# Patient Record
Sex: MALE | Race: WHITE | NOT HISPANIC OR LATINO | Employment: OTHER | URBAN - METROPOLITAN AREA
[De-identification: names, ages, dates, MRNs, and addresses within clinical notes are randomized per-mention and may not be internally consistent; named-entity substitution may affect disease eponyms.]

---

## 2017-02-12 ENCOUNTER — HOSPITAL ENCOUNTER (EMERGENCY)
Facility: HOSPITAL | Age: 72
Discharge: HOME | End: 2017-02-12
Attending: EMERGENCY MEDICINE | Admitting: EMERGENCY MEDICINE

## 2017-02-12 ENCOUNTER — APPOINTMENT (OUTPATIENT)
Dept: GENERAL RADIOLOGY | Facility: HOSPITAL | Age: 72
End: 2017-02-12

## 2017-02-12 VITALS
BODY MASS INDEX: 35.21 KG/M2 | WEIGHT: 260 LBS | SYSTOLIC BLOOD PRESSURE: 185 MMHG | OXYGEN SATURATION: 96 % | TEMPERATURE: 97.8 F | HEIGHT: 72 IN | DIASTOLIC BLOOD PRESSURE: 96 MMHG | HEART RATE: 54 BPM | RESPIRATION RATE: 16 BRPM

## 2017-02-12 DIAGNOSIS — M54.31 SCIATICA OF RIGHT SIDE: ICD-10-CM

## 2017-02-12 DIAGNOSIS — S39.012A LUMBAR STRAIN, INITIAL ENCOUNTER: Primary | ICD-10-CM

## 2017-02-12 PROCEDURE — 99283 EMERGENCY DEPT VISIT LOW MDM: CPT

## 2017-02-12 PROCEDURE — 72110 X-RAY EXAM L-2 SPINE 4/>VWS: CPT

## 2017-02-12 RX ORDER — BACLOFEN 10 MG/1
10 TABLET ORAL 3 TIMES DAILY PRN
Qty: 15 TABLET | Refills: 0 | Status: SHIPPED | OUTPATIENT
Start: 2017-02-12 | End: 2017-04-17

## 2017-02-12 RX ORDER — BACLOFEN 10 MG/1
10 TABLET ORAL ONCE
Status: COMPLETED | OUTPATIENT
Start: 2017-02-12 | End: 2017-02-12

## 2017-02-12 RX ORDER — TAMSULOSIN HYDROCHLORIDE 0.4 MG/1
1 CAPSULE ORAL DAILY
COMMUNITY
End: 2020-10-10 | Stop reason: HOSPADM

## 2017-02-12 RX ORDER — OXYCODONE HYDROCHLORIDE AND ACETAMINOPHEN 5; 325 MG/1; MG/1
1 TABLET ORAL ONCE
Status: COMPLETED | OUTPATIENT
Start: 2017-02-12 | End: 2017-02-12

## 2017-02-12 RX ORDER — SIMVASTATIN 10 MG
10 TABLET ORAL NIGHTLY
COMMUNITY
End: 2018-05-21 | Stop reason: CLARIF

## 2017-02-12 RX ORDER — FINASTERIDE 5 MG/1
5 TABLET, FILM COATED ORAL NIGHTLY
COMMUNITY
End: 2022-01-10

## 2017-02-12 RX ORDER — LISINOPRIL 40 MG/1
40 TABLET ORAL EVERY MORNING
COMMUNITY
End: 2019-12-10 | Stop reason: SDUPTHER

## 2017-02-12 RX ORDER — OXYCODONE HYDROCHLORIDE AND ACETAMINOPHEN 5; 325 MG/1; MG/1
1 TABLET ORAL EVERY 4 HOURS PRN
Qty: 16 TABLET | Refills: 0 | Status: SHIPPED | OUTPATIENT
Start: 2017-02-12 | End: 2017-04-17

## 2017-02-12 RX ORDER — DILTIAZEM HYDROCHLORIDE 120 MG/1
240 TABLET, FILM COATED ORAL ONCE
COMMUNITY
End: 2017-04-17 | Stop reason: DRUGHIGH

## 2017-02-12 RX ORDER — GLIMEPIRIDE 4 MG/1
4 TABLET ORAL
COMMUNITY
End: 2019-12-10

## 2017-02-12 RX ORDER — CLOPIDOGREL BISULFATE 75 MG/1
75 TABLET ORAL EVERY MORNING
COMMUNITY
End: 2018-11-07 | Stop reason: ALTCHOICE

## 2017-02-12 RX ORDER — HYDRALAZINE HYDROCHLORIDE 50 MG/1
50 TABLET, FILM COATED ORAL 2 TIMES DAILY
COMMUNITY
End: 2018-05-21 | Stop reason: DRUGHIGH

## 2017-02-12 RX ADMIN — BACLOFEN 10 MG: 10 TABLET ORAL at 13:08

## 2017-02-12 RX ADMIN — OXYCODONE HYDROCHLORIDE AND ACETAMINOPHEN 1 TABLET: 5; 325 TABLET ORAL at 13:08

## 2017-02-12 NOTE — ED PROVIDER NOTES
70 y/o male presents to the ED with right sided back pain with radiation down his right leg. Pt states he was seen by Mandaen recently, DVT was ruled out with venous duplex. The pt also says he underwent cardiac cath earlier this month.   The pt says he has been using his stationary bicycle     EXAM:  Awake, alert, oriented X3  Pain to Right buttock with radiation to right thigh, NV intact distally.    PLAN:   XR L-Spine - no acute abnormalities.   Pt will be d/c from the ED and instructed to f/u with ortho.     I supervised care provided by the midlevel provider.    We have discussed this patient's history, physical exam, and treatment plan.   I have reviewed the note and personally saw and examined the patient and agree with the plan of care.    Entered by Nicolás Baez acting as scribe for Dr. Mccartney.     Nicolás Baez  02/12/17 7123       Aftab Mccartney MD  02/13/17 2996

## 2017-02-12 NOTE — ED NOTES
"Pt wife states they went to a [facility] due to the leg and back pain. States they did a doppler and ruled out a blood clot and sent him home with no medications. Pt states he has had a similar event occur 5 years ago and was told he had \"scoliosis\". Pt states he has had pain shooting down from his and down his leg.      Beth Armenta, Nursing Student  02/12/17 1123    "

## 2017-02-12 NOTE — ED PROVIDER NOTES
"  EMERGENCY DEPARTMENT ENCOUNTER    CHIEF COMPLAINT  Chief Complaint: back pain  History given by: patient, family  History limited by: N/A  Room Number: 07/07  PMD: MUKUL Bañuelos      HPI:  Pt is a 71 y.o. male who presents with right lower back pain radiating to the right hip and right thigh which started about 4 days ago. Right lower back pain is exacerbated by bearing weight. He denies recent injury or trauma, sensory loss, motor loss, bladder dysfunction, bowel dysfunction, saddle anesthesia, chest pain, trouble breathing, fevers, chills, abd pain, and N/V/D. He has been taking NSAIDs for pain without sx relief. Per family, pt was evaluated yesterday at [facility] for \"a knot in the right leg\" and had RLE venous duplex performed which showed no DVT. Pt also reports that he had unremarkable cardiac cath earlier this month and was started on plavix. Past Medical History of HTN and diabetes.     Duration: started about 4 days ago  Timing: intermittent  Location: right lower back  Radiation: right hip and right thigh  Quality: pain  Intensity/Severity: moderate  Progression: unchanged  Associated Symptoms: none  Aggravating Factors: bearing weight  Alleviating Factors: resting  Previous Episodes: pt had similar sx 5 years ago for which he was evaluated at the time and was told he had \"scoliosis\"   Treatment before arrival: pt has been taking NSAIDs for pain without sx relief    PAST MEDICAL HISTORY  Active Ambulatory Problems     Diagnosis Date Noted   • No Active Ambulatory Problems     Resolved Ambulatory Problems     Diagnosis Date Noted   • No Resolved Ambulatory Problems     Past Medical History   Diagnosis Date   • Diabetes mellitus    • Hypertension    • Prostate hyperplasia without urinary obstruction    • TIA (transient ischemic attack)        PAST SURGICAL HISTORY  Past Surgical History   Procedure Laterality Date   • Joint replacement       Righ Knee   • Hand surgery     • Colonoscopy   "       FAMILY HISTORY  History reviewed. No pertinent family history.    SOCIAL HISTORY  Social History     Social History   • Marital status:      Spouse name: N/A   • Number of children: N/A   • Years of education: N/A     Occupational History   • Not on file.     Social History Main Topics   • Smoking status: Former Smoker   • Smokeless tobacco: Never Used      Comment: quite: 1973   • Alcohol use No   • Drug use: No   • Sexual activity: Not on file     Other Topics Concern   • Not on file     Social History Narrative   • No narrative on file         ALLERGIES  Review of patient's allergies indicates no known allergies.    REVIEW OF SYSTEMS  Review of Systems   Constitutional: Negative for chills and fever.   HENT: Negative for sore throat.    Respiratory: Negative for shortness of breath.    Cardiovascular: Negative for chest pain.   Gastrointestinal: Negative for abdominal pain, diarrhea, nausea and vomiting.        No bowel dysfunction   Genitourinary: Negative for difficulty urinating and dysuria.        No bladder dysfunction   Musculoskeletal: Positive for back pain (right lower back pain radiating to the right hip and right thigh). Negative for neck pain.   Skin: Negative for rash.   Neurological: Negative for weakness, numbness and headaches.        No saddle anesthesia   Psychiatric/Behavioral: The patient is not nervous/anxious.        PHYSICAL EXAM  ED Triage Vitals   Temp Heart Rate Resp BP SpO2   02/12/17 1115 02/12/17 1115 02/12/17 1115 02/12/17 1144 02/12/17 1115   97.8 °F (36.6 °C) 76 18 191/89 95 % WNL       Physical Exam   Constitutional: He is oriented to person, place, and time and well-developed, well-nourished, and in no distress. No distress.   HENT:   Head: Atraumatic.   Mouth/Throat: Mucous membranes are normal.   Eyes: No scleral icterus.   Neck: Normal range of motion.   Cardiovascular: Normal rate, regular rhythm, normal heart sounds and intact distal pulses.    Pulses:        Dorsalis pedis pulses are 2+ on the right side, and 2+ on the left side.        Posterior tibial pulses are 2+ on the right side, and 2+ on the left side.   Pulmonary/Chest: Effort normal and breath sounds normal.   Abdominal: Soft. There is no tenderness. There is no rebound and no guarding.   Musculoskeletal: Normal range of motion.   No l-spine tenderness, tenderness over right SI joint, negative straight leg raises bilaterally, NV intact distally to bilateral feet   Neurological: He is alert and oriented to person, place, and time. He has normal sensation and normal strength.   Skin: Skin is warm and dry.   Psychiatric: Mood and affect normal.   Nursing note and vitals reviewed.        RADIOLOGY         XR Spine Lumbar 4+ View (Final result) Result time: 02/12/17 13:39:34     Final result by Rc Lombardi MD (02/12/17 13:39:34)     Narrative:     LUMBAR SPINE SERIES 5 VIEWS     HISTORY: 71-year-old male with nontraumatic low back pain associated  with right-sided hip pain x4 days.     COMPARISON: (none available)     FINDINGS:  1. The scoliosis of the lumbar spine convex to the right measuring  approximately 19 degrees.  2. Multilevel degenerative disease with vacuum phenomena and L1-S1.  2. No acute abnormality.  3. Prominent overlying bowel contents somewhat limits evaluation  question constipation.     This report was finalized on 2/12/2017 1:39 PM by Dr. Rc Lombardi MD.                   I ordered the above noted radiological studies and reviewed the images on the PACS system.         PROGRESS AND CONSULTS  12:39 PM- Discussed with pt about the importance of not taking NSAIDs while he is on a blood thinner.  12:56 PM- Ordered percocet and baclofen for back pain.   2:00 PM- Reviewed pt's history and workup with Dr. Mccartney.  At bedside evaluation, they agree with the plan of care.  2:28 PM- Rechecked pt. He is feeling better after ED treatment. Reviewed implications of results (l-spine xray findings),  diagnosis, meds, responsibility to follow up, warning signs and symptoms of possible worsening, potential complications and reasons to return to ER with patient.  Discussed all results and noted any abnormalities with patient.  Discussed absolute need to recheck abnormalities and condition with orthopedic surgeon. Informed pt that sx are possibly due to right sided sciatica for which he will be prescribed short course of pain medication and muscle relaxer. Advised pt to apply ice or heat and to perform gentle stretching. Again reiterated to pt to not take NSAIDs while he is on a blood thinner.   Discussed plan for discharge, as there is no emergent indication for admission.  Pt is agreeable and understands need for follow up and repeat testing.  Pt is aware that discharge does not mean that nothing is wrong but it indicates no emergency is present.  Pt is discharged with instructions to follow up with primary care doctor to have their blood pressure rechecked.       DIAGNOSIS  Final diagnoses:   Lumbar strain, initial encounter   Sciatica of right side       FOLLOW UP   Zamzam John, MUKUL  [address]  [facility] [id number] [phone]    Call in 1 day      Prudencio Chambers MD  [address]  [facility] [id number] [phone]    Call in 1 day        RX     baclofen 10 MG tablet   Commonly known as:  LIORESAL   Take 1 tablet by mouth 3 (Three) Times a Day As Needed for muscle spasms.       oxyCODONE-acetaminophen 5-325 MG per tablet   Commonly known as:  PERCOCET   Take 1 tablet by mouth Every 4 (Four) Hours As Needed for moderate pain   (4-6).           [facility] report #[id number] reviewed.  Risks, benefits, alternatives discussed with patient.  Pt consents to treatment and agrees to follow up with PMD tomorrow for further care and any other prescriptions.       COURSE & MEDICAL DECISION MAKING  Pertinent Imaging studies that were ordered and reviewed are noted above.  Results were  "reviewed/discussed with the patient and they were also made aware of online assess.   Pt also made aware that some labs, such as cultures, will not be resulted during ER visit and follow up with PMD is necessary.     MEDICATIONS GIVEN IN ER  Medications   oxyCODONE-acetaminophen (PERCOCET) 5-325 MG per tablet 1 tablet (1 tablet Oral Given 2/12/17 1308)   baclofen (LIORESAL) tablet 10 mg (10 mg Oral Given 2/12/17 1308)       Visit Vitals   • BP (!) 181/88   • Pulse 86   • Temp 97.8 °F (36.6 °C) (Tympanic)   • Resp 19   • Ht 72\" (182.9 cm)   • Wt 260 lb (118 kg)   • SpO2 96%   • BMI 35.26 kg/m2         I personally reviewed the past medical history, past surgical history, social history, family history, current medications and allergies as they appear in this chart.  The scribe's note accurately reflects the work and decisions made by me.     I personally scribed for YN Harmon on 2/12/2017 at 2:31 PM.  Electronically signed by Clarice Lundberg on 2/12/2017 at time 2:31 PM         Clarice Lundberg  02/12/17 1444       MUKUL Johnson  02/12/17 1703    "

## 2017-02-12 NOTE — DISCHARGE INSTRUCTIONS
Medications as ordered  Continue current home medications   Heat or ice to back with gentle stretching  Activity as tolerated  Follow up with pmd/orthopedic md-call in am for appointment  Return to er for numbness/tingling to legs, loss of bowel/bladder function, increased pain or any new or worsening symptoms

## 2017-03-16 ENCOUNTER — TRANSCRIBE ORDERS (OUTPATIENT)
Dept: ADMINISTRATIVE | Facility: HOSPITAL | Age: 72
End: 2017-03-16

## 2017-03-16 DIAGNOSIS — M89.9 BONE DISORDER: Primary | ICD-10-CM

## 2017-03-16 DIAGNOSIS — M54.40 BILATERAL LOW BACK PAIN WITH SCIATICA, SCIATICA LATERALITY UNSPECIFIED, UNSPECIFIED CHRONICITY: ICD-10-CM

## 2017-03-22 ENCOUNTER — APPOINTMENT (OUTPATIENT)
Dept: BONE DENSITY | Facility: HOSPITAL | Age: 72
End: 2017-03-22
Attending: ORTHOPAEDIC SURGERY

## 2017-04-14 ENCOUNTER — APPOINTMENT (OUTPATIENT)
Dept: PREADMISSION TESTING | Facility: HOSPITAL | Age: 72
End: 2017-04-14

## 2017-04-17 ENCOUNTER — HOSPITAL ENCOUNTER (OUTPATIENT)
Dept: GENERAL RADIOLOGY | Facility: HOSPITAL | Age: 72
Discharge: HOME | End: 2017-04-17

## 2017-04-17 ENCOUNTER — HOSPITAL ENCOUNTER (OUTPATIENT)
Dept: GENERAL RADIOLOGY | Facility: HOSPITAL | Age: 72
Discharge: HOME | End: 2017-04-17
Admitting: ORTHOPAEDIC SURGERY

## 2017-04-17 ENCOUNTER — APPOINTMENT (OUTPATIENT)
Dept: PREADMISSION TESTING | Facility: HOSPITAL | Age: 72
End: 2017-04-17

## 2017-04-17 VITALS
OXYGEN SATURATION: 96 % | SYSTOLIC BLOOD PRESSURE: 168 MMHG | RESPIRATION RATE: 20 BRPM | HEART RATE: 69 BPM | BODY MASS INDEX: 34.95 KG/M2 | HEIGHT: 72 IN | WEIGHT: 258 LBS | TEMPERATURE: 98.4 F | DIASTOLIC BLOOD PRESSURE: 76 MMHG

## 2017-04-17 LAB
ALBUMIN SERPL-MCNC: 4.3 G/DL (ref 3.5–5.2)
ALBUMIN/GLOB SERPL: 1.6 G/DL
ALP SERPL-CCNC: 78 U/L (ref 39–117)
ALT SERPL W P-5'-P-CCNC: 23 U/L (ref 1–41)
ANION GAP SERPL CALCULATED.3IONS-SCNC: 15.2 MMOL/L
APTT PPP: 30.4 SECONDS (ref 22.7–35.4)
AST SERPL-CCNC: 25 U/L (ref 1–40)
BASOPHILS # BLD AUTO: 0.09 10*3/MM3 (ref 0–0.2)
BASOPHILS NFR BLD AUTO: 0.9 % (ref 0–1.5)
BILIRUB SERPL-MCNC: 0.5 MG/DL (ref 0.1–1.2)
BILIRUB UR QL STRIP: NEGATIVE
BUN BLD-MCNC: 16 MG/DL (ref 8–23)
BUN/CREAT SERPL: 13.6 (ref 7–25)
CALCIUM SPEC-SCNC: 9.5 MG/DL (ref 8.6–10.5)
CHLORIDE SERPL-SCNC: 100 MMOL/L (ref 98–107)
CLARITY UR: CLEAR
CO2 SERPL-SCNC: 26.8 MMOL/L (ref 22–29)
COLOR UR: YELLOW
CREAT BLD-MCNC: 1.18 MG/DL (ref 0.76–1.27)
DEPRECATED RDW RBC AUTO: 47.4 FL (ref 37–54)
EOSINOPHIL # BLD AUTO: 0.82 10*3/MM3 (ref 0–0.7)
EOSINOPHIL NFR BLD AUTO: 8.5 % (ref 0.3–6.2)
ERYTHROCYTE [DISTWIDTH] IN BLOOD BY AUTOMATED COUNT: 14 % (ref 11.5–14.5)
GFR SERPL CREATININE-BSD FRML MDRD: 61 ML/MIN/1.73
GLOBULIN UR ELPH-MCNC: 2.7 GM/DL
GLUCOSE BLD-MCNC: 367 MG/DL (ref 65–99)
GLUCOSE UR STRIP-MCNC: ABNORMAL MG/DL
HCT VFR BLD AUTO: 48.3 % (ref 40.4–52.2)
HGB BLD-MCNC: 16.6 G/DL (ref 13.7–17.6)
HGB UR QL STRIP.AUTO: NEGATIVE
IMM GRANULOCYTES # BLD: 0 10*3/MM3 (ref 0–0.03)
IMM GRANULOCYTES NFR BLD: 0 % (ref 0–0.5)
INR PPP: 1.14 (ref 0.9–1.1)
KETONES UR QL STRIP: NEGATIVE
LEUKOCYTE ESTERASE UR QL STRIP.AUTO: NEGATIVE
LYMPHOCYTES # BLD AUTO: 1.41 10*3/MM3 (ref 0.9–4.8)
LYMPHOCYTES NFR BLD AUTO: 14.5 % (ref 19.6–45.3)
MCH RBC QN AUTO: 31.9 PG (ref 27–32.7)
MCHC RBC AUTO-ENTMCNC: 34.4 G/DL (ref 32.6–36.4)
MCV RBC AUTO: 92.7 FL (ref 79.8–96.2)
MONOCYTES # BLD AUTO: 0.53 10*3/MM3 (ref 0.2–1.2)
MONOCYTES NFR BLD AUTO: 5.5 % (ref 5–12)
NEUTROPHILS # BLD AUTO: 6.85 10*3/MM3 (ref 1.9–8.1)
NEUTROPHILS NFR BLD AUTO: 70.6 % (ref 42.7–76)
NITRITE UR QL STRIP: NEGATIVE
PH UR STRIP.AUTO: 6 [PH] (ref 5–8)
PLATELET # BLD AUTO: 525 10*3/MM3 (ref 140–500)
PMV BLD AUTO: 10.1 FL (ref 6–12)
POTASSIUM BLD-SCNC: 3.5 MMOL/L (ref 3.5–5.2)
PROT SERPL-MCNC: 7 G/DL (ref 6–8.5)
PROT UR QL STRIP: NEGATIVE
PROTHROMBIN TIME: 14.1 SECONDS (ref 11.7–14.2)
RBC # BLD AUTO: 5.21 10*6/MM3 (ref 4.6–6)
SODIUM BLD-SCNC: 142 MMOL/L (ref 136–145)
SP GR UR STRIP: 1.01 (ref 1–1.03)
UROBILINOGEN UR QL STRIP: ABNORMAL
WBC NRBC COR # BLD: 9.7 10*3/MM3 (ref 4.5–10.7)

## 2017-04-17 PROCEDURE — 71020 HC CHEST PA AND LATERAL: CPT

## 2017-04-17 PROCEDURE — 85610 PROTHROMBIN TIME: CPT

## 2017-04-17 PROCEDURE — 80053 COMPREHEN METABOLIC PANEL: CPT

## 2017-04-17 PROCEDURE — 85730 THROMBOPLASTIN TIME PARTIAL: CPT

## 2017-04-17 PROCEDURE — 81003 URINALYSIS AUTO W/O SCOPE: CPT

## 2017-04-17 PROCEDURE — 85025 COMPLETE CBC W/AUTO DIFF WBC: CPT

## 2017-04-17 PROCEDURE — 36415 COLL VENOUS BLD VENIPUNCTURE: CPT

## 2017-04-17 PROCEDURE — 73560 X-RAY EXAM OF KNEE 1 OR 2: CPT

## 2017-04-17 RX ORDER — DILTIAZEM HYDROCHLORIDE 240 MG/1
240 CAPSULE, COATED, EXTENDED RELEASE ORAL EVERY MORNING
COMMUNITY
End: 2018-10-24

## 2017-04-17 RX ORDER — BUMETANIDE 1 MG/1
1 TABLET ORAL EVERY MORNING
COMMUNITY
End: 2020-08-29 | Stop reason: HOSPADM

## 2017-04-17 RX ORDER — TRIAMTERENE AND HYDROCHLOROTHIAZIDE 37.5; 25 MG/1; MG/1
1 CAPSULE ORAL EVERY MORNING
COMMUNITY
End: 2020-08-29 | Stop reason: HOSPADM

## 2017-04-17 NOTE — DISCHARGE INSTRUCTIONS
Take the following medications the morning of surgery with a small sip of water:hydralazine, triamterene cartia        General Instructions:  • Do not eat solid food after midnight the night before surgery.  • You may drink clear liquids day of surgery but must stop at least one hour before your hospital arrival time.  • It is beneficial for you to have a clear drink that contains carbohydrates the day of surgery.  We suggest a 20 ounce bottle of Gatorade or Powerade for non-diabetic patients or a 20 ounce bottle of G2 or Powerade Zero for diabetic patients. (Pediatric patients, are not advised to drink a 20 ounce carbohydrate drink)    Clear liquids are liquids you can see through.  Nothing red in color.     Plain water                               Sports drinks  Sodas                                   Gelatin (Jell-O)  Fruit juices without pulp such as white grape juice and apple juice  Popsicles that contain no fruit or yogurt  Tea or coffee (no cream or milk added)  Gatorade / Powerade  G2 / Powerade Zero    • Infants may have breast milk up to four hours before surgery.  • Infants drinking formula may drink formula up to six hours before surgery.   • Patients who avoid smoking, chewing tobacco and alcohol for 4 weeks prior to surgery have a reduced risk of post-operative complications.  Quit smoking as many days before surgery as you can.  • Do not smoke, use chewing tobacco or drink alcohol the day of surgery.   • If applicable bring your C-PAP/ BI-PAP machine.  • Bring any papers given to you in the doctor’s office.  • Wear clean comfortable clothes and socks.  • Do not wear contact lenses or make-up.  Bring a case for your glasses.   • Bring crutches or walker if applicable.  • Leave all other valuables and jewelry at home.  • The Pre-Admission Testing nurse will instruct you to bring medications if unable to obtain an accurate list in Pre-Admission Testing.        If you were given a blood bank ID arm band  remember to bring it with you the day of surgery.    Preventing a Surgical Site Infection:  • For 2 to 3 days before surgery, avoid shaving with a razor because the razor can irritate skin and make it easier to develop an infection.  • The night prior to surgery sleep in a clean bed with clean clothing.  Do not allow pets to sleep with you.  • Shower on the morning of surgery using a fresh bar of anti-bacterial soap (such as Dial) and clean washcloth.  Dry with a clean towel and dress in clean clothing.  • Ask your surgeon if you will be receiving antibiotics prior to surgery.  • Make sure you, your family, and all healthcare providers clean their hands with soap and water or an alcohol based hand  before caring for you or your wound.    Day of surgery:  Upon arrival, a Pre-op nurse and Anesthesiologist will review your health history, obtain vital signs, and answer questions you may have.  The only belongings needed at this time will be your home medications and if applicable your C-PAP/BI-PAP machine.  If you are staying overnight your family can leave the rest of your belongings in the car and bring them to your room later.  A Pre-op nurse will start an IV and you may receive medication in preparation for surgery, including something to help you relax.  Your family will be able to see you in the Pre-op area.  While you are in surgery your family should notify the waiting room  if they leave the waiting room area and provide a contact phone number.    Please be aware that surgery does come with discomfort.  We want to make every effort to control your discomfort so please discuss any uncontrolled symptoms with your nurse.   Your doctor will most likely have prescribed pain medications.      If you are going home after surgery you will receive individualized written care instructions before being discharged.  A responsible adult must drive you to and from the hospital on the day of your surgery  and stay with you for 24 hours.    If you are staying overnight following surgery, you will be transported to your hospital room following the recovery period.  [facility] has all private rooms.    If you have any questions please call Pre-Admission Testing at [phone].  Deductibles and co-payments are collected on the day of service. Please be prepared to pay the required co-pay, deductible or deposit on the day of service as defined by your plan.    2% CHLORAHEXIDINE GLUCONATE* CLOTH  Preparing or “prepping” skin before surgery can reduce the risk of infection at the surgical site. To make the process easier, [facility] has chosen disposable cloths moistened with a rinse-free, 2% Chlorhexidine Gluconate (CHG) antiseptic solution. The steps below outline the prepping process and should be carefully followed.        Use the prep cloth on the area that is circled in the diagram             Directions Night before Surgery  1) Shower using a fresh bar of anti-bacterial soap (such as Dial) and clean washcloth.  Use a clean towel to completely dry your skin.  2) Do not use any lotions, oils or creams on your skin.  3) Open the package and remove 1 cloth, wipe your skin for 30 seconds in a circular motion.  Allow to dry for 3 minutes.  4) Repeat #3 with second cloth.  5) Do not touch your eyes, ears, or mouth with the prep cloth.  6) Allow the wet prep solution to air dry.  7) Discard the prep cloth and wash your hands with soap and water.   8) Dress in clean bed clothes and sleep on fresh clean bed sheets.   9) You may experience some temporary itching after the prep.    Directions Day of Surgery  1) Repeat steps 1,2,3,4,5,6,7, and 9.   2) Dress in clean clothes before coming to the hospital.    BACTROBAN NASAL OINTMENT  There are many germs normally in your nose. Bactroban is an ointment that will help reduce these germs. Please follow these instructions for Bactroban use:    ____Two days  before surgery in the evening Date________    ____The day before surgery in the morning  Date________    ____The day before surgery in the evening              Date________    ____The day of surgery in the morning    Date________    **Squirt ½ package of Bactroban Ointment onto a cotton applicator and apply to inside of 1st nostril.  Squirt the remaining Bactroban and apply to the inside of the other nostril.    PERIDEX- ORAL:  Use only if your surgeon has ordered  Use the night before and morning of surgery - Swish, gargle, and spit - do not swallow.

## 2017-04-27 ENCOUNTER — HOSPITAL ENCOUNTER (INPATIENT)
Facility: HOSPITAL | Age: 72
LOS: 1 days | Discharge: HOME HEALTH | End: 2017-04-28
Attending: ORTHOPAEDIC SURGERY | Admitting: ORTHOPAEDIC SURGERY

## 2017-04-27 ENCOUNTER — ANESTHESIA EVENT (OUTPATIENT)
Dept: PERIOP | Facility: HOSPITAL | Age: 72
End: 2017-04-27

## 2017-04-27 ENCOUNTER — ANESTHESIA (OUTPATIENT)
Dept: PERIOP | Facility: HOSPITAL | Age: 72
End: 2017-04-27

## 2017-04-27 DIAGNOSIS — R26.89 IMPAIRED GAIT AND MOBILITY: Primary | ICD-10-CM

## 2017-04-27 PROBLEM — M17.9 OSTEOARTHRITIS, KNEE: Status: ACTIVE | Noted: 2017-04-27

## 2017-04-27 LAB
GLUCOSE BLDC GLUCOMTR-MCNC: 168 MG/DL (ref 70–130)
GLUCOSE BLDC GLUCOMTR-MCNC: 234 MG/DL (ref 70–130)
GLUCOSE BLDC GLUCOMTR-MCNC: 268 MG/DL (ref 70–130)
GLUCOSE BLDC GLUCOMTR-MCNC: 89 MG/DL (ref 70–130)

## 2017-04-27 PROCEDURE — C1713 ANCHOR/SCREW BN/BN,TIS/BN: HCPCS | Performed by: ORTHOPAEDIC SURGERY

## 2017-04-27 PROCEDURE — 25010000002 FENTANYL CITRATE (PF) 100 MCG/2ML SOLUTION: Performed by: NURSE ANESTHETIST, CERTIFIED REGISTERED

## 2017-04-27 PROCEDURE — 97161 PT EVAL LOW COMPLEX 20 MIN: CPT

## 2017-04-27 PROCEDURE — 94799 UNLISTED PULMONARY SVC/PX: CPT

## 2017-04-27 PROCEDURE — 63710000001 INSULIN ASPART PER 5 UNITS: Performed by: ORTHOPAEDIC SURGERY

## 2017-04-27 PROCEDURE — 25010000002 ONDANSETRON PER 1 MG: Performed by: NURSE ANESTHETIST, CERTIFIED REGISTERED

## 2017-04-27 PROCEDURE — 82962 GLUCOSE BLOOD TEST: CPT

## 2017-04-27 PROCEDURE — 25010000003 CEFAZOLIN IN DEXTROSE 2-4 GM/100ML-% SOLUTION: Performed by: ORTHOPAEDIC SURGERY

## 2017-04-27 PROCEDURE — 25010000003 CEFAZOLIN IN DEXTROSE 2-4 GM/100ML-% SOLUTION: Performed by: NURSE ANESTHETIST, CERTIFIED REGISTERED

## 2017-04-27 PROCEDURE — 0SRD0J9 REPLACEMENT OF LEFT KNEE JOINT WITH SYNTHETIC SUBSTITUTE, CEMENTED, OPEN APPROACH: ICD-10-PCS | Performed by: ORTHOPAEDIC SURGERY

## 2017-04-27 PROCEDURE — 25010000002 MIDAZOLAM PER 1 MG: Performed by: ANESTHESIOLOGY

## 2017-04-27 PROCEDURE — C1776 JOINT DEVICE (IMPLANTABLE): HCPCS | Performed by: ORTHOPAEDIC SURGERY

## 2017-04-27 PROCEDURE — 97110 THERAPEUTIC EXERCISES: CPT

## 2017-04-27 PROCEDURE — 25010000002 KETOROLAC TROMETHAMINE PER 15 MG: Performed by: ORTHOPAEDIC SURGERY

## 2017-04-27 PROCEDURE — 25010000002 PROPOFOL 10 MG/ML EMULSION: Performed by: NURSE ANESTHETIST, CERTIFIED REGISTERED

## 2017-04-27 PROCEDURE — 25010000002 VANCOMYCIN: Performed by: ORTHOPAEDIC SURGERY

## 2017-04-27 DEVICE — TRY TIB INTERLOK PRI 79MM: Type: IMPLANTABLE DEVICE | Site: KNEE | Status: FUNCTIONAL

## 2017-04-27 DEVICE — CMT BONE PALACOS 120001: Type: IMPLANTABLE DEVICE | Site: KNEE | Status: FUNCTIONAL

## 2017-04-27 DEVICE — IMPLANTABLE DEVICE: Type: IMPLANTABLE DEVICE | Site: KNEE | Status: FUNCTIONAL

## 2017-04-27 DEVICE — PAT 3PEG THN 37X9.6 37MM: Type: IMPLANTABLE DEVICE | Site: KNEE | Status: FUNCTIONAL

## 2017-04-27 DEVICE — CAP TOTL KN CMT PREMIUM: Type: IMPLANTABLE DEVICE | Site: KNEE | Status: FUNCTIONAL

## 2017-04-27 DEVICE — COMP FEM/KN VANGUARD INTLK CR 72.5MM NS LT: Type: IMPLANTABLE DEVICE | Site: KNEE | Status: FUNCTIONAL

## 2017-04-27 DEVICE — STEM TIB PRI FINN 46X40MM: Type: IMPLANTABLE DEVICE | Site: KNEE | Status: FUNCTIONAL

## 2017-04-27 RX ORDER — PROMETHAZINE HYDROCHLORIDE 25 MG/1
12.5 TABLET ORAL ONCE AS NEEDED
Status: DISCONTINUED | OUTPATIENT
Start: 2017-04-27 | End: 2017-04-27 | Stop reason: HOSPADM

## 2017-04-27 RX ORDER — ONDANSETRON 2 MG/ML
INJECTION INTRAMUSCULAR; INTRAVENOUS AS NEEDED
Status: DISCONTINUED | OUTPATIENT
Start: 2017-04-27 | End: 2017-04-27 | Stop reason: SURG

## 2017-04-27 RX ORDER — PROMETHAZINE HYDROCHLORIDE 25 MG/ML
12.5 INJECTION, SOLUTION INTRAMUSCULAR; INTRAVENOUS ONCE AS NEEDED
Status: DISCONTINUED | OUTPATIENT
Start: 2017-04-27 | End: 2017-04-27 | Stop reason: HOSPADM

## 2017-04-27 RX ORDER — HYDRALAZINE HYDROCHLORIDE 50 MG/1
50 TABLET, FILM COATED ORAL 2 TIMES DAILY
Status: DISCONTINUED | OUTPATIENT
Start: 2017-04-27 | End: 2017-04-28

## 2017-04-27 RX ORDER — CEFAZOLIN SODIUM 2 G/100ML
2 INJECTION, SOLUTION INTRAVENOUS EVERY 8 HOURS
Status: DISCONTINUED | OUTPATIENT
Start: 2017-04-27 | End: 2017-04-27

## 2017-04-27 RX ORDER — FERROUS SULFATE 325(65) MG
325 TABLET ORAL
Status: DISCONTINUED | OUTPATIENT
Start: 2017-04-27 | End: 2017-04-28 | Stop reason: HOSPADM

## 2017-04-27 RX ORDER — ROSUVASTATIN CALCIUM 5 MG/1
5 TABLET, COATED ORAL DAILY
Status: DISCONTINUED | OUTPATIENT
Start: 2017-04-27 | End: 2017-04-28 | Stop reason: HOSPADM

## 2017-04-27 RX ORDER — HYDROCODONE BITARTRATE AND ACETAMINOPHEN 7.5; 325 MG/1; MG/1
1 TABLET ORAL ONCE AS NEEDED
Status: DISCONTINUED | OUTPATIENT
Start: 2017-04-27 | End: 2017-04-27 | Stop reason: HOSPADM

## 2017-04-27 RX ORDER — DILTIAZEM HYDROCHLORIDE 240 MG/1
240 CAPSULE, COATED, EXTENDED RELEASE ORAL EVERY MORNING
Status: DISCONTINUED | OUTPATIENT
Start: 2017-04-27 | End: 2017-04-28 | Stop reason: HOSPADM

## 2017-04-27 RX ORDER — ASPIRIN 81 MG/1
81 TABLET ORAL DAILY
Status: DISCONTINUED | OUTPATIENT
Start: 2017-04-27 | End: 2017-04-28 | Stop reason: HOSPADM

## 2017-04-27 RX ORDER — DEXTROSE MONOHYDRATE 25 G/50ML
25 INJECTION, SOLUTION INTRAVENOUS
Status: DISCONTINUED | OUTPATIENT
Start: 2017-04-27 | End: 2017-04-28 | Stop reason: HOSPADM

## 2017-04-27 RX ORDER — MIDAZOLAM HYDROCHLORIDE 1 MG/ML
2 INJECTION INTRAMUSCULAR; INTRAVENOUS
Status: DISCONTINUED | OUTPATIENT
Start: 2017-04-27 | End: 2017-04-27 | Stop reason: HOSPADM

## 2017-04-27 RX ORDER — NALOXONE HCL 0.4 MG/ML
0.1 VIAL (ML) INJECTION
Status: DISCONTINUED | OUTPATIENT
Start: 2017-04-27 | End: 2017-04-28 | Stop reason: HOSPADM

## 2017-04-27 RX ORDER — FENTANYL CITRATE 50 UG/ML
50 INJECTION, SOLUTION INTRAMUSCULAR; INTRAVENOUS
Status: DISCONTINUED | OUTPATIENT
Start: 2017-04-27 | End: 2017-04-27 | Stop reason: HOSPADM

## 2017-04-27 RX ORDER — ATORVASTATIN CALCIUM 10 MG/1
10 TABLET, FILM COATED ORAL DAILY
Status: DISCONTINUED | OUTPATIENT
Start: 2017-04-27 | End: 2017-04-27 | Stop reason: CLARIF

## 2017-04-27 RX ORDER — GLIMEPIRIDE 4 MG/1
4 TABLET ORAL
Status: DISCONTINUED | OUTPATIENT
Start: 2017-04-27 | End: 2017-04-27 | Stop reason: CLARIF

## 2017-04-27 RX ORDER — SODIUM CHLORIDE 0.9 % (FLUSH) 0.9 %
1-10 SYRINGE (ML) INJECTION AS NEEDED
Status: DISCONTINUED | OUTPATIENT
Start: 2017-04-27 | End: 2017-04-27 | Stop reason: HOSPADM

## 2017-04-27 RX ORDER — ONDANSETRON 2 MG/ML
4 INJECTION INTRAMUSCULAR; INTRAVENOUS EVERY 6 HOURS PRN
Status: DISCONTINUED | OUTPATIENT
Start: 2017-04-27 | End: 2017-04-28 | Stop reason: HOSPADM

## 2017-04-27 RX ORDER — BISACODYL 5 MG/1
10 TABLET, DELAYED RELEASE ORAL DAILY PRN
Status: DISCONTINUED | OUTPATIENT
Start: 2017-04-27 | End: 2017-04-28 | Stop reason: HOSPADM

## 2017-04-27 RX ORDER — NICOTINE POLACRILEX 4 MG
15 LOZENGE BUCCAL
Status: DISCONTINUED | OUTPATIENT
Start: 2017-04-27 | End: 2017-04-28 | Stop reason: HOSPADM

## 2017-04-27 RX ORDER — ACETAMINOPHEN 325 MG/1
325 TABLET ORAL EVERY 4 HOURS PRN
Status: DISCONTINUED | OUTPATIENT
Start: 2017-04-27 | End: 2017-04-28 | Stop reason: HOSPADM

## 2017-04-27 RX ORDER — FINASTERIDE 5 MG/1
5 TABLET, FILM COATED ORAL NIGHTLY
Status: DISCONTINUED | OUTPATIENT
Start: 2017-04-27 | End: 2017-04-28 | Stop reason: HOSPADM

## 2017-04-27 RX ORDER — TAMSULOSIN HYDROCHLORIDE 0.4 MG/1
0.4 CAPSULE ORAL NIGHTLY
Status: DISCONTINUED | OUTPATIENT
Start: 2017-04-27 | End: 2017-04-28 | Stop reason: HOSPADM

## 2017-04-27 RX ORDER — OXYCODONE HYDROCHLORIDE AND ACETAMINOPHEN 5; 325 MG/1; MG/1
2 TABLET ORAL EVERY 4 HOURS PRN
Status: DISCONTINUED | OUTPATIENT
Start: 2017-04-27 | End: 2017-04-28 | Stop reason: HOSPADM

## 2017-04-27 RX ORDER — PROPOFOL 10 MG/ML
VIAL (ML) INTRAVENOUS AS NEEDED
Status: DISCONTINUED | OUTPATIENT
Start: 2017-04-27 | End: 2017-04-27 | Stop reason: SURG

## 2017-04-27 RX ORDER — ACETAMINOPHEN 325 MG/1
650 TABLET ORAL EVERY 4 HOURS PRN
Status: DISCONTINUED | OUTPATIENT
Start: 2017-04-27 | End: 2017-04-28 | Stop reason: HOSPADM

## 2017-04-27 RX ORDER — LABETALOL HYDROCHLORIDE 5 MG/ML
5 INJECTION, SOLUTION INTRAVENOUS
Status: DISCONTINUED | OUTPATIENT
Start: 2017-04-27 | End: 2017-04-27 | Stop reason: HOSPADM

## 2017-04-27 RX ORDER — MAGNESIUM HYDROXIDE 1200 MG/15ML
LIQUID ORAL AS NEEDED
Status: DISCONTINUED | OUTPATIENT
Start: 2017-04-27 | End: 2017-04-27 | Stop reason: HOSPADM

## 2017-04-27 RX ORDER — NALOXONE HCL 0.4 MG/ML
0.2 VIAL (ML) INJECTION AS NEEDED
Status: DISCONTINUED | OUTPATIENT
Start: 2017-04-27 | End: 2017-04-27 | Stop reason: HOSPADM

## 2017-04-27 RX ORDER — ROCURONIUM BROMIDE 10 MG/ML
INJECTION, SOLUTION INTRAVENOUS AS NEEDED
Status: DISCONTINUED | OUTPATIENT
Start: 2017-04-27 | End: 2017-04-27 | Stop reason: SURG

## 2017-04-27 RX ORDER — HYDRALAZINE HYDROCHLORIDE 20 MG/ML
5 INJECTION INTRAMUSCULAR; INTRAVENOUS
Status: DISCONTINUED | OUTPATIENT
Start: 2017-04-27 | End: 2017-04-27 | Stop reason: HOSPADM

## 2017-04-27 RX ORDER — ONDANSETRON 4 MG/1
4 TABLET, ORALLY DISINTEGRATING ORAL EVERY 6 HOURS PRN
Status: DISCONTINUED | OUTPATIENT
Start: 2017-04-27 | End: 2017-04-28 | Stop reason: HOSPADM

## 2017-04-27 RX ORDER — FLUMAZENIL 0.1 MG/ML
0.2 INJECTION INTRAVENOUS AS NEEDED
Status: DISCONTINUED | OUTPATIENT
Start: 2017-04-27 | End: 2017-04-27 | Stop reason: HOSPADM

## 2017-04-27 RX ORDER — FAMOTIDINE 10 MG/ML
20 INJECTION, SOLUTION INTRAVENOUS ONCE
Status: COMPLETED | OUTPATIENT
Start: 2017-04-27 | End: 2017-04-27

## 2017-04-27 RX ORDER — TRIAMTERENE AND HYDROCHLOROTHIAZIDE 37.5; 25 MG/1; MG/1
1 CAPSULE ORAL EVERY MORNING
Status: DISCONTINUED | OUTPATIENT
Start: 2017-04-27 | End: 2017-04-28

## 2017-04-27 RX ORDER — LIDOCAINE HYDROCHLORIDE 20 MG/ML
INJECTION, SOLUTION INFILTRATION; PERINEURAL AS NEEDED
Status: DISCONTINUED | OUTPATIENT
Start: 2017-04-27 | End: 2017-04-27 | Stop reason: SURG

## 2017-04-27 RX ORDER — ONDANSETRON 2 MG/ML
4 INJECTION INTRAMUSCULAR; INTRAVENOUS ONCE AS NEEDED
Status: DISCONTINUED | OUTPATIENT
Start: 2017-04-27 | End: 2017-04-27 | Stop reason: HOSPADM

## 2017-04-27 RX ORDER — BISACODYL 10 MG
10 SUPPOSITORY, RECTAL RECTAL DAILY PRN
Status: DISCONTINUED | OUTPATIENT
Start: 2017-04-27 | End: 2017-04-28 | Stop reason: HOSPADM

## 2017-04-27 RX ORDER — ACETAMINOPHEN 500 MG
1000 TABLET ORAL ONCE
Status: COMPLETED | OUTPATIENT
Start: 2017-04-27 | End: 2017-04-27

## 2017-04-27 RX ORDER — PROMETHAZINE HYDROCHLORIDE 25 MG/1
25 SUPPOSITORY RECTAL ONCE AS NEEDED
Status: DISCONTINUED | OUTPATIENT
Start: 2017-04-27 | End: 2017-04-27 | Stop reason: HOSPADM

## 2017-04-27 RX ORDER — DIPHENHYDRAMINE HYDROCHLORIDE 50 MG/ML
12.5 INJECTION INTRAMUSCULAR; INTRAVENOUS
Status: DISCONTINUED | OUTPATIENT
Start: 2017-04-27 | End: 2017-04-27 | Stop reason: HOSPADM

## 2017-04-27 RX ORDER — SODIUM CHLORIDE, SODIUM LACTATE, POTASSIUM CHLORIDE, CALCIUM CHLORIDE 600; 310; 30; 20 MG/100ML; MG/100ML; MG/100ML; MG/100ML
9 INJECTION, SOLUTION INTRAVENOUS CONTINUOUS
Status: DISCONTINUED | OUTPATIENT
Start: 2017-04-27 | End: 2017-04-28 | Stop reason: HOSPADM

## 2017-04-27 RX ORDER — SODIUM CHLORIDE 0.9 % (FLUSH) 0.9 %
1-10 SYRINGE (ML) INJECTION AS NEEDED
Status: DISCONTINUED | OUTPATIENT
Start: 2017-04-27 | End: 2017-04-28 | Stop reason: HOSPADM

## 2017-04-27 RX ORDER — BUMETANIDE 1 MG/1
1 TABLET ORAL EVERY MORNING
Status: DISCONTINUED | OUTPATIENT
Start: 2017-04-27 | End: 2017-04-28

## 2017-04-27 RX ORDER — SODIUM CHLORIDE 9 MG/ML
INJECTION, SOLUTION INTRAVENOUS AS NEEDED
Status: DISCONTINUED | OUTPATIENT
Start: 2017-04-27 | End: 2017-04-27 | Stop reason: HOSPADM

## 2017-04-27 RX ORDER — CEFAZOLIN SODIUM 2 G/100ML
INJECTION, SOLUTION INTRAVENOUS AS NEEDED
Status: DISCONTINUED | OUTPATIENT
Start: 2017-04-27 | End: 2017-04-27 | Stop reason: SURG

## 2017-04-27 RX ORDER — DOCUSATE SODIUM 100 MG/1
100 CAPSULE, LIQUID FILLED ORAL 2 TIMES DAILY PRN
Status: DISCONTINUED | OUTPATIENT
Start: 2017-04-27 | End: 2017-04-28 | Stop reason: HOSPADM

## 2017-04-27 RX ORDER — HYDROMORPHONE HYDROCHLORIDE 1 MG/ML
0.5 INJECTION, SOLUTION INTRAMUSCULAR; INTRAVENOUS; SUBCUTANEOUS
Status: DISCONTINUED | OUTPATIENT
Start: 2017-04-27 | End: 2017-04-27 | Stop reason: HOSPADM

## 2017-04-27 RX ORDER — LISINOPRIL 40 MG/1
40 TABLET ORAL EVERY MORNING
Status: DISCONTINUED | OUTPATIENT
Start: 2017-04-27 | End: 2017-04-28 | Stop reason: HOSPADM

## 2017-04-27 RX ORDER — SODIUM CHLORIDE, SODIUM LACTATE, POTASSIUM CHLORIDE, CALCIUM CHLORIDE 600; 310; 30; 20 MG/100ML; MG/100ML; MG/100ML; MG/100ML
100 INJECTION, SOLUTION INTRAVENOUS CONTINUOUS
Status: DISCONTINUED | OUTPATIENT
Start: 2017-04-27 | End: 2017-04-28 | Stop reason: HOSPADM

## 2017-04-27 RX ORDER — FENTANYL CITRATE 50 UG/ML
INJECTION, SOLUTION INTRAMUSCULAR; INTRAVENOUS AS NEEDED
Status: DISCONTINUED | OUTPATIENT
Start: 2017-04-27 | End: 2017-04-27 | Stop reason: SURG

## 2017-04-27 RX ORDER — CLOPIDOGREL BISULFATE 75 MG/1
75 TABLET ORAL EVERY MORNING
Status: DISCONTINUED | OUTPATIENT
Start: 2017-04-28 | End: 2017-04-28 | Stop reason: HOSPADM

## 2017-04-27 RX ORDER — DIPHENHYDRAMINE HCL 25 MG
25 CAPSULE ORAL EVERY 6 HOURS PRN
Status: DISCONTINUED | OUTPATIENT
Start: 2017-04-27 | End: 2017-04-28 | Stop reason: HOSPADM

## 2017-04-27 RX ORDER — ONDANSETRON 4 MG/1
4 TABLET, FILM COATED ORAL EVERY 6 HOURS PRN
Status: DISCONTINUED | OUTPATIENT
Start: 2017-04-27 | End: 2017-04-28 | Stop reason: HOSPADM

## 2017-04-27 RX ORDER — GLIPIZIDE 10 MG/1
10 TABLET ORAL
Status: DISCONTINUED | OUTPATIENT
Start: 2017-04-27 | End: 2017-04-28 | Stop reason: HOSPADM

## 2017-04-27 RX ORDER — KETOROLAC TROMETHAMINE 30 MG/ML
30 INJECTION, SOLUTION INTRAMUSCULAR; INTRAVENOUS EVERY 6 HOURS
Status: DISPENSED | OUTPATIENT
Start: 2017-04-27 | End: 2017-04-28

## 2017-04-27 RX ORDER — OXYCODONE AND ACETAMINOPHEN 7.5; 325 MG/1; MG/1
1 TABLET ORAL ONCE AS NEEDED
Status: DISCONTINUED | OUTPATIENT
Start: 2017-04-27 | End: 2017-04-27 | Stop reason: HOSPADM

## 2017-04-27 RX ORDER — PROMETHAZINE HYDROCHLORIDE 25 MG/1
25 TABLET ORAL ONCE AS NEEDED
Status: DISCONTINUED | OUTPATIENT
Start: 2017-04-27 | End: 2017-04-27 | Stop reason: HOSPADM

## 2017-04-27 RX ORDER — OXYCODONE HYDROCHLORIDE AND ACETAMINOPHEN 5; 325 MG/1; MG/1
1 TABLET ORAL EVERY 4 HOURS PRN
Status: DISCONTINUED | OUTPATIENT
Start: 2017-04-27 | End: 2017-04-28 | Stop reason: HOSPADM

## 2017-04-27 RX ORDER — CEFAZOLIN SODIUM 2 G/100ML
2 INJECTION, SOLUTION INTRAVENOUS EVERY 8 HOURS
Status: COMPLETED | OUTPATIENT
Start: 2017-04-27 | End: 2017-04-28

## 2017-04-27 RX ORDER — MIDAZOLAM HYDROCHLORIDE 1 MG/ML
1 INJECTION INTRAMUSCULAR; INTRAVENOUS
Status: DISCONTINUED | OUTPATIENT
Start: 2017-04-27 | End: 2017-04-27 | Stop reason: HOSPADM

## 2017-04-27 RX ADMIN — PROPOFOL 200 MG: 10 INJECTION, EMULSION INTRAVENOUS at 07:08

## 2017-04-27 RX ADMIN — FAMOTIDINE 20 MG: 10 INJECTION, SOLUTION INTRAVENOUS at 06:57

## 2017-04-27 RX ADMIN — INSULIN ASPART 4 UNITS: 100 INJECTION, SOLUTION INTRAVENOUS; SUBCUTANEOUS at 20:39

## 2017-04-27 RX ADMIN — LISINOPRIL 40 MG: 40 TABLET ORAL at 11:17

## 2017-04-27 RX ADMIN — CEFAZOLIN SODIUM 2 G: 2 INJECTION, SOLUTION INTRAVENOUS at 08:31

## 2017-04-27 RX ADMIN — FENTANYL CITRATE 50 MCG: 50 INJECTION INTRAMUSCULAR; INTRAVENOUS at 09:27

## 2017-04-27 RX ADMIN — FENTANYL CITRATE 50 MCG: 50 INJECTION INTRAMUSCULAR; INTRAVENOUS at 07:31

## 2017-04-27 RX ADMIN — ROSUVASTATIN CALCIUM 5 MG: 5 TABLET, FILM COATED ORAL at 11:16

## 2017-04-27 RX ADMIN — FINASTERIDE 5 MG: 5 TABLET, FILM COATED ORAL at 20:31

## 2017-04-27 RX ADMIN — LIDOCAINE HYDROCHLORIDE 100 MG: 20 INJECTION, SOLUTION INFILTRATION; PERINEURAL at 07:08

## 2017-04-27 RX ADMIN — SODIUM CHLORIDE, POTASSIUM CHLORIDE, SODIUM LACTATE AND CALCIUM CHLORIDE 9 ML/HR: 600; 310; 30; 20 INJECTION, SOLUTION INTRAVENOUS at 06:57

## 2017-04-27 RX ADMIN — CEFAZOLIN SODIUM 2 G: 2 INJECTION, SOLUTION INTRAVENOUS at 15:24

## 2017-04-27 RX ADMIN — ROCURONIUM BROMIDE 50 MG: 10 INJECTION INTRAVENOUS at 07:08

## 2017-04-27 RX ADMIN — OXYCODONE HYDROCHLORIDE AND ACETAMINOPHEN 2 TABLET: 5; 325 TABLET ORAL at 11:16

## 2017-04-27 RX ADMIN — HYDRALAZINE HYDROCHLORIDE 50 MG: 50 TABLET ORAL at 17:02

## 2017-04-27 RX ADMIN — MIDAZOLAM 2 MG: 1 INJECTION INTRAMUSCULAR; INTRAVENOUS at 06:57

## 2017-04-27 RX ADMIN — VANCOMYCIN HYDROCHLORIDE 1500 MG: 1 INJECTION, POWDER, LYOPHILIZED, FOR SOLUTION INTRAVENOUS at 06:02

## 2017-04-27 RX ADMIN — TAMSULOSIN HYDROCHLORIDE 0.4 MG: 0.4 CAPSULE ORAL at 20:31

## 2017-04-27 RX ADMIN — KETOROLAC TROMETHAMINE 30 MG: 30 INJECTION, SOLUTION INTRAMUSCULAR at 15:24

## 2017-04-27 RX ADMIN — OXYCODONE HYDROCHLORIDE AND ACETAMINOPHEN 2 TABLET: 5; 325 TABLET ORAL at 20:35

## 2017-04-27 RX ADMIN — KETOROLAC TROMETHAMINE 30 MG: 30 INJECTION, SOLUTION INTRAMUSCULAR at 22:32

## 2017-04-27 RX ADMIN — ACETAMINOPHEN 1000 MG: 500 TABLET ORAL at 06:01

## 2017-04-27 RX ADMIN — OXYCODONE HYDROCHLORIDE AND ACETAMINOPHEN 2 TABLET: 5; 325 TABLET ORAL at 15:24

## 2017-04-27 RX ADMIN — FENTANYL CITRATE 100 MCG: 50 INJECTION INTRAMUSCULAR; INTRAVENOUS at 07:06

## 2017-04-27 RX ADMIN — FENTANYL CITRATE 50 MCG: 50 INJECTION INTRAMUSCULAR; INTRAVENOUS at 09:14

## 2017-04-27 RX ADMIN — ASPIRIN 81 MG: 81 TABLET ORAL at 11:16

## 2017-04-27 RX ADMIN — SUGAMMADEX 230 MG: 100 INJECTION, SOLUTION INTRAVENOUS at 08:36

## 2017-04-27 RX ADMIN — KETOROLAC TROMETHAMINE 30 MG: 30 INJECTION, SOLUTION INTRAMUSCULAR at 09:15

## 2017-04-27 RX ADMIN — FENTANYL CITRATE 50 MCG: 50 INJECTION INTRAMUSCULAR; INTRAVENOUS at 08:49

## 2017-04-27 RX ADMIN — FERROUS SULFATE TAB 325 MG (65 MG ELEMENTAL FE) 325 MG: 325 (65 FE) TAB at 11:17

## 2017-04-27 RX ADMIN — SODIUM CHLORIDE, POTASSIUM CHLORIDE, SODIUM LACTATE AND CALCIUM CHLORIDE: 600; 310; 30; 20 INJECTION, SOLUTION INTRAVENOUS at 08:40

## 2017-04-27 RX ADMIN — ONDANSETRON 4 MG: 2 INJECTION INTRAMUSCULAR; INTRAVENOUS at 08:36

## 2017-04-27 RX ADMIN — GLIPIZIDE 10 MG: 10 TABLET ORAL at 11:17

## 2017-04-27 RX ADMIN — INSULIN ASPART 6 UNITS: 100 INJECTION, SOLUTION INTRAVENOUS; SUBCUTANEOUS at 17:02

## 2017-04-27 RX ADMIN — DOCUSATE SODIUM 100 MG: 100 CAPSULE, LIQUID FILLED ORAL at 11:16

## 2017-04-27 NOTE — ANESTHESIA PREPROCEDURE EVALUATION
Anesthesia Evaluation     Patient summary reviewed and Nursing notes reviewed   no history of anesthetic complications:  NPO Status: > 8 hours   Airway   Mallampati: III  TM distance: >3 FB  Neck ROM: full  possible difficult intubation  Dental - normal exam     Pulmonary - negative pulmonary ROS    breath sounds clear to auscultation  (-) shortness of breath, sleep apnea, decreased breath sounds, wheezes  Cardiovascular - normal exam  Exercise tolerance: good (4-7 METS)    ECG reviewed  PT is on anticoagulation therapy  Beta blocker not taken-may be given intraoperatively  Rhythm: regular  Rate: normal    (+) hypertension, CAD,   (-) past MI, angina, CHF, orthopnea, PND, FELICIANO, PVD      Neuro/Psych  (+) TIA,    (-) seizures, neuromuscular disease, CVA, dizziness/light headedness, weakness, numbness  GI/Hepatic/Renal/Endo    (+) obesity,  diabetes mellitus type 2,   (-) liver disease, renal disease    Musculoskeletal     Abdominal  - normal exam   Substance History - negative use  (-) alcohol use, drug use     OB/GYN negative ob/gyn ROS         Other   (+) arthritis                                 Anesthesia Plan    ASA 3     general     intravenous induction   Anesthetic plan and risks discussed with patient.    Plan discussed with CRNA.

## 2017-04-27 NOTE — ANESTHESIA PROCEDURE NOTES
Airway  Date/Time: 4/27/2017 7:13 AM  Airway not difficult    General Information and Staff    Patient location during procedure: OR  Anesthesiologist: YESSICA TUCKER  CRNA: BRISEYDA DUFF    Indications and Patient Condition  Indications for airway management: airway protection    Preoxygenated: yes  Mask difficulty assessment: 2 - vent by mask + OA or adjuvant +/- NMBA    Final Airway Details  Final airway type: endotracheal airway      Successful airway: ETT  Cuffed: yes   Successful intubation technique: direct laryngoscopy and video laryngoscopy  Facilitating devices/methods: intubating stylet and cricoid pressure  Endotracheal tube insertion site: oral  Blade size: D blade.  ETT size: 8.0 mm  Placement verified by: chest auscultation and capnometry   Measured from: teeth  ETT to teeth (cm): 21  Number of attempts at approach: 1    Additional Comments  C MAC used first attempt for perceived difficult airway and dental protection. Atraumatic. No dental damage noted.

## 2017-04-27 NOTE — ANESTHESIA POSTPROCEDURE EVALUATION
Patient: Erlin Blanco    Procedure Summary     Date Anesthesia Start Anesthesia Stop Room / Location    04/27/17 0705 0900 [facility] OR [facility] / [facility]       Procedure Diagnosis Surgeon Provider    LT TOTAL KNEE ARTHROPLASTY (Left Knee) No diagnosis on file. MD Vamsi Hoffman MD          Anesthesia Type: general  Last vitals  /77 (04/27/17 0928)    Temp      Pulse 68 (04/27/17 0928)   Resp 16 (04/27/17 0928)    SpO2 98 % (04/27/17 0928)      Post Anesthesia Care and Evaluation    Patient location during evaluation: PACU  Patient participation: complete - patient participated  Level of consciousness: awake and alert  Pain management: adequate  Airway patency: patent  Anesthetic complications: No anesthetic complications  PONV Status: none  Cardiovascular status: acceptable  Respiratory status: acceptable  Hydration status: acceptable

## 2017-04-28 VITALS
OXYGEN SATURATION: 94 % | TEMPERATURE: 98.7 F | DIASTOLIC BLOOD PRESSURE: 64 MMHG | WEIGHT: 254 LBS | BODY MASS INDEX: 34.4 KG/M2 | RESPIRATION RATE: 18 BRPM | HEART RATE: 79 BPM | HEIGHT: 72 IN | SYSTOLIC BLOOD PRESSURE: 150 MMHG

## 2017-04-28 PROBLEM — I10 HYPERTENSION: Status: ACTIVE | Noted: 2017-04-28

## 2017-04-28 PROBLEM — E11.9 TYPE 2 DIABETES MELLITUS: Status: ACTIVE | Noted: 2017-04-28

## 2017-04-28 PROBLEM — N17.9 AKI (ACUTE KIDNEY INJURY) (HCC): Status: ACTIVE | Noted: 2017-04-28

## 2017-04-28 PROBLEM — N17.9 AKI (ACUTE KIDNEY INJURY): Status: ACTIVE | Noted: 2017-04-28

## 2017-04-28 PROBLEM — I25.10 CORONARY ARTERY DISEASE: Status: ACTIVE | Noted: 2017-04-28

## 2017-04-28 LAB
ANION GAP SERPL CALCULATED.3IONS-SCNC: 11.9 MMOL/L
BUN BLD-MCNC: 30 MG/DL (ref 8–23)
BUN/CREAT SERPL: 19.4 (ref 7–25)
CALCIUM SPEC-SCNC: 8.5 MG/DL (ref 8.6–10.5)
CHLORIDE SERPL-SCNC: 98 MMOL/L (ref 98–107)
CO2 SERPL-SCNC: 26.1 MMOL/L (ref 22–29)
CREAT BLD-MCNC: 1.55 MG/DL (ref 0.76–1.27)
GFR SERPL CREATININE-BSD FRML MDRD: 45 ML/MIN/1.73
GLUCOSE BLD-MCNC: 167 MG/DL (ref 65–99)
GLUCOSE BLDC GLUCOMTR-MCNC: 123 MG/DL (ref 70–130)
GLUCOSE BLDC GLUCOMTR-MCNC: 213 MG/DL (ref 70–130)
HBA1C MFR BLD: 6.45 % (ref 4.8–5.6)
HCT VFR BLD AUTO: 41.7 % (ref 40.4–52.2)
HGB BLD-MCNC: 13.5 G/DL (ref 13.7–17.6)
POTASSIUM BLD-SCNC: 3.5 MMOL/L (ref 3.5–5.2)
SODIUM BLD-SCNC: 136 MMOL/L (ref 136–145)

## 2017-04-28 PROCEDURE — 83036 HEMOGLOBIN GLYCOSYLATED A1C: CPT | Performed by: INTERNAL MEDICINE

## 2017-04-28 PROCEDURE — 82962 GLUCOSE BLOOD TEST: CPT

## 2017-04-28 PROCEDURE — 97110 THERAPEUTIC EXERCISES: CPT

## 2017-04-28 PROCEDURE — 25010000003 CEFAZOLIN IN DEXTROSE 2-4 GM/100ML-% SOLUTION: Performed by: ORTHOPAEDIC SURGERY

## 2017-04-28 PROCEDURE — 85018 HEMOGLOBIN: CPT | Performed by: ORTHOPAEDIC SURGERY

## 2017-04-28 PROCEDURE — 85014 HEMATOCRIT: CPT | Performed by: ORTHOPAEDIC SURGERY

## 2017-04-28 PROCEDURE — 80048 BASIC METABOLIC PNL TOTAL CA: CPT | Performed by: ORTHOPAEDIC SURGERY

## 2017-04-28 PROCEDURE — 97150 GROUP THERAPEUTIC PROCEDURES: CPT

## 2017-04-28 RX ORDER — HYDRALAZINE HYDROCHLORIDE 50 MG/1
50 TABLET, FILM COATED ORAL EVERY 8 HOURS SCHEDULED
Status: DISCONTINUED | OUTPATIENT
Start: 2017-04-28 | End: 2017-04-28 | Stop reason: HOSPADM

## 2017-04-28 RX ORDER — ASPIRIN 81 MG/1
81 TABLET ORAL DAILY
Qty: 42 TABLET | Refills: 0 | Status: SHIPPED | OUTPATIENT
Start: 2017-04-28 | End: 2017-06-09

## 2017-04-28 RX ORDER — OXYCODONE HYDROCHLORIDE AND ACETAMINOPHEN 5; 325 MG/1; MG/1
1-2 TABLET ORAL EVERY 4 HOURS PRN
Qty: 100 TABLET | Refills: 0 | Status: SHIPPED | OUTPATIENT
Start: 2017-04-28 | End: 2017-05-07

## 2017-04-28 RX ADMIN — GLIPIZIDE 10 MG: 10 TABLET ORAL at 08:48

## 2017-04-28 RX ADMIN — OXYCODONE HYDROCHLORIDE AND ACETAMINOPHEN 2 TABLET: 5; 325 TABLET ORAL at 04:55

## 2017-04-28 RX ADMIN — LISINOPRIL 40 MG: 40 TABLET ORAL at 08:49

## 2017-04-28 RX ADMIN — CEFAZOLIN SODIUM 2 G: 2 INJECTION, SOLUTION INTRAVENOUS at 00:28

## 2017-04-28 RX ADMIN — DILTIAZEM HYDROCHLORIDE 240 MG: 240 CAPSULE, COATED, EXTENDED RELEASE ORAL at 08:48

## 2017-04-28 RX ADMIN — MAGNESIUM HYDROXIDE 10 ML: 2400 SUSPENSION ORAL at 06:24

## 2017-04-28 RX ADMIN — HYDRALAZINE HYDROCHLORIDE 50 MG: 50 TABLET, FILM COATED ORAL at 06:20

## 2017-04-28 RX ADMIN — OXYCODONE HYDROCHLORIDE AND ACETAMINOPHEN 2 TABLET: 5; 325 TABLET ORAL at 00:29

## 2017-04-28 RX ADMIN — ASPIRIN 81 MG: 81 TABLET ORAL at 08:47

## 2017-04-28 RX ADMIN — OXYCODONE HYDROCHLORIDE AND ACETAMINOPHEN 2 TABLET: 5; 325 TABLET ORAL at 08:48

## 2017-04-28 RX ADMIN — FERROUS SULFATE TAB 325 MG (65 MG ELEMENTAL FE) 325 MG: 325 (65 FE) TAB at 08:48

## 2017-04-28 RX ADMIN — CLOPIDOGREL 75 MG: 75 TABLET, FILM COATED ORAL at 08:48

## 2017-04-28 RX ADMIN — OXYCODONE HYDROCHLORIDE AND ACETAMINOPHEN 2 TABLET: 5; 325 TABLET ORAL at 13:58

## 2017-05-03 ENCOUNTER — HOSPITAL ENCOUNTER (OUTPATIENT)
Facility: HOSPITAL | Age: 72
Setting detail: OBSERVATION
Discharge: HOME | End: 2017-05-05
Attending: ORTHOPAEDIC SURGERY | Admitting: ORTHOPAEDIC SURGERY

## 2017-05-03 DIAGNOSIS — L03.116 CELLULITIS OF KNEE, LEFT: Primary | ICD-10-CM

## 2017-05-03 DIAGNOSIS — R26.2 DIFFICULTY WALKING: ICD-10-CM

## 2017-05-03 LAB
ALBUMIN SERPL-MCNC: 3.5 G/DL (ref 3.5–5.2)
ALBUMIN/GLOB SERPL: 1 G/DL
ALP SERPL-CCNC: 68 U/L (ref 39–117)
ALT SERPL W P-5'-P-CCNC: 19 U/L (ref 1–41)
ANION GAP SERPL CALCULATED.3IONS-SCNC: 14.1 MMOL/L
AST SERPL-CCNC: 26 U/L (ref 1–40)
BILIRUB SERPL-MCNC: 0.6 MG/DL (ref 0.1–1.2)
BUN BLD-MCNC: 32 MG/DL (ref 8–23)
BUN/CREAT SERPL: 29.4 (ref 7–25)
CALCIUM SPEC-SCNC: 9.5 MG/DL (ref 8.6–10.5)
CHLORIDE SERPL-SCNC: 93 MMOL/L (ref 98–107)
CO2 SERPL-SCNC: 28.9 MMOL/L (ref 22–29)
CREAT BLD-MCNC: 1.09 MG/DL (ref 0.76–1.27)
CRP SERPL-MCNC: 8.55 MG/DL (ref 0–0.5)
DEPRECATED RDW RBC AUTO: 46.2 FL (ref 37–54)
ERYTHROCYTE [DISTWIDTH] IN BLOOD BY AUTOMATED COUNT: 13.3 % (ref 11.5–14.5)
ERYTHROCYTE [SEDIMENTATION RATE] IN BLOOD: 45 MM/HR (ref 0–20)
GFR SERPL CREATININE-BSD FRML MDRD: 67 ML/MIN/1.73
GLOBULIN UR ELPH-MCNC: 3.4 GM/DL
GLUCOSE BLD-MCNC: 300 MG/DL (ref 65–99)
GLUCOSE BLDC GLUCOMTR-MCNC: 231 MG/DL (ref 70–130)
GLUCOSE BLDC GLUCOMTR-MCNC: 235 MG/DL (ref 70–130)
HCT VFR BLD AUTO: 40.4 % (ref 40.4–52.2)
HGB BLD-MCNC: 13.3 G/DL (ref 13.7–17.6)
MCH RBC QN AUTO: 31.2 PG (ref 27–32.7)
MCHC RBC AUTO-ENTMCNC: 32.9 G/DL (ref 32.6–36.4)
MCV RBC AUTO: 94.8 FL (ref 79.8–96.2)
PLATELET # BLD AUTO: 704 10*3/MM3 (ref 140–500)
PMV BLD AUTO: 9.7 FL (ref 6–12)
POTASSIUM BLD-SCNC: 3.9 MMOL/L (ref 3.5–5.2)
PROT SERPL-MCNC: 6.9 G/DL (ref 6–8.5)
RBC # BLD AUTO: 4.26 10*6/MM3 (ref 4.6–6)
SODIUM BLD-SCNC: 136 MMOL/L (ref 136–145)
WBC NRBC COR # BLD: 11.23 10*3/MM3 (ref 4.5–10.7)

## 2017-05-03 PROCEDURE — 25010000002 VANCOMYCIN: Performed by: PHYSICIAN ASSISTANT

## 2017-05-03 PROCEDURE — 86140 C-REACTIVE PROTEIN: CPT | Performed by: PHYSICIAN ASSISTANT

## 2017-05-03 PROCEDURE — G0378 HOSPITAL OBSERVATION PER HR: HCPCS

## 2017-05-03 PROCEDURE — 82962 GLUCOSE BLOOD TEST: CPT

## 2017-05-03 PROCEDURE — 85652 RBC SED RATE AUTOMATED: CPT | Performed by: PHYSICIAN ASSISTANT

## 2017-05-03 PROCEDURE — 80053 COMPREHEN METABOLIC PANEL: CPT | Performed by: PHYSICIAN ASSISTANT

## 2017-05-03 PROCEDURE — 85027 COMPLETE CBC AUTOMATED: CPT | Performed by: PHYSICIAN ASSISTANT

## 2017-05-03 PROCEDURE — 25010000003 CEFTRIAXONE PER 250 MG: Performed by: PHYSICIAN ASSISTANT

## 2017-05-03 RX ORDER — DILTIAZEM HYDROCHLORIDE 240 MG/1
240 CAPSULE, COATED, EXTENDED RELEASE ORAL EVERY MORNING
Status: DISCONTINUED | OUTPATIENT
Start: 2017-05-04 | End: 2017-05-05 | Stop reason: HOSPADM

## 2017-05-03 RX ORDER — TRIAMTERENE AND HYDROCHLOROTHIAZIDE 37.5; 25 MG/1; MG/1
1 CAPSULE ORAL EVERY MORNING
Status: DISCONTINUED | OUTPATIENT
Start: 2017-05-04 | End: 2017-05-05 | Stop reason: HOSPADM

## 2017-05-03 RX ORDER — OXYCODONE HYDROCHLORIDE AND ACETAMINOPHEN 5; 325 MG/1; MG/1
2 TABLET ORAL EVERY 4 HOURS PRN
Status: DISCONTINUED | OUTPATIENT
Start: 2017-05-03 | End: 2017-05-03 | Stop reason: SDUPTHER

## 2017-05-03 RX ORDER — TAMSULOSIN HYDROCHLORIDE 0.4 MG/1
0.4 CAPSULE ORAL NIGHTLY
Status: DISCONTINUED | OUTPATIENT
Start: 2017-05-03 | End: 2017-05-05 | Stop reason: HOSPADM

## 2017-05-03 RX ORDER — ASPIRIN 81 MG/1
81 TABLET ORAL DAILY
Status: DISCONTINUED | OUTPATIENT
Start: 2017-05-03 | End: 2017-05-05 | Stop reason: HOSPADM

## 2017-05-03 RX ORDER — HYDRALAZINE HYDROCHLORIDE 50 MG/1
50 TABLET, FILM COATED ORAL 2 TIMES DAILY
Status: DISCONTINUED | OUTPATIENT
Start: 2017-05-03 | End: 2017-05-05 | Stop reason: HOSPADM

## 2017-05-03 RX ORDER — LISINOPRIL 40 MG/1
40 TABLET ORAL EVERY MORNING
Status: DISCONTINUED | OUTPATIENT
Start: 2017-05-04 | End: 2017-05-05 | Stop reason: HOSPADM

## 2017-05-03 RX ORDER — GLIMEPIRIDE 4 MG/1
4 TABLET ORAL
Status: DISCONTINUED | OUTPATIENT
Start: 2017-05-04 | End: 2017-05-03 | Stop reason: CLARIF

## 2017-05-03 RX ORDER — OXYCODONE HYDROCHLORIDE AND ACETAMINOPHEN 5; 325 MG/1; MG/1
2 TABLET ORAL EVERY 4 HOURS PRN
Status: DISCONTINUED | OUTPATIENT
Start: 2017-05-03 | End: 2017-05-05 | Stop reason: HOSPADM

## 2017-05-03 RX ORDER — CEFTRIAXONE SODIUM 2 G/50ML
2 INJECTION, SOLUTION INTRAVENOUS EVERY 24 HOURS
Status: DISCONTINUED | OUTPATIENT
Start: 2017-05-03 | End: 2017-05-05 | Stop reason: HOSPADM

## 2017-05-03 RX ORDER — BUMETANIDE 1 MG/1
1 TABLET ORAL EVERY MORNING
Status: DISCONTINUED | OUTPATIENT
Start: 2017-05-04 | End: 2017-05-05 | Stop reason: HOSPADM

## 2017-05-03 RX ORDER — OXYCODONE HYDROCHLORIDE AND ACETAMINOPHEN 5; 325 MG/1; MG/1
1 TABLET ORAL EVERY 4 HOURS PRN
Status: DISCONTINUED | OUTPATIENT
Start: 2017-05-03 | End: 2017-05-03

## 2017-05-03 RX ORDER — ZOLPIDEM TARTRATE 5 MG/1
5 TABLET ORAL NIGHTLY PRN
Status: DISCONTINUED | OUTPATIENT
Start: 2017-05-03 | End: 2017-05-05 | Stop reason: HOSPADM

## 2017-05-03 RX ORDER — FINASTERIDE 5 MG/1
5 TABLET, FILM COATED ORAL NIGHTLY
Status: DISCONTINUED | OUTPATIENT
Start: 2017-05-03 | End: 2017-05-05 | Stop reason: HOSPADM

## 2017-05-03 RX ORDER — CLOPIDOGREL BISULFATE 75 MG/1
75 TABLET ORAL DAILY
Status: DISCONTINUED | OUTPATIENT
Start: 2017-05-03 | End: 2017-05-03

## 2017-05-03 RX ORDER — GLIPIZIDE 10 MG/1
10 TABLET ORAL
Status: DISCONTINUED | OUTPATIENT
Start: 2017-05-04 | End: 2017-05-05 | Stop reason: HOSPADM

## 2017-05-03 RX ORDER — OXYCODONE HYDROCHLORIDE AND ACETAMINOPHEN 5; 325 MG/1; MG/1
1 TABLET ORAL EVERY 4 HOURS PRN
Status: DISCONTINUED | OUTPATIENT
Start: 2017-05-03 | End: 2017-05-05 | Stop reason: HOSPADM

## 2017-05-03 RX ORDER — CLOPIDOGREL BISULFATE 75 MG/1
75 TABLET ORAL EVERY MORNING
Status: DISCONTINUED | OUTPATIENT
Start: 2017-05-04 | End: 2017-05-05 | Stop reason: HOSPADM

## 2017-05-03 RX ORDER — ASPIRIN 81 MG/1
81 TABLET, CHEWABLE ORAL DAILY
Status: DISCONTINUED | OUTPATIENT
Start: 2017-05-03 | End: 2017-05-03

## 2017-05-03 RX ORDER — OXYCODONE HYDROCHLORIDE AND ACETAMINOPHEN 5; 325 MG/1; MG/1
1 TABLET ORAL EVERY 4 HOURS PRN
Status: DISCONTINUED | OUTPATIENT
Start: 2017-05-03 | End: 2017-05-03 | Stop reason: SDUPTHER

## 2017-05-03 RX ORDER — ROSUVASTATIN CALCIUM 5 MG/1
5 TABLET, COATED ORAL DAILY
Status: DISCONTINUED | OUTPATIENT
Start: 2017-05-03 | End: 2017-05-05 | Stop reason: HOSPADM

## 2017-05-03 RX ADMIN — VANCOMYCIN HYDROCHLORIDE 2250 MG: 1 INJECTION, POWDER, LYOPHILIZED, FOR SOLUTION INTRAVENOUS at 18:46

## 2017-05-03 RX ADMIN — ASPIRIN 81 MG: 81 TABLET, CHEWABLE ORAL at 18:15

## 2017-05-03 RX ADMIN — OXYCODONE HYDROCHLORIDE AND ACETAMINOPHEN 2 TABLET: 5; 325 TABLET ORAL at 17:02

## 2017-05-03 RX ADMIN — ROSUVASTATIN CALCIUM 5 MG: 5 TABLET, FILM COATED ORAL at 22:18

## 2017-05-03 RX ADMIN — CEFTRIAXONE SODIUM 2 G: 2 INJECTION, SOLUTION INTRAVENOUS at 18:15

## 2017-05-03 RX ADMIN — OXYCODONE HYDROCHLORIDE AND ACETAMINOPHEN 2 TABLET: 5; 325 TABLET ORAL at 20:56

## 2017-05-03 RX ADMIN — HYDRALAZINE HYDROCHLORIDE 50 MG: 50 TABLET, FILM COATED ORAL at 22:18

## 2017-05-03 RX ADMIN — FINASTERIDE 5 MG: 5 TABLET, FILM COATED ORAL at 22:18

## 2017-05-03 RX ADMIN — TAMSULOSIN HYDROCHLORIDE 0.4 MG: 0.4 CAPSULE ORAL at 22:18

## 2017-05-04 ENCOUNTER — APPOINTMENT (OUTPATIENT)
Dept: GENERAL RADIOLOGY | Facility: HOSPITAL | Age: 72
End: 2017-05-04

## 2017-05-04 PROBLEM — L03.116 CELLULITIS OF KNEE, LEFT: Status: ACTIVE | Noted: 2017-05-04

## 2017-05-04 LAB
GLUCOSE BLDC GLUCOMTR-MCNC: 170 MG/DL (ref 70–130)
GLUCOSE BLDC GLUCOMTR-MCNC: 192 MG/DL (ref 70–130)
GLUCOSE BLDC GLUCOMTR-MCNC: 237 MG/DL (ref 70–130)
GLUCOSE BLDC GLUCOMTR-MCNC: 295 MG/DL (ref 70–130)

## 2017-05-04 PROCEDURE — 96366 THER/PROPH/DIAG IV INF ADDON: CPT

## 2017-05-04 PROCEDURE — 82962 GLUCOSE BLOOD TEST: CPT

## 2017-05-04 PROCEDURE — G0378 HOSPITAL OBSERVATION PER HR: HCPCS

## 2017-05-04 PROCEDURE — C1894 INTRO/SHEATH, NON-LASER: HCPCS

## 2017-05-04 PROCEDURE — 25010000003 CEFTRIAXONE PER 250 MG: Performed by: PHYSICIAN ASSISTANT

## 2017-05-04 PROCEDURE — 63710000001 INSULIN ASPART PER 5 UNITS: Performed by: ORTHOPAEDIC SURGERY

## 2017-05-04 PROCEDURE — 25010000002 VANCOMYCIN: Performed by: PHYSICIAN ASSISTANT

## 2017-05-04 PROCEDURE — 96365 THER/PROPH/DIAG IV INF INIT: CPT

## 2017-05-04 RX ORDER — DEXTROSE MONOHYDRATE 25 G/50ML
25 INJECTION, SOLUTION INTRAVENOUS
Status: DISCONTINUED | OUTPATIENT
Start: 2017-05-04 | End: 2017-05-05 | Stop reason: HOSPADM

## 2017-05-04 RX ORDER — SODIUM CHLORIDE 0.9 % (FLUSH) 0.9 %
10 SYRINGE (ML) INJECTION EVERY 12 HOURS SCHEDULED
Status: DISCONTINUED | OUTPATIENT
Start: 2017-05-04 | End: 2017-05-05 | Stop reason: HOSPADM

## 2017-05-04 RX ORDER — SODIUM CHLORIDE 0.9 % (FLUSH) 0.9 %
10 SYRINGE (ML) INJECTION AS NEEDED
Status: DISCONTINUED | OUTPATIENT
Start: 2017-05-04 | End: 2017-05-05 | Stop reason: HOSPADM

## 2017-05-04 RX ORDER — NICOTINE POLACRILEX 4 MG
15 LOZENGE BUCCAL
Status: DISCONTINUED | OUTPATIENT
Start: 2017-05-04 | End: 2017-05-05 | Stop reason: HOSPADM

## 2017-05-04 RX ADMIN — HYDRALAZINE HYDROCHLORIDE 50 MG: 50 TABLET, FILM COATED ORAL at 17:47

## 2017-05-04 RX ADMIN — CLOPIDOGREL 75 MG: 75 TABLET, FILM COATED ORAL at 06:48

## 2017-05-04 RX ADMIN — TAMSULOSIN HYDROCHLORIDE 0.4 MG: 0.4 CAPSULE ORAL at 20:16

## 2017-05-04 RX ADMIN — CEFTRIAXONE SODIUM 2 G: 2 INJECTION, SOLUTION INTRAVENOUS at 17:19

## 2017-05-04 RX ADMIN — VANCOMYCIN HYDROCHLORIDE 1500 MG: 1 INJECTION, POWDER, LYOPHILIZED, FOR SOLUTION INTRAVENOUS at 17:47

## 2017-05-04 RX ADMIN — ASPIRIN 81 MG: 81 TABLET ORAL at 08:50

## 2017-05-04 RX ADMIN — Medication 10 ML: at 21:58

## 2017-05-04 RX ADMIN — FINASTERIDE 5 MG: 5 TABLET, FILM COATED ORAL at 20:16

## 2017-05-04 RX ADMIN — OXYCODONE HYDROCHLORIDE AND ACETAMINOPHEN 2 TABLET: 5; 325 TABLET ORAL at 10:48

## 2017-05-04 RX ADMIN — INSULIN ASPART 4 UNITS: 100 INJECTION, SOLUTION INTRAVENOUS; SUBCUTANEOUS at 22:21

## 2017-05-04 RX ADMIN — OXYCODONE HYDROCHLORIDE AND ACETAMINOPHEN 1 TABLET: 5; 325 TABLET ORAL at 16:21

## 2017-05-04 RX ADMIN — GLIPIZIDE 10 MG: 10 TABLET ORAL at 06:48

## 2017-05-04 RX ADMIN — INSULIN ASPART 2 UNITS: 100 INJECTION, SOLUTION INTRAVENOUS; SUBCUTANEOUS at 17:47

## 2017-05-04 RX ADMIN — OXYCODONE HYDROCHLORIDE AND ACETAMINOPHEN 2 TABLET: 5; 325 TABLET ORAL at 02:50

## 2017-05-04 RX ADMIN — DILTIAZEM HYDROCHLORIDE 240 MG: 240 CAPSULE, COATED, EXTENDED RELEASE ORAL at 06:48

## 2017-05-04 RX ADMIN — TRIAMTERENE AND HYDROCHLOROTHIAZIDE 1 CAPSULE: 37.5; 25 CAPSULE ORAL at 06:48

## 2017-05-04 RX ADMIN — LISINOPRIL 40 MG: 40 TABLET ORAL at 06:48

## 2017-05-04 RX ADMIN — BUMETANIDE 1 MG: 1 TABLET ORAL at 06:48

## 2017-05-04 RX ADMIN — HYDRALAZINE HYDROCHLORIDE 50 MG: 50 TABLET, FILM COATED ORAL at 08:50

## 2017-05-04 RX ADMIN — OXYCODONE HYDROCHLORIDE AND ACETAMINOPHEN 2 TABLET: 5; 325 TABLET ORAL at 20:16

## 2017-05-04 RX ADMIN — MAGNESIUM HYDROXIDE 10 ML: 2400 SUSPENSION ORAL at 16:21

## 2017-05-04 RX ADMIN — VANCOMYCIN HYDROCHLORIDE 1500 MG: 1 INJECTION, POWDER, LYOPHILIZED, FOR SOLUTION INTRAVENOUS at 04:15

## 2017-05-05 VITALS
BODY MASS INDEX: 37.71 KG/M2 | SYSTOLIC BLOOD PRESSURE: 176 MMHG | WEIGHT: 278.4 LBS | HEIGHT: 72 IN | HEART RATE: 71 BPM | DIASTOLIC BLOOD PRESSURE: 82 MMHG | TEMPERATURE: 97.7 F | RESPIRATION RATE: 20 BRPM | OXYGEN SATURATION: 96 %

## 2017-05-05 LAB
ANION GAP SERPL CALCULATED.3IONS-SCNC: 12.6 MMOL/L
BUN BLD-MCNC: 29 MG/DL (ref 8–23)
BUN/CREAT SERPL: 26.6 (ref 7–25)
CALCIUM SPEC-SCNC: 9 MG/DL (ref 8.6–10.5)
CHLORIDE SERPL-SCNC: 100 MMOL/L (ref 98–107)
CO2 SERPL-SCNC: 26.4 MMOL/L (ref 22–29)
CREAT BLD-MCNC: 1.09 MG/DL (ref 0.76–1.27)
GFR SERPL CREATININE-BSD FRML MDRD: 67 ML/MIN/1.73
GLUCOSE BLD-MCNC: 140 MG/DL (ref 65–99)
GLUCOSE BLDC GLUCOMTR-MCNC: 158 MG/DL (ref 70–130)
GLUCOSE BLDC GLUCOMTR-MCNC: 173 MG/DL (ref 70–130)
POTASSIUM BLD-SCNC: 3.6 MMOL/L (ref 3.5–5.2)
SODIUM BLD-SCNC: 139 MMOL/L (ref 136–145)
VANCOMYCIN TROUGH SERPL-MCNC: 15.1 MCG/ML (ref 5–20)

## 2017-05-05 PROCEDURE — 63710000001 INSULIN ASPART PER 5 UNITS: Performed by: ORTHOPAEDIC SURGERY

## 2017-05-05 PROCEDURE — 80048 BASIC METABOLIC PNL TOTAL CA: CPT

## 2017-05-05 PROCEDURE — 96366 THER/PROPH/DIAG IV INF ADDON: CPT

## 2017-05-05 PROCEDURE — G0378 HOSPITAL OBSERVATION PER HR: HCPCS

## 2017-05-05 PROCEDURE — 82962 GLUCOSE BLOOD TEST: CPT

## 2017-05-05 PROCEDURE — 25010000002 VANCOMYCIN: Performed by: PHYSICIAN ASSISTANT

## 2017-05-05 PROCEDURE — 97161 PT EVAL LOW COMPLEX 20 MIN: CPT | Performed by: PHYSICAL THERAPIST

## 2017-05-05 PROCEDURE — 80202 ASSAY OF VANCOMYCIN: CPT | Performed by: PHYSICIAN ASSISTANT

## 2017-05-05 PROCEDURE — G8978 MOBILITY CURRENT STATUS: HCPCS | Performed by: PHYSICAL THERAPIST

## 2017-05-05 PROCEDURE — G8979 MOBILITY GOAL STATUS: HCPCS | Performed by: PHYSICAL THERAPIST

## 2017-05-05 PROCEDURE — 97110 THERAPEUTIC EXERCISES: CPT | Performed by: PHYSICAL THERAPIST

## 2017-05-05 PROCEDURE — G8980 MOBILITY D/C STATUS: HCPCS | Performed by: PHYSICAL THERAPIST

## 2017-05-05 RX ORDER — CEFTRIAXONE SODIUM 2 G/50ML
2 INJECTION, SOLUTION INTRAVENOUS EVERY 24 HOURS
Qty: 40 ML | Refills: 1 | Status: SHIPPED | OUTPATIENT
Start: 2017-05-05 | End: 2018-05-21

## 2017-05-05 RX ADMIN — BUMETANIDE 1 MG: 1 TABLET ORAL at 08:38

## 2017-05-05 RX ADMIN — ROSUVASTATIN CALCIUM 5 MG: 5 TABLET, FILM COATED ORAL at 08:37

## 2017-05-05 RX ADMIN — CLOPIDOGREL 75 MG: 75 TABLET, FILM COATED ORAL at 06:49

## 2017-05-05 RX ADMIN — ASPIRIN 81 MG: 81 TABLET ORAL at 08:37

## 2017-05-05 RX ADMIN — VANCOMYCIN HYDROCHLORIDE 1500 MG: 1 INJECTION, POWDER, LYOPHILIZED, FOR SOLUTION INTRAVENOUS at 03:12

## 2017-05-05 RX ADMIN — OXYCODONE HYDROCHLORIDE AND ACETAMINOPHEN 2 TABLET: 5; 325 TABLET ORAL at 03:21

## 2017-05-05 RX ADMIN — OXYCODONE HYDROCHLORIDE AND ACETAMINOPHEN 2 TABLET: 5; 325 TABLET ORAL at 10:15

## 2017-05-05 RX ADMIN — OXYCODONE HYDROCHLORIDE AND ACETAMINOPHEN 2 TABLET: 5; 325 TABLET ORAL at 15:36

## 2017-05-05 RX ADMIN — LISINOPRIL 40 MG: 40 TABLET ORAL at 06:50

## 2017-05-05 RX ADMIN — INSULIN ASPART 2 UNITS: 100 INJECTION, SOLUTION INTRAVENOUS; SUBCUTANEOUS at 08:37

## 2017-05-05 RX ADMIN — TRIAMTERENE AND HYDROCHLOROTHIAZIDE 1 CAPSULE: 37.5; 25 CAPSULE ORAL at 06:49

## 2017-05-05 RX ADMIN — DILTIAZEM HYDROCHLORIDE 240 MG: 240 CAPSULE, COATED, EXTENDED RELEASE ORAL at 06:50

## 2017-05-05 RX ADMIN — GLIPIZIDE 10 MG: 10 TABLET ORAL at 06:50

## 2017-05-05 RX ADMIN — HYDRALAZINE HYDROCHLORIDE 50 MG: 50 TABLET, FILM COATED ORAL at 08:37

## 2017-05-05 RX ADMIN — INSULIN ASPART 2 UNITS: 100 INJECTION, SOLUTION INTRAVENOUS; SUBCUTANEOUS at 11:51

## 2017-05-09 ENCOUNTER — LAB REQUISITION (OUTPATIENT)
Dept: LAB | Facility: HOSPITAL | Age: 72
End: 2017-05-09

## 2017-05-09 DIAGNOSIS — Z00.00 ENCOUNTER FOR GENERAL ADULT MEDICAL EXAMINATION WITHOUT ABNORMAL FINDINGS: ICD-10-CM

## 2017-05-09 LAB
ALBUMIN SERPL-MCNC: 3.3 G/DL (ref 3.5–5.2)
ALBUMIN/GLOB SERPL: 0.9 G/DL
ALP SERPL-CCNC: 101 U/L (ref 39–117)
ALT SERPL W P-5'-P-CCNC: 50 U/L (ref 1–41)
ANION GAP SERPL CALCULATED.3IONS-SCNC: 13.6 MMOL/L
AST SERPL-CCNC: 56 U/L (ref 1–40)
BASOPHILS # BLD AUTO: 0.1 10*3/MM3 (ref 0–0.2)
BASOPHILS NFR BLD AUTO: 0.8 % (ref 0–1.5)
BILIRUB SERPL-MCNC: 0.7 MG/DL (ref 0.1–1.2)
BUN BLD-MCNC: 22 MG/DL (ref 8–23)
BUN/CREAT SERPL: 17.7 (ref 7–25)
CALCIUM SPEC-SCNC: 9.7 MG/DL (ref 8.6–10.5)
CHLORIDE SERPL-SCNC: 99 MMOL/L (ref 98–107)
CO2 SERPL-SCNC: 26.4 MMOL/L (ref 22–29)
CREAT BLD-MCNC: 1.24 MG/DL (ref 0.76–1.27)
DEPRECATED RDW RBC AUTO: 45.4 FL (ref 37–54)
EOSINOPHIL # BLD AUTO: 0.47 10*3/MM3 (ref 0–0.7)
EOSINOPHIL NFR BLD AUTO: 4 % (ref 0.3–6.2)
ERYTHROCYTE [DISTWIDTH] IN BLOOD BY AUTOMATED COUNT: 13.3 % (ref 11.5–14.5)
GFR SERPL CREATININE-BSD FRML MDRD: 58 ML/MIN/1.73
GLOBULIN UR ELPH-MCNC: 3.7 GM/DL
GLUCOSE BLD-MCNC: 183 MG/DL (ref 65–99)
HCT VFR BLD AUTO: 40.5 % (ref 40.4–52.2)
HGB BLD-MCNC: 13.4 G/DL (ref 13.7–17.6)
IMM GRANULOCYTES # BLD: 0.09 10*3/MM3 (ref 0–0.03)
IMM GRANULOCYTES NFR BLD: 0.8 % (ref 0–0.5)
LYMPHOCYTES # BLD AUTO: 1.26 10*3/MM3 (ref 0.9–4.8)
LYMPHOCYTES NFR BLD AUTO: 10.7 % (ref 19.6–45.3)
MCH RBC QN AUTO: 31 PG (ref 27–32.7)
MCHC RBC AUTO-ENTMCNC: 33.1 G/DL (ref 32.6–36.4)
MCV RBC AUTO: 93.8 FL (ref 79.8–96.2)
MONOCYTES # BLD AUTO: 0.66 10*3/MM3 (ref 0.2–1.2)
MONOCYTES NFR BLD AUTO: 5.6 % (ref 5–12)
NEUTROPHILS # BLD AUTO: 9.25 10*3/MM3 (ref 1.9–8.1)
NEUTROPHILS NFR BLD AUTO: 78.1 % (ref 42.7–76)
PLATELET # BLD AUTO: 822 10*3/MM3 (ref 140–500)
PMV BLD AUTO: 9.4 FL (ref 6–12)
POTASSIUM BLD-SCNC: 3.9 MMOL/L (ref 3.5–5.2)
PROT SERPL-MCNC: 7 G/DL (ref 6–8.5)
RBC # BLD AUTO: 4.32 10*6/MM3 (ref 4.6–6)
SODIUM BLD-SCNC: 139 MMOL/L (ref 136–145)
VANCOMYCIN TROUGH SERPL-MCNC: 15.1 MCG/ML (ref 5–20)
WBC NRBC COR # BLD: 11.83 10*3/MM3 (ref 4.5–10.7)

## 2017-05-09 PROCEDURE — 85025 COMPLETE CBC W/AUTO DIFF WBC: CPT | Performed by: ORTHOPAEDIC SURGERY

## 2017-05-09 PROCEDURE — 80053 COMPREHEN METABOLIC PANEL: CPT | Performed by: ORTHOPAEDIC SURGERY

## 2017-05-09 PROCEDURE — 80202 ASSAY OF VANCOMYCIN: CPT | Performed by: ORTHOPAEDIC SURGERY

## 2017-05-15 ENCOUNTER — LAB REQUISITION (OUTPATIENT)
Dept: LAB | Facility: HOSPITAL | Age: 72
End: 2017-05-15

## 2017-05-15 DIAGNOSIS — Z00.00 ENCOUNTER FOR GENERAL ADULT MEDICAL EXAMINATION WITHOUT ABNORMAL FINDINGS: ICD-10-CM

## 2017-05-15 LAB
ALBUMIN SERPL-MCNC: 3.4 G/DL (ref 3.5–5.2)
ALBUMIN/GLOB SERPL: 1 G/DL
ALP SERPL-CCNC: 96 U/L (ref 39–117)
ALT SERPL W P-5'-P-CCNC: 34 U/L (ref 1–41)
ANION GAP SERPL CALCULATED.3IONS-SCNC: 12.1 MMOL/L
AST SERPL-CCNC: 37 U/L (ref 1–40)
BASOPHILS # BLD AUTO: 0.15 10*3/MM3 (ref 0–0.2)
BASOPHILS NFR BLD AUTO: 1.4 % (ref 0–1.5)
BILIRUB SERPL-MCNC: 0.5 MG/DL (ref 0.1–1.2)
BUN BLD-MCNC: 22 MG/DL (ref 8–23)
BUN/CREAT SERPL: 19.5 (ref 7–25)
CALCIUM SPEC-SCNC: 9.6 MG/DL (ref 8.6–10.5)
CHLORIDE SERPL-SCNC: 101 MMOL/L (ref 98–107)
CO2 SERPL-SCNC: 26.9 MMOL/L (ref 22–29)
CREAT BLD-MCNC: 1.13 MG/DL (ref 0.76–1.27)
DEPRECATED RDW RBC AUTO: 45.5 FL (ref 37–54)
EOSINOPHIL # BLD AUTO: 0.84 10*3/MM3 (ref 0–0.7)
EOSINOPHIL NFR BLD AUTO: 8.1 % (ref 0.3–6.2)
ERYTHROCYTE [DISTWIDTH] IN BLOOD BY AUTOMATED COUNT: 13.5 % (ref 11.5–14.5)
GFR SERPL CREATININE-BSD FRML MDRD: 64 ML/MIN/1.73
GLOBULIN UR ELPH-MCNC: 3.4 GM/DL
GLUCOSE BLD-MCNC: 133 MG/DL (ref 65–99)
HCT VFR BLD AUTO: 40.6 % (ref 40.4–52.2)
HGB BLD-MCNC: 13.4 G/DL (ref 13.7–17.6)
IMM GRANULOCYTES # BLD: 0.05 10*3/MM3 (ref 0–0.03)
IMM GRANULOCYTES NFR BLD: 0.5 % (ref 0–0.5)
LYMPHOCYTES # BLD AUTO: 1.45 10*3/MM3 (ref 0.9–4.8)
LYMPHOCYTES NFR BLD AUTO: 14 % (ref 19.6–45.3)
MCH RBC QN AUTO: 30.9 PG (ref 27–32.7)
MCHC RBC AUTO-ENTMCNC: 33 G/DL (ref 32.6–36.4)
MCV RBC AUTO: 93.5 FL (ref 79.8–96.2)
MONOCYTES # BLD AUTO: 0.64 10*3/MM3 (ref 0.2–1.2)
MONOCYTES NFR BLD AUTO: 6.2 % (ref 5–12)
NEUTROPHILS # BLD AUTO: 7.25 10*3/MM3 (ref 1.9–8.1)
NEUTROPHILS NFR BLD AUTO: 69.8 % (ref 42.7–76)
PLATELET # BLD AUTO: 842 10*3/MM3 (ref 140–500)
PMV BLD AUTO: 9.5 FL (ref 6–12)
POTASSIUM BLD-SCNC: 3.7 MMOL/L (ref 3.5–5.2)
PROT SERPL-MCNC: 6.8 G/DL (ref 6–8.5)
RBC # BLD AUTO: 4.34 10*6/MM3 (ref 4.6–6)
SODIUM BLD-SCNC: 140 MMOL/L (ref 136–145)
VANCOMYCIN TROUGH SERPL-MCNC: 17.9 MCG/ML (ref 5–20)
WBC NRBC COR # BLD: 10.38 10*3/MM3 (ref 4.5–10.7)

## 2017-05-15 PROCEDURE — 85025 COMPLETE CBC W/AUTO DIFF WBC: CPT | Performed by: ORTHOPAEDIC SURGERY

## 2017-05-15 PROCEDURE — 80202 ASSAY OF VANCOMYCIN: CPT | Performed by: ORTHOPAEDIC SURGERY

## 2017-05-15 PROCEDURE — 80053 COMPREHEN METABOLIC PANEL: CPT | Performed by: ORTHOPAEDIC SURGERY

## 2017-05-23 ENCOUNTER — LAB REQUISITION (OUTPATIENT)
Dept: LAB | Facility: HOSPITAL | Age: 72
End: 2017-05-23

## 2017-05-23 DIAGNOSIS — Z00.00 ENCOUNTER FOR GENERAL ADULT MEDICAL EXAMINATION WITHOUT ABNORMAL FINDINGS: ICD-10-CM

## 2017-05-23 LAB
ALBUMIN SERPL-MCNC: 3.8 G/DL (ref 3.5–5.2)
ALBUMIN/GLOB SERPL: 1.3 G/DL
ALP SERPL-CCNC: 106 U/L (ref 39–117)
ALT SERPL W P-5'-P-CCNC: 21 U/L (ref 1–41)
ANION GAP SERPL CALCULATED.3IONS-SCNC: 14.3 MMOL/L
AST SERPL-CCNC: 23 U/L (ref 1–40)
BASOPHILS # BLD AUTO: 0.06 10*3/MM3 (ref 0–0.2)
BASOPHILS NFR BLD AUTO: 0.7 % (ref 0–1.5)
BILIRUB SERPL-MCNC: 0.7 MG/DL (ref 0.1–1.2)
BUN BLD-MCNC: 23 MG/DL (ref 8–23)
BUN/CREAT SERPL: 20.9 (ref 7–25)
CALCIUM SPEC-SCNC: 9.8 MG/DL (ref 8.6–10.5)
CHLORIDE SERPL-SCNC: 101 MMOL/L (ref 98–107)
CO2 SERPL-SCNC: 25.7 MMOL/L (ref 22–29)
CREAT BLD-MCNC: 1.1 MG/DL (ref 0.76–1.27)
CRP SERPL-MCNC: 0.66 MG/DL (ref 0–0.5)
DEPRECATED RDW RBC AUTO: 47.3 FL (ref 37–54)
EOSINOPHIL # BLD AUTO: 0.79 10*3/MM3 (ref 0–0.7)
EOSINOPHIL NFR BLD AUTO: 9.5 % (ref 0.3–6.2)
ERYTHROCYTE [DISTWIDTH] IN BLOOD BY AUTOMATED COUNT: 13.7 % (ref 11.5–14.5)
ERYTHROCYTE [SEDIMENTATION RATE] IN BLOOD: 5 MM/HR (ref 0–20)
GFR SERPL CREATININE-BSD FRML MDRD: 66 ML/MIN/1.73
GLOBULIN UR ELPH-MCNC: 3 GM/DL
GLUCOSE BLD-MCNC: 221 MG/DL (ref 65–99)
HCT VFR BLD AUTO: 43.5 % (ref 40.4–52.2)
HGB BLD-MCNC: 14.3 G/DL (ref 13.7–17.6)
IMM GRANULOCYTES # BLD: 0.03 10*3/MM3 (ref 0–0.03)
IMM GRANULOCYTES NFR BLD: 0.4 % (ref 0–0.5)
LYMPHOCYTES # BLD AUTO: 1.32 10*3/MM3 (ref 0.9–4.8)
LYMPHOCYTES NFR BLD AUTO: 15.9 % (ref 19.6–45.3)
MCH RBC QN AUTO: 31.3 PG (ref 27–32.7)
MCHC RBC AUTO-ENTMCNC: 32.9 G/DL (ref 32.6–36.4)
MCV RBC AUTO: 95.2 FL (ref 79.8–96.2)
MONOCYTES # BLD AUTO: 0.62 10*3/MM3 (ref 0.2–1.2)
MONOCYTES NFR BLD AUTO: 7.5 % (ref 5–12)
NEUTROPHILS # BLD AUTO: 5.5 10*3/MM3 (ref 1.9–8.1)
NEUTROPHILS NFR BLD AUTO: 66 % (ref 42.7–76)
PLATELET # BLD AUTO: 603 10*3/MM3 (ref 140–500)
PMV BLD AUTO: 10.1 FL (ref 6–12)
POTASSIUM BLD-SCNC: 3.4 MMOL/L (ref 3.5–5.2)
PROT SERPL-MCNC: 6.8 G/DL (ref 6–8.5)
RBC # BLD AUTO: 4.57 10*6/MM3 (ref 4.6–6)
SODIUM BLD-SCNC: 141 MMOL/L (ref 136–145)
VANCOMYCIN TROUGH SERPL-MCNC: 16.2 MCG/ML (ref 5–20)
WBC NRBC COR # BLD: 8.32 10*3/MM3 (ref 4.5–10.7)

## 2017-05-23 PROCEDURE — 85652 RBC SED RATE AUTOMATED: CPT | Performed by: ORTHOPAEDIC SURGERY

## 2017-05-23 PROCEDURE — 85025 COMPLETE CBC W/AUTO DIFF WBC: CPT | Performed by: ORTHOPAEDIC SURGERY

## 2017-05-23 PROCEDURE — 86140 C-REACTIVE PROTEIN: CPT | Performed by: ORTHOPAEDIC SURGERY

## 2017-05-23 PROCEDURE — 80053 COMPREHEN METABOLIC PANEL: CPT | Performed by: ORTHOPAEDIC SURGERY

## 2017-05-23 PROCEDURE — 80202 ASSAY OF VANCOMYCIN: CPT | Performed by: ORTHOPAEDIC SURGERY

## 2018-05-21 ENCOUNTER — OFFICE VISIT (OUTPATIENT)
Dept: CARDIOLOGY | Facility: CLINIC | Age: 73
End: 2018-05-21

## 2018-05-21 VITALS
SYSTOLIC BLOOD PRESSURE: 136 MMHG | BODY MASS INDEX: 34.81 KG/M2 | DIASTOLIC BLOOD PRESSURE: 68 MMHG | HEART RATE: 81 BPM | HEIGHT: 72 IN | WEIGHT: 257 LBS

## 2018-05-21 DIAGNOSIS — I77.810 AORTIC ROOT DILATATION (HCC): ICD-10-CM

## 2018-05-21 DIAGNOSIS — R00.2 PALPITATIONS: Primary | ICD-10-CM

## 2018-05-21 PROCEDURE — 93000 ELECTROCARDIOGRAM COMPLETE: CPT | Performed by: INTERNAL MEDICINE

## 2018-05-21 PROCEDURE — 99204 OFFICE O/P NEW MOD 45 MIN: CPT | Performed by: INTERNAL MEDICINE

## 2018-05-21 RX ORDER — HYDRALAZINE HYDROCHLORIDE 100 MG/1
100 TABLET, FILM COATED ORAL 3 TIMES DAILY
COMMUNITY
Start: 2018-04-16 | End: 2019-12-10 | Stop reason: SDUPTHER

## 2018-05-21 RX ORDER — PRAVASTATIN SODIUM 40 MG
40 TABLET ORAL DAILY
COMMUNITY
Start: 2018-05-18 | End: 2019-12-11 | Stop reason: SDUPTHER

## 2018-05-21 RX ORDER — VIT C/B6/B5/MAGNESIUM/HERB 173 50-5-6-5MG
CAPSULE ORAL
COMMUNITY
End: 2019-12-10 | Stop reason: SINTOL

## 2018-07-09 ENCOUNTER — HOSPITAL ENCOUNTER (EMERGENCY)
Facility: HOSPITAL | Age: 73
Discharge: HOME | End: 2018-07-09
Attending: EMERGENCY MEDICINE | Admitting: EMERGENCY MEDICINE

## 2018-07-09 ENCOUNTER — APPOINTMENT (OUTPATIENT)
Dept: GENERAL RADIOLOGY | Facility: HOSPITAL | Age: 73
End: 2018-07-09

## 2018-07-09 VITALS
HEART RATE: 56 BPM | TEMPERATURE: 97.1 F | SYSTOLIC BLOOD PRESSURE: 173 MMHG | OXYGEN SATURATION: 98 % | DIASTOLIC BLOOD PRESSURE: 91 MMHG | BODY MASS INDEX: 34.4 KG/M2 | RESPIRATION RATE: 18 BRPM | WEIGHT: 254 LBS | HEIGHT: 72 IN

## 2018-07-09 DIAGNOSIS — S61.215A LACERATION OF LEFT RING FINGER WITHOUT FOREIGN BODY WITHOUT DAMAGE TO NAIL, INITIAL ENCOUNTER: Primary | ICD-10-CM

## 2018-07-09 PROCEDURE — 90471 IMMUNIZATION ADMIN: CPT | Performed by: PHYSICIAN ASSISTANT

## 2018-07-09 PROCEDURE — 25010000002 TDAP 5-2.5-18.5 LF-MCG/0.5 SUSPENSION: Performed by: PHYSICIAN ASSISTANT

## 2018-07-09 PROCEDURE — 90715 TDAP VACCINE 7 YRS/> IM: CPT | Performed by: PHYSICIAN ASSISTANT

## 2018-07-09 PROCEDURE — 73140 X-RAY EXAM OF FINGER(S): CPT

## 2018-07-09 PROCEDURE — 99283 EMERGENCY DEPT VISIT LOW MDM: CPT

## 2018-07-09 RX ORDER — LIDOCAINE HYDROCHLORIDE 10 MG/ML
20 INJECTION, SOLUTION INFILTRATION; PERINEURAL ONCE
Status: COMPLETED | OUTPATIENT
Start: 2018-07-09 | End: 2018-07-09

## 2018-07-09 RX ORDER — CEPHALEXIN 500 MG/1
500 CAPSULE ORAL 3 TIMES DAILY
Qty: 21 CAPSULE | Refills: 0 | Status: SHIPPED | OUTPATIENT
Start: 2018-07-09 | End: 2018-11-05

## 2018-07-09 RX ADMIN — LIDOCAINE HYDROCHLORIDE 20 ML: 10 INJECTION, SOLUTION INFILTRATION; PERINEURAL at 22:00

## 2018-07-09 RX ADMIN — TETANUS TOXOID, REDUCED DIPHTHERIA TOXOID AND ACELLULAR PERTUSSIS VACCINE, ADSORBED 0.5 ML: 5; 2.5; 8; 8; 2.5 SUSPENSION INTRAMUSCULAR at 22:26

## 2018-07-10 NOTE — DISCHARGE INSTRUCTIONS
Keep laceration clean and dry, apply antibiotic ointment once or twice daily, watch carefully for signs of infection, sutures need to be removed in 8 to 10 days. Return to care if any complications.

## 2018-07-10 NOTE — ED NOTES
Pt using hedge trimmers today. Pt with laceration to 4th finger on left hand. Bleeding controlled. Pt on Plavix.      Rebecca Norton RN  07/09/18 8280

## 2018-07-10 NOTE — ED NOTES
Pt's wound dressed with bacitracin and Band-Aid applied. Pt tolerated well.     Lachelle Gillis RN  07/09/18 5370

## 2018-07-10 NOTE — ED PROVIDER NOTES
The patient presents complaining of a left ring finger laceration after an incident with a headge edwar. Pt is right handed.     Physical Exam:  Patient is nontoxic appearing and in NAD. The pt is alert and oriented X 3.  HENT: Normal  Skin: 1cm laceration on palmar aspect of left 4th finger. No nail injury. Normal ROM.     XR - Negative for fracture.    Plan: Discussed plan to have PA repair the laceration. Pt understands and agrees with the plan. All questions have been answered.      MD ATTESTATION NOTE    The CONSTANZA and I have discussed this patient's history, physical exam, and treatment plan.  I have reviewed the documentation and personally had a face to face interaction with the patient. I affirm the documentation and agree with the treatment and plan.  The attached note describes my personal findings.    Documentation assistance provided by shayne Mchugh for Dr Tran. Information recorded by the scribe was done at my direction and has been verified and validated by me.       Jenelle Mchugh  07/09/18 2229       Walter Tran MD  07/10/18 0019

## 2018-07-10 NOTE — ED PROVIDER NOTES
" EMERGENCY DEPARTMENT ENCOUNTER    CHIEF COMPLAINT  Chief Complaint: Finger laceration  History given by: Pt  History limited by: Nothing  Room Number: 08/08  PMD: MUKUL Bañuelos      HPI:  Pt is a 73 y.o. male who presents complaining of laceration to the L 4th fingertip approximately 30 minutes PTA. Pt reports he was working in his yard and cut his L 4th finger with electric hedge trimmers. Pt c/o \"throbbing\" pain to the finger. Pt denies numbness/tingling.  Pt states he is unsure if he is UTD on his TDAP.     Duration:  30 minutes PTA  Onset: sudden  Timing: constant  Location: L 4th fingertip  Radiation: none  Quality: laceration  Intensity/Severity: moderate  Progression: unchanged  Associated Symptoms: \"throbbing\" pain to the finger  Aggravating Factors: none  Alleviating Factors: none  Previous Episodes: None stated  Treatment before arrival: Pt reports he is unsure if he is UTD on his TDAP    PAST MEDICAL HISTORY  Active Ambulatory Problems     Diagnosis Date Noted   • Osteoarthritis, knee 04/27/2017   • Type 2 diabetes mellitus (CMS/[facility]) 04/28/2017   • DORI (acute kidney injury) (CMS/[facility]) 04/28/2017   • Hypertension 04/28/2017   • Coronary artery disease 04/28/2017   • Cellulitis of knee, left 05/04/2017     Resolved Ambulatory Problems     Diagnosis Date Noted   • No Resolved Ambulatory Problems     Past Medical History:   Diagnosis Date   • Aortic root dilation (CMS/[facility])    • Arthritis    • CHF (congestive heart failure) (CMS/[facility])    • Coronary artery disease    • Diabetes mellitus (CMS/[facility])    • Diminished pulse    • Hearing loss    • Heart valve disease    • High cholesterol    • Irregular heart rate    • Knee pain, left    • Prostate hyperplasia without urinary obstruction    • Pulmonary hypertension    • Scoliosis    • TIA (transient ischemic attack)        PAST SURGICAL HISTORY  Past Surgical History:   Procedure Laterality Date   • CARDIAC CATHETERIZATION     • COLONOSCOPY     • HAND SURGERY   "   • JOINT REPLACEMENT  2014    Rig Knee   • IN TOTAL KNEE ARTHROPLASTY Left 4/27/2017    Procedure: LT TOTAL KNEE ARTHROPLASTY;  Surgeon: Bertin Perrin MD;  Location: [facility] MAIN OR;  Service: Orthopedics       FAMILY HISTORY  Family History   Problem Relation Age of Onset   • Hypertension Mother    • Other Father         ruptured appendixdies when pt. was 14.   • Diabetes Paternal Aunt    • Diabetes Paternal Uncle        SOCIAL HISTORY  Social History     Social History   • Marital status:      Spouse name: N/A   • Number of children: N/A   • Years of education: N/A     Occupational History   • Not on file.     Social History Main Topics   • Smoking status: Former Smoker     Types: Cigarettes     Quit date: 1973   • Smokeless tobacco: Former User      Comment: quite: 1973   • Alcohol use No   • Drug use: No   • Sexual activity: Not on file     Other Topics Concern   • Not on file     Social History Narrative   • No narrative on file       ALLERGIES  Patient has no known allergies.    REVIEW OF SYSTEMS  Review of Systems   Constitutional: Negative for fever.   Respiratory: Negative for shortness of breath.    Cardiovascular: Negative for chest pain.   Musculoskeletal: Positive for myalgias (L 4th finger pain).   Skin: Positive for wound (L 4th finger laceration).       PHYSICAL EXAM  ED Triage Vitals   Temp Heart Rate Resp BP SpO2   07/09/18 2133 07/09/18 2133 07/09/18 2133 07/09/18 2146 07/09/18 2133   97.1 °F (36.2 °C) 64 18 170/89 98 %      Temp src Heart Rate Source Patient Position BP Location FiO2 (%)   07/09/18 2133 -- -- -- --   Tympanic           Physical Exam   Constitutional: No distress.   HENT:   Head: Normocephalic and atraumatic.   Cardiovascular: Regular rhythm.    Pulmonary/Chest: No respiratory distress.   Abdominal: There is no tenderness.   Musculoskeletal: He exhibits no tenderness.   Neurological:   Pt is neurovascularly intact   Skin: No rash noted.   1 cm laceration to the L ring  finger without nail involvement   Nursing note and vitals reviewed.      RADIOLOGY  XR Finger 2+ View Left   Preliminary Result   No acute fracture or dislocation. Soft tissue laceration.               I ordered the above noted radiological studies. Interpreted by radiologist. Reviewed by me in PACS.       PROCEDURES  Laceration Repair  Date/Time: 7/9/2018 9:58 PM  Performed by: MARIA ELENA MAGUIRE  Authorized by: CONSUELO RAY     Consent:     Consent obtained:  Verbal    Consent given by:  Patient    Risks discussed:  Infection and pain    Alternatives discussed:  No treatment  Anesthesia (see MAR for exact dosages):     Anesthesia method:  Nerve block    Block location:  L 4th finger    Block needle gauge:  27 G    Block anesthetic:  Lidocaine 1% w/o epi    Block technique:  Digital block    Block injection procedure:  Anatomic landmarks identified, introduced needle and incremental injection    Block outcome:  Anesthesia achieved  Laceration details:     Location:  Finger    Finger location:  L ring finger    Length (cm):  1  Repair type:     Repair type:  Simple  Pre-procedure details:     Preparation:  Patient was prepped and draped in usual sterile fashion and imaging obtained to evaluate for foreign bodies  Exploration:     Hemostasis achieved with:  Epinephrine  Treatment:     Area cleansed with:  Betadine    Amount of cleaning:  Standard    Irrigation solution:  Sterile saline  Skin repair:     Repair method:  Sutures    Suture size:  5-0    Suture material:  Nylon    Suture technique:  Simple interrupted    Number of sutures:  4  Approximation:     Approximation:  Close  Post-procedure details:     Dressing:  Antibiotic ointment    Patient tolerance of procedure:  Tolerated well, no immediate complications            PROGRESS AND CONSULTS       2154 - XR L finger ordered for further evaluation. TDAP ordered.     2210 - Discussed pt care with Dr. Tran who agrees with course of care.     2241 - Informed pt  of the plan to discharge home with abx and a follow up with PCP for suture removal. Pt understands and agrees with plan. All questions answered.     MEDICAL DECISION MAKING  Results were reviewed/discussed with the patient and they were also made aware of online access. Pt also made aware that some labs, such as cultures, will not be resulted during ER visit and follow up with PMD is necessary.     MDM  Number of Diagnoses or Management Options     Amount and/or Complexity of Data Reviewed  Tests in the radiology section of CPT®: reviewed and ordered (XR L finger - no acute fracture)           DIAGNOSIS  Final diagnoses:   Laceration of left ring finger without foreign body without damage to nail, initial encounter       DISPOSITION  DISCHARGE    Patient discharged in stable condition.    Reviewed implications of results, diagnosis, meds, responsibility to follow up, warning signs and symptoms of possible worsening, potential complications and reasons to return to ER.    Patient/Family voiced understanding of above instructions.    Discussed plan for discharge, as there is no emergent indication for admission. Patient referred to primary care provider for BP management due to today's BP. Pt/family is agreeable and understands need for follow up and repeat testing.  Pt is aware that discharge does not mean that nothing is wrong but it indicates no emergency is present that requires admission and they must continue care with follow-up as given below or physician of their choice.     FOLLOW-UP  Zamzam John, APRN  [address] [id number] [phone]    Schedule an appointment as soon as possible for a visit in 10 days  For suture removal         Medication List      New Prescriptions    cephalexin 500 MG capsule  Commonly known as:  KEFLEX  Take 1 capsule by mouth 3 (Three) Times a Day.              Latest Documented Vital Signs:  As of 10:48 PM  BP- 170/89 HR- 64 Temp- 97.1 °F (36.2 °C) (Tympanic) O2  sat- 98%    --  Documentation assistance provided by shayne Ballesteros for Cachorro Osorio PA-C.  Information recorded by the mariselaiberna was done at my direction and has been verified and validated by me.         Phong Ballesteros  07/09/18 2245       CARINA Day  07/09/18 2245

## 2018-07-10 NOTE — ED NOTES
Pt doesn't remember when his last tetanus shot was. There is no active bleeding at this time to lac. Reassurance given; call light in reach. Pts breathing even and unlabored. Pt appears in NAD at this time. Family at bedside.        Lachelle Gillis RN  07/09/18 7119

## 2018-10-24 ENCOUNTER — APPOINTMENT (OUTPATIENT)
Dept: GENERAL RADIOLOGY | Facility: HOSPITAL | Age: 73
End: 2018-10-24

## 2018-10-24 ENCOUNTER — HOSPITAL ENCOUNTER (EMERGENCY)
Facility: HOSPITAL | Age: 73
Discharge: HOME | End: 2018-10-24
Attending: EMERGENCY MEDICINE | Admitting: EMERGENCY MEDICINE

## 2018-10-24 ENCOUNTER — APPOINTMENT (OUTPATIENT)
Dept: CARDIOLOGY | Facility: HOSPITAL | Age: 73
End: 2018-10-24
Attending: EMERGENCY MEDICINE

## 2018-10-24 VITALS
WEIGHT: 242 LBS | DIASTOLIC BLOOD PRESSURE: 77 MMHG | BODY MASS INDEX: 32.78 KG/M2 | TEMPERATURE: 98 F | RESPIRATION RATE: 16 BRPM | HEIGHT: 72 IN | HEART RATE: 69 BPM | SYSTOLIC BLOOD PRESSURE: 137 MMHG | OXYGEN SATURATION: 94 %

## 2018-10-24 DIAGNOSIS — R00.0 TACHYCARDIA: Primary | ICD-10-CM

## 2018-10-24 LAB
ALBUMIN SERPL-MCNC: 3.7 G/DL (ref 3.5–5.2)
ALBUMIN/GLOB SERPL: 1.1 G/DL
ALP SERPL-CCNC: 76 U/L (ref 39–117)
ALT SERPL W P-5'-P-CCNC: 14 U/L (ref 1–41)
ANION GAP SERPL CALCULATED.3IONS-SCNC: 11.2 MMOL/L
AST SERPL-CCNC: 19 U/L (ref 1–40)
BASOPHILS # BLD AUTO: 0.06 10*3/MM3 (ref 0–0.2)
BASOPHILS NFR BLD AUTO: 0.6 % (ref 0–1.5)
BILIRUB SERPL-MCNC: 0.5 MG/DL (ref 0.1–1.2)
BUN BLD-MCNC: 22 MG/DL (ref 8–23)
BUN/CREAT SERPL: 19.1 (ref 7–25)
CALCIUM SPEC-SCNC: 9.5 MG/DL (ref 8.6–10.5)
CHLORIDE SERPL-SCNC: 103 MMOL/L (ref 98–107)
CO2 SERPL-SCNC: 23.8 MMOL/L (ref 22–29)
CREAT BLD-MCNC: 1.15 MG/DL (ref 0.76–1.27)
DEPRECATED RDW RBC AUTO: 48.9 FL (ref 37–54)
EOSINOPHIL # BLD AUTO: 0.21 10*3/MM3 (ref 0–0.7)
EOSINOPHIL NFR BLD AUTO: 2.1 % (ref 0.3–6.2)
ERYTHROCYTE [DISTWIDTH] IN BLOOD BY AUTOMATED COUNT: 14.3 % (ref 11.5–14.5)
GFR SERPL CREATININE-BSD FRML MDRD: 63 ML/MIN/1.73
GLOBULIN UR ELPH-MCNC: 3.3 GM/DL
GLUCOSE BLD-MCNC: 239 MG/DL (ref 65–99)
HCT VFR BLD AUTO: 40.7 % (ref 40.4–52.2)
HGB BLD-MCNC: 13.4 G/DL (ref 13.7–17.6)
IMM GRANULOCYTES # BLD: 0.03 10*3/MM3 (ref 0–0.03)
IMM GRANULOCYTES NFR BLD: 0.3 % (ref 0–0.5)
INR PPP: 1.26 (ref 0.9–1.1)
LYMPHOCYTES # BLD AUTO: 0.95 10*3/MM3 (ref 0.9–4.8)
LYMPHOCYTES NFR BLD AUTO: 9.6 % (ref 19.6–45.3)
MAGNESIUM SERPL-MCNC: 2.1 MG/DL (ref 1.6–2.4)
MCH RBC QN AUTO: 30.5 PG (ref 27–32.7)
MCHC RBC AUTO-ENTMCNC: 32.9 G/DL (ref 32.6–36.4)
MCV RBC AUTO: 92.7 FL (ref 79.8–96.2)
MONOCYTES # BLD AUTO: 0.98 10*3/MM3 (ref 0.2–1.2)
MONOCYTES NFR BLD AUTO: 9.9 % (ref 5–12)
NEUTROPHILS # BLD AUTO: 7.66 10*3/MM3 (ref 1.9–8.1)
NEUTROPHILS NFR BLD AUTO: 77.8 % (ref 42.7–76)
PLATELET # BLD AUTO: 516 10*3/MM3 (ref 140–500)
PMV BLD AUTO: 9.7 FL (ref 6–12)
POTASSIUM BLD-SCNC: 3.6 MMOL/L (ref 3.5–5.2)
PROT SERPL-MCNC: 7 G/DL (ref 6–8.5)
PROTHROMBIN TIME: 15.5 SECONDS (ref 11.7–14.2)
RBC # BLD AUTO: 4.39 10*6/MM3 (ref 4.6–6)
SODIUM BLD-SCNC: 138 MMOL/L (ref 136–145)
T4 FREE SERPL-MCNC: 1.29 NG/DL (ref 0.93–1.7)
TROPONIN T SERPL-MCNC: 0.01 NG/ML (ref 0–0.03)
TSH SERPL DL<=0.05 MIU/L-ACNC: 2.07 MIU/ML (ref 0.27–4.2)
WBC NRBC COR # BLD: 9.86 10*3/MM3 (ref 4.5–10.7)

## 2018-10-24 PROCEDURE — 99284 EMERGENCY DEPT VISIT MOD MDM: CPT

## 2018-10-24 PROCEDURE — 93005 ELECTROCARDIOGRAM TRACING: CPT | Performed by: EMERGENCY MEDICINE

## 2018-10-24 PROCEDURE — 0296T HC EXT ECG > 48HR TO 21 DAY RCRD W/CONECT INTL RCRD: CPT

## 2018-10-24 PROCEDURE — 93010 ELECTROCARDIOGRAM REPORT: CPT | Performed by: INTERNAL MEDICINE

## 2018-10-24 PROCEDURE — 71045 X-RAY EXAM CHEST 1 VIEW: CPT

## 2018-10-24 PROCEDURE — 84443 ASSAY THYROID STIM HORMONE: CPT | Performed by: EMERGENCY MEDICINE

## 2018-10-24 PROCEDURE — 80053 COMPREHEN METABOLIC PANEL: CPT | Performed by: EMERGENCY MEDICINE

## 2018-10-24 PROCEDURE — 83735 ASSAY OF MAGNESIUM: CPT | Performed by: EMERGENCY MEDICINE

## 2018-10-24 PROCEDURE — 84484 ASSAY OF TROPONIN QUANT: CPT | Performed by: EMERGENCY MEDICINE

## 2018-10-24 PROCEDURE — 84439 ASSAY OF FREE THYROXINE: CPT | Performed by: EMERGENCY MEDICINE

## 2018-10-24 PROCEDURE — 85610 PROTHROMBIN TIME: CPT | Performed by: EMERGENCY MEDICINE

## 2018-10-24 PROCEDURE — 85025 COMPLETE CBC W/AUTO DIFF WBC: CPT | Performed by: EMERGENCY MEDICINE

## 2018-10-24 RX ORDER — POTASSIUM CHLORIDE 750 MG/1
40 CAPSULE, EXTENDED RELEASE ORAL ONCE
Status: COMPLETED | OUTPATIENT
Start: 2018-10-24 | End: 2018-10-24

## 2018-10-24 RX ORDER — DILTIAZEM HYDROCHLORIDE 300 MG/1
300 CAPSULE, COATED, EXTENDED RELEASE ORAL DAILY
Qty: 30 CAPSULE | Refills: 0 | Status: SHIPPED | OUTPATIENT
Start: 2018-10-24 | End: 2018-11-19 | Stop reason: SDUPTHER

## 2018-10-24 RX ORDER — SODIUM CHLORIDE 0.9 % (FLUSH) 0.9 %
10 SYRINGE (ML) INJECTION AS NEEDED
Status: DISCONTINUED | OUTPATIENT
Start: 2018-10-24 | End: 2018-10-24 | Stop reason: HOSPADM

## 2018-10-24 RX ADMIN — POTASSIUM CHLORIDE 40 MEQ: 750 CAPSULE, EXTENDED RELEASE ORAL at 14:15

## 2018-10-24 NOTE — ED NOTES
Pt reports he checked his HR this morning and HR 120s. Pt denies cp, soa, palpitations or any complaints at this time. Pt a&ox4, skin p/w/d. Pt states he was seen at [facility] on Monday for HR 140s and states he was given IV medication to slow his heart rate.     Francine Connelly RN  10/24/18 7797

## 2018-10-24 NOTE — ED PROVIDER NOTES
" EMERGENCY DEPARTMENT ENCOUNTER    Room Number:  26/26  PCP: Zamzam John APRN   Cardiologist: Dr. Higuera  Historian: Patient, Family      HPI  Chief Complaint: Elevated Heart Rate  Context: Erlin Blanco is a 73 y.o. male. Pt reports that while pt was at his PMD's office for a routine f/u appointment about 2 days ago, pt was noticed to have heart rate in the 140s. Consequently, pt was referred to the ER for further evaluation. Pt was seen at [facility] ER for this about 2 days ago and was noted to be in SVT for which pt was administered Adenosine, which converted pt out of SVT and into sinus rhythm. Pt reports that after working last night, pt checked his heart rate and noted that it was in the 120s. Pt called his PMD's office for this earlier today and was again referred to the ER for further evaluation. Pt reports that he is asymptomatic with this - Pt denies chest pain/chest pressure/chest tightness, dyspnea, palpitations, dizziness/lightheadedness, diaphoresis, nausea, vomiting, abdominal pain, BLE edema, and bilateral/unilateral calf pain. There are no other complaints at this time.       Location: Cardiac  Radiation: N/A  Character: \"elevated heart rate\"  Duration: Onset about 2 days ago  Severity: Moderate  Progression: Waxing and waning  Aggravating Factors: Nothing  Alleviating Factors: Nothing          MEDICAL RECORD REVIEW    In 2015, pt was admitted at this facility for a knee surgery. During the admission, pt was noted to be tachycardic; pt was evaluated for this by cardiology, who thought that pt was in sinus tachycardia.     Pt was seen in Dr. Higuera's office in 05/2018 (cardiology) secondary to palpitations - At that time, pt's palpitations were thought to be infrequent.           PAST MEDICAL HISTORY  Active Ambulatory Problems     Diagnosis Date Noted   • Osteoarthritis, knee 04/27/2017   • Type 2 diabetes mellitus (CMS/[facility]) 04/28/2017   • DORI (acute kidney injury) (CMS/[facility]) 04/28/2017   • " Hypertension 04/28/2017   • Coronary artery disease 04/28/2017   • Cellulitis of knee, left 05/04/2017     Resolved Ambulatory Problems     Diagnosis Date Noted   • No Resolved Ambulatory Problems     Past Medical History:   Diagnosis Date   • Aortic root dilation (CMS/[facility])    • Arthritis    • CHF (congestive heart failure) (CMS/[facility])    • Coronary artery disease    • Diabetes mellitus (CMS/[facility])    • Diminished pulse    • Hearing loss    • Heart valve disease    • High cholesterol    • Irregular heart rate    • Knee pain, left    • Prostate hyperplasia without urinary obstruction    • Pulmonary hypertension (CMS/[facility])    • Scoliosis    • SVT (supraventricular tachycardia) (CMS/[facility])    • TIA (transient ischemic attack)          PAST SURGICAL HISTORY  Past Surgical History:   Procedure Laterality Date   • CARDIAC CATHETERIZATION     • COLONOSCOPY     • HAND SURGERY     • JOINT REPLACEMENT  2014    Righ Knee   • MA TOTAL KNEE ARTHROPLASTY Left 4/27/2017    Procedure: LT TOTAL KNEE ARTHROPLASTY;  Surgeon: Bertin Perrin MD;  Location: [facility] OR;  Service: Orthopedics         FAMILY HISTORY  Family History   Problem Relation Age of Onset   • Hypertension Mother    • Other Father         ruptured appendixdies when pt. was 14.   • Diabetes Paternal Aunt    • Diabetes Paternal Uncle          SOCIAL HISTORY  Social History     Social History   • Marital status:      Spouse name: N/A   • Number of children: N/A   • Years of education: N/A     Occupational History   • Not on file.     Social History Main Topics   • Smoking status: Former Smoker     Types: Cigarettes     Quit date: 1973   • Smokeless tobacco: Former User      Comment: quite: 1973   • Alcohol use No   • Drug use: No   • Sexual activity: Not on file     Other Topics Concern   • Not on file     Social History Narrative   • No narrative on file         ALLERGIES  Patient has no known allergies.        REVIEW OF SYSTEMS  Review of Systems    Constitutional: Negative for chills, diaphoresis and fever.   HENT: Negative for congestion, rhinorrhea and sore throat.    Eyes: Negative for pain.   Respiratory: Negative for cough and shortness of breath.    Cardiovascular: Negative for chest pain, palpitations and leg swelling.   Gastrointestinal: Negative for abdominal pain, diarrhea, nausea and vomiting.   Endocrine: Negative.    Genitourinary: Negative for difficulty urinating, dysuria, flank pain and frequency.   Musculoskeletal: Negative for myalgias, neck pain and neck stiffness.   Skin: Negative.  Negative for rash.   Neurological: Negative for dizziness, speech difficulty, weakness, light-headedness, numbness and headaches.   Psychiatric/Behavioral: Negative.    All other systems reviewed and are negative.           PHYSICAL EXAM  ED Triage Vitals   Temp Heart Rate Resp BP SpO2   10/24/18 1227 10/24/18 1227 10/24/18 1227 10/24/18 1251 10/24/18 1227   98 °F (36.7 °C) 84 16 129/66 98 %      Temp src Heart Rate Source Patient Position BP Location FiO2 (%)   10/24/18 1227 -- -- -- --   Tympanic             Physical Exam   Constitutional: He is oriented to person, place, and time. No distress.   HENT:   Head: Normocephalic.   Mouth/Throat: Mucous membranes are normal.   Eyes: Pupils are equal, round, and reactive to light. EOM are normal.   Neck: Normal range of motion. Neck supple.   Cardiovascular: Normal rate and regular rhythm.    Murmur heard.   Systolic murmur is present with a grade of 3/6   Pulmonary/Chest: Effort normal and breath sounds normal. No respiratory distress. He has no decreased breath sounds. He has no wheezes. He has no rhonchi. He has no rales.   Abdominal: Soft. There is no tenderness. There is no rebound and no guarding.   Musculoskeletal: Normal range of motion. He exhibits edema (1+ BLE edema).   Calves are nontender bilaterally.   Neurological: He is alert and oriented to person, place, and time. He has normal sensation.   Skin:  Skin is warm and dry.   Psychiatric: Mood and affect normal.   Nursing note and vitals reviewed.          LAB RESULTS  Recent Results (from the past 24 hour(s))   Comprehensive Metabolic Panel    Collection Time: 10/24/18 12:55 PM   Result Value Ref Range    Glucose 239 (H) 65 - 99 mg/dL    BUN 22 8 - 23 mg/dL    Creatinine 1.15 0.76 - 1.27 mg/dL    Sodium 138 136 - 145 mmol/L    Potassium 3.6 3.5 - 5.2 mmol/L    Chloride 103 98 - 107 mmol/L    CO2 23.8 22.0 - 29.0 mmol/L    Calcium 9.5 8.6 - 10.5 mg/dL    Total Protein 7.0 6.0 - 8.5 g/dL    Albumin 3.70 3.50 - 5.20 g/dL    ALT (SGPT) 14 1 - 41 U/L    AST (SGOT) 19 1 - 40 U/L    Alkaline Phosphatase 76 39 - 117 U/L    Total Bilirubin 0.5 0.1 - 1.2 mg/dL    eGFR Non African Amer 63 >60 mL/min/1.73    Globulin 3.3 gm/dL    A/G Ratio 1.1 g/dL    BUN/Creatinine Ratio 19.1 7.0 - 25.0    Anion Gap 11.2 mmol/L   Protime-INR    Collection Time: 10/24/18 12:55 PM   Result Value Ref Range    Protime 15.5 (H) 11.7 - 14.2 Seconds    INR 1.26 (H) 0.90 - 1.10   Troponin    Collection Time: 10/24/18 12:55 PM   Result Value Ref Range    Troponin T 0.012 0.000 - 0.030 ng/mL   Magnesium    Collection Time: 10/24/18 12:55 PM   Result Value Ref Range    Magnesium 2.1 1.6 - 2.4 mg/dL   TSH    Collection Time: 10/24/18 12:55 PM   Result Value Ref Range    TSH 2.070 0.270 - 4.200 mIU/mL   T4, Free    Collection Time: 10/24/18 12:55 PM   Result Value Ref Range    Free T4 1.29 0.93 - 1.70 ng/dL   CBC Auto Differential    Collection Time: 10/24/18 12:55 PM   Result Value Ref Range    WBC 9.86 4.50 - 10.70 10*3/mm3    RBC 4.39 (L) 4.60 - 6.00 10*6/mm3    Hemoglobin 13.4 (L) 13.7 - 17.6 g/dL    Hematocrit 40.7 40.4 - 52.2 %    MCV 92.7 79.8 - 96.2 fL    MCH 30.5 27.0 - 32.7 pg    MCHC 32.9 32.6 - 36.4 g/dL    RDW 14.3 11.5 - 14.5 %    RDW-SD 48.9 37.0 - 54.0 fl    MPV 9.7 6.0 - 12.0 fL    Platelets 516 (H) 140 - 500 10*3/mm3    Neutrophil % 77.8 (H) 42.7 - 76.0 %    Lymphocyte % 9.6 (L) 19.6 -  45.3 %    Monocyte % 9.9 5.0 - 12.0 %    Eosinophil % 2.1 0.3 - 6.2 %    Basophil % 0.6 0.0 - 1.5 %    Immature Grans % 0.3 0.0 - 0.5 %    Neutrophils, Absolute 7.66 1.90 - 8.10 10*3/mm3    Lymphocytes, Absolute 0.95 0.90 - 4.80 10*3/mm3    Monocytes, Absolute 0.98 0.20 - 1.20 10*3/mm3    Eosinophils, Absolute 0.21 0.00 - 0.70 10*3/mm3    Basophils, Absolute 0.06 0.00 - 0.20 10*3/mm3    Immature Grans, Absolute 0.03 0.00 - 0.03 10*3/mm3       Ordered the above labs and reviewed the results.        RADIOLOGY  XR Chest 1 View   Final Result   No focal pulmonary consolidation. Tortuous aorta. Follow-up   as clinically indicated.       This report was finalized on 10/24/2018 1:10 PM by Dr. Akhil Nugent M.D.                 Ordered the above noted radiological studies. Reviewed by me in PACS.          PROCEDURES  Procedures        EKG    EKG time: 12:45 PM  Rhythm/Rate: NSR rate 73  LBBB  No Acute Ischemia  Non-Specific ST-T changes    changed compared to prior on 06/2015 (Pt was in sinus tachycardia on prior EKG)    Interpreted Contemporaneously by me.  Independently viewed by me.          MEDICATIONS GIVEN IN ER  Medications   sodium chloride 0.9 % flush 10 mL (not administered)   potassium chloride (MICRO-K) CR capsule 40 mEq (40 mEq Oral Given 10/24/18 1415)             PROGRESS AND CONSULTS       12:42 PM:  Blood work, Free T4/TSH, CXR, and EKG ordered for further evaluation.     1:02 PM:  Holter monitor ordered for pt.     1:44 PM:  Pt's K+ is 3.6 -> KCl PO ordered for pt. Placed call to the cardiologist to discuss further course of care.    2:02 PM:  Rechecked pt. Pt is resting comfortably and appears in no acute distress. Pt is in sinus rhythm rate 60s.     Informed pt that he has been in sinus rhythm in the ER. Pt's K+ is 3.6. Will discuss further course of care with the cardiologist. Pt agrees with plan.     2:10 PM:  Discussed the case with Dr. Garcia, cardiologist. She would like pt to increase his  Cardizem dose to 300 mg once a day. She would like pt to call their office either today or tomorrow to schedule an appointment for close f/u.    2:32 PM:  Rechecked pt. Pt currently has a holter monitor placed. Informed pt that I have discussed the case with Dr. Garcia, cardiologist. Pt's dose of Cardizem will be increased to 300 mg once a day. Family states that pt has an appointment with [facility] on 10/29/18 -> Strongly advised pt to f/u as scheduled. Strict RTER warnings given. Pt agrees with plan for discharge.         MEDICAL DECISION MAKING      MDM  Number of Diagnoses or Management Options  Tachycardia:      Amount and/or Complexity of Data Reviewed  Clinical lab tests: ordered and reviewed (Troponin is 0.012.)  Tests in the radiology section of CPT®: ordered and reviewed (CXR:  No focal pulmonary consolidation. Tortuous aorta. Follow-up  as clinically indicated.)  Tests in the medicine section of CPT®: reviewed and ordered (EKG interpreted.   )  Decide to obtain previous medical records or to obtain history from someone other than the patient: yes  Discuss the patient with other providers: yes (Discussed the case with Dr. Garcia, cardiologist.  )    Patient Progress  Patient progress: stable             DIAGNOSIS  Final diagnoses:   Tachycardia         DISPOSITION  Pt discharged.    DISCHARGE    Patient discharged in stable condition.    Reviewed implications of results, diagnosis, meds, responsibility to follow up, warning signs and symptoms of possible worsening, potential complications and reasons to return to ER.    Patient/Family voiced understanding of above instructions.    Discussed plan for discharge, as there is no emergent indication for admission. Pt/family is agreeable and understands need for follow up and repeat testing. Pt is aware that discharge does not mean that nothing is wrong but it indicates no emergency is present that requires admission and they must continue care  with follow-up as given below or physician of their choice.     FOLLOW-UP  Prudencio Higuera MD  [address]  [place] [address]  [phone]      Go to your appointment as scheduled next week.         Medication List      Changed    diltiaZEM  MG 24 hr capsule  Commonly known as:  CARDIZEM CD  Take 1 capsule by mouth Daily.  What changed:  medication strength  how much to take  when to take this                Latest Documented Vital Signs:  As of 2:36 PM  BP- 131/71 HR- 68 Temp- 98 °F (36.7 °C) (Tympanic) O2 sat- 96%        --  Documentation assistance provided by shayne Lundberg for Dr. Bib MD.  Information recorded by the scribe was done at my direction and has been verified and validated by me.          Bev Lundberg  10/24/18 1549       Aftab Mccartney MD  10/26/18 1939

## 2018-11-04 PROBLEM — I47.10 SUPRAVENTRICULAR TACHYCARDIA: Status: ACTIVE | Noted: 2018-11-04

## 2018-11-04 PROBLEM — I77.810 AORTIC ROOT DILATION: Status: ACTIVE | Noted: 2018-11-04

## 2018-11-04 PROBLEM — I47.1 SUPRAVENTRICULAR TACHYCARDIA: Status: ACTIVE | Noted: 2018-11-04

## 2018-11-04 NOTE — PROGRESS NOTES
Date of Office Visit: 2018  Encounter Provider: MUKUL Sun  Place of Service: [facility] CARDIOLOGY  Patient Name: Erlin Blanco  :1945      Subjective:     Chief Complaint:  Tachycardia, Palpitations    History of Present Illness:  Erlin Blanco is a pleasant 73 y.o. male who is new to me.  Outside records have been obtained and reviewed by me. The patient has a past medial history of CAD, CHF, Aortic root dilation, Diabetes Mellitus, Hypertension, Hyperlipidemia, Pulmonary Hypertension, TIA, and hearing loss.      This patient formerly followed with Dr. Thomas and transitioned his care to Dr. Higuera. He was initially evaluated by Dr. Higuera on 18. At that visit the patient reported some intermittent palpitations which were not particularly bothersome to the patient and he did not have any other associated with symptoms such a dyspnea, chest pain, or syncope with the palpitations. The palpitations seemed to be infrequent at the time. Dr. Higuera stated if the palpitations become more frequent or the patient begins having symptoms, we could evaluate with a holter monitor. It was also noted that the patient has some chronic edema in which the diuretics he takes seem to control it well. He had also been on Plavix ever since a TIA in . It appears he has had thrombocytosis. He had an echocardiogram in the past that suggested pulmonary hypertension, which was evaluated by heart catheterization. He had follow up echocardiogram this year in 2018, which revealed normal LV function, no pulmonary hypertension, and mild aortic root dilation. In regards to his mild aortic root dilation,  Dr. Higuera focused on blood pressure control as the main therapy for that. He wanted to see him back in 1 year.     This patient presented to his PCP's office on 10/22/18 for routine follow-up. His HR was apparently in the 140s, so he was referred to the ED at [facility] and was found to be  an SVT rhythm. He was given adenosine in the ED and converted back to sinus rhythm and apparently sent home. Two nights later he called his PCP because he checked his HR after working that night and noted his heart rate to be in the 120s. He was referred back to the ED, this time at [facility], for further evaluation on 10/24/18. He had no other complaints and was asymptomatic with it. His Troponin was negative, EKG showed NSR with LBBB ST-T wave changes but no acute ischemia, chest xray no acute findings but a tortuous aorta detected. Patient's potassium was 3.6- was given supplementation. Troponin was negative and rest of lab work up including TSH/Free T4 was WNL. Patient al had a holter monitor placed. Dr. Garcia wanted to increase his Cardizem to 300 mg daily and he was discharged home to follow-up closely with cardiology.    He is here today for ED follow-up.  He is currently wearing a Ziopatch and will be mailing it in on Wednesday 11/7/18. I do not have an EKG for review from [facility] ED on 10/22/18. I have requested those records.  He is currently in sinus rhythm with a left bundle branch block as ECG is essentially unchanged compared to his May 2018 12-lead.  He has no complaints today.  He reports he has not had any more issues with elevations of his heart rate since his Cardizem dose was increased on 10/24/18.  He denies any chest pain, shortness of breath, palpitations, edema, dizziness, lightheadedness, syncope, near-syncope, fatigue, PND or orthopnea.  He states that blood pressure typically runs in the 130s/80s.  Occasionally it will run in the 140s/150s before he takes his medications or if he takes his medications late as he did today because of his wife's appointment.  His initial blood pressure today was 174/84 recheck was 158/78.      Prior testing:   3/26/18 Echo:  His echocardiogram revealed mild thickening of mitral valve leaflets, dilation of aortic root measuring 3.7 cm, mildly dilated left  atrium, mildly enlarged right atrium size, mild concentric left ventricular hypertrophy, overall left ventricular function was normal, the ejection fraction is visually estimated at 60-65%, regional wall motion abnormalities could not be excluded due to limited visualization.  Velocities were consistent with impaired relaxation and the pulmonary AR velocity was greater than 35 cm/s which was consistent with elevated filling pressure, the right ventricle was normal size and function.  Past Medical History:   Diagnosis Date   • Aortic root dilation (CMS/HCC)    • Arthritis    • CHF (congestive heart failure) (CMS/HCC)    • Coronary artery disease    • Diabetes mellitus (CMS/HCC)     type 2   • Diminished pulse     in lower extremities   • Hearing loss     mala hearing aids   • Heart valve disease    • High cholesterol    • Irregular heart rate    • Knee pain, left    • Prostate hyperplasia without urinary obstruction    • Pulmonary hypertension (CMS/HCC)    • Scoliosis    • SVT (supraventricular tachycardia) (CMS/HCC)    • TIA (transient ischemic attack)     2006     Past Surgical History:   Procedure Laterality Date   • CARDIAC CATHETERIZATION     • COLONOSCOPY     • HAND SURGERY     • JOINT REPLACEMENT  2014    Righ Knee   • PA TOTAL KNEE ARTHROPLASTY Left 4/27/2017    Procedure: LT TOTAL KNEE ARTHROPLASTY;  Surgeon: Bertin Perrin MD;  Location: [facility];  Service: Orthopedics     Outpatient Medications Prior to Visit   Medication Sig Dispense Refill   • bumetanide (BUMEX) 1 MG tablet Take 1 mg by mouth Every Morning.     • clopidogrel (PLAVIX) 75 MG tablet Take 75 mg by mouth Every Morning. Stopped 4/13/17     • diltiaZEM CD (CARDIZEM CD) 300 MG 24 hr capsule Take 1 capsule by mouth Daily. 30 capsule 0   • finasteride (PROSCAR) 5 MG tablet Take 5 mg by mouth Every Night.     • glimepiride (AMARYL) 4 MG tablet Take 4 mg by mouth Every Morning Before Breakfast.     • hydrALAZINE (APRESOLINE) 100 MG tablet  Take 100 mg by mouth 3 (Three) Times a Day.     • lisinopril (PRINIVIL,ZESTRIL) 40 MG tablet Take 40 mg by mouth Every Morning.     • Multiple Vitamins-Minerals (MULTIVITAMIN ADULT PO) Take 1 tablet by mouth Daily.     • pravastatin (PRAVACHOL) 40 MG tablet Take 40 mg by mouth Daily.     • tamsulosin (FLOMAX) 0.4 MG capsule 24 hr capsule Take 1 capsule by mouth Every Night.     • triamterene-hydrochlorothiazide (DYAZIDE) 37.5-25 MG per capsule Take 1 capsule by mouth Every Morning.     • Turmeric 500 MG capsule Take  by mouth.     • cephalexin (KEFLEX) 500 MG capsule Take 1 capsule by mouth 3 (Three) Times a Day. 21 capsule 0   • Naproxen Sodium (ALEVE PO) Take 1 tablet by mouth 2 (Two) Times a Day. Stopped 4/13/17       No facility-administered medications prior to visit.        Allergies as of 11/05/2018   • (No Known Allergies)     Social History     Social History   • Marital status:      Spouse name: N/A   • Number of children: N/A   • Years of education: N/A     Occupational History   • Not on file.     Social History Main Topics   • Smoking status: Former Smoker     Types: Cigarettes     Quit date: 1973   • Smokeless tobacco: Former User      Comment: quite: 1973   • Alcohol use No   • Drug use: No   • Sexual activity: Defer     Other Topics Concern   • Not on file     Social History Narrative   • No narrative on file     Family History   Problem Relation Age of Onset   • Hypertension Mother    • Other Father         ruptured appendixdies when pt. was 14.   • Diabetes Paternal Aunt    • Diabetes Paternal Uncle      Review of Systems   Constitution: Negative for chills, fever and malaise/fatigue.   HENT: Negative for ear pain, hearing loss, nosebleeds and sore throat.    Eyes: Negative for double vision, pain, vision loss in left eye and vision loss in right eye.   Cardiovascular: Negative for chest pain, claudication, dyspnea on exertion, irregular heartbeat, leg swelling, near-syncope, orthopnea,  "palpitations, paroxysmal nocturnal dyspnea and syncope.   Respiratory: Negative for cough, shortness of breath, snoring and wheezing.    Endocrine: Negative for cold intolerance and heat intolerance.   Hematologic/Lymphatic: Negative for bleeding problem.   Skin: Negative for color change, itching, rash and unusual hair distribution.   Musculoskeletal: Negative for joint pain and joint swelling.   Gastrointestinal: Negative for abdominal pain, diarrhea, hematochezia, melena, nausea and vomiting.   Genitourinary: Negative for decreased libido, frequency, hematuria, hesitancy and incomplete emptying.   Neurological: Negative for excessive daytime sleepiness, dizziness, headaches, light-headedness, loss of balance, numbness, paresthesias and seizures.   Psychiatric/Behavioral: Negative for depression.          Objective:     Vitals:    11/05/18 1320   BP: 174/84   BP Location: Left arm   Patient Position: Sitting   Pulse: 59   Weight: 112 kg (248 lb)   Height: 182.9 cm (72\")     Body mass index is 33.63 kg/m².    PHYSICAL EXAM:  Physical Exam   Constitutional: He is oriented to person, place, and time. He appears well-developed and well-nourished. No distress.   Obese   HENT:   Head: Normocephalic and atraumatic.   Eyes: Pupils are equal, round, and reactive to light.   Neck: No JVD present. Carotid bruit is not present.   Cardiovascular: Normal rate, regular rhythm and intact distal pulses.    Murmur heard.  High-pitched blowing midsystolic murmur is present with a grade of 1/6  at the apex  Pulses:       Radial pulses are 2+ on the right side, and 2+ on the left side.        Dorsalis pedis pulses are 2+ on the right side, and 2+ on the left side.        Posterior tibial pulses are 2+ on the right side, and 2+ on the left side.   Pulmonary/Chest: Effort normal and breath sounds normal. No accessory muscle usage. No respiratory distress. He has no decreased breath sounds. He has no wheezes. He has no rhonchi. He has no " rales. He exhibits no tenderness.   Abdominal: Soft. Bowel sounds are normal. He exhibits no distension. There is no splenomegaly or hepatomegaly. There is no tenderness.   Rounded, compressible.   Musculoskeletal: Normal range of motion. He exhibits edema (mild, pitting lower extremity edema).   Neurological: He is alert and oriented to person, place, and time.   No focal neuro deficits noted.   Skin: Skin is warm, dry and intact. He is not diaphoretic. No erythema.   Psychiatric: He has a normal mood and affect. His speech is normal and behavior is normal. Judgment and thought content normal. Cognition and memory are normal.   Nursing note and vitals reviewed.        ECG 12 Lead  Date/Time: 11/5/2018 1:33 PM  Performed by: WALESKA SAHU  Authorized by: WALESKA SAHU   Comparison: compared with previous ECG   Rhythm: sinus bradycardia  Rate: bradycardic  BPM: 59  Conduction: left bundle branch block  QRS axis: normal  Clinical impression: abnormal ECG  Comments: Borderline prolonged ID interval   Indication: Hx of SVT            Assessment:       Diagnosis Plan   1. Supraventricular tachycardia (CMS/HCC)     2. Essential hypertension     3. Aortic root dilation (CMS/HCC)         Plan:      1. Supraventricular tachycardia: Cardizem increased in [facility] ED on 10/24/18 to 300 mg daily.  No recurrence of elevated heart rate since increase of Cardizem dose. After the patient had left I received his records from [facility] ED. Upon review of EKG from 10/22/18 at [facility] with Dr. Alvarado it appears that the patient was in atrial flutter with a rate of 137. He is currently wearing Ziopatch and will mail it on Wednesday 11/7/18.  I will discuss need for anticoagulation for this patient with Dr. Higuera based on his Ziopatch results and burden of atrial flutter.     Atrial Fibrillation and Atrial Flutter  Assessment  • The patient has paroxysmal atrial flutter  • This is non-valvular in etiology  • The patient's CHADS2-VASc  score is 5  • A ZNP2DF1-MVDv score of 2 or more is considered a high risk for a thromboembolic event    Plan  • Attempt to maintain sinus rhythm  • Continue diltiazem for rhythm control    2. Hypertension: B/P a little uncontrolled today.  Patient will let me know if his blood pressure is consistently saying above 140/90 at home.  He will also continue to monitor his heart rate at home as well.  I gave him a log to keep track of his vitals at home.  I will have him bring in his cuff for accuracy verification at his next visit.     I advised the patient that if they have not heard from our office within 4-5 days after they have mailed their Ziopatch to please call our office to obtain their results. He demonstrated understanding.          Follow up with Dr. Higuera on 5/23/18, unless otherwise needed sooner.  I advised the patient to contact our office with any questions or concerns.         Your medication list          Accurate as of 11/5/18  1:52 PM. If you have any questions, ask your nurse or doctor.               CONTINUE taking these medications      Instructions Last Dose Given Next Dose Due   bumetanide 1 MG tablet  Commonly known as:  BUMEX      Take 1 mg by mouth Every Morning.       clopidogrel 75 MG tablet  Commonly known as:  PLAVIX      Take 75 mg by mouth Every Morning. Stopped 4/13/17       diltiaZEM  MG 24 hr capsule  Commonly known as:  CARDIZEM CD      Take 1 capsule by mouth Daily.       finasteride 5 MG tablet  Commonly known as:  PROSCAR      Take 5 mg by mouth Every Night.       fluticasone 50 MCG/ACT nasal spray  Commonly known as:  FLONASE           glimepiride 4 MG tablet  Commonly known as:  AMARYL      Take 4 mg by mouth Every Morning Before Breakfast.       hydrALAZINE 100 MG tablet  Commonly known as:  APRESOLINE      Take 100 mg by mouth 3 (Three) Times a Day.       lisinopril 40 MG tablet  Commonly known as:  PRINIVIL,ZESTRIL      Take 40 mg by mouth Every Morning.       MULTIVITAMIN  ADULT PO      Take 1 tablet by mouth Daily.       nitrofurantoin (macrocrystal-monohydrate) 100 MG capsule  Commonly known as:  MACROBID      TAKE 1 CAPSULE BY MOUTH 2 TIMES A DAY WITH MEALS       pravastatin 40 MG tablet  Commonly known as:  PRAVACHOL      Take 40 mg by mouth Daily.       tamsulosin 0.4 MG capsule 24 hr capsule  Commonly known as:  FLOMAX      Take 1 capsule by mouth Every Night.       triamterene-hydrochlorothiazide 37.5-25 MG per capsule  Commonly known as:  DYAZIDE      Take 1 capsule by mouth Every Morning.       Turmeric 500 MG capsule      Take  by mouth.          STOP taking these medications    ALEVE PO  Stopped by:  MUKUL Sun        cephalexin 500 MG capsule  Commonly known as:  KEFLEX  Stopped by:  MUKUL Sun               The above medication changes may not have been made by this provider.  Medication list was updated to reflect medications patient is currently taking including medication changes and discontinuations made by other healthcare providers.       I have reviewed this case with Dr. Alvarado. It has been a pleasure to participate in this patient's care. Please feel free to contact me with any questions or concerns.     MUKUL Sun  11/05/2018       Dictated utilizing Dragon Dictation System.

## 2018-11-05 ENCOUNTER — OFFICE VISIT (OUTPATIENT)
Dept: CARDIOLOGY | Facility: CLINIC | Age: 73
End: 2018-11-05

## 2018-11-05 VITALS
BODY MASS INDEX: 33.59 KG/M2 | SYSTOLIC BLOOD PRESSURE: 158 MMHG | HEIGHT: 72 IN | HEART RATE: 59 BPM | DIASTOLIC BLOOD PRESSURE: 78 MMHG | WEIGHT: 248 LBS

## 2018-11-05 DIAGNOSIS — I10 ESSENTIAL HYPERTENSION: ICD-10-CM

## 2018-11-05 DIAGNOSIS — I77.810 AORTIC ROOT DILATION (HCC): ICD-10-CM

## 2018-11-05 DIAGNOSIS — I47.1 SUPRAVENTRICULAR TACHYCARDIA (HCC): Primary | ICD-10-CM

## 2018-11-05 PROCEDURE — 99214 OFFICE O/P EST MOD 30 MIN: CPT | Performed by: NURSE PRACTITIONER

## 2018-11-05 PROCEDURE — 93000 ELECTROCARDIOGRAM COMPLETE: CPT | Performed by: NURSE PRACTITIONER

## 2018-11-05 RX ORDER — FLUTICASONE PROPIONATE 50 MCG
SPRAY, SUSPENSION (ML) NASAL AS NEEDED
COMMUNITY
Start: 2018-08-16 | End: 2020-06-24 | Stop reason: SDUPTHER

## 2018-11-05 RX ORDER — NITROFURANTOIN 25; 75 MG/1; MG/1
CAPSULE ORAL
Refills: 0 | COMMUNITY
Start: 2018-09-02 | End: 2019-12-10

## 2018-11-07 ENCOUNTER — TELEPHONE (OUTPATIENT)
Dept: CARDIOLOGY | Facility: CLINIC | Age: 73
End: 2018-11-07

## 2018-11-07 NOTE — TELEPHONE ENCOUNTER
I called this patient and started him on Eliquis 5 mg twice a day as he had an episode of atrial flutter with a [id number] score of 5.  I had him discontinue his clopidogrel which he was taking for a TIAs in the past in 2006.  He denied any type of stent placement in the past.  He turned in a Ziopatch today.  I advised him we will call him with results of his testing when we receive it.  He needs a four-week hospital follow-up with Dr. Higuera to discuss further management of his atrial flutter and review his Ziopatch results.  He is going to come and  some samples of the Eliquis tomorrow morning.  I will have him schedule his four-week follow-up with Dr. Higuera at that time.    Terry- can you please get the patient scheduled for his follow-up visit with Dr. Higuera in 4 weeks when he comes to  his samples tomorrow (11/8/18) please and thank you?

## 2018-11-15 ENCOUNTER — TRANSCRIBE ORDERS (OUTPATIENT)
Dept: ADMINISTRATIVE | Facility: HOSPITAL | Age: 73
End: 2018-11-15

## 2018-11-15 DIAGNOSIS — M25.562 ACUTE BILATERAL KNEE PAIN: Primary | ICD-10-CM

## 2018-11-15 DIAGNOSIS — M25.561 ACUTE BILATERAL KNEE PAIN: Primary | ICD-10-CM

## 2018-11-16 ENCOUNTER — TRANSCRIBE ORDERS (OUTPATIENT)
Dept: ADMINISTRATIVE | Facility: HOSPITAL | Age: 73
End: 2018-11-16

## 2018-11-16 DIAGNOSIS — Z96.653 HISTORY OF BILATERAL KNEE REPLACEMENT: Primary | ICD-10-CM

## 2018-11-16 PROCEDURE — 0298T HOLTER MONITOR - 72 HOUR UP TO 21 DAY: CPT | Performed by: INTERNAL MEDICINE

## 2018-11-19 ENCOUNTER — TELEPHONE (OUTPATIENT)
Dept: CARDIOLOGY | Facility: CLINIC | Age: 73
End: 2018-11-19

## 2018-11-19 RX ORDER — DILTIAZEM HYDROCHLORIDE 300 MG/1
300 CAPSULE, COATED, EXTENDED RELEASE ORAL DAILY
Qty: 30 CAPSULE | Refills: 0 | Status: SHIPPED | OUTPATIENT
Start: 2018-11-19 | End: 2018-12-19 | Stop reason: SDUPTHER

## 2018-11-19 RX ORDER — DILTIAZEM HYDROCHLORIDE 300 MG/1
300 CAPSULE, COATED, EXTENDED RELEASE ORAL DAILY
Qty: 30 CAPSULE | Refills: 3 | Status: SHIPPED | OUTPATIENT
Start: 2018-11-19 | End: 2018-12-19 | Stop reason: SDUPTHER

## 2018-11-19 RX ORDER — DILTIAZEM HYDROCHLORIDE 60 MG/1
60 CAPSULE, EXTENDED RELEASE ORAL
Status: CANCELLED | OUTPATIENT
Start: 2018-11-19

## 2018-11-19 NOTE — TELEPHONE ENCOUNTER
I called and spoke with this patient regarding his Ziopatch results.  I discussed the case with Dr. Higuera and we will have him continue taking his apixaban.  He is on Cardizem 300 mg daily and his heart rate has been running 60s to 70s in the morning.  He is feeling well and has had no symptoms of palpitations. I renewed his Rx.

## 2018-11-21 ENCOUNTER — HOSPITAL ENCOUNTER (OUTPATIENT)
Dept: NUCLEAR MEDICINE | Facility: HOSPITAL | Age: 73
Discharge: HOME | End: 2018-11-21
Attending: ORTHOPAEDIC SURGERY

## 2018-11-21 DIAGNOSIS — M25.561 ACUTE BILATERAL KNEE PAIN: ICD-10-CM

## 2018-11-21 DIAGNOSIS — M25.562 ACUTE BILATERAL KNEE PAIN: ICD-10-CM

## 2018-11-21 PROCEDURE — 78306 BONE IMAGING WHOLE BODY: CPT

## 2018-11-21 PROCEDURE — 0 TECHNETIUM MEDRONATE KIT: Performed by: ORTHOPAEDIC SURGERY

## 2018-11-21 PROCEDURE — A9503 TC99M MEDRONATE: HCPCS | Performed by: ORTHOPAEDIC SURGERY

## 2018-11-21 RX ORDER — TC 99M MEDRONATE 20 MG/10ML
18.4 INJECTION, POWDER, LYOPHILIZED, FOR SOLUTION INTRAVENOUS
Status: COMPLETED | OUTPATIENT
Start: 2018-11-21 | End: 2018-11-21

## 2018-11-21 RX ADMIN — Medication 18.4 MILLICURIE: at 09:10

## 2018-12-19 ENCOUNTER — OFFICE VISIT (OUTPATIENT)
Dept: CARDIOLOGY | Facility: CLINIC | Age: 73
End: 2018-12-19

## 2018-12-19 VITALS
HEART RATE: 127 BPM | BODY MASS INDEX: 33.18 KG/M2 | SYSTOLIC BLOOD PRESSURE: 148 MMHG | DIASTOLIC BLOOD PRESSURE: 82 MMHG | WEIGHT: 245 LBS | HEIGHT: 72 IN

## 2018-12-19 DIAGNOSIS — I10 ESSENTIAL HYPERTENSION: ICD-10-CM

## 2018-12-19 DIAGNOSIS — I77.810 AORTIC ROOT DILATION (HCC): ICD-10-CM

## 2018-12-19 DIAGNOSIS — I47.1 SUPRAVENTRICULAR TACHYCARDIA (HCC): Primary | ICD-10-CM

## 2018-12-19 PROCEDURE — 93000 ELECTROCARDIOGRAM COMPLETE: CPT | Performed by: INTERNAL MEDICINE

## 2018-12-19 PROCEDURE — 99213 OFFICE O/P EST LOW 20 MIN: CPT | Performed by: INTERNAL MEDICINE

## 2018-12-19 RX ORDER — DILTIAZEM HYDROCHLORIDE 300 MG/1
300 CAPSULE, COATED, EXTENDED RELEASE ORAL DAILY
Qty: 90 CAPSULE | Refills: 3 | Status: SHIPPED | OUTPATIENT
Start: 2018-12-19 | End: 2019-11-30 | Stop reason: SDUPTHER

## 2018-12-19 NOTE — PROGRESS NOTES
Subjective:     Encounter Date:12/19/2018      Patient ID: Erlin Blanco is a 73 y.o. male.    Chief Complaint: SVT    History of Present Illness    Dear Zamzam John:    I had the pleasure of seeing this patient in cardiac follow up today.  As you well know, he is a janki 73-year-old man with history of diabetes, hypertension, hyperlipidemia and aortic root dilatation.  He has a history of palpitations.  He saw my nurse practitioner last month and was found to be tachycardic.  There was some concern about whether he was in atrial flutter.  He had a heart monitor applied and was started on apixaban.    He comes in for his follow up.  Since I have last seen him, he says he has no symptoms.  He is tachycardic on presentation, but has no complaints.  I performed carotid sinus massage and converted him to sinus rhythm at a rate of 80.        Review of Systems   All other systems reviewed and are negative.        ECG 12 Lead  Date/Time: 12/19/2018 10:43 AM  Performed by: Prudencio Higuera MD  Authorized by: Prudencio Higuera MD   Comparison: compared with previous ECG   Similar to previous ECG  Rhythm: sinus rhythm  BPM: 82  Conduction: left bundle branch block               Objective:     Physical Exam   Constitutional: He is oriented to person, place, and time. He appears well-developed and well-nourished.   HENT:   Head: Normocephalic and atraumatic.   Neck: Normal range of motion. Neck supple.   Cardiovascular: Normal rate, regular rhythm and normal heart sounds.   Pulmonary/Chest: Effort normal and breath sounds normal.   Abdominal: Soft. Bowel sounds are normal.   Musculoskeletal: Normal range of motion.   Neurological: He is alert and oriented to person, place, and time.   Skin: Skin is warm and dry.   Psychiatric: He has a normal mood and affect. His behavior is normal. Thought content normal.   Vitals reviewed.      Lab Review:       Assessment:          Diagnosis Plan   1. Supraventricular tachycardia (CMS/HCC)      2. Aortic root dilation (CMS/HCC)     3. Essential hypertension            Plan:       It was a pleasure to see your patient in cardiac follow up today.  He is a janki 73-year-old man with hypertension and multiple cardiac risk factors.  He has a chronic left bundle branch block.  It turns out he has frequent SVT.  I do not think this is atrial flutter and therefore, I do not think he has to be on chronic anticoagulation for this rhythm.    I offered him increased medications or ablation to treat SVT, but he says it is really asymptomatic, so I do not think he wants to do anything about it.  He will see me again in 6 months or sooner if symptoms warrant.

## 2018-12-26 ENCOUNTER — TELEPHONE (OUTPATIENT)
Dept: CARDIOLOGY | Facility: CLINIC | Age: 73
End: 2018-12-26

## 2018-12-26 RX ORDER — CLOPIDOGREL BISULFATE 75 MG/1
75 TABLET ORAL DAILY
Qty: 90 TABLET | Refills: 2 | Status: SHIPPED | OUTPATIENT
Start: 2018-12-26 | End: 2019-09-23 | Stop reason: SDUPTHER

## 2018-12-26 NOTE — TELEPHONE ENCOUNTER
Patient was seen in the office on 12/19, and was told by Dr. Higuera to stop taking Eliquis.  He is calling back today to see if he should go back onto plavix, which is what he was taking prior to Eliquis.    Per verbal from Dr. Higuera, he should be taking plavix.    Left patient a voicemail informing him, and medication list updated in Epic.

## 2019-06-18 PROBLEM — G45.9 TIA (TRANSIENT ISCHEMIC ATTACK): Status: ACTIVE | Noted: 2019-06-18

## 2019-06-18 NOTE — PROGRESS NOTES
Date of Office Visit: 2019  Encounter Provider: MUKUL Sun  Place of Service: [facility] CARDIOLOGY  Patient Name: Erlin Blanco  :1945      Subjective:     Chief Complaint:  6-month follow-up visit SVT, hypertension, CAD    History of Present Illness:  Erlin Blanco is a pleasant 74 y.o. male who is new to me.  Outside records have been obtained and reviewed by me. The patient has a past medial history of CAD, CHF, Aortic root dilation, Diabetes Mellitus, Hypertension, Hyperlipidemia, Pulmonary Hypertension, TIA, and hearing loss.      This patient formerly followed with Dr. Thomas and transitioned his care to Dr. Higuera. He was initially evaluated by Dr. Higuera on 18. At that visit the patient reported some intermittent palpitations which were not particularly bothersome to the patient and he did not have any other associated with symptoms such a dyspnea, chest pain, or syncope with the palpitations. The palpitations seemed to be infrequent at the time. Dr. Higuera stated if the palpitations become more frequent or the patient begins having symptoms, we could evaluate with a holter monitor. It was also noted that the patient has some chronic edema in which the diuretics he takes seem to control it well. He had also been on Plavix ever since a TIA in . It appears he has had thrombocytosis. He had an echocardiogram in the past that suggested pulmonary hypertension, which was evaluated by heart catheterization. He had follow up echocardiogram this year in 2018, which revealed normal LV function, no pulmonary hypertension, and mild aortic root dilation. In regards to his mild aortic root dilation,  Dr. Higuera focused on blood pressure control as the main therapy for that. He wanted to see him back in 1 year.     This patient presented to his PCP's office on 10/22/18 for routine follow-up. His HR was apparently in the 140s, so he was referred to the ED at [facility]  and was found to be an SVT rhythm. He was given adenosine in the ED and converted back to sinus rhythm and apparently sent home. Two nights later he called his PCP because he checked his HR after working that night and noted his heart rate to be in the 120s. He was referred back to the ED, this time at [facility], for further evaluation on 10/24/18. He had no other complaints and was asymptomatic with it. His Troponin was negative, EKG showed NSR with LBBB ST-T wave changes but no acute ischemia, chest xray no acute findings but a tortuous aorta detected. Patient's potassium was 3.6- was given supplementation. Troponin was negative and rest of lab work up including TSH/Free T4 was WNL. Patient had a holter monitor placed. Dr. Garcia wanted to increase his Cardizem to 300 mg daily and he was discharged home to follow-up closely with cardiology.   I saw him for the first time on 11/5/18 for and ED follow up and he was still wearing a Ziopatch. I did not have an EKG for review from [facility] ED on 10/22/18 but requested those records.  At the time of his visit he was in sinus rhythm with a left bundle branch block as ECG is essentially unchanged compared to his May 2018 12-lead.  He had no complaints.  He had not had any more issues with elevations of his heart rate since his Cardizem dose was increased on 10/24/18.  He denied any chest pain, shortness of breath, palpitations, edema, dizziness, lightheadedness, syncope, near-syncope, fatigue, PND or orthopnea.  He states that blood pressure typically runs in the 130s/80s and occasionally it woul drun in the 140s/150s before he took his meds of if he took them late. His initial blood pressure today was 174/84 recheck was 158/78 he reported he took his medications late so I asked him to continue to monitor at home.    Ziopatch showed predominant rhythm normal sinus rhythm with bundle branch block and first-degree AV block, numerous episodes of SVT and possible atrial  fibrillation the longest was 1 hour and 2 minutes.  He had no ventricular tachycardia.  PACs occurred frequently and PVCs occurred rarely.  He was started on apixaban.     He followed up with Dr. Higuera on 12/19/2018 he was having no symptoms was tachycardic on presentation.  Carotid sinus massage was performed and converted to sinus rhythm at a rate of 80.  Dr. Higuera felt at that time he was experiencing frequent SVT and not atrial flutter and did not feel he needed to be on chronic anticoagulation so his apixaban was stopped and he was restarted on Plavix.  Dr. Higuera offered him increase in medications or ablation to treat SVT but he was really asymptomatic and so they held off on any further treatment.  Dr. Higuera wanted to see him again in 6 months.    He is presenting today for 6-month follow-up visit.  He has been doing well since his last visit.  He had lost weight as he works a part-time job with [employer] 4 times a week and walks about 4 miles each shift.  He was newly diagnosed with what sounds like polycythemia vera and is following with Dr. Thomas and has had therapeutic phlebotomy.  He is on testosterone therapy which Dr. Thomas okayed him to continue.  He was already taking Plavix from a TIA in 2006 and Dr. Thomas recommended he continue with his polycythemia. He denies any chest pain, shortness of breath, palpitations, dizziness, lightheadedness, syncope, near syncope, PND, orthopnea or significant fatigue.  He has a wound on his left shin after injuring it in his workshop.  He is following with wound care weekly and currently has it wrapped.  He has had some swelling of that leg but it he is starting to heal.  He denies any numbness or tingling in his extremities.            Past Medical History:   Diagnosis Date   • Aortic root dilation (CMS/HCC)    • Arthritis    • CHF (congestive heart failure) (CMS/HCC)    • Coronary artery disease    • Diabetes mellitus (CMS/HCC)     type 2   • Diminished pulse     in  lower extremities   • Essential hypertension    • Hearing loss     mala hearing aids   • Heart valve disease    • High cholesterol    • Irregular heart rate    • Knee pain, left    • Prostate hyperplasia without urinary obstruction    • Pulmonary hypertension (CMS/HCC)    • Scoliosis    • SVT (supraventricular tachycardia) (CMS/HCC)    • TIA (transient ischemic attack)     2006     Past Surgical History:   Procedure Laterality Date   • CARDIAC CATHETERIZATION     • COLONOSCOPY     • HAND SURGERY     • JOINT REPLACEMENT  2014    Righ Knee   • FL TOTAL KNEE ARTHROPLASTY Left 4/27/2017    Procedure: LT TOTAL KNEE ARTHROPLASTY;  Surgeon: Bertin Perrin MD;  Location: [facility] OR;  Service: Orthopedics     Outpatient Medications Prior to Visit   Medication Sig Dispense Refill   • bumetanide (BUMEX) 1 MG tablet Take 1 mg by mouth Every Morning.     • clopidogrel (PLAVIX) 75 MG tablet Take 1 tablet by mouth Daily. 90 tablet 2   • diltiaZEM CD (CARDIZEM CD) 300 MG 24 hr capsule Take 1 capsule by mouth Daily. 90 capsule 3   • finasteride (PROSCAR) 5 MG tablet Take 5 mg by mouth Every Night.     • fluticasone (FLONASE) 50 MCG/ACT nasal spray      • glimepiride (AMARYL) 4 MG tablet Take 4 mg by mouth Every Morning Before Breakfast.     • hydrALAZINE (APRESOLINE) 100 MG tablet Take 100 mg by mouth 3 (Three) Times a Day.     • hydroxyurea (HYDREA) 500 MG capsule Take 1 mg by mouth Daily.     • lisinopril (PRINIVIL,ZESTRIL) 40 MG tablet Take 40 mg by mouth Every Morning.     • Multiple Vitamins-Minerals (MULTIVITAMIN ADULT PO) Take 1 tablet by mouth Daily.     • nitrofurantoin, macrocrystal-monohydrate, (MACROBID) 100 MG capsule TAKE 1 CAPSULE BY MOUTH 2 TIMES A DAY WITH MEALS  0   • pravastatin (PRAVACHOL) 40 MG tablet Take 40 mg by mouth Daily.     • tamsulosin (FLOMAX) 0.4 MG capsule 24 hr capsule Take 1 capsule by mouth Every Night.     • Testosterone (TESTOPEL IL) by Implant route.     • triamterene-hydrochlorothiazide  (DYAZIDE) 37.5-25 MG per capsule Take 1 capsule by mouth Every Morning.     • Turmeric 500 MG capsule Take  by mouth.       No facility-administered medications prior to visit.        Allergies as of 2019   • (No Known Allergies)     Social History     Socioeconomic History   • Marital status:      Spouse name: Not on file   • Number of children: Not on file   • Years of education: Not on file   • Highest education level: Not on file   Tobacco Use   • Smoking status: Former Smoker     Types: Cigarettes     Last attempt to quit:      Years since quittin.4   • Smokeless tobacco: Former User   • Tobacco comment: quite:    Substance and Sexual Activity   • Alcohol use: No   • Drug use: No   • Sexual activity: Defer     Family History   Problem Relation Age of Onset   • Hypertension Mother    • Other Father         ruptured appendixdies when pt. was 14.   • Diabetes Paternal Aunt    • Diabetes Paternal Uncle      Review of Systems   Constitution: Positive for weight loss (10 lbs). Negative for chills, fever and malaise/fatigue.   HENT: Negative for ear pain, hearing loss, nosebleeds and sore throat.    Eyes: Negative for double vision, pain, vision loss in left eye and vision loss in right eye.   Cardiovascular: Negative for chest pain, claudication, dyspnea on exertion, irregular heartbeat, leg swelling, near-syncope, orthopnea, palpitations, paroxysmal nocturnal dyspnea and syncope.   Respiratory: Negative for cough, shortness of breath, snoring and wheezing.    Endocrine: Negative for cold intolerance and heat intolerance.   Hematologic/Lymphatic: Negative for bleeding problem.   Skin: Positive for poor wound healing. Negative for color change, itching, rash and unusual hair distribution.   Musculoskeletal: Negative for joint pain and joint swelling.   Gastrointestinal: Negative for abdominal pain, diarrhea, hematochezia, melena, nausea and vomiting.   Genitourinary: Negative for decreased  "libido, frequency, hematuria, hesitancy and incomplete emptying.   Neurological: Negative for excessive daytime sleepiness, dizziness, headaches, light-headedness, loss of balance, numbness, paresthesias and seizures.   Psychiatric/Behavioral: Negative for depression.          Objective:     Vitals:    06/19/19 1419 06/19/19 1445   BP: 130/76    BP Location: Left arm    Patient Position: Sitting    Pulse: 91 98   SpO2:  99%   Weight: 113 kg (249 lb 3.2 oz)    Height: 182.9 cm (72\")      Body mass index is 33.8 kg/m².    PHYSICAL EXAM:  Physical Exam   Constitutional: He is oriented to person, place, and time. He appears well-developed and well-nourished. No distress.   Obese   HENT:   Head: Normocephalic and atraumatic.   Eyes: Conjunctivae and EOM are normal. Pupils are equal, round, and reactive to light.   Neck: No JVD present. Carotid bruit is not present.   Cardiovascular: Normal rate, regular rhythm and intact distal pulses.   No murmur heard.  Pulses:       Radial pulses are 2+ on the right side, and 2+ on the left side.        Dorsalis pedis pulses are 2+ on the right side, and 2+ on the left side.        Posterior tibial pulses are 2+ on the right side, and 2+ on the left side.   Lower extremity edema   Pulmonary/Chest: Effort normal and breath sounds normal. No accessory muscle usage. No respiratory distress. He has no decreased breath sounds. He has no wheezes. He has no rhonchi. He has no rales. He exhibits no tenderness.   Abdominal: Soft. Bowel sounds are normal. He exhibits no distension. There is no tenderness. There is no rebound and no guarding.   Rounded, compressible.   Musculoskeletal: Normal range of motion. He exhibits edema (mild, 1+ pitting lower extremity edema (left leg wrapped in ace wrap)).   Neurological: He is alert and oriented to person, place, and time.   No focal neuro deficits noted.   Skin: Skin is warm, dry and intact. He is not diaphoretic. No erythema.        Psychiatric: He " has a normal mood and affect. His speech is normal and behavior is normal. Judgment and thought content normal. Cognition and memory are normal.   Nursing note and vitals reviewed.        ECG 12 Lead  Date/Time: 6/19/2019 2:18 PM  Performed by: Cleopatra Augustin APRN  Authorized by: Cleopatra Augustin APRN   Comparison: compared with previous ECG from 12/19/2018  Comparison to previous ECG: Similar after converting out of SVT  Rhythm: sinus rhythm  Rate: normal  BPM: 91  Conduction: left bundle branch block and 1st degree AV block  QRS axis: normal    Clinical impression: abnormal EKG  Comments: Borderline prolonged QT interval  Indication: Paroxysmal SVT, LBBB          Prior testing:   3/26/18 Echo:  His echocardiogram revealed mild thickening of mitral valve leaflets, dilation of aortic root measuring 3.7 cm, mildly dilated left atrium, mildly enlarged right atrium size, mild concentric left ventricular hypertrophy, overall left ventricular function was normal, the ejection fraction is visually estimated at 60-65%, regional wall motion abnormalities could not be excluded due to limited visualization.  Velocities were consistent with impaired relaxation and the pulmonary AR velocity was greater than 35 cm/s which was consistent with elevated filling pressure, the right ventricle was normal size and function.    Assessment:       Diagnosis Plan   1. Supraventricular tachycardia (CMS/HCC)  Adult Transthoracic Echo Complete W/ Cont if Necessary Per Protocol    ECG 12 Lead   2. Aortic root dilation (CMS/HCC)  Adult Transthoracic Echo Complete W/ Cont if Necessary Per Protocol    ECG 12 Lead   3. Essential hypertension  Adult Transthoracic Echo Complete W/ Cont if Necessary Per Protocol    ECG 12 Lead   4. TIA (transient ischemic attack)  ECG 12 Lead   5. Type 2 diabetes mellitus with complication, without long-term current use of insulin (CMS/HCC)  ECG 12 Lead       Plan:      1. Supraventricular tachycardia: Cardizem  increased in [facility] ED on 10/24/18 to 300 mg daily.  Questionable atrial flutter/fib but Dr. Higuera felt it was only SVT and stopped apixaban and offered increase in mediation vs. Ablation but the patient was asymptomatic. In SR today.       2. Hypertension: B/P is 130/76 today. On multiple medications. Patient will let me know if his blood pressure is consistently saying above 140/90 at home.  He will also continue to monitor his heart rate at home as well.  I gave him a log to keep track of his vitals at home.  I will have him bring in his cuff for accuracy verification at his next visit.      3. Aortic root dilation:  3.7 cm on 3/2018 echo. Will repeat echo prior to next visit in 6 months to follow up.     4. TIA: 2006. On Plavix and statin.     5. Pulmonary hypertension: Prior echocardiogram suggestive, evaluated with heart catheterization, follow-up echo showed no pulmonary hypertension. Denies SOB.     6. Diabetes: On oral therapy.  His A1c 6.6% on 6/6/2019.     7. Left bundle branch block: Chronic.     8. Chronic edema: On diuretic therapy.  Her renal function and LFTs on 6/6/2019 labs    9. Polycythemia: Seeing heme/onc Dr. Thomas    10.  Hyperlipidemia: Lipid panel under good control triglyceride 79, total cholesterol 118, HDL 46, LDL 56 on 6/6/2019 labs      He is doing well from a cardiac standpoint so I will not make any changes to his regimen today.  Follow up with Dr. Arias to transition care from Dr. Higuera in 6 months, unless otherwise needed sooner.  I advised the patient to contact our office with any questions or concerns.         Your medication list           Accurate as of 6/19/19  4:07 PM. If you have any questions, ask your nurse or doctor.               CONTINUE taking these medications      Instructions Last Dose Given Next Dose Due   bumetanide 1 MG tablet  Commonly known as:  BUMEX      Take 1 mg by mouth Every Morning.       clopidogrel 75 MG tablet  Commonly known as:  PLAVIX      Take 1  tablet by mouth Daily.       diltiaZEM  MG 24 hr capsule  Commonly known as:  CARDIZEM CD      Take 1 capsule by mouth Daily.       finasteride 5 MG tablet  Commonly known as:  PROSCAR      Take 5 mg by mouth Every Night.       fluticasone 50 MCG/ACT nasal spray  Commonly known as:  FLONASE           glimepiride 4 MG tablet  Commonly known as:  AMARYL      Take 4 mg by mouth Every Morning Before Breakfast.       hydrALAZINE 100 MG tablet  Commonly known as:  APRESOLINE      Take 100 mg by mouth 3 (Three) Times a Day.       hydroxyurea 500 MG capsule  Commonly known as:  HYDREA      Take 1 mg by mouth Daily.       lisinopril 40 MG tablet  Commonly known as:  PRINIVIL,ZESTRIL      Take 40 mg by mouth Every Morning.       MULTIVITAMIN ADULT PO      Take 1 tablet by mouth Daily.       nitrofurantoin (macrocrystal-monohydrate) 100 MG capsule  Commonly known as:  MACROBID      TAKE 1 CAPSULE BY MOUTH 2 TIMES A DAY WITH MEALS       pravastatin 40 MG tablet  Commonly known as:  PRAVACHOL      Take 40 mg by mouth Daily.       tamsulosin 0.4 MG capsule 24 hr capsule  Commonly known as:  FLOMAX      Take 1 capsule by mouth Every Night.       TESTOPEL IL      by Implant route.       triamterene-hydrochlorothiazide 37.5-25 MG per capsule  Commonly known as:  DYAZIDE      Take 1 capsule by mouth Every Morning.       Turmeric 500 MG capsule      Take  by mouth.              The above medication changes may not have been made by this provider.  Medication list was updated to reflect medications patient is currently taking including medication changes and discontinuations made by other healthcare providers.       I have reviewed this case with Dr. Arias. It has been a pleasure to participate in this patient's care. Please feel free to contact me with any questions or concerns.     MUKUL Sun  06/19/2019    Dictated utilizing Dragon Dictation System.

## 2019-06-19 ENCOUNTER — OFFICE VISIT (OUTPATIENT)
Dept: CARDIOLOGY | Facility: CLINIC | Age: 74
End: 2019-06-19

## 2019-06-19 VITALS
HEART RATE: 98 BPM | DIASTOLIC BLOOD PRESSURE: 76 MMHG | OXYGEN SATURATION: 99 % | BODY MASS INDEX: 33.75 KG/M2 | HEIGHT: 72 IN | WEIGHT: 249.2 LBS | SYSTOLIC BLOOD PRESSURE: 130 MMHG

## 2019-06-19 DIAGNOSIS — G45.9 TIA (TRANSIENT ISCHEMIC ATTACK): ICD-10-CM

## 2019-06-19 DIAGNOSIS — I10 ESSENTIAL HYPERTENSION: ICD-10-CM

## 2019-06-19 DIAGNOSIS — E11.8 TYPE 2 DIABETES MELLITUS WITH COMPLICATION, WITHOUT LONG-TERM CURRENT USE OF INSULIN (HCC): ICD-10-CM

## 2019-06-19 DIAGNOSIS — I47.1 SUPRAVENTRICULAR TACHYCARDIA (HCC): Primary | ICD-10-CM

## 2019-06-19 DIAGNOSIS — I77.810 AORTIC ROOT DILATION (HCC): ICD-10-CM

## 2019-06-19 PROCEDURE — 93000 ELECTROCARDIOGRAM COMPLETE: CPT | Performed by: NURSE PRACTITIONER

## 2019-06-19 PROCEDURE — 99214 OFFICE O/P EST MOD 30 MIN: CPT | Performed by: NURSE PRACTITIONER

## 2019-06-19 RX ORDER — HYDROXYUREA 500 MG/1
1 CAPSULE ORAL DAILY
COMMUNITY
End: 2020-10-10 | Stop reason: HOSPADM

## 2019-09-23 RX ORDER — CLOPIDOGREL BISULFATE 75 MG/1
TABLET ORAL
Qty: 90 TABLET | Refills: 1 | Status: SHIPPED | OUTPATIENT
Start: 2019-09-23 | End: 2020-03-23

## 2019-12-02 RX ORDER — DILTIAZEM HYDROCHLORIDE 300 MG/1
CAPSULE, EXTENDED RELEASE ORAL
Qty: 90 CAPSULE | Refills: 1 | Status: SHIPPED | OUTPATIENT
Start: 2019-12-02 | End: 2020-06-01

## 2019-12-10 ENCOUNTER — OFFICE VISIT (OUTPATIENT)
Dept: FAMILY MEDICINE CLINIC | Facility: CLINIC | Age: 74
End: 2019-12-10

## 2019-12-10 VITALS
SYSTOLIC BLOOD PRESSURE: 150 MMHG | DIASTOLIC BLOOD PRESSURE: 70 MMHG | WEIGHT: 253.8 LBS | HEIGHT: 72 IN | BODY MASS INDEX: 34.38 KG/M2 | TEMPERATURE: 98.2 F | HEART RATE: 59 BPM | OXYGEN SATURATION: 98 %

## 2019-12-10 DIAGNOSIS — E66.09 CLASS 1 OBESITY DUE TO EXCESS CALORIES WITH SERIOUS COMORBIDITY AND BODY MASS INDEX (BMI) OF 34.0 TO 34.9 IN ADULT: ICD-10-CM

## 2019-12-10 DIAGNOSIS — E11.9 TYPE 2 DIABETES MELLITUS WITHOUT COMPLICATION, WITHOUT LONG-TERM CURRENT USE OF INSULIN (HCC): Primary | ICD-10-CM

## 2019-12-10 DIAGNOSIS — E78.2 MIXED HYPERLIPIDEMIA: ICD-10-CM

## 2019-12-10 DIAGNOSIS — D45 POLYCYTHEMIA VERA (HCC): ICD-10-CM

## 2019-12-10 DIAGNOSIS — I10 ESSENTIAL HYPERTENSION: ICD-10-CM

## 2019-12-10 PROBLEM — L03.116 CELLULITIS OF KNEE, LEFT: Status: RESOLVED | Noted: 2017-05-04 | Resolved: 2019-12-10

## 2019-12-10 PROCEDURE — 99214 OFFICE O/P EST MOD 30 MIN: CPT | Performed by: NURSE PRACTITIONER

## 2019-12-10 RX ORDER — HYDRALAZINE HYDROCHLORIDE 100 MG/1
100 TABLET, FILM COATED ORAL 3 TIMES DAILY
Qty: 270 TABLET | Refills: 1 | Status: SHIPPED | OUTPATIENT
Start: 2019-12-10 | End: 2020-11-13 | Stop reason: SDUPTHER

## 2019-12-10 RX ORDER — LISINOPRIL 40 MG/1
40 TABLET ORAL EVERY MORNING
Qty: 90 TABLET | Refills: 1 | Status: SHIPPED | OUTPATIENT
Start: 2019-12-10 | End: 2020-06-09

## 2019-12-10 RX ORDER — GLIMEPIRIDE 2 MG/1
2 TABLET ORAL
COMMUNITY
End: 2019-12-13 | Stop reason: SDUPTHER

## 2019-12-10 RX ORDER — METHYLPREDNISOLONE 4 MG/1
TABLET ORAL
Refills: 0 | COMMUNITY
Start: 2019-09-11 | End: 2019-12-10

## 2019-12-10 NOTE — PROGRESS NOTES
Subjective   Erlin Blanco is a 74 y.o. male.     Chief Complaint   Patient presents with   • Diabetes   • Hypertension   • Hyperlipidemia       History of Present Illness   Patient presents for follow up DM2: takes Glimepiride daily; last A1c about 7.0%; monitors blood sugars, typically runs about 120s in mornings; was previously working nights and blood sugars were running higher due to different schedule so took Glimepiride twice daily for about 1 month; no longer working and blood sugars have been good; back to taking Glimepiride once daily; watches diet; not as much exercise since no longer walking at work; no numbness or tingling; last eye exam about 2 months ago, [facility], no retinopathy.    F/U HTN: takes Lisinopril, Triamterene/HCTZ and Cartia daily; also takes Hydralazine TID and Bumex daily; will be seeing Dr. Arias cardiology now that Dr. Higuera has left practice; has follow up next week; monitors blood pressure; typically runs about 150s/70-80s; some swelling in left foot, has had problems with left knee; swelling resolves when takes Bumex; no orthostasis; no headaches.    F/U Hyperlipidemia: takes Pravastatin daily; no myalgias since changed to Pravastatin instead of Simvastatin in 2018; avoids saturated fats; fasting today.    F/U BPH: takes Flomax daily; sees Dr. Chapman urology; urology monitors PSA, due in 6 months; had follow up yesterday; will be stopping Testosterone pellets due to polycythemia; sees Dr. Thomas hematology; takes Hydroxyurea daily; has therapeutic phlebotomy every 6 weeks; last labs almost normal.    F/U CAD: takes Plavix daily; has some easy bruising.    The following portions of the patient's history were reviewed and updated as appropriate: allergies, current medications, past family history, past medical history, past social history, past surgical history and problem list.    Past Medical History:   Diagnosis Date   • Aortic root dilation (CMS/HCC)    • Arthritis    •  Cellulitis of knee, left 2017   • CHF (congestive heart failure) (CMS/HCC)    • Coronary artery disease    • Diabetes mellitus (CMS/HCC)     type 2   • Diminished pulse     in lower extremities   • Essential hypertension    • Hearing loss     mala hearing aids   • Heart valve disease    • Hyperlipidemia    • Irregular heart rate    • Knee pain, left    • Polycythemia vera (CMS/HCC)    • Prostate hyperplasia without urinary obstruction    • Pulmonary hypertension (CMS/HCC)    • Scoliosis    • SVT (supraventricular tachycardia) (CMS/HCC)    • TIA (transient ischemic attack)            Past Surgical History:   Procedure Laterality Date   • CARDIAC CATHETERIZATION     • COLONOSCOPY      did Cologuard approx 2019 and negative   • HAND SURGERY     • JOINT REPLACEMENT      Righ Knee   • CA TOTAL KNEE ARTHROPLASTY Left 2017    Procedure: LT TOTAL KNEE ARTHROPLASTY;  Surgeon: Bertin Perrin MD;  Location: [facility];  Service: Orthopedics       Family History   Problem Relation Age of Onset   • Hypertension Mother    • Other Father         ruptured appendix when pt. was 14.   • Diabetes Paternal Aunt    • Diabetes Paternal Uncle        Social History     Socioeconomic History   • Marital status:      Spouse name: Not on file   • Number of children: Not on file   • Years of education: Not on file   • Highest education level: Not on file   Tobacco Use   • Smoking status: Former Smoker     Types: Cigarettes     Last attempt to quit:      Years since quittin.9   • Smokeless tobacco: Former User   • Tobacco comment: quite: 1973   Substance and Sexual Activity   • Alcohol use: No   • Drug use: No   • Sexual activity: Defer       Review of Systems   Constitutional: Negative for appetite change, chills, fatigue, fever, unexpected weight gain (no unexplained weight gain) and unexpected weight loss.   HENT: Negative for ear pain and sore throat.    Eyes: Negative for blurred vision and pain.  "  Respiratory: Negative for cough, chest tightness and shortness of breath.    Cardiovascular: Negative for chest pain and palpitations.   Gastrointestinal: Negative for abdominal pain, blood in stool, constipation, diarrhea, GERD and indigestion.   Genitourinary: Negative for dysuria and frequency.   Musculoskeletal: Negative for back pain. Arthralgias: left knee pain, sees Dr. Aydin contreras.   Skin: Negative for rash.   Neurological: Negative for syncope and light-headedness.   Psychiatric/Behavioral: Negative for sleep disturbance and depressed mood. The patient is not nervous/anxious.        Objective   Vitals:    12/10/19 0908 12/10/19 0956 12/10/19 0957   BP: 134/60 150/68 150/70   BP Location: Left arm Right arm Left arm   Patient Position: Sitting Sitting Sitting   Cuff Size: Adult     Pulse: 59     Temp: 98.2 °F (36.8 °C)     SpO2: 98%     Weight: 115 kg (253 lb 12.8 oz)     Height: 182.9 cm (72\")       Body mass index is 34.42 kg/m².    Physical Exam   Constitutional: He is oriented to person, place, and time. He appears well-developed and well-nourished. No distress.   HENT:   Head: Normocephalic.   Eyes: Conjunctivae are normal.   Neck: Normal range of motion. Neck supple. Carotid bruit is not present.   Cardiovascular: Normal rate, regular rhythm, S1 normal, S2 normal and intact distal pulses.   Murmur heard.   Systolic murmur is present with a grade of 2/6.  Pulmonary/Chest: Effort normal and breath sounds normal. No respiratory distress.   Abdominal: Soft. Bowel sounds are normal. There is no hepatosplenomegaly. There is no tenderness.   Musculoskeletal: He exhibits edema (trace pretibial bilaterally; left ankle with 1+ nonpitting edema; right ankle with trace nonpitting edema).    Elba had a diabetic foot exam performed today.   During the foot exam he had a monofilament test performed.    Neurological Sensory Findings -  Altered sharp/dull right ankle/foot discrimination (in some areas) and altered " sharp/dull left ankle/foot discrimination (in some areas). No right ankle/foot altered proprioception and no left ankle/foot altered proprioception  Vascular Status -  His right foot exhibits normal foot vasculature . His left foot exhibits abnormal foot edema. His left foot exhibits normal foot vasculature .  Skin Integrity  -  His right foot skin is intact.His left foot skin is intact..  Neurological: He is alert and oriented to person, place, and time. Abnormal gait: limping on left.   Skin: Skin is warm and dry. No rash noted.   Psychiatric: He has a normal mood and affect. His behavior is normal. Judgment and thought content normal. Cognition and memory are normal.   Nursing note and vitals reviewed.      Assessment/Plan .  Problem List Items Addressed This Visit     Type 2 diabetes mellitus (CMS/[facility]) - Primary    Current Assessment & Plan     Diabetes is stable.   Dietary recommendations for ADA diet.  Regular aerobic exercise.  Diabetes will be reassessed 3-4 months.         Relevant Medications    glimepiride (AMARYL) 2 MG tablet    Other Relevant Orders    Comprehensive Metabolic Panel    Lipid Panel With LDL / HDL Ratio    Hemoglobin A1c    Hypertension    Current Assessment & Plan     Hypertension is stable.  Regular aerobic exercise.  Continue current medications.  Blood pressure will be reassessed at the next regular appointment.         Relevant Medications    hydrALAZINE (APRESOLINE) 100 MG tablet    lisinopril (PRINIVIL,ZESTRIL) 40 MG tablet    Other Relevant Orders    Comprehensive Metabolic Panel    Hyperlipidemia    Relevant Orders    Lipid Panel With LDL / HDL Ratio    Class 1 obesity due to excess calories with serious comorbidity and body mass index (BMI) of 34.0 to 34.9 in adult    Current Assessment & Plan     Continue to work on healthy diet and exercise.  Handout given.         Polycythemia vera (CMS/[facility])          Return in about 4 months (around 4/10/2020) for Medicare Wellness,  Recheck.  Will send refill of Pravastatin to mail order pending lab results.

## 2019-12-10 NOTE — ASSESSMENT & PLAN NOTE
Diabetes is stable.   Dietary recommendations for ADA diet.  Regular aerobic exercise.  Diabetes will be reassessed 3-4 months.

## 2019-12-10 NOTE — PATIENT INSTRUCTIONS
Continue to monitor your blood sugar once daily before a meal and record results.  Continue to monitor your blood pressure periodically and record results.  Continue to work on healthy diet and exercise.  Follow up pending lab results.  Follow up in 3-4 months for Medicare Wellness, or sooner if problem or concerns.      Exercising to Lose Weight  Exercise is structured, repetitive physical activity to improve fitness and health. Getting regular exercise is important for everyone. It is especially important if you are overweight. Being overweight increases your risk of heart disease, stroke, diabetes, high blood pressure, and several types of cancer. Reducing your calorie intake and exercising can help you lose weight.  Exercise is usually categorized as moderate or vigorous intensity. To lose weight, most people need to do a certain amount of moderate-intensity or vigorous-intensity exercise each week.  Moderate-intensity exercise    Moderate-intensity exercise is any activity that gets you moving enough to burn at least three times more energy (calories) than if you were sitting.  Examples of moderate exercise include:  · Walking a mile in 15 minutes.  · Doing light yard work.  · Biking at an easy pace.  Most people should get at least 150 minutes (2 hours and 30 minutes) a week of moderate-intensity exercise to maintain their body weight.  Vigorous-intensity exercise  Vigorous-intensity exercise is any activity that gets you moving enough to burn at least six times more calories than if you were sitting. When you exercise at this intensity, you should be working hard enough that you are not able to carry on a conversation.  Examples of vigorous exercise include:  · Running.  · Playing a team sport, such as football, basketball, and soccer.  · Jumping rope.  Most people should get at least 75 minutes (1 hour and 15 minutes) a week of vigorous-intensity exercise to maintain their body weight.  How can exercise  affect me?  When you exercise enough to burn more calories than you eat, you lose weight. Exercise also reduces body fat and builds muscle. The more muscle you have, the more calories you burn. Exercise also:  · Improves mood.  · Reduces stress and tension.  · Improves your overall fitness, flexibility, and endurance.  · Increases bone strength.  The amount of exercise you need to lose weight depends on:  · Your age.  · The type of exercise.  · Any health conditions you have.  · Your overall physical ability.  Talk to your health care provider about how much exercise you need and what types of activities are safe for you.  What actions can I take to lose weight?  Nutrition    · Make changes to your diet as told by your health care provider or diet and nutrition specialist (dietitian). This may include:  ? Eating fewer calories.  ? Eating more protein.  ? Eating less unhealthy fats.  ? Eating a diet that includes fresh fruits and vegetables, whole grains, low-fat dairy products, and lean protein.  ? Avoiding foods with added fat, salt, and sugar.  · Drink plenty of water while you exercise to prevent dehydration or heat stroke.  Activity  · Choose an activity that you enjoy and set realistic goals. Your health care provider can help you make an exercise plan that works for you.  · Exercise at a moderate or vigorous intensity most days of the week.  ? The intensity of exercise may vary from person to person. You can tell how intense a workout is for you by paying attention to your breathing and heartbeat. Most people will notice their breathing and heartbeat get faster with more intense exercise.  · Do resistance training twice each week, such as:  ? Push-ups.  ? Sit-ups.  ? Lifting weights.  ? Using resistance bands.  · Getting short amounts of exercise can be just as helpful as long structured periods of exercise. If you have trouble finding time to exercise, try to include exercise in your daily routine.  ? Get up,  stretch, and walk around every 30 minutes throughout the day.  ? Go for a walk during your lunch break.  ? Park your car farther away from your destination.  ? If you take public transportation, get off one stop early and walk the rest of the way.  ? Make phone calls while standing up and walking around.  ? Take the stairs instead of elevators or escalators.  · Wear comfortable clothes and shoes with good support.  · Do not exercise so much that you hurt yourself, feel dizzy, or get very short of breath.  Where to find more information  · [place] Department of Health and Human Services: www.hhs.gov  · Centers for Disease Control and Prevention (CDC): www.cdc.gov  Contact a health care provider:  · Before starting a new exercise program.  · If you have questions or concerns about your weight.  · If you have a medical problem that keeps you from exercising.  Get help right away if you have any of the following while exercising:  · Injury.  · Dizziness.  · Difficulty breathing or shortness of breath that does not go away when you stop exercising.  · Chest pain.  · Rapid heartbeat.  Summary  · Being overweight increases your risk of heart disease, stroke, diabetes, high blood pressure, and several types of cancer.  · Losing weight happens when you burn more calories than you eat.  · Reducing the amount of calories you eat in addition to getting regular moderate or vigorous exercise each week helps you lose weight.  This information is not intended to replace advice given to you by your health care provider. Make sure you discuss any questions you have with your health care provider.  Document Released: 01/20/2012 Document Revised: 12/31/2018 Document Reviewed: 12/31/2018  [employer] Interactive Patient Education © 2019 [employer]      Calorie Counting for Weight Loss  Calories are units of energy. Your body needs a certain amount of calories from food to keep you going throughout the day. When you eat more calories than  your body needs, your body stores the extra calories as fat. When you eat fewer calories than your body needs, your body burns fat to get the energy it needs.  Calorie counting means keeping track of how many calories you eat and drink each day. Calorie counting can be helpful if you need to lose weight. If you make sure to eat fewer calories than your body needs, you should lose weight. Ask your health care provider what a healthy weight is for you.  For calorie counting to work, you will need to eat the right number of calories in a day in order to lose a healthy amount of weight per week. A dietitian can help you determine how many calories you need in a day and will give you suggestions on how to reach your calorie goal.  · A healthy amount of weight to lose per week is usually 1-2 lb (0.5-0.9 kg). This usually means that your daily calorie intake should be reduced by 500-750 calories.  · Eating 1,200 - 1,500 calories per day can help most women lose weight.  · Eating 1,500 - 1,800 calories per day can help most men lose weight.  What is my plan?  My goal is to have __________ calories per day.  If I have this many calories per day, I should lose around __________ pounds per week.  What do I need to know about calorie counting?  In order to meet your daily calorie goal, you will need to:  · Find out how many calories are in each food you would like to eat. Try to do this before you eat.  · Decide how much of the food you plan to eat.  · Write down what you ate and how many calories it had. Doing this is called keeping a food log.  To successfully lose weight, it is important to balance calorie counting with a healthy lifestyle that includes regular activity. Aim for 150 minutes of moderate exercise (such as walking) or 75 minutes of vigorous exercise (such as running) each week.  Where do I find calorie information?    The number of calories in a food can be found on a Nutrition Facts label. If a food does not  have a Nutrition Facts label, try to look up the calories online or ask your dietitian for help.  Remember that calories are listed per serving. If you choose to have more than one serving of a food, you will have to multiply the calories per serving by the amount of servings you plan to eat. For example, the label on a package of bread might say that a serving size is 1 slice and that there are 90 calories in a serving. If you eat 1 slice, you will have eaten 90 calories. If you eat 2 slices, you will have eaten 180 calories.  How do I keep a food log?  Immediately after each meal, record the following information in your food log:  · What you ate. Don't forget to include toppings, sauces, and other extras on the food.  · How much you ate. This can be measured in cups, ounces, or number of items.  · How many calories each food and drink had.  · The total number of calories in the meal.  Keep your food log near you, such as in a small notebook in your pocket, or use a mobile bartolo or website. Some programs will calculate calories for you and show you how many calories you have left for the day to meet your goal.  What are some calorie counting tips?    · Use your calories on foods and drinks that will fill you up and not leave you hungry:  ? Some examples of foods that fill you up are nuts and nut butters, vegetables, lean proteins, and high-fiber foods like whole grains. High-fiber foods are foods with more than 5 g fiber per serving.  ? Drinks such as sodas, specialty coffee drinks, alcohol, and juices have a lot of calories, yet do not fill you up.  · Eat nutritious foods and avoid empty calories. Empty calories are calories you get from foods or beverages that do not have many vitamins or protein, such as candy, sweets, and soda. It is better to have a nutritious high-calorie food (such as an avocado) than a food with few nutrients (such as a bag of chips).  · Know how many calories are in the foods you eat most  "often. This will help you calculate calorie counts faster.  · Pay attention to calories in drinks. Low-calorie drinks include water and unsweetened drinks.  · Pay attention to nutrition labels for \"low fat\" or \"fat free\" foods. These foods sometimes have the same amount of calories or more calories than the full fat versions. They also often have added sugar, starch, or salt, to make up for flavor that was removed with the fat.  · Find a way of tracking calories that works for you. Get creative. Try different apps or programs if writing down calories does not work for you.  What are some portion control tips?  · Know how many calories are in a serving. This will help you know how many servings of a certain food you can have.  · Use a measuring cup to measure serving sizes. You could also try weighing out portions on a kitchen scale. With time, you will be able to estimate serving sizes for some foods.  · Take some time to put servings of different foods on your favorite plates, bowls, and cups so you know what a serving looks like.  · Try not to eat straight from a bag or box. Doing this can lead to overeating. Put the amount you would like to eat in a cup or on a plate to make sure you are eating the right portion.  · Use smaller plates, glasses, and bowls to prevent overeating.  · Try not to multitask (for example, watch TV or use your computer) while eating. If it is time to eat, sit down at a table and enjoy your food. This will help you to know when you are full. It will also help you to be aware of what you are eating and how much you are eating.  What are tips for following this plan?  Reading food labels  · Check the calorie count compared to the serving size. The serving size may be smaller than what you are used to eating.  · Check the source of the calories. Make sure the food you are eating is high in vitamins and protein and low in saturated and trans fats.  Shopping  · Read nutrition labels while you " "shop. This will help you make healthy decisions before you decide to purchase your food.  · Make a grocery list and stick to it.  Cooking  · Try to cook your favorite foods in a healthier way. For example, try baking instead of frying.  · Use low-fat dairy products.  Meal planning  · Use more fruits and vegetables. Half of your plate should be fruits and vegetables.  · Include lean proteins like poultry and fish.  How do I count calories when eating out?  · Ask for smaller portion sizes.  · Consider sharing an entree and sides instead of getting your own entree.  · If you get your own entree, eat only half. Ask for a box at the beginning of your meal and put the rest of your entree in it so you are not tempted to eat it.  · If calories are listed on the menu, choose the lower calorie options.  · Choose dishes that include vegetables, fruits, whole grains, low-fat dairy products, and lean protein.  · Choose items that are boiled, broiled, grilled, or steamed. Stay away from items that are buttered, battered, fried, or served with cream sauce. Items labeled \"crispy\" are usually fried, unless stated otherwise.  · Choose water, low-fat milk, unsweetened iced tea, or other drinks without added sugar. If you want an alcoholic beverage, choose a lower calorie option such as a glass of wine or light beer.  · Ask for dressings, sauces, and syrups on the side. These are usually high in calories, so you should limit the amount you eat.  · If you want a salad, choose a garden salad and ask for grilled meats. Avoid extra toppings like hung, cheese, or fried items. Ask for the dressing on the side, or ask for olive oil and vinegar or lemon to use as dressing.  · Estimate how many servings of a food you are given. For example, a serving of cooked rice is ½ cup or about the size of half a baseball. Knowing serving sizes will help you be aware of how much food you are eating at restaurants. The list below tells you how big or small " some common portion sizes are based on everyday objects:  ? 1 oz--4 stacked dice.  ? 3 oz--1 deck of cards.  ? 1 tsp--1 die.  ? 1 Tbsp--½ a ping-pong ball.  ? 2 Tbsp--1 ping-pong ball.  ? ½ cup--½ baseball.  ? 1 cup--1 baseball.  Summary  · Calorie counting means keeping track of how many calories you eat and drink each day. If you eat fewer calories than your body needs, you should lose weight.  · A healthy amount of weight to lose per week is usually 1-2 lb (0.5-0.9 kg). This usually means reducing your daily calorie intake by 500-750 calories.  · The number of calories in a food can be found on a Nutrition Facts label. If a food does not have a Nutrition Facts label, try to look up the calories online or ask your dietitian for help.  · Use your calories on foods and drinks that will fill you up, and not on foods and drinks that will leave you hungry.  · Use smaller plates, glasses, and bowls to prevent overeating.  This information is not intended to replace advice given to you by your health care provider. Make sure you discuss any questions you have with your health care provider.  Document Released: 12/18/2006 Document Revised: 09/06/2019 Document Reviewed: 11/17/2017  [employer] Interactive Patient Education © 2019 [employer]

## 2019-12-10 NOTE — ASSESSMENT & PLAN NOTE
Hypertension is stable.  Regular aerobic exercise.  Continue current medications.  Blood pressure will be reassessed at the next regular appointment.

## 2019-12-11 DIAGNOSIS — E78.2 MIXED HYPERLIPIDEMIA: Primary | ICD-10-CM

## 2019-12-11 LAB
ALBUMIN SERPL-MCNC: 4.7 G/DL (ref 3.5–5.2)
ALBUMIN/GLOB SERPL: 2 G/DL
ALP SERPL-CCNC: 82 U/L (ref 39–117)
ALT SERPL-CCNC: 19 U/L (ref 1–41)
AST SERPL-CCNC: 23 U/L (ref 1–40)
BILIRUB SERPL-MCNC: 0.6 MG/DL (ref 0.2–1.2)
BUN SERPL-MCNC: 22 MG/DL (ref 8–23)
BUN/CREAT SERPL: 16.9 (ref 7–25)
CALCIUM SERPL-MCNC: 9.6 MG/DL (ref 8.6–10.5)
CHLORIDE SERPL-SCNC: 101 MMOL/L (ref 98–107)
CHOLEST SERPL-MCNC: 129 MG/DL (ref 0–200)
CO2 SERPL-SCNC: 30.1 MMOL/L (ref 22–29)
CREAT SERPL-MCNC: 1.3 MG/DL (ref 0.76–1.27)
GLOBULIN SER CALC-MCNC: 2.3 GM/DL
GLUCOSE SERPL-MCNC: 134 MG/DL (ref 65–99)
HBA1C MFR BLD: 7.5 % (ref 4.8–5.6)
HDLC SERPL-MCNC: 52 MG/DL (ref 40–60)
LDLC SERPL CALC-MCNC: 67 MG/DL (ref 0–100)
LDLC/HDLC SERPL: 1.29 {RATIO}
POTASSIUM SERPL-SCNC: 3.9 MMOL/L (ref 3.5–5.2)
PROT SERPL-MCNC: 7 G/DL (ref 6–8.5)
SODIUM SERPL-SCNC: 143 MMOL/L (ref 136–145)
TRIGL SERPL-MCNC: 49 MG/DL (ref 0–150)
VLDLC SERPL CALC-MCNC: 9.8 MG/DL

## 2019-12-11 RX ORDER — PRAVASTATIN SODIUM 40 MG
40 TABLET ORAL DAILY
Qty: 90 TABLET | Refills: 1 | Status: SHIPPED | OUTPATIENT
Start: 2019-12-11 | End: 2020-06-08

## 2019-12-13 DIAGNOSIS — E11.9 TYPE 2 DIABETES MELLITUS WITHOUT COMPLICATION, WITHOUT LONG-TERM CURRENT USE OF INSULIN (HCC): Primary | ICD-10-CM

## 2019-12-13 RX ORDER — GLIMEPIRIDE 2 MG/1
1 TABLET ORAL
Start: 2019-12-13 | End: 2020-02-24 | Stop reason: SDUPTHER

## 2019-12-17 ENCOUNTER — HOSPITAL ENCOUNTER (OUTPATIENT)
Dept: CARDIOLOGY | Facility: HOSPITAL | Age: 74
Discharge: HOME | End: 2019-12-17
Admitting: NURSE PRACTITIONER

## 2019-12-17 ENCOUNTER — OFFICE VISIT (OUTPATIENT)
Dept: CARDIOLOGY | Facility: CLINIC | Age: 74
End: 2019-12-17

## 2019-12-17 VITALS
DIASTOLIC BLOOD PRESSURE: 70 MMHG | SYSTOLIC BLOOD PRESSURE: 152 MMHG | HEIGHT: 72 IN | HEART RATE: 69 BPM | BODY MASS INDEX: 34.13 KG/M2 | WEIGHT: 252 LBS

## 2019-12-17 DIAGNOSIS — I77.810 AORTIC ROOT DILATION (HCC): ICD-10-CM

## 2019-12-17 DIAGNOSIS — G45.9 TIA (TRANSIENT ISCHEMIC ATTACK): ICD-10-CM

## 2019-12-17 DIAGNOSIS — D45 POLYCYTHEMIA VERA (HCC): ICD-10-CM

## 2019-12-17 DIAGNOSIS — I10 ESSENTIAL HYPERTENSION: ICD-10-CM

## 2019-12-17 DIAGNOSIS — E11.9 TYPE 2 DIABETES MELLITUS WITHOUT COMPLICATION, WITHOUT LONG-TERM CURRENT USE OF INSULIN (HCC): ICD-10-CM

## 2019-12-17 DIAGNOSIS — I25.10 CORONARY ARTERY DISEASE INVOLVING NATIVE CORONARY ARTERY OF NATIVE HEART WITHOUT ANGINA PECTORIS: ICD-10-CM

## 2019-12-17 DIAGNOSIS — I47.1 SUPRAVENTRICULAR TACHYCARDIA (HCC): ICD-10-CM

## 2019-12-17 DIAGNOSIS — E78.2 MIXED HYPERLIPIDEMIA: ICD-10-CM

## 2019-12-17 DIAGNOSIS — I47.1 SUPRAVENTRICULAR TACHYCARDIA (HCC): Primary | ICD-10-CM

## 2019-12-17 PROCEDURE — 93306 TTE W/DOPPLER COMPLETE: CPT

## 2019-12-17 PROCEDURE — 25010000002 PERFLUTREN (DEFINITY) 8.476 MG IN SODIUM CHLORIDE 0.9 % 10 ML INJECTION: Performed by: NURSE PRACTITIONER

## 2019-12-17 PROCEDURE — 93306 TTE W/DOPPLER COMPLETE: CPT | Performed by: INTERNAL MEDICINE

## 2019-12-17 PROCEDURE — 99214 OFFICE O/P EST MOD 30 MIN: CPT | Performed by: INTERNAL MEDICINE

## 2019-12-17 PROCEDURE — 93000 ELECTROCARDIOGRAM COMPLETE: CPT | Performed by: INTERNAL MEDICINE

## 2019-12-17 RX ADMIN — PERFLUTREN 2 ML: 6.52 INJECTION, SUSPENSION INTRAVENOUS at 12:25

## 2019-12-17 NOTE — PROGRESS NOTES
Subjective:     Encounter Date:12/17/2019      Patient ID: Erlin Blanco is a 74 y.o. male.    Chief Complaint:  History of Present Illness    This is a 74-year-old with a history of paroxysmal ventricular tachycardia, aortic root dilatation, coronary artery disease, diabetes mellitus type 2, hypertension, hyperlipidemia, prior TIA in 2006, chronic diastolic congestive heart failure, polycythemia, who presents for follow-up.    The patient was previously followed by Dr. Higuera.  Prior to that he was followed by Dr. Thomas.  He establish care with Dr. Higuera in 5/2018.  At that time he reported intermittent palpitations but nothing significant.  The plan was to monitor his symptoms and consider further work-up if he had any issues.  In regards to his aortic root dilatation Dr. Higuera recommended focusing on blood pressure control and the plan was to see him back again in 1 year.      In 10/2018 the patient was referred to the emergency room from his primary care physician's office after he was noted to have a heart rate in the 140s.  In the emergency room it was felt that he was in supraventricular tachycardia so he was given a dose of adenosine which resulted in conversion back to sinus rhythm.  He returned back to the emergency room a couple of days later with heart rates in the 120s but his work-up was unremarkable at that time.  Prior to leaving the emergency room he had a ZIO monitor this placed and his diltiazem was increased to 300 mg a day.  He returned to the office on 11/5/2018 and was seen by MUKUL George at which time he was doing well.  His ZIO monitor ended up showing numerous episodes of supraventricular tachycardia with possible atrial fibrillation, the longest lasting an hour and 2 minutes.  Based on those findings he was started on apixaban.    He return for routine follow-up in 12/2018 at which time he he was incidentally noted to be tachycardic with rates in the 120s.  An EKG was performed and  Dr. Higuera felt that it was supraventricular tachycardia.  Carotid massage was performed and the patient converted to sinus rhythm.  The patient was asymptomatic with the supraventricular tachycardia.  Since it was not felt that he was having atrial fibrillation his apixaban was stopped and he was resumed on clopidogrel.  He then returned to the office last and was seen by MUKUL George in 6/2019 at which time he was doing well.  He had lost weight at that time working at [employer] 4 times a week which required a lot of walking.  He also reported that he had been diagnosed with polycythemia and was getting therapeutic phlebotomy about every 6 weeks.  No changes were made into his management.  He was set up for a repeat echocardiogram to reevaluate his aortic root dilatation.    He presents today for routine follow-up.  He overall reports that he has been feeling well.  He denies any chest pain, palpitations, orthopnea, near-syncope or syncope, lower extremity edema, or shortness of breath.  He reports that his systolic pressures run in the 140s to 150s at home.  He continues to get therapeutic phlebotomy about every 6 weeks to maintain his hematocrit around 50 or below.  Since his last office visit he no longer works at [employer] and admits that he has not been as active.  As a result his weight has crept up since his last office visit.    Review of Systems   Constitution: Negative for malaise/fatigue.   HENT: Negative for hearing loss, hoarse voice, nosebleeds and sore throat.    Eyes: Negative for pain.   Cardiovascular: Negative for chest pain, claudication, cyanosis, dyspnea on exertion, irregular heartbeat, leg swelling, near-syncope, orthopnea, palpitations, paroxysmal nocturnal dyspnea and syncope.   Respiratory: Negative for shortness of breath and snoring.    Endocrine: Negative for cold intolerance, heat intolerance, polydipsia, polyphagia and polyuria.   Skin: Negative for itching and rash.   Musculoskeletal:  Negative for arthritis, falls, joint pain, joint swelling, muscle cramps, muscle weakness and myalgias.   Gastrointestinal: Negative for constipation, diarrhea, dysphagia, heartburn, hematemesis, hematochezia, melena, nausea and vomiting.   Genitourinary: Negative for frequency, hematuria and hesitancy.   Neurological: Negative for excessive daytime sleepiness, dizziness, headaches, light-headedness, numbness and weakness.   Psychiatric/Behavioral: Negative for depression. The patient is not nervous/anxious.          Current Outpatient Medications:   •  bumetanide (BUMEX) 1 MG tablet, Take 1 mg by mouth Every Morning., Disp: , Rfl:   •  CARTIA  MG 24 hr capsule, TAKE 1 CAPSULE DAILY, Disp: 90 capsule, Rfl: 1  •  clopidogrel (PLAVIX) 75 MG tablet, TAKE 1 TABLET DAILY, Disp: 90 tablet, Rfl: 1  •  finasteride (PROSCAR) 5 MG tablet, Take 5 mg by mouth Every Night., Disp: , Rfl:   •  fluticasone (FLONASE) 50 MCG/ACT nasal spray, , Disp: , Rfl:   •  glimepiride (AMARYL) 2 MG tablet, Take 0.5 tablets by mouth Every Morning Before Breakfast., Disp: , Rfl:   •  hydrALAZINE (APRESOLINE) 100 MG tablet, Take 1 tablet by mouth 3 (Three) Times a Day., Disp: 270 tablet, Rfl: 1  •  hydroxyurea (HYDREA) 500 MG capsule, Take 1 mg by mouth Daily., Disp: , Rfl:   •  lisinopril (PRINIVIL,ZESTRIL) 40 MG tablet, Take 1 tablet by mouth Every Morning., Disp: 90 tablet, Rfl: 1  •  Multiple Vitamins-Minerals (MULTIVITAMIN ADULT PO), Take 1 tablet by mouth Daily., Disp: , Rfl:   •  pravastatin (PRAVACHOL) 40 MG tablet, Take 1 tablet by mouth Daily., Disp: 90 tablet, Rfl: 1  •  tamsulosin (FLOMAX) 0.4 MG capsule 24 hr capsule, Take 1 capsule by mouth Every Night., Disp: , Rfl:   •  triamterene-hydrochlorothiazide (DYAZIDE) 37.5-25 MG per capsule, Take 1 capsule by mouth Every Morning., Disp: , Rfl:     Past Medical History:   Diagnosis Date   • Abnormal ECG    • Abnormal stress test    • Acute bronchitis    • Acute sinusitis    • Allergic  rhinitis    • Aortic root dilation (CMS/HCC)    • Arthritis    • Benign enlargement of prostate    • Cellulitis of knee, left 5/4/2017   • CHF (congestive heart failure) (CMS/HCC)    • Chronic diastolic congestive heart failure (CMS/HCC)    • Coronary artery disease    • Diminished pulse     in lower extremities   • Edema    • Elevated hematocrit    • Elevated hemoglobin (CMS/HCC)    • Essential hypertension    • Hearing loss     mala hearing aids   • Heart valve disease    • High risk medication use    • History of back pain    • History of dysuria    • History of echocardiogram    • History of intracranial hemorrhage    • History of scoliosis    • History of stroke    • Hyperlipidemia    • Injury of toe, left, initial encounter    • Irregular heart rate    • Knee pain, left    • Left bundle branch block    • Leg wound, left    • Low back pain    • Medicare annual wellness visit, subsequent    • Murmur    • Muscle cramps    • Need for hepatitis C screening test    • Polycythemia vera (CMS/HCC)    • Prostate hyperplasia without urinary obstruction    • Prostatitis    • Proteinuria    • Pulmonary hypertension (CMS/HCC)    • Scoliosis    • SVT (supraventricular tachycardia) (CMS/HCC)    • Tachycardia    • Thrombocytosis (CMS/HCC)    • TIA (transient ischemic attack)     2006   • Type 2 diabetes mellitus (CMS/HCC)    • Valvular heart disease        Past Surgical History:   Procedure Laterality Date   • CARDIAC CATHETERIZATION     • COLONOSCOPY      did Cologuard approx 6/2019 and negative   • HAND SURGERY     • JOINT REPLACEMENT  2014    Righ Knee   • IN TOTAL KNEE ARTHROPLASTY Left 4/27/2017    Procedure: LT TOTAL KNEE ARTHROPLASTY;  Surgeon: Bertin Perrin MD;  Location: [facility];  Service: Orthopedics       Family History   Problem Relation Age of Onset   • Hypertension Mother    • Other Father         ruptured appendix when pt. was 14.   • Diabetes Paternal Aunt    • Diabetes Paternal Uncle        Social  "History     Tobacco Use   • Smoking status: Former Smoker     Types: Cigarettes     Last attempt to quit: 1973     Years since quittin.9   • Smokeless tobacco: Former User   • Tobacco comment: quite:    Substance Use Topics   • Alcohol use: No   • Drug use: No         ECG 12 Lead  Date/Time: 2019 1:49 PM  Performed by: Marika Arias MD  Authorized by: Marika Arias MD   Comparison: compared with previous ECG   Similar to previous ECG  Rhythm: sinus rhythm  Conduction: left bundle branch block and 1st degree AV block               Objective:     Visit Vitals  /70   Pulse 69   Ht 182.9 cm (72\")   Wt 114 kg (252 lb)   BMI 34.18 kg/m²         Physical Exam   Constitutional: He is oriented to person, place, and time. He appears well-developed and well-nourished.   HENT:   Head: Normocephalic and atraumatic.   Neck: No JVD present. Carotid bruit is not present.   Cardiovascular: Normal rate, regular rhythm, S1 normal and S2 normal. Exam reveals no gallop.   No murmur heard.  Pulses:       Radial pulses are 2+ on the right side, and 2+ on the left side.   No bilateral lower extremity edema   Pulmonary/Chest: Effort normal and breath sounds normal.   Abdominal: Soft. Normal appearance.   Neurological: He is alert and oriented to person, place, and time.   Skin: Skin is warm, dry and intact.   Psychiatric: He has a normal mood and affect.       Lab Review:       Assessment:          Diagnosis Plan   1. Supraventricular tachycardia (CMS/HCC)     2. Aortic root dilation (CMS/HCC)     3. TIA (transient ischemic attack)     4. Mixed hyperlipidemia     5. Essential hypertension     6. Coronary artery disease involving native coronary artery of native heart without angina pectoris     7. Type 2 diabetes mellitus without complication, without long-term current use of insulin (CMS/HCC)     8. Polycythemia vera (CMS/HCC)            Plan:       1.  Paroxysmal supraventricular tachycardia.  No recent episodes " that we are aware of.  Continue management with diltiazem.  2.  Aortic root dilatation.  Is scheduled for repeat echocardiogram today.  Will review the results once they are available and call him with those results.  3.  Hypertension.  Mildly elevated and it appears that this is where his blood pressures have been running at home.  He is already on a maximum dose of 5 antihypertensive medications.  Instead of adding more medications I suggested the patient start working on his lifestyle with dropping some of the extra weight that he is gained recently and going back to increasing his activity.  4.  Diabetes mellitus type 2  5.  Hyperlipidemia.  On pravastatin.  This is followed by MUKUL Crawford.  6.  History of a TIA.  On chronic antiplatelet therapy with clopidogrel.  7.  Polycythemia vera.  8.  Chronic diastolic congestive heart failure.  Appears euvolemic on bumetanide.    We will plan on seeing the patient back again in 6 months.

## 2019-12-18 LAB
AORTIC ARCH: 2.5 CM
AORTIC ROOT ANNULUS: 2.1 CM
ASCENDING AORTA: 4.1 CM
BH CV ECHO MEAS - ACS: 2.2 CM
BH CV ECHO MEAS - AO MAX PG (FULL): 5 MMHG
BH CV ECHO MEAS - AO MAX PG: 12.8 MMHG
BH CV ECHO MEAS - AO MEAN PG (FULL): 3.2 MMHG
BH CV ECHO MEAS - AO MEAN PG: 7.6 MMHG
BH CV ECHO MEAS - AO ROOT AREA (BSA CORRECTED): 1.6
BH CV ECHO MEAS - AO ROOT AREA: 11.2 CM^2
BH CV ECHO MEAS - AO ROOT DIAM: 3.8 CM
BH CV ECHO MEAS - AO V2 MAX: 178.8 CM/SEC
BH CV ECHO MEAS - AO V2 MEAN: 130 CM/SEC
BH CV ECHO MEAS - AO V2 VTI: 41.2 CM
BH CV ECHO MEAS - ASC AORTA: 4.1 CM
BH CV ECHO MEAS - AVA(I,A): 2.6 CM^2
BH CV ECHO MEAS - AVA(I,D): 2.6 CM^2
BH CV ECHO MEAS - AVA(V,A): 2.7 CM^2
BH CV ECHO MEAS - AVA(V,D): 2.7 CM^2
BH CV ECHO MEAS - BSA(HAYCOCK): 2.4 M^2
BH CV ECHO MEAS - BSA: 2.4 M^2
BH CV ECHO MEAS - BZI_BMI: 34.2 KILOGRAMS/M^2
BH CV ECHO MEAS - BZI_METRIC_HEIGHT: 182.9 CM
BH CV ECHO MEAS - BZI_METRIC_WEIGHT: 114.3 KG
BH CV ECHO MEAS - EDV(MOD-SP2): 132 ML
BH CV ECHO MEAS - EDV(MOD-SP4): 180 ML
BH CV ECHO MEAS - EDV(TEICH): 143.9 ML
BH CV ECHO MEAS - EF(CUBED): 68.2 %
BH CV ECHO MEAS - EF(MOD-BP): 59 %
BH CV ECHO MEAS - EF(MOD-SP2): 53.8 %
BH CV ECHO MEAS - EF(MOD-SP4): 63.3 %
BH CV ECHO MEAS - EF(TEICH): 59.3 %
BH CV ECHO MEAS - ESV(MOD-SP2): 61 ML
BH CV ECHO MEAS - ESV(MOD-SP4): 66 ML
BH CV ECHO MEAS - ESV(TEICH): 58.6 ML
BH CV ECHO MEAS - FS: 31.8 %
BH CV ECHO MEAS - IVS/LVPW: 1
BH CV ECHO MEAS - IVSD: 1.3 CM
BH CV ECHO MEAS - LAT PEAK E' VEL: 6 CM/SEC
BH CV ECHO MEAS - LV DIASTOLIC VOL/BSA (35-75): 76.6 ML/M^2
BH CV ECHO MEAS - LV MASS(C)D: 291.6 GRAMS
BH CV ECHO MEAS - LV MASS(C)DI: 124.1 GRAMS/M^2
BH CV ECHO MEAS - LV MAX PG: 7.8 MMHG
BH CV ECHO MEAS - LV MEAN PG: 4.4 MMHG
BH CV ECHO MEAS - LV SYSTOLIC VOL/BSA (12-30): 28.1 ML/M^2
BH CV ECHO MEAS - LV V1 MAX: 139.4 CM/SEC
BH CV ECHO MEAS - LV V1 MEAN: 97.8 CM/SEC
BH CV ECHO MEAS - LV V1 VTI: 30.7 CM
BH CV ECHO MEAS - LVIDD: 5.4 CM
BH CV ECHO MEAS - LVIDS: 3.7 CM
BH CV ECHO MEAS - LVLD AP2: 8.6 CM
BH CV ECHO MEAS - LVLD AP4: 8.3 CM
BH CV ECHO MEAS - LVLS AP2: 8.4 CM
BH CV ECHO MEAS - LVLS AP4: 7.2 CM
BH CV ECHO MEAS - LVOT AREA (M): 3.5 CM^2
BH CV ECHO MEAS - LVOT AREA: 3.5 CM^2
BH CV ECHO MEAS - LVOT DIAM: 2.1 CM
BH CV ECHO MEAS - LVPWD: 1.3 CM
BH CV ECHO MEAS - MED PEAK E' VEL: 5 CM/SEC
BH CV ECHO MEAS - MV A DUR: 0.12 SEC
BH CV ECHO MEAS - MV A MAX VEL: 109.6 CM/SEC
BH CV ECHO MEAS - MV DEC SLOPE: 413.4 CM/SEC^2
BH CV ECHO MEAS - MV DEC TIME: 0.24 SEC
BH CV ECHO MEAS - MV E MAX VEL: 89.3 CM/SEC
BH CV ECHO MEAS - MV E/A: 0.81
BH CV ECHO MEAS - MV MAX PG: 4.5 MMHG
BH CV ECHO MEAS - MV MEAN PG: 2.5 MMHG
BH CV ECHO MEAS - MV P1/2T MAX VEL: 100.4 CM/SEC
BH CV ECHO MEAS - MV P1/2T: 71.2 MSEC
BH CV ECHO MEAS - MV V2 MAX: 105.9 CM/SEC
BH CV ECHO MEAS - MV V2 MEAN: 75.6 CM/SEC
BH CV ECHO MEAS - MV V2 VTI: 34.6 CM
BH CV ECHO MEAS - MVA P1/2T LCG: 2.2 CM^2
BH CV ECHO MEAS - MVA(P1/2T): 3.1 CM^2
BH CV ECHO MEAS - MVA(VTI): 3.1 CM^2
BH CV ECHO MEAS - PA ACC TIME: 0.09 SEC
BH CV ECHO MEAS - PA MAX PG (FULL): 1.7 MMHG
BH CV ECHO MEAS - PA MAX PG: 6.2 MMHG
BH CV ECHO MEAS - PA PR(ACCEL): 37.8 MMHG
BH CV ECHO MEAS - PA V2 MAX: 124.9 CM/SEC
BH CV ECHO MEAS - PULM A REVS DUR: 0.11 SEC
BH CV ECHO MEAS - PULM A REVS VEL: 29.9 CM/SEC
BH CV ECHO MEAS - PULM DIAS VEL: 43.4 CM/SEC
BH CV ECHO MEAS - PULM S/D: 1.3
BH CV ECHO MEAS - PULM SYS VEL: 57.7 CM/SEC
BH CV ECHO MEAS - PVA(V,A): 2.3 CM^2
BH CV ECHO MEAS - PVA(V,D): 2.3 CM^2
BH CV ECHO MEAS - QP/QS: 0.53
BH CV ECHO MEAS - RAP SYSTOLE: 3 MMHG
BH CV ECHO MEAS - RV MAX PG: 4.5 MMHG
BH CV ECHO MEAS - RV MEAN PG: 2.2 MMHG
BH CV ECHO MEAS - RV V1 MAX: 106.2 CM/SEC
BH CV ECHO MEAS - RV V1 MEAN: 68.6 CM/SEC
BH CV ECHO MEAS - RV V1 VTI: 21 CM
BH CV ECHO MEAS - RVOT AREA: 2.7 CM^2
BH CV ECHO MEAS - RVOT DIAM: 1.9 CM
BH CV ECHO MEAS - RVSP: 22 MMHG
BH CV ECHO MEAS - SI(AO): 196.6 ML/M^2
BH CV ECHO MEAS - SI(CUBED): 46.8 ML/M^2
BH CV ECHO MEAS - SI(LVOT): 45.8 ML/M^2
BH CV ECHO MEAS - SI(MOD-SP2): 30.2 ML/M^2
BH CV ECHO MEAS - SI(MOD-SP4): 48.5 ML/M^2
BH CV ECHO MEAS - SI(TEICH): 36.3 ML/M^2
BH CV ECHO MEAS - SUP REN AO DIAM: 2.1 CM
BH CV ECHO MEAS - SV(AO): 462.1 ML
BH CV ECHO MEAS - SV(CUBED): 110 ML
BH CV ECHO MEAS - SV(LVOT): 107.6 ML
BH CV ECHO MEAS - SV(MOD-SP2): 71 ML
BH CV ECHO MEAS - SV(MOD-SP4): 114 ML
BH CV ECHO MEAS - SV(RVOT): 57.5 ML
BH CV ECHO MEAS - SV(TEICH): 85.3 ML
BH CV ECHO MEAS - TAPSE (>1.6): 3 CM2
BH CV ECHO MEAS - TR MAX VEL: 217.2 CM/SEC
BH CV ECHO MEASUREMENTS AVERAGE E/E' RATIO: 16.24
BH CV XLRA - RV BASE: 3.3 CM
BH CV XLRA - RV LENGTH: 8.7 CM
BH CV XLRA - RV MID: 3.3 CM
BH CV XLRA - TDI S': 11 CM/SEC
LEFT ATRIUM VOLUME INDEX: 34 ML/M2
LV EF 2D ECHO EST: 59 %
MAXIMAL PREDICTED HEART RATE: 148 BPM
SINUS: 3.5 CM
STJ: 3.5 CM
STRESS TARGET HR: 126 BPM

## 2019-12-19 ENCOUNTER — TELEPHONE (OUTPATIENT)
Dept: CARDIOLOGY | Facility: CLINIC | Age: 74
End: 2019-12-19

## 2019-12-19 NOTE — TELEPHONE ENCOUNTER
After reviewing with DR. Arias. I called and spoke with the patient regarding his echo results.  There was no evidence of aortic dilation.  At this time we will hold off on CT scan and plan for repeat echo in 1 year which will be addressed when he follows up with Dr. Arias in 6 months.  I reviewed his echo results and reiterated dietary and lifestyle modifications to try to help with better blood pressure control as he is already on high doses of 5 medications.  He intends to join [employer] with his wife.  I have encouraged him in the next couple months despite lifestyle changes to please call if his blood pressure still running elevated.  He demonstrated understanding and will keep his June 2020 appointment with Dr. Arias.

## 2020-01-10 ENCOUNTER — TELEPHONE (OUTPATIENT)
Dept: FAMILY MEDICINE CLINIC | Facility: CLINIC | Age: 75
End: 2020-01-10

## 2020-01-10 NOTE — TELEPHONE ENCOUNTER
Left message to call back regarding current symptoms and if any fever, as well as when symptoms started.

## 2020-01-13 ENCOUNTER — OFFICE VISIT (OUTPATIENT)
Dept: FAMILY MEDICINE CLINIC | Facility: CLINIC | Age: 75
End: 2020-01-13

## 2020-01-13 VITALS
WEIGHT: 253.2 LBS | SYSTOLIC BLOOD PRESSURE: 142 MMHG | BODY MASS INDEX: 34.29 KG/M2 | HEIGHT: 72 IN | DIASTOLIC BLOOD PRESSURE: 60 MMHG | TEMPERATURE: 98.2 F | OXYGEN SATURATION: 98 % | HEART RATE: 67 BPM

## 2020-01-13 DIAGNOSIS — J01.90 ACUTE NON-RECURRENT SINUSITIS, UNSPECIFIED LOCATION: ICD-10-CM

## 2020-01-13 DIAGNOSIS — E11.9 TYPE 2 DIABETES MELLITUS WITHOUT COMPLICATION, WITHOUT LONG-TERM CURRENT USE OF INSULIN (HCC): ICD-10-CM

## 2020-01-13 DIAGNOSIS — J20.9 ACUTE BRONCHITIS, UNSPECIFIED ORGANISM: Primary | ICD-10-CM

## 2020-01-13 PROCEDURE — 99213 OFFICE O/P EST LOW 20 MIN: CPT | Performed by: NURSE PRACTITIONER

## 2020-01-13 RX ORDER — AMOXICILLIN AND CLAVULANATE POTASSIUM 875; 125 MG/1; MG/1
1 TABLET, FILM COATED ORAL 2 TIMES DAILY
Qty: 14 TABLET | Refills: 0 | Status: SHIPPED | OUTPATIENT
Start: 2020-01-13 | End: 2020-01-20

## 2020-01-13 NOTE — PATIENT INSTRUCTIONS
Continue increased intake of clear liquids and rest.  Continue Flonase daily.  May try Delsym for cough.  Follow up if symptoms persist or worsen.

## 2020-01-13 NOTE — PROGRESS NOTES
Thais Blanco is a 74 y.o. male.     Chief Complaint   Patient presents with   • URI     X 5 days, getting better        History of Present Illness   Patient presents with c/o URI; symptoms started on 1/8/20; no fever; has had productive cough--green sputum few days ago, now clear; no ear pain; no sore throat; started with runny nose; no nausea, vomiting, or diarrhea; has tried Coriciden and Flonase and help some; takes daily allergy medication; feeling a little better, cough has improved; still has some congestion in lungs.    F/U DM2: blood sugar was getting into 70s and feeling weak, so decreased to Glimepiride 1/2 tablet twice daily; blood sugar has been running low 100s since; has been trying to do better with healthy diet and exercise.    The following portions of the patient's history were reviewed and updated as appropriate: allergies, current medications, past family history, past medical history, past social history, past surgical history and problem list.    Past Medical History:   Diagnosis Date   • Abnormal ECG    • Abnormal stress test    • Acute bronchitis    • Acute sinusitis    • Allergic rhinitis    • Aortic root dilation (CMS/HCC)    • Arthritis    • Benign enlargement of prostate    • Cellulitis of knee, left 5/4/2017   • CHF (congestive heart failure) (CMS/HCC)    • Chronic diastolic congestive heart failure (CMS/HCC)    • Coronary artery disease    • Diminished pulse     in lower extremities   • Edema    • Elevated hematocrit    • Elevated hemoglobin (CMS/HCC)    • Essential hypertension    • Hearing loss     mala hearing aids   • Heart valve disease    • High risk medication use    • History of back pain    • History of dysuria    • History of echocardiogram    • History of intracranial hemorrhage    • History of scoliosis    • History of stroke    • Hyperlipidemia    • Injury of toe, left, initial encounter    • Irregular heart rate    • Knee pain, left    • Left bundle branch  block    • Leg wound, left    • Low back pain    • Medicare annual wellness visit, subsequent    • Murmur    • Muscle cramps    • Need for hepatitis C screening test    • Polycythemia vera (CMS/HCC)    • Prostate hyperplasia without urinary obstruction    • Prostatitis    • Proteinuria    • Pulmonary hypertension (CMS/HCC)    • Scoliosis    • SVT (supraventricular tachycardia) (CMS/HCC)    • Tachycardia    • Thrombocytosis (CMS/HCC)    • TIA (transient ischemic attack)        • Type 2 diabetes mellitus (CMS/HCC)    • Valvular heart disease        Past Surgical History:   Procedure Laterality Date   • CARDIAC CATHETERIZATION     • COLONOSCOPY      did Cologuard approx 2019 and negative   • HAND SURGERY     • JOINT REPLACEMENT      Righ Knee   • ME TOTAL KNEE ARTHROPLASTY Left 2017    Procedure: LT TOTAL KNEE ARTHROPLASTY;  Surgeon: Bertin Perrin MD;  Location: [facility];  Service: Orthopedics       Family History   Problem Relation Age of Onset   • Hypertension Mother    • Other Father         ruptured appendix when pt. was 14.   • Diabetes Paternal Aunt    • Diabetes Paternal Uncle    • No Known Problems Other        Social History     Socioeconomic History   • Marital status:      Spouse name: Not on file   • Number of children: Not on file   • Years of education: Not on file   • Highest education level: Not on file   Tobacco Use   • Smoking status: Former Smoker     Types: Cigarettes     Last attempt to quit:      Years since quittin.0   • Smokeless tobacco: Former User   • Tobacco comment: quite: 1973   Substance and Sexual Activity   • Alcohol use: No   • Drug use: No   • Sexual activity: Defer       Review of Systems   Constitutional: Positive for fatigue. Negative for appetite change, chills, fever, unexpected weight gain and unexpected weight loss.   HENT: Positive for postnasal drip. Negative for sinus pressure and trouble swallowing.    Eyes: Negative for blurred  "vision and pain.   Respiratory: Positive for cough. Negative for chest tightness and shortness of breath (some with coughing spells).    Cardiovascular: Negative for chest pain, palpitations and leg swelling.   Gastrointestinal: Negative for abdominal pain.   Musculoskeletal: Negative for back pain.   Skin: Negative for rash.   Neurological: Negative for dizziness and headache.       Objective   Vitals:    01/13/20 1307   BP: 142/60   BP Location: Left arm   Patient Position: Sitting   Cuff Size: Adult   Pulse: 67   Temp: 98.2 °F (36.8 °C)   SpO2: 98%   Weight: 115 kg (253 lb 3.2 oz)   Height: 182.9 cm (72\")     Body mass index is 34.34 kg/m².    Physical Exam   Constitutional: He is oriented to person, place, and time. He appears well-developed and well-nourished. No distress.   HENT:   Head: Normocephalic.   Right Ear: External ear normal. Right ear erythematous TM: mild erythema. A middle ear effusion is present.   Left Ear: External ear normal. Left ear erythematous TM: mild erythema. A middle ear effusion is present.   Nose: Nose normal. Right sinus exhibits no maxillary sinus tenderness and no frontal sinus tenderness. Left sinus exhibits no maxillary sinus tenderness and no frontal sinus tenderness.   Mouth/Throat: Mucous membranes are normal. Posterior oropharyngeal erythema present. Oropharyngeal exudate: drainage in posterior pharynx.   Eyes: Conjunctivae are normal.   Neck: Normal range of motion. Neck supple. Carotid bruit is not present.   Cardiovascular: Normal rate, regular rhythm, S1 normal, S2 normal and intact distal pulses.   Murmur heard.   Systolic murmur is present with a grade of 2/6.  Pulmonary/Chest: Effort normal. No respiratory distress. He has no decreased breath sounds. He has no wheezes. He has rhonchi in the right lower field and the left lower field.   Rhonchi throughout improves with cough.   Abdominal: Soft. Bowel sounds are normal. There is no hepatosplenomegaly. There is no " tenderness.   Musculoskeletal: He exhibits no edema.   Lymphadenopathy:     He has cervical adenopathy (tenderness of bilateral anterior cervical lymph nodes, mobile, approx 0.5 cm).   Neurological: He is alert and oriented to person, place, and time. Abnormal gait: limping on left.   Skin: Skin is warm and dry. No rash noted.   Psychiatric: He has a normal mood and affect. His behavior is normal. Judgment and thought content normal. Cognition and memory are normal.   Nursing note and vitals reviewed.      Assessment/Plan .  Problem List Items Addressed This Visit     Type 2 diabetes mellitus, without long-term current use of insulin (CMS/[facility])    Overview     With mild non-proliferative diabetic retinopathy, without macular edema         Current Assessment & Plan     Diabetes is improving with lifestyle modifications.   Continue current treatment regimen.  Regular aerobic exercise.  Diabetes will be reassessed as scheduled in 3 months.         Acute non-recurrent sinusitis    Current Assessment & Plan     Continue Flonase daily.         Relevant Medications    amoxicillin-clavulanate (AUGMENTIN) 875-125 MG per tablet    Acute bronchitis - Primary    Current Assessment & Plan     Continue increased intake of clear liquids and rest.  May try Delsym for cough.         Relevant Medications    Chlorphen-Pseudoephed-APAP (CORICIDIN D PO)    amoxicillin-clavulanate (AUGMENTIN) 875-125 MG per tablet          Return if symptoms worsen or fail to improve.

## 2020-01-13 NOTE — ASSESSMENT & PLAN NOTE
Diabetes is improving with lifestyle modifications.   Continue current treatment regimen.  Regular aerobic exercise.  Diabetes will be reassessed as scheduled in 3 months.

## 2020-01-25 ENCOUNTER — APPOINTMENT (OUTPATIENT)
Dept: CT IMAGING | Facility: HOSPITAL | Age: 75
End: 2020-01-25

## 2020-01-25 ENCOUNTER — HOSPITAL ENCOUNTER (EMERGENCY)
Facility: HOSPITAL | Age: 75
Discharge: HOME | End: 2020-01-25
Attending: EMERGENCY MEDICINE | Admitting: EMERGENCY MEDICINE

## 2020-01-25 VITALS
HEART RATE: 68 BPM | TEMPERATURE: 97.9 F | SYSTOLIC BLOOD PRESSURE: 172 MMHG | OXYGEN SATURATION: 97 % | HEIGHT: 73 IN | RESPIRATION RATE: 17 BRPM | WEIGHT: 242 LBS | BODY MASS INDEX: 32.07 KG/M2 | DIASTOLIC BLOOD PRESSURE: 84 MMHG

## 2020-01-25 DIAGNOSIS — S40.812A ABRASION OF LEFT UPPER EXTREMITY, INITIAL ENCOUNTER: ICD-10-CM

## 2020-01-25 DIAGNOSIS — S00.81XA ABRASION OF FACE, INITIAL ENCOUNTER: ICD-10-CM

## 2020-01-25 DIAGNOSIS — I10 HYPERTENSION, UNSPECIFIED TYPE: ICD-10-CM

## 2020-01-25 DIAGNOSIS — S00.83XA CONTUSION OF FACE, INITIAL ENCOUNTER: ICD-10-CM

## 2020-01-25 DIAGNOSIS — S09.90XA MINOR HEAD INJURY WITHOUT LOSS OF CONSCIOUSNESS, INITIAL ENCOUNTER: ICD-10-CM
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
## 2020-01-25 LAB
BILIRUB UR QL STRIP: NEGATIVE
CLARITY UR: CLEAR
COLOR UR: YELLOW
GLUCOSE UR STRIP-MCNC: NEGATIVE MG/DL
HGB UR QL STRIP.AUTO: NEGATIVE
KETONES UR QL STRIP: NEGATIVE
LEUKOCYTE ESTERASE UR QL STRIP.AUTO: NEGATIVE
NITRITE UR QL STRIP: NEGATIVE
PH UR STRIP.AUTO: 7 [PH] (ref 5–8)
PROT UR QL STRIP: NEGATIVE
SP GR UR STRIP: 1.01 (ref 1–1.03)
UROBILINOGEN UR QL STRIP: NORMAL

## 2020-01-25 PROCEDURE — 25010000002 TDAP 5-2.5-18.5 LF-MCG/0.5 SUSPENSION: Performed by: EMERGENCY MEDICINE

## 2020-01-25 PROCEDURE — 81003 URINALYSIS AUTO W/O SCOPE: CPT | Performed by: EMERGENCY MEDICINE

## 2020-01-25 PROCEDURE — 90471 IMMUNIZATION ADMIN: CPT | Performed by: EMERGENCY MEDICINE

## 2020-01-25 PROCEDURE — 51798 US URINE CAPACITY MEASURE: CPT

## 2020-01-25 PROCEDURE — 70450 CT HEAD/BRAIN W/O DYE: CPT

## 2020-01-25 PROCEDURE — 90715 TDAP VACCINE 7 YRS/> IM: CPT | Performed by: EMERGENCY MEDICINE

## 2020-01-25 PROCEDURE — 99284 EMERGENCY DEPT VISIT MOD MDM: CPT

## 2020-01-25 RX ORDER — HYDRALAZINE HYDROCHLORIDE 50 MG/1
100 TABLET, FILM COATED ORAL ONCE
Status: COMPLETED | OUTPATIENT
Start: 2020-01-25 | End: 2020-01-25

## 2020-01-25 RX ADMIN — TETANUS TOXOID, REDUCED DIPHTHERIA TOXOID AND ACELLULAR PERTUSSIS VACCINE, ADSORBED 0.5 ML: 5; 2.5; 8; 8; 2.5 SUSPENSION INTRAMUSCULAR at 20:15

## 2020-01-25 RX ADMIN — HYDRALAZINE HYDROCHLORIDE 100 MG: 50 TABLET, FILM COATED ORAL at 21:02

## 2020-01-26 NOTE — ED TRIAGE NOTES
Pt here after fall with  Laceration to head and nose. Denies LOC, denies Nausea,   vomiting, or dizziness at this time.   Pt awake and alert able to answer all questions as asked. Takes plavix 75mg daily

## 2020-01-26 NOTE — ED NOTES
Pt ambulatory c steady gait to wheelchair, AAOx4, ABC's intact, NAD noted at this time. Pt discharged c belongings and educational packet.        Tomas Orozco, RN  01/25/20 4372

## 2020-01-26 NOTE — ED PROVIDER NOTES
EMERGENCY DEPARTMENT ENCOUNTER    CHIEF COMPLAINT  Chief Complaint: facial abrasion  History given by: patient  History limited by: none  Room Number: 27/27  PMD: Zamzam John, MUKUL      HPI:  Pt is a 74 y.o. male who presents complaining of an abrasion to the forehead that occurred s/p mechanical fall this evening. He states he was walking down a set of stairs, accidentally missed a step, and fell face-down onto concrete. He denies facial pain, dental pain, and any dizziness prior or following the incident. Per pt family who witnessed the event, pt was wearing glasses which had cut his nose during the fall. Pt affirms Hx of HTN.            PAST MEDICAL HISTORY  Active Ambulatory Problems     Diagnosis Date Noted   • Osteoarthritis, knee 04/27/2017   • Type 2 diabetes mellitus, without long-term current use of insulin (CMS/[facility]) 04/28/2017   • DORI (acute kidney injury) (CMS/[facility]) 04/28/2017   • Hypertension 04/28/2017   • Coronary artery disease 04/28/2017   • Supraventricular tachycardia (CMS/[facility]) 11/04/2018   • Aortic root dilation (CMS/[facility]) 11/04/2018   • TIA (transient ischemic attack) 06/18/2019   • Hyperlipidemia    • Prostate hyperplasia without urinary obstruction    • Class 1 obesity due to excess calories with serious comorbidity and body mass index (BMI) of 34.0 to 34.9 in adult 12/10/2019   • Polycythemia vera (CMS/[facility])    • Acute non-recurrent sinusitis 01/13/2020   • Acute bronchitis 01/13/2020     Resolved Ambulatory Problems     Diagnosis Date Noted   • Cellulitis of knee, left 05/04/2017     Past Medical History:   Diagnosis Date   • Abnormal ECG    • Abnormal stress test    • Acute sinusitis    • Allergic rhinitis    • Arthritis    • Benign enlargement of prostate    • CHF (congestive heart failure) (CMS/[facility])    • Chronic diastolic congestive heart failure (CMS/[facility])    • Diminished pulse    • Edema    • Elevated hematocrit    • Elevated hemoglobin (CMS/[facility])    • Essential hypertension    • Hearing  loss    • Heart valve disease    • High risk medication use    • History of back pain    • History of dysuria    • History of echocardiogram    • History of intracranial hemorrhage    • History of scoliosis    • History of stroke    • Injury of toe, left, initial encounter    • Irregular heart rate    • Knee pain, left    • Left bundle branch block    • Leg wound, left    • Low back pain    • Medicare annual wellness visit, subsequent    • Murmur    • Muscle cramps    • Need for hepatitis C screening test    • Prostatitis    • Proteinuria    • Pulmonary hypertension (CMS/HCC)    • Scoliosis    • SVT (supraventricular tachycardia) (CMS/HCC)    • Tachycardia    • Thrombocytosis (CMS/HCC)    • Type 2 diabetes mellitus (CMS/HCC)    • Valvular heart disease        PAST SURGICAL HISTORY  Past Surgical History:   Procedure Laterality Date   • CARDIAC CATHETERIZATION     • COLONOSCOPY      did Cologuard approx 2019 and negative   • HAND SURGERY     • JOINT REPLACEMENT      Righ Knee   • [place] TOTAL KNEE ARTHROPLASTY Left 2017    Procedure: LT TOTAL KNEE ARTHROPLASTY;  Surgeon: Bertin Perrin MD;  Location: [facility];  Service: Orthopedics       FAMILY HISTORY  Family History   Problem Relation Age of Onset   • Hypertension Mother    • Other Father         ruptured appendix,  when pt. was 14 years old.   • Diabetes Paternal Aunt    • Diabetes Paternal Uncle    • No Known Problems Other        SOCIAL HISTORY  Social History     Socioeconomic History   • Marital status:      Spouse name: Not on file   • Number of children: Not on file   • Years of education: Not on file   • Highest education level: Not on file   Tobacco Use   • Smoking status: Former Smoker     Types: Cigarettes     Last attempt to quit:      Years since quittin.1   • Smokeless tobacco: Former User   • Tobacco comment: quite:    Substance and Sexual Activity   • Alcohol use: No   • Drug use: No   • Sexual activity:  Defer       ALLERGIES  Patient has no known allergies.    REVIEW OF SYSTEMS  Review of Systems   Constitutional: Negative for activity change, appetite change and fever.   HENT: Negative for congestion and sore throat.    Eyes: Negative.    Respiratory: Negative for cough and shortness of breath.    Cardiovascular: Negative for chest pain and leg swelling.   Gastrointestinal: Negative for abdominal pain, diarrhea and vomiting.   Endocrine: Negative.    Genitourinary: Negative for decreased urine volume and dysuria.   Musculoskeletal: Negative for neck pain.   Skin: Positive for wound ( forehead abrasion ). Negative for rash.   Allergic/Immunologic: Negative.    Neurological: Negative for weakness, numbness and headaches.   Hematological: Negative.    Psychiatric/Behavioral: Negative.    All other systems reviewed and are negative.      PHYSICAL EXAM  ED Triage Vitals [01/25/20 1916]   Temp Heart Rate Resp BP SpO2   97.9 °F (36.6 °C) (!) 146 20 -- 98 %      Temp src Heart Rate Source Patient Position BP Location FiO2 (%)   Oral Monitor -- -- --       Physical Exam   Constitutional: He is oriented to person, place, and time and well-developed, well-nourished, and in no distress. No distress.   HENT:   Head: Normocephalic.   Contusion/abrasion to L forehead; there is a deep abrasion to the nose with a good siezd tissue defect, however, there is nothing able to be sutured; no septal hematoma; abrasion to upper lip; there are no intraoral injuries; no malocclusion; facial bones and mandible are nontender   Eyes: Pupils are equal, round, and reactive to light.   Neck: Normal range of motion. Neck supple. No JVD present. No tracheal deviation present. No thyromegaly present.   Cardiovascular: Normal rate, regular rhythm and normal heart sounds. Exam reveals no gallop and no friction rub.   No murmur heard.  Pulmonary/Chest: Effort normal and breath sounds normal. No stridor. No respiratory distress. He has no wheezes. He  has no rales.   Abdominal: Soft. Bowel sounds are normal. He exhibits no distension. There is no tenderness. There is no rebound and no guarding.   Musculoskeletal: Normal range of motion. He exhibits no edema, tenderness or deformity.   Abrasions to L forearm   Neurological: He is alert and oriented to person, place, and time. No cranial nerve deficit. GCS score is 15.   Skin: Skin is warm and dry. No rash noted. He is not diaphoretic. No erythema.   Psychiatric: Mood, affect and judgment normal.   Nursing note and vitals reviewed.      LAB RESULTS  Lab Results (last 24 hours)     ** No results found for the last 24 hours. **          I ordered the above labs and reviewed the results    RADIOLOGY  CT Head Without Contrast   Final Result   1. No acute intracranial abnormality.                       This report was finalized on 1/26/2020 1:29 AM by Randy Garza M.D.               I ordered the above noted radiological studies. Interpreted by radiologist. Discussed with radiologist. Reviewed by me in PACS.       PROCEDURES  Procedures      PROGRESS AND CONSULTS  ED Course as of Jan 25 2107   Sat Jan 25, 2020 2048 I have ordered his nighttime dose of hydralazine for his hypertension.  No suturable injuries identified   [WC]      ED Course User Index  [WC] Walter Mnea MD   1933: Ordered CT head for further evaluation. Ordered Tetanus shot and instructions for RN to clean and dress wounds.    2106: Pt rechecked and resting comfortably, in NAD and with wounds dressed. Informed pt of negative acute CT head and discussed plan for discharge.  I discussed appropriate wound care. Pt and pt family agreeable to plan or discharge, all questions answered.     MEDICAL DECISION MAKING  Results were reviewed/discussed with the patient and they were also made aware of online access. Pt also made aware that some labs, such as cultures, will not be resulted during ER visit and follow up with PMD is necessary.     [facility]  Number  of Diagnoses or Management Options     Amount and/or Complexity of Data Reviewed  Clinical lab tests: reviewed and ordered (UA nl)  Tests in the radiology section of CPT®: reviewed and ordered (Negative acute)  Decide to obtain previous medical records or to obtain history from someone other than the patient: yes           DIAGNOSIS  Final diagnoses:   Fall down steps, initial encounter   Minor head injury without loss of consciousness, initial encounter   Contusion of face, initial encounter   Abrasion of face, initial encounter   Abrasion of left upper extremity, initial encounter   Hypertension, unspecified type       DISPOSITION  DISCHARGE    Patient discharged in stable condition.    Reviewed implications of results, diagnosis, meds, responsibility to follow up, warning signs and symptoms of possible worsening, potential complications and reasons to return to ER.    Patient/Family voiced understanding of above instructions.    Discussed plan for discharge, as there is no emergent indication for admission. Patient referred to primary care provider for BP management due to today's BP. Pt/family is agreeable and understands need for follow up and repeat testing.  Pt is aware that discharge does not mean that nothing is wrong but it indicates no emergency is present that requires admission and they must continue care with follow-up as given below or physician of their choice.     FOLLOW-UP  Zamzam John, APRN  [address] [address] [address] [phone]    Schedule an appointment as soon as possible for a visit in 1 week  For wound re-check    [facility] Emergency Department  [address] [facility] [address] [phone]    As needed, If symptoms worsen         Medication List      No changes were made to your prescriptions during this visit.           Latest Documented Vital Signs:  As of 9:43 AM  BP- 172/84 HR- 68 Temp- 97.9 °F (36.6 °C) (Oral) O2 sat-  97%    --  Documentation assistance provided by shayne Nelson for Dr. Mena.  Information recorded by the scribe was done at my direction and has been verified and validated by me.     Gwen Nelson  01/25/20 2112       Walter Mena MD  01/26/20 030       Walter Mena MD  01/29/20 [id number]

## 2020-01-29 ENCOUNTER — OFFICE VISIT (OUTPATIENT)
Dept: FAMILY MEDICINE CLINIC | Facility: CLINIC | Age: 75
End: 2020-01-29

## 2020-01-29 VITALS
TEMPERATURE: 97.6 F | HEART RATE: 86 BPM | SYSTOLIC BLOOD PRESSURE: 150 MMHG | OXYGEN SATURATION: 98 % | WEIGHT: 255.2 LBS | DIASTOLIC BLOOD PRESSURE: 70 MMHG | BODY MASS INDEX: 33.82 KG/M2 | HEIGHT: 73 IN

## 2020-01-29 DIAGNOSIS — S00.81XD ABRASION OF FACE, SUBSEQUENT ENCOUNTER: ICD-10-CM

## 2020-01-29 DIAGNOSIS — I10 ESSENTIAL HYPERTENSION: Primary | ICD-10-CM

## 2020-01-29 DIAGNOSIS — M25.561 RIGHT KNEE PAIN, UNSPECIFIED CHRONICITY: ICD-10-CM

## 2020-01-29 DIAGNOSIS — S09.90XD: ICD-10-CM

## 2020-01-29 DIAGNOSIS — S00.83XD CONTUSION OF FACE, SUBSEQUENT ENCOUNTER: ICD-10-CM
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
## 2020-01-29 PROBLEM — S00.81XA ABRASION OF FACE: Status: ACTIVE | Noted: 2020-01-29

## 2020-01-29 PROBLEM — S09.90XA MINOR HEAD INJURY WITHOUT LOSS OF CONSCIOUSNESS: Status: ACTIVE | Noted: 2020-01-29
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
## 2020-01-29 PROBLEM — S00.83XA CONTUSION OF FACE: Status: ACTIVE | Noted: 2020-01-29

## 2020-01-29 PROCEDURE — 99214 OFFICE O/P EST MOD 30 MIN: CPT | Performed by: NURSE PRACTITIONER

## 2020-01-29 NOTE — PROGRESS NOTES
Thais Blanco is a 74 y.o. male.     Chief Complaint   Patient presents with   • hospital follow up     01/25/2020    • Facial Injury     brusing   • Hypertension     went up in hospital        History of Present Illness   Patient presents for ER follow up; pt went to [facility] ER on 1/25/20 after fall down steps; had missed step and landed face down on concrete; glasses cut nose; had CT scan: no brain bleed or concussion; no dizziness; no headaches; swelling in face has improved; BP was increased in ER and gave med and helped; not a lot of discomfort; took Tylenol twice daily for couple of days and helped; did not need Tylenol yesterday; no ear pain; no trouble chewing; no blurry vision or pain in eyes; has facial bruising and abrasion; no drainage from facial abrasion; no sutures; has been doing equal parts peroxide and water twice daily, started triple antibiotic ointment yesterday; had been keeping covered, but left open to air yesterday; takes Plavix daily.    F/U HTN: takes Hydralazine TID, Lisinopril and Triamterene/HCTZ daily; needs new battery for BP machine so has not been monitoring recently; no swelling in legs other than knees; injured knee with fall, but improving; knees have felt stiff; no decreased ROM; due for follow with ortho, plans to schedule.    The following portions of the patient's history were reviewed and updated as appropriate: allergies, current medications, past family history, past medical history, past social history, past surgical history and problem list.    Past Medical History:   Diagnosis Date   • Abnormal ECG    • Abnormal stress test    • Acute bronchitis    • Acute sinusitis    • Allergic rhinitis    • Aortic root dilation (CMS/HCC)    • Arthritis    • Benign enlargement of prostate    • Cellulitis of knee, left 5/4/2017   • CHF (congestive heart failure) (CMS/HCC)    • Chronic diastolic congestive heart failure (CMS/HCC)    • Coronary artery disease    •  Diminished pulse     in lower extremities   • Edema    • Elevated hematocrit    • Elevated hemoglobin (CMS/HCC)    • Essential hypertension    • Hearing loss     mala hearing aids   • Heart valve disease    • High risk medication use    • History of back pain    • History of dysuria    • History of echocardiogram    • History of intracranial hemorrhage    • History of scoliosis    • History of stroke    • Hyperlipidemia    • Injury of toe, left, initial encounter    • Irregular heart rate    • Knee pain, left    • Left bundle branch block    • Leg wound, left    • Low back pain    • Medicare annual wellness visit, subsequent    • Murmur    • Muscle cramps    • Need for hepatitis C screening test    • Polycythemia vera (CMS/HCC)    • Prostate hyperplasia without urinary obstruction    • Prostatitis    • Proteinuria    • Pulmonary hypertension (CMS/HCC)    • Scoliosis    • SVT (supraventricular tachycardia) (CMS/HCC)    • Tachycardia    • Thrombocytosis (CMS/HCC)    • TIA (transient ischemic attack)        • Type 2 diabetes mellitus (CMS/HCC)    • Valvular heart disease        Past Surgical History:   Procedure Laterality Date   • CARDIAC CATHETERIZATION     • COLONOSCOPY      did Cologuard approx 2019 and negative   • HAND SURGERY     • JOINT REPLACEMENT      Righ Knee   • [place] TOTAL KNEE ARTHROPLASTY Left 2017    Procedure: LT TOTAL KNEE ARTHROPLASTY;  Surgeon: Bertin Perrin MD;  Location: [facility];  Service: Orthopedics       Family History   Problem Relation Age of Onset   • Hypertension Mother    • Other Father         ruptured appendix,  when pt. was 14 years old.   • Diabetes Paternal Aunt    • Diabetes Paternal Uncle    • No Known Problems Other        Social History     Socioeconomic History   • Marital status:      Spouse name: Not on file   • Number of children: Not on file   • Years of education: Not on file   • Highest education level: Not on file   Tobacco Use   •  "Smoking status: Former Smoker     Types: Cigarettes     Last attempt to quit:      Years since quittin.1   • Smokeless tobacco: Former User   • Tobacco comment: quite:    Substance and Sexual Activity   • Alcohol use: No   • Drug use: No   • Sexual activity: Defer       Review of Systems   Constitutional: Negative for unexpected weight gain and unexpected weight loss.   HENT: Negative for nosebleeds, sinus pressure, sore throat and trouble swallowing.    Eyes: Negative for blurred vision.   Respiratory: Positive for cough (occasional cough, much improved from illness). Negative for chest tightness and shortness of breath.    Cardiovascular: Negative for chest pain and palpitations.   Gastrointestinal: Negative for abdominal pain, constipation, diarrhea, GERD and indigestion.   Genitourinary: Negative for dysuria and frequency.   Musculoskeletal: Negative for back pain and neck pain. Neck stiffness: some after yard work before injury.   Skin: Negative for rash.   Neurological: Negative for syncope, light-headedness and headache.       Objective   Vitals:    20 0817   BP: 150/70   BP Location: Left arm   Patient Position: Sitting   Cuff Size: Adult   Pulse: 86   Temp: 97.6 °F (36.4 °C)   SpO2: 98%   Weight: 116 kg (255 lb 3.2 oz)   Height: 185.4 cm (73\")     Body mass index is 33.67 kg/m².    Physical Exam   Constitutional: He is oriented to person, place, and time. Vital signs are normal. He appears well-developed and well-nourished. No distress.   HENT:   Head: Normocephalic.       Right Ear: External ear normal. Tympanic membrane is not erythematous. Right ear middle ear effusion: fluid behind TM.   Left Ear: Tympanic membrane is not erythematous. Left ear middle ear effusion: fluid behind TM.   Nose: Nose normal.       Mouth/Throat: Mucous membranes are normal. He has dentures. Oropharyngeal exudate: drainage noted in posterior pharynx. Posterior oropharyngeal erythema: mild erythema.   Eyes: " Pupils are equal, round, and reactive to light. Conjunctivae and EOM are normal.   Ecchymosis with signs of improvement around bilateral eyes with mild swelling   Neck: Normal range of motion. Neck supple. Carotid bruit is not present.   Cardiovascular: Normal rate, regular rhythm, S1 normal, S2 normal and intact distal pulses.   Murmur heard.   Systolic murmur is present with a grade of 2/6.  Pulmonary/Chest: Effort normal and breath sounds normal. No respiratory distress.   Abdominal: Soft. Bowel sounds are normal. There is no hepatosplenomegaly. There is no tenderness.   Musculoskeletal: He exhibits no edema.        Right knee: Decreased range of motion: slight decreased ROM  Swelling: mild  No tenderness found.   Lymphadenopathy:     He has no cervical adenopathy.   Neurological: He is alert and oriented to person, place, and time. No cranial nerve deficit. Abnormal gait: limping on right.   Skin: Skin is warm and dry.   Psychiatric: He has a normal mood and affect. Judgment and thought content normal. Cognition and memory are normal.   Nursing note and vitals reviewed.     Lab Results   Component Value Date    CHLPL 129 12/10/2019    TRIG 49 12/10/2019    HDL 52 12/10/2019    VLDL 9.8 12/10/2019    LDL 67 12/10/2019     Lab Results   Component Value Date    TSH 2.070 10/24/2018     Lab Results   Component Value Date    HGBA1C 7.50 (H) 12/10/2019     Lab Results   Component Value Date    LABPH 7.5 06/02/2015    COLORU Yellow 01/25/2020    CLARITYU Clear 01/25/2020    LEUKOCYTESUR Negative 01/25/2020    GLUCOSEU Negative 01/25/2020    BLOODU Negative 01/25/2020    BILIRUBINUR Negative 01/25/2020    NITRITEU Negative 01/25/2020     [facility]  1/25/20: CT head: no acute intracranial abnormality; minimal frontal region extracranial soft tissue swelling without fracture.    Assessment/Plan .  Problem List Items Addressed This Visit     Hypertension - Primary    Current  Assessment & Plan     Hypertension is stable.  Continue current medications.  Blood pressure will be reassessed at the next regular appointment.         Abrasion of face    Current Assessment & Plan     Continue to keep facial wounds clean.  Continue triple antibiotic ointment to wounds.  Continue to monitor wounds for signs or symptoms of infection, such as redness, drainage, swelling, etc.         Right knee pain    Current Assessment & Plan     Schedule follow up appointment with ortho.         Fall down steps    Minor head injury without loss of consciousness    Contusion of face          Return if symptoms worsen or fail to improve.

## 2020-01-29 NOTE — PATIENT INSTRUCTIONS
Continue to keep facial wounds clean.  Continue triple antibiotic ointment to wounds.  Follow up if symptoms persist or worsen.

## 2020-01-30 NOTE — ASSESSMENT & PLAN NOTE
Hypertension is stable.  Continue current medications.  Blood pressure will be reassessed at the next regular appointment.

## 2020-01-30 NOTE — ASSESSMENT & PLAN NOTE
Continue to keep facial wounds clean.  Continue triple antibiotic ointment to wounds.  Continue to monitor wounds for signs or symptoms of infection, such as redness, drainage, swelling, etc.

## 2020-02-21 ENCOUNTER — TELEPHONE (OUTPATIENT)
Dept: FAMILY MEDICINE CLINIC | Facility: CLINIC | Age: 75
End: 2020-02-21

## 2020-02-21 NOTE — TELEPHONE ENCOUNTER
Pt is requesting a refill on the following medication,   glimepiride (AMARYL) 2 MG tablet        Sig: Take 0.5 tablets by mouth Every Morning Before Breakfast.

## 2020-02-24 DIAGNOSIS — E11.9 TYPE 2 DIABETES MELLITUS WITHOUT COMPLICATION, WITHOUT LONG-TERM CURRENT USE OF INSULIN (HCC): ICD-10-CM

## 2020-02-24 RX ORDER — GLIMEPIRIDE 2 MG/1
1 TABLET ORAL
Qty: 45 TABLET | Refills: 0 | Status: SHIPPED | OUTPATIENT
Start: 2020-02-24 | End: 2020-03-16 | Stop reason: SDUPTHER

## 2020-03-16 DIAGNOSIS — E11.9 TYPE 2 DIABETES MELLITUS WITHOUT COMPLICATION, WITHOUT LONG-TERM CURRENT USE OF INSULIN (HCC): ICD-10-CM

## 2020-03-16 RX ORDER — GLIMEPIRIDE 2 MG/1
1 TABLET ORAL
Qty: 45 TABLET | Refills: 0 | Status: SHIPPED | OUTPATIENT
Start: 2020-03-16 | End: 2020-04-14 | Stop reason: SDUPTHER

## 2020-03-23 ENCOUNTER — TELEPHONE (OUTPATIENT)
Dept: FAMILY MEDICINE CLINIC | Facility: CLINIC | Age: 75
End: 2020-03-23

## 2020-03-23 RX ORDER — CLOPIDOGREL BISULFATE 75 MG/1
TABLET ORAL
Qty: 90 TABLET | Refills: 3 | Status: SHIPPED | OUTPATIENT
Start: 2020-03-23 | End: 2020-08-29 | Stop reason: HOSPADM

## 2020-03-23 NOTE — TELEPHONE ENCOUNTER
Patient states that he has been having this congestion since he was seen last advise him to take some OTC medication such as antihistamines and to try cordidin d and see if that helps his symptoms if not to call us back in a couple days to evaluate

## 2020-03-24 ENCOUNTER — TELEPHONE (OUTPATIENT)
Dept: FAMILY MEDICINE CLINIC | Facility: CLINIC | Age: 75
End: 2020-03-24

## 2020-03-24 NOTE — TELEPHONE ENCOUNTER
Spoke with patient over the phone; pt has had occasional cough, productive at times, since last OV when treated for sinusitis; no ear pain; no sore throat; cough started again after had been outside working in the yard few days ago; no runny/stuffy nose; no fever; no SOA; pt uses Flonase daily; pt plans to try Coricidin HBP; instructed may try antihistamine, such as Claritin or Allegra; pt saw hematology yesterday and labs were good; instructed to follow up if symptoms persist or worsen.

## 2020-04-14 ENCOUNTER — TELEMEDICINE (OUTPATIENT)
Dept: FAMILY MEDICINE CLINIC | Facility: CLINIC | Age: 75
End: 2020-04-14

## 2020-04-14 VITALS — DIASTOLIC BLOOD PRESSURE: 80 MMHG | HEART RATE: 64 BPM | SYSTOLIC BLOOD PRESSURE: 145 MMHG

## 2020-04-14 DIAGNOSIS — I10 ESSENTIAL HYPERTENSION: ICD-10-CM

## 2020-04-14 DIAGNOSIS — D45 POLYCYTHEMIA VERA (HCC): ICD-10-CM

## 2020-04-14 DIAGNOSIS — R05.9 COUGH: ICD-10-CM

## 2020-04-14 DIAGNOSIS — E11.3293 TYPE 2 DIABETES MELLITUS WITH BOTH EYES AFFECTED BY MILD NONPROLIFERATIVE RETINOPATHY WITHOUT MACULAR EDEMA, WITHOUT LONG-TERM CURRENT USE OF INSULIN (HCC): Primary | ICD-10-CM

## 2020-04-14 DIAGNOSIS — N40.0 PROSTATE HYPERPLASIA WITHOUT URINARY OBSTRUCTION: ICD-10-CM

## 2020-04-14 DIAGNOSIS — E78.2 MIXED HYPERLIPIDEMIA: ICD-10-CM

## 2020-04-14 DIAGNOSIS — J30.9 ALLERGIC RHINITIS, UNSPECIFIED SEASONALITY, UNSPECIFIED TRIGGER: ICD-10-CM

## 2020-04-14 PROBLEM — S00.81XA ABRASION OF FACE: Status: RESOLVED | Noted: 2020-01-29 | Resolved: 2020-04-14

## 2020-04-14 PROBLEM — S09.90XA MINOR HEAD INJURY WITHOUT LOSS OF CONSCIOUSNESS: Status: RESOLVED | Noted: 2020-01-29 | Resolved: 2020-04-14
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
## 2020-04-14 PROBLEM — S00.83XA CONTUSION OF FACE: Status: RESOLVED | Noted: 2020-01-29 | Resolved: 2020-04-14

## 2020-04-14 PROCEDURE — 99214 OFFICE O/P EST MOD 30 MIN: CPT | Performed by: NURSE PRACTITIONER

## 2020-04-14 RX ORDER — MONTELUKAST SODIUM 10 MG/1
10 TABLET ORAL DAILY
Qty: 30 TABLET | Refills: 1 | Status: SHIPPED | OUTPATIENT
Start: 2020-04-14 | End: 2020-06-18

## 2020-04-14 RX ORDER — GLIMEPIRIDE 2 MG/1
1 TABLET ORAL
Qty: 45 TABLET | Refills: 0 | Status: SHIPPED | OUTPATIENT
Start: 2020-04-14 | End: 2020-05-14 | Stop reason: SDUPTHER

## 2020-04-14 NOTE — ASSESSMENT & PLAN NOTE
Hypertension is stable.  Continue current medications.  Ambulatory blood pressure monitoring.  Blood pressure will be reassessed at the next regular appointment.

## 2020-04-14 NOTE — PROGRESS NOTES
Subjective   Erlin Blanco is a 74 y.o. male.     Chief Complaint   Patient presents with   • Diabetes   • Hypertension   • Hyperlipidemia   • Cough       History of Present Illness   This is a video visit.  Patient presents for follow up DM2: takes Glimepiride daily; last A1c 7.5%; monitors blood sugars, typically running low 100s; was out of medication for about 1 week about 1 month ago and blood sugars were running about 160s when out of medication; watches intake of carbs/sugars; not much exercise with COVID-19 pandemic, has been trying to get out walking some; considering getting some weights; no numbness or tingling; last eye exam 8/2019; needs refill sent to mail order.    F/U HTN: takes Lisinopril, Triamterene/HCTZ, and Cartia daily; also takes Hydralazaine TID and Bumex daily; sees Dr. Arias cardiology; also takes Plavix daily for CAD; monitors blood pressure, typically runs about 140s/70s; no headaches; no orthostasis; no swelling; wears some mild compression stockings; has follow up with Dr. Arias June 2020.    FU Hyperlipidemia: takes Pravastatin daily; no myalgias; watches intake of satured fats.    F/U BPH: takes Flomax and Finasteride daily; sees Dr. Chapman urology; plans to schedule follow up after pandemic.    F/U Polycythemia: sees Dr. Thomas hematology; takes Hydroxyurea daily; has had recent labs aobut 2 weeks ago and good; last phlebotomy about 1 month ago; gets phlebotomy about every 12 weeks at this point.    Also c/o chest congestion; occasional productive cough--clear drainage; has been taking Claritin, Flonase and Mucinex and helps some; no fever; no SOA; occasional wheeze at night; no ear pain or sore throat; no runny or stuffy nose; some runny nose after mowing grass; typically has problems with allergies this time of year for last couple of years; will have cough in mornings due to drainage in throat; takes Delsym as needed and helps; has had cough since finished Augmentin in  January 2020; no malaise.    The following portions of the patient's history were reviewed and updated as appropriate: allergies, current medications, past family history, past medical history, past social history, past surgical history and problem list.    Current Outpatient Medications   Medication Sig Dispense Refill   • bumetanide (BUMEX) 1 MG tablet Take 1 mg by mouth Every Morning.     • CARTIA  MG 24 hr capsule TAKE 1 CAPSULE DAILY 90 capsule 1   • clopidogrel (PLAVIX) 75 MG tablet TAKE 1 TABLET DAILY 90 tablet 3   • finasteride (PROSCAR) 5 MG tablet Take 5 mg by mouth Every Night.     • fluticasone (FLONASE) 50 MCG/ACT nasal spray      • glimepiride (AMARYL) 2 MG tablet Take 0.5 tablets by mouth Every Morning Before Breakfast. 45 tablet 0   • hydrALAZINE (APRESOLINE) 100 MG tablet Take 1 tablet by mouth 3 (Three) Times a Day. 270 tablet 1   • hydroxyurea (HYDREA) 500 MG capsule Take 1 mg by mouth Daily.     • lisinopril (PRINIVIL,ZESTRIL) 40 MG tablet Take 1 tablet by mouth Every Morning. 90 tablet 1   • Multiple Vitamins-Minerals (MULTIVITAMIN ADULT PO) Take 1 tablet by mouth Daily.     • pravastatin (PRAVACHOL) 40 MG tablet Take 1 tablet by mouth Daily. 90 tablet 1   • tamsulosin (FLOMAX) 0.4 MG capsule 24 hr capsule Take 1 capsule by mouth Every Night.     • triamterene-hydrochlorothiazide (DYAZIDE) 37.5-25 MG per capsule Take 1 capsule by mouth Every Morning.     • montelukast (Singulair) 10 MG tablet Take 1 tablet by mouth Daily. 30 tablet 1     No current facility-administered medications for this visit.         Past Medical History:   Diagnosis Date   • Abnormal ECG    • Abnormal stress test    • Abrasion of face 1/29/2020   • Acute bronchitis    • Acute sinusitis    • Allergic rhinitis    • Aortic root dilation (CMS/HCC)    • Arthritis    • Benign enlargement of prostate    • Cellulitis of knee, left 5/4/2017   • CHF (congestive heart failure) (CMS/HCC)    • Chronic diastolic congestive heart  failure (CMS/[facility])    • Contusion of face 2020   • Coronary artery disease    • Diminished pulse     in lower extremities   • Edema    • Elevated hematocrit    • Elevated hemoglobin (CMS/[facility])    • Essential hypertension    • Hearing loss     mala hearing aids   • Heart valve disease    • High risk medication use    • History of back pain    • History of dysuria    • History of echocardiogram    • History of intracranial hemorrhage    • History of scoliosis    • History of stroke    • Hyperlipidemia    • Injury of toe, left, initial encounter    • Irregular heart rate    • Knee pain, left    • Left bundle branch block    • Leg wound, left    • Low back pain    • Medicare annual wellness visit, subsequent    • Minor head injury without loss of consciousness 2020   • Murmur    • Muscle cramps    • Need for hepatitis C screening test    • Polycythemia vera (CMS/[facility])    • Prostate hyperplasia without urinary obstruction    • Prostatitis    • Proteinuria    • Pulmonary hypertension (CMS/[facility])    • Scoliosis    • SVT (supraventricular tachycardia) (CMS/[facility])    • Tachycardia    • Thrombocytosis (CMS/[facility])    • TIA (transient ischemic attack)        • Type 2 diabetes mellitus (CMS/[facility])    • Valvular heart disease        Past Surgical History:   Procedure Laterality Date   • CARDIAC CATHETERIZATION     • COLONOSCOPY      did Cologuard approx 2019 and negative   • HAND SURGERY     • JOINT REPLACEMENT      Righ Knee   • OR TOTAL KNEE ARTHROPLASTY Left 2017    Procedure: LT TOTAL KNEE ARTHROPLASTY;  Surgeon: Bertin Perrin MD;  Location: [facility];  Service: Orthopedics       Family History   Problem Relation Age of Onset   • Hypertension Mother    • Other Father         ruptured appendix,  when pt. was 14 years old.   • Diabetes Paternal Aunt    • Diabetes Paternal Uncle    • No Known Problems Other        Social History     Socioeconomic History   • Marital status:      Spouse name:  Not on file   • Number of children: Not on file   • Years of education: Not on file   • Highest education level: Not on file   Tobacco Use   • Smoking status: Former Smoker     Types: Cigarettes     Last attempt to quit:      Years since quittin.3   • Smokeless tobacco: Former User   • Tobacco comment: quite:    Substance and Sexual Activity   • Alcohol use: No   • Drug use: No   • Sexual activity: Defer       Review of Systems   Constitutional: Negative for appetite change, chills, fatigue (has been feeling pretty good recently), fever, unexpected weight gain and unexpected weight loss.   HENT: Positive for postnasal drip. Negative for sinus pressure and trouble swallowing.    Eyes: Negative for blurred vision and pain.   Respiratory: Negative for apnea and chest tightness.    Cardiovascular: Negative for chest pain and palpitations.   Gastrointestinal: Negative for abdominal pain, blood in stool, constipation, diarrhea, GERD and indigestion.   Genitourinary: Positive for frequency (urology discussed prostate surgery about 6 months ago). Negative for dysuria.   Skin: Negative for rash.   Psychiatric/Behavioral: Negative for sleep disturbance.       Objective   Vitals:    20 1030   BP: 145/80   Pulse: 64     There is no height or weight on file to calculate BMI.    Physical Exam   Constitutional: He is oriented to person, place, and time. He appears well-developed and well-nourished. No distress.   HENT:   Head: Normocephalic.   Mouth/Throat: Mucous membranes are normal.   Eyes: Lids are normal.   Neck: Normal range of motion.   Pulmonary/Chest: Effort normal. No accessory muscle usage. No respiratory distress.   Abdominal: There is no tenderness (per patient palpation).   Neurological: He is alert and oriented to person, place, and time.   Skin: Skin is dry.   Psychiatric: He has a normal mood and affect. Judgment and thought content normal. Cognition and memory are normal.       Lab Results    Component Value Date    WBC 9.86 10/24/2018    RBC 4.39 (L) 10/24/2018    HGB 13.4 (L) 10/24/2018    HCT 40.7 10/24/2018    MCV 92.7 10/24/2018    MCH 30.5 10/24/2018    MCHC 32.9 10/24/2018    RDW 14.3 10/24/2018    RDWSD 48.9 10/24/2018    MPV 9.7 10/24/2018     (H) 10/24/2018    NEUTRORELPCT 77.8 (H) 10/24/2018    LYMPHORELPCT 9.6 (L) 10/24/2018    MONORELPCT 9.9 10/24/2018    EOSRELPCT 2.1 10/24/2018    BASORELPCT 0.6 10/24/2018    AUTOIGPER 0.3 10/24/2018    NEUTROABS 7.66 10/24/2018    LYMPHSABS 0.95 10/24/2018    MONOSABS 0.98 10/24/2018    EOSABS 0.21 10/24/2018    BASOSABS 0.06 10/24/2018    AUTOIGNUM 0.03 10/24/2018     Lab Results   Component Value Date    GLUCOSE 239 (H) 10/24/2018    BUN 22 12/10/2019    CREATININE 1.30 (H) 12/10/2019    EGFRIFNONA 54 (L) 12/10/2019    EGFRIFAFRI 66 12/10/2019    BCR 16.9 12/10/2019    K 3.9 12/10/2019    CO2 30.1 (H) 12/10/2019    CALCIUM 9.6 12/10/2019    PROTENTOTREF 7.0 12/10/2019    ALBUMIN 4.70 12/10/2019    LABIL2 2.0 12/10/2019    AST 23 12/10/2019    ALT 19 12/10/2019      Lab Results   Component Value Date    CHLPL 129 12/10/2019    TRIG 49 12/10/2019    HDL 52 12/10/2019    VLDL 9.8 12/10/2019    LDL 67 12/10/2019     Lab Results   Component Value Date    TSH 2.070 10/24/2018     Lab Results   Component Value Date    HGBA1C 7.50 (H) 12/10/2019     Lab Results   Component Value Date    LABPH 7.5 06/02/2015    COLORU Yellow 01/25/2020    CLARITYU Clear 01/25/2020    LEUKOCYTESUR Negative 01/25/2020    GLUCOSEU Negative 01/25/2020    BLOODU Negative 01/25/2020    BILIRUBINUR Negative 01/25/2020    NITRITEU Negative 01/25/2020       Assessment/Plan .  Problem List Items Addressed This Visit     Type 2 diabetes mellitus, without long-term current use of insulin (CMS/[facility]) - Primary    Overview     With mild non-proliferative diabetic retinopathy, without macular edema         Relevant Medications    glimepiride (AMARYL) 2 MG tablet    Other Relevant Orders     Lipid Panel With LDL / HDL Ratio    Comprehensive Metabolic Panel    Hemoglobin A1c    Hypertension    Current Assessment & Plan     Hypertension is stable.  Continue current medications.  Ambulatory blood pressure monitoring.  Blood pressure will be reassessed at the next regular appointment.         Relevant Orders    Comprehensive Metabolic Panel    Hyperlipidemia    Current Assessment & Plan     Continue healthy diet and exercise.         Relevant Orders    Lipid Panel With LDL / HDL Ratio    Comprehensive Metabolic Panel    Prostate hyperplasia without urinary obstruction    Current Assessment & Plan     Schedule follow up appointment with urology.         Polycythemia vera (CMS/HCC)    Current Assessment & Plan     Follow up as scheduled with Dr. Thomas, hematology.         Allergic rhinitis    Relevant Medications    montelukast (Singulair) 10 MG tablet    Cough    Current Assessment & Plan     Add Montelukast daily.  Call in 2 weeks regarding cough.               Return in about 4 months (around 8/14/2020) for Medicare Wellness, Recheck. or sooner if symptoms persist or worsen.  Discussed AE/BBW with Montelukast; will consider chest x-ray if symptoms persist.    Time spent doing video visit, reviewing, discussing, answering questions, and educating patient was 40 minutes.

## 2020-04-14 NOTE — PATIENT INSTRUCTIONS
Get fasting labs in about 1 month.  Continue to monitor your blood sugar once daily before a meal and record results.  Continue to monitor your blood pressure periodically and record results.  Continue to work on healthy diet and exercise.  Add Montelukast daily.  Call in 2 weeks regarding cough.  Follow up pending lab results.  Follow up in 4 months, or sooner if problem or concerns.

## 2020-05-14 ENCOUNTER — RESULTS ENCOUNTER (OUTPATIENT)
Dept: FAMILY MEDICINE CLINIC | Facility: CLINIC | Age: 75
End: 2020-05-14

## 2020-05-14 DIAGNOSIS — E11.3293 TYPE 2 DIABETES MELLITUS WITH BOTH EYES AFFECTED BY MILD NONPROLIFERATIVE RETINOPATHY WITHOUT MACULAR EDEMA, WITHOUT LONG-TERM CURRENT USE OF INSULIN (HCC): ICD-10-CM

## 2020-05-14 DIAGNOSIS — I10 ESSENTIAL HYPERTENSION: ICD-10-CM

## 2020-05-14 DIAGNOSIS — E78.2 MIXED HYPERLIPIDEMIA: ICD-10-CM

## 2020-05-14 RX ORDER — GLIMEPIRIDE 2 MG/1
1 TABLET ORAL
Qty: 45 TABLET | Refills: 0 | Status: SHIPPED | OUTPATIENT
Start: 2020-05-14 | End: 2020-06-03 | Stop reason: SDUPTHER

## 2020-06-01 RX ORDER — DILTIAZEM HYDROCHLORIDE 300 MG/1
CAPSULE, EXTENDED RELEASE ORAL
Qty: 90 CAPSULE | Refills: 0 | Status: ON HOLD | OUTPATIENT
Start: 2020-06-01 | End: 2020-08-28

## 2020-06-02 LAB
ALBUMIN SERPL-MCNC: 4.3 G/DL (ref 3.5–5.2)
ALBUMIN/GLOB SERPL: 1.7 G/DL
ALP SERPL-CCNC: 91 U/L (ref 39–117)
ALT SERPL-CCNC: 18 U/L (ref 1–41)
AST SERPL-CCNC: 27 U/L (ref 1–40)
BILIRUB SERPL-MCNC: 0.4 MG/DL (ref 0.2–1.2)
BUN SERPL-MCNC: 17 MG/DL (ref 8–23)
BUN/CREAT SERPL: 16.2 (ref 7–25)
CALCIUM SERPL-MCNC: 9.6 MG/DL (ref 8.6–10.5)
CHLORIDE SERPL-SCNC: 104 MMOL/L (ref 98–107)
CHOLEST SERPL-MCNC: 113 MG/DL (ref 0–200)
CO2 SERPL-SCNC: 26.4 MMOL/L (ref 22–29)
CREAT SERPL-MCNC: 1.05 MG/DL (ref 0.76–1.27)
GLOBULIN SER CALC-MCNC: 2.5 GM/DL
GLUCOSE SERPL-MCNC: 154 MG/DL (ref 65–99)
HBA1C MFR BLD: 8.5 % (ref 4.8–5.6)
HDLC SERPL-MCNC: 40 MG/DL (ref 40–60)
LDLC SERPL CALC-MCNC: 59 MG/DL (ref 0–100)
LDLC/HDLC SERPL: 1.48 {RATIO}
POTASSIUM SERPL-SCNC: 4.1 MMOL/L (ref 3.5–5.2)
PROT SERPL-MCNC: 6.8 G/DL (ref 6–8.5)
SODIUM SERPL-SCNC: 140 MMOL/L (ref 136–145)
TRIGL SERPL-MCNC: 69 MG/DL (ref 0–150)
VLDLC SERPL CALC-MCNC: 13.8 MG/DL

## 2020-06-03 DIAGNOSIS — E11.3293 TYPE 2 DIABETES MELLITUS WITH BOTH EYES AFFECTED BY MILD NONPROLIFERATIVE RETINOPATHY WITHOUT MACULAR EDEMA, WITHOUT LONG-TERM CURRENT USE OF INSULIN (HCC): ICD-10-CM

## 2020-06-03 RX ORDER — GLIMEPIRIDE 2 MG/1
TABLET ORAL
Start: 2020-06-03 | End: 2020-06-24 | Stop reason: SDUPTHER

## 2020-06-07 DIAGNOSIS — I10 ESSENTIAL HYPERTENSION: ICD-10-CM

## 2020-06-08 DIAGNOSIS — E78.2 MIXED HYPERLIPIDEMIA: ICD-10-CM

## 2020-06-08 RX ORDER — PRAVASTATIN SODIUM 40 MG
TABLET ORAL
Qty: 90 TABLET | Refills: 0 | Status: SHIPPED | OUTPATIENT
Start: 2020-06-08 | End: 2021-02-23

## 2020-06-09 RX ORDER — LISINOPRIL 40 MG/1
TABLET ORAL
Qty: 90 TABLET | Refills: 0 | Status: SHIPPED | OUTPATIENT
Start: 2020-06-09 | End: 2020-08-29 | Stop reason: HOSPADM

## 2020-06-18 ENCOUNTER — OFFICE VISIT (OUTPATIENT)
Dept: CARDIOLOGY | Facility: CLINIC | Age: 75
End: 2020-06-18

## 2020-06-18 VITALS
HEART RATE: 67 BPM | HEIGHT: 73 IN | WEIGHT: 258.8 LBS | BODY MASS INDEX: 34.3 KG/M2 | SYSTOLIC BLOOD PRESSURE: 130 MMHG | DIASTOLIC BLOOD PRESSURE: 70 MMHG

## 2020-06-18 DIAGNOSIS — E78.2 MIXED HYPERLIPIDEMIA: ICD-10-CM

## 2020-06-18 DIAGNOSIS — D45 POLYCYTHEMIA VERA (HCC): ICD-10-CM

## 2020-06-18 DIAGNOSIS — I77.810 AORTIC ROOT DILATION (HCC): ICD-10-CM

## 2020-06-18 DIAGNOSIS — I44.7 LBBB (LEFT BUNDLE BRANCH BLOCK): ICD-10-CM

## 2020-06-18 DIAGNOSIS — I10 ESSENTIAL HYPERTENSION: ICD-10-CM

## 2020-06-18 DIAGNOSIS — E11.3293 TYPE 2 DIABETES MELLITUS WITH BOTH EYES AFFECTED BY MILD NONPROLIFERATIVE RETINOPATHY WITHOUT MACULAR EDEMA, WITHOUT LONG-TERM CURRENT USE OF INSULIN (HCC): ICD-10-CM

## 2020-06-18 DIAGNOSIS — I47.1 SUPRAVENTRICULAR TACHYCARDIA (HCC): Primary | ICD-10-CM

## 2020-06-18 PROCEDURE — 99214 OFFICE O/P EST MOD 30 MIN: CPT | Performed by: INTERNAL MEDICINE

## 2020-06-18 PROCEDURE — 93000 ELECTROCARDIOGRAM COMPLETE: CPT | Performed by: INTERNAL MEDICINE

## 2020-06-18 NOTE — PROGRESS NOTES
Subjective:     Encounter Date:06/18/2020      Patient ID: Erlin Blanco is a 75 y.o. male.    Chief Complaint:  History of Present Illness    This is a 75-year-old with a history of paroxysmal ventricular tachycardia, aortic root dilatation, coronary artery disease, diabetes mellitus type 2, hypertension, hyperlipidemia, prior TIA in 2006, chronic diastolic congestive heart failure, polycythemia, who presents for follow-up.     He presents today for annual follow-up.  He denies any chest pain, shortness of breath, palpitations, orthopnea, near-syncope or syncope, or lower extremity edema.  He reports that he has been doing a lot of home improvement projects lately and this is kept him busy.  He denies any significant issues engage in these activities.    Prior History:  The patient was previously followed by Dr. Higuera.  Prior to that he was followed by Dr. Thomas.  He establish care with Dr. Higuera in 5/2018.  At that time he reported intermittent palpitations but nothing significant.  The plan was to monitor his symptoms and consider further work-up if he had any issues.  In regards to his aortic root dilatation Dr. Higuera recommended focusing on blood pressure control and the plan was to see him back again in 1 year.       In 10/2018 the patient was referred to the emergency room from his primary care physician's office after he was noted to have a heart rate in the 140s.  In the emergency room it was felt that he was in supraventricular tachycardia so he was given a dose of adenosine which resulted in conversion back to sinus rhythm.  He returned back to the emergency room a couple of days later with heart rates in the 120s but his work-up was unremarkable at that time.  Prior to leaving the emergency room he had a ZIO monitor this placed and his diltiazem was increased to 300 mg a day.  He returned to the office on 11/5/2018 and was seen by MUKUL George at which time he was doing well.  His ZIO monitor  ended up showing numerous episodes of supraventricular tachycardia with possible atrial fibrillation, the longest lasting an hour and 2 minutes.  Based on those findings he was started on apixaban.     He return for routine follow-up in 12/2018 at which time he he was incidentally noted to be tachycardic with rates in the 120s.  An EKG was performed and Dr. Higuera felt that it was supraventricular tachycardia.  Carotid massage was performed and the patient converted to sinus rhythm.  The patient was asymptomatic with the supraventricular tachycardia.  Since it was not felt that he was having atrial fibrillation his apixaban was stopped and he was resumed on clopidogrel.  He then returned to the office last and was seen by MUKUL George in 6/2019 at which time he was doing well.  He had lost weight at that time working at [employer] 4 times a week which required a lot of walking.  He also reported that he had been diagnosed with polycythemia and was getting therapeutic phlebotomy about every 6 weeks.       He had a repeat echocardiogram performed in 12/2019 which showed normal left ventricular systolic function wall motion with an EF of 59%, grade 2 diastolic dysfunction, mildly dilated left atrium, mild mitral regurgitation, normal right ventricular systolic pressure, and no evidence of aortic root or ascending aortic dilatation.    Review of Systems   Constitution: Positive for malaise/fatigue.   HENT: Negative for hearing loss, hoarse voice, nosebleeds and sore throat.    Eyes: Negative for pain.   Cardiovascular: Negative for chest pain, claudication, cyanosis, dyspnea on exertion, irregular heartbeat, leg swelling, near-syncope, orthopnea, palpitations, paroxysmal nocturnal dyspnea and syncope.   Respiratory: Negative for shortness of breath and snoring.    Endocrine: Negative for cold intolerance, heat intolerance, polydipsia, polyphagia and polyuria.   Skin: Negative for itching and rash.   Musculoskeletal:  Negative for arthritis, falls, joint pain, joint swelling, muscle cramps, muscle weakness and myalgias.   Gastrointestinal: Negative for constipation, diarrhea, dysphagia, heartburn, hematemesis, hematochezia, melena, nausea and vomiting.   Genitourinary: Negative for frequency, hematuria and hesitancy.   Neurological: Negative for excessive daytime sleepiness, dizziness, headaches, light-headedness, numbness and weakness.   Psychiatric/Behavioral: Negative for depression. The patient is not nervous/anxious.          Current Outpatient Medications:   •  bumetanide (BUMEX) 1 MG tablet, Take 1 mg by mouth Every Morning., Disp: , Rfl:   •  CARTIA  MG 24 hr capsule, TAKE 1 CAPSULE DAILY, Disp: 90 capsule, Rfl: 0  •  clopidogrel (PLAVIX) 75 MG tablet, TAKE 1 TABLET DAILY, Disp: 90 tablet, Rfl: 3  •  finasteride (PROSCAR) 5 MG tablet, Take 5 mg by mouth Every Night., Disp: , Rfl:   •  fluticasone (FLONASE) 50 MCG/ACT nasal spray, As Needed., Disp: , Rfl:   •  glimepiride (AMARYL) 2 MG tablet, Take whole tablet with breakfast and 1/2 tablet with evening meal., Disp: , Rfl:   •  hydrALAZINE (APRESOLINE) 100 MG tablet, Take 1 tablet by mouth 3 (Three) Times a Day., Disp: 270 tablet, Rfl: 1  •  hydroxyurea (HYDREA) 500 MG capsule, Take 1 mg by mouth Daily., Disp: , Rfl:   •  lisinopril (PRINIVIL,ZESTRIL) 40 MG tablet, TAKE 1 TABLET EVERY MORNING, Disp: 90 tablet, Rfl: 0  •  Multiple Vitamins-Minerals (MULTIVITAMIN ADULT PO), Take 1 tablet by mouth Daily., Disp: , Rfl:   •  pravastatin (PRAVACHOL) 40 MG tablet, TAKE 1 TABLET DAILY, Disp: 90 tablet, Rfl: 0  •  tamsulosin (FLOMAX) 0.4 MG capsule 24 hr capsule, Take 1 capsule by mouth 2 (Two) Times a Day., Disp: , Rfl:   •  triamterene-hydrochlorothiazide (DYAZIDE) 37.5-25 MG per capsule, Take 1 capsule by mouth Every Morning., Disp: , Rfl:     Past Medical History:   Diagnosis Date   • Abnormal ECG    • Abnormal stress test    • Abrasion of face 1/29/2020   • Acute  bronchitis    • Acute sinusitis    • Allergic rhinitis    • Aortic root dilation (CMS/HCC)    • Arthritis    • Benign enlargement of prostate    • Cellulitis of knee, left 5/4/2017   • CHF (congestive heart failure) (CMS/HCC)    • Chronic diastolic congestive heart failure (CMS/HCC)    • Contusion of face 1/29/2020   • Coronary artery disease    • Diminished pulse     in lower extremities   • Edema    • Elevated hematocrit    • Elevated hemoglobin (CMS/HCC)    • Essential hypertension    • Hearing loss     mala hearing aids   • Heart valve disease    • High risk medication use    • History of back pain    • History of dysuria    • History of echocardiogram    • History of intracranial hemorrhage    • History of scoliosis    • History of stroke    • Hyperlipidemia    • Injury of toe, left, initial encounter    • Irregular heart rate    • Knee pain, left    • Left bundle branch block    • Leg wound, left    • Low back pain    • Medicare annual wellness visit, subsequent    • Minor head injury without loss of consciousness 1/29/2020   • Murmur    • Muscle cramps    • Need for hepatitis C screening test    • Polycythemia    • Polycythemia vera (CMS/HCC)    • Prostate hyperplasia without urinary obstruction    • Prostatitis    • Proteinuria    • Pulmonary hypertension (CMS/HCC)    • Scoliosis    • SVT (supraventricular tachycardia) (CMS/HCC)    • Tachycardia    • Thrombocytosis (CMS/HCC)    • TIA (transient ischemic attack)     2006   • Type 2 diabetes mellitus (CMS/HCC)    • Valvular heart disease        Past Surgical History:   Procedure Laterality Date   • CARDIAC CATHETERIZATION     • COLONOSCOPY      did Cologuard approx 6/2019 and negative   • HAND SURGERY     • JOINT REPLACEMENT  2014    Rig Knee   • AK TOTAL KNEE ARTHROPLASTY Left 4/27/2017    Procedure: LT TOTAL KNEE ARTHROPLASTY;  Surgeon: Bertin Perrin MD;  Location: [facility];  Service: Orthopedics       Family History   Problem Relation Age of Onset  "  • Hypertension Mother    • Other Father         ruptured appendix,  when pt. was 14 years old.   • Diabetes Paternal Aunt    • Diabetes Paternal Uncle    • No Known Problems Other        Social History     Tobacco Use   • Smoking status: Former Smoker     Types: Cigarettes     Last attempt to quit:      Years since quittin.4   • Smokeless tobacco: Former User   • Tobacco comment: quite:    Substance Use Topics   • Alcohol use: No   • Drug use: No         ECG 12 Lead  Date/Time: 2020 5:02 PM  Performed by: Marika Arias MD  Authorized by: Marika Arias MD   Comparison: compared with previous ECG   Similar to previous ECG  Rhythm: sinus rhythm  Conduction: left bundle branch block               Objective:     Visit Vitals  /70   Pulse 67   Ht 185.4 cm (73\")   Wt 117 kg (258 lb 12.8 oz)   BMI 34.14 kg/m²         Physical Exam   Constitutional: He is oriented to person, place, and time. He appears well-developed and well-nourished.   HENT:   Head: Normocephalic and atraumatic.   Neck: No JVD present. Carotid bruit is not present.   Cardiovascular: Normal rate, regular rhythm, S1 normal and S2 normal. Exam reveals no gallop.   Murmur heard.   Systolic murmur is present with a grade of 2/6.  Pulses:       Radial pulses are 2+ on the right side, and 2+ on the left side.   Pulmonary/Chest: Effort normal and breath sounds normal.   Abdominal: Soft. Normal appearance.   Musculoskeletal: He exhibits no edema.   Neurological: He is alert and oriented to person, place, and time.   Skin: Skin is warm, dry and intact.   Psychiatric: He has a normal mood and affect.           Assessment:          Diagnosis Plan   1. Supraventricular tachycardia (CMS/HCC)     2. Aortic root dilation (CMS/HCC)     3. Essential hypertension     4. Mixed hyperlipidemia     5. Type 2 diabetes mellitus with both eyes affected by mild nonproliferative retinopathy without macular edema, without long-term current use " of insulin (CMS/[facility])     6. Polycythemia vera (CMS/[facility])     7. LBBB (left bundle branch block)            Plan:         1.  Paroxysmal supraventricular tachycardia.  No recent episodes.  Continue management with diltiazem.  2.  Prior reported aortic root dilatation.  Not present on his last echocardiogram.  Will consider repeat echocardiogram next year to reevaluate and ensure that this is still the case.  3.  Hypertension.  Well-controlled on his current medications.  4.  Chronic diastolic congestive heart failure.  Euvolemic on his current management.  5.  Diabetes mellitus type 2  6.  Hyperlipidemia.  On pravastatin which is followed by MUKUL Crawford  7.  History of TIA.  Remains on clopidogrel.  8.  Polycythemia vera.  Followed closely by hematology.  He reports that he has therapeutic phlebotomy about every 6 weeks.  9.  Chronic left bundle branch block.    We will plan on seeing the patient back again in 1 year or sooner if further issues arise.

## 2020-06-24 ENCOUNTER — OFFICE VISIT (OUTPATIENT)
Dept: FAMILY MEDICINE CLINIC | Facility: CLINIC | Age: 75
End: 2020-06-24

## 2020-06-24 VITALS — HEART RATE: 74 BPM | SYSTOLIC BLOOD PRESSURE: 114 MMHG | DIASTOLIC BLOOD PRESSURE: 72 MMHG

## 2020-06-24 DIAGNOSIS — E11.3293 TYPE 2 DIABETES MELLITUS WITH BOTH EYES AFFECTED BY MILD NONPROLIFERATIVE RETINOPATHY WITHOUT MACULAR EDEMA, WITHOUT LONG-TERM CURRENT USE OF INSULIN (HCC): Primary | ICD-10-CM

## 2020-06-24 DIAGNOSIS — J30.9 ALLERGIC RHINITIS, UNSPECIFIED SEASONALITY, UNSPECIFIED TRIGGER: ICD-10-CM

## 2020-06-24 DIAGNOSIS — N40.0 PROSTATE HYPERPLASIA WITHOUT URINARY OBSTRUCTION: ICD-10-CM

## 2020-06-24 DIAGNOSIS — R53.83 FATIGUE, UNSPECIFIED TYPE: ICD-10-CM

## 2020-06-24 DIAGNOSIS — D45 POLYCYTHEMIA VERA (HCC): ICD-10-CM

## 2020-06-24 PROCEDURE — 99442 PR PHYS/QHP TELEPHONE EVALUATION 11-20 MIN: CPT | Performed by: NURSE PRACTITIONER

## 2020-06-24 RX ORDER — MONTELUKAST SODIUM 10 MG/1
10 TABLET ORAL DAILY
Qty: 90 TABLET | Refills: 1 | Status: SHIPPED | OUTPATIENT
Start: 2020-06-24 | End: 2021-06-28

## 2020-06-24 RX ORDER — FLUTICASONE PROPIONATE 50 MCG
2 SPRAY, SUSPENSION (ML) NASAL AS NEEDED
Qty: 3 BOTTLE | Refills: 1 | Status: SHIPPED | OUTPATIENT
Start: 2020-06-24 | End: 2020-08-29 | Stop reason: HOSPADM

## 2020-06-24 RX ORDER — GLIMEPIRIDE 2 MG/1
TABLET ORAL
Qty: 135 TABLET | Refills: 1 | Status: SHIPPED | OUTPATIENT
Start: 2020-06-24 | End: 2020-08-29 | Stop reason: HOSPADM

## 2020-06-24 NOTE — PROGRESS NOTES
Subjective   Erlin Blanco is a 75 y.o. male.     Chief Complaint   Patient presents with   • Fatigue     You have chosen to receive care through a telephone visit. Do you consent to use a telephone visit for your medical care today? Yes    History of Present Illness   Patient presents with c/o fatigue x2 weeks; has done a lot of work around house last couple of weeks; feels like has not recovered from overwork; saw urology about 1 week ago and increased Tamsulosin dose; wondering if increase may be affecting blood sugars; since then has noticed blood sugars have been higher; blood sugar was 114 this morning and then 214 before lunch and did no have any sweets/carbs with breakfast; no change in appetite; blood sugars had not been well controlled prior to last labs and had adjusted Glimperide to 2 mg in morning and 1 mg in evening and blood sugars had been much better until last week; has not been on any other medication for diabetes in past; does not seem like increase in Tamsulosin does has helped urinary symptoms and has seemed to make symptoms worse.     F/U allergic rhinitis: takes Flonase and Montelukast daily and work well; needs refills; ran out of Montelukast last week.    F/U polycythemia: gets therapeutic phlebotomy every 6 weeks; last 3-4 weeks ago.    The following portions of the patient's history were reviewed and updated as appropriate: allergies, current medications, past family history, past medical history, past social history, past surgical history and problem list.    Current Outpatient Medications on File Prior to Visit   Medication Sig   • bumetanide (BUMEX) 1 MG tablet Take 1 mg by mouth Every Morning.   • CARTIA  MG 24 hr capsule TAKE 1 CAPSULE DAILY   • clopidogrel (PLAVIX) 75 MG tablet TAKE 1 TABLET DAILY   • finasteride (PROSCAR) 5 MG tablet Take 5 mg by mouth Every Night.   • hydrALAZINE (APRESOLINE) 100 MG tablet Take 1 tablet by mouth 3 (Three) Times a Day.   • hydroxyurea  (HYDREA) 500 MG capsule Take 1 mg by mouth Daily.   • lisinopril (PRINIVIL,ZESTRIL) 40 MG tablet TAKE 1 TABLET EVERY MORNING   • Multiple Vitamins-Minerals (MULTIVITAMIN ADULT PO) Take 1 tablet by mouth Daily.   • pravastatin (PRAVACHOL) 40 MG tablet TAKE 1 TABLET DAILY   • tamsulosin (FLOMAX) 0.4 MG capsule 24 hr capsule Take 1 capsule by mouth 2 (Two) Times a Day.   • triamterene-hydrochlorothiazide (DYAZIDE) 37.5-25 MG per capsule Take 1 capsule by mouth Every Morning.   • [DISCONTINUED] fluticasone (FLONASE) 50 MCG/ACT nasal spray As Needed.   • [DISCONTINUED] glimepiride (AMARYL) 2 MG tablet Take whole tablet with breakfast and 1/2 tablet with evening meal.     No current facility-administered medications on file prior to visit.        Past Medical History:   Diagnosis Date   • Abnormal ECG    • Abnormal stress test    • Abrasion of face 1/29/2020   • Acute bronchitis    • Acute sinusitis    • Allergic rhinitis    • Aortic root dilation (CMS/HCC)    • Arthritis    • Benign enlargement of prostate    • Cellulitis of knee, left 5/4/2017   • CHF (congestive heart failure) (CMS/HCC)    • Chronic diastolic congestive heart failure (CMS/HCC)    • Contusion of face 1/29/2020   • Coronary artery disease    • Diminished pulse     in lower extremities   • Edema    • Elevated hematocrit    • Elevated hemoglobin (CMS/HCC)    • Essential hypertension    • Hearing loss     mala hearing aids   • Heart valve disease    • High risk medication use    • History of back pain    • History of dysuria    • History of echocardiogram    • History of intracranial hemorrhage    • History of scoliosis    • History of stroke    • Hyperlipidemia    • Injury of toe, left, initial encounter    • Irregular heart rate    • Knee pain, left    • Left bundle branch block    • Leg wound, left    • Low back pain    • Medicare annual wellness visit, subsequent    • Minor head injury without loss of consciousness 1/29/2020   • Murmur    • Muscle cramps     • Need for hepatitis C screening test    • Polycythemia    • Polycythemia vera (CMS/HCC)    • Prostate hyperplasia without urinary obstruction    • Prostatitis    • Proteinuria    • Pulmonary hypertension (CMS/HCC)    • Scoliosis    • SVT (supraventricular tachycardia) (CMS/HCC)    • Tachycardia    • Thrombocytosis (CMS/HCC)    • TIA (transient ischemic attack)        • Type 2 diabetes mellitus (CMS/HCC)    • Valvular heart disease        Past Surgical History:   Procedure Laterality Date   • CARDIAC CATHETERIZATION     • COLONOSCOPY      did Cologuard approx 2019 and negative   • HAND SURGERY     • JOINT REPLACEMENT      Righ Knee   • OH TOTAL KNEE ARTHROPLASTY Left 2017    Procedure: LT TOTAL KNEE ARTHROPLASTY;  Surgeon: Bertin Perrin MD;  Location: [facility] OR;  Service: Orthopedics       Family History   Problem Relation Age of Onset   • Hypertension Mother    • Other Father         ruptured appendix,  when pt. was 14 years old.   • Diabetes Paternal Aunt    • Diabetes Paternal Uncle    • No Known Problems Other        Social History     Socioeconomic History   • Marital status:      Spouse name: Not on file   • Number of children: Not on file   • Years of education: Not on file   • Highest education level: Not on file   Tobacco Use   • Smoking status: Former Smoker     Types: Cigarettes     Last attempt to quit: 1973     Years since quittin.5   • Smokeless tobacco: Former User   Substance and Sexual Activity   • Alcohol use: No   • Drug use: No   • Sexual activity: Defer       Review of Systems   Constitutional: Positive for fatigue. Negative for appetite change, chills, fever, unexpected weight gain and unexpected weight loss.   HENT: Negative for ear pain, sinus pressure, sore throat and trouble swallowing.    Eyes: Negative for blurred vision and pain.   Respiratory: Negative for cough, chest tightness and shortness of breath.    Cardiovascular: Negative for chest  pain, palpitations and leg swelling.   Gastrointestinal: Negative for abdominal pain, blood in stool, constipation and diarrhea.   Endocrine: Negative for polydipsia.   Genitourinary: Negative for dysuria and hematuria.   Musculoskeletal: Negative for back pain. Arthralgias: no unexplained soreness in shoulders.   Skin: Negative for rash.   Neurological: Positive for weakness. Negative for dizziness and headache.   Hematological: Does not bruise/bleed easily.   Psychiatric/Behavioral: Negative for depressed mood. The patient is not nervous/anxious.        Objective   Vitals:    06/24/20 1354   BP: 114/72   Pulse: 74     There is no height or weight on file to calculate BMI.    Physical Exam  This was a telephone encounter.    Assessment/Plan .  Problem List Items Addressed This Visit     Type 2 diabetes mellitus, without long-term current use of insulin (CMS/[facility]) - Primary    Overview     With mild non-proliferative diabetic retinopathy, without macular edema         Current Assessment & Plan     Diabetes is worsening.   Continue current treatment regimen.  Regular aerobic exercise.  Diabetes will be reassessed next week.  Call next week with blood sugar readings.         Relevant Medications    glimepiride (AMARYL) 2 MG tablet    Prostate hyperplasia without urinary obstruction    Current Assessment & Plan     Decrease Tamsulosin back to 1 tablet daily and see if helps symptoms.         Polycythemia vera (CMS/[facility])    Allergic rhinitis    Relevant Medications    fluticasone (FLONASE) 50 MCG/ACT nasal spray    montelukast (Singulair) 10 MG tablet    Fatigue    Current Assessment & Plan     Will check labs if symptoms persist.               Return in about 6 weeks (around 8/5/2020) for Medicare Wellness, Recheck.or sooner if symptoms persist or worsen.  Will decrease Tamsulosin back to 1 tablet daily and see if helps blood sugars since increase in dose has not seemed to help urinary symptoms; instructed to call back  next week regarding blood sugar readings and fatigue.  Time spent doing telephone visit, reviewing, discussing, answering questions, and educating patient was 20 minutes.

## 2020-06-24 NOTE — PATIENT INSTRUCTIONS
Decrease Tamsulosin back to 1 tablet daily and see if helps symptoms.  Continue to monitor your blood sugar once daily before a meal and record results.  Call next week with blood sugar readings.  Continue to monitor your blood pressure periodically and record results.  Continue to work on healthy diet and exercise.  Follow up in 6 weeks for Medicare Wellness, or sooner if symptoms persist or worsen.

## 2020-06-24 NOTE — ASSESSMENT & PLAN NOTE
Diabetes is worsening.   Continue current treatment regimen.  Regular aerobic exercise.  Diabetes will be reassessed next week.  Call next week with blood sugar readings.

## 2020-08-16 ENCOUNTER — APPOINTMENT (OUTPATIENT)
Dept: CT IMAGING | Facility: HOSPITAL | Age: 75
End: 2020-08-16

## 2020-08-16 ENCOUNTER — HOSPITAL ENCOUNTER (INPATIENT)
Facility: HOSPITAL | Age: 75
LOS: 13 days | Discharge: NURSING, REHAB OR LONG-TERM CARE | End: 2020-08-29
Attending: EMERGENCY MEDICINE | Admitting: HOSPITALIST

## 2020-08-16 ENCOUNTER — APPOINTMENT (OUTPATIENT)
Dept: GENERAL RADIOLOGY | Facility: HOSPITAL | Age: 75
End: 2020-08-16

## 2020-08-16 DIAGNOSIS — R00.0 SINUS TACHYCARDIA: ICD-10-CM

## 2020-08-16 DIAGNOSIS — R93.5 ABNORMAL ABDOMINAL CT SCAN: ICD-10-CM

## 2020-08-16 DIAGNOSIS — R65.20 SEPSIS WITH METABOLIC ENCEPHALOPATHY (HCC): ICD-10-CM

## 2020-08-16 DIAGNOSIS — A41.9 SEPSIS WITH METABOLIC ENCEPHALOPATHY (HCC): ICD-10-CM

## 2020-08-16 DIAGNOSIS — N41.0 ACUTE PROSTATITIS: ICD-10-CM

## 2020-08-16 DIAGNOSIS — R50.9 FEVER AND CHILLS: Primary | ICD-10-CM

## 2020-08-16 DIAGNOSIS — G93.41 SEPSIS WITH METABOLIC ENCEPHALOPATHY (HCC): ICD-10-CM

## 2020-08-16 LAB
ALBUMIN SERPL-MCNC: 3.5 G/DL (ref 3.5–5.2)
ALBUMIN/GLOB SERPL: 1.1 G/DL
ALP SERPL-CCNC: 102 U/L (ref 39–117)
ALT SERPL W P-5'-P-CCNC: 39 U/L (ref 1–41)
ANION GAP SERPL CALCULATED.3IONS-SCNC: 13.1 MMOL/L (ref 5–15)
AST SERPL-CCNC: 30 U/L (ref 1–40)
B PARAPERT DNA SPEC QL NAA+PROBE: NOT DETECTED
B PERT DNA SPEC QL NAA+PROBE: NOT DETECTED
BACTERIA UR QL AUTO: ABNORMAL /HPF
BASOPHILS # BLD MANUAL: 0.04 10*3/MM3 (ref 0–0.2)
BASOPHILS NFR BLD AUTO: 1 % (ref 0–1.5)
BILIRUB SERPL-MCNC: 1.1 MG/DL (ref 0–1.2)
BILIRUB UR QL STRIP: NEGATIVE
BUN SERPL-MCNC: 20 MG/DL (ref 8–23)
BUN/CREAT SERPL: 14.9 (ref 7–25)
C PNEUM DNA NPH QL NAA+NON-PROBE: NOT DETECTED
CALCIUM SPEC-SCNC: 9.2 MG/DL (ref 8.6–10.5)
CHLORIDE SERPL-SCNC: 96 MMOL/L (ref 98–107)
CLARITY UR: CLEAR
CO2 SERPL-SCNC: 26.9 MMOL/L (ref 22–29)
COLOR UR: YELLOW
CREAT SERPL-MCNC: 1.34 MG/DL (ref 0.76–1.27)
D-LACTATE SERPL-SCNC: 2 MMOL/L (ref 0.5–2)
DEPRECATED RDW RBC AUTO: 41 FL (ref 37–54)
EOSINOPHIL # BLD MANUAL: 0.04 10*3/MM3 (ref 0–0.4)
EOSINOPHIL NFR BLD MANUAL: 1 % (ref 0.3–6.2)
ERYTHROCYTE [DISTWIDTH] IN BLOOD BY AUTOMATED COUNT: 13.1 % (ref 12.3–15.4)
FLUAV H1 2009 PAND RNA NPH QL NAA+PROBE: NOT DETECTED
FLUAV H1 HA GENE NPH QL NAA+PROBE: NOT DETECTED
FLUAV H3 RNA NPH QL NAA+PROBE: NOT DETECTED
FLUAV SUBTYP SPEC NAA+PROBE: NOT DETECTED
FLUBV RNA ISLT QL NAA+PROBE: NOT DETECTED
GFR SERPL CREATININE-BSD FRML MDRD: 52 ML/MIN/1.73
GLOBULIN UR ELPH-MCNC: 3.2 GM/DL
GLUCOSE BLDC GLUCOMTR-MCNC: 268 MG/DL (ref 70–130)
GLUCOSE SERPL-MCNC: 317 MG/DL (ref 65–99)
GLUCOSE UR STRIP-MCNC: NEGATIVE MG/DL
HADV DNA SPEC NAA+PROBE: NOT DETECTED
HBA1C MFR BLD: 8 % (ref 4.8–5.6)
HCOV 229E RNA SPEC QL NAA+PROBE: NOT DETECTED
HCOV HKU1 RNA SPEC QL NAA+PROBE: NOT DETECTED
HCOV NL63 RNA SPEC QL NAA+PROBE: NOT DETECTED
HCOV OC43 RNA SPEC QL NAA+PROBE: NOT DETECTED
HCT VFR BLD AUTO: 42.9 % (ref 37.5–51)
HGB BLD-MCNC: 14 G/DL (ref 13–17.7)
HGB UR QL STRIP.AUTO: ABNORMAL
HMPV RNA NPH QL NAA+NON-PROBE: NOT DETECTED
HPIV1 RNA SPEC QL NAA+PROBE: NOT DETECTED
HPIV2 RNA SPEC QL NAA+PROBE: NOT DETECTED
HPIV3 RNA NPH QL NAA+PROBE: NOT DETECTED
HPIV4 P GENE NPH QL NAA+PROBE: NOT DETECTED
HYALINE CASTS UR QL AUTO: ABNORMAL /LPF
INR PPP: 1.32 (ref 0.9–1.1)
KETONES UR QL STRIP: NEGATIVE
LEUKOCYTE ESTERASE UR QL STRIP.AUTO: ABNORMAL
LIPASE SERPL-CCNC: 20 U/L (ref 13–60)
LYMPHOCYTES # BLD MANUAL: 0.11 10*3/MM3 (ref 0.7–3.1)
LYMPHOCYTES NFR BLD MANUAL: 2 % (ref 5–12)
LYMPHOCYTES NFR BLD MANUAL: 3 % (ref 19.6–45.3)
M PNEUMO IGG SER IA-ACNC: NOT DETECTED
MCH RBC QN AUTO: 28.2 PG (ref 26.6–33)
MCHC RBC AUTO-ENTMCNC: 32.6 G/DL (ref 31.5–35.7)
MCV RBC AUTO: 86.3 FL (ref 79–97)
MONOCYTES # BLD AUTO: 0.07 10*3/MM3 (ref 0.1–0.9)
NEUTROPHILS # BLD AUTO: 3.47 10*3/MM3 (ref 1.7–7)
NEUTROPHILS NFR BLD MANUAL: 92.9 % (ref 42.7–76)
NITRITE UR QL STRIP: NEGATIVE
PH UR STRIP.AUTO: <=5 [PH] (ref 5–8)
PLAT MORPH BLD: NORMAL
PLATELET # BLD AUTO: 309 10*3/MM3 (ref 140–450)
PMV BLD AUTO: 10.4 FL (ref 6–12)
POTASSIUM SERPL-SCNC: 3.7 MMOL/L (ref 3.5–5.2)
PROCALCITONIN SERPL-MCNC: 0.36 NG/ML (ref 0–0.25)
PROT SERPL-MCNC: 6.7 G/DL (ref 6–8.5)
PROT UR QL STRIP: NEGATIVE
PROTHROMBIN TIME: 16.2 SECONDS (ref 11.7–14.2)
RBC # BLD AUTO: 4.97 10*6/MM3 (ref 4.14–5.8)
RBC # UR: ABNORMAL /HPF
RBC MORPH BLD: NORMAL
REF LAB TEST METHOD: ABNORMAL
RHINOVIRUS RNA SPEC NAA+PROBE: NOT DETECTED
RSV RNA NPH QL NAA+NON-PROBE: NOT DETECTED
SARS-COV-2 RNA PNL SPEC NAA+PROBE: NOT DETECTED
SODIUM SERPL-SCNC: 136 MMOL/L (ref 136–145)
SP GR UR STRIP: 1.01 (ref 1–1.03)
SQUAMOUS #/AREA URNS HPF: ABNORMAL /HPF
TROPONIN T SERPL-MCNC: <0.01 NG/ML (ref 0–0.03)
TROPONIN T SERPL-MCNC: <0.01 NG/ML (ref 0–0.03)
UROBILINOGEN UR QL STRIP: ABNORMAL
WBC # BLD AUTO: 3.73 10*3/MM3 (ref 3.4–10.8)
WBC MORPH BLD: NORMAL
WBC UR QL AUTO: ABNORMAL /HPF

## 2020-08-16 PROCEDURE — 83690 ASSAY OF LIPASE: CPT | Performed by: EMERGENCY MEDICINE

## 2020-08-16 PROCEDURE — 51702 INSERT TEMP BLADDER CATH: CPT

## 2020-08-16 PROCEDURE — 83605 ASSAY OF LACTIC ACID: CPT | Performed by: EMERGENCY MEDICINE

## 2020-08-16 PROCEDURE — 71260 CT THORAX DX C+: CPT

## 2020-08-16 PROCEDURE — 82962 GLUCOSE BLOOD TEST: CPT

## 2020-08-16 PROCEDURE — 99285 EMERGENCY DEPT VISIT HI MDM: CPT

## 2020-08-16 PROCEDURE — 85025 COMPLETE CBC W/AUTO DIFF WBC: CPT | Performed by: EMERGENCY MEDICINE

## 2020-08-16 PROCEDURE — 81001 URINALYSIS AUTO W/SCOPE: CPT | Performed by: EMERGENCY MEDICINE

## 2020-08-16 PROCEDURE — 0202U NFCT DS 22 TRGT SARS-COV-2: CPT | Performed by: EMERGENCY MEDICINE

## 2020-08-16 PROCEDURE — 87186 SC STD MICRODIL/AGAR DIL: CPT | Performed by: EMERGENCY MEDICINE

## 2020-08-16 PROCEDURE — 84484 ASSAY OF TROPONIN QUANT: CPT | Performed by: INTERNAL MEDICINE

## 2020-08-16 PROCEDURE — 99222 1ST HOSP IP/OBS MODERATE 55: CPT | Performed by: INTERNAL MEDICINE

## 2020-08-16 PROCEDURE — 85610 PROTHROMBIN TIME: CPT | Performed by: EMERGENCY MEDICINE

## 2020-08-16 PROCEDURE — 84484 ASSAY OF TROPONIN QUANT: CPT | Performed by: EMERGENCY MEDICINE

## 2020-08-16 PROCEDURE — 85007 BL SMEAR W/DIFF WBC COUNT: CPT | Performed by: EMERGENCY MEDICINE

## 2020-08-16 PROCEDURE — 87088 URINE BACTERIA CULTURE: CPT | Performed by: EMERGENCY MEDICINE

## 2020-08-16 PROCEDURE — 25010000002 VANCOMYCIN 10 G RECONSTITUTED SOLUTION: Performed by: INTERNAL MEDICINE

## 2020-08-16 PROCEDURE — 25010000002 IOPAMIDOL 61 % SOLUTION: Performed by: EMERGENCY MEDICINE

## 2020-08-16 PROCEDURE — 93005 ELECTROCARDIOGRAM TRACING: CPT | Performed by: EMERGENCY MEDICINE

## 2020-08-16 PROCEDURE — 87086 URINE CULTURE/COLONY COUNT: CPT | Performed by: EMERGENCY MEDICINE

## 2020-08-16 PROCEDURE — 63710000001 INSULIN LISPRO (HUMAN) PER 5 UNITS: Performed by: INTERNAL MEDICINE

## 2020-08-16 PROCEDURE — 25010000002 CEFTRIAXONE PER 250 MG: Performed by: INTERNAL MEDICINE

## 2020-08-16 PROCEDURE — 87150 DNA/RNA AMPLIFIED PROBE: CPT | Performed by: EMERGENCY MEDICINE

## 2020-08-16 PROCEDURE — 71045 X-RAY EXAM CHEST 1 VIEW: CPT

## 2020-08-16 PROCEDURE — 80053 COMPREHEN METABOLIC PANEL: CPT | Performed by: EMERGENCY MEDICINE

## 2020-08-16 PROCEDURE — 84145 PROCALCITONIN (PCT): CPT | Performed by: EMERGENCY MEDICINE

## 2020-08-16 PROCEDURE — 93010 ELECTROCARDIOGRAM REPORT: CPT | Performed by: INTERNAL MEDICINE

## 2020-08-16 PROCEDURE — 83036 HEMOGLOBIN GLYCOSYLATED A1C: CPT | Performed by: INTERNAL MEDICINE

## 2020-08-16 PROCEDURE — 74177 CT ABD & PELVIS W/CONTRAST: CPT

## 2020-08-16 PROCEDURE — 87040 BLOOD CULTURE FOR BACTERIA: CPT | Performed by: EMERGENCY MEDICINE

## 2020-08-16 RX ORDER — ONDANSETRON 2 MG/ML
4 INJECTION INTRAMUSCULAR; INTRAVENOUS EVERY 6 HOURS PRN
Status: DISCONTINUED | OUTPATIENT
Start: 2020-08-16 | End: 2020-08-29 | Stop reason: HOSPADM

## 2020-08-16 RX ORDER — CEFTRIAXONE SODIUM 2 G/50ML
2 INJECTION, SOLUTION INTRAVENOUS EVERY 24 HOURS
Status: DISCONTINUED | OUTPATIENT
Start: 2020-08-17 | End: 2020-08-18

## 2020-08-16 RX ORDER — LISINOPRIL 40 MG/1
40 TABLET ORAL EVERY MORNING
Status: DISCONTINUED | OUTPATIENT
Start: 2020-08-17 | End: 2020-08-17

## 2020-08-16 RX ORDER — FINASTERIDE 5 MG/1
5 TABLET, FILM COATED ORAL NIGHTLY
Status: DISCONTINUED | OUTPATIENT
Start: 2020-08-16 | End: 2020-08-29 | Stop reason: HOSPADM

## 2020-08-16 RX ORDER — HYDROCODONE BITARTRATE AND ACETAMINOPHEN 7.5; 325 MG/1; MG/1
1 TABLET ORAL ONCE
Status: COMPLETED | OUTPATIENT
Start: 2020-08-16 | End: 2020-08-16

## 2020-08-16 RX ORDER — ACETAMINOPHEN 160 MG/5ML
650 SOLUTION ORAL EVERY 4 HOURS PRN
Status: DISCONTINUED | OUTPATIENT
Start: 2020-08-16 | End: 2020-08-29 | Stop reason: HOSPADM

## 2020-08-16 RX ORDER — METOPROLOL TARTRATE 5 MG/5ML
INJECTION INTRAVENOUS
Status: COMPLETED
Start: 2020-08-16 | End: 2020-08-16

## 2020-08-16 RX ORDER — ACETAMINOPHEN 500 MG
TABLET ORAL
Status: COMPLETED
Start: 2020-08-16 | End: 2020-08-16

## 2020-08-16 RX ORDER — DEXTROSE MONOHYDRATE 25 G/50ML
25 INJECTION, SOLUTION INTRAVENOUS
Status: DISCONTINUED | OUTPATIENT
Start: 2020-08-16 | End: 2020-08-17

## 2020-08-16 RX ORDER — HYDROCODONE BITARTRATE AND ACETAMINOPHEN 5; 325 MG/1; MG/1
1 TABLET ORAL EVERY 4 HOURS PRN
COMMUNITY
End: 2020-08-29 | Stop reason: HOSPADM

## 2020-08-16 RX ORDER — SODIUM CHLORIDE 0.9 % (FLUSH) 0.9 %
10 SYRINGE (ML) INJECTION AS NEEDED
Status: DISCONTINUED | OUTPATIENT
Start: 2020-08-16 | End: 2020-08-29 | Stop reason: HOSPADM

## 2020-08-16 RX ORDER — CIPROFLOXACIN 500 MG/1
500 TABLET, FILM COATED ORAL 2 TIMES DAILY
COMMUNITY
End: 2020-08-29 | Stop reason: HOSPADM

## 2020-08-16 RX ORDER — HYDROXYUREA 500 MG/1
500 CAPSULE ORAL DAILY
Status: DISCONTINUED | OUTPATIENT
Start: 2020-08-16 | End: 2020-08-29 | Stop reason: HOSPADM

## 2020-08-16 RX ORDER — ACETAMINOPHEN 650 MG/1
650 SUPPOSITORY RECTAL EVERY 4 HOURS PRN
Status: DISCONTINUED | OUTPATIENT
Start: 2020-08-16 | End: 2020-08-29 | Stop reason: HOSPADM

## 2020-08-16 RX ORDER — MULTIPLE VITAMINS W/ MINERALS TAB 9MG-400MCG
1 TAB ORAL DAILY
Status: DISCONTINUED | OUTPATIENT
Start: 2020-08-16 | End: 2020-08-29 | Stop reason: HOSPADM

## 2020-08-16 RX ORDER — MONTELUKAST SODIUM 10 MG/1
10 TABLET ORAL NIGHTLY
Status: DISCONTINUED | OUTPATIENT
Start: 2020-08-16 | End: 2020-08-29 | Stop reason: HOSPADM

## 2020-08-16 RX ORDER — NICOTINE POLACRILEX 4 MG
15 LOZENGE BUCCAL
Status: DISCONTINUED | OUTPATIENT
Start: 2020-08-16 | End: 2020-08-17

## 2020-08-16 RX ORDER — ACETAMINOPHEN 500 MG
500 TABLET ORAL ONCE
Status: COMPLETED | OUTPATIENT
Start: 2020-08-16 | End: 2020-08-16

## 2020-08-16 RX ORDER — CEFTRIAXONE SODIUM 1 G/50ML
1 INJECTION, SOLUTION INTRAVENOUS ONCE
Status: COMPLETED | OUTPATIENT
Start: 2020-08-16 | End: 2020-08-16

## 2020-08-16 RX ORDER — DILTIAZEM HCL IN NACL,ISO-OSM 125 MG/125
2.5 PLASTIC BAG, INJECTION (ML) INTRAVENOUS
Status: DISCONTINUED | OUTPATIENT
Start: 2020-08-16 | End: 2020-08-17

## 2020-08-16 RX ORDER — PRAVASTATIN SODIUM 40 MG
40 TABLET ORAL DAILY
Status: DISCONTINUED | OUTPATIENT
Start: 2020-08-16 | End: 2020-08-29 | Stop reason: HOSPADM

## 2020-08-16 RX ORDER — CLOPIDOGREL BISULFATE 75 MG/1
75 TABLET ORAL DAILY
Status: DISCONTINUED | OUTPATIENT
Start: 2020-08-16 | End: 2020-08-23

## 2020-08-16 RX ORDER — ACETAMINOPHEN 325 MG/1
650 TABLET ORAL EVERY 4 HOURS PRN
Status: DISCONTINUED | OUTPATIENT
Start: 2020-08-16 | End: 2020-08-29 | Stop reason: HOSPADM

## 2020-08-16 RX ORDER — TAMSULOSIN HYDROCHLORIDE 0.4 MG/1
0.4 CAPSULE ORAL DAILY
Status: DISCONTINUED | OUTPATIENT
Start: 2020-08-16 | End: 2020-08-29 | Stop reason: HOSPADM

## 2020-08-16 RX ORDER — SODIUM CHLORIDE 9 MG/ML
125 INJECTION, SOLUTION INTRAVENOUS CONTINUOUS
Status: DISCONTINUED | OUTPATIENT
Start: 2020-08-16 | End: 2020-08-17

## 2020-08-16 RX ORDER — SODIUM CHLORIDE 0.9 % (FLUSH) 0.9 %
10 SYRINGE (ML) INJECTION EVERY 12 HOURS SCHEDULED
Status: DISCONTINUED | OUTPATIENT
Start: 2020-08-16 | End: 2020-08-29 | Stop reason: HOSPADM

## 2020-08-16 RX ORDER — HYDROCODONE BITARTRATE AND ACETAMINOPHEN 5; 325 MG/1; MG/1
1 TABLET ORAL EVERY 4 HOURS PRN
Status: DISCONTINUED | OUTPATIENT
Start: 2020-08-16 | End: 2020-08-29 | Stop reason: HOSPADM

## 2020-08-16 RX ORDER — SODIUM CHLORIDE 9 MG/ML
125 INJECTION, SOLUTION INTRAVENOUS CONTINUOUS
Status: DISCONTINUED | OUTPATIENT
Start: 2020-08-16 | End: 2020-08-16

## 2020-08-16 RX ORDER — HYDRALAZINE HYDROCHLORIDE 50 MG/1
100 TABLET, FILM COATED ORAL 3 TIMES DAILY
Status: DISCONTINUED | OUTPATIENT
Start: 2020-08-16 | End: 2020-08-17

## 2020-08-16 RX ORDER — SODIUM CHLORIDE, SODIUM LACTATE, POTASSIUM CHLORIDE, CALCIUM CHLORIDE 600; 310; 30; 20 MG/100ML; MG/100ML; MG/100ML; MG/100ML
150 INJECTION, SOLUTION INTRAVENOUS CONTINUOUS
Status: DISCONTINUED | OUTPATIENT
Start: 2020-08-16 | End: 2020-08-19

## 2020-08-16 RX ORDER — ACETAMINOPHEN 500 MG
1000 TABLET ORAL ONCE
Status: DISCONTINUED | OUTPATIENT
Start: 2020-08-16 | End: 2020-08-16

## 2020-08-16 RX ADMIN — SODIUM CHLORIDE 500 ML: 9 INJECTION, SOLUTION INTRAVENOUS at 13:14

## 2020-08-16 RX ADMIN — Medication 500 MG: at 16:38

## 2020-08-16 RX ADMIN — MONTELUKAST SODIUM 10 MG: 10 TABLET, FILM COATED ORAL at 22:29

## 2020-08-16 RX ADMIN — IOPAMIDOL 100 ML: 612 INJECTION, SOLUTION INTRAVENOUS at 14:26

## 2020-08-16 RX ADMIN — SODIUM CHLORIDE, POTASSIUM CHLORIDE, SODIUM LACTATE AND CALCIUM CHLORIDE 150 ML/HR: 600; 310; 30; 20 INJECTION, SOLUTION INTRAVENOUS at 20:48

## 2020-08-16 RX ADMIN — VANCOMYCIN HYDROCHLORIDE 2250 MG: 10 INJECTION, POWDER, LYOPHILIZED, FOR SOLUTION INTRAVENOUS at 21:29

## 2020-08-16 RX ADMIN — METOROPROLOL TARTRATE 2.5 MG: 5 INJECTION, SOLUTION INTRAVENOUS at 18:35

## 2020-08-16 RX ADMIN — ACETAMINOPHEN 500 MG: 500 TABLET ORAL at 16:38

## 2020-08-16 RX ADMIN — INSULIN LISPRO 6 UNITS: 100 INJECTION, SOLUTION INTRAVENOUS; SUBCUTANEOUS at 22:31

## 2020-08-16 RX ADMIN — CEFTRIAXONE SODIUM 1 G: 1 INJECTION, SOLUTION INTRAVENOUS at 16:01

## 2020-08-16 RX ADMIN — Medication 2.5 MG/HR: at 20:50

## 2020-08-16 RX ADMIN — HYDROCODONE BITARTRATE AND ACETAMINOPHEN 1 TABLET: 7.5; 325 TABLET ORAL at 16:39

## 2020-08-16 RX ADMIN — SODIUM CHLORIDE, PRESERVATIVE FREE 10 ML: 5 INJECTION INTRAVENOUS at 22:32

## 2020-08-16 RX ADMIN — FINASTERIDE 5 MG: 5 TABLET, FILM COATED ORAL at 22:30

## 2020-08-17 PROBLEM — R78.81 BACTEREMIA DUE TO ESCHERICHIA COLI: Status: ACTIVE | Noted: 2020-08-17

## 2020-08-17 PROBLEM — A41.9 SEPSIS WITH METABOLIC ENCEPHALOPATHY: Status: ACTIVE | Noted: 2020-08-17

## 2020-08-17 PROBLEM — B96.20 BACTEREMIA DUE TO ESCHERICHIA COLI: Status: ACTIVE | Noted: 2020-08-17

## 2020-08-17 PROBLEM — A41.9 SEPSIS DUE TO URINARY TRACT INFECTION (HCC): Status: ACTIVE | Noted: 2020-08-17

## 2020-08-17 PROBLEM — N39.0 SEPSIS DUE TO URINARY TRACT INFECTION: Status: ACTIVE | Noted: 2020-08-17

## 2020-08-17 PROBLEM — A41.9 SEPSIS DUE TO URINARY TRACT INFECTION: Status: ACTIVE | Noted: 2020-08-17

## 2020-08-17 PROBLEM — G93.41 SEPSIS WITH METABOLIC ENCEPHALOPATHY: Status: ACTIVE | Noted: 2020-08-17

## 2020-08-17 PROBLEM — R65.20 SEPSIS WITH METABOLIC ENCEPHALOPATHY: Status: ACTIVE | Noted: 2020-08-17

## 2020-08-17 LAB
ANION GAP SERPL CALCULATED.3IONS-SCNC: 12.6 MMOL/L (ref 5–15)
BACTERIA BLD CULT: ABNORMAL
BUN SERPL-MCNC: 28 MG/DL (ref 8–23)
BUN/CREAT SERPL: 17.1 (ref 7–25)
CALCIUM SPEC-SCNC: 8.4 MG/DL (ref 8.6–10.5)
CHLORIDE SERPL-SCNC: 100 MMOL/L (ref 98–107)
CO2 SERPL-SCNC: 23.4 MMOL/L (ref 22–29)
CREAT SERPL-MCNC: 1.64 MG/DL (ref 0.76–1.27)
D-LACTATE SERPL-SCNC: 1.7 MMOL/L (ref 0.5–2)
D-LACTATE SERPL-SCNC: 2.4 MMOL/L (ref 0.5–2)
DEPRECATED RDW RBC AUTO: 42.7 FL (ref 37–54)
ERYTHROCYTE [DISTWIDTH] IN BLOOD BY AUTOMATED COUNT: 13.6 % (ref 12.3–15.4)
GFR SERPL CREATININE-BSD FRML MDRD: 41 ML/MIN/1.73
GLUCOSE BLDC GLUCOMTR-MCNC: 239 MG/DL (ref 70–130)
GLUCOSE BLDC GLUCOMTR-MCNC: 271 MG/DL (ref 70–130)
GLUCOSE BLDC GLUCOMTR-MCNC: 318 MG/DL (ref 70–130)
GLUCOSE BLDC GLUCOMTR-MCNC: 324 MG/DL (ref 70–130)
GLUCOSE SERPL-MCNC: 274 MG/DL (ref 65–99)
HCT VFR BLD AUTO: 39.2 % (ref 37.5–51)
HGB BLD-MCNC: 12.6 G/DL (ref 13–17.7)
LACTATE HOLD SPECIMEN: NORMAL
LYMPHOCYTES # BLD MANUAL: 0.18 10*3/MM3 (ref 0.7–3.1)
LYMPHOCYTES NFR BLD MANUAL: 1 % (ref 19.6–45.3)
MAGNESIUM SERPL-MCNC: 1.7 MG/DL (ref 1.6–2.4)
MAGNESIUM SERPL-MCNC: 2.1 MG/DL (ref 1.6–2.4)
MCH RBC QN AUTO: 27.8 PG (ref 26.6–33)
MCHC RBC AUTO-ENTMCNC: 32.1 G/DL (ref 31.5–35.7)
MCV RBC AUTO: 86.5 FL (ref 79–97)
NEUTROPHILS # BLD AUTO: 18.3 10*3/MM3 (ref 1.7–7)
NEUTROPHILS NFR BLD MANUAL: 99 % (ref 42.7–76)
PLAT MORPH BLD: NORMAL
PLATELET # BLD AUTO: 319 10*3/MM3 (ref 140–450)
PMV BLD AUTO: 11 FL (ref 6–12)
POTASSIUM SERPL-SCNC: 3.6 MMOL/L (ref 3.5–5.2)
POTASSIUM SERPL-SCNC: 3.8 MMOL/L (ref 3.5–5.2)
RBC # BLD AUTO: 4.53 10*6/MM3 (ref 4.14–5.8)
RBC MORPH BLD: NORMAL
SODIUM SERPL-SCNC: 136 MMOL/L (ref 136–145)
WBC # BLD AUTO: 18.48 10*3/MM3 (ref 3.4–10.8)
WBC MORPH BLD: NORMAL

## 2020-08-17 PROCEDURE — 80048 BASIC METABOLIC PNL TOTAL CA: CPT | Performed by: INTERNAL MEDICINE

## 2020-08-17 PROCEDURE — 87040 BLOOD CULTURE FOR BACTERIA: CPT | Performed by: INTERNAL MEDICINE

## 2020-08-17 PROCEDURE — 85007 BL SMEAR W/DIFF WBC COUNT: CPT | Performed by: INTERNAL MEDICINE

## 2020-08-17 PROCEDURE — 93005 ELECTROCARDIOGRAM TRACING: CPT | Performed by: NURSE PRACTITIONER

## 2020-08-17 PROCEDURE — 99232 SBSQ HOSP IP/OBS MODERATE 35: CPT | Performed by: INTERNAL MEDICINE

## 2020-08-17 PROCEDURE — 93005 ELECTROCARDIOGRAM TRACING: CPT | Performed by: INTERNAL MEDICINE

## 2020-08-17 PROCEDURE — 93010 ELECTROCARDIOGRAM REPORT: CPT | Performed by: INTERNAL MEDICINE

## 2020-08-17 PROCEDURE — 63710000001 INSULIN LISPRO (HUMAN) PER 5 UNITS: Performed by: INTERNAL MEDICINE

## 2020-08-17 PROCEDURE — 25010000003 CEFTRIAXONE PER 250 MG: Performed by: INTERNAL MEDICINE

## 2020-08-17 PROCEDURE — 85025 COMPLETE CBC W/AUTO DIFF WBC: CPT | Performed by: INTERNAL MEDICINE

## 2020-08-17 PROCEDURE — 25010000002 VANCOMYCIN 10 G RECONSTITUTED SOLUTION: Performed by: INTERNAL MEDICINE

## 2020-08-17 PROCEDURE — 83605 ASSAY OF LACTIC ACID: CPT | Performed by: INTERNAL MEDICINE

## 2020-08-17 PROCEDURE — 84132 ASSAY OF SERUM POTASSIUM: CPT | Performed by: INTERNAL MEDICINE

## 2020-08-17 PROCEDURE — 83735 ASSAY OF MAGNESIUM: CPT | Performed by: INTERNAL MEDICINE

## 2020-08-17 PROCEDURE — 25010000002 MAGNESIUM SULFATE IN D5W 1G/100ML (PREMIX) 1-5 GM/100ML-% SOLUTION: Performed by: INTERNAL MEDICINE

## 2020-08-17 PROCEDURE — 99223 1ST HOSP IP/OBS HIGH 75: CPT | Performed by: INTERNAL MEDICINE

## 2020-08-17 PROCEDURE — 25010000002 ERTAPENEM PER 500 MG: Performed by: INTERNAL MEDICINE

## 2020-08-17 PROCEDURE — 82962 GLUCOSE BLOOD TEST: CPT

## 2020-08-17 PROCEDURE — 25010000002 ONDANSETRON PER 1 MG: Performed by: INTERNAL MEDICINE

## 2020-08-17 RX ORDER — NICOTINE POLACRILEX 4 MG
15 LOZENGE BUCCAL
Status: DISCONTINUED | OUTPATIENT
Start: 2020-08-17 | End: 2020-08-18

## 2020-08-17 RX ORDER — POTASSIUM CHLORIDE 750 MG/1
40 CAPSULE, EXTENDED RELEASE ORAL ONCE
Status: COMPLETED | OUTPATIENT
Start: 2020-08-17 | End: 2020-08-17

## 2020-08-17 RX ORDER — DEXTROSE MONOHYDRATE 25 G/50ML
25 INJECTION, SOLUTION INTRAVENOUS
Status: DISCONTINUED | OUTPATIENT
Start: 2020-08-17 | End: 2020-08-18

## 2020-08-17 RX ORDER — MAGNESIUM SULFATE 1 G/100ML
1 INJECTION INTRAVENOUS ONCE
Status: COMPLETED | OUTPATIENT
Start: 2020-08-17 | End: 2020-08-17

## 2020-08-17 RX ORDER — LIDOCAINE 50 MG/G
1 PATCH TOPICAL
Status: DISCONTINUED | OUTPATIENT
Start: 2020-08-17 | End: 2020-08-29 | Stop reason: HOSPADM

## 2020-08-17 RX ADMIN — INSULIN LISPRO 6 UNITS: 100 INJECTION, SOLUTION INTRAVENOUS; SUBCUTANEOUS at 12:46

## 2020-08-17 RX ADMIN — HYDROCODONE BITARTRATE AND ACETAMINOPHEN 1 TABLET: 5; 325 TABLET ORAL at 12:47

## 2020-08-17 RX ADMIN — HYDRALAZINE HYDROCHLORIDE 100 MG: 50 TABLET, FILM COATED ORAL at 16:54

## 2020-08-17 RX ADMIN — ONDANSETRON 4 MG: 2 INJECTION INTRAMUSCULAR; INTRAVENOUS at 14:26

## 2020-08-17 RX ADMIN — FINASTERIDE 5 MG: 5 TABLET, FILM COATED ORAL at 20:54

## 2020-08-17 RX ADMIN — ACETAMINOPHEN 650 MG: 325 TABLET, FILM COATED ORAL at 17:58

## 2020-08-17 RX ADMIN — HYDROXYUREA 500 MG: 500 CAPSULE ORAL at 08:35

## 2020-08-17 RX ADMIN — MONTELUKAST SODIUM 10 MG: 10 TABLET, FILM COATED ORAL at 20:54

## 2020-08-17 RX ADMIN — METOPROLOL TARTRATE 5 MG: 5 INJECTION, SOLUTION INTRAVENOUS at 18:20

## 2020-08-17 RX ADMIN — SODIUM CHLORIDE, POTASSIUM CHLORIDE, SODIUM LACTATE AND CALCIUM CHLORIDE 1000 ML: 600; 310; 30; 20 INJECTION, SOLUTION INTRAVENOUS at 20:09

## 2020-08-17 RX ADMIN — SODIUM CHLORIDE, PRESERVATIVE FREE 10 ML: 5 INJECTION INTRAVENOUS at 08:36

## 2020-08-17 RX ADMIN — TAMSULOSIN HYDROCHLORIDE 0.4 MG: 0.4 CAPSULE ORAL at 08:35

## 2020-08-17 RX ADMIN — INSULIN LISPRO 4 UNITS: 100 INJECTION, SOLUTION INTRAVENOUS; SUBCUTANEOUS at 08:35

## 2020-08-17 RX ADMIN — POTASSIUM CHLORIDE 40 MEQ: 750 CAPSULE, EXTENDED RELEASE ORAL at 12:47

## 2020-08-17 RX ADMIN — HYDROCODONE BITARTRATE AND ACETAMINOPHEN 1 TABLET: 5; 325 TABLET ORAL at 17:56

## 2020-08-17 RX ADMIN — VANCOMYCIN HYDROCHLORIDE 1250 MG: 10 INJECTION, POWDER, LYOPHILIZED, FOR SOLUTION INTRAVENOUS at 08:34

## 2020-08-17 RX ADMIN — PRAVASTATIN SODIUM 40 MG: 40 TABLET ORAL at 08:36

## 2020-08-17 RX ADMIN — MAGNESIUM SULFATE 1 G: 1 INJECTION INTRAVENOUS at 12:47

## 2020-08-17 RX ADMIN — CLOPIDOGREL 75 MG: 75 TABLET, FILM COATED ORAL at 08:35

## 2020-08-17 RX ADMIN — ACETAMINOPHEN 650 MG: 325 TABLET, FILM COATED ORAL at 00:30

## 2020-08-17 RX ADMIN — LIDOCAINE 1 PATCH: 50 PATCH CUTANEOUS at 15:16

## 2020-08-17 RX ADMIN — HYDRALAZINE HYDROCHLORIDE 100 MG: 50 TABLET, FILM COATED ORAL at 08:35

## 2020-08-17 RX ADMIN — INSULIN LISPRO 7 UNITS: 100 INJECTION, SOLUTION INTRAVENOUS; SUBCUTANEOUS at 16:53

## 2020-08-17 RX ADMIN — MULTIPLE VITAMINS W/ MINERALS TAB 1 TABLET: TAB at 08:35

## 2020-08-17 RX ADMIN — DILTIAZEM HYDROCHLORIDE 300 MG: 180 CAPSULE, COATED, EXTENDED RELEASE ORAL at 22:59

## 2020-08-17 RX ADMIN — ERTAPENEM SODIUM 1 G: 1 INJECTION, POWDER, LYOPHILIZED, FOR SOLUTION INTRAMUSCULAR; INTRAVENOUS at 20:53

## 2020-08-17 RX ADMIN — LISINOPRIL 40 MG: 40 TABLET ORAL at 08:35

## 2020-08-17 RX ADMIN — CEFTRIAXONE SODIUM 2 G: 2 INJECTION, SOLUTION INTRAVENOUS at 17:34

## 2020-08-17 RX ADMIN — SODIUM CHLORIDE, POTASSIUM CHLORIDE, SODIUM LACTATE AND CALCIUM CHLORIDE 150 ML/HR: 600; 310; 30; 20 INJECTION, SOLUTION INTRAVENOUS at 08:35

## 2020-08-17 RX ADMIN — SODIUM CHLORIDE, PRESERVATIVE FREE 10 ML: 5 INJECTION INTRAVENOUS at 20:55

## 2020-08-17 RX ADMIN — HYDROCODONE BITARTRATE AND ACETAMINOPHEN 1 TABLET: 5; 325 TABLET ORAL at 08:35

## 2020-08-18 ENCOUNTER — APPOINTMENT (OUTPATIENT)
Dept: CARDIOLOGY | Facility: HOSPITAL | Age: 75
End: 2020-08-18

## 2020-08-18 PROBLEM — N39.0 URINARY TRACT INFECTION DUE TO EXTENDED-SPECTRUM BETA LACTAMASE (ESBL) PRODUCING ESCHERICHIA COLI: Status: ACTIVE | Noted: 2020-08-18

## 2020-08-18 PROBLEM — A41.51 SEPSIS DUE TO ESCHERICHIA COLI: Status: ACTIVE | Noted: 2020-08-17

## 2020-08-18 PROBLEM — B96.29 URINARY TRACT INFECTION DUE TO EXTENDED-SPECTRUM BETA LACTAMASE (ESBL) PRODUCING ESCHERICHIA COLI: Status: ACTIVE | Noted: 2020-08-18

## 2020-08-18 PROBLEM — Z16.12 URINARY TRACT INFECTION DUE TO EXTENDED-SPECTRUM BETA LACTAMASE (ESBL) PRODUCING ESCHERICHIA COLI: Status: ACTIVE | Noted: 2020-08-18

## 2020-08-18 LAB
ANION GAP SERPL CALCULATED.3IONS-SCNC: 11.5 MMOL/L (ref 5–15)
AORTIC DIMENSIONLESS INDEX: 0.7 (DI)
BACTERIA SPEC AEROBE CULT: ABNORMAL
BH CV ECHO MEAS - ACS: 1.9 CM
BH CV ECHO MEAS - AO MAX PG: 83 MMHG
BH CV ECHO MEAS - AO MEAN PG (FULL): 1 MMHG
BH CV ECHO MEAS - AO MEAN PG: 6 MMHG
BH CV ECHO MEAS - AO ROOT AREA (BSA CORRECTED): 1.3
BH CV ECHO MEAS - AO ROOT AREA: 7.5 CM^2
BH CV ECHO MEAS - AO ROOT DIAM: 3.1 CM
BH CV ECHO MEAS - AO V2 MAX: 122 CM/SEC
BH CV ECHO MEAS - AO V2 MEAN: 83.4 CM/SEC
BH CV ECHO MEAS - AO V2 VTI: 18.8 CM
BH CV ECHO MEAS - AVA(I,A): 2.3 CM^2
BH CV ECHO MEAS - AVA(I,D): 2.3 CM^2
BH CV ECHO MEAS - BSA(HAYCOCK): 2.5 M^2
BH CV ECHO MEAS - BSA: 2.4 M^2
BH CV ECHO MEAS - BZI_BMI: 35 KILOGRAMS/M^2
BH CV ECHO MEAS - BZI_METRIC_HEIGHT: 182.9 CM
BH CV ECHO MEAS - BZI_METRIC_WEIGHT: 117 KG
BH CV ECHO MEAS - EDV(MOD-SP2): 67 ML
BH CV ECHO MEAS - EDV(MOD-SP4): 157 ML
BH CV ECHO MEAS - EDV(TEICH): 78.6 ML
BH CV ECHO MEAS - EF(CUBED): 55.8 %
BH CV ECHO MEAS - EF(MOD-BP): 60 %
BH CV ECHO MEAS - EF(MOD-SP2): 59.7 %
BH CV ECHO MEAS - EF(MOD-SP4): 59.2 %
BH CV ECHO MEAS - EF(TEICH): 47.9 %
BH CV ECHO MEAS - ESV(MOD-SP2): 27 ML
BH CV ECHO MEAS - ESV(MOD-SP4): 64 ML
BH CV ECHO MEAS - ESV(TEICH): 41 ML
BH CV ECHO MEAS - FS: 23.8 %
BH CV ECHO MEAS - IVS/LVPW: 1
BH CV ECHO MEAS - IVSD: 1.3 CM
BH CV ECHO MEAS - LAT PEAK E' VEL: 7 CM/SEC
BH CV ECHO MEAS - LV DIASTOLIC VOL/BSA (35-75): 66.1 ML/M^2
BH CV ECHO MEAS - LV MASS(C)D: 200.6 GRAMS
BH CV ECHO MEAS - LV MASS(C)DI: 84.5 GRAMS/M^2
BH CV ECHO MEAS - LV MAX PG: 6 MMHG
BH CV ECHO MEAS - LV MEAN PG: 2 MMHG
BH CV ECHO MEAS - LV SYSTOLIC VOL/BSA (12-30): 27 ML/M^2
BH CV ECHO MEAS - LV V1 MAX: 99 CM/SEC
BH CV ECHO MEAS - LV V1 MEAN: 65.7 CM/SEC
BH CV ECHO MEAS - LV V1 VTI: 13.6 CM
BH CV ECHO MEAS - LVIDD: 4.2 CM
BH CV ECHO MEAS - LVIDS: 3.2 CM
BH CV ECHO MEAS - LVLD AP2: 7.4 CM
BH CV ECHO MEAS - LVLD AP4: 8.3 CM
BH CV ECHO MEAS - LVLS AP2: 6.6 CM
BH CV ECHO MEAS - LVLS AP4: 7.6 CM
BH CV ECHO MEAS - LVOT AREA (M): 3.1 CM^2
BH CV ECHO MEAS - LVOT AREA: 3.1 CM^2
BH CV ECHO MEAS - LVOT DIAM: 2 CM
BH CV ECHO MEAS - LVPWD: 1.3 CM
BH CV ECHO MEAS - MED PEAK E' VEL: 7 CM/SEC
BH CV ECHO MEAS - MV DEC SLOPE: 1465 CM/SEC^2
BH CV ECHO MEAS - MV DEC TIME: 0.12 SEC
BH CV ECHO MEAS - MV E MAX VEL: 118 CM/SEC
BH CV ECHO MEAS - MV MEAN PG: 3 MMHG
BH CV ECHO MEAS - MV P1/2T MAX VEL: 133 CM/SEC
BH CV ECHO MEAS - MV P1/2T: 26.6 MSEC
BH CV ECHO MEAS - MV V2 MEAN: 71.9 CM/SEC
BH CV ECHO MEAS - MV V2 VTI: 14.2 CM
BH CV ECHO MEAS - MVA P1/2T LCG: 1.7 CM^2
BH CV ECHO MEAS - MVA(P1/2T): 8.3 CM^2
BH CV ECHO MEAS - MVA(VTI): 3 CM^2
BH CV ECHO MEAS - PA MAX PG: 6.3 MMHG
BH CV ECHO MEAS - PA V2 MAX: 125 CM/SEC
BH CV ECHO MEAS - PULM DIAS VEL: 33.5 CM/SEC
BH CV ECHO MEAS - PULM S/D: 1.3
BH CV ECHO MEAS - PULM SYS VEL: 44 CM/SEC
BH CV ECHO MEAS - QP/QS: 0.83
BH CV ECHO MEAS - RAP SYSTOLE: 8 MMHG
BH CV ECHO MEAS - RV MEAN PG: 1 MMHG
BH CV ECHO MEAS - RV V1 MEAN: 43.6 CM/SEC
BH CV ECHO MEAS - RV V1 VTI: 10.2 CM
BH CV ECHO MEAS - RVOT AREA: 3.5 CM^2
BH CV ECHO MEAS - RVOT DIAM: 2.1 CM
BH CV ECHO MEAS - RVSP: 41 MMHG
BH CV ECHO MEAS - SI(AO): 59.8 ML/M^2
BH CV ECHO MEAS - SI(CUBED): 17.4 ML/M^2
BH CV ECHO MEAS - SI(LVOT): 18 ML/M^2
BH CV ECHO MEAS - SI(MOD-SP2): 16.9 ML/M^2
BH CV ECHO MEAS - SI(MOD-SP4): 39.2 ML/M^2
BH CV ECHO MEAS - SI(TEICH): 15.8 ML/M^2
BH CV ECHO MEAS - SV(AO): 141.9 ML
BH CV ECHO MEAS - SV(CUBED): 41.3 ML
BH CV ECHO MEAS - SV(LVOT): 42.7 ML
BH CV ECHO MEAS - SV(MOD-SP2): 40 ML
BH CV ECHO MEAS - SV(MOD-SP4): 93 ML
BH CV ECHO MEAS - SV(RVOT): 35.3 ML
BH CV ECHO MEAS - SV(TEICH): 37.6 ML
BH CV ECHO MEAS - TAPSE (>1.6): 2 CM2
BH CV ECHO MEAS - TR MAX VEL: 288 CM/SEC
BH CV ECHO MEASUREMENTS AVERAGE E/E' RATIO: 16.86
BH CV VAS BP LEFT ARM: NORMAL MMHG
BH CV XLRA - RV BASE: 3.5 CM
BH CV XLRA - RV LENGTH: 7.1 CM
BH CV XLRA - RV MID: 3.4 CM
BH CV XLRA - TDI S': 14 CM/SEC
BUN SERPL-MCNC: 42 MG/DL (ref 8–23)
BUN/CREAT SERPL: 25 (ref 7–25)
CALCIUM SPEC-SCNC: 8.3 MG/DL (ref 8.6–10.5)
CHLORIDE SERPL-SCNC: 102 MMOL/L (ref 98–107)
CLUMPED PLATELETS: PRESENT
CO2 SERPL-SCNC: 22.5 MMOL/L (ref 22–29)
CREAT SERPL-MCNC: 1.68 MG/DL (ref 0.76–1.27)
DEPRECATED RDW RBC AUTO: 40.8 FL (ref 37–54)
ERYTHROCYTE [DISTWIDTH] IN BLOOD BY AUTOMATED COUNT: 13.5 % (ref 12.3–15.4)
GFR SERPL CREATININE-BSD FRML MDRD: 40 ML/MIN/1.73
GLUCOSE BLDC GLUCOMTR-MCNC: 252 MG/DL (ref 70–130)
GLUCOSE BLDC GLUCOMTR-MCNC: 262 MG/DL (ref 70–130)
GLUCOSE BLDC GLUCOMTR-MCNC: 339 MG/DL (ref 70–130)
GLUCOSE BLDC GLUCOMTR-MCNC: 340 MG/DL (ref 70–130)
GLUCOSE SERPL-MCNC: 311 MG/DL (ref 65–99)
HCT VFR BLD AUTO: 39.1 % (ref 37.5–51)
HGB BLD-MCNC: 13.2 G/DL (ref 13–17.7)
LEFT ATRIUM VOLUME INDEX: 41 ML/M2
LYMPHOCYTES # BLD MANUAL: 0.31 10*3/MM3 (ref 0.7–3.1)
LYMPHOCYTES NFR BLD MANUAL: 2 % (ref 19.6–45.3)
LYMPHOCYTES NFR BLD MANUAL: 3 % (ref 5–12)
MCH RBC QN AUTO: 28.4 PG (ref 26.6–33)
MCHC RBC AUTO-ENTMCNC: 33.8 G/DL (ref 31.5–35.7)
MCV RBC AUTO: 84.1 FL (ref 79–97)
METAMYELOCYTES NFR BLD MANUAL: 2 % (ref 0–0)
MONOCYTES # BLD AUTO: 0.47 10*3/MM3 (ref 0.1–0.9)
NEUTROPHILS # BLD AUTO: 14.55 10*3/MM3 (ref 1.7–7)
NEUTROPHILS NFR BLD MANUAL: 93 % (ref 42.7–76)
PLATELET # BLD AUTO: 227 10*3/MM3 (ref 140–450)
PMV BLD AUTO: 11.7 FL (ref 6–12)
POTASSIUM SERPL-SCNC: 4.1 MMOL/L (ref 3.5–5.2)
RBC # BLD AUTO: 4.65 10*6/MM3 (ref 4.14–5.8)
RBC MORPH BLD: NORMAL
SODIUM SERPL-SCNC: 136 MMOL/L (ref 136–145)
WBC # BLD AUTO: 15.64 10*3/MM3 (ref 3.4–10.8)
WBC MORPH BLD: NORMAL

## 2020-08-18 PROCEDURE — 93010 ELECTROCARDIOGRAM REPORT: CPT | Performed by: INTERNAL MEDICINE

## 2020-08-18 PROCEDURE — 63710000001 INSULIN LISPRO (HUMAN) PER 5 UNITS: Performed by: INTERNAL MEDICINE

## 2020-08-18 PROCEDURE — 85025 COMPLETE CBC W/AUTO DIFF WBC: CPT | Performed by: INTERNAL MEDICINE

## 2020-08-18 PROCEDURE — 93005 ELECTROCARDIOGRAM TRACING: CPT | Performed by: NURSE PRACTITIONER

## 2020-08-18 PROCEDURE — 80048 BASIC METABOLIC PNL TOTAL CA: CPT | Performed by: INTERNAL MEDICINE

## 2020-08-18 PROCEDURE — 97110 THERAPEUTIC EXERCISES: CPT

## 2020-08-18 PROCEDURE — 93306 TTE W/DOPPLER COMPLETE: CPT | Performed by: INTERNAL MEDICINE

## 2020-08-18 PROCEDURE — 97166 OT EVAL MOD COMPLEX 45 MIN: CPT

## 2020-08-18 PROCEDURE — 99233 SBSQ HOSP IP/OBS HIGH 50: CPT | Performed by: INTERNAL MEDICINE

## 2020-08-18 PROCEDURE — 99232 SBSQ HOSP IP/OBS MODERATE 35: CPT | Performed by: NURSE PRACTITIONER

## 2020-08-18 PROCEDURE — 93306 TTE W/DOPPLER COMPLETE: CPT

## 2020-08-18 PROCEDURE — 97162 PT EVAL MOD COMPLEX 30 MIN: CPT

## 2020-08-18 PROCEDURE — 82962 GLUCOSE BLOOD TEST: CPT

## 2020-08-18 PROCEDURE — 97530 THERAPEUTIC ACTIVITIES: CPT

## 2020-08-18 PROCEDURE — 25010000002 ERTAPENEM PER 500 MG: Performed by: INTERNAL MEDICINE

## 2020-08-18 PROCEDURE — 85007 BL SMEAR W/DIFF WBC COUNT: CPT | Performed by: INTERNAL MEDICINE

## 2020-08-18 RX ORDER — DEXTROSE MONOHYDRATE 25 G/50ML
25 INJECTION, SOLUTION INTRAVENOUS
Status: DISCONTINUED | OUTPATIENT
Start: 2020-08-18 | End: 2020-08-19

## 2020-08-18 RX ORDER — DILTIAZEM HYDROCHLORIDE 60 MG/1
60 TABLET, FILM COATED ORAL EVERY 6 HOURS SCHEDULED
Status: DISCONTINUED | OUTPATIENT
Start: 2020-08-18 | End: 2020-08-20

## 2020-08-18 RX ORDER — NICOTINE POLACRILEX 4 MG
15 LOZENGE BUCCAL
Status: DISCONTINUED | OUTPATIENT
Start: 2020-08-18 | End: 2020-08-19

## 2020-08-18 RX ADMIN — HYDROXYUREA 500 MG: 500 CAPSULE ORAL at 08:43

## 2020-08-18 RX ADMIN — SODIUM CHLORIDE, POTASSIUM CHLORIDE, SODIUM LACTATE AND CALCIUM CHLORIDE 150 ML/HR: 600; 310; 30; 20 INJECTION, SOLUTION INTRAVENOUS at 01:26

## 2020-08-18 RX ADMIN — ERTAPENEM 1 G: 1 INJECTION INTRAMUSCULAR; INTRAVENOUS at 18:02

## 2020-08-18 RX ADMIN — METOPROLOL TARTRATE 5 MG: 5 INJECTION, SOLUTION INTRAVENOUS at 03:00

## 2020-08-18 RX ADMIN — MONTELUKAST SODIUM 10 MG: 10 TABLET, FILM COATED ORAL at 21:46

## 2020-08-18 RX ADMIN — DILTIAZEM HYDROCHLORIDE 60 MG: 60 TABLET, FILM COATED ORAL at 13:16

## 2020-08-18 RX ADMIN — LIDOCAINE 1 PATCH: 50 PATCH CUTANEOUS at 08:43

## 2020-08-18 RX ADMIN — INSULIN LISPRO 10 UNITS: 100 INJECTION, SOLUTION INTRAVENOUS; SUBCUTANEOUS at 12:08

## 2020-08-18 RX ADMIN — CLOPIDOGREL 75 MG: 75 TABLET, FILM COATED ORAL at 08:43

## 2020-08-18 RX ADMIN — FINASTERIDE 5 MG: 5 TABLET, FILM COATED ORAL at 21:46

## 2020-08-18 RX ADMIN — MULTIPLE VITAMINS W/ MINERALS TAB 1 TABLET: TAB at 08:43

## 2020-08-18 RX ADMIN — INSULIN LISPRO 10 UNITS: 100 INJECTION, SOLUTION INTRAVENOUS; SUBCUTANEOUS at 08:42

## 2020-08-18 RX ADMIN — INSULIN LISPRO 12 UNITS: 100 INJECTION, SOLUTION INTRAVENOUS; SUBCUTANEOUS at 18:02

## 2020-08-18 RX ADMIN — DILTIAZEM HYDROCHLORIDE 300 MG: 180 CAPSULE, COATED, EXTENDED RELEASE ORAL at 08:43

## 2020-08-18 RX ADMIN — DILTIAZEM HYDROCHLORIDE 60 MG: 60 TABLET, FILM COATED ORAL at 18:02

## 2020-08-18 RX ADMIN — HYDROCODONE BITARTRATE AND ACETAMINOPHEN 1 TABLET: 5; 325 TABLET ORAL at 23:23

## 2020-08-18 RX ADMIN — DILTIAZEM HYDROCHLORIDE 60 MG: 60 TABLET, FILM COATED ORAL at 23:23

## 2020-08-18 RX ADMIN — HYDROCODONE BITARTRATE AND ACETAMINOPHEN 1 TABLET: 5; 325 TABLET ORAL at 06:21

## 2020-08-18 RX ADMIN — TAMSULOSIN HYDROCHLORIDE 0.4 MG: 0.4 CAPSULE ORAL at 08:43

## 2020-08-18 RX ADMIN — SODIUM CHLORIDE, PRESERVATIVE FREE 10 ML: 5 INJECTION INTRAVENOUS at 21:46

## 2020-08-18 RX ADMIN — METOPROLOL TARTRATE 5 MG: 5 INJECTION, SOLUTION INTRAVENOUS at 01:17

## 2020-08-18 RX ADMIN — HYDROCODONE BITARTRATE AND ACETAMINOPHEN 1 TABLET: 5; 325 TABLET ORAL at 15:57

## 2020-08-18 RX ADMIN — SODIUM CHLORIDE, POTASSIUM CHLORIDE, SODIUM LACTATE AND CALCIUM CHLORIDE 150 ML/HR: 600; 310; 30; 20 INJECTION, SOLUTION INTRAVENOUS at 20:51

## 2020-08-18 RX ADMIN — HYDROCODONE BITARTRATE AND ACETAMINOPHEN 1 TABLET: 5; 325 TABLET ORAL at 01:17

## 2020-08-18 RX ADMIN — SODIUM CHLORIDE, POTASSIUM CHLORIDE, SODIUM LACTATE AND CALCIUM CHLORIDE 150 ML/HR: 600; 310; 30; 20 INJECTION, SOLUTION INTRAVENOUS at 10:58

## 2020-08-18 RX ADMIN — PRAVASTATIN SODIUM 40 MG: 40 TABLET ORAL at 08:43

## 2020-08-19 PROBLEM — A41.9 SHOCK, SEPTIC: Status: ACTIVE | Noted: 2020-08-19

## 2020-08-19 PROBLEM — R65.21 SHOCK, SEPTIC: Status: ACTIVE | Noted: 2020-08-19

## 2020-08-19 PROBLEM — R65.21 SHOCK, SEPTIC (HCC): Status: ACTIVE | Noted: 2020-08-19

## 2020-08-19 LAB
ANION GAP SERPL CALCULATED.3IONS-SCNC: 7.5 MMOL/L (ref 5–15)
BACTERIA SPEC AEROBE CULT: ABNORMAL
BASOPHILS # BLD AUTO: 0.04 10*3/MM3 (ref 0–0.2)
BASOPHILS NFR BLD AUTO: 0.5 % (ref 0–1.5)
BUN SERPL-MCNC: 44 MG/DL (ref 8–23)
BUN/CREAT SERPL: 35.5 (ref 7–25)
CALCIUM SPEC-SCNC: 8.2 MG/DL (ref 8.6–10.5)
CHLORIDE SERPL-SCNC: 102 MMOL/L (ref 98–107)
CO2 SERPL-SCNC: 26.5 MMOL/L (ref 22–29)
CREAT SERPL-MCNC: 1.24 MG/DL (ref 0.76–1.27)
DEPRECATED RDW RBC AUTO: 41 FL (ref 37–54)
EOSINOPHIL # BLD AUTO: 0.01 10*3/MM3 (ref 0–0.4)
EOSINOPHIL NFR BLD AUTO: 0.1 % (ref 0.3–6.2)
ERYTHROCYTE [DISTWIDTH] IN BLOOD BY AUTOMATED COUNT: 13.6 % (ref 12.3–15.4)
GFR SERPL CREATININE-BSD FRML MDRD: 57 ML/MIN/1.73
GLUCOSE BLDC GLUCOMTR-MCNC: 240 MG/DL (ref 70–130)
GLUCOSE BLDC GLUCOMTR-MCNC: 264 MG/DL (ref 70–130)
GLUCOSE BLDC GLUCOMTR-MCNC: 276 MG/DL (ref 70–130)
GLUCOSE BLDC GLUCOMTR-MCNC: 303 MG/DL (ref 70–130)
GLUCOSE SERPL-MCNC: 288 MG/DL (ref 65–99)
GRAM STN SPEC: ABNORMAL
HCT VFR BLD AUTO: 35.5 % (ref 37.5–51)
HGB BLD-MCNC: 12.3 G/DL (ref 13–17.7)
ISOLATED FROM: ABNORMAL
LYMPHOCYTES # BLD AUTO: 0.48 10*3/MM3 (ref 0.7–3.1)
LYMPHOCYTES NFR BLD AUTO: 5.7 % (ref 19.6–45.3)
MCH RBC QN AUTO: 28.7 PG (ref 26.6–33)
MCHC RBC AUTO-ENTMCNC: 34.6 G/DL (ref 31.5–35.7)
MCV RBC AUTO: 82.9 FL (ref 79–97)
MONOCYTES # BLD AUTO: 0.51 10*3/MM3 (ref 0.1–0.9)
MONOCYTES NFR BLD AUTO: 6 % (ref 5–12)
NEUTROPHILS NFR BLD AUTO: 7.25 10*3/MM3 (ref 1.7–7)
NEUTROPHILS NFR BLD AUTO: 85.6 % (ref 42.7–76)
NT-PROBNP SERPL-MCNC: 6183 PG/ML (ref 0–900)
PLATELET # BLD AUTO: 223 10*3/MM3 (ref 140–450)
PMV BLD AUTO: 11.5 FL (ref 6–12)
POTASSIUM SERPL-SCNC: 3.9 MMOL/L (ref 3.5–5.2)
RBC # BLD AUTO: 4.28 10*6/MM3 (ref 4.14–5.8)
SODIUM SERPL-SCNC: 136 MMOL/L (ref 136–145)
WBC # BLD AUTO: 8.47 10*3/MM3 (ref 3.4–10.8)

## 2020-08-19 PROCEDURE — 25010000002 FUROSEMIDE PER 20 MG: Performed by: NURSE PRACTITIONER

## 2020-08-19 PROCEDURE — 63710000001 INSULIN GLARGINE PER 5 UNITS: Performed by: INTERNAL MEDICINE

## 2020-08-19 PROCEDURE — 83880 ASSAY OF NATRIURETIC PEPTIDE: CPT | Performed by: NURSE PRACTITIONER

## 2020-08-19 PROCEDURE — 85025 COMPLETE CBC W/AUTO DIFF WBC: CPT | Performed by: INTERNAL MEDICINE

## 2020-08-19 PROCEDURE — 93005 ELECTROCARDIOGRAM TRACING: CPT | Performed by: NURSE PRACTITIONER

## 2020-08-19 PROCEDURE — 63710000001 INSULIN LISPRO (HUMAN) PER 5 UNITS: Performed by: INTERNAL MEDICINE

## 2020-08-19 PROCEDURE — 93010 ELECTROCARDIOGRAM REPORT: CPT | Performed by: INTERNAL MEDICINE

## 2020-08-19 PROCEDURE — 82962 GLUCOSE BLOOD TEST: CPT

## 2020-08-19 PROCEDURE — 87040 BLOOD CULTURE FOR BACTERIA: CPT | Performed by: INTERNAL MEDICINE

## 2020-08-19 PROCEDURE — 99232 SBSQ HOSP IP/OBS MODERATE 35: CPT | Performed by: INTERNAL MEDICINE

## 2020-08-19 PROCEDURE — 99233 SBSQ HOSP IP/OBS HIGH 50: CPT | Performed by: NURSE PRACTITIONER

## 2020-08-19 PROCEDURE — 80048 BASIC METABOLIC PNL TOTAL CA: CPT | Performed by: INTERNAL MEDICINE

## 2020-08-19 PROCEDURE — 25010000002 ERTAPENEM PER 500 MG: Performed by: INTERNAL MEDICINE

## 2020-08-19 RX ORDER — INSULIN GLARGINE 100 [IU]/ML
20 INJECTION, SOLUTION SUBCUTANEOUS NIGHTLY
Status: DISCONTINUED | OUTPATIENT
Start: 2020-08-19 | End: 2020-08-29 | Stop reason: HOSPADM

## 2020-08-19 RX ORDER — FUROSEMIDE 10 MG/ML
20 INJECTION INTRAMUSCULAR; INTRAVENOUS ONCE
Status: COMPLETED | OUTPATIENT
Start: 2020-08-19 | End: 2020-08-19

## 2020-08-19 RX ORDER — DEXTROSE MONOHYDRATE 25 G/50ML
25 INJECTION, SOLUTION INTRAVENOUS
Status: DISCONTINUED | OUTPATIENT
Start: 2020-08-19 | End: 2020-08-29 | Stop reason: HOSPADM

## 2020-08-19 RX ORDER — NICOTINE POLACRILEX 4 MG
15 LOZENGE BUCCAL
Status: DISCONTINUED | OUTPATIENT
Start: 2020-08-19 | End: 2020-08-29 | Stop reason: HOSPADM

## 2020-08-19 RX ADMIN — PRAVASTATIN SODIUM 40 MG: 40 TABLET ORAL at 10:10

## 2020-08-19 RX ADMIN — MULTIPLE VITAMINS W/ MINERALS TAB 1 TABLET: TAB at 10:10

## 2020-08-19 RX ADMIN — INSULIN LISPRO 16 UNITS: 100 INJECTION, SOLUTION INTRAVENOUS; SUBCUTANEOUS at 12:48

## 2020-08-19 RX ADMIN — HYDROCODONE BITARTRATE AND ACETAMINOPHEN 1 TABLET: 5; 325 TABLET ORAL at 04:18

## 2020-08-19 RX ADMIN — INSULIN GLARGINE 20 UNITS: 100 INJECTION, SOLUTION SUBCUTANEOUS at 21:29

## 2020-08-19 RX ADMIN — SODIUM CHLORIDE, PRESERVATIVE FREE 10 ML: 5 INJECTION INTRAVENOUS at 21:29

## 2020-08-19 RX ADMIN — TAMSULOSIN HYDROCHLORIDE 0.4 MG: 0.4 CAPSULE ORAL at 10:10

## 2020-08-19 RX ADMIN — MONTELUKAST SODIUM 10 MG: 10 TABLET, FILM COATED ORAL at 21:29

## 2020-08-19 RX ADMIN — HYDROXYUREA 500 MG: 500 CAPSULE ORAL at 10:10

## 2020-08-19 RX ADMIN — DILTIAZEM HYDROCHLORIDE 60 MG: 60 TABLET, FILM COATED ORAL at 05:31

## 2020-08-19 RX ADMIN — FUROSEMIDE 20 MG: 10 INJECTION, SOLUTION INTRAMUSCULAR; INTRAVENOUS at 12:50

## 2020-08-19 RX ADMIN — INSULIN LISPRO 6 UNITS: 100 INJECTION, SOLUTION INTRAVENOUS; SUBCUTANEOUS at 18:12

## 2020-08-19 RX ADMIN — ERTAPENEM 1 G: 1 INJECTION INTRAMUSCULAR; INTRAVENOUS at 18:10

## 2020-08-19 RX ADMIN — SODIUM CHLORIDE, POTASSIUM CHLORIDE, SODIUM LACTATE AND CALCIUM CHLORIDE 150 ML/HR: 600; 310; 30; 20 INJECTION, SOLUTION INTRAVENOUS at 10:26

## 2020-08-19 RX ADMIN — HYDROCODONE BITARTRATE AND ACETAMINOPHEN 1 TABLET: 5; 325 TABLET ORAL at 15:09

## 2020-08-19 RX ADMIN — LIDOCAINE 1 PATCH: 50 PATCH CUTANEOUS at 10:09

## 2020-08-19 RX ADMIN — DILTIAZEM HYDROCHLORIDE 60 MG: 60 TABLET, FILM COATED ORAL at 12:48

## 2020-08-19 RX ADMIN — HYDROCODONE BITARTRATE AND ACETAMINOPHEN 1 TABLET: 5; 325 TABLET ORAL at 21:29

## 2020-08-19 RX ADMIN — INSULIN LISPRO 12 UNITS: 100 INJECTION, SOLUTION INTRAVENOUS; SUBCUTANEOUS at 10:07

## 2020-08-19 RX ADMIN — CLOPIDOGREL 75 MG: 75 TABLET, FILM COATED ORAL at 10:10

## 2020-08-19 RX ADMIN — SODIUM CHLORIDE, POTASSIUM CHLORIDE, SODIUM LACTATE AND CALCIUM CHLORIDE 150 ML/HR: 600; 310; 30; 20 INJECTION, SOLUTION INTRAVENOUS at 03:41

## 2020-08-19 RX ADMIN — DILTIAZEM HYDROCHLORIDE 60 MG: 60 TABLET, FILM COATED ORAL at 18:10

## 2020-08-19 RX ADMIN — FINASTERIDE 5 MG: 5 TABLET, FILM COATED ORAL at 21:29

## 2020-08-19 RX ADMIN — INSULIN LISPRO 5 UNITS: 100 INJECTION, SOLUTION INTRAVENOUS; SUBCUTANEOUS at 18:10

## 2020-08-19 RX ADMIN — SODIUM CHLORIDE, PRESERVATIVE FREE 10 ML: 5 INJECTION INTRAVENOUS at 10:10

## 2020-08-20 LAB
ANION GAP SERPL CALCULATED.3IONS-SCNC: 11.4 MMOL/L (ref 5–15)
BASOPHILS # BLD AUTO: 0.04 10*3/MM3 (ref 0–0.2)
BASOPHILS NFR BLD AUTO: 0.5 % (ref 0–1.5)
BUN SERPL-MCNC: 33 MG/DL (ref 8–23)
BUN/CREAT SERPL: 37.9 (ref 7–25)
CALCIUM SPEC-SCNC: 8.3 MG/DL (ref 8.6–10.5)
CHLORIDE SERPL-SCNC: 102 MMOL/L (ref 98–107)
CO2 SERPL-SCNC: 22.6 MMOL/L (ref 22–29)
CREAT SERPL-MCNC: 0.87 MG/DL (ref 0.76–1.27)
DEPRECATED RDW RBC AUTO: 41.2 FL (ref 37–54)
EOSINOPHIL # BLD AUTO: 0.02 10*3/MM3 (ref 0–0.4)
EOSINOPHIL NFR BLD AUTO: 0.3 % (ref 0.3–6.2)
ERYTHROCYTE [DISTWIDTH] IN BLOOD BY AUTOMATED COUNT: 13.2 % (ref 12.3–15.4)
GFR SERPL CREATININE-BSD FRML MDRD: 86 ML/MIN/1.73
GLUCOSE BLDC GLUCOMTR-MCNC: 177 MG/DL (ref 70–130)
GLUCOSE BLDC GLUCOMTR-MCNC: 193 MG/DL (ref 70–130)
GLUCOSE BLDC GLUCOMTR-MCNC: 246 MG/DL (ref 70–130)
GLUCOSE BLDC GLUCOMTR-MCNC: 300 MG/DL (ref 70–130)
GLUCOSE SERPL-MCNC: 272 MG/DL (ref 65–99)
HCT VFR BLD AUTO: 38.1 % (ref 37.5–51)
HGB BLD-MCNC: 12.9 G/DL (ref 13–17.7)
LYMPHOCYTES # BLD AUTO: 0.42 10*3/MM3 (ref 0.7–3.1)
LYMPHOCYTES NFR BLD AUTO: 5.5 % (ref 19.6–45.3)
MCH RBC QN AUTO: 28.7 PG (ref 26.6–33)
MCHC RBC AUTO-ENTMCNC: 33.9 G/DL (ref 31.5–35.7)
MCV RBC AUTO: 84.9 FL (ref 79–97)
MONOCYTES # BLD AUTO: 0.73 10*3/MM3 (ref 0.1–0.9)
MONOCYTES NFR BLD AUTO: 9.6 % (ref 5–12)
NEUTROPHILS NFR BLD AUTO: 6.32 10*3/MM3 (ref 1.7–7)
NEUTROPHILS NFR BLD AUTO: 83 % (ref 42.7–76)
PLATELET # BLD AUTO: 204 10*3/MM3 (ref 140–450)
PMV BLD AUTO: 11.1 FL (ref 6–12)
POTASSIUM SERPL-SCNC: 4 MMOL/L (ref 3.5–5.2)
RBC # BLD AUTO: 4.49 10*6/MM3 (ref 4.14–5.8)
SODIUM SERPL-SCNC: 136 MMOL/L (ref 136–145)
WBC # BLD AUTO: 7.61 10*3/MM3 (ref 3.4–10.8)

## 2020-08-20 PROCEDURE — 63710000001 INSULIN LISPRO (HUMAN) PER 5 UNITS: Performed by: INTERNAL MEDICINE

## 2020-08-20 PROCEDURE — 25010000002 DIGOXIN PER 500 MCG: Performed by: INTERNAL MEDICINE

## 2020-08-20 PROCEDURE — 63710000001 INSULIN GLARGINE PER 5 UNITS: Performed by: INTERNAL MEDICINE

## 2020-08-20 PROCEDURE — 25010000002 FUROSEMIDE PER 20 MG: Performed by: INTERNAL MEDICINE

## 2020-08-20 PROCEDURE — 99232 SBSQ HOSP IP/OBS MODERATE 35: CPT | Performed by: INTERNAL MEDICINE

## 2020-08-20 PROCEDURE — 25010000002 ERTAPENEM PER 500 MG: Performed by: INTERNAL MEDICINE

## 2020-08-20 PROCEDURE — 85025 COMPLETE CBC W/AUTO DIFF WBC: CPT | Performed by: INTERNAL MEDICINE

## 2020-08-20 PROCEDURE — 80048 BASIC METABOLIC PNL TOTAL CA: CPT | Performed by: INTERNAL MEDICINE

## 2020-08-20 PROCEDURE — 82962 GLUCOSE BLOOD TEST: CPT

## 2020-08-20 RX ORDER — DIGOXIN 0.25 MG/ML
125 INJECTION INTRAMUSCULAR; INTRAVENOUS EVERY 8 HOURS
Status: COMPLETED | OUTPATIENT
Start: 2020-08-20 | End: 2020-08-21

## 2020-08-20 RX ORDER — DIGOXIN 0.25 MG/ML
250 INJECTION INTRAMUSCULAR; INTRAVENOUS ONCE
Status: COMPLETED | OUTPATIENT
Start: 2020-08-20 | End: 2020-08-20

## 2020-08-20 RX ORDER — FUROSEMIDE 10 MG/ML
40 INJECTION INTRAMUSCULAR; INTRAVENOUS ONCE
Status: COMPLETED | OUTPATIENT
Start: 2020-08-20 | End: 2020-08-20

## 2020-08-20 RX ADMIN — INSULIN GLARGINE 20 UNITS: 100 INJECTION, SOLUTION SUBCUTANEOUS at 20:35

## 2020-08-20 RX ADMIN — PRAVASTATIN SODIUM 40 MG: 40 TABLET ORAL at 09:11

## 2020-08-20 RX ADMIN — INSULIN LISPRO 7 UNITS: 100 INJECTION, SOLUTION INTRAVENOUS; SUBCUTANEOUS at 12:31

## 2020-08-20 RX ADMIN — HYDROCODONE BITARTRATE AND ACETAMINOPHEN 1 TABLET: 5; 325 TABLET ORAL at 20:34

## 2020-08-20 RX ADMIN — LIDOCAINE 1 PATCH: 50 PATCH CUTANEOUS at 09:10

## 2020-08-20 RX ADMIN — TAMSULOSIN HYDROCHLORIDE 0.4 MG: 0.4 CAPSULE ORAL at 09:11

## 2020-08-20 RX ADMIN — DILTIAZEM HYDROCHLORIDE 60 MG: 60 TABLET, FILM COATED ORAL at 06:48

## 2020-08-20 RX ADMIN — FUROSEMIDE 40 MG: 10 INJECTION, SOLUTION INTRAMUSCULAR; INTRAVENOUS at 09:12

## 2020-08-20 RX ADMIN — HYDROCODONE BITARTRATE AND ACETAMINOPHEN 1 TABLET: 5; 325 TABLET ORAL at 03:50

## 2020-08-20 RX ADMIN — FINASTERIDE 5 MG: 5 TABLET, FILM COATED ORAL at 20:34

## 2020-08-20 RX ADMIN — INSULIN LISPRO 2 UNITS: 100 INJECTION, SOLUTION INTRAVENOUS; SUBCUTANEOUS at 18:13

## 2020-08-20 RX ADMIN — INSULIN LISPRO 4 UNITS: 100 INJECTION, SOLUTION INTRAVENOUS; SUBCUTANEOUS at 09:12

## 2020-08-20 RX ADMIN — MONTELUKAST SODIUM 10 MG: 10 TABLET, FILM COATED ORAL at 20:34

## 2020-08-20 RX ADMIN — SODIUM CHLORIDE, PRESERVATIVE FREE 10 ML: 5 INJECTION INTRAVENOUS at 20:34

## 2020-08-20 RX ADMIN — DILTIAZEM HYDROCHLORIDE 60 MG: 60 TABLET, FILM COATED ORAL at 00:25

## 2020-08-20 RX ADMIN — DILTIAZEM HYDROCHLORIDE 90 MG: 60 TABLET, FILM COATED ORAL at 18:12

## 2020-08-20 RX ADMIN — INSULIN LISPRO 5 UNITS: 100 INJECTION, SOLUTION INTRAVENOUS; SUBCUTANEOUS at 09:14

## 2020-08-20 RX ADMIN — DIGOXIN 125 MCG: 0.25 INJECTION INTRAMUSCULAR; INTRAVENOUS at 18:12

## 2020-08-20 RX ADMIN — INSULIN LISPRO 5 UNITS: 100 INJECTION, SOLUTION INTRAVENOUS; SUBCUTANEOUS at 18:13

## 2020-08-20 RX ADMIN — MULTIPLE VITAMINS W/ MINERALS TAB 1 TABLET: TAB at 09:11

## 2020-08-20 RX ADMIN — ERTAPENEM 1 G: 1 INJECTION INTRAMUSCULAR; INTRAVENOUS at 18:12

## 2020-08-20 RX ADMIN — HYDROCODONE BITARTRATE AND ACETAMINOPHEN 1 TABLET: 5; 325 TABLET ORAL at 15:45

## 2020-08-20 RX ADMIN — CLOPIDOGREL 75 MG: 75 TABLET, FILM COATED ORAL at 09:11

## 2020-08-20 RX ADMIN — INSULIN LISPRO 5 UNITS: 100 INJECTION, SOLUTION INTRAVENOUS; SUBCUTANEOUS at 12:32

## 2020-08-20 RX ADMIN — SODIUM CHLORIDE, PRESERVATIVE FREE 10 ML: 5 INJECTION INTRAVENOUS at 09:13

## 2020-08-20 RX ADMIN — DIGOXIN 250 MCG: 0.25 INJECTION INTRAMUSCULAR; INTRAVENOUS at 09:12

## 2020-08-20 RX ADMIN — HYDROXYUREA 500 MG: 500 CAPSULE ORAL at 09:11

## 2020-08-20 RX ADMIN — HYDROCODONE BITARTRATE AND ACETAMINOPHEN 1 TABLET: 5; 325 TABLET ORAL at 09:41

## 2020-08-20 RX ADMIN — DILTIAZEM HYDROCHLORIDE 90 MG: 60 TABLET, FILM COATED ORAL at 12:32

## 2020-08-21 ENCOUNTER — APPOINTMENT (OUTPATIENT)
Dept: MRI IMAGING | Facility: HOSPITAL | Age: 75
End: 2020-08-21

## 2020-08-21 LAB
ANION GAP SERPL CALCULATED.3IONS-SCNC: 8.1 MMOL/L (ref 5–15)
BASOPHILS # BLD AUTO: 0.05 10*3/MM3 (ref 0–0.2)
BASOPHILS NFR BLD AUTO: 0.4 % (ref 0–1.5)
BUN SERPL-MCNC: 29 MG/DL (ref 8–23)
BUN/CREAT SERPL: 31.9 (ref 7–25)
CALCIUM SPEC-SCNC: 8.4 MG/DL (ref 8.6–10.5)
CHLORIDE SERPL-SCNC: 102 MMOL/L (ref 98–107)
CO2 SERPL-SCNC: 26.9 MMOL/L (ref 22–29)
CREAT SERPL-MCNC: 0.91 MG/DL (ref 0.76–1.27)
DEPRECATED RDW RBC AUTO: 41.4 FL (ref 37–54)
EOSINOPHIL # BLD AUTO: 0.07 10*3/MM3 (ref 0–0.4)
EOSINOPHIL NFR BLD AUTO: 0.6 % (ref 0.3–6.2)
ERYTHROCYTE [DISTWIDTH] IN BLOOD BY AUTOMATED COUNT: 13.4 % (ref 12.3–15.4)
GFR SERPL CREATININE-BSD FRML MDRD: 82 ML/MIN/1.73
GLUCOSE BLDC GLUCOMTR-MCNC: 175 MG/DL (ref 70–130)
GLUCOSE BLDC GLUCOMTR-MCNC: 219 MG/DL (ref 70–130)
GLUCOSE BLDC GLUCOMTR-MCNC: 286 MG/DL (ref 70–130)
GLUCOSE BLDC GLUCOMTR-MCNC: 305 MG/DL (ref 70–130)
GLUCOSE SERPL-MCNC: 212 MG/DL (ref 65–99)
HCT VFR BLD AUTO: 38 % (ref 37.5–51)
HGB BLD-MCNC: 12.6 G/DL (ref 13–17.7)
IMM GRANULOCYTES # BLD AUTO: 0.16 10*3/MM3 (ref 0–0.05)
IMM GRANULOCYTES NFR BLD AUTO: 1.4 % (ref 0–0.5)
LYMPHOCYTES # BLD AUTO: 0.71 10*3/MM3 (ref 0.7–3.1)
LYMPHOCYTES NFR BLD AUTO: 6 % (ref 19.6–45.3)
MCH RBC QN AUTO: 28.1 PG (ref 26.6–33)
MCHC RBC AUTO-ENTMCNC: 33.2 G/DL (ref 31.5–35.7)
MCV RBC AUTO: 84.6 FL (ref 79–97)
MONOCYTES # BLD AUTO: 0.75 10*3/MM3 (ref 0.1–0.9)
MONOCYTES NFR BLD AUTO: 6.4 % (ref 5–12)
NEUTROPHILS NFR BLD AUTO: 10.06 10*3/MM3 (ref 1.7–7)
NEUTROPHILS NFR BLD AUTO: 85.2 % (ref 42.7–76)
NRBC BLD AUTO-RTO: 0 /100 WBC (ref 0–0.2)
PLATELET # BLD AUTO: 190 10*3/MM3 (ref 140–450)
PMV BLD AUTO: 11.5 FL (ref 6–12)
POTASSIUM SERPL-SCNC: 3.9 MMOL/L (ref 3.5–5.2)
RBC # BLD AUTO: 4.49 10*6/MM3 (ref 4.14–5.8)
SODIUM SERPL-SCNC: 137 MMOL/L (ref 136–145)
WBC # BLD AUTO: 11.8 10*3/MM3 (ref 3.4–10.8)

## 2020-08-21 PROCEDURE — 97530 THERAPEUTIC ACTIVITIES: CPT

## 2020-08-21 PROCEDURE — 72158 MRI LUMBAR SPINE W/O & W/DYE: CPT

## 2020-08-21 PROCEDURE — 25010000002 GADOTERATE MEGLUMINE 10 MMOL/20ML SOLUTION: Performed by: INTERNAL MEDICINE

## 2020-08-21 PROCEDURE — 25010000002 ERTAPENEM PER 500 MG: Performed by: INTERNAL MEDICINE

## 2020-08-21 PROCEDURE — 63710000001 INSULIN GLARGINE PER 5 UNITS: Performed by: INTERNAL MEDICINE

## 2020-08-21 PROCEDURE — 80048 BASIC METABOLIC PNL TOTAL CA: CPT | Performed by: INTERNAL MEDICINE

## 2020-08-21 PROCEDURE — 63710000001 INSULIN LISPRO (HUMAN) PER 5 UNITS: Performed by: INTERNAL MEDICINE

## 2020-08-21 PROCEDURE — 99232 SBSQ HOSP IP/OBS MODERATE 35: CPT | Performed by: INTERNAL MEDICINE

## 2020-08-21 PROCEDURE — 82962 GLUCOSE BLOOD TEST: CPT

## 2020-08-21 PROCEDURE — 25010000002 DIGOXIN PER 500 MCG: Performed by: INTERNAL MEDICINE

## 2020-08-21 PROCEDURE — 97110 THERAPEUTIC EXERCISES: CPT

## 2020-08-21 PROCEDURE — A9575 INJ GADOTERATE MEGLUMI 0.1ML: HCPCS | Performed by: INTERNAL MEDICINE

## 2020-08-21 PROCEDURE — 85025 COMPLETE CBC W/AUTO DIFF WBC: CPT | Performed by: INTERNAL MEDICINE

## 2020-08-21 PROCEDURE — 99223 1ST HOSP IP/OBS HIGH 75: CPT | Performed by: INTERNAL MEDICINE

## 2020-08-21 RX ORDER — GADOTERATE MEGLUMINE 376.9 MG/ML
20 INJECTION INTRAVENOUS
Status: COMPLETED | OUTPATIENT
Start: 2020-08-21 | End: 2020-08-21

## 2020-08-21 RX ORDER — FUROSEMIDE 40 MG/1
40 TABLET ORAL DAILY
Status: DISCONTINUED | OUTPATIENT
Start: 2020-08-21 | End: 2020-08-29 | Stop reason: HOSPADM

## 2020-08-21 RX ORDER — HYDRALAZINE HYDROCHLORIDE 25 MG/1
25 TABLET, FILM COATED ORAL EVERY 8 HOURS SCHEDULED
Status: DISCONTINUED | OUTPATIENT
Start: 2020-08-21 | End: 2020-08-26

## 2020-08-21 RX ADMIN — MONTELUKAST SODIUM 10 MG: 10 TABLET, FILM COATED ORAL at 21:02

## 2020-08-21 RX ADMIN — LIDOCAINE 1 PATCH: 50 PATCH CUTANEOUS at 08:34

## 2020-08-21 RX ADMIN — INSULIN LISPRO 4 UNITS: 100 INJECTION, SOLUTION INTRAVENOUS; SUBCUTANEOUS at 18:33

## 2020-08-21 RX ADMIN — SODIUM CHLORIDE, PRESERVATIVE FREE 10 ML: 5 INJECTION INTRAVENOUS at 21:15

## 2020-08-21 RX ADMIN — INSULIN LISPRO 5 UNITS: 100 INJECTION, SOLUTION INTRAVENOUS; SUBCUTANEOUS at 08:32

## 2020-08-21 RX ADMIN — HYDROXYUREA 500 MG: 500 CAPSULE ORAL at 08:34

## 2020-08-21 RX ADMIN — PRAVASTATIN SODIUM 40 MG: 40 TABLET ORAL at 08:35

## 2020-08-21 RX ADMIN — DIGOXIN 125 MCG: 0.25 INJECTION INTRAMUSCULAR; INTRAVENOUS at 01:32

## 2020-08-21 RX ADMIN — HYDRALAZINE HYDROCHLORIDE 25 MG: 25 TABLET, FILM COATED ORAL at 13:05

## 2020-08-21 RX ADMIN — HYDROCODONE BITARTRATE AND ACETAMINOPHEN 1 TABLET: 5; 325 TABLET ORAL at 15:51

## 2020-08-21 RX ADMIN — HYDROCODONE BITARTRATE AND ACETAMINOPHEN 1 TABLET: 5; 325 TABLET ORAL at 01:32

## 2020-08-21 RX ADMIN — ERTAPENEM 1 G: 1 INJECTION INTRAMUSCULAR; INTRAVENOUS at 18:32

## 2020-08-21 RX ADMIN — TAMSULOSIN HYDROCHLORIDE 0.4 MG: 0.4 CAPSULE ORAL at 08:33

## 2020-08-21 RX ADMIN — FINASTERIDE 5 MG: 5 TABLET, FILM COATED ORAL at 21:03

## 2020-08-21 RX ADMIN — INSULIN LISPRO 5 UNITS: 100 INJECTION, SOLUTION INTRAVENOUS; SUBCUTANEOUS at 18:32

## 2020-08-21 RX ADMIN — HYDROCODONE BITARTRATE AND ACETAMINOPHEN 1 TABLET: 5; 325 TABLET ORAL at 21:03

## 2020-08-21 RX ADMIN — INSULIN LISPRO 4 UNITS: 100 INJECTION, SOLUTION INTRAVENOUS; SUBCUTANEOUS at 13:10

## 2020-08-21 RX ADMIN — DILTIAZEM HYDROCHLORIDE 90 MG: 60 TABLET, FILM COATED ORAL at 01:32

## 2020-08-21 RX ADMIN — INSULIN LISPRO 5 UNITS: 100 INJECTION, SOLUTION INTRAVENOUS; SUBCUTANEOUS at 13:05

## 2020-08-21 RX ADMIN — GADOTERATE MEGLUMINE 20 ML: 376.9 INJECTION, SOLUTION INTRAVENOUS at 17:30

## 2020-08-21 RX ADMIN — DILTIAZEM HYDROCHLORIDE 90 MG: 60 TABLET, FILM COATED ORAL at 13:05

## 2020-08-21 RX ADMIN — DILTIAZEM HYDROCHLORIDE 90 MG: 60 TABLET, FILM COATED ORAL at 23:31

## 2020-08-21 RX ADMIN — HYDROCODONE BITARTRATE AND ACETAMINOPHEN 1 TABLET: 5; 325 TABLET ORAL at 11:20

## 2020-08-21 RX ADMIN — MULTIPLE VITAMINS W/ MINERALS TAB 1 TABLET: TAB at 08:33

## 2020-08-21 RX ADMIN — INSULIN LISPRO 2 UNITS: 100 INJECTION, SOLUTION INTRAVENOUS; SUBCUTANEOUS at 08:32

## 2020-08-21 RX ADMIN — DILTIAZEM HYDROCHLORIDE 90 MG: 60 TABLET, FILM COATED ORAL at 18:32

## 2020-08-21 RX ADMIN — HYDRALAZINE HYDROCHLORIDE 25 MG: 25 TABLET, FILM COATED ORAL at 21:02

## 2020-08-21 RX ADMIN — CLOPIDOGREL 75 MG: 75 TABLET, FILM COATED ORAL at 08:34

## 2020-08-21 RX ADMIN — INSULIN GLARGINE 20 UNITS: 100 INJECTION, SOLUTION SUBCUTANEOUS at 21:15

## 2020-08-21 RX ADMIN — SODIUM CHLORIDE, PRESERVATIVE FREE 10 ML: 5 INJECTION INTRAVENOUS at 08:35

## 2020-08-21 RX ADMIN — DILTIAZEM HYDROCHLORIDE 90 MG: 60 TABLET, FILM COATED ORAL at 06:11

## 2020-08-21 RX ADMIN — FUROSEMIDE 40 MG: 40 TABLET ORAL at 13:04

## 2020-08-22 LAB
ANION GAP SERPL CALCULATED.3IONS-SCNC: 7.6 MMOL/L (ref 5–15)
BASOPHILS # BLD AUTO: 0.06 10*3/MM3 (ref 0–0.2)
BASOPHILS NFR BLD AUTO: 0.5 % (ref 0–1.5)
BUN SERPL-MCNC: 21 MG/DL (ref 8–23)
BUN/CREAT SERPL: 21.2 (ref 7–25)
CALCIUM SPEC-SCNC: 8.2 MG/DL (ref 8.6–10.5)
CHLORIDE SERPL-SCNC: 101 MMOL/L (ref 98–107)
CO2 SERPL-SCNC: 24.4 MMOL/L (ref 22–29)
CREAT SERPL-MCNC: 0.99 MG/DL (ref 0.76–1.27)
CRP SERPL-MCNC: 15.9 MG/DL (ref 0–0.5)
DEPRECATED RDW RBC AUTO: 41.2 FL (ref 37–54)
EOSINOPHIL # BLD AUTO: 0.04 10*3/MM3 (ref 0–0.4)
EOSINOPHIL NFR BLD AUTO: 0.3 % (ref 0.3–6.2)
ERYTHROCYTE [DISTWIDTH] IN BLOOD BY AUTOMATED COUNT: 13.4 % (ref 12.3–15.4)
ERYTHROCYTE [SEDIMENTATION RATE] IN BLOOD: 17 MM/HR (ref 0–20)
GFR SERPL CREATININE-BSD FRML MDRD: 74 ML/MIN/1.73
GLUCOSE BLDC GLUCOMTR-MCNC: 210 MG/DL (ref 70–130)
GLUCOSE BLDC GLUCOMTR-MCNC: 242 MG/DL (ref 70–130)
GLUCOSE BLDC GLUCOMTR-MCNC: 245 MG/DL (ref 70–130)
GLUCOSE BLDC GLUCOMTR-MCNC: 263 MG/DL (ref 70–130)
GLUCOSE SERPL-MCNC: 238 MG/DL (ref 65–99)
HCT VFR BLD AUTO: 38.1 % (ref 37.5–51)
HGB BLD-MCNC: 12.7 G/DL (ref 13–17.7)
IMM GRANULOCYTES # BLD AUTO: 0.24 10*3/MM3 (ref 0–0.05)
IMM GRANULOCYTES NFR BLD AUTO: 1.9 % (ref 0–0.5)
LYMPHOCYTES # BLD AUTO: 0.83 10*3/MM3 (ref 0.7–3.1)
LYMPHOCYTES NFR BLD AUTO: 6.7 % (ref 19.6–45.3)
MCH RBC QN AUTO: 28.2 PG (ref 26.6–33)
MCHC RBC AUTO-ENTMCNC: 33.3 G/DL (ref 31.5–35.7)
MCV RBC AUTO: 84.5 FL (ref 79–97)
MONOCYTES # BLD AUTO: 0.86 10*3/MM3 (ref 0.1–0.9)
MONOCYTES NFR BLD AUTO: 6.9 % (ref 5–12)
NEUTROPHILS NFR BLD AUTO: 10.4 10*3/MM3 (ref 1.7–7)
NEUTROPHILS NFR BLD AUTO: 83.7 % (ref 42.7–76)
NRBC BLD AUTO-RTO: 0 /100 WBC (ref 0–0.2)
PLATELET # BLD AUTO: 287 10*3/MM3 (ref 140–450)
PMV BLD AUTO: 10.7 FL (ref 6–12)
POTASSIUM SERPL-SCNC: 3.8 MMOL/L (ref 3.5–5.2)
RBC # BLD AUTO: 4.51 10*6/MM3 (ref 4.14–5.8)
SODIUM SERPL-SCNC: 133 MMOL/L (ref 136–145)
WBC # BLD AUTO: 12.43 10*3/MM3 (ref 3.4–10.8)

## 2020-08-22 PROCEDURE — 86140 C-REACTIVE PROTEIN: CPT | Performed by: INTERNAL MEDICINE

## 2020-08-22 PROCEDURE — 82962 GLUCOSE BLOOD TEST: CPT

## 2020-08-22 PROCEDURE — 25010000002 ERTAPENEM PER 500 MG: Performed by: INTERNAL MEDICINE

## 2020-08-22 PROCEDURE — 97110 THERAPEUTIC EXERCISES: CPT

## 2020-08-22 PROCEDURE — 99232 SBSQ HOSP IP/OBS MODERATE 35: CPT | Performed by: NURSE PRACTITIONER

## 2020-08-22 PROCEDURE — 63710000001 INSULIN GLARGINE PER 5 UNITS: Performed by: INTERNAL MEDICINE

## 2020-08-22 PROCEDURE — 85652 RBC SED RATE AUTOMATED: CPT | Performed by: INTERNAL MEDICINE

## 2020-08-22 PROCEDURE — 80048 BASIC METABOLIC PNL TOTAL CA: CPT | Performed by: INTERNAL MEDICINE

## 2020-08-22 PROCEDURE — 63710000001 INSULIN LISPRO (HUMAN) PER 5 UNITS: Performed by: INTERNAL MEDICINE

## 2020-08-22 PROCEDURE — 85025 COMPLETE CBC W/AUTO DIFF WBC: CPT | Performed by: INTERNAL MEDICINE

## 2020-08-22 PROCEDURE — 94799 UNLISTED PULMONARY SVC/PX: CPT

## 2020-08-22 PROCEDURE — 99232 SBSQ HOSP IP/OBS MODERATE 35: CPT | Performed by: INTERNAL MEDICINE

## 2020-08-22 RX ORDER — AMOXICILLIN 250 MG
2 CAPSULE ORAL 2 TIMES DAILY PRN
Status: DISCONTINUED | OUTPATIENT
Start: 2020-08-22 | End: 2020-08-25

## 2020-08-22 RX ORDER — BISACODYL 10 MG
10 SUPPOSITORY, RECTAL RECTAL DAILY PRN
Status: DISCONTINUED | OUTPATIENT
Start: 2020-08-22 | End: 2020-08-29 | Stop reason: HOSPADM

## 2020-08-22 RX ADMIN — SODIUM CHLORIDE, PRESERVATIVE FREE 10 ML: 5 INJECTION INTRAVENOUS at 21:08

## 2020-08-22 RX ADMIN — INSULIN LISPRO 4 UNITS: 100 INJECTION, SOLUTION INTRAVENOUS; SUBCUTANEOUS at 13:05

## 2020-08-22 RX ADMIN — MONTELUKAST SODIUM 10 MG: 10 TABLET, FILM COATED ORAL at 21:07

## 2020-08-22 RX ADMIN — HYDROCODONE BITARTRATE AND ACETAMINOPHEN 1 TABLET: 5; 325 TABLET ORAL at 15:14

## 2020-08-22 RX ADMIN — DOCUSATE SODIUM 50MG AND SENNOSIDES 8.6MG 2 TABLET: 8.6; 5 TABLET, FILM COATED ORAL at 13:05

## 2020-08-22 RX ADMIN — CLOPIDOGREL 75 MG: 75 TABLET, FILM COATED ORAL at 08:59

## 2020-08-22 RX ADMIN — FINASTERIDE 5 MG: 5 TABLET, FILM COATED ORAL at 21:07

## 2020-08-22 RX ADMIN — INSULIN LISPRO 6 UNITS: 100 INJECTION, SOLUTION INTRAVENOUS; SUBCUTANEOUS at 08:59

## 2020-08-22 RX ADMIN — INSULIN LISPRO 5 UNITS: 100 INJECTION, SOLUTION INTRAVENOUS; SUBCUTANEOUS at 09:00

## 2020-08-22 RX ADMIN — INSULIN GLARGINE 20 UNITS: 100 INJECTION, SOLUTION SUBCUTANEOUS at 21:36

## 2020-08-22 RX ADMIN — MULTIPLE VITAMINS W/ MINERALS TAB 1 TABLET: TAB at 08:59

## 2020-08-22 RX ADMIN — LIDOCAINE 1 PATCH: 50 PATCH CUTANEOUS at 08:58

## 2020-08-22 RX ADMIN — HYDROCODONE BITARTRATE AND ACETAMINOPHEN 1 TABLET: 5; 325 TABLET ORAL at 09:08

## 2020-08-22 RX ADMIN — DILTIAZEM HYDROCHLORIDE 90 MG: 60 TABLET, FILM COATED ORAL at 18:07

## 2020-08-22 RX ADMIN — HYDRALAZINE HYDROCHLORIDE 25 MG: 25 TABLET, FILM COATED ORAL at 05:22

## 2020-08-22 RX ADMIN — INSULIN LISPRO 5 UNITS: 100 INJECTION, SOLUTION INTRAVENOUS; SUBCUTANEOUS at 13:05

## 2020-08-22 RX ADMIN — SODIUM CHLORIDE, PRESERVATIVE FREE 10 ML: 5 INJECTION INTRAVENOUS at 09:01

## 2020-08-22 RX ADMIN — METOPROLOL TARTRATE 25 MG: 25 TABLET, FILM COATED ORAL at 13:04

## 2020-08-22 RX ADMIN — INSULIN LISPRO 4 UNITS: 100 INJECTION, SOLUTION INTRAVENOUS; SUBCUTANEOUS at 18:07

## 2020-08-22 RX ADMIN — INSULIN LISPRO 5 UNITS: 100 INJECTION, SOLUTION INTRAVENOUS; SUBCUTANEOUS at 18:07

## 2020-08-22 RX ADMIN — DILTIAZEM HYDROCHLORIDE 90 MG: 60 TABLET, FILM COATED ORAL at 13:04

## 2020-08-22 RX ADMIN — HYDRALAZINE HYDROCHLORIDE 25 MG: 25 TABLET, FILM COATED ORAL at 15:14

## 2020-08-22 RX ADMIN — FUROSEMIDE 40 MG: 40 TABLET ORAL at 08:59

## 2020-08-22 RX ADMIN — METOPROLOL TARTRATE 25 MG: 25 TABLET, FILM COATED ORAL at 21:07

## 2020-08-22 RX ADMIN — PRAVASTATIN SODIUM 40 MG: 40 TABLET ORAL at 08:59

## 2020-08-22 RX ADMIN — TAMSULOSIN HYDROCHLORIDE 0.4 MG: 0.4 CAPSULE ORAL at 08:59

## 2020-08-22 RX ADMIN — ERTAPENEM 1 G: 1 INJECTION INTRAMUSCULAR; INTRAVENOUS at 18:07

## 2020-08-22 RX ADMIN — DILTIAZEM HYDROCHLORIDE 90 MG: 60 TABLET, FILM COATED ORAL at 05:22

## 2020-08-22 RX ADMIN — POLYETHYLENE GLYCOL 3350 17 G: 17 POWDER, FOR SOLUTION ORAL at 13:04

## 2020-08-22 RX ADMIN — HYDRALAZINE HYDROCHLORIDE 25 MG: 25 TABLET, FILM COATED ORAL at 21:07

## 2020-08-22 RX ADMIN — HYDROXYUREA 500 MG: 500 CAPSULE ORAL at 11:01

## 2020-08-23 LAB
BASOPHILS # BLD AUTO: 0.04 10*3/MM3 (ref 0–0.2)
BASOPHILS NFR BLD AUTO: 0.4 % (ref 0–1.5)
DEPRECATED RDW RBC AUTO: 41.5 FL (ref 37–54)
EOSINOPHIL # BLD AUTO: 0.07 10*3/MM3 (ref 0–0.4)
EOSINOPHIL NFR BLD AUTO: 0.8 % (ref 0.3–6.2)
ERYTHROCYTE [DISTWIDTH] IN BLOOD BY AUTOMATED COUNT: 13.5 % (ref 12.3–15.4)
GLUCOSE BLDC GLUCOMTR-MCNC: 194 MG/DL (ref 70–130)
GLUCOSE BLDC GLUCOMTR-MCNC: 247 MG/DL (ref 70–130)
GLUCOSE BLDC GLUCOMTR-MCNC: 269 MG/DL (ref 70–130)
GLUCOSE BLDC GLUCOMTR-MCNC: 307 MG/DL (ref 70–130)
HCT VFR BLD AUTO: 37.8 % (ref 37.5–51)
HGB BLD-MCNC: 12.4 G/DL (ref 13–17.7)
IMM GRANULOCYTES # BLD AUTO: 0.17 10*3/MM3 (ref 0–0.05)
IMM GRANULOCYTES NFR BLD AUTO: 1.9 % (ref 0–0.5)
LYMPHOCYTES # BLD AUTO: 0.71 10*3/MM3 (ref 0.7–3.1)
LYMPHOCYTES NFR BLD AUTO: 7.9 % (ref 19.6–45.3)
MCH RBC QN AUTO: 27.9 PG (ref 26.6–33)
MCHC RBC AUTO-ENTMCNC: 32.8 G/DL (ref 31.5–35.7)
MCV RBC AUTO: 84.9 FL (ref 79–97)
MONOCYTES # BLD AUTO: 0.68 10*3/MM3 (ref 0.1–0.9)
MONOCYTES NFR BLD AUTO: 7.6 % (ref 5–12)
NEUTROPHILS NFR BLD AUTO: 7.32 10*3/MM3 (ref 1.7–7)
NEUTROPHILS NFR BLD AUTO: 81.4 % (ref 42.7–76)
NRBC BLD AUTO-RTO: 0 /100 WBC (ref 0–0.2)
PLATELET # BLD AUTO: 289 10*3/MM3 (ref 140–450)
PMV BLD AUTO: 10.7 FL (ref 6–12)
RBC # BLD AUTO: 4.45 10*6/MM3 (ref 4.14–5.8)
WBC # BLD AUTO: 8.99 10*3/MM3 (ref 3.4–10.8)

## 2020-08-23 PROCEDURE — 99232 SBSQ HOSP IP/OBS MODERATE 35: CPT | Performed by: NURSE PRACTITIONER

## 2020-08-23 PROCEDURE — 85025 COMPLETE CBC W/AUTO DIFF WBC: CPT | Performed by: INTERNAL MEDICINE

## 2020-08-23 PROCEDURE — 82962 GLUCOSE BLOOD TEST: CPT

## 2020-08-23 PROCEDURE — 25010000002 ERTAPENEM PER 500 MG: Performed by: INTERNAL MEDICINE

## 2020-08-23 PROCEDURE — 63710000001 INSULIN GLARGINE PER 5 UNITS: Performed by: INTERNAL MEDICINE

## 2020-08-23 PROCEDURE — 80048 BASIC METABOLIC PNL TOTAL CA: CPT | Performed by: INTERNAL MEDICINE

## 2020-08-23 PROCEDURE — 63710000001 INSULIN LISPRO (HUMAN) PER 5 UNITS: Performed by: INTERNAL MEDICINE

## 2020-08-23 PROCEDURE — 99232 SBSQ HOSP IP/OBS MODERATE 35: CPT | Performed by: INTERNAL MEDICINE

## 2020-08-23 RX ADMIN — INSULIN LISPRO 5 UNITS: 100 INJECTION, SOLUTION INTRAVENOUS; SUBCUTANEOUS at 12:56

## 2020-08-23 RX ADMIN — METOPROLOL TARTRATE 25 MG: 25 TABLET, FILM COATED ORAL at 21:32

## 2020-08-23 RX ADMIN — SODIUM CHLORIDE, PRESERVATIVE FREE 10 ML: 5 INJECTION INTRAVENOUS at 21:32

## 2020-08-23 RX ADMIN — MULTIPLE VITAMINS W/ MINERALS TAB 1 TABLET: TAB at 09:10

## 2020-08-23 RX ADMIN — INSULIN LISPRO 5 UNITS: 100 INJECTION, SOLUTION INTRAVENOUS; SUBCUTANEOUS at 09:15

## 2020-08-23 RX ADMIN — INSULIN LISPRO 6 UNITS: 100 INJECTION, SOLUTION INTRAVENOUS; SUBCUTANEOUS at 12:57

## 2020-08-23 RX ADMIN — LIDOCAINE 1 PATCH: 50 PATCH CUTANEOUS at 09:09

## 2020-08-23 RX ADMIN — DILTIAZEM HYDROCHLORIDE 90 MG: 60 TABLET, FILM COATED ORAL at 12:55

## 2020-08-23 RX ADMIN — FUROSEMIDE 40 MG: 40 TABLET ORAL at 09:09

## 2020-08-23 RX ADMIN — POLYETHYLENE GLYCOL 3350 17 G: 17 POWDER, FOR SOLUTION ORAL at 09:09

## 2020-08-23 RX ADMIN — DILTIAZEM HYDROCHLORIDE 90 MG: 60 TABLET, FILM COATED ORAL at 18:36

## 2020-08-23 RX ADMIN — HYDRALAZINE HYDROCHLORIDE 25 MG: 25 TABLET, FILM COATED ORAL at 06:13

## 2020-08-23 RX ADMIN — NYSTATIN 500000 UNITS: 500000 SUSPENSION ORAL at 21:31

## 2020-08-23 RX ADMIN — SODIUM CHLORIDE, PRESERVATIVE FREE 10 ML: 5 INJECTION INTRAVENOUS at 09:10

## 2020-08-23 RX ADMIN — TAMSULOSIN HYDROCHLORIDE 0.4 MG: 0.4 CAPSULE ORAL at 09:09

## 2020-08-23 RX ADMIN — DILTIAZEM HYDROCHLORIDE 90 MG: 60 TABLET, FILM COATED ORAL at 06:13

## 2020-08-23 RX ADMIN — METOPROLOL TARTRATE 25 MG: 25 TABLET, FILM COATED ORAL at 12:55

## 2020-08-23 RX ADMIN — NYSTATIN 500000 UNITS: 500000 SUSPENSION ORAL at 18:37

## 2020-08-23 RX ADMIN — DOCUSATE SODIUM 50MG AND SENNOSIDES 8.6MG 2 TABLET: 8.6; 5 TABLET, FILM COATED ORAL at 09:10

## 2020-08-23 RX ADMIN — DILTIAZEM HYDROCHLORIDE 90 MG: 60 TABLET, FILM COATED ORAL at 01:17

## 2020-08-23 RX ADMIN — CLOPIDOGREL 75 MG: 75 TABLET, FILM COATED ORAL at 09:10

## 2020-08-23 RX ADMIN — DOCUSATE SODIUM 50MG AND SENNOSIDES 8.6MG 2 TABLET: 8.6; 5 TABLET, FILM COATED ORAL at 21:40

## 2020-08-23 RX ADMIN — METOPROLOL TARTRATE 25 MG: 25 TABLET, FILM COATED ORAL at 06:14

## 2020-08-23 RX ADMIN — INSULIN LISPRO 5 UNITS: 100 INJECTION, SOLUTION INTRAVENOUS; SUBCUTANEOUS at 18:37

## 2020-08-23 RX ADMIN — HYDRALAZINE HYDROCHLORIDE 25 MG: 25 TABLET, FILM COATED ORAL at 15:33

## 2020-08-23 RX ADMIN — ERTAPENEM 1 G: 1 INJECTION INTRAMUSCULAR; INTRAVENOUS at 18:36

## 2020-08-23 RX ADMIN — FINASTERIDE 5 MG: 5 TABLET, FILM COATED ORAL at 21:31

## 2020-08-23 RX ADMIN — MONTELUKAST SODIUM 10 MG: 10 TABLET, FILM COATED ORAL at 21:31

## 2020-08-23 RX ADMIN — HYDRALAZINE HYDROCHLORIDE 25 MG: 25 TABLET, FILM COATED ORAL at 21:31

## 2020-08-23 RX ADMIN — INSULIN LISPRO 4 UNITS: 100 INJECTION, SOLUTION INTRAVENOUS; SUBCUTANEOUS at 18:36

## 2020-08-23 RX ADMIN — PRAVASTATIN SODIUM 40 MG: 40 TABLET ORAL at 09:10

## 2020-08-23 RX ADMIN — HYDROCODONE BITARTRATE AND ACETAMINOPHEN 1 TABLET: 5; 325 TABLET ORAL at 06:14

## 2020-08-23 RX ADMIN — INSULIN GLARGINE 20 UNITS: 100 INJECTION, SOLUTION SUBCUTANEOUS at 21:51

## 2020-08-23 RX ADMIN — HYDROXYUREA 500 MG: 500 CAPSULE ORAL at 09:10

## 2020-08-23 RX ADMIN — NYSTATIN 500000 UNITS: 500000 SUSPENSION ORAL at 15:33

## 2020-08-23 RX ADMIN — INSULIN LISPRO 2 UNITS: 100 INJECTION, SOLUTION INTRAVENOUS; SUBCUTANEOUS at 09:10

## 2020-08-23 RX ADMIN — HYDROCODONE BITARTRATE AND ACETAMINOPHEN 1 TABLET: 5; 325 TABLET ORAL at 15:45

## 2020-08-24 LAB
ANION GAP SERPL CALCULATED.3IONS-SCNC: 3.3 MMOL/L (ref 5–15)
ANION GAP SERPL CALCULATED.3IONS-SCNC: 6.3 MMOL/L (ref 5–15)
BACTERIA SPEC AEROBE CULT: NORMAL
BACTERIA SPEC AEROBE CULT: NORMAL
BASOPHILS # BLD AUTO: 0.04 10*3/MM3 (ref 0–0.2)
BASOPHILS NFR BLD AUTO: 0.4 % (ref 0–1.5)
BUN SERPL-MCNC: 22 MG/DL (ref 8–23)
BUN SERPL-MCNC: 24 MG/DL (ref 8–23)
BUN/CREAT SERPL: 26.2 (ref 7–25)
BUN/CREAT SERPL: 30 (ref 7–25)
CALCIUM SPEC-SCNC: 8.5 MG/DL (ref 8.6–10.5)
CALCIUM SPEC-SCNC: 8.6 MG/DL (ref 8.6–10.5)
CHLORIDE SERPL-SCNC: 100 MMOL/L (ref 98–107)
CHLORIDE SERPL-SCNC: 101 MMOL/L (ref 98–107)
CO2 SERPL-SCNC: 24.7 MMOL/L (ref 22–29)
CO2 SERPL-SCNC: 25.7 MMOL/L (ref 22–29)
CREAT SERPL-MCNC: 0.8 MG/DL (ref 0.76–1.27)
CREAT SERPL-MCNC: 0.84 MG/DL (ref 0.76–1.27)
DEPRECATED RDW RBC AUTO: 42.1 FL (ref 37–54)
EOSINOPHIL # BLD AUTO: 0.07 10*3/MM3 (ref 0–0.4)
EOSINOPHIL NFR BLD AUTO: 0.7 % (ref 0.3–6.2)
ERYTHROCYTE [DISTWIDTH] IN BLOOD BY AUTOMATED COUNT: 13.6 % (ref 12.3–15.4)
GFR SERPL CREATININE-BSD FRML MDRD: 90 ML/MIN/1.73
GFR SERPL CREATININE-BSD FRML MDRD: 95 ML/MIN/1.73
GLUCOSE BLDC GLUCOMTR-MCNC: 174 MG/DL (ref 70–130)
GLUCOSE BLDC GLUCOMTR-MCNC: 176 MG/DL (ref 70–130)
GLUCOSE BLDC GLUCOMTR-MCNC: 198 MG/DL (ref 70–130)
GLUCOSE BLDC GLUCOMTR-MCNC: 231 MG/DL (ref 70–130)
GLUCOSE SERPL-MCNC: 219 MG/DL (ref 65–99)
GLUCOSE SERPL-MCNC: 225 MG/DL (ref 65–99)
HCT VFR BLD AUTO: 36.2 % (ref 37.5–51)
HGB BLD-MCNC: 12 G/DL (ref 13–17.7)
IMM GRANULOCYTES # BLD AUTO: 0.19 10*3/MM3 (ref 0–0.05)
IMM GRANULOCYTES NFR BLD AUTO: 1.8 % (ref 0–0.5)
LYMPHOCYTES # BLD AUTO: 0.72 10*3/MM3 (ref 0.7–3.1)
LYMPHOCYTES NFR BLD AUTO: 6.9 % (ref 19.6–45.3)
MCH RBC QN AUTO: 28.1 PG (ref 26.6–33)
MCHC RBC AUTO-ENTMCNC: 33.1 G/DL (ref 31.5–35.7)
MCV RBC AUTO: 84.8 FL (ref 79–97)
MONOCYTES # BLD AUTO: 0.64 10*3/MM3 (ref 0.1–0.9)
MONOCYTES NFR BLD AUTO: 6.2 % (ref 5–12)
NEUTROPHILS NFR BLD AUTO: 8.7 10*3/MM3 (ref 1.7–7)
NEUTROPHILS NFR BLD AUTO: 84 % (ref 42.7–76)
NRBC BLD AUTO-RTO: 0 /100 WBC (ref 0–0.2)
PLATELET # BLD AUTO: 391 10*3/MM3 (ref 140–450)
PMV BLD AUTO: 10.5 FL (ref 6–12)
POTASSIUM SERPL-SCNC: 3.8 MMOL/L (ref 3.5–5.2)
POTASSIUM SERPL-SCNC: 3.8 MMOL/L (ref 3.5–5.2)
RBC # BLD AUTO: 4.27 10*6/MM3 (ref 4.14–5.8)
SODIUM SERPL-SCNC: 128 MMOL/L (ref 136–145)
SODIUM SERPL-SCNC: 133 MMOL/L (ref 136–145)
WBC # BLD AUTO: 10.36 10*3/MM3 (ref 3.4–10.8)

## 2020-08-24 PROCEDURE — 63710000001 INSULIN GLARGINE PER 5 UNITS: Performed by: INTERNAL MEDICINE

## 2020-08-24 PROCEDURE — 63710000001 INSULIN LISPRO (HUMAN) PER 5 UNITS: Performed by: INTERNAL MEDICINE

## 2020-08-24 PROCEDURE — 25010000002 ERTAPENEM PER 500 MG: Performed by: INTERNAL MEDICINE

## 2020-08-24 PROCEDURE — C1751 CATH, INF, PER/CENT/MIDLINE: HCPCS

## 2020-08-24 PROCEDURE — 99232 SBSQ HOSP IP/OBS MODERATE 35: CPT | Performed by: INTERNAL MEDICINE

## 2020-08-24 PROCEDURE — 82962 GLUCOSE BLOOD TEST: CPT

## 2020-08-24 PROCEDURE — 02HV33Z INSERTION OF INFUSION DEVICE INTO SUPERIOR VENA CAVA, PERCUTANEOUS APPROACH: ICD-10-PCS | Performed by: INTERNAL MEDICINE

## 2020-08-24 PROCEDURE — 80048 BASIC METABOLIC PNL TOTAL CA: CPT | Performed by: INTERNAL MEDICINE

## 2020-08-24 PROCEDURE — 99231 SBSQ HOSP IP/OBS SF/LOW 25: CPT | Performed by: NURSE PRACTITIONER

## 2020-08-24 PROCEDURE — 97110 THERAPEUTIC EXERCISES: CPT

## 2020-08-24 PROCEDURE — 85025 COMPLETE CBC W/AUTO DIFF WBC: CPT | Performed by: INTERNAL MEDICINE

## 2020-08-24 PROCEDURE — 97530 THERAPEUTIC ACTIVITIES: CPT

## 2020-08-24 RX ORDER — METOPROLOL TARTRATE 50 MG/1
50 TABLET, FILM COATED ORAL EVERY 12 HOURS SCHEDULED
Status: DISCONTINUED | OUTPATIENT
Start: 2020-08-24 | End: 2020-08-25

## 2020-08-24 RX ORDER — DILTIAZEM HYDROCHLORIDE 180 MG/1
360 CAPSULE, COATED, EXTENDED RELEASE ORAL
Status: DISCONTINUED | OUTPATIENT
Start: 2020-08-24 | End: 2020-08-29 | Stop reason: HOSPADM

## 2020-08-24 RX ADMIN — HYDROCODONE BITARTRATE AND ACETAMINOPHEN 1 TABLET: 5; 325 TABLET ORAL at 05:53

## 2020-08-24 RX ADMIN — MULTIPLE VITAMINS W/ MINERALS TAB 1 TABLET: TAB at 09:12

## 2020-08-24 RX ADMIN — NYSTATIN 500000 UNITS: 500000 SUSPENSION ORAL at 09:15

## 2020-08-24 RX ADMIN — ERTAPENEM 1 G: 1 INJECTION INTRAMUSCULAR; INTRAVENOUS at 18:33

## 2020-08-24 RX ADMIN — HYDROXYUREA 500 MG: 500 CAPSULE ORAL at 09:12

## 2020-08-24 RX ADMIN — NYSTATIN 500000 UNITS: 500000 SUSPENSION ORAL at 18:32

## 2020-08-24 RX ADMIN — HYDRALAZINE HYDROCHLORIDE 25 MG: 25 TABLET, FILM COATED ORAL at 18:32

## 2020-08-24 RX ADMIN — APIXABAN 5 MG: 5 TABLET, FILM COATED ORAL at 09:12

## 2020-08-24 RX ADMIN — HYDRALAZINE HYDROCHLORIDE 25 MG: 25 TABLET, FILM COATED ORAL at 05:32

## 2020-08-24 RX ADMIN — INSULIN LISPRO 4 UNITS: 100 INJECTION, SOLUTION INTRAVENOUS; SUBCUTANEOUS at 09:13

## 2020-08-24 RX ADMIN — FINASTERIDE 5 MG: 5 TABLET, FILM COATED ORAL at 21:55

## 2020-08-24 RX ADMIN — POLYETHYLENE GLYCOL 3350 17 G: 17 POWDER, FOR SOLUTION ORAL at 09:12

## 2020-08-24 RX ADMIN — METOPROLOL TARTRATE 25 MG: 25 TABLET, FILM COATED ORAL at 05:32

## 2020-08-24 RX ADMIN — LIDOCAINE 1 PATCH: 50 PATCH CUTANEOUS at 09:12

## 2020-08-24 RX ADMIN — FUROSEMIDE 40 MG: 40 TABLET ORAL at 09:12

## 2020-08-24 RX ADMIN — HYDROCODONE BITARTRATE AND ACETAMINOPHEN 1 TABLET: 5; 325 TABLET ORAL at 18:33

## 2020-08-24 RX ADMIN — INSULIN GLARGINE 20 UNITS: 100 INJECTION, SOLUTION SUBCUTANEOUS at 22:11

## 2020-08-24 RX ADMIN — INSULIN LISPRO 5 UNITS: 100 INJECTION, SOLUTION INTRAVENOUS; SUBCUTANEOUS at 18:32

## 2020-08-24 RX ADMIN — DILTIAZEM HYDROCHLORIDE 90 MG: 60 TABLET, FILM COATED ORAL at 05:32

## 2020-08-24 RX ADMIN — INSULIN LISPRO 2 UNITS: 100 INJECTION, SOLUTION INTRAVENOUS; SUBCUTANEOUS at 18:33

## 2020-08-24 RX ADMIN — NYSTATIN 500000 UNITS: 500000 SUSPENSION ORAL at 21:56

## 2020-08-24 RX ADMIN — DILTIAZEM HYDROCHLORIDE 90 MG: 60 TABLET, FILM COATED ORAL at 00:10

## 2020-08-24 RX ADMIN — DILTIAZEM HYDROCHLORIDE 360 MG: 180 CAPSULE, COATED, EXTENDED RELEASE ORAL at 10:23

## 2020-08-24 RX ADMIN — TAMSULOSIN HYDROCHLORIDE 0.4 MG: 0.4 CAPSULE ORAL at 09:12

## 2020-08-24 RX ADMIN — METOPROLOL TARTRATE 50 MG: 50 TABLET, FILM COATED ORAL at 21:56

## 2020-08-24 RX ADMIN — SODIUM CHLORIDE, PRESERVATIVE FREE 10 ML: 5 INJECTION INTRAVENOUS at 21:57

## 2020-08-24 RX ADMIN — HYDRALAZINE HYDROCHLORIDE 25 MG: 25 TABLET, FILM COATED ORAL at 21:56

## 2020-08-24 RX ADMIN — MONTELUKAST SODIUM 10 MG: 10 TABLET, FILM COATED ORAL at 21:56

## 2020-08-24 RX ADMIN — SODIUM CHLORIDE, PRESERVATIVE FREE 10 ML: 5 INJECTION INTRAVENOUS at 09:13

## 2020-08-24 RX ADMIN — PRAVASTATIN SODIUM 40 MG: 40 TABLET ORAL at 09:12

## 2020-08-24 RX ADMIN — INSULIN LISPRO 5 UNITS: 100 INJECTION, SOLUTION INTRAVENOUS; SUBCUTANEOUS at 09:15

## 2020-08-24 RX ADMIN — APIXABAN 5 MG: 5 TABLET, FILM COATED ORAL at 21:56

## 2020-08-25 ENCOUNTER — TELEPHONE (OUTPATIENT)
Dept: NEUROSURGERY | Facility: CLINIC | Age: 75
End: 2020-08-25

## 2020-08-25 DIAGNOSIS — R78.81 BACTEREMIA DUE TO ESCHERICHIA COLI: ICD-10-CM

## 2020-08-25 DIAGNOSIS — Z16.12 URINARY TRACT INFECTION DUE TO EXTENDED-SPECTRUM BETA LACTAMASE (ESBL) PRODUCING ESCHERICHIA COLI: ICD-10-CM

## 2020-08-25 DIAGNOSIS — A41.51 SEPSIS DUE TO ESCHERICHIA COLI, UNSPECIFIED WHETHER ACUTE ORGAN DYSFUNCTION PRESENT (HCC): Primary | ICD-10-CM

## 2020-08-25 DIAGNOSIS — N39.0 URINARY TRACT INFECTION DUE TO EXTENDED-SPECTRUM BETA LACTAMASE (ESBL) PRODUCING ESCHERICHIA COLI: ICD-10-CM

## 2020-08-25 DIAGNOSIS — Q87.0 ACROCEPHALOSYNDACTYLY SYNDROME, TYPE I: ICD-10-CM

## 2020-08-25 DIAGNOSIS — B96.29 URINARY TRACT INFECTION DUE TO EXTENDED-SPECTRUM BETA LACTAMASE (ESBL) PRODUCING ESCHERICHIA COLI: ICD-10-CM

## 2020-08-25 DIAGNOSIS — B96.20 BACTEREMIA DUE TO ESCHERICHIA COLI: ICD-10-CM

## 2020-08-25 LAB
ANION GAP SERPL CALCULATED.3IONS-SCNC: 9.2 MMOL/L (ref 5–15)
BUN SERPL-MCNC: 19 MG/DL (ref 8–23)
BUN/CREAT SERPL: 21.6 (ref 7–25)
CALCIUM SPEC-SCNC: 8.7 MG/DL (ref 8.6–10.5)
CHLORIDE SERPL-SCNC: 101 MMOL/L (ref 98–107)
CO2 SERPL-SCNC: 24.8 MMOL/L (ref 22–29)
CREAT SERPL-MCNC: 0.88 MG/DL (ref 0.76–1.27)
DEPRECATED RDW RBC AUTO: 42 FL (ref 37–54)
ERYTHROCYTE [DISTWIDTH] IN BLOOD BY AUTOMATED COUNT: 13.6 % (ref 12.3–15.4)
GFR SERPL CREATININE-BSD FRML MDRD: 85 ML/MIN/1.73
GLUCOSE BLDC GLUCOMTR-MCNC: 170 MG/DL (ref 70–130)
GLUCOSE BLDC GLUCOMTR-MCNC: 179 MG/DL (ref 70–130)
GLUCOSE BLDC GLUCOMTR-MCNC: 194 MG/DL (ref 70–130)
GLUCOSE BLDC GLUCOMTR-MCNC: 223 MG/DL (ref 70–130)
GLUCOSE SERPL-MCNC: 175 MG/DL (ref 65–99)
HCT VFR BLD AUTO: 36.8 % (ref 37.5–51)
HGB BLD-MCNC: 12.2 G/DL (ref 13–17.7)
MCH RBC QN AUTO: 28 PG (ref 26.6–33)
MCHC RBC AUTO-ENTMCNC: 33.2 G/DL (ref 31.5–35.7)
MCV RBC AUTO: 84.6 FL (ref 79–97)
PLATELET # BLD AUTO: 341 10*3/MM3 (ref 140–450)
PMV BLD AUTO: 11.1 FL (ref 6–12)
POTASSIUM SERPL-SCNC: 3.8 MMOL/L (ref 3.5–5.2)
RBC # BLD AUTO: 4.35 10*6/MM3 (ref 4.14–5.8)
SODIUM SERPL-SCNC: 135 MMOL/L (ref 136–145)
WBC # BLD AUTO: 11.41 10*3/MM3 (ref 3.4–10.8)

## 2020-08-25 PROCEDURE — 63710000001 INSULIN LISPRO (HUMAN) PER 5 UNITS: Performed by: INTERNAL MEDICINE

## 2020-08-25 PROCEDURE — 82962 GLUCOSE BLOOD TEST: CPT

## 2020-08-25 PROCEDURE — 97110 THERAPEUTIC EXERCISES: CPT

## 2020-08-25 PROCEDURE — 80048 BASIC METABOLIC PNL TOTAL CA: CPT | Performed by: INTERNAL MEDICINE

## 2020-08-25 PROCEDURE — 99232 SBSQ HOSP IP/OBS MODERATE 35: CPT | Performed by: INTERNAL MEDICINE

## 2020-08-25 PROCEDURE — 97530 THERAPEUTIC ACTIVITIES: CPT

## 2020-08-25 PROCEDURE — 63710000001 INSULIN GLARGINE PER 5 UNITS: Performed by: INTERNAL MEDICINE

## 2020-08-25 PROCEDURE — L0631 LSO SAG R AN/POS PNL PRE CST: HCPCS

## 2020-08-25 PROCEDURE — 85027 COMPLETE CBC AUTOMATED: CPT | Performed by: INTERNAL MEDICINE

## 2020-08-25 PROCEDURE — 25010000002 ERTAPENEM PER 500 MG: Performed by: INTERNAL MEDICINE

## 2020-08-25 RX ORDER — HYDROCODONE BITARTRATE AND ACETAMINOPHEN 5; 325 MG/1; MG/1
2 TABLET ORAL EVERY 4 HOURS PRN
Status: DISCONTINUED | OUTPATIENT
Start: 2020-08-25 | End: 2020-08-29 | Stop reason: HOSPADM

## 2020-08-25 RX ORDER — HYDROMORPHONE HYDROCHLORIDE 1 MG/ML
0.5 INJECTION, SOLUTION INTRAMUSCULAR; INTRAVENOUS; SUBCUTANEOUS EVERY 4 HOURS PRN
Status: DISCONTINUED | OUTPATIENT
Start: 2020-08-25 | End: 2020-08-29 | Stop reason: HOSPADM

## 2020-08-25 RX ORDER — CYCLOBENZAPRINE HCL 10 MG
10 TABLET ORAL 3 TIMES DAILY PRN
Status: DISCONTINUED | OUTPATIENT
Start: 2020-08-25 | End: 2020-08-29 | Stop reason: HOSPADM

## 2020-08-25 RX ORDER — AMOXICILLIN 250 MG
2 CAPSULE ORAL NIGHTLY
Status: DISCONTINUED | OUTPATIENT
Start: 2020-08-25 | End: 2020-08-27

## 2020-08-25 RX ORDER — METOPROLOL TARTRATE 50 MG/1
100 TABLET, FILM COATED ORAL EVERY 12 HOURS SCHEDULED
Status: DISCONTINUED | OUTPATIENT
Start: 2020-08-25 | End: 2020-08-29 | Stop reason: HOSPADM

## 2020-08-25 RX ADMIN — HYDROCODONE BITARTRATE AND ACETAMINOPHEN 2 TABLET: 5; 325 TABLET ORAL at 15:06

## 2020-08-25 RX ADMIN — APIXABAN 5 MG: 5 TABLET, FILM COATED ORAL at 10:18

## 2020-08-25 RX ADMIN — HYDRALAZINE HYDROCHLORIDE 25 MG: 25 TABLET, FILM COATED ORAL at 15:04

## 2020-08-25 RX ADMIN — HYDRALAZINE HYDROCHLORIDE 25 MG: 25 TABLET, FILM COATED ORAL at 22:12

## 2020-08-25 RX ADMIN — FUROSEMIDE 40 MG: 40 TABLET ORAL at 10:18

## 2020-08-25 RX ADMIN — SODIUM CHLORIDE, PRESERVATIVE FREE 10 ML: 5 INJECTION INTRAVENOUS at 10:15

## 2020-08-25 RX ADMIN — LIDOCAINE 1 PATCH: 50 PATCH CUTANEOUS at 10:23

## 2020-08-25 RX ADMIN — INSULIN LISPRO 5 UNITS: 100 INJECTION, SOLUTION INTRAVENOUS; SUBCUTANEOUS at 13:29

## 2020-08-25 RX ADMIN — FINASTERIDE 5 MG: 5 TABLET, FILM COATED ORAL at 22:11

## 2020-08-25 RX ADMIN — INSULIN LISPRO 4 UNITS: 100 INJECTION, SOLUTION INTRAVENOUS; SUBCUTANEOUS at 13:28

## 2020-08-25 RX ADMIN — DOCUSATE SODIUM 50MG AND SENNOSIDES 8.6MG 2 TABLET: 8.6; 5 TABLET, FILM COATED ORAL at 22:12

## 2020-08-25 RX ADMIN — HYDROXYUREA 500 MG: 500 CAPSULE ORAL at 10:19

## 2020-08-25 RX ADMIN — SODIUM CHLORIDE, PRESERVATIVE FREE 10 ML: 5 INJECTION INTRAVENOUS at 23:31

## 2020-08-25 RX ADMIN — INSULIN LISPRO 5 UNITS: 100 INJECTION, SOLUTION INTRAVENOUS; SUBCUTANEOUS at 18:04

## 2020-08-25 RX ADMIN — INSULIN GLARGINE 20 UNITS: 100 INJECTION, SOLUTION SUBCUTANEOUS at 22:31

## 2020-08-25 RX ADMIN — APIXABAN 5 MG: 5 TABLET, FILM COATED ORAL at 22:12

## 2020-08-25 RX ADMIN — INSULIN LISPRO 2 UNITS: 100 INJECTION, SOLUTION INTRAVENOUS; SUBCUTANEOUS at 18:04

## 2020-08-25 RX ADMIN — TAMSULOSIN HYDROCHLORIDE 0.4 MG: 0.4 CAPSULE ORAL at 10:17

## 2020-08-25 RX ADMIN — NYSTATIN 500000 UNITS: 500000 SUSPENSION ORAL at 13:30

## 2020-08-25 RX ADMIN — ERTAPENEM 1 G: 1 INJECTION INTRAMUSCULAR; INTRAVENOUS at 18:04

## 2020-08-25 RX ADMIN — METOPROLOL TARTRATE 100 MG: 50 TABLET, FILM COATED ORAL at 22:12

## 2020-08-25 RX ADMIN — INSULIN LISPRO 2 UNITS: 100 INJECTION, SOLUTION INTRAVENOUS; SUBCUTANEOUS at 10:16

## 2020-08-25 RX ADMIN — MONTELUKAST SODIUM 10 MG: 10 TABLET, FILM COATED ORAL at 22:11

## 2020-08-25 RX ADMIN — HYDRALAZINE HYDROCHLORIDE 25 MG: 25 TABLET, FILM COATED ORAL at 06:25

## 2020-08-25 RX ADMIN — INSULIN LISPRO 5 UNITS: 100 INJECTION, SOLUTION INTRAVENOUS; SUBCUTANEOUS at 10:22

## 2020-08-25 RX ADMIN — NYSTATIN 500000 UNITS: 500000 SUSPENSION ORAL at 22:11

## 2020-08-25 RX ADMIN — PRAVASTATIN SODIUM 40 MG: 40 TABLET ORAL at 10:18

## 2020-08-25 RX ADMIN — DILTIAZEM HYDROCHLORIDE 360 MG: 180 CAPSULE, COATED, EXTENDED RELEASE ORAL at 10:17

## 2020-08-25 RX ADMIN — HYDROCODONE BITARTRATE AND ACETAMINOPHEN 1 TABLET: 5; 325 TABLET ORAL at 11:38

## 2020-08-25 RX ADMIN — NYSTATIN 500000 UNITS: 500000 SUSPENSION ORAL at 18:03

## 2020-08-25 RX ADMIN — BISACODYL 10 MG: 10 SUPPOSITORY RECTAL at 13:28

## 2020-08-25 RX ADMIN — MULTIPLE VITAMINS W/ MINERALS TAB 1 TABLET: TAB at 10:17

## 2020-08-25 RX ADMIN — NYSTATIN 500000 UNITS: 500000 SUSPENSION ORAL at 10:16

## 2020-08-25 RX ADMIN — POLYETHYLENE GLYCOL 3350 17 G: 17 POWDER, FOR SOLUTION ORAL at 13:28

## 2020-08-25 RX ADMIN — METOPROLOL TARTRATE 100 MG: 50 TABLET, FILM COATED ORAL at 15:03

## 2020-08-25 NOTE — TELEPHONE ENCOUNTER
Appt with Dr Wilburn on 10/12/20 @ 12:30PM.  MRI lumbar scheduled for 10/5/20 @ 11:45 am arrival time.  Key @ [id number] noitified of dates and times.  Letter mailed to pt as well.

## 2020-08-26 LAB
ANION GAP SERPL CALCULATED.3IONS-SCNC: 8.5 MMOL/L (ref 5–15)
BUN SERPL-MCNC: 22 MG/DL (ref 8–23)
BUN/CREAT SERPL: 21.8 (ref 7–25)
CALCIUM SPEC-SCNC: 8.9 MG/DL (ref 8.6–10.5)
CHLORIDE SERPL-SCNC: 103 MMOL/L (ref 98–107)
CO2 SERPL-SCNC: 23.5 MMOL/L (ref 22–29)
CREAT SERPL-MCNC: 1.01 MG/DL (ref 0.76–1.27)
DEPRECATED RDW RBC AUTO: 43.4 FL (ref 37–54)
ERYTHROCYTE [DISTWIDTH] IN BLOOD BY AUTOMATED COUNT: 13.8 % (ref 12.3–15.4)
GFR SERPL CREATININE-BSD FRML MDRD: 72 ML/MIN/1.73
GLUCOSE BLDC GLUCOMTR-MCNC: 158 MG/DL (ref 70–130)
GLUCOSE BLDC GLUCOMTR-MCNC: 248 MG/DL (ref 70–130)
GLUCOSE BLDC GLUCOMTR-MCNC: 256 MG/DL (ref 70–130)
GLUCOSE BLDC GLUCOMTR-MCNC: 277 MG/DL (ref 70–130)
GLUCOSE SERPL-MCNC: 159 MG/DL (ref 65–99)
HCT VFR BLD AUTO: 36.3 % (ref 37.5–51)
HGB BLD-MCNC: 11.8 G/DL (ref 13–17.7)
MCH RBC QN AUTO: 28.2 PG (ref 26.6–33)
MCHC RBC AUTO-ENTMCNC: 32.5 G/DL (ref 31.5–35.7)
MCV RBC AUTO: 86.8 FL (ref 79–97)
PLATELET # BLD AUTO: 367 10*3/MM3 (ref 140–450)
PMV BLD AUTO: 10.8 FL (ref 6–12)
POTASSIUM SERPL-SCNC: 3.6 MMOL/L (ref 3.5–5.2)
RBC # BLD AUTO: 4.18 10*6/MM3 (ref 4.14–5.8)
SODIUM SERPL-SCNC: 135 MMOL/L (ref 136–145)
WBC # BLD AUTO: 12.39 10*3/MM3 (ref 3.4–10.8)

## 2020-08-26 PROCEDURE — 97112 NEUROMUSCULAR REEDUCATION: CPT

## 2020-08-26 PROCEDURE — 80048 BASIC METABOLIC PNL TOTAL CA: CPT | Performed by: INTERNAL MEDICINE

## 2020-08-26 PROCEDURE — 63710000001 INSULIN LISPRO (HUMAN) PER 5 UNITS: Performed by: INTERNAL MEDICINE

## 2020-08-26 PROCEDURE — 97530 THERAPEUTIC ACTIVITIES: CPT

## 2020-08-26 PROCEDURE — 85027 COMPLETE CBC AUTOMATED: CPT | Performed by: INTERNAL MEDICINE

## 2020-08-26 PROCEDURE — 25010000002 ERTAPENEM PER 500 MG: Performed by: INTERNAL MEDICINE

## 2020-08-26 PROCEDURE — 82962 GLUCOSE BLOOD TEST: CPT

## 2020-08-26 PROCEDURE — 63710000001 INSULIN GLARGINE PER 5 UNITS: Performed by: INTERNAL MEDICINE

## 2020-08-26 PROCEDURE — 99232 SBSQ HOSP IP/OBS MODERATE 35: CPT | Performed by: NURSE PRACTITIONER

## 2020-08-26 RX ORDER — HYDRALAZINE HYDROCHLORIDE 50 MG/1
50 TABLET, FILM COATED ORAL EVERY 8 HOURS SCHEDULED
Status: DISCONTINUED | OUTPATIENT
Start: 2020-08-26 | End: 2020-08-27

## 2020-08-26 RX ADMIN — NYSTATIN 500000 UNITS: 500000 SUSPENSION ORAL at 17:52

## 2020-08-26 RX ADMIN — HYDROXYUREA 500 MG: 500 CAPSULE ORAL at 10:14

## 2020-08-26 RX ADMIN — HYDRALAZINE HYDROCHLORIDE 50 MG: 50 TABLET, FILM COATED ORAL at 21:54

## 2020-08-26 RX ADMIN — PRAVASTATIN SODIUM 40 MG: 40 TABLET ORAL at 10:16

## 2020-08-26 RX ADMIN — INSULIN LISPRO 4 UNITS: 100 INJECTION, SOLUTION INTRAVENOUS; SUBCUTANEOUS at 17:51

## 2020-08-26 RX ADMIN — FINASTERIDE 5 MG: 5 TABLET, FILM COATED ORAL at 21:54

## 2020-08-26 RX ADMIN — NYSTATIN 500000 UNITS: 500000 SUSPENSION ORAL at 21:54

## 2020-08-26 RX ADMIN — MONTELUKAST SODIUM 10 MG: 10 TABLET, FILM COATED ORAL at 21:54

## 2020-08-26 RX ADMIN — FUROSEMIDE 40 MG: 40 TABLET ORAL at 10:15

## 2020-08-26 RX ADMIN — ERTAPENEM 1 G: 1 INJECTION INTRAMUSCULAR; INTRAVENOUS at 17:51

## 2020-08-26 RX ADMIN — SODIUM CHLORIDE, PRESERVATIVE FREE 10 ML: 5 INJECTION INTRAVENOUS at 21:55

## 2020-08-26 RX ADMIN — INSULIN LISPRO 5 UNITS: 100 INJECTION, SOLUTION INTRAVENOUS; SUBCUTANEOUS at 17:52

## 2020-08-26 RX ADMIN — APIXABAN 5 MG: 5 TABLET, FILM COATED ORAL at 10:16

## 2020-08-26 RX ADMIN — HYDRALAZINE HYDROCHLORIDE 50 MG: 50 TABLET, FILM COATED ORAL at 14:30

## 2020-08-26 RX ADMIN — INSULIN LISPRO 2 UNITS: 100 INJECTION, SOLUTION INTRAVENOUS; SUBCUTANEOUS at 10:13

## 2020-08-26 RX ADMIN — SODIUM CHLORIDE, PRESERVATIVE FREE 10 ML: 5 INJECTION INTRAVENOUS at 10:18

## 2020-08-26 RX ADMIN — APIXABAN 5 MG: 5 TABLET, FILM COATED ORAL at 21:54

## 2020-08-26 RX ADMIN — NYSTATIN 500000 UNITS: 500000 SUSPENSION ORAL at 10:13

## 2020-08-26 RX ADMIN — DILTIAZEM HYDROCHLORIDE 360 MG: 180 CAPSULE, COATED, EXTENDED RELEASE ORAL at 10:15

## 2020-08-26 RX ADMIN — HYDRALAZINE HYDROCHLORIDE 25 MG: 25 TABLET, FILM COATED ORAL at 05:34

## 2020-08-26 RX ADMIN — NYSTATIN 500000 UNITS: 500000 SUSPENSION ORAL at 12:26

## 2020-08-26 RX ADMIN — INSULIN LISPRO 5 UNITS: 100 INJECTION, SOLUTION INTRAVENOUS; SUBCUTANEOUS at 10:18

## 2020-08-26 RX ADMIN — TAMSULOSIN HYDROCHLORIDE 0.4 MG: 0.4 CAPSULE ORAL at 10:14

## 2020-08-26 RX ADMIN — INSULIN LISPRO 5 UNITS: 100 INJECTION, SOLUTION INTRAVENOUS; SUBCUTANEOUS at 12:26

## 2020-08-26 RX ADMIN — DOCUSATE SODIUM 50MG AND SENNOSIDES 8.6MG 2 TABLET: 8.6; 5 TABLET, FILM COATED ORAL at 21:54

## 2020-08-26 RX ADMIN — INSULIN GLARGINE 20 UNITS: 100 INJECTION, SOLUTION SUBCUTANEOUS at 21:50

## 2020-08-26 RX ADMIN — LIDOCAINE 1 PATCH: 50 PATCH CUTANEOUS at 10:13

## 2020-08-26 RX ADMIN — INSULIN LISPRO 6 UNITS: 100 INJECTION, SOLUTION INTRAVENOUS; SUBCUTANEOUS at 12:26

## 2020-08-26 RX ADMIN — METOPROLOL TARTRATE 100 MG: 50 TABLET, FILM COATED ORAL at 21:54

## 2020-08-26 RX ADMIN — METOPROLOL TARTRATE 100 MG: 50 TABLET, FILM COATED ORAL at 10:14

## 2020-08-26 RX ADMIN — HYDROCODONE BITARTRATE AND ACETAMINOPHEN 2 TABLET: 5; 325 TABLET ORAL at 10:15

## 2020-08-26 RX ADMIN — MULTIPLE VITAMINS W/ MINERALS TAB 1 TABLET: TAB at 10:15

## 2020-08-27 LAB
ANION GAP SERPL CALCULATED.3IONS-SCNC: 7.4 MMOL/L (ref 5–15)
BASOPHILS # BLD AUTO: 0.1 10*3/MM3 (ref 0–0.2)
BASOPHILS NFR BLD AUTO: 1 % (ref 0–1.5)
BUN SERPL-MCNC: 22 MG/DL (ref 8–23)
BUN/CREAT SERPL: 23.9 (ref 7–25)
CALCIUM SPEC-SCNC: 8.6 MG/DL (ref 8.6–10.5)
CHLORIDE SERPL-SCNC: 105 MMOL/L (ref 98–107)
CO2 SERPL-SCNC: 24.6 MMOL/L (ref 22–29)
CREAT SERPL-MCNC: 0.92 MG/DL (ref 0.76–1.27)
DEPRECATED RDW RBC AUTO: 41.6 FL (ref 37–54)
EOSINOPHIL # BLD AUTO: 0.06 10*3/MM3 (ref 0–0.4)
EOSINOPHIL NFR BLD AUTO: 0.6 % (ref 0.3–6.2)
ERYTHROCYTE [DISTWIDTH] IN BLOOD BY AUTOMATED COUNT: 13.5 % (ref 12.3–15.4)
GFR SERPL CREATININE-BSD FRML MDRD: 81 ML/MIN/1.73
GLUCOSE BLDC GLUCOMTR-MCNC: 147 MG/DL (ref 70–130)
GLUCOSE BLDC GLUCOMTR-MCNC: 167 MG/DL (ref 70–130)
GLUCOSE BLDC GLUCOMTR-MCNC: 201 MG/DL (ref 70–130)
GLUCOSE BLDC GLUCOMTR-MCNC: 251 MG/DL (ref 70–130)
GLUCOSE SERPL-MCNC: 149 MG/DL (ref 65–99)
HCT VFR BLD AUTO: 34.5 % (ref 37.5–51)
HGB BLD-MCNC: 11.3 G/DL (ref 13–17.7)
IMM GRANULOCYTES # BLD AUTO: 0.08 10*3/MM3 (ref 0–0.05)
IMM GRANULOCYTES NFR BLD AUTO: 0.8 % (ref 0–0.5)
LYMPHOCYTES # BLD AUTO: 1.05 10*3/MM3 (ref 0.7–3.1)
LYMPHOCYTES NFR BLD AUTO: 10.1 % (ref 19.6–45.3)
MCH RBC QN AUTO: 27.8 PG (ref 26.6–33)
MCHC RBC AUTO-ENTMCNC: 32.8 G/DL (ref 31.5–35.7)
MCV RBC AUTO: 84.8 FL (ref 79–97)
MONOCYTES # BLD AUTO: 0.79 10*3/MM3 (ref 0.1–0.9)
MONOCYTES NFR BLD AUTO: 7.6 % (ref 5–12)
NEUTROPHILS NFR BLD AUTO: 79.9 % (ref 42.7–76)
NEUTROPHILS NFR BLD AUTO: 8.3 10*3/MM3 (ref 1.7–7)
NRBC BLD AUTO-RTO: 0 /100 WBC (ref 0–0.2)
PLATELET # BLD AUTO: 393 10*3/MM3 (ref 140–450)
PMV BLD AUTO: 10.6 FL (ref 6–12)
POTASSIUM SERPL-SCNC: 3.6 MMOL/L (ref 3.5–5.2)
RBC # BLD AUTO: 4.07 10*6/MM3 (ref 4.14–5.8)
SODIUM SERPL-SCNC: 137 MMOL/L (ref 136–145)
WBC # BLD AUTO: 10.38 10*3/MM3 (ref 3.4–10.8)

## 2020-08-27 PROCEDURE — 82962 GLUCOSE BLOOD TEST: CPT

## 2020-08-27 PROCEDURE — 63710000001 INSULIN LISPRO (HUMAN) PER 5 UNITS: Performed by: INTERNAL MEDICINE

## 2020-08-27 PROCEDURE — 85025 COMPLETE CBC W/AUTO DIFF WBC: CPT | Performed by: INTERNAL MEDICINE

## 2020-08-27 PROCEDURE — 63710000001 INSULIN GLARGINE PER 5 UNITS: Performed by: INTERNAL MEDICINE

## 2020-08-27 PROCEDURE — 25010000002 ERTAPENEM PER 500 MG: Performed by: INTERNAL MEDICINE

## 2020-08-27 PROCEDURE — 80048 BASIC METABOLIC PNL TOTAL CA: CPT | Performed by: INTERNAL MEDICINE

## 2020-08-27 PROCEDURE — 99232 SBSQ HOSP IP/OBS MODERATE 35: CPT | Performed by: INTERNAL MEDICINE

## 2020-08-27 PROCEDURE — 97530 THERAPEUTIC ACTIVITIES: CPT

## 2020-08-27 RX ORDER — HYDRALAZINE HYDROCHLORIDE 50 MG/1
100 TABLET, FILM COATED ORAL EVERY 8 HOURS SCHEDULED
Status: DISCONTINUED | OUTPATIENT
Start: 2020-08-27 | End: 2020-08-29 | Stop reason: HOSPADM

## 2020-08-27 RX ORDER — AMOXICILLIN 250 MG
2 CAPSULE ORAL 2 TIMES DAILY
Status: DISCONTINUED | OUTPATIENT
Start: 2020-08-27 | End: 2020-08-29 | Stop reason: HOSPADM

## 2020-08-27 RX ADMIN — INSULIN LISPRO 5 UNITS: 100 INJECTION, SOLUTION INTRAVENOUS; SUBCUTANEOUS at 11:40

## 2020-08-27 RX ADMIN — METOPROLOL TARTRATE 100 MG: 50 TABLET, FILM COATED ORAL at 09:49

## 2020-08-27 RX ADMIN — NYSTATIN 500000 UNITS: 500000 SUSPENSION ORAL at 21:11

## 2020-08-27 RX ADMIN — BISACODYL 10 MG: 10 SUPPOSITORY RECTAL at 09:43

## 2020-08-27 RX ADMIN — NYSTATIN 500000 UNITS: 500000 SUSPENSION ORAL at 09:50

## 2020-08-27 RX ADMIN — PRAVASTATIN SODIUM 40 MG: 40 TABLET ORAL at 09:50

## 2020-08-27 RX ADMIN — MULTIPLE VITAMINS W/ MINERALS TAB 1 TABLET: TAB at 09:50

## 2020-08-27 RX ADMIN — FUROSEMIDE 40 MG: 40 TABLET ORAL at 09:50

## 2020-08-27 RX ADMIN — SODIUM CHLORIDE, PRESERVATIVE FREE 10 ML: 5 INJECTION INTRAVENOUS at 21:12

## 2020-08-27 RX ADMIN — INSULIN LISPRO 5 UNITS: 100 INJECTION, SOLUTION INTRAVENOUS; SUBCUTANEOUS at 09:53

## 2020-08-27 RX ADMIN — HYDROXYUREA 500 MG: 500 CAPSULE ORAL at 09:49

## 2020-08-27 RX ADMIN — SODIUM CHLORIDE, PRESERVATIVE FREE 10 ML: 5 INJECTION INTRAVENOUS at 09:51

## 2020-08-27 RX ADMIN — MONTELUKAST SODIUM 10 MG: 10 TABLET, FILM COATED ORAL at 21:11

## 2020-08-27 RX ADMIN — NYSTATIN 500000 UNITS: 500000 SUSPENSION ORAL at 11:40

## 2020-08-27 RX ADMIN — APIXABAN 5 MG: 5 TABLET, FILM COATED ORAL at 21:11

## 2020-08-27 RX ADMIN — NYSTATIN 500000 UNITS: 500000 SUSPENSION ORAL at 17:53

## 2020-08-27 RX ADMIN — INSULIN GLARGINE 20 UNITS: 100 INJECTION, SOLUTION SUBCUTANEOUS at 21:12

## 2020-08-27 RX ADMIN — INSULIN LISPRO 5 UNITS: 100 INJECTION, SOLUTION INTRAVENOUS; SUBCUTANEOUS at 17:53

## 2020-08-27 RX ADMIN — DILTIAZEM HYDROCHLORIDE 360 MG: 180 CAPSULE, COATED, EXTENDED RELEASE ORAL at 09:49

## 2020-08-27 RX ADMIN — POLYETHYLENE GLYCOL 3350 17 G: 17 POWDER, FOR SOLUTION ORAL at 09:44

## 2020-08-27 RX ADMIN — HYDROCODONE BITARTRATE AND ACETAMINOPHEN 2 TABLET: 5; 325 TABLET ORAL at 10:41

## 2020-08-27 RX ADMIN — APIXABAN 5 MG: 5 TABLET, FILM COATED ORAL at 09:50

## 2020-08-27 RX ADMIN — INSULIN LISPRO 2 UNITS: 100 INJECTION, SOLUTION INTRAVENOUS; SUBCUTANEOUS at 11:40

## 2020-08-27 RX ADMIN — HYDROCODONE BITARTRATE AND ACETAMINOPHEN 2 TABLET: 5; 325 TABLET ORAL at 17:53

## 2020-08-27 RX ADMIN — LIDOCAINE 1 PATCH: 50 PATCH CUTANEOUS at 09:43

## 2020-08-27 RX ADMIN — INSULIN LISPRO 2 UNITS: 100 INJECTION, SOLUTION INTRAVENOUS; SUBCUTANEOUS at 17:52

## 2020-08-27 RX ADMIN — ERTAPENEM 1 G: 1 INJECTION INTRAMUSCULAR; INTRAVENOUS at 17:54

## 2020-08-27 RX ADMIN — DOCUSATE SODIUM 50MG AND SENNOSIDES 8.6MG 2 TABLET: 8.6; 5 TABLET, FILM COATED ORAL at 21:11

## 2020-08-27 RX ADMIN — HYDROCODONE BITARTRATE AND ACETAMINOPHEN 1 TABLET: 5; 325 TABLET ORAL at 03:59

## 2020-08-27 RX ADMIN — HYDRALAZINE HYDROCHLORIDE 100 MG: 50 TABLET, FILM COATED ORAL at 14:53

## 2020-08-27 RX ADMIN — TAMSULOSIN HYDROCHLORIDE 0.4 MG: 0.4 CAPSULE ORAL at 09:50

## 2020-08-27 RX ADMIN — HYDRALAZINE HYDROCHLORIDE 50 MG: 50 TABLET, FILM COATED ORAL at 05:27

## 2020-08-27 RX ADMIN — FINASTERIDE 5 MG: 5 TABLET, FILM COATED ORAL at 21:11

## 2020-08-27 RX ADMIN — HYDRALAZINE HYDROCHLORIDE 100 MG: 50 TABLET, FILM COATED ORAL at 21:11

## 2020-08-28 LAB
B PARAPERT DNA SPEC QL NAA+PROBE: NOT DETECTED
B PERT DNA SPEC QL NAA+PROBE: NOT DETECTED
C PNEUM DNA NPH QL NAA+NON-PROBE: NOT DETECTED
FLUAV H1 2009 PAND RNA NPH QL NAA+PROBE: NOT DETECTED
FLUAV H1 HA GENE NPH QL NAA+PROBE: NOT DETECTED
FLUAV H3 RNA NPH QL NAA+PROBE: NOT DETECTED
FLUAV SUBTYP SPEC NAA+PROBE: NOT DETECTED
FLUBV RNA ISLT QL NAA+PROBE: NOT DETECTED
GLUCOSE BLDC GLUCOMTR-MCNC: 186 MG/DL (ref 70–130)
GLUCOSE BLDC GLUCOMTR-MCNC: 189 MG/DL (ref 70–130)
GLUCOSE BLDC GLUCOMTR-MCNC: 211 MG/DL (ref 70–130)
GLUCOSE BLDC GLUCOMTR-MCNC: 241 MG/DL (ref 70–130)
HADV DNA SPEC NAA+PROBE: NOT DETECTED
HCOV 229E RNA SPEC QL NAA+PROBE: NOT DETECTED
HCOV HKU1 RNA SPEC QL NAA+PROBE: NOT DETECTED
HCOV NL63 RNA SPEC QL NAA+PROBE: NOT DETECTED
HCOV OC43 RNA SPEC QL NAA+PROBE: NOT DETECTED
HMPV RNA NPH QL NAA+NON-PROBE: NOT DETECTED
HPIV1 RNA SPEC QL NAA+PROBE: NOT DETECTED
HPIV2 RNA SPEC QL NAA+PROBE: NOT DETECTED
HPIV3 RNA NPH QL NAA+PROBE: NOT DETECTED
HPIV4 P GENE NPH QL NAA+PROBE: NOT DETECTED
M PNEUMO IGG SER IA-ACNC: NOT DETECTED
RHINOVIRUS RNA SPEC NAA+PROBE: NOT DETECTED
RSV RNA NPH QL NAA+NON-PROBE: NOT DETECTED
SARS-COV-2 RNA NPH QL NAA+NON-PROBE: NOT DETECTED

## 2020-08-28 PROCEDURE — 0202U NFCT DS 22 TRGT SARS-COV-2: CPT | Performed by: HOSPITALIST

## 2020-08-28 PROCEDURE — 25010000002 ERTAPENEM PER 500 MG: Performed by: INTERNAL MEDICINE

## 2020-08-28 PROCEDURE — C1751 CATH, INF, PER/CENT/MIDLINE: HCPCS

## 2020-08-28 PROCEDURE — 63710000001 INSULIN GLARGINE PER 5 UNITS: Performed by: INTERNAL MEDICINE

## 2020-08-28 PROCEDURE — 99232 SBSQ HOSP IP/OBS MODERATE 35: CPT | Performed by: NURSE PRACTITIONER

## 2020-08-28 PROCEDURE — 82962 GLUCOSE BLOOD TEST: CPT

## 2020-08-28 PROCEDURE — 02HV33Z INSERTION OF INFUSION DEVICE INTO SUPERIOR VENA CAVA, PERCUTANEOUS APPROACH: ICD-10-PCS | Performed by: HOSPITALIST

## 2020-08-28 PROCEDURE — 63710000001 INSULIN LISPRO (HUMAN) PER 5 UNITS: Performed by: INTERNAL MEDICINE

## 2020-08-28 RX ORDER — SODIUM CHLORIDE 0.9 % (FLUSH) 0.9 %
10 SYRINGE (ML) INJECTION EVERY 12 HOURS SCHEDULED
Status: DISCONTINUED | OUTPATIENT
Start: 2020-08-28 | End: 2020-08-29 | Stop reason: HOSPADM

## 2020-08-28 RX ORDER — SODIUM CHLORIDE 0.9 % (FLUSH) 0.9 %
20 SYRINGE (ML) INJECTION AS NEEDED
Status: DISCONTINUED | OUTPATIENT
Start: 2020-08-28 | End: 2020-08-29 | Stop reason: HOSPADM

## 2020-08-28 RX ORDER — DILTIAZEM HYDROCHLORIDE 300 MG/1
CAPSULE, COATED, EXTENDED RELEASE ORAL
Qty: 90 CAPSULE | Refills: 3 | Status: SHIPPED | OUTPATIENT
Start: 2020-08-28 | End: 2020-10-10 | Stop reason: HOSPADM

## 2020-08-28 RX ORDER — SODIUM CHLORIDE 0.9 % (FLUSH) 0.9 %
10 SYRINGE (ML) INJECTION AS NEEDED
Status: DISCONTINUED | OUTPATIENT
Start: 2020-08-28 | End: 2020-08-29 | Stop reason: HOSPADM

## 2020-08-28 RX ADMIN — SODIUM CHLORIDE, PRESERVATIVE FREE 10 ML: 5 INJECTION INTRAVENOUS at 16:07

## 2020-08-28 RX ADMIN — FUROSEMIDE 40 MG: 40 TABLET ORAL at 08:40

## 2020-08-28 RX ADMIN — HYDRALAZINE HYDROCHLORIDE 100 MG: 50 TABLET, FILM COATED ORAL at 21:27

## 2020-08-28 RX ADMIN — PRAVASTATIN SODIUM 40 MG: 40 TABLET ORAL at 08:40

## 2020-08-28 RX ADMIN — NYSTATIN 500000 UNITS: 500000 SUSPENSION ORAL at 17:36

## 2020-08-28 RX ADMIN — POLYETHYLENE GLYCOL 3350 17 G: 17 POWDER, FOR SOLUTION ORAL at 08:43

## 2020-08-28 RX ADMIN — NYSTATIN 500000 UNITS: 500000 SUSPENSION ORAL at 21:33

## 2020-08-28 RX ADMIN — DILTIAZEM HYDROCHLORIDE 360 MG: 180 CAPSULE, COATED, EXTENDED RELEASE ORAL at 08:40

## 2020-08-28 RX ADMIN — SODIUM CHLORIDE, PRESERVATIVE FREE 10 ML: 5 INJECTION INTRAVENOUS at 08:43

## 2020-08-28 RX ADMIN — INSULIN LISPRO 5 UNITS: 100 INJECTION, SOLUTION INTRAVENOUS; SUBCUTANEOUS at 13:01

## 2020-08-28 RX ADMIN — APIXABAN 5 MG: 5 TABLET, FILM COATED ORAL at 08:40

## 2020-08-28 RX ADMIN — MONTELUKAST SODIUM 10 MG: 10 TABLET, FILM COATED ORAL at 21:27

## 2020-08-28 RX ADMIN — METOPROLOL TARTRATE 100 MG: 50 TABLET, FILM COATED ORAL at 08:40

## 2020-08-28 RX ADMIN — HYDRALAZINE HYDROCHLORIDE 100 MG: 50 TABLET, FILM COATED ORAL at 16:07

## 2020-08-28 RX ADMIN — TAMSULOSIN HYDROCHLORIDE 0.4 MG: 0.4 CAPSULE ORAL at 08:40

## 2020-08-28 RX ADMIN — APIXABAN 5 MG: 5 TABLET, FILM COATED ORAL at 21:27

## 2020-08-28 RX ADMIN — SODIUM CHLORIDE, PRESERVATIVE FREE 10 ML: 5 INJECTION INTRAVENOUS at 21:33

## 2020-08-28 RX ADMIN — FINASTERIDE 5 MG: 5 TABLET, FILM COATED ORAL at 21:27

## 2020-08-28 RX ADMIN — HYDROXYUREA 500 MG: 500 CAPSULE ORAL at 08:40

## 2020-08-28 RX ADMIN — INSULIN LISPRO 5 UNITS: 100 INJECTION, SOLUTION INTRAVENOUS; SUBCUTANEOUS at 08:42

## 2020-08-28 RX ADMIN — INSULIN LISPRO 2 UNITS: 100 INJECTION, SOLUTION INTRAVENOUS; SUBCUTANEOUS at 08:41

## 2020-08-28 RX ADMIN — MULTIPLE VITAMINS W/ MINERALS TAB 1 TABLET: TAB at 08:40

## 2020-08-28 RX ADMIN — ERTAPENEM 1 G: 1 INJECTION INTRAMUSCULAR; INTRAVENOUS at 17:36

## 2020-08-28 RX ADMIN — INSULIN LISPRO 4 UNITS: 100 INJECTION, SOLUTION INTRAVENOUS; SUBCUTANEOUS at 13:02

## 2020-08-28 RX ADMIN — LIDOCAINE 1 PATCH: 50 PATCH CUTANEOUS at 08:40

## 2020-08-28 RX ADMIN — HYDROCODONE BITARTRATE AND ACETAMINOPHEN 2 TABLET: 5; 325 TABLET ORAL at 01:20

## 2020-08-28 RX ADMIN — INSULIN LISPRO 2 UNITS: 100 INJECTION, SOLUTION INTRAVENOUS; SUBCUTANEOUS at 17:36

## 2020-08-28 RX ADMIN — DOCUSATE SODIUM 50MG AND SENNOSIDES 8.6MG 2 TABLET: 8.6; 5 TABLET, FILM COATED ORAL at 08:40

## 2020-08-28 RX ADMIN — INSULIN GLARGINE 20 UNITS: 100 INJECTION, SOLUTION SUBCUTANEOUS at 21:27

## 2020-08-28 RX ADMIN — INSULIN LISPRO 5 UNITS: 100 INJECTION, SOLUTION INTRAVENOUS; SUBCUTANEOUS at 17:37

## 2020-08-28 RX ADMIN — HYDRALAZINE HYDROCHLORIDE 100 MG: 50 TABLET, FILM COATED ORAL at 06:29

## 2020-08-28 RX ADMIN — DOCUSATE SODIUM 50MG AND SENNOSIDES 8.6MG 2 TABLET: 8.6; 5 TABLET, FILM COATED ORAL at 21:28

## 2020-08-28 RX ADMIN — NYSTATIN 500000 UNITS: 500000 SUSPENSION ORAL at 08:44

## 2020-08-28 RX ADMIN — METOPROLOL TARTRATE 100 MG: 50 TABLET, FILM COATED ORAL at 21:27

## 2020-08-28 RX ADMIN — HYDROCODONE BITARTRATE AND ACETAMINOPHEN 2 TABLET: 5; 325 TABLET ORAL at 06:29

## 2020-08-29 VITALS
HEIGHT: 73 IN | RESPIRATION RATE: 16 BRPM | BODY MASS INDEX: 31.71 KG/M2 | DIASTOLIC BLOOD PRESSURE: 72 MMHG | WEIGHT: 239.3 LBS | SYSTOLIC BLOOD PRESSURE: 145 MMHG | TEMPERATURE: 99.4 F | HEART RATE: 59 BPM | OXYGEN SATURATION: 96 %

## 2020-08-29 PROBLEM — R65.21 SHOCK, SEPTIC: Status: RESOLVED | Noted: 2020-08-19 | Resolved: 2020-08-29

## 2020-08-29 PROBLEM — A41.9 SHOCK, SEPTIC (HCC): Status: RESOLVED | Noted: 2020-08-19 | Resolved: 2020-08-29

## 2020-08-29 PROBLEM — R50.9 FEVER AND CHILLS: Status: RESOLVED | Noted: 2020-08-16 | Resolved: 2020-08-29

## 2020-08-29 PROBLEM — A41.9 SHOCK, SEPTIC: Status: RESOLVED | Noted: 2020-08-19 | Resolved: 2020-08-29

## 2020-08-29 PROBLEM — N17.9 AKI (ACUTE KIDNEY INJURY) (HCC): Status: RESOLVED | Noted: 2017-04-28 | Resolved: 2020-08-29

## 2020-08-29 PROBLEM — N17.9 AKI (ACUTE KIDNEY INJURY): Status: RESOLVED | Noted: 2017-04-28 | Resolved: 2020-08-29

## 2020-08-29 LAB
GLUCOSE BLDC GLUCOMTR-MCNC: 196 MG/DL (ref 70–130)
GLUCOSE BLDC GLUCOMTR-MCNC: 198 MG/DL (ref 70–130)
GLUCOSE BLDC GLUCOMTR-MCNC: 239 MG/DL (ref 70–130)

## 2020-08-29 PROCEDURE — 63710000001 INSULIN LISPRO (HUMAN) PER 5 UNITS: Performed by: INTERNAL MEDICINE

## 2020-08-29 PROCEDURE — 99232 SBSQ HOSP IP/OBS MODERATE 35: CPT | Performed by: NURSE PRACTITIONER

## 2020-08-29 PROCEDURE — 82962 GLUCOSE BLOOD TEST: CPT

## 2020-08-29 RX ORDER — FUROSEMIDE 40 MG/1
40 TABLET ORAL DAILY
Start: 2020-08-30 | End: 2020-11-13 | Stop reason: SDUPTHER

## 2020-08-29 RX ORDER — LIDOCAINE 50 MG/G
1 PATCH TOPICAL
Qty: 5 PATCH | Refills: 0
Start: 2020-08-30 | End: 2020-09-04

## 2020-08-29 RX ORDER — METOPROLOL TARTRATE 100 MG/1
100 TABLET ORAL EVERY 12 HOURS SCHEDULED
Start: 2020-08-29 | End: 2020-10-10 | Stop reason: HOSPADM

## 2020-08-29 RX ORDER — HYDROCODONE BITARTRATE AND ACETAMINOPHEN 5; 325 MG/1; MG/1
1 TABLET ORAL EVERY 4 HOURS PRN
Qty: 12 TABLET | Refills: 0 | Status: ON HOLD
Start: 2020-08-29 | End: 2020-10-09 | Stop reason: SDUPTHER

## 2020-08-29 RX ORDER — INSULIN GLARGINE 100 [IU]/ML
20 INJECTION, SOLUTION SUBCUTANEOUS NIGHTLY
Refills: 12
Start: 2020-08-29 | End: 2020-10-10 | Stop reason: HOSPADM

## 2020-08-29 RX ORDER — HYDROCODONE BITARTRATE AND ACETAMINOPHEN 5; 325 MG/1; MG/1
2 TABLET ORAL EVERY 4 HOURS PRN
Start: 2020-08-29 | End: 2020-09-04

## 2020-08-29 RX ORDER — AMOXICILLIN 250 MG
2 CAPSULE ORAL 2 TIMES DAILY
Start: 2020-08-29 | End: 2020-10-10 | Stop reason: HOSPADM

## 2020-08-29 RX ORDER — CYCLOBENZAPRINE HCL 10 MG
10 TABLET ORAL 3 TIMES DAILY PRN
Qty: 10 TABLET
Start: 2020-08-29 | End: 2020-10-10 | Stop reason: HOSPADM

## 2020-08-29 RX ADMIN — MULTIPLE VITAMINS W/ MINERALS TAB 1 TABLET: TAB at 09:17

## 2020-08-29 RX ADMIN — DILTIAZEM HYDROCHLORIDE 360 MG: 180 CAPSULE, COATED, EXTENDED RELEASE ORAL at 09:18

## 2020-08-29 RX ADMIN — NYSTATIN 500000 UNITS: 500000 SUSPENSION ORAL at 09:28

## 2020-08-29 RX ADMIN — SODIUM CHLORIDE, PRESERVATIVE FREE 10 ML: 5 INJECTION INTRAVENOUS at 09:45

## 2020-08-29 RX ADMIN — INSULIN LISPRO 5 UNITS: 100 INJECTION, SOLUTION INTRAVENOUS; SUBCUTANEOUS at 12:01

## 2020-08-29 RX ADMIN — INSULIN LISPRO 5 UNITS: 100 INJECTION, SOLUTION INTRAVENOUS; SUBCUTANEOUS at 09:23

## 2020-08-29 RX ADMIN — HYDROXYUREA 500 MG: 500 CAPSULE ORAL at 09:18

## 2020-08-29 RX ADMIN — METOPROLOL TARTRATE 100 MG: 50 TABLET, FILM COATED ORAL at 09:18

## 2020-08-29 RX ADMIN — CYCLOBENZAPRINE 10 MG: 10 TABLET, FILM COATED ORAL at 01:03

## 2020-08-29 RX ADMIN — POLYETHYLENE GLYCOL 3350 17 G: 17 POWDER, FOR SOLUTION ORAL at 09:29

## 2020-08-29 RX ADMIN — PRAVASTATIN SODIUM 40 MG: 40 TABLET ORAL at 09:18

## 2020-08-29 RX ADMIN — APIXABAN 5 MG: 5 TABLET, FILM COATED ORAL at 09:17

## 2020-08-29 RX ADMIN — DOCUSATE SODIUM 50MG AND SENNOSIDES 8.6MG 2 TABLET: 8.6; 5 TABLET, FILM COATED ORAL at 09:17

## 2020-08-29 RX ADMIN — INSULIN LISPRO 2 UNITS: 100 INJECTION, SOLUTION INTRAVENOUS; SUBCUTANEOUS at 06:46

## 2020-08-29 RX ADMIN — INSULIN LISPRO 4 UNITS: 100 INJECTION, SOLUTION INTRAVENOUS; SUBCUTANEOUS at 12:00

## 2020-08-29 RX ADMIN — NYSTATIN 500000 UNITS: 500000 SUSPENSION ORAL at 12:01

## 2020-08-29 RX ADMIN — HYDROCODONE BITARTRATE AND ACETAMINOPHEN 1 TABLET: 5; 325 TABLET ORAL at 11:00

## 2020-08-29 RX ADMIN — TAMSULOSIN HYDROCHLORIDE 0.4 MG: 0.4 CAPSULE ORAL at 09:18

## 2020-08-29 RX ADMIN — SODIUM CHLORIDE, PRESERVATIVE FREE 10 ML: 5 INJECTION INTRAVENOUS at 09:29

## 2020-08-29 RX ADMIN — LIDOCAINE 1 PATCH: 50 PATCH CUTANEOUS at 09:19

## 2020-08-29 RX ADMIN — CYCLOBENZAPRINE 10 MG: 10 TABLET, FILM COATED ORAL at 09:18

## 2020-08-29 RX ADMIN — HYDRALAZINE HYDROCHLORIDE 100 MG: 50 TABLET, FILM COATED ORAL at 06:39

## 2020-08-29 RX ADMIN — FUROSEMIDE 40 MG: 40 TABLET ORAL at 09:17

## 2020-09-14 ENCOUNTER — TELEPHONE (OUTPATIENT)
Dept: FAMILY MEDICINE CLINIC | Facility: CLINIC | Age: 75
End: 2020-09-14

## 2020-09-14 NOTE — TELEPHONE ENCOUNTER
Debra [phone] with  Home Care needs to let [facility] know that patient is going home today & they will be sending over orders for overall plan of care. Infectious disease will follow with IV medications & urologist will follow as well

## 2020-09-15 ENCOUNTER — READMISSION MANAGEMENT (OUTPATIENT)
Dept: CALL CENTER | Facility: HOSPITAL | Age: 75
End: 2020-09-15

## 2020-09-15 ENCOUNTER — LAB REQUISITION (OUTPATIENT)
Dept: LAB | Facility: HOSPITAL | Age: 75
End: 2020-09-15

## 2020-09-15 ENCOUNTER — TRANSITIONAL CARE MANAGEMENT TELEPHONE ENCOUNTER (OUTPATIENT)
Dept: CALL CENTER | Facility: HOSPITAL | Age: 75
End: 2020-09-15

## 2020-09-15 ENCOUNTER — HOSPITAL ENCOUNTER (INPATIENT)
Facility: HOSPITAL | Age: 75
LOS: 24 days | Discharge: NURSING, REHAB OR LONG-TERM CARE | End: 2020-10-10
Attending: EMERGENCY MEDICINE | Admitting: INTERNAL MEDICINE

## 2020-09-15 DIAGNOSIS — I48.0 PAROXYSMAL ATRIAL FIBRILLATION WITH RAPID VENTRICULAR RESPONSE (HCC): ICD-10-CM

## 2020-09-15 DIAGNOSIS — A41.9 SEPSIS WITH METABOLIC ENCEPHALOPATHY (HCC): ICD-10-CM

## 2020-09-15 DIAGNOSIS — E87.6 ACUTE HYPOKALEMIA: Primary | ICD-10-CM

## 2020-09-15 DIAGNOSIS — R65.20 SEPSIS WITH METABOLIC ENCEPHALOPATHY (HCC): ICD-10-CM

## 2020-09-15 DIAGNOSIS — N39.0 URINARY TRACT INFECTION, SITE NOT SPECIFIED: ICD-10-CM

## 2020-09-15 DIAGNOSIS — A04.72 C. DIFFICILE COLITIS: ICD-10-CM

## 2020-09-15 DIAGNOSIS — G93.41 SEPSIS WITH METABOLIC ENCEPHALOPATHY (HCC): ICD-10-CM

## 2020-09-15 LAB
ALBUMIN SERPL-MCNC: 2.2 G/DL (ref 3.5–5.2)
ALBUMIN/GLOB SERPL: 0.8 G/DL
ALP SERPL-CCNC: 84 U/L (ref 39–117)
ALT SERPL W P-5'-P-CCNC: 8 U/L (ref 1–41)
ANION GAP SERPL CALCULATED.3IONS-SCNC: 11 MMOL/L (ref 5–15)
AST SERPL-CCNC: 13 U/L (ref 1–40)
BASOPHILS # BLD AUTO: 0.07 10*3/MM3 (ref 0–0.2)
BASOPHILS NFR BLD AUTO: 0.6 % (ref 0–1.5)
BILIRUB SERPL-MCNC: 0.3 MG/DL (ref 0–1.2)
BUN SERPL-MCNC: 16 MG/DL (ref 8–23)
BUN/CREAT SERPL: 20 (ref 7–25)
C DIFF TOX GENS STL QL NAA+PROBE: POSITIVE
CALCIUM SPEC-SCNC: 6.9 MG/DL (ref 8.6–10.5)
CHLORIDE SERPL-SCNC: 109 MMOL/L (ref 98–107)
CO2 SERPL-SCNC: 23 MMOL/L (ref 22–29)
CREAT SERPL-MCNC: 0.8 MG/DL (ref 0.76–1.27)
DEPRECATED RDW RBC AUTO: 45 FL (ref 37–54)
EOSINOPHIL # BLD AUTO: 0.27 10*3/MM3 (ref 0–0.4)
EOSINOPHIL NFR BLD AUTO: 2.5 % (ref 0.3–6.2)
ERYTHROCYTE [DISTWIDTH] IN BLOOD BY AUTOMATED COUNT: 14.7 % (ref 12.3–15.4)
GFR SERPL CREATININE-BSD FRML MDRD: 95 ML/MIN/1.73
GLOBULIN UR ELPH-MCNC: 2.8 GM/DL
GLUCOSE SERPL-MCNC: 162 MG/DL (ref 65–99)
HCT VFR BLD AUTO: 31.3 % (ref 37.5–51)
HGB BLD-MCNC: 10.2 G/DL (ref 13–17.7)
IMM GRANULOCYTES # BLD AUTO: 0.06 10*3/MM3 (ref 0–0.05)
IMM GRANULOCYTES NFR BLD AUTO: 0.6 % (ref 0–0.5)
LYMPHOCYTES # BLD AUTO: 0.97 10*3/MM3 (ref 0.7–3.1)
LYMPHOCYTES NFR BLD AUTO: 9 % (ref 19.6–45.3)
MCH RBC QN AUTO: 27.9 PG (ref 26.6–33)
MCHC RBC AUTO-ENTMCNC: 32.6 G/DL (ref 31.5–35.7)
MCV RBC AUTO: 85.8 FL (ref 79–97)
MONOCYTES # BLD AUTO: 0.69 10*3/MM3 (ref 0.1–0.9)
MONOCYTES NFR BLD AUTO: 6.4 % (ref 5–12)
NEUTROPHILS NFR BLD AUTO: 8.73 10*3/MM3 (ref 1.7–7)
NEUTROPHILS NFR BLD AUTO: 80.9 % (ref 42.7–76)
NRBC BLD AUTO-RTO: 0 /100 WBC (ref 0–0.2)
PLATELET # BLD AUTO: 316 10*3/MM3 (ref 140–450)
PMV BLD AUTO: 10.4 FL (ref 6–12)
POTASSIUM SERPL-SCNC: 2.2 MMOL/L (ref 3.5–5.2)
PROT SERPL-MCNC: 5 G/DL (ref 6–8.5)
RBC # BLD AUTO: 3.65 10*6/MM3 (ref 4.14–5.8)
SODIUM SERPL-SCNC: 143 MMOL/L (ref 136–145)
WBC # BLD AUTO: 10.79 10*3/MM3 (ref 3.4–10.8)

## 2020-09-15 PROCEDURE — 99285 EMERGENCY DEPT VISIT HI MDM: CPT

## 2020-09-15 PROCEDURE — 87493 C DIFF AMPLIFIED PROBE: CPT | Performed by: NURSE PRACTITIONER

## 2020-09-15 PROCEDURE — 80053 COMPREHEN METABOLIC PANEL: CPT | Performed by: NURSE PRACTITIONER

## 2020-09-15 PROCEDURE — 85025 COMPLETE CBC W/AUTO DIFF WBC: CPT | Performed by: NURSE PRACTITIONER

## 2020-09-15 NOTE — OUTREACH NOTE
Call Center TCM Note      Responses   [facility] patient discharged from?  Non-BH   Does the patient have one of the following disease processes/diagnoses(primary or secondary)?  Other   TCM attempt successful?  No          Edith Burden MA    9/15/2020, 15:07 EDT

## 2020-09-15 NOTE — OUTREACH NOTE
Prep Survey      Responses   [facility] patient discharged from?  Non-BH   Is LACE score < 7 ?  Non-BH Discharge   Eligibility  [facility]   Date of Discharge  09/14/20   Discharge Disposition  Home or Self Care   Discharge diagnosis  [email or web address]   Does the patient have one of the following disease processes/diagnoses(primary or secondary)?  Other   Prep survey completed?  Yes          Damari Da Silva RN

## 2020-09-15 NOTE — OUTREACH NOTE
Call Center TCM Note      Responses   [facility] patient discharged from?  Non-BH   Does the patient have one of the following disease processes/diagnoses(primary or secondary)?  Other   TCM attempt successful?  No   Unsuccessful attempts  Attempt 2          Edith Burden MA    9/15/2020, 15:48 EDT

## 2020-09-16 ENCOUNTER — TRANSITIONAL CARE MANAGEMENT TELEPHONE ENCOUNTER (OUTPATIENT)
Dept: CALL CENTER | Facility: HOSPITAL | Age: 75
End: 2020-09-16

## 2020-09-16 ENCOUNTER — APPOINTMENT (OUTPATIENT)
Dept: MRI IMAGING | Facility: HOSPITAL | Age: 75
End: 2020-09-16

## 2020-09-16 PROBLEM — A04.72 C. DIFFICILE COLITIS: Status: ACTIVE | Noted: 2020-09-16

## 2020-09-16 PROBLEM — E87.6 ACUTE HYPOKALEMIA: Status: ACTIVE | Noted: 2020-09-16

## 2020-09-16 PROBLEM — D72.829 LEUKOCYTOSIS: Status: ACTIVE | Noted: 2020-09-16

## 2020-09-16 PROBLEM — I48.92 ATRIAL FLUTTER WITH RAPID VENTRICULAR RESPONSE (HCC): Status: ACTIVE | Noted: 2020-09-16

## 2020-09-16 PROBLEM — I48.92 ATRIAL FLUTTER WITH RAPID VENTRICULAR RESPONSE: Status: ACTIVE | Noted: 2020-09-16

## 2020-09-16 LAB
ALBUMIN SERPL-MCNC: 2.7 G/DL (ref 3.5–5.2)
ALBUMIN/GLOB SERPL: 0.8 G/DL
ALP SERPL-CCNC: 100 U/L (ref 39–117)
ALT SERPL W P-5'-P-CCNC: 11 U/L (ref 1–41)
ANION GAP SERPL CALCULATED.3IONS-SCNC: 9.5 MMOL/L (ref 5–15)
ANION GAP SERPL CALCULATED.3IONS-SCNC: 9.6 MMOL/L (ref 5–15)
AST SERPL-CCNC: 15 U/L (ref 1–40)
BASOPHILS # BLD AUTO: 0.11 10*3/MM3 (ref 0–0.2)
BASOPHILS NFR BLD AUTO: 0.8 % (ref 0–1.5)
BILIRUB SERPL-MCNC: 0.5 MG/DL (ref 0–1.2)
BUN SERPL-MCNC: 16 MG/DL (ref 8–23)
BUN SERPL-MCNC: 17 MG/DL (ref 8–23)
BUN/CREAT SERPL: 19.8 (ref 7–25)
BUN/CREAT SERPL: 20.5 (ref 7–25)
CALCIUM SPEC-SCNC: 8.3 MG/DL (ref 8.6–10.5)
CALCIUM SPEC-SCNC: 8.3 MG/DL (ref 8.6–10.5)
CHLORIDE SERPL-SCNC: 102 MMOL/L (ref 98–107)
CHLORIDE SERPL-SCNC: 103 MMOL/L (ref 98–107)
CO2 SERPL-SCNC: 26.4 MMOL/L (ref 22–29)
CO2 SERPL-SCNC: 27.5 MMOL/L (ref 22–29)
CREAT SERPL-MCNC: 0.81 MG/DL (ref 0.76–1.27)
CREAT SERPL-MCNC: 0.83 MG/DL (ref 0.76–1.27)
DEPRECATED RDW RBC AUTO: 45.2 FL (ref 37–54)
DEPRECATED RDW RBC AUTO: 46.1 FL (ref 37–54)
EOSINOPHIL # BLD AUTO: 0.31 10*3/MM3 (ref 0–0.4)
EOSINOPHIL NFR BLD AUTO: 2.2 % (ref 0.3–6.2)
ERYTHROCYTE [DISTWIDTH] IN BLOOD BY AUTOMATED COUNT: 14.9 % (ref 12.3–15.4)
ERYTHROCYTE [DISTWIDTH] IN BLOOD BY AUTOMATED COUNT: 15.1 % (ref 12.3–15.4)
GFR SERPL CREATININE-BSD FRML MDRD: 91 ML/MIN/1.73
GFR SERPL CREATININE-BSD FRML MDRD: 93 ML/MIN/1.73
GLOBULIN UR ELPH-MCNC: 3.2 GM/DL
GLUCOSE BLDC GLUCOMTR-MCNC: 225 MG/DL (ref 70–130)
GLUCOSE BLDC GLUCOMTR-MCNC: 227 MG/DL (ref 70–130)
GLUCOSE BLDC GLUCOMTR-MCNC: 304 MG/DL (ref 70–130)
GLUCOSE SERPL-MCNC: 227 MG/DL (ref 65–99)
GLUCOSE SERPL-MCNC: 233 MG/DL (ref 65–99)
HCT VFR BLD AUTO: 34.5 % (ref 37.5–51)
HCT VFR BLD AUTO: 36 % (ref 37.5–51)
HGB BLD-MCNC: 11.3 G/DL (ref 13–17.7)
HGB BLD-MCNC: 11.9 G/DL (ref 13–17.7)
IMM GRANULOCYTES # BLD AUTO: 0.08 10*3/MM3 (ref 0–0.05)
IMM GRANULOCYTES NFR BLD AUTO: 0.6 % (ref 0–0.5)
LYMPHOCYTES # BLD AUTO: 1.45 10*3/MM3 (ref 0.7–3.1)
LYMPHOCYTES NFR BLD AUTO: 10.4 % (ref 19.6–45.3)
MAGNESIUM SERPL-MCNC: 1.7 MG/DL (ref 1.6–2.4)
MCH RBC QN AUTO: 27.9 PG (ref 26.6–33)
MCH RBC QN AUTO: 28 PG (ref 26.6–33)
MCHC RBC AUTO-ENTMCNC: 32.8 G/DL (ref 31.5–35.7)
MCHC RBC AUTO-ENTMCNC: 33.1 G/DL (ref 31.5–35.7)
MCV RBC AUTO: 84.5 FL (ref 79–97)
MCV RBC AUTO: 85.4 FL (ref 79–97)
MONOCYTES # BLD AUTO: 0.95 10*3/MM3 (ref 0.1–0.9)
MONOCYTES NFR BLD AUTO: 6.8 % (ref 5–12)
NEUTROPHILS NFR BLD AUTO: 11.1 10*3/MM3 (ref 1.7–7)
NEUTROPHILS NFR BLD AUTO: 79.2 % (ref 42.7–76)
NRBC BLD AUTO-RTO: 0 /100 WBC (ref 0–0.2)
NT-PROBNP SERPL-MCNC: 549.9 PG/ML (ref 0–900)
PLATELET # BLD AUTO: 338 10*3/MM3 (ref 140–450)
PLATELET # BLD AUTO: 352 10*3/MM3 (ref 140–450)
PMV BLD AUTO: 9.9 FL (ref 6–12)
PMV BLD AUTO: 9.9 FL (ref 6–12)
POTASSIUM SERPL-SCNC: 2.8 MMOL/L (ref 3.5–5.2)
POTASSIUM SERPL-SCNC: 2.9 MMOL/L (ref 3.5–5.2)
PROT SERPL-MCNC: 5.9 G/DL (ref 6–8.5)
RBC # BLD AUTO: 4.04 10*6/MM3 (ref 4.14–5.8)
RBC # BLD AUTO: 4.26 10*6/MM3 (ref 4.14–5.8)
SARS-COV-2 RNA RESP QL NAA+PROBE: NOT DETECTED
SODIUM SERPL-SCNC: 138 MMOL/L (ref 136–145)
SODIUM SERPL-SCNC: 140 MMOL/L (ref 136–145)
TROPONIN T SERPL-MCNC: <0.01 NG/ML (ref 0–0.03)
WBC # BLD AUTO: 14 10*3/MM3 (ref 3.4–10.8)
WBC # BLD AUTO: 14.35 10*3/MM3 (ref 3.4–10.8)

## 2020-09-16 PROCEDURE — 63710000001 INSULIN LISPRO (HUMAN) PER 5 UNITS: Performed by: NURSE PRACTITIONER

## 2020-09-16 PROCEDURE — 85027 COMPLETE CBC AUTOMATED: CPT | Performed by: NURSE PRACTITIONER

## 2020-09-16 PROCEDURE — 83735 ASSAY OF MAGNESIUM: CPT | Performed by: EMERGENCY MEDICINE

## 2020-09-16 PROCEDURE — 99223 1ST HOSP IP/OBS HIGH 75: CPT | Performed by: INTERNAL MEDICINE

## 2020-09-16 PROCEDURE — 84484 ASSAY OF TROPONIN QUANT: CPT | Performed by: EMERGENCY MEDICINE

## 2020-09-16 PROCEDURE — 85025 COMPLETE CBC W/AUTO DIFF WBC: CPT | Performed by: EMERGENCY MEDICINE

## 2020-09-16 PROCEDURE — 93010 ELECTROCARDIOGRAM REPORT: CPT | Performed by: INTERNAL MEDICINE

## 2020-09-16 PROCEDURE — 25810000003 SODIUM CHLORIDE 0.9 % WITH KCL 20 MEQ 20-0.9 MEQ/L-% SOLUTION: Performed by: NURSE PRACTITIONER

## 2020-09-16 PROCEDURE — 0 GADOBENATE DIMEGLUMINE 529 MG/ML SOLUTION: Performed by: HOSPITALIST

## 2020-09-16 PROCEDURE — 72158 MRI LUMBAR SPINE W/O & W/DYE: CPT

## 2020-09-16 PROCEDURE — 99221 1ST HOSP IP/OBS SF/LOW 40: CPT | Performed by: NURSE PRACTITIONER

## 2020-09-16 PROCEDURE — 93005 ELECTROCARDIOGRAM TRACING: CPT | Performed by: EMERGENCY MEDICINE

## 2020-09-16 PROCEDURE — 25010000002 ERTAPENEM PER 500 MG: Performed by: INTERNAL MEDICINE

## 2020-09-16 PROCEDURE — U0004 COV-19 TEST NON-CDC HGH THRU: HCPCS | Performed by: EMERGENCY MEDICINE

## 2020-09-16 PROCEDURE — 80053 COMPREHEN METABOLIC PANEL: CPT | Performed by: EMERGENCY MEDICINE

## 2020-09-16 PROCEDURE — A9577 INJ MULTIHANCE: HCPCS | Performed by: HOSPITALIST

## 2020-09-16 PROCEDURE — 82962 GLUCOSE BLOOD TEST: CPT

## 2020-09-16 PROCEDURE — 83880 ASSAY OF NATRIURETIC PEPTIDE: CPT | Performed by: EMERGENCY MEDICINE

## 2020-09-16 RX ORDER — BISACODYL 5 MG/1
5 TABLET, DELAYED RELEASE ORAL DAILY PRN
Status: DISCONTINUED | OUTPATIENT
Start: 2020-09-16 | End: 2020-09-18

## 2020-09-16 RX ORDER — MAGNESIUM SULFATE HEPTAHYDRATE 40 MG/ML
2 INJECTION, SOLUTION INTRAVENOUS AS NEEDED
Status: DISCONTINUED | OUTPATIENT
Start: 2020-09-16 | End: 2020-09-29

## 2020-09-16 RX ORDER — SODIUM CHLORIDE AND POTASSIUM CHLORIDE 150; 900 MG/100ML; MG/100ML
100 INJECTION, SOLUTION INTRAVENOUS CONTINUOUS
Status: DISCONTINUED | OUTPATIENT
Start: 2020-09-16 | End: 2020-09-18

## 2020-09-16 RX ORDER — ALUMINA, MAGNESIA, AND SIMETHICONE 2400; 2400; 240 MG/30ML; MG/30ML; MG/30ML
15 SUSPENSION ORAL EVERY 6 HOURS PRN
Status: DISCONTINUED | OUTPATIENT
Start: 2020-09-16 | End: 2020-10-10 | Stop reason: HOSPADM

## 2020-09-16 RX ORDER — DEXTROSE MONOHYDRATE 25 G/50ML
25 INJECTION, SOLUTION INTRAVENOUS
Status: DISCONTINUED | OUTPATIENT
Start: 2020-09-16 | End: 2020-10-10 | Stop reason: HOSPADM

## 2020-09-16 RX ORDER — DILTIAZEM HYDROCHLORIDE 5 MG/ML
INJECTION INTRAVENOUS
Status: COMPLETED
Start: 2020-09-16 | End: 2020-09-16

## 2020-09-16 RX ORDER — POTASSIUM CHLORIDE 7.45 MG/ML
10 INJECTION INTRAVENOUS
Status: DISCONTINUED | OUTPATIENT
Start: 2020-09-16 | End: 2020-09-29

## 2020-09-16 RX ORDER — POTASSIUM CHLORIDE 1.5 G/1.77G
40 POWDER, FOR SOLUTION ORAL AS NEEDED
Status: DISCONTINUED | OUTPATIENT
Start: 2020-09-16 | End: 2020-09-29

## 2020-09-16 RX ORDER — ACETAMINOPHEN 325 MG/1
650 TABLET ORAL EVERY 4 HOURS PRN
Status: DISCONTINUED | OUTPATIENT
Start: 2020-09-16 | End: 2020-10-10 | Stop reason: HOSPADM

## 2020-09-16 RX ORDER — DILTIAZEM HCL IN NACL,ISO-OSM 125 MG/125
5-15 PLASTIC BAG, INJECTION (ML) INTRAVENOUS CONTINUOUS
Status: DISCONTINUED | OUTPATIENT
Start: 2020-09-16 | End: 2020-09-17

## 2020-09-16 RX ORDER — NICOTINE POLACRILEX 4 MG
15 LOZENGE BUCCAL
Status: DISCONTINUED | OUTPATIENT
Start: 2020-09-16 | End: 2020-10-10 | Stop reason: HOSPADM

## 2020-09-16 RX ORDER — ACETAMINOPHEN 160 MG/5ML
650 SOLUTION ORAL EVERY 4 HOURS PRN
Status: DISCONTINUED | OUTPATIENT
Start: 2020-09-16 | End: 2020-09-30

## 2020-09-16 RX ORDER — MAGNESIUM SULFATE HEPTAHYDRATE 40 MG/ML
4 INJECTION, SOLUTION INTRAVENOUS AS NEEDED
Status: DISCONTINUED | OUTPATIENT
Start: 2020-09-16 | End: 2020-09-29

## 2020-09-16 RX ORDER — DIVALPROEX SODIUM 125 MG/1
125 TABLET, DELAYED RELEASE ORAL 2 TIMES DAILY
COMMUNITY
End: 2020-10-10 | Stop reason: HOSPADM

## 2020-09-16 RX ORDER — METOPROLOL TARTRATE 50 MG/1
100 TABLET, FILM COATED ORAL EVERY 12 HOURS SCHEDULED
Status: DISCONTINUED | OUTPATIENT
Start: 2020-09-16 | End: 2020-09-23

## 2020-09-16 RX ORDER — SODIUM CHLORIDE 0.9 % (FLUSH) 0.9 %
10 SYRINGE (ML) INJECTION AS NEEDED
Status: DISCONTINUED | OUTPATIENT
Start: 2020-09-16 | End: 2020-10-10 | Stop reason: HOSPADM

## 2020-09-16 RX ORDER — ACETAMINOPHEN 650 MG/1
650 SUPPOSITORY RECTAL EVERY 4 HOURS PRN
Status: DISCONTINUED | OUTPATIENT
Start: 2020-09-16 | End: 2020-09-30

## 2020-09-16 RX ORDER — POTASSIUM CHLORIDE 750 MG/1
40 CAPSULE, EXTENDED RELEASE ORAL ONCE
Status: COMPLETED | OUTPATIENT
Start: 2020-09-16 | End: 2020-09-16

## 2020-09-16 RX ORDER — POTASSIUM CHLORIDE 750 MG/1
40 CAPSULE, EXTENDED RELEASE ORAL AS NEEDED
Status: DISCONTINUED | OUTPATIENT
Start: 2020-09-16 | End: 2020-09-29

## 2020-09-16 RX ORDER — ONDANSETRON 4 MG/1
4 TABLET, FILM COATED ORAL EVERY 6 HOURS PRN
Status: DISCONTINUED | OUTPATIENT
Start: 2020-09-16 | End: 2020-10-10 | Stop reason: HOSPADM

## 2020-09-16 RX ORDER — ONDANSETRON 2 MG/ML
4 INJECTION INTRAMUSCULAR; INTRAVENOUS EVERY 6 HOURS PRN
Status: DISCONTINUED | OUTPATIENT
Start: 2020-09-16 | End: 2020-10-10 | Stop reason: HOSPADM

## 2020-09-16 RX ORDER — BISACODYL 10 MG
10 SUPPOSITORY, RECTAL RECTAL DAILY PRN
Status: DISCONTINUED | OUTPATIENT
Start: 2020-09-16 | End: 2020-09-18

## 2020-09-16 RX ADMIN — INSULIN LISPRO 5 UNITS: 100 INJECTION, SOLUTION INTRAVENOUS; SUBCUTANEOUS at 13:02

## 2020-09-16 RX ADMIN — APIXABAN 5 MG: 5 TABLET, FILM COATED ORAL at 16:45

## 2020-09-16 RX ADMIN — INSULIN LISPRO 3 UNITS: 100 INJECTION, SOLUTION INTRAVENOUS; SUBCUTANEOUS at 18:16

## 2020-09-16 RX ADMIN — METOPROLOL TARTRATE 100 MG: 50 TABLET, FILM COATED ORAL at 16:44

## 2020-09-16 RX ADMIN — POTASSIUM CHLORIDE 40 MEQ: 10 CAPSULE, COATED, EXTENDED RELEASE ORAL at 09:17

## 2020-09-16 RX ADMIN — ERTAPENEM SODIUM 1 G: 1 INJECTION, POWDER, LYOPHILIZED, FOR SOLUTION INTRAMUSCULAR; INTRAVENOUS at 13:02

## 2020-09-16 RX ADMIN — POTASSIUM CHLORIDE 40 MEQ: 10 CAPSULE, COATED, EXTENDED RELEASE ORAL at 03:13

## 2020-09-16 RX ADMIN — POTASSIUM CHLORIDE AND SODIUM CHLORIDE 100 ML/HR: 900; 150 INJECTION, SOLUTION INTRAVENOUS at 21:13

## 2020-09-16 RX ADMIN — Medication 10 MG/HR: at 21:14

## 2020-09-16 RX ADMIN — VANCOMYCIN 125 MG: KIT at 18:16

## 2020-09-16 RX ADMIN — GADOBENATE DIMEGLUMINE 20 ML: 529 INJECTION, SOLUTION INTRAVENOUS at 14:15

## 2020-09-16 RX ADMIN — VANCOMYCIN 125 MG: KIT at 13:03

## 2020-09-16 RX ADMIN — POTASSIUM CHLORIDE AND SODIUM CHLORIDE 100 ML/HR: 900; 150 INJECTION, SOLUTION INTRAVENOUS at 10:24

## 2020-09-16 RX ADMIN — Medication 5 MG/HR: at 03:16

## 2020-09-16 NOTE — OUTREACH NOTE
Call Center TCM Note      Responses   [facility] patient discharged from?  Non-   Does the patient have one of the following disease processes/diagnoses(primary or secondary)?  Other   TCM attempt successful?  No   Change in Health Status  Readmitted          Damari Da Silva RN    9/16/2020, 05:44 EDT

## 2020-09-17 LAB
ANION GAP SERPL CALCULATED.3IONS-SCNC: 6.7 MMOL/L (ref 5–15)
BASOPHILS # BLD AUTO: 0.08 10*3/MM3 (ref 0–0.2)
BASOPHILS NFR BLD AUTO: 0.7 % (ref 0–1.5)
BUN SERPL-MCNC: 11 MG/DL (ref 8–23)
BUN/CREAT SERPL: 15.9 (ref 7–25)
CALCIUM SPEC-SCNC: 8.3 MG/DL (ref 8.6–10.5)
CHLORIDE SERPL-SCNC: 105 MMOL/L (ref 98–107)
CO2 SERPL-SCNC: 25.3 MMOL/L (ref 22–29)
CREAT SERPL-MCNC: 0.69 MG/DL (ref 0.76–1.27)
CRP SERPL-MCNC: 5.89 MG/DL (ref 0–0.5)
DEPRECATED RDW RBC AUTO: 47.5 FL (ref 37–54)
EOSINOPHIL # BLD AUTO: 0.39 10*3/MM3 (ref 0–0.4)
EOSINOPHIL NFR BLD AUTO: 3.4 % (ref 0.3–6.2)
ERYTHROCYTE [DISTWIDTH] IN BLOOD BY AUTOMATED COUNT: 14.8 % (ref 12.3–15.4)
ERYTHROCYTE [SEDIMENTATION RATE] IN BLOOD: 34 MM/HR (ref 0–20)
GFR SERPL CREATININE-BSD FRML MDRD: 112 ML/MIN/1.73
GLUCOSE BLDC GLUCOMTR-MCNC: 219 MG/DL (ref 70–130)
GLUCOSE BLDC GLUCOMTR-MCNC: 227 MG/DL (ref 70–130)
GLUCOSE BLDC GLUCOMTR-MCNC: 242 MG/DL (ref 70–130)
GLUCOSE BLDC GLUCOMTR-MCNC: 256 MG/DL (ref 70–130)
GLUCOSE BLDC GLUCOMTR-MCNC: 282 MG/DL (ref 70–130)
GLUCOSE SERPL-MCNC: 246 MG/DL (ref 65–99)
HCT VFR BLD AUTO: 34.6 % (ref 37.5–51)
HGB BLD-MCNC: 11.1 G/DL (ref 13–17.7)
IMM GRANULOCYTES # BLD AUTO: 0.13 10*3/MM3 (ref 0–0.05)
IMM GRANULOCYTES NFR BLD AUTO: 1.1 % (ref 0–0.5)
LYMPHOCYTES # BLD AUTO: 1.07 10*3/MM3 (ref 0.7–3.1)
LYMPHOCYTES NFR BLD AUTO: 9.2 % (ref 19.6–45.3)
MCH RBC QN AUTO: 28.5 PG (ref 26.6–33)
MCHC RBC AUTO-ENTMCNC: 32.1 G/DL (ref 31.5–35.7)
MCV RBC AUTO: 88.9 FL (ref 79–97)
MONOCYTES # BLD AUTO: 0.64 10*3/MM3 (ref 0.1–0.9)
MONOCYTES NFR BLD AUTO: 5.5 % (ref 5–12)
NEUTROPHILS NFR BLD AUTO: 80.1 % (ref 42.7–76)
NEUTROPHILS NFR BLD AUTO: 9.29 10*3/MM3 (ref 1.7–7)
NRBC BLD AUTO-RTO: 0 /100 WBC (ref 0–0.2)
PLATELET # BLD AUTO: 338 10*3/MM3 (ref 140–450)
PMV BLD AUTO: 9.9 FL (ref 6–12)
POTASSIUM SERPL-SCNC: 3.6 MMOL/L (ref 3.5–5.2)
RBC # BLD AUTO: 3.89 10*6/MM3 (ref 4.14–5.8)
SODIUM SERPL-SCNC: 137 MMOL/L (ref 136–145)
WBC # BLD AUTO: 11.6 10*3/MM3 (ref 3.4–10.8)

## 2020-09-17 PROCEDURE — 99221 1ST HOSP IP/OBS SF/LOW 40: CPT | Performed by: NURSE PRACTITIONER

## 2020-09-17 PROCEDURE — 85025 COMPLETE CBC W/AUTO DIFF WBC: CPT | Performed by: NURSE PRACTITIONER

## 2020-09-17 PROCEDURE — 85652 RBC SED RATE AUTOMATED: CPT | Performed by: NURSE PRACTITIONER

## 2020-09-17 PROCEDURE — 99233 SBSQ HOSP IP/OBS HIGH 50: CPT | Performed by: INTERNAL MEDICINE

## 2020-09-17 PROCEDURE — 86140 C-REACTIVE PROTEIN: CPT | Performed by: NURSE PRACTITIONER

## 2020-09-17 PROCEDURE — 63710000001 INSULIN GLARGINE PER 5 UNITS: Performed by: NURSE PRACTITIONER

## 2020-09-17 PROCEDURE — 80048 BASIC METABOLIC PNL TOTAL CA: CPT | Performed by: NURSE PRACTITIONER

## 2020-09-17 PROCEDURE — 25810000003 SODIUM CHLORIDE 0.9 % WITH KCL 20 MEQ 20-0.9 MEQ/L-% SOLUTION: Performed by: NURSE PRACTITIONER

## 2020-09-17 PROCEDURE — 99232 SBSQ HOSP IP/OBS MODERATE 35: CPT | Performed by: INTERNAL MEDICINE

## 2020-09-17 PROCEDURE — 63710000001 INSULIN LISPRO (HUMAN) PER 5 UNITS: Performed by: NURSE PRACTITIONER

## 2020-09-17 PROCEDURE — 82962 GLUCOSE BLOOD TEST: CPT

## 2020-09-17 PROCEDURE — 25010000002 ERTAPENEM PER 500 MG: Performed by: INTERNAL MEDICINE

## 2020-09-17 RX ORDER — DIVALPROEX SODIUM 125 MG/1
125 TABLET, DELAYED RELEASE ORAL 2 TIMES DAILY
Status: DISCONTINUED | OUTPATIENT
Start: 2020-09-17 | End: 2020-09-25

## 2020-09-17 RX ORDER — HYDROCODONE BITARTRATE AND ACETAMINOPHEN 5; 325 MG/1; MG/1
1 TABLET ORAL EVERY 4 HOURS PRN
Status: DISCONTINUED | OUTPATIENT
Start: 2020-09-17 | End: 2020-10-10 | Stop reason: HOSPADM

## 2020-09-17 RX ORDER — MONTELUKAST SODIUM 10 MG/1
10 TABLET ORAL NIGHTLY
Status: DISCONTINUED | OUTPATIENT
Start: 2020-09-17 | End: 2020-10-10 | Stop reason: HOSPADM

## 2020-09-17 RX ORDER — FINASTERIDE 5 MG/1
5 TABLET, FILM COATED ORAL NIGHTLY
Status: DISCONTINUED | OUTPATIENT
Start: 2020-09-17 | End: 2020-10-10 | Stop reason: HOSPADM

## 2020-09-17 RX ORDER — HYDRALAZINE HYDROCHLORIDE 50 MG/1
100 TABLET, FILM COATED ORAL 3 TIMES DAILY
Status: DISCONTINUED | OUTPATIENT
Start: 2020-09-17 | End: 2020-10-10 | Stop reason: HOSPADM

## 2020-09-17 RX ORDER — TAMSULOSIN HYDROCHLORIDE 0.4 MG/1
0.4 CAPSULE ORAL DAILY
Status: DISCONTINUED | OUTPATIENT
Start: 2020-09-17 | End: 2020-10-02

## 2020-09-17 RX ORDER — CYCLOBENZAPRINE HCL 10 MG
10 TABLET ORAL 3 TIMES DAILY PRN
Status: DISCONTINUED | OUTPATIENT
Start: 2020-09-17 | End: 2020-09-24

## 2020-09-17 RX ORDER — INSULIN GLARGINE 100 [IU]/ML
20 INJECTION, SOLUTION SUBCUTANEOUS NIGHTLY
Status: DISCONTINUED | OUTPATIENT
Start: 2020-09-17 | End: 2020-09-20

## 2020-09-17 RX ORDER — MULTIPLE VITAMINS W/ MINERALS TAB 9MG-400MCG
1 TAB ORAL DAILY
Status: DISCONTINUED | OUTPATIENT
Start: 2020-09-17 | End: 2020-10-10 | Stop reason: HOSPADM

## 2020-09-17 RX ORDER — PRAVASTATIN SODIUM 40 MG
40 TABLET ORAL DAILY
Status: DISCONTINUED | OUTPATIENT
Start: 2020-09-17 | End: 2020-10-10 | Stop reason: HOSPADM

## 2020-09-17 RX ADMIN — VANCOMYCIN 125 MG: KIT at 12:34

## 2020-09-17 RX ADMIN — INSULIN LISPRO 5 UNITS: 100 INJECTION, SOLUTION INTRAVENOUS; SUBCUTANEOUS at 18:48

## 2020-09-17 RX ADMIN — DILTIAZEM HYDROCHLORIDE 300 MG: 180 CAPSULE, COATED, EXTENDED RELEASE ORAL at 09:04

## 2020-09-17 RX ADMIN — MULTIPLE VITAMINS W/ MINERALS TAB 1 TABLET: TAB at 15:28

## 2020-09-17 RX ADMIN — POTASSIUM CHLORIDE AND SODIUM CHLORIDE 100 ML/HR: 900; 150 INJECTION, SOLUTION INTRAVENOUS at 21:17

## 2020-09-17 RX ADMIN — PRAVASTATIN SODIUM 40 MG: 40 TABLET ORAL at 15:28

## 2020-09-17 RX ADMIN — METOPROLOL TARTRATE 100 MG: 50 TABLET, FILM COATED ORAL at 12:33

## 2020-09-17 RX ADMIN — DIVALPROEX SODIUM 125 MG: 125 TABLET, DELAYED RELEASE ORAL at 21:22

## 2020-09-17 RX ADMIN — ERTAPENEM SODIUM 1 G: 1 INJECTION, POWDER, LYOPHILIZED, FOR SOLUTION INTRAMUSCULAR; INTRAVENOUS at 12:34

## 2020-09-17 RX ADMIN — FINASTERIDE 5 MG: 5 TABLET, FILM COATED ORAL at 21:22

## 2020-09-17 RX ADMIN — VANCOMYCIN 125 MG: KIT at 00:21

## 2020-09-17 RX ADMIN — TAMSULOSIN HYDROCHLORIDE 0.4 MG: 0.4 CAPSULE ORAL at 15:28

## 2020-09-17 RX ADMIN — APIXABAN 5 MG: 5 TABLET, FILM COATED ORAL at 09:04

## 2020-09-17 RX ADMIN — INSULIN GLARGINE 20 UNITS: 100 INJECTION, SOLUTION SUBCUTANEOUS at 22:21

## 2020-09-17 RX ADMIN — INSULIN LISPRO 3 UNITS: 100 INJECTION, SOLUTION INTRAVENOUS; SUBCUTANEOUS at 12:34

## 2020-09-17 RX ADMIN — VANCOMYCIN 125 MG: KIT at 05:48

## 2020-09-17 RX ADMIN — POTASSIUM CHLORIDE AND SODIUM CHLORIDE 100 ML/HR: 900; 150 INJECTION, SOLUTION INTRAVENOUS at 09:05

## 2020-09-17 RX ADMIN — VANCOMYCIN 125 MG: KIT at 18:48

## 2020-09-17 RX ADMIN — INSULIN LISPRO 3 UNITS: 100 INJECTION, SOLUTION INTRAVENOUS; SUBCUTANEOUS at 09:04

## 2020-09-17 RX ADMIN — APIXABAN 5 MG: 5 TABLET, FILM COATED ORAL at 21:21

## 2020-09-17 RX ADMIN — MONTELUKAST SODIUM 10 MG: 10 TABLET, FILM COATED ORAL at 21:22

## 2020-09-17 RX ADMIN — HYDRALAZINE HYDROCHLORIDE 100 MG: 50 TABLET, FILM COATED ORAL at 21:22

## 2020-09-17 RX ADMIN — INSULIN LISPRO 4 UNITS: 100 INJECTION, SOLUTION INTRAVENOUS; SUBCUTANEOUS at 18:48

## 2020-09-18 PROBLEM — L89.302 PRESSURE INJURY OF BUTTOCK, STAGE 2: Status: ACTIVE | Noted: 2020-09-18

## 2020-09-18 LAB
ANION GAP SERPL CALCULATED.3IONS-SCNC: 5.1 MMOL/L (ref 5–15)
BUN SERPL-MCNC: 9 MG/DL (ref 8–23)
BUN/CREAT SERPL: 13 (ref 7–25)
CALCIUM SPEC-SCNC: 8.2 MG/DL (ref 8.6–10.5)
CHLORIDE SERPL-SCNC: 108 MMOL/L (ref 98–107)
CO2 SERPL-SCNC: 25.9 MMOL/L (ref 22–29)
CREAT SERPL-MCNC: 0.69 MG/DL (ref 0.76–1.27)
DEPRECATED RDW RBC AUTO: 47.9 FL (ref 37–54)
ERYTHROCYTE [DISTWIDTH] IN BLOOD BY AUTOMATED COUNT: 15 % (ref 12.3–15.4)
GFR SERPL CREATININE-BSD FRML MDRD: 112 ML/MIN/1.73
GLUCOSE BLDC GLUCOMTR-MCNC: 120 MG/DL (ref 70–130)
GLUCOSE BLDC GLUCOMTR-MCNC: 131 MG/DL (ref 70–130)
GLUCOSE BLDC GLUCOMTR-MCNC: 214 MG/DL (ref 70–130)
GLUCOSE BLDC GLUCOMTR-MCNC: 237 MG/DL (ref 70–130)
GLUCOSE SERPL-MCNC: 137 MG/DL (ref 65–99)
HCT VFR BLD AUTO: 31.4 % (ref 37.5–51)
HGB BLD-MCNC: 10.2 G/DL (ref 13–17.7)
MCH RBC QN AUTO: 28.3 PG (ref 26.6–33)
MCHC RBC AUTO-ENTMCNC: 32.5 G/DL (ref 31.5–35.7)
MCV RBC AUTO: 87.2 FL (ref 79–97)
PLATELET # BLD AUTO: 346 10*3/MM3 (ref 140–450)
PMV BLD AUTO: 9.6 FL (ref 6–12)
POTASSIUM SERPL-SCNC: 3.2 MMOL/L (ref 3.5–5.2)
RBC # BLD AUTO: 3.6 10*6/MM3 (ref 4.14–5.8)
SODIUM SERPL-SCNC: 139 MMOL/L (ref 136–145)
WBC # BLD AUTO: 10.21 10*3/MM3 (ref 3.4–10.8)

## 2020-09-18 PROCEDURE — 82962 GLUCOSE BLOOD TEST: CPT

## 2020-09-18 PROCEDURE — 80048 BASIC METABOLIC PNL TOTAL CA: CPT | Performed by: NURSE PRACTITIONER

## 2020-09-18 PROCEDURE — 97535 SELF CARE MNGMENT TRAINING: CPT

## 2020-09-18 PROCEDURE — 97110 THERAPEUTIC EXERCISES: CPT

## 2020-09-18 PROCEDURE — 25810000003 SODIUM CHLORIDE 0.9 % WITH KCL 20 MEQ 20-0.9 MEQ/L-% SOLUTION: Performed by: NURSE PRACTITIONER

## 2020-09-18 PROCEDURE — 85027 COMPLETE CBC AUTOMATED: CPT | Performed by: NURSE PRACTITIONER

## 2020-09-18 PROCEDURE — 63710000001 INSULIN LISPRO (HUMAN) PER 5 UNITS: Performed by: NURSE PRACTITIONER

## 2020-09-18 PROCEDURE — 97162 PT EVAL MOD COMPLEX 30 MIN: CPT

## 2020-09-18 PROCEDURE — 99232 SBSQ HOSP IP/OBS MODERATE 35: CPT | Performed by: INTERNAL MEDICINE

## 2020-09-18 PROCEDURE — 97530 THERAPEUTIC ACTIVITIES: CPT

## 2020-09-18 PROCEDURE — 63710000001 INSULIN GLARGINE PER 5 UNITS: Performed by: NURSE PRACTITIONER

## 2020-09-18 PROCEDURE — 25010000002 ERTAPENEM PER 500 MG: Performed by: INTERNAL MEDICINE

## 2020-09-18 PROCEDURE — 97166 OT EVAL MOD COMPLEX 45 MIN: CPT

## 2020-09-18 RX ORDER — POTASSIUM CHLORIDE 750 MG/1
40 CAPSULE, EXTENDED RELEASE ORAL ONCE
Status: COMPLETED | OUTPATIENT
Start: 2020-09-18 | End: 2020-09-18

## 2020-09-18 RX ADMIN — MONTELUKAST SODIUM 10 MG: 10 TABLET, FILM COATED ORAL at 20:46

## 2020-09-18 RX ADMIN — Medication 1 APPLICATION: at 20:47

## 2020-09-18 RX ADMIN — HYDRALAZINE HYDROCHLORIDE 100 MG: 50 TABLET, FILM COATED ORAL at 20:46

## 2020-09-18 RX ADMIN — METOPROLOL TARTRATE 100 MG: 50 TABLET, FILM COATED ORAL at 17:15

## 2020-09-18 RX ADMIN — APIXABAN 5 MG: 5 TABLET, FILM COATED ORAL at 08:34

## 2020-09-18 RX ADMIN — DIVALPROEX SODIUM 125 MG: 125 TABLET, DELAYED RELEASE ORAL at 20:47

## 2020-09-18 RX ADMIN — FINASTERIDE 5 MG: 5 TABLET, FILM COATED ORAL at 20:47

## 2020-09-18 RX ADMIN — INSULIN LISPRO 3 UNITS: 100 INJECTION, SOLUTION INTRAVENOUS; SUBCUTANEOUS at 17:14

## 2020-09-18 RX ADMIN — VANCOMYCIN 125 MG: KIT at 17:14

## 2020-09-18 RX ADMIN — METOPROLOL TARTRATE 100 MG: 50 TABLET, FILM COATED ORAL at 23:33

## 2020-09-18 RX ADMIN — DIVALPROEX SODIUM 125 MG: 125 TABLET, DELAYED RELEASE ORAL at 08:35

## 2020-09-18 RX ADMIN — VANCOMYCIN 125 MG: KIT at 23:33

## 2020-09-18 RX ADMIN — POTASSIUM CHLORIDE AND SODIUM CHLORIDE 100 ML/HR: 900; 150 INJECTION, SOLUTION INTRAVENOUS at 08:34

## 2020-09-18 RX ADMIN — POTASSIUM CHLORIDE 40 MEQ: 10 CAPSULE, COATED, EXTENDED RELEASE ORAL at 12:06

## 2020-09-18 RX ADMIN — PRAVASTATIN SODIUM 40 MG: 40 TABLET ORAL at 08:35

## 2020-09-18 RX ADMIN — VANCOMYCIN 125 MG: KIT at 12:07

## 2020-09-18 RX ADMIN — INSULIN LISPRO 5 UNITS: 100 INJECTION, SOLUTION INTRAVENOUS; SUBCUTANEOUS at 08:35

## 2020-09-18 RX ADMIN — TAMSULOSIN HYDROCHLORIDE 0.4 MG: 0.4 CAPSULE ORAL at 08:35

## 2020-09-18 RX ADMIN — INSULIN LISPRO 5 UNITS: 100 INJECTION, SOLUTION INTRAVENOUS; SUBCUTANEOUS at 12:07

## 2020-09-18 RX ADMIN — INSULIN GLARGINE 20 UNITS: 100 INJECTION, SOLUTION SUBCUTANEOUS at 21:45

## 2020-09-18 RX ADMIN — VANCOMYCIN 125 MG: KIT at 01:16

## 2020-09-18 RX ADMIN — HYDROCODONE BITARTRATE AND ACETAMINOPHEN 1 TABLET: 5; 325 TABLET ORAL at 23:19

## 2020-09-18 RX ADMIN — HYDROCODONE BITARTRATE AND ACETAMINOPHEN 1 TABLET: 5; 325 TABLET ORAL at 12:06

## 2020-09-18 RX ADMIN — VANCOMYCIN 125 MG: KIT at 06:24

## 2020-09-18 RX ADMIN — SODIUM CHLORIDE, PRESERVATIVE FREE 10 ML: 5 INJECTION INTRAVENOUS at 08:35

## 2020-09-18 RX ADMIN — Medication 1 APPLICATION: at 08:35

## 2020-09-18 RX ADMIN — MULTIPLE VITAMINS W/ MINERALS TAB 1 TABLET: TAB at 08:35

## 2020-09-18 RX ADMIN — DILTIAZEM HYDROCHLORIDE 300 MG: 180 CAPSULE, COATED, EXTENDED RELEASE ORAL at 13:43

## 2020-09-18 RX ADMIN — ERTAPENEM SODIUM 1 G: 1 INJECTION, POWDER, LYOPHILIZED, FOR SOLUTION INTRAMUSCULAR; INTRAVENOUS at 12:06

## 2020-09-18 RX ADMIN — APIXABAN 5 MG: 5 TABLET, FILM COATED ORAL at 20:46

## 2020-09-18 RX ADMIN — INSULIN LISPRO 5 UNITS: 100 INJECTION, SOLUTION INTRAVENOUS; SUBCUTANEOUS at 17:14

## 2020-09-18 RX ADMIN — Medication 1 APPLICATION: at 01:15

## 2020-09-19 LAB
ANION GAP SERPL CALCULATED.3IONS-SCNC: 8.8 MMOL/L (ref 5–15)
BUN SERPL-MCNC: 7 MG/DL (ref 8–23)
BUN/CREAT SERPL: 8.4 (ref 7–25)
CALCIUM SPEC-SCNC: 9.1 MG/DL (ref 8.6–10.5)
CHLORIDE SERPL-SCNC: 105 MMOL/L (ref 98–107)
CO2 SERPL-SCNC: 26.2 MMOL/L (ref 22–29)
CREAT SERPL-MCNC: 0.83 MG/DL (ref 0.76–1.27)
DEPRECATED RDW RBC AUTO: 46.2 FL (ref 37–54)
ERYTHROCYTE [DISTWIDTH] IN BLOOD BY AUTOMATED COUNT: 15.1 % (ref 12.3–15.4)
GFR SERPL CREATININE-BSD FRML MDRD: 91 ML/MIN/1.73
GLUCOSE BLDC GLUCOMTR-MCNC: 124 MG/DL (ref 70–130)
GLUCOSE BLDC GLUCOMTR-MCNC: 166 MG/DL (ref 70–130)
GLUCOSE BLDC GLUCOMTR-MCNC: 228 MG/DL (ref 70–130)
GLUCOSE BLDC GLUCOMTR-MCNC: 230 MG/DL (ref 70–130)
GLUCOSE SERPL-MCNC: 163 MG/DL (ref 65–99)
HCT VFR BLD AUTO: 32.7 % (ref 37.5–51)
HGB BLD-MCNC: 10.7 G/DL (ref 13–17.7)
MCH RBC QN AUTO: 27.9 PG (ref 26.6–33)
MCHC RBC AUTO-ENTMCNC: 32.7 G/DL (ref 31.5–35.7)
MCV RBC AUTO: 85.2 FL (ref 79–97)
PLATELET # BLD AUTO: 356 10*3/MM3 (ref 140–450)
PMV BLD AUTO: 9.7 FL (ref 6–12)
POTASSIUM SERPL-SCNC: 3.5 MMOL/L (ref 3.5–5.2)
RBC # BLD AUTO: 3.84 10*6/MM3 (ref 4.14–5.8)
SODIUM SERPL-SCNC: 140 MMOL/L (ref 136–145)
WBC # BLD AUTO: 8.15 10*3/MM3 (ref 3.4–10.8)

## 2020-09-19 PROCEDURE — 97530 THERAPEUTIC ACTIVITIES: CPT

## 2020-09-19 PROCEDURE — 63710000001 INSULIN LISPRO (HUMAN) PER 5 UNITS: Performed by: NURSE PRACTITIONER

## 2020-09-19 PROCEDURE — 63710000001 INSULIN GLARGINE PER 5 UNITS: Performed by: NURSE PRACTITIONER

## 2020-09-19 PROCEDURE — 25010000002 ERTAPENEM PER 500 MG: Performed by: INTERNAL MEDICINE

## 2020-09-19 PROCEDURE — 99232 SBSQ HOSP IP/OBS MODERATE 35: CPT | Performed by: INTERNAL MEDICINE

## 2020-09-19 PROCEDURE — 80048 BASIC METABOLIC PNL TOTAL CA: CPT | Performed by: HOSPITALIST

## 2020-09-19 PROCEDURE — 82962 GLUCOSE BLOOD TEST: CPT

## 2020-09-19 PROCEDURE — 97110 THERAPEUTIC EXERCISES: CPT

## 2020-09-19 PROCEDURE — 85027 COMPLETE CBC AUTOMATED: CPT | Performed by: HOSPITALIST

## 2020-09-19 RX ORDER — FUROSEMIDE 40 MG/1
40 TABLET ORAL DAILY
Status: DISCONTINUED | OUTPATIENT
Start: 2020-09-19 | End: 2020-10-10 | Stop reason: HOSPADM

## 2020-09-19 RX ORDER — LOSARTAN POTASSIUM 25 MG/1
25 TABLET ORAL
Status: DISCONTINUED | OUTPATIENT
Start: 2020-09-19 | End: 2020-09-21

## 2020-09-19 RX ADMIN — MULTIPLE VITAMINS W/ MINERALS TAB 1 TABLET: TAB at 07:48

## 2020-09-19 RX ADMIN — INSULIN LISPRO 5 UNITS: 100 INJECTION, SOLUTION INTRAVENOUS; SUBCUTANEOUS at 07:48

## 2020-09-19 RX ADMIN — INSULIN LISPRO 5 UNITS: 100 INJECTION, SOLUTION INTRAVENOUS; SUBCUTANEOUS at 17:28

## 2020-09-19 RX ADMIN — ERTAPENEM SODIUM 1 G: 1 INJECTION, POWDER, LYOPHILIZED, FOR SOLUTION INTRAMUSCULAR; INTRAVENOUS at 11:35

## 2020-09-19 RX ADMIN — Medication 1 APPLICATION: at 20:44

## 2020-09-19 RX ADMIN — HYDROCODONE BITARTRATE AND ACETAMINOPHEN 1 TABLET: 5; 325 TABLET ORAL at 17:28

## 2020-09-19 RX ADMIN — HYDRALAZINE HYDROCHLORIDE 100 MG: 50 TABLET, FILM COATED ORAL at 17:27

## 2020-09-19 RX ADMIN — APIXABAN 5 MG: 5 TABLET, FILM COATED ORAL at 20:41

## 2020-09-19 RX ADMIN — VANCOMYCIN 125 MG: KIT at 23:14

## 2020-09-19 RX ADMIN — VANCOMYCIN 125 MG: KIT at 17:27

## 2020-09-19 RX ADMIN — TAMSULOSIN HYDROCHLORIDE 0.4 MG: 0.4 CAPSULE ORAL at 07:48

## 2020-09-19 RX ADMIN — DIVALPROEX SODIUM 125 MG: 125 TABLET, DELAYED RELEASE ORAL at 07:46

## 2020-09-19 RX ADMIN — HYDRALAZINE HYDROCHLORIDE 100 MG: 50 TABLET, FILM COATED ORAL at 20:41

## 2020-09-19 RX ADMIN — HYDROCODONE BITARTRATE AND ACETAMINOPHEN 1 TABLET: 5; 325 TABLET ORAL at 23:16

## 2020-09-19 RX ADMIN — INSULIN GLARGINE 20 UNITS: 100 INJECTION, SOLUTION SUBCUTANEOUS at 21:57

## 2020-09-19 RX ADMIN — LOSARTAN POTASSIUM 25 MG: 25 TABLET, FILM COATED ORAL at 17:27

## 2020-09-19 RX ADMIN — METOPROLOL TARTRATE 100 MG: 50 TABLET, FILM COATED ORAL at 23:13

## 2020-09-19 RX ADMIN — APIXABAN 5 MG: 5 TABLET, FILM COATED ORAL at 07:48

## 2020-09-19 RX ADMIN — HYDRALAZINE HYDROCHLORIDE 100 MG: 50 TABLET, FILM COATED ORAL at 07:48

## 2020-09-19 RX ADMIN — FUROSEMIDE 40 MG: 40 TABLET ORAL at 11:35

## 2020-09-19 RX ADMIN — SODIUM CHLORIDE, PRESERVATIVE FREE 10 ML: 5 INJECTION INTRAVENOUS at 20:41

## 2020-09-19 RX ADMIN — FINASTERIDE 5 MG: 5 TABLET, FILM COATED ORAL at 20:44

## 2020-09-19 RX ADMIN — PRAVASTATIN SODIUM 40 MG: 40 TABLET ORAL at 07:47

## 2020-09-19 RX ADMIN — INSULIN LISPRO 5 UNITS: 100 INJECTION, SOLUTION INTRAVENOUS; SUBCUTANEOUS at 11:35

## 2020-09-19 RX ADMIN — DILTIAZEM HYDROCHLORIDE 300 MG: 180 CAPSULE, COATED, EXTENDED RELEASE ORAL at 07:47

## 2020-09-19 RX ADMIN — DIVALPROEX SODIUM 125 MG: 125 TABLET, DELAYED RELEASE ORAL at 20:41

## 2020-09-19 RX ADMIN — INSULIN LISPRO 2 UNITS: 100 INJECTION, SOLUTION INTRAVENOUS; SUBCUTANEOUS at 07:46

## 2020-09-19 RX ADMIN — VANCOMYCIN 125 MG: KIT at 11:35

## 2020-09-19 RX ADMIN — Medication 1 APPLICATION: at 07:49

## 2020-09-19 RX ADMIN — MONTELUKAST SODIUM 10 MG: 10 TABLET, FILM COATED ORAL at 20:41

## 2020-09-19 RX ADMIN — METOPROLOL TARTRATE 100 MG: 50 TABLET, FILM COATED ORAL at 07:47

## 2020-09-19 RX ADMIN — INSULIN LISPRO 3 UNITS: 100 INJECTION, SOLUTION INTRAVENOUS; SUBCUTANEOUS at 17:28

## 2020-09-19 RX ADMIN — VANCOMYCIN 125 MG: KIT at 06:19

## 2020-09-20 LAB
GLUCOSE BLDC GLUCOMTR-MCNC: 165 MG/DL (ref 70–130)
GLUCOSE BLDC GLUCOMTR-MCNC: 165 MG/DL (ref 70–130)
GLUCOSE BLDC GLUCOMTR-MCNC: 182 MG/DL (ref 70–130)
GLUCOSE BLDC GLUCOMTR-MCNC: 322 MG/DL (ref 70–130)

## 2020-09-20 PROCEDURE — 25010000002 ERTAPENEM PER 500 MG: Performed by: INTERNAL MEDICINE

## 2020-09-20 PROCEDURE — 82962 GLUCOSE BLOOD TEST: CPT

## 2020-09-20 PROCEDURE — 63710000001 INSULIN GLARGINE PER 5 UNITS: Performed by: HOSPITALIST

## 2020-09-20 PROCEDURE — 99232 SBSQ HOSP IP/OBS MODERATE 35: CPT | Performed by: NURSE PRACTITIONER

## 2020-09-20 PROCEDURE — 63710000001 INSULIN LISPRO (HUMAN) PER 5 UNITS: Performed by: NURSE PRACTITIONER

## 2020-09-20 RX ORDER — INSULIN GLARGINE 100 [IU]/ML
25 INJECTION, SOLUTION SUBCUTANEOUS NIGHTLY
Status: DISCONTINUED | OUTPATIENT
Start: 2020-09-20 | End: 2020-09-22

## 2020-09-20 RX ADMIN — HYDRALAZINE HYDROCHLORIDE 100 MG: 50 TABLET, FILM COATED ORAL at 16:08

## 2020-09-20 RX ADMIN — METOPROLOL TARTRATE 100 MG: 50 TABLET, FILM COATED ORAL at 21:04

## 2020-09-20 RX ADMIN — METOPROLOL TARTRATE 100 MG: 50 TABLET, FILM COATED ORAL at 08:55

## 2020-09-20 RX ADMIN — DIVALPROEX SODIUM 125 MG: 125 TABLET, DELAYED RELEASE ORAL at 08:56

## 2020-09-20 RX ADMIN — INSULIN LISPRO 5 UNITS: 100 INJECTION, SOLUTION INTRAVENOUS; SUBCUTANEOUS at 11:45

## 2020-09-20 RX ADMIN — VANCOMYCIN 125 MG: KIT at 13:31

## 2020-09-20 RX ADMIN — Medication 1 APPLICATION: at 21:03

## 2020-09-20 RX ADMIN — INSULIN LISPRO 2 UNITS: 100 INJECTION, SOLUTION INTRAVENOUS; SUBCUTANEOUS at 08:57

## 2020-09-20 RX ADMIN — MONTELUKAST SODIUM 10 MG: 10 TABLET, FILM COATED ORAL at 21:05

## 2020-09-20 RX ADMIN — ERTAPENEM SODIUM 1 G: 1 INJECTION, POWDER, LYOPHILIZED, FOR SOLUTION INTRAMUSCULAR; INTRAVENOUS at 11:45

## 2020-09-20 RX ADMIN — DIVALPROEX SODIUM 125 MG: 125 TABLET, DELAYED RELEASE ORAL at 21:04

## 2020-09-20 RX ADMIN — VANCOMYCIN 125 MG: KIT at 08:56

## 2020-09-20 RX ADMIN — INSULIN LISPRO 5 UNITS: 100 INJECTION, SOLUTION INTRAVENOUS; SUBCUTANEOUS at 17:26

## 2020-09-20 RX ADMIN — VANCOMYCIN 125 MG: KIT at 22:20

## 2020-09-20 RX ADMIN — INSULIN GLARGINE 25 UNITS: 100 INJECTION, SOLUTION SUBCUTANEOUS at 21:07

## 2020-09-20 RX ADMIN — APIXABAN 5 MG: 5 TABLET, FILM COATED ORAL at 08:56

## 2020-09-20 RX ADMIN — FUROSEMIDE 40 MG: 40 TABLET ORAL at 08:57

## 2020-09-20 RX ADMIN — APIXABAN 5 MG: 5 TABLET, FILM COATED ORAL at 21:05

## 2020-09-20 RX ADMIN — TAMSULOSIN HYDROCHLORIDE 0.4 MG: 0.4 CAPSULE ORAL at 08:57

## 2020-09-20 RX ADMIN — INSULIN LISPRO 5 UNITS: 100 INJECTION, SOLUTION INTRAVENOUS; SUBCUTANEOUS at 08:55

## 2020-09-20 RX ADMIN — LOSARTAN POTASSIUM 25 MG: 25 TABLET, FILM COATED ORAL at 08:57

## 2020-09-20 RX ADMIN — DILTIAZEM HYDROCHLORIDE 300 MG: 180 CAPSULE, COATED, EXTENDED RELEASE ORAL at 08:55

## 2020-09-20 RX ADMIN — HYDRALAZINE HYDROCHLORIDE 100 MG: 50 TABLET, FILM COATED ORAL at 08:56

## 2020-09-20 RX ADMIN — HYDROCODONE BITARTRATE AND ACETAMINOPHEN 1 TABLET: 5; 325 TABLET ORAL at 10:57

## 2020-09-20 RX ADMIN — FINASTERIDE 5 MG: 5 TABLET, FILM COATED ORAL at 21:05

## 2020-09-20 RX ADMIN — CYCLOBENZAPRINE HYDROCHLORIDE 10 MG: 10 TABLET, FILM COATED ORAL at 22:20

## 2020-09-20 RX ADMIN — PRAVASTATIN SODIUM 40 MG: 40 TABLET ORAL at 08:56

## 2020-09-20 RX ADMIN — INSULIN LISPRO 2 UNITS: 100 INJECTION, SOLUTION INTRAVENOUS; SUBCUTANEOUS at 17:26

## 2020-09-20 RX ADMIN — MULTIPLE VITAMINS W/ MINERALS TAB 1 TABLET: TAB at 08:57

## 2020-09-20 RX ADMIN — INSULIN LISPRO 2 UNITS: 100 INJECTION, SOLUTION INTRAVENOUS; SUBCUTANEOUS at 11:46

## 2020-09-20 RX ADMIN — HYDRALAZINE HYDROCHLORIDE 100 MG: 50 TABLET, FILM COATED ORAL at 21:05

## 2020-09-20 RX ADMIN — VANCOMYCIN 125 MG: KIT at 17:26

## 2020-09-20 RX ADMIN — Medication 1 APPLICATION: at 08:57

## 2020-09-20 RX ADMIN — SODIUM CHLORIDE, PRESERVATIVE FREE 10 ML: 5 INJECTION INTRAVENOUS at 19:18

## 2020-09-21 LAB
GLUCOSE BLDC GLUCOMTR-MCNC: 151 MG/DL (ref 70–130)
GLUCOSE BLDC GLUCOMTR-MCNC: 184 MG/DL (ref 70–130)
GLUCOSE BLDC GLUCOMTR-MCNC: 229 MG/DL (ref 70–130)
GLUCOSE BLDC GLUCOMTR-MCNC: 311 MG/DL (ref 70–130)

## 2020-09-21 PROCEDURE — 63710000001 INSULIN GLARGINE PER 5 UNITS: Performed by: HOSPITALIST

## 2020-09-21 PROCEDURE — 97530 THERAPEUTIC ACTIVITIES: CPT

## 2020-09-21 PROCEDURE — 99232 SBSQ HOSP IP/OBS MODERATE 35: CPT | Performed by: NURSE PRACTITIONER

## 2020-09-21 PROCEDURE — 63710000001 INSULIN LISPRO (HUMAN) PER 5 UNITS: Performed by: NURSE PRACTITIONER

## 2020-09-21 PROCEDURE — 82962 GLUCOSE BLOOD TEST: CPT

## 2020-09-21 PROCEDURE — 25010000002 ERTAPENEM PER 500 MG: Performed by: INTERNAL MEDICINE

## 2020-09-21 PROCEDURE — 97110 THERAPEUTIC EXERCISES: CPT

## 2020-09-21 RX ORDER — LOSARTAN POTASSIUM 50 MG/1
50 TABLET ORAL
Status: DISCONTINUED | OUTPATIENT
Start: 2020-09-21 | End: 2020-09-22

## 2020-09-21 RX ADMIN — FUROSEMIDE 40 MG: 40 TABLET ORAL at 09:00

## 2020-09-21 RX ADMIN — INSULIN GLARGINE 25 UNITS: 100 INJECTION, SOLUTION SUBCUTANEOUS at 22:03

## 2020-09-21 RX ADMIN — INSULIN LISPRO 3 UNITS: 100 INJECTION, SOLUTION INTRAVENOUS; SUBCUTANEOUS at 17:39

## 2020-09-21 RX ADMIN — METOPROLOL TARTRATE 100 MG: 50 TABLET, FILM COATED ORAL at 22:00

## 2020-09-21 RX ADMIN — APIXABAN 5 MG: 5 TABLET, FILM COATED ORAL at 09:03

## 2020-09-21 RX ADMIN — APIXABAN 5 MG: 5 TABLET, FILM COATED ORAL at 22:00

## 2020-09-21 RX ADMIN — Medication 1 APPLICATION: at 09:03

## 2020-09-21 RX ADMIN — INSULIN LISPRO 5 UNITS: 100 INJECTION, SOLUTION INTRAVENOUS; SUBCUTANEOUS at 17:40

## 2020-09-21 RX ADMIN — DIVALPROEX SODIUM 125 MG: 125 TABLET, DELAYED RELEASE ORAL at 09:03

## 2020-09-21 RX ADMIN — INSULIN LISPRO 5 UNITS: 100 INJECTION, SOLUTION INTRAVENOUS; SUBCUTANEOUS at 09:04

## 2020-09-21 RX ADMIN — FINASTERIDE 5 MG: 5 TABLET, FILM COATED ORAL at 22:01

## 2020-09-21 RX ADMIN — VANCOMYCIN 125 MG: KIT at 05:55

## 2020-09-21 RX ADMIN — MULTIPLE VITAMINS W/ MINERALS TAB 1 TABLET: TAB at 09:03

## 2020-09-21 RX ADMIN — DILTIAZEM HYDROCHLORIDE 300 MG: 180 CAPSULE, COATED, EXTENDED RELEASE ORAL at 09:03

## 2020-09-21 RX ADMIN — METOPROLOL TARTRATE 100 MG: 50 TABLET, FILM COATED ORAL at 12:28

## 2020-09-21 RX ADMIN — ERTAPENEM SODIUM 1 G: 1 INJECTION, POWDER, LYOPHILIZED, FOR SOLUTION INTRAMUSCULAR; INTRAVENOUS at 12:29

## 2020-09-21 RX ADMIN — HYDRALAZINE HYDROCHLORIDE 100 MG: 50 TABLET, FILM COATED ORAL at 22:01

## 2020-09-21 RX ADMIN — CYCLOBENZAPRINE HYDROCHLORIDE 10 MG: 10 TABLET, FILM COATED ORAL at 09:03

## 2020-09-21 RX ADMIN — MONTELUKAST SODIUM 10 MG: 10 TABLET, FILM COATED ORAL at 22:48

## 2020-09-21 RX ADMIN — TAMSULOSIN HYDROCHLORIDE 0.4 MG: 0.4 CAPSULE ORAL at 09:03

## 2020-09-21 RX ADMIN — HYDRALAZINE HYDROCHLORIDE 100 MG: 50 TABLET, FILM COATED ORAL at 16:30

## 2020-09-21 RX ADMIN — Medication 1 APPLICATION: at 21:54

## 2020-09-21 RX ADMIN — HYDRALAZINE HYDROCHLORIDE 100 MG: 50 TABLET, FILM COATED ORAL at 09:03

## 2020-09-21 RX ADMIN — VANCOMYCIN 125 MG: KIT at 12:28

## 2020-09-21 RX ADMIN — PRAVASTATIN SODIUM 40 MG: 40 TABLET ORAL at 09:03

## 2020-09-21 RX ADMIN — LOSARTAN POTASSIUM 50 MG: 25 TABLET, FILM COATED ORAL at 10:48

## 2020-09-21 RX ADMIN — VANCOMYCIN 125 MG: KIT at 17:39

## 2020-09-21 RX ADMIN — CYCLOBENZAPRINE HYDROCHLORIDE 10 MG: 10 TABLET, FILM COATED ORAL at 17:44

## 2020-09-21 RX ADMIN — DIVALPROEX SODIUM 125 MG: 125 TABLET, DELAYED RELEASE ORAL at 22:01

## 2020-09-22 LAB
ALBUMIN SERPL-MCNC: 3 G/DL (ref 3.5–5.2)
ANION GAP SERPL CALCULATED.3IONS-SCNC: 5.9 MMOL/L (ref 5–15)
BUN SERPL-MCNC: 11 MG/DL (ref 8–23)
BUN/CREAT SERPL: 13.6 (ref 7–25)
CALCIUM SPEC-SCNC: 8.9 MG/DL (ref 8.6–10.5)
CHLORIDE SERPL-SCNC: 101 MMOL/L (ref 98–107)
CO2 SERPL-SCNC: 33.1 MMOL/L (ref 22–29)
CREAT SERPL-MCNC: 0.81 MG/DL (ref 0.76–1.27)
DEPRECATED RDW RBC AUTO: 51.3 FL (ref 37–54)
ERYTHROCYTE [DISTWIDTH] IN BLOOD BY AUTOMATED COUNT: 16.3 % (ref 12.3–15.4)
GFR SERPL CREATININE-BSD FRML MDRD: 93 ML/MIN/1.73
GLUCOSE BLDC GLUCOMTR-MCNC: 192 MG/DL (ref 70–130)
GLUCOSE BLDC GLUCOMTR-MCNC: 194 MG/DL (ref 70–130)
GLUCOSE BLDC GLUCOMTR-MCNC: 211 MG/DL (ref 70–130)
GLUCOSE BLDC GLUCOMTR-MCNC: 230 MG/DL (ref 70–130)
GLUCOSE BLDC GLUCOMTR-MCNC: 327 MG/DL (ref 70–130)
GLUCOSE BLDC GLUCOMTR-MCNC: 366 MG/DL (ref 70–130)
GLUCOSE SERPL-MCNC: 222 MG/DL (ref 65–99)
HCT VFR BLD AUTO: 36.5 % (ref 37.5–51)
HGB BLD-MCNC: 11.9 G/DL (ref 13–17.7)
MCH RBC QN AUTO: 28.7 PG (ref 26.6–33)
MCHC RBC AUTO-ENTMCNC: 32.6 G/DL (ref 31.5–35.7)
MCV RBC AUTO: 88 FL (ref 79–97)
PHOSPHATE SERPL-MCNC: 2.5 MG/DL (ref 2.5–4.5)
PLATELET # BLD AUTO: 358 10*3/MM3 (ref 140–450)
PMV BLD AUTO: 9.9 FL (ref 6–12)
POTASSIUM SERPL-SCNC: 3.3 MMOL/L (ref 3.5–5.2)
POTASSIUM SERPL-SCNC: 4.2 MMOL/L (ref 3.5–5.2)
RBC # BLD AUTO: 4.15 10*6/MM3 (ref 4.14–5.8)
SODIUM SERPL-SCNC: 140 MMOL/L (ref 136–145)
WBC # BLD AUTO: 11.32 10*3/MM3 (ref 3.4–10.8)

## 2020-09-22 PROCEDURE — 25010000002 ERTAPENEM PER 500 MG: Performed by: INTERNAL MEDICINE

## 2020-09-22 PROCEDURE — 63710000001 INSULIN GLARGINE PER 5 UNITS: Performed by: HOSPITALIST

## 2020-09-22 PROCEDURE — 99232 SBSQ HOSP IP/OBS MODERATE 35: CPT | Performed by: INTERNAL MEDICINE

## 2020-09-22 PROCEDURE — 80069 RENAL FUNCTION PANEL: CPT | Performed by: HOSPITALIST

## 2020-09-22 PROCEDURE — 84132 ASSAY OF SERUM POTASSIUM: CPT | Performed by: HOSPITALIST

## 2020-09-22 PROCEDURE — 63710000001 INSULIN LISPRO (HUMAN) PER 5 UNITS: Performed by: NURSE PRACTITIONER

## 2020-09-22 PROCEDURE — 97110 THERAPEUTIC EXERCISES: CPT

## 2020-09-22 PROCEDURE — 85027 COMPLETE CBC AUTOMATED: CPT | Performed by: HOSPITALIST

## 2020-09-22 PROCEDURE — 99222 1ST HOSP IP/OBS MODERATE 55: CPT | Performed by: PSYCHIATRY & NEUROLOGY

## 2020-09-22 PROCEDURE — 82962 GLUCOSE BLOOD TEST: CPT

## 2020-09-22 RX ORDER — LOSARTAN POTASSIUM 100 MG/1
100 TABLET ORAL
Status: DISCONTINUED | OUTPATIENT
Start: 2020-09-22 | End: 2020-10-10 | Stop reason: HOSPADM

## 2020-09-22 RX ORDER — INSULIN GLARGINE 100 [IU]/ML
30 INJECTION, SOLUTION SUBCUTANEOUS NIGHTLY
Status: DISCONTINUED | OUTPATIENT
Start: 2020-09-22 | End: 2020-10-10 | Stop reason: HOSPADM

## 2020-09-22 RX ORDER — OLANZAPINE 5 MG/1
5 TABLET ORAL
Status: DISCONTINUED | OUTPATIENT
Start: 2020-09-22 | End: 2020-09-25

## 2020-09-22 RX ORDER — MECLIZINE HYDROCHLORIDE 25 MG/1
25 TABLET ORAL 3 TIMES DAILY PRN
Status: DISCONTINUED | OUTPATIENT
Start: 2020-09-22 | End: 2020-10-10 | Stop reason: HOSPADM

## 2020-09-22 RX ADMIN — APIXABAN 5 MG: 5 TABLET, FILM COATED ORAL at 21:40

## 2020-09-22 RX ADMIN — HYDRALAZINE HYDROCHLORIDE 100 MG: 50 TABLET, FILM COATED ORAL at 15:50

## 2020-09-22 RX ADMIN — CYCLOBENZAPRINE HYDROCHLORIDE 10 MG: 10 TABLET, FILM COATED ORAL at 11:56

## 2020-09-22 RX ADMIN — PRAVASTATIN SODIUM 40 MG: 40 TABLET ORAL at 09:35

## 2020-09-22 RX ADMIN — VANCOMYCIN 125 MG: KIT at 06:12

## 2020-09-22 RX ADMIN — MONTELUKAST SODIUM 10 MG: 10 TABLET, FILM COATED ORAL at 21:40

## 2020-09-22 RX ADMIN — VANCOMYCIN 125 MG: KIT at 17:54

## 2020-09-22 RX ADMIN — INSULIN LISPRO 2 UNITS: 100 INJECTION, SOLUTION INTRAVENOUS; SUBCUTANEOUS at 17:53

## 2020-09-22 RX ADMIN — HYDRALAZINE HYDROCHLORIDE 100 MG: 50 TABLET, FILM COATED ORAL at 21:40

## 2020-09-22 RX ADMIN — MECLIZINE HYDROCHLORIDE 25 MG: 25 TABLET ORAL at 17:53

## 2020-09-22 RX ADMIN — FUROSEMIDE 40 MG: 40 TABLET ORAL at 09:35

## 2020-09-22 RX ADMIN — Medication 1 APPLICATION: at 21:41

## 2020-09-22 RX ADMIN — HYDRALAZINE HYDROCHLORIDE 100 MG: 50 TABLET, FILM COATED ORAL at 09:35

## 2020-09-22 RX ADMIN — FINASTERIDE 5 MG: 5 TABLET, FILM COATED ORAL at 21:40

## 2020-09-22 RX ADMIN — TAMSULOSIN HYDROCHLORIDE 0.4 MG: 0.4 CAPSULE ORAL at 09:35

## 2020-09-22 RX ADMIN — INSULIN LISPRO 5 UNITS: 100 INJECTION, SOLUTION INTRAVENOUS; SUBCUTANEOUS at 09:36

## 2020-09-22 RX ADMIN — METOPROLOL TARTRATE 100 MG: 50 TABLET, FILM COATED ORAL at 21:40

## 2020-09-22 RX ADMIN — INSULIN LISPRO 2 UNITS: 100 INJECTION, SOLUTION INTRAVENOUS; SUBCUTANEOUS at 09:35

## 2020-09-22 RX ADMIN — HYDROCODONE BITARTRATE AND ACETAMINOPHEN 1 TABLET: 5; 325 TABLET ORAL at 15:50

## 2020-09-22 RX ADMIN — POTASSIUM CHLORIDE 40 MEQ: 10 CAPSULE, COATED, EXTENDED RELEASE ORAL at 09:35

## 2020-09-22 RX ADMIN — DILTIAZEM HYDROCHLORIDE 300 MG: 180 CAPSULE, COATED, EXTENDED RELEASE ORAL at 09:35

## 2020-09-22 RX ADMIN — MULTIPLE VITAMINS W/ MINERALS TAB 1 TABLET: TAB at 09:35

## 2020-09-22 RX ADMIN — METOPROLOL TARTRATE 100 MG: 50 TABLET, FILM COATED ORAL at 09:35

## 2020-09-22 RX ADMIN — DIVALPROEX SODIUM 125 MG: 125 TABLET, DELAYED RELEASE ORAL at 21:40

## 2020-09-22 RX ADMIN — VANCOMYCIN 125 MG: KIT at 01:05

## 2020-09-22 RX ADMIN — INSULIN LISPRO 5 UNITS: 100 INJECTION, SOLUTION INTRAVENOUS; SUBCUTANEOUS at 11:56

## 2020-09-22 RX ADMIN — APIXABAN 5 MG: 5 TABLET, FILM COATED ORAL at 09:35

## 2020-09-22 RX ADMIN — Medication 1 APPLICATION: at 09:36

## 2020-09-22 RX ADMIN — INSULIN LISPRO 5 UNITS: 100 INJECTION, SOLUTION INTRAVENOUS; SUBCUTANEOUS at 17:54

## 2020-09-22 RX ADMIN — INSULIN GLARGINE 30 UNITS: 100 INJECTION, SOLUTION SUBCUTANEOUS at 21:41

## 2020-09-22 RX ADMIN — DIVALPROEX SODIUM 125 MG: 125 TABLET, DELAYED RELEASE ORAL at 09:35

## 2020-09-22 RX ADMIN — LOSARTAN POTASSIUM 100 MG: 100 TABLET, FILM COATED ORAL at 09:34

## 2020-09-22 RX ADMIN — ERTAPENEM SODIUM 1 G: 1 INJECTION, POWDER, LYOPHILIZED, FOR SOLUTION INTRAMUSCULAR; INTRAVENOUS at 11:56

## 2020-09-22 RX ADMIN — OLANZAPINE 5 MG: 5 TABLET ORAL at 17:53

## 2020-09-22 RX ADMIN — VANCOMYCIN 125 MG: KIT at 11:56

## 2020-09-22 RX ADMIN — POTASSIUM CHLORIDE 40 MEQ: 10 CAPSULE, COATED, EXTENDED RELEASE ORAL at 15:50

## 2020-09-23 ENCOUNTER — APPOINTMENT (OUTPATIENT)
Dept: MRI IMAGING | Facility: HOSPITAL | Age: 75
End: 2020-09-23

## 2020-09-23 LAB
GLUCOSE BLDC GLUCOMTR-MCNC: 116 MG/DL (ref 70–130)
GLUCOSE BLDC GLUCOMTR-MCNC: 131 MG/DL (ref 70–130)
GLUCOSE BLDC GLUCOMTR-MCNC: 227 MG/DL (ref 70–130)
GLUCOSE BLDC GLUCOMTR-MCNC: 234 MG/DL (ref 70–130)

## 2020-09-23 PROCEDURE — 0 GADOBENATE DIMEGLUMINE 529 MG/ML SOLUTION: Performed by: INTERNAL MEDICINE

## 2020-09-23 PROCEDURE — 99231 SBSQ HOSP IP/OBS SF/LOW 25: CPT | Performed by: PSYCHIATRY & NEUROLOGY

## 2020-09-23 PROCEDURE — 70553 MRI BRAIN STEM W/O & W/DYE: CPT

## 2020-09-23 PROCEDURE — 99232 SBSQ HOSP IP/OBS MODERATE 35: CPT | Performed by: NURSE PRACTITIONER

## 2020-09-23 PROCEDURE — 25010000002 ERTAPENEM PER 500 MG: Performed by: INTERNAL MEDICINE

## 2020-09-23 PROCEDURE — 63710000001 INSULIN LISPRO (HUMAN) PER 5 UNITS: Performed by: NURSE PRACTITIONER

## 2020-09-23 PROCEDURE — 82962 GLUCOSE BLOOD TEST: CPT

## 2020-09-23 PROCEDURE — 97530 THERAPEUTIC ACTIVITIES: CPT

## 2020-09-23 PROCEDURE — 63710000001 INSULIN GLARGINE PER 5 UNITS: Performed by: HOSPITALIST

## 2020-09-23 PROCEDURE — A9577 INJ MULTIHANCE: HCPCS | Performed by: INTERNAL MEDICINE

## 2020-09-23 RX ORDER — ASPIRIN 81 MG/1
81 TABLET ORAL DAILY
Status: DISCONTINUED | OUTPATIENT
Start: 2020-09-23 | End: 2020-10-10 | Stop reason: HOSPADM

## 2020-09-23 RX ORDER — DILTIAZEM HYDROCHLORIDE 120 MG/1
360 CAPSULE, COATED, EXTENDED RELEASE ORAL DAILY
Status: DISCONTINUED | OUTPATIENT
Start: 2020-09-24 | End: 2020-10-10 | Stop reason: HOSPADM

## 2020-09-23 RX ORDER — HYDRALAZINE HYDROCHLORIDE 20 MG/ML
10 INJECTION INTRAMUSCULAR; INTRAVENOUS EVERY 6 HOURS PRN
Status: DISCONTINUED | OUTPATIENT
Start: 2020-09-23 | End: 2020-09-29

## 2020-09-23 RX ADMIN — CARVEDILOL 37.5 MG: 12.5 TABLET, FILM COATED ORAL at 17:30

## 2020-09-23 RX ADMIN — HYDRALAZINE HYDROCHLORIDE 100 MG: 50 TABLET, FILM COATED ORAL at 20:20

## 2020-09-23 RX ADMIN — FINASTERIDE 5 MG: 5 TABLET, FILM COATED ORAL at 20:20

## 2020-09-23 RX ADMIN — TAMSULOSIN HYDROCHLORIDE 0.4 MG: 0.4 CAPSULE ORAL at 09:31

## 2020-09-23 RX ADMIN — HYDROCODONE BITARTRATE AND ACETAMINOPHEN 1 TABLET: 5; 325 TABLET ORAL at 20:20

## 2020-09-23 RX ADMIN — Medication 1 APPLICATION: at 20:20

## 2020-09-23 RX ADMIN — APIXABAN 5 MG: 5 TABLET, FILM COATED ORAL at 09:35

## 2020-09-23 RX ADMIN — CYCLOBENZAPRINE HYDROCHLORIDE 10 MG: 10 TABLET, FILM COATED ORAL at 09:31

## 2020-09-23 RX ADMIN — GADOBENATE DIMEGLUMINE 20 ML: 529 INJECTION, SOLUTION INTRAVENOUS at 08:12

## 2020-09-23 RX ADMIN — MULTIPLE VITAMINS W/ MINERALS TAB 1 TABLET: TAB at 09:31

## 2020-09-23 RX ADMIN — OLANZAPINE 5 MG: 5 TABLET ORAL at 17:30

## 2020-09-23 RX ADMIN — VANCOMYCIN 125 MG: KIT at 02:27

## 2020-09-23 RX ADMIN — FUROSEMIDE 40 MG: 40 TABLET ORAL at 09:31

## 2020-09-23 RX ADMIN — DIVALPROEX SODIUM 125 MG: 125 TABLET, DELAYED RELEASE ORAL at 09:30

## 2020-09-23 RX ADMIN — MONTELUKAST SODIUM 10 MG: 10 TABLET, FILM COATED ORAL at 20:20

## 2020-09-23 RX ADMIN — VANCOMYCIN 125 MG: KIT at 06:11

## 2020-09-23 RX ADMIN — INSULIN LISPRO 3 UNITS: 100 INJECTION, SOLUTION INTRAVENOUS; SUBCUTANEOUS at 17:30

## 2020-09-23 RX ADMIN — LOSARTAN POTASSIUM 100 MG: 100 TABLET, FILM COATED ORAL at 09:31

## 2020-09-23 RX ADMIN — APIXABAN 5 MG: 5 TABLET, FILM COATED ORAL at 20:20

## 2020-09-23 RX ADMIN — MECLIZINE HYDROCHLORIDE 25 MG: 25 TABLET ORAL at 06:15

## 2020-09-23 RX ADMIN — ERTAPENEM SODIUM 1 G: 1 INJECTION, POWDER, LYOPHILIZED, FOR SOLUTION INTRAMUSCULAR; INTRAVENOUS at 12:44

## 2020-09-23 RX ADMIN — DILTIAZEM HYDROCHLORIDE 300 MG: 180 CAPSULE, COATED, EXTENDED RELEASE ORAL at 09:31

## 2020-09-23 RX ADMIN — VANCOMYCIN 125 MG: KIT at 17:31

## 2020-09-23 RX ADMIN — CARVEDILOL 37.5 MG: 12.5 TABLET, FILM COATED ORAL at 12:44

## 2020-09-23 RX ADMIN — ASPIRIN 81 MG: 81 TABLET, COATED ORAL at 17:30

## 2020-09-23 RX ADMIN — DIVALPROEX SODIUM 125 MG: 125 TABLET, DELAYED RELEASE ORAL at 20:20

## 2020-09-23 RX ADMIN — HYDRALAZINE HYDROCHLORIDE 100 MG: 50 TABLET, FILM COATED ORAL at 09:31

## 2020-09-23 RX ADMIN — INSULIN GLARGINE 30 UNITS: 100 INJECTION, SOLUTION SUBCUTANEOUS at 21:19

## 2020-09-23 RX ADMIN — VANCOMYCIN 125 MG: KIT at 12:44

## 2020-09-23 RX ADMIN — Medication 1 APPLICATION: at 09:31

## 2020-09-23 RX ADMIN — CYCLOBENZAPRINE HYDROCHLORIDE 10 MG: 10 TABLET, FILM COATED ORAL at 17:30

## 2020-09-23 RX ADMIN — PRAVASTATIN SODIUM 40 MG: 40 TABLET ORAL at 09:31

## 2020-09-23 RX ADMIN — HYDRALAZINE HYDROCHLORIDE 100 MG: 50 TABLET, FILM COATED ORAL at 17:30

## 2020-09-24 LAB
ALBUMIN SERPL-MCNC: 3.1 G/DL (ref 3.5–5.2)
ANION GAP SERPL CALCULATED.3IONS-SCNC: 6.9 MMOL/L (ref 5–15)
BUN SERPL-MCNC: 12 MG/DL (ref 8–23)
BUN/CREAT SERPL: 14.3 (ref 7–25)
CALCIUM SPEC-SCNC: 9.3 MG/DL (ref 8.6–10.5)
CHLORIDE SERPL-SCNC: 103 MMOL/L (ref 98–107)
CO2 SERPL-SCNC: 32.1 MMOL/L (ref 22–29)
CREAT SERPL-MCNC: 0.84 MG/DL (ref 0.76–1.27)
DEPRECATED RDW RBC AUTO: 49.5 FL (ref 37–54)
ERYTHROCYTE [DISTWIDTH] IN BLOOD BY AUTOMATED COUNT: 16.5 % (ref 12.3–15.4)
GFR SERPL CREATININE-BSD FRML MDRD: 90 ML/MIN/1.73
GLUCOSE BLDC GLUCOMTR-MCNC: 106 MG/DL (ref 70–130)
GLUCOSE BLDC GLUCOMTR-MCNC: 128 MG/DL (ref 70–130)
GLUCOSE BLDC GLUCOMTR-MCNC: 251 MG/DL (ref 70–130)
GLUCOSE BLDC GLUCOMTR-MCNC: 299 MG/DL (ref 70–130)
GLUCOSE SERPL-MCNC: 91 MG/DL (ref 65–99)
HCT VFR BLD AUTO: 38.7 % (ref 37.5–51)
HGB BLD-MCNC: 13 G/DL (ref 13–17.7)
MAGNESIUM SERPL-MCNC: 2 MG/DL (ref 1.6–2.4)
MCH RBC QN AUTO: 28.4 PG (ref 26.6–33)
MCHC RBC AUTO-ENTMCNC: 33.6 G/DL (ref 31.5–35.7)
MCV RBC AUTO: 84.5 FL (ref 79–97)
PHOSPHATE SERPL-MCNC: 3.1 MG/DL (ref 2.5–4.5)
PLATELET # BLD AUTO: 342 10*3/MM3 (ref 140–450)
PMV BLD AUTO: 10.2 FL (ref 6–12)
POTASSIUM SERPL-SCNC: 3.3 MMOL/L (ref 3.5–5.2)
POTASSIUM SERPL-SCNC: 3.7 MMOL/L (ref 3.5–5.2)
RBC # BLD AUTO: 4.58 10*6/MM3 (ref 4.14–5.8)
SODIUM SERPL-SCNC: 142 MMOL/L (ref 136–145)
WBC # BLD AUTO: 11.76 10*3/MM3 (ref 3.4–10.8)

## 2020-09-24 PROCEDURE — 63710000001 INSULIN LISPRO (HUMAN) PER 5 UNITS: Performed by: NURSE PRACTITIONER

## 2020-09-24 PROCEDURE — 84132 ASSAY OF SERUM POTASSIUM: CPT | Performed by: INTERNAL MEDICINE

## 2020-09-24 PROCEDURE — 99232 SBSQ HOSP IP/OBS MODERATE 35: CPT | Performed by: INTERNAL MEDICINE

## 2020-09-24 PROCEDURE — 82962 GLUCOSE BLOOD TEST: CPT

## 2020-09-24 PROCEDURE — 63710000001 INSULIN GLARGINE PER 5 UNITS: Performed by: HOSPITALIST

## 2020-09-24 PROCEDURE — 85027 COMPLETE CBC AUTOMATED: CPT | Performed by: INTERNAL MEDICINE

## 2020-09-24 PROCEDURE — 83735 ASSAY OF MAGNESIUM: CPT | Performed by: INTERNAL MEDICINE

## 2020-09-24 PROCEDURE — 99231 SBSQ HOSP IP/OBS SF/LOW 25: CPT | Performed by: PSYCHIATRY & NEUROLOGY

## 2020-09-24 PROCEDURE — 97110 THERAPEUTIC EXERCISES: CPT

## 2020-09-24 PROCEDURE — 80069 RENAL FUNCTION PANEL: CPT | Performed by: INTERNAL MEDICINE

## 2020-09-24 PROCEDURE — 97755 ASSISTIVE TECHNOLOGY ASSESS: CPT

## 2020-09-24 PROCEDURE — 25010000002 ERTAPENEM PER 500 MG: Performed by: INTERNAL MEDICINE

## 2020-09-24 PROCEDURE — 97530 THERAPEUTIC ACTIVITIES: CPT

## 2020-09-24 RX ADMIN — ERTAPENEM SODIUM 1 G: 1 INJECTION, POWDER, LYOPHILIZED, FOR SOLUTION INTRAMUSCULAR; INTRAVENOUS at 12:39

## 2020-09-24 RX ADMIN — ACETAMINOPHEN 650 MG: 325 TABLET, FILM COATED ORAL at 14:31

## 2020-09-24 RX ADMIN — MULTIPLE VITAMINS W/ MINERALS TAB 1 TABLET: TAB at 10:15

## 2020-09-24 RX ADMIN — ASPIRIN 81 MG: 81 TABLET, COATED ORAL at 10:16

## 2020-09-24 RX ADMIN — VANCOMYCIN 125 MG: KIT at 19:04

## 2020-09-24 RX ADMIN — SODIUM CHLORIDE, PRESERVATIVE FREE 10 ML: 5 INJECTION INTRAVENOUS at 10:16

## 2020-09-24 RX ADMIN — MONTELUKAST SODIUM 10 MG: 10 TABLET, FILM COATED ORAL at 21:39

## 2020-09-24 RX ADMIN — VANCOMYCIN 125 MG: KIT at 01:53

## 2020-09-24 RX ADMIN — INSULIN LISPRO 4 UNITS: 100 INJECTION, SOLUTION INTRAVENOUS; SUBCUTANEOUS at 19:05

## 2020-09-24 RX ADMIN — TAMSULOSIN HYDROCHLORIDE 0.4 MG: 0.4 CAPSULE ORAL at 10:15

## 2020-09-24 RX ADMIN — FINASTERIDE 5 MG: 5 TABLET, FILM COATED ORAL at 21:39

## 2020-09-24 RX ADMIN — CARVEDILOL 37.5 MG: 12.5 TABLET, FILM COATED ORAL at 19:03

## 2020-09-24 RX ADMIN — DIVALPROEX SODIUM 125 MG: 125 TABLET, DELAYED RELEASE ORAL at 10:14

## 2020-09-24 RX ADMIN — INSULIN GLARGINE 30 UNITS: 100 INJECTION, SOLUTION SUBCUTANEOUS at 21:39

## 2020-09-24 RX ADMIN — HYDRALAZINE HYDROCHLORIDE 100 MG: 50 TABLET, FILM COATED ORAL at 10:14

## 2020-09-24 RX ADMIN — PRAVASTATIN SODIUM 40 MG: 40 TABLET ORAL at 10:19

## 2020-09-24 RX ADMIN — FUROSEMIDE 40 MG: 40 TABLET ORAL at 10:15

## 2020-09-24 RX ADMIN — Medication 1 APPLICATION: at 21:48

## 2020-09-24 RX ADMIN — APIXABAN 5 MG: 5 TABLET, FILM COATED ORAL at 21:39

## 2020-09-24 RX ADMIN — DILTIAZEM HYDROCHLORIDE 360 MG: 180 CAPSULE, COATED, EXTENDED RELEASE ORAL at 10:15

## 2020-09-24 RX ADMIN — HYDRALAZINE HYDROCHLORIDE 100 MG: 50 TABLET, FILM COATED ORAL at 19:04

## 2020-09-24 RX ADMIN — VANCOMYCIN 125 MG: KIT at 06:18

## 2020-09-24 RX ADMIN — DIVALPROEX SODIUM 125 MG: 125 TABLET, DELAYED RELEASE ORAL at 21:39

## 2020-09-24 RX ADMIN — OLANZAPINE 5 MG: 5 TABLET ORAL at 19:04

## 2020-09-24 RX ADMIN — SODIUM CHLORIDE, PRESERVATIVE FREE 10 ML: 5 INJECTION INTRAVENOUS at 21:39

## 2020-09-24 RX ADMIN — APIXABAN 5 MG: 5 TABLET, FILM COATED ORAL at 10:16

## 2020-09-24 RX ADMIN — INSULIN LISPRO 5 UNITS: 100 INJECTION, SOLUTION INTRAVENOUS; SUBCUTANEOUS at 19:05

## 2020-09-24 RX ADMIN — HYDRALAZINE HYDROCHLORIDE 100 MG: 50 TABLET, FILM COATED ORAL at 21:39

## 2020-09-24 RX ADMIN — Medication 1 APPLICATION: at 10:19

## 2020-09-24 RX ADMIN — VANCOMYCIN 125 MG: KIT at 12:39

## 2020-09-24 RX ADMIN — VANCOMYCIN 125 MG: KIT at 23:24

## 2020-09-24 RX ADMIN — LOSARTAN POTASSIUM 100 MG: 100 TABLET, FILM COATED ORAL at 10:14

## 2020-09-24 RX ADMIN — POTASSIUM CHLORIDE 40 MEQ: 10 CAPSULE, COATED, EXTENDED RELEASE ORAL at 14:31

## 2020-09-24 RX ADMIN — CARVEDILOL 37.5 MG: 12.5 TABLET, FILM COATED ORAL at 10:15

## 2020-09-24 RX ADMIN — POTASSIUM CHLORIDE 40 MEQ: 10 CAPSULE, COATED, EXTENDED RELEASE ORAL at 19:03

## 2020-09-25 LAB
ALBUMIN SERPL-MCNC: 3.2 G/DL (ref 3.5–5.2)
ANION GAP SERPL CALCULATED.3IONS-SCNC: 8.4 MMOL/L (ref 5–15)
BUN SERPL-MCNC: 17 MG/DL (ref 8–23)
BUN/CREAT SERPL: 20.7 (ref 7–25)
CALCIUM SPEC-SCNC: 9.4 MG/DL (ref 8.6–10.5)
CHLORIDE SERPL-SCNC: 103 MMOL/L (ref 98–107)
CO2 SERPL-SCNC: 30.6 MMOL/L (ref 22–29)
CREAT SERPL-MCNC: 0.82 MG/DL (ref 0.76–1.27)
DEPRECATED RDW RBC AUTO: 53.3 FL (ref 37–54)
ERYTHROCYTE [DISTWIDTH] IN BLOOD BY AUTOMATED COUNT: 16.7 % (ref 12.3–15.4)
GFR SERPL CREATININE-BSD FRML MDRD: 92 ML/MIN/1.73
GLUCOSE BLDC GLUCOMTR-MCNC: 134 MG/DL (ref 70–130)
GLUCOSE BLDC GLUCOMTR-MCNC: 155 MG/DL (ref 70–130)
GLUCOSE BLDC GLUCOMTR-MCNC: 177 MG/DL (ref 70–130)
GLUCOSE BLDC GLUCOMTR-MCNC: 239 MG/DL (ref 70–130)
GLUCOSE SERPL-MCNC: 157 MG/DL (ref 65–99)
HCT VFR BLD AUTO: 37.7 % (ref 37.5–51)
HGB BLD-MCNC: 12.3 G/DL (ref 13–17.7)
MAGNESIUM SERPL-MCNC: 2.1 MG/DL (ref 1.6–2.4)
MCH RBC QN AUTO: 28.5 PG (ref 26.6–33)
MCHC RBC AUTO-ENTMCNC: 32.6 G/DL (ref 31.5–35.7)
MCV RBC AUTO: 87.5 FL (ref 79–97)
PHOSPHATE SERPL-MCNC: 3.3 MG/DL (ref 2.5–4.5)
PLATELET # BLD AUTO: 301 10*3/MM3 (ref 140–450)
PMV BLD AUTO: 10.4 FL (ref 6–12)
POTASSIUM SERPL-SCNC: 3.6 MMOL/L (ref 3.5–5.2)
RBC # BLD AUTO: 4.31 10*6/MM3 (ref 4.14–5.8)
SODIUM SERPL-SCNC: 142 MMOL/L (ref 136–145)
WBC # BLD AUTO: 10.14 10*3/MM3 (ref 3.4–10.8)

## 2020-09-25 PROCEDURE — 99231 SBSQ HOSP IP/OBS SF/LOW 25: CPT | Performed by: PSYCHIATRY & NEUROLOGY

## 2020-09-25 PROCEDURE — 83735 ASSAY OF MAGNESIUM: CPT | Performed by: INTERNAL MEDICINE

## 2020-09-25 PROCEDURE — 97110 THERAPEUTIC EXERCISES: CPT

## 2020-09-25 PROCEDURE — 85027 COMPLETE CBC AUTOMATED: CPT | Performed by: INTERNAL MEDICINE

## 2020-09-25 PROCEDURE — 80069 RENAL FUNCTION PANEL: CPT | Performed by: INTERNAL MEDICINE

## 2020-09-25 PROCEDURE — 99232 SBSQ HOSP IP/OBS MODERATE 35: CPT | Performed by: INTERNAL MEDICINE

## 2020-09-25 PROCEDURE — 63710000001 INSULIN LISPRO (HUMAN) PER 5 UNITS: Performed by: NURSE PRACTITIONER

## 2020-09-25 PROCEDURE — 97530 THERAPEUTIC ACTIVITIES: CPT

## 2020-09-25 PROCEDURE — 82962 GLUCOSE BLOOD TEST: CPT

## 2020-09-25 PROCEDURE — 25010000002 ERTAPENEM PER 500 MG: Performed by: INTERNAL MEDICINE

## 2020-09-25 PROCEDURE — 63710000001 INSULIN GLARGINE PER 5 UNITS: Performed by: HOSPITALIST

## 2020-09-25 RX ORDER — QUETIAPINE FUMARATE 25 MG/1
25 TABLET, FILM COATED ORAL
Status: DISCONTINUED | OUTPATIENT
Start: 2020-09-25 | End: 2020-09-27

## 2020-09-25 RX ADMIN — INSULIN LISPRO 5 UNITS: 100 INJECTION, SOLUTION INTRAVENOUS; SUBCUTANEOUS at 12:45

## 2020-09-25 RX ADMIN — APIXABAN 5 MG: 5 TABLET, FILM COATED ORAL at 09:38

## 2020-09-25 RX ADMIN — MONTELUKAST SODIUM 10 MG: 10 TABLET, FILM COATED ORAL at 20:38

## 2020-09-25 RX ADMIN — VANCOMYCIN 125 MG: KIT at 12:45

## 2020-09-25 RX ADMIN — VANCOMYCIN 125 MG: KIT at 05:09

## 2020-09-25 RX ADMIN — INSULIN LISPRO 5 UNITS: 100 INJECTION, SOLUTION INTRAVENOUS; SUBCUTANEOUS at 09:37

## 2020-09-25 RX ADMIN — POTASSIUM CHLORIDE 40 MEQ: 10 CAPSULE, COATED, EXTENDED RELEASE ORAL at 09:39

## 2020-09-25 RX ADMIN — DILTIAZEM HYDROCHLORIDE 360 MG: 180 CAPSULE, COATED, EXTENDED RELEASE ORAL at 09:38

## 2020-09-25 RX ADMIN — HYDRALAZINE HYDROCHLORIDE 100 MG: 50 TABLET, FILM COATED ORAL at 20:38

## 2020-09-25 RX ADMIN — DIVALPROEX SODIUM 125 MG: 125 TABLET, DELAYED RELEASE ORAL at 09:39

## 2020-09-25 RX ADMIN — FINASTERIDE 5 MG: 5 TABLET, FILM COATED ORAL at 20:38

## 2020-09-25 RX ADMIN — FUROSEMIDE 40 MG: 40 TABLET ORAL at 09:38

## 2020-09-25 RX ADMIN — APIXABAN 5 MG: 5 TABLET, FILM COATED ORAL at 20:38

## 2020-09-25 RX ADMIN — INSULIN GLARGINE 30 UNITS: 100 INJECTION, SOLUTION SUBCUTANEOUS at 20:38

## 2020-09-25 RX ADMIN — VANCOMYCIN 125 MG: KIT at 18:03

## 2020-09-25 RX ADMIN — CARVEDILOL 37.5 MG: 12.5 TABLET, FILM COATED ORAL at 09:38

## 2020-09-25 RX ADMIN — PRAVASTATIN SODIUM 40 MG: 40 TABLET ORAL at 09:38

## 2020-09-25 RX ADMIN — HYDRALAZINE HYDROCHLORIDE 100 MG: 50 TABLET, FILM COATED ORAL at 09:39

## 2020-09-25 RX ADMIN — INSULIN LISPRO 5 UNITS: 100 INJECTION, SOLUTION INTRAVENOUS; SUBCUTANEOUS at 18:04

## 2020-09-25 RX ADMIN — ASPIRIN 81 MG: 81 TABLET, COATED ORAL at 09:38

## 2020-09-25 RX ADMIN — TAMSULOSIN HYDROCHLORIDE 0.4 MG: 0.4 CAPSULE ORAL at 09:39

## 2020-09-25 RX ADMIN — HYDRALAZINE HYDROCHLORIDE 100 MG: 50 TABLET, FILM COATED ORAL at 18:03

## 2020-09-25 RX ADMIN — QUETIAPINE FUMARATE 25 MG: 25 TABLET ORAL at 18:03

## 2020-09-25 RX ADMIN — HYDROCODONE BITARTRATE AND ACETAMINOPHEN 1 TABLET: 5; 325 TABLET ORAL at 21:28

## 2020-09-25 RX ADMIN — LOSARTAN POTASSIUM 100 MG: 100 TABLET, FILM COATED ORAL at 09:39

## 2020-09-25 RX ADMIN — CARVEDILOL 37.5 MG: 12.5 TABLET, FILM COATED ORAL at 18:03

## 2020-09-25 RX ADMIN — Medication 1 APPLICATION: at 09:39

## 2020-09-25 RX ADMIN — Medication 1 APPLICATION: at 20:38

## 2020-09-25 RX ADMIN — ERTAPENEM SODIUM 1 G: 1 INJECTION, POWDER, LYOPHILIZED, FOR SOLUTION INTRAMUSCULAR; INTRAVENOUS at 12:44

## 2020-09-25 RX ADMIN — INSULIN LISPRO 2 UNITS: 100 INJECTION, SOLUTION INTRAVENOUS; SUBCUTANEOUS at 18:03

## 2020-09-25 RX ADMIN — MULTIPLE VITAMINS W/ MINERALS TAB 1 TABLET: TAB at 09:38

## 2020-09-26 LAB
ALBUMIN SERPL-MCNC: 2.9 G/DL (ref 3.5–5.2)
ALBUMIN/GLOB SERPL: 0.9 G/DL
ALP SERPL-CCNC: 117 U/L (ref 39–117)
ALT SERPL W P-5'-P-CCNC: 14 U/L (ref 1–41)
ANION GAP SERPL CALCULATED.3IONS-SCNC: 6.8 MMOL/L (ref 5–15)
AST SERPL-CCNC: 19 U/L (ref 1–40)
BASOPHILS # BLD AUTO: 0.08 10*3/MM3 (ref 0–0.2)
BASOPHILS NFR BLD AUTO: 1 % (ref 0–1.5)
BILIRUB SERPL-MCNC: 0.6 MG/DL (ref 0–1.2)
BUN SERPL-MCNC: 17 MG/DL (ref 8–23)
BUN/CREAT SERPL: 20 (ref 7–25)
CALCIUM SPEC-SCNC: 9 MG/DL (ref 8.6–10.5)
CHLORIDE SERPL-SCNC: 107 MMOL/L (ref 98–107)
CO2 SERPL-SCNC: 30.2 MMOL/L (ref 22–29)
CREAT SERPL-MCNC: 0.85 MG/DL (ref 0.76–1.27)
CRP SERPL-MCNC: 2.41 MG/DL (ref 0–0.5)
DEPRECATED RDW RBC AUTO: 53.4 FL (ref 37–54)
EOSINOPHIL # BLD AUTO: 0.73 10*3/MM3 (ref 0–0.4)
EOSINOPHIL NFR BLD AUTO: 8.9 % (ref 0.3–6.2)
ERYTHROCYTE [DISTWIDTH] IN BLOOD BY AUTOMATED COUNT: 16.7 % (ref 12.3–15.4)
FOLATE SERPL-MCNC: 13.1 NG/ML (ref 4.78–24.2)
GFR SERPL CREATININE-BSD FRML MDRD: 88 ML/MIN/1.73
GLOBULIN UR ELPH-MCNC: 3.3 GM/DL
GLUCOSE BLDC GLUCOMTR-MCNC: 156 MG/DL (ref 70–130)
GLUCOSE BLDC GLUCOMTR-MCNC: 168 MG/DL (ref 70–130)
GLUCOSE BLDC GLUCOMTR-MCNC: 260 MG/DL (ref 70–130)
GLUCOSE BLDC GLUCOMTR-MCNC: 262 MG/DL (ref 70–130)
GLUCOSE SERPL-MCNC: 175 MG/DL (ref 65–99)
HCT VFR BLD AUTO: 35.2 % (ref 37.5–51)
HGB BLD-MCNC: 11.3 G/DL (ref 13–17.7)
IMM GRANULOCYTES # BLD AUTO: 0.04 10*3/MM3 (ref 0–0.05)
IMM GRANULOCYTES NFR BLD AUTO: 0.5 % (ref 0–0.5)
LYMPHOCYTES # BLD AUTO: 1.65 10*3/MM3 (ref 0.7–3.1)
LYMPHOCYTES NFR BLD AUTO: 20.1 % (ref 19.6–45.3)
MAGNESIUM SERPL-MCNC: 2.2 MG/DL (ref 1.6–2.4)
MCH RBC QN AUTO: 28.5 PG (ref 26.6–33)
MCHC RBC AUTO-ENTMCNC: 32.1 G/DL (ref 31.5–35.7)
MCV RBC AUTO: 88.7 FL (ref 79–97)
MONOCYTES # BLD AUTO: 0.68 10*3/MM3 (ref 0.1–0.9)
MONOCYTES NFR BLD AUTO: 8.3 % (ref 5–12)
NEUTROPHILS NFR BLD AUTO: 5.04 10*3/MM3 (ref 1.7–7)
NEUTROPHILS NFR BLD AUTO: 61.2 % (ref 42.7–76)
NRBC BLD AUTO-RTO: 0 /100 WBC (ref 0–0.2)
PLATELET # BLD AUTO: 348 10*3/MM3 (ref 140–450)
PMV BLD AUTO: 10.3 FL (ref 6–12)
POTASSIUM SERPL-SCNC: 3.3 MMOL/L (ref 3.5–5.2)
PROT SERPL-MCNC: 6.2 G/DL (ref 6–8.5)
RBC # BLD AUTO: 3.97 10*6/MM3 (ref 4.14–5.8)
SODIUM SERPL-SCNC: 144 MMOL/L (ref 136–145)
TSH SERPL DL<=0.05 MIU/L-ACNC: 2.74 UIU/ML (ref 0.27–4.2)
VIT B12 BLD-MCNC: 852 PG/ML (ref 211–946)
WBC # BLD AUTO: 8.22 10*3/MM3 (ref 3.4–10.8)

## 2020-09-26 PROCEDURE — 82746 ASSAY OF FOLIC ACID SERUM: CPT | Performed by: INTERNAL MEDICINE

## 2020-09-26 PROCEDURE — 82607 VITAMIN B-12: CPT | Performed by: INTERNAL MEDICINE

## 2020-09-26 PROCEDURE — 84443 ASSAY THYROID STIM HORMONE: CPT | Performed by: INTERNAL MEDICINE

## 2020-09-26 PROCEDURE — 83735 ASSAY OF MAGNESIUM: CPT | Performed by: INTERNAL MEDICINE

## 2020-09-26 PROCEDURE — 82962 GLUCOSE BLOOD TEST: CPT

## 2020-09-26 PROCEDURE — 86140 C-REACTIVE PROTEIN: CPT | Performed by: INTERNAL MEDICINE

## 2020-09-26 PROCEDURE — 80053 COMPREHEN METABOLIC PANEL: CPT | Performed by: INTERNAL MEDICINE

## 2020-09-26 PROCEDURE — 63710000001 INSULIN LISPRO (HUMAN) PER 5 UNITS: Performed by: NURSE PRACTITIONER

## 2020-09-26 PROCEDURE — 85025 COMPLETE CBC W/AUTO DIFF WBC: CPT | Performed by: INTERNAL MEDICINE

## 2020-09-26 PROCEDURE — 25010000002 ERTAPENEM PER 500 MG: Performed by: INTERNAL MEDICINE

## 2020-09-26 PROCEDURE — 99232 SBSQ HOSP IP/OBS MODERATE 35: CPT | Performed by: NURSE PRACTITIONER

## 2020-09-26 PROCEDURE — 99231 SBSQ HOSP IP/OBS SF/LOW 25: CPT | Performed by: PSYCHIATRY & NEUROLOGY

## 2020-09-26 PROCEDURE — 25010000003 POTASSIUM CHLORIDE 10 MEQ/100ML SOLUTION: Performed by: HOSPITALIST

## 2020-09-26 RX ADMIN — VANCOMYCIN 125 MG: KIT at 06:29

## 2020-09-26 RX ADMIN — ASPIRIN 81 MG: 81 TABLET, COATED ORAL at 12:23

## 2020-09-26 RX ADMIN — PRAVASTATIN SODIUM 40 MG: 40 TABLET ORAL at 14:33

## 2020-09-26 RX ADMIN — TAMSULOSIN HYDROCHLORIDE 0.4 MG: 0.4 CAPSULE ORAL at 13:46

## 2020-09-26 RX ADMIN — APIXABAN 5 MG: 5 TABLET, FILM COATED ORAL at 12:23

## 2020-09-26 RX ADMIN — POTASSIUM CHLORIDE 10 MEQ: 7.46 INJECTION, SOLUTION INTRAVENOUS at 13:33

## 2020-09-26 RX ADMIN — POTASSIUM CHLORIDE 10 MEQ: 7.46 INJECTION, SOLUTION INTRAVENOUS at 14:33

## 2020-09-26 RX ADMIN — FUROSEMIDE 40 MG: 40 TABLET ORAL at 14:33

## 2020-09-26 RX ADMIN — QUETIAPINE FUMARATE 25 MG: 25 TABLET ORAL at 18:44

## 2020-09-26 RX ADMIN — INSULIN LISPRO 2 UNITS: 100 INJECTION, SOLUTION INTRAVENOUS; SUBCUTANEOUS at 18:44

## 2020-09-26 RX ADMIN — POTASSIUM CHLORIDE 10 MEQ: 7.46 INJECTION, SOLUTION INTRAVENOUS at 11:23

## 2020-09-26 RX ADMIN — DILTIAZEM HYDROCHLORIDE 360 MG: 180 CAPSULE, COATED, EXTENDED RELEASE ORAL at 13:46

## 2020-09-26 RX ADMIN — VANCOMYCIN 125 MG: KIT at 00:12

## 2020-09-26 RX ADMIN — HYDRALAZINE HYDROCHLORIDE 100 MG: 50 TABLET, FILM COATED ORAL at 12:22

## 2020-09-26 RX ADMIN — INSULIN LISPRO 5 UNITS: 100 INJECTION, SOLUTION INTRAVENOUS; SUBCUTANEOUS at 18:44

## 2020-09-26 RX ADMIN — INSULIN LISPRO 4 UNITS: 100 INJECTION, SOLUTION INTRAVENOUS; SUBCUTANEOUS at 11:23

## 2020-09-26 RX ADMIN — LOSARTAN POTASSIUM 100 MG: 100 TABLET, FILM COATED ORAL at 13:46

## 2020-09-26 RX ADMIN — VANCOMYCIN 125 MG: KIT at 12:23

## 2020-09-26 RX ADMIN — ERTAPENEM SODIUM 1 G: 1 INJECTION, POWDER, LYOPHILIZED, FOR SOLUTION INTRAMUSCULAR; INTRAVENOUS at 11:23

## 2020-09-26 RX ADMIN — INSULIN LISPRO 2 UNITS: 100 INJECTION, SOLUTION INTRAVENOUS; SUBCUTANEOUS at 09:53

## 2020-09-26 RX ADMIN — VANCOMYCIN 125 MG: KIT at 18:44

## 2020-09-26 RX ADMIN — MULTIPLE VITAMINS W/ MINERALS TAB 1 TABLET: TAB at 13:46

## 2020-09-26 RX ADMIN — POTASSIUM CHLORIDE 10 MEQ: 7.46 INJECTION, SOLUTION INTRAVENOUS at 12:27

## 2020-09-26 RX ADMIN — CARVEDILOL 37.5 MG: 12.5 TABLET, FILM COATED ORAL at 12:23

## 2020-09-27 LAB
ALBUMIN SERPL-MCNC: 3.2 G/DL (ref 3.5–5.2)
ANION GAP SERPL CALCULATED.3IONS-SCNC: 6.6 MMOL/L (ref 5–15)
BUN SERPL-MCNC: 15 MG/DL (ref 8–23)
BUN/CREAT SERPL: 13.9 (ref 7–25)
CALCIUM SPEC-SCNC: 9.3 MG/DL (ref 8.6–10.5)
CHLORIDE SERPL-SCNC: 106 MMOL/L (ref 98–107)
CO2 SERPL-SCNC: 29.4 MMOL/L (ref 22–29)
CREAT SERPL-MCNC: 1.08 MG/DL (ref 0.76–1.27)
DEPRECATED RDW RBC AUTO: 52.6 FL (ref 37–54)
ERYTHROCYTE [DISTWIDTH] IN BLOOD BY AUTOMATED COUNT: 16.9 % (ref 12.3–15.4)
GFR SERPL CREATININE-BSD FRML MDRD: 67 ML/MIN/1.73
GLUCOSE BLDC GLUCOMTR-MCNC: 132 MG/DL (ref 70–130)
GLUCOSE BLDC GLUCOMTR-MCNC: 136 MG/DL (ref 70–130)
GLUCOSE BLDC GLUCOMTR-MCNC: 149 MG/DL (ref 70–130)
GLUCOSE BLDC GLUCOMTR-MCNC: 188 MG/DL (ref 70–130)
GLUCOSE SERPL-MCNC: 108 MG/DL (ref 65–99)
HCT VFR BLD AUTO: 37.4 % (ref 37.5–51)
HGB BLD-MCNC: 12.2 G/DL (ref 13–17.7)
MAGNESIUM SERPL-MCNC: 2.2 MG/DL (ref 1.6–2.4)
MCH RBC QN AUTO: 28.6 PG (ref 26.6–33)
MCHC RBC AUTO-ENTMCNC: 32.6 G/DL (ref 31.5–35.7)
MCV RBC AUTO: 87.8 FL (ref 79–97)
PHOSPHATE SERPL-MCNC: 2.9 MG/DL (ref 2.5–4.5)
PLATELET # BLD AUTO: 428 10*3/MM3 (ref 140–450)
PMV BLD AUTO: 9.7 FL (ref 6–12)
POTASSIUM SERPL-SCNC: 3.5 MMOL/L (ref 3.5–5.2)
POTASSIUM SERPL-SCNC: 3.5 MMOL/L (ref 3.5–5.2)
POTASSIUM SERPL-SCNC: 3.8 MMOL/L (ref 3.5–5.2)
RBC # BLD AUTO: 4.26 10*6/MM3 (ref 4.14–5.8)
SODIUM SERPL-SCNC: 142 MMOL/L (ref 136–145)
WBC # BLD AUTO: 10.76 10*3/MM3 (ref 3.4–10.8)

## 2020-09-27 PROCEDURE — 63710000001 INSULIN GLARGINE PER 5 UNITS: Performed by: HOSPITALIST

## 2020-09-27 PROCEDURE — 85027 COMPLETE CBC AUTOMATED: CPT | Performed by: INTERNAL MEDICINE

## 2020-09-27 PROCEDURE — 84132 ASSAY OF SERUM POTASSIUM: CPT | Performed by: INTERNAL MEDICINE

## 2020-09-27 PROCEDURE — 80069 RENAL FUNCTION PANEL: CPT | Performed by: INTERNAL MEDICINE

## 2020-09-27 PROCEDURE — 99231 SBSQ HOSP IP/OBS SF/LOW 25: CPT | Performed by: NURSE PRACTITIONER

## 2020-09-27 PROCEDURE — 83735 ASSAY OF MAGNESIUM: CPT | Performed by: INTERNAL MEDICINE

## 2020-09-27 PROCEDURE — 99231 SBSQ HOSP IP/OBS SF/LOW 25: CPT | Performed by: PSYCHIATRY & NEUROLOGY

## 2020-09-27 PROCEDURE — 82962 GLUCOSE BLOOD TEST: CPT

## 2020-09-27 PROCEDURE — 25010000002 ERTAPENEM PER 500 MG: Performed by: INTERNAL MEDICINE

## 2020-09-27 PROCEDURE — 63710000001 INSULIN LISPRO (HUMAN) PER 5 UNITS: Performed by: NURSE PRACTITIONER

## 2020-09-27 RX ORDER — QUETIAPINE FUMARATE 50 MG/1
50 TABLET, FILM COATED ORAL DAILY
Status: DISCONTINUED | OUTPATIENT
Start: 2020-09-27 | End: 2020-10-10 | Stop reason: HOSPADM

## 2020-09-27 RX ADMIN — QUETIAPINE FUMARATE 50 MG: 50 TABLET, FILM COATED ORAL at 16:46

## 2020-09-27 RX ADMIN — POTASSIUM CHLORIDE 40 MEQ: 10 CAPSULE, COATED, EXTENDED RELEASE ORAL at 08:29

## 2020-09-27 RX ADMIN — FUROSEMIDE 40 MG: 40 TABLET ORAL at 08:29

## 2020-09-27 RX ADMIN — INSULIN GLARGINE 30 UNITS: 100 INJECTION, SOLUTION SUBCUTANEOUS at 00:05

## 2020-09-27 RX ADMIN — HYDRALAZINE HYDROCHLORIDE 100 MG: 50 TABLET, FILM COATED ORAL at 16:47

## 2020-09-27 RX ADMIN — ASPIRIN 81 MG: 81 TABLET, COATED ORAL at 08:29

## 2020-09-27 RX ADMIN — Medication 1 APPLICATION: at 08:30

## 2020-09-27 RX ADMIN — LOSARTAN POTASSIUM 100 MG: 100 TABLET, FILM COATED ORAL at 08:29

## 2020-09-27 RX ADMIN — CARVEDILOL 37.5 MG: 12.5 TABLET, FILM COATED ORAL at 00:06

## 2020-09-27 RX ADMIN — Medication 1 APPLICATION: at 21:13

## 2020-09-27 RX ADMIN — APIXABAN 5 MG: 5 TABLET, FILM COATED ORAL at 08:29

## 2020-09-27 RX ADMIN — CARVEDILOL 37.5 MG: 12.5 TABLET, FILM COATED ORAL at 16:47

## 2020-09-27 RX ADMIN — INSULIN GLARGINE 30 UNITS: 100 INJECTION, SOLUTION SUBCUTANEOUS at 23:29

## 2020-09-27 RX ADMIN — SODIUM CHLORIDE, PRESERVATIVE FREE 10 ML: 5 INJECTION INTRAVENOUS at 21:12

## 2020-09-27 RX ADMIN — VANCOMYCIN 125 MG: KIT at 16:47

## 2020-09-27 RX ADMIN — MULTIPLE VITAMINS W/ MINERALS TAB 1 TABLET: TAB at 08:29

## 2020-09-27 RX ADMIN — HYDRALAZINE HYDROCHLORIDE 100 MG: 50 TABLET, FILM COATED ORAL at 00:08

## 2020-09-27 RX ADMIN — Medication 1 APPLICATION: at 00:14

## 2020-09-27 RX ADMIN — HYDRALAZINE HYDROCHLORIDE 100 MG: 50 TABLET, FILM COATED ORAL at 08:29

## 2020-09-27 RX ADMIN — INSULIN LISPRO 5 UNITS: 100 INJECTION, SOLUTION INTRAVENOUS; SUBCUTANEOUS at 12:05

## 2020-09-27 RX ADMIN — ERTAPENEM SODIUM 1 G: 1 INJECTION, POWDER, LYOPHILIZED, FOR SOLUTION INTRAMUSCULAR; INTRAVENOUS at 12:05

## 2020-09-27 RX ADMIN — POTASSIUM CHLORIDE 40 MEQ: 10 CAPSULE, COATED, EXTENDED RELEASE ORAL at 12:17

## 2020-09-27 RX ADMIN — MONTELUKAST SODIUM 10 MG: 10 TABLET, FILM COATED ORAL at 00:06

## 2020-09-27 RX ADMIN — VANCOMYCIN 125 MG: KIT at 07:33

## 2020-09-27 RX ADMIN — DILTIAZEM HYDROCHLORIDE 360 MG: 180 CAPSULE, COATED, EXTENDED RELEASE ORAL at 08:29

## 2020-09-27 RX ADMIN — VANCOMYCIN 125 MG: KIT at 12:05

## 2020-09-27 RX ADMIN — INSULIN LISPRO 5 UNITS: 100 INJECTION, SOLUTION INTRAVENOUS; SUBCUTANEOUS at 17:12

## 2020-09-27 RX ADMIN — PRAVASTATIN SODIUM 40 MG: 40 TABLET ORAL at 08:29

## 2020-09-27 RX ADMIN — VANCOMYCIN 125 MG: KIT at 00:16

## 2020-09-27 RX ADMIN — MONTELUKAST SODIUM 10 MG: 10 TABLET, FILM COATED ORAL at 21:13

## 2020-09-27 RX ADMIN — CARVEDILOL 37.5 MG: 12.5 TABLET, FILM COATED ORAL at 08:29

## 2020-09-27 RX ADMIN — TAMSULOSIN HYDROCHLORIDE 0.4 MG: 0.4 CAPSULE ORAL at 08:29

## 2020-09-27 RX ADMIN — FINASTERIDE 5 MG: 5 TABLET, FILM COATED ORAL at 21:12

## 2020-09-27 RX ADMIN — INSULIN LISPRO 5 UNITS: 100 INJECTION, SOLUTION INTRAVENOUS; SUBCUTANEOUS at 08:29

## 2020-09-27 RX ADMIN — HYDRALAZINE HYDROCHLORIDE 100 MG: 50 TABLET, FILM COATED ORAL at 21:13

## 2020-09-28 PROBLEM — I63.9 CEREBROVASCULAR ACCIDENT: Status: ACTIVE | Noted: 2020-09-28

## 2020-09-28 LAB
ALBUMIN SERPL-MCNC: 3.3 G/DL (ref 3.5–5.2)
ANION GAP SERPL CALCULATED.3IONS-SCNC: 8.3 MMOL/L (ref 5–15)
BASOPHILS # BLD AUTO: 0.11 10*3/MM3 (ref 0–0.2)
BASOPHILS NFR BLD AUTO: 1.5 % (ref 0–1.5)
BUN SERPL-MCNC: 15 MG/DL (ref 8–23)
BUN/CREAT SERPL: 15.8 (ref 7–25)
CALCIUM SPEC-SCNC: 9.2 MG/DL (ref 8.6–10.5)
CHLORIDE SERPL-SCNC: 104 MMOL/L (ref 98–107)
CO2 SERPL-SCNC: 28.7 MMOL/L (ref 22–29)
CREAT SERPL-MCNC: 0.95 MG/DL (ref 0.76–1.27)
DEPRECATED RDW RBC AUTO: 51.6 FL (ref 37–54)
EOSINOPHIL # BLD AUTO: 0.59 10*3/MM3 (ref 0–0.4)
EOSINOPHIL NFR BLD AUTO: 8 % (ref 0.3–6.2)
ERYTHROCYTE [DISTWIDTH] IN BLOOD BY AUTOMATED COUNT: 16.8 % (ref 12.3–15.4)
GFR SERPL CREATININE-BSD FRML MDRD: 78 ML/MIN/1.73
GLUCOSE BLDC GLUCOMTR-MCNC: 158 MG/DL (ref 70–130)
GLUCOSE BLDC GLUCOMTR-MCNC: 181 MG/DL (ref 70–130)
GLUCOSE BLDC GLUCOMTR-MCNC: 217 MG/DL (ref 70–130)
GLUCOSE BLDC GLUCOMTR-MCNC: 281 MG/DL (ref 70–130)
GLUCOSE SERPL-MCNC: 221 MG/DL (ref 65–99)
HCT VFR BLD AUTO: 36.7 % (ref 37.5–51)
HGB BLD-MCNC: 12 G/DL (ref 13–17.7)
IMM GRANULOCYTES # BLD AUTO: 0.03 10*3/MM3 (ref 0–0.05)
IMM GRANULOCYTES NFR BLD AUTO: 0.4 % (ref 0–0.5)
LYMPHOCYTES # BLD AUTO: 1.5 10*3/MM3 (ref 0.7–3.1)
LYMPHOCYTES NFR BLD AUTO: 20.2 % (ref 19.6–45.3)
MAGNESIUM SERPL-MCNC: 2.3 MG/DL (ref 1.6–2.4)
MCH RBC QN AUTO: 28.1 PG (ref 26.6–33)
MCHC RBC AUTO-ENTMCNC: 32.7 G/DL (ref 31.5–35.7)
MCV RBC AUTO: 85.9 FL (ref 79–97)
MONOCYTES # BLD AUTO: 0.51 10*3/MM3 (ref 0.1–0.9)
MONOCYTES NFR BLD AUTO: 6.9 % (ref 5–12)
NEUTROPHILS NFR BLD AUTO: 4.67 10*3/MM3 (ref 1.7–7)
NEUTROPHILS NFR BLD AUTO: 63 % (ref 42.7–76)
NRBC BLD AUTO-RTO: 0 /100 WBC (ref 0–0.2)
PHOSPHATE SERPL-MCNC: 3.7 MG/DL (ref 2.5–4.5)
PLATELET # BLD AUTO: 419 10*3/MM3 (ref 140–450)
PMV BLD AUTO: 10 FL (ref 6–12)
POTASSIUM SERPL-SCNC: 3.6 MMOL/L (ref 3.5–5.2)
RBC # BLD AUTO: 4.27 10*6/MM3 (ref 4.14–5.8)
SODIUM SERPL-SCNC: 141 MMOL/L (ref 136–145)
WBC # BLD AUTO: 7.41 10*3/MM3 (ref 3.4–10.8)

## 2020-09-28 PROCEDURE — 82962 GLUCOSE BLOOD TEST: CPT

## 2020-09-28 PROCEDURE — 83735 ASSAY OF MAGNESIUM: CPT | Performed by: INTERNAL MEDICINE

## 2020-09-28 PROCEDURE — 80069 RENAL FUNCTION PANEL: CPT | Performed by: INTERNAL MEDICINE

## 2020-09-28 PROCEDURE — 85025 COMPLETE CBC W/AUTO DIFF WBC: CPT | Performed by: INTERNAL MEDICINE

## 2020-09-28 PROCEDURE — 63710000001 INSULIN LISPRO (HUMAN) PER 5 UNITS: Performed by: NURSE PRACTITIONER

## 2020-09-28 PROCEDURE — 97110 THERAPEUTIC EXERCISES: CPT

## 2020-09-28 PROCEDURE — 25010000002 ERTAPENEM PER 500 MG: Performed by: INTERNAL MEDICINE

## 2020-09-28 RX ADMIN — CARVEDILOL 37.5 MG: 12.5 TABLET, FILM COATED ORAL at 10:12

## 2020-09-28 RX ADMIN — INSULIN LISPRO 5 UNITS: 100 INJECTION, SOLUTION INTRAVENOUS; SUBCUTANEOUS at 13:12

## 2020-09-28 RX ADMIN — HYDRALAZINE HYDROCHLORIDE 100 MG: 50 TABLET, FILM COATED ORAL at 10:11

## 2020-09-28 RX ADMIN — VANCOMYCIN 125 MG: KIT at 00:04

## 2020-09-28 RX ADMIN — FINASTERIDE 5 MG: 5 TABLET, FILM COATED ORAL at 22:34

## 2020-09-28 RX ADMIN — POTASSIUM CHLORIDE 40 MEQ: 10 CAPSULE, COATED, EXTENDED RELEASE ORAL at 18:49

## 2020-09-28 RX ADMIN — INSULIN LISPRO 5 UNITS: 100 INJECTION, SOLUTION INTRAVENOUS; SUBCUTANEOUS at 17:48

## 2020-09-28 RX ADMIN — Medication 1 APPLICATION: at 10:13

## 2020-09-28 RX ADMIN — APIXABAN 5 MG: 5 TABLET, FILM COATED ORAL at 10:11

## 2020-09-28 RX ADMIN — ERTAPENEM SODIUM 1 G: 1 INJECTION, POWDER, LYOPHILIZED, FOR SOLUTION INTRAMUSCULAR; INTRAVENOUS at 13:12

## 2020-09-28 RX ADMIN — VANCOMYCIN 125 MG: KIT at 13:11

## 2020-09-28 RX ADMIN — VANCOMYCIN 125 MG: KIT at 17:52

## 2020-09-28 RX ADMIN — INSULIN LISPRO 4 UNITS: 100 INJECTION, SOLUTION INTRAVENOUS; SUBCUTANEOUS at 13:12

## 2020-09-28 RX ADMIN — LOSARTAN POTASSIUM 100 MG: 100 TABLET, FILM COATED ORAL at 10:11

## 2020-09-28 RX ADMIN — HYDRALAZINE HYDROCHLORIDE 100 MG: 50 TABLET, FILM COATED ORAL at 17:48

## 2020-09-28 RX ADMIN — APIXABAN 5 MG: 5 TABLET, FILM COATED ORAL at 22:34

## 2020-09-28 RX ADMIN — FUROSEMIDE 40 MG: 40 TABLET ORAL at 10:11

## 2020-09-28 RX ADMIN — MONTELUKAST SODIUM 10 MG: 10 TABLET, FILM COATED ORAL at 22:34

## 2020-09-28 RX ADMIN — PRAVASTATIN SODIUM 40 MG: 40 TABLET ORAL at 10:19

## 2020-09-28 RX ADMIN — HYDRALAZINE HYDROCHLORIDE 100 MG: 50 TABLET, FILM COATED ORAL at 22:34

## 2020-09-28 RX ADMIN — APIXABAN 5 MG: 5 TABLET, FILM COATED ORAL at 00:04

## 2020-09-28 RX ADMIN — INSULIN LISPRO 4 UNITS: 100 INJECTION, SOLUTION INTRAVENOUS; SUBCUTANEOUS at 10:14

## 2020-09-28 RX ADMIN — INSULIN LISPRO 5 UNITS: 100 INJECTION, SOLUTION INTRAVENOUS; SUBCUTANEOUS at 10:10

## 2020-09-28 RX ADMIN — TAMSULOSIN HYDROCHLORIDE 0.4 MG: 0.4 CAPSULE ORAL at 10:17

## 2020-09-28 RX ADMIN — QUETIAPINE FUMARATE 50 MG: 50 TABLET, FILM COATED ORAL at 17:47

## 2020-09-28 RX ADMIN — ASPIRIN 81 MG: 81 TABLET, COATED ORAL at 10:12

## 2020-09-28 RX ADMIN — CARVEDILOL 37.5 MG: 12.5 TABLET, FILM COATED ORAL at 17:47

## 2020-09-28 RX ADMIN — INSULIN LISPRO 2 UNITS: 100 INJECTION, SOLUTION INTRAVENOUS; SUBCUTANEOUS at 17:48

## 2020-09-28 RX ADMIN — DILTIAZEM HYDROCHLORIDE 360 MG: 180 CAPSULE, COATED, EXTENDED RELEASE ORAL at 10:10

## 2020-09-28 RX ADMIN — MULTIPLE VITAMINS W/ MINERALS TAB 1 TABLET: TAB at 10:11

## 2020-09-28 RX ADMIN — SODIUM CHLORIDE, PRESERVATIVE FREE 10 ML: 5 INJECTION INTRAVENOUS at 10:12

## 2020-09-29 LAB
CRP SERPL-MCNC: 1.28 MG/DL (ref 0–0.5)
ERYTHROCYTE [SEDIMENTATION RATE] IN BLOOD: 43 MM/HR (ref 0–20)
GLUCOSE BLDC GLUCOMTR-MCNC: 161 MG/DL (ref 70–130)
GLUCOSE BLDC GLUCOMTR-MCNC: 188 MG/DL (ref 70–130)
GLUCOSE BLDC GLUCOMTR-MCNC: 219 MG/DL (ref 70–130)
GLUCOSE BLDC GLUCOMTR-MCNC: 224 MG/DL (ref 70–130)

## 2020-09-29 PROCEDURE — 82962 GLUCOSE BLOOD TEST: CPT

## 2020-09-29 PROCEDURE — 25010000002 ERTAPENEM PER 500 MG: Performed by: INTERNAL MEDICINE

## 2020-09-29 PROCEDURE — 85652 RBC SED RATE AUTOMATED: CPT | Performed by: INTERNAL MEDICINE

## 2020-09-29 PROCEDURE — 63710000001 INSULIN LISPRO (HUMAN) PER 5 UNITS: Performed by: NURSE PRACTITIONER

## 2020-09-29 PROCEDURE — 97110 THERAPEUTIC EXERCISES: CPT

## 2020-09-29 PROCEDURE — 99232 SBSQ HOSP IP/OBS MODERATE 35: CPT | Performed by: INTERNAL MEDICINE

## 2020-09-29 PROCEDURE — 63710000001 INSULIN GLARGINE PER 5 UNITS: Performed by: HOSPITALIST

## 2020-09-29 PROCEDURE — 86140 C-REACTIVE PROTEIN: CPT | Performed by: INTERNAL MEDICINE

## 2020-09-29 RX ORDER — DOXYCYCLINE 100 MG/1
100 CAPSULE ORAL EVERY 12 HOURS SCHEDULED
Status: DISCONTINUED | OUTPATIENT
Start: 2020-09-30 | End: 2020-10-10 | Stop reason: HOSPADM

## 2020-09-29 RX ADMIN — ASPIRIN 81 MG: 81 TABLET, COATED ORAL at 09:05

## 2020-09-29 RX ADMIN — Medication 1 APPLICATION: at 09:07

## 2020-09-29 RX ADMIN — MONTELUKAST SODIUM 10 MG: 10 TABLET, FILM COATED ORAL at 20:48

## 2020-09-29 RX ADMIN — SODIUM CHLORIDE, PRESERVATIVE FREE 10 ML: 5 INJECTION INTRAVENOUS at 09:07

## 2020-09-29 RX ADMIN — Medication 1 APPLICATION: at 20:48

## 2020-09-29 RX ADMIN — APIXABAN 5 MG: 5 TABLET, FILM COATED ORAL at 09:05

## 2020-09-29 RX ADMIN — LOSARTAN POTASSIUM 100 MG: 100 TABLET, FILM COATED ORAL at 09:05

## 2020-09-29 RX ADMIN — FINASTERIDE 5 MG: 5 TABLET, FILM COATED ORAL at 20:48

## 2020-09-29 RX ADMIN — PRAVASTATIN SODIUM 40 MG: 40 TABLET ORAL at 09:07

## 2020-09-29 RX ADMIN — ACETAMINOPHEN 650 MG: 325 TABLET, FILM COATED ORAL at 09:06

## 2020-09-29 RX ADMIN — HYDRALAZINE HYDROCHLORIDE 100 MG: 50 TABLET, FILM COATED ORAL at 20:48

## 2020-09-29 RX ADMIN — FUROSEMIDE 40 MG: 40 TABLET ORAL at 09:05

## 2020-09-29 RX ADMIN — VANCOMYCIN 125 MG: KIT at 12:34

## 2020-09-29 RX ADMIN — INSULIN GLARGINE 30 UNITS: 100 INJECTION, SOLUTION SUBCUTANEOUS at 07:49

## 2020-09-29 RX ADMIN — QUETIAPINE FUMARATE 50 MG: 50 TABLET, FILM COATED ORAL at 18:41

## 2020-09-29 RX ADMIN — INSULIN GLARGINE 30 UNITS: 100 INJECTION, SOLUTION SUBCUTANEOUS at 20:52

## 2020-09-29 RX ADMIN — INSULIN LISPRO 5 UNITS: 100 INJECTION, SOLUTION INTRAVENOUS; SUBCUTANEOUS at 18:40

## 2020-09-29 RX ADMIN — VANCOMYCIN 125 MG: KIT at 18:41

## 2020-09-29 RX ADMIN — INSULIN LISPRO 5 UNITS: 100 INJECTION, SOLUTION INTRAVENOUS; SUBCUTANEOUS at 09:06

## 2020-09-29 RX ADMIN — APIXABAN 5 MG: 5 TABLET, FILM COATED ORAL at 20:48

## 2020-09-29 RX ADMIN — ERTAPENEM SODIUM 1 G: 1 INJECTION, POWDER, LYOPHILIZED, FOR SOLUTION INTRAMUSCULAR; INTRAVENOUS at 13:19

## 2020-09-29 RX ADMIN — INSULIN LISPRO 2 UNITS: 100 INJECTION, SOLUTION INTRAVENOUS; SUBCUTANEOUS at 12:33

## 2020-09-29 RX ADMIN — CARVEDILOL 37.5 MG: 12.5 TABLET, FILM COATED ORAL at 18:41

## 2020-09-29 RX ADMIN — MULTIPLE VITAMINS W/ MINERALS TAB 1 TABLET: TAB at 09:05

## 2020-09-29 RX ADMIN — VANCOMYCIN 125 MG: KIT at 06:10

## 2020-09-29 RX ADMIN — HYDRALAZINE HYDROCHLORIDE 100 MG: 50 TABLET, FILM COATED ORAL at 16:39

## 2020-09-29 RX ADMIN — INSULIN LISPRO 3 UNITS: 100 INJECTION, SOLUTION INTRAVENOUS; SUBCUTANEOUS at 09:02

## 2020-09-29 RX ADMIN — VANCOMYCIN 125 MG: KIT at 00:00

## 2020-09-29 RX ADMIN — CARVEDILOL 37.5 MG: 12.5 TABLET, FILM COATED ORAL at 09:06

## 2020-09-29 RX ADMIN — DILTIAZEM HYDROCHLORIDE 360 MG: 180 CAPSULE, COATED, EXTENDED RELEASE ORAL at 09:06

## 2020-09-29 RX ADMIN — HYDRALAZINE HYDROCHLORIDE 100 MG: 50 TABLET, FILM COATED ORAL at 09:05

## 2020-09-29 RX ADMIN — TAMSULOSIN HYDROCHLORIDE 0.4 MG: 0.4 CAPSULE ORAL at 09:05

## 2020-09-29 RX ADMIN — INSULIN LISPRO 5 UNITS: 100 INJECTION, SOLUTION INTRAVENOUS; SUBCUTANEOUS at 12:34

## 2020-09-29 RX ADMIN — INSULIN LISPRO 2 UNITS: 100 INJECTION, SOLUTION INTRAVENOUS; SUBCUTANEOUS at 18:41

## 2020-09-30 LAB
GLUCOSE BLDC GLUCOMTR-MCNC: 145 MG/DL (ref 70–130)
GLUCOSE BLDC GLUCOMTR-MCNC: 163 MG/DL (ref 70–130)
GLUCOSE BLDC GLUCOMTR-MCNC: 179 MG/DL (ref 70–130)
GLUCOSE BLDC GLUCOMTR-MCNC: 212 MG/DL (ref 70–130)

## 2020-09-30 PROCEDURE — 97530 THERAPEUTIC ACTIVITIES: CPT

## 2020-09-30 PROCEDURE — 82962 GLUCOSE BLOOD TEST: CPT

## 2020-09-30 PROCEDURE — 63710000001 INSULIN LISPRO (HUMAN) PER 5 UNITS: Performed by: NURSE PRACTITIONER

## 2020-09-30 PROCEDURE — 63710000001 INSULIN GLARGINE PER 5 UNITS: Performed by: HOSPITALIST

## 2020-09-30 RX ADMIN — QUETIAPINE FUMARATE 50 MG: 50 TABLET, FILM COATED ORAL at 21:34

## 2020-09-30 RX ADMIN — Medication 1 APPLICATION: at 09:05

## 2020-09-30 RX ADMIN — MULTIPLE VITAMINS W/ MINERALS TAB 1 TABLET: TAB at 09:03

## 2020-09-30 RX ADMIN — VANCOMYCIN 125 MG: KIT at 21:35

## 2020-09-30 RX ADMIN — HYDRALAZINE HYDROCHLORIDE 100 MG: 50 TABLET, FILM COATED ORAL at 21:34

## 2020-09-30 RX ADMIN — APIXABAN 5 MG: 5 TABLET, FILM COATED ORAL at 09:03

## 2020-09-30 RX ADMIN — MONTELUKAST SODIUM 10 MG: 10 TABLET, FILM COATED ORAL at 21:34

## 2020-09-30 RX ADMIN — INSULIN LISPRO 2 UNITS: 100 INJECTION, SOLUTION INTRAVENOUS; SUBCUTANEOUS at 12:05

## 2020-09-30 RX ADMIN — TAMSULOSIN HYDROCHLORIDE 0.4 MG: 0.4 CAPSULE ORAL at 09:03

## 2020-09-30 RX ADMIN — INSULIN LISPRO 5 UNITS: 100 INJECTION, SOLUTION INTRAVENOUS; SUBCUTANEOUS at 12:05

## 2020-09-30 RX ADMIN — INSULIN GLARGINE 30 UNITS: 100 INJECTION, SOLUTION SUBCUTANEOUS at 21:34

## 2020-09-30 RX ADMIN — VANCOMYCIN 125 MG: KIT at 12:05

## 2020-09-30 RX ADMIN — Medication 1 APPLICATION: at 21:38

## 2020-09-30 RX ADMIN — DILTIAZEM HYDROCHLORIDE 360 MG: 180 CAPSULE, COATED, EXTENDED RELEASE ORAL at 09:03

## 2020-09-30 RX ADMIN — FUROSEMIDE 40 MG: 40 TABLET ORAL at 09:03

## 2020-09-30 RX ADMIN — INSULIN LISPRO 5 UNITS: 100 INJECTION, SOLUTION INTRAVENOUS; SUBCUTANEOUS at 17:42

## 2020-09-30 RX ADMIN — HYDRALAZINE HYDROCHLORIDE 100 MG: 50 TABLET, FILM COATED ORAL at 17:04

## 2020-09-30 RX ADMIN — VANCOMYCIN 125 MG: KIT at 01:26

## 2020-09-30 RX ADMIN — CARVEDILOL 37.5 MG: 12.5 TABLET, FILM COATED ORAL at 09:04

## 2020-09-30 RX ADMIN — DOXYCYCLINE 100 MG: 100 CAPSULE ORAL at 21:34

## 2020-09-30 RX ADMIN — FINASTERIDE 5 MG: 5 TABLET, FILM COATED ORAL at 21:34

## 2020-09-30 RX ADMIN — DOXYCYCLINE 100 MG: 100 CAPSULE ORAL at 09:03

## 2020-09-30 RX ADMIN — INSULIN LISPRO 2 UNITS: 100 INJECTION, SOLUTION INTRAVENOUS; SUBCUTANEOUS at 17:43

## 2020-09-30 RX ADMIN — ACETAMINOPHEN 650 MG: 325 TABLET, FILM COATED ORAL at 14:48

## 2020-09-30 RX ADMIN — APIXABAN 5 MG: 5 TABLET, FILM COATED ORAL at 21:34

## 2020-09-30 RX ADMIN — VANCOMYCIN 125 MG: KIT at 06:36

## 2020-09-30 RX ADMIN — SODIUM CHLORIDE, PRESERVATIVE FREE 10 ML: 5 INJECTION INTRAVENOUS at 21:35

## 2020-09-30 RX ADMIN — VANCOMYCIN 125 MG: KIT at 17:42

## 2020-09-30 RX ADMIN — HYDRALAZINE HYDROCHLORIDE 100 MG: 50 TABLET, FILM COATED ORAL at 09:01

## 2020-09-30 RX ADMIN — ASPIRIN 81 MG: 81 TABLET, COATED ORAL at 09:01

## 2020-09-30 RX ADMIN — PRAVASTATIN SODIUM 40 MG: 40 TABLET ORAL at 09:01

## 2020-09-30 RX ADMIN — LOSARTAN POTASSIUM 100 MG: 100 TABLET, FILM COATED ORAL at 09:03

## 2020-09-30 RX ADMIN — CARVEDILOL 37.5 MG: 12.5 TABLET, FILM COATED ORAL at 17:42

## 2020-10-01 LAB
ANION GAP SERPL CALCULATED.3IONS-SCNC: 7.1 MMOL/L (ref 5–15)
BUN SERPL-MCNC: 14 MG/DL (ref 8–23)
BUN/CREAT SERPL: 17.9 (ref 7–25)
CALCIUM SPEC-SCNC: 9 MG/DL (ref 8.6–10.5)
CHLORIDE SERPL-SCNC: 105 MMOL/L (ref 98–107)
CO2 SERPL-SCNC: 27.9 MMOL/L (ref 22–29)
CREAT SERPL-MCNC: 0.78 MG/DL (ref 0.76–1.27)
DEPRECATED RDW RBC AUTO: 53.2 FL (ref 37–54)
ERYTHROCYTE [DISTWIDTH] IN BLOOD BY AUTOMATED COUNT: 17 % (ref 12.3–15.4)
GFR SERPL CREATININE-BSD FRML MDRD: 98 ML/MIN/1.73
GLUCOSE BLDC GLUCOMTR-MCNC: 115 MG/DL (ref 70–130)
GLUCOSE BLDC GLUCOMTR-MCNC: 161 MG/DL (ref 70–130)
GLUCOSE BLDC GLUCOMTR-MCNC: 234 MG/DL (ref 70–130)
GLUCOSE BLDC GLUCOMTR-MCNC: 309 MG/DL (ref 70–130)
GLUCOSE SERPL-MCNC: 124 MG/DL (ref 65–99)
HCT VFR BLD AUTO: 36.2 % (ref 37.5–51)
HGB BLD-MCNC: 12.1 G/DL (ref 13–17.7)
MCH RBC QN AUTO: 29.2 PG (ref 26.6–33)
MCHC RBC AUTO-ENTMCNC: 33.4 G/DL (ref 31.5–35.7)
MCV RBC AUTO: 87.4 FL (ref 79–97)
PLATELET # BLD AUTO: 425 10*3/MM3 (ref 140–450)
PMV BLD AUTO: 9.9 FL (ref 6–12)
POTASSIUM SERPL-SCNC: 3.1 MMOL/L (ref 3.5–5.2)
RBC # BLD AUTO: 4.14 10*6/MM3 (ref 4.14–5.8)
SODIUM SERPL-SCNC: 140 MMOL/L (ref 136–145)
WBC # BLD AUTO: 7.44 10*3/MM3 (ref 3.4–10.8)

## 2020-10-01 PROCEDURE — 63710000001 INSULIN LISPRO (HUMAN) PER 5 UNITS: Performed by: NURSE PRACTITIONER

## 2020-10-01 PROCEDURE — 85027 COMPLETE CBC AUTOMATED: CPT | Performed by: HOSPITALIST

## 2020-10-01 PROCEDURE — 63710000001 INSULIN GLARGINE PER 5 UNITS: Performed by: HOSPITALIST

## 2020-10-01 PROCEDURE — 82962 GLUCOSE BLOOD TEST: CPT

## 2020-10-01 PROCEDURE — 97530 THERAPEUTIC ACTIVITIES: CPT

## 2020-10-01 PROCEDURE — 80048 BASIC METABOLIC PNL TOTAL CA: CPT | Performed by: HOSPITALIST

## 2020-10-01 RX ORDER — POTASSIUM CHLORIDE 1.5 G/1.77G
40 POWDER, FOR SOLUTION ORAL AS NEEDED
Status: DISCONTINUED | OUTPATIENT
Start: 2020-10-01 | End: 2020-10-10 | Stop reason: HOSPADM

## 2020-10-01 RX ORDER — POTASSIUM CHLORIDE 750 MG/1
40 CAPSULE, EXTENDED RELEASE ORAL AS NEEDED
Status: DISCONTINUED | OUTPATIENT
Start: 2020-10-01 | End: 2020-10-10 | Stop reason: HOSPADM

## 2020-10-01 RX ORDER — POTASSIUM CHLORIDE 7.45 MG/ML
10 INJECTION INTRAVENOUS
Status: DISCONTINUED | OUTPATIENT
Start: 2020-10-01 | End: 2020-10-10 | Stop reason: HOSPADM

## 2020-10-01 RX ADMIN — INSULIN LISPRO 5 UNITS: 100 INJECTION, SOLUTION INTRAVENOUS; SUBCUTANEOUS at 17:36

## 2020-10-01 RX ADMIN — VANCOMYCIN 125 MG: KIT at 06:30

## 2020-10-01 RX ADMIN — APIXABAN 5 MG: 5 TABLET, FILM COATED ORAL at 08:33

## 2020-10-01 RX ADMIN — Medication 1 APPLICATION: at 08:35

## 2020-10-01 RX ADMIN — APIXABAN 5 MG: 5 TABLET, FILM COATED ORAL at 23:14

## 2020-10-01 RX ADMIN — TAMSULOSIN HYDROCHLORIDE 0.4 MG: 0.4 CAPSULE ORAL at 08:32

## 2020-10-01 RX ADMIN — HYDRALAZINE HYDROCHLORIDE 100 MG: 50 TABLET, FILM COATED ORAL at 23:14

## 2020-10-01 RX ADMIN — SODIUM CHLORIDE, PRESERVATIVE FREE 10 ML: 5 INJECTION INTRAVENOUS at 08:34

## 2020-10-01 RX ADMIN — HYDRALAZINE HYDROCHLORIDE 100 MG: 50 TABLET, FILM COATED ORAL at 08:33

## 2020-10-01 RX ADMIN — Medication 1 APPLICATION: at 23:15

## 2020-10-01 RX ADMIN — INSULIN LISPRO 5 UNITS: 100 INJECTION, SOLUTION INTRAVENOUS; SUBCUTANEOUS at 08:34

## 2020-10-01 RX ADMIN — INSULIN LISPRO 2 UNITS: 100 INJECTION, SOLUTION INTRAVENOUS; SUBCUTANEOUS at 08:32

## 2020-10-01 RX ADMIN — MONTELUKAST SODIUM 10 MG: 10 TABLET, FILM COATED ORAL at 23:14

## 2020-10-01 RX ADMIN — VANCOMYCIN 125 MG: KIT at 11:24

## 2020-10-01 RX ADMIN — POTASSIUM CHLORIDE 40 MEQ: 10 CAPSULE, COATED, EXTENDED RELEASE ORAL at 10:05

## 2020-10-01 RX ADMIN — MULTIPLE VITAMINS W/ MINERALS TAB 1 TABLET: TAB at 08:32

## 2020-10-01 RX ADMIN — VANCOMYCIN 125 MG: KIT at 23:14

## 2020-10-01 RX ADMIN — PRAVASTATIN SODIUM 40 MG: 40 TABLET ORAL at 08:32

## 2020-10-01 RX ADMIN — FUROSEMIDE 40 MG: 40 TABLET ORAL at 08:33

## 2020-10-01 RX ADMIN — DILTIAZEM HYDROCHLORIDE 360 MG: 180 CAPSULE, COATED, EXTENDED RELEASE ORAL at 08:32

## 2020-10-01 RX ADMIN — HYDRALAZINE HYDROCHLORIDE 100 MG: 50 TABLET, FILM COATED ORAL at 15:51

## 2020-10-01 RX ADMIN — DOXYCYCLINE 100 MG: 100 CAPSULE ORAL at 08:32

## 2020-10-01 RX ADMIN — ACETAMINOPHEN 650 MG: 325 TABLET, FILM COATED ORAL at 13:38

## 2020-10-01 RX ADMIN — CARVEDILOL 37.5 MG: 12.5 TABLET, FILM COATED ORAL at 08:33

## 2020-10-01 RX ADMIN — VANCOMYCIN 125 MG: KIT at 17:35

## 2020-10-01 RX ADMIN — ASPIRIN 81 MG: 81 TABLET, COATED ORAL at 08:33

## 2020-10-01 RX ADMIN — INSULIN GLARGINE 30 UNITS: 100 INJECTION, SOLUTION SUBCUTANEOUS at 23:17

## 2020-10-01 RX ADMIN — DOXYCYCLINE 100 MG: 100 CAPSULE ORAL at 23:14

## 2020-10-01 RX ADMIN — CARVEDILOL 37.5 MG: 12.5 TABLET, FILM COATED ORAL at 17:34

## 2020-10-01 RX ADMIN — LOSARTAN POTASSIUM 100 MG: 100 TABLET, FILM COATED ORAL at 08:32

## 2020-10-01 RX ADMIN — INSULIN LISPRO 5 UNITS: 100 INJECTION, SOLUTION INTRAVENOUS; SUBCUTANEOUS at 11:24

## 2020-10-01 RX ADMIN — FINASTERIDE 5 MG: 5 TABLET, FILM COATED ORAL at 23:14

## 2020-10-01 RX ADMIN — POTASSIUM CHLORIDE 40 MEQ: 10 CAPSULE, COATED, EXTENDED RELEASE ORAL at 14:12

## 2020-10-01 RX ADMIN — INSULIN LISPRO 5 UNITS: 100 INJECTION, SOLUTION INTRAVENOUS; SUBCUTANEOUS at 17:35

## 2020-10-02 LAB
ALBUMIN SERPL-MCNC: 3.5 G/DL (ref 3.5–5.2)
ANION GAP SERPL CALCULATED.3IONS-SCNC: 6.4 MMOL/L (ref 5–15)
BUN SERPL-MCNC: 15 MG/DL (ref 8–23)
BUN/CREAT SERPL: 20.3 (ref 7–25)
CALCIUM SPEC-SCNC: 9.5 MG/DL (ref 8.6–10.5)
CHLORIDE SERPL-SCNC: 106 MMOL/L (ref 98–107)
CO2 SERPL-SCNC: 26.6 MMOL/L (ref 22–29)
CREAT SERPL-MCNC: 0.74 MG/DL (ref 0.76–1.27)
DEPRECATED RDW RBC AUTO: 58.2 FL (ref 37–54)
ERYTHROCYTE [DISTWIDTH] IN BLOOD BY AUTOMATED COUNT: 17.4 % (ref 12.3–15.4)
GFR SERPL CREATININE-BSD FRML MDRD: 104 ML/MIN/1.73
GLUCOSE BLDC GLUCOMTR-MCNC: 125 MG/DL (ref 70–130)
GLUCOSE BLDC GLUCOMTR-MCNC: 174 MG/DL (ref 70–130)
GLUCOSE BLDC GLUCOMTR-MCNC: 179 MG/DL (ref 70–130)
GLUCOSE BLDC GLUCOMTR-MCNC: 249 MG/DL (ref 70–130)
GLUCOSE SERPL-MCNC: 139 MG/DL (ref 65–99)
HCT VFR BLD AUTO: 42.8 % (ref 37.5–51)
HGB BLD-MCNC: 13.6 G/DL (ref 13–17.7)
MAGNESIUM SERPL-MCNC: 2.1 MG/DL (ref 1.6–2.4)
MCH RBC QN AUTO: 29.2 PG (ref 26.6–33)
MCHC RBC AUTO-ENTMCNC: 31.8 G/DL (ref 31.5–35.7)
MCV RBC AUTO: 91.8 FL (ref 79–97)
PHOSPHATE SERPL-MCNC: 2.5 MG/DL (ref 2.5–4.5)
PLATELET # BLD AUTO: 456 10*3/MM3 (ref 140–450)
PMV BLD AUTO: 10.3 FL (ref 6–12)
POTASSIUM SERPL-SCNC: 3.4 MMOL/L (ref 3.5–5.2)
RBC # BLD AUTO: 4.66 10*6/MM3 (ref 4.14–5.8)
SODIUM SERPL-SCNC: 139 MMOL/L (ref 136–145)
WBC # BLD AUTO: 10.14 10*3/MM3 (ref 3.4–10.8)

## 2020-10-02 PROCEDURE — 63710000001 INSULIN LISPRO (HUMAN) PER 5 UNITS: Performed by: NURSE PRACTITIONER

## 2020-10-02 PROCEDURE — 84132 ASSAY OF SERUM POTASSIUM: CPT | Performed by: INTERNAL MEDICINE

## 2020-10-02 PROCEDURE — 63710000001 INSULIN GLARGINE PER 5 UNITS: Performed by: HOSPITALIST

## 2020-10-02 PROCEDURE — 83735 ASSAY OF MAGNESIUM: CPT | Performed by: INTERNAL MEDICINE

## 2020-10-02 PROCEDURE — 80069 RENAL FUNCTION PANEL: CPT | Performed by: INTERNAL MEDICINE

## 2020-10-02 PROCEDURE — 82962 GLUCOSE BLOOD TEST: CPT

## 2020-10-02 PROCEDURE — 97530 THERAPEUTIC ACTIVITIES: CPT

## 2020-10-02 PROCEDURE — 85027 COMPLETE CBC AUTOMATED: CPT | Performed by: INTERNAL MEDICINE

## 2020-10-02 RX ORDER — TERAZOSIN 2 MG/1
2 CAPSULE ORAL NIGHTLY
Status: DISCONTINUED | OUTPATIENT
Start: 2020-10-02 | End: 2020-10-10 | Stop reason: HOSPADM

## 2020-10-02 RX ORDER — POTASSIUM CHLORIDE 750 MG/1
40 CAPSULE, EXTENDED RELEASE ORAL DAILY
Status: DISCONTINUED | OUTPATIENT
Start: 2020-10-03 | End: 2020-10-10 | Stop reason: HOSPADM

## 2020-10-02 RX ADMIN — HYDRALAZINE HYDROCHLORIDE 100 MG: 50 TABLET, FILM COATED ORAL at 16:20

## 2020-10-02 RX ADMIN — INSULIN LISPRO 5 UNITS: 100 INJECTION, SOLUTION INTRAVENOUS; SUBCUTANEOUS at 12:40

## 2020-10-02 RX ADMIN — INSULIN LISPRO 5 UNITS: 100 INJECTION, SOLUTION INTRAVENOUS; SUBCUTANEOUS at 18:06

## 2020-10-02 RX ADMIN — FUROSEMIDE 40 MG: 40 TABLET ORAL at 09:34

## 2020-10-02 RX ADMIN — APIXABAN 5 MG: 5 TABLET, FILM COATED ORAL at 09:33

## 2020-10-02 RX ADMIN — VANCOMYCIN 125 MG: KIT at 12:47

## 2020-10-02 RX ADMIN — POTASSIUM CHLORIDE 40 MEQ: 10 CAPSULE, COATED, EXTENDED RELEASE ORAL at 12:45

## 2020-10-02 RX ADMIN — HYDRALAZINE HYDROCHLORIDE 100 MG: 50 TABLET, FILM COATED ORAL at 09:34

## 2020-10-02 RX ADMIN — DOXYCYCLINE 100 MG: 100 CAPSULE ORAL at 20:52

## 2020-10-02 RX ADMIN — MULTIPLE VITAMINS W/ MINERALS TAB 1 TABLET: TAB at 09:33

## 2020-10-02 RX ADMIN — INSULIN GLARGINE 30 UNITS: 100 INJECTION, SOLUTION SUBCUTANEOUS at 21:14

## 2020-10-02 RX ADMIN — LOSARTAN POTASSIUM 100 MG: 100 TABLET, FILM COATED ORAL at 09:33

## 2020-10-02 RX ADMIN — TAMSULOSIN HYDROCHLORIDE 0.4 MG: 0.4 CAPSULE ORAL at 09:33

## 2020-10-02 RX ADMIN — INSULIN LISPRO 5 UNITS: 100 INJECTION, SOLUTION INTRAVENOUS; SUBCUTANEOUS at 09:33

## 2020-10-02 RX ADMIN — PRAVASTATIN SODIUM 40 MG: 40 TABLET ORAL at 09:33

## 2020-10-02 RX ADMIN — POTASSIUM CHLORIDE 40 MEQ: 10 CAPSULE, COATED, EXTENDED RELEASE ORAL at 19:16

## 2020-10-02 RX ADMIN — ASPIRIN 81 MG: 81 TABLET, COATED ORAL at 09:33

## 2020-10-02 RX ADMIN — INSULIN LISPRO 2 UNITS: 100 INJECTION, SOLUTION INTRAVENOUS; SUBCUTANEOUS at 12:41

## 2020-10-02 RX ADMIN — DOXYCYCLINE 100 MG: 100 CAPSULE ORAL at 09:34

## 2020-10-02 RX ADMIN — APIXABAN 5 MG: 5 TABLET, FILM COATED ORAL at 20:51

## 2020-10-02 RX ADMIN — Medication 1 APPLICATION: at 09:35

## 2020-10-02 RX ADMIN — FINASTERIDE 5 MG: 5 TABLET, FILM COATED ORAL at 20:52

## 2020-10-02 RX ADMIN — QUETIAPINE FUMARATE 50 MG: 50 TABLET, FILM COATED ORAL at 20:52

## 2020-10-02 RX ADMIN — TERAZOSIN HYDROCHLORIDE 2 MG: 2 CAPSULE ORAL at 20:51

## 2020-10-02 RX ADMIN — CARVEDILOL 37.5 MG: 12.5 TABLET, FILM COATED ORAL at 18:06

## 2020-10-02 RX ADMIN — ACETAMINOPHEN 650 MG: 325 TABLET, FILM COATED ORAL at 18:06

## 2020-10-02 RX ADMIN — CARVEDILOL 37.5 MG: 12.5 TABLET, FILM COATED ORAL at 09:33

## 2020-10-02 RX ADMIN — INSULIN LISPRO 2 UNITS: 100 INJECTION, SOLUTION INTRAVENOUS; SUBCUTANEOUS at 18:07

## 2020-10-02 RX ADMIN — DILTIAZEM HYDROCHLORIDE 360 MG: 180 CAPSULE, COATED, EXTENDED RELEASE ORAL at 09:34

## 2020-10-02 RX ADMIN — VANCOMYCIN 125 MG: KIT at 06:30

## 2020-10-02 RX ADMIN — Medication 1 APPLICATION: at 21:15

## 2020-10-02 RX ADMIN — SODIUM CHLORIDE, PRESERVATIVE FREE 10 ML: 5 INJECTION INTRAVENOUS at 09:35

## 2020-10-02 RX ADMIN — MONTELUKAST SODIUM 10 MG: 10 TABLET, FILM COATED ORAL at 20:51

## 2020-10-02 RX ADMIN — HYDRALAZINE HYDROCHLORIDE 100 MG: 50 TABLET, FILM COATED ORAL at 20:52

## 2020-10-02 RX ADMIN — VANCOMYCIN 125 MG: KIT at 18:06

## 2020-10-03 LAB
GLUCOSE BLDC GLUCOMTR-MCNC: 115 MG/DL (ref 70–130)
GLUCOSE BLDC GLUCOMTR-MCNC: 180 MG/DL (ref 70–130)
GLUCOSE BLDC GLUCOMTR-MCNC: 216 MG/DL (ref 70–130)
GLUCOSE BLDC GLUCOMTR-MCNC: 227 MG/DL (ref 70–130)
POTASSIUM SERPL-SCNC: 3.6 MMOL/L (ref 3.5–5.2)

## 2020-10-03 PROCEDURE — 63710000001 INSULIN LISPRO (HUMAN) PER 5 UNITS: Performed by: NURSE PRACTITIONER

## 2020-10-03 PROCEDURE — 82962 GLUCOSE BLOOD TEST: CPT

## 2020-10-03 PROCEDURE — 63710000001 INSULIN GLARGINE PER 5 UNITS: Performed by: HOSPITALIST

## 2020-10-03 RX ADMIN — INSULIN LISPRO 2 UNITS: 100 INJECTION, SOLUTION INTRAVENOUS; SUBCUTANEOUS at 17:59

## 2020-10-03 RX ADMIN — HYDRALAZINE HYDROCHLORIDE 100 MG: 50 TABLET, FILM COATED ORAL at 17:59

## 2020-10-03 RX ADMIN — Medication 1 APPLICATION: at 09:24

## 2020-10-03 RX ADMIN — QUETIAPINE FUMARATE 50 MG: 50 TABLET, FILM COATED ORAL at 21:23

## 2020-10-03 RX ADMIN — APIXABAN 5 MG: 5 TABLET, FILM COATED ORAL at 21:22

## 2020-10-03 RX ADMIN — FINASTERIDE 5 MG: 5 TABLET, FILM COATED ORAL at 21:22

## 2020-10-03 RX ADMIN — DOXYCYCLINE 100 MG: 100 CAPSULE ORAL at 09:22

## 2020-10-03 RX ADMIN — INSULIN GLARGINE 30 UNITS: 100 INJECTION, SOLUTION SUBCUTANEOUS at 21:21

## 2020-10-03 RX ADMIN — CARVEDILOL 37.5 MG: 12.5 TABLET, FILM COATED ORAL at 17:59

## 2020-10-03 RX ADMIN — HYDRALAZINE HYDROCHLORIDE 100 MG: 50 TABLET, FILM COATED ORAL at 09:22

## 2020-10-03 RX ADMIN — INSULIN LISPRO 3 UNITS: 100 INJECTION, SOLUTION INTRAVENOUS; SUBCUTANEOUS at 11:51

## 2020-10-03 RX ADMIN — POTASSIUM CHLORIDE 40 MEQ: 10 CAPSULE, COATED, EXTENDED RELEASE ORAL at 09:22

## 2020-10-03 RX ADMIN — APIXABAN 5 MG: 5 TABLET, FILM COATED ORAL at 09:23

## 2020-10-03 RX ADMIN — DILTIAZEM HYDROCHLORIDE 360 MG: 180 CAPSULE, COATED, EXTENDED RELEASE ORAL at 11:50

## 2020-10-03 RX ADMIN — MONTELUKAST SODIUM 10 MG: 10 TABLET, FILM COATED ORAL at 21:23

## 2020-10-03 RX ADMIN — PRAVASTATIN SODIUM 40 MG: 40 TABLET ORAL at 09:21

## 2020-10-03 RX ADMIN — TERAZOSIN HYDROCHLORIDE 2 MG: 2 CAPSULE ORAL at 21:22

## 2020-10-03 RX ADMIN — FUROSEMIDE 40 MG: 40 TABLET ORAL at 09:22

## 2020-10-03 RX ADMIN — INSULIN LISPRO 5 UNITS: 100 INJECTION, SOLUTION INTRAVENOUS; SUBCUTANEOUS at 18:01

## 2020-10-03 RX ADMIN — CARVEDILOL 37.5 MG: 12.5 TABLET, FILM COATED ORAL at 09:22

## 2020-10-03 RX ADMIN — ASPIRIN 81 MG: 81 TABLET, COATED ORAL at 09:22

## 2020-10-03 RX ADMIN — DOXYCYCLINE 100 MG: 100 CAPSULE ORAL at 21:22

## 2020-10-03 RX ADMIN — SODIUM CHLORIDE, PRESERVATIVE FREE 10 ML: 5 INJECTION INTRAVENOUS at 09:23

## 2020-10-03 RX ADMIN — Medication 1 APPLICATION: at 21:24

## 2020-10-03 RX ADMIN — HYDRALAZINE HYDROCHLORIDE 100 MG: 50 TABLET, FILM COATED ORAL at 21:23

## 2020-10-03 RX ADMIN — INSULIN LISPRO 5 UNITS: 100 INJECTION, SOLUTION INTRAVENOUS; SUBCUTANEOUS at 11:51

## 2020-10-03 RX ADMIN — INSULIN LISPRO 5 UNITS: 100 INJECTION, SOLUTION INTRAVENOUS; SUBCUTANEOUS at 09:23

## 2020-10-03 RX ADMIN — MULTIPLE VITAMINS W/ MINERALS TAB 1 TABLET: TAB at 09:22

## 2020-10-03 RX ADMIN — MECLIZINE HYDROCHLORIDE 25 MG: 25 TABLET ORAL at 09:22

## 2020-10-03 RX ADMIN — LOSARTAN POTASSIUM 100 MG: 100 TABLET, FILM COATED ORAL at 09:22

## 2020-10-04 LAB
ALBUMIN SERPL-MCNC: 3.2 G/DL (ref 3.5–5.2)
ANION GAP SERPL CALCULATED.3IONS-SCNC: 6.7 MMOL/L (ref 5–15)
BUN SERPL-MCNC: 20 MG/DL (ref 8–23)
BUN/CREAT SERPL: 24.7 (ref 7–25)
CALCIUM SPEC-SCNC: 9.3 MG/DL (ref 8.6–10.5)
CHLORIDE SERPL-SCNC: 102 MMOL/L (ref 98–107)
CO2 SERPL-SCNC: 29.3 MMOL/L (ref 22–29)
CREAT SERPL-MCNC: 0.81 MG/DL (ref 0.76–1.27)
GFR SERPL CREATININE-BSD FRML MDRD: 93 ML/MIN/1.73
GLUCOSE BLDC GLUCOMTR-MCNC: 104 MG/DL (ref 70–130)
GLUCOSE BLDC GLUCOMTR-MCNC: 191 MG/DL (ref 70–130)
GLUCOSE BLDC GLUCOMTR-MCNC: 235 MG/DL (ref 70–130)
GLUCOSE BLDC GLUCOMTR-MCNC: 95 MG/DL (ref 70–130)
GLUCOSE SERPL-MCNC: 89 MG/DL (ref 65–99)
PHOSPHATE SERPL-MCNC: 3.8 MG/DL (ref 2.5–4.5)
POTASSIUM SERPL-SCNC: 3.5 MMOL/L (ref 3.5–5.2)
POTASSIUM SERPL-SCNC: 4.1 MMOL/L (ref 3.5–5.2)
SODIUM SERPL-SCNC: 138 MMOL/L (ref 136–145)

## 2020-10-04 PROCEDURE — 80069 RENAL FUNCTION PANEL: CPT | Performed by: INTERNAL MEDICINE

## 2020-10-04 PROCEDURE — 63710000001 INSULIN LISPRO (HUMAN) PER 5 UNITS: Performed by: NURSE PRACTITIONER

## 2020-10-04 PROCEDURE — 82962 GLUCOSE BLOOD TEST: CPT

## 2020-10-04 PROCEDURE — 84132 ASSAY OF SERUM POTASSIUM: CPT | Performed by: INTERNAL MEDICINE

## 2020-10-04 PROCEDURE — 63710000001 INSULIN GLARGINE PER 5 UNITS: Performed by: HOSPITALIST

## 2020-10-04 RX ORDER — LORAZEPAM 1 MG/1
1 TABLET ORAL ONCE AS NEEDED
Status: COMPLETED | OUTPATIENT
Start: 2020-10-04 | End: 2020-10-05

## 2020-10-04 RX ADMIN — QUETIAPINE FUMARATE 50 MG: 50 TABLET, FILM COATED ORAL at 21:23

## 2020-10-04 RX ADMIN — HYDRALAZINE HYDROCHLORIDE 100 MG: 50 TABLET, FILM COATED ORAL at 09:26

## 2020-10-04 RX ADMIN — PRAVASTATIN SODIUM 40 MG: 40 TABLET ORAL at 09:26

## 2020-10-04 RX ADMIN — Medication 1 APPLICATION: at 21:23

## 2020-10-04 RX ADMIN — HYDRALAZINE HYDROCHLORIDE 100 MG: 50 TABLET, FILM COATED ORAL at 21:22

## 2020-10-04 RX ADMIN — ASPIRIN 81 MG: 81 TABLET, COATED ORAL at 09:25

## 2020-10-04 RX ADMIN — APIXABAN 5 MG: 5 TABLET, FILM COATED ORAL at 21:22

## 2020-10-04 RX ADMIN — DILTIAZEM HYDROCHLORIDE 360 MG: 180 CAPSULE, COATED, EXTENDED RELEASE ORAL at 09:25

## 2020-10-04 RX ADMIN — CARVEDILOL 37.5 MG: 12.5 TABLET, FILM COATED ORAL at 17:25

## 2020-10-04 RX ADMIN — FINASTERIDE 5 MG: 5 TABLET, FILM COATED ORAL at 21:22

## 2020-10-04 RX ADMIN — INSULIN LISPRO 5 UNITS: 100 INJECTION, SOLUTION INTRAVENOUS; SUBCUTANEOUS at 12:06

## 2020-10-04 RX ADMIN — DOXYCYCLINE 100 MG: 100 CAPSULE ORAL at 09:25

## 2020-10-04 RX ADMIN — SODIUM CHLORIDE, PRESERVATIVE FREE 10 ML: 5 INJECTION INTRAVENOUS at 09:28

## 2020-10-04 RX ADMIN — MULTIPLE VITAMINS W/ MINERALS TAB 1 TABLET: TAB at 09:26

## 2020-10-04 RX ADMIN — POTASSIUM CHLORIDE 40 MEQ: 10 CAPSULE, COATED, EXTENDED RELEASE ORAL at 09:26

## 2020-10-04 RX ADMIN — HYDRALAZINE HYDROCHLORIDE 100 MG: 50 TABLET, FILM COATED ORAL at 17:25

## 2020-10-04 RX ADMIN — DOXYCYCLINE 100 MG: 100 CAPSULE ORAL at 21:22

## 2020-10-04 RX ADMIN — APIXABAN 5 MG: 5 TABLET, FILM COATED ORAL at 09:26

## 2020-10-04 RX ADMIN — CARVEDILOL 37.5 MG: 12.5 TABLET, FILM COATED ORAL at 09:26

## 2020-10-04 RX ADMIN — INSULIN LISPRO 2 UNITS: 100 INJECTION, SOLUTION INTRAVENOUS; SUBCUTANEOUS at 12:06

## 2020-10-04 RX ADMIN — POTASSIUM CHLORIDE 40 MEQ: 10 CAPSULE, COATED, EXTENDED RELEASE ORAL at 17:27

## 2020-10-04 RX ADMIN — TERAZOSIN HYDROCHLORIDE 2 MG: 2 CAPSULE ORAL at 21:22

## 2020-10-04 RX ADMIN — INSULIN LISPRO 5 UNITS: 100 INJECTION, SOLUTION INTRAVENOUS; SUBCUTANEOUS at 09:25

## 2020-10-04 RX ADMIN — FUROSEMIDE 40 MG: 40 TABLET ORAL at 09:25

## 2020-10-04 RX ADMIN — MONTELUKAST SODIUM 10 MG: 10 TABLET, FILM COATED ORAL at 21:23

## 2020-10-04 RX ADMIN — INSULIN GLARGINE 30 UNITS: 100 INJECTION, SOLUTION SUBCUTANEOUS at 21:22

## 2020-10-04 RX ADMIN — LOSARTAN POTASSIUM 100 MG: 100 TABLET, FILM COATED ORAL at 09:26

## 2020-10-04 RX ADMIN — Medication 1 APPLICATION: at 09:28

## 2020-10-05 ENCOUNTER — APPOINTMENT (OUTPATIENT)
Dept: MRI IMAGING | Facility: HOSPITAL | Age: 75
End: 2020-10-05

## 2020-10-05 LAB
GLUCOSE BLDC GLUCOMTR-MCNC: 152 MG/DL (ref 70–130)
GLUCOSE BLDC GLUCOMTR-MCNC: 241 MG/DL (ref 70–130)
GLUCOSE BLDC GLUCOMTR-MCNC: 268 MG/DL (ref 70–130)
GLUCOSE BLDC GLUCOMTR-MCNC: 287 MG/DL (ref 70–130)

## 2020-10-05 PROCEDURE — A9577 INJ MULTIHANCE: HCPCS | Performed by: INTERNAL MEDICINE

## 2020-10-05 PROCEDURE — 72158 MRI LUMBAR SPINE W/O & W/DYE: CPT

## 2020-10-05 PROCEDURE — 0 GADOBENATE DIMEGLUMINE 529 MG/ML SOLUTION: Performed by: INTERNAL MEDICINE

## 2020-10-05 PROCEDURE — 63710000001 INSULIN LISPRO (HUMAN) PER 5 UNITS: Performed by: NURSE PRACTITIONER

## 2020-10-05 PROCEDURE — 97110 THERAPEUTIC EXERCISES: CPT

## 2020-10-05 PROCEDURE — 63710000001 INSULIN GLARGINE PER 5 UNITS: Performed by: HOSPITALIST

## 2020-10-05 PROCEDURE — 97530 THERAPEUTIC ACTIVITIES: CPT

## 2020-10-05 PROCEDURE — 82962 GLUCOSE BLOOD TEST: CPT

## 2020-10-05 RX ORDER — LORAZEPAM 1 MG/1
1 TABLET ORAL ONCE AS NEEDED
Status: COMPLETED | OUTPATIENT
Start: 2020-10-05 | End: 2020-10-06

## 2020-10-05 RX ADMIN — TERAZOSIN HYDROCHLORIDE 2 MG: 2 CAPSULE ORAL at 21:07

## 2020-10-05 RX ADMIN — PRAVASTATIN SODIUM 40 MG: 40 TABLET ORAL at 08:52

## 2020-10-05 RX ADMIN — INSULIN LISPRO 4 UNITS: 100 INJECTION, SOLUTION INTRAVENOUS; SUBCUTANEOUS at 17:32

## 2020-10-05 RX ADMIN — APIXABAN 5 MG: 5 TABLET, FILM COATED ORAL at 08:50

## 2020-10-05 RX ADMIN — FINASTERIDE 5 MG: 5 TABLET, FILM COATED ORAL at 21:07

## 2020-10-05 RX ADMIN — QUETIAPINE FUMARATE 50 MG: 50 TABLET, FILM COATED ORAL at 21:07

## 2020-10-05 RX ADMIN — CARVEDILOL 37.5 MG: 12.5 TABLET, FILM COATED ORAL at 21:07

## 2020-10-05 RX ADMIN — ASPIRIN 81 MG: 81 TABLET, COATED ORAL at 08:51

## 2020-10-05 RX ADMIN — CARVEDILOL 37.5 MG: 12.5 TABLET, FILM COATED ORAL at 08:50

## 2020-10-05 RX ADMIN — INSULIN LISPRO 5 UNITS: 100 INJECTION, SOLUTION INTRAVENOUS; SUBCUTANEOUS at 17:32

## 2020-10-05 RX ADMIN — HYDRALAZINE HYDROCHLORIDE 100 MG: 50 TABLET, FILM COATED ORAL at 15:32

## 2020-10-05 RX ADMIN — SODIUM CHLORIDE, PRESERVATIVE FREE 10 ML: 5 INJECTION INTRAVENOUS at 08:50

## 2020-10-05 RX ADMIN — INSULIN GLARGINE 30 UNITS: 100 INJECTION, SOLUTION SUBCUTANEOUS at 21:24

## 2020-10-05 RX ADMIN — FUROSEMIDE 40 MG: 40 TABLET ORAL at 08:50

## 2020-10-05 RX ADMIN — HYDROCODONE BITARTRATE AND ACETAMINOPHEN 1 TABLET: 5; 325 TABLET ORAL at 15:32

## 2020-10-05 RX ADMIN — MULTIPLE VITAMINS W/ MINERALS TAB 1 TABLET: TAB at 08:50

## 2020-10-05 RX ADMIN — DOXYCYCLINE 100 MG: 100 CAPSULE ORAL at 21:07

## 2020-10-05 RX ADMIN — Medication 1 APPLICATION: at 21:08

## 2020-10-05 RX ADMIN — APIXABAN 5 MG: 5 TABLET, FILM COATED ORAL at 22:16

## 2020-10-05 RX ADMIN — LORAZEPAM 1 MG: 1 TABLET ORAL at 12:07

## 2020-10-05 RX ADMIN — Medication 1 APPLICATION: at 08:51

## 2020-10-05 RX ADMIN — MONTELUKAST SODIUM 10 MG: 10 TABLET, FILM COATED ORAL at 21:07

## 2020-10-05 RX ADMIN — DOXYCYCLINE 100 MG: 100 CAPSULE ORAL at 08:50

## 2020-10-05 RX ADMIN — LOSARTAN POTASSIUM 100 MG: 100 TABLET, FILM COATED ORAL at 08:50

## 2020-10-05 RX ADMIN — INSULIN LISPRO 4 UNITS: 100 INJECTION, SOLUTION INTRAVENOUS; SUBCUTANEOUS at 11:47

## 2020-10-05 RX ADMIN — INSULIN LISPRO 5 UNITS: 100 INJECTION, SOLUTION INTRAVENOUS; SUBCUTANEOUS at 11:46

## 2020-10-05 RX ADMIN — POTASSIUM CHLORIDE 40 MEQ: 10 CAPSULE, COATED, EXTENDED RELEASE ORAL at 08:55

## 2020-10-05 RX ADMIN — DILTIAZEM HYDROCHLORIDE 360 MG: 180 CAPSULE, COATED, EXTENDED RELEASE ORAL at 08:50

## 2020-10-05 RX ADMIN — HYDRALAZINE HYDROCHLORIDE 100 MG: 50 TABLET, FILM COATED ORAL at 21:07

## 2020-10-05 RX ADMIN — HYDRALAZINE HYDROCHLORIDE 100 MG: 50 TABLET, FILM COATED ORAL at 08:51

## 2020-10-05 RX ADMIN — GADOBENATE DIMEGLUMINE 20 ML: 529 INJECTION, SOLUTION INTRAVENOUS at 13:45

## 2020-10-06 ENCOUNTER — APPOINTMENT (OUTPATIENT)
Dept: MRI IMAGING | Facility: HOSPITAL | Age: 75
End: 2020-10-06

## 2020-10-06 LAB
ALBUMIN SERPL-MCNC: 3.3 G/DL (ref 3.5–5.2)
ANION GAP SERPL CALCULATED.3IONS-SCNC: 8 MMOL/L (ref 5–15)
BUN SERPL-MCNC: 20 MG/DL (ref 8–23)
BUN/CREAT SERPL: 24.4 (ref 7–25)
CALCIUM SPEC-SCNC: 9.5 MG/DL (ref 8.6–10.5)
CHLORIDE SERPL-SCNC: 105 MMOL/L (ref 98–107)
CO2 SERPL-SCNC: 28 MMOL/L (ref 22–29)
CREAT SERPL-MCNC: 0.82 MG/DL (ref 0.76–1.27)
DEPRECATED RDW RBC AUTO: 54.4 FL (ref 37–54)
ERYTHROCYTE [DISTWIDTH] IN BLOOD BY AUTOMATED COUNT: 17.2 % (ref 12.3–15.4)
GFR SERPL CREATININE-BSD FRML MDRD: 92 ML/MIN/1.73
GLUCOSE BLDC GLUCOMTR-MCNC: 111 MG/DL (ref 70–130)
GLUCOSE BLDC GLUCOMTR-MCNC: 204 MG/DL (ref 70–130)
GLUCOSE BLDC GLUCOMTR-MCNC: 233 MG/DL (ref 70–130)
GLUCOSE BLDC GLUCOMTR-MCNC: 235 MG/DL (ref 70–130)
GLUCOSE SERPL-MCNC: 121 MG/DL (ref 65–99)
HCT VFR BLD AUTO: 38.1 % (ref 37.5–51)
HGB BLD-MCNC: 12.7 G/DL (ref 13–17.7)
MAGNESIUM SERPL-MCNC: 2 MG/DL (ref 1.6–2.4)
MCH RBC QN AUTO: 29.4 PG (ref 26.6–33)
MCHC RBC AUTO-ENTMCNC: 33.3 G/DL (ref 31.5–35.7)
MCV RBC AUTO: 88.2 FL (ref 79–97)
PHOSPHATE SERPL-MCNC: 3.2 MG/DL (ref 2.5–4.5)
PLATELET # BLD AUTO: 428 10*3/MM3 (ref 140–450)
PMV BLD AUTO: 10.1 FL (ref 6–12)
POTASSIUM SERPL-SCNC: 3.4 MMOL/L (ref 3.5–5.2)
RBC # BLD AUTO: 4.32 10*6/MM3 (ref 4.14–5.8)
SODIUM SERPL-SCNC: 141 MMOL/L (ref 136–145)
WBC # BLD AUTO: 8.9 10*3/MM3 (ref 3.4–10.8)

## 2020-10-06 PROCEDURE — 72197 MRI PELVIS W/O & W/DYE: CPT

## 2020-10-06 PROCEDURE — 83735 ASSAY OF MAGNESIUM: CPT | Performed by: INTERNAL MEDICINE

## 2020-10-06 PROCEDURE — 72157 MRI CHEST SPINE W/O & W/DYE: CPT

## 2020-10-06 PROCEDURE — 99231 SBSQ HOSP IP/OBS SF/LOW 25: CPT | Performed by: NURSE PRACTITIONER

## 2020-10-06 PROCEDURE — 63710000001 INSULIN GLARGINE PER 5 UNITS: Performed by: HOSPITALIST

## 2020-10-06 PROCEDURE — 85027 COMPLETE CBC AUTOMATED: CPT | Performed by: INTERNAL MEDICINE

## 2020-10-06 PROCEDURE — 99232 SBSQ HOSP IP/OBS MODERATE 35: CPT | Performed by: INTERNAL MEDICINE

## 2020-10-06 PROCEDURE — 82962 GLUCOSE BLOOD TEST: CPT

## 2020-10-06 PROCEDURE — A9577 INJ MULTIHANCE: HCPCS | Performed by: INTERNAL MEDICINE

## 2020-10-06 PROCEDURE — 80069 RENAL FUNCTION PANEL: CPT | Performed by: INTERNAL MEDICINE

## 2020-10-06 PROCEDURE — 0 GADOBENATE DIMEGLUMINE 529 MG/ML SOLUTION: Performed by: INTERNAL MEDICINE

## 2020-10-06 PROCEDURE — 63710000001 INSULIN LISPRO (HUMAN) PER 5 UNITS: Performed by: NURSE PRACTITIONER

## 2020-10-06 RX ADMIN — LOSARTAN POTASSIUM 100 MG: 100 TABLET, FILM COATED ORAL at 08:50

## 2020-10-06 RX ADMIN — HYDROCODONE BITARTRATE AND ACETAMINOPHEN 1 TABLET: 5; 325 TABLET ORAL at 08:50

## 2020-10-06 RX ADMIN — HYDRALAZINE HYDROCHLORIDE 100 MG: 50 TABLET, FILM COATED ORAL at 21:13

## 2020-10-06 RX ADMIN — INSULIN LISPRO 5 UNITS: 100 INJECTION, SOLUTION INTRAVENOUS; SUBCUTANEOUS at 17:29

## 2020-10-06 RX ADMIN — ACETAMINOPHEN 650 MG: 325 TABLET, FILM COATED ORAL at 10:17

## 2020-10-06 RX ADMIN — FUROSEMIDE 40 MG: 40 TABLET ORAL at 08:50

## 2020-10-06 RX ADMIN — PRAVASTATIN SODIUM 40 MG: 40 TABLET ORAL at 08:50

## 2020-10-06 RX ADMIN — LORAZEPAM 1 MG: 1 TABLET ORAL at 13:52

## 2020-10-06 RX ADMIN — FINASTERIDE 5 MG: 5 TABLET, FILM COATED ORAL at 21:13

## 2020-10-06 RX ADMIN — CARVEDILOL 37.5 MG: 12.5 TABLET, FILM COATED ORAL at 08:49

## 2020-10-06 RX ADMIN — INSULIN LISPRO 5 UNITS: 100 INJECTION, SOLUTION INTRAVENOUS; SUBCUTANEOUS at 08:53

## 2020-10-06 RX ADMIN — Medication 1 APPLICATION: at 08:51

## 2020-10-06 RX ADMIN — DOXYCYCLINE 100 MG: 100 CAPSULE ORAL at 21:13

## 2020-10-06 RX ADMIN — Medication 1 APPLICATION: at 21:14

## 2020-10-06 RX ADMIN — ASPIRIN 81 MG: 81 TABLET, COATED ORAL at 08:49

## 2020-10-06 RX ADMIN — HYDRALAZINE HYDROCHLORIDE 100 MG: 50 TABLET, FILM COATED ORAL at 17:30

## 2020-10-06 RX ADMIN — MONTELUKAST SODIUM 10 MG: 10 TABLET, FILM COATED ORAL at 21:13

## 2020-10-06 RX ADMIN — DOXYCYCLINE 100 MG: 100 CAPSULE ORAL at 08:50

## 2020-10-06 RX ADMIN — POTASSIUM CHLORIDE 40 MEQ: 10 CAPSULE, COATED, EXTENDED RELEASE ORAL at 08:50

## 2020-10-06 RX ADMIN — DILTIAZEM HYDROCHLORIDE 360 MG: 180 CAPSULE, COATED, EXTENDED RELEASE ORAL at 08:49

## 2020-10-06 RX ADMIN — APIXABAN 5 MG: 5 TABLET, FILM COATED ORAL at 21:13

## 2020-10-06 RX ADMIN — INSULIN LISPRO 3 UNITS: 100 INJECTION, SOLUTION INTRAVENOUS; SUBCUTANEOUS at 17:30

## 2020-10-06 RX ADMIN — HYDRALAZINE HYDROCHLORIDE 100 MG: 50 TABLET, FILM COATED ORAL at 08:54

## 2020-10-06 RX ADMIN — GADOBENATE DIMEGLUMINE 20 ML: 529 INJECTION, SOLUTION INTRAVENOUS at 16:00

## 2020-10-06 RX ADMIN — MULTIPLE VITAMINS W/ MINERALS TAB 1 TABLET: TAB at 08:49

## 2020-10-06 RX ADMIN — INSULIN GLARGINE 30 UNITS: 100 INJECTION, SOLUTION SUBCUTANEOUS at 22:52

## 2020-10-06 RX ADMIN — INSULIN LISPRO 3 UNITS: 100 INJECTION, SOLUTION INTRAVENOUS; SUBCUTANEOUS at 11:46

## 2020-10-06 RX ADMIN — CARVEDILOL 37.5 MG: 12.5 TABLET, FILM COATED ORAL at 17:30

## 2020-10-06 RX ADMIN — POTASSIUM CHLORIDE 40 MEQ: 10 CAPSULE, COATED, EXTENDED RELEASE ORAL at 21:13

## 2020-10-06 RX ADMIN — TERAZOSIN HYDROCHLORIDE 2 MG: 2 CAPSULE ORAL at 21:13

## 2020-10-06 RX ADMIN — QUETIAPINE FUMARATE 50 MG: 50 TABLET, FILM COATED ORAL at 21:13

## 2020-10-06 RX ADMIN — INSULIN LISPRO 5 UNITS: 100 INJECTION, SOLUTION INTRAVENOUS; SUBCUTANEOUS at 12:05

## 2020-10-06 RX ADMIN — APIXABAN 5 MG: 5 TABLET, FILM COATED ORAL at 08:50

## 2020-10-07 LAB
ANION GAP SERPL CALCULATED.3IONS-SCNC: 6.2 MMOL/L (ref 5–15)
BUN SERPL-MCNC: 16 MG/DL (ref 8–23)
BUN/CREAT SERPL: 22.2 (ref 7–25)
CALCIUM SPEC-SCNC: 9.5 MG/DL (ref 8.6–10.5)
CHLORIDE SERPL-SCNC: 104 MMOL/L (ref 98–107)
CO2 SERPL-SCNC: 26.8 MMOL/L (ref 22–29)
CREAT SERPL-MCNC: 0.72 MG/DL (ref 0.76–1.27)
DEPRECATED RDW RBC AUTO: 56.1 FL (ref 37–54)
ERYTHROCYTE [DISTWIDTH] IN BLOOD BY AUTOMATED COUNT: 17.4 % (ref 12.3–15.4)
GFR SERPL CREATININE-BSD FRML MDRD: 107 ML/MIN/1.73
GLUCOSE BLDC GLUCOMTR-MCNC: 126 MG/DL (ref 70–130)
GLUCOSE BLDC GLUCOMTR-MCNC: 160 MG/DL (ref 70–130)
GLUCOSE BLDC GLUCOMTR-MCNC: 191 MG/DL (ref 70–130)
GLUCOSE BLDC GLUCOMTR-MCNC: 223 MG/DL (ref 70–130)
GLUCOSE SERPL-MCNC: 101 MG/DL (ref 65–99)
HCT VFR BLD AUTO: 39.9 % (ref 37.5–51)
HGB BLD-MCNC: 13 G/DL (ref 13–17.7)
MCH RBC QN AUTO: 29 PG (ref 26.6–33)
MCHC RBC AUTO-ENTMCNC: 32.6 G/DL (ref 31.5–35.7)
MCV RBC AUTO: 88.9 FL (ref 79–97)
PLATELET # BLD AUTO: 486 10*3/MM3 (ref 140–450)
PMV BLD AUTO: 9.9 FL (ref 6–12)
POTASSIUM SERPL-SCNC: 3.5 MMOL/L (ref 3.5–5.2)
RBC # BLD AUTO: 4.49 10*6/MM3 (ref 4.14–5.8)
SODIUM SERPL-SCNC: 137 MMOL/L (ref 136–145)
WBC # BLD AUTO: 7.56 10*3/MM3 (ref 3.4–10.8)

## 2020-10-07 PROCEDURE — 97112 NEUROMUSCULAR REEDUCATION: CPT | Performed by: OCCUPATIONAL THERAPIST

## 2020-10-07 PROCEDURE — 99222 1ST HOSP IP/OBS MODERATE 55: CPT | Performed by: ORTHOPAEDIC SURGERY

## 2020-10-07 PROCEDURE — 99232 SBSQ HOSP IP/OBS MODERATE 35: CPT | Performed by: INTERNAL MEDICINE

## 2020-10-07 PROCEDURE — 63710000001 INSULIN LISPRO (HUMAN) PER 5 UNITS: Performed by: NURSE PRACTITIONER

## 2020-10-07 PROCEDURE — 99232 SBSQ HOSP IP/OBS MODERATE 35: CPT | Performed by: NURSE PRACTITIONER

## 2020-10-07 PROCEDURE — 97530 THERAPEUTIC ACTIVITIES: CPT

## 2020-10-07 PROCEDURE — 97110 THERAPEUTIC EXERCISES: CPT | Performed by: OCCUPATIONAL THERAPIST

## 2020-10-07 PROCEDURE — 63710000001 INSULIN GLARGINE PER 5 UNITS: Performed by: HOSPITALIST

## 2020-10-07 PROCEDURE — 85027 COMPLETE CBC AUTOMATED: CPT | Performed by: INTERNAL MEDICINE

## 2020-10-07 PROCEDURE — 82962 GLUCOSE BLOOD TEST: CPT

## 2020-10-07 PROCEDURE — 80048 BASIC METABOLIC PNL TOTAL CA: CPT | Performed by: INTERNAL MEDICINE

## 2020-10-07 RX ADMIN — DOXYCYCLINE 100 MG: 100 CAPSULE ORAL at 22:22

## 2020-10-07 RX ADMIN — SODIUM CHLORIDE, PRESERVATIVE FREE 10 ML: 5 INJECTION INTRAVENOUS at 08:37

## 2020-10-07 RX ADMIN — HYDRALAZINE HYDROCHLORIDE 100 MG: 50 TABLET, FILM COATED ORAL at 17:57

## 2020-10-07 RX ADMIN — MONTELUKAST SODIUM 10 MG: 10 TABLET, FILM COATED ORAL at 22:22

## 2020-10-07 RX ADMIN — HYDRALAZINE HYDROCHLORIDE 100 MG: 50 TABLET, FILM COATED ORAL at 08:36

## 2020-10-07 RX ADMIN — INSULIN LISPRO 5 UNITS: 100 INJECTION, SOLUTION INTRAVENOUS; SUBCUTANEOUS at 17:58

## 2020-10-07 RX ADMIN — APIXABAN 5 MG: 5 TABLET, FILM COATED ORAL at 22:22

## 2020-10-07 RX ADMIN — CARVEDILOL 37.5 MG: 12.5 TABLET, FILM COATED ORAL at 17:57

## 2020-10-07 RX ADMIN — POTASSIUM CHLORIDE 40 MEQ: 10 CAPSULE, COATED, EXTENDED RELEASE ORAL at 08:36

## 2020-10-07 RX ADMIN — FUROSEMIDE 40 MG: 40 TABLET ORAL at 08:36

## 2020-10-07 RX ADMIN — DOXYCYCLINE 100 MG: 100 CAPSULE ORAL at 08:36

## 2020-10-07 RX ADMIN — HYDRALAZINE HYDROCHLORIDE 100 MG: 50 TABLET, FILM COATED ORAL at 22:22

## 2020-10-07 RX ADMIN — MULTIPLE VITAMINS W/ MINERALS TAB 1 TABLET: TAB at 08:37

## 2020-10-07 RX ADMIN — INSULIN GLARGINE 30 UNITS: 100 INJECTION, SOLUTION SUBCUTANEOUS at 22:22

## 2020-10-07 RX ADMIN — FINASTERIDE 5 MG: 5 TABLET, FILM COATED ORAL at 22:22

## 2020-10-07 RX ADMIN — CARVEDILOL 37.5 MG: 12.5 TABLET, FILM COATED ORAL at 08:36

## 2020-10-07 RX ADMIN — Medication 1 APPLICATION: at 22:23

## 2020-10-07 RX ADMIN — QUETIAPINE FUMARATE 50 MG: 50 TABLET, FILM COATED ORAL at 22:22

## 2020-10-07 RX ADMIN — INSULIN LISPRO 5 UNITS: 100 INJECTION, SOLUTION INTRAVENOUS; SUBCUTANEOUS at 08:35

## 2020-10-07 RX ADMIN — Medication 1 APPLICATION: at 08:37

## 2020-10-07 RX ADMIN — INSULIN LISPRO 2 UNITS: 100 INJECTION, SOLUTION INTRAVENOUS; SUBCUTANEOUS at 17:58

## 2020-10-07 RX ADMIN — PRAVASTATIN SODIUM 40 MG: 40 TABLET ORAL at 08:36

## 2020-10-07 RX ADMIN — APIXABAN 5 MG: 5 TABLET, FILM COATED ORAL at 08:37

## 2020-10-07 RX ADMIN — LOSARTAN POTASSIUM 100 MG: 100 TABLET, FILM COATED ORAL at 08:36

## 2020-10-07 RX ADMIN — ASPIRIN 81 MG: 81 TABLET, COATED ORAL at 08:37

## 2020-10-07 RX ADMIN — DILTIAZEM HYDROCHLORIDE 360 MG: 180 CAPSULE, COATED, EXTENDED RELEASE ORAL at 08:37

## 2020-10-07 RX ADMIN — TERAZOSIN HYDROCHLORIDE 2 MG: 2 CAPSULE ORAL at 22:22

## 2020-10-08 LAB
ANION GAP SERPL CALCULATED.3IONS-SCNC: 9.3 MMOL/L (ref 5–15)
BUN SERPL-MCNC: 19 MG/DL (ref 8–23)
BUN/CREAT SERPL: 22.4 (ref 7–25)
CALCIUM SPEC-SCNC: 9.3 MG/DL (ref 8.6–10.5)
CHLORIDE SERPL-SCNC: 105 MMOL/L (ref 98–107)
CO2 SERPL-SCNC: 27.7 MMOL/L (ref 22–29)
CREAT SERPL-MCNC: 0.85 MG/DL (ref 0.76–1.27)
DEPRECATED RDW RBC AUTO: 56.8 FL (ref 37–54)
ERYTHROCYTE [DISTWIDTH] IN BLOOD BY AUTOMATED COUNT: 17.3 % (ref 12.3–15.4)
GFR SERPL CREATININE-BSD FRML MDRD: 88 ML/MIN/1.73
GLUCOSE BLDC GLUCOMTR-MCNC: 113 MG/DL (ref 70–130)
GLUCOSE BLDC GLUCOMTR-MCNC: 126 MG/DL (ref 70–130)
GLUCOSE BLDC GLUCOMTR-MCNC: 167 MG/DL (ref 70–130)
GLUCOSE BLDC GLUCOMTR-MCNC: 252 MG/DL (ref 70–130)
GLUCOSE SERPL-MCNC: 88 MG/DL (ref 65–99)
HCT VFR BLD AUTO: 39.5 % (ref 37.5–51)
HGB BLD-MCNC: 12.8 G/DL (ref 13–17.7)
MCH RBC QN AUTO: 29.4 PG (ref 26.6–33)
MCHC RBC AUTO-ENTMCNC: 32.4 G/DL (ref 31.5–35.7)
MCV RBC AUTO: 90.8 FL (ref 79–97)
PLATELET # BLD AUTO: 396 10*3/MM3 (ref 140–450)
PMV BLD AUTO: 10 FL (ref 6–12)
POTASSIUM SERPL-SCNC: 3.2 MMOL/L (ref 3.5–5.2)
RBC # BLD AUTO: 4.35 10*6/MM3 (ref 4.14–5.8)
SODIUM SERPL-SCNC: 142 MMOL/L (ref 136–145)
WBC # BLD AUTO: 6.82 10*3/MM3 (ref 3.4–10.8)

## 2020-10-08 PROCEDURE — 85027 COMPLETE CBC AUTOMATED: CPT | Performed by: INTERNAL MEDICINE

## 2020-10-08 PROCEDURE — 80048 BASIC METABOLIC PNL TOTAL CA: CPT | Performed by: INTERNAL MEDICINE

## 2020-10-08 PROCEDURE — 63710000001 INSULIN LISPRO (HUMAN) PER 5 UNITS: Performed by: NURSE PRACTITIONER

## 2020-10-08 PROCEDURE — 63710000001 INSULIN GLARGINE PER 5 UNITS: Performed by: HOSPITALIST

## 2020-10-08 PROCEDURE — 97530 THERAPEUTIC ACTIVITIES: CPT

## 2020-10-08 PROCEDURE — 82962 GLUCOSE BLOOD TEST: CPT

## 2020-10-08 RX ADMIN — INSULIN GLARGINE 30 UNITS: 100 INJECTION, SOLUTION SUBCUTANEOUS at 20:37

## 2020-10-08 RX ADMIN — SODIUM CHLORIDE, PRESERVATIVE FREE 10 ML: 5 INJECTION INTRAVENOUS at 08:54

## 2020-10-08 RX ADMIN — CARVEDILOL 37.5 MG: 12.5 TABLET, FILM COATED ORAL at 17:10

## 2020-10-08 RX ADMIN — HYDRALAZINE HYDROCHLORIDE 100 MG: 50 TABLET, FILM COATED ORAL at 17:10

## 2020-10-08 RX ADMIN — APIXABAN 5 MG: 5 TABLET, FILM COATED ORAL at 08:54

## 2020-10-08 RX ADMIN — Medication 1 APPLICATION: at 08:59

## 2020-10-08 RX ADMIN — POTASSIUM CHLORIDE 40 MEQ: 10 CAPSULE, COATED, EXTENDED RELEASE ORAL at 08:53

## 2020-10-08 RX ADMIN — HYDRALAZINE HYDROCHLORIDE 100 MG: 50 TABLET, FILM COATED ORAL at 20:37

## 2020-10-08 RX ADMIN — CARVEDILOL 37.5 MG: 12.5 TABLET, FILM COATED ORAL at 08:54

## 2020-10-08 RX ADMIN — LOSARTAN POTASSIUM 100 MG: 100 TABLET, FILM COATED ORAL at 08:53

## 2020-10-08 RX ADMIN — Medication 1 APPLICATION: at 20:37

## 2020-10-08 RX ADMIN — INSULIN LISPRO 5 UNITS: 100 INJECTION, SOLUTION INTRAVENOUS; SUBCUTANEOUS at 17:10

## 2020-10-08 RX ADMIN — MULTIPLE VITAMINS W/ MINERALS TAB 1 TABLET: TAB at 08:53

## 2020-10-08 RX ADMIN — ASPIRIN 81 MG: 81 TABLET, COATED ORAL at 08:53

## 2020-10-08 RX ADMIN — FUROSEMIDE 40 MG: 40 TABLET ORAL at 08:53

## 2020-10-08 RX ADMIN — FINASTERIDE 5 MG: 5 TABLET, FILM COATED ORAL at 20:37

## 2020-10-08 RX ADMIN — INSULIN LISPRO 2 UNITS: 100 INJECTION, SOLUTION INTRAVENOUS; SUBCUTANEOUS at 17:11

## 2020-10-08 RX ADMIN — PRAVASTATIN SODIUM 40 MG: 40 TABLET ORAL at 08:53

## 2020-10-08 RX ADMIN — TERAZOSIN HYDROCHLORIDE 2 MG: 2 CAPSULE ORAL at 20:37

## 2020-10-08 RX ADMIN — INSULIN LISPRO 5 UNITS: 100 INJECTION, SOLUTION INTRAVENOUS; SUBCUTANEOUS at 08:52

## 2020-10-08 RX ADMIN — DOXYCYCLINE 100 MG: 100 CAPSULE ORAL at 20:37

## 2020-10-08 RX ADMIN — INSULIN LISPRO 5 UNITS: 100 INJECTION, SOLUTION INTRAVENOUS; SUBCUTANEOUS at 12:50

## 2020-10-08 RX ADMIN — MONTELUKAST SODIUM 10 MG: 10 TABLET, FILM COATED ORAL at 20:37

## 2020-10-08 RX ADMIN — QUETIAPINE FUMARATE 50 MG: 50 TABLET, FILM COATED ORAL at 20:37

## 2020-10-08 RX ADMIN — HYDRALAZINE HYDROCHLORIDE 100 MG: 50 TABLET, FILM COATED ORAL at 08:53

## 2020-10-08 RX ADMIN — DOXYCYCLINE 100 MG: 100 CAPSULE ORAL at 08:53

## 2020-10-08 RX ADMIN — POTASSIUM CHLORIDE 40 MEQ: 10 CAPSULE, COATED, EXTENDED RELEASE ORAL at 12:51

## 2020-10-08 RX ADMIN — HYDROCODONE BITARTRATE AND ACETAMINOPHEN 1 TABLET: 5; 325 TABLET ORAL at 10:11

## 2020-10-08 RX ADMIN — DILTIAZEM HYDROCHLORIDE 360 MG: 180 CAPSULE, COATED, EXTENDED RELEASE ORAL at 08:54

## 2020-10-08 RX ADMIN — APIXABAN 5 MG: 5 TABLET, FILM COATED ORAL at 20:37

## 2020-10-08 RX ADMIN — SODIUM CHLORIDE, PRESERVATIVE FREE 10 ML: 5 INJECTION INTRAVENOUS at 20:37

## 2020-10-09 PROBLEM — M46.46 DISCITIS OF LUMBAR REGION: Status: ACTIVE | Noted: 2020-10-09

## 2020-10-09 PROBLEM — M46.1 SACROILIITIS: Status: ACTIVE | Noted: 2020-10-09

## 2020-10-09 PROBLEM — N10 ACUTE PYELONEPHRITIS: Status: ACTIVE | Noted: 2020-10-09

## 2020-10-09 LAB
B PARAPERT DNA SPEC QL NAA+PROBE: NOT DETECTED
B PERT DNA SPEC QL NAA+PROBE: NOT DETECTED
C PNEUM DNA NPH QL NAA+NON-PROBE: NOT DETECTED
FLUAV H1 2009 PAND RNA NPH QL NAA+PROBE: NOT DETECTED
FLUAV H1 HA GENE NPH QL NAA+PROBE: NOT DETECTED
FLUAV H3 RNA NPH QL NAA+PROBE: NOT DETECTED
FLUAV SUBTYP SPEC NAA+PROBE: NOT DETECTED
FLUBV RNA ISLT QL NAA+PROBE: NOT DETECTED
GLUCOSE BLDC GLUCOMTR-MCNC: 140 MG/DL (ref 70–130)
GLUCOSE BLDC GLUCOMTR-MCNC: 215 MG/DL (ref 70–130)
GLUCOSE BLDC GLUCOMTR-MCNC: 229 MG/DL (ref 70–130)
GLUCOSE BLDC GLUCOMTR-MCNC: 249 MG/DL (ref 70–130)
HADV DNA SPEC NAA+PROBE: NOT DETECTED
HCOV 229E RNA SPEC QL NAA+PROBE: NOT DETECTED
HCOV HKU1 RNA SPEC QL NAA+PROBE: NOT DETECTED
HCOV NL63 RNA SPEC QL NAA+PROBE: NOT DETECTED
HCOV OC43 RNA SPEC QL NAA+PROBE: NOT DETECTED
HMPV RNA NPH QL NAA+NON-PROBE: NOT DETECTED
HPIV1 RNA SPEC QL NAA+PROBE: NOT DETECTED
HPIV2 RNA SPEC QL NAA+PROBE: NOT DETECTED
HPIV3 RNA NPH QL NAA+PROBE: NOT DETECTED
HPIV4 P GENE NPH QL NAA+PROBE: NOT DETECTED
M PNEUMO IGG SER IA-ACNC: NOT DETECTED
POTASSIUM SERPL-SCNC: 3.7 MMOL/L (ref 3.5–5.2)
RHINOVIRUS RNA SPEC NAA+PROBE: NOT DETECTED
RSV RNA NPH QL NAA+NON-PROBE: NOT DETECTED
SARS-COV-2 RNA NPH QL NAA+NON-PROBE: NOT DETECTED

## 2020-10-09 PROCEDURE — 97530 THERAPEUTIC ACTIVITIES: CPT

## 2020-10-09 PROCEDURE — 63710000001 INSULIN GLARGINE PER 5 UNITS: Performed by: HOSPITALIST

## 2020-10-09 PROCEDURE — 84132 ASSAY OF SERUM POTASSIUM: CPT | Performed by: INTERNAL MEDICINE

## 2020-10-09 PROCEDURE — C9803 HOPD COVID-19 SPEC COLLECT: HCPCS | Performed by: INTERNAL MEDICINE

## 2020-10-09 PROCEDURE — 63710000001 INSULIN LISPRO (HUMAN) PER 5 UNITS: Performed by: NURSE PRACTITIONER

## 2020-10-09 PROCEDURE — 0202U NFCT DS 22 TRGT SARS-COV-2: CPT | Performed by: INTERNAL MEDICINE

## 2020-10-09 PROCEDURE — 82962 GLUCOSE BLOOD TEST: CPT

## 2020-10-09 RX ORDER — TERAZOSIN 2 MG/1
2 CAPSULE ORAL NIGHTLY
Start: 2020-10-09 | End: 2020-11-13 | Stop reason: SDUPTHER

## 2020-10-09 RX ORDER — HYDROCODONE BITARTRATE AND ACETAMINOPHEN 5; 325 MG/1; MG/1
1 TABLET ORAL EVERY 4 HOURS PRN
Qty: 18 TABLET | Refills: 0 | Status: SHIPPED | OUTPATIENT
Start: 2020-10-09 | End: 2021-01-07 | Stop reason: ALTCHOICE

## 2020-10-09 RX ORDER — DILTIAZEM HYDROCHLORIDE 360 MG/1
360 CAPSULE, EXTENDED RELEASE ORAL DAILY
Start: 2020-10-10 | End: 2020-11-13 | Stop reason: SDUPTHER

## 2020-10-09 RX ORDER — POTASSIUM CHLORIDE 750 MG/1
40 CAPSULE, EXTENDED RELEASE ORAL DAILY
Start: 2020-10-10 | End: 2020-11-04

## 2020-10-09 RX ORDER — QUETIAPINE FUMARATE 50 MG/1
50 TABLET, FILM COATED ORAL DAILY
Start: 2020-10-09 | End: 2020-11-13

## 2020-10-09 RX ORDER — DOXYCYCLINE 100 MG/1
100 CAPSULE ORAL EVERY 12 HOURS SCHEDULED
Qty: 28 CAPSULE | Refills: 0
Start: 2020-10-09 | End: 2020-10-23

## 2020-10-09 RX ORDER — CARVEDILOL 12.5 MG/1
37.5 TABLET ORAL 2 TIMES DAILY WITH MEALS
Start: 2020-10-09 | End: 2020-11-13 | Stop reason: SDUPTHER

## 2020-10-09 RX ORDER — LOSARTAN POTASSIUM 100 MG/1
100 TABLET ORAL
Start: 2020-10-10 | End: 2020-11-13 | Stop reason: SDUPTHER

## 2020-10-09 RX ORDER — ACETAMINOPHEN 325 MG/1
650 TABLET ORAL EVERY 4 HOURS PRN
Start: 2020-10-09 | End: 2021-01-07 | Stop reason: ALTCHOICE

## 2020-10-09 RX ORDER — INSULIN GLARGINE 100 [IU]/ML
30 INJECTION, SOLUTION SUBCUTANEOUS NIGHTLY
Refills: 12
Start: 2020-10-09 | End: 2020-11-04

## 2020-10-09 RX ORDER — ASPIRIN 81 MG/1
81 TABLET ORAL DAILY
Start: 2020-10-10 | End: 2020-11-13 | Stop reason: SDUPTHER

## 2020-10-09 RX ADMIN — POTASSIUM CHLORIDE 40 MEQ: 10 CAPSULE, COATED, EXTENDED RELEASE ORAL at 01:45

## 2020-10-09 RX ADMIN — FINASTERIDE 5 MG: 5 TABLET, FILM COATED ORAL at 20:28

## 2020-10-09 RX ADMIN — TERAZOSIN HYDROCHLORIDE 2 MG: 2 CAPSULE ORAL at 20:27

## 2020-10-09 RX ADMIN — Medication 1 APPLICATION: at 09:30

## 2020-10-09 RX ADMIN — POTASSIUM CHLORIDE 40 MEQ: 10 CAPSULE, COATED, EXTENDED RELEASE ORAL at 09:27

## 2020-10-09 RX ADMIN — CARVEDILOL 37.5 MG: 12.5 TABLET, FILM COATED ORAL at 09:27

## 2020-10-09 RX ADMIN — HYDRALAZINE HYDROCHLORIDE 100 MG: 50 TABLET, FILM COATED ORAL at 17:09

## 2020-10-09 RX ADMIN — LOSARTAN POTASSIUM 100 MG: 100 TABLET, FILM COATED ORAL at 09:28

## 2020-10-09 RX ADMIN — CARVEDILOL 37.5 MG: 12.5 TABLET, FILM COATED ORAL at 17:09

## 2020-10-09 RX ADMIN — PRAVASTATIN SODIUM 40 MG: 40 TABLET ORAL at 09:27

## 2020-10-09 RX ADMIN — INSULIN LISPRO 5 UNITS: 100 INJECTION, SOLUTION INTRAVENOUS; SUBCUTANEOUS at 12:28

## 2020-10-09 RX ADMIN — INSULIN LISPRO 5 UNITS: 100 INJECTION, SOLUTION INTRAVENOUS; SUBCUTANEOUS at 17:09

## 2020-10-09 RX ADMIN — DOXYCYCLINE 100 MG: 100 CAPSULE ORAL at 20:27

## 2020-10-09 RX ADMIN — DOXYCYCLINE 100 MG: 100 CAPSULE ORAL at 09:33

## 2020-10-09 RX ADMIN — QUETIAPINE FUMARATE 50 MG: 50 TABLET, FILM COATED ORAL at 20:27

## 2020-10-09 RX ADMIN — APIXABAN 5 MG: 5 TABLET, FILM COATED ORAL at 20:27

## 2020-10-09 RX ADMIN — HYDRALAZINE HYDROCHLORIDE 100 MG: 50 TABLET, FILM COATED ORAL at 20:27

## 2020-10-09 RX ADMIN — MULTIPLE VITAMINS W/ MINERALS TAB 1 TABLET: TAB at 09:28

## 2020-10-09 RX ADMIN — Medication 1 APPLICATION: at 22:34

## 2020-10-09 RX ADMIN — DILTIAZEM HYDROCHLORIDE 360 MG: 180 CAPSULE, COATED, EXTENDED RELEASE ORAL at 09:27

## 2020-10-09 RX ADMIN — SODIUM CHLORIDE, PRESERVATIVE FREE 10 ML: 5 INJECTION INTRAVENOUS at 19:41

## 2020-10-09 RX ADMIN — INSULIN LISPRO 5 UNITS: 100 INJECTION, SOLUTION INTRAVENOUS; SUBCUTANEOUS at 09:34

## 2020-10-09 RX ADMIN — FUROSEMIDE 40 MG: 40 TABLET ORAL at 09:28

## 2020-10-09 RX ADMIN — INSULIN LISPRO 3 UNITS: 100 INJECTION, SOLUTION INTRAVENOUS; SUBCUTANEOUS at 12:28

## 2020-10-09 RX ADMIN — INSULIN LISPRO 3 UNITS: 100 INJECTION, SOLUTION INTRAVENOUS; SUBCUTANEOUS at 17:10

## 2020-10-09 RX ADMIN — ASPIRIN 81 MG: 81 TABLET, COATED ORAL at 09:27

## 2020-10-09 RX ADMIN — INSULIN GLARGINE 30 UNITS: 100 INJECTION, SOLUTION SUBCUTANEOUS at 22:33

## 2020-10-09 RX ADMIN — MONTELUKAST SODIUM 10 MG: 10 TABLET, FILM COATED ORAL at 20:27

## 2020-10-09 RX ADMIN — HYDRALAZINE HYDROCHLORIDE 100 MG: 50 TABLET, FILM COATED ORAL at 09:27

## 2020-10-09 RX ADMIN — APIXABAN 5 MG: 5 TABLET, FILM COATED ORAL at 09:27

## 2020-10-10 VITALS
HEIGHT: 72 IN | WEIGHT: 214.7 LBS | HEART RATE: 70 BPM | SYSTOLIC BLOOD PRESSURE: 150 MMHG | BODY MASS INDEX: 29.08 KG/M2 | TEMPERATURE: 97.6 F | OXYGEN SATURATION: 97 % | DIASTOLIC BLOOD PRESSURE: 80 MMHG | RESPIRATION RATE: 16 BRPM

## 2020-10-10 LAB
GLUCOSE BLDC GLUCOMTR-MCNC: 119 MG/DL (ref 70–130)
GLUCOSE BLDC GLUCOMTR-MCNC: 160 MG/DL (ref 70–130)

## 2020-10-10 PROCEDURE — 63710000001 INSULIN LISPRO (HUMAN) PER 5 UNITS: Performed by: NURSE PRACTITIONER

## 2020-10-10 PROCEDURE — 82962 GLUCOSE BLOOD TEST: CPT

## 2020-10-10 RX ADMIN — FUROSEMIDE 40 MG: 40 TABLET ORAL at 08:09

## 2020-10-10 RX ADMIN — POTASSIUM CHLORIDE 40 MEQ: 10 CAPSULE, COATED, EXTENDED RELEASE ORAL at 08:07

## 2020-10-10 RX ADMIN — APIXABAN 5 MG: 5 TABLET, FILM COATED ORAL at 08:10

## 2020-10-10 RX ADMIN — HYDRALAZINE HYDROCHLORIDE 100 MG: 50 TABLET, FILM COATED ORAL at 08:10

## 2020-10-10 RX ADMIN — PRAVASTATIN SODIUM 40 MG: 40 TABLET ORAL at 08:10

## 2020-10-10 RX ADMIN — CARVEDILOL 37.5 MG: 12.5 TABLET, FILM COATED ORAL at 08:08

## 2020-10-10 RX ADMIN — ACETAMINOPHEN 650 MG: 325 TABLET, FILM COATED ORAL at 12:44

## 2020-10-10 RX ADMIN — LOSARTAN POTASSIUM 100 MG: 100 TABLET, FILM COATED ORAL at 08:10

## 2020-10-10 RX ADMIN — ASPIRIN 81 MG: 81 TABLET, COATED ORAL at 08:08

## 2020-10-10 RX ADMIN — MULTIPLE VITAMINS W/ MINERALS TAB 1 TABLET: TAB at 08:10

## 2020-10-10 RX ADMIN — DILTIAZEM HYDROCHLORIDE 360 MG: 180 CAPSULE, COATED, EXTENDED RELEASE ORAL at 08:10

## 2020-10-10 RX ADMIN — DOXYCYCLINE 100 MG: 100 CAPSULE ORAL at 08:08

## 2020-10-10 RX ADMIN — INSULIN LISPRO 5 UNITS: 100 INJECTION, SOLUTION INTRAVENOUS; SUBCUTANEOUS at 12:17

## 2020-10-10 RX ADMIN — INSULIN LISPRO 5 UNITS: 100 INJECTION, SOLUTION INTRAVENOUS; SUBCUTANEOUS at 08:11

## 2020-10-10 RX ADMIN — INSULIN LISPRO 2 UNITS: 100 INJECTION, SOLUTION INTRAVENOUS; SUBCUTANEOUS at 08:13

## 2020-10-10 RX ADMIN — Medication 1 APPLICATION: at 08:14

## 2020-10-31 ENCOUNTER — READMISSION MANAGEMENT (OUTPATIENT)
Dept: CALL CENTER | Facility: HOSPITAL | Age: 75
End: 2020-10-31

## 2020-10-31 NOTE — OUTREACH NOTE
Prep Survey      Responses   [facility] facility patient discharged from?  Non-BH   Is LACE score < 7 ?  Non-BH Discharge   Eligibility  [facility]   Date of Admission  10/10/20   Date of Discharge  10/31/20   Discharge diagnosis  C. difficile colitis, Sacroiliitis, Discitis of lumbar region    Does the patient have one of the following disease processes/diagnoses(primary or secondary)?  Other   Prep survey completed?  Yes          Damari Da Silva RN

## 2020-11-02 ENCOUNTER — TRANSITIONAL CARE MANAGEMENT TELEPHONE ENCOUNTER (OUTPATIENT)
Dept: CALL CENTER | Facility: HOSPITAL | Age: 75
End: 2020-11-02

## 2020-11-02 ENCOUNTER — TELEPHONE (OUTPATIENT)
Dept: FAMILY MEDICINE CLINIC | Facility: CLINIC | Age: 75
End: 2020-11-02

## 2020-11-02 NOTE — OUTREACH NOTE
Call Center TCM Note      Responses   [facility] patient discharged from?  Non-   Does the patient have one of the following disease processes/diagnoses(primary or secondary)?  Other   TCM attempt successful?  Yes   Call start time  1354   Call end time  1357   Meds reviewed with patient/caregiver?  Yes   Is the patient having any side effects they believe may be caused by any medication additions or changes?  No   Does the patient have all medications ordered at discharge?  Yes   Is the patient taking all medications as directed (includes completed medication regime)?  Yes   Does the patient have a primary care provider?   Yes   Does the patient have an appointment with their PCP within 7 days of discharge?  No   Comments regarding PCP  PATIENT STATES WIFE IS WAITING FOR A CALL BACK FROM PCP OFFICE TO SCHEDULE HIS FOLLOW UP APPOINTMENT   What is preventing the patient from scheduling follow up appointments within 7 days of discharge?  Waiting on return call   Nursing Interventions  Educated patient on importance of making appointment, Advised patient to make appointment   Has the patient kept scheduled appointments due by today?  N/A   Comments  PATIENT DECLINED MAKING PCP FOLLOW UP APPOINTMENT DURING THIS CALL STATING THAT HIS WIFE IS WAITING FOR A RETURN CALL FROM HIS PCP AND SHE WILL TAKE CARE OF MAKING HIS APPOINTMENT.    Has home health visited the patient within 72 hours of discharge?  N/A   Did the patient receive a copy of their discharge instructions?  Yes   Nursing interventions  Reviewed instructions with patient   What is the patient's perception of their health status since discharge?  Improving   Is the patient/caregiver able to teach back signs and symptoms related to disease process for when to call PCP?  Yes   Is the patient/caregiver able to teach back signs and symptoms related to disease process for when to call 911?  Yes   Is the patient/caregiver able to teach back the hierarchy of who  to call/visit for symptoms/problems? PCP, Specialist, Home health nurse, Urgent Care, ED, 911  Yes   If the patient is a current smoker, are they able to teach back resources for cessation?  Not a smoker   TCM call completed?  Yes          Anum Canada LPN    11/2/2020, 14:00 EST

## 2020-11-02 NOTE — PROGRESS NOTES
Spoke with patient and spouse over the phone; pt is home from rehab and doing well; questions answered; see telephone note.

## 2020-11-02 NOTE — TELEPHONE ENCOUNTER
Spoke with patient's spouse over the phone; pt discharged from rehab on 10/31/20; pt is home and doing well; questions answered regarding changes to medications; pt is now taking Lantus 40 units daily; spouse is concerned about pt being off Hydroxyurea; will have home health draw CBC and BMP with next visit on 11/4/20; spouse will call back to schedule a hospital follow up visit in about 1 week; spouse states patient strength is improving each day and spouse hoping she will be able to bring patient to office by herself at that point.

## 2020-11-03 ENCOUNTER — TELEPHONE (OUTPATIENT)
Dept: FAMILY MEDICINE CLINIC | Facility: CLINIC | Age: 75
End: 2020-11-03

## 2020-11-03 NOTE — TELEPHONE ENCOUNTER
PATIENT'S WIFE JONA (GUNNAR) CALLED TO MAKE A HOSPITAL FOLLOW UP APPOINTMENT. HE WAS AT [facility] FROM 10/10/2020-10/31/2020 WHERE HE HAD TO LEARN TO WALK DUE TO ECOLI DESTROYED HIS MUSCLES.     HE HAS APPOINTMENT ON 11/13/2020     GUNNAR' CALL BACK NUMBER [phone]    GUNNAR HAS SPOKEN WITH DAVIDA BAIG IN REGARD TO THIS VISIT.

## 2020-11-04 ENCOUNTER — LAB REQUISITION (OUTPATIENT)
Dept: LAB | Facility: HOSPITAL | Age: 75
End: 2020-11-04

## 2020-11-04 DIAGNOSIS — E87.6 ACUTE HYPOKALEMIA: Primary | ICD-10-CM

## 2020-11-04 DIAGNOSIS — Z00.00 ENCOUNTER FOR GENERAL ADULT MEDICAL EXAMINATION WITHOUT ABNORMAL FINDINGS: ICD-10-CM

## 2020-11-04 DIAGNOSIS — E11.3293 TYPE 2 DIABETES MELLITUS WITH BOTH EYES AFFECTED BY MILD NONPROLIFERATIVE RETINOPATHY WITHOUT MACULAR EDEMA, WITHOUT LONG-TERM CURRENT USE OF INSULIN (HCC): Primary | ICD-10-CM

## 2020-11-04 LAB
ALBUMIN SERPL-MCNC: 3.5 G/DL (ref 3.5–5.2)
ALBUMIN/GLOB SERPL: 1.5 G/DL
ALP SERPL-CCNC: 103 U/L (ref 39–117)
ALT SERPL W P-5'-P-CCNC: 22 U/L (ref 1–41)
ANION GAP SERPL CALCULATED.3IONS-SCNC: 9.5 MMOL/L (ref 5–15)
AST SERPL-CCNC: 23 U/L (ref 1–40)
BASOPHILS # BLD AUTO: 0.13 10*3/MM3 (ref 0–0.2)
BASOPHILS NFR BLD AUTO: 1.5 % (ref 0–1.5)
BILIRUB SERPL-MCNC: 0.3 MG/DL (ref 0–1.2)
BUN SERPL-MCNC: 27 MG/DL (ref 8–23)
BUN/CREAT SERPL: 28.1 (ref 7–25)
CALCIUM SPEC-SCNC: 9.4 MG/DL (ref 8.6–10.5)
CHLORIDE SERPL-SCNC: 101 MMOL/L (ref 98–107)
CO2 SERPL-SCNC: 29.5 MMOL/L (ref 22–29)
CREAT SERPL-MCNC: 0.96 MG/DL (ref 0.76–1.27)
DEPRECATED RDW RBC AUTO: 46.7 FL (ref 37–54)
EOSINOPHIL # BLD AUTO: 0.6 10*3/MM3 (ref 0–0.4)
EOSINOPHIL NFR BLD AUTO: 7 % (ref 0.3–6.2)
ERYTHROCYTE [DISTWIDTH] IN BLOOD BY AUTOMATED COUNT: 14 % (ref 12.3–15.4)
GFR SERPL CREATININE-BSD FRML MDRD: 77 ML/MIN/1.73
GLOBULIN UR ELPH-MCNC: 2.4 GM/DL
GLUCOSE SERPL-MCNC: 221 MG/DL (ref 65–99)
HCT VFR BLD AUTO: 39.8 % (ref 37.5–51)
HGB BLD-MCNC: 13.2 G/DL (ref 13–17.7)
IMM GRANULOCYTES # BLD AUTO: 0.05 10*3/MM3 (ref 0–0.05)
IMM GRANULOCYTES NFR BLD AUTO: 0.6 % (ref 0–0.5)
LYMPHOCYTES # BLD AUTO: 1.82 10*3/MM3 (ref 0.7–3.1)
LYMPHOCYTES NFR BLD AUTO: 21.2 % (ref 19.6–45.3)
MCH RBC QN AUTO: 30.1 PG (ref 26.6–33)
MCHC RBC AUTO-ENTMCNC: 33.2 G/DL (ref 31.5–35.7)
MCV RBC AUTO: 90.9 FL (ref 79–97)
MONOCYTES # BLD AUTO: 0.48 10*3/MM3 (ref 0.1–0.9)
MONOCYTES NFR BLD AUTO: 5.6 % (ref 5–12)
NEUTROPHILS NFR BLD AUTO: 5.52 10*3/MM3 (ref 1.7–7)
NEUTROPHILS NFR BLD AUTO: 64.1 % (ref 42.7–76)
NRBC BLD AUTO-RTO: 0 /100 WBC (ref 0–0.2)
PLATELET # BLD AUTO: 185 10*3/MM3 (ref 140–450)
PMV BLD AUTO: 10.6 FL (ref 6–12)
POTASSIUM SERPL-SCNC: 3.1 MMOL/L (ref 3.5–5.2)
PROT SERPL-MCNC: 5.9 G/DL (ref 6–8.5)
RBC # BLD AUTO: 4.38 10*6/MM3 (ref 4.14–5.8)
SODIUM SERPL-SCNC: 140 MMOL/L (ref 136–145)
WBC # BLD AUTO: 8.6 10*3/MM3 (ref 3.4–10.8)

## 2020-11-04 PROCEDURE — 85025 COMPLETE CBC W/AUTO DIFF WBC: CPT | Performed by: NURSE PRACTITIONER

## 2020-11-04 PROCEDURE — 80053 COMPREHEN METABOLIC PANEL: CPT | Performed by: NURSE PRACTITIONER

## 2020-11-04 RX ORDER — POTASSIUM CHLORIDE 20 MEQ/1
20 TABLET, EXTENDED RELEASE ORAL 3 TIMES DAILY
Start: 2020-11-04 | End: 2020-11-13 | Stop reason: SDUPTHER

## 2020-11-04 RX ORDER — INSULIN GLARGINE 100 [IU]/ML
40 INJECTION, SOLUTION SUBCUTANEOUS NIGHTLY
Refills: 12
Start: 2020-11-04 | End: 2020-11-13 | Stop reason: ALTCHOICE

## 2020-11-06 ENCOUNTER — TELEPHONE (OUTPATIENT)
Dept: FAMILY MEDICINE CLINIC | Facility: CLINIC | Age: 75
End: 2020-11-06

## 2020-11-06 NOTE — TELEPHONE ENCOUNTER
HOME HEALTH WANT TO KNOW IF IT IS OKAY TO DO HIS BMP ON Tuesday  OR Wednesday, THE NURSE IS NOT SCHEDULED TO COME UNTIL THEN.    [phone] APRIL

## 2020-11-09 ENCOUNTER — RESULTS ENCOUNTER (OUTPATIENT)
Dept: FAMILY MEDICINE CLINIC | Facility: CLINIC | Age: 75
End: 2020-11-09

## 2020-11-09 ENCOUNTER — TELEPHONE (OUTPATIENT)
Dept: FAMILY MEDICINE CLINIC | Facility: CLINIC | Age: 75
End: 2020-11-09

## 2020-11-09 DIAGNOSIS — E87.6 ACUTE HYPOKALEMIA: ICD-10-CM

## 2020-11-09 NOTE — TELEPHONE ENCOUNTER
PATIENTS HOME HEALTH REQUESTING ORDERS FOR OT FOR 2 TIMES A WEEK FOR THIS WEEK.     [phone] ISABELLA

## 2020-11-11 ENCOUNTER — LAB REQUISITION (OUTPATIENT)
Dept: LAB | Facility: HOSPITAL | Age: 75
End: 2020-11-11

## 2020-11-11 DIAGNOSIS — Z00.00 ENCOUNTER FOR GENERAL ADULT MEDICAL EXAMINATION WITHOUT ABNORMAL FINDINGS: ICD-10-CM

## 2020-11-11 LAB
ANION GAP SERPL CALCULATED.3IONS-SCNC: 6.1 MMOL/L (ref 5–15)
BUN SERPL-MCNC: 28 MG/DL (ref 8–23)
BUN/CREAT SERPL: 29.8 (ref 7–25)
CALCIUM SPEC-SCNC: 9.4 MG/DL (ref 8.6–10.5)
CHLORIDE SERPL-SCNC: 107 MMOL/L (ref 98–107)
CO2 SERPL-SCNC: 25.9 MMOL/L (ref 22–29)
CREAT SERPL-MCNC: 0.94 MG/DL (ref 0.76–1.27)
GFR SERPL CREATININE-BSD FRML MDRD: 79 ML/MIN/1.73
GLUCOSE SERPL-MCNC: 150 MG/DL (ref 65–99)
POTASSIUM SERPL-SCNC: 3.5 MMOL/L (ref 3.5–5.2)
SODIUM SERPL-SCNC: 139 MMOL/L (ref 136–145)

## 2020-11-11 PROCEDURE — 80048 BASIC METABOLIC PNL TOTAL CA: CPT | Performed by: NURSE PRACTITIONER

## 2020-11-13 ENCOUNTER — OFFICE VISIT (OUTPATIENT)
Dept: FAMILY MEDICINE CLINIC | Facility: CLINIC | Age: 75
End: 2020-11-13

## 2020-11-13 VITALS
HEIGHT: 72 IN | BODY MASS INDEX: 31.97 KG/M2 | OXYGEN SATURATION: 96 % | SYSTOLIC BLOOD PRESSURE: 136 MMHG | WEIGHT: 236 LBS | HEART RATE: 80 BPM | DIASTOLIC BLOOD PRESSURE: 70 MMHG | TEMPERATURE: 97.8 F

## 2020-11-13 DIAGNOSIS — Z09 HOSPITAL DISCHARGE FOLLOW-UP: Primary | ICD-10-CM

## 2020-11-13 DIAGNOSIS — D45 POLYCYTHEMIA VERA (HCC): ICD-10-CM

## 2020-11-13 DIAGNOSIS — I25.10 CORONARY ARTERY DISEASE INVOLVING NATIVE CORONARY ARTERY OF NATIVE HEART WITHOUT ANGINA PECTORIS: ICD-10-CM

## 2020-11-13 DIAGNOSIS — L89.302 PRESSURE INJURY OF BUTTOCK, STAGE 2, UNSPECIFIED LATERALITY (HCC): ICD-10-CM

## 2020-11-13 DIAGNOSIS — N40.0 PROSTATE HYPERPLASIA WITHOUT URINARY OBSTRUCTION: ICD-10-CM

## 2020-11-13 DIAGNOSIS — M46.1 SACROILIITIS (HCC): ICD-10-CM

## 2020-11-13 DIAGNOSIS — M46.46 DISCITIS OF LUMBAR REGION: ICD-10-CM

## 2020-11-13 DIAGNOSIS — J30.9 ALLERGIC RHINITIS, UNSPECIFIED SEASONALITY, UNSPECIFIED TRIGGER: ICD-10-CM

## 2020-11-13 DIAGNOSIS — E11.3293 TYPE 2 DIABETES MELLITUS WITH BOTH EYES AFFECTED BY MILD NONPROLIFERATIVE RETINOPATHY WITHOUT MACULAR EDEMA, WITHOUT LONG-TERM CURRENT USE OF INSULIN (HCC): ICD-10-CM

## 2020-11-13 DIAGNOSIS — A04.72 C. DIFFICILE COLITIS: ICD-10-CM

## 2020-11-13 DIAGNOSIS — K59.00 CONSTIPATION, UNSPECIFIED CONSTIPATION TYPE: ICD-10-CM

## 2020-11-13 DIAGNOSIS — E87.6 ACUTE HYPOKALEMIA: ICD-10-CM

## 2020-11-13 DIAGNOSIS — E78.2 MIXED HYPERLIPIDEMIA: ICD-10-CM

## 2020-11-13 DIAGNOSIS — A41.51 SEPSIS DUE TO ESCHERICHIA COLI, UNSPECIFIED WHETHER ACUTE ORGAN DYSFUNCTION PRESENT (HCC): ICD-10-CM

## 2020-11-13 DIAGNOSIS — I48.92 ATRIAL FLUTTER WITH RAPID VENTRICULAR RESPONSE (HCC): ICD-10-CM

## 2020-11-13 DIAGNOSIS — I10 ESSENTIAL HYPERTENSION: ICD-10-CM

## 2020-11-13 PROCEDURE — 99495 TRANSJ CARE MGMT MOD F2F 14D: CPT | Performed by: NURSE PRACTITIONER

## 2020-11-13 RX ORDER — TERAZOSIN 2 MG/1
2 CAPSULE ORAL NIGHTLY
Qty: 90 CAPSULE | Refills: 1 | Status: SHIPPED | OUTPATIENT
Start: 2020-11-13 | End: 2021-06-08

## 2020-11-13 RX ORDER — ASPIRIN 81 MG/1
81 TABLET ORAL DAILY
Qty: 90 TABLET | Refills: 1 | Status: SHIPPED | OUTPATIENT
Start: 2020-11-13 | End: 2021-06-08

## 2020-11-13 RX ORDER — POTASSIUM CHLORIDE 20 MEQ/1
20 TABLET, EXTENDED RELEASE ORAL 3 TIMES DAILY
Qty: 270 TABLET | Refills: 1 | Status: SHIPPED | OUTPATIENT
Start: 2020-11-13 | End: 2021-08-23

## 2020-11-13 RX ORDER — AMOXICILLIN 250 MG
1 CAPSULE ORAL 2 TIMES DAILY
Qty: 180 TABLET | Refills: 1 | Status: SHIPPED | OUTPATIENT
Start: 2020-11-13 | End: 2021-04-12

## 2020-11-13 RX ORDER — DILTIAZEM HYDROCHLORIDE 360 MG/1
360 CAPSULE, EXTENDED RELEASE ORAL DAILY
Qty: 90 CAPSULE | Refills: 1 | Status: SHIPPED | OUTPATIENT
Start: 2020-11-13 | End: 2021-06-08

## 2020-11-13 RX ORDER — FUROSEMIDE 40 MG/1
40 TABLET ORAL DAILY
Qty: 90 TABLET | Refills: 1 | Status: SHIPPED | OUTPATIENT
Start: 2020-11-13 | End: 2021-06-22

## 2020-11-13 RX ORDER — LOSARTAN POTASSIUM 100 MG/1
100 TABLET ORAL DAILY
Qty: 90 TABLET | Refills: 1 | Status: SHIPPED | OUTPATIENT
Start: 2020-11-13 | End: 2021-06-08

## 2020-11-13 RX ORDER — CARVEDILOL 12.5 MG/1
37.5 TABLET ORAL 2 TIMES DAILY WITH MEALS
Qty: 540 TABLET | Refills: 0 | Status: SHIPPED | OUTPATIENT
Start: 2020-11-13 | End: 2021-02-23

## 2020-11-13 RX ORDER — HYDRALAZINE HYDROCHLORIDE 100 MG/1
100 TABLET, FILM COATED ORAL 3 TIMES DAILY
Qty: 270 TABLET | Refills: 1 | Status: SHIPPED | OUTPATIENT
Start: 2020-11-13 | End: 2021-08-23

## 2020-11-13 RX ORDER — FLURBIPROFEN SODIUM 0.3 MG/ML
1 SOLUTION/ DROPS OPHTHALMIC DAILY
Qty: 100 EACH | Refills: 2 | Status: SHIPPED | OUTPATIENT
Start: 2020-11-13

## 2020-11-13 RX ORDER — HYDROXYUREA 500 MG/1
500 CAPSULE ORAL DAILY
COMMUNITY

## 2020-11-13 RX ORDER — FLUTICASONE PROPIONATE 50 MCG
2 SPRAY, SUSPENSION (ML) NASAL DAILY
Qty: 3 BOTTLE | Refills: 1 | Status: SHIPPED | OUTPATIENT
Start: 2020-11-13 | End: 2022-01-10

## 2020-11-13 RX ORDER — ZINC OXIDE 13 %
1 CREAM (GRAM) TOPICAL DAILY
Qty: 90 CAPSULE | Refills: 1 | Status: SHIPPED | OUTPATIENT
Start: 2020-11-13

## 2020-11-13 RX ORDER — AMOXICILLIN 250 MG
1 CAPSULE ORAL DAILY
COMMUNITY
End: 2020-11-13 | Stop reason: SDUPTHER

## 2020-11-13 NOTE — PATIENT INSTRUCTIONS
Continue to monitor your blood sugar once daily before a meal and record results.  Continue to monitor your blood pressure periodically and record results.  Continue to work on healthy diet and exercise.  Continue barrier cream to buttocks.  Continue barrier patch per home health.  Continue to use cushion from wheelchair when sitting and change positions frequently.  Follow up pending lab results.  Follow up in 6 weeks, or sooner if problems or concerns.  Check with Dr. Milelr about follow up with Dr. Arana ortho/spine specialty.  Schedule a follow up appointment with cardiology.

## 2020-11-13 NOTE — PROGRESS NOTES
Subjective   Erlin Blanco is a 75 y.o. male.     Chief Complaint   Patient presents with   • Hospital Follow Up Visit     rehab       History of Present Illness   Patient presents for hospital follow up; pt originally went to [facility] on 8/16/20 with fever after prostate procedure; diagnosed with sepsis due to E. Coli, metabolic encephalopathy, UTI, and acute prostatitis; was on IV antibiotics; was also started on Eliquis for atrial fib; was discharged to rehab on 8/29/20; on 9/15/20, pt wife took patient home due to poor progress at rehab; pt was home 12 hours and the K+ came back very low; sent back to ER; pt was admitted to [facility] on 9/15/20 with C. Diff and sepsis with acute pyelonephritis.    This is another very nice 75-year-old gentleman that presented to the hospital with severe C. difficile colitis and sepsis, present on admission.  He had a very complicated hospitalization in the preceding weeks leading up to this current hospitalization where he was diagnosed with pyelonephritis and discitis.  He presented on this admission with A. fib RVR and a significant leukocytosis.  He was started on oral vancomycin and continued on his IV ertapenem.  Infectious disease was consulted for assistance.  Given the persistent back pain and the lack of improvement with skilled therapy neurosurgery was reconsulted and his back was reimaged.  Imaging does show some worsening discitis as described above but neurosurgery recommended against any further intervention as they feel the imaging is likely related to delayed healing rather than worsening or new infection.  He was quite encephalopathic early in his hospitalization.  Neurology and psychiatry assisted with this and have adjusted medications and his encephalopathy is now much improved.  His rapid atrial fibrillation came under control.  His rate control medications have been adjusted and he is maintained on anticoagulation.  Cardiology signed off.  He  completed a course of Invanz and oral vancomycin with resolution of his diarrhea. Repeat MRI of the lumbar spine done 10/5 showed fluid in the right psoas area and right SI joint.  This was followed up with MRI of the thoracic spine and pelvis.  He was reevaluated by neurosurgery and orthospine surgery who did not recommend any surgical intervention.  Infectious disease has him on doxycycline for the next 2 weeks but do not recommend any antibiotics beyond that.  Clinically he is much improved.  He has been cleared for discharge by all consulting services.  He will follow-up with orthospine surgery in 3 to 4 weeks and with neurosurgery in 6 weeks.  He is medically stable at this time.  He has a bed available at rehab and will transfer there today.  Of note, he has initially been denied for subacute rehab by his insurance company.  He and family will private pay for the next 2 weeks while the appeals process is conducted.  He certainly would not do well at home with home health and needs a higher level of care at SNF.    Transferred to rehab at [facility]; discharged from [facility] on 10/31/20.    Patient completed the Doxycycline x2 weeks per ID; finished med at rehab.    F/U pressure ulcer: patient has pressure ulcer on buttocks; have been applying magic barrier cream; home health nurse looked at area and suggested barrier cream; also has some pads RN gave to put on areas.     F/U diarrhea: diarrhea has resolved; has been taking Senna twice daily; cut back to 1 twice daily to see if still needed since was having some soft/loose stools.    F/U lower back pain: has pretty much resolved at this point; no longer taking Norco since has been home from rehab; has been taking Tylenol on occasion as needed after PT; to follow up with spine surgery Dr. Arana 3-4 weeks and neurosurgery Dr. Miller in 6 weeks; has follow up next week with Dr. Miller; pt was not aware of follow up needed with Dr. Arana; getting around using walker;  has been doing physical therapy at home; went up stairs yesterday using walker with physical therapy; also has been doing occupational therapy, about finished with OT; able to get dressed and undressed himself, can  shower, has lift on commode and able to do most self care; spouse has been taking care of medications at this point.    F/U DM2: takes Lantus 40 units nightly; has been monitoring blood sugars; have been running about 110-140s since has been watching sugars better; no numbness or tingling.    F/U HTN: takes Carvedilol twice daily, Hydralazine TID, and Losartan and Cardizem CD daily; also takes Furosemide daily; monitors BP, typically runs about 100-130s/70s; no headaches; no orthostasis; no swelling.    F/U atrial fib: takes Eliquis twice daily; no signs of bleeding.    F/U Hypokalemia: takes KCL 20 mEq TID.    F/U Hyperlipidemia: takes Pravastatin daily; no myalgias other than knee pain; attributes knee pain to squats during PT.    F/U allergic rhinitis: takes Montelukast daily and helps; needs refill of Flonase for stuffy nose at night.    F/U prostate: takes Terazosin and Finasteride nightly; previously taking Tamsulosin; no follow up scheduled with urology at this point; no problems with urinary frequency, symptoms have improved.    F/U polycythemia vera: hospital stopped Hydroxyurea; pt contacted hematology after discharge and resumed medication; pt had follow up with hematology on 11/10/20; pt has follow up in December for therapeutic phlebotomy; had repeat CBC at office on 11/10/20.    F/U insomnia: no longer taking Seroquel at bedtime.     Within 48 business hours after discharge our office contacted him via telephone to coordinate his care and needs.    Date of TCM Phone Call 9/15/2020 10/31/2020   [facility]   Date of Admission - 10/10/2020   Date of Discharge 9/14/2020 10/31/2020   Discharge Disposition Home or Self Care -     Risk  for Readmission (LACE) No data recorded    Patient was admitted to [facility]   ALL records were obtained and reviewed.  Date of admission 9/15/20  Date of discharge 10/10/20, then went to rehab  Diagnosis: C. Diff with sepsis; sacroilitis; discitis of lumbar region; acute pyelonephritis; CVA; pressure injury of buttock, stage 2; acute hypokalemia; atrial flutter with rapid ventricular response; leukocytosis; hyperlipidemia; polycythemia vera; DM2; HTN  Medications upon discharge reviewed with patient  Condition stable    Current outpatient and discharge medications have been reconciled for the patient.    I reviewed and requested records labs and diagnostics from the hospital with the patient and family.  The patient is to follow-up with specialist as discussed and directed.    If any problems arise or further questions develop patient is to call or to contact us for any needs.     The following portions of the patient's history were reviewed and updated as appropriate: allergies, current medications, past family history, past medical history, past social history, past surgical history and problem list.    Current Outpatient Medications   Medication Sig Dispense Refill   • acetaminophen (TYLENOL) 325 MG tablet Take 2 tablets by mouth Every 4 (Four) Hours As Needed for Mild Pain .     • apixaban (ELIQUIS) 5 MG tablet tablet Take 1 tablet by mouth Every 12 (Twelve) Hours. Indications: Atrial Fibrillation 180 tablet 1   • aspirin 81 MG EC tablet Take 1 tablet by mouth Daily. 90 tablet 1   • carvedilol (COREG) 12.5 MG tablet Take 3 tablets by mouth 2 (Two) Times a Day With Meals. 540 tablet 0   • dilTIAZem CD (CARDIZEM CD) 360 MG 24 hr capsule Take 1 capsule by mouth Daily. 90 capsule 1   • finasteride (PROSCAR) 5 MG tablet Take 5 mg by mouth Every Night.     • furosemide (LASIX) 40 MG tablet Take 1 tablet by mouth Daily. 90 tablet 1   • hydrALAZINE (APRESOLINE) 100 MG tablet Take 1 tablet by mouth 3 (Three)  Times a Day. 270 tablet 1   • HYDROcodone-acetaminophen (NORCO) 5-325 MG per tablet Take 1 tablet by mouth Every 4 (Four) Hours As Needed for Moderate Pain . 18 tablet 0   • hydroxyurea (HYDREA) 500 MG capsule Take 500 mg by mouth Daily.     • losartan (COZAAR) 100 MG tablet Take 1 tablet by mouth Daily. 90 tablet 1   • montelukast (Singulair) 10 MG tablet Take 1 tablet by mouth Daily. (Patient taking differently: Take 10 mg by mouth Every Night.) 90 tablet 1   • Multiple Vitamins-Minerals (MULTIVITAMIN ADULT PO) Take 1 tablet by mouth Daily.     • potassium chloride (K-DUR,KLOR-CON) 20 MEQ CR tablet Take 1 tablet by mouth 3 (Three) Times a Day. 270 tablet 1   • pravastatin (PRAVACHOL) 40 MG tablet TAKE 1 TABLET DAILY 90 tablet 0   • sennosides-docusate (Senexon-S) 8.6-50 MG per tablet Take 1 tablet by mouth 2 (Two) Times a Day. 180 tablet 1   • terazosin (HYTRIN) 2 MG capsule Take 1 capsule by mouth Every Night. 90 capsule 1   • fluticasone (Flonase) 50 MCG/ACT nasal spray 2 sprays into the nostril(s) as directed by provider Daily. 3 bottle 1   • hydrocortisone-bacitracin-zinc oxide-nystatin (MAGIC BARRIER) Apply 1 application topically to the appropriate area as directed 2 (Two) Times a Day.     • Insulin Glargine (LANTUS SOLOSTAR) 100 UNIT/ML injection pen Inject 40 Units under the skin into the appropriate area as directed Daily. 12 pen 1   • Insulin Pen Needle (B-D UF III MINI PEN NEEDLES) 31G X 5 MM misc Inject 1 each under the skin into the appropriate area as directed Daily. 100 each 2   • Probiotic Product (Probiotic Daily) capsule Take 1 capsule by mouth Daily. 90 capsule 1     No current facility-administered medications for this visit.        Current Outpatient Medications on File Prior to Visit   Medication Sig   • acetaminophen (TYLENOL) 325 MG tablet Take 2 tablets by mouth Every 4 (Four) Hours As Needed for Mild Pain .   • finasteride (PROSCAR) 5 MG tablet Take 5 mg by mouth Every Night.   •  HYDROcodone-acetaminophen (NORCO) 5-325 MG per tablet Take 1 tablet by mouth Every 4 (Four) Hours As Needed for Moderate Pain .   • hydroxyurea (HYDREA) 500 MG capsule Take 500 mg by mouth Daily.   • montelukast (Singulair) 10 MG tablet Take 1 tablet by mouth Daily. (Patient taking differently: Take 10 mg by mouth Every Night.)   • Multiple Vitamins-Minerals (MULTIVITAMIN ADULT PO) Take 1 tablet by mouth Daily.   • pravastatin (PRAVACHOL) 40 MG tablet TAKE 1 TABLET DAILY   • hydrocortisone-bacitracin-zinc oxide-nystatin (MAGIC BARRIER) Apply 1 application topically to the appropriate area as directed 2 (Two) Times a Day.     No current facility-administered medications on file prior to visit.        Past Medical History:   Diagnosis Date   • Abnormal ECG    • Abnormal stress test    • Abrasion of face 1/29/2020   • Acute bronchitis    • Acute pyelonephritis 10/9/2020   • Acute sinusitis    • Allergic rhinitis    • Aortic root dilation (CMS/HCC)    • Arthritis    • Bacteremia due to Escherichia coli 8/17/2020   • Benign enlargement of prostate    • C. difficile colitis 9/16/2020   • Cellulitis of knee, left 5/4/2017   • CHF (congestive heart failure) (CMS/HCC)    • Chronic diastolic congestive heart failure (CMS/HCC)    • Contusion of face 1/29/2020   • Coronary artery disease    • Diminished pulse     in lower extremities   • Edema    • Elevated hematocrit    • Elevated hemoglobin (CMS/HCC)    • Essential hypertension    • Hearing loss     mala hearing aids   • Heart valve disease    • High risk medication use    • History of back pain    • History of dysuria    • History of echocardiogram    • History of intracranial hemorrhage    • History of scoliosis    • History of stroke    • Hyperlipidemia    • Injury of toe, left, initial encounter    • Irregular heart rate    • Knee pain, left    • Left bundle branch block    • Leg wound, left    • Low back pain    • Medicare annual wellness visit, subsequent    • Minor head  injury without loss of consciousness 2020   • Murmur    • Muscle cramps    • Need for hepatitis C screening test    • Polycythemia    • Polycythemia vera (CMS/HCC)    • Prostate hyperplasia without urinary obstruction    • Prostatitis    • Proteinuria    • Pulmonary hypertension (CMS/HCC)    • Scoliosis    • Sepsis due to Escherichia coli (CMS/HCC) 2020   • Sepsis with metabolic encephalopathy (CMS/HCC) 2020   • Stroke (CMS/[facility])    • SVT (supraventricular tachycardia) (CMS/HCC)    • Tachycardia    • Thrombocytosis (CMS/HCC)    • TIA (transient ischemic attack)        • Type 2 diabetes mellitus (CMS/HCC)    • Urinary tract infection due to extended-spectrum beta lactamase (ESBL) producing Escherichia coli 2020   • Valvular heart disease        Past Surgical History:   Procedure Laterality Date   • CARDIAC CATHETERIZATION     • COLONOSCOPY      did Cologuard approx 2019 and negative   • HAND SURGERY     • JOINT REPLACEMENT      Righ Knee   • SC TOTAL KNEE ARTHROPLASTY Left 2017    Procedure: LT TOTAL KNEE ARTHROPLASTY;  Surgeon: Bertin Perrin MD;  Location: [facility];  Service: Orthopedics   • PROSTATE SURGERY      Rezum steam treatment to shrink prostate by dr. guerrero       Family History   Problem Relation Age of Onset   • Hypertension Mother    • Other Father         ruptured appendix,  when pt. was 14 years old.   • Diabetes Paternal Aunt    • Diabetes Paternal Uncle    • No Known Problems Other        Social History     Socioeconomic History   • Marital status:      Spouse name: Not on file   • Number of children: Not on file   • Years of education: Not on file   • Highest education level: Not on file   Tobacco Use   • Smoking status: Former Smoker     Types: Cigarettes     Quit date:      Years since quittin.9   • Smokeless tobacco: Former User   Substance and Sexual Activity   • Alcohol use: No   • Drug use: No   • Sexual activity: Defer  "      Review of Systems   Constitutional: Positive for fatigue. Negative for appetite change, unexpected weight gain and unexpected weight loss.   HENT: Negative for ear pain, sore throat and trouble swallowing. Sinus pressure: some stuffy nose.    Eyes: Negative for blurred vision and pain.   Respiratory: Negative for cough, chest tightness and shortness of breath.    Cardiovascular: Negative for chest pain and palpitations.   Gastrointestinal: Negative for abdominal pain, blood in stool, constipation, GERD and indigestion. Diarrhea: some soft stool, has decreased stool softener recently.   Endocrine: Negative for polydipsia.   Genitourinary: Negative for frequency.   Musculoskeletal: Negative for back pain. Arthralgias: some in knees.   Skin: Negative for rash.   Neurological: Negative for syncope.   Psychiatric/Behavioral: Negative for sleep disturbance and depressed mood. The patient is not nervous/anxious.        Objective   Vitals:    11/13/20 1008   BP: 136/70   BP Location: Right arm   Patient Position: Sitting   Cuff Size: Large Adult   Pulse: 80   Temp: 97.8 °F (36.6 °C)   TempSrc: Infrared   SpO2: 96%   Weight: 107 kg (236 lb)   Height: 182.9 cm (72\")     Body mass index is 32.01 kg/m².     Physical Exam  Vitals signs and nursing note reviewed.   Constitutional:       General: He is not in acute distress.     Appearance: He is well-developed and well-groomed. He is not ill-appearing or diaphoretic.   HENT:      Head: Normocephalic.      Right Ear: External ear normal.      Left Ear: External ear normal.   Eyes:      Conjunctiva/sclera: Conjunctivae normal.   Neck:      Musculoskeletal: Normal range of motion and neck supple.      Vascular: No carotid bruit.   Cardiovascular:      Rate and Rhythm: Normal rate and regular rhythm.      Pulses: Normal pulses.      Heart sounds: Murmur present. Systolic murmur present with a grade of 2/6.   Pulmonary:      Effort: Pulmonary effort is normal. No respiratory " distress.      Breath sounds: Normal breath sounds.   Abdominal:      General: Bowel sounds are normal.      Palpations: Abdomen is soft.      Tenderness: There is no abdominal tenderness.   Musculoskeletal:      Cervical back: He exhibits no bony tenderness.      Thoracic back: He exhibits no bony tenderness.      Lumbar back: He exhibits no bony tenderness.      Right lower leg: Edema (trace-1+ pretibial) present.      Left lower leg: Edema (trace-1+ pretibial) present.   Skin:     General: Skin is warm and dry.          Neurological:      Mental Status: He is alert and oriented to person, place, and time.      Cranial Nerves: Cranial nerves are intact.      Gait: Abnormal gait: guarded gait with rolling walker.   Psychiatric:         Mood and Affect: Mood normal.         Behavior: Behavior normal.         Thought Content: Thought content normal.         Cognition and Memory: Cognition normal.         Judgment: Judgment normal.         MRI of the lumbar spine on 9/16/2020:  1.  Increasing discitis and osteomyelitis at L4-L5 as well as increasing  paraspinal edema, paraspinal enhancement as well as epidural enhancement  anteriorly at L4 on L5 as compared to the MRI examination of 08/21/2020.  There is increased edema and enhancement involving the medial aspect of  the psoas muscles as compared to the prior examination with 2 areas of  abnormal enhancement involving the psoas muscle on the right consistent  with developing abscesses. There likely is a small abscess lateral to  the L5 vertebral body to the left measuring approximately 4 mm in size.  There is an area of nonenhancement involving the epidural soft tissues  anteriorly at the midline at the level of the superior and middle thirds  of the L4 vertebral body (series 17 image 27). This measures  approximately 2 x 2 mm in size and was not present previously and likely  represents a small epidural abscess.  2.  T2 hyperintensity and enhancement at L1-L2,  nonspecific. This may be  degenerative in nature. Treated discitis/osteomyelitis or an element of  active discitis/osteomyelitis cannot be entirely excluded.     Brain MRI on 9/23/2020:  1.  The study is hampered by patient motion and the inability to use the   anterior coil.   2.  There is moderate small vessel ischemic disease and scattered   lacunar infarcts. There is a 1.5 mm area of mildly increased signal   intensity involving the corona radiata on the left on the diffusion   sequence. No convincing signal loss is appreciated on the ADC map. There   was no evidence of associated enhancement. This likely represents a   small 1.5 to 2 mm acute infarct. Clinical correlation is recommended. A   short-term followup MRI examination of the brain could be obtained as   indicated.   3.  Mild dural enhancement overlying the cerebral hemispheres   superiolaterally bilaterally, nonspecific.   4.  There is a small volume of fluid within the mastoid air cells on the   right.      MRI of the lumbar spine on 10/5/2020:  1.  Persistent edema and enhancement involving the L4 and L5 vertebral   bodies as well as involving the disc. Adjacent paraspinal soft tissues.   There is persistent epidural enhancement at L4 and L5 which also extends   into the neural foramen at L4-L5. The appearance is consistent with   discitis and osteomyelitis. The overall appearance is similar to the MRI   examination of 09/16/2020. There is no evidence of an epidural abscess.   2.  Enhancement involving the endplates at L1-L2 is noted likely   degenerative in nature. A superimposed infection cannot be entirely   excluded but is thought to be unlikely. This area can be reassessed on   subsequent follow-up imaging.   3.  Myositis involving the psoas muscle on the left with no evidence of   a psoas muscle abscess. Small abscesses however are identified medial to   the psoas muscle which were present previously and are at the level of   L5 and S1.   4.   Area of T1 hyperintensity involving the anterior aspect of the psoas   muscle on the right inferiorly, only partially visualized. This may   represent a complex fluid or hemorrhage within the psoas muscle. Further   evaluation could be performed with MRI examination of the pelvis with   and without contrast in order to completely assess this area.   5.  New T2 hyperintensity enhancement adjacent to the sacroiliac joint   on the right. The findings are concerning for infection/inflammation.      MRI of the thoracic spine on 10/6/2020:  The study is moderately compromised by motion artifact but otherwise   demonstrates no convincing evidence to suggest discitis or   osteomyelitis. Also, there is no evidence for compressive myelopathy.   However, the cord signal is suboptimally evaluated.      MRI of the pelvis on 10/6/2020;  1. Findings are suspected to represent an infectious right sacroiliitis   with bone marrow edema and enhancement surrounding the right sacroiliac   joint and mild enhancement of the soft tissues anterior to the right   sacroiliac joint. No clear cortical loss or abnormal widening.   2. Mild edema within the proximal gluteus medius musculature   bilaterally. There is also a band of edema and enhancement adjacent to   the left greater trochanter and extending superficial to the left   gluteus medius tendon and deep to the iliotibial tract. This may   represent mild bursitis. Infectious bursitis is possible though there is   no evidence for a drainable collection.   3. Prostate gland enlargement.   4. MRI thoracic and lumbar spine has been completed and is reported   separately.     Assessment/Plan .  Problem List Items Addressed This Visit     Type 2 diabetes mellitus, without long-term current use of insulin (CMS/[facility])    Overview     With mild non-proliferative diabetic retinopathy, without macular edema         Current Assessment & Plan     Diabetes is stable.   Continue current treatment  regimen.  Diabetes will be reassessed 6 weeks.         Relevant Medications    Insulin Glargine (LANTUS SOLOSTAR) 100 UNIT/ML injection pen    Insulin Pen Needle (B-D UF III MINI PEN NEEDLES) 31G X 5 MM misc    Other Relevant Orders    Hemoglobin A1c    Hypertension    Current Assessment & Plan     Hypertension is stable.  Continue current medications.  Ambulatory blood pressure monitoring.  Blood pressure will be reassessed at the next regular appointment.         Relevant Medications    terazosin (HYTRIN) 2 MG capsule    dilTIAZem CD (CARDIZEM CD) 360 MG 24 hr capsule    losartan (COZAAR) 100 MG tablet    carvedilol (COREG) 12.5 MG tablet    furosemide (LASIX) 40 MG tablet    hydrALAZINE (APRESOLINE) 100 MG tablet    Other Relevant Orders    Comprehensive Metabolic Panel    Coronary artery disease    Relevant Medications    aspirin 81 MG EC tablet    dilTIAZem CD (CARDIZEM CD) 360 MG 24 hr capsule    carvedilol (COREG) 12.5 MG tablet    furosemide (LASIX) 40 MG tablet    Hyperlipidemia    Current Assessment & Plan     Continue to work on healthy diet.         Relevant Orders    Comprehensive Metabolic Panel    Lipid Panel With LDL / HDL Ratio    Prostate hyperplasia without urinary obstruction    Current Assessment & Plan     Schedule a follow up appointment with urology when able.         Relevant Medications    terazosin (HYTRIN) 2 MG capsule    Polycythemia vera (CMS/HCC)    Current Assessment & Plan     Follow up as scheduled with hematology.         Relevant Medications    hydroxyurea (HYDREA) 500 MG capsule    Allergic rhinitis    Relevant Medications    fluticasone (Flonase) 50 MCG/ACT nasal spray    Acute hypokalemia    Relevant Medications    potassium chloride (K-DUR,KLOR-CON) 20 MEQ CR tablet    Other Relevant Orders    Comprehensive Metabolic Panel    Atrial flutter with rapid ventricular response (CMS/HCC)    Current Assessment & Plan     Stable.  Continue Eliquis twice daily.  Schedule a follow up  appointment with cardiology.         Relevant Medications    dilTIAZem CD (CARDIZEM CD) 360 MG 24 hr capsule    carvedilol (COREG) 12.5 MG tablet    Pressure injury of buttock, stage 2 (CMS/HCC)    Current Assessment & Plan     Continue barrier cream to buttocks.  Continue barrier patch per home health.  Continue to use cushion from wheelchair when sitting and change positions frequently.         Sacroiliitis (CMS/HCC)    Current Assessment & Plan     Follow up as scheduled with Dr. Miller, neurosurgery.         Discitis of lumbar region    Current Assessment & Plan     Follow up as scheduled with Dr. Miller, neurosurgery.         Constipation    Relevant Medications    sennosides-docusate (Senexon-S) 8.6-50 MG per tablet    Probiotic Product (Probiotic Daily) capsule    RESOLVED: Sepsis due to Escherichia coli (CMS/HCC)    RESOLVED: C. difficile colitis      Other Visit Diagnoses     Hospital discharge follow-up    -  Primary    Relevant Orders    Comprehensive Metabolic Panel          Return in about 6 weeks (around 12/25/2020) for Recheck, Medicare Wellness.or sooner if problems or concerns.

## 2020-11-14 DIAGNOSIS — E11.3293 TYPE 2 DIABETES MELLITUS WITH BOTH EYES AFFECTED BY MILD NONPROLIFERATIVE RETINOPATHY WITHOUT MACULAR EDEMA, WITH LONG-TERM CURRENT USE OF INSULIN (HCC): Primary | ICD-10-CM

## 2020-11-14 DIAGNOSIS — Z79.4 TYPE 2 DIABETES MELLITUS WITH BOTH EYES AFFECTED BY MILD NONPROLIFERATIVE RETINOPATHY WITHOUT MACULAR EDEMA, WITH LONG-TERM CURRENT USE OF INSULIN (HCC): Primary | ICD-10-CM

## 2020-11-14 PROBLEM — K59.00 CONSTIPATION: Status: ACTIVE | Noted: 2020-11-14

## 2020-11-14 PROBLEM — A04.72 C. DIFFICILE COLITIS: Status: RESOLVED | Noted: 2020-09-16 | Resolved: 2020-11-14

## 2020-11-14 PROBLEM — R65.20 SEPSIS WITH METABOLIC ENCEPHALOPATHY: Status: RESOLVED | Noted: 2020-08-17 | Resolved: 2020-11-14

## 2020-11-14 PROBLEM — Z16.12 URINARY TRACT INFECTION DUE TO EXTENDED-SPECTRUM BETA LACTAMASE (ESBL) PRODUCING ESCHERICHIA COLI: Status: RESOLVED | Noted: 2020-08-18 | Resolved: 2020-11-14

## 2020-11-14 PROBLEM — N39.0 URINARY TRACT INFECTION DUE TO EXTENDED-SPECTRUM BETA LACTAMASE (ESBL) PRODUCING ESCHERICHIA COLI: Status: RESOLVED | Noted: 2020-08-18 | Resolved: 2020-11-14

## 2020-11-14 PROBLEM — B96.20 BACTEREMIA DUE TO ESCHERICHIA COLI: Status: RESOLVED | Noted: 2020-08-17 | Resolved: 2020-11-14

## 2020-11-14 PROBLEM — J20.9 ACUTE BRONCHITIS: Status: RESOLVED | Noted: 2020-01-13 | Resolved: 2020-11-14

## 2020-11-14 PROBLEM — B96.29 URINARY TRACT INFECTION DUE TO EXTENDED-SPECTRUM BETA LACTAMASE (ESBL) PRODUCING ESCHERICHIA COLI: Status: RESOLVED | Noted: 2020-08-18 | Resolved: 2020-11-14

## 2020-11-14 PROBLEM — R78.81 BACTEREMIA DUE TO ESCHERICHIA COLI: Status: RESOLVED | Noted: 2020-08-17 | Resolved: 2020-11-14

## 2020-11-14 PROBLEM — A41.51 SEPSIS DUE TO ESCHERICHIA COLI: Status: RESOLVED | Noted: 2020-08-17 | Resolved: 2020-11-14

## 2020-11-14 PROBLEM — G93.41 SEPSIS WITH METABOLIC ENCEPHALOPATHY: Status: RESOLVED | Noted: 2020-08-17 | Resolved: 2020-11-14

## 2020-11-14 PROBLEM — N10 ACUTE PYELONEPHRITIS: Status: RESOLVED | Noted: 2020-10-09 | Resolved: 2020-11-14

## 2020-11-14 PROBLEM — A41.9 SEPSIS WITH METABOLIC ENCEPHALOPATHY (HCC): Status: RESOLVED | Noted: 2020-08-17 | Resolved: 2020-11-14

## 2020-11-14 PROBLEM — A41.9 SEPSIS WITH METABOLIC ENCEPHALOPATHY: Status: RESOLVED | Noted: 2020-08-17 | Resolved: 2020-11-14

## 2020-11-14 LAB
ALBUMIN SERPL-MCNC: 4 G/DL (ref 3.7–4.7)
ALBUMIN/GLOB SERPL: 1.5 {RATIO} (ref 1.2–2.2)
ALP SERPL-CCNC: 108 IU/L (ref 39–117)
ALT SERPL-CCNC: 28 IU/L (ref 0–44)
AST SERPL-CCNC: 25 IU/L (ref 0–40)
BILIRUB SERPL-MCNC: 0.4 MG/DL (ref 0–1.2)
BUN SERPL-MCNC: 36 MG/DL (ref 8–27)
BUN/CREAT SERPL: 32 (ref 10–24)
CALCIUM SERPL-MCNC: 9.7 MG/DL (ref 8.6–10.2)
CHLORIDE SERPL-SCNC: 103 MMOL/L (ref 96–106)
CHOLEST SERPL-MCNC: 118 MG/DL (ref 100–199)
CO2 SERPL-SCNC: 26 MMOL/L (ref 20–29)
CREAT SERPL-MCNC: 1.13 MG/DL (ref 0.76–1.27)
GLOBULIN SER CALC-MCNC: 2.7 G/DL (ref 1.5–4.5)
GLUCOSE SERPL-MCNC: 189 MG/DL (ref 65–99)
HBA1C MFR BLD: 7.1 % (ref 4.8–5.6)
HDLC SERPL-MCNC: 41 MG/DL
LDLC SERPL CALC-MCNC: 64 MG/DL (ref 0–99)
LDLC/HDLC SERPL: 1.6 RATIO (ref 0–3.6)
POTASSIUM SERPL-SCNC: 4.2 MMOL/L (ref 3.5–5.2)
PROT SERPL-MCNC: 6.7 G/DL (ref 6–8.5)
SODIUM SERPL-SCNC: 143 MMOL/L (ref 134–144)
TRIGL SERPL-MCNC: 58 MG/DL (ref 0–149)
VLDLC SERPL CALC-MCNC: 13 MG/DL (ref 5–40)

## 2020-11-14 RX ORDER — BLOOD SUGAR DIAGNOSTIC
STRIP MISCELLANEOUS
Qty: 200 EACH | Refills: 3 | Status: SHIPPED | OUTPATIENT
Start: 2020-11-14 | End: 2022-10-14 | Stop reason: ALTCHOICE

## 2020-11-14 NOTE — ASSESSMENT & PLAN NOTE
Continue barrier cream to buttocks.  Continue barrier patch per home health.  Continue to use cushion from wheelchair when sitting and change positions frequently.

## 2020-11-24 ENCOUNTER — OFFICE VISIT (OUTPATIENT)
Dept: FAMILY MEDICINE CLINIC | Facility: CLINIC | Age: 75
End: 2020-11-24

## 2020-11-24 ENCOUNTER — TELEPHONE (OUTPATIENT)
Dept: FAMILY MEDICINE CLINIC | Facility: CLINIC | Age: 75
End: 2020-11-24

## 2020-11-24 VITALS — DIASTOLIC BLOOD PRESSURE: 66 MMHG | HEART RATE: 60 BPM | SYSTOLIC BLOOD PRESSURE: 138 MMHG

## 2020-11-24 DIAGNOSIS — R60.0 LOCALIZED EDEMA: Primary | ICD-10-CM

## 2020-11-24 DIAGNOSIS — I10 ESSENTIAL HYPERTENSION: ICD-10-CM

## 2020-11-24 PROCEDURE — 99442 PR PHYS/QHP TELEPHONE EVALUATION 11-20 MIN: CPT | Performed by: NURSE PRACTITIONER

## 2020-11-24 RX ORDER — BLOOD-GLUCOSE METER
EACH MISCELLANEOUS
COMMUNITY
Start: 2020-11-20

## 2020-11-24 RX ORDER — LANCETS
EACH MISCELLANEOUS
COMMUNITY
Start: 2020-11-20

## 2020-11-24 NOTE — TELEPHONE ENCOUNTER
Patient's wife called and stated patient has started to swell from the knees down. Patient would like to know what to do     Please advise       [phone]

## 2020-11-24 NOTE — PATIENT INSTRUCTIONS
Check daily weight.  Continue compression stockings.  Continue to elevate legs when sitting.  Continue to monitor your blood pressure periodically and record results.  Continue to work on healthy diet and exercise as tolerated.  Follow up in 6 weeks, or sooner if symptoms persist or worsen.

## 2020-11-24 NOTE — PROGRESS NOTES
Subjective   Erlin Blanco is a 75 y.o. male.     Chief Complaint   Patient presents with   • Edema     You have chosen to receive care through a telephone visit. Do you consent to use a telephone visit for your medical care today? Yes    History of Present Illness   Patient presents with c/o edema; has had swelling of bilateral legs from knee down; symptoms started about 3 days ago and worse each day; had tacos few days ago and may have contributed to symptoms; typically does not eat much salt in diet; put on support stockings today and has helped; has had increased urination since has put on support stockings; left leg always swells more than right since had knee replacement; takes Furosemide daily; no redness or pain in legs; BP was 140/80 this morning; HR seems to be regular; no SOA; has not checked weight recently.    The following portions of the patient's history were reviewed and updated as appropriate: allergies, current medications, past family history, past medical history, past social history, past surgical history and problem list.    Current Outpatient Medications on File Prior to Visit   Medication Sig   • acetaminophen (TYLENOL) 325 MG tablet Take 2 tablets by mouth Every 4 (Four) Hours As Needed for Mild Pain .   • apixaban (ELIQUIS) 5 MG tablet tablet Take 1 tablet by mouth Every 12 (Twelve) Hours. Indications: Atrial Fibrillation   • aspirin 81 MG EC tablet Take 1 tablet by mouth Daily.   • carvedilol (COREG) 12.5 MG tablet Take 3 tablets by mouth 2 (Two) Times a Day With Meals.   • dilTIAZem CD (CARDIZEM CD) 360 MG 24 hr capsule Take 1 capsule by mouth Daily.   • finasteride (PROSCAR) 5 MG tablet Take 5 mg by mouth Every Night.   • fluticasone (Flonase) 50 MCG/ACT nasal spray 2 sprays into the nostril(s) as directed by provider Daily.   • furosemide (LASIX) 40 MG tablet Take 1 tablet by mouth Daily.   • glucose blood (OneTouch Ultra) test strip Test blood sugar 1-2 times daily and as needed.    • hydrALAZINE (APRESOLINE) 100 MG tablet Take 1 tablet by mouth 3 (Three) Times a Day.   • HYDROcodone-acetaminophen (NORCO) 5-325 MG per tablet Take 1 tablet by mouth Every 4 (Four) Hours As Needed for Moderate Pain .   • hydrocortisone-bacitracin-zinc oxide-nystatin (MAGIC BARRIER) Apply 1 application topically to the appropriate area as directed 2 (Two) Times a Day.   • hydroxyurea (HYDREA) 500 MG capsule Take 500 mg by mouth Daily.   • Insulin Glargine (LANTUS SOLOSTAR) 100 UNIT/ML injection pen Inject 40 Units under the skin into the appropriate area as directed Daily.   • Insulin Pen Needle (B-D UF III MINI PEN NEEDLES) 31G X 5 MM misc Inject 1 each under the skin into the appropriate area as directed Daily.   • losartan (COZAAR) 100 MG tablet Take 1 tablet by mouth Daily.   • montelukast (Singulair) 10 MG tablet Take 1 tablet by mouth Daily. (Patient taking differently: Take 10 mg by mouth Every Night.)   • Multiple Vitamins-Minerals (MULTIVITAMIN ADULT PO) Take 1 tablet by mouth Daily.   • potassium chloride (K-DUR,KLOR-CON) 20 MEQ CR tablet Take 1 tablet by mouth 3 (Three) Times a Day.   • pravastatin (PRAVACHOL) 40 MG tablet TAKE 1 TABLET DAILY   • Probiotic Product (Probiotic Daily) capsule Take 1 capsule by mouth Daily.   • sennosides-docusate (Senexon-S) 8.6-50 MG per tablet Take 1 tablet by mouth 2 (Two) Times a Day.   • terazosin (HYTRIN) 2 MG capsule Take 1 capsule by mouth Every Night.   • Accu-Chek FastClix Lancets misc TEST BLOOD SUGAR 1-2 TIMES DAILY AND AS NEEDED   • Blood Glucose Monitoring Suppl (Accu-Chek Guide Me) w/Device kit TEST BLOOD SUGAR 1-2 TIMES DAILY AND AS NEEDED     No current facility-administered medications on file prior to visit.        Past Medical History:   Diagnosis Date   • Abnormal ECG    • Abnormal stress test    • Abrasion of face 1/29/2020   • Acute bronchitis    • Acute pyelonephritis 10/9/2020   • Acute sinusitis    • Allergic rhinitis    • Aortic root dilation  (CMS/[facility])    • Arthritis    • Bacteremia due to Escherichia coli 8/17/2020   • Benign enlargement of prostate    • C. difficile colitis 9/16/2020   • Cellulitis of knee, left 5/4/2017   • CHF (congestive heart failure) (CMS/[facility])    • Chronic diastolic congestive heart failure (CMS/[facility])    • Contusion of face 1/29/2020   • Coronary artery disease    • Diminished pulse     in lower extremities   • Edema    • Elevated hematocrit    • Elevated hemoglobin (CMS/[facility])    • Essential hypertension    • Hearing loss     mala hearing aids   • Heart valve disease    • High risk medication use    • History of back pain    • History of dysuria    • History of echocardiogram    • History of intracranial hemorrhage    • History of scoliosis    • History of stroke    • Hyperlipidemia    • Injury of toe, left, initial encounter    • Irregular heart rate    • Knee pain, left    • Left bundle branch block    • Leg wound, left    • Low back pain    • Medicare annual wellness visit, subsequent    • Minor head injury without loss of consciousness 1/29/2020   • Murmur    • Muscle cramps    • Need for hepatitis C screening test    • Polycythemia    • Polycythemia vera (CMS/[facility])    • Prostate hyperplasia without urinary obstruction    • Prostatitis    • Proteinuria    • Pulmonary hypertension (CMS/[facility])    • Scoliosis    • Sepsis due to Escherichia coli (CMS/[facility]) 8/17/2020   • Sepsis with metabolic encephalopathy (CMS/[facility]) 8/17/2020   • Stroke (CMS/[facility])    • SVT (supraventricular tachycardia) (CMS/[facility])    • Tachycardia    • Thrombocytosis (CMS/[facility])    • TIA (transient ischemic attack)     2006   • Type 2 diabetes mellitus (CMS/[facility])    • Urinary tract infection due to extended-spectrum beta lactamase (ESBL) producing Escherichia coli 8/18/2020   • Valvular heart disease        Past Surgical History:   Procedure Laterality Date   • CARDIAC CATHETERIZATION     • COLONOSCOPY      did Cologuard approx 6/2019 and negative   • HAND SURGERY     • JOINT  REPLACEMENT  2014    Righ Knee   • CA TOTAL KNEE ARTHROPLASTY Left 2017    Procedure: LT TOTAL KNEE ARTHROPLASTY;  Surgeon: Bertin Perrin MD;  Location: [facility] OR;  Service: Orthopedics   • PROSTATE SURGERY      Rezum steam treatment to shrink prostate by dr. guerrero       Family History   Problem Relation Age of Onset   • Hypertension Mother    • Other Father         ruptured appendix,  when pt. was 14 years old.   • Diabetes Paternal Aunt    • Diabetes Paternal Uncle    • No Known Problems Other        Social History     Socioeconomic History   • Marital status:      Spouse name: Not on file   • Number of children: Not on file   • Years of education: Not on file   • Highest education level: Not on file   Tobacco Use   • Smoking status: Former Smoker     Types: Cigarettes     Quit date:      Years since quittin.9   • Smokeless tobacco: Former User   Substance and Sexual Activity   • Alcohol use: No   • Drug use: No   • Sexual activity: Defer       Review of Systems   Constitutional: Negative for appetite change (has been drinking protein shake daily), unexpected weight gain and unexpected weight loss. Fatigue: some fatigue, no change.   HENT: Negative for ear pain and sore throat.    Eyes: Negative for blurred vision and pain.   Respiratory: Negative for cough, chest tightness and shortness of breath.    Cardiovascular: Positive for leg swelling. Negative for chest pain and palpitations.   Gastrointestinal: Negative for abdominal pain, blood in stool, constipation, diarrhea, nausea and vomiting.   Endocrine: Negative for polydipsia (has been trying to drink more water).   Genitourinary: Negative for decreased urine volume, dysuria and hematuria.   Musculoskeletal: Back pain: no change, continues to improve.   Skin: Negative for rash.   Neurological: Negative for dizziness and headache.       Objective   Vitals:    20 1634   BP: 138/66   Pulse: 60     There is no height or  weight on file to calculate BMI.    Physical Exam  Constitutional:       General: He is not in acute distress.  Pulmonary:      Effort: Pulmonary effort is normal. No respiratory distress.   Neurological:      Mental Status: He is alert.   Psychiatric:         Mood and Affect: Mood normal.         Thought Content: Thought content normal.         Judgment: Judgment normal.     This was a telephone visit.    Lab Results   Component Value Date    WBC 8.60 11/04/2020    RBC 4.38 11/04/2020    HGB 13.2 11/04/2020    HCT 39.8 11/04/2020    MCV 90.9 11/04/2020    MCH 30.1 11/04/2020    MCHC 33.2 11/04/2020    RDW 14.0 11/04/2020    RDWSD 46.7 11/04/2020    MPV 10.6 11/04/2020     11/04/2020    NEUTRORELPCT 64.1 11/04/2020    LYMPHORELPCT 21.2 11/04/2020    MONORELPCT 5.6 11/04/2020    EOSRELPCT 7.0 (H) 11/04/2020    BASORELPCT 1.5 11/04/2020    AUTOIGPER 0.6 (H) 11/04/2020    NEUTROABS 5.52 11/04/2020    LYMPHSABS 1.82 11/04/2020    MONOSABS 0.48 11/04/2020    EOSABS 0.60 (H) 11/04/2020    BASOSABS 0.13 11/04/2020    AUTOIGNUM 0.05 11/04/2020    NRBC 0.0 11/04/2020     Lab Results   Component Value Date    GLUCOSE 150 (H) 11/11/2020    BUN 36 (H) 11/13/2020    CREATININE 1.13 11/13/2020    EGFRIFNONA 64 11/13/2020    EGFRIFAFRI 74 11/13/2020    BCR 32 (H) 11/13/2020    K 4.2 11/13/2020    CO2 26 11/13/2020    CALCIUM 9.7 11/13/2020    PROTENTOTREF 6.7 11/13/2020    ALBUMIN 4.0 11/13/2020    LABIL2 1.5 11/13/2020    AST 25 11/13/2020    ALT 28 11/13/2020      Lab Results   Component Value Date    CHLPL 118 11/13/2020    TRIG 58 11/13/2020    HDL 41 11/13/2020    VLDL 13 11/13/2020    LDL 64 11/13/2020     Lab Results   Component Value Date    TSH 2.740 09/26/2020     Lab Results   Component Value Date    HGBA1C 7.1 (H) 11/13/2020         Assessment/Plan .  Problem List Items Addressed This Visit     Hypertension    Current Assessment & Plan     Hypertension is stable.  Continue current medications.  Ambulatory blood  pressure monitoring.  Blood pressure will be reassessed at the next regular appointment.         Localized edema - Primary    Current Assessment & Plan     Check daily weight.  Continue compression stockings.  Continue to elevate legs when sitting.               Return in about 6 weeks (around 1/5/2021) for Medicare Wellness, Recheck.or sooner if symptoms persist or worsen.  Swelling in legs has improved with compression stockings; will continue to monitor symptoms and start checking daily weight; to follow up if symptoms persist or worsen.    Time spent doing telephone visit, reviewing, discussing, answering questions, and educating patient was 20 minutes.

## 2020-12-28 NOTE — PROGRESS NOTES
"Subjective   Patient ID: Erlin Blanco is a 75 y.o. male is here today for HFU back pain/ fu after IV antibiotics follow-up.    Patient hd MRI Thoracic spine, MRI pelvis, and MRI Lumbar 10.05.2020.    Patient reports today that has low back pain. Patient states that if he bends all the way, he has sever pain and N/T in his L hand/fingers. Patient states that the only time he notice N/T in his L hand is when he bends over to pick something up, once he stands back up the N/T in his L hand resides.  Patient reports he has L leg weakness.     Patient is currently taking OTC Tylenol for pain.    History of Present Illness    This patient returns today.  He is doing a lot better than he was in October when he was in the hospital.  At that time he was having severe pain in his lower back.  Now he has very little pain in his lower back.  He does have some numbness and tingling in his hands whenever he bends over.  The symptoms go away when he stands back up.  He does have a little weakness still in his legs.    The following portions of the patient's history were reviewed and updated as appropriate: allergies, current medications, past family history, past medical history, past social history, past surgical history and problem list.    Review of Systems   Constitutional: Negative for chills and fever.   HENT: Negative for congestion.    Genitourinary: Negative for difficulty urinating and dysuria.   Musculoskeletal: Positive for back pain and gait problem. Negative for neck pain and neck stiffness.   Neurological: Positive for weakness and numbness.        N/T L hand    L Leg weakness       I have reviewed the review of systems as documented by my MA.      Objective     Vitals:    12/29/20 1251   BP: 152/68   Cuff Size: Adult   Pulse: 61   Temp: 97.1 °F (36.2 °C)   Weight: 107 kg (236 lb)   Height: 182.9 cm (72\")     Body mass index is 32.01 kg/m².      Physical Exam  Neurological:      Mental Status: He is alert and " oriented to person, place, and time.       Neurologic Exam     Mental Status   Oriented to person, place, and time.       Assessment/Plan   Independent Review of Radiographic Studies:      I personally reviewed the images from the following studies.    I reviewed his imaging from October of this year.  This did show some signal abnormality at L4 and L5 with evidence of osteomyelitis and discitis at those levels.  I also reviewed an MRI of his thoracic spine which looked okay.  An MRI of his pelvis showed some psoas abscess as well.  This is all extending from an MRI that was originally done in August of this year and showed the initial infection.    Medical Decision Making:      I told the patient that I do not think we need to image his lower back anymore at all.  If he still had residual infection he would be having horrible pain in his lower back and not just some weakness in his legs.  I did recommend that we check an MRI of his cervical spine just because of the numbness and tingling in his hands to be sure there is nothing terribly dangerous there.  If that looks okay then I will think we need to do anything further.    Diagnoses and all orders for this visit:    1. Discitis of lumbar region (Primary)    2. Cervical radiculitis  -     MRI Cervical Spine Without Contrast; Future      Return for After radiology test.

## 2020-12-29 ENCOUNTER — OFFICE VISIT (OUTPATIENT)
Dept: NEUROSURGERY | Facility: CLINIC | Age: 75
End: 2020-12-29

## 2020-12-29 VITALS
HEIGHT: 72 IN | SYSTOLIC BLOOD PRESSURE: 152 MMHG | DIASTOLIC BLOOD PRESSURE: 68 MMHG | BODY MASS INDEX: 31.97 KG/M2 | TEMPERATURE: 97.1 F | WEIGHT: 236 LBS | HEART RATE: 61 BPM

## 2020-12-29 DIAGNOSIS — M46.46 DISCITIS OF LUMBAR REGION: Primary | ICD-10-CM

## 2020-12-29 DIAGNOSIS — M54.12 CERVICAL RADICULITIS: ICD-10-CM

## 2020-12-29 PROCEDURE — 99213 OFFICE O/P EST LOW 20 MIN: CPT | Performed by: NEUROLOGICAL SURGERY

## 2021-01-04 ENCOUNTER — OFFICE VISIT (OUTPATIENT)
Dept: FAMILY MEDICINE CLINIC | Facility: CLINIC | Age: 76
End: 2021-01-04

## 2021-01-04 VITALS
WEIGHT: 269.8 LBS | DIASTOLIC BLOOD PRESSURE: 80 MMHG | SYSTOLIC BLOOD PRESSURE: 140 MMHG | HEIGHT: 72 IN | HEART RATE: 65 BPM | BODY MASS INDEX: 36.54 KG/M2 | OXYGEN SATURATION: 96 % | TEMPERATURE: 98.2 F

## 2021-01-04 DIAGNOSIS — Z00.00 ENCOUNTER FOR MEDICARE ANNUAL WELLNESS EXAM: Primary | ICD-10-CM

## 2021-01-04 DIAGNOSIS — Z79.4 TYPE 2 DIABETES MELLITUS WITH BOTH EYES AFFECTED BY MILD NONPROLIFERATIVE RETINOPATHY WITHOUT MACULAR EDEMA, WITH LONG-TERM CURRENT USE OF INSULIN (HCC): ICD-10-CM

## 2021-01-04 DIAGNOSIS — I10 ESSENTIAL HYPERTENSION: ICD-10-CM

## 2021-01-04 DIAGNOSIS — R60.0 LOCALIZED EDEMA: ICD-10-CM

## 2021-01-04 DIAGNOSIS — D45 POLYCYTHEMIA VERA (HCC): ICD-10-CM

## 2021-01-04 DIAGNOSIS — E66.01 CLASS 2 SEVERE OBESITY DUE TO EXCESS CALORIES WITH SERIOUS COMORBIDITY AND BODY MASS INDEX (BMI) OF 36.0 TO 36.9 IN ADULT (HCC): ICD-10-CM

## 2021-01-04 DIAGNOSIS — M46.1 SACROILIITIS (HCC): ICD-10-CM

## 2021-01-04 DIAGNOSIS — E11.3293 TYPE 2 DIABETES MELLITUS WITH BOTH EYES AFFECTED BY MILD NONPROLIFERATIVE RETINOPATHY WITHOUT MACULAR EDEMA, WITH LONG-TERM CURRENT USE OF INSULIN (HCC): ICD-10-CM

## 2021-01-04 PROBLEM — R05.9 COUGH: Status: RESOLVED | Noted: 2020-04-14 | Resolved: 2021-01-04

## 2021-01-04 LAB
POC CREATININE URINE: 50
POC MICROALBUMIN URINE: 10

## 2021-01-04 PROCEDURE — G0439 PPPS, SUBSEQ VISIT: HCPCS | Performed by: NURSE PRACTITIONER

## 2021-01-04 PROCEDURE — 82044 UR ALBUMIN SEMIQUANTITATIVE: CPT | Performed by: NURSE PRACTITIONER

## 2021-01-04 PROCEDURE — 96160 PT-FOCUSED HLTH RISK ASSMT: CPT | Performed by: NURSE PRACTITIONER

## 2021-01-04 NOTE — PROGRESS NOTES
The ABCs of the Annual Wellness Visit  Subsequent Medicare Wellness Visit    Chief Complaint   Patient presents with   • Annual Exam     [facility] labwork        Subjective   History of Present Illness:  Erlin Blanco is a 75 y.o. male who presents for a Subsequent Medicare Wellness Visit.  In addition, we addressed the following health issues:    F/U DM2: takes Lantus daily; last A1c 7.1% when started Lantus; blood sugar has been running 90-110s in mornings; has had few blood sugars in 70s, typically running 90-110s; has not been able to exercise much recently; watches diet some, could do better; no numbness or tingling; last eye exam about 6 months ago at [facility].    F/U HTN: takes Carvedilol twice daily, Hydralazine TID, and Furosemide, Losartan, and Cardizem daily; also takes Eliquis twice daily; has upcoming appointment with Dr. Arias cardiology on 1/7/21; monitors BP, typically running about 140s/70-80s; no headaches; no orthostasis.    F/U edema: has swelling in bilateral legs; has been wearing support hose and helps some; has been on Triamterene/HCTZ in past and changed to Furosemide when was in hospital; has not been monitoring daily weight; has gained weight due to swelling and lack of exercise; has swelling from bilateral knees down.    F/U Hyperlipidemia: takes Pravastatin daily; no myalgias; fasting today.    F/U discitis: saw Dr. Miller, neurosurgery; lower back WNL at this point; if bends over, fingers of left hand will go numb; will be having MRI cervical spine and follow up with Dr. Miller.    Does not see pulmonary regularly.    F/U polycythemia vera: takes Hydroxyurea daily; no recent phlebotomy; sees Dr. Hoyos, hematology.      HEALTH RISK ASSESSMENT    Recent Hospitalizations:  Recently treated at the following:  [facility]    Current Medical Providers:  Patient Care Team:  Zamzam John APRN as PCP - General (Family Medicine)  Akhil Rossi MD as Consulting  Physician (Pulmonary Disease)  Bertin Perrin MD as Consulting Physician (Orthopedic Surgery)  Marika Arias MD as Consulting Physician (Cardiology)  Bakari Chapman Jr., MD as Consulting Physician (Urology)  Remy Thomas MD as Consulting Physician (Hematology and Oncology)    Smoking Status:  Social History     Tobacco Use   Smoking Status Former Smoker   • Types: Cigarettes   • Quit date:    • Years since quittin.0   Smokeless Tobacco Former User       Alcohol Consumption:  Social History     Substance and Sexual Activity   Alcohol Use No       Depression Screen:   PHQ-2/PHQ-9 Depression Screening 2021   Little interest or pleasure in doing things 0   Feeling down, depressed, or hopeless 0   Total Score 0       Fall Risk Screen:  STEADI Fall Risk Assessment was completed, and patient is at LOW risk for falls.Assessment completed on:2021    Health Habits and Functional and Cognitive Screening:  Functional & Cognitive Status 2021   Do you have difficulty preparing food and eating? No   Do you have difficulty bathing yourself, getting dressed or grooming yourself? No   Do you have difficulty using the toilet? No   Do you have difficulty moving around from place to place? No   Do you have trouble with steps or getting out of a bed or a chair? No   Current Diet Well Balanced Diet   Dental Exam Up to date   Eye Exam Up to date   Exercise (times per week) 0 times per week   Current Exercises Include No Regular Exercise   Do you need help using the phone?  No   Are you deaf or do you have serious difficulty hearing?  Yes   Do you need help with transportation? No   Do you need help shopping? No   Do you need help preparing meals?  No   Do you need help with housework?  No   Do you need help with laundry? No   Do you need help taking your medications? No   Do you need help managing money? No   Do you ever drive or ride in a car without wearing a seat belt? No   Have you felt unusual  stress, anger or loneliness in the last month? No   Who do you live with? Spouse   If you need help, do you have trouble finding someone available to you? No   Have you been bothered in the last four weeks by sexual problems? No   Do you have difficulty concentrating, remembering or making decisions? No     Has hearing aids.    Does the patient have evidence of cognitive impairment? No    Asprin use counseling:Taking ASA appropriately as indicated    Age-appropriate Screening Schedule:  Refer to the list below for future screening recommendations based on patient's age, sex and/or medical conditions. Orders for these recommended tests are listed in the plan section. The patient has been provided with a written plan.    Health Maintenance   Topic Date Due   • DIABETIC EYE EXAM  08/20/2020   • ZOSTER VACCINE (2 of 3) 04/01/2021 (Originally 2/1/2013)   • HEMOGLOBIN A1C  05/13/2021   • LIPID PANEL  11/13/2021   • DIABETIC FOOT EXAM  01/04/2022   • URINE MICROALBUMIN  01/04/2022   • TDAP/TD VACCINES (3 - Td) 01/25/2030   • INFLUENZA VACCINE  Completed          The following portions of the patient's history were reviewed and updated as appropriate: allergies, current medications, past family history, past medical history, past social history, past surgical history and problem list.    Outpatient Medications Prior to Visit   Medication Sig Dispense Refill   • Accu-Chek FastClix Lancets misc TEST BLOOD SUGAR 1-2 TIMES DAILY AND AS NEEDED     • acetaminophen (TYLENOL) 325 MG tablet Take 2 tablets by mouth Every 4 (Four) Hours As Needed for Mild Pain .     • apixaban (ELIQUIS) 5 MG tablet tablet Take 1 tablet by mouth Every 12 (Twelve) Hours. Indications: Atrial Fibrillation 180 tablet 1   • aspirin 81 MG EC tablet Take 1 tablet by mouth Daily. 90 tablet 1   • Blood Glucose Monitoring Suppl (Accu-Chek Guide Me) w/Device kit TEST BLOOD SUGAR 1-2 TIMES DAILY AND AS NEEDED     • carvedilol (COREG) 12.5 MG tablet Take 3 tablets  by mouth 2 (Two) Times a Day With Meals. 540 tablet 0   • dilTIAZem CD (CARDIZEM CD) 360 MG 24 hr capsule Take 1 capsule by mouth Daily. 90 capsule 1   • finasteride (PROSCAR) 5 MG tablet Take 5 mg by mouth Every Night.     • fluticasone (Flonase) 50 MCG/ACT nasal spray 2 sprays into the nostril(s) as directed by provider Daily. 3 bottle 1   • furosemide (LASIX) 40 MG tablet Take 1 tablet by mouth Daily. 90 tablet 1   • glucose blood (OneTouch Ultra) test strip Test blood sugar 1-2 times daily and as needed. 200 each 3   • hydrALAZINE (APRESOLINE) 100 MG tablet Take 1 tablet by mouth 3 (Three) Times a Day. 270 tablet 1   • HYDROcodone-acetaminophen (NORCO) 5-325 MG per tablet Take 1 tablet by mouth Every 4 (Four) Hours As Needed for Moderate Pain . 18 tablet 0   • hydrocortisone-bacitracin-zinc oxide-nystatin (MAGIC BARRIER) Apply 1 application topically to the appropriate area as directed 2 (Two) Times a Day.     • hydroxyurea (HYDREA) 500 MG capsule Take 500 mg by mouth Daily.     • Insulin Glargine (LANTUS SOLOSTAR) 100 UNIT/ML injection pen Inject 40 Units under the skin into the appropriate area as directed Daily. 12 pen 1   • Insulin Pen Needle (B-D UF III MINI PEN NEEDLES) 31G X 5 MM misc Inject 1 each under the skin into the appropriate area as directed Daily. 100 each 2   • losartan (COZAAR) 100 MG tablet Take 1 tablet by mouth Daily. 90 tablet 1   • montelukast (Singulair) 10 MG tablet Take 1 tablet by mouth Daily. (Patient taking differently: Take 10 mg by mouth Every Night.) 90 tablet 1   • Multiple Vitamins-Minerals (MULTIVITAMIN ADULT PO) Take 1 tablet by mouth Daily.     • potassium chloride (K-DUR,KLOR-CON) 20 MEQ CR tablet Take 1 tablet by mouth 3 (Three) Times a Day. 270 tablet 1   • pravastatin (PRAVACHOL) 40 MG tablet TAKE 1 TABLET DAILY 90 tablet 0   • Probiotic Product (Probiotic Daily) capsule Take 1 capsule by mouth Daily. 90 capsule 1   • sennosides-docusate (Senexon-S) 8.6-50 MG per tablet  Take 1 tablet by mouth 2 (Two) Times a Day. 180 tablet 1   • terazosin (HYTRIN) 2 MG capsule Take 1 capsule by mouth Every Night. 90 capsule 1     No facility-administered medications prior to visit.        Patient Active Problem List   Diagnosis   • Osteoarthritis, knee   • Type 2 diabetes mellitus, with long-term current use of insulin (CMS/[facility])   • Hypertension   • Coronary artery disease   • Supraventricular tachycardia (CMS/HCC)   • Aortic root dilation (CMS/[facility])   • TIA (transient ischemic attack)   • Hyperlipidemia   • Prostate hyperplasia without urinary obstruction   • Class 2 severe obesity due to excess calories with serious comorbidity and body mass index (BMI) of 36.0 to 36.9 in adult (CMS/[facility])   • Polycythemia vera (CMS/[facility])   • Acute non-recurrent sinusitis   • Right knee pain   • Allergic rhinitis   • LBBB (left bundle branch block)   • Fatigue   • Acute hypokalemia   • Atrial flutter with rapid ventricular response (CMS/[facility])   • Leukocytosis   • Pressure injury of buttock, stage 2 (CMS/[facility])   • Cerebrovascular accident (CMS/[facility])   • Discitis of lumbar region   • Constipation   • Localized edema   • Cervical radiculitis       Advanced Care Planning:  ACP discussion was held with the patient during this visit. Patient has an advance directive (not in EMR), copy requested.    Review of Systems   Constitutional: Negative for appetite change, chills, fatigue and fever. Unexpected weight change: some.   HENT: Negative for ear pain, sinus pressure, sore throat and trouble swallowing.    Eyes: Negative for discharge.   Respiratory: Negative for cough, chest tightness and shortness of breath.    Cardiovascular: Negative for chest pain and palpitations.   Gastrointestinal: Negative for abdominal pain, blood in stool, constipation and diarrhea (taking daily probiotic has helped bowels).   Endocrine: Negative for cold intolerance, heat intolerance and polydipsia.   Genitourinary: Negative for dysuria and  "frequency.   Musculoskeletal: Negative for back pain and neck pain (see HPI). Arthralgias: some in bilateral knees.   Skin: Negative for rash.   Neurological: Negative for dizziness and syncope.   Hematological: Does not bruise/bleed easily.   Psychiatric/Behavioral: Negative for dysphoric mood and sleep disturbance. The patient is not nervous/anxious.        Compared to one year ago, the patient feels his physical health is worse.  Compared to one year ago, the patient feels his mental health is the same.    Reviewed chart for potential of high risk medication in the elderly: yes  Reviewed chart for potential of harmful drug interactions in the elderly:yes    Objective         Vitals:    01/04/21 1033 01/04/21 1138   BP: 160/80 140/80   BP Location: Left arm Left arm   Patient Position: Sitting Sitting   Cuff Size: Adult    Pulse: 65    Temp: 98.2 °F (36.8 °C)    SpO2: 96%    Weight: 122 kg (269 lb 12.8 oz)    Height: 182.9 cm (72\")        Body mass index is 36.59 kg/m².  Discussed the patient's BMI with him. The BMI is above average; BMI management plan is completed.    Physical Exam  Vitals signs and nursing note reviewed.   Constitutional:       General: He is not in acute distress.     Appearance: He is well-developed and well-groomed. He is not diaphoretic.   HENT:      Head: Normocephalic and atraumatic.      Jaw: No tenderness or pain on movement.      Right Ear: External ear normal. Decreased hearing (has hearing aid) noted. Right ear middle ear effusion: few fluid bubbles behind TM.      Left Ear: Tympanic membrane and external ear normal. Decreased hearing (has hearing aid) noted.      Nose: Nose normal.      Right Sinus: No maxillary sinus tenderness or frontal sinus tenderness.      Left Sinus: No maxillary sinus tenderness or frontal sinus tenderness.      Mouth/Throat:      Mouth: Mucous membranes are moist.      Pharynx: No oropharyngeal exudate. Posterior oropharyngeal erythema: mild.   Eyes:      " Extraocular Movements: Extraocular movements intact.      Conjunctiva/sclera: Conjunctivae normal.      Pupils: Pupils are equal, round, and reactive to light.   Neck:      Musculoskeletal: Normal range of motion and neck supple.      Thyroid: No thyromegaly.      Vascular: No carotid bruit.      Trachea: No tracheal deviation.   Cardiovascular:      Rate and Rhythm: Normal rate and regular rhythm.      Pulses:           Dorsalis pedis pulses are 1+ on the right side and 2+ on the left side.        Posterior tibial pulses are 2+ on the right side and 2+ on the left side.      Heart sounds: S1 normal and S2 normal. Murmur present. Systolic murmur present with a grade of 2/6.      Comments: Swelling to just above bilateral knees  Pulmonary:      Effort: Pulmonary effort is normal. No respiratory distress.      Breath sounds: Normal breath sounds. No rales.   Abdominal:      General: Bowel sounds are normal.      Palpations: Abdomen is soft. There is no hepatomegaly or splenomegaly.      Tenderness: There is no abdominal tenderness.   Musculoskeletal: Normal range of motion.      Cervical back: He exhibits no bony tenderness.      Thoracic back: He exhibits no bony tenderness.      Lumbar back: He exhibits no bony tenderness.      Right lower le+ Edema present.      Left lower le+ Edema present.        Feet:    Feet:      Right foot:      Protective Sensation: 10 sites tested. 6 sites sensed.      Skin integrity: Right foot erythema: mild.      Left foot:      Protective Sensation: 10 sites tested. 5 sites sensed.      Skin integrity: Left foot erythema: mild.      Comments: Diabetic Foot Exam Performed and Monofilament Test Performed  Lymphadenopathy:      Cervical: No cervical adenopathy.   Skin:     General: Skin is warm and dry.      Findings: No rash.   Neurological:      Mental Status: He is alert and oriented to person, place, and time.      Cranial Nerves: No cranial nerve deficit.      Coordination:  Coordination normal.      Gait: Abnormal gait: guarded gait with cane.      Deep Tendon Reflexes: Reflexes are normal and symmetric.   Psychiatric:         Mood and Affect: Mood normal.         Behavior: Behavior normal.         Thought Content: Thought content normal.         Cognition and Memory: Cognition normal.         Judgment: Judgment normal.         Lab Results   Component Value Date     (H) 11/13/2020    CHLPL 118 11/13/2020    TRIG 58 11/13/2020    HDL 41 11/13/2020    LDL 64 11/13/2020    VLDL 13 11/13/2020    HGBA1C 7.1 (H) 11/13/2020        Assessment/Plan   Medicare Risks and Personalized Health Plan  CMS Preventative Services Quick Reference  Immunizations Discussed/Encouraged (specific immunizations; Shingrix )  Inactivity/Sedentary     Discussed moderate risk for falls and recommended avoiding throw rugs, installing grab bars in bathroom, making sure have handrails on steps, etc.    The above risks/problems have been discussed with the patient.  Pertinent information has been shared with the patient in the After Visit Summary.  Follow up plans and orders are seen below in the Assessment/Plan Section.    Diagnoses and all orders for this visit:    1. Encounter for Medicare annual wellness exam (Primary)    2. Type 2 diabetes mellitus with both eyes affected by mild nonproliferative retinopathy without macular edema, with long-term current use of insulin (CMS/[facility])  Assessment & Plan:  Diabetes is stable.   Continue current treatment regimen.  Regular aerobic exercise.  Diabetes will be reassessed in 1 month.  Continue Lantus daily.    Orders:  -     Basic Metabolic Panel  -     POC Microalbumin    3. Localized edema  Assessment & Plan:  Continue compression stockings.  Continue to elevate legs when sitting.  Continue to work on healthy diet and exercise.  Check daily weight.  Follow up as scheduled with cardiology.  Will consider increase in Furosemide to 40 mg BID x1 week pending lab results;  will repeat BMP in 1 week after increase.    Orders:  -     Basic Metabolic Panel    4. Sacroiliitis (CMS/[facility])    5. Polycythemia vera (CMS/[facility])  Assessment & Plan:  Follow up as scheduled with Dr. Thomas, hematology.      6. Essential hypertension  Assessment & Plan:  Hypertension is stable.  Regular aerobic exercise.  Continue current medications.  Ambulatory blood pressure monitoring.  Blood pressure will be reassessed in 3 months.      7. Class 2 severe obesity due to excess calories with serious comorbidity and body mass index (BMI) of 36.0 to 36.9 in adult (CMS/[facility])  Assessment & Plan:  Obesity is worsening.  Discussed the patient's BMI.  The BMI is above average; BMI management plan is completed.  General weight loss/lifestyle modification strategies discussed (elicit support from others; identify saboteurs; non-food rewards, etc).  Regular aerobic exercise program discussed.      Follow Up:  Return in about 1 month (around 2/4/2021) for Recheck.or sooner if symptoms persist or worsen.      An After Visit Summary and PPPS were given to the patient.

## 2021-01-04 NOTE — PATIENT INSTRUCTIONS
Continue to monitor your blood sugar once daily before a meal and record results.  Continue to monitor your blood pressure periodically and record results.  Continue to work on healthy diet and exercise.  Check daily weight.  Follow up pending lab results.  Follow up in 1 month, or sooner if symptoms persist or worsen.  Follow up as scheduled with cardiology.      Medicare Wellness  Personal Prevention Plan of Service     Date of Office Visit:  2021  Encounter Provider:  MUKUL Bañuelos  Place of Service:  [facility]  Patient Name: Erlin Blanco  :  1945    As part of the Medicare Wellness portion of your visit today, we are providing you with this personalized preventive plan of services (PPPS). This plan is based upon recommendations of the [place] Preventive Services Task Force (USPSTF) and the Advisory Committee on Immunization Practices (ACIP).    This lists the preventive care services that should be considered, and provides dates of when you are due. Items listed as completed are up-to-date and do not require any further intervention.    Health Maintenance   Topic Date Due   • DIABETIC EYE EXAM  2020   • ZOSTER VACCINE (2 of 3) 2021 (Originally 2013)   • HEMOGLOBIN A1C  2021   • LIPID PANEL  2021   • ANNUAL WELLNESS VISIT  2022   • DIABETIC FOOT EXAM  2022   • URINE MICROALBUMIN  2022   • COLOGUARD  2022   • TDAP/TD VACCINES (3 - Td) 2030   • HEPATITIS C SCREENING  Completed   • INFLUENZA VACCINE  Completed   • Pneumococcal Vaccine 65+  Completed   • AAA SCREEN (ONE-TIME)  Completed   • MENINGOCOCCAL VACCINE  Aged Out       Orders Placed This Encounter   Procedures   • Basic Metabolic Panel   • POC Microalbumin       Return in about 1 month (around 2021) for Recheck.  Check with pharmacy regarding Shingrix vaccine.

## 2021-01-05 DIAGNOSIS — R60.0 LOCALIZED EDEMA: Primary | ICD-10-CM

## 2021-01-05 LAB
BUN SERPL-MCNC: 23 MG/DL (ref 8–27)
BUN/CREAT SERPL: 22 (ref 10–24)
CALCIUM SERPL-MCNC: 9.9 MG/DL (ref 8.6–10.2)
CHLORIDE SERPL-SCNC: 106 MMOL/L (ref 96–106)
CO2 SERPL-SCNC: 25 MMOL/L (ref 20–29)
CREAT SERPL-MCNC: 1.03 MG/DL (ref 0.76–1.27)
GLUCOSE SERPL-MCNC: 66 MG/DL (ref 65–99)
POTASSIUM SERPL-SCNC: 4.6 MMOL/L (ref 3.5–5.2)
SODIUM SERPL-SCNC: 144 MMOL/L (ref 134–144)

## 2021-01-05 NOTE — ASSESSMENT & PLAN NOTE
Hypertension is stable.  Regular aerobic exercise.  Continue current medications.  Ambulatory blood pressure monitoring.  Blood pressure will be reassessed in 3 months.

## 2021-01-05 NOTE — ASSESSMENT & PLAN NOTE
Continue compression stockings.  Continue to elevate legs when sitting.  Continue to work on healthy diet and exercise.  Check daily weight.  Follow up as scheduled with cardiology.  Will consider increase in Furosemide to 40 mg BID x1 week pending lab results; will repeat BMP in 1 week after increase.

## 2021-01-05 NOTE — ASSESSMENT & PLAN NOTE
Diabetes is stable.   Continue current treatment regimen.  Regular aerobic exercise.  Diabetes will be reassessed in 1 month.  Continue Lantus daily.

## 2021-01-07 ENCOUNTER — OFFICE VISIT (OUTPATIENT)
Dept: CARDIOLOGY | Facility: CLINIC | Age: 76
End: 2021-01-07

## 2021-01-07 ENCOUNTER — TELEPHONE (OUTPATIENT)
Dept: NEUROSURGERY | Facility: CLINIC | Age: 76
End: 2021-01-07

## 2021-01-07 VITALS
DIASTOLIC BLOOD PRESSURE: 70 MMHG | SYSTOLIC BLOOD PRESSURE: 130 MMHG | HEART RATE: 62 BPM | BODY MASS INDEX: 35.16 KG/M2 | WEIGHT: 259.6 LBS | OXYGEN SATURATION: 98 % | HEIGHT: 72 IN

## 2021-01-07 DIAGNOSIS — I44.7 LBBB (LEFT BUNDLE BRANCH BLOCK): ICD-10-CM

## 2021-01-07 DIAGNOSIS — E78.2 MIXED HYPERLIPIDEMIA: ICD-10-CM

## 2021-01-07 DIAGNOSIS — I47.1 SUPRAVENTRICULAR TACHYCARDIA (HCC): ICD-10-CM

## 2021-01-07 DIAGNOSIS — I10 ESSENTIAL HYPERTENSION: ICD-10-CM

## 2021-01-07 DIAGNOSIS — Z79.4 TYPE 2 DIABETES MELLITUS WITH BOTH EYES AFFECTED BY MILD NONPROLIFERATIVE RETINOPATHY WITHOUT MACULAR EDEMA, WITH LONG-TERM CURRENT USE OF INSULIN (HCC): ICD-10-CM

## 2021-01-07 DIAGNOSIS — I77.810 AORTIC ROOT DILATION (HCC): ICD-10-CM

## 2021-01-07 DIAGNOSIS — E11.3293 TYPE 2 DIABETES MELLITUS WITH BOTH EYES AFFECTED BY MILD NONPROLIFERATIVE RETINOPATHY WITHOUT MACULAR EDEMA, WITH LONG-TERM CURRENT USE OF INSULIN (HCC): ICD-10-CM

## 2021-01-07 DIAGNOSIS — E87.6 HYPOKALEMIA: ICD-10-CM

## 2021-01-07 DIAGNOSIS — D45 POLYCYTHEMIA VERA (HCC): ICD-10-CM

## 2021-01-07 DIAGNOSIS — I48.92 ATRIAL FLUTTER WITH RAPID VENTRICULAR RESPONSE (HCC): Primary | ICD-10-CM

## 2021-01-07 DIAGNOSIS — I25.10 CORONARY ARTERY DISEASE INVOLVING NATIVE CORONARY ARTERY OF NATIVE HEART WITHOUT ANGINA PECTORIS: ICD-10-CM

## 2021-01-07 DIAGNOSIS — I63.9 CEREBROVASCULAR ACCIDENT (CVA), UNSPECIFIED MECHANISM (HCC): ICD-10-CM

## 2021-01-07 PROBLEM — I50.33 ACUTE ON CHRONIC DIASTOLIC CHF (CONGESTIVE HEART FAILURE): Status: ACTIVE | Noted: 2021-01-07

## 2021-01-07 PROCEDURE — 93000 ELECTROCARDIOGRAM COMPLETE: CPT | Performed by: NURSE PRACTITIONER

## 2021-01-07 PROCEDURE — 99214 OFFICE O/P EST MOD 30 MIN: CPT | Performed by: NURSE PRACTITIONER

## 2021-01-07 NOTE — PROGRESS NOTES
Date of Office Visit: 21  Encounter Provider: MUKUL Sun  Primary Cardiologist: Dr. Arias  Place of Service: [facility] CARDIOLOGY  Patient Name: Erlin Blanco  :1945      Subjective:     Chief Complaint:  Follow up post rehab    History of Present Illness:  Erlin Blanco is a pleasant 75 y.o. male who is known to me .  Outside records have been requested and reviewed by me if available.     Mr Blanco is a 75 year old patient of Dr Arias's with a history of paroxysmal AF, SVT, ventricular tachycardia, diabetes, coronary artery disease, TIA , hypertension, hyperlipidemia, diastolic CHF, dilated aortic root, LBBB, and polycythemia (receives therapeutic phlebotomy every 6 weeks). On his last ECHO no aortic root dilation was noted     He was in the ED in 2018 with SVT with 's.  He received adenosine and converted to NSR.  Her returned a couple days later with 's.  His Cardizem was increased and ZIO was placed that showed numerous episodes of SVT with possible atrial fibrillation with episode lasting 1 hour.  He was started on apixaban at that time.     At his follow up visit in 2018 EKG showed SVT and was asymptomatic. Carotid massage was performed and the patient converted to NSR. His apixaban was changed to Plavix due to no atrial fibrillation      He was seen in the office for annual visit on 2020 and was doing well.      He was hospitalized in 2020 with E. Coli sepsis secondary to polynephritis.  There was also concern for lumbar abscess.  He was treated with IV antibiotics.  We saw this patient for SVT/Tachycardia and new onset atrial fibrillation and was placed on Eliquis, metoprolol and diltiazem. ECHO  done at that time showed EF 60%, moderate LVH, with mild TR. He was discharged to rehab and was just discharged from rehab to home on 9/15/2020. He had labs drawn at home and was asked to come to the ED for  abnormal lab values. K was 2.2 and calcium 6.9.  While he was in the ED he went into rapid atrial fibrillation with 's. He received diltiazem bolus 10 mg followed by drip and KCL 40 mEq oral and has IV potassium in his IVF's. He was readmitted 9/15/2020 and hospitalized until 10/10/2020. He was also found to have an acute infarct on MRI of brain 9/23/2020 with moderate small vessel ischemic disease and scattered lacunar infarcts  His A. fib with RVR improved with electrolyte replacement. he had issues with back pain, encephalopathy, elevated blood pressures which required medication adjustment. He was discharged to rehab.    He is presenting today for post rehab follow up.  He is overall doing well.  Not having any chest pain, shortness with, PND, orthopnea, palpitations, dizziness, lightheadedness, syncope, near syncope.  He is slowly improving and is back at home and doing some walking daily.  Blood pressures running 130s to 140s/70s at home.  He is not sure about his heart rate but he is in normal sinus rhythm today.  About 10 days ago he started having some swelling in his legs more than his baseline and so 2 days ago his furosemide was increased to 40 mg twice daily and he started to urinate more and his edema is starting to improve.  He is elevating and wearing compression stockings and trying to watch his sodium.  He has no cough, fevers, chills.  He has had steady weight gain since the hospital but has not been weighing himself daily.  He is not having any further diarrhea.  His back pain is improving.  He notes he is going to be having scan of his cervical spine because he has some tingling in his hands when he bends forward.    Past Medical History:   Diagnosis Date   • Abnormal ECG    • Abnormal stress test    • Abrasion of face 1/29/2020   • Acute bronchitis    • Acute pyelonephritis 10/9/2020   • Acute sinusitis    • Allergic rhinitis    • Aortic root dilation (CMS/HCC)    • Arthritis    •  Bacteremia due to Escherichia coli 8/17/2020   • Benign enlargement of prostate    • C. difficile colitis 9/16/2020   • Cellulitis of knee, left 5/4/2017   • CHF (congestive heart failure) (CMS/[facility])    • Chronic diastolic congestive heart failure (CMS/[facility])    • Contusion of face 1/29/2020   • Coronary artery disease    • Diminished pulse     in lower extremities   • Edema    • Elevated hematocrit    • Elevated hemoglobin (CMS/[facility])    • Essential hypertension    • Hearing loss     mala hearing aids   • Heart valve disease    • High risk medication use    • History of back pain    • History of dysuria    • History of echocardiogram    • History of intracranial hemorrhage    • History of scoliosis    • History of stroke    • Hyperlipidemia    • Injury of toe, left, initial encounter    • Irregular heart rate    • Knee pain, left    • Left bundle branch block    • Leg wound, left    • Low back pain    • Medicare annual wellness visit, subsequent    • Minor head injury without loss of consciousness 1/29/2020   • Murmur    • Muscle cramps    • Need for hepatitis C screening test    • Polycythemia    • Polycythemia vera (CMS/[facility])    • Prostate hyperplasia without urinary obstruction    • Prostatitis    • Proteinuria    • Pulmonary hypertension (CMS/[facility])    • Sacroiliitis (CMS/[facility]) 10/9/2020   • Scoliosis    • Sepsis due to Escherichia coli (CMS/[facility]) 8/17/2020   • Sepsis with metabolic encephalopathy (CMS/[facility]) 8/17/2020   • Stroke (CMS/[facility])    • SVT (supraventricular tachycardia) (CMS/[facility])    • Tachycardia    • Thrombocytosis (CMS/[facility])    • TIA (transient ischemic attack)     2006   • Type 2 diabetes mellitus (CMS/[facility])    • Urinary tract infection due to extended-spectrum beta lactamase (ESBL) producing Escherichia coli 8/18/2020   • Valvular heart disease      Past Surgical History:   Procedure Laterality Date   • CARDIAC CATHETERIZATION     • COLONOSCOPY      did Cologuard approx 6/2019 and negative   • HAND SURGERY     •  JOINT REPLACEMENT  2014    [facility] Knee   • KS TOTAL KNEE ARTHROPLASTY Left 4/27/2017    Procedure: LT TOTAL KNEE ARTHROPLASTY;  Surgeon: Bertin Perrin MD;  Location: [facility]  Service: Orthopedics   • PROSTATE SURGERY      Rezum steam treatment to shrink prostate by dr. guerrero     Outpatient Medications Prior to Visit   Medication Sig Dispense Refill   • Accu-Chek FastClix Lancets misc TEST BLOOD SUGAR 1-2 TIMES DAILY AND AS NEEDED     • apixaban (ELIQUIS) 5 MG tablet tablet Take 1 tablet by mouth Every 12 (Twelve) Hours. Indications: Atrial Fibrillation 180 tablet 1   • aspirin 81 MG EC tablet Take 1 tablet by mouth Daily. 90 tablet 1   • Blood Glucose Monitoring Suppl (Accu-Chek Guide Me) w/Device kit TEST BLOOD SUGAR 1-2 TIMES DAILY AND AS NEEDED     • carvedilol (COREG) 12.5 MG tablet Take 3 tablets by mouth 2 (Two) Times a Day With Meals. 540 tablet 0   • dilTIAZem CD (CARDIZEM CD) 360 MG 24 hr capsule Take 1 capsule by mouth Daily. 90 capsule 1   • finasteride (PROSCAR) 5 MG tablet Take 5 mg by mouth Every Night.     • fluticasone (Flonase) 50 MCG/ACT nasal spray 2 sprays into the nostril(s) as directed by provider Daily. 3 bottle 1   • furosemide (LASIX) 40 MG tablet Take 1 tablet by mouth Daily. (Patient taking differently: Take 40 mg by mouth 2 (Two) Times a Day.) 90 tablet 1   • glucose blood (OneTouch Ultra) test strip Test blood sugar 1-2 times daily and as needed. 200 each 3   • hydrALAZINE (APRESOLINE) 100 MG tablet Take 1 tablet by mouth 3 (Three) Times a Day. 270 tablet 1   • hydrocortisone-bacitracin-zinc oxide-nystatin (MAGIC BARRIER) Apply 1 application topically to the appropriate area as directed 2 (Two) Times a Day.     • hydroxyurea (HYDREA) 500 MG capsule Take 500 mg by mouth Daily.     • Insulin Glargine (LANTUS SOLOSTAR) 100 UNIT/ML injection pen Inject 40 Units under the skin into the appropriate area as directed Daily. 12 pen 1   • Insulin Pen Needle (B-D UF III MINI PEN NEEDLES)  31G X 5 MM misc Inject 1 each under the skin into the appropriate area as directed Daily. 100 each 2   • losartan (COZAAR) 100 MG tablet Take 1 tablet by mouth Daily. 90 tablet 1   • montelukast (Singulair) 10 MG tablet Take 1 tablet by mouth Daily. (Patient taking differently: Take 10 mg by mouth Every Night.) 90 tablet 1   • Multiple Vitamins-Minerals (MULTIVITAMIN ADULT PO) Take 1 tablet by mouth Daily.     • potassium chloride (K-DUR,KLOR-CON) 20 MEQ CR tablet Take 1 tablet by mouth 3 (Three) Times a Day. 270 tablet 1   • pravastatin (PRAVACHOL) 40 MG tablet TAKE 1 TABLET DAILY 90 tablet 0   • Probiotic Product (Probiotic Daily) capsule Take 1 capsule by mouth Daily. 90 capsule 1   • sennosides-docusate (Senexon-S) 8.6-50 MG per tablet Take 1 tablet by mouth 2 (Two) Times a Day. 180 tablet 1   • terazosin (HYTRIN) 2 MG capsule Take 1 capsule by mouth Every Night. 90 capsule 1   • acetaminophen (TYLENOL) 325 MG tablet Take 2 tablets by mouth Every 4 (Four) Hours As Needed for Mild Pain .     • HYDROcodone-acetaminophen (NORCO) 5-325 MG per tablet Take 1 tablet by mouth Every 4 (Four) Hours As Needed for Moderate Pain . 18 tablet 0     No facility-administered medications prior to visit.        Allergies as of 2021   • (No Known Allergies)     Social History     Socioeconomic History   • Marital status:      Spouse name: Not on file   • Number of children: Not on file   • Years of education: Not on file   • Highest education level: Not on file   Tobacco Use   • Smoking status: Former Smoker     Types: Cigarettes     Quit date:      Years since quittin.0   • Smokeless tobacco: Former User   • Tobacco comment: Caffeine daily   Substance and Sexual Activity   • Alcohol use: No   • Drug use: No   • Sexual activity: Defer     Family History   Problem Relation Age of Onset   • Hypertension Mother    • Other Father         ruptured appendix,  when pt. was 14 years old.   • Diabetes Paternal  "Aunt    • Diabetes Paternal Uncle    • No Known Problems Other      Review of Systems   Constitution: Negative for chills, fever and malaise/fatigue.   HENT: Negative for ear pain, hearing loss, nosebleeds and sore throat.    Eyes: Negative for double vision, pain, vision loss in left eye and vision loss in right eye.   Cardiovascular: Positive for leg swelling. Negative for chest pain, claudication, dyspnea on exertion, irregular heartbeat, near-syncope, orthopnea, palpitations, paroxysmal nocturnal dyspnea and syncope.   Respiratory: Negative for cough, shortness of breath, snoring and wheezing.    Endocrine: Negative for cold intolerance and heat intolerance.   Hematologic/Lymphatic: Negative for bleeding problem.   Skin: Negative for color change, itching, rash and unusual hair distribution.   Musculoskeletal: Negative for joint pain and joint swelling.   Gastrointestinal: Negative for abdominal pain, diarrhea, hematochezia, melena, nausea and vomiting.   Genitourinary: Negative for decreased libido, frequency, hematuria, hesitancy and incomplete emptying.   Neurological: Negative for excessive daytime sleepiness, dizziness, headaches, light-headedness, loss of balance, numbness, paresthesias and seizures.   Psychiatric/Behavioral: Negative for depression.          Objective:     Vitals:    01/07/21 0919 01/07/21 0939   BP: 130/70    BP Location: Right arm    Patient Position: Sitting    Pulse: 60 62   SpO2:  98%   Weight: 118 kg (259 lb 9.6 oz)    Height: 182.9 cm (72\")      Body mass index is 35.21 kg/m².    PHYSICAL EXAM:  Vitals signs and nursing note reviewed.   Constitutional:       General: Not in acute distress.     Appearance: Well-developed and not in distress. Not diaphoretic.   Eyes:      Comments: Wearing glasses   HENT:      Head: Normocephalic and atraumatic.   Neck:      Vascular: No JVD.   Pulmonary:      Effort: Pulmonary effort is normal. No respiratory distress.      Breath sounds: Normal " breath sounds. No decreased breath sounds. No wheezing. No rhonchi. No rales.   Cardiovascular:      Normal rate. Regular rhythm.      Murmurs: There is a grade 2/6 systolic murmur at the URSB.   Pulses:     Carotid: 2+ bilaterally.     Radial: 2+ bilaterally.  Edema:     Pretibial: bilateral 2+ edema of the pretibial area.     Ankle: bilateral 2+ edema of the ankle.     Feet: bilateral 2+ edema of the feet.  Abdominal:      General: Bowel sounds are normal. There is no distension.      Palpations: Abdomen is soft.      Tenderness: There is no abdominal tenderness.   Musculoskeletal:      Comments: Uses a cane   Skin:     General: Skin is warm and dry.      Findings: No erythema.   Neurological:      Mental Status: Alert and oriented to person, place, and time.   Psychiatric:         Attention and Perception: Attention normal.         Mood and Affect: Mood normal.         Speech: Speech normal.         Behavior: Behavior normal.         Thought Content: Thought content normal.         Cognition and Memory: Cognition normal.         Judgment: Judgment normal.           ECG 12 Lead    Date/Time: 1/7/2021 9:27 AM  Performed by: Cleopatra Augustin APRN  Authorized by: Cleopatra Augustin APRN   Comparison: compared with previous ECG from 9/16/2020  Comparison to previous ECG: No longer in atrial fibrillation  Similar to 6/18/2020 ECG with Dr. Arias  Rhythm: sinus rhythm  Rate: normal  BPM: 60  Conduction: left bundle branch block    Clinical impression: abnormal EKG  Comments: Indication: PAF, LBBB, CAD, diastolic HF            Most recent lab review:  1/4/2021BMP normal potassium was 4.6  11/13/2020 hemoglobin A1c 7.1% lipid panel under good control LDL 64, HDL 41, triglycerides glycerides 58, total cholesterol 118 CMP with normal LFTs  11/4/2020 CBC unremarkable with normal white blood cell count, H&H and platelet count  Assessment:       Diagnosis Plan   1. Atrial flutter with rapid ventricular response (CMS/HCC)  ECG 12  Lead   2. Coronary artery disease involving native coronary artery of native heart without angina pectoris  ECG 12 Lead   3. Supraventricular tachycardia (CMS/HCC)  ECG 12 Lead   4. LBBB (left bundle branch block)  ECG 12 Lead   5. Aortic root dilation (CMS/HCC)  ECG 12 Lead   6. Cerebrovascular accident (CVA), unspecified mechanism (CMS/[facility])  ECG 12 Lead   7. Hypokalemia  ECG 12 Lead   8. Essential hypertension  ECG 12 Lead   9. Mixed hyperlipidemia  ECG 12 Lead   10. Type 2 diabetes mellitus with both eyes affected by mild nonproliferative retinopathy without macular edema, with long-term current use of insulin (CMS/[facility])  ECG 12 Lead   11. Polycythemia vera (CMS/[facility])  ECG 12 Lead       Plan:     1.  Atrial fibrillation/flutter with RVR: IN sinus rhythm today. On coreg, diltiazem, apixaban. 8/2020 echo EF normal with moderately increased left atrial volume, moderately dilated right atrial cavity mild TR and mild pulmonary hypertension RVSP 41 mmHg.  He had evidence of acute CVA with scattered lacunar infarcts as well on 9/23/2020 MRI.  Continue current management.  Bleeding and safety precautions reviewed. Denies falls/bleeding.  2.  History of acute on chronic diastolic CHF: on Furosemide. Having some bilateral lower extremity edema to above the knees.  No dyspnea, PND, orthopnea, cough, chest pain.  HF teaching and low sodium diet reviewed.  Reminded him about daily weights.  Plan as below with increased diuretic per PCP  3.  Hx of Hypokalemia: K+ 4.6 on 1/4/2020.  4.  CAD: No angina.  On aspirin, statin.  5.  Hypertension: BP is overall well controlled on current management  6.  LBBB: Chronic  7. HX of dilated aortic root: not mentioned on last echo 8/2020.  8. Diabetes: per PCP  9.  HLD: on statin  10. Hx of polycythemia vera: on medical therapy/gets therapuetic phlebotomy per hematology  11. . Hx C. difficile colitis: Peers resolved.  12. L4/L5 discitis and epidural abscess: completed atbx by ID and follows  with neurosurgery.  Slowly improving  13. Murmur: Other than edema he is having no shortness of breath and is doing some walking.  His 8/2020 echo showed noSevere valvular disease we will keep an eye on this for now.       Furosemide increased to 40 mg BID by PCP, repeat BMP in 1 week to follow his potassium already ordered by PCP. I agree with that plan and continue compression stockings/elevation/low sodium diet.  He will call with any worsening shortness of breath or no improvement in symptoms.    Follow up with Dr. Arias on 6/22/2021 as scheduled, unless otherwise needed sooner.  I advised the patient to contact our office with any questions or concerns.  Patient denied the need for any medication refills at this time.   It has been a pleasure to participate in this patient's care. Please feel free to contact me with any questions or concerns.     Cleopatra Augustin, APRN  01/07/21             Your medication list          Accurate as of January 7, 2021  9:50 AM. If you have any questions, ask your nurse or doctor.            CHANGE how you take these medications      Instructions Last Dose Given Next Dose Due   furosemide 40 MG tablet  Commonly known as: LASIX  What changed: when to take this      Take 1 tablet by mouth Daily.       montelukast 10 MG tablet  Commonly known as: Singulair  What changed: when to take this      Take 1 tablet by mouth Daily.          CONTINUE taking these medications      Instructions Last Dose Given Next Dose Due   Accu-Chek FastClix Lancets misc      TEST BLOOD SUGAR 1-2 TIMES DAILY AND AS NEEDED       Accu-Chek Guide Me w/Device kit      TEST BLOOD SUGAR 1-2 TIMES DAILY AND AS NEEDED       apixaban 5 MG tablet tablet  Commonly known as: ELIQUIS      Take 1 tablet by mouth Every 12 (Twelve) Hours. Indications: Atrial Fibrillation       aspirin 81 MG EC tablet      Take 1 tablet by mouth Daily.       B-D UF III MINI PEN NEEDLES 31G X 5 MM misc  Generic drug: Insulin Pen Needle       Inject 1 each under the skin into the appropriate area as directed Daily.       carvedilol 12.5 MG tablet  Commonly known as: COREG      Take 3 tablets by mouth 2 (Two) Times a Day With Meals.       dilTIAZem  MG 24 hr capsule  Commonly known as: CARDIZEM CD      Take 1 capsule by mouth Daily.       finasteride 5 MG tablet  Commonly known as: PROSCAR      Take 5 mg by mouth Every Night.       fluticasone 50 MCG/ACT nasal spray  Commonly known as: Flonase      2 sprays into the nostril(s) as directed by provider Daily.       hydrALAZINE 100 MG tablet  Commonly known as: APRESOLINE      Take 1 tablet by mouth 3 (Three) Times a Day.       hydrocortisone-bacitracin-zinc oxide-nystatin  Commonly known as: MAGIC BARRIER      Apply 1 application topically to the appropriate area as directed 2 (Two) Times a Day.       hydroxyurea 500 MG capsule  Commonly known as: HYDREA      Take 500 mg by mouth Daily.       Insulin Glargine 100 UNIT/ML injection pen  Commonly known as: LANTUS SOLOSTAR      Inject 40 Units under the skin into the appropriate area as directed Daily.       losartan 100 MG tablet  Commonly known as: COZAAR      Take 1 tablet by mouth Daily.       multivitamin with minerals tablet tablet      Take 1 tablet by mouth Daily.       OneTouch Ultra test strip  Generic drug: glucose blood      Test blood sugar 1-2 times daily and as needed.       potassium chloride 20 MEQ CR tablet  Commonly known as: K-DUR,KLOR-CON      Take 1 tablet by mouth 3 (Three) Times a Day.       pravastatin 40 MG tablet  Commonly known as: PRAVACHOL      TAKE 1 TABLET DAILY       Probiotic Daily capsule      Take 1 capsule by mouth Daily.       sennosides-docusate 8.6-50 MG per tablet  Commonly known as: Senexon-S      Take 1 tablet by mouth 2 (Two) Times a Day.       terazosin 2 MG capsule  Commonly known as: HYTRIN      Take 1 capsule by mouth Every Night.              The above medication changes may not have been made by this  provider.  Medication list was updated to reflect medications patient is currently taking including medication changes and discontinuations made by other healthcare providers or the patients themselves.     Dictated utilizing Dragon Dictation System.

## 2021-01-07 NOTE — TELEPHONE ENCOUNTER
Provider: DR AMBROSE  Caller: FELIPE DOCKERY  Relationship to Patient: SELF  Pharmacy:   Phone Number: [phone]    Reason for Call: PT IS CALLING BECAUSE HE IS GETTING HIS MRI DONE ON 1/20/21.  HIS APPOINTMENT FOR 1/21/21 HAS BEEN CANCELLED. HE WOULD LIKE TO KEEP THAT APPOINTMENT IF POSSIBLE IF DR HALIE COLEMANAN GET HIS IMAGING THEN

## 2021-01-13 ENCOUNTER — TELEPHONE (OUTPATIENT)
Dept: FAMILY MEDICINE CLINIC | Facility: CLINIC | Age: 76
End: 2021-01-13

## 2021-01-13 NOTE — TELEPHONE ENCOUNTER
Caller: Erlin Blanco    Relationship to patient: Self    Best call back number: [phone]     Patient is needing: Patient called in and is requesting a lab appointment . Hub attempted to contact practice not successful . Please call

## 2021-01-20 ENCOUNTER — HOSPITAL ENCOUNTER (OUTPATIENT)
Dept: MRI IMAGING | Facility: HOSPITAL | Age: 76
Discharge: HOME | End: 2021-01-20
Admitting: NEUROLOGICAL SURGERY

## 2021-01-20 DIAGNOSIS — M54.12 CERVICAL RADICULITIS: ICD-10-CM

## 2021-01-20 PROCEDURE — 72141 MRI NECK SPINE W/O DYE: CPT

## 2021-01-21 ENCOUNTER — OFFICE VISIT (OUTPATIENT)
Dept: NEUROSURGERY | Facility: CLINIC | Age: 76
End: 2021-01-21

## 2021-01-21 VITALS
HEIGHT: 72 IN | BODY MASS INDEX: 35.16 KG/M2 | WEIGHT: 259.6 LBS | HEART RATE: 53 BPM | DIASTOLIC BLOOD PRESSURE: 69 MMHG | SYSTOLIC BLOOD PRESSURE: 154 MMHG | TEMPERATURE: 97.7 F

## 2021-01-21 DIAGNOSIS — M46.46 DISCITIS OF LUMBAR REGION: ICD-10-CM

## 2021-01-21 DIAGNOSIS — M54.12 CERVICAL RADICULITIS: Primary | ICD-10-CM

## 2021-01-21 PROCEDURE — 99213 OFFICE O/P EST LOW 20 MIN: CPT | Performed by: NEUROLOGICAL SURGERY

## 2021-01-21 NOTE — PROGRESS NOTES
"Subjective   Patient ID: Erlin Blanco is a 75 y.o. male is here today for MRI C-spine follow-up. MRI ordered at last visit on 12.29.2020.    Today patients states that when he bends over, his L  Fingers go numb. Patient denies pain in his neck.     Patient, provdier, and MA are all wearing masks today in the office.     History of Present Illness    This patient continues with numbness in his hands whenever he bends his neck over.  For the most part however he really does not have too much in the way of symptoms.  He does have some weakness in his legs on and off as well.    The following portions of the patient's history were reviewed and updated as appropriate: allergies, current medications, past family history, past medical history, past social history, past surgical history and problem list.    Review of Systems   Constitutional: Negative for chills and fever.   HENT: Negative for congestion.    Musculoskeletal: Positive for neck stiffness. Negative for back pain and neck pain.   Neurological: Positive for weakness and numbness.        L. ARM/HAND WEAKNESS AND NUMBNESS       I have reviewed the review of systems as documented by my MA.      Objective     Vitals:    01/21/21 1122   BP: 154/69   Cuff Size: Adult   Pulse: 53   Temp: 97.7 °F (36.5 °C)   Weight: 118 kg (259 lb 9.6 oz)   Height: 182.9 cm (72\")     Body mass index is 35.21 kg/m².      Physical Exam  Neurological:      Mental Status: He is alert and oriented to person, place, and time.       Neurologic Exam     Mental Status   Oriented to person, place, and time.           Assessment/Plan   Independent Review of Radiographic Studies:      I personally reviewed the images from the following studies.    I reviewed his MRI of the cervical spine done yesterday.  This does show some multilevel degenerative disease.  C2-3 is fairly open.  C3-4 shows some central stenosis and some foraminal stenosis particularly on the left.  C4-5 shows some very mild " foraminal stenosis.  C5-6 shows more significant foraminal stenosis and some central stenosis.  C6-7 shows foraminal and central stenosis and C7-T1 mostly looks okay.  Radiologist felt there was an abnormality in the right lamina of C2 to but I really do not completely appreciate this.  I also looked at his MRI from last October which showed the area of infection at L4-5 but nothing that had gotten a lot worse since the previous MRI.    Medical Decision Making:      I told the patient that based on the MRI of the cervical spine I do not see anything here that is terribly dangerous.  We have also looked at his thoracic and his lumbar spine in the past and there was nothing dangerous there either.  Consequently based on his current symptoms which he says are mostly tolerable I would tend to just leave this alone.  He agrees.  He will call if anything becomes intolerable and we can always reevaluate him at that time.  I do not see any reason to reimage his lumbar spine since he is not having any trouble with his lower back right now.    Diagnoses and all orders for this visit:    1. Cervical radiculitis (Primary)    2. Discitis of lumbar region      Return if symptoms worsen or fail to improve.

## 2021-01-22 ENCOUNTER — OFFICE VISIT (OUTPATIENT)
Dept: FAMILY MEDICINE CLINIC | Facility: CLINIC | Age: 76
End: 2021-01-22

## 2021-01-22 VITALS
TEMPERATURE: 98.7 F | BODY MASS INDEX: 34.72 KG/M2 | SYSTOLIC BLOOD PRESSURE: 122 MMHG | HEART RATE: 58 BPM | OXYGEN SATURATION: 97 % | WEIGHT: 256.3 LBS | HEIGHT: 72 IN | DIASTOLIC BLOOD PRESSURE: 54 MMHG

## 2021-01-22 DIAGNOSIS — E11.3293 TYPE 2 DIABETES MELLITUS WITH BOTH EYES AFFECTED BY MILD NONPROLIFERATIVE RETINOPATHY WITHOUT MACULAR EDEMA, WITH LONG-TERM CURRENT USE OF INSULIN (HCC): ICD-10-CM

## 2021-01-22 DIAGNOSIS — I10 ESSENTIAL HYPERTENSION: ICD-10-CM

## 2021-01-22 DIAGNOSIS — R60.0 LOCALIZED EDEMA: Primary | ICD-10-CM

## 2021-01-22 DIAGNOSIS — Z79.4 TYPE 2 DIABETES MELLITUS WITH BOTH EYES AFFECTED BY MILD NONPROLIFERATIVE RETINOPATHY WITHOUT MACULAR EDEMA, WITH LONG-TERM CURRENT USE OF INSULIN (HCC): ICD-10-CM

## 2021-01-22 PROCEDURE — 99214 OFFICE O/P EST MOD 30 MIN: CPT | Performed by: NURSE PRACTITIONER

## 2021-01-22 NOTE — PATIENT INSTRUCTIONS
Decrease Lantus to 36 units daily.  Continue to monitor your blood sugar once daily before a meal and record results.  Continue to monitor your blood pressure periodically and record results.  Continue to work on healthy diet and exercise.  Follow up pending lab results.  Follow up in 1 month, or sooner if problems or concerns.

## 2021-01-22 NOTE — PROGRESS NOTES
Subjective   Erlin Blanco is a 75 y.o. male.     Chief Complaint   Patient presents with   • Leg Swelling     follow up on laxis        History of Present Illness   Patient presents for follow up edema: increased Furosemide to 40 mg BID and has helped; weight is down about 13 pounds; saw cardiology on 1/7/21 and also recommended compression stockings/elevation and low sodium diet; has been elevating legs when sitting; no added salt in diet; overall swelling is much better; no SOA; needs labs rechecked today    F/U HTN: takes Carvediolol twice daily, Hydralazine TID, and Losartan and Cardizem daily; also takes Eliquis twice daily for atrial fib; monitors BP, typically runs 130-140s/70s; no headaches; no orthostasis.    F/U DM2: takes Lantus daily; last A1c 7.1%; blood sugars have been running 120s, was in 70s a few mornings and felt weak; has been eating good; has gained some weight back; no numbness or tingling; needs repeat A1c after 2/14/21.    Saw Dr. Miller for numbness in fingers when bending over; had MRI cervical spine; not necessary for surgery at this point; will continue to monitor symptoms.    The following portions of the patient's history were reviewed and updated as appropriate: allergies, current medications, past family history, past medical history, past social history, past surgical history and problem list.    Current Outpatient Medications on File Prior to Visit   Medication Sig   • Accu-Chek FastClix Lancets misc TEST BLOOD SUGAR 1-2 TIMES DAILY AND AS NEEDED   • apixaban (ELIQUIS) 5 MG tablet tablet Take 1 tablet by mouth Every 12 (Twelve) Hours. Indications: Atrial Fibrillation   • aspirin 81 MG EC tablet Take 1 tablet by mouth Daily.   • Blood Glucose Monitoring Suppl (Accu-Chek Guide Me) w/Device kit TEST BLOOD SUGAR 1-2 TIMES DAILY AND AS NEEDED   • carvedilol (COREG) 12.5 MG tablet Take 3 tablets by mouth 2 (Two) Times a Day With Meals.   • dilTIAZem CD (CARDIZEM CD) 360 MG 24 hr capsule  Take 1 capsule by mouth Daily.   • finasteride (PROSCAR) 5 MG tablet Take 5 mg by mouth Every Night.   • fluticasone (Flonase) 50 MCG/ACT nasal spray 2 sprays into the nostril(s) as directed by provider Daily.   • furosemide (LASIX) 40 MG tablet Take 1 tablet by mouth Daily. (Patient taking differently: Take 40 mg by mouth 2 (Two) Times a Day.)   • glucose blood (OneTouch Ultra) test strip Test blood sugar 1-2 times daily and as needed.   • hydrALAZINE (APRESOLINE) 100 MG tablet Take 1 tablet by mouth 3 (Three) Times a Day.   • hydrocortisone-bacitracin-zinc oxide-nystatin (MAGIC BARRIER) Apply 1 application topically to the appropriate area as directed 2 (Two) Times a Day.   • hydroxyurea (HYDREA) 500 MG capsule Take 500 mg by mouth Daily.   • Insulin Pen Needle (B-D UF III MINI PEN NEEDLES) 31G X 5 MM misc Inject 1 each under the skin into the appropriate area as directed Daily.   • losartan (COZAAR) 100 MG tablet Take 1 tablet by mouth Daily.   • montelukast (Singulair) 10 MG tablet Take 1 tablet by mouth Daily. (Patient taking differently: Take 10 mg by mouth Every Night.)   • Multiple Vitamins-Minerals (MULTIVITAMIN ADULT PO) Take 1 tablet by mouth Daily.   • potassium chloride (K-DUR,KLOR-CON) 20 MEQ CR tablet Take 1 tablet by mouth 3 (Three) Times a Day.   • pravastatin (PRAVACHOL) 40 MG tablet TAKE 1 TABLET DAILY   • Probiotic Product (Probiotic Daily) capsule Take 1 capsule by mouth Daily.   • sennosides-docusate (Senexon-S) 8.6-50 MG per tablet Take 1 tablet by mouth 2 (Two) Times a Day.   • terazosin (HYTRIN) 2 MG capsule Take 1 capsule by mouth Every Night.   • [DISCONTINUED] Insulin Glargine (LANTUS SOLOSTAR) 100 UNIT/ML injection pen Inject 40 Units under the skin into the appropriate area as directed Daily.     No current facility-administered medications on file prior to visit.        Past Medical History:   Diagnosis Date   • Abnormal ECG    • Abnormal stress test    • Abrasion of face 1/29/2020   •  Acute bronchitis    • Acute pyelonephritis 10/9/2020   • Acute sinusitis    • Allergic rhinitis    • Aortic root dilation (CMS/[facility])    • Arthritis    • Bacteremia due to Escherichia coli 8/17/2020   • Benign enlargement of prostate    • C. difficile colitis 9/16/2020   • Cellulitis of knee, left 5/4/2017   • CHF (congestive heart failure) (CMS/[facility])    • Chronic diastolic congestive heart failure (CMS/[facility])    • Contusion of face 1/29/2020   • Coronary artery disease    • Diminished pulse     in lower extremities   • Edema    • Elevated hematocrit    • Elevated hemoglobin (CMS/[facility])    • Essential hypertension    • Hearing loss     mala hearing aids   • Heart valve disease    • High risk medication use    • History of back pain    • History of dysuria    • History of echocardiogram    • History of intracranial hemorrhage    • History of scoliosis    • History of stroke    • Hyperlipidemia    • Injury of toe, left, initial encounter    • Irregular heart rate    • Knee pain, left    • Left bundle branch block    • Leg wound, left    • Low back pain    • Medicare annual wellness visit, subsequent    • Minor head injury without loss of consciousness 1/29/2020   • Murmur    • Muscle cramps    • Need for hepatitis C screening test    • Polycythemia    • Polycythemia vera (CMS/[facility])    • Prostate hyperplasia without urinary obstruction    • Prostatitis    • Proteinuria    • Pulmonary hypertension (CMS/[facility])    • Sacroiliitis (CMS/[facility]) 10/9/2020   • Scoliosis    • Sepsis due to Escherichia coli (CMS/[facility]) 8/17/2020   • Sepsis with metabolic encephalopathy (CMS/[facility]) 8/17/2020   • Stroke (CMS/[facility])    • SVT (supraventricular tachycardia) (CMS/[facility])    • Tachycardia    • Thrombocytosis (CMS/[facility])    • TIA (transient ischemic attack)     2006   • Type 2 diabetes mellitus (CMS/[facility])    • Urinary tract infection due to extended-spectrum beta lactamase (ESBL) producing Escherichia coli 8/18/2020   • Valvular heart disease        Past  Surgical History:   Procedure Laterality Date   • CARDIAC CATHETERIZATION     • COLONOSCOPY      did Cologuard approx 2019 and negative   • HAND SURGERY     • JOINT REPLACEMENT  2014    Righ Knee   • SD TOTAL KNEE ARTHROPLASTY Left 2017    Procedure: LT TOTAL KNEE ARTHROPLASTY;  Surgeon: Bertin Perrin MD;  Location: [facility] OR;  Service: Orthopedics   • PROSTATE SURGERY      Rezum steam treatment to shrink prostate by dr. guerrero       Family History   Problem Relation Age of Onset   • Hypertension Mother    • Other Father         ruptured appendix,  when pt. was 14 years old.   • Diabetes Paternal Aunt    • Diabetes Paternal Uncle    • No Known Problems Other        Social History     Socioeconomic History   • Marital status:      Spouse name: Not on file   • Number of children: Not on file   • Years of education: Not on file   • Highest education level: Not on file   Tobacco Use   • Smoking status: Former Smoker     Types: Cigarettes     Quit date:      Years since quittin.0   • Smokeless tobacco: Former User   • Tobacco comment: Caffeine daily   Substance and Sexual Activity   • Alcohol use: No   • Drug use: No   • Sexual activity: Defer       Review of Systems   Constitutional: Negative for appetite change, chills, fatigue (some with physical work ), fever, unexpected weight gain and unexpected weight loss.   HENT: Negative for ear pain and sore throat.    Respiratory: Negative for cough, chest tightness and shortness of breath.    Cardiovascular: Negative for chest pain and palpitations.   Gastrointestinal: Negative for abdominal pain, blood in stool, constipation, diarrhea, GERD and indigestion.   Genitourinary: Negative for dysuria and frequency.   Musculoskeletal: Negative for neck pain (mild decreased ROM with rotation).   Skin: Negative for rash.   Neurological: Negative for syncope and light-headedness.       Objective   Vitals:    21 1104   BP: 122/54   BP  "Location: Left arm   Patient Position: Sitting   Cuff Size: Adult   Pulse: 58   Temp: 98.7 °F (37.1 °C)   SpO2: 97%   Weight: 116 kg (256 lb 4.8 oz)   Height: 182.9 cm (72\")     Body mass index is 34.76 kg/m².    Physical Exam  Vitals signs and nursing note reviewed.   Constitutional:       General: He is not in acute distress.     Appearance: He is well-developed. He is not diaphoretic.   HENT:      Head: Normocephalic.      Right Ear: External ear normal.      Left Ear: External ear normal.   Eyes:      Conjunctiva/sclera: Conjunctivae normal.   Neck:      Musculoskeletal: Normal range of motion and neck supple.   Cardiovascular:      Rate and Rhythm: Normal rate and regular rhythm.      Pulses: Normal pulses.      Heart sounds: Murmur present. Systolic murmur present with a grade of 2/6.   Pulmonary:      Effort: Pulmonary effort is normal. No respiratory distress.      Breath sounds: Normal breath sounds.   Abdominal:      General: Bowel sounds are normal.      Palpations: Abdomen is soft. There is no hepatomegaly or splenomegaly.      Tenderness: There is no abdominal tenderness.   Musculoskeletal:      Right lower leg: Edema (trace-1+) present.      Left lower leg: Edema (1+; pt states left leg always more swollen than right since knee replacement; pt plans to schedule follow up appointment with ortho due to ongoing knee discomfort) present.   Skin:     General: Skin is warm and dry.      Findings: No rash.   Neurological:      Mental Status: He is alert and oriented to person, place, and time.      Gait: Abnormal gait: limping on left, walking with cane.   Psychiatric:         Mood and Affect: Mood normal.         Behavior: Behavior normal.         Thought Content: Thought content normal.         Cognition and Memory: Cognition normal.         Judgment: Judgment normal.         Lab Results   Component Value Date    WBC 8.60 11/04/2020    RBC 4.38 11/04/2020    HGB 13.2 11/04/2020    HCT 39.8 11/04/2020    MCV " 90.9 11/04/2020    MCH 30.1 11/04/2020    MCHC 33.2 11/04/2020    RDW 14.0 11/04/2020    RDWSD 46.7 11/04/2020    MPV 10.6 11/04/2020     11/04/2020    NEUTRORELPCT 64.1 11/04/2020    LYMPHORELPCT 21.2 11/04/2020    MONORELPCT 5.6 11/04/2020    EOSRELPCT 7.0 (H) 11/04/2020    BASORELPCT 1.5 11/04/2020    AUTOIGPER 0.6 (H) 11/04/2020    NEUTROABS 5.52 11/04/2020    LYMPHSABS 1.82 11/04/2020    MONOSABS 0.48 11/04/2020    EOSABS 0.60 (H) 11/04/2020    BASOSABS 0.13 11/04/2020    AUTOIGNUM 0.05 11/04/2020    NRBC 0.0 11/04/2020     Lab Results   Component Value Date    GLUCOSE 150 (H) 11/11/2020    BUN 24 01/11/2021    CREATININE 1.19 01/11/2021    EGFRIFNONA 60 01/11/2021    EGFRIFAFRI 70 01/11/2021    BCR 20 01/11/2021    K 4.3 01/11/2021    CO2 28 01/11/2021    CALCIUM 9.9 01/11/2021    PROTENTOTREF 6.7 11/13/2020    ALBUMIN 4.0 11/13/2020    LABIL2 1.5 11/13/2020    AST 25 11/13/2020    ALT 28 11/13/2020      Lab Results   Component Value Date    CHLPL 118 11/13/2020    TRIG 58 11/13/2020    HDL 41 11/13/2020    VLDL 13 11/13/2020    LDL 64 11/13/2020     Lab Results   Component Value Date    TSH 2.740 09/26/2020     Lab Results   Component Value Date    HGBA1C 7.1 (H) 11/13/2020       Assessment/Plan .  Problem List Items Addressed This Visit     Type 2 diabetes mellitus with both eyes affected by mild nonproliferative retinopathy without macular edema, with long-term current use of insulin (CMS/[facility])    Overview     With mild non-proliferative diabetic retinopathy, without macular edema         Current Assessment & Plan     Diabetes is a little over treated.   Regular aerobic exercise.  Medication changes per orders.  Diabetes will be reassessed in 1 month.  Will decrease Lantus to 36 units daily since has had some low blood sugars.  Due for repeat A1c next month.         Relevant Medications    Insulin Glargine (LANTUS SOLOSTAR) 100 UNIT/ML injection pen    Hypertension    Current Assessment & Plan      Hypertension is stable.  Continue current medications.  Ambulatory blood pressure monitoring.  Blood pressure will be reassessed at the next regular appointment.         Localized edema - Primary    Current Assessment & Plan     Continue Furosemide twice daily.  Continue to elevate legs when sitting.         Relevant Orders    Basic Metabolic Panel          Return in about 1 month (around 2/22/2021) for Recheck.or sooner if symptoms persist or worsen.  Will send refill of Furosemide to mail order for twice daily dosing pending lab results.         COVID-19 Precautions - Patient was compliant in wearing a mask. When I saw the patient, I used appropriate personal protective equipment (PPE) including mask, gloves, and eye shield (standard procedure).  Hand hygiene was completed before and after seeing the patient.

## 2021-01-22 NOTE — ASSESSMENT & PLAN NOTE
Diabetes is a little over treated.   Regular aerobic exercise.  Medication changes per orders.  Diabetes will be reassessed in 1 month.  Will decrease Lantus to 36 units daily since has had some low blood sugars.  Due for repeat A1c next month.

## 2021-01-23 LAB
BUN SERPL-MCNC: 23 MG/DL (ref 8–27)
BUN/CREAT SERPL: 20 (ref 10–24)
CALCIUM SERPL-MCNC: 9.7 MG/DL (ref 8.6–10.2)
CHLORIDE SERPL-SCNC: 102 MMOL/L (ref 96–106)
CO2 SERPL-SCNC: 27 MMOL/L (ref 20–29)
CREAT SERPL-MCNC: 1.17 MG/DL (ref 0.76–1.27)
GLUCOSE SERPL-MCNC: 251 MG/DL (ref 65–99)
POTASSIUM SERPL-SCNC: 4.1 MMOL/L (ref 3.5–5.2)
SODIUM SERPL-SCNC: 143 MMOL/L (ref 134–144)

## 2021-01-25 DIAGNOSIS — E11.3293 TYPE 2 DIABETES MELLITUS WITH BOTH EYES AFFECTED BY MILD NONPROLIFERATIVE RETINOPATHY WITHOUT MACULAR EDEMA, WITH LONG-TERM CURRENT USE OF INSULIN (HCC): ICD-10-CM

## 2021-01-25 DIAGNOSIS — Z79.4 TYPE 2 DIABETES MELLITUS WITH BOTH EYES AFFECTED BY MILD NONPROLIFERATIVE RETINOPATHY WITHOUT MACULAR EDEMA, WITH LONG-TERM CURRENT USE OF INSULIN (HCC): ICD-10-CM

## 2021-02-08 ENCOUNTER — OFFICE VISIT (OUTPATIENT)
Dept: FAMILY MEDICINE CLINIC | Facility: CLINIC | Age: 76
End: 2021-02-08

## 2021-02-08 VITALS
OXYGEN SATURATION: 97 % | WEIGHT: 258.8 LBS | DIASTOLIC BLOOD PRESSURE: 68 MMHG | SYSTOLIC BLOOD PRESSURE: 138 MMHG | HEART RATE: 56 BPM | TEMPERATURE: 97.1 F | HEIGHT: 72 IN | BODY MASS INDEX: 35.05 KG/M2

## 2021-02-08 DIAGNOSIS — R60.0 LOCALIZED EDEMA: ICD-10-CM

## 2021-02-08 DIAGNOSIS — Z79.4 TYPE 2 DIABETES MELLITUS WITH BOTH EYES AFFECTED BY MILD NONPROLIFERATIVE RETINOPATHY WITHOUT MACULAR EDEMA, WITH LONG-TERM CURRENT USE OF INSULIN (HCC): Primary | ICD-10-CM

## 2021-02-08 DIAGNOSIS — I10 ESSENTIAL HYPERTENSION: ICD-10-CM

## 2021-02-08 DIAGNOSIS — E11.3293 TYPE 2 DIABETES MELLITUS WITH BOTH EYES AFFECTED BY MILD NONPROLIFERATIVE RETINOPATHY WITHOUT MACULAR EDEMA, WITH LONG-TERM CURRENT USE OF INSULIN (HCC): Primary | ICD-10-CM

## 2021-02-08 PROBLEM — L89.302 PRESSURE INJURY OF BUTTOCK, STAGE 2: Status: RESOLVED | Noted: 2020-09-18 | Resolved: 2021-02-08

## 2021-02-08 PROCEDURE — 99214 OFFICE O/P EST MOD 30 MIN: CPT | Performed by: NURSE PRACTITIONER

## 2021-02-08 NOTE — ASSESSMENT & PLAN NOTE
Hypertension is stable.  Regular aerobic exercise.  Continue current medications.  Ambulatory blood pressure monitoring.  Blood pressure will be reassessed at the next regular appointment.

## 2021-02-08 NOTE — ASSESSMENT & PLAN NOTE
Diabetes is fluctuating.   Continue current treatment regimen.  Regular aerobic exercise.  Diabetes will be reassessed in 3 months.  Continue Lantus daily.  Continue to work on healthy diet and exercise.

## 2021-02-08 NOTE — ASSESSMENT & PLAN NOTE
Improved, but persists.  Continue Furosemide twice daily.  Continue compression stockings.  Continue to monitor daily weight.  Schedule a follow up appointment with cardiology.

## 2021-02-08 NOTE — PROGRESS NOTES
Subjective   Erlin Blanco is a 75 y.o. male.     Chief Complaint   Patient presents with   • Leg Swelling     still there but has went down alittle    • Diabetes     follow up       History of Present Illness   Patient presents for follow up DM2: takes Lantus 40 units daily; last A1c 7.1%; monitors blood sugars, no more blood sugars in 70s; sugars seem to be all over the place; yesterday morning blood sugar was 86 and this morning was 150; does not feel like diet is much different from day to day; watches carbs/sugars; not much exercise; no numbness or tingling other than when bends over to pick something up; had MRI cervical spine and has some spinal stenosis, no surgery at this point; last eye exam about 2 weeks ago, [facility].    F/U edema: takes Furosemide twice daily and KCL TID; swelling has improved overall; has been wearing compression stockings and has helped; weight has been stable for last couple of weeks.    F/U HTN: takes Carvedilol twice daily, Hydralazine TID, and Losartan and Cardizem daily; has not been able to monitor BP recently, needs new BP machine; no headaches; no orthostasis; also takes Eliquis twice daily for atrial fib.    F/U Hyperlipidemia: takes Pravastatin daily; no myalgias since has been on Pravastatin; saw Dr. Perrin for knee pain; had x-rays and no infection.    The following portions of the patient's history were reviewed and updated as appropriate: allergies, current medications, past family history, past medical history, past social history, past surgical history and problem list.    Current Outpatient Medications on File Prior to Visit   Medication Sig   • Accu-Chek FastClix Lancets misc TEST BLOOD SUGAR 1-2 TIMES DAILY AND AS NEEDED   • apixaban (ELIQUIS) 5 MG tablet tablet Take 1 tablet by mouth Every 12 (Twelve) Hours. Indications: Atrial Fibrillation   • aspirin 81 MG EC tablet Take 1 tablet by mouth Daily.   • Blood Glucose Monitoring Suppl (Accu-Chek Guide Me) w/Device  kit TEST BLOOD SUGAR 1-2 TIMES DAILY AND AS NEEDED   • carvedilol (COREG) 12.5 MG tablet Take 3 tablets by mouth 2 (Two) Times a Day With Meals.   • dilTIAZem CD (CARDIZEM CD) 360 MG 24 hr capsule Take 1 capsule by mouth Daily.   • finasteride (PROSCAR) 5 MG tablet Take 5 mg by mouth Every Night.   • fluticasone (Flonase) 50 MCG/ACT nasal spray 2 sprays into the nostril(s) as directed by provider Daily.   • furosemide (LASIX) 40 MG tablet Take 1 tablet by mouth Daily. (Patient taking differently: Take 40 mg by mouth 2 (Two) Times a Day.)   • glucose blood (OneTouch Ultra) test strip Test blood sugar 1-2 times daily and as needed.   • hydrALAZINE (APRESOLINE) 100 MG tablet Take 1 tablet by mouth 3 (Three) Times a Day.   • hydrocortisone-bacitracin-zinc oxide-nystatin (MAGIC BARRIER) Apply 1 application topically to the appropriate area as directed 2 (Two) Times a Day.   • hydroxyurea (HYDREA) 500 MG capsule Take 500 mg by mouth Daily.   • Insulin Glargine (LANTUS SOLOSTAR) 100 UNIT/ML injection pen Inject 40 Units under the skin into the appropriate area as directed Daily.   • Insulin Pen Needle (B-D UF III MINI PEN NEEDLES) 31G X 5 MM misc Inject 1 each under the skin into the appropriate area as directed Daily.   • losartan (COZAAR) 100 MG tablet Take 1 tablet by mouth Daily.   • montelukast (Singulair) 10 MG tablet Take 1 tablet by mouth Daily. (Patient taking differently: Take 10 mg by mouth Every Night.)   • Multiple Vitamins-Minerals (MULTIVITAMIN ADULT PO) Take 1 tablet by mouth Daily.   • potassium chloride (K-DUR,KLOR-CON) 20 MEQ CR tablet Take 1 tablet by mouth 3 (Three) Times a Day.   • pravastatin (PRAVACHOL) 40 MG tablet TAKE 1 TABLET DAILY   • Probiotic Product (Probiotic Daily) capsule Take 1 capsule by mouth Daily.   • sennosides-docusate (Senexon-S) 8.6-50 MG per tablet Take 1 tablet by mouth 2 (Two) Times a Day.   • terazosin (HYTRIN) 2 MG capsule Take 1 capsule by mouth Every Night.     No current  facility-administered medications on file prior to visit.         Past Medical History:   Diagnosis Date   • Abnormal ECG    • Abnormal stress test    • Abrasion of face 1/29/2020   • Acute bronchitis    • Acute pyelonephritis 10/9/2020   • Acute sinusitis    • Allergic rhinitis    • Aortic root dilation (CMS/[facility])    • Arthritis    • Bacteremia due to Escherichia coli 8/17/2020   • Benign enlargement of prostate    • C. difficile colitis 9/16/2020   • Cellulitis of knee, left 5/4/2017   • CHF (congestive heart failure) (CMS/[facility])    • Chronic diastolic congestive heart failure (CMS/[facility])    • Contusion of face 1/29/2020   • Coronary artery disease    • Diminished pulse     in lower extremities   • Edema    • Elevated hematocrit    • Elevated hemoglobin (CMS/[facility])    • Essential hypertension    • Hearing loss     mala hearing aids   • Heart valve disease    • High risk medication use    • History of back pain    • History of dysuria    • History of echocardiogram    • History of intracranial hemorrhage    • History of scoliosis    • History of stroke    • Hyperlipidemia    • Injury of toe, left, initial encounter    • Irregular heart rate    • Knee pain, left    • Left bundle branch block    • Leg wound, left    • Low back pain    • Medicare annual wellness visit, subsequent    • Minor head injury without loss of consciousness 1/29/2020   • Murmur    • Muscle cramps    • Need for hepatitis C screening test    • Polycythemia    • Polycythemia vera (CMS/[facility])    • Pressure injury of buttock, stage 2 (CMS/[facility]) 9/18/2020   • Prostate hyperplasia without urinary obstruction    • Prostatitis    • Proteinuria    • Pulmonary hypertension (CMS/[facility])    • Sacroiliitis (CMS/[facility]) 10/9/2020   • Scoliosis    • Sepsis due to Escherichia coli (CMS/[facility]) 8/17/2020   • Sepsis with metabolic encephalopathy (CMS/[facility]) 8/17/2020   • Stroke (CMS/[facility])    • SVT (supraventricular tachycardia) (CMS/[facility])    • Tachycardia    • Thrombocytosis (CMS/[facility])     • TIA (transient ischemic attack)        • Type 2 diabetes mellitus (CMS/[facility])    • Urinary tract infection due to extended-spectrum beta lactamase (ESBL) producing Escherichia coli 2020   • Valvular heart disease        Past Surgical History:   Procedure Laterality Date   • CARDIAC CATHETERIZATION     • COLONOSCOPY      did Cologuard approx 2019 and negative   • HAND SURGERY     • JOINT REPLACEMENT      Righ Knee   • CT TOTAL KNEE ARTHROPLASTY Left 2017    Procedure: LT TOTAL KNEE ARTHROPLASTY;  Surgeon: Bertin Perrin MD;  Location: [facility];  Service: Orthopedics   • PROSTATE SURGERY      Rezum steam treatment to shrink prostate by dr. guerrero       Family History   Problem Relation Age of Onset   • Hypertension Mother    • Other Father         ruptured appendix,  when pt. was 14 years old.   • Diabetes Paternal Aunt    • Diabetes Paternal Uncle    • No Known Problems Other        Social History     Socioeconomic History   • Marital status:      Spouse name: Not on file   • Number of children: Not on file   • Years of education: Not on file   • Highest education level: Not on file   Tobacco Use   • Smoking status: Former Smoker     Types: Cigarettes     Quit date:      Years since quittin.1   • Smokeless tobacco: Former User   • Tobacco comment: Caffeine daily   Substance and Sexual Activity   • Alcohol use: No   • Drug use: No   • Sexual activity: Defer       Review of Systems   Constitutional: Negative for appetite change (too much appetite), chills, fatigue, fever, unexpected weight gain and unexpected weight loss.   HENT: Negative for ear pain, postnasal drip, rhinorrhea, sore throat and trouble swallowing. Sinus pressure: some sinus symptoms; nasal spray helps. Sneezing: some when has been around dust cleaning out things.    Eyes: Negative for blurred vision and pain.   Respiratory: Negative for cough, chest tightness and shortness of breath.   "  Cardiovascular: Negative for chest pain and palpitations.   Gastrointestinal: Negative for abdominal pain, blood in stool, constipation, diarrhea, GERD and indigestion.   Endocrine: Negative for cold intolerance, heat intolerance and polydipsia.   Genitourinary: Negative for dysuria and frequency.   Musculoskeletal: Negative for back pain and neck pain.   Skin: Rash: some on legs since has had swelling, no pain or warmth.   Neurological: Negative for syncope and light-headedness.   Hematological: Does not bruise/bleed easily.       Objective   Vitals:    02/08/21 1004 02/08/21 1041   BP: 138/68    BP Location: Left arm    Patient Position: Sitting    Cuff Size: Adult    Pulse: (!) 49 56   Temp: 97.1 °F (36.2 °C)    SpO2: 97%    Weight: 117 kg (258 lb 12.8 oz)    Height: 182.9 cm (72\")      Body mass index is 35.1 kg/m².    Physical Exam  Vitals signs and nursing note reviewed.   Constitutional:       General: He is not in acute distress.     Appearance: He is well-developed. He is not diaphoretic.   HENT:      Head: Normocephalic.      Right Ear: External ear normal.      Left Ear: External ear normal.   Eyes:      Conjunctiva/sclera: Conjunctivae normal.   Neck:      Musculoskeletal: Normal range of motion and neck supple.      Vascular: No carotid bruit.   Cardiovascular:      Rate and Rhythm: Normal rate and regular rhythm.      Pulses: Normal pulses.      Heart sounds: Murmur present. Systolic murmur present with a grade of 2/6.   Pulmonary:      Effort: Pulmonary effort is normal. No respiratory distress.      Breath sounds: Normal breath sounds.   Abdominal:      General: Bowel sounds are normal.      Palpations: Abdomen is soft. There is no hepatomegaly or splenomegaly.      Tenderness: There is no abdominal tenderness.   Musculoskeletal:      Right lower leg: Edema (1-2+ pretibial/ankle) present.      Left lower leg: Edema (1-2+ pretibial/ankle) present.   Skin:     General: Skin is warm and dry.      " Comments: Venous stasis changes of bilateral LE; no warmth or drainage   Neurological:      Mental Status: He is alert and oriented to person, place, and time.      Gait: Abnormal gait: guarded gait and limping on left, uses cane.   Psychiatric:         Mood and Affect: Mood normal.         Behavior: Behavior normal.         Thought Content: Thought content normal.         Cognition and Memory: Cognition normal.         Judgment: Judgment normal.         Lab Results   Component Value Date    WBC 8.60 11/04/2020    RBC 4.38 11/04/2020    HGB 13.2 11/04/2020    HCT 39.8 11/04/2020    MCV 90.9 11/04/2020    MCH 30.1 11/04/2020    MCHC 33.2 11/04/2020    RDW 14.0 11/04/2020    RDWSD 46.7 11/04/2020    MPV 10.6 11/04/2020     11/04/2020    NEUTRORELPCT 64.1 11/04/2020    LYMPHORELPCT 21.2 11/04/2020    MONORELPCT 5.6 11/04/2020    EOSRELPCT 7.0 (H) 11/04/2020    BASORELPCT 1.5 11/04/2020    AUTOIGPER 0.6 (H) 11/04/2020    NEUTROABS 5.52 11/04/2020    LYMPHSABS 1.82 11/04/2020    MONOSABS 0.48 11/04/2020    EOSABS 0.60 (H) 11/04/2020    BASOSABS 0.13 11/04/2020    AUTOIGNUM 0.05 11/04/2020    NRBC 0.0 11/04/2020     Lab Results   Component Value Date    GLUCOSE 150 (H) 11/11/2020    BUN 23 01/22/2021    CREATININE 1.17 01/22/2021    EGFRIFNONA 61 01/22/2021    EGFRIFAFRI 71 01/22/2021    BCR 20 01/22/2021    K 4.1 01/22/2021    CO2 27 01/22/2021    CALCIUM 9.7 01/22/2021    PROTENTOTREF 6.7 11/13/2020    ALBUMIN 4.0 11/13/2020    LABIL2 1.5 11/13/2020    AST 25 11/13/2020    ALT 28 11/13/2020      Lab Results   Component Value Date    CHLPL 118 11/13/2020    TRIG 58 11/13/2020    HDL 41 11/13/2020    VLDL 13 11/13/2020    LDL 64 11/13/2020     Lab Results   Component Value Date    TSH 2.740 09/26/2020     Lab Results   Component Value Date    HGBA1C 7.1 (H) 11/13/2020       Assessment/Plan .  Problem List Items Addressed This Visit     Type 2 diabetes mellitus with both eyes affected by mild nonproliferative  retinopathy without macular edema, with long-term current use of insulin (CMS/[facility]) - Primary    Overview     With mild non-proliferative diabetic retinopathy, without macular edema         Current Assessment & Plan     Diabetes is fluctuating.   Continue current treatment regimen.  Regular aerobic exercise.  Diabetes will be reassessed in 3 months.  Continue Lantus daily.  Continue to work on healthy diet and exercise.         Relevant Orders    Basic Metabolic Panel    Hemoglobin A1c    Hypertension    Current Assessment & Plan     Hypertension is stable.  Regular aerobic exercise.  Continue current medications.  Ambulatory blood pressure monitoring.  Blood pressure will be reassessed at the next regular appointment.         Localized edema    Current Assessment & Plan     Improved, but persists.  Continue Furosemide twice daily.  Continue compression stockings.  Continue to monitor daily weight.  Schedule a follow up appointment with cardiology.         Relevant Orders    Basic Metabolic Panel        Too soon for A1c today; will come back for labs in 1 week.  Return in about 2 months (around 4/8/2021) for Recheck.or sooner if symptoms persist or worsen.         COVID-19 Precautions - Patient was compliant in wearing a mask. When I saw the patient, I used appropriate personal protective equipment (PPE) including mask, gloves, and eye shield (standard procedure).  Hand hygiene was completed before and after seeing the patient.

## 2021-02-08 NOTE — PATIENT INSTRUCTIONS
Come back for labs in 1 week.  Continue to monitor your blood sugar once daily before a meal and record results.  Continue to monitor your blood pressure periodically and record results.  Continue to work on healthy diet and exercise as tolerated.  Follow up pending lab results.  Follow up in 2 months, or sooner if problems or concerns.  Schedule a follow up appointment with cardiology.

## 2021-02-22 DIAGNOSIS — I10 ESSENTIAL HYPERTENSION: ICD-10-CM

## 2021-02-22 DIAGNOSIS — E78.2 MIXED HYPERLIPIDEMIA: ICD-10-CM

## 2021-02-23 RX ORDER — CARVEDILOL 12.5 MG/1
TABLET ORAL
Qty: 540 TABLET | Refills: 1 | Status: SHIPPED | OUTPATIENT
Start: 2021-02-23 | End: 2021-08-18 | Stop reason: SDUPTHER

## 2021-02-23 RX ORDER — PRAVASTATIN SODIUM 40 MG
TABLET ORAL
Qty: 90 TABLET | Refills: 0 | Status: SHIPPED | OUTPATIENT
Start: 2021-02-23 | End: 2021-06-08

## 2021-02-24 ENCOUNTER — TELEPHONE (OUTPATIENT)
Dept: FAMILY MEDICINE CLINIC | Facility: CLINIC | Age: 76
End: 2021-02-24

## 2021-02-24 DIAGNOSIS — G89.29 CHRONIC PAIN OF BOTH KNEES: Primary | ICD-10-CM

## 2021-02-24 DIAGNOSIS — M25.561 CHRONIC PAIN OF BOTH KNEES: Primary | ICD-10-CM

## 2021-02-24 DIAGNOSIS — M25.562 CHRONIC PAIN OF BOTH KNEES: Primary | ICD-10-CM

## 2021-02-24 NOTE — TELEPHONE ENCOUNTER
Spoke with patient over the phone; pt is still having a lot of knee discomfort in bilateral knees; pt has seen ortho recently; pt would like referral to physical therapy; will order referral.

## 2021-03-03 DIAGNOSIS — Z23 IMMUNIZATION DUE: ICD-10-CM

## 2021-03-04 ENCOUNTER — TREATMENT (OUTPATIENT)
Dept: PHYSICAL THERAPY | Facility: CLINIC | Age: 76
End: 2021-03-04

## 2021-03-04 DIAGNOSIS — R26.81 UNSTEADY GAIT: ICD-10-CM

## 2021-03-04 DIAGNOSIS — R53.1 WEAKNESS: ICD-10-CM

## 2021-03-04 DIAGNOSIS — G89.29 CHRONIC PAIN OF BOTH KNEES: Primary | ICD-10-CM

## 2021-03-04 DIAGNOSIS — M25.561 CHRONIC PAIN OF BOTH KNEES: Primary | ICD-10-CM

## 2021-03-04 DIAGNOSIS — M25.562 CHRONIC PAIN OF BOTH KNEES: Primary | ICD-10-CM

## 2021-03-04 PROCEDURE — 97162 PT EVAL MOD COMPLEX 30 MIN: CPT | Performed by: PHYSICAL THERAPIST

## 2021-03-04 PROCEDURE — 97530 THERAPEUTIC ACTIVITIES: CPT | Performed by: PHYSICAL THERAPIST

## 2021-03-04 PROCEDURE — 97110 THERAPEUTIC EXERCISES: CPT | Performed by: PHYSICAL THERAPIST

## 2021-03-04 NOTE — PROGRESS NOTES
Physical Therapy Initial Evaluation and Plan of Care    Patient: Erlin Blanco   : 1945  Diagnosis/ICD-10 Code:  Chronic pain of both knees [M25.561, M25.562, G89.29]  Referring practitioner: MUKUL Dover  Date of Initial Evaluation:  Type: THERAPY  Noted: 3/4/2021  _______________________________________________________________________________________________________________________    Subjective Evaluation    History of Present Illness  Mechanism of injury: Knee were replaced in  right and left in .  The knees never bent very well.  Both knees became infected during the procedures.  This past August he had sepsis due to Escherichia coli and also developed metabolic encephalopathy.  He reports this impacted all his muscles and has now settled in his back. He was hospitalized from 2020 - 2020. Following this hospitalization he was hospitalized from 9/15/20 to 10/10/20 with C. difficile colitis.He then went to rehab for about a month.  He had home health and  PT for about 1 1/2 months. He was on a walker and WC and is now on a cane.    He also has cervical radiculitis and lumbar discitis. He reports numbness in fingers on left especially if he bends over to  something. He was assessed by Alfred Miller MD.    Subjective comment: Seeking PT to get his knees and legs stronger.  Not having a great deal of pain.  He has to lift his left leg to get the leg into the car.  The left leg is weaker.   Patient Occupation:  - retired Quality of life: good    Pain  Current pain ratin  At best pain ratin  At worst pain ratin  Location: bilateral knee pain  Quality: dull ache and burning  Relieving factors: rest (Lying in the recliner and keeping feet elevated)  Aggravating factors: ambulation, squatting, standing, prolonged positioning and repetitive movement  Progression: no change    Social Support  Lives in: multiple-level home  Lives with: spouse    Hand  dominance: right    Diagnostic Tests  X-ray: abnormal  MRI studies: abnormal  CT scan: abnormal    Treatments  Previous treatment: physical therapy and medication  Patient Goals  Patient goals for therapy: decreased pain, improved balance, increased motion, increased strength and independence with ADLs/IADLs  Patient goal: He wants to get stronger           Objective          Static Posture     Head  Forward.    Shoulders  Rounded.    Thoracic Spine  Hypokyphosis.    Lumbar Spine   Decreased lordosis.     Pelvis   Posterior pelvic tilt    Knee   Genu varus.   Knee (Left): Flexed.     Active Range of Motion   Left Knee   Flexion: 100 degrees     Strength/Myotome Testing     Left Hip   Planes of Motion   Flexion: 2  Extension: 2+  Abduction: 3-  Adduction: 3    Right Hip   Planes of Motion   Extension: 2+  Abduction: 3-  Adduction: 3    Left Knee   Flexion: 3  Extension: 3-    Right Knee   Flexion: 4  Extension: 4    Left Ankle/Foot   Dorsiflexion: 4-  Plantar flexion: 3+    Right Ankle/Foot   Dorsiflexion: 3+  Plantar flexion: 4-    Additional Strength Details  Unable to heel or toe walk.      Muscle Activation   Patient able to activate left transverse abdominals, left external obliques, left internal obliques, right transverse abdominals, right external obliques and right internal obliques.     Additional Muscle Activation Details  Weak abdominals in sitting and rising from supine.     Swelling     Left Knee Girth Measurement (cm)   Joint line: 52 cm    Right Knee Girth Measurement (cm)   Joint line: 53 cm    Ambulation     Observational Gait   Gait: antalgic and circumduction   Increased left stance time. Decreased walking speed, stride length, right stance time, left swing time and right swing time.   Left foot contact pattern: foot flat  Right foot contact pattern: foot flat  Base of support: decreased    Functional Assessment     Comments  5 x sit to stand: from 23.5 inch height (low table + purple step)  33  "seconds  Use of hands on table and easy LOB      Subjective Questionnaire: LEFS: 30/80 or 62% disability        Assessment & Plan     Assessment  Impairments: abnormal gait, abnormal muscle tone, abnormal or restricted ROM, activity intolerance, impaired balance, impaired physical strength and pain with function  Assessment details: Erlin Blanco is a 75 y.o. year-old male referred to physical therapy for bilateral chronic knee pain. He presents with a evolving clinical presentation but is limited in endurance, balance and strength with ADL's..  He has multiple comorbidities CAD, tachycardia, IDDM, TIA, CVA, cervical radiculitis, fatigue edema and lumbar discitis but no personal factors  that may affect his progress in the plan of care.  He has been extremely ill since last September due to .  He presents with painful knees, decreased strength, impaired core stability, impaired balance and impaired endurance.   Prognosis: good  Functional Limitations: lifting, walking, pulling, uncomfortable because of pain, moving in bed, sitting, standing, stooping and unable to perform repetitive tasks  Goals  Plan Goals: STG (4 weeks)  1. Pt will be independent with initial LE and core strengthening program for ease with sit to stand transfers, stand to walk and balance stability.  2.  Pt will demonstrate improved 5 x sit to stand from 33 seconds to 25 seconds from 23.5\" surface.  3.  Pt will be able to rise from sitting and walk with minimal LOB.  4.  Pt will tolerate 30 minutes of continuous exercises without exacerbation of pain or fatigue.    LTG (8 weeks)  1.  Pt will be independent with core and LE stabilization program for improved balance and stability with all functional tasks.   2.  Pt will demonstrate 4/5 abdominal and LE strength for stability with walking, standing, and transitional activities.    3.  Pt will be able to ambulate community distances with little to no fatigue.   4.  Pt will report improved perceived " function via LEFS from 62% to 40% or less disability     LTG (8 weeks)  1.  Pt will be independent with core and LE stabilization exercises for ease with all functional tasks and leisure activities.  2.    Plan  Therapy options: will be seen for skilled physical therapy services  Planned modality interventions: electrical stimulation/Russian stimulation, thermotherapy (hydrocollator packs) and cryotherapy  Planned therapy interventions: abdominal trunk stabilization, balance/weight-bearing training, body mechanics training, flexibility, functional ROM exercises, gait training, home exercise program, neuromuscular re-education, postural training, soft tissue mobilization, strengthening, stretching and therapeutic activities  Frequency: 2x week  Duration in weeks: 8  Treatment plan discussed with: patient  Plan details: Progress in LE strengthening, add in standing balance exercises; modalities for knee pain.           Manual Therapy:         mins  62841;  Therapeutic Exercise:    15     mins  70570;     Neuromuscular Alfred:        mins  63535;    Therapeutic Activity:     10     mins  87060;     Gait Training:           mins  24103;     Ultrasound:          mins  32413;    Work Hardening                 mins 85066  Iontophoresis                  mins 14156    Timed Treatment:   25   mins   Total Treatment:     60   mins    PT SIGNATURE: Uma Ordonze, PT   DATE TREATMENT INITIATED: 3/4/2021        Initial Certification  Certification Period: 6/2/2021  I certify that the therapy services are furnished while this patient is under my care.  The services outlined above are required by this patient, and will be reviewed every 90 days.     PHYSICIAN: Zamzam John, APRN      DATE:     Please sign and return via fax to [phone].. Thank you, [facility].

## 2021-03-08 ENCOUNTER — TREATMENT (OUTPATIENT)
Dept: PHYSICAL THERAPY | Facility: CLINIC | Age: 76
End: 2021-03-08

## 2021-03-08 DIAGNOSIS — G89.29 CHRONIC PAIN OF BOTH KNEES: Primary | ICD-10-CM

## 2021-03-08 DIAGNOSIS — R53.1 WEAKNESS: ICD-10-CM

## 2021-03-08 DIAGNOSIS — M25.561 CHRONIC PAIN OF BOTH KNEES: Primary | ICD-10-CM

## 2021-03-08 DIAGNOSIS — R26.81 UNSTEADY GAIT: ICD-10-CM

## 2021-03-08 DIAGNOSIS — M25.562 CHRONIC PAIN OF BOTH KNEES: Primary | ICD-10-CM

## 2021-03-08 PROCEDURE — 97110 THERAPEUTIC EXERCISES: CPT | Performed by: PHYSICAL THERAPIST

## 2021-03-08 NOTE — PROGRESS NOTES
Physical Therapy Daily Progress Note    Patient: Erlin Blanco   : 1945  Diagnosis/ICD-10 Code:  Chronic pain of both knees [M25.561, M25.562, G89.29]  Referring practitioner: No ref. provider found  Date of Initial Visit: Type: THERAPY  Noted: 3/4/2021  Today's Date: 3/8/2021  Patient seen for 2 sessions           Subjective Evaluation    History of Present Illness    Subjective comment: doing okay so far. HEP okay. agrees he needs a ball or coffee can to help do SAQs. no ankle wts at home. L LE definitely weaker.        Objective   See Exercise, Manual, and Modality Logs for complete treatment.       Assessment & Plan     Assessment  Assessment details: Did well today. Great SATs and HR although some SOA with nu step and standing ball toss at tramp.   L knee primary source of weakness and gait and stairs difficulty.   P: try more dynamic balance gait ex for fall prevention.               Timed:    Manual Therapy:         mins  40303;  Therapeutic Exercise:     45    mins  67939;     Neuromuscular Alfred:        mins  83391;    Therapeutic Activity:          mins  35643;     Gait Training:           mins  48218;     Ultrasound:          mins  57778;    Electrical Stimulation:         mins  63000 ( );  Iontophoresis         mins 35756;  Aquatic Therapy         mins 13247;  Dry Needling                   mins    Untimed:  Electrical Stimulation:         mins  95247 ( );  Mechanical Traction:         mins  75291;     Timed Treatment:   45   mins   Total Treatment:     45   mins  Zorre Zeno Kimura, PT  Physical Therapist

## 2021-03-10 ENCOUNTER — TREATMENT (OUTPATIENT)
Dept: PHYSICAL THERAPY | Facility: CLINIC | Age: 76
End: 2021-03-10

## 2021-03-10 DIAGNOSIS — R26.81 UNSTEADY GAIT: ICD-10-CM

## 2021-03-10 DIAGNOSIS — R53.1 WEAKNESS: ICD-10-CM

## 2021-03-10 DIAGNOSIS — M25.561 CHRONIC PAIN OF BOTH KNEES: Primary | ICD-10-CM

## 2021-03-10 DIAGNOSIS — M25.562 CHRONIC PAIN OF BOTH KNEES: Primary | ICD-10-CM

## 2021-03-10 DIAGNOSIS — G89.29 CHRONIC PAIN OF BOTH KNEES: Primary | ICD-10-CM

## 2021-03-10 PROCEDURE — 97110 THERAPEUTIC EXERCISES: CPT | Performed by: PHYSICAL THERAPIST

## 2021-03-10 PROCEDURE — 97530 THERAPEUTIC ACTIVITIES: CPT | Performed by: PHYSICAL THERAPIST

## 2021-03-10 NOTE — PROGRESS NOTES
Physical Therapy Daily Progress Note    Patient: Erlin Blanco   : 1945  Diagnosis/ICD-10 Code:  Chronic pain of both knees [M25.561, M25.562, G89.29]  Referring practitioner: MUKUL Dover  Date of Initial Visit: Type: THERAPY  Noted: 3/4/2021  Today's Date: 3/10/2021  Patient seen for 3 sessions           Subjective Evaluation    History of Present Illness    Subjective comment: doing okay. been up and down from a chair shredding paperwork so legs have been getting a workout       Objective   See Exercise, Manual, and Modality Logs for complete treatment.       Assessment & Plan     Assessment  Assessment details: Did great today not getting too frustrated with my attempts to get him to side step for safety. Tended to cross over with R vs. Side step with L but with tech helping he eventually trusted a half step. This came in handy with LOB during step taps and able to do 10 in a row with CGA vs. Grab bar.  P: keep working on L LE and balance. Consider some back stepping reactions next rx.                Timed:    Manual Therapy:         mins  42234;  Therapeutic Exercise:    30     mins  38668;     Neuromuscular Alfred:    15    mins  08059;    Therapeutic Activity:     15     mins  93284;     Gait Training:           mins  20027;     Ultrasound:          mins  78798;    Electrical Stimulation:         mins  22979 ( );  Iontophoresis         mins 99870;  Aquatic Therapy         mins 73335;  Dry Needling                   mins    Untimed  Electrical Stimulation:         mins  54446 ( );  Mechanical Traction:         mins  20895;     Timed Treatment:   60   mins   Total Treatment:     60   mins  Zorre Zeno Kimura, PT  Physical Therapist

## 2021-03-15 ENCOUNTER — TREATMENT (OUTPATIENT)
Dept: PHYSICAL THERAPY | Facility: CLINIC | Age: 76
End: 2021-03-15

## 2021-03-15 DIAGNOSIS — M25.561 CHRONIC PAIN OF BOTH KNEES: Primary | ICD-10-CM

## 2021-03-15 DIAGNOSIS — G89.29 CHRONIC PAIN OF BOTH KNEES: Primary | ICD-10-CM

## 2021-03-15 DIAGNOSIS — R26.81 UNSTEADY GAIT: ICD-10-CM

## 2021-03-15 DIAGNOSIS — R53.1 WEAKNESS: ICD-10-CM

## 2021-03-15 DIAGNOSIS — M25.562 CHRONIC PAIN OF BOTH KNEES: Primary | ICD-10-CM

## 2021-03-15 PROCEDURE — 97110 THERAPEUTIC EXERCISES: CPT | Performed by: PHYSICAL THERAPIST

## 2021-03-15 PROCEDURE — 97112 NEUROMUSCULAR REEDUCATION: CPT | Performed by: PHYSICAL THERAPIST

## 2021-03-15 NOTE — PROGRESS NOTES
Physical Therapy Daily Progress Note      Visit # 4      Subjective Evaluation    History of Present Illness    Subjective comment: Pt reports helping his grandson work on cars last weekend. He had to get down on his knee several times.       Objective   See Exercise, Manual, and Modality Logs for complete treatment.       Assessment & Plan     Assessment  Assessment details: Pt completed treatment with no c/o's.  He continues to make progress in strengthening his (B) LE for greater stability.  Progress strengthening and balance as tolerated.                     Manual Therapy:    0     mins  56221;  Therapeutic Exercise:    28     mins  66233;     Neuromuscular Alfred:    24    mins  10396;    Therapeutic Activity:     0     mins  22940;     Gait Trainin     mins  73237;     Ultrasound:     0     mins  95060;    Work Hardening           0      mins 61230  Iontophoresis               0   mins 74286  E-Stim                          _0_ mins 34242 ( )    Timed Treatment:   52   mins   Total Treatment:     52   mins    Miko Hopson PTA  Physical Therapist Assistant

## 2021-03-17 ENCOUNTER — TREATMENT (OUTPATIENT)
Dept: PHYSICAL THERAPY | Facility: CLINIC | Age: 76
End: 2021-03-17

## 2021-03-17 DIAGNOSIS — G89.29 CHRONIC PAIN OF BOTH KNEES: Primary | ICD-10-CM

## 2021-03-17 DIAGNOSIS — M25.562 CHRONIC PAIN OF BOTH KNEES: Primary | ICD-10-CM

## 2021-03-17 DIAGNOSIS — R26.81 UNSTEADY GAIT: ICD-10-CM

## 2021-03-17 DIAGNOSIS — R53.1 WEAKNESS: ICD-10-CM

## 2021-03-17 DIAGNOSIS — M25.561 CHRONIC PAIN OF BOTH KNEES: Primary | ICD-10-CM

## 2021-03-17 PROCEDURE — 97112 NEUROMUSCULAR REEDUCATION: CPT | Performed by: PHYSICAL THERAPIST

## 2021-03-17 PROCEDURE — 97110 THERAPEUTIC EXERCISES: CPT | Performed by: PHYSICAL THERAPIST

## 2021-03-17 NOTE — PROGRESS NOTES
Physical Therapy Daily Progress Note      Visit # 5      Subjective Evaluation    History of Present Illness    Subjective comment: Pt reports no pain in knees today.       Objective   See Exercise, Manual, and Modality Logs for complete treatment.   Added hip bridges    Assessment & Plan     Assessment  Assessment details: Pt tolerated treatment well.  He had no c/o pain from his prescribed exercises.  He benefits from vc's to take a step to reach a bounced ball instead of leaning.  Plan to progress closed chain exercises as tolerated.                     Manual Therapy:    0     mins  79553;  Therapeutic Exercise:    25     mins  29526;     Neuromuscular Alfred:    15    mins  10229;    Therapeutic Activity:     0     mins  18861;     Gait Trainin     mins  13502;     Ultrasound:     0     mins  23724;    Work Hardening           0      mins 49219  Iontophoresis               0   mins 16282  E-Stim                          _0_ mins 34837 ( )    Timed Treatment:   40   mins   Total Treatment:     40   mins    Miko Hopson PTA  Physical Therapist Assistant

## 2021-03-22 ENCOUNTER — TREATMENT (OUTPATIENT)
Dept: PHYSICAL THERAPY | Facility: CLINIC | Age: 76
End: 2021-03-22

## 2021-03-22 DIAGNOSIS — R26.81 UNSTEADY GAIT: ICD-10-CM

## 2021-03-22 DIAGNOSIS — R53.1 WEAKNESS: ICD-10-CM

## 2021-03-22 DIAGNOSIS — M25.561 CHRONIC PAIN OF BOTH KNEES: Primary | ICD-10-CM

## 2021-03-22 DIAGNOSIS — G89.29 CHRONIC PAIN OF BOTH KNEES: Primary | ICD-10-CM

## 2021-03-22 DIAGNOSIS — M25.562 CHRONIC PAIN OF BOTH KNEES: Primary | ICD-10-CM

## 2021-03-22 PROCEDURE — 97116 GAIT TRAINING THERAPY: CPT | Performed by: PHYSICAL THERAPIST

## 2021-03-22 PROCEDURE — 97112 NEUROMUSCULAR REEDUCATION: CPT | Performed by: PHYSICAL THERAPIST

## 2021-03-22 PROCEDURE — 97110 THERAPEUTIC EXERCISES: CPT | Performed by: PHYSICAL THERAPIST

## 2021-03-22 NOTE — PROGRESS NOTES
Physical Therapy Daily Progress Note      Visit # 6      Subjective Evaluation    History of Present Illness    Subjective comment: Pt reports that he has been doing a lot of work around the house last weekend causing his knees to be sore.       Objective   See Exercise, Manual, and Modality Logs for complete treatment.       Assessment & Plan     Assessment  Assessment details: Pt tolerated treatment with no c/o pain in (B) knees.  He suffered  from fatigue as treatment progressed and needed several seated rest breaks during balance training.  He benefited from vc to move his feet and not to over reach during balance training.                     Manual Therapy:    0     mins  34936;  Therapeutic Exercise:    33     mins  55284;     Neuromuscular Alfred:    10    mins  25365;    Therapeutic Activity:     0     mins  03747;     Gait Trainin     mins  76114;     Ultrasound:     0     mins  15436;    Work Hardening           0      mins 41845  Iontophoresis               0   mins 05799  E-Stim                          _0_ mins 10969 ( )    Timed Treatment:   51   mins   Total Treatment:     51   mins    Miko Hopson PTA  Physical Therapist Assistant

## 2021-03-26 ENCOUNTER — TREATMENT (OUTPATIENT)
Dept: PHYSICAL THERAPY | Facility: CLINIC | Age: 76
End: 2021-03-26

## 2021-03-26 DIAGNOSIS — M25.562 CHRONIC PAIN OF BOTH KNEES: Primary | ICD-10-CM

## 2021-03-26 DIAGNOSIS — R53.1 WEAKNESS: ICD-10-CM

## 2021-03-26 DIAGNOSIS — R26.81 UNSTEADY GAIT: ICD-10-CM

## 2021-03-26 DIAGNOSIS — M25.561 CHRONIC PAIN OF BOTH KNEES: Primary | ICD-10-CM

## 2021-03-26 DIAGNOSIS — G89.29 CHRONIC PAIN OF BOTH KNEES: Primary | ICD-10-CM

## 2021-03-26 PROCEDURE — 97530 THERAPEUTIC ACTIVITIES: CPT | Performed by: PHYSICAL THERAPIST

## 2021-03-26 PROCEDURE — 97112 NEUROMUSCULAR REEDUCATION: CPT | Performed by: PHYSICAL THERAPIST

## 2021-03-26 PROCEDURE — 97110 THERAPEUTIC EXERCISES: CPT | Performed by: PHYSICAL THERAPIST

## 2021-03-26 NOTE — PROGRESS NOTES
Re-Assessment / Re-Certification        Patient: Erlin Blanco   : 1945  Diagnosis/ICD-10 Code:  Chronic pain of both knees [M25.561, M25.562, G89.29]  Referring practitioner: MUKUL Dvoer  Date of Initial Evaluation:  Type: THERAPY  Noted: 3/4/2021  Patient seen for 7 sessions      Subjective:   Subjective Questionnaire: LEFS: 36/80 or 55% disability.   Clinical Progress: improved  Home Program Compliance: Yes  Treatment has included: therapeutic exercise, neuromuscular re-education, therapeutic activity, gait training and patient education    Subjective Evaluation    History of Present Illness    Subjective comment: His knees are still sore but they are getting better. Able to get around better compared to at initial evaluation. Pain  Current pain ratin  Location: bilateral knee         Objective          Active Range of Motion   Left Knee   Flexion: 100 degrees     Right Knee   Flexion: 100 degrees     Strength/Myotome Testing     Left Hip   Planes of Motion   Flexion: 3    Right Hip   Planes of Motion   Flexion: 4-    Left Knee   Flexion: 3  Extension: 3+  Quadriceps contraction: good    Right Knee   Flexion: 4  Extension: 4  Quadriceps contraction: good    Ambulation     Observational Gait   Gait: antalgic and circumduction   Increased left stance time. Decreased walking speed, stride length, right stance time, left step length and right step length.   Left foot contact pattern: foot flat  Right foot contact pattern: foot flat  Left arm swing: high guard  Right arm swing: decreased    Additional Observational Gait Details  Knees in slight flexion, circumduction of left hip due to weak hip flexors    Functional Assessment     Comments  5 X SIT TO STAND: 24 seconds with hands on purple box and occasional use of thighs against the table.       Assessment & Plan     Assessment  Assessment details: Erlin Blanco has been seen for 7 physical therapy sessions for chronic knee pain, weakness and  "knee stiffness. He is moving better compared to initial evaluation.  He is able to rise from sitting (23\") with improved core muscle activation and much less compensation. He struggles with seated positions lower than 23\", especially if he is fatigued.  He reports improved perceived function and is able to move about his house and do more functional tasks with less pain.  He will benefit from continued skilled physical therapy to address remaining impairments and functional limitations.   Prognosis: good    Goals  Plan Goals: STG (4 weeks)  1. Pt will be independent with initial LE and core strengthening program for ease with sit to stand transfers, stand to walk and balance stability.  MET  2.  Pt will demonstrate improved 5 x sit to stand from 33 seconds to 25 seconds from 23.5\" surface.  MET  3.  Pt will be able to rise from sitting and walk with minimal LOB. PARTIALLY MET  He still has occasional LOB from lower surfaces.  4.  Pt will tolerate 30 minutes of continuous exercises without exacerbation of pain or fatigue.  PARTIALLY MET - he continues to fatigue but is getting close to being able to perform 30 minutes of continuous exercises.     LTG (8 weeks)  1.  Pt will be independent with core and LE stabilization program for improved balance and stability with all functional tasks. PROGRESSING  2.  Pt will demonstrate 4/5 abdominal and LE strength for stability with walking, standing, and transitional activities.  PARTIALLY MET  3.  Pt will be able to ambulate community distances with little to no fatigue.   4.  Pt will report improved perceived function via LEFS from 62% to 40% or less disability  IMPROVING    Plan  Therapy options: will be seen for skilled physical therapy services  Planned therapy interventions: abdominal trunk stabilization, balance/weight-bearing training, body mechanics training, flexibility, functional ROM exercises, gait training, home exercise program, neuromuscular re-education, " postural training, strengthening, stretching and therapeutic activities  Frequency: 2x week  Duration in weeks: 6  Plan details: Continue progressing in balance, strengthening and functional activities.       Progress toward previous goals: Partially Met          Recommendations: Continue as planned  Timeframe: 6 weeks  Prognosis to achieve goals: good    PT Signature: Uma Ordonez, PT        Manual Therapy:         mins  37733;  Therapeutic Exercise:    30     mins  11843;     Neuromuscular Alfred:    10    mins  68163;    Therapeutic Activity:     12     mins  69520;     Gait Training:           mins  21301;     Ultrasound:          mins  99546;    Work Hardening                 mins 12505  Iontophoresis                  mins 07373    Timed Treatment:   52   mins   Total Treatment:     52   mins

## 2021-03-30 ENCOUNTER — TREATMENT (OUTPATIENT)
Dept: PHYSICAL THERAPY | Facility: CLINIC | Age: 76
End: 2021-03-30

## 2021-03-30 DIAGNOSIS — G89.29 CHRONIC PAIN OF BOTH KNEES: Primary | ICD-10-CM

## 2021-03-30 DIAGNOSIS — M25.562 CHRONIC PAIN OF BOTH KNEES: Primary | ICD-10-CM

## 2021-03-30 DIAGNOSIS — R53.1 WEAKNESS: ICD-10-CM

## 2021-03-30 DIAGNOSIS — M25.561 CHRONIC PAIN OF BOTH KNEES: Primary | ICD-10-CM

## 2021-03-30 DIAGNOSIS — R26.81 UNSTEADY GAIT: ICD-10-CM

## 2021-03-30 PROCEDURE — 97110 THERAPEUTIC EXERCISES: CPT | Performed by: PHYSICAL THERAPIST

## 2021-03-30 NOTE — PROGRESS NOTES
Physical Therapy Daily Progress Note    Patient: Erlin Blanco   : 1945  Diagnosis/ICD-10 Code:  Chronic pain of both knees [M25.561, M25.562, G89.29]  Referring practitioner: MUKUL Dover  Date of Initial Visit: Type: THERAPY  Noted: 3/4/2021  Today's Date: 3/30/2021  Patient seen for 8 sessions           Subjective Evaluation    History of Present Illness    Subjective comment: taking move free pills OTC which he thinks helps. also using aspacream prn. busy shredding paperwork at home getting ready to downsize.        Objective   See Exercise, Manual, and Modality Logs for complete treatment.       Assessment & Plan     Assessment  Assessment details: More LE swelling today and did not put his lymph stockings on. 25 minutes late to PT today so rx abbreviated.   Doing better with static standing and slight improvement in dynamic stepping reactions.   Consider doing clock stepping drill next visit. 1-3-5-7-9-11               Timed:    Manual Therapy:         mins  30164;  Therapeutic Exercise:    30     mins  35347;     Neuromuscular Alfred:    15    mins  84217;    Therapeutic Activity:          mins  58823;     Gait Training:           mins  14161;     Ultrasound:          mins  19731;    Electrical Stimulation:         mins  49339 ( );  Iontophoresis         mins 19290;  Aquatic Therapy         mins 95149;  Dry Needling                   mins    Untimed:  Electrical Stimulation:         mins  93007 ( );  Mechanical Traction:         mins  21800;     Timed Treatment:   45   mins   Total Treatment:     45   mins  Zorre Zeno Kimura, PT  Physical Therapist

## 2021-04-01 ENCOUNTER — TREATMENT (OUTPATIENT)
Dept: PHYSICAL THERAPY | Facility: CLINIC | Age: 76
End: 2021-04-01

## 2021-04-01 DIAGNOSIS — R53.1 WEAKNESS: ICD-10-CM

## 2021-04-01 DIAGNOSIS — M25.562 CHRONIC PAIN OF BOTH KNEES: Primary | ICD-10-CM

## 2021-04-01 DIAGNOSIS — G89.29 CHRONIC PAIN OF BOTH KNEES: Primary | ICD-10-CM

## 2021-04-01 DIAGNOSIS — M25.561 CHRONIC PAIN OF BOTH KNEES: Primary | ICD-10-CM

## 2021-04-01 DIAGNOSIS — R26.81 UNSTEADY GAIT: ICD-10-CM

## 2021-04-01 PROCEDURE — 97530 THERAPEUTIC ACTIVITIES: CPT | Performed by: PHYSICAL THERAPIST

## 2021-04-01 PROCEDURE — 97110 THERAPEUTIC EXERCISES: CPT | Performed by: PHYSICAL THERAPIST

## 2021-04-01 NOTE — PROGRESS NOTES
"Physical Therapy Daily Progress Note    Patient: Erlin Blanco   : 1945  Diagnosis/ICD-10 Code:  Chronic pain of both knees [M25.561, M25.562, G89.29]  Referring practitioner: MUKUL Dover  Date of Initial Visit: Type: THERAPY  Noted: 3/4/2021  Today's Date: 2021  Patient seen for 9 sessions           Subjective Evaluation    History of Present Illness    Subjective comment: doing well today. wearing his stockings so swelling better.        Objective   See Exercise, Manual, and Modality Logs for complete treatment.       Assessment & Plan     Assessment  Assessment details: Did well today and walking more without use of cane but stance is very wide and safe for the most part. Worked on stepping reactions yuridia L to the rear at \"7 o'clock\"    L knee and hip weakness continues post E coli hospitalization and infection affecting L LE per pt. Agrees he is slowly getting stronger.                  Timed:    Manual Therapy:         mins  78539;  Therapeutic Exercise:    30     mins  08142;     Neuromuscular Alfred:        mins  64567;    Therapeutic Activity:     30     mins  69175;     Gait Training:           mins  87909;     Ultrasound:          mins  31635;    Electrical Stimulation:         mins  26099 ( );  Iontophoresis         mins 96827;  Aquatic Therapy         mins 59085;  Dry Needling                   mins    Untimed:  Electrical Stimulation:         mins  22169 ( );  Mechanical Traction:         mins  71517;     Timed Treatment:   60   mins   Total Treatment:     60   mins  Zorre Zeno Kimura, PT  Physical Therapist  "

## 2021-04-06 ENCOUNTER — TREATMENT (OUTPATIENT)
Dept: PHYSICAL THERAPY | Facility: CLINIC | Age: 76
End: 2021-04-06

## 2021-04-06 DIAGNOSIS — G89.29 CHRONIC PAIN OF BOTH KNEES: Primary | ICD-10-CM

## 2021-04-06 DIAGNOSIS — M25.562 CHRONIC PAIN OF BOTH KNEES: Primary | ICD-10-CM

## 2021-04-06 DIAGNOSIS — R26.81 UNSTEADY GAIT: ICD-10-CM

## 2021-04-06 DIAGNOSIS — M25.561 CHRONIC PAIN OF BOTH KNEES: Primary | ICD-10-CM

## 2021-04-06 DIAGNOSIS — R53.1 WEAKNESS: ICD-10-CM

## 2021-04-06 PROCEDURE — 97112 NEUROMUSCULAR REEDUCATION: CPT | Performed by: PHYSICAL THERAPIST

## 2021-04-06 PROCEDURE — 97110 THERAPEUTIC EXERCISES: CPT | Performed by: PHYSICAL THERAPIST

## 2021-04-06 NOTE — PROGRESS NOTES
Physical Therapy Daily Treatment Note      Visit # 10      Subjective Evaluation    History of Present Illness    Subjective comment: Tried to take some Anacin with OTC pain killer. Sorry to be late.  got stuck in trafficPain  Current pain ratin           Objective   See Exercise, Manual, and Modality Logs for complete treatment.       Assessment & Plan     Assessment  Assessment details: Erlin is slowly making progress.  His gait is improving and his ability to get on and off treatment table is also improving.  He still struggles with sit to stand but he does better with 2nd set once he has practiced the technique and muscle memory sets in.                       Manual Therapy:         mins  03840;  Therapeutic Exercise:    30     mins  19981;     Neuromuscular Alfred:    25    mins  62973;    Therapeutic Activity:          mins  72819;     Gait Training:           mins  38603;     Ultrasound:          mins  57282;    Work Hardening                 mins 10564  Iontophoresis                  mins 58427    Timed Treatment:   55   mins   Total Treatment:     55   mins    Uma Ordonez, PT  Physical Therapist

## 2021-04-08 ENCOUNTER — TREATMENT (OUTPATIENT)
Dept: PHYSICAL THERAPY | Facility: CLINIC | Age: 76
End: 2021-04-08

## 2021-04-08 DIAGNOSIS — M25.562 CHRONIC PAIN OF BOTH KNEES: Primary | ICD-10-CM

## 2021-04-08 DIAGNOSIS — R26.81 UNSTEADY GAIT: ICD-10-CM

## 2021-04-08 DIAGNOSIS — R53.1 WEAKNESS: ICD-10-CM

## 2021-04-08 DIAGNOSIS — M25.561 CHRONIC PAIN OF BOTH KNEES: Primary | ICD-10-CM

## 2021-04-08 DIAGNOSIS — G89.29 CHRONIC PAIN OF BOTH KNEES: Primary | ICD-10-CM

## 2021-04-08 PROCEDURE — 97530 THERAPEUTIC ACTIVITIES: CPT | Performed by: PHYSICAL THERAPIST

## 2021-04-08 PROCEDURE — 97110 THERAPEUTIC EXERCISES: CPT | Performed by: PHYSICAL THERAPIST

## 2021-04-08 PROCEDURE — 97112 NEUROMUSCULAR REEDUCATION: CPT | Performed by: PHYSICAL THERAPIST

## 2021-04-08 NOTE — PROGRESS NOTES
Physical Therapy Daily Treatment Note      Visit # 11      Subjective Evaluation    History of Present Illness    Subjective comment: Feeling pretty good and feels like he is getting stronger.  Not having any pain.  Still struggling with rising from a low chair.  A little more weak today from packing his 5 yo grandson's items for his return home after a visit and he has walked a great deal today.  Sees Zamzam John on 21. Pain  Current pain ratin           Objective          Strength/Myotome Testing     Lumbar   Left   Heel walk: normal  Toe walk: normal    Right   Heel walk: normal  Toe walk: normal    Additional Strength Details  Left Hip   Flexion: 4  Extension: 2+  Abduction: 3+  Adduction: 4     Right Hip    Extension: 2+  Abduction: 3+  Adduction: 4     Left Knee   Flexion: 4  Extension: 4     Right Knee   Flexion: 4+  Extension: 4+     Left Ankle/Foot   Dorsiflexion: 4  Plantar flexion: 4     Right Ankle/Foot   Dorsiflexion: 4  Plantar flexion: 4         Ambulation     Observational Gait   Gait: antalgic   Increased walking speed and stride length.     Additional Observational Gait Details  With his walker/rollator his gait is smooth and fairly normal.  Without an AD, his gait slows down, he keeps his head down more, base of support narrows and he tends to shuffle more.        See Exercise, Manual, and Modality Logs for complete treatment.       Assessment & Plan     Assessment  Assessment details: Erlin Blanco has been seen for 11 physical therapy sessions for chronic bilateral knee pain, weakness, and unsteady gait. He is slowly gaining strength, stability and endurance.  His gait is mildly antalgic and slow.  The right leg is weaker than the left.  This weakness is most notable with standing exercises and weight shifting tasks. He will benefit from continued skilled physical therapy to address remaining impairments and functional limitations.     Goals  Plan Goals: STG (4 weeks)  1. Pt will be  "independent with initial LE and core strengthening program for ease with sit to stand transfers, stand to walk and balance stability.  MET  2.  Pt will demonstrate improved 5 x sit to stand from 33 seconds to 24 seconds from 23.5\" surface.  MET With VC for correct mechanics.   3.  Pt will be able to rise from sitting and walk with minimal LOB. PARTIALLY MET  He is improving with this skill.  Initially he was unable without assist and now can perform sit to stand without assistance.  He has occasional LOB from lower surfaces.  4.  Pt will tolerate 30 minutes of continuous exercises without exacerbation of pain or fatigue.  PARTIALLY MET - he continues to fatigue with weight bearing exercises but is getting close to being able to perform 30 minutes of continuous exercises.     LTG (8 weeks)  1.  Pt will be independent with core and LE stabilization program for improved balance and stability with all functional tasks. PROGRESSING  2.  Pt will demonstrate 4/5 abdominal and LE strength for stability with walking, standing, and transitional activities.  PARTIALLY MET  3.  Pt will be able to ambulate community distances with little to no fatigue.   4.  Pt will report improved perceived function via LEFS from 62% to 40% or less disability  IMPROVING                     Manual Therapy:         mins  45874;  Therapeutic Exercise:    30     mins  83982;     Neuromuscular Alfred:    10    mins  08701;    Therapeutic Activity:     12     mins  20698;     Gait Training:           mins  42775;     Ultrasound:          mins  98246;    Work Hardening                 mins 20130  Iontophoresis                  mins 16995    Timed Treatment:   52   mins   Total Treatment:     52   mins    Uma Ordonez, PT  Physical Therapist  "

## 2021-04-12 ENCOUNTER — TELEPHONE (OUTPATIENT)
Dept: FAMILY MEDICINE CLINIC | Facility: CLINIC | Age: 76
End: 2021-04-12

## 2021-04-12 ENCOUNTER — OFFICE VISIT (OUTPATIENT)
Dept: FAMILY MEDICINE CLINIC | Facility: CLINIC | Age: 76
End: 2021-04-12

## 2021-04-12 VITALS
HEART RATE: 55 BPM | SYSTOLIC BLOOD PRESSURE: 140 MMHG | DIASTOLIC BLOOD PRESSURE: 64 MMHG | HEIGHT: 72 IN | TEMPERATURE: 98.4 F | WEIGHT: 262 LBS | OXYGEN SATURATION: 97 % | BODY MASS INDEX: 35.49 KG/M2

## 2021-04-12 DIAGNOSIS — D45 POLYCYTHEMIA VERA (HCC): ICD-10-CM

## 2021-04-12 DIAGNOSIS — R60.0 LOCALIZED EDEMA: ICD-10-CM

## 2021-04-12 DIAGNOSIS — E11.3293 TYPE 2 DIABETES MELLITUS WITH BOTH EYES AFFECTED BY MILD NONPROLIFERATIVE RETINOPATHY WITHOUT MACULAR EDEMA, WITH LONG-TERM CURRENT USE OF INSULIN (HCC): Primary | ICD-10-CM

## 2021-04-12 DIAGNOSIS — M25.562 PAIN IN BOTH KNEES, UNSPECIFIED CHRONICITY: ICD-10-CM

## 2021-04-12 DIAGNOSIS — E78.2 MIXED HYPERLIPIDEMIA: ICD-10-CM

## 2021-04-12 DIAGNOSIS — Z79.4 TYPE 2 DIABETES MELLITUS WITH BOTH EYES AFFECTED BY MILD NONPROLIFERATIVE RETINOPATHY WITHOUT MACULAR EDEMA, WITH LONG-TERM CURRENT USE OF INSULIN (HCC): Primary | ICD-10-CM

## 2021-04-12 DIAGNOSIS — J30.9 ALLERGIC RHINITIS, UNSPECIFIED SEASONALITY, UNSPECIFIED TRIGGER: ICD-10-CM

## 2021-04-12 DIAGNOSIS — I10 ESSENTIAL HYPERTENSION: ICD-10-CM

## 2021-04-12 DIAGNOSIS — M25.561 PAIN IN BOTH KNEES, UNSPECIFIED CHRONICITY: ICD-10-CM

## 2021-04-12 PROBLEM — D72.829 LEUKOCYTOSIS: Status: RESOLVED | Noted: 2020-09-16 | Resolved: 2021-04-12

## 2021-04-12 PROBLEM — K59.00 CONSTIPATION: Status: RESOLVED | Noted: 2020-11-14 | Resolved: 2021-04-12

## 2021-04-12 PROCEDURE — 99214 OFFICE O/P EST MOD 30 MIN: CPT | Performed by: NURSE PRACTITIONER

## 2021-04-12 RX ORDER — CETIRIZINE HYDROCHLORIDE 10 MG/1
10 TABLET ORAL DAILY
COMMUNITY
End: 2023-03-07 | Stop reason: ALTCHOICE

## 2021-04-12 RX ORDER — ACETAMINOPHEN 500 MG
1000 TABLET ORAL 2 TIMES DAILY PRN
COMMUNITY
End: 2022-12-16

## 2021-04-12 NOTE — PROGRESS NOTES
Subjective   Erlin Blanco is a 75 y.o. male.     Chief Complaint   Patient presents with   • Diabetes   • Leg Swelling     follow up, got better still there        History of Present Illness   Patient presents for follow up DM2: takes Lantus daily; last A1c 7.6%; monitors blood sugars, typically running 150-200s; no hypoglycemia; watches carbs/sugars for most part; has been doing physical therapy and has helped knees and back; hoping to be more active with warmer weather; no numbness/tingling other than when bends over to reach for something on left; saw neurosurgery and no surgery indicated; last eye exam about 1 month ago.    F/U HTN/edema: takes Diltiazem and Losartan daily, Carvedilol twice daily, and Hydralazine TID; also takes Furosemide BID and KCL TID and helps; swelling worse on left; has always been more swollen on left since knee replacement; saw ortho and told not infection; wears compression stockings and help some; monitors BP on occasion, typically runs 140s/70-80s; no headaches; no orthostasis; has not seen cardiology recently.    F/U Hyperlipidemia: takes Pravastatin daily; no myalgias; fasting today.    F/U atrial fib: takes Eliquis twice daily.    F/U polycythemia vera: takes Hydroxyurea daily; sees hematology; had recent CBC; has not needed phlebotomy since before surgery last fall 2020.    F/U allergic rhinitis: takes Montelukast and Flonase daily and works well; does not take Zyrtec daily due to dries out too much; allergies have been pretty well controlled.    Has been taking OTC Tylenol 500 mg 2 tablets twice daily for bilateral knee pain and has been helping; needs updated parking permit for disabled persons; has trouble walking and getting in and out of the car with bilateral knee pain.     The following portions of the patient's history were reviewed and updated as appropriate: allergies, current medications, past family history, past medical history, past social history, past surgical  history and problem list.    Current Outpatient Medications on File Prior to Visit   Medication Sig   • Accu-Chek FastClix Lancets misc TEST BLOOD SUGAR 1-2 TIMES DAILY AND AS NEEDED   • apixaban (ELIQUIS) 5 MG tablet tablet Take 1 tablet by mouth Every 12 (Twelve) Hours. Indications: Atrial Fibrillation   • aspirin 81 MG EC tablet Take 1 tablet by mouth Daily.   • Blood Glucose Monitoring Suppl (Accu-Chek Guide Me) w/Device kit TEST BLOOD SUGAR 1-2 TIMES DAILY AND AS NEEDED   • carvedilol (COREG) 12.5 MG tablet TAKE 3 TABLETS TWICE A DAY WITH MEALS   • cetirizine (zyrTEC) 10 MG tablet Take 10 mg by mouth Daily.   • dilTIAZem CD (CARDIZEM CD) 360 MG 24 hr capsule Take 1 capsule by mouth Daily.   • finasteride (PROSCAR) 5 MG tablet Take 5 mg by mouth Every Night.   • fluticasone (Flonase) 50 MCG/ACT nasal spray 2 sprays into the nostril(s) as directed by provider Daily.   • furosemide (LASIX) 40 MG tablet Take 1 tablet by mouth Daily. (Patient taking differently: Take 40 mg by mouth 2 (Two) Times a Day.)   • glucose blood (OneTouch Ultra) test strip Test blood sugar 1-2 times daily and as needed.   • hydrALAZINE (APRESOLINE) 100 MG tablet Take 1 tablet by mouth 3 (Three) Times a Day.   • hydrocortisone-bacitracin-zinc oxide-nystatin (MAGIC BARRIER) Apply 1 application topically to the appropriate area as directed 2 (Two) Times a Day.   • hydroxyurea (HYDREA) 500 MG capsule Take 500 mg by mouth Daily.   • Insulin Glargine (LANTUS SOLOSTAR) 100 UNIT/ML injection pen Inject 40 Units under the skin into the appropriate area as directed Daily.   • Insulin Pen Needle (B-D UF III MINI PEN NEEDLES) 31G X 5 MM misc Inject 1 each under the skin into the appropriate area as directed Daily.   • losartan (COZAAR) 100 MG tablet Take 1 tablet by mouth Daily.   • montelukast (Singulair) 10 MG tablet Take 1 tablet by mouth Daily. (Patient taking differently: Take 10 mg by mouth Every Night.)   • Multiple Vitamins-Minerals  (MULTIVITAMIN ADULT PO) Take 1 tablet by mouth Daily.   • potassium chloride (K-DUR,KLOR-CON) 20 MEQ CR tablet Take 1 tablet by mouth 3 (Three) Times a Day.   • pravastatin (PRAVACHOL) 40 MG tablet TAKE 1 TABLET DAILY   • Probiotic Product (Probiotic Daily) capsule Take 1 capsule by mouth Daily.   • terazosin (HYTRIN) 2 MG capsule Take 1 capsule by mouth Every Night.   • [DISCONTINUED] sennosides-docusate (Senexon-S) 8.6-50 MG per tablet Take 1 tablet by mouth 2 (Two) Times a Day.     No current facility-administered medications on file prior to visit.          Past Medical History:   Diagnosis Date   • Abnormal ECG    • Abnormal stress test    • Abrasion of face 1/29/2020   • Acute bronchitis    • Acute pyelonephritis 10/9/2020   • Acute sinusitis    • Allergic rhinitis    • Aortic root dilation (CMS/HCC)    • Arthritis    • Bacteremia due to Escherichia coli 8/17/2020   • Benign enlargement of prostate    • C. difficile colitis 9/16/2020   • Cellulitis of knee, left 5/4/2017   • CHF (congestive heart failure) (CMS/HCC)    • Chronic diastolic congestive heart failure (CMS/HCC)    • Constipation 11/14/2020   • Contusion of face 1/29/2020   • Coronary artery disease    • Diminished pulse     in lower extremities   • Edema    • Elevated hematocrit    • Elevated hemoglobin (CMS/HCC)    • Essential hypertension    • Hearing loss     mala hearing aids   • Heart valve disease    • High risk medication use    • History of back pain    • History of dysuria    • History of echocardiogram    • History of intracranial hemorrhage    • History of scoliosis    • History of stroke    • Hyperlipidemia    • Injury of toe, left, initial encounter    • Irregular heart rate    • Knee pain, left    • Left bundle branch block    • Leg wound, left    • Leukocytosis 9/16/2020   • Low back pain    • Medicare annual wellness visit, subsequent    • Minor head injury without loss of consciousness 1/29/2020   • Murmur    • Muscle cramps    •  Need for hepatitis C screening test    • Polycythemia    • Polycythemia vera (CMS/HCC)    • Pressure injury of buttock, stage 2 (CMS/HCC) 2020   • Prostate hyperplasia without urinary obstruction    • Prostatitis    • Proteinuria    • Pulmonary hypertension (CMS/HCC)    • Sacroiliitis (CMS/HCC) 10/9/2020   • Scoliosis    • Sepsis due to Escherichia coli (CMS/HCC) 2020   • Sepsis with metabolic encephalopathy (CMS/HCC) 2020   • Stroke (CMS/HCC)    • SVT (supraventricular tachycardia) (CMS/HCC)    • Tachycardia    • Thrombocytosis (CMS/HCC)    • TIA (transient ischemic attack)        • Type 2 diabetes mellitus (CMS/HCC)    • Urinary tract infection due to extended-spectrum beta lactamase (ESBL) producing Escherichia coli 2020   • Valvular heart disease        Past Surgical History:   Procedure Laterality Date   • CARDIAC CATHETERIZATION     • COLONOSCOPY      did Cologuard approx 2019 and negative   • HAND SURGERY     • JOINT REPLACEMENT      Righ Knee   • OR TOTAL KNEE ARTHROPLASTY Left 2017    Procedure: LT TOTAL KNEE ARTHROPLASTY;  Surgeon: Bertin Perrin MD;  Location: [facility];  Service: Orthopedics   • PROSTATE SURGERY      Rezum steam treatment to shrink prostate by dr. guerrero       Family History   Problem Relation Age of Onset   • Hypertension Mother    • Other Father         ruptured appendix,  when pt. was 14 years old.   • Diabetes Paternal Aunt    • Diabetes Paternal Uncle    • No Known Problems Other        Social History     Socioeconomic History   • Marital status:      Spouse name: Not on file   • Number of children: Not on file   • Years of education: Not on file   • Highest education level: Not on file   Tobacco Use   • Smoking status: Former Smoker     Types: Cigarettes     Quit date:      Years since quittin.3   • Smokeless tobacco: Former User   • Tobacco comment: Caffeine daily   Substance and Sexual Activity   • Alcohol use: No  "  • Drug use: No   • Sexual activity: Defer       Review of Systems   Constitutional: Negative for appetite change, chills, fatigue, fever, unexpected weight gain and unexpected weight loss.   HENT: Negative for ear pain, postnasal drip, sinus pressure, sore throat and trouble swallowing.    Eyes: Negative for blurred vision (will need cataract surgery soon) and pain.   Respiratory: Negative for cough, chest tightness and shortness of breath.    Cardiovascular: Negative for chest pain and palpitations.   Gastrointestinal: Negative for abdominal pain, blood in stool, constipation, diarrhea, GERD and indigestion.   Endocrine: Negative for polydipsia.   Genitourinary: Negative for dysuria and frequency.   Musculoskeletal: Arthralgias: bilateral knee pain, physical therapy has helped. Back pain: some in lower back, physical therapy has helped.   Skin: Negative for rash.   Neurological: Negative for syncope and light-headedness. Weakness: improving as has been more active.   Psychiatric/Behavioral: Negative for sleep disturbance and depressed mood. The patient is not nervous/anxious.        Objective   Vitals:    04/12/21 0943   BP: 140/64   BP Location: Left arm   Patient Position: Sitting   Cuff Size: Adult   Pulse: 55   Temp: 98.4 °F (36.9 °C)   SpO2: 97%   Weight: 119 kg (262 lb)   Height: 182.9 cm (72\")     Body mass index is 35.53 kg/m².    Physical Exam  Vitals and nursing note reviewed.   Constitutional:       General: He is not in acute distress.     Appearance: He is well-developed and well-groomed. He is not diaphoretic.   HENT:      Head: Normocephalic.      Right Ear: External ear normal. Right ear decreased hearing: bilateral hearing aids.      Left Ear: External ear normal. Left ear decreased hearing: bilateral hearing aids.   Eyes:      Conjunctiva/sclera: Conjunctivae normal.   Neck:      Vascular: No carotid bruit.   Cardiovascular:      Rate and Rhythm: Normal rate and regular rhythm.      Pulses: " Normal pulses.           Dorsalis pedis pulses are 2+ on the right side and 2+ on the left side.        Posterior tibial pulses are 2+ on the right side and 2+ on the left side.      Heart sounds: Murmur heard.   Systolic murmur is present with a grade of 2/6.        Comments: At RUSB  Pulmonary:      Effort: Pulmonary effort is normal. No respiratory distress.      Breath sounds: Normal breath sounds.   Abdominal:      General: Bowel sounds are normal.      Palpations: Abdomen is soft. There is no hepatomegaly or splenomegaly.      Tenderness: There is no abdominal tenderness.   Musculoskeletal:      Cervical back: Normal range of motion and neck supple.      Right lower le+ Edema present.      Left lower le+ Edema (left slightly greater than right; pt states has always had more swelling on left since knee replacement) present.        Feet:    Feet:      Right foot:      Protective Sensation: 10 sites tested. 9 sites sensed.      Skin integrity: Skin integrity normal.      Left foot:      Protective Sensation: 10 sites tested. 10 sites sensed.      Skin integrity: Skin integrity normal.      Comments: Diabetic Foot Exam Performed and Monofilament Test Performed    Skin:     General: Skin is warm and dry.      Comments: Mild discoloration of bilateral distal LE consistent with venous stasis   Neurological:      Mental Status: He is alert and oriented to person, place, and time.      Gait: Abnormal gait: guarded gait with cane, limping on left; improves after up walking    Psychiatric:         Mood and Affect: Mood normal.         Behavior: Behavior normal.         Thought Content: Thought content normal.         Cognition and Memory: Cognition normal.         Judgment: Judgment normal.         Lab Results   Component Value Date    WBC 8.60 2020    RBC 4.38 2020    HGB 13.2 2020    HCT 39.8 2020    MCV 90.9 2020    MCH 30.1 2020    MCHC 33.2 2020    RDW 14.0  11/04/2020    RDWSD 46.7 11/04/2020    MPV 10.6 11/04/2020     11/04/2020    NEUTRORELPCT 64.1 11/04/2020    LYMPHORELPCT 21.2 11/04/2020    MONORELPCT 5.6 11/04/2020    EOSRELPCT 7.0 (H) 11/04/2020    BASORELPCT 1.5 11/04/2020    AUTOIGPER 0.6 (H) 11/04/2020    NEUTROABS 5.52 11/04/2020    LYMPHSABS 1.82 11/04/2020    MONOSABS 0.48 11/04/2020    EOSABS 0.60 (H) 11/04/2020    BASOSABS 0.13 11/04/2020    AUTOIGNUM 0.05 11/04/2020    NRBC 0.0 11/04/2020     Lab Results   Component Value Date    GLUCOSE 150 (H) 11/11/2020    BUN 18 02/22/2021    CREATININE 1.09 02/22/2021    EGFRIFNONA 66 02/22/2021    EGFRIFAFRI 80 02/22/2021    BCR 16.5 02/22/2021    K 4.4 02/22/2021    CO2 32.3 (H) 02/22/2021    CALCIUM 9.8 02/22/2021    PROTENTOTREF 6.7 11/13/2020    ALBUMIN 4.0 11/13/2020    LABIL2 1.5 11/13/2020    AST 25 11/13/2020    ALT 28 11/13/2020      Lab Results   Component Value Date    CHLPL 118 11/13/2020    TRIG 58 11/13/2020    HDL 41 11/13/2020    VLDL 13 11/13/2020    LDL 64 11/13/2020     Lab Results   Component Value Date    TSH 2.740 09/26/2020     Lab Results   Component Value Date    HGBA1C 7.60 (H) 02/22/2021         Assessment/Plan .  Problem List Items Addressed This Visit     Type 2 diabetes mellitus with both eyes affected by mild nonproliferative retinopathy without macular edema, with long-term current use of insulin (CMS/[facility]) - Primary    Overview     With mild non-proliferative diabetic retinopathy, without macular edema         Current Assessment & Plan     Diabetes is worsening.   Continue current treatment regimen.  Regular aerobic exercise.  Discussed foot care.  Diabetes will be reassessed in 3 months.  Continue Lantus daily.  Will consider SGLT-2 pending lab results.         Relevant Orders    Lipid Panel With LDL / HDL Ratio    Comprehensive Metabolic Panel    Hemoglobin A1c    Hypertension    Current Assessment & Plan     Hypertension is stable.  Continue current  medications.  Ambulatory blood pressure monitoring.  Blood pressure will be reassessed at the next regular appointment.  Continue Losartan and Diltiazem daily, Carvedilol twice daily, and Hydralazine TID.         Relevant Orders    Comprehensive Metabolic Panel    Hyperlipidemia    Current Assessment & Plan     Continue to work on healthy diet and exercise as tolerated.  Continue Pravastatin daily.         Relevant Orders    Lipid Panel With LDL / HDL Ratio    Polycythemia vera (CMS/HCC)    Current Assessment & Plan     Stable per hematology.         Pain in both knees    Current Assessment & Plan     Continue physical therapy.  Continue Tylenol 500 mg 2 tablets twice daily.         Allergic rhinitis    Current Assessment & Plan     Stable on Montelukast and Flonase daily and Zyrtec as needed.         Localized edema    Current Assessment & Plan     Continue Furosemide twice daily and KCL TID.  Continue compression stockings.  Continue to elevate legs when sitting.  Schedule an appointment with cardiology.               Return in about 3 months (around 7/12/2021) for Recheck.or sooner if symptoms persist or worsen.  Paper work completed for renewal of parking permit for disabled persons; pt has chronic problems with bilateral knees and ongoing lower back pain; walks with cane.       COVID-19 Precautions - Patient was compliant in wearing a mask. When I saw the patient, I used appropriate personal protective equipment (PPE) including mask, gloves, and eye shield (standard procedure).  Hand hygiene was completed before and after seeing the patient.

## 2021-04-12 NOTE — ASSESSMENT & PLAN NOTE
Hypertension is stable.  Continue current medications.  Ambulatory blood pressure monitoring.  Blood pressure will be reassessed at the next regular appointment.  Continue Losartan and Diltiazem daily, Carvedilol twice daily, and Hydralazine TID.

## 2021-04-12 NOTE — TELEPHONE ENCOUNTER
Provider: DAVIDA BAIG    Caller: FELIPE    Reason for Call:     PATIENT HAS BEEN TAKING OTC GENERIC TYLENOL FOR PAIN RELIEF. DAVIDA INSTRUCTED HIM TO CALL WITH THE NAME OF IT.

## 2021-04-12 NOTE — TELEPHONE ENCOUNTER
Spoke with patient over the phone; notified may continue to take Tylenol 1000 mg BID as currently taking.

## 2021-04-12 NOTE — ASSESSMENT & PLAN NOTE
Continue Furosemide twice daily and KCL TID.  Continue compression stockings.  Continue to elevate legs when sitting.  Schedule an appointment with cardiology.

## 2021-04-12 NOTE — PATIENT INSTRUCTIONS
Continue to monitor your blood sugar once daily before a meal and record results.  Continue to monitor your blood pressure periodically and record results.  Continue to work on healthy diet and exercise as tolerated.  Continue physical therapy.  Continue Tylenol 500 mg 2 tablets twice daily.  Follow up pending lab results.  Follow up in 3 months, or sooner if problems or concerns.  Schedule an appointment with cardiology.

## 2021-04-12 NOTE — ASSESSMENT & PLAN NOTE
Diabetes is worsening.   Continue current treatment regimen.  Regular aerobic exercise.  Discussed foot care.  Diabetes will be reassessed in 3 months.  Continue Lantus daily.  Will consider SGLT-2 pending lab results.

## 2021-04-13 ENCOUNTER — TREATMENT (OUTPATIENT)
Dept: PHYSICAL THERAPY | Facility: CLINIC | Age: 76
End: 2021-04-13

## 2021-04-13 DIAGNOSIS — R26.81 UNSTEADY GAIT: ICD-10-CM

## 2021-04-13 DIAGNOSIS — M25.562 CHRONIC PAIN OF BOTH KNEES: Primary | ICD-10-CM

## 2021-04-13 DIAGNOSIS — R53.1 WEAKNESS: ICD-10-CM

## 2021-04-13 DIAGNOSIS — M25.561 CHRONIC PAIN OF BOTH KNEES: Primary | ICD-10-CM

## 2021-04-13 DIAGNOSIS — G89.29 CHRONIC PAIN OF BOTH KNEES: Primary | ICD-10-CM

## 2021-04-13 LAB
ALBUMIN SERPL-MCNC: 4.2 G/DL (ref 3.5–5.2)
ALBUMIN/GLOB SERPL: 1.7 G/DL
ALP SERPL-CCNC: 105 U/L (ref 39–117)
ALT SERPL-CCNC: 21 U/L (ref 1–41)
AST SERPL-CCNC: 30 U/L (ref 1–40)
BILIRUB SERPL-MCNC: 0.8 MG/DL (ref 0–1.2)
BUN SERPL-MCNC: 20 MG/DL (ref 8–23)
BUN/CREAT SERPL: 21.1 (ref 7–25)
CALCIUM SERPL-MCNC: 9.7 MG/DL (ref 8.6–10.5)
CHLORIDE SERPL-SCNC: 102 MMOL/L (ref 98–107)
CHOLEST SERPL-MCNC: 119 MG/DL (ref 0–200)
CO2 SERPL-SCNC: 28 MMOL/L (ref 22–29)
CREAT SERPL-MCNC: 0.95 MG/DL (ref 0.76–1.27)
GLOBULIN SER CALC-MCNC: 2.5 GM/DL
GLUCOSE SERPL-MCNC: 244 MG/DL (ref 65–99)
HBA1C MFR BLD: 9.5 % (ref 4.8–5.6)
HDLC SERPL-MCNC: 45 MG/DL (ref 40–60)
LDLC SERPL CALC-MCNC: 58 MG/DL (ref 0–100)
LDLC/HDLC SERPL: 1.29 {RATIO}
POTASSIUM SERPL-SCNC: 4.1 MMOL/L (ref 3.5–5.2)
PROT SERPL-MCNC: 6.7 G/DL (ref 6–8.5)
SODIUM SERPL-SCNC: 137 MMOL/L (ref 136–145)
TRIGL SERPL-MCNC: 80 MG/DL (ref 0–150)
VLDLC SERPL CALC-MCNC: 16 MG/DL (ref 5–40)

## 2021-04-13 PROCEDURE — 97110 THERAPEUTIC EXERCISES: CPT | Performed by: PHYSICAL THERAPIST

## 2021-04-13 PROCEDURE — 97112 NEUROMUSCULAR REEDUCATION: CPT | Performed by: PHYSICAL THERAPIST

## 2021-04-13 NOTE — PROGRESS NOTES
Physical Therapy Daily Treatment Note      Visit # 12      Subjective Evaluation    History of Present Illness    Subjective comment: No new problems today.        Objective   See Exercise, Manual, and Modality Logs for complete treatment.       Assessment & Plan     Assessment  Assessment details: Erlin is continuing to demonstrate improved tolerance with all exercises.  He is requiring less assist with standing exercises and is able to perform sit to stand with SBA only.  SBA is for safety.  His left leg remains weaker than the right and this imbalance of strength is still affecting his overall balance.                      Manual Therapy:        mins  38649;  Therapeutic Exercise:    30     mins  86985;     Neuromuscular Alfred:    23    mins  06375;    Therapeutic Activity:          mins  78971;     Gait Training:           mins  85289;     Ultrasound:          mins  02769;    Work Hardening                 mins 93834  Iontophoresis                  mins 56974    Timed Treatment:   53   mins   Total Treatment:     53  mins    Uma Ordonez, PT  Physical Therapist

## 2021-04-15 ENCOUNTER — TREATMENT (OUTPATIENT)
Dept: PHYSICAL THERAPY | Facility: CLINIC | Age: 76
End: 2021-04-15

## 2021-04-15 DIAGNOSIS — G89.29 CHRONIC PAIN OF BOTH KNEES: Primary | ICD-10-CM

## 2021-04-15 DIAGNOSIS — M25.561 CHRONIC PAIN OF BOTH KNEES: Primary | ICD-10-CM

## 2021-04-15 DIAGNOSIS — M25.562 CHRONIC PAIN OF BOTH KNEES: Primary | ICD-10-CM

## 2021-04-15 DIAGNOSIS — R26.81 UNSTEADY GAIT: ICD-10-CM

## 2021-04-15 DIAGNOSIS — R53.1 WEAKNESS: ICD-10-CM

## 2021-04-15 PROCEDURE — 97110 THERAPEUTIC EXERCISES: CPT | Performed by: PHYSICAL THERAPIST

## 2021-04-15 PROCEDURE — 97112 NEUROMUSCULAR REEDUCATION: CPT | Performed by: PHYSICAL THERAPIST

## 2021-04-15 NOTE — PROGRESS NOTES
Physical Therapy Daily Treatment Note      Visit # 13      Subjective Evaluation    History of Present Illness    Subjective comment: Started taking Tylenol 3-4 days ago and it has helped with the pain in his knees. He is able to do reciprocal gait on steps as long as he has a railing. Pain  Current pain rating: 3           Objective   See Exercise, Manual, and Modality Logs for complete treatment.       Assessment & Plan     Assessment  Assessment details: Erlin continues to demonstrate improved strength, balance and endurance.  His pain has become more manageable with use of Tylenol 500 mg.  He is tolerating higher level balance and gait exercises with decreased LOB and decreased assistance for stability.                      Manual Therapy:         mins  07411;  Therapeutic Exercise:    30     mins  06778;     Neuromuscular Alfred:    27    mins  50671;    Therapeutic Activity:          mins  03722;     Gait Training:           mins  61906;     Ultrasound:          mins  84179;    Work Hardening                 mins 02028  Iontophoresis                  mins 33148    Timed Treatment:   57   mins   Total Treatment:     57   mins    Uma Ordonez, PT  Physical Therapist

## 2021-04-16 ENCOUNTER — TELEPHONE (OUTPATIENT)
Dept: FAMILY MEDICINE CLINIC | Facility: CLINIC | Age: 76
End: 2021-04-16

## 2021-04-16 DIAGNOSIS — Z79.4 TYPE 2 DIABETES MELLITUS WITH BOTH EYES AFFECTED BY MILD NONPROLIFERATIVE RETINOPATHY WITHOUT MACULAR EDEMA, WITH LONG-TERM CURRENT USE OF INSULIN (HCC): Primary | ICD-10-CM

## 2021-04-16 DIAGNOSIS — E11.3293 TYPE 2 DIABETES MELLITUS WITH BOTH EYES AFFECTED BY MILD NONPROLIFERATIVE RETINOPATHY WITHOUT MACULAR EDEMA, WITH LONG-TERM CURRENT USE OF INSULIN (HCC): Primary | ICD-10-CM

## 2021-04-16 NOTE — TELEPHONE ENCOUNTER
Spoke with patient over the phone; discussed lab results and diabetes uncontrolled; using shared decision making, discussed risks vs benefits of Steglatro and possible side effects; pt would like to try Steglatro; will come in for BMP in 1 month, or sooner if problems or concerns.

## 2021-04-20 ENCOUNTER — TREATMENT (OUTPATIENT)
Dept: PHYSICAL THERAPY | Facility: CLINIC | Age: 76
End: 2021-04-20

## 2021-04-20 DIAGNOSIS — R53.1 WEAKNESS: ICD-10-CM

## 2021-04-20 DIAGNOSIS — M25.562 CHRONIC PAIN OF BOTH KNEES: Primary | ICD-10-CM

## 2021-04-20 DIAGNOSIS — M25.561 CHRONIC PAIN OF BOTH KNEES: Primary | ICD-10-CM

## 2021-04-20 DIAGNOSIS — R26.81 UNSTEADY GAIT: ICD-10-CM

## 2021-04-20 DIAGNOSIS — G89.29 CHRONIC PAIN OF BOTH KNEES: Primary | ICD-10-CM

## 2021-04-20 PROCEDURE — 97110 THERAPEUTIC EXERCISES: CPT | Performed by: PHYSICAL THERAPIST

## 2021-04-20 PROCEDURE — 97112 NEUROMUSCULAR REEDUCATION: CPT | Performed by: PHYSICAL THERAPIST

## 2021-04-20 NOTE — PROGRESS NOTES
Physical Therapy Daily Treatment Note      Visit # 14      Subjective Evaluation    History of Present Illness    Subjective comment: Feels like he is doing much better.  His knees are bending better, can get in/out of his wife's car more easily.  He rode up past [place] and did well on trip.  Does not have any pain.  the Tylenol Pain  Current pain ratin           Objective   See Exercise, Manual, and Modality Logs for complete treatment.       Assessment & Plan     Assessment  Assessment details: Mr. Blanco continues to demonstrate improved endurance, balance and gait as demonstrated by the decreasing need for assistance and rest breaks during exercises.  He does fatigue more with standing exercises but only required 2 rest breaks for approximately 1 minute each time then he is ready to get back up and perform balance and stabilization in standing.                      Manual Therapy:         mins  42296;  Therapeutic Exercise:    30    mins  44237;     Neuromuscular Alfred:    27  mins  06188;    Therapeutic Activity:          mins  68265;     Gait Training:           mins  56351;     Ultrasound:          mins  51917;    Work Hardening                 mins 35623  Iontophoresis                  mins 77121    Timed Treatment:   57   mins   Total Treatment:     57   mins    Uma Ordonez, PT  Physical Therapist

## 2021-04-22 ENCOUNTER — TREATMENT (OUTPATIENT)
Dept: PHYSICAL THERAPY | Facility: CLINIC | Age: 76
End: 2021-04-22

## 2021-04-22 DIAGNOSIS — R53.1 WEAKNESS: ICD-10-CM

## 2021-04-22 DIAGNOSIS — R26.81 UNSTEADY GAIT: ICD-10-CM

## 2021-04-22 DIAGNOSIS — M25.562 CHRONIC PAIN OF BOTH KNEES: Primary | ICD-10-CM

## 2021-04-22 DIAGNOSIS — G89.29 CHRONIC PAIN OF BOTH KNEES: Primary | ICD-10-CM

## 2021-04-22 DIAGNOSIS — M25.561 CHRONIC PAIN OF BOTH KNEES: Primary | ICD-10-CM

## 2021-04-22 PROCEDURE — 97112 NEUROMUSCULAR REEDUCATION: CPT | Performed by: PHYSICAL THERAPIST

## 2021-04-22 PROCEDURE — 97110 THERAPEUTIC EXERCISES: CPT | Performed by: PHYSICAL THERAPIST

## 2021-04-22 NOTE — PROGRESS NOTES
"Re-Assessment / Re-Certification        Patient: Erlin Blanco   : 1945  Diagnosis/ICD-10 Code:  Chronic pain of both knees [M25.561, M25.562, G89.29]  Referring practitioner: MUKUL Dover  Date of Initial Evaluation:  Type: THERAPY  Noted: 3/4/2021  Patient seen for 15 sessions      Subjective:     Clinical Progress: improved  Home Program Compliance: Yes  Treatment has included: therapeutic exercise, neuromuscular re-education, therapeutic activity, gait training and pt education    Subjective Evaluation    History of Present Illness    Subjective comment: He is feeling stronger and has more endurance but still has difficulty with balance.  The left leg continues to be weaker. Pain  Current pain ratin  At worst pain ratin  Location: knees and ankles         Objective          Strength/Myotome Testing     Left Hip   Planes of Motion   Flexion: 4-  Extension: 3  Abduction: 4-    Right Hip   Planes of Motion   Flexion: 4  Extension: 3  Abduction: 4    Left Knee   Flexion: 4  Extension: 4    Right Knee   Flexion: 4+  Extension: 4+    Left Ankle/Foot   Dorsiflexion: 4    Right Ankle/Foot   Dorsiflexion: 4    Ambulation     Observational Gait   Decreased walking speed.   Left arm swing: high guard  Right arm swing: high guard      Assessment & Plan       Goals  Plan Goals: STG (4 weeks)  1. Pt will be independent with initial LE and core strengthening program for ease with sit to stand transfers, stand to walk and balance stability.  MET  2.  Pt will demonstrate improved 5 x sit to stand from 33 seconds to 25 seconds from 23.5\" surface.  MET  3.  Pt will be able to rise from sitting and walk with minimal LOB.  MET    4.  Pt will tolerate 30 minutes of continuous exercises without exacerbation of pain or fatigue.  P MET    LTG (8 weeks)  1.  Pt will be independent with core and LE stabilization program for improved balance and stability with all functional tasks. PROGRESSING  2.  Pt will " demonstrate 4/5 abdominal and LE strength for stability with walking, standing, and transitional activities.  MET  3.  Pt will be able to ambulate community distances with little to no fatigue. MET  4.  Pt will report improved perceived function via LEFS from 62% to 40% or less disability  IMPROVING      Progress toward previous goals: Partially Met          Recommendations: Continue as planned  Timeframe: 2 weeks  Prognosis to achieve goals: good        Manual Therapy:         mins  74711;  Therapeutic Exercise:    30     mins  30354;     Neuromuscular Alfred:    30    mins  19428;    Therapeutic Activity:          mins  24918;     Gait Training:           mins  09819;     Ultrasound:          mins  82089;    Work Hardening                 mins 65200  Iontophoresis                  mins 93545    Timed Treatment:   60   mins   Total Treatment:     60   mins

## 2021-04-27 ENCOUNTER — TREATMENT (OUTPATIENT)
Dept: PHYSICAL THERAPY | Facility: CLINIC | Age: 76
End: 2021-04-27

## 2021-04-27 DIAGNOSIS — G89.29 CHRONIC PAIN OF BOTH KNEES: Primary | ICD-10-CM

## 2021-04-27 DIAGNOSIS — R26.81 UNSTEADY GAIT: ICD-10-CM

## 2021-04-27 DIAGNOSIS — R53.1 WEAKNESS: ICD-10-CM

## 2021-04-27 DIAGNOSIS — M25.561 CHRONIC PAIN OF BOTH KNEES: Primary | ICD-10-CM

## 2021-04-27 DIAGNOSIS — M25.562 CHRONIC PAIN OF BOTH KNEES: Primary | ICD-10-CM

## 2021-04-27 PROCEDURE — 97112 NEUROMUSCULAR REEDUCATION: CPT | Performed by: PHYSICAL THERAPIST

## 2021-04-27 PROCEDURE — 97110 THERAPEUTIC EXERCISES: CPT | Performed by: PHYSICAL THERAPIST

## 2021-04-27 NOTE — PROGRESS NOTES
"Physical Therapy Daily Treatment Note      Visit # 16      Subjective Evaluation    History of Present Illness    Subjective comment: Feels like he is doing ok.  A little tired from all his activities today.  A little pain in his knees but nothing out of the ordinary for a 76 year ol. Pain  Current pain ratin           Objective   See Exercise, Manual, and Modality Logs for complete treatment.       Assessment & Plan     Assessment  Assessment details: Erlin is continuing to improve in all planes but backwards motions. He is only able to take very small steps backwards and is fearful of LOB.  He struggles some with following directions with higher level exercises, requiring reinstruction 2-3 times.  He is doing well with sit to stand from 23\" or higher but lower than that and he struggles due to balance issues and legs being too long to stand with ease from lower levels.      Plan  Plan details: Next session, discuss with patient DC or seeing 1-2 mariola times spread out over the next 3 weeks.                      Manual Therapy:         mins  49244;  Therapeutic Exercise:    30     mins  50266;     Neuromuscular Alfred:    30    mins  56714;    Therapeutic Activity:          mins  81471;     Gait Training:           mins  83149;     Ultrasound:          mins  61760;    Work Hardening                 mins 88460  Iontophoresis                  mins 66255    Timed Treatment:   60   mins   Total Treatment:     60   mins    Uma Ordonez, PT  Physical Therapist  "

## 2021-04-29 ENCOUNTER — TREATMENT (OUTPATIENT)
Dept: PHYSICAL THERAPY | Facility: CLINIC | Age: 76
End: 2021-04-29

## 2021-04-29 DIAGNOSIS — M25.562 CHRONIC PAIN OF BOTH KNEES: Primary | ICD-10-CM

## 2021-04-29 DIAGNOSIS — R53.1 WEAKNESS: ICD-10-CM

## 2021-04-29 DIAGNOSIS — M25.561 CHRONIC PAIN OF BOTH KNEES: Primary | ICD-10-CM

## 2021-04-29 DIAGNOSIS — G89.29 CHRONIC PAIN OF BOTH KNEES: Primary | ICD-10-CM

## 2021-04-29 DIAGNOSIS — R26.81 UNSTEADY GAIT: ICD-10-CM

## 2021-04-29 PROCEDURE — 97112 NEUROMUSCULAR REEDUCATION: CPT | Performed by: PHYSICAL THERAPIST

## 2021-04-29 PROCEDURE — 97110 THERAPEUTIC EXERCISES: CPT | Performed by: PHYSICAL THERAPIST

## 2021-04-29 NOTE — PROGRESS NOTES
Physical Therapy Daily Progress Note      Visit # 17      Subjective Evaluation    History of Present Illness    Subjective comment: Pt reports feeling tired today.  He has been up since 4 am.       Objective   See Exercise, Manual, and Modality Logs for complete treatment.       Assessment & Plan     Assessment  Assessment details: Pt completed treatment with no c/o pain in (B) LE.  He has made good progress in strengthening his LE for improved ADL's.  He still displays deficits in balance that impacts his ambulation.                     Manual Therapy:    0     mins  71263;  Therapeutic Exercise:    32     mins  47115;     Neuromuscular Alfred:    30    mins  49383;    Therapeutic Activity:     0     mins  46675;     Gait Trainin     mins  71109;     Ultrasound:     0     mins  71020;    Work Hardening           0      mins 18773  Iontophoresis               0   mins 87100  E-Stim                          _0_ mins 96528 ( )    Timed Treatment:   62   mins   Total Treatment:     62   mins    Miko Hopson PTA  Physical Therapist Assistant

## 2021-05-04 ENCOUNTER — TREATMENT (OUTPATIENT)
Dept: PHYSICAL THERAPY | Facility: CLINIC | Age: 76
End: 2021-05-04

## 2021-05-04 DIAGNOSIS — M25.562 CHRONIC PAIN OF BOTH KNEES: Primary | ICD-10-CM

## 2021-05-04 DIAGNOSIS — R26.81 UNSTEADY GAIT: ICD-10-CM

## 2021-05-04 DIAGNOSIS — R53.1 WEAKNESS: ICD-10-CM

## 2021-05-04 DIAGNOSIS — G89.29 CHRONIC PAIN OF BOTH KNEES: Primary | ICD-10-CM

## 2021-05-04 DIAGNOSIS — M25.561 CHRONIC PAIN OF BOTH KNEES: Primary | ICD-10-CM

## 2021-05-04 PROCEDURE — 97116 GAIT TRAINING THERAPY: CPT | Performed by: PHYSICAL THERAPIST

## 2021-05-04 PROCEDURE — 97110 THERAPEUTIC EXERCISES: CPT | Performed by: PHYSICAL THERAPIST

## 2021-05-04 NOTE — PROGRESS NOTES
Physical Therapy Daily Progress Note      Visit # 18      Subjective Evaluation    History of Present Illness    Subjective comment: Pt reports no complaints or issues.       Objective   See Exercise, Manual, and Modality Logs for complete treatment.       Assessment & Plan     Assessment  Assessment details: Pt tolerated treatment with no c/o pain.  Pt has made good progress in strengthen his (B) LE for improved mobility and balance.  Plan to progress balance and gait training as tolerated.                     Manual Therapy:    0     mins  99552;  Therapeutic Exercise:    30     mins  49262;     Neuromuscular Alfred:    0    mins  59301;    Therapeutic Activity:     0     mins  84724;     Gait Trainin     mins  99613;     Ultrasound:     0     mins  93823;    Work Hardening           0      mins 47536  Iontophoresis               0   mins 63265  E-Stim                          _0_ mins 22184 ( )    Timed Treatment:   54   mins   Total Treatment:     54   mins    Miko Hopson PTA  Physical Therapist Assistant

## 2021-05-14 ENCOUNTER — OFFICE VISIT (OUTPATIENT)
Dept: FAMILY MEDICINE CLINIC | Facility: CLINIC | Age: 76
End: 2021-05-14

## 2021-05-14 VITALS
BODY MASS INDEX: 36.47 KG/M2 | DIASTOLIC BLOOD PRESSURE: 80 MMHG | TEMPERATURE: 97.3 F | SYSTOLIC BLOOD PRESSURE: 150 MMHG | HEART RATE: 65 BPM | WEIGHT: 269.3 LBS | HEIGHT: 72 IN | OXYGEN SATURATION: 96 %

## 2021-05-14 DIAGNOSIS — Z79.4 TYPE 2 DIABETES MELLITUS WITH BOTH EYES AFFECTED BY MILD NONPROLIFERATIVE RETINOPATHY WITHOUT MACULAR EDEMA, WITH LONG-TERM CURRENT USE OF INSULIN (HCC): ICD-10-CM

## 2021-05-14 DIAGNOSIS — J01.90 ACUTE NON-RECURRENT SINUSITIS, UNSPECIFIED LOCATION: Primary | ICD-10-CM

## 2021-05-14 DIAGNOSIS — E11.3293 TYPE 2 DIABETES MELLITUS WITH BOTH EYES AFFECTED BY MILD NONPROLIFERATIVE RETINOPATHY WITHOUT MACULAR EDEMA, WITH LONG-TERM CURRENT USE OF INSULIN (HCC): ICD-10-CM

## 2021-05-14 DIAGNOSIS — R60.0 LOCALIZED EDEMA: ICD-10-CM

## 2021-05-14 PROCEDURE — 99214 OFFICE O/P EST MOD 30 MIN: CPT | Performed by: NURSE PRACTITIONER

## 2021-05-14 RX ORDER — GLIMEPIRIDE 4 MG/1
2 TABLET ORAL
Start: 2021-05-14 | End: 2021-08-23 | Stop reason: SDUPTHER

## 2021-05-14 RX ORDER — AMOXICILLIN 500 MG/1
500 CAPSULE ORAL 3 TIMES DAILY
Qty: 21 CAPSULE | Refills: 0 | Status: SHIPPED | OUTPATIENT
Start: 2021-05-14 | End: 2021-05-21

## 2021-05-14 NOTE — PATIENT INSTRUCTIONS
Resume Glimepiride 4 mg 1/2 tablet daily with breakfast.  Call in 2 weeks with blood sugar readings.  Continue to monitor your blood sugar once daily before a meal and record results.  Continue to monitor your blood pressure periodically and record results.  Continue to drink adequate liquids.  Continue to work on healthy diet and exercise as tolerated.  Follow up in 6 weeks, or sooner if problems or concerns.

## 2021-05-14 NOTE — PROGRESS NOTES
Subjective   Erlin Blanco is a 76 y.o. male.     Chief Complaint   Patient presents with   • Cough     sneezing x 1.5 weeks    • Nasal Congestion   • Diabetes     follow up       History of Present Illness   Patient presents with c/o cough, sneezing, and nasal congestion; symptoms started about 1.5 weeks ago; has been taking Coricidin HBP and Delsym and has helped; no fever; nonproductive cough; symptoms started after out working in yard planting flowers; no SOA; no chest tightness; takes Zyrtec, Flonase, and Montelukast daily and typically work well; symptoms have improved in last few days.    F/U DM2: takes Lantus 40 units daily; last A1c 9.5% and added Steglatro; had some problems with yeast after being on Steglatro for about 1.5 weeks so stopped taking; blood sugars have improved some, have been running 170s in mornings; watches carbs/sugars, could do better; has been doing physical therapy for weakness and has really helped; has been more flexible getting in and out of car; balance slightly better.    F/U edema: takes Furosemide twice daily; swelling has improved, but does not resolve; sees cardiology next month.    The following portions of the patient's history were reviewed and updated as appropriate: allergies, current medications, past family history, past medical history, past social history, past surgical history and problem list.    Current Outpatient Medications on File Prior to Visit   Medication Sig   • Accu-Chek FastClix Lancets misc TEST BLOOD SUGAR 1-2 TIMES DAILY AND AS NEEDED   • acetaminophen (TYLENOL) 500 MG tablet Take 1,000 mg by mouth 2 (Two) Times a Day As Needed for Mild Pain  or Moderate Pain .   • apixaban (ELIQUIS) 5 MG tablet tablet Take 1 tablet by mouth Every 12 (Twelve) Hours. Indications: Atrial Fibrillation   • aspirin 81 MG EC tablet Take 1 tablet by mouth Daily.   • Blood Glucose Monitoring Suppl (Accu-Chek Guide Me) w/Device kit TEST BLOOD SUGAR 1-2 TIMES DAILY AND AS  NEEDED   • carvedilol (COREG) 12.5 MG tablet TAKE 3 TABLETS TWICE A DAY WITH MEALS   • cetirizine (zyrTEC) 10 MG tablet Take 10 mg by mouth Daily.   • dilTIAZem CD (CARDIZEM CD) 360 MG 24 hr capsule Take 1 capsule by mouth Daily.   • finasteride (PROSCAR) 5 MG tablet Take 5 mg by mouth Every Night.   • fluticasone (Flonase) 50 MCG/ACT nasal spray 2 sprays into the nostril(s) as directed by provider Daily.   • furosemide (LASIX) 40 MG tablet Take 1 tablet by mouth Daily. (Patient taking differently: Take 40 mg by mouth 2 (Two) Times a Day.)   • glucose blood (OneTouch Ultra) test strip Test blood sugar 1-2 times daily and as needed.   • hydrALAZINE (APRESOLINE) 100 MG tablet Take 1 tablet by mouth 3 (Three) Times a Day.   • hydrocortisone-bacitracin-zinc oxide-nystatin (MAGIC BARRIER) Apply 1 application topically to the appropriate area as directed 2 (Two) Times a Day.   • hydroxyurea (HYDREA) 500 MG capsule Take 500 mg by mouth Daily.   • Insulin Glargine (LANTUS SOLOSTAR) 100 UNIT/ML injection pen Inject 40 Units under the skin into the appropriate area as directed Daily.   • Insulin Pen Needle (B-D UF III MINI PEN NEEDLES) 31G X 5 MM misc Inject 1 each under the skin into the appropriate area as directed Daily.   • losartan (COZAAR) 100 MG tablet Take 1 tablet by mouth Daily.   • montelukast (Singulair) 10 MG tablet Take 1 tablet by mouth Daily. (Patient taking differently: Take 10 mg by mouth Every Night.)   • Multiple Vitamins-Minerals (MULTIVITAMIN ADULT PO) Take 1 tablet by mouth Daily.   • potassium chloride (K-DUR,KLOR-CON) 20 MEQ CR tablet Take 1 tablet by mouth 3 (Three) Times a Day.   • pravastatin (PRAVACHOL) 40 MG tablet TAKE 1 TABLET DAILY   • Probiotic Product (Probiotic Daily) capsule Take 1 capsule by mouth Daily.   • terazosin (HYTRIN) 2 MG capsule Take 1 capsule by mouth Every Night.   • [DISCONTINUED] Ertugliflozin L-PyroglutamicAc (Steglatro) 5 MG tablet Take 1 tablet by mouth Every Morning.      No current facility-administered medications on file prior to visit.          Past Medical History:   Diagnosis Date   • Abnormal ECG    • Abnormal stress test    • Abrasion of face 1/29/2020   • Acute bronchitis    • Acute pyelonephritis 10/9/2020   • Acute sinusitis    • Allergic rhinitis    • Aortic root dilation (CMS/[facility])    • Arthritis    • Bacteremia due to Escherichia coli 8/17/2020   • Benign enlargement of prostate    • C. difficile colitis 9/16/2020   • Cellulitis of knee, left 5/4/2017   • CHF (congestive heart failure) (CMS/[facility])    • Chronic diastolic congestive heart failure (CMS/[facility])    • Constipation 11/14/2020   • Contusion of face 1/29/2020   • Coronary artery disease    • Diminished pulse     in lower extremities   • Edema    • Elevated hematocrit    • Elevated hemoglobin (CMS/[facility])    • Essential hypertension    • Hearing loss     mala hearing aids   • Heart valve disease    • High risk medication use    • History of back pain    • History of dysuria    • History of echocardiogram    • History of intracranial hemorrhage    • History of scoliosis    • History of stroke    • Hyperlipidemia    • Injury of toe, left, initial encounter    • Irregular heart rate    • Knee pain, left    • Left bundle branch block    • Leg wound, left    • Leukocytosis 9/16/2020   • Low back pain    • Medicare annual wellness visit, subsequent    • Minor head injury without loss of consciousness 1/29/2020   • Murmur    • Muscle cramps    • Need for hepatitis C screening test    • Polycythemia    • Polycythemia vera (CMS/[facility])    • Pressure injury of buttock, stage 2 (CMS/[facility]) 9/18/2020   • Prostate hyperplasia without urinary obstruction    • Prostatitis    • Proteinuria    • Pulmonary hypertension (CMS/[facility])    • Sacroiliitis (CMS/[facility]) 10/9/2020   • Scoliosis    • Sepsis due to Escherichia coli (CMS/[facility]) 8/17/2020   • Sepsis with metabolic encephalopathy (CMS/[facility]) 8/17/2020   • Stroke (CMS/[facility])    • SVT (supraventricular  tachycardia) (CMS/H[facility])    • Tachycardia    • Thrombocytosis (CMS/HCC)    • TIA (transient ischemic attack)        • Type 2 diabetes mellitus (CMS/HCC)    • Urinary tract infection due to extended-spectrum beta lactamase (ESBL) producing Escherichia coli 2020   • Valvular heart disease        Past Surgical History:   Procedure Laterality Date   • CARDIAC CATHETERIZATION     • COLONOSCOPY      did Cologuard approx 2019 and negative   • HAND SURGERY     • JOINT REPLACEMENT  2014    Righ Knee   • GA TOTAL KNEE ARTHROPLASTY Left 2017    Procedure: LT TOTAL KNEE ARTHROPLASTY;  Surgeon: Bertin Perrin MD;  Location: [facility] OR;  Service: Orthopedics   • PROSTATE SURGERY      Rezum steam treatment to shrink prostate by dr. guerrero       Family History   Problem Relation Age of Onset   • Hypertension Mother    • Other Father         ruptured appendix,  when pt. was 14 years old.   • Diabetes Paternal Aunt    • Diabetes Paternal Uncle    • No Known Problems Other        Social History     Socioeconomic History   • Marital status:      Spouse name: Not on file   • Number of children: Not on file   • Years of education: Not on file   • Highest education level: Not on file   Tobacco Use   • Smoking status: Former Smoker     Types: Cigarettes     Quit date:      Years since quittin.3   • Smokeless tobacco: Former User   • Tobacco comment: Caffeine daily   Substance and Sexual Activity   • Alcohol use: No   • Drug use: No   • Sexual activity: Defer       Review of Systems   Constitutional: Negative for appetite change, chills, fatigue, fever, unexpected weight gain and unexpected weight loss.   HENT: Negative for ear pain and sore throat. Postnasal drip: some in mornings. Rhinorrhea: some, has improved. Sinus pressure: mild, sinus medication has helped.    Eyes: Negative for blurred vision (monitoring cataract) and pain.   Respiratory: Positive for cough. Negative for chest tightness  "and shortness of breath.    Cardiovascular: Negative for chest pain and palpitations.   Gastrointestinal: Negative for abdominal pain, blood in stool, constipation, diarrhea, nausea, vomiting, GERD and indigestion.   Endocrine: Negative for polydipsia.   Genitourinary: Negative for dysuria and frequency (no change).   Musculoskeletal: Arthralgias: knee pain has improved with physical therapy. Back pain: has improved with physical therapy; bending over to pick something up no longer causing numbness in left arm.   Skin: Negative for rash.   Neurological: Negative for dizziness and headache.       Objective   Vitals:    05/14/21 0913   BP: 150/80   BP Location: Left arm   Patient Position: Sitting   Cuff Size: Adult   Pulse: 65   Temp: 97.3 °F (36.3 °C)   SpO2: 96%   Weight: 122 kg (269 lb 4.8 oz)   Height: 182.9 cm (72\")     Body mass index is 36.52 kg/m².    Physical Exam  Vitals and nursing note reviewed.   Constitutional:       General: He is not in acute distress.     Appearance: He is well-developed. He is not diaphoretic.   HENT:      Head: Normocephalic.      Right Ear: External ear normal. Right ear decreased hearing: has bilateral hearing aids. Right ear middle ear effusion: mild.      Left Ear: External ear normal. Left ear decreased hearing: bilateral hearing aids. Left ear middle ear effusion: mild.      Nose: Nose normal.      Right Sinus: No maxillary sinus tenderness or frontal sinus tenderness.      Left Sinus: No maxillary sinus tenderness or frontal sinus tenderness.      Mouth/Throat:      Mouth: Mucous membranes are moist.      Pharynx: Posterior oropharyngeal erythema present. No oropharyngeal exudate.   Eyes:      Conjunctiva/sclera: Conjunctivae normal.   Neck:      Vascular: No carotid bruit.   Cardiovascular:      Rate and Rhythm: Normal rate and regular rhythm.      Pulses: Normal pulses.      Heart sounds: Murmur heard.     Pulmonary:      Effort: Pulmonary effort is normal. No respiratory " distress.      Breath sounds: Normal breath sounds. No decreased breath sounds.   Abdominal:      General: Bowel sounds are normal.      Palpations: Abdomen is soft. There is no hepatomegaly or splenomegaly.      Tenderness: There is no abdominal tenderness.   Musculoskeletal:      Cervical back: Normal range of motion and neck supple.      Right lower leg: Edema (trace-1+ pretibial and ankle) present.      Left lower le+ Edema (pretibial and ankle; pt states left has been more swollen than right since knee replacement) present.   Lymphadenopathy:      Cervical: No cervical adenopathy.   Skin:     General: Skin is warm and dry.      Findings: No rash.   Neurological:      Mental Status: He is alert and oriented to person, place, and time.      Gait: Abnormal gait: limping on left, using cane.   Psychiatric:         Mood and Affect: Mood normal.         Behavior: Behavior normal.         Thought Content: Thought content normal.         Cognition and Memory: Cognition normal.         Judgment: Judgment normal.         Lab Results   Component Value Date    WBC 8.60 2020    RBC 4.38 2020    HGB 13.2 2020    HCT 39.8 2020    MCV 90.9 2020    MCH 30.1 2020    MCHC 33.2 2020    RDW 14.0 2020    RDWSD 46.7 2020    MPV 10.6 2020     2020    NEUTRORELPCT 64.1 2020    LYMPHORELPCT 21.2 2020    MONORELPCT 5.6 2020    EOSRELPCT 7.0 (H) 2020    BASORELPCT 1.5 2020    AUTOIGPER 0.6 (H) 2020    NEUTROABS 5.52 2020    LYMPHSABS 1.82 2020    MONOSABS 0.48 2020    EOSABS 0.60 (H) 2020    BASOSABS 0.13 2020    AUTOIGNUM 0.05 2020    NRBC 0.0 2020     Lab Results   Component Value Date    GLUCOSE 150 (H) 2020    BUN 20 2021    CREATININE 0.95 2021    EGFRIFNONA 78 2021    EGFRIFAFRI 94 2021    BCR 21.1 2021    K 4.1 2021    CO2 28.0 2021     CALCIUM 9.7 04/12/2021    PROTENTOTREF 6.7 04/12/2021    ALBUMIN 4.20 04/12/2021    LABIL2 1.7 04/12/2021    AST 30 04/12/2021    ALT 21 04/12/2021      Lab Results   Component Value Date    CHLPL 119 04/12/2021    TRIG 80 04/12/2021    HDL 45 04/12/2021    VLDL 16 04/12/2021    LDL 58 04/12/2021     Lab Results   Component Value Date    TSH 2.740 09/26/2020     Lab Results   Component Value Date    HGBA1C 9.50 (H) 04/12/2021         Assessment/Plan .  Problem List Items Addressed This Visit     Type 2 diabetes mellitus with both eyes affected by mild nonproliferative retinopathy without macular edema, with long-term current use of insulin (CMS/[facility])    Overview     With mild non-proliferative diabetic retinopathy, without macular edema         Current Assessment & Plan     Diabetes is improving with treatment.   Regular aerobic exercise.  Medication changes per orders.  Diabetes will be reassessed 2 weeks.  Continue Lantus daily.  Resume Glimepiride 4 mg 1/2 tablet daily with breakfast.  Call in 2 weeks with blood sugar readings.  Decrease intake of carbs/sugars.         Relevant Medications    glimepiride (AMARYL) 4 MG tablet    Acute non-recurrent sinusitis - Primary    Current Assessment & Plan     Make sure drinking adequate liquids.         Relevant Medications    amoxicillin (AMOXIL) 500 MG capsule    Localized edema    Current Assessment & Plan     Continue Furosemide twice daily.  Follow up as scheduled with cardiology.               Return in about 6 weeks (around 6/25/2021) for Recheck.or sooner if symptoms persist or worsen.  Patient has been on Glimepiride in past and would like to try again; instructed to watch blood sugars closely with Glimepiride and Lantus.       I spent 30 minutes caring for Erlin on this date of service. This time includes time spent by me in the following activities:performing a medically appropriate examination and/or evaluation , counseling and educating the  patient/family/caregiver, ordering medications, tests, or procedures and documenting information in the medical record    COVID-19 Precautions - Patient was compliant in wearing a mask. When I saw the patient, I used appropriate personal protective equipment (PPE) including mask, gloves, and eye shield (standard procedure).  Hand hygiene was completed before and after seeing the patient.

## 2021-05-14 NOTE — ASSESSMENT & PLAN NOTE
Diabetes is improving with treatment.   Regular aerobic exercise.  Medication changes per orders.  Diabetes will be reassessed 2 weeks.  Continue Lantus daily.  Resume Glimepiride 4 mg 1/2 tablet daily with breakfast.  Call in 2 weeks with blood sugar readings.  Decrease intake of carbs/sugars.

## 2021-05-15 ENCOUNTER — HOSPITAL ENCOUNTER (OUTPATIENT)
Facility: HOSPITAL | Age: 76
Setting detail: OBSERVATION
Discharge: HOME | End: 2021-05-16
Attending: EMERGENCY MEDICINE | Admitting: INTERNAL MEDICINE

## 2021-05-15 ENCOUNTER — APPOINTMENT (OUTPATIENT)
Dept: GENERAL RADIOLOGY | Facility: HOSPITAL | Age: 76
End: 2021-05-15

## 2021-05-15 DIAGNOSIS — I10 ESSENTIAL HYPERTENSION: ICD-10-CM

## 2021-05-15 DIAGNOSIS — E11.65 TYPE 2 DIABETES MELLITUS WITH HYPERGLYCEMIA, WITHOUT LONG-TERM CURRENT USE OF INSULIN (HCC): ICD-10-CM

## 2021-05-15 DIAGNOSIS — N17.9 AKI (ACUTE KIDNEY INJURY) (HCC): ICD-10-CM

## 2021-05-15 DIAGNOSIS — N48.1 BALANITIS: Primary | ICD-10-CM

## 2021-05-15 DIAGNOSIS — N47.2 PARAPHIMOSIS: ICD-10-CM

## 2021-05-15 LAB
ALBUMIN SERPL-MCNC: 4 G/DL (ref 3.5–5.2)
ALBUMIN/GLOB SERPL: 1.4 G/DL
ALP SERPL-CCNC: 113 U/L (ref 39–117)
ALT SERPL W P-5'-P-CCNC: 19 U/L (ref 1–41)
ANION GAP SERPL CALCULATED.3IONS-SCNC: 10.2 MMOL/L (ref 5–15)
APTT PPP: 33.6 SECONDS (ref 22.7–35.4)
AST SERPL-CCNC: 22 U/L (ref 1–40)
BACTERIA UR QL AUTO: ABNORMAL /HPF
BASOPHILS # BLD AUTO: 0.11 10*3/MM3 (ref 0–0.2)
BASOPHILS NFR BLD AUTO: 1.4 % (ref 0–1.5)
BILIRUB SERPL-MCNC: 0.3 MG/DL (ref 0–1.2)
BILIRUB UR QL STRIP: NEGATIVE
BUN SERPL-MCNC: 18 MG/DL (ref 8–23)
BUN/CREAT SERPL: 13.8 (ref 7–25)
CALCIUM SPEC-SCNC: 9.3 MG/DL (ref 8.6–10.5)
CHLORIDE SERPL-SCNC: 103 MMOL/L (ref 98–107)
CLARITY UR: CLEAR
CO2 SERPL-SCNC: 26.8 MMOL/L (ref 22–29)
COLOR UR: YELLOW
CREAT SERPL-MCNC: 1.3 MG/DL (ref 0.76–1.27)
D-LACTATE SERPL-SCNC: 1.3 MMOL/L (ref 0.5–2)
DEPRECATED RDW RBC AUTO: 41.5 FL (ref 37–54)
EOSINOPHIL # BLD AUTO: 0.44 10*3/MM3 (ref 0–0.4)
EOSINOPHIL NFR BLD AUTO: 5.5 % (ref 0.3–6.2)
ERYTHROCYTE [DISTWIDTH] IN BLOOD BY AUTOMATED COUNT: 13 % (ref 12.3–15.4)
GFR SERPL CREATININE-BSD FRML MDRD: 54 ML/MIN/1.73
GLOBULIN UR ELPH-MCNC: 2.9 GM/DL
GLUCOSE SERPL-MCNC: 297 MG/DL (ref 65–99)
GLUCOSE UR STRIP-MCNC: ABNORMAL MG/DL
HCT VFR BLD AUTO: 44.7 % (ref 37.5–51)
HGB BLD-MCNC: 14.9 G/DL (ref 13–17.7)
HGB UR QL STRIP.AUTO: NEGATIVE
HYALINE CASTS UR QL AUTO: ABNORMAL /LPF
IMM GRANULOCYTES # BLD AUTO: 0.05 10*3/MM3 (ref 0–0.05)
IMM GRANULOCYTES NFR BLD AUTO: 0.6 % (ref 0–0.5)
INR PPP: 1.19 (ref 0.9–1.1)
KETONES UR QL STRIP: ABNORMAL
LEUKOCYTE ESTERASE UR QL STRIP.AUTO: ABNORMAL
LYMPHOCYTES # BLD AUTO: 1.41 10*3/MM3 (ref 0.7–3.1)
LYMPHOCYTES NFR BLD AUTO: 17.7 % (ref 19.6–45.3)
MAGNESIUM SERPL-MCNC: 2 MG/DL (ref 1.6–2.4)
MCH RBC QN AUTO: 29.5 PG (ref 26.6–33)
MCHC RBC AUTO-ENTMCNC: 33.3 G/DL (ref 31.5–35.7)
MCV RBC AUTO: 88.5 FL (ref 79–97)
MONOCYTES # BLD AUTO: 0.62 10*3/MM3 (ref 0.1–0.9)
MONOCYTES NFR BLD AUTO: 7.8 % (ref 5–12)
NEUTROPHILS NFR BLD AUTO: 5.32 10*3/MM3 (ref 1.7–7)
NEUTROPHILS NFR BLD AUTO: 67 % (ref 42.7–76)
NITRITE UR QL STRIP: NEGATIVE
NRBC BLD AUTO-RTO: 0 /100 WBC (ref 0–0.2)
NT-PROBNP SERPL-MCNC: 237.8 PG/ML (ref 0–900)
PH UR STRIP.AUTO: 5.5 [PH] (ref 5–8)
PLATELET # BLD AUTO: 376 10*3/MM3 (ref 140–450)
PMV BLD AUTO: 9.5 FL (ref 6–12)
POTASSIUM SERPL-SCNC: 3.5 MMOL/L (ref 3.5–5.2)
PROCALCITONIN SERPL-MCNC: 0.06 NG/ML (ref 0–0.25)
PROT SERPL-MCNC: 6.9 G/DL (ref 6–8.5)
PROT UR QL STRIP: ABNORMAL
PROTHROMBIN TIME: 14.9 SECONDS (ref 11.7–14.2)
RBC # BLD AUTO: 5.05 10*6/MM3 (ref 4.14–5.8)
RBC # UR: ABNORMAL /HPF
REF LAB TEST METHOD: ABNORMAL
SODIUM SERPL-SCNC: 140 MMOL/L (ref 136–145)
SP GR UR STRIP: 1.03 (ref 1–1.03)
SQUAMOUS #/AREA URNS HPF: ABNORMAL /HPF
TROPONIN T SERPL-MCNC: <0.01 NG/ML (ref 0–0.03)
UROBILINOGEN UR QL STRIP: ABNORMAL
WBC # BLD AUTO: 7.95 10*3/MM3 (ref 3.4–10.8)
WBC UR QL AUTO: ABNORMAL /HPF

## 2021-05-15 PROCEDURE — 83605 ASSAY OF LACTIC ACID: CPT | Performed by: PHYSICIAN ASSISTANT

## 2021-05-15 PROCEDURE — 81001 URINALYSIS AUTO W/SCOPE: CPT | Performed by: PHYSICIAN ASSISTANT

## 2021-05-15 PROCEDURE — 80053 COMPREHEN METABOLIC PANEL: CPT | Performed by: PHYSICIAN ASSISTANT

## 2021-05-15 PROCEDURE — 85730 THROMBOPLASTIN TIME PARTIAL: CPT | Performed by: PHYSICIAN ASSISTANT

## 2021-05-15 PROCEDURE — 71045 X-RAY EXAM CHEST 1 VIEW: CPT

## 2021-05-15 PROCEDURE — 83735 ASSAY OF MAGNESIUM: CPT | Performed by: PHYSICIAN ASSISTANT

## 2021-05-15 PROCEDURE — 99285 EMERGENCY DEPT VISIT HI MDM: CPT

## 2021-05-15 PROCEDURE — U0004 COV-19 TEST NON-CDC HGH THRU: HCPCS | Performed by: PHYSICIAN ASSISTANT

## 2021-05-15 PROCEDURE — 85025 COMPLETE CBC W/AUTO DIFF WBC: CPT | Performed by: PHYSICIAN ASSISTANT

## 2021-05-15 PROCEDURE — C9803 HOPD COVID-19 SPEC COLLECT: HCPCS

## 2021-05-15 PROCEDURE — 87040 BLOOD CULTURE FOR BACTERIA: CPT | Performed by: PHYSICIAN ASSISTANT

## 2021-05-15 PROCEDURE — 84484 ASSAY OF TROPONIN QUANT: CPT | Performed by: PHYSICIAN ASSISTANT

## 2021-05-15 PROCEDURE — 83880 ASSAY OF NATRIURETIC PEPTIDE: CPT | Performed by: PHYSICIAN ASSISTANT

## 2021-05-15 PROCEDURE — 93005 ELECTROCARDIOGRAM TRACING: CPT | Performed by: PHYSICIAN ASSISTANT

## 2021-05-15 PROCEDURE — 93010 ELECTROCARDIOGRAM REPORT: CPT | Performed by: INTERNAL MEDICINE

## 2021-05-15 PROCEDURE — 85610 PROTHROMBIN TIME: CPT | Performed by: PHYSICIAN ASSISTANT

## 2021-05-15 PROCEDURE — 99284 EMERGENCY DEPT VISIT MOD MDM: CPT

## 2021-05-15 PROCEDURE — 84145 PROCALCITONIN (PCT): CPT | Performed by: PHYSICIAN ASSISTANT

## 2021-05-15 RX ORDER — SODIUM CHLORIDE 0.9 % (FLUSH) 0.9 %
10 SYRINGE (ML) INJECTION AS NEEDED
Status: DISCONTINUED | OUTPATIENT
Start: 2021-05-15 | End: 2021-05-16 | Stop reason: HOSPADM

## 2021-05-15 RX ORDER — HYDRALAZINE HYDROCHLORIDE 50 MG/1
100 TABLET, FILM COATED ORAL ONCE
Status: COMPLETED | OUTPATIENT
Start: 2021-05-15 | End: 2021-05-15

## 2021-05-15 RX ADMIN — HYDRALAZINE HYDROCHLORIDE 100 MG: 50 TABLET, FILM COATED ORAL at 22:54

## 2021-05-16 ENCOUNTER — READMISSION MANAGEMENT (OUTPATIENT)
Dept: CALL CENTER | Facility: HOSPITAL | Age: 76
End: 2021-05-16

## 2021-05-16 VITALS
WEIGHT: 270 LBS | DIASTOLIC BLOOD PRESSURE: 83 MMHG | RESPIRATION RATE: 18 BRPM | BODY MASS INDEX: 38.65 KG/M2 | OXYGEN SATURATION: 98 % | HEART RATE: 53 BPM | TEMPERATURE: 98 F | SYSTOLIC BLOOD PRESSURE: 178 MMHG | HEIGHT: 70 IN

## 2021-05-16 PROBLEM — N47.2 PARAPHIMOSIS: Status: ACTIVE | Noted: 2021-05-16

## 2021-05-16 PROBLEM — R81 GLUCOSURIA: Status: ACTIVE | Noted: 2021-05-16

## 2021-05-16 PROBLEM — E11.65 TYPE 2 DIABETES MELLITUS WITH HYPERGLYCEMIA (HCC): Status: ACTIVE | Noted: 2021-05-16

## 2021-05-16 PROBLEM — N48.1 BALANITIS: Status: ACTIVE | Noted: 2021-05-16

## 2021-05-16 PROBLEM — N18.2 CKD (CHRONIC KIDNEY DISEASE) STAGE 2, GFR 60-89 ML/MIN: Status: ACTIVE | Noted: 2021-05-16

## 2021-05-16 PROBLEM — E11.65 TYPE 2 DIABETES MELLITUS WITH HYPERGLYCEMIA: Status: ACTIVE | Noted: 2021-05-16

## 2021-05-16 LAB
ANION GAP SERPL CALCULATED.3IONS-SCNC: 5.7 MMOL/L (ref 5–15)
BUN SERPL-MCNC: 14 MG/DL (ref 8–23)
BUN/CREAT SERPL: 15.7 (ref 7–25)
CALCIUM SPEC-SCNC: 8.9 MG/DL (ref 8.6–10.5)
CHLORIDE SERPL-SCNC: 106 MMOL/L (ref 98–107)
CO2 SERPL-SCNC: 29.3 MMOL/L (ref 22–29)
CREAT SERPL-MCNC: 0.89 MG/DL (ref 0.76–1.27)
DEPRECATED RDW RBC AUTO: 41.7 FL (ref 37–54)
ERYTHROCYTE [DISTWIDTH] IN BLOOD BY AUTOMATED COUNT: 13.1 % (ref 12.3–15.4)
GFR SERPL CREATININE-BSD FRML MDRD: 84 ML/MIN/1.73
GLUCOSE SERPL-MCNC: 107 MG/DL (ref 65–99)
HBA1C MFR BLD: 9.1 % (ref 4.8–5.6)
HCT VFR BLD AUTO: 43.2 % (ref 37.5–51)
HGB BLD-MCNC: 14.3 G/DL (ref 13–17.7)
MCH RBC QN AUTO: 28.9 PG (ref 26.6–33)
MCHC RBC AUTO-ENTMCNC: 33.1 G/DL (ref 31.5–35.7)
MCV RBC AUTO: 87.4 FL (ref 79–97)
PLATELET # BLD AUTO: 276 10*3/MM3 (ref 140–450)
PMV BLD AUTO: 9.9 FL (ref 6–12)
POTASSIUM SERPL-SCNC: 3.1 MMOL/L (ref 3.5–5.2)
QT INTERVAL: 457 MS
RBC # BLD AUTO: 4.94 10*6/MM3 (ref 4.14–5.8)
SARS-COV-2 ORF1AB RESP QL NAA+PROBE: NOT DETECTED
SODIUM SERPL-SCNC: 141 MMOL/L (ref 136–145)
WBC # BLD AUTO: 7.45 10*3/MM3 (ref 3.4–10.8)

## 2021-05-16 PROCEDURE — G0378 HOSPITAL OBSERVATION PER HR: HCPCS

## 2021-05-16 PROCEDURE — 83036 HEMOGLOBIN GLYCOSYLATED A1C: CPT | Performed by: NURSE PRACTITIONER

## 2021-05-16 PROCEDURE — 63710000001 INSULIN GLARGINE PER 5 UNITS: Performed by: NURSE PRACTITIONER

## 2021-05-16 PROCEDURE — 80048 BASIC METABOLIC PNL TOTAL CA: CPT | Performed by: NURSE PRACTITIONER

## 2021-05-16 PROCEDURE — 85027 COMPLETE CBC AUTOMATED: CPT | Performed by: NURSE PRACTITIONER

## 2021-05-16 RX ORDER — INSULIN LISPRO 100 [IU]/ML
0-9 INJECTION, SOLUTION INTRAVENOUS; SUBCUTANEOUS
Status: DISCONTINUED | OUTPATIENT
Start: 2021-05-16 | End: 2021-05-16 | Stop reason: HOSPADM

## 2021-05-16 RX ORDER — HYDRALAZINE HYDROCHLORIDE 50 MG/1
100 TABLET, FILM COATED ORAL 3 TIMES DAILY
Status: DISCONTINUED | OUTPATIENT
Start: 2021-05-16 | End: 2021-05-16 | Stop reason: HOSPADM

## 2021-05-16 RX ORDER — FINASTERIDE 5 MG/1
5 TABLET, FILM COATED ORAL NIGHTLY
Status: DISCONTINUED | OUTPATIENT
Start: 2021-05-16 | End: 2021-05-16 | Stop reason: HOSPADM

## 2021-05-16 RX ORDER — POTASSIUM CHLORIDE 7.45 MG/ML
10 INJECTION INTRAVENOUS
Status: DISCONTINUED | OUTPATIENT
Start: 2021-05-16 | End: 2021-05-16 | Stop reason: HOSPADM

## 2021-05-16 RX ORDER — DEXTROSE MONOHYDRATE 25 G/50ML
25 INJECTION, SOLUTION INTRAVENOUS
Status: DISCONTINUED | OUTPATIENT
Start: 2021-05-16 | End: 2021-05-16 | Stop reason: HOSPADM

## 2021-05-16 RX ORDER — POTASSIUM CHLORIDE 750 MG/1
40 TABLET, FILM COATED, EXTENDED RELEASE ORAL AS NEEDED
Status: DISCONTINUED | OUTPATIENT
Start: 2021-05-16 | End: 2021-05-16 | Stop reason: HOSPADM

## 2021-05-16 RX ORDER — DILTIAZEM HYDROCHLORIDE 180 MG/1
360 CAPSULE, COATED, EXTENDED RELEASE ORAL
Status: DISCONTINUED | OUTPATIENT
Start: 2021-05-16 | End: 2021-05-16 | Stop reason: HOSPADM

## 2021-05-16 RX ORDER — FLUCONAZOLE 200 MG/1
200 TABLET ORAL ONCE
Status: COMPLETED | OUTPATIENT
Start: 2021-05-16 | End: 2021-05-16

## 2021-05-16 RX ORDER — POTASSIUM CHLORIDE 1.5 G/1.77G
40 POWDER, FOR SOLUTION ORAL AS NEEDED
Status: DISCONTINUED | OUTPATIENT
Start: 2021-05-16 | End: 2021-05-16 | Stop reason: HOSPADM

## 2021-05-16 RX ORDER — ALUMINA, MAGNESIA, AND SIMETHICONE 2400; 2400; 240 MG/30ML; MG/30ML; MG/30ML
15 SUSPENSION ORAL EVERY 6 HOURS PRN
Status: DISCONTINUED | OUTPATIENT
Start: 2021-05-16 | End: 2021-05-16 | Stop reason: HOSPADM

## 2021-05-16 RX ORDER — TERAZOSIN 2 MG/1
2 CAPSULE ORAL NIGHTLY
Status: DISCONTINUED | OUTPATIENT
Start: 2021-05-16 | End: 2021-05-16 | Stop reason: HOSPADM

## 2021-05-16 RX ORDER — MONTELUKAST SODIUM 10 MG/1
10 TABLET ORAL NIGHTLY
Status: DISCONTINUED | OUTPATIENT
Start: 2021-05-16 | End: 2021-05-16 | Stop reason: HOSPADM

## 2021-05-16 RX ORDER — ACETAMINOPHEN 325 MG/1
650 TABLET ORAL EVERY 4 HOURS PRN
Status: DISCONTINUED | OUTPATIENT
Start: 2021-05-16 | End: 2021-05-16 | Stop reason: HOSPADM

## 2021-05-16 RX ORDER — MULTIPLE VITAMINS W/ MINERALS TAB 9MG-400MCG
1 TAB ORAL DAILY
Status: DISCONTINUED | OUTPATIENT
Start: 2021-05-16 | End: 2021-05-16 | Stop reason: HOSPADM

## 2021-05-16 RX ORDER — NICOTINE POLACRILEX 4 MG
15 LOZENGE BUCCAL
Status: DISCONTINUED | OUTPATIENT
Start: 2021-05-16 | End: 2021-05-16 | Stop reason: HOSPADM

## 2021-05-16 RX ORDER — NITROGLYCERIN 0.4 MG/1
0.4 TABLET SUBLINGUAL
Status: DISCONTINUED | OUTPATIENT
Start: 2021-05-16 | End: 2021-05-16 | Stop reason: HOSPADM

## 2021-05-16 RX ORDER — ONDANSETRON 4 MG/1
4 TABLET, FILM COATED ORAL EVERY 6 HOURS PRN
Status: DISCONTINUED | OUTPATIENT
Start: 2021-05-16 | End: 2021-05-16 | Stop reason: HOSPADM

## 2021-05-16 RX ORDER — INSULIN GLARGINE 100 [IU]/ML
40 INJECTION, SOLUTION SUBCUTANEOUS DAILY
Status: DISCONTINUED | OUTPATIENT
Start: 2021-05-16 | End: 2021-05-16 | Stop reason: HOSPADM

## 2021-05-16 RX ORDER — SODIUM CHLORIDE 0.9 % (FLUSH) 0.9 %
10 SYRINGE (ML) INJECTION AS NEEDED
Status: DISCONTINUED | OUTPATIENT
Start: 2021-05-16 | End: 2021-05-16 | Stop reason: HOSPADM

## 2021-05-16 RX ORDER — ONDANSETRON 2 MG/ML
4 INJECTION INTRAMUSCULAR; INTRAVENOUS EVERY 6 HOURS PRN
Status: DISCONTINUED | OUTPATIENT
Start: 2021-05-16 | End: 2021-05-16 | Stop reason: HOSPADM

## 2021-05-16 RX ORDER — HYDROXYUREA 500 MG/1
500 CAPSULE ORAL DAILY
Status: DISCONTINUED | OUTPATIENT
Start: 2021-05-16 | End: 2021-05-16 | Stop reason: HOSPADM

## 2021-05-16 RX ORDER — CLOTRIMAZOLE AND BETAMETHASONE DIPROPIONATE 10; .64 MG/G; MG/G
CREAM TOPICAL 2 TIMES DAILY
Qty: 15 G | Refills: 0 | Status: SHIPPED | OUTPATIENT
Start: 2021-05-16 | End: 2021-05-30

## 2021-05-16 RX ORDER — PRAVASTATIN SODIUM 40 MG
40 TABLET ORAL DAILY
Status: DISCONTINUED | OUTPATIENT
Start: 2021-05-16 | End: 2021-05-16 | Stop reason: HOSPADM

## 2021-05-16 RX ORDER — INSULIN LISPRO 100 [IU]/ML
0-9 INJECTION, SOLUTION INTRAVENOUS; SUBCUTANEOUS
Status: DISCONTINUED | OUTPATIENT
Start: 2021-05-16 | End: 2021-05-16

## 2021-05-16 RX ORDER — ASPIRIN 81 MG/1
81 TABLET ORAL DAILY
Status: DISCONTINUED | OUTPATIENT
Start: 2021-05-16 | End: 2021-05-16 | Stop reason: HOSPADM

## 2021-05-16 RX ADMIN — INSULIN GLARGINE 40 UNITS: 100 INJECTION, SOLUTION SUBCUTANEOUS at 10:10

## 2021-05-16 RX ADMIN — PRAVASTATIN SODIUM 40 MG: 40 TABLET ORAL at 11:00

## 2021-05-16 RX ADMIN — MULTIPLE VITAMINS W/ MINERALS TAB 1 TABLET: TAB at 09:58

## 2021-05-16 RX ADMIN — ASPIRIN 81 MG: 81 TABLET, COATED ORAL at 09:58

## 2021-05-16 RX ADMIN — DILTIAZEM HYDROCHLORIDE 360 MG: 180 CAPSULE, COATED, EXTENDED RELEASE ORAL at 09:58

## 2021-05-16 RX ADMIN — HYDRALAZINE HYDROCHLORIDE 100 MG: 50 TABLET, FILM COATED ORAL at 09:58

## 2021-05-16 RX ADMIN — HYDROXYUREA 500 MG: 500 CAPSULE ORAL at 11:00

## 2021-05-16 RX ADMIN — FLUCONAZOLE 200 MG: 200 TABLET ORAL at 01:38

## 2021-05-16 RX ADMIN — CARVEDILOL 37.5 MG: 12.5 TABLET, FILM COATED ORAL at 09:58

## 2021-05-16 RX ADMIN — APIXABAN 5 MG: 5 TABLET, FILM COATED ORAL at 09:57

## 2021-05-16 RX ADMIN — POTASSIUM CHLORIDE 40 MEQ: 750 TABLET, EXTENDED RELEASE ORAL at 10:01

## 2021-05-16 NOTE — OUTREACH NOTE
Prep Survey      Responses   [facility] patient discharged from?  [facility]   Is LACE score < 7 ?  No   Emergency Room discharge w/ pulse ox?  No   Eligibility  Not Eligible   [facility]   Date of Admission  05/15/21   Date of Discharge  05/16/21   Discharge Disposition  Home or Self Care   What are the reasons patient is not eligible?  Other [ED visit only - pt declined admission]   Discharge diagnosis  Balanitis   Does the patient have one of the following disease processes/diagnoses(primary or secondary)?  Other   Does the patient have Home health ordered?  No   Is there a DME ordered?  No   Prep survey completed?  Yes          Britta Rueda RN

## 2021-05-19 ENCOUNTER — TREATMENT (OUTPATIENT)
Dept: PHYSICAL THERAPY | Facility: CLINIC | Age: 76
End: 2021-05-19

## 2021-05-19 DIAGNOSIS — R26.81 UNSTEADY GAIT: ICD-10-CM

## 2021-05-19 DIAGNOSIS — M25.561 CHRONIC PAIN OF BOTH KNEES: Primary | ICD-10-CM

## 2021-05-19 DIAGNOSIS — G89.29 CHRONIC PAIN OF BOTH KNEES: Primary | ICD-10-CM

## 2021-05-19 DIAGNOSIS — R53.1 WEAKNESS: ICD-10-CM

## 2021-05-19 DIAGNOSIS — M25.562 CHRONIC PAIN OF BOTH KNEES: Primary | ICD-10-CM

## 2021-05-19 PROCEDURE — 97116 GAIT TRAINING THERAPY: CPT | Performed by: PHYSICAL THERAPIST

## 2021-05-19 PROCEDURE — 97110 THERAPEUTIC EXERCISES: CPT | Performed by: PHYSICAL THERAPIST

## 2021-05-19 NOTE — PROGRESS NOTES
"Physical Therapy Daily Progress Note      Visit # 19      Subjective Evaluation    History of Present Illness    Subjective comment: Pt reports he is feeling and getting around better.       Objective          Functional Assessment     Comments  5 x sit to stand: from 23.5 inch height (low table + purple step)  13 seconds  Use of hands on table. No LOB      See Exercise, Manual, and Modality Logs for complete treatment.       Assessment & Plan     Assessment  Assessment details: Pt has made good progress in increasing his core and LE strength for improved balance stability.  Pt performed a sit to stand for 5 reps test in 13 sec with no LOB. On his first day he required 33 sec.  Pt stated that when he started he was afraid that he would not be able to get back up if he sat in a chair.  He said he is not afraid to sit down anymore.  He reports that he is also able to get in and out of his wife's car easier.    Goals  Plan Goals: STG (4 weeks)  1. Pt will be independent with initial LE and core strengthening program for ease with sit to stand transfers, stand to walk and balance stability.  MET  2.  Pt will demonstrate improved 5 x sit to stand from 33 seconds to 25 seconds from 23.5\" surface.  MET  3.  Pt will be able to rise from sitting and walk with minimal LOB.  MET    4.  Pt will tolerate 30 minutes of continuous exercises without exacerbation of pain or fatigue.  P MET    LTG (8 weeks)  1.  Pt will be independent with core and LE stabilization program for improved balance and stability with all functional tasks. PROGRESSING  2.  Pt will demonstrate 4/5 abdominal and LE strength for stability with walking, standing, and transitional activities.  MET  3.  Pt will be able to ambulate community distances with little to no fatigue. MET  4.  Pt will report improved perceived function via LEFS from 62% to 40% or less disability  IMPROVING                     Manual Therapy:    0     mins  27309;  Therapeutic Exercise:  "   25     mins  95805;     Neuromuscular Alfred:    0    mins  35425;    Therapeutic Activity:     0     mins  66400;     Gait Trainin     mins  29465;     Ultrasound:     0     mins  32827;    Work Hardening           0      mins 80373  Iontophoresis               0   mins 88666  E-Stim                          _0_ mins 59784 ( )    Timed Treatment:   45   mins   Total Treatment:     45   mins    Miko Hopson PTA  Physical Therapist Assistant

## 2021-05-20 LAB
BACTERIA SPEC AEROBE CULT: NORMAL
BACTERIA SPEC AEROBE CULT: NORMAL

## 2021-05-28 ENCOUNTER — OFFICE VISIT (OUTPATIENT)
Dept: FAMILY MEDICINE CLINIC | Facility: CLINIC | Age: 76
End: 2021-05-28

## 2021-05-28 VITALS
WEIGHT: 261 LBS | HEIGHT: 70 IN | BODY MASS INDEX: 37.37 KG/M2 | SYSTOLIC BLOOD PRESSURE: 150 MMHG | OXYGEN SATURATION: 96 % | HEART RATE: 57 BPM | TEMPERATURE: 98.4 F | DIASTOLIC BLOOD PRESSURE: 72 MMHG

## 2021-05-28 DIAGNOSIS — Z09 HOSPITAL DISCHARGE FOLLOW-UP: Primary | ICD-10-CM

## 2021-05-28 DIAGNOSIS — I10 ESSENTIAL HYPERTENSION: ICD-10-CM

## 2021-05-28 DIAGNOSIS — E11.3293 TYPE 2 DIABETES MELLITUS WITH BOTH EYES AFFECTED BY MILD NONPROLIFERATIVE RETINOPATHY WITHOUT MACULAR EDEMA, WITH LONG-TERM CURRENT USE OF INSULIN (HCC): ICD-10-CM

## 2021-05-28 DIAGNOSIS — N47.2 PARAPHIMOSIS: ICD-10-CM

## 2021-05-28 DIAGNOSIS — J01.90 ACUTE NON-RECURRENT SINUSITIS, UNSPECIFIED LOCATION: ICD-10-CM

## 2021-05-28 DIAGNOSIS — E87.6 HYPOKALEMIA: ICD-10-CM

## 2021-05-28 DIAGNOSIS — N48.1 BALANITIS: ICD-10-CM

## 2021-05-28 DIAGNOSIS — J30.9 ALLERGIC RHINITIS, UNSPECIFIED SEASONALITY, UNSPECIFIED TRIGGER: ICD-10-CM

## 2021-05-28 DIAGNOSIS — Z79.4 TYPE 2 DIABETES MELLITUS WITH BOTH EYES AFFECTED BY MILD NONPROLIFERATIVE RETINOPATHY WITHOUT MACULAR EDEMA, WITH LONG-TERM CURRENT USE OF INSULIN (HCC): ICD-10-CM

## 2021-05-28 PROCEDURE — 99214 OFFICE O/P EST MOD 30 MIN: CPT | Performed by: NURSE PRACTITIONER

## 2021-05-28 PROCEDURE — 1111F DSCHRG MED/CURRENT MED MERGE: CPT | Performed by: NURSE PRACTITIONER

## 2021-05-28 NOTE — PATIENT INSTRUCTIONS
Continue Glimepiride 1/2 tablet twice daily with meals.  If fasting blood sugars are consistently greater than 150, then increase Glimepiride to whole tablet with morning meal and continue 1/2 tablet with evening meal.  Continue to monitor your blood sugar once daily before a meal and record results.  Continue to monitor your blood pressure periodically and record results.  Continue to work on healthy diet and exercise.  Follow up pending lab results.  Follow up in 6 weeks, or sooner if problems or concerns.  Follow up as scheduled with cardiology next month.

## 2021-05-28 NOTE — PROGRESS NOTES
Subjective   Erlin Blanco is a 76 y.o. male.     Chief Complaint   Patient presents with   • hospital follow up     swelling groin       History of Present Illness   Patient presents for hospital follow up; pt went to [facility] ER on 5/15/21 with c/o swelling of penis; had swelling after taking Steglatro for diabetes; had stopped taking Steglatro, but then penis because red, swollen, and irritated after starting Amoxicillin for sinusitis; admitted with balanitis, paraphimosis, uncontrolled DM2; discharged on 5/16/21 and started on Clotrimazole/Betamethasone cream and symptoms resolved; had follow up with urology and no problems; no problems urinating; no dysuria.    F/U sinusitis: finished Amoxicillin and helped; still having some mild sinus pressure on left; no ear pain or sore throat; feels like has some swelling on left; has been using Flonase, and taking Zyrtec and Montelukast daily and helps; no fever; no change in vision other than needs cataract surgery; has upcoming surgery.    F/U DM2: resumed Glimepiride 1/2 tablet twice daily and taking Lantus only as needed; has been monitoring blood sugar; blood sugar has been running 140-150s in mornings; blood sugar will be 160s in evenings; has not been taking Lantus unless sugar is increased; has not taken Lantus in last week or so; watches carbs/sugars in diet; has been drinking protein shake for breakfast; some exercise, does better with warmer weather, more active in yard; no numbness or tingling; overall, blood sugars have been doing much better off Lantus.    F/U HTN: needs new home BP machine; no headaches; no orthostasis; swelling is improving; finished physical therapy and weakness and balance much improved; plans to continue home exercise plan.     Within 48 business hours after discharge our office contacted him via telephone to coordinate his care and needs.    Date of TCM Phone Call 9/15/2020 10/31/2020 5/16/2021   [facility] [place] - Psychiatric   Date of Admission - 10/10/2020 5/15/2021   Date of Discharge 9/14/2020 10/31/2020 5/16/2021   Discharge Disposition Home or Self Care - Home or Self Care     Risk for Readmission (LACE) Score: 7 (5/16/2021  8:21 AM)      Patient was admitted to [facility]   ALL records were obtained and reviewed.  Date of admission 5/15/21  Date of discharge 5/16/21  Diagnosis: balanitis, paraphimosis, uncontrolled DM2  Medications upon discharge reviewed  Condition stable    Current outpatient and discharge medications have been reconciled for the patient.    I reviewed and requested records labs and diagnostics from the hospital with the patient and family.  The patient is to follow-up with specialist as discussed and directed.    If any problems arise or further questions develop patient is to call or to contact us for any needs.     The following portions of the patient's history were reviewed and updated as appropriate: allergies, current medications, past family history, past medical history, past social history, past surgical history and problem list.    Current Outpatient Medications   Medication Sig Dispense Refill   • Accu-Chek FastClix Lancets misc TEST BLOOD SUGAR 1-2 TIMES DAILY AND AS NEEDED     • acetaminophen (TYLENOL) 500 MG tablet Take 1,000 mg by mouth 2 (Two) Times a Day As Needed for Mild Pain  or Moderate Pain .     • apixaban (ELIQUIS) 5 MG tablet tablet Take 1 tablet by mouth Every 12 (Twelve) Hours. Indications: Atrial Fibrillation 180 tablet 1   • aspirin 81 MG EC tablet Take 1 tablet by mouth Daily. 90 tablet 1   • Blood Glucose Monitoring Suppl (Accu-Chek Guide Me) w/Device kit TEST BLOOD SUGAR 1-2 TIMES DAILY AND AS NEEDED     • carvedilol (COREG) 12.5 MG tablet TAKE 3 TABLETS TWICE A DAY WITH MEALS 540 tablet 1   • cetirizine (zyrTEC) 10 MG tablet Take 10 mg by mouth Daily.     • clotrimazole-betamethasone (Lotrisone) 1-0.05 % cream Apply   topically to the appropriate area as directed 2 (Two) Times a Day for 14 days. 15 g 0   • dilTIAZem CD (CARDIZEM CD) 360 MG 24 hr capsule Take 1 capsule by mouth Daily. 90 capsule 1   • finasteride (PROSCAR) 5 MG tablet Take 5 mg by mouth Every Night.     • fluticasone (Flonase) 50 MCG/ACT nasal spray 2 sprays into the nostril(s) as directed by provider Daily. 3 bottle 1   • furosemide (LASIX) 40 MG tablet Take 1 tablet by mouth Daily. (Patient taking differently: Take 40 mg by mouth 2 (Two) Times a Day.) 90 tablet 1   • glimepiride (AMARYL) 4 MG tablet Take 0.5 tablets by mouth Every Morning Before Breakfast.     • glucose blood (OneTouch Ultra) test strip Test blood sugar 1-2 times daily and as needed. 200 each 3   • hydrALAZINE (APRESOLINE) 100 MG tablet Take 1 tablet by mouth 3 (Three) Times a Day. 270 tablet 1   • hydrocortisone-bacitracin-zinc oxide-nystatin (MAGIC BARRIER) Apply 1 application topically to the appropriate area as directed 2 (Two) Times a Day.     • hydroxyurea (HYDREA) 500 MG capsule Take 500 mg by mouth Daily.     • Insulin Pen Needle (B-D UF III MINI PEN NEEDLES) 31G X 5 MM misc Inject 1 each under the skin into the appropriate area as directed Daily. 100 each 2   • losartan (COZAAR) 100 MG tablet Take 1 tablet by mouth Daily. 90 tablet 1   • montelukast (Singulair) 10 MG tablet Take 1 tablet by mouth Daily. (Patient taking differently: Take 10 mg by mouth Every Night.) 90 tablet 1   • Multiple Vitamins-Minerals (MULTIVITAMIN ADULT PO) Take 1 tablet by mouth Daily.     • potassium chloride (K-DUR,KLOR-CON) 20 MEQ CR tablet Take 1 tablet by mouth 3 (Three) Times a Day. 270 tablet 1   • pravastatin (PRAVACHOL) 40 MG tablet TAKE 1 TABLET DAILY 90 tablet 0   • Probiotic Product (Probiotic Daily) capsule Take 1 capsule by mouth Daily. 90 capsule 1   • terazosin (HYTRIN) 2 MG capsule Take 1 capsule by mouth Every Night. 90 capsule 1     No current facility-administered medications for this visit.        Past Medical History:   Diagnosis Date   • Abnormal ECG    • Abnormal stress test    • Abrasion of face 1/29/2020   • Acute bronchitis    • Acute non-recurrent sinusitis 1/13/2020   • Acute pyelonephritis 10/9/2020   • Acute sinusitis    • Allergic rhinitis    • Aortic root dilation (CMS/[facility])    • Arthritis    • Bacteremia due to Escherichia coli 8/17/2020   • Balanitis 5/16/2021   • Benign enlargement of prostate    • C. difficile colitis 9/16/2020   • Cellulitis of knee, left 5/4/2017   • CHF (congestive heart failure) (CMS/[facility])    • Chronic diastolic congestive heart failure (CMS/[facility])    • Constipation 11/14/2020   • Contusion of face 1/29/2020   • Coronary artery disease    • Diminished pulse     in lower extremities   • Edema    • Elevated hematocrit    • Elevated hemoglobin (CMS/[facility])    • Essential hypertension    • Hearing loss     mala hearing aids   • Heart valve disease    • High risk medication use    • History of back pain    • History of dysuria    • History of echocardiogram    • History of intracranial hemorrhage    • History of scoliosis    • History of stroke    • Hyperlipidemia    • Injury of toe, left, initial encounter    • Irregular heart rate    • Knee pain, left    • Left bundle branch block    • Leg wound, left    • Leukocytosis 9/16/2020   • Low back pain    • Medicare annual wellness visit, subsequent    • Minor head injury without loss of consciousness 1/29/2020   • Murmur    • Muscle cramps    • Need for hepatitis C screening test    • Paraphimosis 5/16/2021   • Polycythemia    • Polycythemia vera (CMS/[facility])    • Pressure injury of buttock, stage 2 (CMS/[facility]) 9/18/2020   • Prostate hyperplasia without urinary obstruction    • Prostatitis    • Proteinuria    • Pulmonary hypertension (CMS/[facility])    • Sacroiliitis (CMS/[facility]) 10/9/2020   • Scoliosis    • Sepsis due to Escherichia coli (CMS/[facility]) 8/17/2020   • Sepsis with metabolic encephalopathy (CMS/[facility]) 8/17/2020   • Stroke (CMS/[facility])    •  SVT (supraventricular tachycardia) (CMS/[facility])    • Tachycardia    • Thrombocytosis (CMS/[facility])    • TIA (transient ischemic attack)        • Type 2 diabetes mellitus (CMS/[facility])    • Urinary tract infection due to extended-spectrum beta lactamase (ESBL) producing Escherichia coli 2020   • Valvular heart disease        Past Surgical History:   Procedure Laterality Date   • CARDIAC CATHETERIZATION     • COLONOSCOPY      did Cologuard approx 2019 and negative   • HAND SURGERY     • JOINT REPLACEMENT      Righ Knee   • MO TOTAL KNEE ARTHROPLASTY Left 2017    Procedure: LT TOTAL KNEE ARTHROPLASTY;  Surgeon: Bertin Perrin MD;  Location: [facility] OR;  Service: Orthopedics   • PROSTATE SURGERY      Rezum steam treatment to shrink prostate by dr. guerrero       Family History   Problem Relation Age of Onset   • Hypertension Mother    • Other Father         ruptured appendix,  when pt. was 14 years old.   • Diabetes Paternal Aunt    • Diabetes Paternal Uncle    • No Known Problems Other        Social History     Socioeconomic History   • Marital status:      Spouse name: Not on file   • Number of children: Not on file   • Years of education: Not on file   • Highest education level: Not on file   Tobacco Use   • Smoking status: Former Smoker     Types: Cigarettes     Quit date:      Years since quittin.4   • Smokeless tobacco: Former User   • Tobacco comment: Caffeine daily   Substance and Sexual Activity   • Alcohol use: No   • Drug use: No   • Sexual activity: Defer       Review of Systems   Constitutional: Negative for appetite change, chills, fatigue, fever, unexpected weight gain and unexpected weight loss.   HENT: Negative for postnasal drip, rhinorrhea (Flonase helps) and trouble swallowing.    Respiratory: Negative for cough, chest tightness and shortness of breath.    Cardiovascular: Negative for chest pain and palpitations.   Gastrointestinal: Negative for abdominal pain,  "constipation, diarrhea, GERD and indigestion.   Endocrine: Negative for polydipsia.   Genitourinary: Negative for difficulty urinating and urinary incontinence.   Musculoskeletal: Negative for arthralgias and back pain.   Skin: Negative for rash.   Neurological: Negative for syncope.       Objective   Vitals:    05/28/21 1039   BP: 150/72   BP Location: Left arm   Patient Position: Sitting   Cuff Size: Adult   Pulse: 57   Temp: 98.4 °F (36.9 °C)   SpO2: 96%   Weight: 118 kg (261 lb)   Height: 177.8 cm (70\")     Body mass index is 37.45 kg/m².     Physical Exam  Vitals and nursing note reviewed.   Constitutional:       General: He is not in acute distress.     Appearance: He is well-developed. He is not diaphoretic.   HENT:      Head: Normocephalic.      Comments: No swelling     Right Ear: Tympanic membrane and external ear normal. Right ear decreased hearing: bilateral hearing aids.      Left Ear: Tympanic membrane and external ear normal. Left ear decreased hearing: bilateral hearing aids.      Nose: Nose normal.      Right Sinus: No maxillary sinus tenderness or frontal sinus tenderness.      Left Sinus: No frontal sinus tenderness. Left maxillary sinus tenderness: mild.      Mouth/Throat:      Mouth: Mucous membranes are moist.      Pharynx: No oropharyngeal exudate or posterior oropharyngeal erythema.   Eyes:      Conjunctiva/sclera: Conjunctivae normal.   Neck:      Vascular: No carotid bruit.   Cardiovascular:      Rate and Rhythm: Normal rate and regular rhythm.      Pulses: Normal pulses.      Heart sounds: Murmur heard.   Systolic murmur is present with a grade of 2/6.        Comments: At SB  Pulmonary:      Effort: Pulmonary effort is normal. No respiratory distress.      Breath sounds: Normal breath sounds.   Abdominal:      General: Bowel sounds are normal.      Palpations: Abdomen is soft. There is no hepatomegaly or splenomegaly.      Tenderness: There is no abdominal tenderness.   Musculoskeletal: "      Cervical back: Normal range of motion and neck supple.      Right lower leg: No edema.      Left lower leg: No edema.   Lymphadenopathy:      Cervical: No cervical adenopathy.   Skin:     General: Skin is warm and dry.      Findings: No rash.   Neurological:      Mental Status: He is alert and oriented to person, place, and time.      Gait: Abnormal gait: limping, guarded gait.   Psychiatric:         Mood and Affect: Mood normal.         Behavior: Behavior normal.         Thought Content: Thought content normal.         Cognition and Memory: Cognition normal.         Judgment: Judgment normal.       Lab Results   Component Value Date    WBC 7.45 05/16/2021    RBC 4.94 05/16/2021    HGB 14.3 05/16/2021    HCT 43.2 05/16/2021    MCV 87.4 05/16/2021    MCH 28.9 05/16/2021    MCHC 33.1 05/16/2021    RDW 13.1 05/16/2021    RDWSD 41.7 05/16/2021    MPV 9.9 05/16/2021     05/16/2021    NEUTRORELPCT 67.0 05/15/2021    LYMPHORELPCT 17.7 (L) 05/15/2021    MONORELPCT 7.8 05/15/2021    EOSRELPCT 5.5 05/15/2021    BASORELPCT 1.4 05/15/2021    AUTOIGPER 0.6 (H) 05/15/2021    NEUTROABS 5.32 05/15/2021    LYMPHSABS 1.41 05/15/2021    MONOSABS 0.62 05/15/2021    EOSABS 0.44 (H) 05/15/2021    BASOSABS 0.11 05/15/2021    AUTOIGNUM 0.05 05/15/2021    NRBC 0.0 05/15/2021     Lab Results   Component Value Date    GLUCOSE 107 (H) 05/16/2021    BUN 14 05/16/2021    CREATININE 0.89 05/16/2021    EGFRIFNONA 84 05/16/2021    EGFRIFAFRI 94 04/12/2021    BCR 15.7 05/16/2021    K 3.1 (L) 05/16/2021    CO2 29.3 (H) 05/16/2021    CALCIUM 8.9 05/16/2021    PROTENTOTREF 6.7 04/12/2021    ALBUMIN 4.00 05/15/2021    LABIL2 1.7 04/12/2021    AST 22 05/15/2021    ALT 19 05/15/2021      Lab Results   Component Value Date    CHLPL 119 04/12/2021    TRIG 80 04/12/2021    HDL 45 04/12/2021    VLDL 16 04/12/2021    LDL 58 04/12/2021     Lab Results   Component Value Date    TSH 2.740 09/26/2020     Lab Results   Component Value Date    HGBA1C  9.10 (H) 05/16/2021     Records reviewed from [facility] 5/15/21--5/16/21; will need repeat UA/UC if not completed at urology.    Assessment/Plan .  Problem List Items Addressed This Visit     Type 2 diabetes mellitus with both eyes affected by mild nonproliferative retinopathy without macular edema, with long-term current use of insulin (CMS/[facility])    Overview     With mild non-proliferative diabetic retinopathy, without macular edema         Current Assessment & Plan     Diabetes is improving with treatment.   Regular aerobic exercise.  Medication changes per orders.  Diabetes will be reassessed 6 weeks.  Stay off Lantus for now.  Continue Glimepiride 1/2 tablet twice daily with meals.  If fasting blood sugars are consistently greater than 150, then increase Glimepiride to whole tablet with morning meal and continue 1/2 tablet with evening meal.  Continue to watch carbs/sugars in diet.         Hypertension    Current Assessment & Plan     Hypertension is improving with treatment.  Continue current medications.  Ambulatory blood pressure monitoring.  Blood pressure will be reassessed at the next regular appointment.         Allergic rhinitis    Current Assessment & Plan     Continue Flonase, Montelukast, and Zyrtec daily.         Hypokalemia    Relevant Orders    Basic Metabolic Panel    RESOLVED: Acute non-recurrent sinusitis    RESOLVED: Balanitis    RESOLVED: Paraphimosis      Other Visit Diagnoses     Hospital discharge follow-up    -  Primary    Relevant Orders    Basic Metabolic Panel          Return in about 6 weeks (around 7/9/2021) for Recheck.or sooner if problems or concerns.  Patient was on Glimepiride in past and did not like being on Lantus; will keep off Lantus for now and try to get better control of blood sugars with Glimepiride.  Will request last office note from urology; will check UA/UC if not completed at urology.         COVID-19 Precautions - Patient was compliant in wearing a mask. When I  saw the patient, I used appropriate personal protective equipment (PPE) including mask, gloves, and eye shield (standard procedure).  Hand hygiene was completed before and after seeing the patient.

## 2021-05-29 PROBLEM — N47.2 PARAPHIMOSIS: Status: RESOLVED | Noted: 2021-05-16 | Resolved: 2021-05-29

## 2021-05-29 PROBLEM — J01.90 ACUTE NON-RECURRENT SINUSITIS: Status: RESOLVED | Noted: 2020-01-13 | Resolved: 2021-05-29

## 2021-05-29 PROBLEM — N48.1 BALANITIS: Status: RESOLVED | Noted: 2021-05-16 | Resolved: 2021-05-29

## 2021-05-29 LAB
BUN SERPL-MCNC: 16 MG/DL (ref 8–27)
BUN/CREAT SERPL: 14 (ref 10–24)
CALCIUM SERPL-MCNC: 9.8 MG/DL (ref 8.6–10.2)
CHLORIDE SERPL-SCNC: 102 MMOL/L (ref 96–106)
CO2 SERPL-SCNC: 29 MMOL/L (ref 20–29)
CREAT SERPL-MCNC: 1.15 MG/DL (ref 0.76–1.27)
GLUCOSE SERPL-MCNC: 244 MG/DL (ref 65–99)
POTASSIUM SERPL-SCNC: 4.9 MMOL/L (ref 3.5–5.2)
SODIUM SERPL-SCNC: 141 MMOL/L (ref 134–144)

## 2021-05-29 NOTE — ASSESSMENT & PLAN NOTE
Diabetes is improving with treatment.   Regular aerobic exercise.  Medication changes per orders.  Diabetes will be reassessed 6 weeks.  Stay off Lantus for now.  Continue Glimepiride 1/2 tablet twice daily with meals.  If fasting blood sugars are consistently greater than 150, then increase Glimepiride to whole tablet with morning meal and continue 1/2 tablet with evening meal.  Continue to watch carbs/sugars in diet.

## 2021-05-29 NOTE — ASSESSMENT & PLAN NOTE
Hypertension is improving with treatment.  Continue current medications.  Ambulatory blood pressure monitoring.  Blood pressure will be reassessed at the next regular appointment.

## 2021-06-08 DIAGNOSIS — I10 ESSENTIAL HYPERTENSION: ICD-10-CM

## 2021-06-08 DIAGNOSIS — I25.10 CORONARY ARTERY DISEASE INVOLVING NATIVE CORONARY ARTERY OF NATIVE HEART WITHOUT ANGINA PECTORIS: ICD-10-CM

## 2021-06-08 DIAGNOSIS — I48.92 ATRIAL FLUTTER WITH RAPID VENTRICULAR RESPONSE (HCC): ICD-10-CM

## 2021-06-08 DIAGNOSIS — E78.2 MIXED HYPERLIPIDEMIA: ICD-10-CM

## 2021-06-08 DIAGNOSIS — N40.0 PROSTATE HYPERPLASIA WITHOUT URINARY OBSTRUCTION: ICD-10-CM

## 2021-06-08 RX ORDER — DILTIAZEM HYDROCHLORIDE 360 MG/1
CAPSULE, EXTENDED RELEASE ORAL
Qty: 90 CAPSULE | Refills: 1 | Status: SHIPPED | OUTPATIENT
Start: 2021-06-08 | End: 2021-08-23 | Stop reason: SDUPTHER

## 2021-06-08 RX ORDER — PRAVASTATIN SODIUM 40 MG
TABLET ORAL
Qty: 90 TABLET | Refills: 1 | Status: SHIPPED | OUTPATIENT
Start: 2021-06-08 | End: 2021-11-22

## 2021-06-08 RX ORDER — LOSARTAN POTASSIUM 100 MG/1
TABLET ORAL
Qty: 90 TABLET | Refills: 1 | Status: SHIPPED | OUTPATIENT
Start: 2021-06-08 | End: 2021-11-22

## 2021-06-08 RX ORDER — ASPIRIN 81 MG/1
81 TABLET ORAL DAILY
Qty: 90 TABLET | Refills: 2 | Status: SHIPPED | OUTPATIENT
Start: 2021-06-08 | End: 2022-04-18 | Stop reason: SDUPTHER

## 2021-06-08 RX ORDER — TERAZOSIN 2 MG/1
CAPSULE ORAL
Qty: 90 CAPSULE | Refills: 1 | Status: SHIPPED | OUTPATIENT
Start: 2021-06-08 | End: 2021-11-22

## 2021-06-22 ENCOUNTER — OFFICE VISIT (OUTPATIENT)
Dept: CARDIOLOGY | Facility: CLINIC | Age: 76
End: 2021-06-22

## 2021-06-22 VITALS
BODY MASS INDEX: 35.89 KG/M2 | DIASTOLIC BLOOD PRESSURE: 68 MMHG | HEART RATE: 56 BPM | SYSTOLIC BLOOD PRESSURE: 152 MMHG | OXYGEN SATURATION: 97 % | WEIGHT: 265 LBS | HEIGHT: 72 IN

## 2021-06-22 DIAGNOSIS — E78.2 MIXED HYPERLIPIDEMIA: ICD-10-CM

## 2021-06-22 DIAGNOSIS — I44.7 LBBB (LEFT BUNDLE BRANCH BLOCK): ICD-10-CM

## 2021-06-22 DIAGNOSIS — I10 ESSENTIAL HYPERTENSION: ICD-10-CM

## 2021-06-22 DIAGNOSIS — I25.10 CORONARY ARTERY DISEASE INVOLVING NATIVE CORONARY ARTERY OF NATIVE HEART WITHOUT ANGINA PECTORIS: ICD-10-CM

## 2021-06-22 DIAGNOSIS — Z79.4 TYPE 2 DIABETES MELLITUS WITH BOTH EYES AFFECTED BY MILD NONPROLIFERATIVE RETINOPATHY WITHOUT MACULAR EDEMA, WITH LONG-TERM CURRENT USE OF INSULIN (HCC): ICD-10-CM

## 2021-06-22 DIAGNOSIS — I63.9 CEREBROVASCULAR ACCIDENT (CVA), UNSPECIFIED MECHANISM (HCC): ICD-10-CM

## 2021-06-22 DIAGNOSIS — I48.0 PAROXYSMAL ATRIAL FIBRILLATION (HCC): Primary | ICD-10-CM

## 2021-06-22 DIAGNOSIS — E11.3293 TYPE 2 DIABETES MELLITUS WITH BOTH EYES AFFECTED BY MILD NONPROLIFERATIVE RETINOPATHY WITHOUT MACULAR EDEMA, WITH LONG-TERM CURRENT USE OF INSULIN (HCC): ICD-10-CM

## 2021-06-22 DIAGNOSIS — I48.92 ATRIAL FLUTTER, UNSPECIFIED TYPE (HCC): ICD-10-CM

## 2021-06-22 DIAGNOSIS — I77.810 AORTIC ROOT DILATION (HCC): ICD-10-CM

## 2021-06-22 DIAGNOSIS — I50.32 CHRONIC DIASTOLIC CONGESTIVE HEART FAILURE (HCC): ICD-10-CM

## 2021-06-22 PROCEDURE — 99214 OFFICE O/P EST MOD 30 MIN: CPT | Performed by: INTERNAL MEDICINE

## 2021-06-22 PROCEDURE — 93000 ELECTROCARDIOGRAM COMPLETE: CPT | Performed by: INTERNAL MEDICINE

## 2021-06-22 RX ORDER — TORSEMIDE 20 MG/1
20 TABLET ORAL 2 TIMES DAILY
Qty: 60 TABLET | Refills: 5 | Status: SHIPPED | OUTPATIENT
Start: 2021-06-22 | End: 2021-10-22 | Stop reason: SDUPTHER

## 2021-06-22 NOTE — PROGRESS NOTES
Subjective:     Encounter Date:06/22/2021      Patient ID: Erlin Blanco is a 76 y.o. male.    Chief Complaint:  History of Present Illness    This is a 76-year-old with a history of paroxysmal ventricular tachycardia, aortic root dilatation, coronary artery disease, diabetes mellitus type 2, hypertension, hyperlipidemia, prior TIA in 2006, chronic diastolic congestive heart failure, polycythemia, who presents for follow-up.     This is his first follow-up visit with me since 6/2020.  He was admitted in 8/2020 with acute prostatitis and encephalopathy.  He was noted to be tachycardic with episodes that were felt to be supraventricular tachycardia.  He then developed episodes of atrial fibrillation.  He was treated with diltiazem and digoxin in addition to carvedilol.  He was started on apixaban for for anticoagulation.  During that hospitalization he was also noted to have a small left hemispheric stroke and small vessel disease with lacunar infarcts.  An echocardiogram was performed during that admission on 8/18/2020 and showed normal left ventricular systolic function wall motion with an EF of 55 to 60%, moderate concentric left ventricular hypertrophy, mild pulmonary hypertension with a right ventricular systolic pressure of 41 mmHg, and no significant valvular disease.    Following his admission he was seen by MUKUL George in 1/2021 at which time he appeared to be doing well.  His blood pressures were well controlled.  He started having more swelling around that time so his furosemide dosage was increased to 40 mg twice a day and he reported improvement in his swelling.    He presents today for routine follow-up.  He continues to have significant swelling especially in his left lower extremity.  He does not feel like it is really changed with a high dose furosemide.  He denies any chest pain, palpitations, shortness of breath, orthopnea, near-syncope or syncope.  He believes his blood pressures are  running high at home although he cannot give me any specific numbers.  He also believes his blood pressure cuff is not accurate.     Prior History:  The patient was previously followed by Dr. Higuera.  Prior to that he was followed by Dr. Thomas.  He establish care with Dr. Higuera in 5/2018.  At that time he reported intermittent palpitations but nothing significant.  The plan was to monitor his symptoms and consider further work-up if he had any issues.  In regards to his aortic root dilatation Dr. Higuera recommended focusing on blood pressure control and the plan was to see him back again in 1 year.       In 10/2018 the patient was referred to the emergency room from his primary care physician's office after he was noted to have a heart rate in the 140s.  In the emergency room it was felt that he was in supraventricular tachycardia so he was given a dose of adenosine which resulted in conversion back to sinus rhythm.  He returned back to the emergency room a couple of days later with heart rates in the 120s but his work-up was unremarkable at that time.  Prior to leaving the emergency room he had a ZIO monitor this placed and his diltiazem was increased to 300 mg a day.  He returned to the office on 11/5/2018 and was seen by MUKUL George at which time he was doing well.  His ZIO monitor ended up showing numerous episodes of supraventricular tachycardia with possible atrial fibrillation, the longest lasting an hour and 2 minutes.  Based on those findings he was started on apixaban.     He return for routine follow-up in 12/2018 at which time he he was incidentally noted to be tachycardic with rates in the 120s.  An EKG was performed and Dr. Higuera felt that it was supraventricular tachycardia.  Carotid massage was performed and the patient converted to sinus rhythm.  The patient was asymptomatic with the supraventricular tachycardia.  Since it was not felt that he was having atrial fibrillation his apixaban was stopped  and he was resumed on clopidogrel.  He then returned to the office last and was seen by MUKUL George in 6/2019 at which time he was doing well.  He had lost weight at that time working at [employer] 4 times a week which required a lot of walking.  He also reported that he had been diagnosed with polycythemia and was getting therapeutic phlebotomy about every 6 weeks.       He had a repeat echocardiogram performed in 12/2019 which showed normal left ventricular systolic function wall motion with an EF of 59%, grade 2 diastolic dysfunction, mildly dilated left atrium, mild mitral regurgitation, normal right ventricular systolic pressure, and no evidence of aortic root or ascending aortic dilatation.    Review of Systems   Constitutional: Negative for malaise/fatigue.   HENT: Negative for hearing loss, hoarse voice, nosebleeds and sore throat.    Eyes: Negative for pain.   Cardiovascular: Positive for leg swelling. Negative for chest pain, claudication, cyanosis, dyspnea on exertion, irregular heartbeat, near-syncope, orthopnea, palpitations, paroxysmal nocturnal dyspnea and syncope.   Respiratory: Negative for shortness of breath and snoring.    Endocrine: Negative for cold intolerance, heat intolerance, polydipsia, polyphagia and polyuria.   Skin: Negative for itching and rash.   Musculoskeletal: Positive for joint pain. Negative for arthritis, falls, joint swelling, muscle cramps, muscle weakness and myalgias.   Gastrointestinal: Negative for constipation, diarrhea, dysphagia, heartburn, hematemesis, hematochezia, melena, nausea and vomiting.   Genitourinary: Negative for frequency, hematuria and hesitancy.   Neurological: Negative for excessive daytime sleepiness, dizziness, headaches, light-headedness, numbness and weakness.   Psychiatric/Behavioral: Negative for depression. The patient is not nervous/anxious.          Current Outpatient Medications:   •  Accu-Chek FastClix Lancets misc, TEST BLOOD SUGAR 1-2  TIMES DAILY AND AS NEEDED, Disp: , Rfl:   •  acetaminophen (TYLENOL) 500 MG tablet, Take 1,000 mg by mouth 2 (Two) Times a Day As Needed for Mild Pain  or Moderate Pain ., Disp: , Rfl:   •  apixaban (ELIQUIS) 5 MG tablet tablet, Take 1 tablet by mouth Every 12 (Twelve) Hours. Indications: Atrial Fibrillation, Disp: 180 tablet, Rfl: 1  •  aspirin (ASPIRIN LOW DOSE) 81 MG EC tablet, Take 1 tablet by mouth Daily., Disp: 90 tablet, Rfl: 2  •  Blood Glucose Monitoring Suppl (Accu-Chek Guide Me) w/Device kit, TEST BLOOD SUGAR 1-2 TIMES DAILY AND AS NEEDED, Disp: , Rfl:   •  carvedilol (COREG) 12.5 MG tablet, TAKE 3 TABLETS TWICE A DAY WITH MEALS, Disp: 540 tablet, Rfl: 1  •  cetirizine (zyrTEC) 10 MG tablet, Take 10 mg by mouth Daily., Disp: , Rfl:   •  dilTIAZem CD (CARDIZEM CD) 360 MG 24 hr capsule, TAKE 1 CAPSULE DAILY, Disp: 90 capsule, Rfl: 1  •  finasteride (PROSCAR) 5 MG tablet, Take 5 mg by mouth Every Night., Disp: , Rfl:   •  fluticasone (Flonase) 50 MCG/ACT nasal spray, 2 sprays into the nostril(s) as directed by provider Daily., Disp: 3 bottle, Rfl: 1  •  furosemide (LASIX) 40 MG tablet, Take 1 tablet by mouth Daily. (Patient taking differently: Take 40 mg by mouth 2 (Two) Times a Day.), Disp: 90 tablet, Rfl: 1  •  glimepiride (AMARYL) 4 MG tablet, Take 0.5 tablets by mouth Every Morning Before Breakfast., Disp: , Rfl:   •  glucose blood (OneTouch Ultra) test strip, Test blood sugar 1-2 times daily and as needed., Disp: 200 each, Rfl: 3  •  hydrALAZINE (APRESOLINE) 100 MG tablet, Take 1 tablet by mouth 3 (Three) Times a Day., Disp: 270 tablet, Rfl: 1  •  hydroxyurea (HYDREA) 500 MG capsule, Take 500 mg by mouth Daily., Disp: , Rfl:   •  Insulin Pen Needle (B-D UF III MINI PEN NEEDLES) 31G X 5 MM misc, Inject 1 each under the skin into the appropriate area as directed Daily., Disp: 100 each, Rfl: 2  •  losartan (COZAAR) 100 MG tablet, TAKE 1 TABLET DAILY, Disp: 90 tablet, Rfl: 1  •  montelukast (Singulair) 10  MG tablet, Take 1 tablet by mouth Daily. (Patient taking differently: Take 10 mg by mouth Every Night.), Disp: 90 tablet, Rfl: 1  •  Multiple Vitamins-Minerals (MULTIVITAMIN ADULT PO), Take 1 tablet by mouth Daily., Disp: , Rfl:   •  potassium chloride (K-DUR,KLOR-CON) 20 MEQ CR tablet, Take 1 tablet by mouth 3 (Three) Times a Day., Disp: 270 tablet, Rfl: 1  •  pravastatin (PRAVACHOL) 40 MG tablet, TAKE 1 TABLET DAILY, Disp: 90 tablet, Rfl: 1  •  Probiotic Product (Probiotic Daily) capsule, Take 1 capsule by mouth Daily., Disp: 90 capsule, Rfl: 1  •  terazosin (HYTRIN) 2 MG capsule, TAKE 1 CAPSULE EVERY NIGHT, Disp: 90 capsule, Rfl: 1    Past Medical History:   Diagnosis Date   • Abnormal ECG    • Abnormal stress test    • Abrasion of face 1/29/2020   • Acute bronchitis    • Acute non-recurrent sinusitis 1/13/2020   • Acute pyelonephritis 10/9/2020   • Acute sinusitis    • Allergic rhinitis    • Aortic root dilation (CMS/HCC)    • Arthritis    • Bacteremia due to Escherichia coli 8/17/2020   • Balanitis 5/16/2021   • Benign enlargement of prostate    • C. difficile colitis 9/16/2020   • Cellulitis of knee, left 5/4/2017   • CHF (congestive heart failure) (CMS/HCC)    • Chronic diastolic congestive heart failure (CMS/HCC)    • Constipation 11/14/2020   • Contusion of face 1/29/2020   • Coronary artery disease    • Diminished pulse     in lower extremities   • Edema    • Elevated hematocrit    • Elevated hemoglobin (CMS/HCC)    • Essential hypertension    • Hearing loss     mala hearing aids   • Heart valve disease    • High risk medication use    • History of back pain    • History of dysuria    • History of echocardiogram    • History of intracranial hemorrhage    • History of scoliosis    • History of stroke    • Hyperlipidemia    • Injury of toe, left, initial encounter    • Irregular heart rate    • Knee pain, left    • Left bundle branch block    • Leg wound, left    • Leukocytosis 9/16/2020   • Low back pain     • Medicare annual wellness visit, subsequent    • Minor head injury without loss of consciousness 2020   • Murmur    • Muscle cramps    • Need for hepatitis C screening test    • Paraphimosis 2021   • Polycythemia    • Polycythemia vera (CMS/[facility])    • Pressure injury of buttock, stage 2 (CMS/[facility]) 2020   • Prostate hyperplasia without urinary obstruction    • Prostatitis    • Proteinuria    • Pulmonary hypertension (CMS/[facility])    • Sacroiliitis (CMS/[facility]) 10/9/2020   • Scoliosis    • Sepsis due to Escherichia coli (CMS/[facility]) 2020   • Sepsis with metabolic encephalopathy (CMS/[facility]) 2020   • Stroke (CMS/[facility])    • SVT (supraventricular tachycardia) (CMS/[facility])    • Tachycardia    • Thrombocytosis (CMS/[facility])    • TIA (transient ischemic attack)        • Type 2 diabetes mellitus (CMS/[facility])    • Urinary tract infection due to extended-spectrum beta lactamase (ESBL) producing Escherichia coli 2020   • Valvular heart disease        Past Surgical History:   Procedure Laterality Date   • CARDIAC CATHETERIZATION     • COLONOSCOPY      did Cologuard approx 2019 and negative   • HAND SURGERY     • JOINT REPLACEMENT      Rig Knee   • NE TOTAL KNEE ARTHROPLASTY Left 2017    Procedure: LT TOTAL KNEE ARTHROPLASTY;  Surgeon: Bertin Perrin MD;  [facility]: [facility];  Service: Orthopedics   • PROSTATE SURGERY      Rezum steam treatment to shrink prostate by dr. guerrero       Family History   Problem Relation Age of Onset   • Hypertension Mother    • Other Father         ruptured appendix,  when pt. was 14 years old.   • Diabetes Paternal Aunt    • Diabetes Paternal Uncle    • No Known Problems Other        Social History     Tobacco Use   • Smoking status: Former Smoker     Types: Cigarettes     Quit date:      Years since quittin.5   • Smokeless tobacco: Former User   • Tobacco comment: Caffeine daily   Substance Use Topics   • Alcohol use: No   • Drug use: No         ECG  "12 Lead    Date/Time: 6/22/2021 12:53 PM  Performed by: Marika Arias MD  Authorized by: Marika Arias MD   Comparison: compared with previous ECG   Similar to previous ECG  Rhythm: sinus rhythm  Conduction: left bundle branch block               Objective:     Visit Vitals  /68 (BP Location: Left arm, Patient Position: Sitting, Cuff Size: Adult)   Pulse 56   Ht 182.9 cm (72\")   Wt 120 kg (265 lb)   SpO2 97%   BMI 35.94 kg/m²         Constitutional:       Appearance: Normal appearance. Well-developed.   HENT:      Head: Normocephalic and atraumatic.   Neck:      Vascular: No carotid bruit or JVD.   Pulmonary:      Effort: Pulmonary effort is normal.      Breath sounds: Normal breath sounds.   Cardiovascular:      Normal rate. Regular rhythm.      No gallop.   Pulses:     Radial: 2+ bilaterally.  Edema:     Peripheral edema present.     Pretibial: 3+ edema of the left pretibial area.     Ankle: 3+ edema of the left ankle and 4+ edema of the right ankle.     Feet: 4+ edema of the right foot.  Abdominal:      Palpations: Abdomen is soft.   Skin:     General: Skin is warm and dry.   Neurological:      Mental Status: Alert and oriented to person, place, and time.             Assessment:          Diagnosis Plan   1. Paroxysmal atrial fibrillation (CMS/HCC)     2. Atrial flutter, unspecified type (CMS/HCC)     3. Mixed hyperlipidemia     4. LBBB (left bundle branch block)     5. Chronic diastolic congestive heart failure (CMS/HCC)     6. Aortic root dilation (CMS/HCC)     7. Essential hypertension     8. Coronary artery disease involving native coronary artery of native heart without angina pectoris     9. Type 2 diabetes mellitus with both eyes affected by mild nonproliferative retinopathy without macular edema, with long-term current use of insulin (CMS/HCC)     10. Cerebrovascular accident (CVA), unspecified mechanism (CMS/HCC)            Plan:       1.  Bilateral lower extremity edema.  Unclear if this is " due to diastolic heart failure.  He has pretty significant swelling despite high-dose furosemide.  I Nohemi try switching him to torsemide 20 mg twice a day to see if this is more effective.  Continue potassium supplementation.  2.  Chronic diastolic congestive heart failure.  Plan as per #1.  3.  Paroxysmal atrial fibrillation.  He remains in sinus rhythm on diltiazem and carvedilol.  He is on chronic anticoagulation with apixaban.  4.  Hyperlipidemia.  On pravastatin which is managed by MUKUL Crawford.  5.  Reported history of coronary artery disease.  6.  History of stroke  7.  Aortic root dilatation.  Need a repeat echocardiogram in the next year to follow-up on this.  8.  Diabetes mellitus type 2  9.  Chronic left bundle branch block  10.  Polycythemia    The patient will return to the office for follow-up in 6 weeks to follow-up on his blood pressures and his lower extremity swelling limiting his medication changes.

## 2021-06-23 ENCOUNTER — TELEPHONE (OUTPATIENT)
Dept: FAMILY MEDICINE CLINIC | Facility: CLINIC | Age: 76
End: 2021-06-23

## 2021-06-23 ENCOUNTER — TELEPHONE (OUTPATIENT)
Dept: CARDIOLOGY | Facility: CLINIC | Age: 76
End: 2021-06-23

## 2021-06-23 NOTE — TELEPHONE ENCOUNTER
Caller: Erlin Blanco    Relationship to patient: Self    Best call back number: [phone]    Patient is needing: PATIENT ASKED FOR A CALL BACK FROM DAVIDA BAIG. HE WOULDN'T SPECIFY WHAT HE NEEDED TO SPEAK ABOUT, BUT SAID THAT HE HAS SOME QUESTIONS TO ASK.

## 2021-06-23 NOTE — TELEPHONE ENCOUNTER
[phone]  12:21 pm      Pt called asking for a written rx for a BP cuff.  Pharmacist told him w an rx that medicare will pay for it.  Please advise.    [facility]

## 2021-06-24 NOTE — TELEPHONE ENCOUNTER
Spoke with patient over the phone; pt is currently leasing a [employer] that is too hard for him to get into and out of due to his knee problems; patient needs a letter stating this in order to terminate his lease; letter sent to [employer].

## 2021-06-25 ENCOUNTER — TELEPHONE (OUTPATIENT)
Dept: FAMILY MEDICINE CLINIC | Facility: CLINIC | Age: 76
End: 2021-06-25

## 2021-06-25 DIAGNOSIS — J30.9 ALLERGIC RHINITIS, UNSPECIFIED SEASONALITY, UNSPECIFIED TRIGGER: ICD-10-CM

## 2021-06-25 NOTE — TELEPHONE ENCOUNTER
Caller: Erlin Blanco    Relationship: Self     Best call back number: [phone]    What is the best time to reach you: ANY TIME     Who are you requesting to speak with (clinical staff, provider,  specific staff member): CLINICAL STAFF     What was the call regarding: THE WRITTEN LETTER DAVIDA WAS WORKING ON FOR THE PATIENT- HE WOULD LIKE TO COME PICK THIS UP IN OFFICE WHEN IT'S COMPLETE, AND NOT GET IT OFF MYCHART PLEASE.    Do you require a callback: YES WHEN THE LETTER IS WROTE AND READY

## 2021-06-28 RX ORDER — MONTELUKAST SODIUM 10 MG/1
TABLET ORAL
Qty: 90 TABLET | Refills: 1 | Status: SHIPPED | OUTPATIENT
Start: 2021-06-28 | End: 2021-12-02

## 2021-06-28 RX ORDER — APIXABAN 5 MG/1
TABLET, FILM COATED ORAL
Qty: 180 TABLET | Refills: 1 | Status: SHIPPED | OUTPATIENT
Start: 2021-06-28 | End: 2021-11-09 | Stop reason: SDUPTHER

## 2021-07-16 ENCOUNTER — OFFICE VISIT (OUTPATIENT)
Dept: FAMILY MEDICINE CLINIC | Facility: CLINIC | Age: 76
End: 2021-07-16

## 2021-07-16 VITALS
TEMPERATURE: 99.1 F | SYSTOLIC BLOOD PRESSURE: 138 MMHG | HEIGHT: 72 IN | DIASTOLIC BLOOD PRESSURE: 78 MMHG | OXYGEN SATURATION: 97 % | BODY MASS INDEX: 36.46 KG/M2 | HEART RATE: 55 BPM | WEIGHT: 269.2 LBS

## 2021-07-16 DIAGNOSIS — E11.3293 TYPE 2 DIABETES MELLITUS WITH BOTH EYES AFFECTED BY MILD NONPROLIFERATIVE RETINOPATHY WITHOUT MACULAR EDEMA, WITHOUT LONG-TERM CURRENT USE OF INSULIN (HCC): ICD-10-CM

## 2021-07-16 DIAGNOSIS — Z86.73 HISTORY OF CVA (CEREBROVASCULAR ACCIDENT): ICD-10-CM

## 2021-07-16 DIAGNOSIS — R41.3 MEMORY DIFFICULTIES: Primary | ICD-10-CM

## 2021-07-16 DIAGNOSIS — J01.00 ACUTE NON-RECURRENT MAXILLARY SINUSITIS: ICD-10-CM

## 2021-07-16 DIAGNOSIS — R60.0 LOCALIZED EDEMA: ICD-10-CM

## 2021-07-16 PROBLEM — E87.6 HYPOKALEMIA: Status: RESOLVED | Noted: 2021-01-07 | Resolved: 2021-07-16

## 2021-07-16 PROBLEM — E87.6 ACUTE HYPOKALEMIA: Status: RESOLVED | Noted: 2020-09-16 | Resolved: 2021-07-16

## 2021-07-16 PROBLEM — E11.65 TYPE 2 DIABETES MELLITUS WITH HYPERGLYCEMIA: Status: RESOLVED | Noted: 2021-05-16 | Resolved: 2021-07-16

## 2021-07-16 LAB
BILIRUB BLD-MCNC: NEGATIVE MG/DL
CLARITY, POC: CLEAR
COLOR UR: YELLOW
GLUCOSE UR STRIP-MCNC: NEGATIVE MG/DL
KETONES UR QL: NEGATIVE
LEUKOCYTE EST, POC: NEGATIVE
NITRITE UR-MCNC: NEGATIVE MG/ML
PH UR: 6 [PH] (ref 5–8)
PROT UR STRIP-MCNC: ABNORMAL MG/DL
RBC # UR STRIP: NEGATIVE /UL
SP GR UR: 1.01 (ref 1–1.03)
UROBILINOGEN UR QL: NORMAL

## 2021-07-16 PROCEDURE — 81003 URINALYSIS AUTO W/O SCOPE: CPT | Performed by: NURSE PRACTITIONER

## 2021-07-16 PROCEDURE — 99214 OFFICE O/P EST MOD 30 MIN: CPT | Performed by: NURSE PRACTITIONER

## 2021-07-16 RX ORDER — DOXYCYCLINE 100 MG/1
100 CAPSULE ORAL 2 TIMES DAILY
Qty: 20 CAPSULE | Refills: 0 | Status: SHIPPED | OUTPATIENT
Start: 2021-07-16 | End: 2021-07-26

## 2021-07-16 NOTE — PATIENT INSTRUCTIONS
Make sure drinking adequate liquids.  Continue to monitor your blood sugar once daily before a meal and record results.  Continue to monitor your blood pressure periodically and record results.  Continue to work on healthy diet and exercise as tolerated.  Follow up pending lab results.  Follow up in 4-6 weeks, or sooner if symptoms persist or worsen.

## 2021-07-16 NOTE — ASSESSMENT & PLAN NOTE
Diabetes is improving per home blood sugar readings.   Continue current treatment regimen.  Diabetes will be reassessed 1 month.  Continue Glimepiride twice daily.

## 2021-07-16 NOTE — PROGRESS NOTES
Subjective   Erlin Blanco is a 76 y.o. male.     Chief Complaint   Patient presents with   • Groin Swelling     follow up    • Sinusitis     X 2 weeks    • Diabetes     follow up   • Memory Loss     has had some short term memory loss       History of Present Illness   Patient presents for follow up swelling of penis; had swelling after taking Steglatro; went to [facility] and diagnosed with Phimosis, Balanitis, Paraphimosis; admitted overnight for urology to see; not circumcised; gave cream and symptoms resolved; no problems urinating, no dysuria; has follow up with urology next week.    Also, c/o sinus symptoms x2 weeks; has pressure in left cheek, no pain; no ear pain; no sore throat; no fever; no nausea, vomiting, or diarrhea; has stuffy nose; has been using Flonase and helps some, but symptoms persist; takes Montelukast and Zyrtec daily; will be having cataract surgery at end of the month.    Saw Dr. Arias, cardiology and changed to Torsemide twice daily instead of Furosemide and seems to be working better; swelling has improved.    F/U DM2: takes Glimepiride whole tablet in morning and 1/2 tablet in evening; monitors blood sugar, typically runs 120s; no longer taking Lantus; no low blood sugars.    Also, c/o memory concerns recently; has been forgetting things when going to do something; will forget to do things has said would do; spouse is concerned; has concerns with short term memory; no headaches; no orthostasis; symptoms started when left hospital after stroke fall 2020; no worsening of symptoms; no weakness of one side; walks with cane due to left knee problems.      The following portions of the patient's history were reviewed and updated as appropriate: allergies, current medications, past family history, past medical history, past social history, past surgical history and problem list.    Current Outpatient Medications on File Prior to Visit   Medication Sig   • Accu-Chek FastClix Lancets misc  TEST BLOOD SUGAR 1-2 TIMES DAILY AND AS NEEDED   • acetaminophen (TYLENOL) 500 MG tablet Take 1,000 mg by mouth 2 (Two) Times a Day As Needed for Mild Pain  or Moderate Pain .   • aspirin (ASPIRIN LOW DOSE) 81 MG EC tablet Take 1 tablet by mouth Daily.   • Blood Glucose Monitoring Suppl (Accu-Chek Guide Me) w/Device kit TEST BLOOD SUGAR 1-2 TIMES DAILY AND AS NEEDED   • carvedilol (COREG) 12.5 MG tablet TAKE 3 TABLETS TWICE A DAY WITH MEALS   • cetirizine (zyrTEC) 10 MG tablet Take 10 mg by mouth Daily.   • dilTIAZem CD (CARDIZEM CD) 360 MG 24 hr capsule TAKE 1 CAPSULE DAILY   • Eliquis 5 MG tablet tablet TAKE 1 TABLET EVERY 12 HOURS FOR ATRIAL FIBRILLATION   • finasteride (PROSCAR) 5 MG tablet Take 5 mg by mouth Every Night.   • fluticasone (Flonase) 50 MCG/ACT nasal spray 2 sprays into the nostril(s) as directed by provider Daily.   • glimepiride (AMARYL) 4 MG tablet Take 0.5 tablets by mouth Every Morning Before Breakfast. (Patient taking differently: Take 4 mg by mouth Every Morning Before Breakfast. Taking 4 mg with breakfast and 2 mg with evening meal.)   • glucose blood (OneTouch Ultra) test strip Test blood sugar 1-2 times daily and as needed.   • hydrALAZINE (APRESOLINE) 100 MG tablet Take 1 tablet by mouth 3 (Three) Times a Day.   • hydroxyurea (HYDREA) 500 MG capsule Take 500 mg by mouth Daily.   • Insulin Pen Needle (B-D UF III MINI PEN NEEDLES) 31G X 5 MM misc Inject 1 each under the skin into the appropriate area as directed Daily.   • losartan (COZAAR) 100 MG tablet TAKE 1 TABLET DAILY   • montelukast (SINGULAIR) 10 MG tablet TAKE 1 TABLET DAILY   • Multiple Vitamins-Minerals (MULTIVITAMIN ADULT PO) Take 1 tablet by mouth Daily.   • potassium chloride (K-DUR,KLOR-CON) 20 MEQ CR tablet Take 1 tablet by mouth 3 (Three) Times a Day.   • pravastatin (PRAVACHOL) 40 MG tablet TAKE 1 TABLET DAILY   • Probiotic Product (Probiotic Daily) capsule Take 1 capsule by mouth Daily.   • terazosin (HYTRIN) 2 MG  capsule TAKE 1 CAPSULE EVERY NIGHT   • torsemide (DEMADEX) 20 MG tablet Take 1 tablet by mouth 2 (Two) Times a Day.     No current facility-administered medications on file prior to visit.        Past Medical History:   Diagnosis Date   • Abnormal ECG    • Abnormal stress test    • Abrasion of face 1/29/2020   • Acute bronchitis    • Acute hypokalemia 9/16/2020   • Acute non-recurrent sinusitis 1/13/2020   • Acute pyelonephritis 10/9/2020   • Acute sinusitis    • Allergic rhinitis    • Aortic root dilation (CMS/[facility])    • Arthritis    • Bacteremia due to Escherichia coli 8/17/2020   • Balanitis 5/16/2021   • Benign enlargement of prostate    • C. difficile colitis 9/16/2020   • Cellulitis of knee, left 5/4/2017   • CHF (congestive heart failure) (CMS/[facility])    • Chronic diastolic congestive heart failure (CMS/[facility])    • Constipation 11/14/2020   • Contusion of face 1/29/2020   • Coronary artery disease    • Diminished pulse     in lower extremities   • Edema    • Elevated hematocrit    • Elevated hemoglobin (CMS/[facility])    • Essential hypertension    • Hearing loss     mala hearing aids   • Heart valve disease    • High risk medication use    • History of back pain    • History of dysuria    • History of echocardiogram    • History of intracranial hemorrhage    • History of scoliosis    • History of stroke    • Hyperlipidemia    • Hypokalemia 1/7/2021   • Injury of toe, left, initial encounter    • Irregular heart rate    • Knee pain, left    • Left bundle branch block    • Leg wound, left    • Leukocytosis 9/16/2020   • Low back pain    • Medicare annual wellness visit, subsequent    • Minor head injury without loss of consciousness 1/29/2020   • Murmur    • Muscle cramps    • Need for hepatitis C screening test    • Paraphimosis 5/16/2021   • Polycythemia    • Polycythemia vera (CMS/[facility])    • Pressure injury of buttock, stage 2 (CMS/[facility]) 9/18/2020   • Prostate hyperplasia without urinary obstruction    • Prostatitis    •  Proteinuria    • Pulmonary hypertension (CMS/[facility])    • Sacroiliitis (CMS/[facility]) 10/9/2020   • Scoliosis    • Sepsis due to Escherichia coli (CMS/[facility]) 2020   • Sepsis with metabolic encephalopathy (CMS/[facility]) 2020   • Stroke (CMS/[facility])    • SVT (supraventricular tachycardia) (CMS/[facility])    • Tachycardia    • Thrombocytosis (CMS/[facility])    • TIA (transient ischemic attack)        • Type 2 diabetes mellitus (CMS/[facility])    • Type 2 diabetes mellitus with hyperglycemia (CMS/[facility]) 2021   • Urinary tract infection due to extended-spectrum beta lactamase (ESBL) producing Escherichia coli 2020   • Valvular heart disease        Past Surgical History:   Procedure Laterality Date   • CARDIAC CATHETERIZATION     • COLONOSCOPY      did Cologuard approx 2019 and negative   • HAND SURGERY     • JOINT REPLACEMENT      Righ Knee   • [place] TOTAL KNEE ARTHROPLASTY Left 2017    Procedure: LT TOTAL KNEE ARTHROPLASTY;  Surgeon: Bertin Perrin MD;  [facility];  Service: Orthopedics   • PROSTATE SURGERY      Rezum steam treatment to shrink prostate by dr. guerrero       Family History   Problem Relation Age of Onset   • Hypertension Mother    • Other Father         ruptured appendix,  when pt. was 14 years old.   • Diabetes Paternal Aunt    • Diabetes Paternal Uncle    • No Known Problems Other        Social History     Socioeconomic History   • Marital status:      Spouse name: Not on file   • Number of children: Not on file   • Years of education: Not on file   • Highest education level: Not on file   Tobacco Use   • Smoking status: Former Smoker     Types: Cigarettes     Quit date:      Years since quittin.6   • Smokeless tobacco: Former User   • Tobacco comment: Caffeine daily   Substance and Sexual Activity   • Alcohol use: No   • Drug use: No   • Sexual activity: Defer       Review of Systems   Constitutional: Negative for appetite change, chills, fatigue, fever, unexpected  "weight gain and unexpected weight loss.   HENT: Positive for sinus pressure. Negative for trouble swallowing.    Eyes: Blurred vision: in left eye due to cataract.   Respiratory: Negative for cough, chest tightness and shortness of breath.    Cardiovascular: Negative for chest pain and palpitations.   Gastrointestinal: Negative for abdominal pain, blood in stool, diarrhea, GERD and indigestion. Constipation: mild at times, takes OTC fiber supplement and helps, not a laxative.   Endocrine: Negative for polydipsia.   Genitourinary: Negative for dysuria and frequency.   Musculoskeletal: Negative for back pain.   Skin: Rash: some on legs, no itching.   Neurological: Positive for memory problem. Negative for dizziness, syncope, weakness and headache.       Objective   Vitals:    07/16/21 1033   BP: 138/78   BP Location: Left arm   Patient Position: Sitting   Cuff Size: Adult   Pulse: 55   Temp: 99.1 °F (37.3 °C)   SpO2: 97%   Weight: 122 kg (269 lb 3.2 oz)   Height: 182.9 cm (72\")     Body mass index is 36.51 kg/m².    Physical Exam  Vitals and nursing note reviewed.   Constitutional:       General: He is not in acute distress.     Appearance: He is well-developed and well-groomed. He is not diaphoretic.   HENT:      Head: Normocephalic.      Jaw: No tenderness or pain on movement.      Right Ear: Tympanic membrane and external ear normal. Right ear decreased hearing: hearing aid.      Left Ear: External ear normal. Left ear decreased hearing: hearing aid. Left ear middle ear effusion: mild. Left ear injected TM: mild.      Nose: No congestion.      Right Sinus: No maxillary sinus tenderness or frontal sinus tenderness.      Left Sinus: Maxillary sinus tenderness present. No frontal sinus tenderness.      Mouth/Throat:      Mouth: Mucous membranes are moist.      Pharynx: Posterior oropharyngeal erythema: mild.   Eyes:      Extraocular Movements: Extraocular movements intact.      Conjunctiva/sclera: Conjunctivae normal. " "     Pupils: Pupils are equal, round, and reactive to light.   Neck:      Vascular: No carotid bruit.   Cardiovascular:      Rate and Rhythm: Normal rate and regular rhythm.      Pulses: Normal pulses.      Heart sounds: Murmur heard.   Systolic murmur is present with a grade of 2/6.        Comments: Mild warmth and erythema of distal left LE  Pulmonary:      Effort: Pulmonary effort is normal. No respiratory distress.      Breath sounds: Normal breath sounds.   Abdominal:      General: Bowel sounds are normal.      Palpations: Abdomen is soft. There is no hepatomegaly or splenomegaly.      Tenderness: There is no abdominal tenderness.   Musculoskeletal:      Cervical back: Normal range of motion and neck supple.      Right lower leg: Edema (1+ pretibial and ankle) present.      Left lower leg: Edema (1-2+ pretibial and ankle) present.   Lymphadenopathy:      Cervical: No cervical adenopathy.   Skin:     General: Skin is warm and dry.   Neurological:      Mental Status: He is alert and oriented to person, place, and time.      Cranial Nerves: Cranial nerves are intact.      Gait: Abnormal gait: guarded gait, limping on left with cane.   Psychiatric:         Mood and Affect: Mood normal.         Behavior: Behavior normal.         Thought Content: Thought content normal.         Cognition and Memory: Cognition normal.         Judgment: Judgment normal.         MMSE  Orientation   What is the year? Season? Date? Day of the Week? Month? (5 points)score 3    Where are we now: State? Country? Town/city? Hospital? Floor? (5 points)score 5    Registration  The examiner names 3 unrelated objects clearly and slowly, then asked the patient to name all 3 of them.  The patient's response is used for scoring. Examiner repeats them until patient learns all of them if possible.(3 points)score 3    Attention and Calculation(5 points)  \"I would like you to count backward from 100 by sevens\".(93, 86,79,72,65)  Stop after 5 answers.  " "Alternative: \"Spell WORLD backwards\"(D-L-R-O-W)(5 points)score 2    Recall   \"Earlier I told you the names of 3 things can you remember what those were?\"(3 points)score 2    Language and Praxis  Naming  Show the patient to simple objects, symptoms such as a watch and a pencil, and asked the patient to name them.(2 points)score 2    Repetition  \"Repeat the phrase: \"No if's, and's, or but's.\"\"(1point)score 1    3 stage command  \"Take the paper in your right hand, folded in half, and put it on the floor.\"  (The examiner gives the patient a piece of blank paper)(3points)score 3    Reading  \"Please read this and do what it says.\"  (Written instruction is \"close your eyes.\")(1point)score 0    Writing  \"Make up and write a sentence about anything.\"  (This sentence must contain a noun and a verb.)(1 point)score 1    Copying  \"Please copy this picture\" (the examiner gives the patient a blank is a paper and ask him or her to draw the symbol of 2 intersecting pentagons.  All 10 angles must be present and to must intersect.)(1 point)score 1    Total Score 23 out of possible 30    Interpretation :  Cutoff :<24 Abnormal  Range: <21 increased odds of dementia; >25 decreased odds of dementia  Education: <21 abnormal for 8th grade education;<23 abnormal high school education;<24 abnormal for college education  Severity: 24-30 no cognitive impairment;18-23 mild cognitive impairment; 0-17 severe cognitive impairment    Lab Results   Component Value Date    WBC 8.0 07/16/2021    RBC 5.11 07/16/2021    HGB 15.4 07/16/2021    HCT 48.0 07/16/2021    MCV 94 07/16/2021    MCH 30.1 07/16/2021    MCHC 32.1 07/16/2021    RDW 15.0 07/16/2021    RDWSD 41.7 05/16/2021    MPV 9.9 05/16/2021     07/16/2021    NEUTRORELPCT 66 07/16/2021    LYMPHORELPCT 19 07/16/2021    MONORELPCT 7 07/16/2021    EOSRELPCT 6 07/16/2021    BASORELPCT 2 07/16/2021    AUTOIGPER 0.6 (H) 05/15/2021    NEUTROABS 5.3 07/16/2021    LYMPHSABS 1.5 07/16/2021    MONOSABS " 0.6 07/16/2021    EOSABS 0.5 (H) 07/16/2021    BASOSABS 0.1 07/16/2021    AUTOIGNUM 0.05 05/15/2021    NRBC 0.0 05/15/2021     Lab Results   Component Value Date    GLUCOSE 107 (H) 05/16/2021    BUN 18 07/16/2021    CREATININE 1.30 (H) 07/16/2021    EGFRIFNONA 54 (L) 07/16/2021    EGFRIFAFRI 62 07/16/2021    BCR 14 07/16/2021    K 4.3 07/16/2021    CO2 27 07/16/2021    CALCIUM 9.8 07/16/2021    PROTENTOTREF 6.7 04/12/2021    ALBUMIN 4.00 05/15/2021    LABIL2 1.7 04/12/2021    AST 22 05/15/2021    ALT 19 05/15/2021      Lab Results   Component Value Date    CHLPL 119 04/12/2021    TRIG 80 04/12/2021    HDL 45 04/12/2021    VLDL 16 04/12/2021    LDL 58 04/12/2021     Lab Results   Component Value Date    TSH 3.060 07/16/2021     Lab Results   Component Value Date    HGBA1C 9.10 (H) 05/16/2021       Assessment/Plan .  Problem List Items Addressed This Visit     Type 2 diabetes mellitus with both eyes affected by mild nonproliferative retinopathy without macular edema, without long-term current use of insulin (CMS/[facility])    Overview     With mild non-proliferative diabetic retinopathy, without macular edema         Current Assessment & Plan     Diabetes is improving per home blood sugar readings.   Continue current treatment regimen.  Diabetes will be reassessed 1 month.  Continue Glimepiride twice daily.         Relevant Orders    Basic Metabolic Panel (Completed)    History of CVA (cerebrovascular accident)    Relevant Orders    Ambulatory Referral to Neurology    Localized edema    Current Assessment & Plan     Continue Torsemide twice daily.  Continue to elevated legs when sitting.         Acute non-recurrent maxillary sinusitis    Current Assessment & Plan     Make sure drinking adequate liquids.  Continue Flonase as needed.         Memory difficulties - Primary    Relevant Orders    Basic Metabolic Panel (Completed)    POC Urinalysis Dipstick, Automated (Completed)    Vitamin B12 & Folate (Completed)    TSH Rfx On  Abnormal To Free T4 (Completed)    CBC & Differential (Completed)    Ambulatory Referral to Neurology    MRI Brain With & Without Contrast          Return in about 1 month (around 8/16/2021) for Recheck.or sooner if symptoms persist or worsen.  Will treat sinuses with Doxycycline and see if helps erythema and swelling in LLE.     COVID-19 Precautions - Patient was compliant in wearing a mask. When I saw the patient, I used appropriate personal protective equipment (PPE) including mask, gloves, and eye shield (standard procedure).  Hand hygiene was completed before and after seeing the patient.

## 2021-07-17 LAB
BASOPHILS # BLD AUTO: 0.1 X10E3/UL (ref 0–0.2)
BASOPHILS NFR BLD AUTO: 2 %
BUN SERPL-MCNC: 18 MG/DL (ref 8–27)
BUN/CREAT SERPL: 14 (ref 10–24)
CALCIUM SERPL-MCNC: 9.8 MG/DL (ref 8.6–10.2)
CHLORIDE SERPL-SCNC: 102 MMOL/L (ref 96–106)
CO2 SERPL-SCNC: 27 MMOL/L (ref 20–29)
CREAT SERPL-MCNC: 1.3 MG/DL (ref 0.76–1.27)
EOSINOPHIL # BLD AUTO: 0.5 X10E3/UL (ref 0–0.4)
EOSINOPHIL NFR BLD AUTO: 6 %
ERYTHROCYTE [DISTWIDTH] IN BLOOD BY AUTOMATED COUNT: 15 % (ref 11.6–15.4)
FOLATE SERPL-MCNC: 14.5 NG/ML
GLUCOSE SERPL-MCNC: 189 MG/DL (ref 65–99)
HCT VFR BLD AUTO: 48 % (ref 37.5–51)
HGB BLD-MCNC: 15.4 G/DL (ref 13–17.7)
IMM GRANULOCYTES # BLD AUTO: 0 X10E3/UL (ref 0–0.1)
IMM GRANULOCYTES NFR BLD AUTO: 0 %
LYMPHOCYTES # BLD AUTO: 1.5 X10E3/UL (ref 0.7–3.1)
LYMPHOCYTES NFR BLD AUTO: 19 %
MCH RBC QN AUTO: 30.1 PG (ref 26.6–33)
MCHC RBC AUTO-ENTMCNC: 32.1 G/DL (ref 31.5–35.7)
MCV RBC AUTO: 94 FL (ref 79–97)
MONOCYTES # BLD AUTO: 0.6 X10E3/UL (ref 0.1–0.9)
MONOCYTES NFR BLD AUTO: 7 %
NEUTROPHILS # BLD AUTO: 5.3 X10E3/UL (ref 1.4–7)
NEUTROPHILS NFR BLD AUTO: 66 %
PLATELET # BLD AUTO: 327 X10E3/UL (ref 150–450)
POTASSIUM SERPL-SCNC: 4.3 MMOL/L (ref 3.5–5.2)
RBC # BLD AUTO: 5.11 X10E6/UL (ref 4.14–5.8)
SODIUM SERPL-SCNC: 144 MMOL/L (ref 134–144)
TSH SERPL DL<=0.005 MIU/L-ACNC: 3.06 UIU/ML (ref 0.45–4.5)
VIT B12 SERPL-MCNC: 691 PG/ML (ref 232–1245)
WBC # BLD AUTO: 8 X10E3/UL (ref 3.4–10.8)

## 2021-07-27 PROBLEM — Z86.73 HISTORY OF CVA (CEREBROVASCULAR ACCIDENT): Status: ACTIVE | Noted: 2020-09-28

## 2021-08-05 ENCOUNTER — OFFICE VISIT (OUTPATIENT)
Dept: NEUROLOGY | Facility: CLINIC | Age: 76
End: 2021-08-05

## 2021-08-05 VITALS
HEIGHT: 72 IN | BODY MASS INDEX: 36.57 KG/M2 | DIASTOLIC BLOOD PRESSURE: 70 MMHG | OXYGEN SATURATION: 95 % | WEIGHT: 270 LBS | HEART RATE: 55 BPM | SYSTOLIC BLOOD PRESSURE: 170 MMHG

## 2021-08-05 DIAGNOSIS — R41.3 MEMORY DIFFICULTIES: ICD-10-CM

## 2021-08-05 DIAGNOSIS — Z86.73 HISTORY OF CVA (CEREBROVASCULAR ACCIDENT): Primary | ICD-10-CM

## 2021-08-05 PROCEDURE — 99215 OFFICE O/P EST HI 40 MIN: CPT | Performed by: PSYCHIATRY & NEUROLOGY

## 2021-08-05 RX ORDER — DONEPEZIL HYDROCHLORIDE 5 MG/1
5 TABLET, FILM COATED ORAL DAILY
Qty: 30 TABLET | Refills: 5 | Status: SHIPPED | OUTPATIENT
Start: 2021-08-05 | End: 2021-08-27

## 2021-08-05 NOTE — PROGRESS NOTES
Chief Complaint  Memory Loss (began sept 2020 and worsening short term memory- had ecoli- had stroke)    Subjective          Erlin Blanco presents to [facility] NEUROLOGY for   HISTORY OF PRESENT ILLNESS:    Erlin Blanco is a 76 year old man who presents with his wife, Prashanth, to neurology clinic for initial evaluation and treatment of history of stroke and memory impairment.  Of note he was evaluated in the hospital for extended period of time starting in 8/2020 for Ecoli sepsis and discitis leading to left leg weakness and confusion for an extended period of time and since September 2020 they have noticed trouble with his short term memory.  He had a brain MRI scan on 9/22/2020 which I reviewed the images independently on his visit today and demonstrates old lacunar strokes and a more acute stroke in the left corona radiata and atrophy.  They tell me that during this hospitalization he was placed on combination of Eliquis and aspirin which he continues to take.  He has not had any further stroke symptoms since being discharged from the hospital where he was evaluated by my colleague Dr. Ledbetter.  I reviewed his notes.  He has had short term memory problems since this time and he does not remember picking things up and forgetting to set the alarm or put the garage door down.  He will forget that they have had conversations.  His older brother had Alzheimer's dementia.  No other family members that they are aware of with dementia.  He is not having any problems with driving.  He has good long term memory.  His personality has changed somewhat and can have short temper and quick to get upset.  Left leg is weak following his hospitalization.  He uses a cane to ambulate.  He has had lab work done recently with normal TSH and Vit B12 and folate.  He does not drink alcohol and does not smoke.      Past Medical History:   Diagnosis Date   • Abnormal ECG    • Abnormal stress test    • Abrasion of face  1/29/2020   • Acute bronchitis    • Acute hypokalemia 9/16/2020   • Acute non-recurrent sinusitis 1/13/2020   • Acute pyelonephritis 10/9/2020   • Acute sinusitis    • Allergic rhinitis    • Aortic root dilation (CMS/[facility])    • Arthritis    • Bacteremia due to Escherichia coli 8/17/2020   • Balanitis 5/16/2021   • Benign enlargement of prostate    • C. difficile colitis 9/16/2020   • Cellulitis of knee, left 5/4/2017   • CHF (congestive heart failure) (CMS/[facility])    • Chronic diastolic congestive heart failure (CMS/[facility])    • Constipation 11/14/2020   • Contusion of face 1/29/2020   • Coronary artery disease    • CVA (cerebral vascular accident) (CMS/[facility]) 2020   • Diminished pulse     in lower extremities   • Edema    • Elevated hematocrit    • Elevated hemoglobin (CMS/[facility])    • Essential hypertension    • Hearing loss     mala hearing aids   • Heart valve disease    • High risk medication use    • History of back pain    • History of dysuria    • History of echocardiogram    • History of intracranial hemorrhage    • History of scoliosis    • History of stroke    • Hyperlipidemia    • Hypokalemia 1/7/2021   • Injury of toe, left, initial encounter    • Irregular heart rate    • Knee pain, left    • Left bundle branch block    • Leg wound, left    • Leukocytosis 9/16/2020   • Low back pain    • Medicare annual wellness visit, subsequent    • Minor head injury without loss of consciousness 1/29/2020   • Murmur    • Muscle cramps    • Need for hepatitis C screening test    • Paraphimosis 5/16/2021   • Polycythemia    • Polycythemia vera (CMS/[facility])    • Pressure injury of buttock, stage 2 (CMS/[facility]) 9/18/2020   • Prostate hyperplasia without urinary obstruction    • Prostatitis    • Proteinuria    • Pulmonary hypertension (CMS/[facility])    • Sacroiliitis (CMS/[facility]) 10/9/2020   • Scoliosis    • Sepsis due to Escherichia coli (CMS/[facility]) 8/17/2020   • Sepsis with metabolic encephalopathy (CMS/[facility]) 8/17/2020   • Stroke (CMS/[facility])    • SVT  (supraventricular tachycardia) (CMS/[facility])    • Tachycardia    • Thrombocytosis (CMS/[facility])    • TIA (transient ischemic attack)        • Type 2 diabetes mellitus (CMS/[facility])    • Type 2 diabetes mellitus with hyperglycemia (CMS/[facility]) 2021   • Urinary tract infection due to extended-spectrum beta lactamase (ESBL) producing Escherichia coli 2020   • Valvular heart disease         Family History   Problem Relation Age of Onset   • Hypertension Mother    • Other Father         ruptured appendix,  when pt. was 14 years old.   • Diabetes Paternal Aunt    • Diabetes Paternal Uncle    • No Known Problems Other    • Migraines Sister    • Stroke Sister    • Dementia Brother         Social History     Socioeconomic History   • Marital status:      Spouse name: Not on file   • Number of children: Not on file   • Years of education: Not on file   • Highest education level: Not on file   Tobacco Use   • Smoking status: Former Smoker     Types: Cigarettes     Quit date:      Years since quittin.6   • Smokeless tobacco: Former User   • Tobacco comment: Caffeine daily   Substance and Sexual Activity   • Alcohol use: No   • Drug use: No   • Sexual activity: Defer        I have personally reviewed the ROS as stated below.     Review of Systems   Constitutional: Positive for activity change. Negative for appetite change.   HENT: Positive for hearing loss and sinus pressure. Negative for sore throat.    Eyes: Negative for double vision, discharge and redness.   Respiratory: Negative for cough, shortness of breath and stridor.    Cardiovascular: Positive for leg swelling.   Gastrointestinal: Negative for anal bleeding, blood in stool and diarrhea.   Endocrine: Negative for cold intolerance and heat intolerance.   Genitourinary: Negative for nocturia, penile pain and erectile dysfunction.   Musculoskeletal: Positive for gait problem. Negative for bursitis.   Allergic/Immunologic: Negative for environmental  "allergies and food allergies.   Neurological: Positive for memory problem. Negative for dizziness, tremors, seizures, syncope, facial asymmetry, speech difficulty, weakness, light-headedness, numbness, headache and confusion.   Hematological: Bruises/bleeds easily.   Psychiatric/Behavioral: Negative for agitation, behavioral problems, decreased concentration, dysphoric mood, hallucinations, self-injury, sleep disturbance, suicidal ideas, negative for hyperactivity, depressed mood and stress. The patient is not nervous/anxious.         Objective   Vital Signs:   /70 (BP Location: Right arm, Patient Position: Sitting)   Pulse 55   Ht 182.9 cm (72\")   Wt 122 kg (270 lb)   SpO2 95%   BMI 36.62 kg/m²       PHYSICAL EXAM:    General   Mental Status - Alert. General Appearance - Well developed, Well groomed, Oriented and Cooperative. Orientation - Oriented X3.       Head and Neck  Head - normocephalic, atraumatic with no lesions or palpable masses.  Neck    Global Assessment - supple.       Eye   Sclera/Conjunctiva - Bilateral - Normal.    ENMT  Mouth and Throat   Oral Cavity/Oropharynx: Oropharynx - the soft palate,uvula and tongue are normal in appearance.    Chest and Lung Exam   Chest - lung clear to auscultation bilaterally.    Cardiovascular   Cardiovascular examination reveals  - normal heart sounds, regular rate and rhythm.    Neurologic   Mental Status: Speech - Normal. Cognitive function - SLUMS 24/30, 1/5 object recall, appropriate fund of knowledge. No impairment of attention, Impairment of concentration, impairment of long term memory but does have impairment of short term memory.  Cranial Nerves:   II Optic: Visual acuity - Left - Normal. Right - Normal. Visual fields - Normal (to confrontation).  III Oculomotor: Pupillary constriction - Left - Normal. Right - Normal.  VII Facial: - Normal Bilaterally.  VIII Acoustic - Bilateral - Hearing normal and (Hearing tested by finger rub).   IX " Glossopharyngeal / X Vagus - Normal.  XI Accessory: Trapezius - Bilateral - Normal. Sternocleidomastoid - Bilateral - Normal.  XII Hypoglossal - Bilateral - Normal.  Eye Movements: - Normal Bilaterally.  Sensory:   Light Touch: Intact - Globally.  Motor:   Bulk and Contour: - Normal.  Tone: - Normal.  Tremor: Not present.  Strength: 5/5 normal muscle strength - on the right side and 4/5 on the left side.    General Assessment of Reflexes: - deep tendon reflexes are normal. Coordination - No Impairment of finger-to-nose or Impairment of rapid alternating movements. Gait - left sided hemiparetic, uses cane to ambulate.      Result Review :                 Assessment and Plan    Problem List Items Addressed This Visit        Neuro    History of CVA (cerebrovascular accident) - Primary    Current Assessment & Plan     He had a brain MRI scan on 9/22/2020 which I reviewed the images independently on his visit today and demonstrates old lacunar strokes and a more acute stroke in the left corona radiata and atrophy.  They tell me that during this hospitalization he was placed on combination of Eliquis and aspirin which he continues to take.  He has not had any further stroke symptoms since being discharged from the hospital where he was evaluated by my colleague Dr. Ledbetter.  I reviewed his notes.  I advised him to continue his medications and be taken to the nearest hospital with any new stroke symptoms.  His BP is elevated today and I advised him to follow up with PCP for further evaluation.           Memory difficulties    Current Assessment & Plan     SLUMS of 24/30 today is consistent with mild cognitive impairment vs early stages of Alzheimer's dementia vs multi-infarct vascular dementia.  I informed patient and wife with regards to this diagnosis.  I will start him on donepezil to further assist with his memory issues.  I have referred patient to community mobility assessment for driving evaluation.  I discussed  safety issues and at this time he is not having trouble with driving and has not had any accidents or issues but I informed them if there are any issues we will need to get further community mobility assessment performed.  Discussed diagnosis extensively with patient and wife and advised patient to exercise and socialize which are the only two things that have been shown to help potentially slow down progression of disease.  POA and Living will were discussed which patient has already done.  Brain MRI reviewed as stated above and he is also going to have another brain MRI scan done.  I reviewed his recent labs as well and his diabetes is not well controlled and I informed him to work on trying to improve his blood sugar.  I advised him to work with his PCP and potentially start an SSRI like Prozac for further assistance. Provided patient education information on dementia websites and possible support groups.  Will follow up in 6 months to reevaluate and sooner if needed.           Relevant Medications    donepezil (ARICEPT) 5 MG tablet          I spent 54 minutes caring for Erlin on this date of service. This time includes time spent by me in the following activities:preparing for the visit, reviewing tests, obtaining and/or reviewing a separately obtained history, performing a medically appropriate examination and/or evaluation , counseling and educating the patient/family/caregiver, ordering medications, tests, or procedures, documenting information in the medical record, independently interpreting results and communicating that information with the patient/family/caregiver and care coordination    Follow Up   Return in about 6 months (around 2/5/2022).  Patient was given instructions and counseling regarding his condition or for health maintenance advice. Please see specific information pulled into the AVS if appropriate.

## 2021-08-05 NOTE — ASSESSMENT & PLAN NOTE
He had a brain MRI scan on 9/22/2020 which I reviewed the images independently on his visit today and demonstrates old lacunar strokes and a more acute stroke in the left corona radiata and atrophy.  They tell me that during this hospitalization he was placed on combination of Eliquis and aspirin which he continues to take.  He has not had any further stroke symptoms since being discharged from the hospital where he was evaluated by my colleague Dr. Ledbetter.  I reviewed his notes.  I advised him to continue his medications and be taken to the nearest hospital with any new stroke symptoms.  His BP is elevated today and I advised him to follow up with PCP for further evaluation.

## 2021-08-05 NOTE — ASSESSMENT & PLAN NOTE
SLUMS of 24/30 today is consistent with mild cognitive impairment vs early stages of Alzheimer's dementia vs multi-infarct vascular dementia.  I informed patient and wife with regards to this diagnosis.  I will start him on donepezil to further assist with his memory issues.  I have referred patient to community mobility assessment for driving evaluation.  I discussed safety issues and at this time he is not having trouble with driving and has not had any accidents or issues but I informed them if there are any issues we will need to get further community mobility assessment performed.  Discussed diagnosis extensively with patient and wife and advised patient to exercise and socialize which are the only two things that have been shown to help potentially slow down progression of disease.  POA and Living will were discussed which patient has already done.  Brain MRI reviewed as stated above and he is also going to have another brain MRI scan done.  I reviewed his recent labs as well and his diabetes is not well controlled and I informed him to work on trying to improve his blood sugar.  I advised him to work with his PCP and potentially start an SSRI like Prozac for further assistance. Provided patient education information on dementia websites and possible support groups.  Will follow up in 6 months to reevaluate and sooner if needed.

## 2021-08-05 NOTE — PATIENT INSTRUCTIONS
**Eat a high fiber diet    **Exercise regularly (physically and mentally)    **Floss daily    **Consider eating yogurt regularly    **Consider drinking green tea    **Limit soda and alcohol    **Ensure safety in the home    **Check out th Alzheimer's Association (www.alz.org).    **If you are interested in participating in a clinical trial, check out the following centers:   [facility]:  [email or web address]      [facility]:  [email or web address]     [facility]:  [email or web address]    **If you live in [place] [place], consider [facility]. It is a free agency that helps find placement for seniors. They do an assessment and find out the need and know which facilities have openings and would be the best fit. They help figure out the financial aspects as well.  Ruma Uriarte is the nurse navigator and her number is [phone].    Lower blood pressure by restricting salt in diet (less than 2400 mg/day), weight loss, increase fruits, vegetables, whole grains, and low-fat dairy products.    Consider the DASH diet or Mediterranean diet.  Increase fish and poultry intake.    Avoid sodas, sweets, and red meat.    Limit alcohol consumption.    Monitor and keep blood pressure, cholesterol and blood sugar at goal.    Avoid the use of tobacco and avoid secondhand smoke.    Engage in moderate to vigorous intensity aerobic exercise for about 40 minutes 3-4 times per week.  Consider lower intensity anthony chi or yoga if necessary.    Take antiplatelet medication regularly.    If you snore, wake up unrefreshed or feel tired during the day, talk to your doctor as these may be signs of sleep apnea and a sleep study may be indicated.

## 2021-08-06 ENCOUNTER — TELEPHONE (OUTPATIENT)
Dept: CARDIOLOGY | Facility: CLINIC | Age: 76
End: 2021-08-06

## 2021-08-06 NOTE — TELEPHONE ENCOUNTER
[phone]  11:52 am      Pt states the diuretic is not helping and pt still has the swelling.  Pt states he was rx'd torsemide two weeks ago and the swelling is the same.  Pt denies SOA, chest tightness, or wt gain.  Can you advise?    [facility]

## 2021-08-12 DIAGNOSIS — E11.3293 TYPE 2 DIABETES MELLITUS WITH BOTH EYES AFFECTED BY MILD NONPROLIFERATIVE RETINOPATHY WITHOUT MACULAR EDEMA, WITHOUT LONG-TERM CURRENT USE OF INSULIN (HCC): ICD-10-CM

## 2021-08-12 DIAGNOSIS — I10 ESSENTIAL HYPERTENSION: ICD-10-CM

## 2021-08-12 DIAGNOSIS — E87.6 ACUTE HYPOKALEMIA: ICD-10-CM

## 2021-08-13 NOTE — TELEPHONE ENCOUNTER
Pt called and lvm today. He is still experiencing edema in his LE, since increasing Torsemide 20mg to bid, but the swelling has gone down a lot.    I verified with the pt, and he is taking the Torsemide as instructed.    Thank you,    Nela Bond, CMA

## 2021-08-13 NOTE — TELEPHONE ENCOUNTER
Lvm for pt, is he taking Torsemide 20mg or 40mg bid? I did tell him on the msg that he was advised to increase to 40mg bid.    Ajit COREA  8/13/21 4:23pm

## 2021-08-13 NOTE — TELEPHONE ENCOUNTER
Is he taking the torsemide 20 or 40 mg twice a day?  I thought he only on 20 mg twice a day and I had him increase it to 40 mg twice a day.

## 2021-08-16 NOTE — TELEPHONE ENCOUNTER
2nd call to pt. Brenda, how much Torsemide is he taking, and how often?    Ajit COREA  8/16/21 12:48pm

## 2021-08-16 NOTE — TELEPHONE ENCOUNTER
Pt called and lm he was rtc.      Called pt back and lm for him to call and let us know what dose of diuretic he is taking?  [facility] RMA

## 2021-08-18 DIAGNOSIS — I10 ESSENTIAL HYPERTENSION: ICD-10-CM

## 2021-08-18 RX ORDER — CARVEDILOL 12.5 MG/1
37.5 TABLET ORAL 2 TIMES DAILY WITH MEALS
Qty: 30 TABLET | Refills: 0 | Status: SHIPPED | OUTPATIENT
Start: 2021-08-18 | End: 2021-09-19 | Stop reason: HOSPADM

## 2021-08-18 NOTE — TELEPHONE ENCOUNTER
5 days sent to local pharmacy.    Spoke with spouse about her COVID questions.  Advised that I would have the provider call to answer her questions. As I could not definitively answer.     1. Are the home test for COVID accurate?   2. Told by family member there was shot that your can take if you have COVID.  3. Patient has not been tested, but she is pretty sure he has COVID.  Her Quarantine is over today, can she go out?  (I advised that she would need to be quarantine and masked from him)  4. Patient is having vomiting, diarrhea and his O2 stats have been 94, 98, 95.     A. What can be done about the   diarrhea?   B. What will constitute a need to go    to the hospital? (Advised if O2 stat   go below 90 and stay there)    Advised patient needed to be tested to be sure this is COVID.

## 2021-08-18 NOTE — TELEPHONE ENCOUNTER
Caller: Joan Blanco    Relationship: Emergency Contact    Best call back number: [phone]    Medication needed:   Requested Prescriptions     Pending Prescriptions Disp Refills   • carvedilol (COREG) 12.5 MG tablet 540 tablet 1     Sig: Take 3 tablets by mouth 2 (Two) Times a Day With Meals.       When do you need the refill by: ASAP     What additional details did the patient provide when requesting the medication: PATIENT IS COMPLETELY OUT OF MEDICATION. WAITING ON REFILL TO COME IN MAIL. REQUESTING A 4 DAY SUPPLY BE SENT TO LOCAL PHARMACY.     Does the patient have less than a 3 day supply:  [x] Yes  [] No    What is the patient's preferred pharmacy: [facility] #[id number] [address] [phone] PH - [phone] FX     **JOAN ALSO STATED THAT PATIENT SPOKE WITH SOMEONE THIS MORNING IN THE OFFICE ABOUT POSSIBLY HAVING COVID, SHE DID NOT KNOW THE STAFF MEMBER'S NAME, BUT IS REQUESTING A CALL BACK TO DISCUSS PATIENT'S SYMPTOMS**

## 2021-08-18 NOTE — TELEPHONE ENCOUNTER
Spoke with patient's spouse over the phone; questions answered; patient likely has COVID-19 as he is having all the same symptoms that spouse had; spouse has been monitoring patient closely; pt vitals have been stable and his O2 sat has been running 95-98%; last /71 with HR in 60s; Tmax 101.4 this morning, but improves with Tylenol; occasional cough; no SOA; drinking adequate liquids; good urine output; will continue to monitor patient closely; instructed to go to ER if SOA, chest pain, decreased O2 sat, etc.

## 2021-08-19 ENCOUNTER — APPOINTMENT (OUTPATIENT)
Dept: MRI IMAGING | Facility: HOSPITAL | Age: 76
End: 2021-08-19

## 2021-08-23 ENCOUNTER — TELEPHONE (OUTPATIENT)
Dept: NEUROLOGY | Facility: CLINIC | Age: 76
End: 2021-08-23

## 2021-08-23 ENCOUNTER — TELEPHONE (OUTPATIENT)
Dept: FAMILY MEDICINE CLINIC | Facility: CLINIC | Age: 76
End: 2021-08-23

## 2021-08-23 DIAGNOSIS — Z79.4 TYPE 2 DIABETES MELLITUS WITH BOTH EYES AFFECTED BY MILD NONPROLIFERATIVE RETINOPATHY WITHOUT MACULAR EDEMA, WITH LONG-TERM CURRENT USE OF INSULIN (HCC): ICD-10-CM

## 2021-08-23 DIAGNOSIS — E11.3293 TYPE 2 DIABETES MELLITUS WITH BOTH EYES AFFECTED BY MILD NONPROLIFERATIVE RETINOPATHY WITHOUT MACULAR EDEMA, WITH LONG-TERM CURRENT USE OF INSULIN (HCC): ICD-10-CM

## 2021-08-23 DIAGNOSIS — I10 ESSENTIAL HYPERTENSION: ICD-10-CM

## 2021-08-23 DIAGNOSIS — I48.92 ATRIAL FLUTTER WITH RAPID VENTRICULAR RESPONSE (HCC): ICD-10-CM

## 2021-08-23 RX ORDER — POTASSIUM CHLORIDE 1500 MG/1
TABLET, EXTENDED RELEASE ORAL
Qty: 270 TABLET | Refills: 1 | Status: SHIPPED | OUTPATIENT
Start: 2021-08-23 | End: 2021-09-03 | Stop reason: SDUPTHER

## 2021-08-23 RX ORDER — HYDRALAZINE HYDROCHLORIDE 100 MG/1
TABLET, FILM COATED ORAL
Qty: 270 TABLET | Refills: 1 | Status: SHIPPED | OUTPATIENT
Start: 2021-08-23 | End: 2021-09-03 | Stop reason: SDUPTHER

## 2021-08-23 RX ORDER — GLIMEPIRIDE 4 MG/1
4 TABLET ORAL
Qty: 135 TABLET | Refills: 1 | Status: SHIPPED | OUTPATIENT
Start: 2021-08-23 | End: 2021-08-23 | Stop reason: SDUPTHER

## 2021-08-23 RX ORDER — DILTIAZEM HYDROCHLORIDE 360 MG/1
360 CAPSULE, EXTENDED RELEASE ORAL DAILY
Qty: 30 CAPSULE | Refills: 0 | Status: SHIPPED | OUTPATIENT
Start: 2021-08-23 | End: 2021-09-19 | Stop reason: HOSPADM

## 2021-08-23 RX ORDER — GLIMEPIRIDE 2 MG/1
TABLET ORAL
Qty: 45 TABLET | Refills: 3 | OUTPATIENT
Start: 2021-08-23

## 2021-08-23 RX ORDER — GLIMEPIRIDE 4 MG/1
4 TABLET ORAL
Qty: 180 TABLET | Refills: 1 | Status: SHIPPED | OUTPATIENT
Start: 2021-08-23 | End: 2021-08-30

## 2021-08-23 NOTE — TELEPHONE ENCOUNTER
Caller: Yany Blanco    Relationship: Emergency Contact    Best call back number:[phone]    What medications are you currently taking:   Current Outpatient Medications on File Prior to Visit   Medication Sig Dispense Refill   • Accu-Chek FastClix Lancets misc TEST BLOOD SUGAR 1-2 TIMES DAILY AND AS NEEDED     • acetaminophen (TYLENOL) 500 MG tablet Take 1,000 mg by mouth 2 (Two) Times a Day As Needed for Mild Pain  or Moderate Pain .     • aspirin (ASPIRIN LOW DOSE) 81 MG EC tablet Take 1 tablet by mouth Daily. 90 tablet 2   • Blood Glucose Monitoring Suppl (Accu-Chek Guide Me) w/Device kit TEST BLOOD SUGAR 1-2 TIMES DAILY AND AS NEEDED     • carvedilol (COREG) 12.5 MG tablet Take 3 tablets by mouth 2 (Two) Times a Day With Meals. 30 tablet 0   • cetirizine (zyrTEC) 10 MG tablet Take 10 mg by mouth Daily.     • dilTIAZem CD (CARDIZEM CD) 360 MG 24 hr capsule Take 1 capsule by mouth Daily. 30 capsule 0   • donepezil (ARICEPT) 5 MG tablet Take 1 tablet by mouth Daily for 30 days. 30 tablet 5   • Eliquis 5 MG tablet tablet TAKE 1 TABLET EVERY 12 HOURS FOR ATRIAL FIBRILLATION 180 tablet 1   • finasteride (PROSCAR) 5 MG tablet Take 5 mg by mouth Every Night.     • fluticasone (Flonase) 50 MCG/ACT nasal spray 2 sprays into the nostril(s) as directed by provider Daily. 3 bottle 1   • glimepiride (AMARYL) 4 MG tablet Take 0.5 tablets by mouth Every Morning Before Breakfast. (Patient taking differently: Take 4 mg by mouth Every Morning Before Breakfast. Taking 4 mg with breakfast and 2 mg with evening meal.)     • glucose blood (OneTouch Ultra) test strip Test blood sugar 1-2 times daily and as needed. 200 each 3   • hydrALAZINE (APRESOLINE) 100 MG tablet Take 1 tablet by mouth 3 (Three) Times a Day. 270 tablet 1   • hydroxyurea (HYDREA) 500 MG capsule Take 500 mg by mouth Daily.     • Insulin Pen Needle (B-D UF III MINI PEN NEEDLES) 31G X 5 MM misc Inject 1 each under the skin into the appropriate area as directed  Daily. 100 each 2   • losartan (COZAAR) 100 MG tablet TAKE 1 TABLET DAILY 90 tablet 1   • montelukast (SINGULAIR) 10 MG tablet TAKE 1 TABLET DAILY 90 tablet 1   • Multiple Vitamins-Minerals (MULTIVITAMIN ADULT PO) Take 1 tablet by mouth Daily.     • potassium chloride (K-DUR,KLOR-CON) 20 MEQ CR tablet Take 1 tablet by mouth 3 (Three) Times a Day. 270 tablet 1   • pravastatin (PRAVACHOL) 40 MG tablet TAKE 1 TABLET DAILY 90 tablet 1   • Probiotic Product (Probiotic Daily) capsule Take 1 capsule by mouth Daily. 90 capsule 1   • terazosin (HYTRIN) 2 MG capsule TAKE 1 CAPSULE EVERY NIGHT 90 capsule 1   • torsemide (DEMADEX) 20 MG tablet Take 1 tablet by mouth 2 (Two) Times a Day. 60 tablet 5   • [DISCONTINUED] dilTIAZem CD (CARDIZEM CD) 360 MG 24 hr capsule TAKE 1 CAPSULE DAILY 90 capsule 1     No current facility-administered medications on file prior to visit.        Which medication are you concerned about  donepezil     Who prescribed you this medication: DR YIN    What are your concerns: FELIPE SEES BUGS CRAWLING ON THE WALLS    How long have you had these concerns: LAST WEEK.  HIS WIFE JOAN TOOK HIM OFF THE MEDICATION LAST Friday.     PLEASE ADVISE

## 2021-08-23 NOTE — TELEPHONE ENCOUNTER
PATIENT CALLING IN REGARDS TO SPEAK WITH DAVIDA BAIG ABOUT THE PATIENT RUNNING LOW ON HIS dilTIAZem CD (CARDIZEM CD) 360 MG 24 hr capsule AND IS REQUESTING A 10 DAYS EXTENSION ON THIS TO BE CALLED IN BECAUSE HE HAS RAN OUT TODAY. PATIENT ALSO CALLING [employer] TO HAVE PRIOR AUTHORIZATION FAXED IN TODAY AS WELL FOR THE REST OF HIS PRESCRIPTIONS. PATIENT WOULD LIKE TO SPEAK TO WITH DAVIDA ABIG ABOUT PATIENTS BLOOD SUGAR.  PLEASE ADVISE, THANK You!    [id number] - [facility] [address] AT [facility] [address] [place] [phone] [place] [phone]

## 2021-08-23 NOTE — TELEPHONE ENCOUNTER
Pt seen 8/5 was prescribed donepezil. Since starting meds, wife reports pt seeing bugs crawling on the walls. Wife has d/c medication- how would you like to proceed

## 2021-08-24 RX ORDER — MEMANTINE HYDROCHLORIDE 5 MG/1
5 TABLET ORAL DAILY
Qty: 30 TABLET | Refills: 0 | Status: SHIPPED | OUTPATIENT
Start: 2021-08-24 | End: 2021-09-30

## 2021-08-24 NOTE — TELEPHONE ENCOUNTER
Spoke with wife, reports he has never used namenda will send in to [facility] per wifes request

## 2021-08-27 ENCOUNTER — OFFICE VISIT (OUTPATIENT)
Dept: FAMILY MEDICINE CLINIC | Facility: CLINIC | Age: 76
End: 2021-08-27

## 2021-08-27 VITALS
DIASTOLIC BLOOD PRESSURE: 74 MMHG | TEMPERATURE: 98 F | OXYGEN SATURATION: 96 % | HEART RATE: 67 BPM | BODY MASS INDEX: 34 KG/M2 | HEIGHT: 72 IN | WEIGHT: 251 LBS | SYSTOLIC BLOOD PRESSURE: 130 MMHG

## 2021-08-27 DIAGNOSIS — E11.3293 TYPE 2 DIABETES MELLITUS WITH BOTH EYES AFFECTED BY MILD NONPROLIFERATIVE RETINOPATHY WITHOUT MACULAR EDEMA, WITHOUT LONG-TERM CURRENT USE OF INSULIN (HCC): Primary | ICD-10-CM

## 2021-08-27 DIAGNOSIS — I10 ESSENTIAL HYPERTENSION: ICD-10-CM

## 2021-08-27 DIAGNOSIS — R53.83 FATIGUE, UNSPECIFIED TYPE: ICD-10-CM

## 2021-08-27 DIAGNOSIS — D45 POLYCYTHEMIA VERA (HCC): ICD-10-CM

## 2021-08-27 DIAGNOSIS — R60.0 LOCALIZED EDEMA: ICD-10-CM

## 2021-08-27 DIAGNOSIS — R41.3 MEMORY DIFFICULTIES: ICD-10-CM

## 2021-08-27 PROCEDURE — 99214 OFFICE O/P EST MOD 30 MIN: CPT | Performed by: NURSE PRACTITIONER

## 2021-08-27 NOTE — PROGRESS NOTES
Subjective   Erlin Blanco is a 76 y.o. male.     Chief Complaint   Patient presents with   • Fatigue   • Diabetes       History of Present Illness   Patient presents with c/o fatigue; recently likely had COVID-19; spouse was positive for COVID-19; breathing is doing better; little bit of cough--nonproductive cough; no more fever; no chest tightness or chest pain; had loss of taste, but has improved; has been sleeping a lot.     F/U DM2: blood sugars have been increased; takes Glipizide twice daily; increased to whole tablet twice daily; previously taking whole tablet in morning and 1/2 tablet in evening; also took dose of Lantus few nights ago; monitors blood sugars, have been running 129 and 157 last 2 days; watches intake of carbs/sugars somewhat; no numbness or tingling; last ate about 2 hours ago.     F/U HTN: has been monitoring BP while has been sick; BP has been running 130-150s/60-80s; had not been monitoring BP prior to getting sick; swelling is much better; monitors daily weight; has lost some weight; no headaches; no lightheadedness.    F/U memory concern: saw neurology, Dr. Adams and started Aricept daily; patient was seeing bugs on wall, so changed medication 2-3 days ago to Namenda; will be having MRI on 9/8/21; to follow up with neurology in 6 months.    Has follow up with hematology on 8/30/21; may need phebotomy again; seems to get more tired when Hct increased.    The following portions of the patient's history were reviewed and updated as appropriate: allergies, current medications, past family history, past medical history, past social history, past surgical history and problem list.    Current Outpatient Medications on File Prior to Visit   Medication Sig   • Accu-Chek FastClix Lancets misc TEST BLOOD SUGAR 1-2 TIMES DAILY AND AS NEEDED   • acetaminophen (TYLENOL) 500 MG tablet Take 1,000 mg by mouth 2 (Two) Times a Day As Needed for Mild Pain  or Moderate Pain .   • aspirin (ASPIRIN LOW  DOSE) 81 MG EC tablet Take 1 tablet by mouth Daily.   • Blood Glucose Monitoring Suppl (Accu-Chek Guide Me) w/Device kit TEST BLOOD SUGAR 1-2 TIMES DAILY AND AS NEEDED   • carvedilol (COREG) 12.5 MG tablet Take 3 tablets by mouth 2 (Two) Times a Day With Meals.   • cetirizine (zyrTEC) 10 MG tablet Take 10 mg by mouth Daily.   • dilTIAZem CD (CARDIZEM CD) 360 MG 24 hr capsule Take 1 capsule by mouth Daily.   • Eliquis 5 MG tablet tablet TAKE 1 TABLET EVERY 12 HOURS FOR ATRIAL FIBRILLATION   • finasteride (PROSCAR) 5 MG tablet Take 5 mg by mouth Every Night.   • fluticasone (Flonase) 50 MCG/ACT nasal spray 2 sprays into the nostril(s) as directed by provider Daily.   • glimepiride (AMARYL) 4 MG tablet Take 1 tablet by mouth 2 (Two) Times a Day Before Meals.   • glucose blood (OneTouch Ultra) test strip Test blood sugar 1-2 times daily and as needed.   • hydrALAZINE (APRESOLINE) 100 MG tablet TAKE 1 TABLET THREE TIMES A DAY   • hydroxyurea (HYDREA) 500 MG capsule Take 500 mg by mouth Daily.   • KLOR-CON 20 MEQ CR tablet TAKE 1 TABLET THREE TIMES A DAY   • losartan (COZAAR) 100 MG tablet TAKE 1 TABLET DAILY   • memantine (NAMENDA) 5 MG tablet Take 1 tablet by mouth Daily.   • montelukast (SINGULAIR) 10 MG tablet TAKE 1 TABLET DAILY   • Multiple Vitamins-Minerals (MULTIVITAMIN ADULT PO) Take 1 tablet by mouth Daily.   • pravastatin (PRAVACHOL) 40 MG tablet TAKE 1 TABLET DAILY   • Probiotic Product (Probiotic Daily) capsule Take 1 capsule by mouth Daily.   • terazosin (HYTRIN) 2 MG capsule TAKE 1 CAPSULE EVERY NIGHT   • torsemide (DEMADEX) 20 MG tablet Take 1 tablet by mouth 2 (Two) Times a Day.   • Insulin Pen Needle (B-D UF III MINI PEN NEEDLES) 31G X 5 MM misc Inject 1 each under the skin into the appropriate area as directed Daily.     No current facility-administered medications on file prior to visit.        Past Medical History:   Diagnosis Date   • Abnormal ECG    • Abnormal stress test    • Abrasion of face  1/29/2020   • Acute bronchitis    • Acute hypokalemia 9/16/2020   • Acute non-recurrent sinusitis 1/13/2020   • Acute pyelonephritis 10/9/2020   • Acute sinusitis    • DORI (acute kidney injury) (CMS/[facility]) 4/28/2017   • Allergic rhinitis    • Aortic root dilation (CMS/[facility])    • Arthritis    • Bacteremia due to Escherichia coli 8/17/2020   • Balanitis 5/16/2021   • Benign enlargement of prostate    • C. difficile colitis 9/16/2020   • Cellulitis of knee, left 5/4/2017   • CHF (congestive heart failure) (CMS/[facility])    • Chronic diastolic congestive heart failure (CMS/[facility])    • Constipation 11/14/2020   • Contusion of face 1/29/2020   • Coronary artery disease    • CVA (cerebral vascular accident) (CMS/[facility]) 2020   • Diminished pulse     in lower extremities   • Edema    • Elevated hematocrit    • Elevated hemoglobin (CMS/[facility])    • Essential hypertension    • Hearing loss     mala hearing aids   • Heart valve disease    • High risk medication use    • History of back pain    • History of dysuria    • History of echocardiogram    • History of intracranial hemorrhage    • History of scoliosis    • History of stroke    • Hyperlipidemia    • Hypokalemia 1/7/2021   • Injury of toe, left, initial encounter    • Irregular heart rate    • Knee pain, left    • Left bundle branch block    • Leg wound, left    • Leukocytosis 9/16/2020   • Low back pain    • Medicare annual wellness visit, subsequent    • Minor head injury without loss of consciousness 1/29/2020   • Murmur    • Muscle cramps    • Need for hepatitis C screening test    • Paraphimosis 5/16/2021   • Polycythemia    • Polycythemia vera (CMS/[facility])    • Pressure injury of buttock, stage 2 (CMS/[facility]) 9/18/2020   • Prostate hyperplasia without urinary obstruction    • Prostatitis    • Proteinuria    • Pulmonary hypertension (CMS/[facility])    • Sacroiliitis (CMS/[facility]) 10/9/2020   • Scoliosis    • Sepsis due to Escherichia coli (CMS/[facility]) 8/17/2020   • Sepsis with metabolic encephalopathy  (CMS/[facility]) 2020   • Stroke (CMS/[facility])    • SVT (supraventricular tachycardia) (CMS/[facility])    • Tachycardia    • Thrombocytosis (CMS/[facility])    • TIA (transient ischemic attack)        • Type 2 diabetes mellitus (CMS/[facility])    • Type 2 diabetes mellitus with hyperglycemia (CMS/[facility]) 2021   • Urinary tract infection due to extended-spectrum beta lactamase (ESBL) producing Escherichia coli 2020   • Valvular heart disease        Past Surgical History:   Procedure Laterality Date   • CARDIAC CATHETERIZATION     • COLONOSCOPY      did Cologuard approx 2019 and negative   • HAND SURGERY     • JOINT REPLACEMENT      Righ Knee   • AR TOTAL KNEE ARTHROPLASTY Left 2017    Procedure: LT TOTAL KNEE ARTHROPLASTY;  Surgeon: Bertin Perrin MD;  Location: [facility];  Service: Orthopedics   • PROSTATE SURGERY      Rezum steam treatment to shrink prostate by dr. guerrero       Family History   Problem Relation Age of Onset   • Hypertension Mother    • Other Father         ruptured appendix,  when pt. was 14 years old.   • Diabetes Paternal Aunt    • Diabetes Paternal Uncle    • No Known Problems Other    • Migraines Sister    • Stroke Sister    • Dementia Brother        Social History     Socioeconomic History   • Marital status:      Spouse name: Not on file   • Number of children: Not on file   • Years of education: Not on file   • Highest education level: Not on file   Tobacco Use   • Smoking status: Former Smoker     Types: Cigarettes     Quit date:      Years since quittin.6   • Smokeless tobacco: Former User   • Tobacco comment: Caffeine daily   Substance and Sexual Activity   • Alcohol use: No   • Drug use: No   • Sexual activity: Defer       Review of Systems   Constitutional: Positive for fatigue. Negative for appetite change, chills, fever and unexpected weight gain. Weight loss: has lost weight since has been sick.   HENT: Negative for ear pain, sinus pressure, sore throat  "and trouble swallowing.    Eyes: Negative for blurred vision and pain.   Respiratory: Positive for cough. Negative for chest tightness. Shortness of breath: mild with activity.    Cardiovascular: Negative for chest pain and palpitations.   Gastrointestinal: Negative for abdominal pain, blood in stool, constipation, diarrhea, GERD and indigestion.   Endocrine: Negative for polydipsia.   Genitourinary: Negative for dysuria and frequency.   Musculoskeletal: Negative for arthralgias and back pain.   Skin: Negative for rash.   Neurological: Positive for weakness (some since has been sick). Negative for dizziness, syncope and headache.       Objective   Vitals:    08/27/21 1332   BP: 130/74   BP Location: Left arm   Patient Position: Sitting   Cuff Size: Large Adult   Pulse: 67   Temp: 98 °F (36.7 °C)   TempSrc: Oral   SpO2: 96%   Weight: 114 kg (251 lb)   Height: 182.9 cm (72\")   PainSc: 0-No pain     Body mass index is 34.04 kg/m².    Physical Exam  Vitals and nursing note reviewed.   Constitutional:       General: He is not in acute distress.     Appearance: He is well-developed and well-groomed. He is not diaphoretic.   HENT:      Head: Normocephalic.      Right Ear: External ear normal. Right ear decreased hearing: has hearing aid. Right ear middle ear effusion: mild. Tympanic membrane is not erythematous.      Left Ear: External ear normal. Left ear decreased hearing: has hearing aid. A middle ear effusion is present. Tympanic membrane is not erythematous.      Nose: Nose normal.      Right Sinus: No maxillary sinus tenderness or frontal sinus tenderness.      Left Sinus: No frontal sinus tenderness. Left maxillary sinus tenderness: mild.      Mouth/Throat:      Mouth: Mucous membranes are moist.      Pharynx: No oropharyngeal exudate or posterior oropharyngeal erythema. Uvula swelling: mild.   Eyes:      Conjunctiva/sclera: Conjunctivae normal.   Neck:      Vascular: No carotid bruit.   Cardiovascular:      Rate " and Rhythm: Normal rate and regular rhythm.      Pulses: Normal pulses.      Heart sounds: Murmur heard.   Systolic murmur is present with a grade of 2/6.        Comments: At RUSB and LUSB  Pulmonary:      Effort: Pulmonary effort is normal. No respiratory distress.      Breath sounds: Normal breath sounds. No wheezing, rhonchi or rales.   Abdominal:      General: Bowel sounds are normal.      Palpations: Abdomen is soft. There is no hepatomegaly or splenomegaly.      Tenderness: There is no abdominal tenderness. There is no guarding.   Musculoskeletal:      Cervical back: Normal range of motion and neck supple.      Right lower leg: No edema.      Left lower leg: No edema.   Lymphadenopathy:      Cervical: No cervical adenopathy.   Skin:     General: Skin is warm and dry.      Findings: No rash.   Neurological:      Mental Status: He is alert and oriented to person, place, and time.      Gait: Abnormal gait: guarded gait with 4 point cane.   Psychiatric:         Mood and Affect: Mood normal.         Behavior: Behavior normal.         Thought Content: Thought content normal.         Cognition and Memory: Cognition normal.         Judgment: Judgment normal.         Lab Results   Component Value Date    WBC 8.0 07/16/2021    RBC 5.11 07/16/2021    HGB 15.4 07/16/2021    HCT 48.0 07/16/2021    MCV 94 07/16/2021    MCH 30.1 07/16/2021    MCHC 32.1 07/16/2021    RDW 15.0 07/16/2021    RDWSD 41.7 05/16/2021    MPV 9.9 05/16/2021     07/16/2021    NEUTRORELPCT 66 07/16/2021    LYMPHORELPCT 19 07/16/2021    MONORELPCT 7 07/16/2021    EOSRELPCT 6 07/16/2021    BASORELPCT 2 07/16/2021    AUTOIGPER 0.6 (H) 05/15/2021    NEUTROABS 5.3 07/16/2021    LYMPHSABS 1.5 07/16/2021    MONOSABS 0.6 07/16/2021    EOSABS 0.5 (H) 07/16/2021    BASOSABS 0.1 07/16/2021    AUTOIGNUM 0.05 05/15/2021    NRBC 0.0 05/15/2021     Lab Results   Component Value Date    GLUCOSE 107 (H) 05/16/2021    BUN 18 07/16/2021    CREATININE 1.30 (H)  07/16/2021    EGFRIFNONA 54 (L) 07/16/2021    EGFRIFAFRI 62 07/16/2021    BCR 14 07/16/2021    K 4.3 07/16/2021    CO2 27 07/16/2021    CALCIUM 9.8 07/16/2021    PROTENTOTREF 6.7 04/12/2021    ALBUMIN 4.00 05/15/2021    LABIL2 1.7 04/12/2021    AST 22 05/15/2021    ALT 19 05/15/2021      Lab Results   Component Value Date    CHLPL 119 04/12/2021    TRIG 80 04/12/2021    HDL 45 04/12/2021    VLDL 16 04/12/2021    LDL 58 04/12/2021     Lab Results   Component Value Date    TSH 3.060 07/16/2021     Lab Results   Component Value Date    HGBA1C 9.10 (H) 05/16/2021       Assessment/Plan .  Problem List Items Addressed This Visit     Type 2 diabetes mellitus with both eyes affected by mild nonproliferative retinopathy without macular edema, without long-term current use of insulin (CMS/[facility]) - Primary    Current Assessment & Plan     Diabetes is not as well controlled since has been sick.   Continue current treatment regimen.  Regular aerobic exercise.  Diabetes will be reassessed in 3 months.  Continue Glipizide twice daily.         Relevant Orders    Comprehensive Metabolic Panel    CBC & Differential    Hemoglobin A1c    Hypertension    Current Assessment & Plan     Hypertension is stable.  Continue current medications.  Ambulatory blood pressure monitoring.  Blood pressure will be reassessed in 3 months.         Polycythemia vera (CMS/[facility])    Current Assessment & Plan     Follow up as scheduled with hematology.         Fatigue    Relevant Orders    Comprehensive Metabolic Panel    CBC & Differential    Localized edema    Current Assessment & Plan     Improved with torsemide twice daily.         Memory difficulties    Current Assessment & Plan     Continue Namenda.  Follow up as scheduled with neurology.               Return in about 3 months (around 11/27/2021) for Recheck.or sooner if symptoms persist or worsen.  Patient recently increased Glipizide to whole tablet twice daily and seems to be helping; will continue  Glipizide twice daily and consider additional medication pending lab results.         COVID-19 Precautions - Patient was compliant in wearing a mask. When I saw the patient, I used appropriate personal protective equipment (PPE) including mask, gloves, and eye shield (standard procedure).  Hand hygiene was completed before and after seeing the patient.

## 2021-08-27 NOTE — PATIENT INSTRUCTIONS
Make sure drinking adequate liquids.  Continue to monitor your blood sugar once daily before a meal and record results.  Continue to monitor your blood pressure periodically and record results.  Continue to work on healthy diet and exercise.  Follow up pending lab results.  Follow up in 3 months, or sooner if symptoms persist or worsen.

## 2021-08-28 PROBLEM — N17.9 AKI (ACUTE KIDNEY INJURY): Status: RESOLVED | Noted: 2017-04-28 | Resolved: 2021-08-28

## 2021-08-28 PROBLEM — N17.9 AKI (ACUTE KIDNEY INJURY) (HCC): Status: RESOLVED | Noted: 2017-04-28 | Resolved: 2021-08-28

## 2021-08-28 LAB
ALBUMIN SERPL-MCNC: 3.8 G/DL (ref 3.7–4.7)
ALBUMIN/GLOB SERPL: 1.3 {RATIO} (ref 1.2–2.2)
ALP SERPL-CCNC: 87 IU/L (ref 48–121)
ALT SERPL-CCNC: 18 IU/L (ref 0–44)
AST SERPL-CCNC: 26 IU/L (ref 0–40)
BASOPHILS # BLD AUTO: 0.1 X10E3/UL (ref 0–0.2)
BASOPHILS NFR BLD AUTO: 2 %
BILIRUB SERPL-MCNC: 0.5 MG/DL (ref 0–1.2)
BUN SERPL-MCNC: 26 MG/DL (ref 8–27)
BUN/CREAT SERPL: 19 (ref 10–24)
CALCIUM SERPL-MCNC: 9.3 MG/DL (ref 8.6–10.2)
CHLORIDE SERPL-SCNC: 103 MMOL/L (ref 96–106)
CO2 SERPL-SCNC: 24 MMOL/L (ref 20–29)
CREAT SERPL-MCNC: 1.37 MG/DL (ref 0.76–1.27)
EOSINOPHIL # BLD AUTO: 0.2 X10E3/UL (ref 0–0.4)
EOSINOPHIL NFR BLD AUTO: 4 %
ERYTHROCYTE [DISTWIDTH] IN BLOOD BY AUTOMATED COUNT: 14 % (ref 11.6–15.4)
GLOBULIN SER CALC-MCNC: 3 G/DL (ref 1.5–4.5)
GLUCOSE SERPL-MCNC: 160 MG/DL (ref 65–99)
HBA1C MFR BLD: 8 % (ref 4.8–5.6)
HCT VFR BLD AUTO: 44.3 % (ref 37.5–51)
HGB BLD-MCNC: 14.8 G/DL (ref 13–17.7)
IMM GRANULOCYTES # BLD AUTO: 0.1 X10E3/UL (ref 0–0.1)
IMM GRANULOCYTES NFR BLD AUTO: 1 %
LYMPHOCYTES # BLD AUTO: 0.9 X10E3/UL (ref 0.7–3.1)
LYMPHOCYTES NFR BLD AUTO: 16 %
MCH RBC QN AUTO: 30.9 PG (ref 26.6–33)
MCHC RBC AUTO-ENTMCNC: 33.4 G/DL (ref 31.5–35.7)
MCV RBC AUTO: 93 FL (ref 79–97)
MONOCYTES # BLD AUTO: 0.5 X10E3/UL (ref 0.1–0.9)
MONOCYTES NFR BLD AUTO: 8 %
NEUTROPHILS # BLD AUTO: 4 X10E3/UL (ref 1.4–7)
NEUTROPHILS NFR BLD AUTO: 69 %
NRBC BLD AUTO-RTO: 1 % (ref 0–0)
PLATELET # BLD AUTO: 326 X10E3/UL (ref 150–450)
POTASSIUM SERPL-SCNC: 4.4 MMOL/L (ref 3.5–5.2)
PROT SERPL-MCNC: 6.8 G/DL (ref 6–8.5)
RBC # BLD AUTO: 4.79 X10E6/UL (ref 4.14–5.8)
SODIUM SERPL-SCNC: 141 MMOL/L (ref 134–144)
WBC # BLD AUTO: 5.7 X10E3/UL (ref 3.4–10.8)

## 2021-08-28 NOTE — ASSESSMENT & PLAN NOTE
Diabetes is not as well controlled since has been sick.   Continue current treatment regimen.  Regular aerobic exercise.  Diabetes will be reassessed in 3 months.  Continue Glipizide twice daily.

## 2021-08-28 NOTE — ASSESSMENT & PLAN NOTE
Hypertension is stable.  Continue current medications.  Ambulatory blood pressure monitoring.  Blood pressure will be reassessed in 3 months.

## 2021-08-30 DIAGNOSIS — E11.3293 TYPE 2 DIABETES MELLITUS WITH BOTH EYES AFFECTED BY MILD NONPROLIFERATIVE RETINOPATHY WITHOUT MACULAR EDEMA, WITHOUT LONG-TERM CURRENT USE OF INSULIN (HCC): Primary | ICD-10-CM

## 2021-08-30 DIAGNOSIS — I10 ESSENTIAL HYPERTENSION: ICD-10-CM

## 2021-08-30 RX ORDER — GLIMEPIRIDE 2 MG/1
2 TABLET ORAL
Qty: 180 TABLET | Refills: 1 | Status: SHIPPED | OUTPATIENT
Start: 2021-08-30 | End: 2022-03-24 | Stop reason: SDUPTHER

## 2021-09-03 ENCOUNTER — TELEPHONE (OUTPATIENT)
Dept: FAMILY MEDICINE CLINIC | Facility: CLINIC | Age: 76
End: 2021-09-03

## 2021-09-03 DIAGNOSIS — E87.6 ACUTE HYPOKALEMIA: ICD-10-CM

## 2021-09-03 DIAGNOSIS — I10 ESSENTIAL HYPERTENSION: ICD-10-CM

## 2021-09-03 RX ORDER — POTASSIUM CHLORIDE 20 MEQ/1
20 TABLET, EXTENDED RELEASE ORAL 3 TIMES DAILY
Qty: 30 TABLET | Refills: 0 | Status: SHIPPED | OUTPATIENT
Start: 2021-09-03

## 2021-09-03 RX ORDER — HYDRALAZINE HYDROCHLORIDE 100 MG/1
100 TABLET, FILM COATED ORAL 3 TIMES DAILY
Qty: 30 TABLET | Refills: 0 | Status: SHIPPED | OUTPATIENT
Start: 2021-09-03 | End: 2022-05-10

## 2021-09-03 NOTE — TELEPHONE ENCOUNTER
Caller: Yany Blanco    Relationship: Emergency Contact    Best call back number: [phone]    Medication needed:   hydrALAZINE (APRESOLINE) 100 MG tablet  KLOR-CON 20 MEQ CR tablet  When do you need the refill by: ASAP    What additional details did the patient provide when requesting the medication: PATIENT'S STATES THAT [employer]" HAS MESSED UP HER  PRESCRIPTIONS FOR THE ABOVE MEDICATIONS.  PATIENT IS NEEDING A  QUANTITY OF 30 FOR EACH MEDICATION.    Does the patient have less than a 3 day supply:  [x] Yes  [] No    What is the patient's preferred pharmacy: [id number] [address] AT [facility] - [phone] [facility] [phone]      [facility] - [phone].  PLEASE CALL IF ANY QUESTIONS.

## 2021-09-09 ENCOUNTER — HOSPITAL ENCOUNTER (OUTPATIENT)
Dept: MRI IMAGING | Facility: HOSPITAL | Age: 76
Discharge: HOME | End: 2021-09-09
Admitting: NURSE PRACTITIONER

## 2021-09-09 DIAGNOSIS — R41.3 MEMORY DIFFICULTIES: ICD-10-CM

## 2021-09-09 PROCEDURE — 0 GADOBENATE DIMEGLUMINE 529 MG/ML SOLUTION: Performed by: NURSE PRACTITIONER

## 2021-09-09 PROCEDURE — 70553 MRI BRAIN STEM W/O & W/DYE: CPT

## 2021-09-09 PROCEDURE — 82565 ASSAY OF CREATININE: CPT

## 2021-09-09 PROCEDURE — A9577 INJ MULTIHANCE: HCPCS | Performed by: NURSE PRACTITIONER

## 2021-09-09 RX ADMIN — GADOBENATE DIMEGLUMINE 20 ML: 529 INJECTION, SOLUTION INTRAVENOUS at 15:26

## 2021-09-10 ENCOUNTER — TELEPHONE (OUTPATIENT)
Dept: NEUROLOGY | Facility: CLINIC | Age: 76
End: 2021-09-10

## 2021-09-10 DIAGNOSIS — Z86.73 HISTORY OF CVA (CEREBROVASCULAR ACCIDENT): Primary | ICD-10-CM

## 2021-09-10 LAB — CREAT BLDA-MCNC: 1.2 MG/DL (ref 0.6–1.3)

## 2021-09-16 ENCOUNTER — HOSPITAL ENCOUNTER (INPATIENT)
Facility: HOSPITAL | Age: 76
LOS: 1 days | Discharge: HOME | End: 2021-09-19
Attending: EMERGENCY MEDICINE | Admitting: HOSPITALIST

## 2021-09-16 DIAGNOSIS — R50.9 FEVER, UNSPECIFIED FEVER CAUSE: ICD-10-CM

## 2021-09-16 DIAGNOSIS — J18.9 PNEUMONIA OF LEFT LUNG DUE TO INFECTIOUS ORGANISM, UNSPECIFIED PART OF LUNG: Primary | ICD-10-CM

## 2021-09-16 PROCEDURE — 84145 PROCALCITONIN (PCT): CPT | Performed by: EMERGENCY MEDICINE

## 2021-09-16 PROCEDURE — 99284 EMERGENCY DEPT VISIT MOD MDM: CPT

## 2021-09-16 PROCEDURE — 87040 BLOOD CULTURE FOR BACTERIA: CPT | Performed by: EMERGENCY MEDICINE

## 2021-09-16 PROCEDURE — 87899 AGENT NOS ASSAY W/OPTIC: CPT | Performed by: NURSE PRACTITIONER

## 2021-09-16 PROCEDURE — 85025 COMPLETE CBC W/AUTO DIFF WBC: CPT | Performed by: EMERGENCY MEDICINE

## 2021-09-16 PROCEDURE — 82728 ASSAY OF FERRITIN: CPT | Performed by: EMERGENCY MEDICINE

## 2021-09-16 PROCEDURE — 86140 C-REACTIVE PROTEIN: CPT | Performed by: EMERGENCY MEDICINE

## 2021-09-16 PROCEDURE — U0003 INFECTIOUS AGENT DETECTION BY NUCLEIC ACID (DNA OR RNA); SEVERE ACUTE RESPIRATORY SYNDROME CORONAVIRUS 2 (SARS-COV-2) (CORONAVIRUS DISEASE [COVID-19]), AMPLIFIED PROBE TECHNIQUE, MAKING USE OF HIGH THROUGHPUT TECHNOLOGIES AS DESCRIBED BY CMS-2020-01-R: HCPCS | Performed by: EMERGENCY MEDICINE

## 2021-09-16 PROCEDURE — 83605 ASSAY OF LACTIC ACID: CPT | Performed by: EMERGENCY MEDICINE

## 2021-09-16 PROCEDURE — 81001 URINALYSIS AUTO W/SCOPE: CPT | Performed by: EMERGENCY MEDICINE

## 2021-09-16 PROCEDURE — 80053 COMPREHEN METABOLIC PANEL: CPT | Performed by: EMERGENCY MEDICINE

## 2021-09-16 PROCEDURE — 93005 ELECTROCARDIOGRAM TRACING: CPT | Performed by: EMERGENCY MEDICINE

## 2021-09-16 PROCEDURE — 84484 ASSAY OF TROPONIN QUANT: CPT | Performed by: EMERGENCY MEDICINE

## 2021-09-16 RX ORDER — SODIUM CHLORIDE 0.9 % (FLUSH) 0.9 %
10 SYRINGE (ML) INJECTION AS NEEDED
Status: DISCONTINUED | OUTPATIENT
Start: 2021-09-16 | End: 2021-09-19 | Stop reason: HOSPADM

## 2021-09-16 RX ADMIN — SODIUM CHLORIDE, POTASSIUM CHLORIDE, SODIUM LACTATE AND CALCIUM CHLORIDE 1000 ML: 600; 310; 30; 20 INJECTION, SOLUTION INTRAVENOUS at 23:58

## 2021-09-17 ENCOUNTER — APPOINTMENT (OUTPATIENT)
Dept: CARDIOLOGY | Facility: HOSPITAL | Age: 76
End: 2021-09-17

## 2021-09-17 ENCOUNTER — APPOINTMENT (OUTPATIENT)
Dept: GENERAL RADIOLOGY | Facility: HOSPITAL | Age: 76
End: 2021-09-17

## 2021-09-17 PROBLEM — J18.9 PNEUMONIA OF LEFT LUNG DUE TO INFECTIOUS ORGANISM: Status: ACTIVE | Noted: 2021-09-17

## 2021-09-17 PROBLEM — R00.1 BRADYCARDIA: Status: ACTIVE | Noted: 2021-09-17

## 2021-09-17 LAB
ALBUMIN SERPL-MCNC: 3.9 G/DL (ref 3.5–5.2)
ALBUMIN/GLOB SERPL: 1.2 G/DL
ALP SERPL-CCNC: 87 U/L (ref 39–117)
ALT SERPL W P-5'-P-CCNC: 17 U/L (ref 1–41)
ANION GAP SERPL CALCULATED.3IONS-SCNC: 11.2 MMOL/L (ref 5–15)
ANION GAP SERPL CALCULATED.3IONS-SCNC: 8.9 MMOL/L (ref 5–15)
AST SERPL-CCNC: 19 U/L (ref 1–40)
B PARAPERT DNA SPEC QL NAA+PROBE: NOT DETECTED
B PERT DNA SPEC QL NAA+PROBE: NOT DETECTED
BACTERIA UR QL AUTO: ABNORMAL /HPF
BASOPHILS # BLD AUTO: 0.08 10*3/MM3 (ref 0–0.2)
BASOPHILS # BLD AUTO: 0.1 10*3/MM3 (ref 0–0.2)
BASOPHILS NFR BLD AUTO: 0.8 % (ref 0–1.5)
BASOPHILS NFR BLD AUTO: 0.9 % (ref 0–1.5)
BH CV LOWER VASCULAR LEFT COMMON FEMORAL AUGMENT: NORMAL
BH CV LOWER VASCULAR LEFT COMMON FEMORAL COMPETENT: NORMAL
BH CV LOWER VASCULAR LEFT COMMON FEMORAL COMPRESS: NORMAL
BH CV LOWER VASCULAR LEFT COMMON FEMORAL PHASIC: NORMAL
BH CV LOWER VASCULAR LEFT COMMON FEMORAL SPONT: NORMAL
BH CV LOWER VASCULAR LEFT DISTAL FEMORAL COMPRESS: NORMAL
BH CV LOWER VASCULAR LEFT GASTRONEMIUS COMPRESS: NORMAL
BH CV LOWER VASCULAR LEFT GREATER SAPH AK COMPRESS: NORMAL
BH CV LOWER VASCULAR LEFT GREATER SAPH BK COMPRESS: NORMAL
BH CV LOWER VASCULAR LEFT LESSER SAPH COMPRESS: NORMAL
BH CV LOWER VASCULAR LEFT MID FEMORAL AUGMENT: NORMAL
BH CV LOWER VASCULAR LEFT MID FEMORAL COMPETENT: NORMAL
BH CV LOWER VASCULAR LEFT MID FEMORAL COMPRESS: NORMAL
BH CV LOWER VASCULAR LEFT MID FEMORAL PHASIC: NORMAL
BH CV LOWER VASCULAR LEFT MID FEMORAL SPONT: NORMAL
BH CV LOWER VASCULAR LEFT PERONEAL COMPRESS: NORMAL
BH CV LOWER VASCULAR LEFT POPLITEAL AUGMENT: NORMAL
BH CV LOWER VASCULAR LEFT POPLITEAL COMPETENT: NORMAL
BH CV LOWER VASCULAR LEFT POPLITEAL COMPRESS: NORMAL
BH CV LOWER VASCULAR LEFT POPLITEAL PHASIC: NORMAL
BH CV LOWER VASCULAR LEFT POPLITEAL SPONT: NORMAL
BH CV LOWER VASCULAR LEFT POSTERIOR TIBIAL COMPRESS: NORMAL
BH CV LOWER VASCULAR LEFT PROFUNDA FEMORAL COMPRESS: NORMAL
BH CV LOWER VASCULAR LEFT PROXIMAL FEMORAL COMPRESS: NORMAL
BH CV LOWER VASCULAR LEFT SAPHENOFEMORAL JUNCTION COMPRESS: NORMAL
BH CV LOWER VASCULAR LEFT SOLEAL COMPRESS: NORMAL
BH CV LOWER VASCULAR RIGHT COMMON FEMORAL AUGMENT: NORMAL
BH CV LOWER VASCULAR RIGHT COMMON FEMORAL COMPETENT: NORMAL
BH CV LOWER VASCULAR RIGHT COMMON FEMORAL COMPRESS: NORMAL
BH CV LOWER VASCULAR RIGHT COMMON FEMORAL PHASIC: NORMAL
BH CV LOWER VASCULAR RIGHT COMMON FEMORAL SPONT: NORMAL
BILIRUB SERPL-MCNC: 0.5 MG/DL (ref 0–1.2)
BILIRUB UR QL STRIP: NEGATIVE
BUN SERPL-MCNC: 15 MG/DL (ref 8–23)
BUN SERPL-MCNC: 17 MG/DL (ref 8–23)
BUN/CREAT SERPL: 14.2 (ref 7–25)
BUN/CREAT SERPL: 14.4 (ref 7–25)
C PNEUM DNA NPH QL NAA+NON-PROBE: NOT DETECTED
CALCIUM SPEC-SCNC: 8.9 MG/DL (ref 8.6–10.5)
CALCIUM SPEC-SCNC: 9.4 MG/DL (ref 8.6–10.5)
CHLORIDE SERPL-SCNC: 101 MMOL/L (ref 98–107)
CHLORIDE SERPL-SCNC: 103 MMOL/L (ref 98–107)
CLARITY UR: CLEAR
CO2 SERPL-SCNC: 25.8 MMOL/L (ref 22–29)
CO2 SERPL-SCNC: 27.1 MMOL/L (ref 22–29)
COLOR UR: YELLOW
CREAT SERPL-MCNC: 1.06 MG/DL (ref 0.76–1.27)
CREAT SERPL-MCNC: 1.18 MG/DL (ref 0.76–1.27)
CRP SERPL-MCNC: 7 MG/DL (ref 0–0.5)
D DIMER PPP FEU-MCNC: 0.5 MCGFEU/ML (ref 0–0.49)
D-LACTATE SERPL-SCNC: 0.8 MMOL/L (ref 0.5–2)
DEPRECATED RDW RBC AUTO: 51.7 FL (ref 37–54)
DEPRECATED RDW RBC AUTO: 51.7 FL (ref 37–54)
EOSINOPHIL # BLD AUTO: 0.2 10*3/MM3 (ref 0–0.4)
EOSINOPHIL # BLD AUTO: 0.22 10*3/MM3 (ref 0–0.4)
EOSINOPHIL NFR BLD AUTO: 2 % (ref 0.3–6.2)
EOSINOPHIL NFR BLD AUTO: 2 % (ref 0.3–6.2)
ERYTHROCYTE [DISTWIDTH] IN BLOOD BY AUTOMATED COUNT: 15 % (ref 12.3–15.4)
ERYTHROCYTE [DISTWIDTH] IN BLOOD BY AUTOMATED COUNT: 15.7 % (ref 12.3–15.4)
FERRITIN SERPL-MCNC: 245 NG/ML (ref 30–400)
FLUAV SUBTYP SPEC NAA+PROBE: NOT DETECTED
FLUBV RNA ISLT QL NAA+PROBE: NOT DETECTED
GFR SERPL CREATININE-BSD FRML MDRD: 60 ML/MIN/1.73
GFR SERPL CREATININE-BSD FRML MDRD: 68 ML/MIN/1.73
GLOBULIN UR ELPH-MCNC: 3.3 GM/DL
GLUCOSE BLDC GLUCOMTR-MCNC: 121 MG/DL (ref 70–130)
GLUCOSE BLDC GLUCOMTR-MCNC: 145 MG/DL (ref 70–130)
GLUCOSE BLDC GLUCOMTR-MCNC: 153 MG/DL (ref 70–130)
GLUCOSE BLDC GLUCOMTR-MCNC: 195 MG/DL (ref 70–130)
GLUCOSE SERPL-MCNC: 114 MG/DL (ref 65–99)
GLUCOSE SERPL-MCNC: 166 MG/DL (ref 65–99)
GLUCOSE UR STRIP-MCNC: NEGATIVE MG/DL
HADV DNA SPEC NAA+PROBE: NOT DETECTED
HCOV 229E RNA SPEC QL NAA+PROBE: NOT DETECTED
HCOV HKU1 RNA SPEC QL NAA+PROBE: NOT DETECTED
HCOV NL63 RNA SPEC QL NAA+PROBE: NOT DETECTED
HCOV OC43 RNA SPEC QL NAA+PROBE: NOT DETECTED
HCT VFR BLD AUTO: 39.5 % (ref 37.5–51)
HCT VFR BLD AUTO: 39.8 % (ref 37.5–51)
HGB BLD-MCNC: 12.8 G/DL (ref 13–17.7)
HGB BLD-MCNC: 13.6 G/DL (ref 13–17.7)
HGB UR QL STRIP.AUTO: NEGATIVE
HMPV RNA NPH QL NAA+NON-PROBE: NOT DETECTED
HOLD SPECIMEN: NORMAL
HPIV1 RNA SPEC QL NAA+PROBE: NOT DETECTED
HPIV2 RNA SPEC QL NAA+PROBE: NOT DETECTED
HPIV3 RNA NPH QL NAA+PROBE: NOT DETECTED
HPIV4 P GENE NPH QL NAA+PROBE: NOT DETECTED
HYALINE CASTS UR QL AUTO: ABNORMAL /LPF
IMM GRANULOCYTES # BLD AUTO: 0.05 10*3/MM3 (ref 0–0.05)
IMM GRANULOCYTES # BLD AUTO: 0.06 10*3/MM3 (ref 0–0.05)
IMM GRANULOCYTES NFR BLD AUTO: 0.5 % (ref 0–0.5)
IMM GRANULOCYTES NFR BLD AUTO: 0.5 % (ref 0–0.5)
INR PPP: 1.59 (ref 0.9–1.1)
KETONES UR QL STRIP: NEGATIVE
L PNEUMO1 AG UR QL IA: NEGATIVE
LEUKOCYTE ESTERASE UR QL STRIP.AUTO: ABNORMAL
LYMPHOCYTES # BLD AUTO: 1.12 10*3/MM3 (ref 0.7–3.1)
LYMPHOCYTES # BLD AUTO: 1.34 10*3/MM3 (ref 0.7–3.1)
LYMPHOCYTES NFR BLD AUTO: 11.2 % (ref 19.6–45.3)
LYMPHOCYTES NFR BLD AUTO: 12.1 % (ref 19.6–45.3)
M PNEUMO IGG SER IA-ACNC: NOT DETECTED
MAGNESIUM SERPL-MCNC: 2 MG/DL (ref 1.6–2.4)
MAXIMAL PREDICTED HEART RATE: 146 BPM
MCH RBC QN AUTO: 30.9 PG (ref 26.6–33)
MCH RBC QN AUTO: 31.9 PG (ref 26.6–33)
MCHC RBC AUTO-ENTMCNC: 32.4 G/DL (ref 31.5–35.7)
MCHC RBC AUTO-ENTMCNC: 34.2 G/DL (ref 31.5–35.7)
MCV RBC AUTO: 93.2 FL (ref 79–97)
MCV RBC AUTO: 95.4 FL (ref 79–97)
MONOCYTES # BLD AUTO: 1.31 10*3/MM3 (ref 0.1–0.9)
MONOCYTES # BLD AUTO: 1.33 10*3/MM3 (ref 0.1–0.9)
MONOCYTES NFR BLD AUTO: 12 % (ref 5–12)
MONOCYTES NFR BLD AUTO: 13.1 % (ref 5–12)
MRSA DNA SPEC QL NAA+PROBE: NORMAL
NEUTROPHILS NFR BLD AUTO: 7.24 10*3/MM3 (ref 1.7–7)
NEUTROPHILS NFR BLD AUTO: 72.4 % (ref 42.7–76)
NEUTROPHILS NFR BLD AUTO: 72.5 % (ref 42.7–76)
NEUTROPHILS NFR BLD AUTO: 8.03 10*3/MM3 (ref 1.7–7)
NITRITE UR QL STRIP: NEGATIVE
NRBC BLD AUTO-RTO: 0 /100 WBC (ref 0–0.2)
NRBC BLD AUTO-RTO: 0 /100 WBC (ref 0–0.2)
PH UR STRIP.AUTO: 5.5 [PH] (ref 5–8)
PLATELET # BLD AUTO: 308 10*3/MM3 (ref 140–450)
PLATELET # BLD AUTO: 316 10*3/MM3 (ref 140–450)
PMV BLD AUTO: 9.8 FL (ref 6–12)
PMV BLD AUTO: 9.9 FL (ref 6–12)
POTASSIUM SERPL-SCNC: 3.6 MMOL/L (ref 3.5–5.2)
POTASSIUM SERPL-SCNC: 3.7 MMOL/L (ref 3.5–5.2)
PROCALCITONIN SERPL-MCNC: 0.09 NG/ML (ref 0–0.25)
PROT SERPL-MCNC: 7.2 G/DL (ref 6–8.5)
PROT UR QL STRIP: ABNORMAL
PROTHROMBIN TIME: 18.8 SECONDS (ref 11.7–14.2)
QT INTERVAL: 464 MS
QT INTERVAL: 565 MS
RBC # BLD AUTO: 4.14 10*6/MM3 (ref 4.14–5.8)
RBC # BLD AUTO: 4.27 10*6/MM3 (ref 4.14–5.8)
RBC # UR: ABNORMAL /HPF
REF LAB TEST METHOD: ABNORMAL
RHINOVIRUS RNA SPEC NAA+PROBE: NOT DETECTED
RSV RNA NPH QL NAA+NON-PROBE: NOT DETECTED
S PNEUM AG SPEC QL LA: NEGATIVE
SARS-COV-2 RNA NPH QL NAA+NON-PROBE: NOT DETECTED
SARS-COV-2 RNA RESP QL NAA+PROBE: NOT DETECTED
SODIUM SERPL-SCNC: 138 MMOL/L (ref 136–145)
SODIUM SERPL-SCNC: 139 MMOL/L (ref 136–145)
SP GR UR STRIP: 1.01 (ref 1–1.03)
SQUAMOUS #/AREA URNS HPF: ABNORMAL /HPF
STRESS TARGET HR: 124 BPM
TROPONIN T SERPL-MCNC: 0.02 NG/ML (ref 0–0.03)
TROPONIN T SERPL-MCNC: 0.02 NG/ML (ref 0–0.03)
TROPONIN T SERPL-MCNC: <0.01 NG/ML (ref 0–0.03)
TSH SERPL DL<=0.05 MIU/L-ACNC: 2.22 UIU/ML (ref 0.27–4.2)
UROBILINOGEN UR QL STRIP: ABNORMAL
WBC # BLD AUTO: 10 10*3/MM3 (ref 3.4–10.8)
WBC # BLD AUTO: 11.08 10*3/MM3 (ref 3.4–10.8)
WBC UR QL AUTO: ABNORMAL /HPF
WHOLE BLOOD HOLD SPECIMEN: NORMAL
WHOLE BLOOD HOLD SPECIMEN: NORMAL

## 2021-09-17 PROCEDURE — 82962 GLUCOSE BLOOD TEST: CPT

## 2021-09-17 PROCEDURE — G0378 HOSPITAL OBSERVATION PER HR: HCPCS

## 2021-09-17 PROCEDURE — 85610 PROTHROMBIN TIME: CPT | Performed by: NURSE PRACTITIONER

## 2021-09-17 PROCEDURE — 85025 COMPLETE CBC W/AUTO DIFF WBC: CPT | Performed by: NURSE PRACTITIONER

## 2021-09-17 PROCEDURE — 80048 BASIC METABOLIC PNL TOTAL CA: CPT | Performed by: NURSE PRACTITIONER

## 2021-09-17 PROCEDURE — 63710000001 INSULIN LISPRO (HUMAN) PER 5 UNITS: Performed by: NURSE PRACTITIONER

## 2021-09-17 PROCEDURE — 87899 AGENT NOS ASSAY W/OPTIC: CPT | Performed by: INTERNAL MEDICINE

## 2021-09-17 PROCEDURE — 85379 FIBRIN DEGRADATION QUANT: CPT | Performed by: NURSE PRACTITIONER

## 2021-09-17 PROCEDURE — 93005 ELECTROCARDIOGRAM TRACING: CPT | Performed by: NURSE PRACTITIONER

## 2021-09-17 PROCEDURE — 93971 EXTREMITY STUDY: CPT

## 2021-09-17 PROCEDURE — 84443 ASSAY THYROID STIM HORMONE: CPT | Performed by: NURSE PRACTITIONER

## 2021-09-17 PROCEDURE — 36415 COLL VENOUS BLD VENIPUNCTURE: CPT | Performed by: NURSE PRACTITIONER

## 2021-09-17 PROCEDURE — 93010 ELECTROCARDIOGRAM REPORT: CPT | Performed by: INTERNAL MEDICINE

## 2021-09-17 PROCEDURE — 84484 ASSAY OF TROPONIN QUANT: CPT | Performed by: NURSE PRACTITIONER

## 2021-09-17 PROCEDURE — 87641 MR-STAPH DNA AMP PROBE: CPT | Performed by: NURSE PRACTITIONER

## 2021-09-17 PROCEDURE — 83735 ASSAY OF MAGNESIUM: CPT | Performed by: NURSE PRACTITIONER

## 2021-09-17 PROCEDURE — 0202U NFCT DS 22 TRGT SARS-COV-2: CPT | Performed by: NURSE PRACTITIONER

## 2021-09-17 PROCEDURE — 71045 X-RAY EXAM CHEST 1 VIEW: CPT

## 2021-09-17 PROCEDURE — 99214 OFFICE O/P EST MOD 30 MIN: CPT | Performed by: NURSE PRACTITIONER

## 2021-09-17 PROCEDURE — 25010000002 CEFTRIAXONE PER 250 MG: Performed by: NURSE PRACTITIONER

## 2021-09-17 PROCEDURE — 25010000002 CEFTRIAXONE PER 250 MG: Performed by: EMERGENCY MEDICINE

## 2021-09-17 PROCEDURE — 25010000002 AZITHROMYCIN PER 500 MG: Performed by: EMERGENCY MEDICINE

## 2021-09-17 RX ORDER — PRAVASTATIN SODIUM 40 MG
40 TABLET ORAL DAILY
Status: DISCONTINUED | OUTPATIENT
Start: 2021-09-17 | End: 2021-09-19 | Stop reason: HOSPADM

## 2021-09-17 RX ORDER — HYDROXYUREA 500 MG/1
500 CAPSULE ORAL DAILY
Status: DISCONTINUED | OUTPATIENT
Start: 2021-09-17 | End: 2021-09-19 | Stop reason: HOSPADM

## 2021-09-17 RX ORDER — ASPIRIN 81 MG/1
81 TABLET ORAL DAILY
Status: DISCONTINUED | OUTPATIENT
Start: 2021-09-17 | End: 2021-09-19 | Stop reason: HOSPADM

## 2021-09-17 RX ORDER — GUAIFENESIN 600 MG/1
600 TABLET, EXTENDED RELEASE ORAL EVERY 12 HOURS SCHEDULED
Status: DISCONTINUED | OUTPATIENT
Start: 2021-09-17 | End: 2021-09-19 | Stop reason: HOSPADM

## 2021-09-17 RX ORDER — ONDANSETRON 2 MG/ML
4 INJECTION INTRAMUSCULAR; INTRAVENOUS EVERY 6 HOURS PRN
Status: DISCONTINUED | OUTPATIENT
Start: 2021-09-17 | End: 2021-09-19 | Stop reason: HOSPADM

## 2021-09-17 RX ORDER — PANTOPRAZOLE SODIUM 40 MG/10ML
40 INJECTION, POWDER, LYOPHILIZED, FOR SOLUTION INTRAVENOUS
Status: DISCONTINUED | OUTPATIENT
Start: 2021-09-18 | End: 2021-09-19

## 2021-09-17 RX ORDER — DEXTROSE MONOHYDRATE 25 G/50ML
25 INJECTION, SOLUTION INTRAVENOUS
Status: DISCONTINUED | OUTPATIENT
Start: 2021-09-17 | End: 2021-09-19 | Stop reason: HOSPADM

## 2021-09-17 RX ORDER — SODIUM CHLORIDE 9 MG/ML
100 INJECTION, SOLUTION INTRAVENOUS CONTINUOUS
Status: DISCONTINUED | OUTPATIENT
Start: 2021-09-17 | End: 2021-09-18

## 2021-09-17 RX ORDER — TERAZOSIN 2 MG/1
2 CAPSULE ORAL NIGHTLY
Status: DISCONTINUED | OUTPATIENT
Start: 2021-09-17 | End: 2021-09-19 | Stop reason: HOSPADM

## 2021-09-17 RX ORDER — NICOTINE POLACRILEX 4 MG
15 LOZENGE BUCCAL
Status: DISCONTINUED | OUTPATIENT
Start: 2021-09-17 | End: 2021-09-19 | Stop reason: HOSPADM

## 2021-09-17 RX ORDER — SODIUM CHLORIDE 0.9 % (FLUSH) 0.9 %
10 SYRINGE (ML) INJECTION EVERY 12 HOURS SCHEDULED
Status: DISCONTINUED | OUTPATIENT
Start: 2021-09-17 | End: 2021-09-19 | Stop reason: HOSPADM

## 2021-09-17 RX ORDER — FLUTICASONE PROPIONATE 50 MCG
2 SPRAY, SUSPENSION (ML) NASAL DAILY
Status: DISCONTINUED | OUTPATIENT
Start: 2021-09-17 | End: 2021-09-19 | Stop reason: HOSPADM

## 2021-09-17 RX ORDER — DILTIAZEM HYDROCHLORIDE 180 MG/1
360 CAPSULE, COATED, EXTENDED RELEASE ORAL
Refills: 0 | Status: DISCONTINUED | OUTPATIENT
Start: 2021-09-17 | End: 2021-09-17

## 2021-09-17 RX ORDER — NITROGLYCERIN 0.4 MG/1
0.4 TABLET SUBLINGUAL
Status: DISCONTINUED | OUTPATIENT
Start: 2021-09-17 | End: 2021-09-19 | Stop reason: HOSPADM

## 2021-09-17 RX ORDER — INSULIN LISPRO 100 [IU]/ML
0-7 INJECTION, SOLUTION INTRAVENOUS; SUBCUTANEOUS
Status: DISCONTINUED | OUTPATIENT
Start: 2021-09-17 | End: 2021-09-19 | Stop reason: HOSPADM

## 2021-09-17 RX ORDER — ACETAMINOPHEN 325 MG/1
650 TABLET ORAL EVERY 6 HOURS PRN
Status: DISCONTINUED | OUTPATIENT
Start: 2021-09-17 | End: 2021-09-19 | Stop reason: HOSPADM

## 2021-09-17 RX ORDER — HYDRALAZINE HYDROCHLORIDE 50 MG/1
100 TABLET, FILM COATED ORAL 3 TIMES DAILY
Status: DISCONTINUED | OUTPATIENT
Start: 2021-09-17 | End: 2021-09-19 | Stop reason: HOSPADM

## 2021-09-17 RX ORDER — SODIUM CHLORIDE 0.9 % (FLUSH) 0.9 %
10 SYRINGE (ML) INJECTION AS NEEDED
Status: DISCONTINUED | OUTPATIENT
Start: 2021-09-17 | End: 2021-09-19 | Stop reason: HOSPADM

## 2021-09-17 RX ORDER — MONTELUKAST SODIUM 10 MG/1
10 TABLET ORAL DAILY
Status: DISCONTINUED | OUTPATIENT
Start: 2021-09-17 | End: 2021-09-19 | Stop reason: HOSPADM

## 2021-09-17 RX ORDER — L.ACID,PARA/B.BIFIDUM/S.THERM 8B CELL
1 CAPSULE ORAL DAILY
Refills: 1 | Status: DISCONTINUED | OUTPATIENT
Start: 2021-09-17 | End: 2021-09-19 | Stop reason: HOSPADM

## 2021-09-17 RX ORDER — CARVEDILOL 6.25 MG/1
6.25 TABLET ORAL 2 TIMES DAILY WITH MEALS
Status: DISCONTINUED | OUTPATIENT
Start: 2021-09-17 | End: 2021-09-19 | Stop reason: HOSPADM

## 2021-09-17 RX ORDER — MEMANTINE HYDROCHLORIDE 5 MG/1
5 TABLET ORAL DAILY
Status: DISCONTINUED | OUTPATIENT
Start: 2021-09-17 | End: 2021-09-19 | Stop reason: HOSPADM

## 2021-09-17 RX ORDER — LOSARTAN POTASSIUM 100 MG/1
100 TABLET ORAL DAILY
Status: DISCONTINUED | OUTPATIENT
Start: 2021-09-17 | End: 2021-09-19 | Stop reason: HOSPADM

## 2021-09-17 RX ADMIN — APIXABAN 5 MG: 5 TABLET, FILM COATED ORAL at 11:25

## 2021-09-17 RX ADMIN — SODIUM CHLORIDE, PRESERVATIVE FREE 10 ML: 5 INJECTION INTRAVENOUS at 21:22

## 2021-09-17 RX ADMIN — HYDRALAZINE HYDROCHLORIDE 100 MG: 50 TABLET, FILM COATED ORAL at 16:57

## 2021-09-17 RX ADMIN — CARVEDILOL 6.25 MG: 12.5 TABLET, FILM COATED ORAL at 16:57

## 2021-09-17 RX ADMIN — MEMANTINE HYDROCHLORIDE 5 MG: 5 TABLET, FILM COATED ORAL at 11:24

## 2021-09-17 RX ADMIN — INSULIN LISPRO 2 UNITS: 100 INJECTION, SOLUTION INTRAVENOUS; SUBCUTANEOUS at 16:57

## 2021-09-17 RX ADMIN — ASPIRIN 81 MG: 81 TABLET, COATED ORAL at 11:32

## 2021-09-17 RX ADMIN — HYDRALAZINE HYDROCHLORIDE 100 MG: 50 TABLET, FILM COATED ORAL at 11:25

## 2021-09-17 RX ADMIN — APIXABAN 5 MG: 5 TABLET, FILM COATED ORAL at 21:19

## 2021-09-17 RX ADMIN — GUAIFENESIN 600 MG: 600 TABLET, EXTENDED RELEASE ORAL at 21:19

## 2021-09-17 RX ADMIN — ACETAMINOPHEN 650 MG: 325 TABLET, FILM COATED ORAL at 11:28

## 2021-09-17 RX ADMIN — Medication 1 CAPSULE: at 11:24

## 2021-09-17 RX ADMIN — AZITHROMYCIN DIHYDRATE 500 MG: 500 INJECTION, POWDER, LYOPHILIZED, FOR SOLUTION INTRAVENOUS at 01:54

## 2021-09-17 RX ADMIN — LOSARTAN POTASSIUM 100 MG: 100 TABLET, FILM COATED ORAL at 11:25

## 2021-09-17 RX ADMIN — GUAIFENESIN 600 MG: 600 TABLET, EXTENDED RELEASE ORAL at 14:49

## 2021-09-17 RX ADMIN — SODIUM CHLORIDE 100 ML/HR: 9 INJECTION, SOLUTION INTRAVENOUS at 21:20

## 2021-09-17 RX ADMIN — MONTELUKAST SODIUM 10 MG: 10 TABLET, FILM COATED ORAL at 11:28

## 2021-09-17 RX ADMIN — FLUTICASONE PROPIONATE 2 SPRAY: 50 SPRAY, METERED NASAL at 11:26

## 2021-09-17 RX ADMIN — CEFTRIAXONE 1 G: 1 INJECTION, POWDER, FOR SOLUTION INTRAMUSCULAR; INTRAVENOUS at 21:20

## 2021-09-17 RX ADMIN — CEFTRIAXONE 1 G: 1 INJECTION, POWDER, FOR SOLUTION INTRAMUSCULAR; INTRAVENOUS at 01:24

## 2021-09-17 RX ADMIN — SODIUM CHLORIDE 100 ML/HR: 9 INJECTION, SOLUTION INTRAVENOUS at 03:54

## 2021-09-17 RX ADMIN — PRAVASTATIN SODIUM 40 MG: 40 TABLET ORAL at 11:25

## 2021-09-17 RX ADMIN — HYDROXYUREA 500 MG: 500 CAPSULE ORAL at 11:25

## 2021-09-17 RX ADMIN — SODIUM CHLORIDE 100 ML/HR: 9 INJECTION, SOLUTION INTRAVENOUS at 14:49

## 2021-09-17 RX ADMIN — TERAZOSIN HYDROCHLORIDE 2 MG: 2 CAPSULE ORAL at 21:19

## 2021-09-17 RX ADMIN — HYDRALAZINE HYDROCHLORIDE 100 MG: 50 TABLET, FILM COATED ORAL at 21:19

## 2021-09-17 NOTE — PLAN OF CARE
Goal Outcome Evaluation:   COVID negative. Respiratory viral panel negative. Still ruling out bacterial process. Febrile this AM. Aox4. VSS. No new issues. Had LE doppler which was negative. BRAYAN.

## 2021-09-17 NOTE — DISCHARGE PLACEMENT REQUEST
"Felipe Dockery (76 y.o. Male)     Date of Birth Social Security Number Address Home Phone MRN    [id number]  [id number] [facility] [phone] [id number]    Spiritism Marital Status          Other        Admission Date Admission Type Admitting Provider Attending Provider Department, Room/Bed    9/16/21 Emergency Maxine Sandoval MD Hussein, Samer H, MD [address], [address]    Discharge Date Discharge Disposition Discharge Destination                       Attending Provider: Maxine Sandoval MD    Allergies: Donepezil, Steglatro [Ertugliflozin]    Isolation: None   Infection: None   Code Status: CPR    Ht: 182.9 cm (72\")   Wt: 118 kg (260 lb 3.2 oz)    Admission Cmt: None   Principal Problem: Pneumonia of left lung due to infectious organism [J18.9]                 Active Insurance as of 9/16/2021     Primary Coverage     Payor Plan Insurance Group Employer/Plan Group    HUMANA MEDICARE REPLACEMENT HU[employer]NA MEDICARE REPLACEMENT [id number]     Payor Plan Address Payor Plan Phone Number Payor Plan Fax Number Effective Dates    [address] [address] [phone]  1/1/2017 - None Entered    [facility] [address]       Subscriber Name Subscriber Birth Date Member ID       FELIPE DOCKERY 1945 [id number]                 Emergency Contacts      (Rel.) Home Phone Work Phone Mobile Phone    Yany Dockery (Spouse) [phone] -- [phone]    Irwin Dockery (Son) -- -- [phone]    JAYCOB DOCKERY ([employer]) (Relative) -- -- [phone]              "

## 2021-09-17 NOTE — ED NOTES
"Nursing report ED to floor  Erlin Blanco  76 y.o.  male    HPI (triage note):   Chief Complaint   Patient presents with   • Fever   • Fatigue       Admitting doctor:   Cachorro Knox MD    Admitting diagnosis:   The primary encounter diagnosis was Pneumonia of left lung due to infectious organism, unspecified part of lung. A diagnosis of Fever, unspecified fever cause was also pertinent to this visit.    Code status:   Current Code Status     Date Active Code Status Order ID Comments User Context       Prior    Advance Care Planning Activity          Allergies:   Donepezil and Steglatro [ertugliflozin]    Weight:       09/16/21  2345   Weight: 113 kg (250 lb)       Most recent vitals:   Vitals:    09/16/21 2345 09/16/21 2346 09/17/21 0016 09/17/21 0046   BP:  139/65 148/76 153/96   Pulse: 69 86 72 74   Resp:       Temp:       SpO2: 96% 96% 95% 95%   Weight: 113 kg (250 lb)      Height: 182.9 cm (72\")          Active LDAs/IV Access:   Lines, Drains & Airways    Active LDAs     Name:   Placement date:   Placement time:   Site:   Days:    Peripheral IV 09/16/21 2350 Right Forearm   09/16/21 2350    Forearm   less than 1                Labs (abnormal labs have a star):   Labs Reviewed   COMPREHENSIVE METABOLIC PANEL - Abnormal; Notable for the following components:       Result Value    Glucose 166 (*)     eGFR Non  Amer 60 (*)     All other components within normal limits    Narrative:     GFR Normal >60  Chronic Kidney Disease <60  Kidney Failure <15     URINALYSIS W/ MICROSCOPIC IF INDICATED (NO CULTURE) - Abnormal; Notable for the following components:    Protein, UA Trace (*)     Leuk Esterase, UA Small (1+) (*)     All other components within normal limits   C-REACTIVE PROTEIN - Abnormal; Notable for the following components:    C-Reactive Protein 7.00 (*)     All other components within normal limits   CBC WITH AUTO DIFFERENTIAL - Abnormal; Notable for the following components:    WBC 11.08 (*)     " "RDW 15.7 (*)     Lymphocyte % 12.1 (*)     Neutrophils, Absolute 8.03 (*)     Monocytes, Absolute 1.33 (*)     Immature Grans, Absolute 0.06 (*)     All other components within normal limits   URINALYSIS, MICROSCOPIC ONLY - Abnormal; Notable for the following components:    WBC, UA 6-12 (*)     All other components within normal limits   COVID-19,[facility] IN-HOUSE CEPHEID/LINDA, NP SWAB IN TRANSPORT MEDIA 8-12 HR TAT - Normal    Narrative:     Fact sheet for providers: https://www.fda.gov/media/294094/download     Fact sheet for patients: https://www.fda.gov/media/159464/download   LACTIC ACID, PLASMA - Normal   PROCALCITONIN - Normal    Narrative:     As a Marker for Sepsis (Non-Neonates):     1. <0.5 ng/mL represents a low risk of severe sepsis and/or septic shock.  2. >2 ng/mL represents a high risk of severe sepsis and/or septic shock.    As a Marker for Lower Respiratory Tract Infections that require antibiotic therapy:  PCT on Admission     Antibiotic Therapy             6-12 Hrs later  >0.5                          Strongly Recommended            >0.25 - <0.5             Recommended  0.1 - 0.25                  Discouraged                       Remeasure/reassess PCT  <0.1                         Strongly Discouraged         Remeasure/reassess PCT      As 28 day mortality risk marker: \"Change in Procalcitonin Result\" (>80% or <=80%) if Day 0 (or Day 1) and Day 4 values are available. Refer to http://www.[employer]-pct-calculator.com/    Change in PCT <=80 %   A decrease of PCT levels below or equal to 80% defines a positive change in PCT test result representing a higher risk for 28-day all-cause mortality of patients diagnosed with severe sepsis or septic shock.    Change in PCT >80 %   A decrease of PCT levels of more than 80% defines a negative change in PCT result representing a lower risk for 28-day all-cause mortality of patients diagnosed with severe sepsis or septic shock.              Results may be falsely " decreased if patient taking Biotin.    FERRITIN - Normal    Narrative:     Results may be falsely decreased if patient taking Biotin.     TROPONIN (IN-HOUSE) - Normal    Narrative:     Troponin T Reference Range:  <= 0.03 ng/mL-   Negative for AMI  >0.03 ng/mL-     Abnormal for myocardial necrosis.  Clinicians would have to utilize clinical acumen, EKG, Troponin and serial changes to determine if it is an Acute Myocardial Infarction or myocardial injury due to an underlying chronic condition.       Results may be falsely decreased if patient taking Biotin.     BLOOD CULTURE   BLOOD CULTURE   RAINBOW DRAW    Narrative:     The following orders were created for panel order [place] Draw.  Procedure                               Abnormality         Status                     ---------                               -----------         ------                     Green Top (Gel)[[id number]]                                  Final result               Lavender Top[[id number]]                                     Final result               Gold Top - SST[[id number]]                                                              Light Blue Top[[id number]]                                   Final result                 Please view results for these tests on the individual orders.   GREEN TOP   LAVENDER TOP   LIGHT BLUE TOP   CBC AND DIFFERENTIAL    Narrative:     The following orders were created for panel order CBC & Differential.  Procedure                               Abnormality         Status                     ---------                               -----------         ------                     CBC Auto Differential[[id number]]        Abnormal            Final result                 Please view results for these tests on the individual orders.   GOLD [facility] - SST       EKG:   ECG 12 Lead   Preliminary Result   HEART RATE= 74  bpm   RR Interval= 812  ms   MI Interval= 219  ms   P Horizontal Axis= 63  deg   P Front Axis= -16  deg    QRSD Interval= 174  ms   QT Interval= 464  ms   QRS Axis= -26  deg   T Wave Axis= 100  deg   - ABNORMAL ECG -   Sinus rhythm   Borderline prolonged SC interval   Left bundle branch block   ST elevation secondary to IVCD   Electronically Signed By:    Date and Time of Study: 2021 00:16:22          Meds given in ED:   Medications   sodium chloride 0.9 % flush 10 mL (has no administration in time range)   AZITHROMYCIN 500 MG/250 ML 0.9% NS IVPB (vial-mate) (has no administration in time range)   cefTRIAXone (ROCEPHIN) 1 g in sodium chloride 0.9 % 100 mL IVPB-VTB (1 g Intravenous New Bag 21 0124)   lactated ringers bolus 1,000 mL (0 mL Intravenous Stopped 21 0125)       Imaging results:  XR Chest 1 View    Result Date: 2021  Electronically signed by Dejuan Rajan MD on 21 at 0041      Ambulatory status:   - up w assist    Social issues:   Social History     Socioeconomic History   • Marital status:      Spouse name: Not on file   • Number of children: Not on file   • Years of education: Not on file   • Highest education level: Not on file   Tobacco Use   • Smoking status: Former Smoker     Types: Cigarettes     Quit date: 1973     Years since quittin.7   • Smokeless tobacco: Former User   • Tobacco comment: Caffeine daily   Substance and Sexual Activity   • Alcohol use: No   • Drug use: No   • Sexual activity: Defer    Nursing report ED to floor     Malone, Tera, RN  21 0129

## 2021-09-17 NOTE — ED NOTES
"Pt arrived to ER via pv with c/o \"feeling bad\". Pt states he had a fever of 103.1 and blood sugar of 210. Pt is a diabetic. Pt son states pt has decreased appetite, irritability, and weakness. Pt denies cough or SOA. Pt denies use of O2 at home. Oxygen is 94 at home on RA. Pt did take extra strength tylenol earlier tonight.   Pt in mask & this RN in N95 & eye protection during care.      Abigail Bowles, RN  09/16/21 1781    "

## 2021-09-17 NOTE — ED PROVIDER NOTES
" EMERGENCY DEPARTMENT ENCOUNTER    Room Number:  19/19  Date of encounter:  9/17/2021  PCP: Zamzam John, MUKUL  Historian: Patient     I used full protective equipment while examining this patient.  This includes face mask, gloves and protective eyewear.  I washed my hands before entering the room and immediately upon leaving the room.  Patient was wearing a surgical mask.      HPI:  Chief Complaint: Fever  A complete HPI/ROS/PMH/PSH/SH/FH are unobtainable due to: None    Context: Erlin Blanco is a 76 y.o. male who presents to the ED c/o fever of 103.1 earlier this evening.  Also reports generalized weakness, malaise, and nausea.  Patient took Tylenol approximately 2 hours ago.  Reports a mild nonproductive cough for the past week or so.  Denies headache, muscle aches, sore throat, chest pain,  shortness of breath, abdominal pain, vomiting, diarrhea, or dysuria.  He is fully vaccinated for Covid.  States his wife tested positive for Covid 1 month ago.  He believes that he then developed Covid as well.  Those symptoms resolved after few weeks and he had felt okay until tonight.  States his glucose was 210 tonight.  Son reports that the patient seemed \"a little confused\" this evening.      PAST MEDICAL HISTORY  Active Ambulatory Problems     Diagnosis Date Noted   • Osteoarthritis, knee 04/27/2017   • Type 2 diabetes mellitus with both eyes affected by mild nonproliferative retinopathy without macular edema, without long-term current use of insulin (CMS/[facility]) 04/28/2017   • Hypertension 04/28/2017   • Coronary artery disease 04/28/2017   • Supraventricular tachycardia (CMS/[facility]) 11/04/2018   • Aortic root dilation (CMS/[facility]) 11/04/2018   • TIA (transient ischemic attack) 06/18/2019   • Hyperlipidemia    • Prostate hyperplasia without urinary obstruction    • Class 2 severe obesity due to excess calories with serious comorbidity and body mass index (BMI) of 36.0 to 36.9 in adult (CMS/[facility]) 12/10/2019   • Polycythemia " vera (CMS/HCC)    • Pain in both knees 01/29/2020   • Allergic rhinitis    • LBBB (left bundle branch block) 06/18/2020   • Fatigue 06/24/2020   • Atrial flutter (CMS/HCC) 09/16/2020   • History of CVA (cerebrovascular accident) 09/28/2020   • Discitis of lumbar region 10/09/2020   • Localized edema 11/24/2020   • Cervical radiculitis 12/29/2020   • Chronic diastolic congestive heart failure (CMS/HCC) 01/07/2021   • CKD (chronic kidney disease) stage 2, GFR 60-89 ml/min 05/16/2021   • Glucosuria 05/16/2021   • Paroxysmal atrial fibrillation (CMS/HCC) 06/22/2021   • Acute non-recurrent maxillary sinusitis 07/16/2021   • Memory difficulties 07/16/2021     Resolved Ambulatory Problems     Diagnosis Date Noted   • DORI (acute kidney injury) (CMS/HCC) 04/28/2017   • Cellulitis of knee, left 05/04/2017   • Acute non-recurrent sinusitis 01/13/2020   • Acute bronchitis 01/13/2020   • Abrasion of face 01/29/2020   • Fall down steps 01/29/2020   • Minor head injury without loss of consciousness 01/29/2020   • Contusion of face 01/29/2020   • Cough 04/14/2020   • Fever and chills 08/16/2020   • Sepsis due to Escherichia coli (CMS/HCC) 08/17/2020   • Bacteremia due to Escherichia coli 08/17/2020   • Sepsis with metabolic encephalopathy (CMS/HCC) 08/17/2020   • Urinary tract infection due to extended-spectrum beta lactamase (ESBL) producing Escherichia coli 08/18/2020   • Shock, septic (CMS/HCC) 08/19/2020   • Acute hypokalemia 09/16/2020   • C. difficile colitis 09/16/2020   • Leukocytosis 09/16/2020   • Pressure injury of buttock, stage 2 (CMS/HCC) 09/18/2020   • Sacroiliitis (CMS/HCC) 10/09/2020   • Acute pyelonephritis 10/09/2020   • Constipation 11/14/2020   • Hypokalemia 01/07/2021   • Balanitis 05/16/2021   • Type 2 diabetes mellitus with hyperglycemia (CMS/HCC) 05/16/2021   • Paraphimosis 05/16/2021     Past Medical History:   Diagnosis Date   • Abnormal ECG    • Abnormal stress test    • Acute sinusitis    • Arthritis     • Benign enlargement of prostate    • CHF (congestive heart failure) (CMS/HCC)    • CVA (cerebral vascular accident) (CMS/HCC) 2020   • Diminished pulse    • Edema    • Elevated hematocrit    • Elevated hemoglobin (CMS/HCC)    • Essential hypertension    • Hearing loss    • Heart valve disease    • High risk medication use    • History of back pain    • History of dysuria    • History of echocardiogram    • History of intracranial hemorrhage    • History of scoliosis    • History of stroke    • Injury of toe, left, initial encounter    • Irregular heart rate    • Knee pain, left    • Left bundle branch block    • Leg wound, left    • Low back pain    • Medicare annual wellness visit, subsequent    • Murmur    • Muscle cramps    • Need for hepatitis C screening test    • Polycythemia    • Prostatitis    • Proteinuria    • Pulmonary hypertension (CMS/HCC)    • Scoliosis    • Stroke (CMS/HCC)    • SVT (supraventricular tachycardia) (CMS/HCC)    • Tachycardia    • Thrombocytosis (CMS/HCC)    • Type 2 diabetes mellitus (CMS/HCC)    • Valvular heart disease          PAST SURGICAL HISTORY  Past Surgical History:   Procedure Laterality Date   • CARDIAC CATHETERIZATION     • COLONOSCOPY      did Cologuard approx 2019 and negative   • HAND SURGERY     • JOINT REPLACEMENT      Rig Knee   • ME TOTAL KNEE ARTHROPLASTY Left 2017    Procedure: LT TOTAL KNEE ARTHROPLASTY;  Surgeon: Bertin Perrin MD;  Location: [facility];  Service: Orthopedics   • PROSTATE SURGERY      Rezum steam treatment to shrink prostate by dr. guerrero         FAMILY HISTORY  Family History   Problem Relation Age of Onset   • Hypertension Mother    • Other Father         ruptured appendix,  when pt. was 14 years old.   • Diabetes Paternal Aunt    • Diabetes Paternal Uncle    • No Known Problems Other    • Migraines Sister    • Stroke Sister    • Dementia Brother          SOCIAL HISTORY  Social History     Socioeconomic History   •  Marital status:      Spouse name: Not on file   • Number of children: Not on file   • Years of education: Not on file   • Highest education level: Not on file   Tobacco Use   • Smoking status: Former Smoker     Types: Cigarettes     Quit date:      Years since quittin.7   • Smokeless tobacco: Former User   • Tobacco comment: Caffeine daily   Substance and Sexual Activity   • Alcohol use: No   • Drug use: No   • Sexual activity: Defer         ALLERGIES  Donepezil and Steglatro [ertugliflozin]       REVIEW OF SYSTEMS  Review of Systems      All systems have been reviewed and are negative except as as discussed in the HPI    PHYSICAL EXAM    I have reviewed the triage vital signs and nursing notes.    ED Triage Vitals [21 2239]   Temp Heart Rate Resp BP SpO2   97.4 °F (36.3 °C) 110 18 164/91 94 %      Temp src Heart Rate Source Patient Position BP Location FiO2 (%)   -- -- -- -- --       Physical Exam  GENERAL: Awake, alert, nontoxic appearing  HENT: NCAT, nares patent, mildly dry mucous membranes  NECK: supple  EYES: Extraocular muscles intact, no scleral icterus  CV: regular rhythm, tachycardic  RESPIRATORY: normal effort, clear to auscultation bilaterally  ABDOMEN: soft, nontender  MUSCULOSKELETAL: Extremities are nontender and without obvious deformity.  There is normal range of motion in all extremities.  There is trace pedal edema in both lower legs   NEURO: Strength, sensation, and coordination are grossly intact.  Speech and mentation are unremarkable.  No facial droop.  SKIN: warm, dry, no rash  PSYCH: Normal mood and affect      LAB RESULTS  Recent Results (from the past 24 hour(s))   COVID-19, [facility] IN-HOUSE CEPHEID/LINDA NP SWAB IN TRANSPORT MEDIA 8-12 HR TAT - Swab, Nasopharynx    Collection Time: 21 11:52 PM    Specimen: Nasopharynx; Swab   Result Value Ref Range    COVID19 Not Detected Not Detected - Ref. Range   Green Top (Gel)    Collection Time: 21 11:53 PM   Result  Value Ref Range    Extra Tube Hold for add-ons.    Light Blue Top    Collection Time: 09/16/21 11:53 PM   Result Value Ref Range    Extra Tube hold for add-on    Comprehensive Metabolic Panel    Collection Time: 09/16/21 11:53 PM    Specimen: Blood   Result Value Ref Range    Glucose 166 (H) 65 - 99 mg/dL    BUN 17 8 - 23 mg/dL    Creatinine 1.18 0.76 - 1.27 mg/dL    Sodium 138 136 - 145 mmol/L    Potassium 3.6 3.5 - 5.2 mmol/L    Chloride 101 98 - 107 mmol/L    CO2 25.8 22.0 - 29.0 mmol/L    Calcium 9.4 8.6 - 10.5 mg/dL    Total Protein 7.2 6.0 - 8.5 g/dL    Albumin 3.90 3.50 - 5.20 g/dL    ALT (SGPT) 17 1 - 41 U/L    AST (SGOT) 19 1 - 40 U/L    Alkaline Phosphatase 87 39 - 117 U/L    Total Bilirubin 0.5 0.0 - 1.2 mg/dL    eGFR Non African Amer 60 (L) >60 mL/min/1.73    Globulin 3.3 gm/dL    A/G Ratio 1.2 g/dL    BUN/Creatinine Ratio 14.4 7.0 - 25.0    Anion Gap 11.2 5.0 - 15.0 mmol/L   Procalcitonin    Collection Time: 09/16/21 11:53 PM    Specimen: Blood   Result Value Ref Range    Procalcitonin 0.09 0.00 - 0.25 ng/mL   C-reactive Protein    Collection Time: 09/16/21 11:53 PM    Specimen: Blood   Result Value Ref Range    C-Reactive Protein 7.00 (H) 0.00 - 0.50 mg/dL   Ferritin    Collection Time: 09/16/21 11:53 PM    Specimen: Blood   Result Value Ref Range    Ferritin 245.00 30.00 - 400.00 ng/mL   Troponin    Collection Time: 09/16/21 11:53 PM    Specimen: Blood   Result Value Ref Range    Troponin T 0.017 0.000 - 0.030 ng/mL   Lavender Top    Collection Time: 09/16/21 11:54 PM   Result Value Ref Range    Extra Tube hold for add-on    Lactic Acid, Plasma    Collection Time: 09/16/21 11:54 PM    Specimen: Blood   Result Value Ref Range    Lactate 0.8 0.5 - 2.0 mmol/L   CBC Auto Differential    Collection Time: 09/16/21 11:54 PM    Specimen: Blood   Result Value Ref Range    WBC 11.08 (H) 3.40 - 10.80 10*3/mm3    RBC 4.27 4.14 - 5.80 10*6/mm3    Hemoglobin 13.6 13.0 - 17.7 g/dL    Hematocrit 39.8 37.5 - 51.0 %     MCV 93.2 79.0 - 97.0 fL    MCH 31.9 26.6 - 33.0 pg    MCHC 34.2 31.5 - 35.7 g/dL    RDW 15.7 (H) 12.3 - 15.4 %    RDW-SD 51.7 37.0 - 54.0 fl    MPV 9.9 6.0 - 12.0 fL    Platelets 316 140 - 450 10*3/mm3    Neutrophil % 72.5 42.7 - 76.0 %    Lymphocyte % 12.1 (L) 19.6 - 45.3 %    Monocyte % 12.0 5.0 - 12.0 %    Eosinophil % 2.0 0.3 - 6.2 %    Basophil % 0.9 0.0 - 1.5 %    Immature Grans % 0.5 0.0 - 0.5 %    Neutrophils, Absolute 8.03 (H) 1.70 - 7.00 10*3/mm3    Lymphocytes, Absolute 1.34 0.70 - 3.10 10*3/mm3    Monocytes, Absolute 1.33 (H) 0.10 - 0.90 10*3/mm3    Eosinophils, Absolute 0.22 0.00 - 0.40 10*3/mm3    Basophils, Absolute 0.10 0.00 - 0.20 10*3/mm3    Immature Grans, Absolute 0.06 (H) 0.00 - 0.05 10*3/mm3    nRBC 0.0 0.0 - 0.2 /100 WBC   Urinalysis With Microscopic If Indicated (No Culture) - Urine, Clean Catch    Collection Time: 09/16/21 11:58 PM    Specimen: Urine, Clean Catch   Result Value Ref Range    Color, UA Yellow Yellow, Straw    Appearance, UA Clear Clear    pH, UA 5.5 5.0 - 8.0    Specific Gravity, UA 1.007 1.005 - 1.030    Glucose, UA Negative Negative    Ketones, UA Negative Negative    Bilirubin, UA Negative Negative    Blood, UA Negative Negative    Protein, UA Trace (A) Negative    Leuk Esterase, UA Small (1+) (A) Negative    Nitrite, UA Negative Negative    Urobilinogen, UA 0.2 E.U./dL 0.2 - 1.0 E.U./dL   Urinalysis, Microscopic Only - Urine, Clean Catch    Collection Time: 09/16/21 11:58 PM    Specimen: Urine, Clean Catch   Result Value Ref Range    RBC, UA 0-2 None Seen, 0-2 /HPF    WBC, UA 6-12 (A) None Seen, 0-2 /HPF    Bacteria, UA None Seen None Seen /HPF    Squamous Epithelial Cells, UA 0-2 None Seen, 0-2 /HPF    Hyaline Casts, UA 0-2 None Seen /LPF    Methodology Automated Microscopy    ECG 12 Lead    Collection Time: 09/17/21 12:16 AM   Result Value Ref Range    QT Interval 464 ms       Ordered the above labs and independently reviewed the results.      RADIOLOGY  XR Chest 1  View    Result Date: 9/17/2021  Patient: FELIPE DOCKERY  Time Out: 00:41 Exam(s): FILM CXR 1 VIEW EXAM:   XR Chest, 1 View CLINICAL HISTORY:    Reason for exam: Fever, weakness. TECHNIQUE:   Frontal view of the chest. COMPARISON:   Chest x-ray 5 15 2021 FINDINGS:   Lungs:  Subtle opacity over the periphery of the left midlung.   Pleural space:  No pleural effusion. No pneumothorax.   Heart:  Unremarkable.  No cardiomegaly. IMPRESSION:       Subtle opacity over the periphery of the left midlung may represent infection in the appropriate clinical setting.    Electronically signed by Dejuan Rajan MD on 09-17-21 at 0041      I ordered the above noted radiological studies. Reviewed by me and discussed with radiologist.  See dictation for official radiology interpretation.      PROCEDURES  Procedures      MEDICATIONS GIVEN IN ER    Medications   sodium chloride 0.9 % flush 10 mL (has no administration in time range)   AZITHROMYCIN 500 MG/250 ML 0.9% NS IVPB (vial-mate) (has no administration in time range)   cefTRIAXone (ROCEPHIN) 1 g in sodium chloride 0.9 % 100 mL IVPB-VTB (has no administration in time range)   lactated ringers bolus 1,000 mL (1,000 mL Intravenous New Bag 9/16/21 1359)         PROGRESS, DATA ANALYSIS, CONSULTS, AND MEDICAL DECISION MAKING    All labs have been independently reviewed by me.  All radiology studies have been reviewed by me and discussed with radiologist dictating the report.   EKG's independently viewed and interpreted by me.  I have reviewed the nurse's notes, vital signs, past medical history, and medication list.  Discussion below represents my analysis of pertinent findings related to patient's condition, differential diagnosis, treatment plan and final disposition.      ED Course as of Sep 17 0122   Thu Sep 16, 2021   2344 Old records reviewed.  Patient was last admitted here in September 2020 for sepsis, C. 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  Independently viewed by me.  There is a subtle opacity over the left midlung which could represent pneumonia.    [WH]   0110 Test results and my recommendation for admission were discussed with the patient and his son.  He is agreeable with being admitted.  Vital signs are normal.  He is not hypoxic.    [WH]   0119 Case discussed with [place] Frank, and she agrees to admit the patient to Dr. Knox.  Patient will be admitted to a monitored bed.  Pertinent exam findings, test results, ED course, and diagnoses were discussed with her.    [WH]   0121 Patient presented to the ED with cough and fever.  Chest x-ray showed left-sided pneumonia.  Labs were relatively unremarkable.  He is not hypoxic or tachypneic.  He does have a history of bacteremia and severe sepsis.  Given this, decision was made to admit him for IV antibiotics.  Blood cultures are pending.  He remained hemodynamically stable in the ED.  Covid test was negative.  Case was discussed with the hospitalist.    [WH]      ED Course User Index  [WH] Walter Tran MD       AS OF 01:22 EDT VITALS:    BP - 153/96  HR - 74  TEMP - 97.4 °F (36.3 °C)  O2 SATS - 95%      DIAGNOSIS  Final diagnoses:   Pneumonia of left lung due to infectious organism, unspecified part of lung   Fever, unspecified fever cause         DISPOSITION  Admission    ADMISSION    Discussed treatment plan and reason for admission with pt/family and admitting physician.  Pt/family voiced understanding of the plan for admission for further  testing/treatment as needed.         Dictated utilizing Dragon dictation:  Much of this encounter note is an electronic transcription/translation of spoken language to printed text. The electronic translation of spoken language may permit erroneous, or at times, nonsensical words or phrases to be inadvertently transcribed; Although I have reviewed the note for such errors, some may still exist.     Walter Tran MD  09/17/21 0122

## 2021-09-17 NOTE — PLAN OF CARE
Goal Outcome Evaluation:  Plan of Care Reviewed With: patient        Progress: no change  Outcome Summary: New admit for PNA, VSS, on RA, IV fluids and antibiotics, COVID neg, D/C iso order given, no c/o of pain or nausea, will CTM

## 2021-09-17 NOTE — CASE MANAGEMENT/SOCIAL WORK
Discharge Planning Assessment  [facility]     Patient Name: Erlin Blanco  MRN: [id number]  Today's Date: 9/17/2021    Admit Date: 9/16/2021    Discharge Needs Assessment     Row Name 09/17/21 1445       Living Environment    Lives With  spouse    Name(s) of Who Lives With Patient  Yany Blanco    Current Living Arrangements  home/apartment/condo    Primary Care Provided by  self    Provides Primary Care For  no one    Family Caregiver if Needed  spouse;child(clifford), adult    Family Caregiver Names  Yany Blanco, Irwin Blanco    Able to Return to Prior Arrangements  yes       Resource/Environmental Concerns    Resource/Environmental Concerns  home accessibility    Home Accessibility Concerns  stairs to access bedroom or bathroom       Transition Planning    Patient/Family Anticipates Transition to  home with family;home with help/services    Patient/Family Anticipated Services at Transition  home health care;none    Transportation Anticipated  family or friend will provide       Discharge Needs Assessment    Equipment Currently Used at Home  cane, straight    Concerns to be Addressed  discharge planning    Discharge Facility/Level of Care Needs  home with home health        Discharge Plan     Row Name 09/17/21 7281       Plan    Plan  Pending treatment course    Patient/Family in Agreement with Plan  yes    Plan Comments  Due to patient being off floor for testing, CCP spoke to patient’s spouse; explained role of CCP, verified facesheet, and discussed d/c planning needs. Plan is pending treatment course. He uses a cane at home. Patient is not currently on O2, but if he were to need it they would use [facility]. He lives with his spouse in a 3 level home but stays on the bottom 2. He has used [facility] in the past and is agreeable to referral again in case it is recommended. [facility] and would return if needed. Patient has been to [facility] in the past. CCP to follow for recommendations  pending treatment course. ERIC Roy        Continued Care and Services - Admitted Since 9/16/2021     Home Medical Care     Service Provider Request Status Selected Services Address Phone Fax Patient Preferred     [facility]  Pending - Request Sent N/A [id number] [address] [phone] --                Demographic Summary     Row Name 09/17/21 1445       General Information    Admission Type  observation    Arrived From  home    Referral Source  admission list    Reason for Consult  discharge planning    Preferred Language  English     Used During This Interaction  no       Contact Information    Permission Granted to Share Info With  family/designee        Functional Status     Row Name 09/17/21 1445       Functional Status    Usual Activity Tolerance  good    Current Activity Tolerance  moderate       Functional Status, IADL    Medications  assistive equipment    Meal Preparation  assistive equipment    Housekeeping  assistive equipment    Laundry  assistive equipment    Shopping  assistive equipment       Mental Status    General Appearance WDL  WDL        Psychosocial    No documentation.       Abuse/Neglect    No documentation.       Legal    No documentation.       Substance Abuse    No documentation.       Patient Forms    No documentation.           ERIC NG

## 2021-09-17 NOTE — CONSULTS
Patient Name: Erlin Blanco  :1945  76 y.o.    Date of Admission: 2021  Date of Consultation:  21  Encounter Provider: MUKUL Astorga  Place of Service: [facility] CARDIOLOGY  Referring Provider: Cachorro Knox MD  Patient Care Team:  Zamzam John APRN as PCP - General (Family Medicine)  Akhil Rossi MD as Consulting Physician (Pulmonary Disease)  Bertin Perrin MD as Consulting Physician (Orthopedic Surgery)  Marika Arias MD as Consulting Physician (Cardiology)  Bakari Chapman Jr., MD as Consulting Physician (Urology)  Remy Thomas MD as Consulting Physician (Hematology and Oncology)      Chief complaint: fatigue, fever    Reason for consult: bradycardia    History of Present Illness:  Mr. Blanco is a 76 year old male who is followed by Dr. Arias for history of paroxysmal supraventricular tachycardia, coronary artery disease, diabetes mellitus type II, hypertension, prior TIA, chronic diastolic congestive heart failure. He has paroxysmal atrial fibrillation and is anticoagulated with apixaban. He has had preserved LV function in the past. Mild pulmonary hypertension. No significant valve disease. He was seen by Dr. Arias in . He was struggling with lower extremity edema despite titration of furosemide. He was changed to torsemide. Edema improved.     He had COVID in early August. He is recovering however continues to struggle with fatigue. No shortness of breath. Had generalized weakness yesterday and was unable to stand up from chair. He was febrile and presented to the emergency room. Initial EKG unchanged from prior and HR in the 70's.     Today, patient has been noted to be bradycardic with HR 46-48.  It has pretty consistently been in the 40's despite exertion. He is asymptomatic other than fatigue which has been ongoing. He has not had any dizziness or light headedness. No syncope. He was seen at his  hematologist office last week and it was mentioned that his heart rate was low at that time.     Echo 2020  · Left ventricle not well visualized. Calculated EF = 60%. Estimated EF was in agreement with the calculated EF. Estimated EF appears to be in the range of 56 - 60%. Wall motion assessment is limited but the left ventricular systolic function appears to be normal. Normal left ventricular cavity size noted. Wall motion assessment is limited Left ventricular wall thickness is consistent with moderate concentric hypertrophy. Left ventricular diastolic function was indeterminate.  · The valve was poorly visualized but appears trileaflet. The aortic valve is abnormal in structure. There is moderate thickening of the aortic valve. No aortic valve regurgitation is present. No aortic valve stenosis is present  · Mild tricuspid valve regurgitation is present. Estimated right ventricular systolic pressure from tricuspid regurgitation is mildly elevated (35-45 mmHg). Calculated right ventricular systolic pressure from tricuspid regurgitation is 41 mmHg.  · Limited 2D imaging of cardiac valves            Past Medical History:   Diagnosis Date   • Abnormal ECG    • Abnormal stress test    • Abrasion of face 1/29/2020   • Acute bronchitis    • Acute hypokalemia 9/16/2020   • Acute non-recurrent sinusitis 1/13/2020   • Acute pyelonephritis 10/9/2020   • Acute sinusitis    • DORI (acute kidney injury) (CMS/[facility]) 4/28/2017   • Allergic rhinitis    • Aortic root dilation (CMS/[facility])    • Arthritis    • Bacteremia due to Escherichia coli 8/17/2020   • Balanitis 5/16/2021   • Benign enlargement of prostate    • C. difficile colitis 9/16/2020   • Cellulitis of knee, left 5/4/2017   • CHF (congestive heart failure) (CMS/[facility])    • Chronic diastolic congestive heart failure (CMS/[facility])    • Constipation 11/14/2020   • Contusion of face 1/29/2020   • Coronary artery disease    • CVA (cerebral vascular accident) (CMS/[facility]) 2020   • Diminished  pulse     in lower extremities   • Edema    • Elevated hematocrit    • Elevated hemoglobin (CMS/[facility])    • Essential hypertension    • Hearing loss     mala hearing aids   • Heart valve disease    • High risk medication use    • History of back pain    • History of dysuria    • History of echocardiogram    • History of intracranial hemorrhage    • History of scoliosis    • History of stroke    • Hyperlipidemia    • Hypokalemia 1/7/2021   • Injury of toe, left, initial encounter    • Irregular heart rate    • Knee pain, left    • Left bundle branch block    • Leg wound, left    • Leukocytosis 9/16/2020   • Low back pain    • Medicare annual wellness visit, subsequent    • Minor head injury without loss of consciousness 1/29/2020   • Murmur    • Muscle cramps    • Need for hepatitis C screening test    • Paraphimosis 5/16/2021   • Polycythemia    • Polycythemia vera (CMS/[facility])    • Pressure injury of buttock, stage 2 (CMS/[facility]) 9/18/2020   • Prostate hyperplasia without urinary obstruction    • Prostatitis    • Proteinuria    • Pulmonary hypertension (CMS/[facility])    • Sacroiliitis (CMS/[facility]) 10/9/2020   • Scoliosis    • Sepsis due to Escherichia coli (CMS/[facility]) 8/17/2020   • Sepsis with metabolic encephalopathy (CMS/[facility]) 8/17/2020   • Stroke (CMS/[facility])    • SVT (supraventricular tachycardia) (CMS/[facility])    • Tachycardia    • Thrombocytosis (CMS/[facility])    • TIA (transient ischemic attack)     2006   • Type 2 diabetes mellitus (CMS/[facility])    • Type 2 diabetes mellitus with hyperglycemia (CMS/[facility]) 5/16/2021   • Urinary tract infection due to extended-spectrum beta lactamase (ESBL) producing Escherichia coli 8/18/2020   • Valvular heart disease        Past Surgical History:   Procedure Laterality Date   • CARDIAC CATHETERIZATION     • COLONOSCOPY      did Cologuard approx 6/2019 and negative   • HAND SURGERY     • JOINT REPLACEMENT  2014    Righ Knee   • PA TOTAL KNEE ARTHROPLASTY Left 4/27/2017    Procedure: LT TOTAL KNEE ARTHROPLASTY;   Surgeon: Bertin Perrin MD;  Location: [facility];  Service: Orthopedics   • PROSTATE SURGERY      Rezum steam treatment to shrink prostate by dr. guerrero         Prior to Admission medications    Medication Sig Start Date End Date Taking? Authorizing Provider   Accu-Chek FastClix Lancets misc TEST BLOOD SUGAR 1-2 TIMES DAILY AND AS NEEDED 11/20/20  Yes Keira Odonnell MD   acetaminophen (TYLENOL) 500 MG tablet Take 1,000 mg by mouth 2 (Two) Times a Day As Needed for Mild Pain  or Moderate Pain .   Yes ProviderKeira MD   aspirin (ASPIRIN LOW DOSE) 81 MG EC tablet Take 1 tablet by mouth Daily. 6/8/21  Yes Zamzam John APRN   Blood Glucose Monitoring Suppl (Accu-Chek Guide Me) w/Device kit TEST BLOOD SUGAR 1-2 TIMES DAILY AND AS NEEDED 11/20/20  Yes ProviderKeira MD   carvedilol (COREG) 12.5 MG tablet Take 3 tablets by mouth 2 (Two) Times a Day With Meals. 8/18/21  Yes Zamzam John APRN   dilTIAZem CD (CARDIZEM CD) 360 MG 24 hr capsule Take 1 capsule by mouth Daily. 8/23/21  Yes Zamzam John APRN   Eliquis 5 MG tablet tablet TAKE 1 TABLET EVERY 12 HOURS FOR ATRIAL FIBRILLATION 6/28/21  Yes Zamzam John APRN   finasteride (PROSCAR) 5 MG tablet Take 5 mg by mouth Every Night.   Yes Keira Odonnell MD   glimepiride (Amaryl) 2 MG tablet Take 1 tablet by mouth 2 (Two) Times a Day Before Meals.  Patient taking differently: Take 2 mg by mouth 2 (Two) Times a Day Before Meals. 1 tab in am and 1/2 tab in pm. 8/30/21  Yes Zamzam John APRN   glucose blood (OneTouch Ultra) test strip Test blood sugar 1-2 times daily and as needed. 11/14/20  Yes Zamzam John APRN   hydrALAZINE (APRESOLINE) 100 MG tablet Take 1 tablet by mouth 3 (Three) Times a Day. 9/3/21  Yes Zamzam John APRN   hydroxyurea (HYDREA) 500 MG capsule Take 500 mg by mouth Daily.   Yes ProviderKeira MD   losartan (COZAAR) 100 MG tablet TAKE 1 TABLET DAILY 6/8/21  Yes Zamzam John, MUKUL   memantine  (NAMENDA) 5 MG tablet Take 1 tablet by mouth Daily. 21 Yes Inderjit Adams MD   montelukast (SINGULAIR) 10 MG tablet TAKE 1 TABLET DAILY 21  Yes Zamzam John APRN   potassium chloride (KLOR-CON) 20 MEQ CR tablet Take 1 tablet by mouth 3 (Three) Times a Day. 9/3/21  Yes Zamzam John APRN   pravastatin (PRAVACHOL) 40 MG tablet TAKE 1 TABLET DAILY 21  Yes Zamzam John APRN   Probiotic Product (Probiotic Daily) capsule Take 1 capsule by mouth Daily. 20  Yes Zamzam John APRN   terazosin (HYTRIN) 2 MG capsule TAKE 1 CAPSULE EVERY NIGHT 21  Yes Zamzam John APRN   torsemide (DEMADEX) 20 MG tablet Take 1 tablet by mouth 2 (Two) Times a Day. 21  Yes Marika Arias MD   cetirizine (zyrTEC) 10 MG tablet Take 10 mg by mouth Daily.    Provider, MD Keira   fluticasone (Flonase) 50 MCG/ACT nasal spray 2 sprays into the nostril(s) as directed by provider Daily. 20   Zamzam John APRN   Insulin Pen Needle (B-D UF III MINI PEN NEEDLES) 31G X 5 MM misc Inject 1 each under the skin into the appropriate area as directed Daily. 20   Zamzam John APRN   Multiple Vitamins-Minerals (MULTIVITAMIN ADULT PO) Take 1 tablet by mouth Daily.    Provider, MD Keira       Allergies   Allergen Reactions   • Donepezil Hallucinations   • Steglatro [Ertugliflozin] Other (See Comments)     balanitis       Social History     Socioeconomic History   • Marital status:      Spouse name: Not on file   • Number of children: Not on file   • Years of education: Not on file   • Highest education level: Not on file   Tobacco Use   • Smoking status: Former Smoker     Types: Cigarettes     Quit date:      Years since quittin.7   • Smokeless tobacco: Former User   • Tobacco comment: Caffeine daily   Substance and Sexual Activity   • Alcohol use: No   • Drug use: No   • Sexual activity: Defer       Family History   Problem Relation Age of Onset   • Hypertension Mother     • Other Father         ruptured appendix,  when pt. was 14 years old.   • Diabetes Paternal Aunt    • Diabetes Paternal Uncle    • No Known Problems Other    • Migraines Sister    • Stroke Sister    • Dementia Brother        REVIEW OF SYSTEMS:   All systems reviewed.  Pertinent positives identified in HPI.  All other systems are negative.      Objective:     Vitals:    21 0630 21 0700 21 0730 21 1502   BP:   155/74 137/70   BP Location:   Right arm Right arm   Patient Position:   Lying Lying   Pulse: 79 77 67 (!) 45   Resp:   18 18   Temp:   (!) 101.3 °F (38.5 °C) 98.4 °F (36.9 °C)   TempSrc:   Oral Oral   SpO2: 93% 90% 93% 96%   Weight:       Height:         Body mass index is 35.29 kg/m².    Physical Exam:  Constitutional: He is oriented to person, place, and time. He appears well-developed. He does not appear ill.   HENT:   Head: Normocephalic and atraumatic. Head is without contusion.   Right Ear: Hearing normal. No drainage.   Left Ear: Hearing normal. No drainage.   Nose: No nasal deformity. No epistaxis.   Eyes: Lids are normal. Right eye exhibits no exudate. Left eye exhibits no exudate.  Neck: No JVD present. Carotid bruit is not present. No tracheal deviation present. No thyroid mass and no thyromegaly present.   Cardiovascular: Normal rate, regular rhythm and normal heart sounds.    Pulses:       Posterior tibial pulses are 2+ on the right side, and 2+ on the left side.   Pulmonary/Chest: Effort normal and breath sounds normal.   Abdominal: Soft. Normal appearance and bowel sounds are normal. There is no tenderness.   Musculoskeletal: Normal range of motion.        Right shoulder: He exhibits no deformity.        Left shoulder: He exhibits no deformity.   Neurological: He is alert and oriented to person, place, and time. He has normal strength.   Skin: Skin is warm, dry and intact. No rash noted. lower extremity edema left >right  Psychiatric: He has a normal mood and  affect. His behavior is normal. Thought content normal.   Vitals reviewed      Lab Review:     Results from last 7 days   Lab Units 09/17/21  0714 09/16/21  2353 09/16/21  2353   SODIUM mmol/L 139   < > 138   POTASSIUM mmol/L 3.7   < > 3.6   CHLORIDE mmol/L 103   < > 101   CO2 mmol/L 27.1   < > 25.8   BUN mg/dL 15   < > 17   CREATININE mg/dL 1.06   < > 1.18   CALCIUM mg/dL 8.9   < > 9.4   BILIRUBIN mg/dL  --   --  0.5   ALK PHOS U/L  --   --  87   ALT (SGPT) U/L  --   --  17   AST (SGOT) U/L  --   --  19   GLUCOSE mg/dL 114*   < > 166*    < > = values in this interval not displayed.     Results from last 7 days   Lab Units 09/17/21  1514 09/17/21  0714 09/16/21  2353   TROPONIN T ng/mL <0.010 0.020 0.017     Results from last 7 days   Lab Units 09/17/21  0714   WBC 10*3/mm3 10.00   HEMOGLOBIN g/dL 12.8*   HEMATOCRIT % 39.5   PLATELETS 10*3/mm3 308     Results from last 7 days   Lab Units 09/17/21  0714   INR  1.59*     Results from last 7 days   Lab Units 09/17/21  0714   MAGNESIUM mg/dL 2.0             Current Facility-Administered Medications:   •  acetaminophen (TYLENOL) tablet 650 mg, 650 mg, Oral, Q6H PRN, Nura Aviles APRN, 650 mg at 09/17/21 1128  •  apixaban (ELIQUIS) tablet 5 mg, 5 mg, Oral, Q12H, Nura Aviles APRN, 5 mg at 09/17/21 1125  •  aspirin EC tablet 81 mg, 81 mg, Oral, Daily, Nura Aviles APRN, 81 mg at 09/17/21 1132  •  AZITHROMYCIN 500 MG/250 ML 0.9% NS IVPB (vial-mate), 500 mg, Intravenous, Q24H, Nura Aviles APRN  •  carvedilol (COREG) tablet 37.5 mg, 37.5 mg, Oral, BID With Meals, Stefan, Nura A, APRN  •  cefTRIAXone (ROCEPHIN) 1 g in sodium chloride 0.9 % 100 mL IVPB-VTB, 1 g, Intravenous, Q24H, Nura Aviles APRN  •  dextrose (D50W) 25 g/ 50mL Intravenous Solution 25 g, 25 g, Intravenous, Q15 Min PRN, Monika Cherry APRN  •  dextrose (GLUTOSE) oral gel 15 g, 15 g, Oral, Q15 Min PRN, Monika Cherry APRN  •  dilTIAZem CD (CARDIZEM CD) 24 hr capsule 360 mg, 360  mg, Oral, Q24H, Nura Aviles APRN  •  fluticasone (FLONASE) 50 MCG/ACT nasal spray 2 spray, 2 spray, Nasal, Daily, Nura Aviles APRN, 2 spray at 09/17/21 1126  •  glucagon (human recombinant) (GLUCAGEN DIAGNOSTIC) injection 1 mg, 1 mg, Subcutaneous, Q15 Min PRN, Monika Cherry APRN  •  guaiFENesin (MUCINEX) 12 hr tablet 600 mg, 600 mg, Oral, Q12H, Nura Aviles APRN, 600 mg at 09/17/21 1449  •  hydrALAZINE (APRESOLINE) tablet 100 mg, 100 mg, Oral, TID, Nura Aviles APRN, 100 mg at 09/17/21 1125  •  hydroxyurea (HYDREA) capsule 500 mg, 500 mg, Oral, Daily, Nura Aviles APRN, 500 mg at 09/17/21 1125  •  insulin lispro (ADMELOG) injection 0-7 Units, 0-7 Units, Subcutaneous, TID AC, Monika Cherry APRN  •  lactobacillus acidophilus (RISAQUAD) capsule 1 capsule, 1 capsule, Oral, Daily, Nura Aviles APRN, 1 capsule at 09/17/21 1124  •  losartan (COZAAR) tablet 100 mg, 100 mg, Oral, Daily, Nura Aviles APRN, 100 mg at 09/17/21 1125  •  memantine (NAMENDA) tablet 5 mg, 5 mg, Oral, Daily, Nura Aviles APRN, 5 mg at 09/17/21 1124  •  montelukast (SINGULAIR) tablet 10 mg, 10 mg, Oral, Daily, Nura Aviles APRN, 10 mg at 09/17/21 1128  •  nitroglycerin (NITROSTAT) SL tablet 0.4 mg, 0.4 mg, Sublingual, Q5 Min PRN, Monika Cherry APRN  •  ondansetron (ZOFRAN) injection 4 mg, 4 mg, Intravenous, Q6H PRN, Monika Cherry APRN  •  pravastatin (PRAVACHOL) tablet 40 mg, 40 mg, Oral, Daily, Nura Aviles APRN, 40 mg at 09/17/21 1125  •  [COMPLETED] Insert peripheral IV, , , Once **AND** sodium chloride 0.9 % flush 10 mL, 10 mL, Intravenous, PRN, Walter Tran MD  •  sodium chloride 0.9 % flush 10 mL, 10 mL, Intravenous, Q12H, Monika Cherry APRN  •  sodium chloride 0.9 % flush 10 mL, 10 mL, Intravenous, PRN, Monika Cherry APRN  •  sodium chloride 0.9 % infusion, 100 mL/hr, Intravenous, Continuous, Monika Cherry APRN, Last Rate: 100 mL/hr at 09/17/21  1449, 100 mL/hr at 09/17/21 1449  •  terazosin (HYTRIN) capsule 2 mg, 2 mg, Oral, Nightly, Nura Aviles APRN    Assessment and Plan:       Active Hospital Problems    Diagnosis  POA   • **Pneumonia of left lung due to infectious organism [J18.9]  Yes   • Bradycardia [R00.1]  No   • CKD (chronic kidney disease) stage 2, GFR 60-89 ml/min [N18.2]  Yes   • Chronic diastolic congestive heart failure (CMS/[facility]) [I50.32]  Yes   • History of CVA (cerebrovascular accident) [Z86.73]  Not Applicable   • Atrial flutter (CMS/[facility]) [I48.92]  Yes   • Fever in adult [R50.9]  No   • Class 2 severe obesity due to excess calories with serious comorbidity and body mass index (BMI) of 36.0 to 36.9 in adult (CMS/[facility]) [E66.01, Z68.36]  Not Applicable   • Hyperlipidemia [E78.5]  Yes   • Polycythemia vera (CMS/[facility]) [D45]  Yes   • TIA (transient ischemic attack) [G45.9]  Yes   • Supraventricular tachycardia (CMS/[facility]) [I47.1]  Yes   • Type 2 diabetes mellitus with both eyes affected by mild nonproliferative retinopathy without macular edema, without long-term current use of insulin (CMS/[facility]) [E11.3293]  Yes   • Hypertension [I10]  Yes   • Coronary artery disease [I25.10]  Yes      Resolved Hospital Problems   No resolved problems to display.     1. Fever, possible pna - on abx  2. Recent COVID-19 infection  3. Chronic diastolic heart failure - appears to be at baseline.   4. Sinus bradycardia, baseline conduction disease with LBBB. Asymptomatic. No syncope or near syncope. HR on last EKG in our office was 51. May not be too farm from baseline. Will stop diltiazem and reduce carvedilol for now. Check TSH. Given recent COVID 19 infection will also check echocardiogram. Continue to follow HR/rhythm on tele.   5. Troponin above reference. Of unclear significance. No evidence of ACS. He denies angina. Echocardiogram as above.   6. Paroxysmal atrial fibrillation - on apixaban  7. History of Paroxysmal SVT   8. History of stroke  9. Diabetes  mellitus type II    Check TSH and echo.     Cate Villalba, APRN  09/17/21  16:23 EDT

## 2021-09-18 ENCOUNTER — APPOINTMENT (OUTPATIENT)
Dept: CT IMAGING | Facility: HOSPITAL | Age: 76
End: 2021-09-18

## 2021-09-18 ENCOUNTER — APPOINTMENT (OUTPATIENT)
Dept: CARDIOLOGY | Facility: HOSPITAL | Age: 76
End: 2021-09-18

## 2021-09-18 PROBLEM — D64.9 ANEMIA: Status: ACTIVE | Noted: 2021-09-18

## 2021-09-18 LAB
ANION GAP SERPL CALCULATED.3IONS-SCNC: 5.8 MMOL/L (ref 5–15)
ASCENDING AORTA: 3.1 CM
BH CV ECHO MEAS - ACS: 2.8 CM
BH CV ECHO MEAS - AO MAX PG (FULL): 5.8 MMHG
BH CV ECHO MEAS - AO MAX PG: 13.1 MMHG
BH CV ECHO MEAS - AO MEAN PG (FULL): 3 MMHG
BH CV ECHO MEAS - AO MEAN PG: 7 MMHG
BH CV ECHO MEAS - AO ROOT AREA (BSA CORRECTED): 1.4
BH CV ECHO MEAS - AO ROOT AREA: 8.6 CM^2
BH CV ECHO MEAS - AO ROOT DIAM: 3.3 CM
BH CV ECHO MEAS - AO V2 MAX: 181 CM/SEC
BH CV ECHO MEAS - AO V2 MEAN: 129 CM/SEC
BH CV ECHO MEAS - AO V2 VTI: 40.6 CM
BH CV ECHO MEAS - ASC AORTA: 3.1 CM
BH CV ECHO MEAS - AVA(I,A): 2.9 CM^2
BH CV ECHO MEAS - AVA(I,D): 2.9 CM^2
BH CV ECHO MEAS - AVA(V,A): 2.6 CM^2
BH CV ECHO MEAS - AVA(V,D): 2.6 CM^2
BH CV ECHO MEAS - BSA(HAYCOCK): 2.5 M^2
BH CV ECHO MEAS - BSA: 2.4 M^2
BH CV ECHO MEAS - BZI_BMI: 35.3 KILOGRAMS/M^2
BH CV ECHO MEAS - BZI_METRIC_HEIGHT: 182.9 CM
BH CV ECHO MEAS - BZI_METRIC_WEIGHT: 117.9 KG
BH CV ECHO MEAS - EDV(CUBED): 125 ML
BH CV ECHO MEAS - EDV(MOD-SP2): 88 ML
BH CV ECHO MEAS - EDV(MOD-SP4): 124 ML
BH CV ECHO MEAS - EDV(TEICH): 118.2 ML
BH CV ECHO MEAS - EF(CUBED): 62.7 %
BH CV ECHO MEAS - EF(MOD-BP): 59.3 %
BH CV ECHO MEAS - EF(MOD-SP2): 60.2 %
BH CV ECHO MEAS - EF(MOD-SP4): 57.3 %
BH CV ECHO MEAS - EF(TEICH): 54 %
BH CV ECHO MEAS - ESV(CUBED): 46.7 ML
BH CV ECHO MEAS - ESV(MOD-SP2): 35 ML
BH CV ECHO MEAS - ESV(MOD-SP4): 53 ML
BH CV ECHO MEAS - ESV(TEICH): 54.4 ML
BH CV ECHO MEAS - FS: 28 %
BH CV ECHO MEAS - IVS/LVPW: 1.1
BH CV ECHO MEAS - IVSD: 1.7 CM
BH CV ECHO MEAS - LA DIMENSION: 3.9 CM
BH CV ECHO MEAS - LA/AO: 1.2
BH CV ECHO MEAS - LAT PEAK E' VEL: 10 CM/SEC
BH CV ECHO MEAS - LV DIASTOLIC VOL/BSA (35-75): 52.1 ML/M^2
BH CV ECHO MEAS - LV MASS(C)D: 372.3 GRAMS
BH CV ECHO MEAS - LV MASS(C)DI: 156.3 GRAMS/M^2
BH CV ECHO MEAS - LV MAX PG: 7.3 MMHG
BH CV ECHO MEAS - LV MEAN PG: 4 MMHG
BH CV ECHO MEAS - LV SYSTOLIC VOL/BSA (12-30): 22.3 ML/M^2
BH CV ECHO MEAS - LV V1 MAX: 135 CM/SEC
BH CV ECHO MEAS - LV V1 MEAN: 90.6 CM/SEC
BH CV ECHO MEAS - LV V1 VTI: 34.1 CM
BH CV ECHO MEAS - LVIDD: 5 CM
BH CV ECHO MEAS - LVIDS: 3.6 CM
BH CV ECHO MEAS - LVLD AP2: 7.7 CM
BH CV ECHO MEAS - LVLD AP4: 8.7 CM
BH CV ECHO MEAS - LVLS AP2: 6.9 CM
BH CV ECHO MEAS - LVLS AP4: 7.7 CM
BH CV ECHO MEAS - LVOT AREA (M): 3.5 CM^2
BH CV ECHO MEAS - LVOT AREA: 3.5 CM^2
BH CV ECHO MEAS - LVOT DIAM: 2.1 CM
BH CV ECHO MEAS - LVPWD: 1.6 CM
BH CV ECHO MEAS - MED PEAK E' VEL: 6.1 CM/SEC
BH CV ECHO MEAS - MV A MAX VEL: 91.7 CM/SEC
BH CV ECHO MEAS - MV DEC SLOPE: 337 CM/SEC^2
BH CV ECHO MEAS - MV DEC TIME: 0.22 SEC
BH CV ECHO MEAS - MV E MAX VEL: 99.4 CM/SEC
BH CV ECHO MEAS - MV E/A: 1.1
BH CV ECHO MEAS - MV MAX PG: 4.5 MMHG
BH CV ECHO MEAS - MV MEAN PG: 2 MMHG
BH CV ECHO MEAS - MV P1/2T MAX VEL: 114 CM/SEC
BH CV ECHO MEAS - MV P1/2T: 99.1 MSEC
BH CV ECHO MEAS - MV V2 MAX: 106 CM/SEC
BH CV ECHO MEAS - MV V2 MEAN: 65.8 CM/SEC
BH CV ECHO MEAS - MV V2 VTI: 56.3 CM
BH CV ECHO MEAS - MVA P1/2T LCG: 1.9 CM^2
BH CV ECHO MEAS - MVA(P1/2T): 2.2 CM^2
BH CV ECHO MEAS - MVA(VTI): 2.1 CM^2
BH CV ECHO MEAS - PA ACC TIME: 0.1 SEC
BH CV ECHO MEAS - PA MAX PG (FULL): 4.3 MMHG
BH CV ECHO MEAS - PA MAX PG: 6.3 MMHG
BH CV ECHO MEAS - PA PR(ACCEL): 34.9 MMHG
BH CV ECHO MEAS - PA V2 MAX: 125 CM/SEC
BH CV ECHO MEAS - RAP SYSTOLE: 3 MMHG
BH CV ECHO MEAS - RV MAX PG: 2 MMHG
BH CV ECHO MEAS - RV MEAN PG: 1 MMHG
BH CV ECHO MEAS - RV V1 MAX: 70.5 CM/SEC
BH CV ECHO MEAS - RV V1 MEAN: 53.9 CM/SEC
BH CV ECHO MEAS - RV V1 VTI: 17.9 CM
BH CV ECHO MEAS - RVSP: 44 MMHG
BH CV ECHO MEAS - SI(AO): 145.8 ML/M^2
BH CV ECHO MEAS - SI(CUBED): 32.9 ML/M^2
BH CV ECHO MEAS - SI(LVOT): 49.6 ML/M^2
BH CV ECHO MEAS - SI(MOD-SP2): 22.3 ML/M^2
BH CV ECHO MEAS - SI(MOD-SP4): 29.8 ML/M^2
BH CV ECHO MEAS - SI(TEICH): 26.8 ML/M^2
BH CV ECHO MEAS - SV(AO): 347.3 ML
BH CV ECHO MEAS - SV(CUBED): 78.3 ML
BH CV ECHO MEAS - SV(LVOT): 118.1 ML
BH CV ECHO MEAS - SV(MOD-SP2): 53 ML
BH CV ECHO MEAS - SV(MOD-SP4): 71 ML
BH CV ECHO MEAS - SV(TEICH): 63.8 ML
BH CV ECHO MEAS - TAPSE (>1.6): 2.9 CM
BH CV ECHO MEAS - TR MAX VEL: 320 CM/SEC
BH CV ECHO MEASUREMENTS AVERAGE E/E' RATIO: 12.35
BH CV XLRA - RV BASE: 4.8 CM
BH CV XLRA - RV LENGTH: 7.2 CM
BH CV XLRA - RV MID: 3.4 CM
BUN SERPL-MCNC: 14 MG/DL (ref 8–23)
BUN/CREAT SERPL: 14 (ref 7–25)
CALCIUM SPEC-SCNC: 8.9 MG/DL (ref 8.6–10.5)
CHLORIDE SERPL-SCNC: 109 MMOL/L (ref 98–107)
CO2 SERPL-SCNC: 27.2 MMOL/L (ref 22–29)
CREAT SERPL-MCNC: 1 MG/DL (ref 0.76–1.27)
DEPRECATED RDW RBC AUTO: 50.5 FL (ref 37–54)
ERYTHROCYTE [DISTWIDTH] IN BLOOD BY AUTOMATED COUNT: 14.9 % (ref 12.3–15.4)
GFR SERPL CREATININE-BSD FRML MDRD: 73 ML/MIN/1.73
GLUCOSE BLDC GLUCOMTR-MCNC: 101 MG/DL (ref 70–130)
GLUCOSE BLDC GLUCOMTR-MCNC: 111 MG/DL (ref 70–130)
GLUCOSE BLDC GLUCOMTR-MCNC: 132 MG/DL (ref 70–130)
GLUCOSE BLDC GLUCOMTR-MCNC: 184 MG/DL (ref 70–130)
GLUCOSE SERPL-MCNC: 102 MG/DL (ref 65–99)
HBA1C MFR BLD: 7.2 % (ref 4.8–5.6)
HCT VFR BLD AUTO: 34 % (ref 37.5–51)
HGB BLD-MCNC: 11.5 G/DL (ref 13–17.7)
LEFT ATRIUM VOLUME INDEX: 35.1 ML/M2
LV EF 2D ECHO EST: 59 %
MCH RBC QN AUTO: 31.5 PG (ref 26.6–33)
MCHC RBC AUTO-ENTMCNC: 33.8 G/DL (ref 31.5–35.7)
MCV RBC AUTO: 93.2 FL (ref 79–97)
PLATELET # BLD AUTO: 277 10*3/MM3 (ref 140–450)
PMV BLD AUTO: 10.2 FL (ref 6–12)
POTASSIUM SERPL-SCNC: 4.2 MMOL/L (ref 3.5–5.2)
RBC # BLD AUTO: 3.65 10*6/MM3 (ref 4.14–5.8)
SINUS: 3 CM
SODIUM SERPL-SCNC: 142 MMOL/L (ref 136–145)
STJ: 3.1 CM
WBC # BLD AUTO: 8.55 10*3/MM3 (ref 3.4–10.8)

## 2021-09-18 PROCEDURE — 25010000002 PERFLUTREN (DEFINITY) 8.476 MG IN SODIUM CHLORIDE (PF) 0.9 % 10 ML INJECTION: Performed by: NURSE PRACTITIONER

## 2021-09-18 PROCEDURE — 93306 TTE W/DOPPLER COMPLETE: CPT | Performed by: INTERNAL MEDICINE

## 2021-09-18 PROCEDURE — 0 IOPAMIDOL PER 1 ML: Performed by: INTERNAL MEDICINE

## 2021-09-18 PROCEDURE — 83036 HEMOGLOBIN GLYCOSYLATED A1C: CPT | Performed by: INTERNAL MEDICINE

## 2021-09-18 PROCEDURE — 82962 GLUCOSE BLOOD TEST: CPT

## 2021-09-18 PROCEDURE — 99232 SBSQ HOSP IP/OBS MODERATE 35: CPT | Performed by: INTERNAL MEDICINE

## 2021-09-18 PROCEDURE — 93306 TTE W/DOPPLER COMPLETE: CPT

## 2021-09-18 PROCEDURE — 80048 BASIC METABOLIC PNL TOTAL CA: CPT | Performed by: NURSE PRACTITIONER

## 2021-09-18 PROCEDURE — 71275 CT ANGIOGRAPHY CHEST: CPT

## 2021-09-18 PROCEDURE — 85027 COMPLETE CBC AUTOMATED: CPT | Performed by: NURSE PRACTITIONER

## 2021-09-18 PROCEDURE — 25010000002 CEFTRIAXONE PER 250 MG: Performed by: NURSE PRACTITIONER

## 2021-09-18 RX ADMIN — GUAIFENESIN 600 MG: 600 TABLET, EXTENDED RELEASE ORAL at 08:41

## 2021-09-18 RX ADMIN — CEFTRIAXONE 1 G: 1 INJECTION, POWDER, FOR SOLUTION INTRAMUSCULAR; INTRAVENOUS at 22:37

## 2021-09-18 RX ADMIN — CARVEDILOL 6.25 MG: 12.5 TABLET, FILM COATED ORAL at 08:40

## 2021-09-18 RX ADMIN — MONTELUKAST SODIUM 10 MG: 10 TABLET, FILM COATED ORAL at 08:41

## 2021-09-18 RX ADMIN — HYDRALAZINE HYDROCHLORIDE 100 MG: 50 TABLET, FILM COATED ORAL at 08:40

## 2021-09-18 RX ADMIN — CARVEDILOL 6.25 MG: 12.5 TABLET, FILM COATED ORAL at 17:24

## 2021-09-18 RX ADMIN — FLUTICASONE PROPIONATE 2 SPRAY: 50 SPRAY, METERED NASAL at 08:41

## 2021-09-18 RX ADMIN — PERFLUTREN 3 ML: 6.52 INJECTION, SUSPENSION INTRAVENOUS at 11:16

## 2021-09-18 RX ADMIN — IOPAMIDOL 95 ML: 755 INJECTION, SOLUTION INTRAVENOUS at 15:12

## 2021-09-18 RX ADMIN — SODIUM CHLORIDE, PRESERVATIVE FREE 10 ML: 5 INJECTION INTRAVENOUS at 08:45

## 2021-09-18 RX ADMIN — TERAZOSIN HYDROCHLORIDE 2 MG: 2 CAPSULE ORAL at 22:38

## 2021-09-18 RX ADMIN — GUAIFENESIN 600 MG: 600 TABLET, EXTENDED RELEASE ORAL at 22:39

## 2021-09-18 RX ADMIN — HYDRALAZINE HYDROCHLORIDE 100 MG: 50 TABLET, FILM COATED ORAL at 22:38

## 2021-09-18 RX ADMIN — APIXABAN 5 MG: 5 TABLET, FILM COATED ORAL at 22:39

## 2021-09-18 RX ADMIN — APIXABAN 5 MG: 5 TABLET, FILM COATED ORAL at 08:40

## 2021-09-18 RX ADMIN — LOSARTAN POTASSIUM 100 MG: 100 TABLET, FILM COATED ORAL at 08:40

## 2021-09-18 RX ADMIN — ASPIRIN 81 MG: 81 TABLET, COATED ORAL at 08:40

## 2021-09-18 RX ADMIN — PANTOPRAZOLE SODIUM 40 MG: 40 INJECTION, POWDER, FOR SOLUTION INTRAVENOUS at 07:47

## 2021-09-18 RX ADMIN — MEMANTINE HYDROCHLORIDE 5 MG: 5 TABLET, FILM COATED ORAL at 08:40

## 2021-09-18 RX ADMIN — Medication 1 CAPSULE: at 08:41

## 2021-09-18 RX ADMIN — PRAVASTATIN SODIUM 40 MG: 40 TABLET ORAL at 08:41

## 2021-09-18 RX ADMIN — SODIUM CHLORIDE 100 ML/HR: 9 INJECTION, SOLUTION INTRAVENOUS at 17:23

## 2021-09-18 RX ADMIN — HYDRALAZINE HYDROCHLORIDE 100 MG: 50 TABLET, FILM COATED ORAL at 17:24

## 2021-09-18 RX ADMIN — SODIUM CHLORIDE, PRESERVATIVE FREE 10 ML: 5 INJECTION INTRAVENOUS at 22:39

## 2021-09-18 RX ADMIN — HYDROXYUREA 500 MG: 500 CAPSULE ORAL at 08:41

## 2021-09-18 NOTE — PROGRESS NOTES
Name: Erlin Blanco ADMIT: 2021   : 1945  PCP: Zamzam John APRN    MRN: [id number] LOS: 0 days   AGE/SEX: 76 y.o. male  ROOM: [facility]     Subjective   Subjective   Patient reports that he feels better.  Positive occasional cough productive of whitish sputum.  No hemoptysis.  No chest pain.  No fever or chills.  No shortness of breath.  No wheezing.  No palpitation.    Review of Systems  GI.  No abdominal pain.  No nausea or vomiting.  Normal bowel habits without constipation/diarrhea/bleeding per rectum/melena.  .  No dysuria or hematuria.  CNS.  No acute focal neurological deficit symptoms.  No dizziness or loss of consciousness     Objective   Objective   Vital Signs  Temp:  [98.4 °F (36.9 °C)-98.8 °F (37.1 °C)] 98.8 °F (37.1 °C)  Heart Rate:  [46-59] 50  Resp:  [17-18] 18  BP: (140-157)/(65-70) 148/69  SpO2:  [95 %-98 %] 96 %  on   ;   Device (Oxygen Therapy): room air    Intake/Output Summary (Last 24 hours) at 2021 1730  Last data filed at 2021 1324  Gross per 24 hour   Intake 342 ml   Output 1110 ml   Net -768 ml     Body mass index is 35.26 kg/m².      21  2345 21  0314 21  1042   Weight: 113 kg (250 lb) 118 kg (260 lb 3.2 oz) 118 kg (260 lb)     Physical Exam  General.  Middle-aged to and elderly gentleman.  Alert and oriented x3.  No apparent pain distress or diaphoresis.  Normal mood and affect.  Patient is obese.  Eyes.  Pupils equal round and reactive.  S/p left cataract surgery.  No pallor or jaundice.  Normal conjunctivae and lids.  Intact extraocular musculature.  Oral cavity.    Moist mucous membranes with no lesions.  Neck.  Supple.  No JVD.  No lymphadenopathy or thyromegaly.  Cardiovascular.  Regular rate and rhythm.  Grade 2 systolic murmur.  No gallop.  Chest.  Clear to auscultation bilaterally with no added sounds.  Abdomen.  Soft lax.  No tenderness.  No organomegaly.  No guarding or rebound.  Extremities.  +1 left lower extremity edema.   Evidence of bilateral lower extremity venous stasis bilaterally.    CNS.  No acute focal neurological deficits.  Results Review:      Results from last 7 days   Lab Units 09/18/21  0534 09/17/21  0714 09/16/21  2353   SODIUM mmol/L 142 139 138   POTASSIUM mmol/L 4.2 3.7 3.6   CHLORIDE mmol/L 109* 103 101   CO2 mmol/L 27.2 27.1 25.8   BUN mg/dL 14 15 17   CREATININE mg/dL 1.00 1.06 1.18   GLUCOSE mg/dL 102* 114* 166*   CALCIUM mg/dL 8.9 8.9 9.4   AST (SGOT) U/L  --   --  19   ALT (SGPT) U/L  --   --  17     Estimated Creatinine Clearance: 86 mL/min (by C-G formula based on SCr of 1 mg/dL).  Results from last 7 days   Lab Units 09/18/21  0534   HEMOGLOBIN A1C % 7.20*     Results from last 7 days   Lab Units 09/18/21  1555 09/18/21  1048 09/18/21  0616 09/17/21  2028 09/17/21  1611 09/17/21  1123 09/17/21  0604   GLUCOSE mg/dL 132* 101 111 153* 195* 145* 121     Results from last 7 days   Lab Units 09/17/21  1514 09/17/21  0714 09/16/21  2353   TROPONIN T ng/mL <0.010 0.020 0.017         Results from last 7 days   Lab Units 09/17/21  1514   TSH uIU/mL 2.220     Results from last 7 days   Lab Units 09/17/21  0714   MAGNESIUM mg/dL 2.0           Invalid input(s): LDLCALC  Results from last 7 days   Lab Units 09/18/21  0534 09/17/21  0714 09/17/21  0714 09/16/21  2354 09/16/21  2354   WBC 10*3/mm3 8.55  --  10.00  --  11.08*   HEMOGLOBIN g/dL 11.5*  --  12.8*  --  13.6   HEMATOCRIT % 34.0*  --  39.5  --  39.8   PLATELETS 10*3/mm3 277  --  308  --  316   MCV fL 93.2   < > 95.4   < > 93.2   MCH pg 31.5   < > 30.9   < > 31.9   MCHC g/dL 33.8   < > 32.4   < > 34.2   RDW % 14.9   < > 15.0   < > 15.7*   RDW-SD fl 50.5   < > 51.7   < > 51.7   MPV fL 10.2   < > 9.8   < > 9.9   NEUTROPHIL % %  --   --  72.4   < > 72.5   LYMPHOCYTE % %  --   --  11.2*   < > 12.1*   MONOCYTES % %  --   --  13.1*   < > 12.0   EOSINOPHIL % %  --   --  2.0   < > 2.0   BASOPHIL % %  --   --  0.8   < > 0.9   IMM GRAN % %  --   --  0.5   < > 0.5   NEUTROS  ABS 10*3/mm3  --   --  7.24*   < > 8.03*   LYMPHS ABS 10*3/mm3  --   --  1.12   < > 1.34   MONOS ABS 10*3/mm3  --   --  1.31*   < > 1.33*   EOS ABS 10*3/mm3  --   --  0.20   < > 0.22   BASOS ABS 10*3/mm3  --   --  0.08   < > 0.10   IMMATURE GRANS (ABS) 10*3/mm3  --   --  0.05   < > 0.06*   NRBC /100 WBC  --   --  0.0   < > 0.0    < > = values in this interval not displayed.     Results from last 7 days   Lab Units 09/17/21  0714   INR  1.59*         Results from last 7 days   Lab Units 09/16/21  2354 09/16/21  2353   PROCALCITONIN ng/mL  --  0.09   LACTATE mmol/L 0.8  --      Results from last 7 days   Lab Units 09/16/21  2353   CRP mg/dL 7.00*         Results from last 7 days   Lab Units 09/16/21  2353   BLOODCX  No growth at 24 hours  No growth at 24 hours     Results from last 7 days   Lab Units 09/17/21  1139   ADENOVIRUS DETECTION BY PCR  Not Detected   CORONAVIRUS 229E  Not Detected   CORONAVIRUS HKU1  Not Detected   CORONAVIRUS NL63  Not Detected   CORONAVIRUS OC43  Not Detected   HUMAN METAPNEUMOVIRUS  Not Detected   HUMAN RHINOVIRUS/ENTEROVIRUS  Not Detected   INFLUENZA B PCR  Not Detected   PARAINFLUENZA 1  Not Detected   PARAINFLUENZA VIRUS 2  Not Detected   PARAINFLUENZA VIRUS 3  Not Detected   PARAINFLUENZA VIRUS 4  Not Detected   BORDETELLA PERTUSSIS PCR  Not Detected   CHLAMYDOPHILA PNEUMONIAE PCR  Not Detected   MYCOPLAMA PNEUMO PCR  Not Detected   INFLUENZA A PCR  Not Detected   RSV, PCR  Not Detected     Results from last 7 days   Lab Units 09/16/21  2358   NITRITE UA  Negative   WBC UA /HPF 6-12*   BACTERIA UA /HPF None Seen   SQUAM EPITHEL UA /HPF 0-2           Imaging:  Imaging Results (Last 24 Hours)     Procedure Component Value Units Date/Time    CT Angiogram Chest With & Without Contrast [[id number]] Collected: 09/18/21 1602     Updated: 09/18/21 1726    Narrative:      EXAMINATION: CT ANGIOGRAM OF THE CHEST WITH CONTRAST     HISTORY: 76-year-old male with history of an elevated d-dimer  level.     TECHNIQUE: Contiguous axial images were obtained through the chest  following bolus intravenous administration of nonionic contrast. Three-D  reformatted images were produced and presented for interpretation.     COMPARISON: Chest x-ray, 09/17/2021.     FINDINGS: No filling defects are seen within the pulmonary arterial  system to the level of the subsegmental pulmonary arteries. The heart  and great vessels have a normal appearance. Irregular linear and patchy  opacities are seen in the bilateral upper lobes, right middle lobe,  lingula and bilateral lower lobes. A 2.4 x 1.6 cm left paratracheal  lymph node is noted. Other shotty prominent mediastinal lymph nodes are  noted, some of which are partially calcified. No evidence for axillary  or hilar adenopathy is appreciated. No abnormality of the visualized  soft tissue structures of the upper abdomen is appreciated.       Impression:      1. There is no evidence for pulmonary embolism.  2. There are irregular linear and patchy opacities seen in the bilateral  upper lobes, bilateral lower lobes, right middle lobe and lingula that  are most consistent with multilobar pneumonia. Borderline mediastinal  adenopathy is noted.     Radiation dose reduction techniques were utilized, including automated  exposure control and exposure modulation based on body size.     This report was finalized on 9/18/2021 5:23 PM by Dr. Han Bear M.D.                I reviewed the patient's new clinical results / labs / tests / procedures      Assessment/Plan     Active Hospital Problems    Diagnosis  POA   • **Pneumonia of left lung due to infectious organism [J18.9]  Yes   • Bradycardia [R00.1]  No   • CKD (chronic kidney disease) stage 2, GFR 60-89 ml/min [N18.2]  Yes   • Chronic diastolic congestive heart failure (CMS/HCC) [I50.32]  Yes   • History of CVA (cerebrovascular accident) [Z86.73]  Not Applicable   • Atrial flutter (CMS/HCC) [I48.92]  Yes   • Fever in adult  [R50.9]  No   • Class 2 severe obesity due to excess calories with serious comorbidity and body mass index (BMI) of 36.0 to 36.9 in adult (CMS/[facility]) [E66.01, Z68.36]  Not Applicable   • Hyperlipidemia [E78.5]  Yes   • Polycythemia vera (CMS/[facility]) [D45]  Yes   • TIA (transient ischemic attack) [G45.9]  Yes   • Supraventricular tachycardia (CMS/[facility]) [I47.1]  Yes   • Type 2 diabetes mellitus with both eyes affected by mild nonproliferative retinopathy without macular edema, without long-term current use of insulin (CMS/[facility]) [E11.3293]  Yes   • Hypertension [I10]  Yes   • Coronary artery disease [I25.10]  Yes      Resolved Hospital Problems   No resolved problems to display.       1.  Bilateral multifocal pneumonia leading to fever and weakness.    Negative blood cultures x1 now. Covid negative x2.  Respiratory PCR panel is negative.  MRSA screen is negative.  Procalcitonin and lactic acid are normal.    Resolved leukocytosis.  Urine Legionella/Streptococcus antigen is negative.  Awaiting sputum culture. continue IV Rocephin/Mucinex/incentive spirometry.   Positive D-dimer but negative CTA of the chest for PE.  2  Bradycardia and a patient with a history of atrial flutter/hypertension/coronary artery disease/chronic diastolic congestive heart failure.  Patient has no evidence clinically of angina or congestive heart failure.  Will continue the patient on his aspirin/Eliquis/hydralazine/Cozaar/Coreg.    Diltiazem held.  Awaiting 2D echo.  Cardiology saw the patient and agrees with the above plan.   3.  Type 2 diabetes.    A1c 7.2.  Discontinue sliding scale insulin at this time.    4.  History of polycythemia rubra vera/new onset anemia..  Hemoglobin has dropped mostly secondary to hydration.  Will work-up the anemia.  Will continue hydroxyurea.    5.  Stage II chronic renal insufficiency.  Normal renal function today.  Dehydration has resolved with IV fluids.  We will stop IV fluid.    6.  Dyslipidemia.  We will continue  Plavix statin and aspirin.  7.  VTE prophylaxis.  Patient is anticoagulated.      · Discussed with patient/wife.  · Disposition.  Can be discharged tomorrow if afebrile and stable hemoglobin.      Maxine Sandoval MD  [facility]  09/18/21  17:30 EDT

## 2021-09-18 NOTE — PROGRESS NOTES
"Patient Name: Erlin Blanco  :1945  76 y.o.      Patient Care Team:  Zamzam John APRN as PCP - General (Family Medicine)  Akhil Rossi MD as Consulting Physician (Pulmonary Disease)  Bertin Perrin MD as Consulting Physician (Orthopedic Surgery)  Marika Arias MD as Consulting Physician (Cardiology)  Bakari Chapman Jr., MD as Consulting Physician (Urology)  Remy Thomas MD as Consulting Physician (Hematology and Oncology)    Interval History:   Echo performed but not in the computer to be read.    Subjective:  Following for bradycardia    Objective   Vital Signs  Temp:  [98.4 °F (36.9 °C)-98.7 °F (37.1 °C)] 98.7 °F (37.1 °C)  Heart Rate:  [45-59] 50  Resp:  [17-18] 18  BP: (137-157)/(65-70) 140/69    Intake/Output Summary (Last 24 hours) at 2021 1216  Last data filed at 2021 0958  Gross per 24 hour   Intake 222 ml   Output 1010 ml   Net -788 ml     Flowsheet Rows      First Filed Value   Admission Height  182.9 cm (72\") Documented at 2021 2345   Admission Weight  113 kg (250 lb) Documented at 2021 2345          Physical Exam:   General Appearance:    Alert, cooperative, in no acute distress   Lungs:     Clear to auscultation.  Normal respiratory effort and rate.      Heart:   Bradycardic but regular   Chest Wall:    No abnormalities observed   Abdomen:     Soft, nontender, positive bowel sounds.     Extremities:   no cyanosis, clubbing or edema.  No marked joint deformities.  Adequate musculoskeletal strength.       Results Review:    Results from last 7 days   Lab Units 21  0534   SODIUM mmol/L 142   POTASSIUM mmol/L 4.2   CHLORIDE mmol/L 109*   CO2 mmol/L 27.2   BUN mg/dL 14   CREATININE mg/dL 1.00   GLUCOSE mg/dL 102*   CALCIUM mg/dL 8.9     Results from last 7 days   Lab Units 21  1514 21  0714 21  2353   TROPONIN T ng/mL <0.010 0.020 0.017     Results from last 7 days   Lab Units 21  0534   WBC 10*3/mm3 8.55 "   HEMOGLOBIN g/dL 11.5*   HEMATOCRIT % 34.0*   PLATELETS 10*3/mm3 277     Results from last 7 days   Lab Units 09/17/21  0714   INR  1.59*         Results from last 7 days   Lab Units 09/17/21  0714   MAGNESIUM mg/dL 2.0             Medication Review:   apixaban, 5 mg, Oral, Q12H  aspirin, 81 mg, Oral, Daily  carvedilol, 6.25 mg, Oral, BID With Meals  cefTRIAXone, 1 g, Intravenous, Q24H  fluticasone, 2 spray, Nasal, Daily  guaiFENesin, 600 mg, Oral, Q12H  hydrALAZINE, 100 mg, Oral, TID  hydroxyurea, 500 mg, Oral, Daily  [START ON 9/19/2021] influenza vaccine, 0.5 mL, Intramuscular, Once  insulin lispro, 0-7 Units, Subcutaneous, TID AC  lactobacillus acidophilus, 1 capsule, Oral, Daily  losartan, 100 mg, Oral, Daily  memantine, 5 mg, Oral, Daily  montelukast, 10 mg, Oral, Daily  pantoprazole, 40 mg, Intravenous, Q AM  pravastatin, 40 mg, Oral, Daily  sodium chloride, 10 mL, Intravenous, Q12H  terazosin, 2 mg, Oral, Nightly         sodium chloride, 100 mL/hr, Last Rate: 100 mL/hr (09/17/21 2120)        Assessment/Plan     1.  Fever/possible pneumonia.  On antibiotics.  2.  History of COVID-19 infection last month.  Echocardiogram pending.  3.  Sinus bradycardia.  Medications were adjusted yesterday.  He is asymptomatic.  I would continue with what he is taking at this point in time.  No indication for pacemaker.  4.  Paroxysmal atrial fibrillation.  On Eliquis.  Currently sinus rhythm.  5.  History of paroxysmal SVT.  6.  History of stroke  7.  Diabetes.    Echocardiogram is pending.  If his echo was unremarkable I have no problems with him going home today.  I will arrange follow-up in the office in the next few weeks with the nurse practitioner.  I have asked him to monitor his heart rate and blood pressure at home and bring in the list to his follow-up appointment.    Colette Soares MD, [facility]  09/18/21  12:16 EDT

## 2021-09-18 NOTE — PLAN OF CARE
Problem: Adult Inpatient Plan of Care  Goal: Plan of Care Review  Outcome: Ongoing, Progressing  Flowsheets (Taken 9/18/2021 0522)  Progress: improving  Plan of Care Reviewed With: patient  Outcome Summary: Denies any discomfort and/or pain remains on RA, VSS, UA collected and neg.Staff will CTM.   Goal Outcome Evaluation:  Plan of Care Reviewed With: patient        Progress: improving  Outcome Summary: Denies any discomfort and/or pain remains on RA, VSS, UA collected and neg.Staff will CTM.

## 2021-09-19 ENCOUNTER — HOME HEALTH ADMISSION (OUTPATIENT)
Dept: HOME HEALTH SERVICES | Facility: HOME HEALTHCARE | Age: 76
End: 2021-09-19

## 2021-09-19 ENCOUNTER — READMISSION MANAGEMENT (OUTPATIENT)
Dept: CALL CENTER | Facility: HOSPITAL | Age: 76
End: 2021-09-19

## 2021-09-19 VITALS
HEIGHT: 72 IN | SYSTOLIC BLOOD PRESSURE: 169 MMHG | HEART RATE: 52 BPM | TEMPERATURE: 97.3 F | OXYGEN SATURATION: 97 % | WEIGHT: 260 LBS | RESPIRATION RATE: 18 BRPM | BODY MASS INDEX: 35.21 KG/M2 | DIASTOLIC BLOOD PRESSURE: 70 MMHG

## 2021-09-19 LAB
ANION GAP SERPL CALCULATED.3IONS-SCNC: 7.9 MMOL/L (ref 5–15)
BASOPHILS # BLD AUTO: 0.09 10*3/MM3 (ref 0–0.2)
BASOPHILS NFR BLD AUTO: 1.2 % (ref 0–1.5)
BUN SERPL-MCNC: 13 MG/DL (ref 8–23)
BUN/CREAT SERPL: 12.3 (ref 7–25)
CALCIUM SPEC-SCNC: 8.8 MG/DL (ref 8.6–10.5)
CHLORIDE SERPL-SCNC: 109 MMOL/L (ref 98–107)
CO2 SERPL-SCNC: 25.1 MMOL/L (ref 22–29)
CREAT SERPL-MCNC: 1.06 MG/DL (ref 0.76–1.27)
DEPRECATED RDW RBC AUTO: 52.6 FL (ref 37–54)
EOSINOPHIL # BLD AUTO: 0.54 10*3/MM3 (ref 0–0.4)
EOSINOPHIL NFR BLD AUTO: 7.1 % (ref 0.3–6.2)
ERYTHROCYTE [DISTWIDTH] IN BLOOD BY AUTOMATED COUNT: 15.2 % (ref 12.3–15.4)
FERRITIN SERPL-MCNC: 249 NG/ML (ref 30–400)
FOLATE SERPL-MCNC: 16 NG/ML (ref 4.78–24.2)
GFR SERPL CREATININE-BSD FRML MDRD: 68 ML/MIN/1.73
GLUCOSE BLDC GLUCOMTR-MCNC: 110 MG/DL (ref 70–130)
GLUCOSE BLDC GLUCOMTR-MCNC: 186 MG/DL (ref 70–130)
GLUCOSE SERPL-MCNC: 98 MG/DL (ref 65–99)
HCT VFR BLD AUTO: 34.8 % (ref 37.5–51)
HGB BLD-MCNC: 11.6 G/DL (ref 13–17.7)
IMM GRANULOCYTES # BLD AUTO: 0.05 10*3/MM3 (ref 0–0.05)
IMM GRANULOCYTES NFR BLD AUTO: 0.7 % (ref 0–0.5)
IRON 24H UR-MRATE: 65 MCG/DL (ref 59–158)
IRON SATN MFR SERPL: 27 % (ref 20–50)
LYMPHOCYTES # BLD AUTO: 1.17 10*3/MM3 (ref 0.7–3.1)
LYMPHOCYTES NFR BLD AUTO: 15.5 % (ref 19.6–45.3)
MCH RBC QN AUTO: 31.7 PG (ref 26.6–33)
MCHC RBC AUTO-ENTMCNC: 33.3 G/DL (ref 31.5–35.7)
MCV RBC AUTO: 95.1 FL (ref 79–97)
MONOCYTES # BLD AUTO: 0.65 10*3/MM3 (ref 0.1–0.9)
MONOCYTES NFR BLD AUTO: 8.6 % (ref 5–12)
NEUTROPHILS NFR BLD AUTO: 5.06 10*3/MM3 (ref 1.7–7)
NEUTROPHILS NFR BLD AUTO: 66.9 % (ref 42.7–76)
NRBC BLD AUTO-RTO: 0 /100 WBC (ref 0–0.2)
PLATELET # BLD AUTO: 323 10*3/MM3 (ref 140–450)
PMV BLD AUTO: 9.9 FL (ref 6–12)
POTASSIUM SERPL-SCNC: 3.7 MMOL/L (ref 3.5–5.2)
RBC # BLD AUTO: 3.66 10*6/MM3 (ref 4.14–5.8)
RETICS # AUTO: 0.08 10*6/MM3 (ref 0.02–0.13)
RETICS/RBC NFR AUTO: 2.09 % (ref 0.7–1.9)
SODIUM SERPL-SCNC: 142 MMOL/L (ref 136–145)
TIBC SERPL-MCNC: 238 MCG/DL (ref 298–536)
TRANSFERRIN SERPL-MCNC: 160 MG/DL (ref 200–360)
VIT B12 BLD-MCNC: 542 PG/ML (ref 211–946)
WBC # BLD AUTO: 7.56 10*3/MM3 (ref 3.4–10.8)

## 2021-09-19 PROCEDURE — 83540 ASSAY OF IRON: CPT | Performed by: INTERNAL MEDICINE

## 2021-09-19 PROCEDURE — 82728 ASSAY OF FERRITIN: CPT | Performed by: INTERNAL MEDICINE

## 2021-09-19 PROCEDURE — 82962 GLUCOSE BLOOD TEST: CPT

## 2021-09-19 PROCEDURE — 82746 ASSAY OF FOLIC ACID SERUM: CPT | Performed by: INTERNAL MEDICINE

## 2021-09-19 PROCEDURE — 85025 COMPLETE CBC W/AUTO DIFF WBC: CPT | Performed by: INTERNAL MEDICINE

## 2021-09-19 PROCEDURE — 82607 VITAMIN B-12: CPT | Performed by: INTERNAL MEDICINE

## 2021-09-19 PROCEDURE — 85045 AUTOMATED RETICULOCYTE COUNT: CPT | Performed by: INTERNAL MEDICINE

## 2021-09-19 PROCEDURE — 80048 BASIC METABOLIC PNL TOTAL CA: CPT | Performed by: INTERNAL MEDICINE

## 2021-09-19 PROCEDURE — 84466 ASSAY OF TRANSFERRIN: CPT | Performed by: INTERNAL MEDICINE

## 2021-09-19 RX ORDER — PANTOPRAZOLE SODIUM 40 MG/1
40 TABLET, DELAYED RELEASE ORAL
Status: DISCONTINUED | OUTPATIENT
Start: 2021-09-19 | End: 2021-09-19 | Stop reason: HOSPADM

## 2021-09-19 RX ORDER — AZITHROMYCIN 250 MG/1
250 TABLET, FILM COATED ORAL DAILY
Status: DISCONTINUED | OUTPATIENT
Start: 2021-09-20 | End: 2021-09-19 | Stop reason: HOSPADM

## 2021-09-19 RX ORDER — AZITHROMYCIN 250 MG/1
500 TABLET, FILM COATED ORAL DAILY
Status: DISCONTINUED | OUTPATIENT
Start: 2021-09-19 | End: 2021-09-19 | Stop reason: HOSPADM

## 2021-09-19 RX ORDER — CARVEDILOL 6.25 MG/1
6.25 TABLET ORAL 2 TIMES DAILY WITH MEALS
Qty: 30 TABLET | Refills: 0 | Status: SHIPPED | OUTPATIENT
Start: 2021-09-19 | End: 2021-10-06 | Stop reason: SDUPTHER

## 2021-09-19 RX ORDER — AZITHROMYCIN 250 MG/1
TABLET, FILM COATED ORAL
Qty: 6 TABLET | Refills: 0 | OUTPATIENT
Start: 2021-09-19 | End: 2021-11-13

## 2021-09-19 RX ADMIN — PRAVASTATIN SODIUM 40 MG: 40 TABLET ORAL at 08:59

## 2021-09-19 RX ADMIN — APIXABAN 5 MG: 5 TABLET, FILM COATED ORAL at 08:59

## 2021-09-19 RX ADMIN — CARVEDILOL 6.25 MG: 12.5 TABLET, FILM COATED ORAL at 17:21

## 2021-09-19 RX ADMIN — FLUTICASONE PROPIONATE 2 SPRAY: 50 SPRAY, METERED NASAL at 09:00

## 2021-09-19 RX ADMIN — MEMANTINE HYDROCHLORIDE 5 MG: 5 TABLET, FILM COATED ORAL at 08:59

## 2021-09-19 RX ADMIN — Medication 1 CAPSULE: at 08:59

## 2021-09-19 RX ADMIN — LOSARTAN POTASSIUM 100 MG: 100 TABLET, FILM COATED ORAL at 08:59

## 2021-09-19 RX ADMIN — PANTOPRAZOLE SODIUM 40 MG: 40 TABLET, DELAYED RELEASE ORAL at 09:34

## 2021-09-19 RX ADMIN — CARVEDILOL 6.25 MG: 12.5 TABLET, FILM COATED ORAL at 08:59

## 2021-09-19 RX ADMIN — GUAIFENESIN 600 MG: 600 TABLET, EXTENDED RELEASE ORAL at 08:59

## 2021-09-19 RX ADMIN — HYDRALAZINE HYDROCHLORIDE 100 MG: 50 TABLET, FILM COATED ORAL at 08:59

## 2021-09-19 RX ADMIN — HYDROXYUREA 500 MG: 500 CAPSULE ORAL at 08:59

## 2021-09-19 RX ADMIN — MONTELUKAST SODIUM 10 MG: 10 TABLET, FILM COATED ORAL at 08:59

## 2021-09-19 RX ADMIN — ASPIRIN 81 MG: 81 TABLET, COATED ORAL at 09:34

## 2021-09-19 RX ADMIN — SODIUM CHLORIDE, PRESERVATIVE FREE 10 ML: 5 INJECTION INTRAVENOUS at 09:00

## 2021-09-19 NOTE — OUTREACH NOTE
Prep Survey      Responses   [facility] patient discharged from?  [place]   Is LACE score < 7 ?  No   Emergency Room discharge w/ pulse ox?  No   Eligibility  [facility]   Date of Admission  09/16/21   Date of Discharge  09/19/21   Discharge Disposition  Home or Self Care   Discharge diagnosis  Febrile illness,   Pneumonia    Does the patient have one of the following disease processes/diagnoses(primary or secondary)?  COPD/Pneumonia   Does the patient have Home health ordered?  No   Is there a DME ordered?  No   Prep survey completed?  Yes          Britta Rueda RN

## 2021-09-19 NOTE — PLAN OF CARE
Goal Outcome Evaluation:  Plan of Care Reviewed With: patient        Progress: improving  Outcome Summary: Denies any discomfort and/or pain remains on RA, VSS, UA collected and neg.Staff will CTM.

## 2021-09-19 NOTE — DISCHARGE SUMMARY
PHYSICIAN DISCHARGE SUMMARY                                                                        [facility]    Patient Identification:  Name: Erlin Blanco  Age: 76 y.o.  Sex: male  :  1945  MRN: [id number]  Primary Care Physician: Zamzam John APRN    Admit date: 2021  Discharge date and time: 2021     Discharged Condition: good    Discharge Diagnoses:   Febrile illness-possible atypical pneumonia.    Pulmonary infiltrate-possible residual of previous Covid 19 infection versus atypical pneumonia.  Recommend repeat chest x-ray in 2 to 3 weeks.   Type 2 diabetes mellitus with both eyes affected by mild nonproliferative retinopathy without macular edema, without long-term current use of insulin (CMS/[facility])    Hypertension    Coronary artery disease    Supraventricular tachycardia (CMS/[facility])     Hyperlipidemia    Class 2 severe obesity due to excess calories with serious comorbidity and body mass index (BMI) of 36.0 to 36.9 in adult (CMS/[facility])    Polycythemia vera (CMS/[facility])    History of CVA (cerebrovascular accident)    Chronic diastolic congestive heart failure (CMS/[facility])    CKD (chronic kidney disease) stage 2, GFR 60-89 ml/min    Bradycardia    Anemia         Hospital Course:  Pleasant 76-year-old gentleman with multiple medical issues including previous history of COVID-19 presents with generalized weakness and a fever.  Please H&P for full details.  On presentation there is evidence of small infiltrates on the chest x-ray as well as follow-up CT of the chest.  There is concern for pneumonia being the cause of his presentation.  He was started on Rocephin and Zithromax initially.  There is only minimal leukocytosis present however with no left shift and a completely normal procalcitonin level.  There is also lack of pulmonary symptomatology.  He did respond very nicely however with resolution of the fever and the  patient returning to his baseline.  He is afebrile vital signs stable and certainly clinically stable for discharge today.  If this does represent a pneumonia then is can be of atypical variety and on discharge I will send him home on Zithromax.  It certainly could be residual of his previous Covid infection.  This also could represent a viral illness that is running its own course.  I do recommend repeat of the chest x-ray in 2 to 3 weeks.      Consults:     Consults     Date and Time Order Name Status Description    9/17/2021  2:59 PM Inpatient Cardiology Consult Completed     9/17/2021  1:12 AM [facility] (on-call MD unless specified) Details Completed             Discharge Exam:  Afebrile vital signs stable.  Well-developed obese male in no apparent distress.  Lungs clear to auscultation good air movement.  Heart regular rate and rhythm.  Extremities with chronic 1+ pitting edema about the left ankle and trace about the right.  Alert oriented conversant cooperative pleasant.     Disposition:  Home    Patient Instructions:      Discharge Medications      New Medications      Instructions Start Date   azithromycin 250 MG tablet  Commonly known as: Zithromax Z-Satish   Take 2 tablets by mouth on day 1, then 1 tablet daily on days 2-5         Changes to Medications      Instructions Start Date   carvedilol 6.25 MG tablet  Commonly known as: COREG  What changed:   · medication strength  · how much to take   6.25 mg, Oral, 2 Times Daily With Meals      glimepiride 2 MG tablet  Commonly known as: Amaryl  What changed: additional instructions   2 mg, Oral, 2 Times Daily Before Meals         Continue These Medications      Instructions Start Date   Accu-Chek FastClix Lancets misc   TEST BLOOD SUGAR 1-2 TIMES DAILY AND AS NEEDED      Accu-Chek Guide Me w/Device kit   TEST BLOOD SUGAR 1-2 TIMES DAILY AND AS NEEDED      acetaminophen 500 MG tablet  Commonly known as: TYLENOL   1,000 mg, Oral, 2 Times Daily PRN      aspirin 81 MG EC  tablet  Commonly known as: ASPIRIN LOW DOSE   81 mg, Oral, Daily      B-D UF III MINI PEN NEEDLES 31G X 5 MM misc  Generic drug: Insulin Pen Needle   1 each, Subcutaneous, Daily      cetirizine 10 MG tablet  Commonly known as: zyrTEC   10 mg, Oral, Daily      Eliquis 5 MG tablet tablet  Generic drug: apixaban   TAKE 1 TABLET EVERY 12 HOURS FOR ATRIAL FIBRILLATION      finasteride 5 MG tablet  Commonly known as: PROSCAR   5 mg, Oral, Nightly      fluticasone 50 MCG/ACT nasal spray  Commonly known as: Flonase   2 sprays, Nasal, Daily      hydrALAZINE 100 MG tablet  Commonly known as: APRESOLINE   100 mg, Oral, 3 Times Daily      hydroxyurea 500 MG capsule  Commonly known as: HYDREA   500 mg, Oral, Daily      losartan 100 MG tablet  Commonly known as: COZAAR   TAKE 1 TABLET DAILY      memantine 5 MG tablet  Commonly known as: NAMENDA   5 mg, Oral, Daily      montelukast 10 MG tablet  Commonly known as: SINGULAIR   TAKE 1 TABLET DAILY      multivitamin with minerals tablet tablet   1 tablet, Oral, Daily      OneTouch Ultra test strip  Generic drug: glucose blood   Test blood sugar 1-2 times daily and as needed.      potassium chloride 20 MEQ CR tablet  Commonly known as: KLOR-CON   20 mEq, Oral, 3 Times Daily      pravastatin 40 MG tablet  Commonly known as: PRAVACHOL   TAKE 1 TABLET DAILY      Probiotic Daily capsule   1 capsule, Oral, Daily      terazosin 2 MG capsule  Commonly known as: HYTRIN   TAKE 1 CAPSULE EVERY NIGHT      torsemide 20 MG tablet  Commonly known as: DEMADEX   20 mg, Oral, 2 Times Daily         Stop These Medications    dilTIAZem  MG 24 hr capsule  Commonly known as: CARDIZEM CD          Diet Instructions     Diet: Consistent Carbohydrate      Discharge Diet: Consistent Carbohydrate        Future Appointments   Date Time Provider Department Center   9/29/2021  1:00 PM [facility]   2/21/2022 10:00 AM Inderjit Adams MD [facility]     Additional Instructions  for the Follow-ups that You Need to Schedule     Discharge Follow-up with PCP   As directed       Currently Documented PCP:    Zamzam John APRN    PCP Phone Number:    [phone]     Follow Up Details: 1 week           Follow-up Information     Zamzam John APRN .    Specialty: Family Medicine  Why: 1 week  Contact information:  [address]  [facility] [address]  [phone]                 Discharge Order (From admission, onward)     Start     Ordered    09/19/21 1535  Discharge patient  Once     Expected Discharge Date: 09/19/21    Discharge Disposition: Home or Self Care    Physician of Record for Attribution - Please select from Treatment Team: TUCKER ROBB [[id number]]    Review needed by CMO to determine Physician of Record: No       Question Answer Comment   Physician of Record for Attribution - Please select from Treatment Team TUCKER ROBB    Review needed by CMO to determine Physician of Record No        09/19/21 1537                  Total time spent discharging patient including evaluation,post hospitalization follow up,  medication and post hospitalization instructions and education total time exceeds 30 minutes.    Signed:  Tucker Robb MD  9/19/2021  16:14 EDT    EMR Dragon/Transcription disclaimer:   Much of this encounter note is an electronic transcription/translation of spoken language to printed text. The electronic translation of spoken language may permit erroneous, or at times, nonsensical words or phrases to be inadvertently transcribed; Although I have reviewed the note for such errors, some may still exist.

## 2021-09-20 ENCOUNTER — TRANSITIONAL CARE MANAGEMENT TELEPHONE ENCOUNTER (OUTPATIENT)
Dept: CALL CENTER | Facility: HOSPITAL | Age: 76
End: 2021-09-20

## 2021-09-20 ENCOUNTER — TELEPHONE (OUTPATIENT)
Dept: NEUROLOGY | Facility: CLINIC | Age: 76
End: 2021-09-20

## 2021-09-20 NOTE — TELEPHONE ENCOUNTER
Caller: GUNNAR     Relationship: [employer]     Best call back number: [phone]    What orders are you requesting (i.e. lab or imaging): FOR [facility] REFERRAL     In what timeframe would the patient need to come in: ASAP     Additional notes: PT WIFE IS CALLING TO FIND OUT ABOUT REFERRAL THAT DR YIN WAS TO SEND TO [facility] . NO ONE HAS CONTACTED THEM ?       PLEASE ADVISE

## 2021-09-20 NOTE — OUTREACH NOTE
Call Center TCM Note      Responses   [facility] patient discharged from?  [place]   Does the patient have one of the following disease processes/diagnoses(primary or secondary)?  COPD/Pneumonia   Was the primary reason for admission:  Pneumonia   TCM attempt successful?  Yes   Call start time  0841   Call end time  0849   Discharge diagnosis  Febrile illness,   Pneumonia    Person spoke with today (if not patient) and relationship  spouse   Meds reviewed with patient/caregiver?  Yes   Is the patient having any side effects they believe may be caused by any medication additions or changes?  No   Does the patient have all medications ordered at discharge?  Yes   Is the patient taking all medications as directed (includes completed medication regime)?  Yes   Does the patient have a primary care provider?   Yes   Does the patient have an appointment with their PCP or specialist within 7 days of discharge?  Yes   Comments regarding [facility] appt on 9/21/21 at 3:00   Has the patient kept scheduled appointments due by today?  N/A   Pulse Ox monitoring  None   Psychosocial issues?  No   Did the patient receive a copy of their discharge instructions?  Yes   Nursing interventions  Reviewed instructions with patient   What is the patient's perception of their health status since discharge?  Improving   Nursing Interventions  Nurse provided patient education   Are the patient's immunizations up to date?   Yes   Nursing interventions  Educated on importance of maintaining up to date immunizations as advised by provider   Is the patient/caregiver able to teach back signs and symptoms of worsening condition:  Fever/chills, Shortness of breath, Chest pain   Is the patient/caregiver able to teach back importance of completing antibiotic course of treatment?  Yes   TCM call completed?  Yes   Wrap up additional comments  Spouse states pt is doing better. Spouse verified [facility] appt on 9/21/21. No  questions/concerns.          Lisa Breen RN    9/20/2021, 08:50 EDT

## 2021-09-21 ENCOUNTER — OFFICE VISIT (OUTPATIENT)
Dept: FAMILY MEDICINE CLINIC | Facility: CLINIC | Age: 76
End: 2021-09-21

## 2021-09-21 DIAGNOSIS — I10 ESSENTIAL HYPERTENSION: ICD-10-CM

## 2021-09-21 DIAGNOSIS — D64.9 ANEMIA, UNSPECIFIED TYPE: ICD-10-CM

## 2021-09-21 DIAGNOSIS — J18.9 PNEUMONIA OF LEFT LUNG DUE TO INFECTIOUS ORGANISM, UNSPECIFIED PART OF LUNG: ICD-10-CM

## 2021-09-21 DIAGNOSIS — Z09 HOSPITAL DISCHARGE FOLLOW-UP: Primary | ICD-10-CM

## 2021-09-21 DIAGNOSIS — Z86.73 HISTORY OF CVA (CEREBROVASCULAR ACCIDENT): ICD-10-CM

## 2021-09-21 DIAGNOSIS — R00.1 BRADYCARDIA: ICD-10-CM

## 2021-09-21 PROCEDURE — 99495 TRANSJ CARE MGMT MOD F2F 14D: CPT | Performed by: NURSE PRACTITIONER

## 2021-09-21 PROCEDURE — 1111F DSCHRG MED/CURRENT MED MERGE: CPT | Performed by: NURSE PRACTITIONER

## 2021-09-21 NOTE — PATIENT INSTRUCTIONS
Get the chest x-ray in about 2 weeks.  Come in for labs as scheduled next week.  Finish Azithromycin as prescribed.  Continue to monitor your blood sugar once daily before a meal and record results.  Continue to monitor your blood pressure periodically and record results.  Continue to work on healthy diet and exercise.  Follow up pending lab results.  Follow up in 2 months, or sooner if problems or concerns.  Schedule a follow up appointment with cardiology.

## 2021-09-21 NOTE — PROGRESS NOTES
Thais Blanco is a 76 y.o. male.     Chief Complaint   Patient presents with   • Transitional Care Management     BHE- Pneumonia and Fever       History of Present Illness   Patient presents for hospital follow up; pt went to [facility] on 9/16/21 with c/o fever 103.1 and generalized weakness; noted small infiltrates on chest x-ray and CT chest; treated for pneumonia; started on Rocephin and Zithromax and fever resolved; discharged on 9/19/21; pt is feeling better; still has some generalized weakness; no fever; no cough; no SOA; sent home on Zithromax; has had some drainage in throat, clear drainage; will need repeat chest x-ray in 2-3 weeks.     Decreased Carvedilol to 6.25 mg BID and stopped Diltiazem due to decreased HR into 40s at times; has pending follow up appointment with cardiology; monitors BP; no headaches; no orthostasis.    Had recent MRI brain; neurology reviewed and will be seeing stroke specialist with [facility] due to stroke despite taking Eliquis twice daily.     Within 48 business hours after discharge our office contacted him via telephone to coordinate his care and needs.    Date of TCM Phone Call 9/15/2020 10/31/2020 5/16/2021 9/19/2021   [facility]   Date of Admission - 10/10/2020 5/15/2021 9/16/2021   Date of Discharge 9/14/2020 10/31/2020 5/16/2021 9/19/2021   Discharge Disposition Home or Self Care - Home or Self Care Home or Self Care     Risk for Readmission (LACE) Score: 13 (9/19/2021  6:00 AM)      Patient was admitted to [facility].   ALL records were obtained and reviewed.  Date of admission 9/16/21  Date of discharge 9/19/21  Diagnosis: Febrile illness--possible atypical pneumonia; Pulmonary infiltrate--possible residual of previous COVID-19 infection vs atypical pneumonia; DM2; HTN; CAD; SVT; Hyperlipidemia; Polycythemia Vera; history of CVA;  Chronic diastolic congestive heart failure; CKD stage 2; Bradycardia; Anemia  Medications upon discharge: Reviewed and reconciled  Condition stable    Current outpatient and discharge medications have been reconciled for the patient.    I reviewed and requested records labs and diagnostics from the hospital with the patient and family.  The patient is to follow-up with specialist as discussed and directed.  If any problems arise or further questions develop patient is to call or to contact us for any needs.     The following portions of the patient's history were reviewed and updated as appropriate: allergies, current medications, past family history, past medical history, past social history, past surgical history and problem list.    Current Outpatient Medications   Medication Sig Dispense Refill   • Accu-Chek FastClix Lancets misc TEST BLOOD SUGAR 1-2 TIMES DAILY AND AS NEEDED     • acetaminophen (TYLENOL) 500 MG tablet Take 1,000 mg by mouth 2 (Two) Times a Day As Needed for Mild Pain  or Moderate Pain .     • aspirin (ASPIRIN LOW DOSE) 81 MG EC tablet Take 1 tablet by mouth Daily. 90 tablet 2   • azithromycin (Zithromax Z-Satish) 250 MG tablet Take 2 tablets by mouth on day 1, then 1 tablet daily on days 2-5 6 tablet 0   • Blood Glucose Monitoring Suppl (Accu-Chek Guide Me) w/Device kit TEST BLOOD SUGAR 1-2 TIMES DAILY AND AS NEEDED     • carvedilol (COREG) 6.25 MG tablet Take 1 tablet by mouth 2 (Two) Times a Day With Meals. 30 tablet 0   • cetirizine (zyrTEC) 10 MG tablet Take 10 mg by mouth Daily.     • Eliquis 5 MG tablet tablet TAKE 1 TABLET EVERY 12 HOURS FOR ATRIAL FIBRILLATION 180 tablet 1   • finasteride (PROSCAR) 5 MG tablet Take 5 mg by mouth Every Night.     • fluticasone (Flonase) 50 MCG/ACT nasal spray 2 sprays into the nostril(s) as directed by provider Daily. 3 bottle 1   • glimepiride (Amaryl) 2 MG tablet Take 1 tablet by mouth 2 (Two) Times a Day Before Meals. (Patient taking differently: Take 2  mg by mouth 2 (Two) Times a Day Before Meals. 1 tab in am and 1/2 tab in pm.) 180 tablet 1   • glucose blood (OneTouch Ultra) test strip Test blood sugar 1-2 times daily and as needed. 200 each 3   • hydrALAZINE (APRESOLINE) 100 MG tablet Take 1 tablet by mouth 3 (Three) Times a Day. 30 tablet 0   • hydroxyurea (HYDREA) 500 MG capsule Take 500 mg by mouth Daily.     • Insulin Pen Needle (B-D UF III MINI PEN NEEDLES) 31G X 5 MM misc Inject 1 each under the skin into the appropriate area as directed Daily. 100 each 2   • losartan (COZAAR) 100 MG tablet TAKE 1 TABLET DAILY 90 tablet 1   • memantine (NAMENDA) 5 MG tablet Take 1 tablet by mouth Daily. 30 tablet 0   • montelukast (SINGULAIR) 10 MG tablet TAKE 1 TABLET DAILY 90 tablet 1   • Multiple Vitamins-Minerals (MULTIVITAMIN ADULT PO) Take 1 tablet by mouth Daily.     • potassium chloride (KLOR-CON) 20 MEQ CR tablet Take 1 tablet by mouth 3 (Three) Times a Day. 30 tablet 0   • pravastatin (PRAVACHOL) 40 MG tablet TAKE 1 TABLET DAILY 90 tablet 1   • Probiotic Product (Probiotic Daily) capsule Take 1 capsule by mouth Daily. 90 capsule 1   • terazosin (HYTRIN) 2 MG capsule TAKE 1 CAPSULE EVERY NIGHT 90 capsule 1   • torsemide (DEMADEX) 20 MG tablet Take 1 tablet by mouth 2 (Two) Times a Day. 60 tablet 5     No current facility-administered medications for this visit.       Current Outpatient Medications on File Prior to Visit   Medication Sig   • Accu-Chek FastClix Lancets misc TEST BLOOD SUGAR 1-2 TIMES DAILY AND AS NEEDED   • acetaminophen (TYLENOL) 500 MG tablet Take 1,000 mg by mouth 2 (Two) Times a Day As Needed for Mild Pain  or Moderate Pain .   • aspirin (ASPIRIN LOW DOSE) 81 MG EC tablet Take 1 tablet by mouth Daily.   • azithromycin (Zithromax Z-Satish) 250 MG tablet Take 2 tablets by mouth on day 1, then 1 tablet daily on days 2-5   • Blood Glucose Monitoring Suppl (Accu-Chek Guide Me) w/Device kit TEST BLOOD SUGAR 1-2 TIMES DAILY AND AS NEEDED   • carvedilol  (COREG) 6.25 MG tablet Take 1 tablet by mouth 2 (Two) Times a Day With Meals.   • cetirizine (zyrTEC) 10 MG tablet Take 10 mg by mouth Daily.   • Eliquis 5 MG tablet tablet TAKE 1 TABLET EVERY 12 HOURS FOR ATRIAL FIBRILLATION   • finasteride (PROSCAR) 5 MG tablet Take 5 mg by mouth Every Night.   • fluticasone (Flonase) 50 MCG/ACT nasal spray 2 sprays into the nostril(s) as directed by provider Daily.   • glimepiride (Amaryl) 2 MG tablet Take 1 tablet by mouth 2 (Two) Times a Day Before Meals. (Patient taking differently: Take 2 mg by mouth 2 (Two) Times a Day Before Meals. 1 tab in am and 1/2 tab in pm.)   • glucose blood (OneTouch Ultra) test strip Test blood sugar 1-2 times daily and as needed.   • hydrALAZINE (APRESOLINE) 100 MG tablet Take 1 tablet by mouth 3 (Three) Times a Day.   • hydroxyurea (HYDREA) 500 MG capsule Take 500 mg by mouth Daily.   • Insulin Pen Needle (B-D UF III MINI PEN NEEDLES) 31G X 5 MM misc Inject 1 each under the skin into the appropriate area as directed Daily.   • losartan (COZAAR) 100 MG tablet TAKE 1 TABLET DAILY   • memantine (NAMENDA) 5 MG tablet Take 1 tablet by mouth Daily.   • montelukast (SINGULAIR) 10 MG tablet TAKE 1 TABLET DAILY   • Multiple Vitamins-Minerals (MULTIVITAMIN ADULT PO) Take 1 tablet by mouth Daily.   • potassium chloride (KLOR-CON) 20 MEQ CR tablet Take 1 tablet by mouth 3 (Three) Times a Day.   • pravastatin (PRAVACHOL) 40 MG tablet TAKE 1 TABLET DAILY   • Probiotic Product (Probiotic Daily) capsule Take 1 capsule by mouth Daily.   • terazosin (HYTRIN) 2 MG capsule TAKE 1 CAPSULE EVERY NIGHT   • torsemide (DEMADEX) 20 MG tablet Take 1 tablet by mouth 2 (Two) Times a Day.     No current facility-administered medications on file prior to visit.       Past Medical History:   Diagnosis Date   • Abnormal ECG    • Abnormal stress test    • Abrasion of face 1/29/2020   • Acute bronchitis    • Acute hypokalemia 9/16/2020   • Acute non-recurrent sinusitis 1/13/2020    • Acute pyelonephritis 10/9/2020   • Acute sinusitis    • DORI (acute kidney injury) (CMS/[facility]) 4/28/2017   • Allergic rhinitis    • Aortic root dilation (CMS/[facility])    • Arthritis    • Bacteremia due to Escherichia coli 8/17/2020   • Balanitis 5/16/2021   • Benign enlargement of prostate    • C. difficile colitis 9/16/2020   • Cellulitis of knee, left 5/4/2017   • CHF (congestive heart failure) (CMS/[facility])    • Chronic diastolic congestive heart failure (CMS/[facility])    • Constipation 11/14/2020   • Contusion of face 1/29/2020   • Coronary artery disease    • CVA (cerebral vascular accident) (CMS/[facility]) 2020   • Diminished pulse     in lower extremities   • Edema    • Elevated hematocrit    • Elevated hemoglobin (CMS/[facility])    • Essential hypertension    • Hearing loss     mala hearing aids   • Heart valve disease    • High risk medication use    • History of back pain    • History of dysuria    • History of echocardiogram    • History of intracranial hemorrhage    • History of scoliosis    • History of stroke    • Hyperlipidemia    • Hypokalemia 1/7/2021   • Injury of toe, left, initial encounter    • Irregular heart rate    • Knee pain, left    • Left bundle branch block    • Leg wound, left    • Leukocytosis 9/16/2020   • Low back pain    • Medicare annual wellness visit, subsequent    • Minor head injury without loss of consciousness 1/29/2020   • Murmur    • Muscle cramps    • Need for hepatitis C screening test    • Paraphimosis 5/16/2021   • Polycythemia    • Polycythemia vera (CMS/[facility])    • Pressure injury of buttock, stage 2 (CMS/[facility]) 9/18/2020   • Prostate hyperplasia without urinary obstruction    • Prostatitis    • Proteinuria    • Pulmonary hypertension (CMS/[facility])    • Sacroiliitis (CMS/[facility]) 10/9/2020   • Scoliosis    • Sepsis due to Escherichia coli (CMS/[facility]) 8/17/2020   • Sepsis with metabolic encephalopathy (CMS/[facility]) 8/17/2020   • Stroke (CMS/[facility])    • SVT (supraventricular tachycardia) (CMS/[facility])    • Tachycardia     • Thrombocytosis (CMS/[facility])    • TIA (transient ischemic attack)        • Type 2 diabetes mellitus (CMS/[facility])    • Type 2 diabetes mellitus with hyperglycemia (CMS/[facility]) 2021   • Urinary tract infection due to extended-spectrum beta lactamase (ESBL) producing Escherichia coli 2020   • Valvular heart disease        Past Surgical History:   Procedure Laterality Date   • CARDIAC CATHETERIZATION     • COLONOSCOPY      did Cologuard approx 2019 and negative   • HAND SURGERY     • JOINT REPLACEMENT  2014    Righ Knee   • LA TOTAL KNEE ARTHROPLASTY Left 2017    Procedure: LT TOTAL KNEE ARTHROPLASTY;  Surgeon: Bertin Perrin MD;  Location: [facility] OR;  Service: Orthopedics   • PROSTATE SURGERY      Rezum steam treatment to shrink prostate by dr. guerrero       Family History   Problem Relation Age of Onset   • Hypertension Mother    • Other Father         ruptured appendix,  when pt. was 14 years old.   • Diabetes Paternal Aunt    • Diabetes Paternal Uncle    • No Known Problems Other    • Migraines Sister    • Stroke Sister    • Dementia Brother        Social History     Socioeconomic History   • Marital status:      Spouse name: Not on file   • Number of children: Not on file   • Years of education: Not on file   • Highest education level: Not on file   Tobacco Use   • Smoking status: Former Smoker     Types: Cigarettes     Quit date:      Years since quittin.7   • Smokeless tobacco: Former User   • Tobacco comment: Caffeine daily   Substance and Sexual Activity   • Alcohol use: No   • Drug use: No   • Sexual activity: Defer       Review of Systems   Constitutional: Positive for fatigue. Negative for appetite change, chills, fever, unexpected weight gain and unexpected weight loss.   HENT: Positive for postnasal drip. Negative for ear pain, sinus pressure, sore throat and trouble swallowing.    Eyes: Negative for blurred vision.   Respiratory: Negative for cough, chest  "tightness and shortness of breath.    Cardiovascular: Negative for chest pain, palpitations and leg swelling.   Gastrointestinal: Negative for abdominal pain, blood in stool, constipation, diarrhea, GERD and indigestion.   Endocrine: Negative for polydipsia.   Genitourinary: Negative for dysuria and frequency.   Musculoskeletal: Negative for arthralgias (no unexplained arthralgias) and back pain.   Skin: Negative for rash.   Neurological: Negative for syncope. Weakness: see HPI.   Hematological: Bruises/bleeds easily: some with Eliquis.   Psychiatric/Behavioral: Negative for depressed mood. The patient is not nervous/anxious.        Objective   Vitals:    09/21/21 1508 09/21/21 1600   BP: 148/72 138/70   BP Location: Left arm Left arm   Patient Position: Sitting Sitting   Cuff Size: Large Adult    Pulse: 53    Temp: 97.8 °F (36.6 °C)    TempSrc: Infrared    SpO2: 96%    Weight: 117 kg (258 lb 9.6 oz)    Height: 182.9 cm (72\")    PainSc: 0-No pain      Body mass index is 35.07 kg/m².     Physical Exam  Vitals and nursing note reviewed.   Constitutional:       General: He is not in acute distress.     Appearance: He is well-developed and well-groomed. He is not diaphoretic.   HENT:      Head: Normocephalic.      Right Ear: External ear normal. Right ear decreased hearing: has hearing aid. Right ear middle ear effusion: TM dull. Tympanic membrane is not erythematous.      Left Ear: External ear normal. Left ear decreased hearing: has hearing aid. Left ear middle ear effusion: mild. Tympanic membrane is not erythematous.      Nose: Nose normal.      Right Sinus: No maxillary sinus tenderness or frontal sinus tenderness.      Left Sinus: No maxillary sinus tenderness or frontal sinus tenderness.      Mouth/Throat:      Mouth: Mucous membranes are moist.      Pharynx: No oropharyngeal exudate or posterior oropharyngeal erythema.   Eyes:      Conjunctiva/sclera: Conjunctivae normal.   Neck:      Vascular: No carotid bruit. "   Cardiovascular:      Rate and Rhythm: Normal rate and regular rhythm.      Pulses: Normal pulses.      Heart sounds: Murmur heard.   Systolic murmur is present with a grade of 2/6.        Comments: At RUSB  Pulmonary:      Effort: Pulmonary effort is normal. No respiratory distress.      Breath sounds: Examination of the right-lower field reveals decreased breath sounds. Decreased breath sounds (slight RLL) present. No rales.   Abdominal:      General: Bowel sounds are normal.      Palpations: Abdomen is soft.      Tenderness: There is no abdominal tenderness. There is no guarding.   Musculoskeletal:      Cervical back: Normal range of motion and neck supple.      Right lower leg: No edema.      Left lower leg: Left lower leg edema: trace -1+ nonpitting.   Lymphadenopathy:      Cervical: No cervical adenopathy.   Skin:     General: Skin is warm and dry.      Findings: No rash.   Neurological:      Mental Status: He is alert and oriented to person, place, and time.      Gait: Abnormal gait: guarded gait with 4 point cane, mild limping on left.   Psychiatric:         Mood and Affect: Mood normal.         Behavior: Behavior normal.         Thought Content: Thought content normal.         Cognition and Memory: Cognition normal.         Judgment: Judgment normal.       Lab Results   Component Value Date    WBC 7.56 09/19/2021    RBC 3.66 (L) 09/19/2021    HGB 11.6 (L) 09/19/2021    HCT 34.8 (L) 09/19/2021    MCV 95.1 09/19/2021    MCH 31.7 09/19/2021    MCHC 33.3 09/19/2021    RDW 15.2 09/19/2021    RDWSD 52.6 09/19/2021    MPV 9.9 09/19/2021     09/19/2021    NEUTRORELPCT 66.9 09/19/2021    LYMPHORELPCT 15.5 (L) 09/19/2021    MONORELPCT 8.6 09/19/2021    EOSRELPCT 7.1 (H) 09/19/2021    BASORELPCT 1.2 09/19/2021    AUTOIGPER 0.7 (H) 09/19/2021    NEUTROABS 5.06 09/19/2021    LYMPHSABS 1.17 09/19/2021    MONOSABS 0.65 09/19/2021    EOSABS 0.54 (H) 09/19/2021    BASOSABS 0.09 09/19/2021    AUTOIGNUM 0.05  09/19/2021    NRBC 0.0 09/19/2021     Lab Results   Component Value Date    GLUCOSE 98 09/19/2021    BUN 13 09/19/2021    CREATININE 1.06 09/19/2021    EGFRIFNONA 68 09/19/2021    EGFRIFAFRI 58 (L) 08/27/2021    BCR 12.3 09/19/2021    K 3.7 09/19/2021    CO2 25.1 09/19/2021    CALCIUM 8.8 09/19/2021    PROTENTOTREF 6.8 08/27/2021    ALBUMIN 3.90 09/16/2021    LABIL2 1.3 08/27/2021    AST 19 09/16/2021    ALT 17 09/16/2021      Lab Results   Component Value Date    CHLPL 119 04/12/2021    TRIG 80 04/12/2021    HDL 45 04/12/2021    VLDL 16 04/12/2021    LDL 58 04/12/2021     Lab Results   Component Value Date    TSH 2.220 09/17/2021     Lab Results   Component Value Date    HGBA1C 7.20 (H) 09/18/2021 9/17/21 venous doppler LLE: negative for DVT  9/17/21 CTA chest: 1. There is no evidence for pulmonary embolism. 2. There are irregular linear and patchy opacities seen in the bilateral upper lobes, bilateral lower lobes, right middle lobe and lingula that are most consistent with multilobar pneumonia. Borderline mediastinal  adenopathy is noted.    Assessment/Plan .  Problem List Items Addressed This Visit     Hypertension    Current Assessment & Plan     Hypertension is stable with some changes in medications.  Continue current medications.  Ambulatory blood pressure monitoring.  Blood pressure will be reassessed 2 months.  Continue lower dose of Carvedilol 6.25 mg BID.         History of CVA (cerebrovascular accident)    Current Assessment & Plan     Follow up as scheduled with neurology.         Pneumonia of left lung due to infectious organism    Current Assessment & Plan     Improving.  Get follow up chest x-ray in about 2 weeks.  Come in for labs as scheduled next week.  Finish Azithromycin as prescribed.         Relevant Orders    XR Chest PA & Lateral    Basic Metabolic Panel    CBC & Differential    Bradycardia    Current Assessment & Plan     Schedule a follow up appointment with cardiology.         Anemia     Relevant Orders    CBC & Differential      Other Visit Diagnoses     Hospital discharge follow-up    -  Primary    Relevant Orders    Basic Metabolic Panel    CBC & Differential          Return in about 2 months (around 11/21/2021) for Recheck. or sooner if symptoms persist or worsen.     COVID-19 Precautions - Patient was compliant in wearing a mask. When I saw the patient, I used appropriate personal protective equipment (PPE) including mask, gloves, and eye shield (standard procedure).  Hand hygiene was completed before and after seeing the patient.

## 2021-09-22 VITALS
BODY MASS INDEX: 35.03 KG/M2 | TEMPERATURE: 97.8 F | HEART RATE: 53 BPM | HEIGHT: 72 IN | WEIGHT: 258.6 LBS | DIASTOLIC BLOOD PRESSURE: 70 MMHG | OXYGEN SATURATION: 96 % | SYSTOLIC BLOOD PRESSURE: 138 MMHG

## 2021-09-22 LAB
BACTERIA SPEC AEROBE CULT: NORMAL
BACTERIA SPEC AEROBE CULT: NORMAL

## 2021-09-22 NOTE — ASSESSMENT & PLAN NOTE
Improving.  Get follow up chest x-ray in about 2 weeks.  Come in for labs as scheduled next week.  Finish Azithromycin as prescribed.

## 2021-09-22 NOTE — ASSESSMENT & PLAN NOTE
Hypertension is stable with some changes in medications.  Continue current medications.  Ambulatory blood pressure monitoring.  Blood pressure will be reassessed 2 months.  Continue lower dose of Carvedilol 6.25 mg BID.

## 2021-09-22 NOTE — TELEPHONE ENCOUNTER
PT'S WIFE, GUNNAR, CALLED AGAIN TO CHECK ON THE STATUS OF THE REFERRAL TO [facility] FOR STROKE.    PT'S PHONE -[phone]    PLEASE ADVISE.

## 2021-09-29 ENCOUNTER — OFFICE VISIT (OUTPATIENT)
Dept: CARDIOLOGY | Facility: CLINIC | Age: 76
End: 2021-09-29

## 2021-09-29 VITALS
HEART RATE: 62 BPM | BODY MASS INDEX: 34.81 KG/M2 | DIASTOLIC BLOOD PRESSURE: 66 MMHG | WEIGHT: 257 LBS | SYSTOLIC BLOOD PRESSURE: 136 MMHG | HEIGHT: 72 IN

## 2021-09-29 DIAGNOSIS — I48.0 PAROXYSMAL ATRIAL FIBRILLATION (HCC): Primary | ICD-10-CM

## 2021-09-29 DIAGNOSIS — I50.32 CHRONIC DIASTOLIC CONGESTIVE HEART FAILURE (HCC): ICD-10-CM

## 2021-09-29 DIAGNOSIS — I10 ESSENTIAL HYPERTENSION: ICD-10-CM

## 2021-09-29 DIAGNOSIS — I44.7 LBBB (LEFT BUNDLE BRANCH BLOCK): ICD-10-CM

## 2021-09-29 PROCEDURE — 99214 OFFICE O/P EST MOD 30 MIN: CPT | Performed by: NURSE PRACTITIONER

## 2021-09-29 PROCEDURE — 93000 ELECTROCARDIOGRAM COMPLETE: CPT | Performed by: NURSE PRACTITIONER

## 2021-09-29 NOTE — PROGRESS NOTES
Date of Office Visit: 2021  Encounter Provider: MUKUL Méndez  Place of Service: [facility] CARDIOLOGY  Patient Name: Erlin Blanco  :1945    Chief Complaint   Patient presents with   • Atrial Fibrillation   :     HPI: Erlin Blanco is a 76 y.o. male who presents today for follow-up. Old records have been obtained and reviewed by me.  He is a patient of Dr. Arias's with a past medical history significant for hypertension, hyperlipidemia, prior TIA, polycythemia, chronic diastolic CHF, aortic root dilatation, coronary artery disease, and paroxysmal ventricular tachycardia. In 2020, he was hospitalized with acute prostatitis and encephalopathy. During that hospitalization, he developed atrial fibrillation which was treated with diltiazem, digoxin, and carvedilol. He was also started on Eliquis. He was also noted to have a small left hemispheric stroke and small vessel disease with lacunar infarcts.   He was last seen in the office by Dr. Arias in  of this year at which time he reported significant swelling in his lower extremities, especially in the left lower extremity. Dr. Arias switched him to torsemide to which he responded well.  He was maintaining sinus rhythm. He was advised to follow-up in 6 weeks.   On 2021, he presented to the ED with generalized weakness and fevers. Additionally, he was bradycardic with a heart rate in the 40s. Evidently he had Covid in early August and had since been struggling with fatigue. There was initial concern for pneumonia, and he was started on antibiotics. The troponin was above reference but he had no evidence of ACS.  Repeat echocardiogram demonstrated normal LV systolic function and no significant valvular abnormalities. It was unclear if his presentation was related to pneumonia or just a viral illness. However, he was discharged on Zithromax. On 2021, he was discharged. He is here today for  follow-up.   He is feeling better. He denies any shortness of breath, chest pain, palpitations, edema, dizziness, syncope, bleeding difficulties or melena. He brought his blood pressure and heart rate log from home. His blood pressures were all taken prior to his morning medications. Therefore, they were a little on the higher side, ranging from the 130s systolic to the 150s and 160s systolic.    Past Medical History:   Diagnosis Date   • Abnormal ECG    • Abnormal stress test    • Abrasion of face 1/29/2020   • Acute bronchitis    • Acute hypokalemia 9/16/2020   • Acute non-recurrent sinusitis 1/13/2020   • Acute pyelonephritis 10/9/2020   • Acute sinusitis    • DORI (acute kidney injury) (CMS/[facility]) 4/28/2017   • Allergic rhinitis    • Aortic root dilation (CMS/[facility])    • Arthritis    • Bacteremia due to Escherichia coli 8/17/2020   • Balanitis 5/16/2021   • Benign enlargement of prostate    • C. difficile colitis 9/16/2020   • Cellulitis of knee, left 5/4/2017   • CHF (congestive heart failure) (CMS/[facility])    • Chronic diastolic congestive heart failure (CMS/[facility])    • Constipation 11/14/2020   • Contusion of face 1/29/2020   • Coronary artery disease    • CVA (cerebral vascular accident) (CMS/[facility]) 2020   • Diminished pulse     in lower extremities   • Edema    • Elevated hematocrit    • Elevated hemoglobin (CMS/[facility])    • Essential hypertension    • Hearing loss     mala hearing aids   • Heart valve disease    • High risk medication use    • History of back pain    • History of dysuria    • History of echocardiogram    • History of intracranial hemorrhage    • History of scoliosis    • History of stroke    • Hyperlipidemia    • Hypokalemia 1/7/2021   • Injury of toe, left, initial encounter    • Irregular heart rate    • Knee pain, left    • Left bundle branch block    • Leg wound, left    • Leukocytosis 9/16/2020   • Low back pain    • Medicare annual wellness visit, subsequent    • Minor head injury without loss of  consciousness 2020   • Murmur    • Muscle cramps    • Need for hepatitis C screening test    • Paraphimosis 2021   • Polycythemia    • Polycythemia vera (CMS/[facility])    • Pressure injury of buttock, stage 2 (CMS/HCC) 2020   • Prostate hyperplasia without urinary obstruction    • Prostatitis    • Proteinuria    • Pulmonary hypertension (CMS/[facility])    • Sacroiliitis (CMS/HCC) 10/9/2020   • Scoliosis    • Sepsis due to Escherichia coli (CMS/[facility]) 2020   • Sepsis with metabolic encephalopathy (CMS/[facility]) 2020   • Stroke (CMS/[facility])    • SVT (supraventricular tachycardia) (CMS/[facility])    • Tachycardia    • Thrombocytosis (CMS/[facility])    • TIA (transient ischemic attack)        • Type 2 diabetes mellitus (CMS/[facility])    • Type 2 diabetes mellitus with hyperglycemia (CMS/[facility]) 2021   • Urinary tract infection due to extended-spectrum beta lactamase (ESBL) producing Escherichia coli 2020   • Valvular heart disease        Past Surgical History:   Procedure Laterality Date   • CARDIAC CATHETERIZATION     • COLONOSCOPY      did Cologuard approx 2019 and negative   • HAND SURGERY     • JOINT REPLACEMENT      Righ Knee   • MT TOTAL KNEE ARTHROPLASTY Left 2017    Procedure: LT TOTAL KNEE ARTHROPLASTY;  Surgeon: Bertin Perrin MD;  Location: [facility];  Service: Orthopedics   • PROSTATE SURGERY      Rezum steam treatment to shrink prostate by dr. guerrero       Social History     Socioeconomic History   • Marital status:      Spouse name: Not on file   • Number of children: Not on file   • Years of education: Not on file   • Highest education level: Not on file   Tobacco Use   • Smoking status: Former Smoker     Types: Cigarettes     Quit date:      Years since quittin.7   • Smokeless tobacco: Former User   • Tobacco comment: Caffeine daily   Substance and Sexual Activity   • Alcohol use: No   • Drug use: No   • Sexual activity: Defer       Family History   Problem Relation Age  of Onset   • Hypertension Mother    • Other Father         ruptured appendix,  when pt. was 14 years old.   • Diabetes Paternal Aunt    • Diabetes Paternal Uncle    • No Known Problems Other    • Migraines Sister    • Stroke Sister    • Dementia Brother        Review of Systems   Constitutional: Negative.   Cardiovascular: Negative.  Negative for chest pain, dyspnea on exertion, leg swelling, orthopnea, paroxysmal nocturnal dyspnea and syncope.   Respiratory: Negative.    Hematologic/Lymphatic: Negative for bleeding problem.   Musculoskeletal: Negative for falls.   Gastrointestinal: Negative for melena.   Neurological: Negative for dizziness and light-headedness.       Allergies   Allergen Reactions   • Donepezil Hallucinations   • Steglatro [Ertugliflozin] Other (See Comments)     balanitis         Current Outpatient Medications:   •  Accu-Chek FastClix Lancets misc, TEST BLOOD SUGAR 1-2 TIMES DAILY AND AS NEEDED, Disp: , Rfl:   •  acetaminophen (TYLENOL) 500 MG tablet, Take 1,000 mg by mouth 2 (Two) Times a Day As Needed for Mild Pain  or Moderate Pain ., Disp: , Rfl:   •  aspirin (ASPIRIN LOW DOSE) 81 MG EC tablet, Take 1 tablet by mouth Daily., Disp: 90 tablet, Rfl: 2  •  azithromycin (Zithromax Z-Satish) 250 MG tablet, Take 2 tablets by mouth on day 1, then 1 tablet daily on days 2-5, Disp: 6 tablet, Rfl: 0  •  Blood Glucose Monitoring Suppl (Accu-Chek Guide Me) w/Device kit, TEST BLOOD SUGAR 1-2 TIMES DAILY AND AS NEEDED, Disp: , Rfl:   •  carvedilol (COREG) 6.25 MG tablet, Take 1 tablet by mouth 2 (Two) Times a Day With Meals., Disp: 30 tablet, Rfl: 0  •  cetirizine (zyrTEC) 10 MG tablet, Take 10 mg by mouth Daily., Disp: , Rfl:   •  Eliquis 5 MG tablet tablet, TAKE 1 TABLET EVERY 12 HOURS FOR ATRIAL FIBRILLATION, Disp: 180 tablet, Rfl: 1  •  finasteride (PROSCAR) 5 MG tablet, Take 5 mg by mouth Every Night., Disp: , Rfl:   •  fluticasone (Flonase) 50 MCG/ACT nasal spray, 2 sprays into the nostril(s) as  "directed by provider Daily., Disp: 3 bottle, Rfl: 1  •  glimepiride (Amaryl) 2 MG tablet, Take 1 tablet by mouth 2 (Two) Times a Day Before Meals. (Patient taking differently: Take 2 mg by mouth 2 (Two) Times a Day Before Meals. 1 tab in am and 1/2 tab in pm.), Disp: 180 tablet, Rfl: 1  •  glucose blood (OneTouch Ultra) test strip, Test blood sugar 1-2 times daily and as needed., Disp: 200 each, Rfl: 3  •  hydrALAZINE (APRESOLINE) 100 MG tablet, Take 1 tablet by mouth 3 (Three) Times a Day., Disp: 30 tablet, Rfl: 0  •  hydroxyurea (HYDREA) 500 MG capsule, Take 500 mg by mouth Daily., Disp: , Rfl:   •  Insulin Pen Needle (B-D UF III MINI PEN NEEDLES) 31G X 5 MM misc, Inject 1 each under the skin into the appropriate area as directed Daily., Disp: 100 each, Rfl: 2  •  losartan (COZAAR) 100 MG tablet, TAKE 1 TABLET DAILY, Disp: 90 tablet, Rfl: 1  •  memantine (NAMENDA) 5 MG tablet, Take 1 tablet by mouth Daily., Disp: 30 tablet, Rfl: 0  •  montelukast (SINGULAIR) 10 MG tablet, TAKE 1 TABLET DAILY, Disp: 90 tablet, Rfl: 1  •  Multiple Vitamins-Minerals (MULTIVITAMIN ADULT PO), Take 1 tablet by mouth Daily., Disp: , Rfl:   •  potassium chloride (KLOR-CON) 20 MEQ CR tablet, Take 1 tablet by mouth 3 (Three) Times a Day., Disp: 30 tablet, Rfl: 0  •  pravastatin (PRAVACHOL) 40 MG tablet, TAKE 1 TABLET DAILY, Disp: 90 tablet, Rfl: 1  •  Probiotic Product (Probiotic Daily) capsule, Take 1 capsule by mouth Daily., Disp: 90 capsule, Rfl: 1  •  terazosin (HYTRIN) 2 MG capsule, TAKE 1 CAPSULE EVERY NIGHT, Disp: 90 capsule, Rfl: 1  •  torsemide (DEMADEX) 20 MG tablet, Take 1 tablet by mouth 2 (Two) Times a Day., Disp: 60 tablet, Rfl: 5      Objective:     Vitals:    09/29/21 0906   BP: 136/66   Pulse: 62   Weight: 117 kg (257 lb)   Height: 182.9 cm (72\")     Body mass index is 34.86 kg/m².    PHYSICAL EXAM:    Neck:      Vascular: No JVD.   Pulmonary:      Effort: Pulmonary effort is normal.      Breath sounds: Normal breath " sounds.   Cardiovascular:      Normal rate. Regular rhythm.      Murmurs: There is no murmur.      No gallop. No click. No rub.   Pulses:     Intact distal pulses.           ECG 12 Lead    Date/Time: 9/29/2021 9:16 AM  Performed by: Julia Kulkarni APRN  Authorized by: Julia Kulkarni APRN   Comparison: compared with previous ECG from 9/17/2021  Similar to previous ECG  Rhythm: sinus rhythm  Rate: normal  BPM: 62  Conduction: left bundle branch block    Clinical impression: abnormal EKG  Comments: Indication: Paroxysmal atrial fibrillation              Assessment:       Diagnosis Plan   1. Paroxysmal atrial fibrillation (CMS/HCC)  ECG 12 Lead   2. Chronic diastolic congestive heart failure (CMS/HCC)     3. LBBB (left bundle branch block)     4. Essential hypertension       Orders Placed This Encounter   Procedures   • ECG 12 Lead     This order was created via procedure documentation     Order Specific Question:   Release to patient     Answer:   Immediate          Plan:       1.  Atrial fibrillation.  He was noted to be bradycardic during his recent hospitalization.  Subsequently, the carvedilol dose was decreased.  His rate is 62 today.  He is anticoagulated on Eliquis.      2.  Chronic diastolic CHF.  Recent echocardiogram demonstrated normal LV systolic function. He appears euvolemic on exam on the current dose of torsemide.      3. Hypertension. His blood pressure looks stable in the office today. I instructed him to take his blood pressures at home 1 to 2 hours after taking his medication. We discussed a blood pressure goal of less than 140/90. Continue current regimen including carvedilol, hydralazine, losartan, and Terazosin      I think he is doing well. He denies any symptoms of angina or heart failure. I am not going to make any changes, and he will follow-up with Dr. Arias in 3 months.      As always, it has been a pleasure to participate in your patient's care.      Sincerely,         Julia  Cayla, APRN

## 2021-09-30 RX ORDER — MEMANTINE HYDROCHLORIDE 5 MG/1
5 TABLET ORAL DAILY
Qty: 30 TABLET | Refills: 0 | Status: SHIPPED | OUTPATIENT
Start: 2021-09-30 | End: 2021-10-28

## 2021-10-05 ENCOUNTER — HOSPITAL ENCOUNTER (OUTPATIENT)
Dept: GENERAL RADIOLOGY | Facility: HOSPITAL | Age: 76
Discharge: HOME | End: 2021-10-05
Admitting: NURSE PRACTITIONER

## 2021-10-05 DIAGNOSIS — J18.9 PNEUMONIA OF LEFT LUNG DUE TO INFECTIOUS ORGANISM, UNSPECIFIED PART OF LUNG: ICD-10-CM

## 2021-10-05 PROCEDURE — 71046 X-RAY EXAM CHEST 2 VIEWS: CPT

## 2021-10-06 RX ORDER — CARVEDILOL 6.25 MG/1
6.25 TABLET ORAL 2 TIMES DAILY WITH MEALS
Qty: 180 TABLET | Refills: 3 | Status: SHIPPED | OUTPATIENT
Start: 2021-10-06 | End: 2021-10-07 | Stop reason: SDUPTHER

## 2021-10-06 RX ORDER — CARVEDILOL 6.25 MG/1
6.25 TABLET ORAL 2 TIMES DAILY WITH MEALS
Qty: 30 TABLET | Refills: 0 | Status: SHIPPED | OUTPATIENT
Start: 2021-10-06 | End: 2021-10-06 | Stop reason: SDUPTHER

## 2021-10-07 RX ORDER — CARVEDILOL 6.25 MG/1
6.25 TABLET ORAL 2 TIMES DAILY WITH MEALS
Qty: 60 TABLET | Refills: 0 | Status: SHIPPED | OUTPATIENT
Start: 2021-10-07 | End: 2022-03-24 | Stop reason: ALTCHOICE

## 2021-10-22 RX ORDER — TORSEMIDE 20 MG/1
20 TABLET ORAL 2 TIMES DAILY
Qty: 60 TABLET | Refills: 5 | Status: SHIPPED | OUTPATIENT
Start: 2021-10-22 | End: 2021-10-25 | Stop reason: SDUPTHER

## 2021-10-25 RX ORDER — TORSEMIDE 20 MG/1
20 TABLET ORAL 2 TIMES DAILY
Qty: 60 TABLET | Refills: 5 | Status: SHIPPED | OUTPATIENT
Start: 2021-10-25 | End: 2022-09-02

## 2021-10-28 RX ORDER — MEMANTINE HYDROCHLORIDE 5 MG/1
5 TABLET ORAL DAILY
Qty: 30 TABLET | Refills: 3 | Status: SHIPPED | OUTPATIENT
Start: 2021-10-28 | End: 2022-04-25

## 2021-11-22 ENCOUNTER — OFFICE VISIT (OUTPATIENT)
Dept: FAMILY MEDICINE CLINIC | Facility: CLINIC | Age: 76
End: 2021-11-22

## 2021-11-22 DIAGNOSIS — I10 PRIMARY HYPERTENSION: ICD-10-CM

## 2021-11-22 DIAGNOSIS — T16.1XXD FOREIGN BODY OF RIGHT EAR, SUBSEQUENT ENCOUNTER: ICD-10-CM

## 2021-11-22 DIAGNOSIS — I10 ESSENTIAL HYPERTENSION: ICD-10-CM

## 2021-11-22 DIAGNOSIS — N40.0 PROSTATE HYPERPLASIA WITHOUT URINARY OBSTRUCTION: ICD-10-CM

## 2021-11-22 DIAGNOSIS — Z86.73 HISTORY OF CVA (CEREBROVASCULAR ACCIDENT): ICD-10-CM

## 2021-11-22 DIAGNOSIS — E78.2 MIXED HYPERLIPIDEMIA: ICD-10-CM

## 2021-11-22 DIAGNOSIS — E11.3293 TYPE 2 DIABETES MELLITUS WITH BOTH EYES AFFECTED BY MILD NONPROLIFERATIVE RETINOPATHY WITHOUT MACULAR EDEMA, WITHOUT LONG-TERM CURRENT USE OF INSULIN (HCC): Primary | ICD-10-CM

## 2021-11-22 DIAGNOSIS — I48.92 ATRIAL FLUTTER WITH RAPID VENTRICULAR RESPONSE (HCC): ICD-10-CM

## 2021-11-22 DIAGNOSIS — R41.3 MEMORY DIFFICULTIES: ICD-10-CM

## 2021-11-22 PROCEDURE — 99214 OFFICE O/P EST MOD 30 MIN: CPT | Performed by: NURSE PRACTITIONER

## 2021-11-22 RX ORDER — DILTIAZEM HYDROCHLORIDE 360 MG/1
CAPSULE, EXTENDED RELEASE ORAL
Qty: 90 CAPSULE | Refills: 3 | Status: SHIPPED | OUTPATIENT
Start: 2021-11-22 | End: 2022-03-22

## 2021-11-22 RX ORDER — TERAZOSIN 2 MG/1
CAPSULE ORAL
Qty: 90 CAPSULE | Refills: 3 | Status: SHIPPED | OUTPATIENT
Start: 2021-11-22 | End: 2022-01-10

## 2021-11-22 RX ORDER — PRAVASTATIN SODIUM 40 MG
TABLET ORAL
Qty: 90 TABLET | Refills: 3 | Status: SHIPPED | OUTPATIENT
Start: 2021-11-22 | End: 2022-01-10

## 2021-11-22 RX ORDER — LOSARTAN POTASSIUM 100 MG/1
TABLET ORAL
Qty: 90 TABLET | Refills: 3 | Status: SHIPPED | OUTPATIENT
Start: 2021-11-22 | End: 2022-03-25

## 2021-11-22 NOTE — PROGRESS NOTES
Subjective   Erlin Blanco is a 76 y.o. male.     Chief Complaint   Patient presents with   • Diabetes       History of Present Illness   Patient presents for follow up DM2: takes Glimepiride 1 tab with morning meal and 1/2 tab with evening meal; last A1c 7.2%; monitors blood sugar, typically runs about 120s; no low blood sugars since has been off insulin; watches intake of carbs/sugars some, could do better; not much exercise; no numbness or tingling.    F/U HTN: takes Hydralazine TID, Carvedilol and Torsemide twice daily, and Losartan daily; also takes KCL daily; weight is up today, no decrease in urine output; has chronic swelling of left leg since had knee replacement; had normal LLE venous doppler on 9/17/21; overall swelling better than was prior to Torsemide; monitors BP and typically runs 130-140s/70s; no headaches; no orthostasis.    F/U Hyperlipidemia: takes Pravastatin daily; no myalgias; not fasting today.    F/U stroke/memory: saw neurology; started on Namenda; had side effects with Aricept; will be seeing stroke specialist at [facility] due to stroke despite taking Eliquis twice daily; recommended started Fluoxetine to help as well; denies anxiety; feeling down or sadness; no problems with sleep; no SI/HI.    The following portions of the patient's history were reviewed and updated as appropriate: allergies, current medications, past family history, past medical history, past social history, past surgical history and problem list.    Current Outpatient Medications on File Prior to Visit   Medication Sig   • Accu-Chek FastClix Lancets misc TEST BLOOD SUGAR 1-2 TIMES DAILY AND AS NEEDED   • Accu-Chek Guide test strip    • acetaminophen (TYLENOL) 500 MG tablet Take 1,000 mg by mouth 2 (Two) Times a Day As Needed for Mild Pain  or Moderate Pain .   • apixaban (Eliquis) 5 MG tablet tablet Take 1 tablet by mouth Every 12 (Twelve) Hours.   • aspirin (ASPIRIN LOW DOSE) 81 MG EC tablet Take 1 tablet by mouth  Daily.   • Blood Glucose Monitoring Suppl (Accu-Chek Guide Me) w/Device kit TEST BLOOD SUGAR 1-2 TIMES DAILY AND AS NEEDED   • carvedilol (COREG) 6.25 MG tablet Take 1 tablet by mouth 2 (Two) Times a Day With Meals.   • cetirizine (zyrTEC) 10 MG tablet Take 10 mg by mouth Daily.   • finasteride (PROSCAR) 5 MG tablet Take 5 mg by mouth Every Night.   • fluticasone (Flonase) 50 MCG/ACT nasal spray 2 sprays into the nostril(s) as directed by provider Daily.   • Fluzone High-Dose Quadrivalent 0.7 ML suspension prefilled syringe injection    • glimepiride (Amaryl) 2 MG tablet Take 1 tablet by mouth 2 (Two) Times a Day Before Meals. (Patient taking differently: Take 2 mg by mouth 2 (Two) Times a Day Before Meals. 1 tab in am and 1/2 tab in pm.)   • glucose blood (OneTouch Ultra) test strip Test blood sugar 1-2 times daily and as needed.   • hydrALAZINE (APRESOLINE) 100 MG tablet Take 1 tablet by mouth 3 (Three) Times a Day.   • hydroxyurea (HYDREA) 500 MG capsule Take 500 mg by mouth Daily.   • Insulin Pen Needle (B-D UF III MINI PEN NEEDLES) 31G X 5 MM misc Inject 1 each under the skin into the appropriate area as directed Daily.   • memantine (NAMENDA) 5 MG tablet Take 1 tablet by mouth Daily.   • montelukast (SINGULAIR) 10 MG tablet TAKE 1 TABLET DAILY   • Multiple Vitamins-Minerals (MULTIVITAMIN ADULT PO) Take 1 tablet by mouth Daily.   • potassium chloride (KLOR-CON) 20 MEQ CR tablet Take 1 tablet by mouth 3 (Three) Times a Day.   • Probiotic Product (Probiotic Daily) capsule Take 1 capsule by mouth Daily.   • torsemide (DEMADEX) 20 MG tablet Take 1 tablet by mouth 2 (Two) Times a Day.     No current facility-administered medications on file prior to visit.        Past Medical History:   Diagnosis Date   • Abnormal ECG    • Abnormal stress test    • Abrasion of face 1/29/2020   • Acute bronchitis    • Acute hypokalemia 9/16/2020   • Acute non-recurrent sinusitis 1/13/2020   • Acute pyelonephritis 10/9/2020   • Acute  sinusitis    • DORI (acute kidney injury) ([facility]) 4/28/2017   • Allergic rhinitis    • Aortic root dilation ([facility])    • Arthritis    • Bacteremia due to Escherichia coli 8/17/2020   • Balanitis 5/16/2021   • Benign enlargement of prostate    • C. difficile colitis 9/16/2020   • Cellulitis of knee, left 5/4/2017   • CHF (congestive heart failure) ([facility])    • Chronic diastolic congestive heart failure ([facility])    • Constipation 11/14/2020   • Contusion of face 1/29/2020   • Coronary artery disease    • CVA (cerebral vascular accident) ([facility]) 2020   • Diminished pulse     in lower extremities   • Edema    • Elevated hematocrit    • Elevated hemoglobin ([facility])    • Essential hypertension    • Hearing loss     mala hearing aids   • Heart valve disease    • High risk medication use    • History of back pain    • History of dysuria    • History of echocardiogram    • History of intracranial hemorrhage    • History of scoliosis    • History of stroke    • Hyperlipidemia    • Hypokalemia 1/7/2021   • Injury of toe, left, initial encounter    • Irregular heart rate    • Knee pain, left    • Left bundle branch block    • Leg wound, left    • Leukocytosis 9/16/2020   • Low back pain    • Medicare annual wellness visit, subsequent    • Minor head injury without loss of consciousness 1/29/2020   • Murmur    • Muscle cramps    • Need for hepatitis C screening test    • Paraphimosis 5/16/2021   • Pneumonia of left lung due to infectious organism 9/17/2021   • Polycythemia    • Polycythemia vera ([facility])    • Pressure injury of buttock, stage 2 ([facility]) 9/18/2020   • Prostate hyperplasia without urinary obstruction    • Prostatitis    • Proteinuria    • Pulmonary hypertension ([facility])    • Sacroiliitis ([facility]) 10/9/2020   • Scoliosis    • Sepsis due to Escherichia coli ([facility]) 8/17/2020   • Sepsis with metabolic encephalopathy ([facility]) 8/17/2020   • Stroke ([facility])    • SVT (supraventricular tachycardia) ([facility])    • Tachycardia    • Thrombocytosis    • TIA  (transient ischemic attack)        • Type 2 diabetes mellitus (HCC)    • Type 2 diabetes mellitus with hyperglycemia (HCC) 2021   • Urinary tract infection due to extended-spectrum beta lactamase (ESBL) producing Escherichia coli 2020   • Valvular heart disease        Past Surgical History:   Procedure Laterality Date   • CARDIAC CATHETERIZATION     • COLONOSCOPY      did Cologuard approx 2019 and negative   • HAND SURGERY     • JOINT REPLACEMENT      Righ Knee   • IL TOTAL KNEE ARTHROPLASTY Left 2017    Procedure: LT TOTAL KNEE ARTHROPLASTY;  Surgeon: Bertin Perrin MD;  Location: [facility] OR;  Service: Orthopedics   • PROSTATE SURGERY      Rezum steam treatment to shrink prostate by dr. guerrero       Family History   Problem Relation Age of Onset   • Hypertension Mother    • Other Father         ruptured appendix,  when pt. was 14 years old.   • Diabetes Paternal Aunt    • Diabetes Paternal Uncle    • No Known Problems Other    • Migraines Sister    • Stroke Sister    • Dementia Brother        Social History     Socioeconomic History   • Marital status:    Tobacco Use   • Smoking status: Former Smoker     Types: Cigarettes     Quit date:      Years since quittin.9   • Smokeless tobacco: Former User   • Tobacco comment: Caffeine daily   Vaping Use   • Vaping Use: Never used   Substance and Sexual Activity   • Alcohol use: No   • Drug use: No   • Sexual activity: Defer       Review of Systems   Constitutional: Negative for appetite change, chills, fatigue, fever and unexpected weight loss. Weight gain: has gained some weight since has not been as active, plans to start using stationary bike; considering Silver Sneakers.   HENT: Negative for ear pain (recently had to get piece of hearing aid removed that came off in ear; feels a little sore), sinus pressure, sore throat and trouble swallowing.    Eyes: Negative for blurred vision.   Respiratory: Negative for  "cough, chest tightness and shortness of breath.    Cardiovascular: Negative for chest pain and palpitations.   Gastrointestinal: Negative for abdominal pain, blood in stool, constipation, diarrhea (takes Probiotic and helps), GERD and indigestion.   Endocrine: Negative for polydipsia.   Genitourinary: Negative for dysuria, frequency and hematuria. Urgency: some on occasion.   Musculoskeletal: Negative for arthralgias (takes Aleve twice daily and helps) and back pain.   Skin: Negative for rash.   Neurological: Negative for syncope and weakness.       Objective   Vitals:    11/22/21 1304 11/22/21 1330   BP: 128/86    BP Location: Left arm    Patient Position: Sitting    Cuff Size: Large Adult    Pulse: 104 80   Temp: 97.6 °F (36.4 °C)    TempSrc: Temporal    SpO2: 97%    Weight: 121 kg (267 lb)    Height: 182.9 cm (72.01\")    PainSc: 0-No pain      Body mass index is 36.2 kg/m².    Physical Exam  Vitals and nursing note reviewed.   Constitutional:       General: He is not in acute distress.     Appearance: He is well-developed and well-groomed. He is not diaphoretic.   HENT:      Head: Normocephalic.      Right Ear: External ear normal. Right ear decreased hearing: has hearing aid. A foreign body (piece of hearing aid remains in ear canal; mild swelling of ear canal noted) is present.      Left Ear: External ear normal. Left ear middle ear effusion: mild.      Nose: Nose normal.      Right Sinus: No maxillary sinus tenderness or frontal sinus tenderness.      Left Sinus: No maxillary sinus tenderness or frontal sinus tenderness.      Mouth/Throat:      Mouth: Mucous membranes are moist.      Pharynx: No oropharyngeal exudate. Posterior oropharyngeal erythema: mild.   Eyes:      Conjunctiva/sclera: Conjunctivae normal.   Neck:      Vascular: No carotid bruit.   Cardiovascular:      Rate and Rhythm: Normal rate and regular rhythm.      Pulses: Normal pulses.      Heart sounds: Murmur heard.    Systolic murmur is present " with a grade of 2/6.       Comments: At RUSB and LUSB  Pulmonary:      Effort: Pulmonary effort is normal. No respiratory distress.      Breath sounds: Normal breath sounds.   Abdominal:      General: Bowel sounds are normal.      Palpations: Abdomen is soft. There is no hepatomegaly or splenomegaly.      Tenderness: There is no abdominal tenderness. There is no guarding.   Musculoskeletal:      Cervical back: Normal range of motion and neck supple.      Right lower leg: No edema.      Left lower leg: No edema.   Lymphadenopathy:      Cervical: No cervical adenopathy.   Skin:     General: Skin is warm and dry.      Findings: No rash.   Neurological:      Mental Status: He is alert and oriented to person, place, and time.      Gait: Abnormal gait: guarded gait; limping on left.   Psychiatric:         Mood and Affect: Mood normal.         Behavior: Behavior normal.         Thought Content: Thought content normal.         Cognition and Memory: Cognition normal.         Judgment: Judgment normal.         Lab Results   Component Value Date    WBC 8.6 11/22/2021    RBC 5.17 11/22/2021    HGB 16.6 11/22/2021    HCT 49.8 11/22/2021    MCV 96 11/22/2021    MCH 32.1 11/22/2021    MCHC 33.3 11/22/2021    RDW 12.6 11/22/2021    RDWSD 52.6 09/19/2021    MPV 9.9 09/19/2021     11/22/2021    NEUTRORELPCT 66 11/22/2021    LYMPHORELPCT 19 11/22/2021    MONORELPCT 6 11/22/2021    EOSRELPCT 7 11/22/2021    BASORELPCT 2 11/22/2021    AUTOIGPER 0.7 (H) 09/19/2021    NEUTROABS 5.7 11/22/2021    LYMPHSABS 1.7 11/22/2021    MONOSABS 0.5 11/22/2021    EOSABS 0.6 (H) 11/22/2021    BASOSABS 0.2 11/22/2021    AUTOIGNUM 0.05 09/19/2021    NRBC 0.0 09/19/2021     Lab Results   Component Value Date    GLUCOSE 212 (H) 11/22/2021    BUN 19 11/22/2021    CREATININE 1.05 11/22/2021    EGFRIFNONA 70 11/22/2021    EGFRIFAFRI 80 11/22/2021    BCR 18 11/22/2021    K 4.0 11/22/2021    CO2 27 11/22/2021    CALCIUM 10.0 11/22/2021    PROTENTOTREF 8.0  11/22/2021    ALBUMIN 4.8 (H) 11/22/2021    LABIL2 1.5 11/22/2021    AST 32 11/22/2021    ALT 25 11/22/2021      Lab Results   Component Value Date    CHLPL 167 11/22/2021    TRIG 161 (H) 11/22/2021    HDL 42 11/22/2021    VLDL 28 11/22/2021    LDL 97 11/22/2021     Lab Results   Component Value Date    TSH 2.220 09/17/2021     Lab Results   Component Value Date    HGBA1C 6.7 (H) 11/22/2021         Assessment/Plan .  Problem List Items Addressed This Visit     Type 2 diabetes mellitus with both eyes affected by mild nonproliferative retinopathy without macular edema, without long-term current use of insulin (HCC) - Primary    Current Assessment & Plan     Diabetes is stable.   Continue current treatment regimen.  Regular aerobic exercise.  Diabetes will be reassessed in 3 months.  Continue Glimepiride twice daily.         Relevant Orders    Lipid Panel With LDL / HDL Ratio (Completed)    CBC & Differential (Completed)    Comprehensive Metabolic Panel (Completed)    Hemoglobin A1c (Completed)    Hypertension    Current Assessment & Plan     Hypertension is stable.  Regular aerobic exercise.  Continue current medications.  Ambulatory blood pressure monitoring.  Blood pressure will be reassessed in 3 months.  Continue Hydralazine TID, Carvedilol and Torsemide BID, and Losartan and KCL daily.         Relevant Orders    Lipid Panel With LDL / HDL Ratio (Completed)    CBC & Differential (Completed)    Comprehensive Metabolic Panel (Completed)    Hyperlipidemia    Current Assessment & Plan     Continue Pravastatin daily.  Continue to work on healthy diet and exercise as tolerated.         Relevant Orders    Lipid Panel With LDL / HDL Ratio (Completed)    Comprehensive Metabolic Panel (Completed)    History of CVA (cerebrovascular accident)    Current Assessment & Plan     Continue Eliquis twice daily.  Follow up as scheduled with [facility] stroke specialist.         Memory difficulties    Current Assessment & Plan      Continue Namenda daily.  Follow up as scheduled with [facility] stroke specialist.           Other Visit Diagnoses     Foreign body of right ear, subsequent encounter        Relevant Orders    Ambulatory Referral to ENT (Otolaryngology)          Return in about 3 months (around 2/22/2022) for Medicare Wellness, Recheck.or sooner if problems or concerns.  Will consider Fluoxetine for memory as recommended by neurology.  Will refer to ENT for removal of foreign body in ear due to current mild swelling and mild erythema.       COVID-19 Precautions - Patient was compliant in wearing a mask. When I saw the patient, I used appropriate personal protective equipment (PPE) including mask, gloves, and eye shield (standard procedure).  Hand hygiene was completed before and after seeing the patient.

## 2021-11-22 NOTE — PATIENT INSTRUCTIONS
Continue to monitor your blood sugar once daily before a meal and record results.  Continue to monitor your blood pressure periodically and record results.  Continue to work on healthy diet and exercise.  Follow up pending lab results.  Follow up in 3 months for Medicare Wellness, or sooner if problems or concerns.

## 2021-11-22 NOTE — TELEPHONE ENCOUNTER
Rx Refill Note  Requested Prescriptions     Pending Prescriptions Disp Refills   • pravastatin (PRAVACHOL) 40 MG tablet [Pharmacy Med Name: PRAVASTATIN TABS 40MG] 90 tablet 3     Sig: TAKE 1 TABLET DAILY   • losartan (COZAAR) 100 MG tablet [Pharmacy Med Name: LOSARTAN TABS 100MG] 90 tablet 3     Sig: TAKE 1 TABLET DAILY   • dilTIAZem CD (CARDIZEM CD) 360 MG 24 hr capsule [Pharmacy Med Name: DILTIAZEM ER (CD) CAPS 360MG] 90 capsule 3     Sig: TAKE 1 CAPSULE DAILY   • terazosin (HYTRIN) 2 MG capsule [Pharmacy Med Name: TERAZOSIN CAPS 2MG] 90 capsule 3     Sig: TAKE 1 CAPSULE EVERY NIGHT      Last office visit with prescribing clinician: 9/21/2021      Next office visit with prescribing clinician: 11/22/2021            Malaika Chavez MA  11/22/21, 13:51 EST

## 2021-11-23 LAB
ALBUMIN SERPL-MCNC: 4.8 G/DL (ref 3.7–4.7)
ALBUMIN/GLOB SERPL: 1.5 {RATIO} (ref 1.2–2.2)
ALP SERPL-CCNC: 122 IU/L (ref 44–121)
ALT SERPL-CCNC: 25 IU/L (ref 0–44)
AST SERPL-CCNC: 32 IU/L (ref 0–40)
BASOPHILS # BLD AUTO: 0.2 X10E3/UL (ref 0–0.2)
BASOPHILS NFR BLD AUTO: 2 %
BILIRUB SERPL-MCNC: 0.4 MG/DL (ref 0–1.2)
BUN SERPL-MCNC: 19 MG/DL (ref 8–27)
BUN/CREAT SERPL: 18 (ref 10–24)
CALCIUM SERPL-MCNC: 10 MG/DL (ref 8.6–10.2)
CHLORIDE SERPL-SCNC: 101 MMOL/L (ref 96–106)
CHOLEST SERPL-MCNC: 167 MG/DL (ref 100–199)
CO2 SERPL-SCNC: 27 MMOL/L (ref 20–29)
CREAT SERPL-MCNC: 1.05 MG/DL (ref 0.76–1.27)
EOSINOPHIL # BLD AUTO: 0.6 X10E3/UL (ref 0–0.4)
EOSINOPHIL NFR BLD AUTO: 7 %
ERYTHROCYTE [DISTWIDTH] IN BLOOD BY AUTOMATED COUNT: 12.6 % (ref 11.6–15.4)
GLOBULIN SER CALC-MCNC: 3.2 G/DL (ref 1.5–4.5)
GLUCOSE SERPL-MCNC: 212 MG/DL (ref 65–99)
HBA1C MFR BLD: 6.7 % (ref 4.8–5.6)
HCT VFR BLD AUTO: 49.8 % (ref 37.5–51)
HDLC SERPL-MCNC: 42 MG/DL
HGB BLD-MCNC: 16.6 G/DL (ref 13–17.7)
IMM GRANULOCYTES # BLD AUTO: 0 X10E3/UL (ref 0–0.1)
IMM GRANULOCYTES NFR BLD AUTO: 0 %
LDLC SERPL CALC-MCNC: 97 MG/DL (ref 0–99)
LDLC/HDLC SERPL: 2.3 RATIO (ref 0–3.6)
LYMPHOCYTES # BLD AUTO: 1.7 X10E3/UL (ref 0.7–3.1)
LYMPHOCYTES NFR BLD AUTO: 19 %
MCH RBC QN AUTO: 32.1 PG (ref 26.6–33)
MCHC RBC AUTO-ENTMCNC: 33.3 G/DL (ref 31.5–35.7)
MCV RBC AUTO: 96 FL (ref 79–97)
MONOCYTES # BLD AUTO: 0.5 X10E3/UL (ref 0.1–0.9)
MONOCYTES NFR BLD AUTO: 6 %
NEUTROPHILS # BLD AUTO: 5.7 X10E3/UL (ref 1.4–7)
NEUTROPHILS NFR BLD AUTO: 66 %
PLATELET # BLD AUTO: 186 X10E3/UL (ref 150–450)
POTASSIUM SERPL-SCNC: 4 MMOL/L (ref 3.5–5.2)
PROT SERPL-MCNC: 8 G/DL (ref 6–8.5)
RBC # BLD AUTO: 5.17 X10E6/UL (ref 4.14–5.8)
SODIUM SERPL-SCNC: 143 MMOL/L (ref 134–144)
TRIGL SERPL-MCNC: 161 MG/DL (ref 0–149)
VLDLC SERPL CALC-MCNC: 28 MG/DL (ref 5–40)
WBC # BLD AUTO: 8.6 X10E3/UL (ref 3.4–10.8)

## 2021-11-24 VITALS
SYSTOLIC BLOOD PRESSURE: 128 MMHG | TEMPERATURE: 97.6 F | OXYGEN SATURATION: 97 % | HEIGHT: 72 IN | BODY MASS INDEX: 36.16 KG/M2 | WEIGHT: 267 LBS | HEART RATE: 80 BPM | DIASTOLIC BLOOD PRESSURE: 86 MMHG

## 2021-11-24 PROBLEM — R50.9 FEVER IN ADULT: Status: RESOLVED | Noted: 2020-08-16 | Resolved: 2021-11-24

## 2021-11-24 PROBLEM — J18.9 PNEUMONIA OF LEFT LUNG DUE TO INFECTIOUS ORGANISM: Status: RESOLVED | Noted: 2021-09-17 | Resolved: 2021-11-24

## 2021-11-24 NOTE — ASSESSMENT & PLAN NOTE
Hypertension is stable.  Regular aerobic exercise.  Continue current medications.  Ambulatory blood pressure monitoring.  Blood pressure will be reassessed in 3 months.  Continue Hydralazine TID, Carvedilol and Torsemide BID, and Losartan and KCL daily.

## 2021-11-24 NOTE — ASSESSMENT & PLAN NOTE
Diabetes is stable.   Continue current treatment regimen.  Regular aerobic exercise.  Diabetes will be reassessed in 3 months.  Continue Glimepiride twice daily.

## 2021-12-02 DIAGNOSIS — J30.9 ALLERGIC RHINITIS, UNSPECIFIED SEASONALITY, UNSPECIFIED TRIGGER: ICD-10-CM

## 2021-12-02 RX ORDER — MONTELUKAST SODIUM 10 MG/1
TABLET ORAL
Qty: 90 TABLET | Refills: 3 | Status: SHIPPED | OUTPATIENT
Start: 2021-12-02 | End: 2022-12-16

## 2021-12-02 NOTE — TELEPHONE ENCOUNTER
Rx Refill Note  Requested Prescriptions     Pending Prescriptions Disp Refills   • montelukast (SINGULAIR) 10 MG tablet [Pharmacy Med Name: MONTELUKAST SODIUM TABS 10MG] 90 tablet 3     Sig: TAKE 1 TABLET DAILY      Last office visit with prescribing clinician: 11/22/2021      Next office visit with prescribing clinician: 2/28/2022     Kat Salazar MA  12/02/21, 16:45 EST

## 2022-01-02 NOTE — TELEPHONE ENCOUNTER
Pt needs temporary rx until mail order arrives. Please advise.  [facility] RMA  
03-Jan-2022 01:14

## 2022-01-09 DIAGNOSIS — J30.9 ALLERGIC RHINITIS, UNSPECIFIED SEASONALITY, UNSPECIFIED TRIGGER: ICD-10-CM

## 2022-01-10 ENCOUNTER — OFFICE VISIT (OUTPATIENT)
Dept: FAMILY MEDICINE CLINIC | Facility: CLINIC | Age: 77
End: 2022-01-10

## 2022-01-10 VITALS
SYSTOLIC BLOOD PRESSURE: 142 MMHG | HEART RATE: 70 BPM | TEMPERATURE: 98 F | DIASTOLIC BLOOD PRESSURE: 76 MMHG | OXYGEN SATURATION: 97 % | WEIGHT: 266.3 LBS | BODY MASS INDEX: 36.07 KG/M2 | HEIGHT: 72 IN

## 2022-01-10 DIAGNOSIS — Z86.73 HISTORY OF CVA (CEREBROVASCULAR ACCIDENT): ICD-10-CM

## 2022-01-10 DIAGNOSIS — R60.0 LOCALIZED EDEMA: ICD-10-CM

## 2022-01-10 DIAGNOSIS — R41.3 MEMORY DIFFICULTIES: ICD-10-CM

## 2022-01-10 DIAGNOSIS — I10 PRIMARY HYPERTENSION: ICD-10-CM

## 2022-01-10 DIAGNOSIS — E78.2 MIXED HYPERLIPIDEMIA: ICD-10-CM

## 2022-01-10 DIAGNOSIS — E11.3293 TYPE 2 DIABETES MELLITUS WITH BOTH EYES AFFECTED BY MILD NONPROLIFERATIVE RETINOPATHY WITHOUT MACULAR EDEMA, WITHOUT LONG-TERM CURRENT USE OF INSULIN: Primary | ICD-10-CM

## 2022-01-10 PROBLEM — I27.20 PULMONARY HYPERTENSION: Status: ACTIVE | Noted: 2017-01-17

## 2022-01-10 PROBLEM — M54.9 BACK PAIN: Status: ACTIVE | Noted: 2022-01-10

## 2022-01-10 PROBLEM — M19.90 ARTHRITIS: Status: ACTIVE | Noted: 2022-01-10

## 2022-01-10 PROBLEM — M54.50 LOW BACK PAIN: Status: ACTIVE | Noted: 2019-04-18

## 2022-01-10 PROBLEM — N41.9 PROSTATITIS: Status: ACTIVE | Noted: 2018-08-30

## 2022-01-10 PROBLEM — I50.32 CHRONIC DIASTOLIC CONGESTIVE HEART FAILURE: Status: ACTIVE | Noted: 2017-02-03

## 2022-01-10 PROBLEM — D75.839 THROMBOCYTOSIS: Status: ACTIVE | Noted: 2018-03-19

## 2022-01-10 PROCEDURE — 99214 OFFICE O/P EST MOD 30 MIN: CPT | Performed by: NURSE PRACTITIONER

## 2022-01-10 RX ORDER — ROSUVASTATIN CALCIUM 40 MG/1
40 TABLET, COATED ORAL DAILY
COMMUNITY
End: 2022-01-12 | Stop reason: SDUPTHER

## 2022-01-10 RX ORDER — FLUTICASONE PROPIONATE 50 MCG
SPRAY, SUSPENSION (ML) NASAL
Qty: 48 G | Refills: 3 | Status: SHIPPED | OUTPATIENT
Start: 2022-01-10

## 2022-01-10 NOTE — PATIENT INSTRUCTIONS
Continue to monitor your blood sugar once daily before a meal and record results.  Continue to monitor your blood pressure periodically and record results.  Continue to work on healthy diet and exercise.  Follow up pending lab results.  Follow up in 2 months for Medicare Wellness, or sooner if problems or concerns.  Follow up as scheduled with cardiology and neurology next month.

## 2022-01-10 NOTE — PROGRESS NOTES
Subjective   Erlin Blanco is a 76 y.o. male.     Chief Complaint   Patient presents with   • Diabetes     follow up, discuss medications        History of Present Illness   Patient presents with c/o leg swelling and urinary frequency; symptoms worse about 1 month ago, but have improved; urinary frequency worse at night; takes Torsemide twice daily; swelling always worse in left leg; no redness; takes Eliquis twice daily; had normal venous doppler in September; has trouble with bilateral knees, left more than right; physical therapy has helped; ROM of bilateral knees has improved; walks with 4 point cane when out and helps.    F/U DM2: takes Glimperide twice daily; last A1c 6.7%; monitors blood sugar some, typically runs about 110s; watches intake of carbs/sugars for most part, could have done better over the holidays; some exercise; has been going to physical therapy per ortho and has helped; has been more active around the house; no numbness or tingling.    F/U HTN: takes Hydralazine TID, Carvedilol twice daily and Losartan and Cardizem daily; also takes Torsemide twice daily and KCL TID; monitors BP, typically runs 140-150s/70s; no headaches; no orthostasis; swelling has improved in last couple of weeks.    F/U Hyperlipidemia: saw MUKUL Lopez with [facility] and change to Crestor instead of Pravastatin; no problems with medication; no myalgias; needs refill of Crestor.    F/U memory difficulties: takes Namenda daily; no problems with medication; has not noted much difference since started medication; no change in memory, attributes symptoms to age; saw Dr. Adams with [facility] neurology and referred to [facility]; has follow up in February with Dr. Adams.    Urology stopped Terazosin and Finasteride after last procedure.    The following portions of the patient's history were reviewed and updated as appropriate: allergies, current medications, past family history, past medical  history, past social history, past surgical history and problem list.      Current Outpatient Medications   Medication Sig Dispense Refill   • Accu-Chek FastClix Lancets misc TEST BLOOD SUGAR 1-2 TIMES DAILY AND AS NEEDED     • Accu-Chek Guide test strip      • acetaminophen (TYLENOL) 500 MG tablet Take 1,000 mg by mouth 2 (Two) Times a Day As Needed for Mild Pain  or Moderate Pain .     • apixaban (Eliquis) 5 MG tablet tablet Take 1 tablet by mouth Every 12 (Twelve) Hours. 14 tablet 0   • aspirin (ASPIRIN LOW DOSE) 81 MG EC tablet Take 1 tablet by mouth Daily. 90 tablet 2   • Blood Glucose Monitoring Suppl (Accu-Chek Guide Me) w/Device kit TEST BLOOD SUGAR 1-2 TIMES DAILY AND AS NEEDED     • carvedilol (COREG) 6.25 MG tablet Take 1 tablet by mouth 2 (Two) Times a Day With Meals. 60 tablet 0   • cetirizine (zyrTEC) 10 MG tablet Take 10 mg by mouth Daily.     • dilTIAZem CD (CARDIZEM CD) 360 MG 24 hr capsule TAKE 1 CAPSULE DAILY 90 capsule 3   • Fluzone High-Dose Quadrivalent 0.7 ML suspension prefilled syringe injection      • glimepiride (Amaryl) 2 MG tablet Take 1 tablet by mouth 2 (Two) Times a Day Before Meals. 180 tablet 1   • glucose blood (OneTouch Ultra) test strip Test blood sugar 1-2 times daily and as needed. 200 each 3   • hydrALAZINE (APRESOLINE) 100 MG tablet Take 1 tablet by mouth 3 (Three) Times a Day. 30 tablet 0   • hydroxyurea (HYDREA) 500 MG capsule Take 500 mg by mouth Daily.     • Insulin Pen Needle (B-D UF III MINI PEN NEEDLES) 31G X 5 MM misc Inject 1 each under the skin into the appropriate area as directed Daily. 100 each 2   • losartan (COZAAR) 100 MG tablet TAKE 1 TABLET DAILY 90 tablet 3   • memantine (NAMENDA) 5 MG tablet Take 1 tablet by mouth Daily. 30 tablet 3   • montelukast (SINGULAIR) 10 MG tablet TAKE 1 TABLET DAILY 90 tablet 3   • Multiple Vitamins-Minerals (MULTIVITAMIN ADULT PO) Take 1 tablet by mouth Daily.     • potassium chloride (KLOR-CON) 20 MEQ CR tablet Take 1 tablet by  mouth 3 (Three) Times a Day. (Patient taking differently: Take 20 mEq by mouth 2 (Two) Times a Day.) 30 tablet 0   • Probiotic Product (Probiotic Daily) capsule Take 1 capsule by mouth Daily. 90 capsule 1   • rosuvastatin (Crestor) 40 MG tablet Take 40 mg by mouth Daily.     • torsemide (DEMADEX) 20 MG tablet Take 1 tablet by mouth 2 (Two) Times a Day. 60 tablet 5   • fluticasone (FLONASE) 50 MCG/ACT nasal spray USE 2 SPRAYS IN NOSTRIL(S) AS DIRECTED BY PROVIDER DAILY 48 g 3     No current facility-administered medications for this visit.       Past Medical History:   Diagnosis Date   • Abnormal ECG    • Abnormal stress test    • Abrasion of face 1/29/2020   • Acute bronchitis    • Acute hypokalemia 9/16/2020   • Acute non-recurrent sinusitis 1/13/2020   • Acute pyelonephritis 10/9/2020   • Acute sinusitis    • DORI (acute kidney injury) ([facility]) 4/28/2017   • Allergic rhinitis    • Aortic root dilation ([facility])    • Arthritis    • Bacteremia due to Escherichia coli 8/17/2020   • Balanitis 5/16/2021   • Benign enlargement of prostate    • C. difficile colitis 9/16/2020   • Cellulitis of knee, left 5/4/2017   • CHF (congestive heart failure) ([facility])    • Chronic diastolic congestive heart failure ([facility])    • Constipation 11/14/2020   • Contusion of face 1/29/2020   • Coronary artery disease    • CVA (cerebral vascular accident) ([facility]) 2020   • Diminished pulse     in lower extremities   • Edema    • Elevated hematocrit    • Elevated hemoglobin ([facility])    • Essential hypertension    • Hearing loss     mala hearing aids   • Heart valve disease    • High risk medication use    • History of back pain    • History of dysuria    • History of echocardiogram    • History of intracranial hemorrhage    • History of scoliosis    • History of stroke    • Hyperlipidemia    • Hypokalemia 1/7/2021   • Injury of toe, left, initial encounter    • Irregular heart rate    • Knee pain, left    • Left bundle branch block    • Leg wound, left    •  Leukocytosis 2020   • Low back pain    • Medicare annual wellness visit, subsequent    • Minor head injury without loss of consciousness 2020   • Murmur    • Muscle cramps    • Need for hepatitis C screening test    • Paraphimosis 2021   • Pneumonia of left lung due to infectious organism 2021   • Polycythemia    • Polycythemia vera (HCC)    • Pressure injury of buttock, stage 2 (HCC) 2020   • Prostate hyperplasia without urinary obstruction    • Prostatitis    • Proteinuria    • Pulmonary hypertension (HCC)    • Sacroiliitis (HCC) 10/9/2020   • Scoliosis    • Sepsis due to Escherichia coli (HCC) 2020   • Sepsis with metabolic encephalopathy (HCC) 2020   • Stroke (HCC)    • SVT (supraventricular tachycardia) (HCC)    • Tachycardia    • Thrombocytosis    • TIA (transient ischemic attack)        • Type 2 diabetes mellitus (HCC)    • Type 2 diabetes mellitus with hyperglycemia (HCC) 2021   • Urinary tract infection due to extended-spectrum beta lactamase (ESBL) producing Escherichia coli 2020   • Valvular heart disease        Past Surgical History:   Procedure Laterality Date   • CARDIAC CATHETERIZATION     • CATARACT EXTRACTION Left    • COLONOSCOPY      did Cologuard approx 2019 and negative   • HAND SURGERY     • JOINT REPLACEMENT  2014    Righ Knee   • ID TOTAL KNEE ARTHROPLASTY Left 2017    Procedure: LT TOTAL KNEE ARTHROPLASTY;  Surgeon: Bertin Perrin MD;  Location: [facility];  Service: Orthopedics   • PROSTATE SURGERY      Rezum steam treatment to shrink prostate by dr. guerrero       Family History   Problem Relation Age of Onset   • Hypertension Mother    • Other Father         ruptured appendix,  when pt. was 14 years old.   • Diabetes Paternal Aunt    • Diabetes Paternal Uncle    • No Known Problems Other    • Migraines Sister    • Stroke Sister    • Dementia Brother        Social History     Socioeconomic History   • Marital status:  "   Tobacco Use   • Smoking status: Former Smoker     Types: Cigarettes     Quit date: 1973     Years since quittin.0   • Smokeless tobacco: Former User   • Tobacco comment: Caffeine daily   Vaping Use   • Vaping Use: Never used   Substance and Sexual Activity   • Alcohol use: No   • Drug use: No   • Sexual activity: Defer       Review of Systems   Constitutional: Negative for appetite change, chills, fatigue, fever (no unexplained fatigue), unexpected weight gain and unexpected weight loss (has been trying to eat better).   HENT: Negative for ear pain, sinus pressure, sore throat and trouble swallowing.    Eyes: Blurred vision: had recent cataract surgery, needs right eye now.   Respiratory: Negative for cough, chest tightness and shortness of breath.    Cardiovascular: Negative for chest pain and palpitations.   Gastrointestinal: Negative for abdominal pain, blood in stool, constipation, diarrhea, GERD and indigestion.   Endocrine: Negative for polydipsia.   Genitourinary: Negative for dysuria and hematuria.   Musculoskeletal: Back pain: no change in chronic lower back pain, no radiation of pain down legs; no bladder or bowel dysfunction.   Skin: Negative for rash.   Neurological: Negative for syncope and weakness.       Objective   Vitals:    01/10/22 1029   BP: 142/76   BP Location: Left arm   Patient Position: Sitting   Cuff Size: Adult   Pulse: 70   Temp: 98 °F (36.7 °C)   SpO2: 97%   Weight: 121 kg (266 lb 4.8 oz)   Height: 182.9 cm (72.01\")     Body mass index is 36.11 kg/m².    Physical Exam  Vitals and nursing note reviewed.   Constitutional:       General: He is not in acute distress.     Appearance: He is well-developed and well-groomed. He is not diaphoretic.   HENT:      Head: Normocephalic.      Right Ear: External ear normal.      Left Ear: External ear normal.   Eyes:      Conjunctiva/sclera: Conjunctivae normal.   Neck:      Vascular: No carotid bruit.   Cardiovascular:      Rate and " Rhythm: Normal rate and regular rhythm.      Pulses: Normal pulses.      Heart sounds: Murmur heard.    Systolic murmur is present with a grade of 2/6.       Comments: At [facility]USB  Venous stasis dermatitis of distal BLE  Pulmonary:      Effort: Pulmonary effort is normal. No respiratory distress.      Breath sounds: Normal breath sounds.   Abdominal:      General: Bowel sounds are normal.      Palpations: Abdomen is soft. There is no hepatomegaly or splenomegaly.      Tenderness: There is no abdominal tenderness. There is no right CVA tenderness, left CVA tenderness or guarding.   Musculoskeletal:      Cervical back: Normal range of motion and neck supple.      Right lower leg: Right lower leg edema: trace-1+ pretibial/ankle.      Left lower le+ Pitting Edema (pretibial/ankle) present.   Skin:     General: Skin is warm and dry.      Findings: No rash.   Neurological:      Mental Status: He is alert and oriented to person, place, and time.      Gait: Abnormal gait: guarded gait with mild limping on left; using 4 point cane.   Psychiatric:         Mood and Affect: Mood normal.         Behavior: Behavior normal.         Thought Content: Thought content normal.         Cognition and Memory: Cognition normal.         Judgment: Judgment normal.         Lab Results   Component Value Date    WBC 8.6 2021    RBC 5.17 2021    HGB 16.6 2021    HCT 49.8 2021    MCV 96 2021    MCH 32.1 2021    MCHC 33.3 2021    RDW 12.6 2021    RDWSD 52.6 2021    MPV 9.9 2021     2021    NEUTRORELPCT 66 2021    LYMPHORELPCT 19 2021    MONORELPCT 6 2021    EOSRELPCT 7 2021    BASORELPCT 2 2021    AUTOIGPER 0.7 (H) 2021    NEUTROABS 5.7 2021    LYMPHSABS 1.7 2021    MONOSABS 0.5 2021    EOSABS 0.6 (H) 2021    BASOSABS 0.2 2021    AUTOIGNUM 0.05 2021    NRBC 0.0 2021     Lab Results   Component Value  Date    GLUCOSE 212 (H) 11/22/2021    BUN 19 11/22/2021    CREATININE 1.05 11/22/2021    EGFRIFNONA 70 11/22/2021    EGFRIFAFRI 80 11/22/2021    BCR 18 11/22/2021    K 4.0 11/22/2021    CO2 27 11/22/2021    CALCIUM 10.0 11/22/2021    PROTENTOTREF 8.0 11/22/2021    ALBUMIN 4.8 (H) 11/22/2021    LABIL2 1.5 11/22/2021    AST 32 11/22/2021    ALT 25 11/22/2021      Lab Results   Component Value Date    CHLPL 167 11/22/2021    TRIG 161 (H) 11/22/2021    HDL 42 11/22/2021    VLDL 28 11/22/2021    LDL 97 11/22/2021     Lab Results   Component Value Date    TSH 2.220 09/17/2021     Lab Results   Component Value Date    HGBA1C 6.7 (H) 11/22/2021 9/17/21 LLE venous doppler: negative for DVT  1/7/22 CBC WNL except Hgb 17.2, Hct 52%; to have therapeutic phlebotomy on 1/10/22.    Assessment/Plan .  Problem List Items Addressed This Visit     Type 2 diabetes mellitus with both eyes affected by mild nonproliferative retinopathy without macular edema, without long-term current use of insulin (HCC) - Primary    Current Assessment & Plan     Diabetes is stable.   Continue current treatment regimen.  Regular aerobic exercise.  Reminded to get yearly retinal exam.  Diabetes will be reassessed 2 months.  Continue Glimepiride twice daily.         Hypertension    Current Assessment & Plan     Hypertension is stable.  Continue current medications.  Ambulatory blood pressure monitoring.  Blood pressure will be reassessed at the next regular appointment.  Continue Hydralazine TID, Carvedilol twice daily, and Losartan and Cardizem daily.  Continue Torsemide twice daily and KCL TID.  Follow up as scheduled with cardiology.         Hyperlipidemia    Current Assessment & Plan     Continue Crestor daily.  Continue to work on healthy diet and exercise as tolerated.         Relevant Medications    rosuvastatin (Crestor) 40 MG tablet    Other Relevant Orders    Comprehensive Metabolic Panel    Lipid Panel With LDL / HDL Ratio    History of CVA  (cerebrovascular accident)    Current Assessment & Plan     Continue Crestor daily.  Continue ASA and Eliquis twice daily.  Follow up as scheduled with [facility] stroke clinic.         Memory difficulties    Current Assessment & Plan     Stable on Namenda.  Follow up as scheduled with neurology.         Localized edema    Current Assessment & Plan     Continue Torsemide twice daily.  Continue to elevate legs when sitting.               Return in about 2 months (around 3/10/2022) for Medicare Wellness, [email or web address] sooner if symptoms persist or worsen.  Will send refill of Crestor to mail order pending lab results.       COVID-19 Precautions - Patient was compliant in wearing a mask. When I saw the patient, I used appropriate personal protective equipment (PPE) including mask, gloves, and eye shield (standard procedure).  Hand hygiene was completed before and after seeing the patient.

## 2022-01-11 LAB
ALBUMIN SERPL-MCNC: 4.4 G/DL (ref 3.7–4.7)
ALBUMIN/GLOB SERPL: 1.7 {RATIO} (ref 1.2–2.2)
ALP SERPL-CCNC: 105 IU/L (ref 44–121)
ALT SERPL-CCNC: 27 IU/L (ref 0–44)
AST SERPL-CCNC: 31 IU/L (ref 0–40)
BILIRUB SERPL-MCNC: 0.6 MG/DL (ref 0–1.2)
BUN SERPL-MCNC: 21 MG/DL (ref 8–27)
BUN/CREAT SERPL: 17 (ref 10–24)
CALCIUM SERPL-MCNC: 9.7 MG/DL (ref 8.6–10.2)
CHLORIDE SERPL-SCNC: 102 MMOL/L (ref 96–106)
CHOLEST SERPL-MCNC: 105 MG/DL (ref 100–199)
CO2 SERPL-SCNC: 28 MMOL/L (ref 20–29)
CREAT SERPL-MCNC: 1.24 MG/DL (ref 0.76–1.27)
GLOBULIN SER CALC-MCNC: 2.6 G/DL (ref 1.5–4.5)
GLUCOSE SERPL-MCNC: 187 MG/DL (ref 65–99)
HDLC SERPL-MCNC: 40 MG/DL
LDLC SERPL CALC-MCNC: 46 MG/DL (ref 0–99)
LDLC/HDLC SERPL: 1.2 RATIO (ref 0–3.6)
POTASSIUM SERPL-SCNC: 4.3 MMOL/L (ref 3.5–5.2)
PROT SERPL-MCNC: 7 G/DL (ref 6–8.5)
SODIUM SERPL-SCNC: 144 MMOL/L (ref 134–144)
TRIGL SERPL-MCNC: 98 MG/DL (ref 0–149)
VLDLC SERPL CALC-MCNC: 19 MG/DL (ref 5–40)

## 2022-01-11 NOTE — ASSESSMENT & PLAN NOTE
Continue Crestor daily.  Continue ASA and Eliquis twice daily.  Follow up as scheduled with [facility] stroke clinic.

## 2022-01-11 NOTE — ASSESSMENT & PLAN NOTE
Diabetes is stable.   Continue current treatment regimen.  Regular aerobic exercise.  Reminded to get yearly retinal exam.  Diabetes will be reassessed 2 months.  Continue Glimepiride twice daily.

## 2022-01-11 NOTE — ASSESSMENT & PLAN NOTE
Hypertension is stable.  Continue current medications.  Ambulatory blood pressure monitoring.  Blood pressure will be reassessed at the next regular appointment.  Continue Hydralazine TID, Carvedilol twice daily, and Losartan and Cardizem daily.  Continue Torsemide twice daily and KCL TID.  Follow up as scheduled with cardiology.

## 2022-01-12 DIAGNOSIS — E78.2 MIXED HYPERLIPIDEMIA: Primary | ICD-10-CM

## 2022-01-12 RX ORDER — ROSUVASTATIN CALCIUM 40 MG/1
40 TABLET, COATED ORAL DAILY
Qty: 90 TABLET | Refills: 1 | Status: SHIPPED | OUTPATIENT
Start: 2022-01-12 | End: 2022-07-14 | Stop reason: SDUPTHER

## 2022-02-17 ENCOUNTER — OFFICE VISIT (OUTPATIENT)
Dept: CARDIOLOGY | Facility: CLINIC | Age: 77
End: 2022-02-17

## 2022-02-17 VITALS
HEIGHT: 72 IN | HEART RATE: 84 BPM | SYSTOLIC BLOOD PRESSURE: 160 MMHG | WEIGHT: 269 LBS | BODY MASS INDEX: 36.44 KG/M2 | OXYGEN SATURATION: 97 % | RESPIRATION RATE: 16 BRPM | DIASTOLIC BLOOD PRESSURE: 80 MMHG

## 2022-02-17 DIAGNOSIS — I47.1 SUPRAVENTRICULAR TACHYCARDIA: ICD-10-CM

## 2022-02-17 DIAGNOSIS — I48.0 PAROXYSMAL ATRIAL FIBRILLATION: Primary | ICD-10-CM

## 2022-02-17 DIAGNOSIS — I50.32 CHRONIC DIASTOLIC CONGESTIVE HEART FAILURE: ICD-10-CM

## 2022-02-17 DIAGNOSIS — N18.2 CKD (CHRONIC KIDNEY DISEASE) STAGE 2, GFR 60-89 ML/MIN: ICD-10-CM

## 2022-02-17 DIAGNOSIS — E11.3293 TYPE 2 DIABETES MELLITUS WITH BOTH EYES AFFECTED BY MILD NONPROLIFERATIVE RETINOPATHY WITHOUT MACULAR EDEMA, WITHOUT LONG-TERM CURRENT USE OF INSULIN: ICD-10-CM

## 2022-02-17 DIAGNOSIS — E66.01 CLASS 2 SEVERE OBESITY DUE TO EXCESS CALORIES WITH SERIOUS COMORBIDITY AND BODY MASS INDEX (BMI) OF 36.0 TO 36.9 IN ADULT: ICD-10-CM

## 2022-02-17 DIAGNOSIS — E78.2 MIXED HYPERLIPIDEMIA: ICD-10-CM

## 2022-02-17 DIAGNOSIS — I44.7 LEFT BUNDLE-BRANCH BLOCK: ICD-10-CM

## 2022-02-17 DIAGNOSIS — I27.20 PULMONARY HYPERTENSION: ICD-10-CM

## 2022-02-17 PROBLEM — I77.810 AORTIC ROOT DILATION: Status: RESOLVED | Noted: 2018-11-04 | Resolved: 2022-02-17

## 2022-02-17 PROCEDURE — 93000 ELECTROCARDIOGRAM COMPLETE: CPT | Performed by: INTERNAL MEDICINE

## 2022-02-17 PROCEDURE — 99214 OFFICE O/P EST MOD 30 MIN: CPT | Performed by: INTERNAL MEDICINE

## 2022-02-17 NOTE — PROGRESS NOTES
Subjective:     Encounter Date:02/17/2022      Patient ID: Erlin Blanco is a 76 y.o. male.    Chief Complaint:  History of Present Illness    This is a 76-year-old with a history of paroxysmal ventricular tachycardia, aortic root dilatation, coronary artery disease, diabetes mellitus type 2, hypertension, hyperlipidemia, prior TIA in 2006, chronic diastolic congestive heart failure, polycythemia, who presents for follow-up.     He was then hospitalized in 9/2021 with fever due to possible pneumonia.  He was noted to be bradycardic during that admission.  His diltiazem was stopped and carvedilol was reduced.  An echocardiogram was checked during that admission which showed normal left ventricular systolic function wall motion with an EF of 59%, normal diastolic function, and mildly elevated right ventricular systolic pressure.  Already improved with the medication adjustment.    He was then seen back in the follow-up by MUKUL Davis on 9/29/2021.  Impaired to be doing well and no changes were made to his management.    He presents today for routine follow-up.  Swelling is largely stable but still greater in the left leg than the right.  He denies any dyspnea, chest pain, palpitations, orthopnea, near-syncope or syncope.  His blood pressures are low in the office today but he admits that he did not take his afternoon medications.  His medication list indicates he is back on the diltiazem.     Prior History:  The patient was previously followed by Dr. Higuera.  Prior to that he was followed by Dr. Thomas.  He establish care with Dr. Higuera in 5/2018.  At that time he reported intermittent palpitations but nothing significant.  The plan was to monitor his symptoms and consider further work-up if he had any issues.  In regards to his aortic root dilatation Dr. Higuera recommended focusing on blood pressure control and the plan was to see him back again in 1 year.       In 10/2018 the patient was referred to the  emergency room from his primary care physician's office after he was noted to have a heart rate in the 140s.  In the emergency room it was felt that he was in supraventricular tachycardia so he was given a dose of adenosine which resulted in conversion back to sinus rhythm.  He returned back to the emergency room a couple of days later with heart rates in the 120s but his work-up was unremarkable at that time.  Prior to leaving the emergency room he had a ZIO monitor this placed and his diltiazem was increased to 300 mg a day.  He returned to the office on 11/5/2018 and was seen by MUKUL George at which time he was doing well.  His ZIO monitor ended up showing numerous episodes of supraventricular tachycardia with possible atrial fibrillation, the longest lasting an hour and 2 minutes.  Based on those findings he was started on apixaban.     He return for routine follow-up in 12/2018 at which time he he was incidentally noted to be tachycardic with rates in the 120s.  An EKG was performed and Dr. Higuera felt that it was supraventricular tachycardia.  Carotid massage was performed and the patient converted to sinus rhythm.  The patient was asymptomatic with the supraventricular tachycardia.  Since it was not felt that he was having atrial fibrillation his apixaban was stopped and he was resumed on clopidogrel.  He then returned to the office last and was seen by MUKUL George in 6/2019 at which time he was doing well.  He had lost weight at that time working at [employer] 4 times a week which required a lot of walking.  He also reported that he had been diagnosed with polycythemia and was getting therapeutic phlebotomy about every 6 weeks.       He had a repeat echocardiogram performed in 12/2019 which showed normal left ventricular systolic function wall motion with an EF of 59%, grade 2 diastolic dysfunction, mildly dilated left atrium, mild mitral regurgitation, normal right ventricular systolic pressure, and  no evidence of aortic root or ascending aortic dilatation.    He was admitted in 8/2020 with acute prostatitis and encephalopathy.  He was noted to be tachycardic with episodes that were felt to be supraventricular tachycardia.  He then developed episodes of atrial fibrillation.  He was treated with diltiazem and digoxin in addition to carvedilol.  He was started on apixaban for for anticoagulation.  During that hospitalization he was also noted to have a small left hemispheric stroke and small vessel disease with lacunar infarcts.  An echocardiogram was performed during that admission on 8/18/2020 and showed normal left ventricular systolic function wall motion with an EF of 55 to 60%, moderate concentric left ventricular hypertrophy, mild pulmonary hypertension with a right ventricular systolic pressure of 41 mmHg, and no significant valvular disease.     In follow-up he was noted to have issues with increased lower extremity swelling.  This was despite high-dose furosemide.  He was subsequently switched to torsemide to see if this would be more effective.    Review of Systems   Constitutional: Negative for malaise/fatigue.   HENT: Negative for hearing loss, hoarse voice, nosebleeds and sore throat.    Eyes: Negative for pain.   Cardiovascular: Positive for leg swelling. Negative for chest pain, claudication, cyanosis, dyspnea on exertion, irregular heartbeat, near-syncope, orthopnea, palpitations, paroxysmal nocturnal dyspnea and syncope.   Respiratory: Negative for shortness of breath and snoring.    Endocrine: Negative for cold intolerance, heat intolerance, polydipsia, polyphagia and polyuria.   Skin: Negative for itching and rash.   Musculoskeletal: Positive for joint pain and joint swelling. Negative for arthritis, falls, muscle cramps, muscle weakness and myalgias.   Gastrointestinal: Negative for constipation, diarrhea, dysphagia, heartburn, hematemesis, hematochezia, melena, nausea and vomiting.    Genitourinary: Negative for frequency, hematuria and hesitancy.   Neurological: Negative for excessive daytime sleepiness, dizziness, headaches, light-headedness, numbness and weakness.   Psychiatric/Behavioral: Negative for depression. The patient is not nervous/anxious.          Current Outpatient Medications:   •  Accu-Chek FastClix Lancets misc, TEST BLOOD SUGAR 1-2 TIMES DAILY AND AS NEEDED, Disp: , Rfl:   •  Accu-Chek Guide test strip, , Disp: , Rfl:   •  acetaminophen (TYLENOL) 500 MG tablet, Take 1,000 mg by mouth 2 (Two) Times a Day As Needed for Mild Pain  or Moderate Pain ., Disp: , Rfl:   •  apixaban (Eliquis) 5 MG tablet tablet, Take 1 tablet by mouth Every 12 (Twelve) Hours., Disp: 14 tablet, Rfl: 0  •  aspirin (ASPIRIN LOW DOSE) 81 MG EC tablet, Take 1 tablet by mouth Daily., Disp: 90 tablet, Rfl: 2  •  Blood Glucose Monitoring Suppl (Accu-Chek Guide Me) w/Device kit, TEST BLOOD SUGAR 1-2 TIMES DAILY AND AS NEEDED, Disp: , Rfl:   •  carvedilol (COREG) 6.25 MG tablet, Take 1 tablet by mouth 2 (Two) Times a Day With Meals., Disp: 60 tablet, Rfl: 0  •  cetirizine (zyrTEC) 10 MG tablet, Take 10 mg by mouth Daily., Disp: , Rfl:   •  dilTIAZem CD (CARDIZEM CD) 360 MG 24 hr capsule, TAKE 1 CAPSULE DAILY, Disp: 90 capsule, Rfl: 3  •  fluticasone (FLONASE) 50 MCG/ACT nasal spray, USE 2 SPRAYS IN NOSTRIL(S) AS DIRECTED BY PROVIDER DAILY, Disp: 48 g, Rfl: 3  •  Fluzone High-Dose Quadrivalent 0.7 ML suspension prefilled syringe injection, , Disp: , Rfl:   •  glimepiride (Amaryl) 2 MG tablet, Take 1 tablet by mouth 2 (Two) Times a Day Before Meals., Disp: 180 tablet, Rfl: 1  •  glucose blood (OneTouch Ultra) test strip, Test blood sugar 1-2 times daily and as needed., Disp: 200 each, Rfl: 3  •  hydrALAZINE (APRESOLINE) 100 MG tablet, Take 1 tablet by mouth 3 (Three) Times a Day., Disp: 30 tablet, Rfl: 0  •  hydroxyurea (HYDREA) 500 MG capsule, Take 500 mg by mouth Daily., Disp: , Rfl:   •  Insulin Pen Needle  (B-D UF III MINI PEN NEEDLES) 31G X 5 MM misc, Inject 1 each under the skin into the appropriate area as directed Daily., Disp: 100 each, Rfl: 2  •  losartan (COZAAR) 100 MG tablet, TAKE 1 TABLET DAILY, Disp: 90 tablet, Rfl: 3  •  memantine (NAMENDA) 5 MG tablet, Take 1 tablet by mouth Daily., Disp: 30 tablet, Rfl: 3  •  montelukast (SINGULAIR) 10 MG tablet, TAKE 1 TABLET DAILY, Disp: 90 tablet, Rfl: 3  •  Multiple Vitamins-Minerals (MULTIVITAMIN ADULT PO), Take 1 tablet by mouth Daily., Disp: , Rfl:   •  potassium chloride (KLOR-CON) 20 MEQ CR tablet, Take 1 tablet by mouth 3 (Three) Times a Day. (Patient taking differently: Take 20 mEq by mouth 2 (Two) Times a Day.), Disp: 30 tablet, Rfl: 0  •  Probiotic Product (Probiotic Daily) capsule, Take 1 capsule by mouth Daily., Disp: 90 capsule, Rfl: 1  •  rosuvastatin (Crestor) 40 MG tablet, Take 1 tablet by mouth Daily., Disp: 90 tablet, Rfl: 1  •  torsemide (DEMADEX) 20 MG tablet, Take 1 tablet by mouth 2 (Two) Times a Day., Disp: 60 tablet, Rfl: 5    Past Medical History:   Diagnosis Date   • Abnormal ECG    • Abnormal stress test    • Abrasion of face 1/29/2020   • Acute bronchitis    • Acute hypokalemia 9/16/2020   • Acute non-recurrent sinusitis 1/13/2020   • Acute pyelonephritis 10/9/2020   • Acute sinusitis    • DORI (acute kidney injury) ([facility]) 4/28/2017   • Allergic rhinitis    • Aortic root dilation ([facility])    • Arthritis    • Bacteremia due to Escherichia coli 8/17/2020   • Balanitis 5/16/2021   • Benign enlargement of prostate    • C. difficile colitis 9/16/2020   • Cellulitis of knee, left 5/4/2017   • CHF (congestive heart failure) ([facility])    • Chronic diastolic congestive heart failure ([facility])    • Constipation 11/14/2020   • Contusion of face 1/29/2020   • Coronary artery disease    • CVA (cerebral vascular accident) ([facility]) 2020   • Diminished pulse     in lower extremities   • Edema    • Elevated hematocrit    • Elevated hemoglobin ([facility])    • Essential  hypertension    • Hearing loss     mala hearing aids   • Heart valve disease    • High risk medication use    • History of back pain    • History of dysuria    • History of echocardiogram    • History of intracranial hemorrhage    • History of scoliosis    • History of stroke    • Hyperlipidemia    • Hypokalemia 1/7/2021   • Injury of toe, left, initial encounter    • Irregular heart rate    • Knee pain, left    • Left bundle branch block    • Leg wound, left    • Leukocytosis 9/16/2020   • Low back pain    • Medicare annual wellness visit, subsequent    • Minor head injury without loss of consciousness 1/29/2020   • Murmur    • Muscle cramps    • Need for hepatitis C screening test    • Paraphimosis 5/16/2021   • Pneumonia of left lung due to infectious organism 9/17/2021   • Polycythemia    • Polycythemia vera (HCC)    • Pressure injury of buttock, stage 2 (HCC) 9/18/2020   • Prostate hyperplasia without urinary obstruction    • Prostatitis    • Proteinuria    • Pulmonary hypertension (HCC)    • Sacroiliitis (HCC) 10/9/2020   • Scoliosis    • Sepsis due to Escherichia coli ([facility]) 8/17/2020   • Sepsis with metabolic encephalopathy ([facility]) 8/17/2020   • Stroke ([facility])    • SVT (supraventricular tachycardia) ([facility])    • Tachycardia    • Thrombocytosis    • TIA (transient ischemic attack)     2006   • Type 2 diabetes mellitus ([facility])    • Type 2 diabetes mellitus with hyperglycemia ([facility]) 5/16/2021   • Urinary tract infection due to extended-spectrum beta lactamase (ESBL) producing Escherichia coli 8/18/2020   • Valvular heart disease        Past Surgical History:   Procedure Laterality Date   • CARDIAC CATHETERIZATION     • CATARACT EXTRACTION Left    • COLONOSCOPY      did Cologuard approx 6/2019 and negative   • HAND SURGERY     • JOINT REPLACEMENT  2014    [facility] Knee   • PA TOTAL KNEE ARTHROPLASTY Left 4/27/2017    Procedure: LT TOTAL KNEE ARTHROPLASTY;  Surgeon: Bertin Perrin MD;  Location: [facility] MAIN OR;  Service:  "Orthopedics   • PROSTATE SURGERY      Rezum steam treatment to shrink prostate by dr. guerrero       Family History   Problem Relation Age of Onset   • Hypertension Mother    • Other Father         ruptured appendix,  when pt. was 14 years old.   • Diabetes Paternal Aunt    • Diabetes Paternal Uncle    • No Known Problems Other    • Migraines Sister    • Stroke Sister    • Dementia Brother        Social History     Tobacco Use   • Smoking status: Former Smoker     Types: Cigarettes     Quit date:      Years since quittin.1   • Smokeless tobacco: Former User   • Tobacco comment: Caffeine daily   Vaping Use   • Vaping Use: Never used   Substance Use Topics   • Alcohol use: No   • Drug use: No         ECG 12 Lead    Date/Time: 2022 1:39 PM  Performed by: Marika Arias MD  Authorized by: Marika Arias MD   Comparison: compared with previous ECG   Similar to previous ECG  Rhythm: sinus rhythm  Conduction: left bundle branch block and 1st degree AV block               Objective:     Visit Vitals  /80 (BP Location: Left arm, Patient Position: Sitting, Cuff Size: Large Adult)   Pulse 84   Resp 16   Ht 182.9 cm (72.01\")   Wt 122 kg (269 lb)   SpO2 97%   BMI 36.47 kg/m²         Constitutional:       Appearance: Normal appearance. Well-developed.   HENT:      Head: Normocephalic and atraumatic.   Neck:      Vascular: No carotid bruit or JVD.   Pulmonary:      Effort: Pulmonary effort is normal.      Breath sounds: Normal breath sounds.   Cardiovascular:      Normal rate. Regular rhythm.      No gallop.   Pulses:     Radial: 2+ bilaterally.  Edema:     Peripheral edema present.     Pretibial: 3+ edema of the left pretibial area and 2+ edema of the right pretibial area.     Ankle: 3+ edema of the left ankle and 2+ edema of the right ankle.  Abdominal:      Palpations: Abdomen is soft.   Skin:     General: Skin is warm and dry.   Neurological:      Mental Status: Alert and oriented to person, " place, and time.             Assessment:          Diagnosis Plan   1. Paroxysmal atrial fibrillation ([facility])     2. Chronic diastolic congestive heart failure ([facility])     3. Left bundle-branch block     4. Mixed hyperlipidemia     5. Supraventricular tachycardia ([facility])     6. Aortic root dilation ([facility])     7. Type 2 diabetes mellitus with both eyes affected by mild nonproliferative retinopathy without macular edema, without long-term current use of insulin ([facility])     8. CKD (chronic kidney disease) stage 2, GFR 60-89 ml/min     9. Class 2 severe obesity due to excess calories with serious comorbidity and body mass index (BMI) of 36.0 to 36.9 in adult ([facility])     10. Pulmonary hypertension ([facility])            Plan:       1.  Paroxysmal atrial fibrillation.  He is in sinus rhythm today.  On chronic anticoagulation with apixaban with no bleeding issues.  2.  Bradycardia.  Noted during admission in 9/2021.  His diltiazem was stopped at that time and carvedilol dosage decreased.  According to his medication list it appears that he is back on the diltiazem.  Unable to confirm that with the patient.  3.  Chronic diastolic congestive heart failure.  Recent echocardiogram shows no evidence of significant diastolic dysfunction.  He has chronic bilateral lower extremity edema but otherwise no other signs of volume overload.  He remains on torsemide which he is tolerating well.  4.  Paroxysmal supraventricular tachycardia.  No recent episodes.  5.  Chronic left bundle branch block  6.  Chronic kidney disease  7.  Mild pulmonary hypertension  8.  Reported history of coronary artery disease  9.  History of stroke    I will plan on seeing the patient back again in 6 months.

## 2022-03-11 RX ORDER — APIXABAN 5 MG/1
TABLET, FILM COATED ORAL
Qty: 180 TABLET | Refills: 0 | Status: SHIPPED | OUTPATIENT
Start: 2022-03-11 | End: 2022-06-10

## 2022-03-11 NOTE — TELEPHONE ENCOUNTER
Rx Refill Note  Requested Prescriptions     Pending Prescriptions Disp Refills   • Eliquis 5 MG tablet tablet [Pharmacy Med Name: ELIQUIS TABS 5MG] 180 tablet 3     Sig: TAKE 1 TABLET EVERY 12 HOURS FOR ATRIAL FIBRILLATION      Last office visit with prescribing clinician: 1/10/2022      Next office visit with prescribing clinician: 3/11/2022            Alyssa Vasquez  03/11/22, 12:36 EST

## 2022-03-22 ENCOUNTER — OFFICE VISIT (OUTPATIENT)
Dept: FAMILY MEDICINE CLINIC | Facility: CLINIC | Age: 77
End: 2022-03-22

## 2022-03-22 VITALS
HEART RATE: 81 BPM | WEIGHT: 278 LBS | SYSTOLIC BLOOD PRESSURE: 170 MMHG | DIASTOLIC BLOOD PRESSURE: 78 MMHG | OXYGEN SATURATION: 95 % | BODY MASS INDEX: 37.65 KG/M2 | HEIGHT: 72 IN | TEMPERATURE: 97.8 F

## 2022-03-22 DIAGNOSIS — Z00.00 ENCOUNTER FOR MEDICARE ANNUAL WELLNESS EXAM: Primary | ICD-10-CM

## 2022-03-22 DIAGNOSIS — E11.3293 TYPE 2 DIABETES MELLITUS WITH BOTH EYES AFFECTED BY MILD NONPROLIFERATIVE RETINOPATHY WITHOUT MACULAR EDEMA, WITHOUT LONG-TERM CURRENT USE OF INSULIN: ICD-10-CM

## 2022-03-22 DIAGNOSIS — E78.2 MIXED HYPERLIPIDEMIA: ICD-10-CM

## 2022-03-22 DIAGNOSIS — I10 PRIMARY HYPERTENSION: ICD-10-CM

## 2022-03-22 DIAGNOSIS — R60.0 LOCALIZED EDEMA: ICD-10-CM

## 2022-03-22 DIAGNOSIS — D45 POLYCYTHEMIA VERA: ICD-10-CM

## 2022-03-22 DIAGNOSIS — R41.3 MEMORY DIFFICULTIES: ICD-10-CM

## 2022-03-22 PROCEDURE — G0439 PPPS, SUBSEQ VISIT: HCPCS | Performed by: NURSE PRACTITIONER

## 2022-03-22 PROCEDURE — 1159F MED LIST DOCD IN RCRD: CPT | Performed by: NURSE PRACTITIONER

## 2022-03-22 PROCEDURE — 99213 OFFICE O/P EST LOW 20 MIN: CPT | Performed by: NURSE PRACTITIONER

## 2022-03-22 PROCEDURE — 1170F FXNL STATUS ASSESSED: CPT | Performed by: NURSE PRACTITIONER

## 2022-03-22 PROCEDURE — 96160 PT-FOCUSED HLTH RISK ASSMT: CPT | Performed by: NURSE PRACTITIONER

## 2022-03-22 NOTE — PATIENT INSTRUCTIONS
Continue to monitor your blood sugar once daily before a meal and record results.  Continue to monitor your blood pressure periodically and record results.  Continue to work on healthy diet and exercise.  Follow up pending lab results.  Follow up in 3 months, or sooner if symptoms persist or worsen.   Consider Shingrix at pharmacy.      Medicare Wellness  Personal Prevention Plan of Service     Date of Office Visit:    Encounter Provider:  MUKUL Bañuelos  Place of Service:  [facility] PRIMARY [facility]ARE  Patient Name: Erlin Blanco  :  1945    As part of the Medicare Wellness portion of your visit today, we are providing you with this personalized preventive plan of services (PPPS). This plan is based upon recommendations of the [place] Preventive Services Task Force (USPSTF) and the Advisory Committee on Immunization Practices (ACIP).    This lists the preventive care services that should be considered, and provides dates of when you are due. Items listed as completed are up-to-date and do not require any further intervention.    Health Maintenance   Topic Date Due   • ZOSTER VACCINE (2 of 3) 2013   • ANNUAL WELLNESS VISIT  2022   • URINE MICROALBUMIN  2022   • DIABETIC FOOT EXAM  2022   • HEMOGLOBIN A1C  2022   • LIPID PANEL  01/10/2023   • DIABETIC EYE EXAM  03/10/2023   • COLORECTAL CANCER SCREENING  2029   • TDAP/TD VACCINES (3 - Td or Tdap) 2030   • HEPATITIS C SCREENING  Completed   • COVID-19 Vaccine  Completed   • INFLUENZA VACCINE  Completed   • Pneumococcal Vaccine 65+  Completed   • AAA SCREEN (ONE-TIME)  Completed       No orders of the defined types were placed in this encounter.      Return in about 3 months (around 2022) for Recheck.            Fall Prevention in the Home, Adult  Falls can cause injuries and can affect people from all age groups. There are many simple things that you can do to make your home safe and to  help prevent falls. Ask for help when making these changes, if needed.  What actions can I take to prevent falls?  General instructions  · Use good lighting in all rooms. Replace any light bulbs that burn out.  · Turn on lights if it is dark. Use night-lights.  · Place frequently used items in easy-to-reach places. Lower the shelves around your home if necessary.  · Set up furniture so that there are clear paths around it. Avoid moving your furniture around.  · Remove throw rugs and other tripping hazards from the floor.  · Avoid walking on wet floors.  · Fix any uneven floor surfaces.  · Add color or contrast paint or tape to grab bars and handrails in your home. Place contrasting color strips on the first and last steps of stairways.  · When you use a stepladder, make sure that it is completely opened and that the sides are firmly locked. Have someone hold the ladder while you are using it. Do not climb a closed stepladder.  · Be aware of any and all pets.  What can I do in the bathroom?         · Keep the floor dry. Immediately clean up any water that spills onto the floor.  · Remove soap buildup in the tub or shower on a regular basis.  · Use non-skid mats or decals on the floor of the tub or shower.  · Attach bath mats securely with double-sided, non-slip rug tape.  · If you need to sit down while you are in the shower, use a plastic, non-slip stool.  · Install grab bars by the toilet and in the tub and shower. Do not use towel bars as grab bars.  What can I do in the bedroom?  · Make sure that a bedside light is easy to reach.  · Do not use oversized bedding that drapes onto the floor.  · Have a firm chair that has side arms to use for getting dressed.  What can I do in the kitchen?  · Clean up any spills right away.  · If you need to reach for something above you, use a sturdy step stool that has a grab bar.  · Keep electrical cables out of the way.  · Do not use floor polish or wax that makes floors  slippery. If you must use wax, make sure that it is non-skid floor wax.  What can I do in the stairways?  · Do not leave any items on the stairs.  · Make sure that you have a light switch at the top of the stairs and the bottom of the stairs. Have them installed if you do not have them.  · Make sure that there are handrails on both sides of the stairs. Fix handrails that are broken or loose. Make sure that handrails are as long as the stairways.  · Install non-slip stair treads on all stairs in your home.  · Avoid having throw rugs at the top or bottom of stairways, or secure the rugs with carpet tape to prevent them from moving.  · Choose a carpet design that does not hide the edge of steps on the stairway.  · Check any carpeting to make sure that it is firmly attached to the stairs. Fix any carpet that is loose or worn.  What can I do on the outside of my home?  · Use bright outdoor lighting.  · Regularly repair the edges of walkways and driveways and fix any cracks.  · Remove high doorway thresholds.  · Trim any shrubbery on the main path into your home.  · Regularly check that handrails are securely fastened and in good repair. Both sides of any steps should have handrails.  · Install guardrails along the edges of any raised decks or porches.  · Clear walkways of debris and clutter, including tools and rocks.  · Have leaves, snow, and ice cleared regularly.  · Use sand or salt on walkways during winter months.  · In the garage, clean up any spills right away, including grease or oil spills.  What other actions can I take?  · Wear closed-toe shoes that fit well and support your feet. Wear shoes that have rubber soles or low heels.  · Use mobility aids as needed, such as canes, walkers, scooters, and crutches.  · Review your medicines with your health care provider. Some medicines can cause dizziness or changes in blood pressure, which increase your risk of falling.  Talk with your health care provider about other  ways that you can decrease your risk of falls. This may include working with a physical therapist or  to improve your strength, balance, and endurance.  Where to find more information  · Centers for Disease Control and Prevention, [place]: https://www.cdc.gov  · National [place] on Aging: https://st8bylq.alvin.nih.gov  Contact a health care provider if:  · You are afraid of falling at home.  · You feel weak, drowsy, or dizzy at home.  · You fall at home.  Summary  · There are many simple things that you can do to make your home safe and to help prevent falls.  · Ways to make your home safe include removing tripping hazards and installing grab bars in the bathroom.  · Ask for help when making these changes in your home.  This information is not intended to replace advice given to you by your health care provider. Make sure you discuss any questions you have with your health care provider.  Document Revised: 11/30/2018 Document Reviewed: 08/02/2018  Elsevier Patient Education © 2021 [employer]

## 2022-03-22 NOTE — PROGRESS NOTES
The ABCs of the Annual Wellness Visit  Subsequent Medicare Wellness Visit    Chief Complaint   Patient presents with   • Annual Exam     mcwe      Subjective    History of Present Illness:  Erlin Blanco is a 76 y.o. male who presents for a Subsequent Medicare Wellness Visit.  In addition, we addressed the following health issues:     F/U DM2: takes Glimperide twice daily; last A1c 6.7%; monitors blood sugar, typically runs 110s; watches intake of carbs/sugars, could do better; has started drinking protein shake in morning, trying to lose some weight; has been going to rehab to help knees, otherwise not much activity; no numbness or tingling; last eye exam about 2 weeks ago, sees [facility], Dr. Sylvester; sees podiatry every 3 months to have nails trimmed.     F/U HTN: takes Hydralazine TID, Carvedilol twice daily and Losartan daily; also takes Torsemide twice daily and KCL twice daily; monitors BP, BP has been increased recently, has been running 160s/70s; sees Dr. Arias cardiology; no headaches; no orthostasis; swelling is doing pretty good; swelling in left leg always greater than right; takes Eliquis twice daily; had recent Echo.    F/U Hyperlipidemia: takes Crestor daily; no myalgias; has noted BP higher since stroke clinic changed to Crestor; watches saturated fats in diet; fasting today other than protein shake.     F/U memory difficulties: takes Namenda daily; no problems with medication; has not noted much difference since started medication; saw Dr. Adams with [facility] neurology and referred to [facility]; has upcoming follow up with Dr. Adams.    Sees hematology Dr. Hoyos; takes Hydroxyurea daily; had follow up in January; plans to schedule a follow up appointment soon due to feeling like blood is getting thick again.    Saw urology yesterday; has noted blood in urine; told related to Eliquis; no changes in medications; no infection; has upcoming CT next month.    The following portions  of the patient's history were reviewed and   updated as appropriate: allergies, current medications, past family history, past medical history, past social history, past surgical history and problem list.    Compared to one year ago, the patient feels his physical   health is worse.    Compared to one year ago, the patient feels his mental   health is the same.    Recent Hospitalizations:  This patient has had a [facility] admission record on file within the last 365 days.    Current Medical Providers:  Patient Care Team:  Zamzam John APRN as PCP - General (Family Medicine)  Bertin Perrin MD as Consulting Physician (Orthopedic Surgery)  Marika Arias MD as Consulting Physician (Cardiology)  Bakari Chapman Jr., MD as Consulting Physician (Urology)  Remy Thomas MD as Consulting Physician (Hematology and Oncology)    Outpatient Medications Prior to Visit   Medication Sig Dispense Refill   • Accu-Chek FastClix Lancets misc TEST BLOOD SUGAR 1-2 TIMES DAILY AND AS NEEDED     • Accu-Chek Guide test strip      • acetaminophen (TYLENOL) 500 MG tablet Take 1,000 mg by mouth 2 (Two) Times a Day As Needed for Mild Pain  or Moderate Pain .     • aspirin (ASPIRIN LOW DOSE) 81 MG EC tablet Take 1 tablet by mouth Daily. 90 tablet 2   • Blood Glucose Monitoring Suppl (Accu-Chek Guide Me) w/Device kit TEST BLOOD SUGAR 1-2 TIMES DAILY AND AS NEEDED     • carvedilol (COREG) 6.25 MG tablet Take 1 tablet by mouth 2 (Two) Times a Day With Meals. 60 tablet 0   • cetirizine (zyrTEC) 10 MG tablet Take 10 mg by mouth Daily.     • Eliquis 5 MG tablet tablet TAKE 1 TABLET EVERY 12 HOURS FOR ATRIAL FIBRILLATION 180 tablet 0   • fluticasone (FLONASE) 50 MCG/ACT nasal spray USE 2 SPRAYS IN NOSTRIL(S) AS DIRECTED BY PROVIDER DAILY 48 g 3   • Fluzone High-Dose Quadrivalent 0.7 ML suspension prefilled syringe injection      • glimepiride (Amaryl) 2 MG tablet Take 1 tablet by mouth 2 (Two) Times a Day Before Meals. 180  tablet 1   • glucose blood (OneTouch Ultra) test strip Test blood sugar 1-2 times daily and as needed. 200 each 3   • hydrALAZINE (APRESOLINE) 100 MG tablet Take 1 tablet by mouth 3 (Three) Times a Day. 30 tablet 0   • hydroxyurea (HYDREA) 500 MG capsule Take 500 mg by mouth Daily.     • Insulin Pen Needle (B-D UF III MINI PEN NEEDLES) 31G X 5 MM misc Inject 1 each under the skin into the appropriate area as directed Daily. 100 each 2   • losartan (COZAAR) 100 MG tablet TAKE 1 TABLET DAILY 90 tablet 3   • memantine (NAMENDA) 5 MG tablet Take 1 tablet by mouth Daily. 30 tablet 3   • montelukast (SINGULAIR) 10 MG tablet TAKE 1 TABLET DAILY 90 tablet 3   • Multiple Vitamins-Minerals (MULTIVITAMIN ADULT PO) Take 1 tablet by mouth Daily.     • potassium chloride (KLOR-CON) 20 MEQ CR tablet Take 1 tablet by mouth 3 (Three) Times a Day. (Patient taking differently: Take 20 mEq by mouth 2 (Two) Times a Day.) 30 tablet 0   • Probiotic Product (Probiotic Daily) capsule Take 1 capsule by mouth Daily. 90 capsule 1   • rosuvastatin (Crestor) 40 MG tablet Take 1 tablet by mouth Daily. 90 tablet 1   • torsemide (DEMADEX) 20 MG tablet Take 1 tablet by mouth 2 (Two) Times a Day. 60 tablet 5   • dilTIAZem CD (CARDIZEM CD) 360 MG 24 hr capsule TAKE 1 CAPSULE DAILY 90 capsule 3     No facility-administered medications prior to visit.     No longer taking Dilitiazem since last hospitalization.    No opioid medication identified on active medication list. I have reviewed chart for other potential  high risk medication/s and harmful drug interactions in the elderly.          Aspirin is on active medication list. Aspirin use is indicated based on review of current medical condition/s. Pros and cons of this therapy have been discussed today. Benefits of this medication outweigh potential harm.  Patient has been encouraged to continue taking this medication.  .      Patient Active Problem List   Diagnosis   • Osteoarthritis, knee   • Type 2  diabetes mellitus with both eyes affected by mild nonproliferative retinopathy without macular edema, without long-term current use of insulin (HCC)   • Hypertension   • Coronary artery disease   • Supraventricular tachycardia (HCC)   • TIA (transient ischemic attack)   • Hyperlipidemia   • Prostate hyperplasia without urinary obstruction   • Class 2 severe obesity due to excess calories with serious comorbidity and body mass index (BMI) of 36.0 to 36.9 in adult (HCC)   • Polycythemia vera (HCC)   • Pain in both knees   • Allergic rhinitis   • Left bundle-branch block   • Fatigue   • Atrial flutter ([facility])   • History of CVA (cerebrovascular accident)   • Discitis of lumbar region   • Cervical radiculitis   • Chronic diastolic congestive heart failure (HCC)   • CKD (chronic kidney disease) stage 2, GFR 60-89 ml/min   • Glucosuria   • Paroxysmal atrial fibrillation ([facility])   • Acute non-recurrent maxillary sinusitis   • Memory difficulties   • Bradycardia   • Anemia   • Low back pain   • Back pain   • Cardiovascular stress test abnormal   • Prostatitis   • Localized edema   • Murmur   • Pulmonary hypertension ([facility])   • Thrombocytosis   • Arthritis     Advance Care Planning  Advance Directive is not on file.  ACP discussion was held with the patient during this visit. Patient has an advance directive (not in EMR), copy requested.    Review of Systems   Constitutional: Negative for appetite change (has been trying to eat healthier), chills and fever. Fatigue: some.   HENT: Negative for ear pain, sore throat and trouble swallowing. Sinus pressure: some at times, no problems recently; uses Flonase as needed and helps.    Eyes: Negative for visual disturbance.   Respiratory: Negative for cough, chest tightness and shortness of breath.    Cardiovascular: Negative for chest pain and palpitations.   Gastrointestinal: Negative for abdominal pain, blood in stool, constipation and diarrhea.   Endocrine: Negative for cold  "intolerance, heat intolerance and polydipsia.   Genitourinary: Negative for dysuria and frequency. Hematuria: see HPI.   Musculoskeletal: Negative for arthralgias. Back pain: some in lower back, particularly when bends over; no radiation of pain down legs; no bladder or bowel dysfunction.   Skin: Negative for rash.   Neurological: Negative for syncope and weakness.   Hematological: Bruises/bleeds easily: with Eliquis.   Psychiatric/Behavioral: Negative for dysphoric mood and sleep disturbance. The patient is not nervous/anxious.         Objective    Vitals:    03/22/22 0927   BP: 170/78   BP Location: Left arm   Patient Position: Sitting   Cuff Size: Adult   Pulse: 81   Temp: 97.8 °F (36.6 °C)   SpO2: 95%   Weight: 126 kg (278 lb)   Height: 182.9 cm (72.01\")     BMI Readings from Last 1 Encounters:   03/22/22 37.69 kg/m²   BMI is above normal parameters. Recommendations include: nutrition counseling    Does the patient have evidence of cognitive impairment? see HPI    Physical Exam  Vitals and nursing note reviewed.   Constitutional:       General: He is not in acute distress.     Appearance: He is well-developed and well-groomed. He is not diaphoretic.   HENT:      Head: Normocephalic and atraumatic.      Jaw: No tenderness or pain on movement.      Right Ear: External ear normal. Right ear decreased hearing: has hearing aid. Right ear middle ear effusion: TM dull. Tympanic membrane is not erythematous.      Left Ear: External ear normal. Left ear decreased hearing: has hearing aid. Left ear middle ear effusion: mild. Left ear injected TM: mild.      Nose: Nose normal.      Right Sinus: No maxillary sinus tenderness or frontal sinus tenderness.      Left Sinus: No maxillary sinus tenderness or frontal sinus tenderness.      Mouth/Throat:      Mouth: Mucous membranes are moist.      Pharynx: Oropharyngeal exudate: drainage noted on tonsils. Posterior oropharyngeal erythema: mild.   Eyes:      Extraocular Movements: " Extraocular movements intact.      Conjunctiva/sclera: Conjunctivae normal.      Pupils: Pupils are equal, round, and reactive to light.   Neck:      Thyroid: No thyromegaly.      Vascular: No carotid bruit.      Trachea: No tracheal deviation.   Cardiovascular:      Rate and Rhythm: Normal rate and regular rhythm.      Pulses: Normal pulses.      Heart sounds: Murmur heard.    Systolic murmur is present with a grade of 2/6.     Comments: At RUSB and LUSB  Bilateral compression stockings  Pulmonary:      Effort: Pulmonary effort is normal. No respiratory distress.      Breath sounds: Normal breath sounds.   Abdominal:      General: Bowel sounds are normal.      Palpations: Abdomen is soft. There is no hepatomegaly or splenomegaly.      Tenderness: There is no abdominal tenderness. There is no guarding.   Musculoskeletal:         General: Normal range of motion.      Cervical back: Normal range of motion and neck supple. No bony tenderness.      Thoracic back: No bony tenderness.      Lumbar back: Bony tenderness: mild in lower back.      Right lower leg: Right lower leg edema: trace pretibial/ankle.      Left lower leg: Edema (1+ pretibial/ankle) present.   Lymphadenopathy:      Cervical: No cervical adenopathy.   Skin:     General: Skin is warm and dry.      Findings: No rash.   Neurological:      Mental Status: He is alert and oriented to person, place, and time.      Cranial Nerves: No cranial nerve deficit.      Coordination: Coordination normal.      Gait: Abnormal gait: guarded gait with 4 point cane.      Deep Tendon Reflexes: Reflexes are normal and symmetric.   Psychiatric:         Mood and Affect: Mood normal.         Behavior: Behavior normal.         Thought Content: Thought content normal.         Cognition and Memory: Cognition normal.         Judgment: Judgment normal.       Lab Results   Component Value Date    CHLPL 105 01/10/2022    TRIG 98 01/10/2022    HDL 40 01/10/2022    LDL 46 01/10/2022     VLDL 19 01/10/2022            HEALTH RISK ASSESSMENT    Smoking Status:  Social History     Tobacco Use   Smoking Status Former Smoker   • Types: Cigarettes   • Quit date:    • Years since quittin.2   Smokeless Tobacco Former User   Tobacco Comment    Caffeine daily     Alcohol Consumption:  Social History     Substance and Sexual Activity   Alcohol Use No     Fall Risk Screen:    SIDNEY Fall Risk Assessment was completed, and patient is at HIGH risk for falls. Assessment completed on:3/22/2022    Depression Screening:  PHQ-2/PHQ-9 Depression Screening 3/22/2022   Retired Total Score -   Little Interest or Pleasure in Doing Things 0-->not at all   Feeling Down, Depressed or Hopeless 0-->not at all   PHQ-9: Brief Depression Severity Measure Score 0       Health Habits and Functional and Cognitive Screening:  Functional & Cognitive Status 3/22/2022   Do you have difficulty preparing food and eating? No   Do you have difficulty bathing yourself, getting dressed or grooming yourself? No   Do you have difficulty using the toilet? No   Do you have difficulty moving around from place to place? No   Do you have trouble with steps or getting out of a bed or a chair? Yes   Current Diet Well Balanced Diet   Dental Exam Up to date   Eye Exam Up to date   Exercise (times per week) 0 times per week   Current Exercises Include No Regular Exercise   Do you need help using the phone?  No   Are you deaf or do you have serious difficulty hearing?  Yes   Do you need help with transportation? No   Do you need help shopping? No   Do you need help preparing meals?  No   Do you need help with housework?  No   Do you need help with laundry? No   Do you need help taking your medications? No   Do you need help managing money? No   Do you ever drive or ride in a car without wearing a seat belt? No   Have you felt unusual stress, anger or loneliness in the last month? No   Who do you live with? Spouse   If you need help, do you have  trouble finding someone available to you? No   Have you been bothered in the last four weeks by sexual problems? No   Do you have difficulty concentrating, remembering or making decisions? Yes       Age-appropriate Screening Schedule:  Refer to the list below for future screening recommendations based on patient's age, sex and/or medical conditions. Orders for these recommended tests are listed in the plan section. The patient has been provided with a written plan.    Health Maintenance   Topic Date Due   • ZOSTER VACCINE (2 of 3) 02/01/2013   • URINE MICROALBUMIN  01/04/2022   • DIABETIC FOOT EXAM  04/12/2022   • HEMOGLOBIN A1C  05/22/2022   • LIPID PANEL  01/10/2023   • DIABETIC EYE EXAM  03/10/2023   • TDAP/TD VACCINES (3 - Td or Tdap) 01/25/2030   • INFLUENZA VACCINE  Completed     Has bilateral hearing aids; has trouble with mobility, uses 4 point cane; spouse helps with things.         Assessment/Plan   CMS Preventative Services Quick Reference  Risk Factors Identified During Encounter  Fall Risk-High or Moderate  Immunizations Discussed/Encouraged (specific Immunizations; Shingrix  Obesity/Overweight   The above risks/problems have been discussed with the patient.    Discussed high risk for falls and recommended avoiding throw rugs, installing grab bars in bathroom, making sure have handrails on steps, etc; instructed to continue to use cane.    Follow up actions/plans if indicated are seen below in the Assessment/Plan Section.  Pertinent information has been shared with the patient in the After Visit Summary.    Diagnoses and all orders for this visit:    1. Encounter for Medicare annual wellness exam (Primary)  -     Hemoglobin A1c  -     CBC & Differential  -     Comprehensive Metabolic Panel    2. Type 2 diabetes mellitus with both eyes affected by mild nonproliferative retinopathy without macular edema, without long-term current use of insulin (HCC)  Assessment & Plan:  Diabetes is stable.   Continue  current treatment regimen.  Regular aerobic exercise.  Diabetes will be reassessed in 3 months.  Continue Glimepiride twice daily.    Orders:  -     Hemoglobin A1c  -     Comprehensive Metabolic Panel    3. Primary hypertension  Assessment & Plan:  Hypertension is increased recently.  Weight loss.  Medication changes per orders.  Ambulatory blood pressure monitoring.  Blood pressure will be reassessed in 3 months.  Continue Hydralazine TID, Carvedilol twice daily, and Losartan and Cardizem daily.  Continue Torsemide and KCL twice daily.    Orders:  -     CBC & Differential  -     Comprehensive Metabolic Panel    4. Mixed hyperlipidemia  Assessment & Plan:  Continue Crestor daily.  Continue to work on healthy diet and exercise.      5. Memory difficulties  Assessment & Plan:  Continue Namenda daily.  Follow up as scheduled with neurology.      6. Localized edema  Assessment & Plan:  Continue Torsemide twice daily.      7. Polycythemia vera (HCC)  Assessment & Plan:  Schedule a follow up appointment with Dr. Hoyos, hematology.        Follow Up:   Return in about 3 months (around 6/22/2022) for Recheck.or sooner if symptoms persist or worsen.  Will consider increasing Carvedilol to 12.5 mg twice daily.    An After Visit Summary and PPPS were made available to the patient.

## 2022-03-23 LAB
ALBUMIN SERPL-MCNC: 4.2 G/DL (ref 3.7–4.7)
ALBUMIN/GLOB SERPL: 1.6 {RATIO} (ref 1.2–2.2)
ALP SERPL-CCNC: 114 IU/L (ref 44–121)
ALT SERPL-CCNC: 20 IU/L (ref 0–44)
AST SERPL-CCNC: 27 IU/L (ref 0–40)
BASOPHILS # BLD AUTO: 0.1 X10E3/UL (ref 0–0.2)
BASOPHILS NFR BLD AUTO: 2 %
BILIRUB SERPL-MCNC: 0.6 MG/DL (ref 0–1.2)
BUN SERPL-MCNC: 17 MG/DL (ref 8–27)
BUN/CREAT SERPL: 16 (ref 10–24)
CALCIUM SERPL-MCNC: 9.7 MG/DL (ref 8.6–10.2)
CHLORIDE SERPL-SCNC: 105 MMOL/L (ref 96–106)
CO2 SERPL-SCNC: 24 MMOL/L (ref 20–29)
CREAT SERPL-MCNC: 1.07 MG/DL (ref 0.76–1.27)
EGFRCR SERPLBLD CKD-EPI 2021: 73 ML/MIN/1.73
EOSINOPHIL # BLD AUTO: 0.3 X10E3/UL (ref 0–0.4)
EOSINOPHIL NFR BLD AUTO: 4 %
ERYTHROCYTE [DISTWIDTH] IN BLOOD BY AUTOMATED COUNT: 13.7 % (ref 11.6–15.4)
GLOBULIN SER CALC-MCNC: 2.7 G/DL (ref 1.5–4.5)
GLUCOSE SERPL-MCNC: 256 MG/DL (ref 65–99)
HBA1C MFR BLD: 9 % (ref 4.8–5.6)
HCT VFR BLD AUTO: 48.4 % (ref 37.5–51)
HGB BLD-MCNC: 16.2 G/DL (ref 13–17.7)
IMM GRANULOCYTES # BLD AUTO: 0 X10E3/UL (ref 0–0.1)
IMM GRANULOCYTES NFR BLD AUTO: 0 %
LYMPHOCYTES # BLD AUTO: 1.3 X10E3/UL (ref 0.7–3.1)
LYMPHOCYTES NFR BLD AUTO: 18 %
MCH RBC QN AUTO: 31.3 PG (ref 26.6–33)
MCHC RBC AUTO-ENTMCNC: 33.5 G/DL (ref 31.5–35.7)
MCV RBC AUTO: 94 FL (ref 79–97)
MONOCYTES # BLD AUTO: 0.5 X10E3/UL (ref 0.1–0.9)
MONOCYTES NFR BLD AUTO: 6 %
NEUTROPHILS # BLD AUTO: 4.9 X10E3/UL (ref 1.4–7)
NEUTROPHILS NFR BLD AUTO: 70 %
PLATELET # BLD AUTO: 225 X10E3/UL (ref 150–450)
POTASSIUM SERPL-SCNC: 4.4 MMOL/L (ref 3.5–5.2)
PROT SERPL-MCNC: 6.9 G/DL (ref 6–8.5)
RBC # BLD AUTO: 5.17 X10E6/UL (ref 4.14–5.8)
SODIUM SERPL-SCNC: 145 MMOL/L (ref 134–144)
WBC # BLD AUTO: 7.1 X10E3/UL (ref 3.4–10.8)

## 2022-03-23 NOTE — ASSESSMENT & PLAN NOTE
Hypertension is increased recently.  Weight loss.  Medication changes per orders.  Ambulatory blood pressure monitoring.  Blood pressure will be reassessed in 3 months.  Continue Hydralazine TID, Carvedilol twice daily, and Losartan and Cardizem daily.  Continue Torsemide and KCL twice daily.

## 2022-03-24 ENCOUNTER — TELEPHONE (OUTPATIENT)
Dept: CARDIOLOGY | Facility: CLINIC | Age: 77
End: 2022-03-24

## 2022-03-24 DIAGNOSIS — I10 PRIMARY HYPERTENSION: Primary | ICD-10-CM

## 2022-03-24 DIAGNOSIS — E11.3293 TYPE 2 DIABETES MELLITUS WITH BOTH EYES AFFECTED BY MILD NONPROLIFERATIVE RETINOPATHY WITHOUT MACULAR EDEMA, WITHOUT LONG-TERM CURRENT USE OF INSULIN: ICD-10-CM

## 2022-03-24 RX ORDER — GLIMEPIRIDE 2 MG/1
TABLET ORAL
Start: 2022-03-24 | End: 2022-04-20 | Stop reason: SDUPTHER

## 2022-03-24 RX ORDER — CARVEDILOL 12.5 MG/1
12.5 TABLET ORAL 2 TIMES DAILY WITH MEALS
Qty: 180 TABLET | Refills: 1 | Status: SHIPPED | OUTPATIENT
Start: 2022-03-24 | End: 2022-03-25 | Stop reason: SDUPTHER

## 2022-03-25 ENCOUNTER — OFFICE VISIT (OUTPATIENT)
Dept: NEUROLOGY | Facility: CLINIC | Age: 77
End: 2022-03-25

## 2022-03-25 ENCOUNTER — TELEPHONE (OUTPATIENT)
Dept: FAMILY MEDICINE CLINIC | Facility: CLINIC | Age: 77
End: 2022-03-25

## 2022-03-25 ENCOUNTER — TELEPHONE (OUTPATIENT)
Dept: CARDIOLOGY | Facility: CLINIC | Age: 77
End: 2022-03-25

## 2022-03-25 ENCOUNTER — APPOINTMENT (OUTPATIENT)
Dept: GENERAL RADIOLOGY | Facility: HOSPITAL | Age: 77
End: 2022-03-25

## 2022-03-25 ENCOUNTER — HOSPITAL ENCOUNTER (EMERGENCY)
Facility: HOSPITAL | Age: 77
Discharge: HOME | End: 2022-03-25
Attending: EMERGENCY MEDICINE | Admitting: EMERGENCY MEDICINE

## 2022-03-25 VITALS
DIASTOLIC BLOOD PRESSURE: 87 MMHG | TEMPERATURE: 97.9 F | HEIGHT: 72 IN | RESPIRATION RATE: 16 BRPM | BODY MASS INDEX: 36.3 KG/M2 | OXYGEN SATURATION: 95 % | SYSTOLIC BLOOD PRESSURE: 166 MMHG | HEART RATE: 66 BPM | WEIGHT: 268 LBS

## 2022-03-25 VITALS
OXYGEN SATURATION: 97 % | DIASTOLIC BLOOD PRESSURE: 80 MMHG | HEART RATE: 71 BPM | HEIGHT: 72 IN | SYSTOLIC BLOOD PRESSURE: 222 MMHG | WEIGHT: 268 LBS | BODY MASS INDEX: 36.3 KG/M2

## 2022-03-25 DIAGNOSIS — Z86.73 HISTORY OF CVA (CEREBROVASCULAR ACCIDENT): Primary | ICD-10-CM

## 2022-03-25 DIAGNOSIS — I10 PRIMARY HYPERTENSION: Primary | ICD-10-CM

## 2022-03-25 DIAGNOSIS — I10 PRIMARY HYPERTENSION: ICD-10-CM

## 2022-03-25 DIAGNOSIS — I10 HYPERTENSION, UNSPECIFIED TYPE: Primary | ICD-10-CM

## 2022-03-25 LAB
ALBUMIN SERPL-MCNC: 4.3 G/DL (ref 3.5–5.2)
ALBUMIN/GLOB SERPL: 1.5 G/DL
ALP SERPL-CCNC: 103 U/L (ref 39–117)
ALT SERPL W P-5'-P-CCNC: 33 U/L (ref 1–41)
ANION GAP SERPL CALCULATED.3IONS-SCNC: 12.1 MMOL/L (ref 5–15)
AST SERPL-CCNC: 35 U/L (ref 1–40)
BASOPHILS # BLD AUTO: 0.15 10*3/MM3 (ref 0–0.2)
BASOPHILS NFR BLD AUTO: 1.9 % (ref 0–1.5)
BILIRUB SERPL-MCNC: 0.6 MG/DL (ref 0–1.2)
BUN SERPL-MCNC: 19 MG/DL (ref 8–23)
BUN/CREAT SERPL: 16.7 (ref 7–25)
CALCIUM SPEC-SCNC: 9.8 MG/DL (ref 8.6–10.5)
CHLORIDE SERPL-SCNC: 107 MMOL/L (ref 98–107)
CO2 SERPL-SCNC: 24.9 MMOL/L (ref 22–29)
CREAT SERPL-MCNC: 1.14 MG/DL (ref 0.76–1.27)
DEPRECATED RDW RBC AUTO: 46.4 FL (ref 37–54)
EGFRCR SERPLBLD CKD-EPI 2021: 67.5 ML/MIN/1.73
EOSINOPHIL # BLD AUTO: 0.33 10*3/MM3 (ref 0–0.4)
EOSINOPHIL NFR BLD AUTO: 4.1 % (ref 0.3–6.2)
ERYTHROCYTE [DISTWIDTH] IN BLOOD BY AUTOMATED COUNT: 13.7 % (ref 12.3–15.4)
GLOBULIN UR ELPH-MCNC: 2.9 GM/DL
GLUCOSE SERPL-MCNC: 182 MG/DL (ref 65–99)
HCT VFR BLD AUTO: 47.9 % (ref 37.5–51)
HGB BLD-MCNC: 16.1 G/DL (ref 13–17.7)
HOLD SPECIMEN: NORMAL
HOLD SPECIMEN: NORMAL
IMM GRANULOCYTES # BLD AUTO: 0.03 10*3/MM3 (ref 0–0.05)
IMM GRANULOCYTES NFR BLD AUTO: 0.4 % (ref 0–0.5)
LYMPHOCYTES # BLD AUTO: 1.16 10*3/MM3 (ref 0.7–3.1)
LYMPHOCYTES NFR BLD AUTO: 14.4 % (ref 19.6–45.3)
MCH RBC QN AUTO: 31.1 PG (ref 26.6–33)
MCHC RBC AUTO-ENTMCNC: 33.6 G/DL (ref 31.5–35.7)
MCV RBC AUTO: 92.5 FL (ref 79–97)
MONOCYTES # BLD AUTO: 0.43 10*3/MM3 (ref 0.1–0.9)
MONOCYTES NFR BLD AUTO: 5.3 % (ref 5–12)
NEUTROPHILS NFR BLD AUTO: 5.94 10*3/MM3 (ref 1.7–7)
NEUTROPHILS NFR BLD AUTO: 73.9 % (ref 42.7–76)
NRBC BLD AUTO-RTO: 0 /100 WBC (ref 0–0.2)
PLATELET # BLD AUTO: 308 10*3/MM3 (ref 140–450)
PMV BLD AUTO: 10.6 FL (ref 6–12)
POTASSIUM SERPL-SCNC: 4.3 MMOL/L (ref 3.5–5.2)
PROT SERPL-MCNC: 7.2 G/DL (ref 6–8.5)
QT INTERVAL: 509 MS
RBC # BLD AUTO: 5.18 10*6/MM3 (ref 4.14–5.8)
SODIUM SERPL-SCNC: 144 MMOL/L (ref 136–145)
TROPONIN T SERPL-MCNC: <0.01 NG/ML (ref 0–0.03)
WBC NRBC COR # BLD: 8.04 10*3/MM3 (ref 3.4–10.8)
WHOLE BLOOD HOLD SPECIMEN: NORMAL
WHOLE BLOOD HOLD SPECIMEN: NORMAL

## 2022-03-25 PROCEDURE — 71045 X-RAY EXAM CHEST 1 VIEW: CPT

## 2022-03-25 PROCEDURE — 85025 COMPLETE CBC W/AUTO DIFF WBC: CPT | Performed by: NURSE PRACTITIONER

## 2022-03-25 PROCEDURE — 93005 ELECTROCARDIOGRAM TRACING: CPT | Performed by: NURSE PRACTITIONER

## 2022-03-25 PROCEDURE — 99283 EMERGENCY DEPT VISIT LOW MDM: CPT

## 2022-03-25 PROCEDURE — 93010 ELECTROCARDIOGRAM REPORT: CPT | Performed by: INTERNAL MEDICINE

## 2022-03-25 PROCEDURE — 84484 ASSAY OF TROPONIN QUANT: CPT | Performed by: NURSE PRACTITIONER

## 2022-03-25 PROCEDURE — 80053 COMPREHEN METABOLIC PANEL: CPT | Performed by: NURSE PRACTITIONER

## 2022-03-25 RX ORDER — VALSARTAN 320 MG/1
320 TABLET ORAL DAILY
Qty: 30 TABLET | Refills: 1 | Status: SHIPPED | OUTPATIENT
Start: 2022-03-25 | End: 2022-05-31

## 2022-03-25 RX ORDER — SODIUM CHLORIDE 0.9 % (FLUSH) 0.9 %
10 SYRINGE (ML) INJECTION AS NEEDED
Status: DISCONTINUED | OUTPATIENT
Start: 2022-03-25 | End: 2022-03-25 | Stop reason: HOSPADM

## 2022-03-25 RX ORDER — CARVEDILOL 12.5 MG/1
6.25 TABLET ORAL 2 TIMES DAILY WITH MEALS
Start: 2022-03-25 | End: 2022-10-11

## 2022-03-25 NOTE — TELEPHONE ENCOUNTER
Lana from Neurology called and stated that they had sent patient to ER due to very elevated BP.  His last reading was 222/80.  Patient wanted it verbalized to Zamzam.

## 2022-03-25 NOTE — ED PROVIDER NOTES
EMERGENCY DEPARTMENT ENCOUNTER    Room Number:  01/01  Date of encounter:  3/25/2022  PCP: Zamzam John APRN  Historian: Patient      PPE    Patient was placed in face mask in first look. Patient was wearing facemask when I entered the room and throughout our encounter. I wore full protective equipment throughout this patient encounter including a face mask, and gloves. Hand hygiene was performed before donning protective equipment and after removal when leaving the room.        HPI:  Chief Complaint: Hypertension  A complete HPI/ROS/PMH/PSH/SH/FH are unobtainable due to: Nothing    Context: Erlin Blanco is a 76 y.o. male who arrives to the ED via private vehicle from his neurologist office.  Patient presents with c/o elevated blood pressure readings over the past 2 days.  Patient states that he saw his family doctor earlier this week due to elevated blood pressure, they made no adjustments to his medications at that time.  He was at his neurologist office today for a routine office visit when they noted his blood pressure to be extremely elevated and recommended that he come to the ER. Patient denies fever, chills, headache, chest pain, shortness of breath, weakness, dizziness, nausea, vomiting, dysuria or any other symptoms.  Patient states that nothing makes the symptoms better and nothing worsens symptoms.          PAST MEDICAL HISTORY  Active Ambulatory Problems     Diagnosis Date Noted   • Osteoarthritis, knee 04/27/2017   • Type 2 diabetes mellitus with both eyes affected by mild nonproliferative retinopathy without macular edema, without long-term current use of insulin ([facility]) 04/28/2017   • Hypertension 04/28/2017   • Coronary artery disease 04/28/2017   • Supraventricular tachycardia (HCC) 11/04/2018   • TIA (transient ischemic attack) 06/18/2019   • Hyperlipidemia    • Prostate hyperplasia without urinary obstruction    • Class 2 severe obesity due to excess calories with serious comorbidity and  body mass index (BMI) of 36.0 to 36.9 in adult ([facility]) 12/10/2019   • Polycythemia vera ([facility])    • Pain in both knees 01/29/2020   • Allergic rhinitis    • Left bundle-branch block 07/17/2015   • Fatigue 06/24/2020   • Atrial flutter ([facility]) 09/16/2020   • History of CVA (cerebrovascular accident) 09/28/2020   • Discitis of lumbar region 10/09/2020   • Cervical radiculitis 12/29/2020   • Chronic diastolic congestive heart failure ([facility]) 02/03/2017   • CKD (chronic kidney disease) stage 2, GFR 60-89 ml/min 05/16/2021   • Glucosuria 05/16/2021   • Paroxysmal atrial fibrillation ([facility]) 06/22/2021   • Acute non-recurrent maxillary sinusitis 07/16/2021   • Memory difficulties 07/16/2021   • Bradycardia 09/17/2021   • Anemia 09/18/2021   • Low back pain 04/18/2019   • Back pain 01/10/2022   • Cardiovascular stress test abnormal 08/15/2015   • Prostatitis 08/30/2018   • Localized edema 06/23/2016   • Murmur 06/17/2014   • Pulmonary hypertension ([facility]) 01/17/2017   • Thrombocytosis 03/19/2018   • Arthritis 01/10/2022     Resolved Ambulatory Problems     Diagnosis Date Noted   • DORI (acute kidney injury) ([facility]) 04/28/2017   • Cellulitis of knee, left 05/04/2017   • Aortic root dilation ([facility]) 11/04/2018   • Acute non-recurrent sinusitis 01/13/2020   • Acute bronchitis 01/13/2020   • Abrasion of face 01/29/2020   • Fall down steps 01/29/2020   • Minor head injury without loss of consciousness 01/29/2020   • Contusion of face 01/29/2020   • Cough 04/14/2020   • Fever in adult 08/16/2020   • Sepsis due to Escherichia coli ([facility]) 08/17/2020   • Bacteremia due to Escherichia coli 08/17/2020   • Sepsis with metabolic encephalopathy ([facility]) 08/17/2020   • Urinary tract infection due to extended-spectrum beta lactamase (ESBL) producing Escherichia coli 08/18/2020   • Shock, septic ([facility]) 08/19/2020   • Acute hypokalemia 09/16/2020   • C. difficile colitis 09/16/2020   • Leukocytosis 09/16/2020   • Pressure injury of buttock, stage 2 (HCC)  09/18/2020   • Sacroiliitis (HCC) 10/09/2020   • Acute pyelonephritis 10/09/2020   • Constipation 11/14/2020   • Hypokalemia 01/07/2021   • Balanitis 05/16/2021   • Type 2 diabetes mellitus with hyperglycemia (HCC) 05/16/2021   • Paraphimosis 05/16/2021   • Pneumonia of left lung due to infectious organism 09/17/2021     Past Medical History:   Diagnosis Date   • Abnormal ECG    • Abnormal stress test    • Acute sinusitis    • Benign enlargement of prostate    • CHF (congestive heart failure) ([facility])    • CVA (cerebral vascular accident) ([facility]) 2020   • Diminished pulse    • Edema    • Elevated hematocrit    • Elevated hemoglobin ([facility])    • Essential hypertension    • Hearing loss    • Heart valve disease    • High risk medication use    • History of back pain    • History of dysuria    • History of echocardiogram    • History of intracranial hemorrhage    • History of scoliosis    • History of stroke    • Injury of toe, left, initial encounter    • Irregular heart rate    • Knee pain, left    • Left bundle branch block    • Leg wound, left    • Medicare annual wellness visit, subsequent    • Muscle cramps    • Need for hepatitis C screening test    • Polycythemia    • Proteinuria    • Scoliosis    • Stroke (HCC)    • SVT (supraventricular tachycardia) ([facility])    • Tachycardia    • Type 2 diabetes mellitus (HCC)    • Valvular heart disease          PAST SURGICAL HISTORY  Past Surgical History:   Procedure Laterality Date   • CARDIAC CATHETERIZATION     • CATARACT EXTRACTION Left    • COLONOSCOPY      did Cologuard approx 6/2019 and negative   • HAND SURGERY     • JOINT REPLACEMENT  2014    Righ Knee   • ME TOTAL KNEE ARTHROPLASTY Left 4/27/2017    Procedure: LT TOTAL KNEE ARTHROPLASTY;  Surgeon: Bertin Perrin MD;  Location: [facility];  Service: Orthopedics   • PROSTATE SURGERY      Rezum steam treatment to shrink prostate by dr. guerrero         FAMILY HISTORY  Family History   Problem Relation Age of Onset   •  Hypertension Mother    • Other Father         ruptured appendix,  when pt. was 14 years old.   • Diabetes Paternal Aunt    • Diabetes Paternal Uncle    • No Known Problems Other    • Migraines Sister    • Stroke Sister    • Dementia Brother          SOCIAL HISTORY  Social History     Socioeconomic History   • Marital status:    Tobacco Use   • Smoking status: Former Smoker     Types: Cigarettes     Quit date:      Years since quittin.2   • Smokeless tobacco: Former User   • Tobacco comment: Caffeine daily   Vaping Use   • Vaping Use: Never used   Substance and Sexual Activity   • Alcohol use: No   • Drug use: No   • Sexual activity: Defer         ALLERGIES  Donepezil and Steglatro [ertugliflozin]        REVIEW OF SYSTEMS  Review of Systems     All systems reviewed and negative except for those discussed in HPI.        PHYSICAL EXAM    ED Triage Vitals   Temp Heart Rate Resp BP SpO2   22 1135 22 1135 22 1135 22 1141 22 1135   97.9 °F (36.6 °C) 65 16 (!) 182/88 95 %       Physical Exam  GENERAL: Well appearing, nontoxic appearing, not distressed  HENT: normocephalic, atraumatic  EYES: no scleral icterus, PERRL  CV: regular rhythm, regular rate, no murmur  RESPIRATORY: normal effort, CTAB  ABDOMEN: soft, normal bowel sounds, nontender  MUSCULOSKELETAL: no deformity, no calf tenderness or bilateral lower extremity edema  NEURO: alert, moves all extremities, follows commands, mental status normal/baseline  SKIN: warm, dry, no rash   Psych: Appropriate mood and affect  Nursing notes and vital signs reviewed      LAB RESULTS  Recent Results (from the past 24 hour(s))   Green Top (Gel)    Collection Time: 22 11:49 AM   Result Value Ref Range    Extra Tube Hold for add-ons.    Lavender Top    Collection Time: 22 11:49 AM   Result Value Ref Range    Extra Tube hold for add-on    Gold Top - SST    Collection Time: 22 11:49 AM   Result Value Ref Range     Extra Tube Hold for add-ons.    Light Blue Top    Collection Time: 03/25/22 11:49 AM   Result Value Ref Range    Extra Tube hold for add-on    Comprehensive Metabolic Panel    Collection Time: 03/25/22 11:49 AM    Specimen: Blood   Result Value Ref Range    Glucose 182 (H) 65 - 99 mg/dL    BUN 19 8 - 23 mg/dL    Creatinine 1.14 0.76 - 1.27 mg/dL    Sodium 144 136 - 145 mmol/L    Potassium 4.3 3.5 - 5.2 mmol/L    Chloride 107 98 - 107 mmol/L    CO2 24.9 22.0 - 29.0 mmol/L    Calcium 9.8 8.6 - 10.5 mg/dL    Total Protein 7.2 6.0 - 8.5 g/dL    Albumin 4.30 3.50 - 5.20 g/dL    ALT (SGPT) 33 1 - 41 U/L    AST (SGOT) 35 1 - 40 U/L    Alkaline Phosphatase 103 39 - 117 U/L    Total Bilirubin 0.6 0.0 - 1.2 mg/dL    Globulin 2.9 gm/dL    A/G Ratio 1.5 g/dL    BUN/Creatinine Ratio 16.7 7.0 - 25.0    Anion Gap 12.1 5.0 - 15.0 mmol/L    eGFR 67.5 >60.0 mL/min/1.73   Troponin    Collection Time: 03/25/22 11:49 AM    Specimen: Blood   Result Value Ref Range    Troponin T <0.010 0.000 - 0.030 ng/mL   CBC Auto Differential    Collection Time: 03/25/22 11:49 AM    Specimen: Blood   Result Value Ref Range    WBC 8.04 3.40 - 10.80 10*3/mm3    RBC 5.18 4.14 - 5.80 10*6/mm3    Hemoglobin 16.1 13.0 - 17.7 g/dL    Hematocrit 47.9 37.5 - 51.0 %    MCV 92.5 79.0 - 97.0 fL    MCH 31.1 26.6 - 33.0 pg    MCHC 33.6 31.5 - 35.7 g/dL    RDW 13.7 12.3 - 15.4 %    RDW-SD 46.4 37.0 - 54.0 fl    MPV 10.6 6.0 - 12.0 fL    Platelets 308 140 - 450 10*3/mm3    Neutrophil % 73.9 42.7 - 76.0 %    Lymphocyte % 14.4 (L) 19.6 - 45.3 %    Monocyte % 5.3 5.0 - 12.0 %    Eosinophil % 4.1 0.3 - 6.2 %    Basophil % 1.9 (H) 0.0 - 1.5 %    Immature Grans % 0.4 0.0 - 0.5 %    Neutrophils, Absolute 5.94 1.70 - 7.00 10*3/mm3    Lymphocytes, Absolute 1.16 0.70 - 3.10 10*3/mm3    Monocytes, Absolute 0.43 0.10 - 0.90 10*3/mm3    Eosinophils, Absolute 0.33 0.00 - 0.40 10*3/mm3    Basophils, Absolute 0.15 0.00 - 0.20 10*3/mm3    Immature Grans, Absolute 0.03 0.00 - 0.05  10*3/mm3    nRBC 0.0 0.0 - 0.2 /100 WBC   ECG 12 Lead    Collection Time: 03/25/22 12:42 PM   Result Value Ref Range    QT Interval 509 ms       Ordered the above labs and independently reviewed the results.      RADIOLOGY  XR Chest 1 View    Result Date: 3/25/2022  XR CHEST 1 VW-  HISTORY: Male who is 76 years-old,  hypertension  TECHNIQUE: Frontal views of the chest  COMPARISON: 10/05/2021  FINDINGS: The heart size is borderline. Aorta is calcified. Mild prominence of vascular markings is seen. No focal pulmonary consolidation, pleural effusion, or pneumothorax. No acute osseous process.      No focal pulmonary consolidation. Borderline heart size with mild prominence of vascular markings. Follow-up as clinical indications persist.  This report was finalized on 3/25/2022 12:53 PM by Dr. Akhil Nugent M.D.        I ordered the above noted radiological studies and viewed the images on the PACS system.       EKG      Independently viewed by me and interpreted by Dr Wiseman         MEDICAL RECORD REVIEW  Medical records reviewed in [facility], patient was sent here from his neurologist office due to systolic pressure of 214.  Patient had a echo done in September 2021 that showed an estimated left ventricular EF of 59%, normal left ventricular systolic function.    PROCEDURES    Procedures        DIFFERENTIAL DIAGNOSIS  Differential diagnosis include but are not limited to the following: Hypertensive urgency, CVA, electrolyte abnormality      PROGRESS, DATA ANALYSIS, CONSULTS, AND MEDICAL DECISION MAKING        ED Course as of 03/25/22 1614   Fri Mar 25, 2022   1233 Patient is a 76-year-old male who presents with hypertension, increased blood pressure readings over the past several days, specifically at his neurologist office today.  He has not no associated symptoms, he is compliant with his medications.  Discussed with him that we will obtain labs, EKG and a chest x-ray to evaluate for any abnormalities that could be  resulting in his hypertension.  He verbalized understanding and is agreeable to this plan. [MS]   1319 Reviewed pt's history and workup with Dr. Wiseman.  After a bedside evaluation, they agree with the plan of care.       [MS]   1518 Discussed with patient's primary care nurse practitioner Zamzam John regarding patient's hypertension.  She has a message out to cardiology to see about increasing his Coreg dose to 12.5 mg once a day.  They have tried to increase it in the past and he has issues with bradycardia.  She states that she will call the patient this afternoon and let them know what changes to make.  Discussed with this conversation with patient and son, patient's blood pressure has come down nicely on its own here to 166/87 without any medication.  Patient still has no current complaints.  They are agreeable to discharge and waiting for phone call in regards to what changes to make to their blood pressure medicine.  Will be discharged home in stable condition. [MS]      ED Course User Index  [MS] Janae Davis APRN     Discussed plan for discharge, as there is no emergent indication for admission. Pt/family is agreeable and understands need for follow up and repeat testing.  Pt is aware that discharge does not mean that nothing is wrong but it indicates no emergency is present that requires admission and they must continue care with follow-up as given below or physician of their choice.   Patient/Family voiced understanding of above instructions.  Patient discharged in stable condition.    DIAGNOSIS  Final diagnoses:   Hypertension, unspecified type       FOLLOW UP   Zamzam John, APRN  [address] [facility] [address]  [place] [id number] [phone]    Call in 2 days        RX     Medication List      Changed    carvedilol 12.5 MG tablet  Commonly known as: Coreg  Take 0.5 tablets by mouth 2 (Two) Times a Day With Meals.  What changed: how much to take     potassium chloride 20 MEQ CR  tablet  Commonly known as: KLOR-CON  Take 1 tablet by mouth 3 (Three) Times a Day.  What changed: when to take this           Where to Get Your Medications      Information about where to get these medications is not yet available    Ask your nurse or doctor about these medications  · carvedilol 12.5 MG tablet             MEDICATIONS GIVEN IN ED    Medications   sodium chloride 0.9 % flush 10 mL (has no administration in time range)           COURSE & MEDICAL DECISION MAKING  Any/All labs and Any/All Imaging studies that were ordered were reviewed and are noted above.  Results were reviewed/discussed with the patient and they were also made aware of online access.    Pt also made aware that some labs, such as cultures, will not be resulted during ER visit and followup with PMD is necessary.        Janae Davis R, APRN  03/25/22 [id number]

## 2022-03-25 NOTE — ED PROVIDER NOTES
MD ATTESTATION NOTE    The CONSTANZA and I have discussed this patient's history, physical exam, and treatment plan.    I provided a substantive portion of the care of this patient. I personally performed the physical exam, in its entirety. The attached note describes my personal findings.      Erlin Blanco is a 76 y.o. male who presents to the ED c/o hypertension.  Blood pressure has been elevated for the past 2 days.  It has been as high as 210 systolic.  He is on hydralazine, carvedilol, losartan.  He has had no headache, chest pain, shortness of breath.      On exam:  GENERAL: not distressed  HENT: nares patent  EYES: no scleral icterus  CV: regular rhythm, regular rate  RESPIRATORY: normal effort, clear to auscultation bilaterally  MUSCULOSKELETAL: no deformity  NEURO: alert, moves all extremities, follows commands  SKIN: warm, dry    Labs  Recent Results (from the past 24 hour(s))   Green Top (Gel)    Collection Time: 03/25/22 11:49 AM   Result Value Ref Range    Extra Tube Hold for add-ons.    Lavender Top    Collection Time: 03/25/22 11:49 AM   Result Value Ref Range    Extra Tube hold for add-on    Gold Top - SST    Collection Time: 03/25/22 11:49 AM   Result Value Ref Range    Extra Tube Hold for add-ons.    Light Blue Top    Collection Time: 03/25/22 11:49 AM   Result Value Ref Range    Extra Tube hold for add-on    Comprehensive Metabolic Panel    Collection Time: 03/25/22 11:49 AM    Specimen: Blood   Result Value Ref Range    Glucose 182 (H) 65 - 99 mg/dL    BUN 19 8 - 23 mg/dL    Creatinine 1.14 0.76 - 1.27 mg/dL    Sodium 144 136 - 145 mmol/L    Potassium 4.3 3.5 - 5.2 mmol/L    Chloride 107 98 - 107 mmol/L    CO2 24.9 22.0 - 29.0 mmol/L    Calcium 9.8 8.6 - 10.5 mg/dL    Total Protein 7.2 6.0 - 8.5 g/dL    Albumin 4.30 3.50 - 5.20 g/dL    ALT (SGPT) 33 1 - 41 U/L    AST (SGOT) 35 1 - 40 U/L    Alkaline Phosphatase 103 39 - 117 U/L    Total Bilirubin 0.6 0.0 - 1.2 mg/dL    Globulin 2.9 gm/dL    A/G  Ratio 1.5 g/dL    BUN/Creatinine Ratio 16.7 7.0 - 25.0    Anion Gap 12.1 5.0 - 15.0 mmol/L    eGFR 67.5 >60.0 mL/min/1.73   Troponin    Collection Time: 03/25/22 11:49 AM    Specimen: Blood   Result Value Ref Range    Troponin T <0.010 0.000 - 0.030 ng/mL   CBC Auto Differential    Collection Time: 03/25/22 11:49 AM    Specimen: Blood   Result Value Ref Range    WBC 8.04 3.40 - 10.80 10*3/mm3    RBC 5.18 4.14 - 5.80 10*6/mm3    Hemoglobin 16.1 13.0 - 17.7 g/dL    Hematocrit 47.9 37.5 - 51.0 %    MCV 92.5 79.0 - 97.0 fL    MCH 31.1 26.6 - 33.0 pg    MCHC 33.6 31.5 - 35.7 g/dL    RDW 13.7 12.3 - 15.4 %    RDW-SD 46.4 37.0 - 54.0 fl    MPV 10.6 6.0 - 12.0 fL    Platelets 308 140 - 450 10*3/mm3    Neutrophil % 73.9 42.7 - 76.0 %    Lymphocyte % 14.4 (L) 19.6 - 45.3 %    Monocyte % 5.3 5.0 - 12.0 %    Eosinophil % 4.1 0.3 - 6.2 %    Basophil % 1.9 (H) 0.0 - 1.5 %    Immature Grans % 0.4 0.0 - 0.5 %    Neutrophils, Absolute 5.94 1.70 - 7.00 10*3/mm3    Lymphocytes, Absolute 1.16 0.70 - 3.10 10*3/mm3    Monocytes, Absolute 0.43 0.10 - 0.90 10*3/mm3    Eosinophils, Absolute 0.33 0.00 - 0.40 10*3/mm3    Basophils, Absolute 0.15 0.00 - 0.20 10*3/mm3    Immature Grans, Absolute 0.03 0.00 - 0.05 10*3/mm3    nRBC 0.0 0.0 - 0.2 /100 WBC   ECG 12 Lead    Collection Time: 03/25/22 12:42 PM   Result Value Ref Range    QT Interval 509 ms       Radiology  XR Chest 1 View    Result Date: 3/25/2022  XR CHEST 1 VW-  HISTORY: Male who is 76 years-old,  hypertension  TECHNIQUE: Frontal views of the chest  COMPARISON: 10/05/2021  FINDINGS: The heart size is borderline. Aorta is calcified. Mild prominence of vascular markings is seen. No focal pulmonary consolidation, pleural effusion, or pneumothorax. No acute osseous process.      No focal pulmonary consolidation. Borderline heart size with mild prominence of vascular markings. Follow-up as clinical indications persist.  This report was finalized on 3/25/2022 12:53 PM by Dr. Akhil CLAUDIO  FRANCES Nugent        Medical Decision Making:  ED Course as of 03/25/22 1935   Fri Mar 25, 2022   1233 Patient is a 76-year-old male who presents with hypertension, increased blood pressure readings over the past several days, specifically at his neurologist office today.  He has not no associated symptoms, he is compliant with his medications.  Discussed with him that we will obtain labs, EKG and a chest x-ray to evaluate for any abnormalities that could be resulting in his hypertension.  He verbalized understanding and is agreeable to this plan. [MS]   1319 Reviewed pt's history and workup with Dr. Wiseman.  After a bedside evaluation, they agree with the plan of care.       [MS]   1518 Discussed with patient's primary care nurse practitioner Zamzam John regarding patient's hypertension.  She has a message out to cardiology to see about increasing his Coreg dose to 12.5 mg once a day.  They have tried to increase it in the past and he has issues with bradycardia.  She states that she will call the patient this afternoon and let them know what changes to make.  Discussed with this conversation with patient and son, patient's blood pressure has come down nicely on its own here to 166/87 without any medication.  Patient still has no current complaints.  They are agreeable to discharge and waiting for phone call in regards to what changes to make to their blood pressure medicine.  Will be discharged home in stable condition. [MS]      ED Course User Index  [MS] Janae Davis, MUKUL           PPE: Both the patient and I wore a surgical mask throughout the entire patient encounter. I wore protective goggles.     Diagnosis  Final diagnoses:   Hypertension, unspecified type        Prudencio Wiseman II, MD  03/25/22 1935

## 2022-03-25 NOTE — TELEPHONE ENCOUNTER
Pt's wife called this morning. She said that the patient saw Dr. Adams today. At the appt, pt's systolic B/P was 214. Pt's wife did not know his diastolic. Dr. Adams asked pt to come to [facility] ER. Pt's wife asked us if that's what he should do. I told her that he does need to come to ER. She verbalized understanding.    Thank you,    Chanel Chance, RN  Triage [facility] [place]

## 2022-03-25 NOTE — TELEPHONE ENCOUNTER
Spoke with patient's spouse (on HIPPA) over the phone; pt BP was very high at neurology office this morning; pt was sent to ER for further evaluation; pt was seen in ER and BP came down to 166/87 without treatment; HR 66-71; pt is feeling fine, no headaches, no chest pain, no SOA; had considered increase in Carvedilol to 12.5 mg BID, but held change due to decreased HR; before considering higher dose of Carvedilol, will try Valsartan instead of Losartan; will send Rx for Valsartan 320 mg daily; instructed to continue Carvedilol 6.25 mg BID for now; instructed to continue to monitor BP and HR and call with readings on 3/28/22, or sooner if problems or concerns.

## 2022-03-25 NOTE — ASSESSMENT & PLAN NOTE
His BP is extremely high at systolic of 214 right now and 200 on his first evaluation.  I informed him that I am concerned about his stroke level blood pressure that needs to be urgently treated.  I am going to have him taken down to urgent care or ED for further management given history of stroke and he is on Eliquis he is at an increased risk for bleeding in the brain.  I will not be charging him for his visit today.  I will instead refer him for further blood pressure management.  HTN is number 1 risk factor for stroke.  I will forward my note to his PCP to have him evaluated ASAP.

## 2022-03-25 NOTE — TELEPHONE ENCOUNTER
BP READINGS:       6PM  LAST NIGHT 190/95 HR  55    12AM TODAY   212/97 HR 62     9AM TODAY   193/94  HR 62       PLEASE ADVISE   Yany Blanco (EC) [phone] (M)

## 2022-03-25 NOTE — TELEPHONE ENCOUNTER
Spoke with patient over the phone; reviewed lab results; had discussed increase in Carvedilol to 12.5 mg BID due to increased BP; pt HR currently 55; instructed to monitor BP and HR and call with readings on 3/25/22; instructed to wait to increase Carvedilol at this point due to decreased HR.

## 2022-03-25 NOTE — ED TRIAGE NOTES
Pt was at dr this am and sbp was 218.  He is on bp meds    Patient was placed in face mask during first look triage.  Patient was wearing a face mask throughout encounter.  I wore personal protective equipment throughout the encounter.  Hand hygiene was performed before and after patient encounter.

## 2022-03-28 ENCOUNTER — TELEPHONE (OUTPATIENT)
Dept: FAMILY MEDICINE CLINIC | Facility: CLINIC | Age: 77
End: 2022-03-28

## 2022-03-28 DIAGNOSIS — I10 PRIMARY HYPERTENSION: Primary | ICD-10-CM

## 2022-03-28 DIAGNOSIS — Z12.11 ENCOUNTER FOR SCREENING FOR MALIGNANT NEOPLASM OF COLON: Primary | ICD-10-CM

## 2022-03-28 RX ORDER — AMLODIPINE BESYLATE 5 MG/1
5 TABLET ORAL DAILY
Qty: 30 TABLET | Refills: 1 | Status: SHIPPED | OUTPATIENT
Start: 2022-03-28 | End: 2022-05-31

## 2022-03-28 NOTE — TELEPHONE ENCOUNTER
Caller: Yany Blanco    Relationship: Emergency Contact    Best call back number: [phone] (M)    WIFE CALLED TO PROVIDE DAVIDA GRAVES OF PATIENT'S BLOOD PRESSURE READINGS OVER THE WEEKEND     Friday PM: 200/87      HR 63 BPM     Saturday AM: 180/94  HR 73 BPM  Saturday PM: 179/65  HR 82 BPM    Sunday AM: 188/98     HR 59 BPM  Sunday PM: 200/95     HR  66 BPM    03/28/22 AM: 185/100  HR 75 BMP

## 2022-03-28 NOTE — TELEPHONE ENCOUNTER
Spoke with patient over the phone; pt stopped Losartan and started Valsartan daily on 3/26/22; BP currently 168/60, HR 66; will continue current medications; will also start Amlodipine daily; pt will continue to monitor BP/HR and call with BP/HR readings in a few days; instructed to schedule a follow up appointment with cardiology as well.

## 2022-04-01 ENCOUNTER — TELEPHONE (OUTPATIENT)
Dept: FAMILY MEDICINE CLINIC | Facility: CLINIC | Age: 77
End: 2022-04-01

## 2022-04-01 NOTE — TELEPHONE ENCOUNTER
Caller: Yany Blanco    Relationship: Emergency Contact    Best call back number: [phone]    What is the best time to reach you: AFTER 4PM     Who are you requesting to speak with (clinical staff, provider,  specific staff member): CLINICAL     What was the call regarding: PATIENT'S WIFE CALLED  WITH A UPDATE FOR BLOOD PRESSURE   Wednesday AM 3/30/2022   BLOOD PRESSURE: 190/89  PULSE 60     Wednesday PM   BLOOD PRESSURE: 183/92  PULSE 58      Thursday AM 3/31/2022  BLOOD PRESSURE:162/100   PULSE 71   Thursday PM   BLOOD PRESSURE: 179/91   PULSE: 58     Friday AM 4/1/2022  BLOOD PRESSURE :147/92  PULSE:77     PATIENTS WIFE STATED THE PATIENT HAS A APPOINTMENT WITH THE CARDIOLOGIST ON Monday 4-4-2022 WITH DOCTOR GONDI

## 2022-04-01 NOTE — TELEPHONE ENCOUNTER
Spoke with patient over the phone; pt started Amlodipine 5 mg daily and BP starting to trend down; instructed to continue current medications; to continue to monitor BP and record the results; blood sugar has improved with taking Glimepiride 3 mg with breakfast and 2 mg with evening meal; pt realized protein shake was drinking had a lot of sugar; blood sugar has come down with change in protein shake ingredients; to follow up as scheduled on 4/18/22; to follow up as scheduled with cardiology on 4/4/22.

## 2022-04-04 ENCOUNTER — OFFICE VISIT (OUTPATIENT)
Dept: CARDIOLOGY | Facility: CLINIC | Age: 77
End: 2022-04-04

## 2022-04-04 VITALS
HEART RATE: 67 BPM | BODY MASS INDEX: 36.19 KG/M2 | SYSTOLIC BLOOD PRESSURE: 150 MMHG | HEIGHT: 72 IN | DIASTOLIC BLOOD PRESSURE: 86 MMHG | WEIGHT: 267.2 LBS

## 2022-04-04 DIAGNOSIS — E78.2 MIXED HYPERLIPIDEMIA: ICD-10-CM

## 2022-04-04 DIAGNOSIS — I44.7 LEFT BUNDLE-BRANCH BLOCK: ICD-10-CM

## 2022-04-04 DIAGNOSIS — I50.32 CHRONIC DIASTOLIC CONGESTIVE HEART FAILURE: ICD-10-CM

## 2022-04-04 DIAGNOSIS — I47.1 SUPRAVENTRICULAR TACHYCARDIA: ICD-10-CM

## 2022-04-04 DIAGNOSIS — I48.0 PAROXYSMAL ATRIAL FIBRILLATION: ICD-10-CM

## 2022-04-04 DIAGNOSIS — I10 PRIMARY HYPERTENSION: Primary | ICD-10-CM

## 2022-04-04 PROCEDURE — 99214 OFFICE O/P EST MOD 30 MIN: CPT | Performed by: NURSE PRACTITIONER

## 2022-04-04 PROCEDURE — 93000 ELECTROCARDIOGRAM COMPLETE: CPT | Performed by: NURSE PRACTITIONER

## 2022-04-04 RX ORDER — POTASSIUM CHLORIDE 1500 MG/1
20 TABLET, FILM COATED, EXTENDED RELEASE ORAL 2 TIMES DAILY
COMMUNITY
End: 2022-10-11

## 2022-04-04 NOTE — PROGRESS NOTES
Subjective:     Encounter Date:04/04/2022      Patient ID: Erlin Blanco is a 76 y.o. male.    Chief Complaint: Uncontrolled HTN  History of Present Illness  This is a 77 y/o man who follows with Dr. Arias and is new to me today. He has a pmhx of paroxysmal ventricular tachycardia, aortic root dilatation, coronary artery disease, diabetes mellitus type 2, hypertension, hyperlipidemia, prior TIA in 2006, chronic diastolic congestive heart failure, and polycythemia.     In September 2021, while admitted to the hospital for pneumonia, he was noted to be bradycardic. Diltiazem was stopped and carvedilol was reduced at that time. An echocardiogram was obtained revealing an EF 59%, normal diastolic function and mildly elevated right ventricular systolic pressure.     He was seen in the ED at [facility] on 3/25/22 for elevated systolic blood pressure of 214  at a neurology appointment. The patient reported that his blood pressure had been elevated at home as well. He was asymptomatic with these elevated readings. His PCP was contacted by the ED physician regarding increasing his carvedilol to 12.5 mg BID, however it was decided to wait to increase as his heart rate was 55 at that time and he does have the history of bradycardia. Instead, his losartan was changed to valsartan 320mg. His wife had contacted PCP with BP readings after this change in medication and his BP were still consistently elevated 170s-200s systolic. Amlodipine was added to his regimen.    In reviewing records, it appears Mr. Blanoc saw Dr. Arias in the office on 2/17/2022 and his blood pressure was a little high then, but the patient admitted that he had not taken his afternoon medications.  Since the recent change in his medications by his PCP, the patient tells me his blood pressure has been gradually coming down with systolic readings primarily in the 140s. He denies any chest pain, shortness or air, palpitations, or headache. He denies any  lightheadedness, dizziness or syncope. He has chronic swelling in his left leg since having knee replacement surgery in 2019 but the swelling is stable. He has not noticed any worsening edema with the addition of amlodipine. He is currently in physical therapy for mobility improvement. He does have a regular exercise routine, but does do work around the house. He has been trying to modify his diet and has lost about 3 lbs over the last week.     I have reviewed and updated as appropriate allergies, current medications, past family history, past medical history, past surgical history and problem list.    Review of Systems   Constitutional: Negative for fever and malaise/fatigue.   HENT: Negative for congestion, hoarse voice and sore throat.    Eyes: Negative for blurred vision and double vision.   Cardiovascular: Negative for chest pain, dyspnea on exertion, leg swelling, orthopnea, palpitations and syncope.   Respiratory: Negative for cough, shortness of breath and wheezing.    Gastrointestinal: Negative for abdominal pain, hematemesis, hematochezia and melena.   Genitourinary: Negative for dysuria and hematuria.   Neurological: Negative for dizziness, headaches, light-headedness and numbness.   Psychiatric/Behavioral: Negative for depression. The patient is not nervous/anxious.          Current Outpatient Medications:   •  Accu-Chek FastClix Lancets misc, TEST BLOOD SUGAR 1-2 TIMES DAILY AND AS NEEDED, Disp: , Rfl:   •  Accu-Chek Guide test strip, , Disp: , Rfl:   •  amLODIPine (NORVASC) 5 MG tablet, Take 1 tablet by mouth Daily., Disp: 30 tablet, Rfl: 1  •  aspirin (ASPIRIN LOW DOSE) 81 MG EC tablet, Take 1 tablet by mouth Daily., Disp: 90 tablet, Rfl: 2  •  Blood Glucose Monitoring Suppl (Accu-Chek Guide Me) w/Device kit, TEST BLOOD SUGAR 1-2 TIMES DAILY AND AS NEEDED, Disp: , Rfl:   •  carvedilol (Coreg) 12.5 MG tablet, Take 0.5 tablets by mouth 2 (Two) Times a Day With Meals., Disp: , Rfl:   •  cetirizine  (zyrTEC) 10 MG tablet, Take 10 mg by mouth Daily., Disp: , Rfl:   •  Eliquis 5 MG tablet tablet, TAKE 1 TABLET EVERY 12 HOURS FOR ATRIAL FIBRILLATION, Disp: 180 tablet, Rfl: 0  •  fluticasone (FLONASE) 50 MCG/ACT nasal spray, USE 2 SPRAYS IN NOSTRIL(S) AS DIRECTED BY PROVIDER DAILY, Disp: 48 g, Rfl: 3  •  Fluzone High-Dose Quadrivalent 0.7 ML suspension prefilled syringe injection, , Disp: , Rfl:   •  glimepiride (Amaryl) 2 MG tablet, Take 1.5 tablets with breakfast and 1 tablet with evening meal. (Patient taking differently: Take 1.5 tablets with breakfast and 2  tablet with evening meal.), Disp: , Rfl:   •  glucose blood (OneTouch Ultra) test strip, Test blood sugar 1-2 times daily and as needed., Disp: 200 each, Rfl: 3  •  hydrALAZINE (APRESOLINE) 100 MG tablet, Take 1 tablet by mouth 3 (Three) Times a Day., Disp: 30 tablet, Rfl: 0  •  hydroxyurea (HYDREA) 500 MG capsule, Take 500 mg by mouth Daily., Disp: , Rfl:   •  Insulin Pen Needle (B-D UF III MINI PEN NEEDLES) 31G X 5 MM misc, Inject 1 each under the skin into the appropriate area as directed Daily., Disp: 100 each, Rfl: 2  •  memantine (NAMENDA) 5 MG tablet, Take 1 tablet by mouth Daily., Disp: 30 tablet, Rfl: 3  •  montelukast (SINGULAIR) 10 MG tablet, TAKE 1 TABLET DAILY, Disp: 90 tablet, Rfl: 3  •  Multiple Vitamins-Minerals (MULTIVITAMIN ADULT PO), Take 1 tablet by mouth Daily., Disp: , Rfl:   •  potassium chloride (K-DUR,KLOR-CON) 20 MEQ tablet controlled-release ER tablet, Take 20 mEq by mouth 3 (Three) Times a Day., Disp: , Rfl:   •  Probiotic Product (Probiotic Daily) capsule, Take 1 capsule by mouth Daily., Disp: 90 capsule, Rfl: 1  •  rosuvastatin (Crestor) 40 MG tablet, Take 1 tablet by mouth Daily., Disp: 90 tablet, Rfl: 1  •  torsemide (DEMADEX) 20 MG tablet, Take 1 tablet by mouth 2 (Two) Times a Day., Disp: 60 tablet, Rfl: 5  •  valsartan (DIOVAN) 320 MG tablet, Take 1 tablet by mouth Daily., Disp: 30 tablet, Rfl: 1  •  acetaminophen  (TYLENOL) 500 MG tablet, Take 1,000 mg by mouth 2 (Two) Times a Day As Needed for Mild Pain  or Moderate Pain ., Disp: , Rfl:   •  potassium chloride (KLOR-CON) 20 MEQ CR tablet, Take 1 tablet by mouth 3 (Three) Times a Day. (Patient taking differently: Take 20 mEq by mouth 2 (Two) Times a Day.), Disp: 30 tablet, Rfl: 0    Past Medical History:   Diagnosis Date   • Abnormal ECG    • Abnormal stress test    • Abrasion of face 1/29/2020   • Acute bronchitis    • Acute hypokalemia 9/16/2020   • Acute non-recurrent sinusitis 1/13/2020   • Acute pyelonephritis 10/9/2020   • Acute sinusitis    • DORI (acute kidney injury) ([facility]) 4/28/2017   • Allergic rhinitis    • Aortic root dilation ([facility])    • Arthritis    • Bacteremia due to Escherichia coli 8/17/2020   • Balanitis 5/16/2021   • Benign enlargement of prostate    • C. difficile colitis 9/16/2020   • Cellulitis of knee, left 5/4/2017   • CHF (congestive heart failure) ([facility])    • Chronic diastolic congestive heart failure ([facility])    • Constipation 11/14/2020   • Contusion of face 1/29/2020   • Coronary artery disease    • CVA (cerebral vascular accident) ([facility]) 2020   • Diminished pulse     in lower extremities   • Edema    • Elevated hematocrit    • Elevated hemoglobin ([facility])    • Essential hypertension    • Hearing loss     mala hearing aids   • Heart valve disease    • High risk medication use    • History of back pain    • History of dysuria    • History of echocardiogram    • History of intracranial hemorrhage    • History of scoliosis    • History of stroke    • Hyperlipidemia    • Hypokalemia 1/7/2021   • Injury of toe, left, initial encounter    • Irregular heart rate    • Knee pain, left    • Left bundle branch block    • Leg wound, left    • Leukocytosis 9/16/2020   • Low back pain    • Medicare annual wellness visit, subsequent    • Minor head injury without loss of consciousness 1/29/2020   • Murmur    • Muscle cramps    • Need for hepatitis C screening test    •  Paraphimosis 2021   • Pneumonia of left lung due to infectious organism 2021   • Polycythemia    • Polycythemia vera ([facility])    • Pressure injury of buttock, stage 2 ([facility]) 2020   • Prostate hyperplasia without urinary obstruction    • Prostatitis    • Proteinuria    • Pulmonary hypertension (HCC)    • Sacroiliitis ([facility]) 10/9/2020   • Scoliosis    • Sepsis due to Escherichia coli ([facility]) 2020   • Sepsis with metabolic encephalopathy ([facility]) 2020   • Stroke ([facility])    • SVT (supraventricular tachycardia) ([facility])    • Tachycardia    • Thrombocytosis    • TIA (transient ischemic attack)        • Type 2 diabetes mellitus ([facility])    • Type 2 diabetes mellitus with hyperglycemia ([facility]) 2021   • Urinary tract infection due to extended-spectrum beta lactamase (ESBL) producing Escherichia coli 2020   • Valvular heart disease        Past Surgical History:   Procedure Laterality Date   • CARDIAC CATHETERIZATION     • CATARACT EXTRACTION Left    • COLONOSCOPY      did Cologuard approx 2019 and negative   • HAND SURGERY     • JOINT REPLACEMENT  2014    Righ Knee   • AR TOTAL KNEE ARTHROPLASTY Left 2017    Procedure: LT TOTAL KNEE ARTHROPLASTY;  Surgeon: Bertin Perrin MD;  Location: [facility];  Service: Orthopedics   • PROSTATE SURGERY      Rezum steam treatment to shrink prostate by dr. guerrero       Family History   Problem Relation Age of Onset   • Hypertension Mother    • Other Father         ruptured appendix,  when pt. was 14 years old.   • Diabetes Paternal Aunt    • Diabetes Paternal Uncle    • No Known Problems Other    • Migraines Sister    • Stroke Sister    • Dementia Brother        Social History     Tobacco Use   • Smoking status: Former Smoker     Types: Cigarettes     Quit date: 1973     Years since quittin.2   • Smokeless tobacco: Former User   • Tobacco comment: Caffeine daily   Vaping Use   • Vaping Use: Never used   Substance Use Topics   • Alcohol use: No  "  • Drug use: No         ECG 12 Lead    Date/Time: 2022 2:59 PM  Performed by: Monisha Avendano APRN  Authorized by: Monisha Avendano APRN   Comparison: compared with previous ECG from 3/25/2022  Comparison to previous ECG: QT has improved  Rhythm: sinus rhythm  BPM: 67  Conduction: left bundle branch block    Clinical impression: abnormal EKG               Objective:     Visit Vitals  /86   Pulse 67   Ht 182.9 cm (72\")   Wt 121 kg (267 lb 3.2 oz)   BMI 36.24 kg/m²             Physical Exam  Constitutional:       Appearance: Normal appearance. He is obese.   HENT:      Head: Normocephalic.   Neck:      Vascular: No carotid bruit.   Cardiovascular:      Rate and Rhythm: Normal rate and regular rhythm.      Chest Wall: PMI is not displaced.      Pulses: Normal pulses.           Radial pulses are 2+ on the right side and 2+ on the left side.        Posterior tibial pulses are 2+ on the right side and 2+ on the left side.      Heart sounds: Murmur heard.    Systolic murmur is present with a grade of 2/6.    No friction rub. No gallop.   Pulmonary:      Effort: Pulmonary effort is normal.      Breath sounds: Normal breath sounds.   Abdominal:      General: Bowel sounds are normal. There is no distension.      Palpations: Abdomen is soft.   Musculoskeletal:      Right lower le+ Edema present.      Left lower le+ Edema present.   Skin:     General: Skin is warm and dry.      Capillary Refill: Capillary refill takes less than 2 seconds.   Neurological:      Mental Status: He is alert and oriented to person, place, and time.   Psychiatric:         Mood and Affect: Mood normal.         Behavior: Behavior normal.         Thought Content: Thought content normal.          Lab Review:   Lipid Panel    Lipid Panel 4/12/21 11/22/21 1/10/22   Total Cholesterol 119 167 105   Triglycerides 80 161 (A) 98   HDL Cholesterol 45 42 40   VLDL Cholesterol 16 28 19   LDL Cholesterol  58 97 46   LDL/HDL Ratio 1.29 2.3 1.2   (A) " Abnormal value       Comments are available for some flowsheets but are not being displayed.               Cardiac Procedures:   1. Echocardiogram 9/18/21: EF 59%, diastolic function normal, moderate concentric hypertrophy, RVSP 35-45 mmHg.     Assessment:         Diagnoses and all orders for this visit:    1. Primary hypertension (Primary)    2. Supraventricular tachycardia (HCC)    3. Mixed hyperlipidemia    4. Left bundle-branch block    5. Paroxysmal atrial fibrillation (HCC)    6. Chronic diastolic congestive heart failure (HCC)    Other orders  -     ECG 12 Lead            Plan:       1. Hypertension: His blood pressure is improving with the changes in his medication regimen. I think we need to give time for these medications to work before making any additional changes. He did bring his home BP monitor in and it reads similar to our cuff. I discussed diet and exercise. I think weight loss would have a significant effect on his blood pressure. He is working on this. I have asked him to continue to monitor his blood pressure at home and if it starts increasing again to let us know. I also asked him to monitor for worsening swelling with amlodipine.  2. Chronic LBBB  3. Hyperlipidemia: On Crestor 40mg. Lipid panel in 1/2022 was good.  4. Chronic diastolic CHF: on torsemide. Appears euvolemic on exam  5. Paroxysmal atrial fibrillation: on Eliquis for AC. Rates controlled on Coreg 6.25 mg BID      Thank you for allowing me to participate in this patient's care. Please call with any questions or concerns. Mr. Blanco will follow up with Dr. Arias in August as previously scheduled.           Your medication list          Accurate as of April 4, 2022  3:20 PM. If you have any questions, ask your nurse or doctor.            CHANGE how you take these medications      Instructions Last Dose Given Next Dose Due   glimepiride 2 MG tablet  Commonly known as: Amaryl  What changed: additional instructions      Take 1.5  tablets with breakfast and 1 tablet with evening meal.       potassium chloride 20 MEQ CR tablet  Commonly known as: KLOR-CON  What changed: when to take this      Take 1 tablet by mouth 3 (Three) Times a Day.          CONTINUE taking these medications      Instructions Last Dose Given Next Dose Due   Accu-Chek FastClix Lancets misc      TEST BLOOD SUGAR 1-2 TIMES DAILY AND AS NEEDED       Accu-Chek Guide Me w/Device kit      TEST BLOOD SUGAR 1-2 TIMES DAILY AND AS NEEDED       acetaminophen 500 MG tablet  Commonly known as: TYLENOL      Take 1,000 mg by mouth 2 (Two) Times a Day As Needed for Mild Pain  or Moderate Pain .       amLODIPine 5 MG tablet  Commonly known as: NORVASC      Take 1 tablet by mouth Daily.       aspirin 81 MG EC tablet  Commonly known as: ASPIRIN LOW DOSE      Take 1 tablet by mouth Daily.       B-D UF III MINI PEN NEEDLES 31G X 5 MM misc  Generic drug: Insulin Pen Needle      Inject 1 each under the skin into the appropriate area as directed Daily.       carvedilol 12.5 MG tablet  Commonly known as: Coreg      Take 0.5 tablets by mouth 2 (Two) Times a Day With Meals.       cetirizine 10 MG tablet  Commonly known as: zyrTEC      Take 10 mg by mouth Daily.       Eliquis 5 MG tablet tablet  Generic drug: apixaban      TAKE 1 TABLET EVERY 12 HOURS FOR ATRIAL FIBRILLATION       fluticasone 50 MCG/ACT nasal spray  Commonly known as: FLONASE      USE 2 SPRAYS IN NOSTRIL(S) AS DIRECTED BY PROVIDER DAILY       Fluzone High-Dose Quadrivalent 0.7 ML suspension prefilled syringe injection  Generic drug: Influenza Vac High-Dose Quad           hydrALAZINE 100 MG tablet  Commonly known as: APRESOLINE      Take 1 tablet by mouth 3 (Three) Times a Day.       hydroxyurea 500 MG capsule  Commonly known as: HYDREA      Take 500 mg by mouth Daily.       memantine 5 MG tablet  Commonly known as: NAMENDA      Take 1 tablet by mouth Daily.       montelukast 10 MG tablet  Commonly known as: SINGULAIR      TAKE 1  TABLET DAILY       multivitamin with minerals tablet tablet      Take 1 tablet by mouth Daily.       OneTouch Ultra test strip  Generic drug: glucose blood      Test blood sugar 1-2 times daily and as needed.       Accu-Chek Guide test strip  Generic drug: glucose blood           potassium chloride 20 MEQ tablet controlled-release ER tablet  Commonly known as: K-DUR,KLOR-CON      Take 20 mEq by mouth 3 (Three) Times a Day.       Probiotic Daily capsule      Take 1 capsule by mouth Daily.       rosuvastatin 40 MG tablet  Commonly known as: Crestor      Take 1 tablet by mouth Daily.       torsemide 20 MG tablet  Commonly known as: DEMADEX      Take 1 tablet by mouth 2 (Two) Times a Day.       valsartan 320 MG tablet  Commonly known as: DIOVAN      Take 1 tablet by mouth Daily.                Monisha Avendano, MUKUL  04/04/22  10:54 AM EDT

## 2022-04-18 ENCOUNTER — OFFICE VISIT (OUTPATIENT)
Dept: FAMILY MEDICINE CLINIC | Facility: CLINIC | Age: 77
End: 2022-04-18

## 2022-04-18 VITALS
HEIGHT: 72 IN | HEART RATE: 72 BPM | BODY MASS INDEX: 36.56 KG/M2 | TEMPERATURE: 97.7 F | DIASTOLIC BLOOD PRESSURE: 80 MMHG | OXYGEN SATURATION: 97 % | WEIGHT: 269.9 LBS | SYSTOLIC BLOOD PRESSURE: 140 MMHG

## 2022-04-18 DIAGNOSIS — D45 POLYCYTHEMIA VERA: ICD-10-CM

## 2022-04-18 DIAGNOSIS — I10 PRIMARY HYPERTENSION: Primary | ICD-10-CM

## 2022-04-18 DIAGNOSIS — I25.10 CORONARY ARTERY DISEASE INVOLVING NATIVE CORONARY ARTERY OF NATIVE HEART WITHOUT ANGINA PECTORIS: ICD-10-CM

## 2022-04-18 DIAGNOSIS — E11.3293 TYPE 2 DIABETES MELLITUS WITH BOTH EYES AFFECTED BY MILD NONPROLIFERATIVE RETINOPATHY WITHOUT MACULAR EDEMA, WITHOUT LONG-TERM CURRENT USE OF INSULIN: ICD-10-CM

## 2022-04-18 DIAGNOSIS — R60.0 LOCALIZED EDEMA: ICD-10-CM

## 2022-04-18 PROBLEM — M46.46 DISCITIS OF LUMBAR REGION: Status: RESOLVED | Noted: 2020-10-09 | Resolved: 2022-04-18

## 2022-04-18 PROCEDURE — 99214 OFFICE O/P EST MOD 30 MIN: CPT | Performed by: NURSE PRACTITIONER

## 2022-04-18 RX ORDER — ASPIRIN 81 MG/1
81 TABLET ORAL DAILY
Qty: 90 TABLET | Refills: 2 | Status: SHIPPED | OUTPATIENT
Start: 2022-04-18

## 2022-04-18 NOTE — PROGRESS NOTES
Subjective   Erlin Blanco is a 76 y.o. male.     Chief Complaint   Patient presents with   • Hypertension     Follow up        History of Present Illness   Patient presents for follow up HTN: started Amlodipine and changed from Losartan to Valsartan daily; also takes Hydralazine TID and Carvedilol and Torsemide twice daily with KCL TID; monitors BP, typically runs 140-150s/70s, HR 70s; no swelling; no headaches; no orthostasis; had recent follow up with Dr. Hugo GILMAN, cardiology; no changes in medications; to follow up in 6 months.    F/U DM2: takes Glimperide twice daily; last A1c 9.0% and increased Glimepiride to 1.5 tablets with morning meal and 1 tablet with evening meal; monitors blood sugar, typically runs 140s, has improved; watches intake of carbs/sugars; has lost about 10 pounds; plans to start walking more; no numbness/tingling.    Still having trouble with balance; currently doing physical therapy and helps.    Has upcoming US with urology due to recent hematuria/bleeding; bleeding has resolved at this point.    Has had recent follow up with hematology; takes Hydroxyurea daily and has not needed recent therapeutic phlebotomy.       The following portions of the patient's history were reviewed and updated as appropriate: allergies, current medications, past family history, past medical history, past social history, past surgical history and problem list.      Current Outpatient Medications   Medication Sig Dispense Refill   • Accu-Chek FastClix Lancets misc TEST BLOOD SUGAR 1-2 TIMES DAILY AND AS NEEDED     • Accu-Chek Guide test strip      • acetaminophen (TYLENOL) 500 MG tablet Take 1,000 mg by mouth 2 (Two) Times a Day As Needed for Mild Pain  or Moderate Pain .     • amLODIPine (NORVASC) 5 MG tablet Take 1 tablet by mouth Daily. 30 tablet 1   • aspirin (ASPIRIN LOW DOSE) 81 MG EC tablet Take 1 tablet by mouth Daily. 90 tablet 2   • Blood Glucose Monitoring Suppl (Accu-Chek Guide Me) w/Device kit  TEST BLOOD SUGAR 1-2 TIMES DAILY AND AS NEEDED     • carvedilol (Coreg) 12.5 MG tablet Take 0.5 tablets by mouth 2 (Two) Times a Day With Meals.     • cetirizine (zyrTEC) 10 MG tablet Take 10 mg by mouth Daily.     • Eliquis 5 MG tablet tablet TAKE 1 TABLET EVERY 12 HOURS FOR ATRIAL FIBRILLATION 180 tablet 0   • fluticasone (FLONASE) 50 MCG/ACT nasal spray USE 2 SPRAYS IN NOSTRIL(S) AS DIRECTED BY PROVIDER DAILY 48 g 3   • Fluzone High-Dose Quadrivalent 0.7 ML suspension prefilled syringe injection      • glimepiride (Amaryl) 2 MG tablet Take 1.5 tablets with breakfast and 1 tablet with evening meal. (Patient taking differently: Take 1.5 tablets with breakfast and 2  tablet with evening meal.)     • glucose blood (OneTouch Ultra) test strip Test blood sugar 1-2 times daily and as needed. 200 each 3   • hydrALAZINE (APRESOLINE) 100 MG tablet Take 1 tablet by mouth 3 (Three) Times a Day. 30 tablet 0   • hydroxyurea (HYDREA) 500 MG capsule Take 500 mg by mouth Daily.     • Insulin Pen Needle (B-D UF III MINI PEN NEEDLES) 31G X 5 MM misc Inject 1 each under the skin into the appropriate area as directed Daily. 100 each 2   • memantine (NAMENDA) 5 MG tablet Take 1 tablet by mouth Daily. 30 tablet 3   • montelukast (SINGULAIR) 10 MG tablet TAKE 1 TABLET DAILY 90 tablet 3   • Multiple Vitamins-Minerals (MULTIVITAMIN ADULT PO) Take 1 tablet by mouth Daily.     • potassium chloride (K-DUR,KLOR-CON) 20 MEQ tablet controlled-release ER tablet Take 20 mEq by mouth 3 (Three) Times a Day.     • potassium chloride (KLOR-CON) 20 MEQ CR tablet Take 1 tablet by mouth 3 (Three) Times a Day. (Patient taking differently: Take 20 mEq by mouth 2 (Two) Times a Day.) 30 tablet 0   • Probiotic Product (Probiotic Daily) capsule Take 1 capsule by mouth Daily. 90 capsule 1   • rosuvastatin (Crestor) 40 MG tablet Take 1 tablet by mouth Daily. 90 tablet 1   • torsemide (DEMADEX) 20 MG tablet Take 1 tablet by mouth 2 (Two) Times a Day. 60 tablet 5    • valsartan (DIOVAN) 320 MG tablet Take 1 tablet by mouth Daily. 30 tablet 1     No current facility-administered medications for this visit.       Past Medical History:   Diagnosis Date   • Abnormal ECG    • Abnormal stress test    • Abrasion of face 1/29/2020   • Acute bronchitis    • Acute hypokalemia 9/16/2020   • Acute non-recurrent sinusitis 1/13/2020   • Acute pyelonephritis 10/9/2020   • Acute sinusitis    • DORI (acute kidney injury) ([facility]) 4/28/2017   • Allergic rhinitis    • Aortic root dilation ([facility])    • Arthritis    • Bacteremia due to Escherichia coli 8/17/2020   • Balanitis 5/16/2021   • Benign enlargement of prostate    • C. difficile colitis 9/16/2020   • Cellulitis of knee, left 5/4/2017   • CHF (congestive heart failure) ([facility])    • Chronic diastolic congestive heart failure ([facility])    • Constipation 11/14/2020   • Contusion of face 1/29/2020   • Coronary artery disease    • CVA (cerebral vascular accident) ([facility]) 2020   • Diminished pulse     in lower extremities   • Discitis of lumbar region 10/9/2020   • Edema    • Elevated hematocrit    • Elevated hemoglobin ([facility])    • Essential hypertension    • Hearing loss     mala hearing aids   • Heart valve disease    • High risk medication use    • History of back pain    • History of dysuria    • History of echocardiogram    • History of intracranial hemorrhage    • History of scoliosis    • History of stroke    • Hyperlipidemia    • Hypokalemia 1/7/2021   • Injury of toe, left, initial encounter    • Irregular heart rate    • Knee pain, left    • Left bundle branch block    • Leg wound, left    • Leukocytosis 9/16/2020   • Low back pain    • Medicare annual wellness visit, subsequent    • Minor head injury without loss of consciousness 1/29/2020   • Murmur    • Muscle cramps    • Need for hepatitis C screening test    • Paraphimosis 5/16/2021   • Pneumonia of left lung due to infectious organism 9/17/2021   • Polycythemia    • Polycythemia vera ([facility])    •  Pressure injury of buttock, stage 2 (HCC) 2020   • Prostate hyperplasia without urinary obstruction    • Prostatitis    • Proteinuria    • Pulmonary hypertension (HCC)    • Sacroiliitis (HCC) 10/9/2020   • Scoliosis    • Sepsis due to Escherichia coli (HCC) 2020   • Sepsis with metabolic encephalopathy (HCC) 2020   • Stroke ([facility])    • SVT (supraventricular tachycardia) ([facility])    • Tachycardia    • Thrombocytosis    • TIA (transient ischemic attack)        • Type 2 diabetes mellitus (HCC)    • Type 2 diabetes mellitus with hyperglycemia (HCC) 2021   • Urinary tract infection due to extended-spectrum beta lactamase (ESBL) producing Escherichia coli 2020   • Valvular heart disease        Past Surgical History:   Procedure Laterality Date   • CARDIAC CATHETERIZATION     • CATARACT EXTRACTION Left    • COLONOSCOPY      did Cologuard approx 2019 and negative   • HAND SURGERY     • JOINT REPLACEMENT  2014    Righ Knee   • SD TOTAL KNEE ARTHROPLASTY Left 2017    Procedure: LT TOTAL KNEE ARTHROPLASTY;  Surgeon: Bertin Perrin MD;  Location: [facility];  Service: Orthopedics   • PROSTATE SURGERY      Rezum steam treatment to shrink prostate by dr. guerrero       Family History   Problem Relation Age of Onset   • Hypertension Mother    • Other Father         ruptured appendix,  when pt. was 14 years old.   • Diabetes Paternal Aunt    • Diabetes Paternal Uncle    • No Known Problems Other    • Migraines Sister    • Stroke Sister    • Dementia Brother        Social History     Socioeconomic History   • Marital status:    Tobacco Use   • Smoking status: Former Smoker     Types: Cigarettes     Quit date:      Years since quittin.3   • Smokeless tobacco: Former User   • Tobacco comment: Caffeine daily   Vaping Use   • Vaping Use: Never used   Substance and Sexual Activity   • Alcohol use: No   • Drug use: No   • Sexual activity: Defer       Review of Systems  "  Constitutional: Negative for appetite change, chills, fatigue (seems to tire quicker than in past when does work around house; cannot work 12-18 hour days any more), fever, unexpected weight gain and unexpected weight loss.   HENT: Negative for ear pain, sinus pressure, sore throat and trouble swallowing. Rhinorrhea: nasal spray helps.    Eyes: Negative for blurred vision.   Respiratory: Negative for cough (occasional nonproductive cough), chest tightness and shortness of breath.    Cardiovascular: Negative for chest pain and palpitations.   Gastrointestinal: Negative for abdominal pain, blood in stool, diarrhea, GERD and indigestion. Constipation: some for last week or so; took OTC med and helped.   Endocrine: Negative for polydipsia.   Genitourinary: Negative for dysuria.   Musculoskeletal: Back pain: some in lower back, chronic; no radiation of pain down legs.   Skin: Negative for rash.   Neurological: Negative for syncope and headache.   Psychiatric/Behavioral: Negative for depressed mood. The patient is not nervous/anxious.        Objective   Vitals:    04/18/22 1009 04/18/22 1052   BP: 160/70 140/80   BP Location: Left arm Left arm   Patient Position: Sitting Sitting   Cuff Size: Adult    Pulse: 72    Temp: 97.7 °F (36.5 °C)    SpO2: 97%    Weight: 122 kg (269 lb 14.4 oz)    Height: 182.9 cm (72\")      Body mass index is 36.61 kg/m².    Physical Exam  Vitals and nursing note reviewed.   Constitutional:       General: He is not in acute distress.     Appearance: He is well-developed and well-groomed. He is not ill-appearing or diaphoretic.   HENT:      Head: Normocephalic.      Right Ear: External ear normal.      Left Ear: External ear normal.   Eyes:      Conjunctiva/sclera: Conjunctivae normal.   Neck:      Vascular: No carotid bruit.   Cardiovascular:      Rate and Rhythm: Normal rate and regular rhythm.      Pulses: Normal pulses.      Heart sounds: Normal heart sounds. No murmur heard.  Pulmonary:      " Effort: Pulmonary effort is normal. No respiratory distress.      Breath sounds: Normal breath sounds.   Abdominal:      General: Bowel sounds are normal.      Palpations: Abdomen is soft. There is no hepatomegaly or splenomegaly.      Tenderness: There is no abdominal tenderness. There is no guarding.   Musculoskeletal:      Cervical back: Normal range of motion and neck supple.      Right lower leg: Right lower leg edema: trace.      Left lower leg: Edema (1+, left leg always larger than right) present.   Skin:     General: Skin is warm and dry.      Findings: No rash.   Neurological:      Mental Status: He is alert and oriented to person, place, and time.      Gait: Abnormal gait: guarded gait, limping on left.   Psychiatric:         Mood and Affect: Mood normal.         Behavior: Behavior normal.         Thought Content: Thought content normal.         Cognition and Memory: Cognition normal.         Judgment: Judgment normal.         Lab Results   Component Value Date    WBC 8.04 03/25/2022    RBC 5.18 03/25/2022    HGB 16.1 03/25/2022    HCT 47.9 03/25/2022    MCV 92.5 03/25/2022    MCH 31.1 03/25/2022    MCHC 33.6 03/25/2022    RDW 13.7 03/25/2022    RDWSD 46.4 03/25/2022    MPV 10.6 03/25/2022     03/25/2022    NEUTRORELPCT 73.9 03/25/2022    LYMPHORELPCT 14.4 (L) 03/25/2022    MONORELPCT 5.3 03/25/2022    EOSRELPCT 4.1 03/25/2022    BASORELPCT 1.9 (H) 03/25/2022    AUTOIGPER 0.4 03/25/2022    NEUTROABS 6.1 04/07/2022    LYMPHSABS 1.16 03/25/2022    MONOSABS 0.43 03/25/2022    EOSABS 0.5 04/07/2022    BASOSABS 0.2 04/07/2022    AUTOIGNUM 0.03 03/25/2022    NRBC 0.0 03/25/2022     Lab Results   Component Value Date    GLUCOSE 182 (H) 03/25/2022    BUN 19 03/25/2022    CREATININE 1.14 03/25/2022    EGFRIFNONA 57 (L) 01/10/2022    EGFRIFAFRI 66 01/10/2022    BCR 16.7 03/25/2022    K 4.3 03/25/2022    CO2 24.9 03/25/2022    CALCIUM 9.8 03/25/2022    PROTENTOTREF 6.9 03/22/2022    ALBUMIN 4.30 03/25/2022     LABIL2 1.6 03/22/2022    AST 35 03/25/2022    ALT 33 03/25/2022      Lab Results   Component Value Date    CHLPL 105 01/10/2022    TRIG 98 01/10/2022    HDL 40 01/10/2022    VLDL 19 01/10/2022    LDL 46 01/10/2022     Lab Results   Component Value Date    TSH 2.220 09/17/2021     Lab Results   Component Value Date    HGBA1C 9.0 (H) 03/22/2022         Assessment/Plan .  Problem List Items Addressed This Visit     Type 2 diabetes mellitus with both eyes affected by mild nonproliferative retinopathy without macular edema, without long-term current use of insulin (HCC)    Current Assessment & Plan     Diabetes is improving with treatment.   Continue current treatment regimen.  Regular aerobic exercise.  Diabetes will be reassessed 2 months.  Continue Glimepiride twice daily.           Relevant Orders    Basic Metabolic Panel    Hypertension - Primary    Current Assessment & Plan     Hypertension is improving with treatment.  Regular aerobic exercise.  Continue current medications.  Ambulatory blood pressure monitoring.  Blood pressure will be reassessed 2 months.  Continue Amlodipine and Valsartan daily.  Continue Carvedilol and Torsemide twice daily.  Continue Hydralazine TID.           Relevant Orders    Basic Metabolic Panel    Coronary artery disease    Relevant Medications    aspirin (ASPIRIN LOW DOSE) 81 MG EC tablet    Polycythemia vera (HCC)    Current Assessment & Plan     Continue Hydroxyurea daily.  Follow up as scheduled with hematology.           Localized edema    Current Assessment & Plan     Continue Torsemide twice daily and KCL TID.  Elevate legs when sitting.  Continue compression stockings.           Relevant Orders    Basic Metabolic Panel          Return in about 2 months (around 6/18/2022) for Recheck.or sooner if problems or concerns.         COVID-19 Precautions - Patient was compliant in wearing a mask. When I saw the patient, I used appropriate personal protective equipment (PPE) including  mask, gloves, and eye shield (standard procedure).  Hand hygiene was completed before and after seeing the patient.

## 2022-04-18 NOTE — PATIENT INSTRUCTIONS
Continue to monitor your blood sugar once daily before a meal and record results.  Continue to monitor your blood pressure periodically and record results.  Continue to work on healthy diet and exercise.  Follow up pending lab results.  Follow up in 2 months, or sooner if problems or concerns.

## 2022-04-19 LAB
BUN SERPL-MCNC: 20 MG/DL (ref 8–27)
BUN/CREAT SERPL: 17 (ref 10–24)
CALCIUM SERPL-MCNC: 9.9 MG/DL (ref 8.6–10.2)
CHLORIDE SERPL-SCNC: 102 MMOL/L (ref 96–106)
CO2 SERPL-SCNC: 26 MMOL/L (ref 20–29)
CREAT SERPL-MCNC: 1.19 MG/DL (ref 0.76–1.27)
EGFRCR SERPLBLD CKD-EPI 2021: 64 ML/MIN/1.73
GLUCOSE SERPL-MCNC: 290 MG/DL (ref 65–99)
POTASSIUM SERPL-SCNC: 4.1 MMOL/L (ref 3.5–5.2)
SODIUM SERPL-SCNC: 142 MMOL/L (ref 134–144)

## 2022-04-19 NOTE — ASSESSMENT & PLAN NOTE
Hypertension is improving with treatment.  Regular aerobic exercise.  Continue current medications.  Ambulatory blood pressure monitoring.  Blood pressure will be reassessed 2 months.  Continue Amlodipine and Valsartan daily.  Continue Carvedilol and Torsemide twice daily.  Continue Hydralazine TID.

## 2022-04-19 NOTE — ASSESSMENT & PLAN NOTE
Continue Torsemide twice daily and KCL TID.  Elevate legs when sitting.  Continue compression stockings.

## 2022-04-19 NOTE — ASSESSMENT & PLAN NOTE
Diabetes is improving with treatment.   Continue current treatment regimen.  Regular aerobic exercise.  Diabetes will be reassessed 2 months.  Continue Glimepiride twice daily.

## 2022-04-20 DIAGNOSIS — E11.3293 TYPE 2 DIABETES MELLITUS WITH BOTH EYES AFFECTED BY MILD NONPROLIFERATIVE RETINOPATHY WITHOUT MACULAR EDEMA, WITHOUT LONG-TERM CURRENT USE OF INSULIN: ICD-10-CM

## 2022-04-20 RX ORDER — GLIMEPIRIDE 2 MG/1
TABLET ORAL
Start: 2022-04-20 | End: 2022-07-11 | Stop reason: ALTCHOICE

## 2022-04-25 RX ORDER — MEMANTINE HYDROCHLORIDE 5 MG/1
5 TABLET ORAL DAILY
Qty: 30 TABLET | Refills: 3 | Status: SHIPPED | OUTPATIENT
Start: 2022-04-25 | End: 2022-09-23

## 2022-05-09 DIAGNOSIS — I10 ESSENTIAL HYPERTENSION: ICD-10-CM

## 2022-05-10 RX ORDER — HYDRALAZINE HYDROCHLORIDE 100 MG/1
TABLET, FILM COATED ORAL
Qty: 270 TABLET | Refills: 3 | Status: SHIPPED | OUTPATIENT
Start: 2022-05-10

## 2022-05-28 DIAGNOSIS — I10 PRIMARY HYPERTENSION: ICD-10-CM

## 2022-05-31 RX ORDER — AMLODIPINE BESYLATE 5 MG/1
TABLET ORAL
Qty: 90 TABLET | Refills: 1 | Status: SHIPPED | OUTPATIENT
Start: 2022-05-31 | End: 2022-12-09

## 2022-05-31 RX ORDER — VALSARTAN 320 MG/1
TABLET ORAL
Qty: 90 TABLET | Refills: 1 | Status: SHIPPED | OUTPATIENT
Start: 2022-05-31 | End: 2022-11-23

## 2022-05-31 NOTE — TELEPHONE ENCOUNTER
Rx Refill Note  Requested Prescriptions     Pending Prescriptions Disp Refills   • valsartan (DIOVAN) 320 MG tablet [Pharmacy Med Name: VALSARTAN 320 MG TABLET] 30 tablet 1     Sig: TAKE ONE TABLET BY MOUTH DAILY   • amLODIPine (NORVASC) 5 MG tablet [Pharmacy Med Name: amLODIPine BESYLATE 5 MG TAB] 30 tablet 1     Sig: TAKE ONE TABLET BY MOUTH DAILY      Last office visit with prescribing clinician: 4/18/2022      Next office visit with prescribing clinician: 6/21/2022            Wendi Ramesh MA  05/31/22, 09:02 EDT

## 2022-06-10 RX ORDER — APIXABAN 5 MG/1
TABLET, FILM COATED ORAL
Qty: 180 TABLET | Refills: 3 | Status: SHIPPED | OUTPATIENT
Start: 2022-06-10 | End: 2022-10-11 | Stop reason: SDUPTHER

## 2022-06-10 NOTE — TELEPHONE ENCOUNTER
Rx Refill Note  Requested Prescriptions     Pending Prescriptions Disp Refills   • Eliquis 5 MG tablet tablet [Pharmacy Med Name: ELIQUIS TABS 5MG] 180 tablet 3     Sig: TAKE 1 TABLET EVERY 12 HOURS FOR ATRIAL FIBRILLATION      Last office visit with prescribing clinician: 4/18/2022      Next office visit with prescribing clinician: 7/11/2022            Malaika Chavez MA  06/10/22, 14:14 EDT

## 2022-06-16 ENCOUNTER — TELEPHONE (OUTPATIENT)
Dept: FAMILY MEDICINE CLINIC | Facility: CLINIC | Age: 77
End: 2022-06-16

## 2022-07-11 ENCOUNTER — OFFICE VISIT (OUTPATIENT)
Dept: FAMILY MEDICINE CLINIC | Facility: CLINIC | Age: 77
End: 2022-07-11

## 2022-07-11 VITALS
HEART RATE: 75 BPM | OXYGEN SATURATION: 96 % | DIASTOLIC BLOOD PRESSURE: 80 MMHG | HEIGHT: 72 IN | BODY MASS INDEX: 36.47 KG/M2 | TEMPERATURE: 98.1 F | WEIGHT: 269.3 LBS | SYSTOLIC BLOOD PRESSURE: 140 MMHG

## 2022-07-11 DIAGNOSIS — R60.0 LOCALIZED EDEMA: ICD-10-CM

## 2022-07-11 DIAGNOSIS — I10 PRIMARY HYPERTENSION: ICD-10-CM

## 2022-07-11 DIAGNOSIS — E78.2 MIXED HYPERLIPIDEMIA: ICD-10-CM

## 2022-07-11 DIAGNOSIS — E11.3293 TYPE 2 DIABETES MELLITUS WITH BOTH EYES AFFECTED BY MILD NONPROLIFERATIVE RETINOPATHY WITHOUT MACULAR EDEMA, WITHOUT LONG-TERM CURRENT USE OF INSULIN: Primary | ICD-10-CM

## 2022-07-11 DIAGNOSIS — D45 POLYCYTHEMIA VERA: ICD-10-CM

## 2022-07-11 PROCEDURE — 99214 OFFICE O/P EST MOD 30 MIN: CPT | Performed by: NURSE PRACTITIONER

## 2022-07-11 RX ORDER — GLIMEPIRIDE 4 MG/1
4 TABLET ORAL 2 TIMES DAILY WITH MEALS
Qty: 180 TABLET | Refills: 1 | Status: SHIPPED | OUTPATIENT
Start: 2022-07-11 | End: 2022-12-16

## 2022-07-11 NOTE — PROGRESS NOTES
Subjective   Erlin Blanco is a 77 y.o. male.     Chief Complaint   Patient presents with   • Diabetes     3 mo follow up          History of Present Illness   Patient presents for follow up DM2: takes Glimepiride 2 tabs in morning and 2 tabs in evening; increased to 2 tablets twice daily recently due to blood sugars increased; last A1c 9.0%; monitors blood sugar, typically runs 120s in mornings; watches intake of carbs/sugars in diet; some exercise with physical therapy, needs to do more since only going once per week at this point; plans to start using stepper at home; no numbness or tingling; last eye exam March 2022.    F/U HTN: takes Amlodipine and Valsartan daily and Torsemide and KCL twice daily; also takes Hydralazine TID; monitors BP, typically runs 140s/80s; no headaches; no change in swelling in legs, has had more swelling in left leg since left knee surgery; negative venous doppler in past; no orthostasis.    F/U Hyperlipidemia: takes Rosuvastatin daily; no myalgias; watches intake of saturated fats in diet; non-fasting today.    F/U polycythemia vera: takes Hydroxyurea daily; sees hematology; has not needed therapeutic phlebotomy recently.    Has been doing PT for balance and has been helping; walks with 4 point cane.      The following portions of the patient's history were reviewed and updated as appropriate: allergies, current medications, past family history, past medical history, past social history, past surgical history and problem list.    Current Outpatient Medications on File Prior to Visit   Medication Sig   • Accu-Chek FastClix Lancets misc TEST BLOOD SUGAR 1-2 TIMES DAILY AND AS NEEDED   • Accu-Chek Guide test strip    • acetaminophen (TYLENOL) 500 MG tablet Take 1,000 mg by mouth 2 (Two) Times a Day As Needed for Mild Pain  or Moderate Pain .   • amLODIPine (NORVASC) 5 MG tablet TAKE ONE TABLET BY MOUTH DAILY   • aspirin (ASPIRIN LOW DOSE) 81 MG EC tablet Take 1 tablet by mouth Daily.    • Blood Glucose Monitoring Suppl (Accu-Chek Guide Me) w/Device kit TEST BLOOD SUGAR 1-2 TIMES DAILY AND AS NEEDED   • carvedilol (Coreg) 12.5 MG tablet Take 0.5 tablets by mouth 2 (Two) Times a Day With Meals.   • cetirizine (zyrTEC) 10 MG tablet Take 10 mg by mouth Daily.   • Eliquis 5 MG tablet tablet TAKE 1 TABLET EVERY 12 HOURS FOR ATRIAL FIBRILLATION   • fluticasone (FLONASE) 50 MCG/ACT nasal spray USE 2 SPRAYS IN NOSTRIL(S) AS DIRECTED BY PROVIDER DAILY   • glucose blood (OneTouch Ultra) test strip Test blood sugar 1-2 times daily and as needed.   • hydrALAZINE (APRESOLINE) 100 MG tablet TAKE 1 TABLET THREE TIMES A DAY   • hydroxyurea (HYDREA) 500 MG capsule Take 500 mg by mouth Daily.   • Insulin Pen Needle (B-D UF III MINI PEN NEEDLES) 31G X 5 MM misc Inject 1 each under the skin into the appropriate area as directed Daily.   • memantine (NAMENDA) 5 MG tablet TAKE 1 TABLET BY MOUTH DAILY   • montelukast (SINGULAIR) 10 MG tablet TAKE 1 TABLET DAILY   • Multiple Vitamins-Minerals (MULTIVITAMIN ADULT PO) Take 1 tablet by mouth Daily.   • potassium chloride (K-DUR,KLOR-CON) 20 MEQ tablet controlled-release ER tablet Take 20 mEq by mouth 2 (Two) Times a Day.   • potassium chloride (KLOR-CON) 20 MEQ CR tablet Take 1 tablet by mouth 3 (Three) Times a Day. (Patient taking differently: Take 20 mEq by mouth 2 (Two) Times a Day.)   • Probiotic Product (Probiotic Daily) capsule Take 1 capsule by mouth Daily. (Patient taking differently: Take 1 capsule by mouth Daily As Needed.)   • rosuvastatin (Crestor) 40 MG tablet Take 1 tablet by mouth Daily.   • torsemide (DEMADEX) 20 MG tablet Take 1 tablet by mouth 2 (Two) Times a Day.   • valsartan (DIOVAN) 320 MG tablet TAKE ONE TABLET BY MOUTH DAILY     No current facility-administered medications on file prior to visit.        Past Medical History:   Diagnosis Date   • Abnormal ECG    • Abnormal stress test    • Abrasion of face 1/29/2020   • Acute bronchitis    • Acute  hypokalemia 9/16/2020   • Acute non-recurrent sinusitis 1/13/2020   • Acute pyelonephritis 10/9/2020   • Acute sinusitis    • DORI (acute kidney injury) ([facility]) 4/28/2017   • Allergic rhinitis    • Aortic root dilation ([facility])    • Arthritis    • Bacteremia due to Escherichia coli 8/17/2020   • Balanitis 5/16/2021   • Benign enlargement of prostate    • C. difficile colitis 9/16/2020   • Cellulitis of knee, left 5/4/2017   • CHF (congestive heart failure) ([facility])    • Chronic diastolic congestive heart failure ([facility])    • Constipation 11/14/2020   • Contusion of face 1/29/2020   • Coronary artery disease    • CVA (cerebral vascular accident) ([facility]) 2020   • Diminished pulse     in lower extremities   • Discitis of lumbar region 10/9/2020   • Edema    • Elevated hematocrit    • Elevated hemoglobin ([facility])    • Essential hypertension    • Hearing loss     mala hearing aids   • Heart valve disease    • High risk medication use    • History of back pain    • History of dysuria    • History of echocardiogram    • History of intracranial hemorrhage    • History of scoliosis    • History of stroke    • Hyperlipidemia    • Hypokalemia 1/7/2021   • Injury of toe, left, initial encounter    • Irregular heart rate    • Knee pain, left    • Left bundle branch block    • Leg wound, left    • Leukocytosis 9/16/2020   • Low back pain    • Medicare annual wellness visit, subsequent    • Minor head injury without loss of consciousness 1/29/2020   • Murmur    • Muscle cramps    • Need for hepatitis C screening test    • Paraphimosis 5/16/2021   • Pneumonia of left lung due to infectious organism 9/17/2021   • Polycythemia    • Polycythemia vera ([facility])    • Pressure injury of buttock, stage 2 ([facility]) 9/18/2020   • Prostate hyperplasia without urinary obstruction    • Prostatitis    • Proteinuria    • Pulmonary hypertension ([facility])    • Sacroiliitis ([facility]) 10/9/2020   • Scoliosis    • Sepsis due to Escherichia coli ([facility]) 8/17/2020   • Sepsis with  metabolic encephalopathy (HCC) 2020   • Stroke (HCC)    • SVT (supraventricular tachycardia) ([facility])    • Tachycardia    • Thrombocytosis    • TIA (transient ischemic attack)        • Type 2 diabetes mellitus (HCC)    • Type 2 diabetes mellitus with hyperglycemia (HCC) 2021   • Urinary tract infection due to extended-spectrum beta lactamase (ESBL) producing Escherichia coli 2020   • Valvular heart disease        Past Surgical History:   Procedure Laterality Date   • CARDIAC CATHETERIZATION     • CATARACT EXTRACTION Left    • COLONOSCOPY      did Cologuard approx 2019 and negative   • HAND SURGERY     • JOINT REPLACEMENT Right    • [place] TOTAL KNEE ARTHROPLASTY Left 2017    Procedure: LT TOTAL KNEE ARTHROPLASTY;  Surgeon: Bertin Perrin MD;  Location: [facility] OR;  Service: Orthopedics   • PROSTATE SURGERY      Rezum steam treatment to shrink prostate by dr. guerrero       Family History   Problem Relation Age of Onset   • Hypertension Mother    • Other Father         ruptured appendix,  when pt. was 14 years old.   • Diabetes Paternal Aunt    • Diabetes Paternal Uncle    • No Known Problems Other    • Migraines Sister    • Stroke Sister    • Dementia Brother        Social History     Socioeconomic History   • Marital status:    Tobacco Use   • Smoking status: Former Smoker     Types: Cigarettes     Quit date:      Years since quittin.5   • Smokeless tobacco: Former User   • Tobacco comment: Caffeine daily   Vaping Use   • Vaping Use: Never used   Substance and Sexual Activity   • Alcohol use: No   • Drug use: No   • Sexual activity: Defer       Review of Systems   Constitutional: Negative for appetite change, chills, fatigue, fever, unexpected weight gain and unexpected weight loss.   HENT: Negative for ear pain and sore throat.    Eyes: Negative for blurred vision.   Respiratory: Negative for cough, chest tightness and shortness of breath.    Cardiovascular:  "Negative for chest pain and palpitations.   Gastrointestinal: Negative for abdominal pain, blood in stool, constipation, diarrhea, GERD and indigestion.   Endocrine: Negative for polydipsia.   Genitourinary: Negative for dysuria and frequency.   Musculoskeletal: Negative for back pain. Arthralgias: has discomfort in bilateral knees, currently doing PT.   Skin: Negative for rash.   Neurological: Negative for syncope and weakness. Light-headedness: some if turns really quick.   Psychiatric/Behavioral: Negative for sleep disturbance and depressed mood. The patient is not nervous/anxious.        Objective   Vitals:    07/11/22 1309 07/11/22 1330   BP: 140/80    BP Location: Left arm    Patient Position: Sitting    Cuff Size: Adult    Pulse: 75    Temp: 99.6 °F (37.6 °C) 98.1 °F (36.7 °C)   TempSrc:  Oral   SpO2: 96%    Weight: 122 kg (269 lb 4.8 oz)    Height: 182.9 cm (72\")      Body mass index is 36.52 kg/m².    Physical Exam  Vitals and nursing note reviewed.   Constitutional:       General: He is not in acute distress.     Appearance: He is well-developed and well-groomed. He is not diaphoretic.   HENT:      Head: Normocephalic.      Right Ear: External ear normal.      Left Ear: External ear normal.   Eyes:      Conjunctiva/sclera: Conjunctivae normal.   Cardiovascular:      Rate and Rhythm: Normal rate and regular rhythm.      Pulses: Normal pulses.           Dorsalis pedis pulses are 2+ on the right side and 2+ on the left side.        Posterior tibial pulses are 2+ on the right side and 2+ on the left side.      Heart sounds: Murmur heard.    Systolic murmur is present with a grade of 2/6.     Comments: At RUSB  Pulmonary:      Effort: Pulmonary effort is normal. No respiratory distress.      Breath sounds: Normal breath sounds.   Abdominal:      General: Bowel sounds are normal.      Palpations: Abdomen is soft. There is no hepatomegaly or splenomegaly.      Tenderness: There is no abdominal tenderness. There " is no guarding.   Musculoskeletal:      Cervical back: Normal range of motion and neck supple.      Right lower le+ Edema present.      Left lower le+ Pitting Edema present.        Feet:    Feet:      Right foot:      Protective Sensation: 10 sites tested. 9 sites sensed.      Skin integrity: Skin integrity normal.      Left foot:      Protective Sensation: 10 sites tested. 10 sites sensed.      Skin integrity: Skin integrity normal.      Comments: Diabetic Foot Exam Performed and Monofilament Test Performed    Skin:     General: Skin is warm and dry.      Findings: No rash.   Neurological:      Mental Status: He is alert and oriented to person, place, and time.      Gait: Abnormal gait: mild limping on left with 4 point cane.   Psychiatric:         Mood and Affect: Mood normal.         Behavior: Behavior normal.         Thought Content: Thought content normal.         Judgment: Judgment normal.         22 CMP WNL except glucose 249, eGFR 49    Lab Results   Component Value Date    WBC 8.04 2022    RBC 5.18 2022    HGB 16.1 2022    HCT 47.9 2022    MCV 92.5 2022    MCH 31.1 2022    MCHC 33.6 2022    RDW 13.7 2022    RDWSD 46.4 2022    MPV 10.6 2022     2022    NEUTRORELPCT 73.9 2022    LYMPHORELPCT 14.4 (L) 2022    MONORELPCT 5.3 2022    EOSRELPCT 4.1 2022    BASORELPCT 1.9 (H) 2022    AUTOIGPER 0.4 2022    NEUTROABS 7.3 (H) 2022    LYMPHSABS 1.16 2022    MONOSABS 0.43 2022    EOSABS 0.6 2022    BASOSABS 0.1 2022    AUTOIGNUM 0.03 2022    NRBC 0.0 2022     Lab Results   Component Value Date    GLUCOSE 278 (H) 2022    BUN 24 2022    CREATININE 1.05 2022    EGFRIFNONA 57 (L) 01/10/2022    EGFRIFAFRI 66 01/10/2022    BCR 23 2022    K 4.2 2022    CO2 26 2022    CALCIUM 9.7 2022    PROTENTOTREF 6.9 2022    ALBUMIN  4.30 03/25/2022    LABIL2 1.6 03/22/2022    AST 35 03/25/2022    ALT 33 03/25/2022      Lab Results   Component Value Date    CHLPL 113 07/11/2022    TRIG 106 07/11/2022    HDL 41 07/11/2022    VLDL 20 07/11/2022    LDL 52 07/11/2022     Lab Results   Component Value Date    TSH 2.220 09/17/2021         Assessment    Problem List Items Addressed This Visit     Type 2 diabetes mellitus with both eyes affected by mild nonproliferative retinopathy without macular edema, without long-term current use of insulin (HCC) - Primary    Current Assessment & Plan     Diabetes is improving with treatment.   Continue current treatment regimen.  Regular aerobic exercise.  Discussed foot care.  Reminded to get yearly retinal exam.  Diabetes will be reassessed in 3 months.  Continue Glimepiride twice daily.           Relevant Medications    glimepiride (Amaryl) 4 MG tablet    Other Relevant Orders    Basic Metabolic Panel (Completed)    Hemoglobin A1c (Completed)    Lipid Panel With LDL / HDL Ratio (Completed)    Hypertension    Current Assessment & Plan     Hypertension is stable.  Regular aerobic exercise.  Continue current medications.  Ambulatory blood pressure monitoring.  Blood pressure will be reassessed in 3 months.  Continue Amlodipine and Valsartan daily.  Continue Torsemide with KCL twice daily.  Continue Hydralazine TID.           Relevant Orders    Basic Metabolic Panel (Completed)    Lipid Panel With LDL / HDL Ratio (Completed)    Hyperlipidemia    Current Assessment & Plan     Continue to work on healthy diet and exercise as tolerated.  Continue Rosuvastatin daily.           Relevant Orders    Lipid Panel With LDL / HDL Ratio (Completed)    Polycythemia vera (HCC)    Current Assessment & Plan     Stable.  Continue Hydroxyurea daily.  Follow up as scheduled with hematology.           Localized edema    Current Assessment & Plan     Continue Torsemide and KCL twice daily.  Continue compression stockings.                  Return in about 3 months (around 10/11/2022) for Recheck.or sooner if symptoms persist or worsen.         COVID-19 Precautions - Patient was compliant in wearing a mask. When I saw the patient, I used appropriate personal protective equipment (PPE) including mask, gloves, and eye shield (standard procedure).  Hand hygiene was completed before and after seeing the patient.

## 2022-07-11 NOTE — PATIENT INSTRUCTIONS
Continue to monitor your blood sugar once daily before a meal and record results.  Continue to monitor your blood pressure periodically and record results.  Continue to work on healthy diet and exercise.  Continue compression stockings.  Follow up pending lab results.  Follow up in 3 months, or sooner if problems or concerns.

## 2022-07-12 LAB
BUN SERPL-MCNC: 24 MG/DL (ref 8–27)
BUN/CREAT SERPL: 23 (ref 10–24)
CALCIUM SERPL-MCNC: 9.7 MG/DL (ref 8.6–10.2)
CHLORIDE SERPL-SCNC: 104 MMOL/L (ref 96–106)
CHOLEST SERPL-MCNC: 113 MG/DL (ref 100–199)
CO2 SERPL-SCNC: 26 MMOL/L (ref 20–29)
CREAT SERPL-MCNC: 1.05 MG/DL (ref 0.76–1.27)
EGFRCR SERPLBLD CKD-EPI 2021: 74 ML/MIN/1.73
GLUCOSE SERPL-MCNC: 278 MG/DL (ref 65–99)
HBA1C MFR BLD: 8.5 % (ref 4.8–5.6)
HDLC SERPL-MCNC: 41 MG/DL
LDLC SERPL CALC-MCNC: 52 MG/DL (ref 0–99)
LDLC/HDLC SERPL: 1.3 RATIO (ref 0–3.6)
POTASSIUM SERPL-SCNC: 4.2 MMOL/L (ref 3.5–5.2)
SODIUM SERPL-SCNC: 142 MMOL/L (ref 134–144)
TRIGL SERPL-MCNC: 106 MG/DL (ref 0–149)
VLDLC SERPL CALC-MCNC: 20 MG/DL (ref 5–40)

## 2022-07-13 NOTE — ASSESSMENT & PLAN NOTE
Hypertension is stable.  Regular aerobic exercise.  Continue current medications.  Ambulatory blood pressure monitoring.  Blood pressure will be reassessed in 3 months.  Continue Amlodipine and Valsartan daily.  Continue Torsemide with KCL twice daily.  Continue Hydralazine TID.   Patient Id:  Detail Level: Simple Total Pulses (Will Not Render If 0): 0 Location #2: right elbow Total Energy In Joules: 223.02 Location #1: scalp Dose Setting #1 (Mj/Cm2): 2916 Treatment Number: 23 Dose Setting #2 (Mj/Cm2): 2822 % Increase/Decrease From Last Treatment: increased 10% on scalp and increased by 5% on the elbow Post-Care Instructions: I reviewed with the patient in detail post-care instructions. Patient should stay away from the sun and wear sun protection until treated areas are fully healed. Consent: Written consent obtained, risks reviewed including but not limited to crusting, scabbing, blistering, scarring, darker or lighter pigmentary change, incidental hair removal, bruising, and/or incomplete removal. Comments: Increased dose on scalp by 15% and repeated dosage on elbow per her request. Total Square Area In Cm2 (Required For Proper Billing): 160

## 2022-07-13 NOTE — ASSESSMENT & PLAN NOTE
Diabetes is improving with treatment.   Continue current treatment regimen.  Regular aerobic exercise.  Discussed foot care.  Reminded to get yearly retinal exam.  Diabetes will be reassessed in 3 months.  Continue Glimepiride twice daily.

## 2022-07-14 DIAGNOSIS — E78.2 MIXED HYPERLIPIDEMIA: ICD-10-CM

## 2022-07-14 RX ORDER — ROSUVASTATIN CALCIUM 40 MG/1
40 TABLET, COATED ORAL DAILY
Qty: 90 TABLET | Refills: 1 | Status: SHIPPED | OUTPATIENT
Start: 2022-07-14 | End: 2023-01-10

## 2022-07-31 ENCOUNTER — APPOINTMENT (OUTPATIENT)
Dept: GENERAL RADIOLOGY | Facility: HOSPITAL | Age: 77
End: 2022-07-31

## 2022-07-31 ENCOUNTER — HOSPITAL ENCOUNTER (EMERGENCY)
Facility: HOSPITAL | Age: 77
Discharge: HOME | End: 2022-07-31
Attending: EMERGENCY MEDICINE | Admitting: EMERGENCY MEDICINE

## 2022-07-31 VITALS
OXYGEN SATURATION: 97 % | SYSTOLIC BLOOD PRESSURE: 173 MMHG | TEMPERATURE: 98.4 F | RESPIRATION RATE: 18 BRPM | HEART RATE: 82 BPM | DIASTOLIC BLOOD PRESSURE: 95 MMHG

## 2022-07-31 DIAGNOSIS — S80.01XA CONTUSION OF RIGHT KNEE, INITIAL ENCOUNTER: Primary | ICD-10-CM

## 2022-07-31 DIAGNOSIS — S82.153A AVULSION FRACTURE OF TIBIAL TUBEROSITY: ICD-10-CM

## 2022-07-31 DIAGNOSIS — S80.01XA HEMATOMA OF RIGHT KNEE REGION: ICD-10-CM

## 2022-07-31 PROCEDURE — 99283 EMERGENCY DEPT VISIT LOW MDM: CPT

## 2022-07-31 PROCEDURE — 73562 X-RAY EXAM OF KNEE 3: CPT

## 2022-07-31 RX ORDER — ACETAMINOPHEN 500 MG
1000 TABLET ORAL ONCE
Status: COMPLETED | OUTPATIENT
Start: 2022-07-31 | End: 2022-07-31

## 2022-07-31 RX ADMIN — ACETAMINOPHEN 1000 MG: 500 TABLET, FILM COATED ORAL at 21:07

## 2022-08-01 NOTE — DISCHARGE INSTRUCTIONS
Keep your knee and leg elevated whenever possible.  Apply ice to help with the swelling.  Use Tylenol for pain.  Switch to using your walker for greater stability.  Call your orthopedist tomorrow for further evaluation.  Return here for increased pain, further falls, or sudden weakness or numbness of the right leg.

## 2022-08-01 NOTE — ED PROVIDER NOTES
EMERGENCY DEPARTMENT ENCOUNTER    Room Number:  11/11  Date of encounter:  7/31/2022  PCP: Zamzam John APRN  Patient Care Team:  Zamzam John APRN as PCP - General (Family Medicine)  Bertin Perrin MD as Consulting Physician (Orthopedic Surgery)  Marika Arias MD as Consulting Physician (Cardiology)  Bakari Chapman Jr., MD as Consulting Physician (Urology)  Remy Thomas MD as Consulting Physician (Hematology and Oncology)   Historian: Patient, family    HPI:  Chief Complaint: Fall, right knee pain  A complete HPI/ROS/PMH/PSH/SH/FH are unobtainable due to: Nothing    Context: Erlin Blanco is a 77 y.o. male who presents to the ED c/o of fall and right knee pain.  He reports around 930 this morning he was ambulating without his cane.  He states he realized that he did not have his cane with him and he turned around to go get it and stumbled over his own feet and fell.  He states he landed squarely on his right knee.  He denies other injury.  He did not hit his head.  He has noticed swelling to his right knee.  He reports that 2 weeks ago he fell and landed on his right knee as well.  He is on blood thinners.  He reports the swelling from that fall had just gone down in the last few days.  He reports swelling and pain to his right knee.  He reports he has had a right knee replacement and was concerned that he may have injured the hardware.    Prior record review: Primary care note 7/11/2022 with type 2 diabetes    PAST MEDICAL HISTORY  Active Ambulatory Problems     Diagnosis Date Noted   • Osteoarthritis, knee 04/27/2017   • Type 2 diabetes mellitus with both eyes affected by mild nonproliferative retinopathy without macular edema, without long-term current use of insulin (HCC) 04/28/2017   • Hypertension 04/28/2017   • Coronary artery disease 04/28/2017   • Supraventricular tachycardia (HCC) 11/04/2018   • TIA (transient ischemic attack) 06/18/2019   • Hyperlipidemia    • Prostate  hyperplasia without urinary obstruction    • Class 2 severe obesity due to excess calories with serious comorbidity and body mass index (BMI) of 36.0 to 36.9 in adult ([facility]) 12/10/2019   • Polycythemia vera ([facility])    • Pain in both knees 01/29/2020   • Allergic rhinitis    • Left bundle-branch block 07/17/2015   • Fatigue 06/24/2020   • Atrial flutter ([facility]) 09/16/2020   • History of CVA (cerebrovascular accident) 09/28/2020   • Cervical radiculitis 12/29/2020   • Chronic diastolic congestive heart failure ([facility]) 02/03/2017   • CKD (chronic kidney disease) stage 2, GFR 60-89 ml/min 05/16/2021   • Glucosuria 05/16/2021   • Paroxysmal atrial fibrillation ([facility]) 06/22/2021   • Acute non-recurrent maxillary sinusitis 07/16/2021   • Memory difficulties 07/16/2021   • Bradycardia 09/17/2021   • Anemia 09/18/2021   • Low back pain 04/18/2019   • Back pain 01/10/2022   • Cardiovascular stress test abnormal 08/15/2015   • Prostatitis 08/30/2018   • Localized edema 06/23/2016   • Murmur 06/17/2014   • Pulmonary hypertension ([facility]) 01/17/2017   • Thrombocytosis 03/19/2018   • Arthritis 01/10/2022     Resolved Ambulatory Problems     Diagnosis Date Noted   • DORI (acute kidney injury) ([facility]) 04/28/2017   • Cellulitis of knee, left 05/04/2017   • Aortic root dilation ([facility]) 11/04/2018   • Acute non-recurrent sinusitis 01/13/2020   • Acute bronchitis 01/13/2020   • Abrasion of face 01/29/2020   • Fall down steps 01/29/2020   • Minor head injury without loss of consciousness 01/29/2020   • Contusion of face 01/29/2020   • Cough 04/14/2020   • Fever in adult 08/16/2020   • Sepsis due to Escherichia coli ([facility]) 08/17/2020   • Bacteremia due to Escherichia coli 08/17/2020   • Sepsis with metabolic encephalopathy ([facility]) 08/17/2020   • Urinary tract infection due to extended-spectrum beta lactamase (ESBL) producing Escherichia coli 08/18/2020   • Shock, septic ([facility]) 08/19/2020   • Acute hypokalemia 09/16/2020   • C. difficile colitis 09/16/2020    • Leukocytosis 09/16/2020   • Pressure injury of buttock, stage 2 ([facility]) 09/18/2020   • Sacroiliitis ([facility]) 10/09/2020   • Discitis of lumbar region 10/09/2020   • Acute pyelonephritis 10/09/2020   • Constipation 11/14/2020   • Hypokalemia 01/07/2021   • Balanitis 05/16/2021   • Type 2 diabetes mellitus with hyperglycemia ([facility]) 05/16/2021   • Paraphimosis 05/16/2021   • Pneumonia of left lung due to infectious organism 09/17/2021     Past Medical History:   Diagnosis Date   • Abnormal ECG    • Abnormal stress test    • Acute sinusitis    • Benign enlargement of prostate    • CHF (congestive heart failure) ([facility])    • CVA (cerebral vascular accident) ([facility]) 2020   • Diminished pulse    • Edema    • Elevated hematocrit    • Elevated hemoglobin ([facility])    • Essential hypertension    • Hearing loss    • Heart valve disease    • High risk medication use    • History of back pain    • History of dysuria    • History of echocardiogram    • History of intracranial hemorrhage    • History of scoliosis    • History of stroke    • Injury of toe, left, initial encounter    • Irregular heart rate    • Knee pain, left    • Left bundle branch block    • Leg wound, left    • Medicare annual wellness visit, subsequent    • Muscle cramps    • Need for hepatitis C screening test    • Polycythemia    • Proteinuria    • Scoliosis    • Stroke ([facility])    • SVT (supraventricular tachycardia) ([facility])    • Tachycardia    • Type 2 diabetes mellitus ([facility])    • Valvular heart disease        The patient has a COVID HM Topic on their chart, and they are fully vaccinated.    PAST SURGICAL HISTORY  Past Surgical History:   Procedure Laterality Date   • CARDIAC CATHETERIZATION     • CATARACT EXTRACTION Left    • COLONOSCOPY      did Cologuard approx 6/2019 and negative   • HAND SURGERY     • JOINT REPLACEMENT Right 2014   • MD TOTAL KNEE ARTHROPLASTY Left 04/27/2017    Procedure: LT TOTAL KNEE ARTHROPLASTY;  Surgeon: Bertin Perrin MD;  Location: [facility]  MAIN OR;  Service: Orthopedics   • PROSTATE SURGERY      Rezum steam treatment to shrink prostate by dr. guerrero         FAMILY HISTORY  Family History   Problem Relation Age of Onset   • Hypertension Mother    • Other Father         ruptured appendix,  when pt. was 14 years old.   • Diabetes Paternal Aunt    • Diabetes Paternal Uncle    • No Known Problems Other    • Migraines Sister    • Stroke Sister    • Dementia Brother          SOCIAL HISTORY  Social History     Socioeconomic History   • Marital status:    Tobacco Use   • Smoking status: Former Smoker     Types: Cigarettes     Quit date:      Years since quittin.6   • Smokeless tobacco: Former User   • Tobacco comment: Caffeine daily   Vaping Use   • Vaping Use: Never used   Substance and Sexual Activity   • Alcohol use: No   • Drug use: No   • Sexual activity: Defer         ALLERGIES  Donepezil and Steglatro [ertugliflozin]        REVIEW OF SYSTEMS  Review of Systems   No chest pain, no shortness of breath, no nausea, no vomiting, no fever, no chills, no focal weakness, no focal numbness, no headache, no dizziness  All systems reviewed and negative except for those discussed in HPI.       PHYSICAL EXAM    I have reviewed the triage vital signs and nursing notes.    ED Triage Vitals   Temp Heart Rate Resp BP SpO2   22 1712 22 1710 22 1710 22 1710 22 1710   98.4 °F (36.9 °C) 88 16 171/80 96 %      Temp src Heart Rate Source Patient Position BP Location FiO2 (%)   22 1712 22 1710 22 1710 22 1710 --   Tympanic Monitor Sitting Left arm        Physical Exam  GENERAL: Awake, alert, no acute distress  SKIN: Warm, dry  HENT: Normocephalic, atraumatic  EYES: no scleral icterus  CV: regular rhythm, regular rate  RESPIRATORY: normal effort, lungs clear  ABDOMEN: soft, nontender, nondistended  MUSCULOSKELETAL: no deformity.  Right knee with moderate swelling and hematoma present.  Distal pulses  intact.  Sensation and motor intact.  No open wounds.  No instability.  NEURO: alert, moves all extremities, follows commands          LAB RESULTS  No results found for this or any previous visit (from the past 24 hour(s)).    Ordered the above labs and independently reviewed the results.        RADIOLOGY  XR Knee 3 View Right    Result Date: 7/31/2022  RIGHT KNEE  HISTORY: Fall, pain.  FINDINGS:  AP, lateral and sunrise views of the knee demonstrates a total knee prosthesis which appears to be in satisfactory position. There is extensive prepatellar soft tissue swelling/hematoma. On the lateral view a small linear area of calcific density is identified anterior to the tibia. This was not present on the postoperative examination of the knee performed on 06/11/2015. A small avulsion fragment cannot be excluded. No other areas suspicious for fracture were identified.          I ordered the above noted radiological studies. Reviewed by me and discussed with radiologist.  See dictation for official radiology interpretation.      PROCEDURES    Procedures      MEDICATIONS GIVEN IN ER    Medications - No data to display      PROGRESS, DATA ANALYSIS, CONSULTS, AND MEDICAL DECISION MAKING    All labs have been independently reviewed by me.  All radiology studies have been reviewed by me and discussed with radiologist dictating the report.   EKG's independently viewed and interpreted by me.  Discussion below represents my analysis of pertinent findings related to patient's condition, differential diagnosis, treatment plan and final disposition.    Differential diagnosis includes but is not limited to hemarthrosis, knee fracture, tibial plateau fracture, DVT, hematoma, compartment syndrome.    ED Course as of 07/31/22 2043   Sun Jul 31, 2022 2042 The patient reports that he has a walker at home.  I encouraged him to use that for greater stability.  Given that any narcotic medication would increase his risk of falls, he  prefers to stay with Tylenol for pain.  I have advised him to use cold therapy and keep his leg elevated to help with the swelling.  He is agreeable.  He has orthopedics that he can follow-up with.  I feel that any knee immobilizer would cause him a greater risk of fall then it would provide him benefit. [TR]      ED Course User Index  [TR] Tyrese Pugh MD           PPE: The patient wore a mask and I wore an N95 mask throughout the entire patient encounter.       AS OF 20:43 EDT VITALS:    BP - 171/80  HR - 88  TEMP - 98.4 °F (36.9 °C) (Tympanic)  O2 SATS - 96%        DIAGNOSIS  Final diagnoses:   Contusion of right knee, initial encounter   Hematoma of right knee region   Avulsion fracture of tibial tuberosity         DISPOSITION  ED Disposition     ED Disposition   Discharge    Condition   Stable    Comment   --                   Tyrese Pugh MD  07/31/22 2043       Tyrese Pugh MD  07/31/22 2043

## 2022-08-18 ENCOUNTER — OFFICE VISIT (OUTPATIENT)
Dept: CARDIOLOGY | Facility: CLINIC | Age: 77
End: 2022-08-18

## 2022-08-18 VITALS
HEIGHT: 72 IN | SYSTOLIC BLOOD PRESSURE: 170 MMHG | HEART RATE: 88 BPM | WEIGHT: 270.4 LBS | BODY MASS INDEX: 36.62 KG/M2 | OXYGEN SATURATION: 98 % | DIASTOLIC BLOOD PRESSURE: 82 MMHG

## 2022-08-18 DIAGNOSIS — E66.01 CLASS 2 SEVERE OBESITY DUE TO EXCESS CALORIES WITH SERIOUS COMORBIDITY AND BODY MASS INDEX (BMI) OF 36.0 TO 36.9 IN ADULT: ICD-10-CM

## 2022-08-18 DIAGNOSIS — R01.1 HEART MURMUR: ICD-10-CM

## 2022-08-18 DIAGNOSIS — I48.92 ATRIAL FLUTTER, UNSPECIFIED TYPE: ICD-10-CM

## 2022-08-18 DIAGNOSIS — I47.1 SUPRAVENTRICULAR TACHYCARDIA: ICD-10-CM

## 2022-08-18 DIAGNOSIS — I27.20 PULMONARY HYPERTENSION: ICD-10-CM

## 2022-08-18 DIAGNOSIS — I48.0 PAROXYSMAL ATRIAL FIBRILLATION: Primary | ICD-10-CM

## 2022-08-18 DIAGNOSIS — N18.2 CKD (CHRONIC KIDNEY DISEASE) STAGE 2, GFR 60-89 ML/MIN: ICD-10-CM

## 2022-08-18 DIAGNOSIS — E11.3293 TYPE 2 DIABETES MELLITUS WITH BOTH EYES AFFECTED BY MILD NONPROLIFERATIVE RETINOPATHY WITHOUT MACULAR EDEMA, WITHOUT LONG-TERM CURRENT USE OF INSULIN: ICD-10-CM

## 2022-08-18 DIAGNOSIS — I44.7 LEFT BUNDLE-BRANCH BLOCK: ICD-10-CM

## 2022-08-18 DIAGNOSIS — E78.2 MIXED HYPERLIPIDEMIA: ICD-10-CM

## 2022-08-18 DIAGNOSIS — I50.32 CHRONIC DIASTOLIC CONGESTIVE HEART FAILURE: ICD-10-CM

## 2022-08-18 PROCEDURE — 99214 OFFICE O/P EST MOD 30 MIN: CPT | Performed by: INTERNAL MEDICINE

## 2022-08-18 PROCEDURE — 93000 ELECTROCARDIOGRAM COMPLETE: CPT | Performed by: INTERNAL MEDICINE

## 2022-08-18 NOTE — PROGRESS NOTES
Subjective:     Encounter Date:08/18/2022      Patient ID: Erlin Blanco is a 77 y.o. male.    Chief Complaint:  History of Present Illness    This is a 77-year-old with a history of paroxysmal ventricular tachycardia, aortic root dilatation, coronary artery disease, diabetes mellitus type 2, hypertension, hyperlipidemia, prior TIA in 2006, chronic diastolic congestive heart failure, polycythemia, chronic left bundle branch block, who presents for follow-up.     He was then hospitalized in 9/2021 with fever due to possible pneumonia.  He was noted to be bradycardic during that admission.  His diltiazem was stopped and carvedilol was reduced.  An echocardiogram was checked during that admission which showed normal left ventricular systolic function wall motion with an EF of 59%, normal diastolic function, and mildly elevated right ventricular systolic pressure.      In follow-up he developed issues with elevated blood pressures for which we had to increase his carvedilol dosage back to 12.5 mg twice a day.    Today presents for follow-up.  He reports his blood pressures are fairly well controlled with systolics in the 130s to 140s.  He denies any chest pain, shortness of breath, palpitations, orthopnea, near-syncope or syncope.  He reports some lightheadedness with position changes.  He has chronic bilateral lower extremity edema that is unchanged.     Prior History:  The patient was previously followed by Dr. Higuera.  Prior to that he was followed by Dr. Thomas.  He establish care with Dr. Higuera in 5/2018.  At that time he reported intermittent palpitations but nothing significant.  The plan was to monitor his symptoms and consider further work-up if he had any issues.  In regards to his aortic root dilatation Dr. Higuera recommended focusing on blood pressure control and the plan was to see him back again in 1 year.       In 10/2018 the patient was referred to the emergency room from his primary care physician's  office after he was noted to have a heart rate in the 140s.  In the emergency room it was felt that he was in supraventricular tachycardia so he was given a dose of adenosine which resulted in conversion back to sinus rhythm.  He returned back to the emergency room a couple of days later with heart rates in the 120s but his work-up was unremarkable at that time.  Prior to leaving the emergency room he had a ZIO monitor this placed and his diltiazem was increased to 300 mg a day.  He returned to the office on 11/5/2018 and was seen by MUKUL George at which time he was doing well.  His ZIO monitor ended up showing numerous episodes of supraventricular tachycardia with possible atrial fibrillation, the longest lasting an hour and 2 minutes.  Based on those findings he was started on apixaban.     He return for routine follow-up in 12/2018 at which time he he was incidentally noted to be tachycardic with rates in the 120s.  An EKG was performed and Dr. Higuera felt that it was supraventricular tachycardia.  Carotid massage was performed and the patient converted to sinus rhythm.  The patient was asymptomatic with the supraventricular tachycardia.  Since it was not felt that he was having atrial fibrillation his apixaban was stopped and he was resumed on clopidogrel.  He then returned to the office last and was seen by MUKUL George in 6/2019 at which time he was doing well.  He had lost weight at that time working at [employer] 4 times a week which required a lot of walking.  He also reported that he had been diagnosed with polycythemia and was getting therapeutic phlebotomy about every 6 weeks.       He had a repeat echocardiogram performed in 12/2019 which showed normal left ventricular systolic function wall motion with an EF of 59%, grade 2 diastolic dysfunction, mildly dilated left atrium, mild mitral regurgitation, normal right ventricular systolic pressure, and no evidence of aortic root or ascending aortic  dilatation.     He was admitted in 8/2020 with acute prostatitis and encephalopathy.  He was noted to be tachycardic with episodes that were felt to be supraventricular tachycardia.  He then developed episodes of atrial fibrillation.  He was treated with diltiazem and digoxin in addition to carvedilol.  He was started on apixaban for for anticoagulation.  During that hospitalization he was also noted to have a small left hemispheric stroke and small vessel disease with lacunar infarcts.  An echocardiogram was performed during that admission on 8/18/2020 and showed normal left ventricular systolic function wall motion with an EF of 55 to 60%, moderate concentric left ventricular hypertrophy, mild pulmonary hypertension with a right ventricular systolic pressure of 41 mmHg, and no significant valvular disease.     In follow-up he was noted to have issues with increased lower extremity swelling.  This was despite high-dose furosemide.  He was subsequently switched to torsemide to see if this would be more effective.    Review of Systems   Constitutional: Positive for malaise/fatigue.   HENT: Negative for hearing loss, hoarse voice, nosebleeds and sore throat.    Eyes: Negative for pain.   Cardiovascular: Negative for chest pain, claudication, cyanosis, dyspnea on exertion, irregular heartbeat, leg swelling, near-syncope, orthopnea, palpitations, paroxysmal nocturnal dyspnea and syncope.   Respiratory: Negative for shortness of breath and snoring.    Endocrine: Negative for cold intolerance, heat intolerance, polydipsia, polyphagia and polyuria.   Skin: Negative for itching and rash.   Musculoskeletal: Negative for arthritis, falls, joint pain, joint swelling, muscle cramps, muscle weakness and myalgias.   Gastrointestinal: Negative for constipation, diarrhea, dysphagia, heartburn, hematemesis, hematochezia, melena, nausea and vomiting.   Genitourinary: Negative for frequency, hematuria and hesitancy.   Neurological:  Positive for light-headedness. Negative for excessive daytime sleepiness, dizziness, headaches, numbness and weakness.   Psychiatric/Behavioral: Negative for depression. The patient is not nervous/anxious.          Current Outpatient Medications:   •  Accu-Chek FastClix Lancets misc, TEST BLOOD SUGAR 1-2 TIMES DAILY AND AS NEEDED, Disp: , Rfl:   •  Accu-Chek Guide test strip, , Disp: , Rfl:   •  acetaminophen (TYLENOL) 500 MG tablet, Take 1,000 mg by mouth 2 (Two) Times a Day As Needed for Mild Pain  or Moderate Pain ., Disp: , Rfl:   •  amLODIPine (NORVASC) 5 MG tablet, TAKE ONE TABLET BY MOUTH DAILY, Disp: 90 tablet, Rfl: 1  •  aspirin (ASPIRIN LOW DOSE) 81 MG EC tablet, Take 1 tablet by mouth Daily., Disp: 90 tablet, Rfl: 2  •  Blood Glucose Monitoring Suppl (Accu-Chek Guide Me) w/Device kit, TEST BLOOD SUGAR 1-2 TIMES DAILY AND AS NEEDED, Disp: , Rfl:   •  carvedilol (Coreg) 12.5 MG tablet, Take 0.5 tablets by mouth 2 (Two) Times a Day With Meals., Disp: , Rfl:   •  cetirizine (zyrTEC) 10 MG tablet, Take 10 mg by mouth Daily., Disp: , Rfl:   •  Eliquis 5 MG tablet tablet, TAKE 1 TABLET EVERY 12 HOURS FOR ATRIAL FIBRILLATION, Disp: 180 tablet, Rfl: 3  •  fluticasone (FLONASE) 50 MCG/ACT nasal spray, USE 2 SPRAYS IN NOSTRIL(S) AS DIRECTED BY PROVIDER DAILY, Disp: 48 g, Rfl: 3  •  glimepiride (Amaryl) 4 MG tablet, Take 1 tablet by mouth 2 (Two) Times a Day With Meals., Disp: 180 tablet, Rfl: 1  •  glucose blood (OneTouch Ultra) test strip, Test blood sugar 1-2 times daily and as needed., Disp: 200 each, Rfl: 3  •  hydrALAZINE (APRESOLINE) 100 MG tablet, TAKE 1 TABLET THREE TIMES A DAY, Disp: 270 tablet, Rfl: 3  •  hydroxyurea (HYDREA) 500 MG capsule, Take 500 mg by mouth Daily., Disp: , Rfl:   •  Insulin Pen Needle (B-D UF III MINI PEN NEEDLES) 31G X 5 MM misc, Inject 1 each under the skin into the appropriate area as directed Daily., Disp: 100 each, Rfl: 2  •  memantine (NAMENDA) 5 MG tablet, TAKE 1 TABLET BY  MOUTH DAILY, Disp: 30 tablet, Rfl: 3  •  montelukast (SINGULAIR) 10 MG tablet, TAKE 1 TABLET DAILY, Disp: 90 tablet, Rfl: 3  •  Multiple Vitamins-Minerals (MULTIVITAMIN ADULT PO), Take 1 tablet by mouth Daily., Disp: , Rfl:   •  potassium chloride (K-DUR,KLOR-CON) 20 MEQ tablet controlled-release ER tablet, Take 20 mEq by mouth 2 (Two) Times a Day., Disp: , Rfl:   •  potassium chloride (KLOR-CON) 20 MEQ CR tablet, Take 1 tablet by mouth 3 (Three) Times a Day. (Patient taking differently: Take 20 mEq by mouth 2 (Two) Times a Day.), Disp: 30 tablet, Rfl: 0  •  Probiotic Product (Probiotic Daily) capsule, Take 1 capsule by mouth Daily. (Patient taking differently: Take 1 capsule by mouth Daily As Needed.), Disp: 90 capsule, Rfl: 1  •  rosuvastatin (Crestor) 40 MG tablet, Take 1 tablet by mouth Daily., Disp: 90 tablet, Rfl: 1  •  torsemide (DEMADEX) 20 MG tablet, Take 1 tablet by mouth 2 (Two) Times a Day., Disp: 60 tablet, Rfl: 5  •  valsartan (DIOVAN) 320 MG tablet, TAKE ONE TABLET BY MOUTH DAILY, Disp: 90 tablet, Rfl: 1    Past Medical History:   Diagnosis Date   • Abnormal ECG    • Abnormal stress test    • Abrasion of face 1/29/2020   • Acute bronchitis    • Acute hypokalemia 9/16/2020   • Acute non-recurrent sinusitis 1/13/2020   • Acute pyelonephritis 10/9/2020   • Acute sinusitis    • DORI (acute kidney injury) ([facility]) 4/28/2017   • Allergic rhinitis    • Aortic root dilation ([facility])    • Arthritis    • Bacteremia due to Escherichia coli 8/17/2020   • Balanitis 5/16/2021   • Benign enlargement of prostate    • C. difficile colitis 9/16/2020   • Cellulitis of knee, left 5/4/2017   • CHF (congestive heart failure) ([facility])    • Chronic diastolic congestive heart failure ([facility])    • Constipation 11/14/2020   • Contusion of face 1/29/2020   • Coronary artery disease    • CVA (cerebral vascular accident) ([facility]) 2020   • Diminished pulse     in lower extremities   • Discitis of lumbar region 10/9/2020   • Edema    • Elevated  hematocrit    • Elevated hemoglobin ([facility])    • Essential hypertension    • Hearing loss     mala hearing aids   • Heart valve disease    • High risk medication use    • History of back pain    • History of dysuria    • History of echocardiogram    • History of intracranial hemorrhage    • History of scoliosis    • History of stroke    • Hyperlipidemia    • Hypokalemia 1/7/2021   • Injury of toe, left, initial encounter    • Irregular heart rate    • Knee pain, left    • Left bundle branch block    • Leg wound, left    • Leukocytosis 9/16/2020   • Low back pain    • Medicare annual wellness visit, subsequent    • Minor head injury without loss of consciousness 1/29/2020   • Murmur    • Muscle cramps    • Need for hepatitis C screening test    • Paraphimosis 5/16/2021   • Pneumonia of left lung due to infectious organism 9/17/2021   • Polycythemia    • Polycythemia vera ([facility])    • Pressure injury of buttock, stage 2 ([facility]) 9/18/2020   • Prostate hyperplasia without urinary obstruction    • Prostatitis    • Proteinuria    • Pulmonary hypertension ([facility])    • Sacroiliitis ([facility]) 10/9/2020   • Scoliosis    • Sepsis due to Escherichia coli ([facility]) 8/17/2020   • Sepsis with metabolic encephalopathy ([facility]) 8/17/2020   • Stroke ([facility])    • SVT (supraventricular tachycardia) ([facility])    • Tachycardia    • Thrombocytosis    • TIA (transient ischemic attack)     2006   • Type 2 diabetes mellitus ([facility])    • Type 2 diabetes mellitus with hyperglycemia ([facility]) 5/16/2021   • Urinary tract infection due to extended-spectrum beta lactamase (ESBL) producing Escherichia coli 8/18/2020   • Valvular heart disease        Past Surgical History:   Procedure Laterality Date   • CARDIAC CATHETERIZATION     • CATARACT EXTRACTION Left    • COLONOSCOPY      did Cologuard approx 6/2019 and negative   • HAND SURGERY     • JOINT REPLACEMENT Right 2014   • IN TOTAL KNEE ARTHROPLASTY Left 04/27/2017    Procedure: LT TOTAL KNEE ARTHROPLASTY;  Surgeon: Bertin LLOYD  "MD Aydin;  Location: [facility] OR;  Service: Orthopedics   • PROSTATE SURGERY      Rezum steam treatment to shrink prostate by dr. guerrero       Family History   Problem Relation Age of Onset   • Hypertension Mother    • Other Father         ruptured appendix,  when pt. was 14 years old.   • Diabetes Paternal Aunt    • Diabetes Paternal Uncle    • No Known Problems Other    • Migraines Sister    • Stroke Sister    • Dementia Brother        Social History     Tobacco Use   • Smoking status: Former Smoker     Types: Cigarettes     Quit date:      Years since quittin.6   • Smokeless tobacco: Former User   • Tobacco comment: Caffeine daily   Vaping Use   • Vaping Use: Never used   Substance Use Topics   • Alcohol use: No   • Drug use: No         ECG 12 Lead    Date/Time: 2022 2:13 PM  Performed by: Marika Arias MD  Authorized by: Marika Arias MD   Comparison: compared with previous ECG   Comparison to previous ECG: First degree AV block is new  Rhythm: sinus rhythm  Conduction: left bundle branch block and 1st degree AV block               Objective:     Visit Vitals  /82 (BP Location: Left arm, Patient Position: Sitting, Cuff Size: Large Adult)   Pulse 88   Ht 182.9 cm (72\")   Wt 123 kg (270 lb 6.4 oz)   SpO2 98%   BMI 36.67 kg/m²         Constitutional:       Appearance: Normal appearance. Well-developed.   HENT:      Head: Normocephalic and atraumatic.   Neck:      Vascular: No carotid bruit or JVD.   Pulmonary:      Effort: Pulmonary effort is normal.      Breath sounds: Normal breath sounds.   Cardiovascular:      Normal rate. Regular rhythm.      Murmurs: There is a grade 2/6 systolic murmur.      No gallop.   Pulses:     Radial: 2+ bilaterally.  Edema:     Peripheral edema absent.   Abdominal:      Palpations: Abdomen is soft.   Skin:     General: Skin is warm and dry.   Neurological:      Mental Status: Alert and oriented to person, place, and time.             Assessment: "          Diagnosis Plan   1. Paroxysmal atrial fibrillation ([facility])     2. Chronic diastolic congestive heart failure ([facility])     3. Atrial flutter, unspecified type ([facility])     4. Left bundle-branch block     5. Mixed hyperlipidemia     6. Supraventricular tachycardia ([facility])     7. Type 2 diabetes mellitus with both eyes affected by mild nonproliferative retinopathy without macular edema, without long-term current use of insulin ([facility])     8. CKD (chronic kidney disease) stage 2, GFR 60-89 ml/min     9. Class 2 severe obesity due to excess calories with serious comorbidity and body mass index (BMI) of 36.0 to 36.9 in adult ([facility])     10. Pulmonary hypertension ([facility])            Plan:         1.  Paroxysmal atrial fibrillation.  Remains in sinus rhythm today.  On chronic anticoagulation with apixaban.  Continue the same.  2.  Chronic systolic congestive heart failure.  No significant evidence of volume overload today.  Continue current management.  3.  Hypertension.  Elevated in the office today but fairly well controlled at home.  Continue current management.  4.  History of supraventricular tachycardia.  No recent episodes.  5.  Chronic kidney disease  6.  Diabetes mellitus type 2  7.  Chronic left bundle branch block  8.  Reported history of coronary artery disease.  No anginal symptoms and his EKG is stable.  9.  History of stroke.  10.  Murmur.  Murmur noted on exam.  Last echocardiogram 2 years ago was unremarkable.  We will proceed with a repeat echocardiogram.    I will call and discuss results of his echocardiogram.  Otherwise I will plan on seeing the patient back again in 6 months.

## 2022-09-02 RX ORDER — TORSEMIDE 20 MG/1
TABLET ORAL
Qty: 60 TABLET | Refills: 5 | Status: SHIPPED | OUTPATIENT
Start: 2022-09-02

## 2022-09-11 DIAGNOSIS — E87.6 ACUTE HYPOKALEMIA: ICD-10-CM

## 2022-09-12 RX ORDER — POTASSIUM CHLORIDE 1500 MG/1
TABLET, EXTENDED RELEASE ORAL
Qty: 270 TABLET | Refills: 3 | Status: SHIPPED | OUTPATIENT
Start: 2022-09-12 | End: 2022-10-11

## 2022-09-12 NOTE — TELEPHONE ENCOUNTER
Rx Refill Note  Requested Prescriptions     Pending Prescriptions Disp Refills   • KLOR-CON 20 MEQ CR tablet [Pharmacy Med Name: POTASSIUM CHLORIDE ER (DISP) TABS 20MEQ] 270 tablet 3     Sig: TAKE 1 TABLET THREE TIMES A DAY      Last office visit with prescribing clinician: 7/11/2022      Next office visit with prescribing clinician: 10/11/2022

## 2022-09-16 ENCOUNTER — HOSPITAL ENCOUNTER (OUTPATIENT)
Dept: CARDIOLOGY | Facility: HOSPITAL | Age: 77
Discharge: HOME | End: 2022-09-16
Admitting: INTERNAL MEDICINE

## 2022-09-16 VITALS
HEART RATE: 66 BPM | DIASTOLIC BLOOD PRESSURE: 80 MMHG | WEIGHT: 270 LBS | SYSTOLIC BLOOD PRESSURE: 160 MMHG | HEIGHT: 72 IN | BODY MASS INDEX: 36.57 KG/M2

## 2022-09-16 DIAGNOSIS — R01.1 HEART MURMUR: ICD-10-CM

## 2022-09-16 DIAGNOSIS — I48.0 PAROXYSMAL ATRIAL FIBRILLATION: ICD-10-CM

## 2022-09-16 DIAGNOSIS — I44.7 LEFT BUNDLE-BRANCH BLOCK: ICD-10-CM

## 2022-09-16 DIAGNOSIS — I50.32 CHRONIC DIASTOLIC CONGESTIVE HEART FAILURE: ICD-10-CM

## 2022-09-16 DIAGNOSIS — I27.20 PULMONARY HYPERTENSION: ICD-10-CM

## 2022-09-16 LAB
AORTIC DIMENSIONLESS INDEX: 0.8 (DI)
ASCENDING AORTA: 3.3 CM
BH CV ECHO MEAS - ACS: 2.6 CM
BH CV ECHO MEAS - AO MAX PG: 15.5 MMHG
BH CV ECHO MEAS - AO MEAN PG: 9.2 MMHG
BH CV ECHO MEAS - AO ROOT DIAM: 4 CM
BH CV ECHO MEAS - AO V2 MAX: 197.1 CM/SEC
BH CV ECHO MEAS - AO V2 VTI: 41.4 CM
BH CV ECHO MEAS - AVA(I,D): 2.8 CM2
BH CV ECHO MEAS - EDV(CUBED): 128.4 ML
BH CV ECHO MEAS - EDV(MOD-SP2): 233 ML
BH CV ECHO MEAS - EDV(MOD-SP4): 215 ML
BH CV ECHO MEAS - EF(MOD-BP): 67.4 %
BH CV ECHO MEAS - EF(MOD-SP2): 69.5 %
BH CV ECHO MEAS - EF(MOD-SP4): 65.1 %
BH CV ECHO MEAS - ESV(CUBED): 31.9 ML
BH CV ECHO MEAS - ESV(MOD-SP2): 71 ML
BH CV ECHO MEAS - ESV(MOD-SP4): 75 ML
BH CV ECHO MEAS - FS: 37.2 %
BH CV ECHO MEAS - IVS/LVPW: 1.03 CM
BH CV ECHO MEAS - IVSD: 1.54 CM
BH CV ECHO MEAS - LAT PEAK E' VEL: 7.9 CM/SEC
BH CV ECHO MEAS - LV DIASTOLIC VOL/BSA (35-75): 88.8 CM2
BH CV ECHO MEAS - LV MASS(C)D: 334.2 GRAMS
BH CV ECHO MEAS - LV MAX PG: 10.1 MMHG
BH CV ECHO MEAS - LV MEAN PG: 6.2 MMHG
BH CV ECHO MEAS - LV SYSTOLIC VOL/BSA (12-30): 31 CM2
BH CV ECHO MEAS - LV V1 MAX: 158.6 CM/SEC
BH CV ECHO MEAS - LV V1 VTI: 34.2 CM
BH CV ECHO MEAS - LVIDD: 5 CM
BH CV ECHO MEAS - LVIDS: 3.2 CM
BH CV ECHO MEAS - LVOT AREA: 3.4 CM2
BH CV ECHO MEAS - LVOT DIAM: 2.08 CM
BH CV ECHO MEAS - LVPWD: 1.5 CM
BH CV ECHO MEAS - MED PEAK E' VEL: 7.9 CM/SEC
BH CV ECHO MEAS - MV A DUR: 0.17 SEC
BH CV ECHO MEAS - MV A MAX VEL: 105 CM/SEC
BH CV ECHO MEAS - MV DEC SLOPE: 574.2 CM/SEC2
BH CV ECHO MEAS - MV DEC TIME: 0.21 MSEC
BH CV ECHO MEAS - MV E MAX VEL: 75.4 CM/SEC
BH CV ECHO MEAS - MV E/A: 0.72
BH CV ECHO MEAS - MV MAX PG: 5.8 MMHG
BH CV ECHO MEAS - MV MEAN PG: 2.6 MMHG
BH CV ECHO MEAS - MV P1/2T: 53.7 MSEC
BH CV ECHO MEAS - MV V2 VTI: 30.1 CM
BH CV ECHO MEAS - MVA(P1/2T): 4.1 CM2
BH CV ECHO MEAS - MVA(VTI): 3.9 CM2
BH CV ECHO MEAS - PA ACC TIME: 0.09 SEC
BH CV ECHO MEAS - PA PR(ACCEL): 38.6 MMHG
BH CV ECHO MEAS - PA V2 MAX: 139.8 CM/SEC
BH CV ECHO MEAS - PULM A REVS DUR: 0.17 SEC
BH CV ECHO MEAS - PULM A REVS VEL: 30.8 CM/SEC
BH CV ECHO MEAS - PULM DIAS VEL: 39.6 CM/SEC
BH CV ECHO MEAS - PULM S/D: 0.92
BH CV ECHO MEAS - PULM SYS VEL: 36.4 CM/SEC
BH CV ECHO MEAS - QP/QS: 0.59
BH CV ECHO MEAS - RAP SYSTOLE: 3 MMHG
BH CV ECHO MEAS - RV MAX PG: 2.38 MMHG
BH CV ECHO MEAS - RV V1 MAX: 77.1 CM/SEC
BH CV ECHO MEAS - RV V1 VTI: 15.1 CM
BH CV ECHO MEAS - RVOT DIAM: 2.42 CM
BH CV ECHO MEAS - RVSP: 46 MMHG
BH CV ECHO MEAS - SI(MOD-SP2): 66.9 ML/M2
BH CV ECHO MEAS - SI(MOD-SP4): 57.8 ML/M2
BH CV ECHO MEAS - SV(LVOT): 116.7 ML
BH CV ECHO MEAS - SV(MOD-SP2): 162 ML
BH CV ECHO MEAS - SV(MOD-SP4): 140 ML
BH CV ECHO MEAS - SV(RVOT): 69.4 ML
BH CV ECHO MEAS - TAPSE (>1.6): 3.5 CM
BH CV ECHO MEAS - TR MAX PG: 42.9 MMHG
BH CV ECHO MEAS - TR MAX VEL: 327.4 CM/SEC
BH CV ECHO MEASUREMENTS AVERAGE E/E' RATIO: 9.54
BH CV XLRA - RV BASE: 3.5 CM
BH CV XLRA - RV LENGTH: 7.9 CM
BH CV XLRA - RV MID: 3 CM
BH CV XLRA - TDI S': 19.1 CM/SEC
LEFT ATRIUM VOLUME INDEX: 37.1 ML/M2
LV EF 2D ECHO EST: 67 %
MAXIMAL PREDICTED HEART RATE: 145 BPM
SINUS: 3.3 CM
STJ: 2.9 CM
STRESS TARGET HR: 123 BPM

## 2022-09-16 PROCEDURE — 25010000002 PERFLUTREN (DEFINITY) 8.476 MG IN SODIUM CHLORIDE (PF) 0.9 % 10 ML INJECTION: Performed by: INTERNAL MEDICINE

## 2022-09-16 PROCEDURE — 93306 TTE W/DOPPLER COMPLETE: CPT

## 2022-09-16 PROCEDURE — 93306 TTE W/DOPPLER COMPLETE: CPT | Performed by: INTERNAL MEDICINE

## 2022-09-16 RX ADMIN — PERFLUTREN 2 ML: 6.52 INJECTION, SUSPENSION INTRAVENOUS at 10:43

## 2022-09-23 RX ORDER — MEMANTINE HYDROCHLORIDE 5 MG/1
5 TABLET ORAL DAILY
Qty: 30 TABLET | Refills: 3 | Status: SHIPPED | OUTPATIENT
Start: 2022-09-23 | End: 2023-02-10 | Stop reason: SDUPTHER

## 2022-10-11 ENCOUNTER — OFFICE VISIT (OUTPATIENT)
Dept: FAMILY MEDICINE CLINIC | Facility: CLINIC | Age: 77
End: 2022-10-11

## 2022-10-11 VITALS
DIASTOLIC BLOOD PRESSURE: 80 MMHG | WEIGHT: 279 LBS | HEART RATE: 64 BPM | TEMPERATURE: 98.3 F | SYSTOLIC BLOOD PRESSURE: 150 MMHG | BODY MASS INDEX: 37.79 KG/M2 | HEIGHT: 72 IN | OXYGEN SATURATION: 96 %

## 2022-10-11 DIAGNOSIS — Z23 NEED FOR VACCINATION: ICD-10-CM

## 2022-10-11 DIAGNOSIS — E78.2 MIXED HYPERLIPIDEMIA: ICD-10-CM

## 2022-10-11 DIAGNOSIS — I10 PRIMARY HYPERTENSION: ICD-10-CM

## 2022-10-11 DIAGNOSIS — I48.0 PAROXYSMAL ATRIAL FIBRILLATION: ICD-10-CM

## 2022-10-11 DIAGNOSIS — E11.3293 TYPE 2 DIABETES MELLITUS WITH BOTH EYES AFFECTED BY MILD NONPROLIFERATIVE RETINOPATHY WITHOUT MACULAR EDEMA, WITHOUT LONG-TERM CURRENT USE OF INSULIN: Primary | ICD-10-CM

## 2022-10-11 DIAGNOSIS — R60.0 LOCALIZED EDEMA: ICD-10-CM

## 2022-10-11 DIAGNOSIS — D45 POLYCYTHEMIA VERA: ICD-10-CM

## 2022-10-11 PROCEDURE — 90662 IIV NO PRSV INCREASED AG IM: CPT | Performed by: NURSE PRACTITIONER

## 2022-10-11 PROCEDURE — 99214 OFFICE O/P EST MOD 30 MIN: CPT | Performed by: NURSE PRACTITIONER

## 2022-10-11 PROCEDURE — G0008 ADMIN INFLUENZA VIRUS VAC: HCPCS | Performed by: NURSE PRACTITIONER

## 2022-10-11 RX ORDER — CARVEDILOL 6.25 MG/1
6.25 TABLET ORAL 2 TIMES DAILY WITH MEALS
COMMUNITY
End: 2022-10-18

## 2022-10-11 NOTE — PROGRESS NOTES
Subjective   Erlin Blanco is a 77 y.o. male.     Chief Complaint   Patient presents with   • Diabetes     3 mo follow up          History of Present Illness   Patient presents for follow up DM2: takes Glimepiride 2 tabs in morning and 2 tabs in evening; last A1c 8.5%; monitors blood sugar, typically runs 90-100s in mornings; no low blood sugars; drinks Ovaltine at bedtime; eats supper around 6-7 p.m.; watches intake of carbs/sugars, could do better; does physical therapy twice per week for balance and helps; walks with 4 point cane; no numbness or tingling; last eye exam March 2022; had bilateral cataract surgery this year.     F/U HTN: takes Amlodipine and Valsartan daily and Torsemide and KCL twice daily; also takes Carvedilol twice daily and Hydralazine TID; monitors BP on occasion, typically runs 130-150s/70s; no headaches; no orthotasis; still has swelling in left leg since knee surgery; negative venous doppler in past.    F/U atrial fib: takes Eliquis twice daily.     F/U Hyperlipidemia: takes Rosuvastatin daily; no myalgias; last labs good.     F/U polycythemia vera: takes Hydroxyurea daily; sees hematology; has not needed therapeutic phlebotomy recently, none in about 1 year.    Had recent fall on right knee; had been cutting grass using [employer] and got really tired, next day went to get up without cane and fell; had follow up with ortho and told bruised bad, but no fracture; knee has limited activity, but improving; needs to get back to work on his house.     F/U memory: has not seen neurology recently; takes Namenda daily.      The following portions of the patient's history were reviewed and updated as appropriate: allergies, current medications, past family history, past medical history, past social history, past surgical history and problem list.    Current Outpatient Medications on File Prior to Visit   Medication Sig   • Accu-Chek FastClix Lancets misc TEST BLOOD SUGAR 1-2 TIMES DAILY AND AS  NEEDED   • Accu-Chek Guide test strip    • acetaminophen (TYLENOL) 500 MG tablet Take 1,000 mg by mouth 2 (Two) Times a Day As Needed for Mild Pain  or Moderate Pain .   • amLODIPine (NORVASC) 5 MG tablet TAKE ONE TABLET BY MOUTH DAILY   • aspirin (ASPIRIN LOW DOSE) 81 MG EC tablet Take 1 tablet by mouth Daily.   • Blood Glucose Monitoring Suppl (Accu-Chek Guide Me) w/Device kit TEST BLOOD SUGAR 1-2 TIMES DAILY AND AS NEEDED   • carvedilol (COREG) 6.25 MG tablet Take 1 tablet by mouth 2 (Two) Times a Day With Meals.   • cetirizine (zyrTEC) 10 MG tablet Take 10 mg by mouth Daily.   • fluticasone (FLONASE) 50 MCG/ACT nasal spray USE 2 SPRAYS IN NOSTRIL(S) AS DIRECTED BY PROVIDER DAILY   • glimepiride (Amaryl) 4 MG tablet Take 1 tablet by mouth 2 (Two) Times a Day With Meals.   • glucose blood (OneTouch Ultra) test strip Test blood sugar 1-2 times daily and as needed.   • hydrALAZINE (APRESOLINE) 100 MG tablet TAKE 1 TABLET THREE TIMES A DAY   • hydroxyurea (HYDREA) 500 MG capsule Take 500 mg by mouth Daily.   • Insulin Pen Needle (B-D UF III MINI PEN NEEDLES) 31G X 5 MM misc Inject 1 each under the skin into the appropriate area as directed Daily.   • memantine (NAMENDA) 5 MG tablet TAKE 1 TABLET BY MOUTH DAILY   • montelukast (SINGULAIR) 10 MG tablet TAKE 1 TABLET DAILY   • Multiple Vitamins-Minerals (MULTIVITAMIN ADULT PO) Take 1 tablet by mouth Daily.   • potassium chloride (KLOR-CON) 20 MEQ CR tablet Take 1 tablet by mouth 3 (Three) Times a Day. (Patient taking differently: Take 1 tablet by mouth 2 (Two) Times a Day.)   • Probiotic Product (Probiotic Daily) capsule Take 1 capsule by mouth Daily. (Patient taking differently: Take 1 capsule by mouth Daily As Needed.)   • rosuvastatin (Crestor) 40 MG tablet Take 1 tablet by mouth Daily.   • torsemide (DEMADEX) 20 MG tablet TAKE ONE TABLET BY MOUTH TWICE A DAY   • valsartan (DIOVAN) 320 MG tablet TAKE ONE TABLET BY MOUTH DAILY     No current facility-administered  medications on file prior to visit.        Past Medical History:   Diagnosis Date   • Abnormal ECG    • Abnormal stress test    • Abrasion of face 1/29/2020   • Acute bronchitis    • Acute hypokalemia 9/16/2020   • Acute non-recurrent sinusitis 1/13/2020   • Acute pyelonephritis 10/9/2020   • Acute sinusitis    • DORI (acute kidney injury) ([facility]) 4/28/2017   • Allergic rhinitis    • Aortic root dilation ([facility])    • Arthritis    • Bacteremia due to Escherichia coli 8/17/2020   • Balanitis 5/16/2021   • Benign enlargement of prostate    • C. difficile colitis 9/16/2020   • Cellulitis of knee, left 5/4/2017   • CHF (congestive heart failure) ([facility])    • Chronic diastolic congestive heart failure ([facility])    • Constipation 11/14/2020   • Contusion of face 1/29/2020   • Coronary artery disease    • CVA (cerebral vascular accident) ([facility]) 2020   • Diminished pulse     in lower extremities   • Discitis of lumbar region 10/9/2020   • Edema    • Elevated hematocrit    • Elevated hemoglobin ([facility])    • Essential hypertension    • Hearing loss     mala hearing aids   • Heart valve disease    • High risk medication use    • History of back pain    • History of dysuria    • History of echocardiogram    • History of intracranial hemorrhage    • History of scoliosis    • History of stroke    • Hyperlipidemia    • Hypokalemia 1/7/2021   • Injury of toe, left, initial encounter    • Irregular heart rate    • Knee pain, left    • Left bundle branch block    • Leg wound, left    • Leukocytosis 9/16/2020   • Low back pain    • Medicare annual wellness visit, subsequent    • Minor head injury without loss of consciousness 1/29/2020   • Murmur    • Muscle cramps    • Need for hepatitis C screening test    • Paraphimosis 5/16/2021   • Pneumonia of left lung due to infectious organism 9/17/2021   • Polycythemia    • Polycythemia vera ([facility])    • Pressure injury of buttock, stage 2 ([facility]) 9/18/2020   • Prostate hyperplasia without urinary obstruction     • Prostatitis    • Proteinuria    • Pulmonary hypertension (HCC)    • Sacroiliitis (HCC) 10/9/2020   • Scoliosis    • Sepsis due to Escherichia coli (HCC) 2020   • Sepsis with metabolic encephalopathy (HCC) 2020   • Stroke ([facility])    • SVT (supraventricular tachycardia) ([facility])    • Tachycardia    • Thrombocytosis    • TIA (transient ischemic attack)        • Type 2 diabetes mellitus (HCC)    • Type 2 diabetes mellitus with hyperglycemia (HCC) 2021   • Urinary tract infection due to extended-spectrum beta lactamase (ESBL) producing Escherichia coli 2020   • Valvular heart disease        Past Surgical History:   Procedure Laterality Date   • CARDIAC CATHETERIZATION     • CATARACT EXTRACTION Left 2021   • CATARACT EXTRACTION Right 2022   • COLONOSCOPY      did Cologuard approx 2019 and negative   • HAND SURGERY     • JOINT REPLACEMENT Right    • MI TOTAL KNEE ARTHROPLASTY Left 2017    Procedure: LT TOTAL KNEE ARTHROPLASTY;  Surgeon: Bertin Perrin MD;  Location: [facility];  Service: Orthopedics   • PROSTATE SURGERY      Rezum steam treatment to shrink prostate by dr. guerrero       Family History   Problem Relation Age of Onset   • Hypertension Mother    • Other Father         ruptured appendix,  when pt. was 14 years old.   • Diabetes Paternal Aunt    • Diabetes Paternal Uncle    • No Known Problems Other    • Migraines Sister    • Stroke Sister    • Dementia Brother        Social History     Socioeconomic History   • Marital status:    Tobacco Use   • Smoking status: Former     Types: Cigarettes     Quit date:      Years since quittin.8   • Smokeless tobacco: Former   • Tobacco comments:     Caffeine daily   Vaping Use   • Vaping Use: Never used   Substance and Sexual Activity   • Alcohol use: No   • Drug use: No   • Sexual activity: Defer       Review of Systems   Constitutional: Negative for appetite change, chills, fever, unexpected weight  "gain and unexpected weight loss. Fatigue: some.   HENT: Positive for sinus pressure (some at times). Negative for ear pain, sore throat and trouble swallowing. Rhinorrhea: some at times.    Eyes: Negative for blurred vision.   Respiratory: Negative for chest tightness and shortness of breath. Cough: some at times with drainage.    Cardiovascular: Negative for chest pain and palpitations.   Gastrointestinal: Negative for abdominal pain, blood in stool, constipation (some at times with water pill, taking probiotic helps), diarrhea, GERD and indigestion.   Genitourinary: Negative for dysuria and frequency. Hematuria: see urology regularly.   Musculoskeletal: Arthralgias: see HPI. Back pain: some in lower back at times; no radiation of pain down legs; no bladder or bowel dysfunction.   Skin: Negative for rash.   Neurological: Negative for syncope and weakness.   Psychiatric/Behavioral: Negative for sleep disturbance and depressed mood. The patient is not nervous/anxious.        Objective   Vitals:    10/11/22 1024   BP: 150/80   BP Location: Left arm   Patient Position: Sitting   Cuff Size: Adult   Pulse: 64   Temp: 98.3 °F (36.8 °C)   SpO2: 96%   Weight: 127 kg (279 lb)   Height: 182.9 cm (72\")     Body mass index is 37.84 kg/m².    Physical Exam  Vitals and nursing note reviewed.   Constitutional:       General: He is not in acute distress.     Appearance: He is well-developed and well-groomed. He is not diaphoretic.   HENT:      Head: Normocephalic.      Right Ear: External ear normal. Right ear decreased hearing: has hearing aid. Right ear middle ear effusion: TM dull. Tympanic membrane is not erythematous.      Left Ear: External ear normal. Left ear decreased hearing: has hearing aid. Left ear middle ear effusion: mild. Tympanic membrane is not erythematous.      Nose: Nose normal.      Right Sinus: No maxillary sinus tenderness or frontal sinus tenderness.      Left Sinus: No maxillary sinus tenderness or frontal " sinus tenderness.      Mouth/Throat:      Mouth: Mucous membranes are moist.      Pharynx: No oropharyngeal exudate or posterior oropharyngeal erythema.   Eyes:      Conjunctiva/sclera: Conjunctivae normal.   Neck:      Vascular: No carotid bruit.   Cardiovascular:      Rate and Rhythm: Normal rate and regular rhythm.      Pulses: Normal pulses.      Heart sounds: Murmur heard.    Systolic murmur is present with a grade of 2/6.     Comments: At RUSB  Pulmonary:      Effort: Pulmonary effort is normal. No respiratory distress.      Breath sounds: Normal breath sounds.   Abdominal:      General: Bowel sounds are normal.      Palpations: Abdomen is soft. There is no hepatomegaly or splenomegaly.      Tenderness: There is no abdominal tenderness. There is no guarding.   Musculoskeletal:      Cervical back: Normal range of motion and neck supple.      Right lower leg: Edema (trace pretibial and ankle) present.      Left lower leg: Edema (1+ pretibial and ankle) present.   Lymphadenopathy:      Cervical: No cervical adenopathy.   Skin:     General: Skin is warm and dry.      Findings: No rash.   Neurological:      Mental Status: He is alert and oriented to person, place, and time.      Gait: Abnormal gait: guarded gait with 4 point cane.   Psychiatric:         Mood and Affect: Mood normal.         Behavior: Behavior normal.         Thought Content: Thought content normal.         Cognition and Memory: Cognition normal.         Judgment: Judgment normal.         Lab Results   Component Value Date    WBC 8.04 03/25/2022    RBC 5.18 03/25/2022    HGB 16.1 03/25/2022    HCT 47.9 03/25/2022    MCV 92.5 03/25/2022    MCH 31.1 03/25/2022    MCHC 33.6 03/25/2022    RDW 13.7 03/25/2022    RDWSD 46.4 03/25/2022    MPV 10.6 03/25/2022     03/25/2022    NEUTRORELPCT 73.9 03/25/2022    LYMPHORELPCT 14.4 (L) 03/25/2022    MONORELPCT 5.3 03/25/2022    EOSRELPCT 4.1 03/25/2022    BASORELPCT 1.9 (H) 03/25/2022    AUTOIGPER 0.4  03/25/2022    NEUTROABS 6.8 09/29/2022    LYMPHSABS 1.16 03/25/2022    MONOSABS 0.43 03/25/2022    EOSABS 0.8 (H) 09/29/2022    BASOSABS 0.1 09/29/2022    AUTOIGNUM 0.03 03/25/2022    NRBC 0.0 03/25/2022     Lab Results   Component Value Date    GLUCOSE 174 (H) 10/11/2022    BUN 15 10/11/2022    CREATININE 0.90 10/11/2022    EGFRIFNONA 57 (L) 01/10/2022    EGFRIFAFRI 66 01/10/2022    BCR 16.7 10/11/2022    K 4.2 10/11/2022    CO2 27.0 10/11/2022    CALCIUM 9.5 10/11/2022    PROTENTOTREF 6.9 03/22/2022    ALBUMIN 4.30 03/25/2022    LABIL2 1.6 03/22/2022    AST 35 03/25/2022    ALT 33 03/25/2022      Lab Results   Component Value Date    CHLPL 113 07/11/2022    TRIG 106 07/11/2022    HDL 41 07/11/2022    VLDL 20 07/11/2022    LDL 52 07/11/2022     Lab Results   Component Value Date    TSH 2.220 09/17/2021         Assessment    Problem List Items Addressed This Visit     Type 2 diabetes mellitus with both eyes affected by mild nonproliferative retinopathy without macular edema, without long-term current use of insulin (HCC) - Primary    Current Assessment & Plan     Diabetes is pending lab results.   Continue current treatment regimen.  Regular aerobic exercise.  Reminded to get yearly retinal exam.  Diabetes will be reassessed in 3 months.  Continue Glimepiride twice daily.         Relevant Orders    Hemoglobin A1c (Completed)    Basic Metabolic Panel (Completed)    Microalbumin / Creatinine Urine Ratio - Urine, Clean Catch (Completed)    Hypertension    Current Assessment & Plan     Hypertension is stable.  Continue current medications.  Ambulatory blood pressure monitoring.  Blood pressure will be reassessed in 3 months.  Continue Amlodipine and Valsartan daily and Torsemide and KCL twice daily.  Continue Carvedilol twice daily and Hydralazine TID.         Relevant Medications    carvedilol (COREG) 6.25 MG tablet    Other Relevant Orders    Basic Metabolic Panel (Completed)    Hyperlipidemia    Current Assessment &  Plan     Continue Rosuvastatin daily.  Continue to work on healthy diet and exercise.         Polycythemia vera (HCC)    Current Assessment & Plan     Follow up as scheduled with hematology.         Paroxysmal atrial fibrillation (HCC)    Current Assessment & Plan     Continue Eliquis twice daily.         Relevant Medications    carvedilol (COREG) 6.25 MG tablet    apixaban (Eliquis) 5 MG tablet tablet    Localized edema    Current Assessment & Plan     Stable.  Continue Torsemide twice daily.        Other Visit Diagnoses     Need for vaccination        Relevant Orders    Fluzone High-Dose 65+yrs ([id number]) (Completed)          Return in about 3 months (around 1/11/2023) for Recheck.or sooner if problems or concerns.       COVID-19 Precautions - Patient was compliant in wearing a mask. When I saw the patient, I used appropriate personal protective equipment (PPE) including mask, gloves, and eye shield (standard procedure).  Hand hygiene was completed before and after seeing the patient.

## 2022-10-11 NOTE — PATIENT INSTRUCTIONS
Continue to monitor your blood sugar once daily before a meal and record results.  Continue to monitor your blood pressure periodically and record results.  Continue to work on healthy diet and exercise as tolerated.  Follow up pending lab results.  Follow up in 3 months, or sooner if problems or concerns.

## 2022-10-12 LAB
ALBUMIN/CREAT UR: 997 MG/G CREAT (ref 0–29)
BUN SERPL-MCNC: 15 MG/DL (ref 8–23)
BUN/CREAT SERPL: 16.7 (ref 7–25)
CALCIUM SERPL-MCNC: 9.5 MG/DL (ref 8.6–10.5)
CHLORIDE SERPL-SCNC: 105 MMOL/L (ref 98–107)
CO2 SERPL-SCNC: 27 MMOL/L (ref 22–29)
CREAT SERPL-MCNC: 0.9 MG/DL (ref 0.76–1.27)
CREAT UR-MCNC: 23.5 MG/DL
EGFRCR SERPLBLD CKD-EPI 2021: 89.1 ML/MIN/1.73
GLUCOSE SERPL-MCNC: 174 MG/DL (ref 65–99)
HBA1C MFR BLD: 9 % (ref 4.8–5.6)
MICROALBUMIN UR-MCNC: 234.3 UG/ML
POTASSIUM SERPL-SCNC: 4.2 MMOL/L (ref 3.5–5.2)
SODIUM SERPL-SCNC: 141 MMOL/L (ref 136–145)

## 2022-10-13 NOTE — ASSESSMENT & PLAN NOTE
Hypertension is stable.  Continue current medications.  Ambulatory blood pressure monitoring.  Blood pressure will be reassessed in 3 months.  Continue Amlodipine and Valsartan daily and Torsemide and KCL twice daily.  Continue Carvedilol twice daily and Hydralazine TID.

## 2022-10-13 NOTE — ASSESSMENT & PLAN NOTE
Diabetes is pending lab results.   Continue current treatment regimen.  Regular aerobic exercise.  Reminded to get yearly retinal exam.  Diabetes will be reassessed in 3 months.  Continue Glimepiride twice daily.

## 2022-10-14 DIAGNOSIS — Z79.4 TYPE 2 DIABETES MELLITUS WITH BOTH EYES AFFECTED BY MILD NONPROLIFERATIVE RETINOPATHY WITHOUT MACULAR EDEMA, WITH LONG-TERM CURRENT USE OF INSULIN: ICD-10-CM

## 2022-10-14 DIAGNOSIS — E11.3293 TYPE 2 DIABETES MELLITUS WITH BOTH EYES AFFECTED BY MILD NONPROLIFERATIVE RETINOPATHY WITHOUT MACULAR EDEMA, WITH LONG-TERM CURRENT USE OF INSULIN: ICD-10-CM

## 2022-10-14 DIAGNOSIS — R80.9 MICROALBUMINURIA: Primary | ICD-10-CM

## 2022-10-14 RX ORDER — METFORMIN HYDROCHLORIDE 500 MG/1
500 TABLET, EXTENDED RELEASE ORAL
Qty: 30 TABLET | Refills: 2 | Status: SHIPPED | OUTPATIENT
Start: 2022-10-14 | End: 2022-11-22

## 2022-10-18 RX ORDER — CARVEDILOL 6.25 MG/1
TABLET ORAL
Qty: 180 TABLET | Refills: 3 | Status: SHIPPED | OUTPATIENT
Start: 2022-10-18

## 2022-11-09 DIAGNOSIS — E11.3293 TYPE 2 DIABETES MELLITUS WITH BOTH EYES AFFECTED BY MILD NONPROLIFERATIVE RETINOPATHY WITHOUT MACULAR EDEMA, WITHOUT LONG-TERM CURRENT USE OF INSULIN: Primary | ICD-10-CM

## 2022-11-15 ENCOUNTER — OFFICE VISIT (OUTPATIENT)
Dept: FAMILY MEDICINE CLINIC | Facility: CLINIC | Age: 77
End: 2022-11-15

## 2022-11-15 VITALS
SYSTOLIC BLOOD PRESSURE: 140 MMHG | BODY MASS INDEX: 34.54 KG/M2 | HEART RATE: 79 BPM | OXYGEN SATURATION: 95 % | DIASTOLIC BLOOD PRESSURE: 70 MMHG | TEMPERATURE: 98 F | WEIGHT: 255 LBS | HEIGHT: 72 IN

## 2022-11-15 DIAGNOSIS — E11.3293 TYPE 2 DIABETES MELLITUS WITH BOTH EYES AFFECTED BY MILD NONPROLIFERATIVE RETINOPATHY WITHOUT MACULAR EDEMA, WITHOUT LONG-TERM CURRENT USE OF INSULIN: Primary | ICD-10-CM

## 2022-11-15 DIAGNOSIS — J32.0 MAXILLARY SINUSITIS, UNSPECIFIED CHRONICITY: ICD-10-CM

## 2022-11-15 DIAGNOSIS — I10 PRIMARY HYPERTENSION: ICD-10-CM

## 2022-11-15 PROCEDURE — 99215 OFFICE O/P EST HI 40 MIN: CPT | Performed by: NURSE PRACTITIONER

## 2022-11-15 NOTE — PROGRESS NOTES
Subjective   Erlin Blanco is a 77 y.o. male.     Chief Complaint   Patient presents with   • Diabetes     Follow up          History of Present Illness   Patient presents for follow up DM2: was taking Glimepiride twice daily and added Metformin daily, but has not been taking Glimepiride twice daily since started Metformin; has been taking Metformin 1 tablet in morning and 2 tablets in evening; had diarrhea for about 2 days recently, but has really changed diet after seeing renal; will be having 24 hour urine, kidney US, and labs prior to follow up appointment 12/8/22; has been monitoring blood sugar more recently; blood sugar has been running 200-300s; has been doing better watching diet now that has been monitoring blood sugars; no numbness or tingling; last eye exam 3/2022; has pending referral to endo; felt better with changes in diet initially, then had trouble with diarrhea few days ago; has lost 20 pounds since last OV; last A1c 9.0%.    F/U HTN: takes Amlodipine and Valsartan daily, Carvedilol and Torsemide twice daily, and Hydralazine TID; monitors BP and has been running 150-160s/80s per home BP readings; no headaches; swelling in legs has improved overall, left leg always more swollen than right; had negative venous doppler; has been trying to watch salt in diet; no orthostasis.    Daughter-in-law was present during the history-taking and subsequent discussion with this patient.  Patient agrees to the presence of the individual during this visit.      The following portions of the patient's history were reviewed and updated as appropriate: allergies, current medications, past family history, past medical history, past social history, past surgical history and problem list.    Current Outpatient Medications on File Prior to Visit   Medication Sig   • Accu-Chek FastClix Lancets misc TEST BLOOD SUGAR 1-2 TIMES DAILY AND AS NEEDED   • acetaminophen (TYLENOL) 500 MG tablet Take 1,000 mg by mouth 2 (Two)  Times a Day As Needed for Mild Pain  or Moderate Pain .   • amLODIPine (NORVASC) 5 MG tablet TAKE ONE TABLET BY MOUTH DAILY   • apixaban (Eliquis) 5 MG tablet tablet Take 1 tablet by mouth Every 12 (Twelve) Hours.   • aspirin (ASPIRIN LOW DOSE) 81 MG EC tablet Take 1 tablet by mouth Daily.   • Blood Glucose Monitoring Suppl (Accu-Chek Guide Me) w/Device kit TEST BLOOD SUGAR 1-2 TIMES DAILY AND AS NEEDED   • carvedilol (COREG) 6.25 MG tablet TAKE 1 TABLET TWICE A DAY WITH MEALS   • cetirizine (zyrTEC) 10 MG tablet Take 10 mg by mouth Daily.   • fluticasone (FLONASE) 50 MCG/ACT nasal spray USE 2 SPRAYS IN NOSTRIL(S) AS DIRECTED BY PROVIDER DAILY   • glimepiride (Amaryl) 4 MG tablet Take 1 tablet by mouth 2 (Two) Times a Day With Meals.   • glucosamine-chondroitin 500-400 MG capsule capsule Take 1 capsule by mouth Daily.   • glucose blood (OneTouch Ultra) test strip Test blood sugar 1-2 times daily and as needed.   • hydrALAZINE (APRESOLINE) 100 MG tablet TAKE 1 TABLET THREE TIMES A DAY   • hydroxyurea (HYDREA) 500 MG capsule Take 500 mg by mouth Daily.   • Insulin Pen Needle (B-D UF III MINI PEN NEEDLES) 31G X 5 MM misc Inject 1 each under the skin into the appropriate area as directed Daily.   • memantine (NAMENDA) 5 MG tablet TAKE 1 TABLET BY MOUTH DAILY   • metFORMIN ER (GLUCOPHAGE-XR) 500 MG 24 hr tablet Take 1 tablet by mouth Daily With Breakfast. Take 2 tablets daily with evening meal.   • montelukast (SINGULAIR) 10 MG tablet TAKE 1 TABLET DAILY   • Multiple Vitamins-Minerals (MULTIVITAMIN ADULT PO) Take 1 tablet by mouth Daily.   • potassium chloride (KLOR-CON) 20 MEQ CR tablet Take 1 tablet by mouth 3 (Three) Times a Day. (Patient taking differently: Take 1 tablet by mouth 2 (Two) Times a Day.)   • Probiotic Product (Probiotic Daily) capsule Take 1 capsule by mouth Daily. (Patient taking differently: Take 1 capsule by mouth Daily As Needed.)   • rosuvastatin (Crestor) 40 MG tablet Take 1 tablet by mouth Daily.    • torsemide (DEMADEX) 20 MG tablet TAKE ONE TABLET BY MOUTH TWICE A DAY   • valsartan (DIOVAN) 320 MG tablet TAKE ONE TABLET BY MOUTH DAILY     No current facility-administered medications on file prior to visit.        Past Medical History:   Diagnosis Date   • Abnormal ECG    • Abnormal stress test    • Abrasion of face 1/29/2020   • Acute bronchitis    • Acute hypokalemia 9/16/2020   • Acute non-recurrent sinusitis 1/13/2020   • Acute pyelonephritis 10/9/2020   • Acute sinusitis    • DORI (acute kidney injury) ([facility]) 4/28/2017   • Allergic rhinitis    • Aortic root dilation ([facility])    • Arthritis    • Bacteremia due to Escherichia coli 8/17/2020   • Balanitis 5/16/2021   • Benign enlargement of prostate    • C. difficile colitis 9/16/2020   • Cellulitis of knee, left 5/4/2017   • CHF (congestive heart failure) ([facility])    • Chronic diastolic congestive heart failure ([facility])    • Constipation 11/14/2020   • Contusion of face 1/29/2020   • Coronary artery disease    • CVA (cerebral vascular accident) ([facility]) 2020   • Diminished pulse     in lower extremities   • Discitis of lumbar region 10/9/2020   • Edema    • Elevated hematocrit    • Elevated hemoglobin ([facility])    • Essential hypertension    • Hearing loss     mala hearing aids   • Heart valve disease    • High risk medication use    • History of back pain    • History of dysuria    • History of echocardiogram    • History of intracranial hemorrhage    • History of scoliosis    • History of stroke    • Hyperlipidemia    • Hypokalemia 1/7/2021   • Injury of toe, left, initial encounter    • Irregular heart rate    • Knee pain, left    • Left bundle branch block    • Leg wound, left    • Leukocytosis 9/16/2020   • Low back pain    • Medicare annual wellness visit, subsequent    • Minor head injury without loss of consciousness 1/29/2020   • Murmur    • Muscle cramps    • Need for hepatitis C screening test    • Paraphimosis 5/16/2021   • Pneumonia of left lung due to  infectious organism 2021   • Polycythemia    • Polycythemia vera (HCC)    • Pressure injury of buttock, stage 2 (HCC) 2020   • Prostate hyperplasia without urinary obstruction    • Prostatitis    • Proteinuria    • Pulmonary hypertension (HCC)    • Sacroiliitis (HCC) 10/9/2020   • Scoliosis    • Sepsis due to Escherichia coli (HCC) 2020   • Sepsis with metabolic encephalopathy ([facility]) 2020   • Stroke ([facility])    • SVT (supraventricular tachycardia) ([facility])    • Tachycardia    • Thrombocytosis    • TIA (transient ischemic attack)        • Type 2 diabetes mellitus ([facility])    • Type 2 diabetes mellitus with hyperglycemia ([facility]) 2021   • Urinary tract infection due to extended-spectrum beta lactamase (ESBL) producing Escherichia coli 2020   • Valvular heart disease        Past Surgical History:   Procedure Laterality Date   • CARDIAC CATHETERIZATION     • CATARACT EXTRACTION Left 2021   • CATARACT EXTRACTION Right 2022   • COLONOSCOPY      did Cologuard approx 2019 and negative   • HAND SURGERY     • JOINT REPLACEMENT Right    • WA TOTAL KNEE ARTHROPLASTY Left 2017    Procedure: LT TOTAL KNEE ARTHROPLASTY;  Surgeon: Bertin Perrin MD;  Location: [facility];  Service: Orthopedics   • PROSTATE SURGERY      Rezum steam treatment to shrink prostate by dr. guerrero       Family History   Problem Relation Age of Onset   • Hypertension Mother    • Other Father         ruptured appendix,  when pt. was 14 years old.   • Diabetes Paternal Aunt    • Diabetes Paternal Uncle    • No Known Problems Other    • Migraines Sister    • Stroke Sister    • Dementia Brother        Social History     Socioeconomic History   • Marital status:    Tobacco Use   • Smoking status: Former     Types: Cigarettes     Quit date:      Years since quittin.9   • Smokeless tobacco: Former   • Tobacco comments:     Caffeine daily   Vaping Use   • Vaping Use: Never used   Substance and  "Sexual Activity   • Alcohol use: No   • Drug use: No   • Sexual activity: Defer       Review of Systems   Constitutional: Negative for appetite change, chills and fever. Fatigue: some, has improved with changes in diet. Weight loss: see HPI.   HENT: Negative for ear pain, postnasal drip, sore throat and trouble swallowing. Rhinorrhea: no runny/stuffy nose; some nasal drainage in mornings. Sinus pressure: left sinus feels puffy; no pain.    Eyes: Negative for blurred vision.   Respiratory: Negative for cough, chest tightness and shortness of breath.    Cardiovascular: Negative for chest pain and palpitations.   Gastrointestinal: Negative for abdominal pain, blood in stool, GERD and indigestion. Constipation: takes daily probiotic and helps. Diarrhea: see HPI.   Endocrine: Positive for polydipsia (drinks a lot of water).   Genitourinary: Positive for frequency (has been worse with elevated blood sugars). Negative for dysuria.   Musculoskeletal: Negative for back pain.   Skin: Negative for rash.   Neurological: Negative for syncope and weakness.       Objective   Vitals:    11/15/22 0936 11/15/22 1028   BP: 128/68 140/70   BP Location: Left arm Left arm   Patient Position: Sitting Sitting   Cuff Size: Adult    Pulse: 79    Temp: 98 °F (36.7 °C)    SpO2: 95%    Weight: 116 kg (255 lb)    Height: 182.9 cm (72\")      Body mass index is 34.58 kg/m².    Physical Exam  Vitals and nursing note reviewed.   Constitutional:       General: He is not in acute distress.     Appearance: He is well-developed and well-groomed. He is not diaphoretic.   HENT:      Head: Normocephalic.      Jaw: No tenderness or pain on movement.      Right Ear: External ear normal. No decreased hearing noted. Right ear middle ear effusion: mild. Tympanic membrane is not erythematous.      Left Ear: External ear normal. No decreased hearing noted. Left ear middle ear effusion: mild. Tympanic membrane is not erythematous.      Nose: Nose normal.      " Right Sinus: No maxillary sinus tenderness or frontal sinus tenderness.      Left Sinus: No frontal sinus tenderness. Left maxillary sinus tenderness: mild.      Mouth/Throat:      Mouth: Mucous membranes are moist.      Pharynx: No posterior oropharyngeal erythema. Oropharyngeal exudate: drainage in posterior pharynx.   Eyes:      Conjunctiva/sclera: Conjunctivae normal.   Neck:      Vascular: No carotid bruit.   Cardiovascular:      Rate and Rhythm: Normal rate and regular rhythm.      Pulses: Normal pulses.      Heart sounds: Murmur heard.    Systolic murmur is present with a grade of 2/6.     Comments: At [facility]  Pulmonary:      Effort: Pulmonary effort is normal. No respiratory distress.      Breath sounds: Normal breath sounds.   Abdominal:      General: Bowel sounds are normal.      Palpations: Abdomen is soft. There is no hepatomegaly or splenomegaly.      Tenderness: There is no abdominal tenderness. There is no guarding.   Musculoskeletal:      Cervical back: Normal range of motion and neck supple.      Right lower leg: No edema.      Left lower leg: Edema (1+ pretibial and ankle, nonpitting) present.   Lymphadenopathy:      Cervical: No cervical adenopathy.   Skin:     General: Skin is warm and dry.   Neurological:      Mental Status: He is alert and oriented to person, place, and time.      Gait: Abnormal gait: guarded gait with 4 point cane.   Psychiatric:         Mood and Affect: Mood normal.         Behavior: Behavior normal.         Thought Content: Thought content normal.         Judgment: Judgment normal.         Lab Results   Component Value Date    WBC 8.04 03/25/2022    RBC 5.18 03/25/2022    HGB 16.1 03/25/2022    HCT 47.9 03/25/2022    MCV 92.5 03/25/2022    MCH 31.1 03/25/2022    MCHC 33.6 03/25/2022    RDW 13.7 03/25/2022    RDWSD 46.4 03/25/2022    MPV 10.6 03/25/2022     03/25/2022    NEUTRORELPCT 73.9 03/25/2022    LYMPHORELPCT 14.4 (L) 03/25/2022    MONORELPCT 5.3 03/25/2022     EOSRELPCT 4.1 03/25/2022    BASORELPCT 1.9 (H) 03/25/2022    AUTOIGPER 0.4 03/25/2022    NEUTROABS 6.8 09/29/2022    LYMPHSABS 1.16 03/25/2022    MONOSABS 0.43 03/25/2022    EOSABS 0.8 (H) 09/29/2022    BASOSABS 0.1 09/29/2022    AUTOIGNUM 0.03 03/25/2022    NRBC 0.0 03/25/2022     Lab Results   Component Value Date    GLUCOSE 285 (H) 11/15/2022    BUN 18 11/15/2022    CREATININE 1.21 11/15/2022    EGFRIFNONA 57 (L) 01/10/2022    EGFRIFAFRI 66 01/10/2022    BCR 14.9 11/15/2022    K 4.4 11/15/2022    CO2 29.0 11/15/2022    CALCIUM 10.5 11/15/2022    PROTENTOTREF 7.0 11/15/2022    ALBUMIN 4.70 11/15/2022    LABIL2 2.0 11/15/2022    AST 37 11/15/2022    ALT 23 11/15/2022      Lab Results   Component Value Date    CHLPL 113 07/11/2022    TRIG 106 07/11/2022    HDL 41 07/11/2022    VLDL 20 07/11/2022    LDL 52 07/11/2022     Lab Results   Component Value Date    TSH 2.220 09/17/2021     Lab Results   Component Value Date    HGBA1C 9.00 (H) 10/11/2022       Office note from renal Dr. Addison on 11/8/22 reviewed; to continue current medications and avoid all NSAIDs; to follow up in 2-4 weeks with labs and 24 hour urine as well as renal US; A1c goal 7.5%.    Assessment   Diagnoses and all orders for this visit:    1. Type 2 diabetes mellitus with both eyes affected by mild nonproliferative retinopathy without macular edema, without long-term current use of insulin (HCC) (Primary)  Assessment & Plan:  Diabetes is worsening since has not been taking Glimepiride.   Regular aerobic exercise.  Reminded to get yearly retinal exam.  Medication changes per orders.  Diabetes will be reassessed 2 months.  Resume Glimepiride 2 mg twice daily.  May increase Glimepiride back to 4 mg twice daily if blood sugars remain increased.  Continue Metformin twice daily.  Call in 3 weeks with blood sugar readings.  Continue to watch intake of carbohydrates/sugars in diet.    Orders:  -     Comprehensive Metabolic Panel    2. Maxillary sinusitis,  unspecified chronicity  -     Ambulatory Referral to ENT (Otolaryngology)    3. Primary hypertension  Assessment & Plan:  Hypertension is increased per home BP readings recently.  Continue current medications.  Ambulatory blood pressure monitoring.  Blood pressure will be reassessed 2 months.  Continue to monitor your blood pressure periodically and record results.  Bring home blood pressure machine to next visit to compare readings.  Continue Amlodipine and Valsartan daily, Carvedilol and Torsemide twice daily, and Hydralazine TID.      Return in about 2 months (around 1/15/2023) for Recheck.or sooner if symptoms persist or worsen.  Blood sugars have been running much higher recently; pt had stopped taking Glimepiride when started Metformin; instructed to resume Glimepiride twice daily; will start at lower dose since had been off for few weeks and now taking Metformin; will recheck kidney function today; to follow up as scheduled with renal; pt to call with BP and blood sugar readings in 3 weeks.       I spent 45 minutes caring for Erlin on this date of service. This time includes time spent by me in the following activities:reviewing tests, obtaining and/or reviewing a separately obtained history, performing a medically appropriate examination and/or evaluation , counseling and educating the patient/family/caregiver, ordering medications, tests, or procedures and documenting information in the medical record    COVID-19 Precautions - Patient was compliant in wearing a mask. When I saw the patient, I used appropriate personal protective equipment (PPE) including mask, gloves, and eye shield (standard procedure).  Hand hygiene was completed before and after seeing the patient.

## 2022-11-15 NOTE — PATIENT INSTRUCTIONS
Resume Glimepiride 2 mg twice daily.  May increase Glimepiride back to 4 mg twice daily if blood sugars remain increased.  Continue to monitor your blood sugar once daily before a meal and record results.  Call in 3 weeks with blood sugar readings.  Continue to monitor your blood pressure periodically and record results.  Bring home blood pressure machine to next visit to compare readings.  Continue to watch intake of carbohydrates/sugars in diet.  Follow up pending lab results.  Follow up in 2 months, or sooner if problems or concerns.

## 2022-11-16 ENCOUNTER — TRANSCRIBE ORDERS (OUTPATIENT)
Dept: ADMINISTRATIVE | Facility: HOSPITAL | Age: 77
End: 2022-11-16

## 2022-11-16 DIAGNOSIS — R80.0 ISOLATED NON-NEPHROTIC PROTEINURIA: Primary | ICD-10-CM

## 2022-11-16 PROBLEM — R80.9 PROTEINURIA: Status: ACTIVE | Noted: 2022-11-08

## 2022-11-16 LAB
ALBUMIN SERPL-MCNC: 4.7 G/DL (ref 3.5–5.2)
ALBUMIN/GLOB SERPL: 2 G/DL
ALP SERPL-CCNC: 99 U/L (ref 39–117)
ALT SERPL-CCNC: 23 U/L (ref 1–41)
AST SERPL-CCNC: 37 U/L (ref 1–40)
BILIRUB SERPL-MCNC: 0.7 MG/DL (ref 0–1.2)
BUN SERPL-MCNC: 18 MG/DL (ref 8–23)
BUN/CREAT SERPL: 14.9 (ref 7–25)
CALCIUM SERPL-MCNC: 10.5 MG/DL (ref 8.6–10.5)
CHLORIDE SERPL-SCNC: 100 MMOL/L (ref 98–107)
CO2 SERPL-SCNC: 29 MMOL/L (ref 22–29)
CREAT SERPL-MCNC: 1.21 MG/DL (ref 0.76–1.27)
EGFRCR SERPLBLD CKD-EPI 2021: 62.4 ML/MIN/1.73
GLOBULIN SER CALC-MCNC: 2.3 GM/DL
GLUCOSE SERPL-MCNC: 285 MG/DL (ref 65–99)
POTASSIUM SERPL-SCNC: 4.4 MMOL/L (ref 3.5–5.2)
PROT SERPL-MCNC: 7 G/DL (ref 6–8.5)
SODIUM SERPL-SCNC: 141 MMOL/L (ref 136–145)

## 2022-11-16 RX ORDER — SODIUM PHOSPHATE,MONO-DIBASIC 19G-7G/118
1 ENEMA (ML) RECTAL DAILY
COMMUNITY

## 2022-11-17 PROBLEM — J32.0 MAXILLARY SINUSITIS: Status: ACTIVE | Noted: 2021-07-16

## 2022-11-17 NOTE — ASSESSMENT & PLAN NOTE
Hypertension is increased per home BP readings recently.  Continue current medications.  Ambulatory blood pressure monitoring.  Blood pressure will be reassessed 2 months.  Continue to monitor your blood pressure periodically and record results.  Bring home blood pressure machine to next visit to compare readings.  Continue Amlodipine and Valsartan daily, Carvedilol and Torsemide twice daily, and Hydralazine TID.

## 2022-11-17 NOTE — ASSESSMENT & PLAN NOTE
Diabetes is worsening since has not been taking Glimepiride.   Regular aerobic exercise.  Reminded to get yearly retinal exam.  Medication changes per orders.  Diabetes will be reassessed 2 months.  Resume Glimepiride 2 mg twice daily.  May increase Glimepiride back to 4 mg twice daily if blood sugars remain increased.  Continue Metformin twice daily.  Call in 3 weeks with blood sugar readings.  Continue to watch intake of carbohydrates/sugars in diet.

## 2022-11-22 ENCOUNTER — OFFICE VISIT (OUTPATIENT)
Dept: FAMILY MEDICINE CLINIC | Facility: CLINIC | Age: 77
End: 2022-11-22

## 2022-11-22 VITALS
TEMPERATURE: 98.9 F | SYSTOLIC BLOOD PRESSURE: 130 MMHG | DIASTOLIC BLOOD PRESSURE: 68 MMHG | HEART RATE: 68 BPM | HEIGHT: 72 IN | OXYGEN SATURATION: 97 % | WEIGHT: 256 LBS | BODY MASS INDEX: 34.67 KG/M2

## 2022-11-22 DIAGNOSIS — Z79.4 TYPE 2 DIABETES MELLITUS WITH BOTH EYES AFFECTED BY MILD NONPROLIFERATIVE RETINOPATHY WITHOUT MACULAR EDEMA, WITH LONG-TERM CURRENT USE OF INSULIN: ICD-10-CM

## 2022-11-22 DIAGNOSIS — R09.89 CHEST CONGESTION: Primary | ICD-10-CM

## 2022-11-22 DIAGNOSIS — E11.3293 TYPE 2 DIABETES MELLITUS WITH BOTH EYES AFFECTED BY MILD NONPROLIFERATIVE RETINOPATHY WITHOUT MACULAR EDEMA, WITH LONG-TERM CURRENT USE OF INSULIN: ICD-10-CM

## 2022-11-22 PROCEDURE — 99213 OFFICE O/P EST LOW 20 MIN: CPT | Performed by: STUDENT IN AN ORGANIZED HEALTH CARE EDUCATION/TRAINING PROGRAM

## 2022-11-22 RX ORDER — ALBUTEROL SULFATE 90 UG/1
2 AEROSOL, METERED RESPIRATORY (INHALATION) EVERY 4 HOURS PRN
Qty: 18 G | Refills: 0 | Status: SHIPPED | OUTPATIENT
Start: 2022-11-22 | End: 2023-03-07 | Stop reason: ALTCHOICE

## 2022-11-22 RX ORDER — AZITHROMYCIN 250 MG/1
TABLET, FILM COATED ORAL
Qty: 6 TABLET | Refills: 0 | Status: SHIPPED | OUTPATIENT
Start: 2022-11-22 | End: 2022-12-16

## 2022-11-22 RX ORDER — METFORMIN HYDROCHLORIDE 500 MG/1
TABLET, EXTENDED RELEASE ORAL
Qty: 270 TABLET | Refills: 0 | Status: SHIPPED | OUTPATIENT
Start: 2022-11-22 | End: 2023-01-24 | Stop reason: SDUPTHER

## 2022-11-22 NOTE — PROGRESS NOTES
"Chief Complaint  Cough and Nasal Congestion (Green mucus X 3 days )    Subjective        Erlin Blanco presents to [facility]  History of Present Illness     Some chest congestion. Started about two days ago. Spitting up green stuff. No fevers, no chills, no body aches. No sick contacts. Does not typically get this illness when the seasons change. Breathing ok today. Can tell he is congested but is breathing ok. Feels that symptoms are staying the same but not worsening. Does happen a lot when he is sleeping. Not coughing up any blood. Does not take an inhaler at home. Has not had to have steroids to help with his breathing before. Does not weigh himself at home for his heart failure.     Objective   Vital Signs:  /68 (BP Location: Left arm, Patient Position: Sitting, Cuff Size: Adult)   Pulse 68   Temp 98.9 °F (37.2 °C)   Ht 182.9 cm (72\")   Wt 116 kg (256 lb)   SpO2 97%   BMI 34.72 kg/m²   Estimated body mass index is 34.72 kg/m² as calculated from the following:    Height as of this encounter: 182.9 cm (72\").    Weight as of this encounter: 116 kg (256 lb).    Physical Exam  Vitals and nursing note reviewed.   Constitutional:       General: He is not in acute distress.     Appearance: Normal appearance. He is not ill-appearing.   HENT:      Head: Normocephalic and atraumatic.      Nose: Nose normal.      Mouth/Throat:      Mouth: Mucous membranes are moist.   Eyes:      Extraocular Movements: Extraocular movements intact.      Conjunctiva/sclera: Conjunctivae normal.   Cardiovascular:      Rate and Rhythm: Normal rate and regular rhythm.      Heart sounds: Normal heart sounds. No murmur heard.    No gallop.   Pulmonary:      Effort: Pulmonary effort is normal. No respiratory distress.      Breath sounds: No stridor. Wheezing and rhonchi present. No rales.   Chest:      Chest wall: No tenderness.   Skin:     General: Skin is warm and dry.   Neurological:      General: " No focal deficit present.      Mental Status: He is alert and oriented to person, place, and time. Mental status is at baseline.   Psychiatric:         Mood and Affect: Mood normal.         Behavior: Behavior normal.        Result Review :                Assessment and Plan   Diagnoses and all orders for this visit:    1. Chest congestion (Primary)  -     azithromycin (Zithromax Z-Satish) 250 MG tablet; Take 2 tablets by mouth on day 1, then 1 tablet daily on days 2-5  Dispense: 6 tablet; Refill: 0  -     albuterol sulfate  (90 Base) MCG/ACT inhaler; Inhale 2 puffs Every 4 (Four) Hours As Needed for Wheezing or Shortness of Air.  Dispense: 18 g; Refill: 0    Chest congestion with some wheezing.  We will send albuterol inhaler.  We will send Z-Satish.         Follow Up   Return if symptoms worsen or fail to improve.  Patient was given instructions and counseling regarding his condition or for health maintenance advice. Please see specific information pulled into the AVS if appropriate.

## 2022-11-23 DIAGNOSIS — I10 PRIMARY HYPERTENSION: ICD-10-CM

## 2022-11-23 RX ORDER — VALSARTAN 320 MG/1
TABLET ORAL
Qty: 90 TABLET | Refills: 1 | Status: SHIPPED | OUTPATIENT
Start: 2022-11-23 | End: 2023-01-24 | Stop reason: SDUPTHER

## 2022-11-23 NOTE — TELEPHONE ENCOUNTER
Rx Refill Note  Requested Prescriptions     Pending Prescriptions Disp Refills   • valsartan (DIOVAN) 320 MG tablet [Pharmacy Med Name: VALSARTAN 320 MG TABLET] 90 tablet 1     Sig: TAKE ONE TABLET BY MOUTH DAILY      Last office visit with prescribing clinician: 11/15/2022      Next office visit with prescribing clinician: 1/11/2023

## 2022-11-28 ENCOUNTER — APPOINTMENT (OUTPATIENT)
Dept: DIABETES SERVICES | Facility: HOSPITAL | Age: 77
End: 2022-11-28

## 2022-12-05 ENCOUNTER — HOSPITAL ENCOUNTER (OUTPATIENT)
Dept: ULTRASOUND IMAGING | Facility: HOSPITAL | Age: 77
Discharge: HOME | End: 2022-12-05
Admitting: INTERNAL MEDICINE

## 2022-12-05 DIAGNOSIS — R80.0 ISOLATED NON-NEPHROTIC PROTEINURIA: ICD-10-CM

## 2022-12-05 PROCEDURE — 76775 US EXAM ABDO BACK WALL LIM: CPT

## 2022-12-07 ENCOUNTER — HOSPITAL ENCOUNTER (OUTPATIENT)
Dept: DIABETES SERVICES | Facility: HOSPITAL | Age: 77
Discharge: HOME | End: 2022-12-07

## 2022-12-07 ENCOUNTER — APPOINTMENT (OUTPATIENT)
Dept: DIABETES SERVICES | Facility: HOSPITAL | Age: 77
End: 2022-12-07

## 2022-12-07 PROCEDURE — G0108 DIAB MANAGE TRN  PER INDIV: HCPCS

## 2022-12-07 NOTE — CONSULTS
Mr. Blanco was seen today by Registered Dietitian for Diabetes diet education before meeting with the RN ANDREA. Consistent with the ADA’s standards of care, a comprehensive assessment/training record has been sent to medical records (see media tab).    Daughter in law in attendance and supportive. Blood sugar log was ~250-300mg/dL first thing in morning, now around 150-175's. Some confusion with meds about a month ago, which PCP clarified and he is back on track. Ccho count, labels, portions, meal planning discussed. Does PT, encouraged activity as tolerated such as hand weights or seated exercises.    Mr. Blanco has been encouraged to call our office with questions or additional education needs. Please place referral for additional services or follow-up should need arise.    Thank you for the referral.

## 2022-12-09 DIAGNOSIS — I10 PRIMARY HYPERTENSION: ICD-10-CM

## 2022-12-09 RX ORDER — AMLODIPINE BESYLATE 5 MG/1
TABLET ORAL
Qty: 90 TABLET | Refills: 1 | Status: SHIPPED | OUTPATIENT
Start: 2022-12-09 | End: 2023-01-24 | Stop reason: SDUPTHER

## 2022-12-09 NOTE — TELEPHONE ENCOUNTER
Rx Refill Note  Requested Prescriptions     Pending Prescriptions Disp Refills   • amLODIPine (NORVASC) 5 MG tablet [Pharmacy Med Name: amLODIPine BESYLATE 5 MG TAB] 90 tablet 1     Sig: TAKE ONE TABLET BY MOUTH DAILY      Last office visit with prescribing clinician: 11/15/2022   Last telemedicine visit with prescribing clinician: 1/11/2023   Next office visit with prescribing clinician: 1/11/2023

## 2022-12-12 ENCOUNTER — TELEPHONE (OUTPATIENT)
Dept: FAMILY MEDICINE CLINIC | Facility: CLINIC | Age: 77
End: 2022-12-12

## 2022-12-12 NOTE — TELEPHONE ENCOUNTER
Patient's wife called and states that he fell and hit his head at home.  He doesn't have any brusing and seems to be doing fine.  But she is worried because he is on blood thinners.  She wants to know if [facility] would put a order in for a CT just to be safe.  She doesn't want to take him to the ER and have him around people with the flu and covid.  Her number is [phone].

## 2022-12-15 DIAGNOSIS — J30.9 ALLERGIC RHINITIS, UNSPECIFIED SEASONALITY, UNSPECIFIED TRIGGER: ICD-10-CM

## 2022-12-16 ENCOUNTER — OFFICE VISIT (OUTPATIENT)
Dept: FAMILY MEDICINE CLINIC | Facility: CLINIC | Age: 77
End: 2022-12-16

## 2022-12-16 VITALS
WEIGHT: 257 LBS | TEMPERATURE: 97.3 F | DIASTOLIC BLOOD PRESSURE: 80 MMHG | OXYGEN SATURATION: 97 % | SYSTOLIC BLOOD PRESSURE: 140 MMHG | HEART RATE: 73 BPM | HEIGHT: 72 IN | BODY MASS INDEX: 34.81 KG/M2

## 2022-12-16 DIAGNOSIS — J32.0 MAXILLARY SINUSITIS, UNSPECIFIED CHRONICITY: ICD-10-CM
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
## 2022-12-16 DIAGNOSIS — E78.2 MIXED HYPERLIPIDEMIA: ICD-10-CM

## 2022-12-16 DIAGNOSIS — M54.50 ACUTE MIDLINE LOW BACK PAIN WITHOUT SCIATICA: ICD-10-CM
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
## 2022-12-16 DIAGNOSIS — E11.3293 TYPE 2 DIABETES MELLITUS WITH BOTH EYES AFFECTED BY MILD NONPROLIFERATIVE RETINOPATHY WITHOUT MACULAR EDEMA, WITHOUT LONG-TERM CURRENT USE OF INSULIN: Primary | ICD-10-CM

## 2022-12-16 DIAGNOSIS — R60.0 LOCALIZED EDEMA: ICD-10-CM

## 2022-12-16 DIAGNOSIS — I10 PRIMARY HYPERTENSION: ICD-10-CM

## 2022-12-16 PROCEDURE — 99214 OFFICE O/P EST MOD 30 MIN: CPT | Performed by: NURSE PRACTITIONER

## 2022-12-16 RX ORDER — DOXAZOSIN MESYLATE 1 MG/1
1 TABLET ORAL NIGHTLY
COMMUNITY
Start: 2022-12-06

## 2022-12-16 RX ORDER — GUAIFENESIN 600 MG/1
1200 TABLET, EXTENDED RELEASE ORAL 2 TIMES DAILY
COMMUNITY

## 2022-12-16 RX ORDER — GLIMEPIRIDE 2 MG/1
1 TABLET ORAL
COMMUNITY
End: 2023-01-18

## 2022-12-16 RX ORDER — AMOXICILLIN 875 MG/1
TABLET, COATED ORAL
COMMUNITY
End: 2023-01-24

## 2022-12-16 RX ORDER — MONTELUKAST SODIUM 10 MG/1
TABLET ORAL
Qty: 90 TABLET | Refills: 3 | Status: SHIPPED | OUTPATIENT
Start: 2022-12-16

## 2022-12-16 NOTE — PATIENT INSTRUCTIONS
Come back for A1c and cholesterol lab in 1 month.  Continue to monitor your blood sugar once daily before a meal and record results.  Continue to monitor your blood pressure periodically and record results.  Continue to work on healthy diet and exercise as tolerated.  Try ice/heat to lower back, whichever feels better.  Follow up pending lab results.  Follow up in 4 months for Medicare Wellness, or sooner if symptoms persist or worsen.

## 2022-12-16 NOTE — PROGRESS NOTES
Thais Blanco is a 77 y.o. male.     Chief Complaint   Patient presents with   • Follow-up   • Diabetes     Follow up   • ER follow up       History of Present Illness   Patient presents for follow up DM2: takes Glimepiride and Metformin twice daily; last A1c 9.0%; monitors blood sugar and have been much better since resumed Glimepiride; blood sugar has been running 110s; watches intake of carbs/sugars; has lost about 20 pounds since has been eating better; does physical therapy twice per week; no numbness or tingling; last eye exam 3/2022; recently met with diabetes educator; pending referral to endo.     F/U HTN: takes Amlodipine and Valsartan daily, Carvedilol and Torsemide twice daily, and Hydralazine TID; also started Doxazosin daily about 1 week ago per renal; monitors BP and typically runs 120-130s/70s; no orthostasis; swelling is much better.    Had recent fall down 2 steps and landed on buttock on 12/12/22; hit head on wall when fell; no loss of consciousness; no headaches; no dizziness; went to [facility] ER and had CT head and WNL; buttocks feels sore in general; no problems walking; no decreased ROM; no bruising; no OTC treatment.    Saw ENT recently for sinusitis and started Amoxicillin; has also been taking Mucinex twice daily and symptoms much better; no more pressure in left maxillary.    The following portions of the patient's history were reviewed and updated as appropriate: allergies, current medications, past family history, past medical history, past social history, past surgical history and problem list.    Current Outpatient Medications on File Prior to Visit   Medication Sig   • Accu-Chek FastClix Lancets misc TEST BLOOD SUGAR 1-2 TIMES DAILY AND AS NEEDED   • albuterol sulfate  (90 Base) MCG/ACT inhaler Inhale 2 puffs Every 4 (Four) Hours As Needed for Wheezing or Shortness of Air.   • amLODIPine (NORVASC) 5 MG tablet TAKE ONE TABLET BY MOUTH DAILY   • amoxicillin (AMOXIL)  875 MG tablet    • apixaban (Eliquis) 5 MG tablet tablet Take 1 tablet by mouth Every 12 (Twelve) Hours.   • aspirin (ASPIRIN LOW DOSE) 81 MG EC tablet Take 1 tablet by mouth Daily.   • Blood Glucose Monitoring Suppl (Accu-Chek Guide Me) w/Device kit TEST BLOOD SUGAR 1-2 TIMES DAILY AND AS NEEDED   • carvedilol (COREG) 6.25 MG tablet TAKE 1 TABLET TWICE A DAY WITH MEALS   • cetirizine (zyrTEC) 10 MG tablet Take 10 mg by mouth Daily.   • doxazosin (CARDURA) 1 MG tablet Take 1 mg by mouth Every Night.   • fluticasone (FLONASE) 50 MCG/ACT nasal spray USE 2 SPRAYS IN NOSTRIL(S) AS DIRECTED BY PROVIDER DAILY   • glucosamine-chondroitin 500-400 MG capsule capsule Take 1 capsule by mouth Daily.   • glucose blood (OneTouch Ultra) test strip Test blood sugar 1-2 times daily and as needed.   • guaiFENesin (MUCINEX) 600 MG 12 hr tablet Take 1,200 mg by mouth 2 (Two) Times a Day.   • hydrALAZINE (APRESOLINE) 100 MG tablet TAKE 1 TABLET THREE TIMES A DAY   • hydroxyurea (HYDREA) 500 MG capsule Take 500 mg by mouth Daily.   • Insulin Pen Needle (B-D UF III MINI PEN NEEDLES) 31G X 5 MM misc Inject 1 each under the skin into the appropriate area as directed Daily.   • memantine (NAMENDA) 5 MG tablet TAKE 1 TABLET BY MOUTH DAILY   • metFORMIN ER (GLUCOPHAGE-XR) 500 MG 24 hr tablet TAKE ONE TABLET BY MOUTH DAILY WITH BREAKFAST AND TAKE TWO TABLETS WITH EVENING MEAL   • montelukast (SINGULAIR) 10 MG tablet TAKE 1 TABLET DAILY   • Multiple Vitamins-Minerals (MULTIVITAMIN ADULT PO) Take 1 tablet by mouth Daily.   • potassium chloride (KLOR-CON) 20 MEQ CR tablet Take 1 tablet by mouth 3 (Three) Times a Day. (Patient taking differently: Take 20 mEq by mouth 2 (Two) Times a Day.)   • Probiotic Product (Probiotic Daily) capsule Take 1 capsule by mouth Daily. (Patient taking differently: Take 1 capsule by mouth Daily As Needed.)   • rosuvastatin (Crestor) 40 MG tablet Take 1 tablet by mouth Daily.   • torsemide (DEMADEX) 20 MG tablet TAKE  ONE TABLET BY MOUTH TWICE A DAY   • valsartan (DIOVAN) 320 MG tablet TAKE ONE TABLET BY MOUTH DAILY   • glimepiride (AMARYL) 2 MG tablet Take 1 mg by mouth.     No current facility-administered medications on file prior to visit.        Past Medical History:   Diagnosis Date   • Abnormal ECG    • Abnormal stress test    • Abrasion of face 1/29/2020   • Acute bronchitis    • Acute hypokalemia 9/16/2020   • Acute non-recurrent sinusitis 1/13/2020   • Acute pyelonephritis 10/9/2020   • Acute sinusitis    • DORI (acute kidney injury) ([facility]) 4/28/2017   • Allergic rhinitis    • Aortic root dilation ([facility])    • Arthritis    • Bacteremia due to Escherichia coli 8/17/2020   • Balanitis 5/16/2021   • Benign enlargement of prostate    • C. difficile colitis 9/16/2020   • Cellulitis of knee, left 5/4/2017   • CHF (congestive heart failure) ([facility])    • Chronic diastolic congestive heart failure ([facility])    • Constipation 11/14/2020   • Contusion of face 1/29/2020   • Coronary artery disease    • CVA (cerebral vascular accident) ([facility]) 2020   • Diminished pulse     in lower extremities   • Discitis of lumbar region 10/9/2020   • Edema    • Elevated hematocrit    • Elevated hemoglobin ([facility])    • Essential hypertension    • Hearing loss     mala hearing aids   • Heart valve disease    • High risk medication use    • History of back pain    • History of dysuria    • History of echocardiogram    • History of intracranial hemorrhage    • History of scoliosis    • History of stroke    • Hyperlipidemia    • Hypokalemia 1/7/2021   • Injury of toe, left, initial encounter    • Irregular heart rate    • Knee pain, left    • Left bundle branch block    • Leg wound, left    • Leukocytosis 9/16/2020   • Low back pain    • Maxillary sinusitis 7/16/2021   • Medicare annual wellness visit, subsequent    • Minor head injury without loss of consciousness 1/29/2020   • Murmur    • Muscle cramps    • Need for hepatitis C screening test    • Paraphimosis  2021   • Pneumonia of left lung due to infectious organism 2021   • Polycythemia    • Polycythemia vera (HCC)    • Pressure injury of buttock, stage 2 (HCC) 2020   • Prostate hyperplasia without urinary obstruction    • Prostatitis    • Proteinuria    • Pulmonary hypertension (HCC)    • Sacroiliitis (HCC) 10/9/2020   • Scoliosis    • Sepsis due to Escherichia coli ([facility]) 2020   • Sepsis with metabolic encephalopathy ([facility]) 2020   • Stroke ([facility])    • SVT (supraventricular tachycardia) ([facility])    • Tachycardia    • Thrombocytosis    • TIA (transient ischemic attack)        • Type 2 diabetes mellitus ([facility])    • Type 2 diabetes mellitus with hyperglycemia ([facility]) 2021   • Urinary tract infection due to extended-spectrum beta lactamase (ESBL) producing Escherichia coli 2020   • Valvular heart disease        Past Surgical History:   Procedure Laterality Date   • CARDIAC CATHETERIZATION     • CATARACT EXTRACTION Left 2021   • CATARACT EXTRACTION Right 2022   • COLONOSCOPY      did Cologuard approx 2019 and negative   • HAND SURGERY     • JOINT REPLACEMENT Right    • [place] TOTAL KNEE ARTHROPLASTY Left 2017    Procedure: LT TOTAL KNEE ARTHROPLASTY;  Surgeon: Bertin Perrin MD;  Location: [facility];  Service: Orthopedics   • PROSTATE SURGERY      Rezum steam treatment to shrink prostate by dr. guerrero       Family History   Problem Relation Age of Onset   • Hypertension Mother    • Other Father         ruptured appendix,  when pt. was 14 years old.   • Diabetes Paternal Aunt    • Diabetes Paternal Uncle    • No Known Problems Other    • Migraines Sister    • Stroke Sister    • Dementia Brother        Social History     Socioeconomic History   • Marital status:    Tobacco Use   • Smoking status: Former     Types: Cigarettes     Quit date:      Years since quittin.9   • Smokeless tobacco: Former   • Tobacco comments:     Caffeine daily   Vaping  "Use   • Vaping Use: Never used   Substance and Sexual Activity   • Alcohol use: No   • Drug use: No   • Sexual activity: Defer       Review of Systems   Constitutional: Negative for appetite change, chills, fever, unexpected weight gain and unexpected weight loss. Fatigue: some decrease in energy.   HENT: Positive for postnasal drip. Negative for ear pain, sinus pressure, sore throat and trouble swallowing.    Eyes: Negative for blurred vision and discharge.   Respiratory: Negative for cough, chest tightness and shortness of breath.    Cardiovascular: Negative for chest pain and palpitations.   Gastrointestinal: Negative for abdominal pain, blood in stool, constipation, diarrhea, GERD and indigestion.   Endocrine: Negative for polydipsia.   Genitourinary: Negative for dysuria and frequency.   Musculoskeletal: Back pain: some stiffness since recent fall; no radiation of pain down legs.   Skin: Negative for rash.   Neurological: Negative for syncope.   Psychiatric/Behavioral: Negative for depressed mood. The patient is not nervous/anxious.        Objective   Vitals:    12/16/22 1113   BP: 140/80   Pulse: 73   Temp: 97.3 °F (36.3 °C)   SpO2: 97%   Weight: 117 kg (257 lb)   Height: 182.9 cm (72\")     Body mass index is 34.86 kg/m².    Physical Exam  Vitals and nursing note reviewed.   Constitutional:       General: He is not in acute distress.     Appearance: He is well-developed and well-groomed. He is not diaphoretic.   HENT:      Head: Normocephalic.      Jaw: No tenderness or pain on movement.      Right Ear: External ear normal. No decreased hearing noted. Right ear middle ear effusion: mild. Tympanic membrane is not erythematous.      Left Ear: External ear normal. No decreased hearing noted. Left ear middle ear effusion: mild. Tympanic membrane is not erythematous.      Nose: Nose normal.      Right Sinus: No maxillary sinus tenderness or frontal sinus tenderness.      Left Sinus: No maxillary sinus tenderness or " frontal sinus tenderness.      Mouth/Throat:      Mouth: Mucous membranes are moist.      Pharynx: No oropharyngeal exudate or posterior oropharyngeal erythema.   Eyes:      Extraocular Movements: Extraocular movements intact.      Conjunctiva/sclera: Conjunctivae normal.      Pupils: Pupils are equal, round, and reactive to light.   Neck:      Vascular: No carotid bruit.   Cardiovascular:      Rate and Rhythm: Normal rate and regular rhythm.      Pulses: Normal pulses.      Heart sounds: Murmur heard.    Systolic murmur is present with a grade of 2/6.     Comments: At RUSB  Pulmonary:      Effort: Pulmonary effort is normal. No respiratory distress.      Breath sounds: Normal breath sounds.   Abdominal:      General: Bowel sounds are normal.      Palpations: Abdomen is soft. There is no hepatomegaly or splenomegaly.      Tenderness: There is no abdominal tenderness. There is no guarding.   Musculoskeletal:      Cervical back: Normal range of motion and neck supple. No bony tenderness.      Thoracic back: No bony tenderness.      Lumbar back: No bony tenderness. Negative right straight leg raise test and negative left straight leg raise test.      Right hip: No bony tenderness. Normal range of motion.      Left hip: No bony tenderness. Normal range of motion.      Right lower leg: Right lower leg edema: trace pretibial and ankle.      Left lower leg: Left lower leg edema: trace pretibial and ankle.      Comments: No ecchymosis or erythema   Lymphadenopathy:      Cervical: No cervical adenopathy.   Skin:     General: Skin is warm and dry.      Findings: No rash.   Neurological:      Mental Status: He is alert and oriented to person, place, and time.      Cranial Nerves: Cranial nerves 2-12 are intact.      Gait: Abnormal gait: guarded gait with 4 point cane; limping on left.      Deep Tendon Reflexes:      Reflex Scores:       Patellar reflexes are 2+ on the right side and 2+ on the left side.  Psychiatric:          Mood and Affect: Mood normal.         Behavior: Behavior normal.         Thought Content: Thought content normal.         Cognition and Memory: Cognition normal.         Judgment: Judgment normal.         Lab Results   Component Value Date    WBC 8.04 03/25/2022    RBC 5.18 03/25/2022    HGB 16.1 03/25/2022    HCT 47.9 03/25/2022    MCV 92.5 03/25/2022    MCH 31.1 03/25/2022    MCHC 33.6 03/25/2022    RDW 13.7 03/25/2022    RDWSD 46.4 03/25/2022    MPV 10.6 03/25/2022     03/25/2022    NEUTRORELPCT 73.9 03/25/2022    LYMPHORELPCT 14.4 (L) 03/25/2022    MONORELPCT 5.3 03/25/2022    EOSRELPCT 4.1 03/25/2022    BASORELPCT 1.9 (H) 03/25/2022    AUTOIGPER 0.4 03/25/2022    NEUTROABS 6.8 09/29/2022    LYMPHSABS 1.16 03/25/2022    MONOSABS 0.43 03/25/2022    EOSABS 0.8 (H) 09/29/2022    BASOSABS 0.1 09/29/2022    AUTOIGNUM 0.03 03/25/2022    NRBC 0.0 03/25/2022     Lab Results   Component Value Date    GLUCOSE 285 (H) 11/15/2022    BUN 18 11/15/2022    CREATININE 1.21 11/15/2022    EGFRIFNONA 57 (L) 01/10/2022    EGFRIFAFRI 66 01/10/2022    BCR 14.9 11/15/2022    K 4.4 11/15/2022    CO2 29.0 11/15/2022    CALCIUM 10.5 11/15/2022    PROTENTOTREF 7.0 11/15/2022    ALBUMIN 4.70 11/15/2022    LABIL2 2.0 11/15/2022    AST 37 11/15/2022    ALT 23 11/15/2022      Lab Results   Component Value Date    CHLPL 113 07/11/2022    TRIG 106 07/11/2022    HDL 41 07/11/2022    VLDL 20 07/11/2022    LDL 52 07/11/2022     Lab Results   Component Value Date    TSH 2.220 09/17/2021     Lab Results   Component Value Date    HGBA1C 9.00 (H) 10/11/2022     Lab Results   Component Value Date    RBCUA 0-2 09/16/2021    BACTERIA None Seen 09/16/2021    LABPH 7.5 06/02/2015    COLORU Yellow 09/16/2021    CLARITYU Clear 09/16/2021    LEUKOCYTESUR Small (1+) (A) 09/16/2021    GLUCOSEU Negative 09/16/2021    BLOODU Negative 09/16/2021    BILIRUBINUR Negative 09/16/2021    NITRITEU Negative 09/16/2021 12/12/22 records from [facility] ER  reviewed; pt presented S/P fall, had lost balance and was c/o discomfort in head and buttock; had CT head and no acute change; discharged home.  12/12/22 CT head: IMPRESSION: No CT evidence of acute intracranial abnormality. Mild presumptive chronic microvascular ischemic change of white matter. Small chronic right parietal occipital infarct. Soft tissue swelling in the occipital region.      Assessment    Problem List Items Addressed This Visit     Type 2 diabetes mellitus with both eyes affected by mild nonproliferative retinopathy without macular edema, without long-term current use of insulin (HCC) - Primary    Current Assessment & Plan     Diabetes is improving with treatment.   Continue current treatment regimen.  Regular aerobic exercise.  Reminded to get yearly retinal exam.  Diabetes will be reassessed in 1 month.  Continue Glimepiride and Metformin twice daily.         Relevant Medications    glimepiride (AMARYL) 2 MG tablet    Other Relevant Orders    Comprehensive Metabolic Panel    CBC & Differential    Hemoglobin A1c    Lipid Panel With LDL / HDL Ratio    Hypertension    Current Assessment & Plan     Hypertension is improving with treatment.  Weight loss.  Continue current medications.  Ambulatory blood pressure monitoring.  Blood pressure will be reassessed at the next regular appointment.  Continue Amlodipine and Valsartan daily and Doxazosin nightly.  Continue Carvedilol and Torsemide twice daily.  Continue Hydralazine TID.         Relevant Medications    doxazosin (CARDURA) 1 MG tablet    Hyperlipidemia    Relevant Orders    Lipid Panel With LDL / HDL Ratio    Low back pain    Current Assessment & Plan     Try ice/heat to back, whichever feels better.         Relevant Orders    Comprehensive Metabolic Panel    CBC & Differential    Localized edema    Current Assessment & Plan     Improving with weight loss.  Continue Torsemide twice daily.         Fall at home    Current Assessment & Plan      Symptoms improving.         RESOLVED: Maxillary sinusitis    Current Assessment & Plan     Resolved.             Come back for A1c and cholesterol lab in 1 month.  Return in about 4 months (around 4/16/2023) for Medicare Wellness, Recheck; needs lab 1/11/23.or sooner if symptoms persist or worsen.  Too soon for A1c today; pt will come back for A1c and lipid in January.       COVID-19 Precautions - Patient was compliant in wearing a mask. When I saw the patient, I used appropriate personal protective equipment (PPE) including mask, gloves, and eye shield (standard procedure).  Hand hygiene was completed before and after seeing the patient.

## 2022-12-16 NOTE — TELEPHONE ENCOUNTER
Rx Refill Note  Requested Prescriptions     Pending Prescriptions Disp Refills   • montelukast (SINGULAIR) 10 MG tablet [Pharmacy Med Name: MONTELUKAST SODIUM TABS 10MG] 90 tablet 3     Sig: TAKE 1 TABLET DAILY      Last office visit with prescribing clinician: 11/15/2022   Last telemedicine visit with prescribing clinician: 12/16/2022

## 2022-12-17 PROBLEM — J32.0 MAXILLARY SINUSITIS: Status: RESOLVED | Noted: 2021-07-16 | Resolved: 2022-12-17
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
## 2022-12-17 LAB
ALBUMIN SERPL-MCNC: 4.3 G/DL (ref 3.7–4.7)
ALBUMIN/GLOB SERPL: 1.7 {RATIO} (ref 1.2–2.2)
ALP SERPL-CCNC: 88 IU/L (ref 44–121)
ALT SERPL-CCNC: 16 IU/L (ref 0–44)
AST SERPL-CCNC: 22 IU/L (ref 0–40)
BASOPHILS # BLD AUTO: 0.1 X10E3/UL (ref 0–0.2)
BASOPHILS NFR BLD AUTO: 2 %
BILIRUB SERPL-MCNC: 0.6 MG/DL (ref 0–1.2)
BUN SERPL-MCNC: 20 MG/DL (ref 8–27)
BUN/CREAT SERPL: 17 (ref 10–24)
CALCIUM SERPL-MCNC: 9.9 MG/DL (ref 8.6–10.2)
CHLORIDE SERPL-SCNC: 102 MMOL/L (ref 96–106)
CO2 SERPL-SCNC: 27 MMOL/L (ref 20–29)
CREAT SERPL-MCNC: 1.2 MG/DL (ref 0.76–1.27)
EGFRCR SERPLBLD CKD-EPI 2021: 63 ML/MIN/1.73
EOSINOPHIL # BLD AUTO: 0.5 X10E3/UL (ref 0–0.4)
EOSINOPHIL NFR BLD AUTO: 6 %
ERYTHROCYTE [DISTWIDTH] IN BLOOD BY AUTOMATED COUNT: 13.4 % (ref 11.6–15.4)
GLOBULIN SER CALC-MCNC: 2.5 G/DL (ref 1.5–4.5)
GLUCOSE SERPL-MCNC: 220 MG/DL (ref 70–99)
HCT VFR BLD AUTO: 45.5 % (ref 37.5–51)
HGB BLD-MCNC: 15.6 G/DL (ref 13–17.7)
IMM GRANULOCYTES # BLD AUTO: 0 X10E3/UL (ref 0–0.1)
IMM GRANULOCYTES NFR BLD AUTO: 0 %
LYMPHOCYTES # BLD AUTO: 1.2 X10E3/UL (ref 0.7–3.1)
LYMPHOCYTES NFR BLD AUTO: 15 %
MCH RBC QN AUTO: 32.1 PG (ref 26.6–33)
MCHC RBC AUTO-ENTMCNC: 34.3 G/DL (ref 31.5–35.7)
MCV RBC AUTO: 94 FL (ref 79–97)
MONOCYTES # BLD AUTO: 0.5 X10E3/UL (ref 0.1–0.9)
MONOCYTES NFR BLD AUTO: 7 %
NEUTROPHILS # BLD AUTO: 5.8 X10E3/UL (ref 1.4–7)
NEUTROPHILS NFR BLD AUTO: 70 %
PLATELET # BLD AUTO: 242 X10E3/UL (ref 150–450)
POTASSIUM SERPL-SCNC: 5.2 MMOL/L (ref 3.5–5.2)
PROT SERPL-MCNC: 6.8 G/DL (ref 6–8.5)
RBC # BLD AUTO: 4.86 X10E6/UL (ref 4.14–5.8)
SODIUM SERPL-SCNC: 141 MMOL/L (ref 134–144)
WBC # BLD AUTO: 8.2 X10E3/UL (ref 3.4–10.8)

## 2022-12-17 NOTE — ASSESSMENT & PLAN NOTE
Diabetes is improving with treatment.   Continue current treatment regimen.  Regular aerobic exercise.  Reminded to get yearly retinal exam.  Diabetes will be reassessed in 1 month.  Continue Glimepiride and Metformin twice daily.

## 2022-12-17 NOTE — ASSESSMENT & PLAN NOTE
Hypertension is improving with treatment.  Weight loss.  Continue current medications.  Ambulatory blood pressure monitoring.  Blood pressure will be reassessed at the next regular appointment.  Continue Amlodipine and Valsartan daily and Doxazosin nightly.  Continue Carvedilol and Torsemide twice daily.  Continue Hydralazine TID.

## 2022-12-19 DIAGNOSIS — E78.2 MIXED HYPERLIPIDEMIA: ICD-10-CM

## 2022-12-19 DIAGNOSIS — E11.3293 TYPE 2 DIABETES MELLITUS WITH BOTH EYES AFFECTED BY MILD NONPROLIFERATIVE RETINOPATHY WITHOUT MACULAR EDEMA, WITHOUT LONG-TERM CURRENT USE OF INSULIN: Primary | ICD-10-CM

## 2023-01-10 DIAGNOSIS — E78.2 MIXED HYPERLIPIDEMIA: ICD-10-CM

## 2023-01-10 RX ORDER — ROSUVASTATIN CALCIUM 40 MG/1
TABLET, COATED ORAL
Qty: 90 TABLET | Refills: 3 | Status: SHIPPED | OUTPATIENT
Start: 2023-01-10

## 2023-01-10 NOTE — TELEPHONE ENCOUNTER
Rx Refill Note  Requested Prescriptions     Pending Prescriptions Disp Refills   • rosuvastatin (CRESTOR) 40 MG tablet [Pharmacy Med Name: ROSUVASTATIN TABS 40MG] 90 tablet 3     Sig: TAKE 1 TABLET DAILY      Last office visit with prescribing clinician: 12/16/2022   Last telemedicine visit with prescribing clinician: 1/16/2023   Next office visit with prescribing clinician: 4/10/2023

## 2023-01-18 DIAGNOSIS — E11.3293 TYPE 2 DIABETES MELLITUS WITH BOTH EYES AFFECTED BY MILD NONPROLIFERATIVE RETINOPATHY WITHOUT MACULAR EDEMA, WITHOUT LONG-TERM CURRENT USE OF INSULIN: Primary | ICD-10-CM

## 2023-01-18 RX ORDER — GLIMEPIRIDE 2 MG/1
2 TABLET ORAL 2 TIMES DAILY WITH MEALS
Start: 2023-01-18 | End: 2023-01-24 | Stop reason: SDUPTHER

## 2023-01-23 DIAGNOSIS — E11.3293 TYPE 2 DIABETES MELLITUS WITH BOTH EYES AFFECTED BY MILD NONPROLIFERATIVE RETINOPATHY WITHOUT MACULAR EDEMA, WITHOUT LONG-TERM CURRENT USE OF INSULIN: ICD-10-CM

## 2023-01-23 RX ORDER — GLIMEPIRIDE 2 MG/1
2 TABLET ORAL 2 TIMES DAILY WITH MEALS
Status: CANCELLED
Start: 2023-01-23

## 2023-01-23 NOTE — TELEPHONE ENCOUNTER
Caller: Yany Blanco    Relationship: Emergency Contact    Best call back number: [phone]      Requested Prescriptions:   Requested Prescriptions     Pending Prescriptions Disp Refills   • glimepiride (AMARYL) 2 MG tablet       Sig: Take 1 tablet by mouth 2 (Two) Times a Day With Meals.        Pharmacy where request should be sent: [id number] [address] [phone] [facility] [phone]      Additional details provided by patient: PATIENT IS OUT OF MEDICATION.    Does the patient have less than a 3 day supply:  [x] Yes  [] No    Would you like a call back once the refill request has been completed: [x] Yes [] No    If the office needs to give you a call back, can they leave a voicemail: [x] Yes [] No    Tesha Malone, PCT   01/23/23 08:47 EST

## 2023-01-24 ENCOUNTER — OFFICE VISIT (OUTPATIENT)
Dept: FAMILY MEDICINE CLINIC | Facility: CLINIC | Age: 78
End: 2023-01-24
Payer: MEDICARE

## 2023-01-24 VITALS
BODY MASS INDEX: 34.27 KG/M2 | DIASTOLIC BLOOD PRESSURE: 68 MMHG | SYSTOLIC BLOOD PRESSURE: 130 MMHG | HEIGHT: 72 IN | OXYGEN SATURATION: 96 % | HEART RATE: 63 BPM | WEIGHT: 253 LBS | TEMPERATURE: 98 F

## 2023-01-24 DIAGNOSIS — J01.90 ACUTE NON-RECURRENT SINUSITIS, UNSPECIFIED LOCATION: ICD-10-CM

## 2023-01-24 DIAGNOSIS — E11.3293 TYPE 2 DIABETES MELLITUS WITH BOTH EYES AFFECTED BY MILD NONPROLIFERATIVE RETINOPATHY WITHOUT MACULAR EDEMA, WITHOUT LONG-TERM CURRENT USE OF INSULIN: Primary | ICD-10-CM

## 2023-01-24 DIAGNOSIS — I10 PRIMARY HYPERTENSION: ICD-10-CM

## 2023-01-24 PROBLEM — R41.841 COGNITIVE COMMUNICATION DEFICIT: Status: ACTIVE | Noted: 2020-08-31

## 2023-01-24 PROCEDURE — 3051F HG A1C>EQUAL 7.0%<8.0%: CPT | Performed by: NURSE PRACTITIONER

## 2023-01-24 PROCEDURE — 99213 OFFICE O/P EST LOW 20 MIN: CPT | Performed by: NURSE PRACTITIONER

## 2023-01-24 RX ORDER — AMLODIPINE BESYLATE 5 MG/1
5 TABLET ORAL DAILY
Qty: 90 TABLET | Refills: 1 | Status: SHIPPED | OUTPATIENT
Start: 2023-01-24

## 2023-01-24 RX ORDER — GLIMEPIRIDE 2 MG/1
2 TABLET ORAL 2 TIMES DAILY WITH MEALS
Qty: 60 TABLET | Refills: 0 | Status: SHIPPED | OUTPATIENT
Start: 2023-01-24 | End: 2023-01-24 | Stop reason: SDUPTHER

## 2023-01-24 RX ORDER — METFORMIN HYDROCHLORIDE 500 MG/1
TABLET, EXTENDED RELEASE ORAL
Qty: 270 TABLET | Refills: 1 | Status: SHIPPED | OUTPATIENT
Start: 2023-01-24 | End: 2023-02-19

## 2023-01-24 RX ORDER — GLIMEPIRIDE 2 MG/1
2 TABLET ORAL 2 TIMES DAILY WITH MEALS
Qty: 60 TABLET | Refills: 0
Start: 2023-01-24 | End: 2023-01-24 | Stop reason: SDUPTHER

## 2023-01-24 RX ORDER — VALSARTAN 320 MG/1
320 TABLET ORAL DAILY
Qty: 90 TABLET | Refills: 1 | Status: SHIPPED | OUTPATIENT
Start: 2023-01-24

## 2023-01-24 RX ORDER — GLIMEPIRIDE 2 MG/1
2 TABLET ORAL 2 TIMES DAILY WITH MEALS
Qty: 180 TABLET | Refills: 1 | Status: SHIPPED | OUTPATIENT
Start: 2023-01-24 | End: 2023-02-22

## 2023-01-24 NOTE — PROGRESS NOTES
Thais Blanco is a 77 y.o. male.     Chief Complaint   Patient presents with   • Diabetes     Follow up          History of Present Illness   Patient presents for follow up DM2: takes Metformin and Glimepiride twice daily; last A1c 7.7%; has been out of Glimepiride for at least 4 days due to problem at pharmacy; blood sugar had been running 110s until out of Glimepiride; blood sugar was 189 this morning; has been watching intake of carbs/sugars; has been able to lose some weight; has been doing physical therapy for knees; no numbness or tingling; has been seeing [facility] for diabetic foot care; will be seeing jamshid Morataya 2/13/23.      The following portions of the patient's history were reviewed and updated as appropriate: allergies, current medications, past family history, past medical history, past social history, past surgical history and problem list.      Current Outpatient Medications   Medication Sig Dispense Refill   • Accu-Chek FastClix Lancets misc TEST BLOOD SUGAR 1-2 TIMES DAILY AND AS NEEDED     • albuterol sulfate  (90 Base) MCG/ACT inhaler Inhale 2 puffs Every 4 (Four) Hours As Needed for Wheezing or Shortness of Air. 18 g 0   • amLODIPine (NORVASC) 5 MG tablet Take 1 tablet by mouth Daily. 90 tablet 1   • amoxicillin (AMOXIL) 875 MG tablet      • apixaban (Eliquis) 5 MG tablet tablet Take 1 tablet by mouth Every 12 (Twelve) Hours. 180 tablet 1   • aspirin (ASPIRIN LOW DOSE) 81 MG EC tablet Take 1 tablet by mouth Daily. 90 tablet 2   • Blood Glucose Monitoring Suppl (Accu-Chek Guide Me) w/Device kit TEST BLOOD SUGAR 1-2 TIMES DAILY AND AS NEEDED     • carvedilol (COREG) 6.25 MG tablet TAKE 1 TABLET TWICE A DAY WITH MEALS 180 tablet 3   • cetirizine (zyrTEC) 10 MG tablet Take 10 mg by mouth Daily.     • doxazosin (CARDURA) 1 MG tablet Take 1 mg by mouth Every Night.     • fluticasone (FLONASE) 50 MCG/ACT nasal spray USE 2 SPRAYS IN NOSTRIL(S) AS DIRECTED BY PROVIDER  DAILY 48 g 3   • glimepiride (AMARYL) 2 MG tablet Take 1 tablet by mouth 2 (Two) Times a Day With Meals. 60 tablet 0   • glucosamine-chondroitin 500-400 MG capsule capsule Take 1 capsule by mouth Daily.     • guaiFENesin (MUCINEX) 600 MG 12 hr tablet Take 1,200 mg by mouth 2 (Two) Times a Day.     • hydrALAZINE (APRESOLINE) 100 MG tablet TAKE 1 TABLET THREE TIMES A  tablet 3   • hydroxyurea (HYDREA) 500 MG capsule Take 500 mg by mouth Daily.     • Insulin Pen Needle (B-D UF III MINI PEN NEEDLES) 31G X 5 MM misc Inject 1 each under the skin into the appropriate area as directed Daily. 100 each 2   • memantine (NAMENDA) 5 MG tablet TAKE 1 TABLET BY MOUTH DAILY 30 tablet 3   • metFORMIN ER (GLUCOPHAGE-XR) 500 MG 24 hr tablet Take 1 tablet with breakfast and 2 tablets with evening meal 270 tablet 1   • montelukast (SINGULAIR) 10 MG tablet TAKE 1 TABLET DAILY 90 tablet 3   • Multiple Vitamins-Minerals (MULTIVITAMIN ADULT PO) Take 1 tablet by mouth Daily.     • potassium chloride (KLOR-CON) 20 MEQ CR tablet Take 1 tablet by mouth 3 (Three) Times a Day. (Patient taking differently: Take 20 mEq by mouth 2 (Two) Times a Day.) 30 tablet 0   • Probiotic Product (Probiotic Daily) capsule Take 1 capsule by mouth Daily. (Patient taking differently: Take 1 capsule by mouth Daily As Needed.) 90 capsule 1   • rosuvastatin (CRESTOR) 40 MG tablet TAKE 1 TABLET DAILY 90 tablet 3   • torsemide (DEMADEX) 20 MG tablet TAKE ONE TABLET BY MOUTH TWICE A DAY 60 tablet 5   • valsartan (DIOVAN) 320 MG tablet Take 1 tablet by mouth Daily. 90 tablet 1   • glucose blood (Accu-Chek Guide) test strip Use as instructed to test blood sugar twice daily. 100 each 2     No current facility-administered medications for this visit.       Past Medical History:   Diagnosis Date   • Abnormal ECG    • Abnormal stress test    • Abrasion of face 1/29/2020   • Acute bronchitis    • Acute hypokalemia 9/16/2020   • Acute non-recurrent sinusitis 1/13/2020   •  Acute pyelonephritis 10/9/2020   • Acute sinusitis    • DORI (acute kidney injury) ([facility]) 4/28/2017   • Allergic rhinitis    • Aortic root dilation ([facility])    • Arthritis    • Bacteremia due to Escherichia coli 8/17/2020   • Balanitis 5/16/2021   • Benign enlargement of prostate    • C. difficile colitis 9/16/2020   • Cellulitis of knee, left 5/4/2017   • CHF (congestive heart failure) ([facility])    • Chronic diastolic congestive heart failure ([facility])    • Constipation 11/14/2020   • Contusion of face 1/29/2020   • Coronary artery disease    • CVA (cerebral vascular accident) ([facility]) 2020   • Diminished pulse     in lower extremities   • Discitis of lumbar region 10/9/2020   • Edema    • Elevated hematocrit    • Elevated hemoglobin ([facility])    • Essential hypertension    • Hearing loss     mala hearing aids   • Heart valve disease    • High risk medication use    • History of back pain    • History of dysuria    • History of echocardiogram    • History of intracranial hemorrhage    • History of scoliosis    • History of stroke    • Hyperlipidemia    • Hypokalemia 1/7/2021   • Injury of toe, left, initial encounter    • Irregular heart rate    • Knee pain, left    • Left bundle branch block    • Leg wound, left    • Leukocytosis 9/16/2020   • Low back pain    • Maxillary sinusitis 7/16/2021   • Medicare annual wellness visit, subsequent    • Minor head injury without loss of consciousness 1/29/2020   • Murmur    • Muscle cramps    • Need for hepatitis C screening test    • Paraphimosis 5/16/2021   • Pneumonia of left lung due to infectious organism 9/17/2021   • Polycythemia    • Polycythemia vera ([facility])    • Pressure injury of buttock, stage 2 ([facility]) 9/18/2020   • Prostate hyperplasia without urinary obstruction    • Prostatitis    • Proteinuria    • Pulmonary hypertension ([facility])    • Sacroiliitis ([facility]) 10/9/2020   • Scoliosis    • Sepsis due to Escherichia coli ([facility]) 8/17/2020   • Sepsis with metabolic encephalopathy ([facility]) 8/17/2020    • Stroke ([facility])    • SVT (supraventricular tachycardia) ([facility])    • Tachycardia    • Thrombocytosis    • TIA (transient ischemic attack)        • Type 2 diabetes mellitus ([facility])    • Type 2 diabetes mellitus with hyperglycemia ([facility]) 2021   • Urinary tract infection due to extended-spectrum beta lactamase (ESBL) producing Escherichia coli 2020   • Valvular heart disease        Past Surgical History:   Procedure Laterality Date   • CARDIAC CATHETERIZATION     • CATARACT EXTRACTION Left 2021   • CATARACT EXTRACTION Right 2022   • COLONOSCOPY      did Cologuard approx 2019 and negative   • HAND SURGERY     • JOINT REPLACEMENT Right    • TX ARTHRP KNE CONDYLE&PLATU MEDIAL&LAT COMPARTMENTS Left 2017    Procedure: LT TOTAL KNEE ARTHROPLASTY;  Surgeon: Bertin Perrin MD;  Location: [facility];  Service: Orthopedics   • PROSTATE SURGERY      Rezum steam treatment to shrink prostate by dr. guerrero       Family History   Problem Relation Age of Onset   • Hypertension Mother    • Other Father         ruptured appendix,  when pt. was 14 years old.   • Diabetes Paternal Aunt    • Diabetes Paternal Uncle    • No Known Problems Other    • Migraines Sister    • Stroke Sister    • Dementia Brother        Social History     Socioeconomic History   • Marital status:    Tobacco Use   • Smoking status: Former     Types: Cigarettes     Quit date:      Years since quittin.0   • Smokeless tobacco: Former   • Tobacco comments:     Caffeine daily   Vaping Use   • Vaping Use: Never used   Substance and Sexual Activity   • Alcohol use: No   • Drug use: No   • Sexual activity: Defer       Review of Systems   Constitutional: Negative for appetite change, unexpected weight gain and unexpected weight loss. Fatigue: some with blood sugar running higher since has been out of medication.   HENT: Positive for congestion (mild nasal congestion; has been taking OTC Coricidin HBP Cold and flu  "and has helped) and postnasal drip. Negative for ear pain and sore throat. Sinus pressure: some in left maxillary; had seen ENT and recommended Mucinex BID, has been out of Mucinex.    Respiratory: Negative for chest tightness and shortness of breath. Cough: mild nonproductive cough.    Cardiovascular: Negative for chest pain and palpitations.   Gastrointestinal: Negative for abdominal pain.   Endocrine: Negative for polydipsia.   Skin: Negative for rash.   Neurological: Negative for dizziness, syncope, light-headedness and headache.       Objective   Vitals:    01/24/23 1130   BP: 130/68   BP Location: Left arm   Patient Position: Sitting   Cuff Size: Adult   Pulse: 63   Temp: 98 °F (36.7 °C)   SpO2: 96%   Weight: 115 kg (253 lb)   Height: 182.9 cm (72\")     Body mass index is 34.31 kg/m².    Physical Exam  Vitals and nursing note reviewed.   Constitutional:       General: He is not in acute distress.     Appearance: He is well-developed and well-groomed. He is not diaphoretic.   HENT:      Head: Normocephalic.      Right Ear: External ear normal. Right ear middle ear effusion: mild. Tympanic membrane is not erythematous.      Left Ear: External ear normal. Left ear middle ear effusion: mild. Tympanic membrane is not erythematous.      Nose: Nose normal.      Right Sinus: No maxillary sinus tenderness or frontal sinus tenderness.      Left Sinus: No frontal sinus tenderness. Left maxillary sinus tenderness: mild.      Mouth/Throat:      Mouth: Mucous membranes are moist.      Pharynx: No oropharyngeal exudate. Posterior oropharyngeal erythema: mild in posterior pharynx.   Eyes:      Conjunctiva/sclera: Conjunctivae normal.   Neck:      Vascular: No carotid bruit.   Cardiovascular:      Rate and Rhythm: Normal rate and regular rhythm.      Pulses: Normal pulses.      Heart sounds: Murmur heard.    Systolic murmur is present with a grade of 2/6.     Comments: At RUSB  Pulmonary:      Effort: Pulmonary effort is " normal. No respiratory distress.      Breath sounds: Normal breath sounds.   Abdominal:      General: Bowel sounds are normal.      Palpations: Abdomen is soft.      Tenderness: There is no abdominal tenderness. There is no guarding.   Musculoskeletal:      Cervical back: Normal range of motion and neck supple.      Right lower leg: No edema.      Left lower leg: Left lower leg edema: trace ankle.   Lymphadenopathy:      Cervical: No cervical adenopathy.   Skin:     General: Skin is warm and dry.   Neurological:      Mental Status: He is alert and oriented to person, place, and time.      Gait: Abnormal gait: mild limping on right; walks with 4 point cane.   Psychiatric:         Mood and Affect: Mood normal.         Behavior: Behavior normal.         Thought Content: Thought content normal.         Cognition and Memory: Cognition normal.         Judgment: Judgment normal.         Lab Results   Component Value Date    WBC 8.2 12/16/2022    RBC 4.86 12/16/2022    HGB 15.6 12/16/2022    HCT 45.5 12/16/2022    MCV 94 12/16/2022    MCH 32.1 12/16/2022    MCHC 34.3 12/16/2022    RDW 13.4 12/16/2022    RDWSD 46.4 03/25/2022    MPV 10.6 03/25/2022     12/16/2022    NEUTRORELPCT 70 12/16/2022    LYMPHORELPCT 15 12/16/2022    MONORELPCT 7 12/16/2022    EOSRELPCT 6 12/16/2022    BASORELPCT 2 12/16/2022    AUTOIGPER 0.4 03/25/2022    NEUTROABS 6.6 12/19/2022    LYMPHSABS 1.2 12/16/2022    MONOSABS 0.5 12/16/2022    EOSABS 0.4 12/19/2022    BASOSABS 0.1 12/19/2022    AUTOIGNUM 0.03 03/25/2022    NRBC 0.0 03/25/2022     Lab Results   Component Value Date    GLUCOSE 120 (H) 01/16/2023    BUN 14 01/16/2023    CREATININE 0.94 01/16/2023    EGFRIFNONA 57 (L) 01/10/2022    EGFRIFAFRI 66 01/10/2022    BCR 14.9 01/16/2023    K 4.2 01/16/2023    CO2 29.7 (H) 01/16/2023    CALCIUM 9.5 01/16/2023    PROTENTOTREF 6.8 12/16/2022    ALBUMIN 4.3 12/16/2022    LABIL2 1.7 12/16/2022    AST 22 12/16/2022    ALT 16 12/16/2022      Lab  Results   Component Value Date    CHLPL 89 01/16/2023    TRIG 89 01/16/2023    HDL 37 (L) 01/16/2023    VLDL 18 01/16/2023    LDL 34 01/16/2023     Lab Results   Component Value Date    TSH 2.220 09/17/2021     Lab Results   Component Value Date    HGBA1C 7.70 (H) 01/16/2023         Assessment    Problem List Items Addressed This Visit     Type 2 diabetes mellitus with both eyes affected by mild nonproliferative retinopathy without macular edema, without long-term current use of insulin (HCC) - Primary    Current Assessment & Plan     Diabetes is stable, not well controlled since has been out of Glimepiride.   Continue current treatment regimen.  Diabetes will be reassessed in 3 months.  Resume Glimepiride twice daily.  Continue Metformin twice daily.  Follow up as scheduled with endocrine.         Relevant Medications    glimepiride (AMARYL) 2 MG tablet    metFORMIN ER (GLUCOPHAGE-XR) 500 MG 24 hr tablet    glucose blood (Accu-Chek Guide) test strip    Hypertension    Relevant Medications    amLODIPine (NORVASC) 5 MG tablet    valsartan (DIOVAN) 320 MG tablet    Acute non-recurrent sinusitis    Current Assessment & Plan     Resume Mucinex twice daily.  Make sure drinking adequate liquids.             Return in about 3 months (around 4/24/2023) for Medicare Wellness, Recheck.or sooner if symptoms persist or worsen.         COVID-19 Precautions - Patient was compliant in wearing a mask. When I saw the patient, I used appropriate personal protective equipment (PPE) including mask, gloves, and eye shield (standard procedure).  Hand hygiene was completed before and after seeing the patient.

## 2023-01-24 NOTE — ASSESSMENT & PLAN NOTE
Diabetes is stable, not well controlled since has been out of Glimepiride.   Continue current treatment regimen.  Diabetes will be reassessed in 3 months.  Resume Glimepiride twice daily.  Continue Metformin twice daily.  Follow up as scheduled with endocrine.

## 2023-01-24 NOTE — PATIENT INSTRUCTIONS
Resume Glimepiride twice daily.  Continue to monitor your blood sugar once daily before a meal and record results.  Continue to monitor your blood pressure periodically and record results.  Continue to work on healthy diet and exercise as tolerated.  Resume Mucinex twice daily.  Follow up pending lab results.  Follow up in 3 months for Medicare Wellness, or sooner if problems or concerns.

## 2023-02-10 RX ORDER — MEMANTINE HYDROCHLORIDE 5 MG/1
5 TABLET ORAL DAILY
Qty: 30 TABLET | Refills: 0 | Status: SHIPPED | OUTPATIENT
Start: 2023-02-10 | End: 2023-03-17

## 2023-02-10 NOTE — TELEPHONE ENCOUNTER
Caller: Erlin Blanco    Relationship: Self    Best call back number: [phone]    Requested Prescriptions:   Requested Prescriptions     Pending Prescriptions Disp Refills   • memantine (NAMENDA) 5 MG tablet 30 tablet 3     Sig: Take 1 tablet by mouth Daily.        Pharmacy where request should be sent: [facility] - [address] [phone] [facility] [phone]      Additional details provided by patient: PATIENT HAS BEEN OUT FOR ABOUT A MONTH AND A HALF.    Does the patient have less than a 3 day supply:  [x] Yes  [] No    Would you like a call back once the refill request has been completed: [] Yes [x] No    If the office needs to give you a call back, can they leave a voicemail: [] Yes [x] No    Mame Holman Rep   02/10/23 10:10 EST

## 2023-02-13 ENCOUNTER — OFFICE VISIT (OUTPATIENT)
Dept: ENDOCRINOLOGY | Age: 78
End: 2023-02-13
Payer: MEDICARE

## 2023-02-13 VITALS
OXYGEN SATURATION: 98 % | TEMPERATURE: 96.9 F | WEIGHT: 253 LBS | HEART RATE: 70 BPM | BODY MASS INDEX: 34.27 KG/M2 | DIASTOLIC BLOOD PRESSURE: 60 MMHG | HEIGHT: 72 IN | SYSTOLIC BLOOD PRESSURE: 130 MMHG

## 2023-02-13 DIAGNOSIS — E78.2 MIXED HYPERLIPIDEMIA: ICD-10-CM

## 2023-02-13 DIAGNOSIS — E53.8 VITAMIN B12 DEFICIENCY: ICD-10-CM

## 2023-02-13 DIAGNOSIS — D45 POLYCYTHEMIA VERA: ICD-10-CM

## 2023-02-13 DIAGNOSIS — I10 PRIMARY HYPERTENSION: ICD-10-CM

## 2023-02-13 DIAGNOSIS — E11.3293 TYPE 2 DIABETES MELLITUS WITH BOTH EYES AFFECTED BY MILD NONPROLIFERATIVE RETINOPATHY WITHOUT MACULAR EDEMA, WITHOUT LONG-TERM CURRENT USE OF INSULIN: ICD-10-CM

## 2023-02-13 DIAGNOSIS — E11.8 TYPE 2 DIABETES MELLITUS WITH COMPLICATION, WITHOUT LONG-TERM CURRENT USE OF INSULIN: Primary | ICD-10-CM

## 2023-02-13 PROCEDURE — 3051F HG A1C>EQUAL 7.0%<8.0%: CPT | Performed by: INTERNAL MEDICINE

## 2023-02-13 PROCEDURE — 99204 OFFICE O/P NEW MOD 45 MIN: CPT | Performed by: INTERNAL MEDICINE

## 2023-02-13 RX ORDER — TAMSULOSIN HYDROCHLORIDE 0.4 MG/1
1 CAPSULE ORAL
COMMUNITY
Start: 2023-01-27

## 2023-02-13 NOTE — PROGRESS NOTES
Subjective   Erlin Blanco is a 77 y.o. male.     History of Present Illness        Patient is a 77-year-old male who was referred for diabetes control by Zamzam John NP.    He is known diabetes mellitus since 2014.  He is on glimepiride 2 mg twice a day, and metformin  mg 1 tablet every morning and 2 tablets every evening.  He checks his blood sugar 1-2 times a day.  Fasting glucose .  Lunchtime glucose 117-326.  Suppertime glucose .  He denies severe hypoglycemic episodes.  He has intentionally had lost 13 pounds since January 2022 with reduction in food intake.  He is unable to exercise because of chronic knee pain.  Hemoglobin A1c is 7.7%. His last meal was at 10 AM.    His last eye examination was in December 2022.  He has mild nonproliferative diabetic retinopathy without macular edema.  He denies numbness, tingling or burning in his hands or feet.  Urine microalbumin was elevated in October 2022.  He is being followed by Dr. Carreon.    He has hyperlipidemia and is on rosuvastatin 40 mg/day.  He denies myalgia.  Lipid panel done in January 2023 as follows: Cholesterol 89.  HDL 37.  LDL 34.  Triglycerides 89.    He has hypertension but no history of heart attack.  He had a stroke more than 10 years ago with left leg weakness.  He has history of paroxysmal atrial fibrillation.  He is on torsemide 20 mg twice a day, Coreg 6.25 mg twice a day, hydralazine 100 mg 3 times a day, valsartan 320 mg once a day, amlodipine 5 mg/day, and Eliquis 5 mg twice a day.  He denies chest pain,  SOB or palpitations.  He follows with Dr. Arias.    He has history of polycythemia vera and is on hydroxyurea prescribed by Dr. Thomas.  His last phlebotomy was in January 2023.    He smoked 1/2 pack of cigarettes per day for about 10 years and quit smoking in 1973.  He denies alcohol use.      The following portions of the patient's history were reviewed and updated as appropriate: allergies, current medications,  "past family history, past medical history, past social history, past surgical history and problem list.    Review of Systems   Eyes: Negative for visual disturbance.   Respiratory: Negative for shortness of breath.    Cardiovascular: Negative for chest pain and palpitations.   Gastrointestinal: Negative for anal bleeding, blood in stool, constipation and diarrhea.   Genitourinary: Positive for hematuria (on Eliquis) and urgency. Negative for difficulty urinating.        Urinary incontinence     Musculoskeletal: Negative for myalgias.   Neurological: Negative for numbness.     Vitals:    02/13/23 1201   BP: 130/60   Pulse: 70   Temp: 96.9 °F (36.1 °C)   TempSrc: Temporal   SpO2: 98%   Weight: 115 kg (253 lb)   Height: 182.9 cm (72.01\")      Objective   Physical Exam  Constitutional:       General: He is not in acute distress.     Appearance: Normal appearance. He is obese. He is not ill-appearing, toxic-appearing or diaphoretic.   Eyes:      General: No scleral icterus.        Right eye: No discharge.         Left eye: No discharge.      Extraocular Movements: Extraocular movements intact.      Conjunctiva/sclera: Conjunctivae normal.   Neck:      Vascular: No carotid bruit.   Cardiovascular:      Rate and Rhythm: Normal rate and regular rhythm.      Pulses: Normal pulses.      Heart sounds: Normal heart sounds.   Pulmonary:      Breath sounds: Normal breath sounds. No rales.   Chest:      Chest wall: No tenderness.   Abdominal:      Palpations: Abdomen is soft.      Tenderness: There is no right CVA tenderness or left CVA tenderness.   Musculoskeletal:      Cervical back: No rigidity or tenderness.      Right lower leg: Edema present.      Left lower leg: Edema present.      Comments: Chronic venous stasis changes on both distal lower extremity.  No plantar ulcers.  Palpable dorsalis pedis pulses bilaterally.   Lymphadenopathy:      Cervical: No cervical adenopathy.   Neurological:      Mental Status: He is alert " and oriented to person, place, and time.      Comments: Intact light touch in both lower extremities       Results Encounter on 01/19/2023   Component Date Value Ref Range Status   • Glucose 01/16/2023 120 (H)  65 - 99 mg/dL Final   • BUN 01/16/2023 14  8 - 23 mg/dL Final   • Creatinine 01/16/2023 0.94  0.76 - 1.27 mg/dL Final   • EGFR Result 01/16/2023 84.5  >60.0 mL/min/1.73 Final    Comment: National Kidney Foundation and American Society of  Nephrology (ASN) Task Force recommended calculation based  on the Chronic Kidney Disease Epidemiology Collaboration  (CKD-EPI) equation refit without adjustment for race.  GFR Normal >60  Chronic Kidney Disease <60  Kidney Failure <15  The GFR formula is only valid for adults with stable renal  function between ages 18 and 70.     • BUN/Creatinine Ratio 01/16/2023 14.9  7.0 - 25.0 Final   • Sodium 01/16/2023 142  136 - 145 mmol/L Final   • Potassium 01/16/2023 4.2  3.5 - 5.2 mmol/L Final   • Chloride 01/16/2023 104  98 - 107 mmol/L Final   • Total CO2 01/16/2023 29.7 (H)  22.0 - 29.0 mmol/L Final   • Calcium 01/16/2023 9.5  8.6 - 10.5 mg/dL Final   • Total Cholesterol 01/16/2023 89  0 - 200 mg/dL Final    Comment: Cholesterol Reference Ranges  (U.S. Department of Health and Human Services ATP III  Classifications)  Desirable          <200 mg/dL  Borderline High    200-239 mg/dL  High Risk          >240 mg/dL  Triglyceride Reference Ranges  (U.S. Department of Health and Human Services ATP III  Classifications)  Normal           <150 mg/dL  Borderline High  150-199 mg/dL  High             200-499 mg/dL  Very High        >500 mg/dL  HDL Reference Ranges  (U.S. Department of Health and Human Services ATP III  Classifications)  Low     <40 mg/dl (major risk factor for CHD)  High    >60 mg/dl ('negative' risk factor for CHD)  LDL Reference Ranges  (U.S. Department of Health and Human Services ATP III  Classifications)  Optimal          <100 mg/dL  Near Optimal     100-129  mg/dL  Borderline High  130-159 mg/dL  High             160-189 mg/dL  Very High        >189 mg/dL     • Triglycerides 01/16/2023 89  0 - 150 mg/dL Final   • HDL Cholesterol 01/16/2023 37 (L)  40 - 60 mg/dL Final   • VLDL Cholesterol Gary 01/16/2023 18  5 - 40 mg/dL Final   • LDL Chol Calc (NIH) 01/16/2023 34  0 - 100 mg/dL Final   • LDL/HDL RATIO 01/16/2023 0.92   Final   • Hemoglobin A1C 01/16/2023 7.70 (H)  4.80 - 5.60 % Final    Comment: Hemoglobin A1C Ranges:  Increased Risk for Diabetes  5.7% to 6.4%  Diabetes                     >= 6.5%  Diabetic Goal                < 7.0%       Assessment & Plan   Diagnoses and all orders for this visit:    1. Type 2 diabetes mellitus with complication, without long-term current use of insulin ([facility]) (Primary)  -     Fructosamine  -     C-Peptide  -     Glutamic Acid Decarboxylase  -     Lipid Panel  -     TSH  -     T4, Free  -     Comprehensive Metabolic Panel    2. Type 2 diabetes mellitus with both eyes affected by mild nonproliferative retinopathy without macular edema, without long-term current use of insulin ([facility])  -     Fructosamine  -     C-Peptide  -     Glutamic Acid Decarboxylase  -     Lipid Panel  -     TSH  -     T4, Free    3. Primary hypertension  -     Fructosamine    4. Mixed hyperlipidemia  -     Fructosamine  -     Lipid Panel    5. Polycythemia vera (HCC)  -     CBC & Differential    6. Vitamin B12 deficiency  -     Vitamin B12  -     CBC & Differential  -     Intrinsic Factor Ab      Continue glimepiride 2 mg twice a day metformin  mg 1 tablet every morning and 2 tablets every evening.  Check fructosamine C-peptide and JAKI antibodies  Consider DPP 4 inhibitor, SGLT2 inhibitors or GLP 1 agonist.  Continue rosuvastatin 40 mg a day.  Continue torsemide, Coreg, hydralazine, valsartan, amlodipine, and Eliquis per Dr. Arias.  Follow-up with Dr. Thomas.     Copy of my note sent to Zamzam John NP, Dr. Thomas, Dr. Arias and Dr. Carreon.    RTC 4 mos  with LATOYA Wiseman NP.

## 2023-02-15 LAB
ALBUMIN SERPL-MCNC: 4.6 G/DL (ref 3.5–5.2)
ALBUMIN/GLOB SERPL: 2.4 G/DL
ALP SERPL-CCNC: 80 U/L (ref 39–117)
ALT SERPL-CCNC: 19 U/L (ref 1–41)
AST SERPL-CCNC: 26 U/L (ref 1–40)
BASOPHILS # BLD AUTO: 0.16 10*3/MM3 (ref 0–0.2)
BASOPHILS NFR BLD AUTO: 1.9 % (ref 0–1.5)
BILIRUB SERPL-MCNC: 0.5 MG/DL (ref 0–1.2)
BUN SERPL-MCNC: 21 MG/DL (ref 8–23)
BUN/CREAT SERPL: 20.4 (ref 7–25)
C PEPTIDE SERPL-MCNC: 6.4 NG/ML (ref 1.1–4.4)
CALCIUM SERPL-MCNC: 9.8 MG/DL (ref 8.6–10.5)
CHLORIDE SERPL-SCNC: 105 MMOL/L (ref 98–107)
CHOLEST SERPL-MCNC: 98 MG/DL (ref 0–200)
CO2 SERPL-SCNC: 26.7 MMOL/L (ref 22–29)
CREAT SERPL-MCNC: 1.03 MG/DL (ref 0.76–1.27)
EGFRCR SERPLBLD CKD-EPI 2021: 75.8 ML/MIN/1.73
EOSINOPHIL # BLD AUTO: 0.79 10*3/MM3 (ref 0–0.4)
EOSINOPHIL NFR BLD AUTO: 9.4 % (ref 0.3–6.2)
ERYTHROCYTE [DISTWIDTH] IN BLOOD BY AUTOMATED COUNT: 13.5 % (ref 12.3–15.4)
FRUCTOSAMINE SERPL-SCNC: 386 UMOL/L (ref 0–285)
GAD65 AB SER IA-ACNC: <5 U/ML (ref 0–5)
GLOBULIN SER CALC-MCNC: 1.9 GM/DL
GLUCOSE SERPL-MCNC: 229 MG/DL (ref 65–99)
HCT VFR BLD AUTO: 43.3 % (ref 37.5–51)
HDLC SERPL-MCNC: 38 MG/DL (ref 40–60)
HGB BLD-MCNC: 15.1 G/DL (ref 13–17.7)
IMM GRANULOCYTES # BLD AUTO: 0.05 10*3/MM3 (ref 0–0.05)
IMM GRANULOCYTES NFR BLD AUTO: 0.6 % (ref 0–0.5)
IMP & REVIEW OF LAB RESULTS: NORMAL
LDLC SERPL CALC-MCNC: 41 MG/DL (ref 0–100)
LYMPHOCYTES # BLD AUTO: 1.41 10*3/MM3 (ref 0.7–3.1)
LYMPHOCYTES NFR BLD AUTO: 16.8 % (ref 19.6–45.3)
MCH RBC QN AUTO: 33.7 PG (ref 26.6–33)
MCHC RBC AUTO-ENTMCNC: 34.9 G/DL (ref 31.5–35.7)
MCV RBC AUTO: 96.7 FL (ref 79–97)
MONOCYTES # BLD AUTO: 0.44 10*3/MM3 (ref 0.1–0.9)
MONOCYTES NFR BLD AUTO: 5.2 % (ref 5–12)
NEUTROPHILS # BLD AUTO: 5.54 10*3/MM3 (ref 1.7–7)
NEUTROPHILS NFR BLD AUTO: 66.1 % (ref 42.7–76)
NRBC BLD AUTO-RTO: 0.2 /100 WBC (ref 0–0.2)
PLATELET # BLD AUTO: 316 10*3/MM3 (ref 140–450)
POTASSIUM SERPL-SCNC: 4.5 MMOL/L (ref 3.5–5.2)
PROT SERPL-MCNC: 6.5 G/DL (ref 6–8.5)
RBC # BLD AUTO: 4.48 10*6/MM3 (ref 4.14–5.8)
SODIUM SERPL-SCNC: 141 MMOL/L (ref 136–145)
T4 FREE SERPL-MCNC: 1.09 NG/DL (ref 0.93–1.7)
TRIGL SERPL-MCNC: 99 MG/DL (ref 0–150)
TSH SERPL DL<=0.005 MIU/L-ACNC: 2.93 UIU/ML (ref 0.27–4.2)
VIT B12 SERPL-MCNC: 639 PG/ML (ref 211–946)
VLDLC SERPL CALC-MCNC: 19 MG/DL (ref 5–40)
WBC # BLD AUTO: 8.39 10*3/MM3 (ref 3.4–10.8)

## 2023-02-19 DIAGNOSIS — E11.3293 TYPE 2 DIABETES MELLITUS WITH BOTH EYES AFFECTED BY MILD NONPROLIFERATIVE RETINOPATHY WITHOUT MACULAR EDEMA, WITHOUT LONG-TERM CURRENT USE OF INSULIN: ICD-10-CM

## 2023-02-19 RX ORDER — METFORMIN HYDROCHLORIDE 500 MG/1
TABLET, EXTENDED RELEASE ORAL
Qty: 360 TABLET | Refills: 1 | Status: SHIPPED | OUTPATIENT
Start: 2023-02-19

## 2023-02-19 NOTE — PROGRESS NOTES
Fructosamine elevated.  Diabetes is not well controlled in the past month.  Increase metformin  mg to 2 tablets twice a day.  Prescription sent to pharmacy.  C-peptide 6.4 ng/mL.  JAKI antibody negative.  Patient is still making some insulin on his own.  Normal hemoglobin and hematocrit.  Normal free T4.  Normal TSH.  Normal thyroid function tests.  Normal vitamin B12.  Copy of labs sent to Zamzam John NP and Dr. Carreon  Send copy of labs to hematologist Dr. Thomas.  Please notify patient of results and instructions.

## 2023-02-22 DIAGNOSIS — E11.3293 TYPE 2 DIABETES MELLITUS WITH BOTH EYES AFFECTED BY MILD NONPROLIFERATIVE RETINOPATHY WITHOUT MACULAR EDEMA, WITHOUT LONG-TERM CURRENT USE OF INSULIN: ICD-10-CM

## 2023-02-22 RX ORDER — GLIMEPIRIDE 2 MG/1
TABLET ORAL
Qty: 60 TABLET | Refills: 2 | Status: SHIPPED | OUTPATIENT
Start: 2023-02-22

## 2023-03-07 ENCOUNTER — OFFICE VISIT (OUTPATIENT)
Dept: CARDIOLOGY | Facility: CLINIC | Age: 78
End: 2023-03-07
Payer: MEDICARE

## 2023-03-07 VITALS
HEART RATE: 67 BPM | WEIGHT: 255.2 LBS | SYSTOLIC BLOOD PRESSURE: 130 MMHG | BODY MASS INDEX: 34.57 KG/M2 | HEIGHT: 72 IN | DIASTOLIC BLOOD PRESSURE: 70 MMHG | OXYGEN SATURATION: 97 %

## 2023-03-07 DIAGNOSIS — Z86.73 HISTORY OF CVA (CEREBROVASCULAR ACCIDENT): ICD-10-CM

## 2023-03-07 DIAGNOSIS — I44.7 LEFT BUNDLE-BRANCH BLOCK: ICD-10-CM

## 2023-03-07 DIAGNOSIS — E78.2 MIXED HYPERLIPIDEMIA: ICD-10-CM

## 2023-03-07 DIAGNOSIS — E66.01 CLASS 2 SEVERE OBESITY DUE TO EXCESS CALORIES WITH SERIOUS COMORBIDITY AND BODY MASS INDEX (BMI) OF 36.0 TO 36.9 IN ADULT: ICD-10-CM

## 2023-03-07 DIAGNOSIS — I48.92 ATRIAL FLUTTER, UNSPECIFIED TYPE: ICD-10-CM

## 2023-03-07 DIAGNOSIS — I48.0 PAROXYSMAL ATRIAL FIBRILLATION: ICD-10-CM

## 2023-03-07 DIAGNOSIS — N18.2 CKD (CHRONIC KIDNEY DISEASE) STAGE 2, GFR 60-89 ML/MIN: ICD-10-CM

## 2023-03-07 DIAGNOSIS — I50.32 CHRONIC DIASTOLIC CONGESTIVE HEART FAILURE: Primary | ICD-10-CM

## 2023-03-07 PROCEDURE — 99214 OFFICE O/P EST MOD 30 MIN: CPT | Performed by: INTERNAL MEDICINE

## 2023-03-07 PROCEDURE — 93000 ELECTROCARDIOGRAM COMPLETE: CPT | Performed by: INTERNAL MEDICINE

## 2023-03-07 NOTE — PROGRESS NOTES
Subjective:     Encounter Date:03/07/2023      Patient ID: Erlin Blanoc is a 77 y.o. male.    Chief Complaint:  History of Present Illness    This is a 77-year-old with paroxysmal atrial fibrillation, aortic root dilatation, coronary artery disease, diabetes mellitus type 2, hypertension, hyperlipidemia, prior TIA in 2006, chronic diastolic congestive heart failure, polycythemia, chronic left bundle branch block, who presents for follow-up.     He presents today for routine 6-month follow-up.  Following his last office visit he underwent a repeat echocardiogram which showed normal left ventricular systolic function and wall motion with an EF of 67%, grade 1 diastolic dysfunction, moderate left atrial enlargement, mild tricuspid valve regurgitation with a right ventricular systolic pressure of 46 mmHg, and an aortic root measuring 4 cm which appears to be stable compared to a prior CT angiogram of the chest in 4/2018.    He denies any complaints today.  He denies any chest pain, shortness of breath, palpitations, orthopnea, near-syncope or syncope.  His bilateral lower extremity edema is largely improved and stable.  He usually has more swelling in the left lower extremity than the right following a prior knee surgery.     Prior History:  The patient was previously followed by Dr. Higuera.  Prior to that he was followed by Dr. Thomas.  He establish care with Dr. Higuera in 5/2018.  At that time he reported intermittent palpitations but nothing significant.  The plan was to monitor his symptoms and consider further work-up if he had any issues.  In regards to his aortic root dilatation Dr. Higuera recommended focusing on blood pressure control and the plan was to see him back again in 1 year.       In 10/2018 the patient was referred to the emergency room from his primary care physician's office after he was noted to have a heart rate in the 140s.  In the emergency room it was felt that he was in supraventricular  tachycardia so he was given a dose of adenosine which resulted in conversion back to sinus rhythm.  He returned back to the emergency room a couple of days later with heart rates in the 120s but his work-up was unremarkable at that time.  Prior to leaving the emergency room he had a ZIO monitor this placed and his diltiazem was increased to 300 mg a day.  He returned to the office on 11/5/2018 and was seen by MUKUL George at which time he was doing well.  His ZIO monitor ended up showing numerous episodes of supraventricular tachycardia with possible atrial fibrillation, the longest lasting an hour and 2 minutes.  Based on those findings he was started on apixaban.     He return for routine follow-up in 12/2018 at which time he he was incidentally noted to be tachycardic with rates in the 120s.  An EKG was performed and Dr. Higuera felt that it was supraventricular tachycardia.  Carotid massage was performed and the patient converted to sinus rhythm.  The patient was asymptomatic with the supraventricular tachycardia.  Since it was not felt that he was having atrial fibrillation his apixaban was stopped and he was resumed on clopidogrel.  He then returned to the office last and was seen by MUKUL George in 6/2019 at which time he was doing well.  He had lost weight at that time working at [employer] 4 times a week which required a lot of walking.  He also reported that he had been diagnosed with polycythemia and was getting therapeutic phlebotomy about every 6 weeks.       He had a repeat echocardiogram performed in 12/2019 which showed normal left ventricular systolic function wall motion with an EF of 59%, grade 2 diastolic dysfunction, mildly dilated left atrium, mild mitral regurgitation, normal right ventricular systolic pressure, and no evidence of aortic root or ascending aortic dilatation.     He was admitted in 8/2020 with acute prostatitis and encephalopathy.  He was noted to be tachycardic with episodes  that were felt to be supraventricular tachycardia.  He then developed episodes of atrial fibrillation.  He was treated with diltiazem and digoxin in addition to carvedilol.  He was started on apixaban for for anticoagulation.  During that hospitalization he was also noted to have a small left hemispheric stroke and small vessel disease with lacunar infarcts.  An echocardiogram was performed during that admission on 8/18/2020 and showed normal left ventricular systolic function wall motion with an EF of 55 to 60%, moderate concentric left ventricular hypertrophy, mild pulmonary hypertension with a right ventricular systolic pressure of 41 mmHg, and no significant valvular disease.     In follow-up he was noted to have issues with increased lower extremity swelling.  This was despite high-dose furosemide.  He was subsequently switched to torsemide to see if this would be more effective.    He was hospitalized in 9/2021 with fever due to possible pneumonia.  He was noted to be bradycardic during that admission.  His diltiazem was stopped and carvedilol was reduced.  An echocardiogram was checked during that admission which showed normal left ventricular systolic function wall motion with an EF of 59%, normal diastolic function, and mildly elevated right ventricular systolic pressure.  In follow-up he developed issues with elevated blood pressures for which we had to increase his carvedilol dosage back to 12.5 mg twice a day.       Review of Systems   Constitutional: Negative for malaise/fatigue.   HENT: Negative for hearing loss, hoarse voice, nosebleeds and sore throat.    Eyes: Negative for pain.   Cardiovascular: Negative for chest pain, claudication, cyanosis, dyspnea on exertion, irregular heartbeat, leg swelling, near-syncope, orthopnea, palpitations, paroxysmal nocturnal dyspnea and syncope.   Respiratory: Negative for shortness of breath and snoring.    Endocrine: Negative for cold intolerance, heat  intolerance, polydipsia, polyphagia and polyuria.   Skin: Negative for itching and rash.   Musculoskeletal: Negative for arthritis, falls, joint pain, joint swelling, muscle cramps, muscle weakness and myalgias.   Gastrointestinal: Negative for constipation, diarrhea, dysphagia, heartburn, hematemesis, hematochezia, melena, nausea and vomiting.   Genitourinary: Negative for frequency, hematuria and hesitancy.   Neurological: Negative for excessive daytime sleepiness, dizziness, headaches, light-headedness, numbness and weakness.   Psychiatric/Behavioral: Negative for depression. The patient is not nervous/anxious.          Current Outpatient Medications:   •  Accu-Chek FastClix Lancets misc, TEST BLOOD SUGAR 1-2 TIMES DAILY AND AS NEEDED, Disp: , Rfl:   •  amLODIPine (NORVASC) 5 MG tablet, Take 1 tablet by mouth Daily., Disp: 90 tablet, Rfl: 1  •  apixaban (Eliquis) 5 MG tablet tablet, Take 1 tablet by mouth Every 12 (Twelve) Hours., Disp: 180 tablet, Rfl: 1  •  aspirin (ASPIRIN LOW DOSE) 81 MG EC tablet, Take 1 tablet by mouth Daily., Disp: 90 tablet, Rfl: 2  •  Blood Glucose Monitoring Suppl (Accu-Chek Guide Me) w/Device kit, TEST BLOOD SUGAR 1-2 TIMES DAILY AND AS NEEDED, Disp: , Rfl:   •  carvedilol (COREG) 6.25 MG tablet, TAKE 1 TABLET TWICE A DAY WITH MEALS, Disp: 180 tablet, Rfl: 3  •  doxazosin (CARDURA) 1 MG tablet, Take 1 tablet by mouth Every Night., Disp: , Rfl:   •  fluticasone (FLONASE) 50 MCG/ACT nasal spray, USE 2 SPRAYS IN NOSTRIL(S) AS DIRECTED BY PROVIDER DAILY, Disp: 48 g, Rfl: 3  •  glimepiride (AMARYL) 2 MG tablet, TAKE 1 TABLET BY MOUTH TWO TIMES A DAY WITH MEALS, Disp: 60 tablet, Rfl: 2  •  glucosamine-chondroitin 500-400 MG capsule capsule, Take 1 capsule by mouth Daily., Disp: , Rfl:   •  glucose blood (Accu-Chek Guide) test strip, Use as instructed to test blood sugar twice daily., Disp: 100 each, Rfl: 2  •  guaiFENesin (MUCINEX) 600 MG 12 hr tablet, Take 2 tablets by mouth 2 (Two) Times a  Day., Disp: , Rfl:   •  hydrALAZINE (APRESOLINE) 100 MG tablet, TAKE 1 TABLET THREE TIMES A DAY, Disp: 270 tablet, Rfl: 3  •  hydroxyurea (HYDREA) 500 MG capsule, Take 1 capsule by mouth Daily., Disp: , Rfl:   •  Insulin Pen Needle (B-D UF III MINI PEN NEEDLES) 31G X 5 MM misc, Inject 1 each under the skin into the appropriate area as directed Daily., Disp: 100 each, Rfl: 2  •  memantine (NAMENDA) 5 MG tablet, Take 1 tablet by mouth Daily., Disp: 30 tablet, Rfl: 0  •  metFORMIN ER (GLUCOPHAGE-XR) 500 MG 24 hr tablet, 2 tablets twice daily with meals, Disp: 360 tablet, Rfl: 1  •  montelukast (SINGULAIR) 10 MG tablet, TAKE 1 TABLET DAILY, Disp: 90 tablet, Rfl: 3  •  Multiple Vitamins-Minerals (MULTIVITAMIN ADULT PO), Take 1 tablet by mouth Daily., Disp: , Rfl:   •  potassium chloride (KLOR-CON) 20 MEQ CR tablet, Take 1 tablet by mouth 3 (Three) Times a Day. (Patient taking differently: Take 1 tablet by mouth 2 (Two) Times a Day.), Disp: 30 tablet, Rfl: 0  •  Probiotic Product (Probiotic Daily) capsule, Take 1 capsule by mouth Daily. (Patient taking differently: Take 1 capsule by mouth Daily As Needed.), Disp: 90 capsule, Rfl: 1  •  rosuvastatin (CRESTOR) 40 MG tablet, TAKE 1 TABLET DAILY, Disp: 90 tablet, Rfl: 3  •  tamsulosin (FLOMAX) 0.4 MG capsule 24 hr capsule, Take 1 capsule by mouth every night at bedtime., Disp: , Rfl:   •  torsemide (DEMADEX) 20 MG tablet, TAKE ONE TABLET BY MOUTH TWICE A DAY, Disp: 60 tablet, Rfl: 5  •  valsartan (DIOVAN) 320 MG tablet, Take 1 tablet by mouth Daily., Disp: 90 tablet, Rfl: 1    Past Medical History:   Diagnosis Date   • Abnormal ECG    • Abnormal stress test    • Abrasion of face 1/29/2020   • Acute bronchitis    • Acute hypokalemia 9/16/2020   • Acute non-recurrent sinusitis 1/13/2020   • Acute pyelonephritis 10/9/2020   • Acute sinusitis    • DORI (acute kidney injury) ([facility]) 4/28/2017   • Allergic rhinitis    • Aortic root dilation (HCC)    • Arthritis    • Bacteremia due to  Escherichia coli 8/17/2020   • Balanitis 5/16/2021   • Benign enlargement of prostate    • C. difficile colitis 9/16/2020   • Cellulitis of knee, left 5/4/2017   • CHF (congestive heart failure) ([facility])    • Chronic diastolic congestive heart failure ([facility])    • Constipation 11/14/2020   • Contusion of face 1/29/2020   • Coronary artery disease    • CVA (cerebral vascular accident) ([facility]) 2020   • Diminished pulse     in lower extremities   • Discitis of lumbar region 10/9/2020   • Edema    • Elevated hematocrit    • Elevated hemoglobin ([facility])    • Essential hypertension    • Hearing loss     mala hearing aids   • Heart valve disease    • High risk medication use    • History of back pain    • History of dysuria    • History of echocardiogram    • History of intracranial hemorrhage    • History of scoliosis    • History of stroke    • Hyperlipidemia    • Hypokalemia 1/7/2021   • Injury of toe, left, initial encounter    • Irregular heart rate    • Knee pain, left    • Left bundle branch block    • Leg wound, left    • Leukocytosis 9/16/2020   • Low back pain    • Maxillary sinusitis 7/16/2021   • Medicare annual wellness visit, subsequent    • Minor head injury without loss of consciousness 1/29/2020   • Murmur    • Muscle cramps    • Need for hepatitis C screening test    • Paraphimosis 5/16/2021   • Pneumonia of left lung due to infectious organism 9/17/2021   • Polycythemia    • Polycythemia vera ([facility])    • Pressure injury of buttock, stage 2 ([facility]) 9/18/2020   • Prostate hyperplasia without urinary obstruction    • Prostatitis    • Proteinuria    • Pulmonary hypertension ([facility])    • Sacroiliitis ([facility]) 10/9/2020   • Scoliosis    • Sepsis due to Escherichia coli ([facility]) 8/17/2020   • Sepsis with metabolic encephalopathy ([facility]) 8/17/2020   • Stroke ([facility])    • SVT (supraventricular tachycardia) ([facility])    • Tachycardia    • Thrombocytosis    • TIA (transient ischemic attack)     2006   • Type 2 diabetes mellitus ([facility])    • Type  "2 diabetes mellitus with hyperglycemia (HCC) 2021   • Urinary tract infection due to extended-spectrum beta lactamase (ESBL) producing Escherichia coli 2020   • Valvular heart disease        Past Surgical History:   Procedure Laterality Date   • CARDIAC CATHETERIZATION     • CATARACT EXTRACTION Left 2021   • CATARACT EXTRACTION Right 2022   • COLONOSCOPY      did Cologuard approx 2019 and negative   • HAND SURGERY     • JOINT REPLACEMENT Right    • CT ARTHRP KNE CONDYLE&PLATU MEDIAL&LAT COMPARTMENTS Left 2017    Procedure: LT TOTAL KNEE ARTHROPLASTY;  Surgeon: Bertin Perrin MD;  Location: [facility] OR;  Service: Orthopedics   • PROSTATE SURGERY      Rezum steam treatment to shrink prostate by dr. guerrero       Family History   Problem Relation Age of Onset   • Hypertension Mother    • Other Father         ruptured appendix,  when pt. was 14 years old.   • Diabetes Paternal Aunt    • Diabetes Paternal Uncle    • No Known Problems Other    • Migraines Sister    • Stroke Sister    • Dementia Brother        Social History     Tobacco Use   • Smoking status: Former     Types: Cigarettes     Quit date:      Years since quittin.2   • Smokeless tobacco: Former   • Tobacco comments:     Caffeine daily   Vaping Use   • Vaping Use: Never used   Substance Use Topics   • Alcohol use: No   • Drug use: No         ECG 12 Lead    Date/Time: 3/7/2023 1:02 PM  Performed by: Marika Arias MD  Authorized by: Marika Arias MD   Comparison: compared with previous ECG   Similar to previous ECG  Rhythm: sinus rhythm  Ectopy: atrial premature contractions  Conduction: left bundle branch block and 1st degree AV block               Objective:     Visit Vitals  /70 (BP Location: Left arm, Patient Position: Sitting, Cuff Size: Adult)   Pulse 67   Ht 182.9 cm (72.01\")   Wt 116 kg (255 lb 3.2 oz)   SpO2 97%   BMI 34.60 kg/m²         Constitutional:       Appearance: Normal " appearance. Well-developed.   HENT:      Head: Normocephalic and atraumatic.   Neck:      Vascular: No carotid bruit or JVD.   Pulmonary:      Effort: Pulmonary effort is normal.      Breath sounds: Normal breath sounds.   Cardiovascular:      Normal rate. Regular rhythm.      No gallop.   Pulses:     Radial: 2+ bilaterally.  Edema:     Peripheral edema absent.   Abdominal:      Palpations: Abdomen is soft.   Skin:     General: Skin is warm and dry.   Neurological:      Mental Status: Alert and oriented to person, place, and time.             Assessment:          Diagnosis Plan   1. Chronic diastolic congestive heart failure (HCC)        2. Paroxysmal atrial fibrillation (HCC)        3. Atrial flutter, unspecified type (HCC)        4. Left bundle-branch block        5. Mixed hyperlipidemia        6. Class 2 severe obesity due to excess calories with serious comorbidity and body mass index (BMI) of 36.0 to 36.9 in adult (HCC)        7. CKD (chronic kidney disease) stage 2, GFR 60-89 ml/min        8. History of CVA (cerebrovascular accident)               Plan:         1.  Chronic diastolic congestive heart failure.  Has chronic bilateral lower extremity swelling that is fairly stable on his current dose of torsemide.  Continue the same.  2.  Paroxysmal atrial fibrillation.  Remains in sinus rhythm today.  On chronic anticoagulation with apixaban.  3.  Chronic left bundle branch block.  Recent echocardiogram shows no evidence of cardiomyopathy as a result of this.  4.  Hypertension.  Well-controlled on current regimen medications.  5.  Hyperlipidemia.  6.  Reported history of coronary artery disease  7.  Chronic kidney disease  8.  History of stroke  9.  Ascending aortic dilatation.  Echocardiogram recently shows an aortic root measuring about 4 cm.  This is similar to prior CT angiogram of the chest from 2018 which showed ascending aorta measuring about 4 cm.    We will plan on seeing the patient back again in 6  months.

## 2023-03-09 ENCOUNTER — TELEPHONE (OUTPATIENT)
Dept: FAMILY MEDICINE CLINIC | Facility: CLINIC | Age: 78
End: 2023-03-09
Payer: MEDICARE

## 2023-03-09 NOTE — TELEPHONE ENCOUNTER
Patient just called and states he just missed a call he thinks from [facility].  He just asks if she could call back if it was her.  His number is [phone].

## 2023-03-10 NOTE — TELEPHONE ENCOUNTER
I did not.  Please call to check on him.  Do you see anything else in chart about one of referrals may have tried to call him?  Thanks.

## 2023-03-17 RX ORDER — MEMANTINE HYDROCHLORIDE 5 MG/1
TABLET ORAL
Qty: 30 TABLET | Refills: 2 | Status: SHIPPED | OUTPATIENT
Start: 2023-03-17

## 2023-04-10 ENCOUNTER — OFFICE VISIT (OUTPATIENT)
Dept: FAMILY MEDICINE CLINIC | Facility: CLINIC | Age: 78
End: 2023-04-10
Payer: MEDICARE

## 2023-04-10 VITALS
DIASTOLIC BLOOD PRESSURE: 70 MMHG | SYSTOLIC BLOOD PRESSURE: 126 MMHG | WEIGHT: 255.3 LBS | HEIGHT: 72 IN | OXYGEN SATURATION: 99 % | BODY MASS INDEX: 34.58 KG/M2 | HEART RATE: 64 BPM | TEMPERATURE: 98 F

## 2023-04-10 DIAGNOSIS — I48.0 PAROXYSMAL ATRIAL FIBRILLATION: ICD-10-CM

## 2023-04-10 DIAGNOSIS — Z00.00 ENCOUNTER FOR MEDICARE ANNUAL WELLNESS EXAM: Primary | ICD-10-CM

## 2023-04-10 DIAGNOSIS — E11.3293 TYPE 2 DIABETES MELLITUS WITH BOTH EYES AFFECTED BY MILD NONPROLIFERATIVE RETINOPATHY WITHOUT MACULAR EDEMA, WITHOUT LONG-TERM CURRENT USE OF INSULIN: ICD-10-CM

## 2023-04-10 DIAGNOSIS — R41.3 MEMORY DIFFICULTIES: ICD-10-CM

## 2023-04-10 DIAGNOSIS — I10 ESSENTIAL (PRIMARY) HYPERTENSION: ICD-10-CM

## 2023-04-10 DIAGNOSIS — Z12.11 COLON CANCER SCREENING: ICD-10-CM

## 2023-04-10 DIAGNOSIS — D45 POLYCYTHEMIA VERA: ICD-10-CM

## 2023-04-10 DIAGNOSIS — U07.1 COVID-19 VIRUS INFECTION: ICD-10-CM

## 2023-04-10 DIAGNOSIS — R60.0 LOCALIZED EDEMA: ICD-10-CM

## 2023-04-10 DIAGNOSIS — J30.9 ALLERGIC RHINITIS, UNSPECIFIED SEASONALITY, UNSPECIFIED TRIGGER: ICD-10-CM

## 2023-04-10 PROCEDURE — 3051F HG A1C>EQUAL 7.0%<8.0%: CPT | Performed by: NURSE PRACTITIONER

## 2023-04-10 PROCEDURE — 3074F SYST BP LT 130 MM HG: CPT | Performed by: NURSE PRACTITIONER

## 2023-04-10 PROCEDURE — 96160 PT-FOCUSED HLTH RISK ASSMT: CPT | Performed by: NURSE PRACTITIONER

## 2023-04-10 PROCEDURE — 1170F FXNL STATUS ASSESSED: CPT | Performed by: NURSE PRACTITIONER

## 2023-04-10 PROCEDURE — 3078F DIAST BP <80 MM HG: CPT | Performed by: NURSE PRACTITIONER

## 2023-04-10 PROCEDURE — G0439 PPPS, SUBSEQ VISIT: HCPCS | Performed by: NURSE PRACTITIONER

## 2023-04-10 PROCEDURE — 1159F MED LIST DOCD IN RCRD: CPT | Performed by: NURSE PRACTITIONER

## 2023-04-10 NOTE — PROGRESS NOTES
The ABCs of the Annual Wellness Visit  Subsequent Medicare Wellness Visit    Subjective    Erlin Blanco is a 77 y.o. male who presents for a Subsequent Medicare Wellness Visit.    The following portions of the patient's history were reviewed and   updated as appropriate: allergies, current medications, past family history, past medical history, past social history, past surgical history and problem list.    Compared to one year ago, the patient feels his physical   health is better.    Compared to one year ago, the patient feels his mental   health is the same.    Recent Hospitalizations:  He was not admitted to the hospital during the last year.       Current Medical Providers:  Patient Care Team:  Zamzam John APRN as PCP - General (Family Medicine)  Bertin Perrin MD as Consulting Physician (Orthopedic Surgery)  Marika Arias MD as Consulting Physician (Cardiology)  Remy Thomas MD as Consulting Physician (Hematology and Oncology)  Sahil De La Rosa MD as Consulting Physician (Urology)    Outpatient Medications Prior to Visit   Medication Sig Dispense Refill   • Accu-Chek FastClix Lancets misc TEST BLOOD SUGAR 1-2 TIMES DAILY AND AS NEEDED     • amLODIPine (NORVASC) 5 MG tablet Take 1 tablet by mouth Daily. 90 tablet 1   • apixaban (Eliquis) 5 MG tablet tablet Take 1 tablet by mouth Every 12 (Twelve) Hours. 180 tablet 1   • aspirin (ASPIRIN LOW DOSE) 81 MG EC tablet Take 1 tablet by mouth Daily. 90 tablet 2   • Blood Glucose Monitoring Suppl (Accu-Chek Guide Me) w/Device kit TEST BLOOD SUGAR 1-2 TIMES DAILY AND AS NEEDED     • carvedilol (COREG) 6.25 MG tablet TAKE 1 TABLET TWICE A DAY WITH MEALS 180 tablet 3   • doxazosin (CARDURA) 1 MG tablet Take 1 tablet by mouth Every Night.     • fluticasone (FLONASE) 50 MCG/ACT nasal spray USE 2 SPRAYS IN NOSTRIL(S) AS DIRECTED BY PROVIDER DAILY 48 g 3   • glimepiride (AMARYL) 2 MG tablet TAKE 1 TABLET BY MOUTH TWO TIMES A DAY WITH MEALS 60 tablet 2    • glucosamine-chondroitin 500-400 MG capsule capsule Take 1 capsule by mouth Daily.     • glucose blood (Accu-Chek Guide) test strip Use as instructed to test blood sugar twice daily. 100 each 2   • guaiFENesin (MUCINEX) 600 MG 12 hr tablet Take 2 tablets by mouth 2 (Two) Times a Day.     • hydrALAZINE (APRESOLINE) 100 MG tablet TAKE 1 TABLET THREE TIMES A  tablet 3   • hydroxyurea (HYDREA) 500 MG capsule Take 1 capsule by mouth Daily.     • Insulin Pen Needle (B-D UF III MINI PEN NEEDLES) 31G X 5 MM misc Inject 1 each under the skin into the appropriate area as directed Daily. 100 each 2   • memantine (NAMENDA) 5 MG tablet TAKE 1 TABLET DAILY (NEED APPOINTMENT) 30 tablet 2   • metFORMIN ER (GLUCOPHAGE-XR) 500 MG 24 hr tablet 2 tablets twice daily with meals 360 tablet 1   • montelukast (SINGULAIR) 10 MG tablet TAKE 1 TABLET DAILY 90 tablet 3   • Multiple Vitamins-Minerals (MULTIVITAMIN ADULT PO) Take 1 tablet by mouth Daily.     • potassium chloride (KLOR-CON) 20 MEQ CR tablet Take 1 tablet by mouth 3 (Three) Times a Day. (Patient taking differently: Take 1 tablet by mouth 2 (Two) Times a Day.) 30 tablet 0   • Probiotic Product (Probiotic Daily) capsule Take 1 capsule by mouth Daily. (Patient taking differently: Take 1 capsule by mouth Daily As Needed.) 90 capsule 1   • rosuvastatin (CRESTOR) 40 MG tablet TAKE 1 TABLET DAILY 90 tablet 3   • tamsulosin (FLOMAX) 0.4 MG capsule 24 hr capsule Take 1 capsule by mouth every night at bedtime.     • torsemide (DEMADEX) 20 MG tablet TAKE ONE TABLET BY MOUTH TWICE A DAY 60 tablet 5   • valsartan (DIOVAN) 320 MG tablet Take 1 tablet by mouth Daily. 90 tablet 1     No facility-administered medications prior to visit.       No opioid medication identified on active medication list. I have reviewed chart for other potential  high risk medication/s and harmful drug interactions in the elderly.          Aspirin is on active medication list. Aspirin use is indicated based  on review of current medical condition/s. Pros and cons of this therapy have been discussed today. Benefits of this medication outweigh potential harm.  Patient has been encouraged to continue taking this medication.  .      Patient Active Problem List   Diagnosis   • Osteoarthritis, knee   • Type 2 diabetes mellitus with both eyes affected by mild nonproliferative retinopathy without macular edema, without long-term current use of insulin   • Essential (primary) hypertension   • Coronary artery disease   • Supraventricular tachycardia   • TIA (transient ischemic attack)   • Hyperlipidemia   • Benign prostatic hyperplasia without urinary obstruction   • Class 2 severe obesity due to excess calories with serious comorbidity and body mass index (BMI) of 36.0 to 36.9 in adult   • Polycythemia vera   • Acute non-recurrent sinusitis   • Pain in both knees   • Allergic rhinitis   • Left bundle-branch block   • Fatigue   • Atrial flutter   • History of CVA (cerebrovascular accident)   • Cervical radiculitis   • Chronic diastolic congestive heart failure (HCC)   • CKD (chronic kidney disease) stage 2, GFR 60-89 ml/min   • Glucosuria   • Paroxysmal atrial fibrillation   • Memory difficulties   • Bradycardia   • Anemia   • Low back pain   • Back pain   • Cardiovascular stress test abnormal   • Prostatitis   • Localized edema   • Murmur   • Pulmonary hypertension   • Thrombocytosis   • Arthritis   • Proteinuria   • Fall at home   • Cognitive communication deficit   • COVID-19 virus infection     Advance Care Planning   Advance Care Planning     Advance Directive is not on file.  ACP discussion was held with the patient during this visit. Patient has an advance directive (not in EMR), copy requested.     Objective    Vitals:    04/10/23 1048   BP: 126/70   BP Location: Left arm   Patient Position: Sitting   Cuff Size: Adult   Pulse: 64   Temp: 98 °F (36.7 °C)   SpO2: 99%   Weight: 116 kg (255 lb 4.8 oz)   Height: 182.9 cm  "(72.01\")     Estimated body mass index is 34.62 kg/m² as calculated from the following:    Height as of this encounter: 182.9 cm (72.01\").    Weight as of this encounter: 116 kg (255 lb 4.8 oz).    BMI is >= 30 and <35. (Class 1 Obesity). The following options were offered after discussion;: nutrition counseling/recommendations      Does the patient have evidence of cognitive impairment? No    Lab Results   Component Value Date    CHLPL 98 2023    TRIG 99 2023    HDL 38 (L) 2023    LDL 41 2023    VLDL 19 2023    HGBA1C 7.70 (H) 2023        HEALTH RISK ASSESSMENT    Smoking Status:  Social History     Tobacco Use   Smoking Status Former   • Types: Cigarettes   • Quit date:    • Years since quittin.3   Smokeless Tobacco Former   Tobacco Comments    Caffeine daily     Alcohol Consumption:  Social History     Substance and Sexual Activity   Alcohol Use No     Fall Risk Screen:    OSMANYADI Fall Risk Assessment was completed, and patient is at MODERATE risk for falls. Assessment completed on:4/10/2023    Depression Screening:  PHQ-2/PHQ-9 Depression Screening 4/10/2023   Little Interest or Pleasure in Doing Things 0-->not at all   Feeling Down, Depressed or Hopeless 0-->not at all   PHQ-9: Brief Depression Severity Measure Score 0       Health Habits and Functional and Cognitive Screening:  Functional & Cognitive Status 4/10/2023   Do you have difficulty preparing food and eating? No   Do you have difficulty bathing yourself, getting dressed or grooming yourself? No   Do you have difficulty using the toilet? No   Do you have difficulty moving around from place to place? No   Do you have trouble with steps or getting out of a bed or a chair? No   Current Diet Well Balanced Diet   Dental Exam Up to date   Eye Exam Up to date   Exercise (times per week) 0 times per week   Current Exercises Include No Regular Exercise   Do you need help using the phone?  No   Are you deaf or do you " have serious difficulty hearing?  Yes   Do you need help with transportation? No   Do you need help shopping? No   Do you need help preparing meals?  No   Do you need help with housework?  No   Do you need help with laundry? No   Do you need help taking your medications? No   Do you need help managing money? No   Do you ever drive or ride in a car without wearing a seat belt? No   Have you felt unusual stress, anger or loneliness in the last month? No   Who do you live with? Spouse   If you need help, do you have trouble finding someone available to you? No   Have you been bothered in the last four weeks by sexual problems? No   Do you have difficulty concentrating, remembering or making decisions? No       Age-appropriate Screening Schedule:  Refer to the list below for future screening recommendations based on patient's age, sex and/or medical conditions. Orders for these recommended tests are listed in the plan section. The patient has been provided with a written plan.    Health Maintenance   Topic Date Due   • ZOSTER VACCINE (2 of 3) 02/01/2013   • COLORECTAL CANCER SCREENING  04/12/2022   • COVID-19 Vaccine (5 - Booster for Pfizer series) 07/10/2023 (Originally 9/12/2022)   • DIABETIC FOOT EXAM  07/11/2023   • HEMOGLOBIN A1C  07/16/2023   • INFLUENZA VACCINE  08/01/2023   • URINE MICROALBUMIN  10/11/2023   • DIABETIC EYE EXAM  12/02/2023   • LIPID PANEL  02/13/2024   • ANNUAL WELLNESS VISIT  04/10/2024   • TDAP/TD VACCINES (3 - Td or Tdap) 01/25/2030   • HEPATITIS C SCREENING  Completed   • Pneumococcal Vaccine 65+  Completed   • AAA SCREEN (ONE-TIME)  Completed     Has hearing aids; will get Shingrix at pharmacy.  Due for repeat Cologuard.           CMS Preventative Services Quick Reference  Risk Factors Identified During Encounter  Fall Risk-High or Moderate: Discussed Fall Prevention in the home  Immunizations Discussed/Encouraged: Shingrix  The above risks/problems have been discussed with the  patient.  Pertinent information has been shared with the patient in the After Visit Summary.  An After Visit Summary and PPPS were made available to the patient.    Follow Up:   Next Medicare Wellness visit to be scheduled in 1 year.       Additional E&M Note during same encounter follows:  Patient has multiple medical problems which are significant and separately identifiable that require additional work above and beyond the Medicare Wellness Visit.      Chief Complaint  Annual Exam ([facility], BLOOD WORK /)    Subjective      HPI  Erlin Blanco is also being seen today for follow up DM2: takes Glimepiride and Metformin twice daily; last A1c 7.7%; saw endo and increased Metformin to 2 tablets twice daily; monitor blood sugar and has been running 90-120s; watches intake of carbs/sugars, has been better with this; has been trying to stay active outdoors; no numbness or tingling; had wellness provider from [facility] come to home recently and checked circulation in feet and WNL; last eye exam 12/2022.     F/U HTN: takes Amlodipine and Valsartan daily, Carvedilol and Torsemide twice daily, and Hydralazine TID; takes KCL twice daily; also takes Doxazosin nightly per renal; monitors BP, has been running 120s/70s; no headaches; no orthostasis; not much swelling, swelling has improved; sees Dr. Arias cardiology; had recent follow up March 2022 and no changes; had Echo 9/2022.    F/U atrial fib: takes Eliquis twice daily and aspirin daily.    Has not been taking Mucinex recently; takes Montelukast and Flonase daily and helps; has had some productive cough since had COVID-19 virus recently; was seen at [facility] and given Rx for Molnupiravir; has been hoarse as well; overall, feeling better.    Takes Namenda daily; sees neurology; has upcoming follow up appointment.    F/U polycythemia vera: sees hematology with [facility]; takes Hydroxyurea daily; had phlebotomy about 2 months ago and was first time in about 1 year.    Sees  "urology and they manage prostate; occasional blood in urine, attributed to Eliquis.      Review of Systems   Constitutional: Negative for appetite change, chills, fatigue and fever.   HENT: Negative for ear pain, postnasal drip, sore throat and trouble swallowing. Rhinorrhea: has had stuffy nose. Sinus pressure: some; see HPI.    Eyes: Negative for discharge and visual disturbance.   Respiratory: Negative for chest tightness and shortness of breath. Cough: some at times; see HPI.    Cardiovascular: Negative for chest pain and palpitations.   Gastrointestinal: Negative for abdominal pain, blood in stool, constipation and diarrhea.   Endocrine: Negative for cold intolerance, heat intolerance and polydipsia.   Genitourinary: Negative for dysuria.   Musculoskeletal: Negative for back pain.   Skin: Negative for rash.   Neurological: Negative for syncope and weakness.   Psychiatric/Behavioral: Negative for dysphoric mood. The patient is not nervous/anxious.        Objective   Vital Signs:  /70 (BP Location: Left arm, Patient Position: Sitting, Cuff Size: Adult)   Pulse 64   Temp 98 °F (36.7 °C)   Ht 182.9 cm (72.01\")   Wt 116 kg (255 lb 4.8 oz)   SpO2 99%   BMI 34.62 kg/m²     Physical Exam  Vitals and nursing note reviewed.   Constitutional:       General: He is not in acute distress.     Appearance: He is well-developed and well-groomed. He is not diaphoretic.   HENT:      Head: Normocephalic and atraumatic.      Jaw: No tenderness or pain on movement.      Right Ear: External ear normal. Right ear decreased hearing: has hearing aid. Right ear middle ear effusion: mild. Right ear injected TM: mild.      Left Ear: External ear normal. Left ear decreased hearing: has hearing aid. Left ear middle ear effusion: mild. Left ear injected TM: mild.      Nose: Nose normal.      Right Sinus: No maxillary sinus tenderness or frontal sinus tenderness.      Left Sinus: No frontal sinus tenderness. Left maxillary sinus " tenderness: mild.      Mouth/Throat:      Mouth: Mucous membranes are moist.      Pharynx: Oropharyngeal exudate: mild drainage in throat. Posterior oropharyngeal erythema: mild in posterior pharynx.   Eyes:      Extraocular Movements: Extraocular movements intact.      Conjunctiva/sclera: Conjunctivae normal.      Pupils: Pupils are equal, round, and reactive to light.   Neck:      Thyroid: No thyromegaly.      Vascular: No carotid bruit.      Trachea: No tracheal deviation.   Cardiovascular:      Rate and Rhythm: Normal rate and regular rhythm.      Pulses: Normal pulses.      Heart sounds: Normal heart sounds.   Pulmonary:      Effort: Pulmonary effort is normal. No respiratory distress.      Breath sounds: Normal breath sounds.   Abdominal:      General: Bowel sounds are normal.      Palpations: Abdomen is soft. There is no hepatomegaly or splenomegaly.      Tenderness: There is no abdominal tenderness. There is no guarding.   Musculoskeletal:         General: Normal range of motion.      Cervical back: Normal range of motion and neck supple. No bony tenderness.      Thoracic back: No bony tenderness.      Lumbar back: No bony tenderness.      Right lower leg: Right lower leg edema: trace pretibial.      Left lower leg: Left lower leg edema: trace-1+ pretibial.   Lymphadenopathy:      Cervical: No cervical adenopathy.   Skin:     General: Skin is warm and dry.      Findings: No rash.   Neurological:      Mental Status: He is alert and oriented to person, place, and time.      Cranial Nerves: No cranial nerve deficit.      Coordination: Coordination normal.      Gait: Abnormal gait: limping on left with 4 point cane.      Deep Tendon Reflexes: Reflexes are normal and symmetric.   Psychiatric:         Mood and Affect: Mood normal.         Behavior: Behavior normal.         Thought Content: Thought content normal.         Judgment: Judgment normal.        Lab Results   Component Value Date    WBC 8.39 02/13/2023     RBC 4.48 02/13/2023    HGB 15.1 02/13/2023    HCT 43.3 02/13/2023    MCV 96.7 02/13/2023    MCH 33.7 (H) 02/13/2023    MCHC 34.9 02/13/2023    RDW 13.5 02/13/2023    RDWSD 46.4 03/25/2022    MPV 10.6 03/25/2022     02/13/2023    NEUTRORELPCT 66.1 02/13/2023    LYMPHORELPCT 16.8 (L) 02/13/2023    MONORELPCT 5.2 02/13/2023    EOSRELPCT 9.4 (H) 02/13/2023    BASORELPCT 1.9 (H) 02/13/2023    AUTOIGPER 0.4 03/25/2022    NEUTROABS 5.7 03/20/2023    LYMPHSABS 1.41 02/13/2023    MONOSABS 0.44 02/13/2023    EOSABS 0.4 03/20/2023    BASOSABS 0.2 03/20/2023    AUTOIGNUM 0.03 03/25/2022    NRBC 0.2 02/13/2023     Lab Results   Component Value Date    GLUCOSE 229 (H) 02/13/2023    BUN 21 02/13/2023    CREATININE 1.03 02/13/2023    EGFRIFNONA 57 (L) 01/10/2022    EGFRIFAFRI 66 01/10/2022    BCR 20.4 02/13/2023    K 4.5 02/13/2023    CO2 26.7 02/13/2023    CALCIUM 9.8 02/13/2023    PROTENTOTREF 6.5 02/13/2023    ALBUMIN 4.6 02/13/2023    LABIL2 2.4 02/13/2023    AST 26 02/13/2023    ALT 19 02/13/2023      Lab Results   Component Value Date    CHLPL 98 02/13/2023    TRIG 99 02/13/2023    HDL 38 (L) 02/13/2023    VLDL 19 02/13/2023    LDL 41 02/13/2023     Lab Results   Component Value Date    TSH 2.930 02/13/2023     Lab Results   Component Value Date    HGBA1C 7.70 (H) 01/16/2023              Assessment and Plan Diagnoses and all orders for this visit:    1. Encounter for Medicare annual wellness exam (Primary)    2. Type 2 diabetes mellitus with both eyes affected by mild nonproliferative retinopathy without macular edema, without long-term current use of insulin  Assessment & Plan:  Diabetes is stable.   Continue current treatment regimen.  Reminded to get yearly retinal exam.  Diabetes will be reassessed in 6 months.  Continue Glimepiride and Metformin twice daily.  Follow up as scheduled with endocrine.      3. Colon cancer screening  -     Cologuard - Stool, Per Rectum; Future    4. Allergic rhinitis, unspecified  seasonality, unspecified trigger  Assessment & Plan:  Make sure drinking adequate liquids.  Try daily antihistamine, such as Claritin or Allegra.      5. Polycythemia vera  Assessment & Plan:  Continue Hydroxyurea daily.  Follow up as scheduled with hematology.      6. Localized edema  Assessment & Plan:  Continue Torsemide twice daily and KCL twice daily.      7. Paroxysmal atrial fibrillation  Assessment & Plan:  Continue Eliquis twice daily.      8. Essential (primary) hypertension  Assessment & Plan:  Hypertension is stable.  Continue current medications.  Ambulatory blood pressure monitoring.  Blood pressure will be reassessed at the next regular appointment.  Continue Amlodipine and Valsartan daily.  Continue Carvedilol and Torsemide twice daily.  Continue Hydralazine TID and Doxazosin nightly.      9. Memory difficulties  Assessment & Plan:  Continue Namenda daily.  Follow up as scheduled with neurology.      10. COVID-19 virus infection  Assessment & Plan:  Resolving.  Make sure drinking adequate liquids.             Follow Up  Return in about 6 months (around 10/10/2023) for Recheck.or sooner if problems or concerns.    Patient was given instructions and counseling regarding his condition or for health maintenance advice. Please see specific information pulled into the AVS if appropriate.

## 2023-04-10 NOTE — PATIENT INSTRUCTIONS
Make sure drinking adequate liquids.  Try daily antihistamine, such as Claritin or Allegra.  Continue to monitor your blood sugar once daily before a meal and record results.  Continue to monitor your blood pressure periodically and record results.  Continue to work on healthy diet.  Follow up in 6 months, or sooner if symptoms persist or worsen.   Follow up as scheduled with endocrine.      Medicare Wellness  Personal Prevention Plan of Service     Date of Office Visit:    Encounter Provider:  MUKUL Dover  Place of Service:  [facility]  Patient Name: Erlin Blanco  :  1945    As part of the Medicare Wellness portion of your visit today, we are providing you with this personalized preventive plan of services (PPPS). This plan is based upon recommendations of the [place] Preventive Services Task Force (USPSTF) and the Advisory Committee on Immunization Practices (ACIP).    This lists the preventive care services that should be considered, and provides dates of when you are due. Items listed as completed are up-to-date and do not require any further intervention.    Health Maintenance   Topic Date Due    ZOSTER VACCINE (2 of 3) 2013    COLORECTAL CANCER SCREENING  2022    COVID-19 Vaccine (5 - Booster for Pfizer series) 07/10/2023 (Originally 2022)    DIABETIC FOOT EXAM  2023    HEMOGLOBIN A1C  2023    INFLUENZA VACCINE  2023    URINE MICROALBUMIN  10/11/2023    DIABETIC EYE EXAM  2023    LIPID PANEL  2024    ANNUAL WELLNESS VISIT  04/10/2024    TDAP/TD VACCINES (3 - Td or Tdap) 2030    HEPATITIS C SCREENING  Completed    Pneumococcal Vaccine 65+  Completed    AAA SCREEN (ONE-TIME)  Completed       Orders Placed This Encounter   Procedures    Cologuard - Stool, Per Rectum     Standing Status:   Future     Standing Expiration Date:   2024     Order Specific Question:   Release to patient     Answer:   Routine  Release       Return in about 6 months (around 10/10/2023) for Recheck.

## 2023-04-11 PROBLEM — U07.1 COVID-19 VIRUS INFECTION: Status: ACTIVE | Noted: 2023-04-11

## 2023-04-11 NOTE — ASSESSMENT & PLAN NOTE
Hypertension is stable.  Continue current medications.  Ambulatory blood pressure monitoring.  Blood pressure will be reassessed at the next regular appointment.  Continue Amlodipine and Valsartan daily.  Continue Carvedilol and Torsemide twice daily.  Continue Hydralazine TID and Doxazosin nightly.

## 2023-04-11 NOTE — ASSESSMENT & PLAN NOTE
Diabetes is stable.   Continue current treatment regimen.  Reminded to get yearly retinal exam.  Diabetes will be reassessed in 6 months.  Continue Glimepiride and Metformin twice daily.  Follow up as scheduled with endocrine.

## 2023-06-02 ENCOUNTER — TELEPHONE (OUTPATIENT)
Dept: NEUROLOGY | Facility: CLINIC | Age: 78
End: 2023-06-02

## 2023-06-02 NOTE — TELEPHONE ENCOUNTER
PT CALLING ASKING TO RESCHEDULE APPT (MEMORY PT) NOW FOR 6/8/23.  HUB HAS NO OPEN Thursday THAT WORKS WITH PT SCHEDULE.    PLEASE CONTACT PATIENT TO RESCHEDULE APPT.

## 2023-06-02 NOTE — TELEPHONE ENCOUNTER
Pt called the Hub to schedule an earlier time slot. Hub was un able to schedule. I called pt and rescheduled for June 15th at 9 am, as provider had no other 40min time slots available on 6/8.

## 2023-06-02 NOTE — TELEPHONE ENCOUNTER
Called pt about 8:15 am regarding rescheduling. Explained that they were put on the wrong day, and was scheduled on a Thursday when provider does all memory appt. [employer] message sent.

## 2023-06-13 ENCOUNTER — OFFICE VISIT (OUTPATIENT)
Dept: ENDOCRINOLOGY | Age: 78
End: 2023-06-13
Payer: MEDICARE

## 2023-06-13 VITALS
TEMPERATURE: 97.1 F | OXYGEN SATURATION: 98 % | RESPIRATION RATE: 18 BRPM | DIASTOLIC BLOOD PRESSURE: 76 MMHG | HEIGHT: 72 IN | BODY MASS INDEX: 34.73 KG/M2 | WEIGHT: 256.4 LBS | SYSTOLIC BLOOD PRESSURE: 142 MMHG | HEART RATE: 70 BPM

## 2023-06-13 DIAGNOSIS — E78.2 MIXED HYPERLIPIDEMIA: ICD-10-CM

## 2023-06-13 DIAGNOSIS — I10 ESSENTIAL (PRIMARY) HYPERTENSION: ICD-10-CM

## 2023-06-13 DIAGNOSIS — E11.3293 TYPE 2 DIABETES MELLITUS WITH BOTH EYES AFFECTED BY MILD NONPROLIFERATIVE RETINOPATHY WITHOUT MACULAR EDEMA, WITHOUT LONG-TERM CURRENT USE OF INSULIN: Primary | ICD-10-CM

## 2023-06-13 NOTE — PROGRESS NOTES
Chief Complaint  Chief Complaint   Patient presents with    Diabetes     4 month follow up visit        Subjective          History of Present Illness    Erlin Blanco 78 y.o. presents for a follow-up evaluation for type 2 DM    He has been diabetic since 2014    Pt is on the following medications for their DM: glimepiride 2 mg twice a day and metformin  mg 2 tablets twice daily.      Past medications Lantus - it was making his blood sugar variable  Steglatro caused balanitis        Denies diarrhea, constipation, chest pain, shortness of breath, vision changes or numbness and tingling in feet/hands.    Weight gain of 3 lbs since last visit.    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  Last eye exam was 12/22    Pt does have a history of nephropathy.  Patient is currently taking ARB - follows with Nephrology    Pt does not have neuropathy.  Pt follows with podiatry every 2-3 months    Pt does have a history of CVA - He had a stroke more than 10 years ago with left leg weakness     Last A1C in 01/23 was 7.70  Fructosamine in 02/23 was 386    Last microalbumin in 10/22 was positive          Blood Sugars    Blood glucoses are checked 1/day.    Fasting blood glucoses: 100-130s    Pt has no episodes of hypoglycemia.            Hyperlipidemia     Pt is currently taking Crestor 40 mg HS    Last lipid panel in 02/23 showed Total 98, HDL 38, LDL 41 and Triglycerides 99            Hypertension    He has history of paroxysmal atrial fibrillation     Pt denies any chest pain, palpitations, shortness of breath or headache    Current regimen includes torsemide 20 mg twice a day, Coreg 6.25 mg twice a day, hydralazine 100 mg 3 times a day, valsartan 320 mg daily and amlodipine 5 mg daily,     He follows with Dr. Arias               Other History    He has history of polycythemia vera and is on hydroxyurea prescribed by Dr. Thomas. His periodically has phlebotomy.          I have  "reviewed the patient's allergies, medicines, past medical hx, family hx and social hx.    Objective   Vital Signs:   /76 (BP Location: Left arm, Patient Position: Sitting, Cuff Size: Adult)   Pulse 70   Temp 97.1 °F (36.2 °C) (Infrared)   Resp 18   Ht 182.9 cm (72.01\")   Wt 116 kg (256 lb 6.4 oz)   SpO2 98%   BMI 34.76 kg/m²       Physical Exam   Physical Exam  Constitutional:       General: He is not in acute distress.     Appearance: Normal appearance. He is not diaphoretic.   HENT:      Head: Normocephalic and atraumatic.   Eyes:      General:         Right eye: No discharge.         Left eye: No discharge.   Skin:     General: Skin is warm and dry.   Neurological:      Mental Status: He is alert.   Psychiatric:         Mood and Affect: Mood normal.         Behavior: Behavior normal.                  Results Review:   Hemoglobin A1C   Date Value Ref Range Status   01/16/2023 7.70 (H) 4.80 - 5.60 % Final     Comment:     Hemoglobin A1C Ranges:  Increased Risk for Diabetes  5.7% to 6.4%  Diabetes                     >= 6.5%  Diabetic Goal                < 7.0%     09/18/2021 7.20 (H) 4.80 - 5.60 % Final     Triglycerides   Date Value Ref Range Status   02/13/2023 99 0 - 150 mg/dL Final     HDL Cholesterol   Date Value Ref Range Status   02/13/2023 38 (L) 40 - 60 mg/dL Final     LDL Chol Calc (NIH)   Date Value Ref Range Status   02/13/2023 41 0 - 100 mg/dL Final     VLDL Cholesterol Gary   Date Value Ref Range Status   02/13/2023 19 5 - 40 mg/dL Final     LDL/HDL RATIO   Date Value Ref Range Status   01/16/2023 0.92  Final         Assessment and Plan {CC Problem List  Visit Diagnosis  ROS  Review (Popup)  Health Maintenance  Quality  BestPractice  Medications  SmartSets  SnapShot Encounters  Media :23  Diagnoses and all orders for this visit:    1. Type 2 diabetes mellitus with both eyes affected by mild nonproliferative retinopathy without macular edema, without long-term current use of " insulin (Primary)  -     Hemoglobin A1c    Check labs today  Continue with glimepiride 2 mg twice a day and metformin  mg 2 tablets twice daily.  Check blood sugars 1-2 times daily      2. Mixed hyperlipidemia  -     Lipid Panel    Check labs today  Continue with statin      3. Essential (primary) hypertension     Stable  Continue with current medication regimen  Defer management to PCP        No refills needed at this time    Labs today  RTC in 4 months with me and 8 months with Dr. Yang      Follow Up     Patient was given instructions and counseling regarding her condition or for health maintenance advice. Please see specific information pulled into the AVS if appropriate.              Mendy Wiseman, MUKUL  06/13/23

## 2023-06-14 LAB
CHOLEST SERPL-MCNC: 96 MG/DL (ref 0–200)
HBA1C MFR BLD: 7 % (ref 4.8–5.6)
HDLC SERPL-MCNC: 38 MG/DL (ref 40–60)
IMP & REVIEW OF LAB RESULTS: NORMAL
LDLC SERPL CALC-MCNC: 42 MG/DL (ref 0–100)
TRIGL SERPL-MCNC: 74 MG/DL (ref 0–150)
VLDLC SERPL CALC-MCNC: 16 MG/DL (ref 5–40)

## 2023-06-15 ENCOUNTER — OFFICE VISIT (OUTPATIENT)
Dept: NEUROLOGY | Facility: CLINIC | Age: 78
End: 2023-06-15
Payer: MEDICARE

## 2023-06-15 VITALS
HEART RATE: 68 BPM | DIASTOLIC BLOOD PRESSURE: 82 MMHG | BODY MASS INDEX: 34.54 KG/M2 | SYSTOLIC BLOOD PRESSURE: 150 MMHG | HEIGHT: 72 IN | WEIGHT: 255 LBS | OXYGEN SATURATION: 97 %

## 2023-06-15 DIAGNOSIS — Z86.73 HISTORY OF CVA (CEREBROVASCULAR ACCIDENT): Primary | ICD-10-CM

## 2023-06-15 DIAGNOSIS — G31.84 MILD COGNITIVE IMPAIRMENT WITH MEMORY LOSS: ICD-10-CM

## 2023-06-15 RX ORDER — MEMANTINE HYDROCHLORIDE 5 MG/1
10 TABLET ORAL DAILY
Qty: 30 TABLET | Refills: 5 | Status: SHIPPED | OUTPATIENT
Start: 2023-06-15 | End: 2023-06-15 | Stop reason: SDUPTHER

## 2023-06-15 RX ORDER — MEMANTINE HYDROCHLORIDE 5 MG/1
10 TABLET ORAL DAILY
Qty: 90 TABLET | Refills: 1 | Status: SHIPPED | OUTPATIENT
Start: 2023-06-15

## 2023-06-15 NOTE — ASSESSMENT & PLAN NOTE
78 year old man with history of stroke and memory impairment/mild cognitive impairment.  Of note he was evaluated in the hospital for extended period of time starting in 8/2020 for Ecoli sepsis and discitis leading to left leg weakness and confusion for an extended period of time and since September 2020 they have noticed trouble with his short term memory.  He had a brain MRI scan on 9/22/2020 which demonstrates old lacunar strokes and a more acute stroke in the left corona radiata and atrophy.  They tell me that during this hospitalization he was placed on combination of Eliquis and aspirin which he continues to take.  He has not had any further stroke symptoms since being discharged from the hospital where he was evaluated by my colleague Dr. Ledbetter.   He has not had any new stroke like symptoms and doing well on current medications.  Again advised him to be taken to the nearest ED with any new stroke like symptoms.  BP is a high today but he tells me it is usually better.  I advised him to monitor closely at home and he tells me he checks his often at home.

## 2023-06-15 NOTE — LETTER
Bev 15, 2023     Zamzam John, APRN  [address] [facility]  [address]  [facility] [address]    Patient: Erlin Blanco   YOB: 1945   Date of Visit: 6/15/2023       Dear Dr. John, MUKUL:    Thank you for referring Erlin Blanco to me for evaluation. Below are the relevant portions of my assessment and plan of care.    If you have questions, please do not hesitate to call me. I look forward to following Erlin along with you.         Sincerely,        Inderjit Adams MD        CC: No Recipients    Inderjit Adams MD  06/15/23 0854  Sign when Signing Visit  Chief Complaint  Memory Loss    Subjective          Erlin Blanco presents to [facility] for   HISTORY OF PRESENT ILLNESS:    Erlin Blanco is a 78 year old man who returns to neurology clinic for follow up evaluation and treatment of history of stroke and memory impairment/mild cognitive impairment.  Of note he was evaluated in the hospital for extended period of time starting in 8/2020 for Ecoli sepsis and discitis leading to left leg weakness and confusion for an extended period of time and since September 2020 they have noticed trouble with his short term memory.  He had a brain MRI scan on 9/22/2020 which demonstrates old lacunar strokes and a more acute stroke in the left corona radiata and atrophy.  They tell me that during this hospitalization he was placed on combination of Eliquis and aspirin which he continues to take.  He has not had any further stroke symptoms since being discharged from the hospital where he was evaluated by my colleague Dr. Ledbetter.  He has had short term memory problems since this time and he does not remember picking things up and forgetting to set the alarm or put the garage door down.  He will forget that they have had conversations.  His older brother had Alzheimer's dementia.  No other family members that they are aware of with dementia.  He is not having any problems with driving.   He has good long term memory.  His personality has changed somewhat and can have short temper and quick to get upset.  Left leg is weak following his hospitalization.  He uses a cane to ambulate.  He has had lab work done recently with normal TSH and Vit B12 and folate.  He does not drink alcohol and does not smoke.  I prescribed him donepezil which caused hallucinations so this medicine was discontinued and instead he is on memantine 5 mg once a day. He is tolerating this medicine well without any side effects.  His wife is helping with their finances.  He has POA and Living Will.  No MVA accidents.      Past Medical History:   Diagnosis Date   • Abnormal ECG    • Abnormal stress test    • Abrasion of face 1/29/2020   • Acute bronchitis    • Acute hypokalemia 9/16/2020   • Acute non-recurrent sinusitis 1/13/2020   • Acute pyelonephritis 10/9/2020   • Acute sinusitis    • DORI (acute kidney injury) 4/28/2017   • Allergic rhinitis    • Aortic root dilation    • Arthritis    • Bacteremia due to Escherichia coli 8/17/2020   • Balanitis 5/16/2021   • Benign enlargement of prostate    • C. difficile colitis 9/16/2020   • Cellulitis of knee, left 5/4/2017   • CHF (congestive heart failure)    • Chronic diastolic congestive heart failure    • Constipation 11/14/2020   • Contusion of face 1/29/2020   • Coronary artery disease    • CVA (cerebral vascular accident) 2020   • Diminished pulse     in lower extremities   • Discitis of lumbar region 10/9/2020   • Edema    • Elevated hematocrit    • Elevated hemoglobin    • Essential hypertension    • Hearing loss     mala hearing aids   • Heart valve disease    • High risk medication use    • History of back pain    • History of dysuria    • History of echocardiogram    • History of intracranial hemorrhage    • History of scoliosis    • History of stroke    • Hyperlipidemia    • Hypokalemia 1/7/2021   • Injury of toe, left, initial encounter    • Irregular heart rate    • Knee pain,  left    • Left bundle branch block    • Leg wound, left    • Leukocytosis 2020   • Low back pain    • Maxillary sinusitis 2021   • Medicare annual wellness visit, subsequent    • Minor head injury without loss of consciousness 2020   • Murmur    • Muscle cramps    • Need for hepatitis C screening test    • Paraphimosis 2021   • Pneumonia of left lung due to infectious organism 2021   • Polycythemia    • Polycythemia vera    • Pressure injury of buttock, stage 2 2020   • Prostate hyperplasia without urinary obstruction    • Prostatitis    • Proteinuria    • Pulmonary hypertension    • Sacroiliitis 10/9/2020   • Scoliosis    • Sepsis due to Escherichia coli 2020   • Sepsis with metabolic encephalopathy 2020   • Stroke    • SVT (supraventricular tachycardia)    • Tachycardia    • Thrombocytosis    • TIA (transient ischemic attack)        • Type 2 diabetes mellitus    • Type 2 diabetes mellitus with hyperglycemia 2021   • Urinary tract infection due to extended-spectrum beta lactamase (ESBL) producing Escherichia coli 2020   • Valvular heart disease         Family History   Problem Relation Age of Onset   • Hypertension Mother    • Other Father         ruptured appendix,  when pt. was 14 years old.   • Diabetes Paternal Aunt    • Diabetes Paternal Uncle    • No Known Problems Other    • Migraines Sister    • Stroke Sister    • Dementia Brother         Social History     Socioeconomic History   • Marital status:    Tobacco Use   • Smoking status: Former     Types: Cigarettes     Quit date:      Years since quittin.4     Passive exposure: Past   • Smokeless tobacco: Former   • Tobacco comments:     Caffeine daily   Vaping Use   • Vaping Use: Never used   Substance and Sexual Activity   • Alcohol use: No   • Drug use: No   • Sexual activity: Defer        I have reviewed and confirmed the accuracy of the ROS as documented by the MA/LPN/RN Mehyar  "MD Angie   Review of Systems   Neurological:  Positive for memory problem. Negative for dizziness, tremors, seizures, syncope, facial asymmetry, speech difficulty, weakness, light-headedness, numbness, headache and confusion.   Psychiatric/Behavioral:  Negative for agitation, behavioral problems, decreased concentration, dysphoric mood, hallucinations, self-injury, sleep disturbance, suicidal ideas, negative for hyperactivity, depressed mood and stress. The patient is not nervous/anxious.       Objective   Vital Signs:   /82   Pulse 68   Ht 182.9 cm (72.01\")   Wt 116 kg (255 lb)   SpO2 97%   BMI 34.58 kg/m²       PHYSICAL EXAM:    General   Mental Status - Alert. General Appearance - Well developed, Well groomed, Oriented and Cooperative. Orientation - Oriented X3.       Head and Neck  Head - normocephalic, atraumatic with no lesions or palpable masses.  Neck    Global Assessment - supple.       Eye   Sclera/Conjunctiva - Bilateral - Normal.    ENMT  Mouth and Throat   Oral Cavity/Oropharynx: Oropharynx - the soft palate,uvula and tongue are normal in appearance.    Chest and Lung Exam   Chest - lung clear to auscultation bilaterally.    Cardiovascular   Cardiovascular examination reveals  - normal heart sounds, regular rate and rhythm.    Neurologic   Mental Status: Speech - Normal. Cognitive function - SLUMS 23/30 today with 3/5 object recall compared to previous 24/30, 1/5 object recall, appropriate fund of knowledge. No impairment of attention, Impairment of concentration, impairment of long term memory but does have impairment of short term memory.  Cranial Nerves:   II Optic: Visual acuity - Left - Normal. Right - Normal. Visual fields - Normal (to confrontation).  III Oculomotor: Pupillary constriction - Left - Normal. Right - Normal.  VII Facial: - Normal Bilaterally.  VIII Acoustic - Bilateral - Hearing normal and (Hearing tested by finger rub).   IX Glossopharyngeal / X Vagus - Normal.  XI " Accessory: Trapezius - Bilateral - Normal. Sternocleidomastoid - Bilateral - Normal.  XII Hypoglossal - Bilateral - Normal.  Eye Movements: - Normal Bilaterally.  Sensory:   Light Touch: Intact - Globally.  Motor:   Bulk and Contour: - Normal.  Tone: - Normal.  Tremor: Not present.  Strength: 5/5 normal muscle strength - on the right side and 4/5 on the left side.            General Assessment of Reflexes: - deep tendon reflexes are normal. Coordination - No Impairment of finger-to-nose or Impairment of rapid alternating movements. Gait - left sided hemiparetic, uses cane to ambulate.      Result Review :                 Assessment and Plan    Problem List Items Addressed This Visit          Neuro    History of CVA (cerebrovascular accident) - Primary    Current Assessment & Plan     78 year old man with history of stroke and memory impairment/mild cognitive impairment.  Of note he was evaluated in the hospital for extended period of time starting in 8/2020 for Ecoli sepsis and discitis leading to left leg weakness and confusion for an extended period of time and since September 2020 they have noticed trouble with his short term memory.  He had a brain MRI scan on 9/22/2020 which demonstrates old lacunar strokes and a more acute stroke in the left corona radiata and atrophy.  They tell me that during this hospitalization he was placed on combination of Eliquis and aspirin which he continues to take.  He has not had any further stroke symptoms since being discharged from the hospital where he was evaluated by my colleague Dr. Ledbetter.   He has not had any new stroke like symptoms and doing well on current medications.  Again advised him to be taken to the nearest ED with any new stroke like symptoms.  BP is a high today but he tells me it is usually better.  I advised him to monitor closely at home and he tells me he checks his often at home.           Mild cognitive impairment with memory loss    Current Assessment &  Plan     He has mild cognitive impairment with SLUMS of 23/30 today.  He has had short term memory problems since this time and he does not remember picking things up and forgetting to set the alarm or put the garage door down.  He will forget that they have had conversations.  His older brother had Alzheimer's dementia.  No other family members that they are aware of with dementia.  He is not having any problems with driving.  He has good long term memory.  His personality has changed somewhat and can have short temper and quick to get upset.  Left leg is weak following his hospitalization.  He uses a cane to ambulate.  He has had lab work done recently with normal TSH and Vit B12 and folate.  He does not drink alcohol and does not smoke.  I prescribed him donepezil which caused hallucinations so this medicine was discontinued and instead he is on memantine 5 mg once a day. He is tolerating this medicine well without any side effects.  I will increase to 10 mg daily today.  Advised him to exercise and socialize.  His wife is helping with the finances which are going well.  Will follow up in 6 months.           Relevant Medications    memantine (NAMENDA) 5 MG tablet       Follow Up   No follow-ups on file.  Patient was given instructions and counseling regarding his condition or for health maintenance advice. Please see specific information pulled into the AVS if appropriate.

## 2023-06-15 NOTE — PROGRESS NOTES
Chief Complaint  Memory Loss    Subjective          Erlin Blanco presents to [facility] NEUROLOGY for   HISTORY OF PRESENT ILLNESS:    Erlin Blanco is a 78 year old man who returns to neurology clinic for follow up evaluation and treatment of history of stroke and memory impairment/mild cognitive impairment.  Of note he was evaluated in the hospital for extended period of time starting in 8/2020 for Ecoli sepsis and discitis leading to left leg weakness and confusion for an extended period of time and since September 2020 they have noticed trouble with his short term memory.  He had a brain MRI scan on 9/22/2020 which demonstrates old lacunar strokes and a more acute stroke in the left corona radiata and atrophy.  They tell me that during this hospitalization he was placed on combination of Eliquis and aspirin which he continues to take.  He has not had any further stroke symptoms since being discharged from the hospital where he was evaluated by my colleague Dr. Ledbetter.  He has had short term memory problems since this time and he does not remember picking things up and forgetting to set the alarm or put the garage door down.  He will forget that they have had conversations.  His older brother had Alzheimer's dementia.  No other family members that they are aware of with dementia.  He is not having any problems with driving.  He has good long term memory.  His personality has changed somewhat and can have short temper and quick to get upset.  Left leg is weak following his hospitalization.  He uses a cane to ambulate.  He has had lab work done recently with normal TSH and Vit B12 and folate.  He does not drink alcohol and does not smoke.  I prescribed him donepezil which caused hallucinations so this medicine was discontinued and instead he is on memantine 5 mg once a day. He is tolerating this medicine well without any side effects.  His wife is helping with their finances.  He has POA and  Living Will.  No MVA accidents.      Past Medical History:   Diagnosis Date    Abnormal ECG     Abnormal stress test     Abrasion of face 1/29/2020    Acute bronchitis     Acute hypokalemia 9/16/2020    Acute non-recurrent sinusitis 1/13/2020    Acute pyelonephritis 10/9/2020    Acute sinusitis     DORI (acute kidney injury) 4/28/2017    Allergic rhinitis     Aortic root dilation     Arthritis     Bacteremia due to Escherichia coli 8/17/2020    Balanitis 5/16/2021    Benign enlargement of prostate     C. difficile colitis 9/16/2020    Cellulitis of knee, left 5/4/2017    CHF (congestive heart failure)     Chronic diastolic congestive heart failure     Constipation 11/14/2020    Contusion of face 1/29/2020    Coronary artery disease     CVA (cerebral vascular accident) 2020    Diminished pulse     in lower extremities    Discitis of lumbar region 10/9/2020    Edema     Elevated hematocrit     Elevated hemoglobin     Essential hypertension     Hearing loss     mala hearing aids    Heart valve disease     High risk medication use     History of back pain     History of dysuria     History of echocardiogram     History of intracranial hemorrhage     History of scoliosis     History of stroke     Hyperlipidemia     Hypokalemia 1/7/2021    Injury of toe, left, initial encounter     Irregular heart rate     Knee pain, left     Left bundle branch block     Leg wound, left     Leukocytosis 9/16/2020    Low back pain     Maxillary sinusitis 7/16/2021    Medicare annual wellness visit, subsequent     Minor head injury without loss of consciousness 1/29/2020    Murmur     Muscle cramps     Need for hepatitis C screening test     Paraphimosis 5/16/2021    Pneumonia of left lung due to infectious organism 9/17/2021    Polycythemia     Polycythemia vera     Pressure injury of buttock, stage 2 9/18/2020    Prostate hyperplasia without urinary obstruction     Prostatitis     Proteinuria     Pulmonary hypertension     Sacroiliitis  "10/9/2020    Scoliosis     Sepsis due to Escherichia coli 2020    Sepsis with metabolic encephalopathy 2020    Stroke     SVT (supraventricular tachycardia)     Tachycardia     Thrombocytosis     TIA (transient ischemic attack)         Type 2 diabetes mellitus     Type 2 diabetes mellitus with hyperglycemia 2021    Urinary tract infection due to extended-spectrum beta lactamase (ESBL) producing Escherichia coli 2020    Valvular heart disease         Family History   Problem Relation Age of Onset    Hypertension Mother     Other Father         ruptured appendix,  when pt. was 14 years old.    Diabetes Paternal Aunt     Diabetes Paternal Uncle     No Known Problems Other     Migraines Sister     Stroke Sister     Dementia Brother         Social History     Socioeconomic History    Marital status:    Tobacco Use    Smoking status: Former     Types: Cigarettes     Quit date:      Years since quittin.4     Passive exposure: Past    Smokeless tobacco: Former    Tobacco comments:     Caffeine daily   Vaping Use    Vaping Use: Never used   Substance and Sexual Activity    Alcohol use: No    Drug use: No    Sexual activity: Defer        I have reviewed and confirmed the accuracy of the ROS as documented by the MA/LPN/RN Inderjit Adams MD   Review of Systems   Neurological:  Positive for memory problem. Negative for dizziness, tremors, seizures, syncope, facial asymmetry, speech difficulty, weakness, light-headedness, numbness, headache and confusion.   Psychiatric/Behavioral:  Negative for agitation, behavioral problems, decreased concentration, dysphoric mood, hallucinations, self-injury, sleep disturbance, suicidal ideas, negative for hyperactivity, depressed mood and stress. The patient is not nervous/anxious.       Objective   Vital Signs:   /82   Pulse 68   Ht 182.9 cm (72.01\")   Wt 116 kg (255 lb)   SpO2 97%   BMI 34.58 kg/m²       PHYSICAL EXAM:    General "   Mental Status - Alert. General Appearance - Well developed, Well groomed, Oriented and Cooperative. Orientation - Oriented X3.       Head and Neck  Head - normocephalic, atraumatic with no lesions or palpable masses.  Neck    Global Assessment - supple.       Eye   Sclera/Conjunctiva - Bilateral - Normal.    ENMT  Mouth and Throat   Oral Cavity/Oropharynx: Oropharynx - the soft palate,uvula and tongue are normal in appearance.    Chest and Lung Exam   Chest - lung clear to auscultation bilaterally.    Cardiovascular   Cardiovascular examination reveals  - normal heart sounds, regular rate and rhythm.    Neurologic   Mental Status: Speech - Normal. Cognitive function - SLUMS 23/30 today with 3/5 object recall compared to previous 24/30, 1/5 object recall, appropriate fund of knowledge. No impairment of attention, Impairment of concentration, impairment of long term memory but does have impairment of short term memory.  Cranial Nerves:   II Optic: Visual acuity - Left - Normal. Right - Normal. Visual fields - Normal (to confrontation).  III Oculomotor: Pupillary constriction - Left - Normal. Right - Normal.  VII Facial: - Normal Bilaterally.  VIII Acoustic - Bilateral - Hearing normal and (Hearing tested by finger rub).   IX Glossopharyngeal / X Vagus - Normal.  XI Accessory: Trapezius - Bilateral - Normal. Sternocleidomastoid - Bilateral - Normal.  XII Hypoglossal - Bilateral - Normal.  Eye Movements: - Normal Bilaterally.  Sensory:   Light Touch: Intact - Globally.  Motor:   Bulk and Contour: - Normal.  Tone: - Normal.  Tremor: Not present.  Strength: 5/5 normal muscle strength - on the right side and 4/5 on the left side.            General Assessment of Reflexes: - deep tendon reflexes are normal. Coordination - No Impairment of finger-to-nose or Impairment of rapid alternating movements. Gait - left sided hemiparetic, uses cane to ambulate.      Result Review :                 Assessment and Plan    Problem  List Items Addressed This Visit          Neuro    History of CVA (cerebrovascular accident) - Primary    Current Assessment & Plan     78 year old man with history of stroke and memory impairment/mild cognitive impairment.  Of note he was evaluated in the hospital for extended period of time starting in 8/2020 for Ecoli sepsis and discitis leading to left leg weakness and confusion for an extended period of time and since September 2020 they have noticed trouble with his short term memory.  He had a brain MRI scan on 9/22/2020 which demonstrates old lacunar strokes and a more acute stroke in the left corona radiata and atrophy.  They tell me that during this hospitalization he was placed on combination of Eliquis and aspirin which he continues to take.  He has not had any further stroke symptoms since being discharged from the hospital where he was evaluated by my colleague Dr. Ledbetter.   He has not had any new stroke like symptoms and doing well on current medications.  Again advised him to be taken to the nearest ED with any new stroke like symptoms.  BP is a high today but he tells me it is usually better.  I advised him to monitor closely at home and he tells me he checks his often at home.           Mild cognitive impairment with memory loss    Current Assessment & Plan     He has mild cognitive impairment with SLUMS of 23/30 today.  He has had short term memory problems since this time and he does not remember picking things up and forgetting to set the alarm or put the garage door down.  He will forget that they have had conversations.  His older brother had Alzheimer's dementia.  No other family members that they are aware of with dementia.  He is not having any problems with driving.  He has good long term memory.  His personality has changed somewhat and can have short temper and quick to get upset.  Left leg is weak following his hospitalization.  He uses a cane to ambulate.  He has had lab work done  recently with normal TSH and Vit B12 and folate.  He does not drink alcohol and does not smoke.  I prescribed him donepezil which caused hallucinations so this medicine was discontinued and instead he is on memantine 5 mg once a day. He is tolerating this medicine well without any side effects.  I will increase to 10 mg daily today.  Advised him to exercise and socialize.  His wife is helping with the finances which are going well.  Will follow up in 6 months.           Relevant Medications    memantine (NAMENDA) 5 MG tablet       Follow Up   No follow-ups on file.  Patient was given instructions and counseling regarding his condition or for health maintenance advice. Please see specific information pulled into the AVS if appropriate.

## 2023-06-15 NOTE — ASSESSMENT & PLAN NOTE
He has mild cognitive impairment with SLUMS of 23/30 today.  He has had short term memory problems since this time and he does not remember picking things up and forgetting to set the alarm or put the garage door down.  He will forget that they have had conversations.  His older brother had Alzheimer's dementia.  No other family members that they are aware of with dementia.  He is not having any problems with driving.  He has good long term memory.  His personality has changed somewhat and can have short temper and quick to get upset.  Left leg is weak following his hospitalization.  He uses a cane to ambulate.  He has had lab work done recently with normal TSH and Vit B12 and folate.  He does not drink alcohol and does not smoke.  I prescribed him donepezil which caused hallucinations so this medicine was discontinued and instead he is on memantine 5 mg once a day. He is tolerating this medicine well without any side effects.  I will increase to 10 mg daily today.  Advised him to exercise and socialize.  His wife is helping with the finances which are going well.  Will follow up in 6 months.

## 2023-06-15 NOTE — PATIENT INSTRUCTIONS
Lower blood pressure by restricting salt in diet (less than 2400 mg/day), weight loss, increase fruits, vegetables, whole grains, and low-fat dairy products.    Consider the DASH diet or Mediterranean diet.  Increase fish and poultry intake.    Avoid sodas, sweets, and red meat.    Limit alcohol consumption.    Monitor and keep blood pressure, cholesterol and blood sugar at goal.    Avoid the use of tobacco and avoid secondhand smoke.    Engage in moderate to vigorous intensity aerobic exercise for about 40 minutes 3-4 times per week.  Consider lower intensity anthony chi or yoga if necessary.    Take antiplatelet medication regularly.    If you snore, wake up unrefreshed or feel tired during the day, talk to your doctor as these may be signs of sleep apnea and a sleep study may be indicated.

## 2023-06-19 ENCOUNTER — TELEPHONE (OUTPATIENT)
Dept: NEUROLOGY | Facility: CLINIC | Age: 78
End: 2023-06-19

## 2023-06-19 NOTE — TELEPHONE ENCOUNTER
Provider: ANNAMARIA  Caller: FELIPE  Relationship to Patient: SELF  Phone Number: [phone]  Reason for Call: PT CONFIRMED [facility] RECEIVED REFILL REQUEST.     PLEASE REVIEW    THANK YOU

## 2023-06-19 NOTE — TELEPHONE ENCOUNTER
Caller: Erlin Blanco    Relationship: Self    Best call back number: [phone]    Who are you requesting to speak with (clinical staff, provider,  specific staff member):   DR YIN    What was the call regarding:   memantine (NAMENDA) 5 MG tablet   PT PICKED UP THIS RX (15 DAY SUPPLY OF 30 TABLETS BECAUSE [facility] DIDN'T HAVE THE FULL QUANTITY) AT [facility] ON [facility]. PT CALLED TO SAY THE SCRIPT SHOULD HAVE BEEN SENT TO [facility]. I TOLD THE PT IT WAS SENT TO [facility] ON 6-15-23 AND THEY CONFIRMED RECEIVING THE SCRIPT.     PT WILL CALL [facility] TO CHECK WITH THEM TO CONFIRM THEY HAVE THE SCRIPT.    PT IS CONCERNED THAT [facility] HAD THE SCRIPT WHEN IT WAS SENT TO [facility].

## 2023-07-24 DIAGNOSIS — J30.9 ALLERGIC RHINITIS, UNSPECIFIED SEASONALITY, UNSPECIFIED TRIGGER: ICD-10-CM

## 2023-07-24 RX ORDER — FLUTICASONE PROPIONATE 50 MCG
1 SPRAY, SUSPENSION (ML) NASAL DAILY
Qty: 48 G | Refills: 3 | Status: SHIPPED | OUTPATIENT
Start: 2023-07-24

## 2023-08-02 ENCOUNTER — TELEPHONE (OUTPATIENT)
Dept: FAMILY MEDICINE CLINIC | Facility: CLINIC | Age: 78
End: 2023-08-02
Payer: MEDICARE

## 2023-08-02 DIAGNOSIS — N40.0 BENIGN PROSTATIC HYPERPLASIA WITHOUT URINARY OBSTRUCTION: Primary | ICD-10-CM

## 2023-08-02 RX ORDER — TAMSULOSIN HYDROCHLORIDE 0.4 MG/1
1 CAPSULE ORAL
Qty: 90 CAPSULE | Refills: 1 | Status: SHIPPED | OUTPATIENT
Start: 2023-08-02

## 2023-08-17 NOTE — TELEPHONE ENCOUNTER
Spoke with patient's spouse over the phone; patient had fallen back and hit the back of his head earlier today and spouse was concerned due to patient being on blood thinners; patient is currently in the ER at [facility] for further evaluation.   Post-Care Instructions: I reviewed with the patient in detail post-care instructions. Patient is to keep the biopsy site dry overnight, and then apply bacitracin twice daily until healed. Patient may apply hydrogen peroxide soaks to remove any crusting.

## 2023-08-30 RX ORDER — TORSEMIDE 20 MG/1
TABLET ORAL
Qty: 60 TABLET | Refills: 0 | Status: SHIPPED | OUTPATIENT
Start: 2023-08-30

## 2023-09-11 DIAGNOSIS — I10 PRIMARY HYPERTENSION: ICD-10-CM

## 2023-09-11 RX ORDER — VALSARTAN 320 MG/1
TABLET ORAL
Qty: 90 TABLET | Refills: 1 | Status: SHIPPED | OUTPATIENT
Start: 2023-09-11

## 2023-09-13 ENCOUNTER — OFFICE VISIT (OUTPATIENT)
Dept: CARDIOLOGY | Facility: CLINIC | Age: 78
End: 2023-09-13
Payer: MEDICARE

## 2023-09-13 VITALS
OXYGEN SATURATION: 97 % | SYSTOLIC BLOOD PRESSURE: 160 MMHG | BODY MASS INDEX: 36.09 KG/M2 | DIASTOLIC BLOOD PRESSURE: 83 MMHG | HEART RATE: 61 BPM | HEIGHT: 71 IN | WEIGHT: 257.8 LBS

## 2023-09-13 DIAGNOSIS — I48.0 PAROXYSMAL ATRIAL FIBRILLATION: ICD-10-CM

## 2023-09-13 DIAGNOSIS — R01.1 MURMUR: ICD-10-CM

## 2023-09-13 DIAGNOSIS — I50.32 CHRONIC DIASTOLIC CONGESTIVE HEART FAILURE: ICD-10-CM

## 2023-09-13 DIAGNOSIS — I44.7 LEFT BUNDLE-BRANCH BLOCK: ICD-10-CM

## 2023-09-13 DIAGNOSIS — I25.10 CORONARY ARTERY DISEASE INVOLVING NATIVE CORONARY ARTERY OF NATIVE HEART WITHOUT ANGINA PECTORIS: ICD-10-CM

## 2023-09-13 DIAGNOSIS — I48.92 ATRIAL FLUTTER, UNSPECIFIED TYPE: Primary | ICD-10-CM

## 2023-09-13 DIAGNOSIS — I10 ESSENTIAL (PRIMARY) HYPERTENSION: ICD-10-CM

## 2023-09-13 DIAGNOSIS — R00.1 BRADYCARDIA: ICD-10-CM

## 2023-09-13 DIAGNOSIS — I47.1 SUPRAVENTRICULAR TACHYCARDIA: ICD-10-CM

## 2023-09-13 DIAGNOSIS — E78.2 MIXED HYPERLIPIDEMIA: ICD-10-CM

## 2023-09-13 DIAGNOSIS — R94.39 CARDIOVASCULAR STRESS TEST ABNORMAL: ICD-10-CM

## 2023-09-13 NOTE — PROGRESS NOTES
Subjective:     Encounter Date:09/13/2023      Patient ID: Erlin Blanco is a 78 y.o. male.    Chief Complaint: Uncontrolled HTN  History of Present Illness  This is a 77 y/o man who follows with Dr. Arias and is known to me. He has a pmhx of paroxysmal ventricular tachycardia, aortic root dilatation, coronary artery disease, diabetes mellitus type 2, hypertension, hyperlipidemia, prior TIA in 2006, chronic diastolic congestive heart failure, and polycythemia.     He is here today for a follow up visit. He tells me that he continues to feel well with no chest pain, shortness of breath, palpitations, dizziness or syncope. He has a little swelling in his lower extremities at times that is not new for him. It is stable. He denies any orthopnea or PND. He reports his blood pressure has been well controlled at home typically around 130/80. Today it is elevated at 160/83. He says that it usually becomes elevated when he needs plasmapheresis. He is scheduled to undergo plasmapheresis tomorrow.    Prior history:  In September 2021, while admitted to the hospital for pneumonia, he was noted to be bradycardic. Diltiazem was stopped and carvedilol was reduced at that time. An echocardiogram was obtained revealing an EF 59%, normal diastolic function and mildly elevated right ventricular systolic pressure.     He was seen in the ED at [facility] on 3/25/22 for elevated systolic blood pressure of 214  at a neurology appointment. The patient reported that his blood pressure had been elevated at home as well. He was asymptomatic with these elevated readings. His PCP was contacted by the ED physician regarding increasing his carvedilol to 12.5 mg BID, however it was decided to wait to increase as his heart rate was 55 at that time and he does have the history of bradycardia. Instead, his losartan was changed to valsartan 320mg. His wife had contacted PCP with BP readings after this change in medication and his BP were  still consistently elevated 170s-200s systolic. Amlodipine was added to his regimen.                                     I saw the patient in April and his blood pressure was still on the high side. He said that his PCP made some adjustments and his BP was gradually coming down. We opted to allow more time for the medications to take effect. He followed up with Dr. Arias in August and his blood pressure was fairly well controlled. No changes were made to his medication regimen. An echocardiogram was obtained for a murmur noted on exam. It showed normal left ventricular systolic function and wall motion with an EF of 67%, grade 1 diastolic dysfunction, moderate left atrial enlargement, mild tricuspid valve regurgitation with a right ventricular systolic pressure of 46 mmHg, and an aortic root measuring 4 cm which appears to be stable compared to a prior CT angiogram of the chest in 4/2018.     At follow up in March 2023, he continued to do well and BP was controlled. No changes were made.      I have reviewed and updated as appropriate allergies, current medications, past family history, past medical history, past surgical history and problem list.    Review of Systems   Constitutional: Negative for fever and malaise/fatigue.   HENT:  Negative for congestion, hoarse voice and sore throat.    Eyes:  Negative for blurred vision and double vision.   Cardiovascular:  Positive for leg swelling. Negative for chest pain, dyspnea on exertion, orthopnea, palpitations and syncope.   Respiratory:  Negative for cough, shortness of breath and wheezing.    Gastrointestinal:  Negative for abdominal pain, hematemesis, hematochezia and melena.   Genitourinary:  Negative for dysuria and hematuria.   Neurological:  Negative for dizziness, headaches, light-headedness and numbness.   Psychiatric/Behavioral:  Negative for depression. The patient is not nervous/anxious.        Current Outpatient Medications:     Accu-Chek FastClix Lancets  misc, TEST BLOOD SUGAR 1-2 TIMES DAILY AND AS NEEDED, Disp: , Rfl:     amLODIPine (NORVASC) 5 MG tablet, Take 1 tablet by mouth Daily., Disp: 90 tablet, Rfl: 1    apixaban (Eliquis) 5 MG tablet tablet, TAKE 1 TABLET EVERY 12 HOURS, Disp: 180 tablet, Rfl: 1    Aspirin Low Dose 81 MG EC tablet, TAKE 1 TABLET DAILY, Disp: 90 tablet, Rfl: 1    Blood Glucose Monitoring Suppl (Accu-Chek Guide Me) w/Device kit, TEST BLOOD SUGAR 1-2 TIMES DAILY AND AS NEEDED, Disp: , Rfl:     carvedilol (COREG) 6.25 MG tablet, TAKE 1 TABLET TWICE A DAY WITH MEALS, Disp: 180 tablet, Rfl: 3    doxazosin (CARDURA) 1 MG tablet, Take 1 tablet by mouth Every Night., Disp: , Rfl:     fluticasone (FLONASE) 50 MCG/ACT nasal spray, 1 spray into the nostril(s) as directed by provider Daily., Disp: 48 g, Rfl: 3    glimepiride (AMARYL) 2 MG tablet, TAKE 1 TABLET BY MOUTH TWO TIMES A DAY WITH MEALS, Disp: 60 tablet, Rfl: 2    glucosamine-chondroitin 500-400 MG capsule capsule, Take 1 capsule by mouth Daily., Disp: , Rfl:     glucose blood (Accu-Chek Guide) test strip, Use as instructed to test blood sugar twice daily., Disp: 100 each, Rfl: 2    guaiFENesin (MUCINEX) 600 MG 12 hr tablet, Take 2 tablets by mouth 2 (Two) Times a Day., Disp: , Rfl:     hydrALAZINE (APRESOLINE) 100 MG tablet, TAKE 1 TABLET THREE TIMES A DAY, Disp: 270 tablet, Rfl: 1    hydroxyurea (HYDREA) 500 MG capsule, Take 1 capsule by mouth Daily., Disp: , Rfl:     memantine (NAMENDA) 5 MG tablet, Take 2 tablets by mouth Daily., Disp: 90 tablet, Rfl: 1    metFORMIN ER (GLUCOPHAGE-XR) 500 MG 24 hr tablet, 2 tablets twice daily with meals, Disp: 360 tablet, Rfl: 1    montelukast (SINGULAIR) 10 MG tablet, TAKE 1 TABLET DAILY, Disp: 90 tablet, Rfl: 3    Multiple Vitamins-Minerals (MULTIVITAMIN ADULT PO), Take 1 tablet by mouth Daily., Disp: , Rfl:     potassium chloride (KLOR-CON) 20 MEQ CR tablet, Take 1 tablet by mouth 3 (Three) Times a Day., Disp: 30 tablet, Rfl: 0    Probiotic Product  (Probiotic Daily) capsule, Take 1 capsule by mouth Daily., Disp: 90 capsule, Rfl: 1    rosuvastatin (CRESTOR) 40 MG tablet, TAKE 1 TABLET DAILY, Disp: 90 tablet, Rfl: 3    tamsulosin (FLOMAX) 0.4 MG capsule 24 hr capsule, Take 1 capsule by mouth every night at bedtime., Disp: 90 capsule, Rfl: 1    torsemide (DEMADEX) 20 MG tablet, TAKE 1 TABLET BY MOUTH TWICE A DAY., Disp: 60 tablet, Rfl: 0    valsartan (DIOVAN) 320 MG tablet, TAKE 1 TABLET DAILY, Disp: 90 tablet, Rfl: 1    Past Medical History:   Diagnosis Date    Abnormal ECG     Abnormal stress test     Abrasion of face 1/29/2020    Acute bronchitis     Acute hypokalemia 9/16/2020    Acute non-recurrent sinusitis 1/13/2020    Acute pyelonephritis 10/9/2020    Acute sinusitis     DORI (acute kidney injury) 4/28/2017    Allergic rhinitis     Aortic root dilation     Arthritis     Bacteremia due to Escherichia coli 8/17/2020    Balanitis 5/16/2021    Benign enlargement of prostate     C. difficile colitis 9/16/2020    Cellulitis of knee, left 5/4/2017    CHF (congestive heart failure)     Chronic diastolic congestive heart failure     Constipation 11/14/2020    Contusion of face 1/29/2020    Coronary artery disease     CVA (cerebral vascular accident) 2020    Diminished pulse     in lower extremities    Discitis of lumbar region 10/9/2020    Edema     Elevated hematocrit     Elevated hemoglobin     Essential hypertension     Hearing loss     mala hearing aids    Heart valve disease     High risk medication use     History of back pain     History of dysuria     History of echocardiogram     History of intracranial hemorrhage     History of scoliosis     History of stroke     Hyperlipidemia     Hypokalemia 1/7/2021    Injury of toe, left, initial encounter     Irregular heart rate     Knee pain, left     Left bundle branch block     Leg wound, left     Leukocytosis 9/16/2020    Low back pain     Maxillary sinusitis 7/16/2021    Medicare annual wellness visit,  subsequent     Minor head injury without loss of consciousness 2020    Murmur     Muscle cramps     Need for hepatitis C screening test     Paraphimosis 2021    Pneumonia of left lung due to infectious organism 2021    Polycythemia     Polycythemia vera     Pressure injury of buttock, stage 2 2020    Prostate hyperplasia without urinary obstruction     Prostatitis     Proteinuria     Pulmonary hypertension     Sacroiliitis 10/9/2020    Scoliosis     Sepsis due to Escherichia coli 2020    Sepsis with metabolic encephalopathy 2020    Stroke     SVT (supraventricular tachycardia)     Tachycardia     Thrombocytosis     TIA (transient ischemic attack)         Type 2 diabetes mellitus     Type 2 diabetes mellitus with hyperglycemia 2021    Urinary tract infection due to extended-spectrum beta lactamase (ESBL) producing Escherichia coli 2020    Valvular heart disease        Past Surgical History:   Procedure Laterality Date    CARDIAC CATHETERIZATION      CATARACT EXTRACTION Left 2021    CATARACT EXTRACTION Right 2022    COLONOSCOPY      did Cologuard approx 2019 and negative    HAND SURGERY      JOINT REPLACEMENT Right     IA ARTHRP KNE CONDYLE&PLATU MEDIAL&LAT COMPARTMENTS Left 2017    Procedure: LT TOTAL KNEE ARTHROPLASTY;  Surgeon: Bertin Perrin MD;  Location: [facility];  Service: Orthopedics    PROSTATE SURGERY      Rezum steam treatment to shrink prostate by dr. guerrero       Family History   Problem Relation Age of Onset    Hypertension Mother     Other Father         ruptured appendix,  when pt. was 14 years old.    Diabetes Paternal Aunt     Diabetes Paternal Uncle     No Known Problems Other     Migraines Sister     Stroke Sister     Dementia Brother        Social History     Tobacco Use    Smoking status: Former     Types: Cigarettes     Quit date:      Years since quittin.7     Passive exposure: Past    Smokeless tobacco:  "Former    Tobacco comments:     Caffeine daily   Vaping Use    Vaping Use: Never used   Substance Use Topics    Alcohol use: No    Drug use: No         ECG 12 Lead    Date/Time: 2023 1:57 PM  Performed by: Monisha Avendano APRN  Authorized by: Monisha Avendano APRN   Comparison: compared with previous ECG from 3/7/2023  Similar to previous ECG  Rhythm: sinus rhythm  Conduction: left bundle branch block  Comments: No significant change from previous EKG           Objective:     Visit Vitals  /83 (BP Location: Left arm, Patient Position: Sitting, Cuff Size: Adult)   Pulse 61   Ht 180.3 cm (71\")   Wt 117 kg (257 lb 12.8 oz)   SpO2 97%   BMI 35.96 kg/m²             Physical Exam  Constitutional:       Appearance: Normal appearance. He is obese.   HENT:      Head: Normocephalic.   Neck:      Vascular: No carotid bruit.   Cardiovascular:      Rate and Rhythm: Normal rate and regular rhythm.      Chest Wall: PMI is not displaced.      Pulses: Normal pulses.           Radial pulses are 2+ on the right side and 2+ on the left side.        Posterior tibial pulses are 2+ on the right side and 2+ on the left side.      Heart sounds: Murmur heard.   Systolic murmur is present with a grade of 2/6.     No friction rub. No gallop.   Pulmonary:      Effort: Pulmonary effort is normal.      Breath sounds: Normal breath sounds.   Abdominal:      General: Bowel sounds are normal. There is no distension.      Palpations: Abdomen is soft.   Musculoskeletal:      Right lower le+ Edema present.      Left lower le+ Edema present.   Skin:     General: Skin is warm and dry.      Capillary Refill: Capillary refill takes less than 2 seconds.   Neurological:      Mental Status: He is alert and oriented to person, place, and time.   Psychiatric:         Mood and Affect: Mood normal.         Behavior: Behavior normal.         Thought Content: Thought content normal.        Lab Review:   Lipid Panel          2023    09:53 " 2/13/2023    13:08 6/13/2023    11:10   Lipid Panel   Total Cholesterol 89  98  96    Triglycerides 89  99  74    HDL Cholesterol 37  38  38    VLDL Cholesterol 18  19  16    LDL Cholesterol  34  41  42    LDL/HDL Ratio 0.92          Cardiac Procedures:   Echocardiogram 9/18/21: EF 59%, diastolic function normal, moderate concentric hypertrophy, RVSP 35-45 mmHg.     Assessment:         Diagnoses and all orders for this visit:    1. Atrial flutter, unspecified type (Primary)    2. Bradycardia    3. Cardiovascular stress test abnormal    4. Chronic diastolic congestive heart failure    5. Coronary artery disease involving native coronary artery of native heart without angina pectoris    6. Essential (primary) hypertension    7. Mixed hyperlipidemia    8. Left bundle-branch block    9. Murmur    10. Paroxysmal atrial fibrillation    11. Supraventricular tachycardia            Plan:       1. Hypertension: blood pressure is elevated in office today but he reports good control at home. He believes it will return to normal once he undergoes plasmapheresis tomorrow. I have asked to monitor his BP over the next few weeks and let me know if it does not improve to around 130/80 or below.  2. Chronic diastolic CHF: chronic bilateral lower extremity swelling is stable. On torsemide. No changes  3. Chronic LBBB: recent echocardiogram showed no evidence of wall motion abnormalities/cardiomyopathy  4. Hyperlipidemia: On Crestor 40mg. Lipid panel in 1/2022 was good.  5. Paroxysmal atrial fibrillation: on Eliquis for AC. Rates controlled on Coreg 6.25 mg BID  6. Ascnedin aortic dilatation: echocardiogram in 9/2022 was stable.       Thank you for allowing me to participate in this patient's care. Please call with any questions or concerns. Mr. Blanco will follow up with Dr. Arias in 6 months.          Your medication list            Accurate as of September 13, 2023  1:57 PM. If you have any questions, ask your nurse or doctor.                 CONTINUE taking these medications        Instructions Last Dose Given Next Dose Due   Accu-Chek FastClix Lancets misc      TEST BLOOD SUGAR 1-2 TIMES DAILY AND AS NEEDED       Accu-Chek Guide Me w/Device kit      TEST BLOOD SUGAR 1-2 TIMES DAILY AND AS NEEDED       amLODIPine 5 MG tablet  Commonly known as: NORVASC      Take 1 tablet by mouth Daily.       Aspirin Low Dose 81 MG EC tablet  Generic drug: aspirin      TAKE 1 TABLET DAILY       carvedilol 6.25 MG tablet  Commonly known as: COREG      TAKE 1 TABLET TWICE A DAY WITH MEALS       doxazosin 1 MG tablet  Commonly known as: CARDURA      Take 1 tablet by mouth Every Night.       Eliquis 5 MG tablet tablet  Generic drug: apixaban      TAKE 1 TABLET EVERY 12 HOURS       fluticasone 50 MCG/ACT nasal spray  Commonly known as: FLONASE      1 spray into the nostril(s) as directed by provider Daily.       glimepiride 2 MG tablet  Commonly known as: AMARYL      TAKE 1 TABLET BY MOUTH TWO TIMES A DAY WITH MEALS       glucosamine-chondroitin 500-400 MG capsule capsule      Take 1 capsule by mouth Daily.       glucose blood test strip  Commonly known as: Accu-Chek Guide      Use as instructed to test blood sugar twice daily.       guaiFENesin 600 MG 12 hr tablet  Commonly known as: MUCINEX      Take 2 tablets by mouth 2 (Two) Times a Day.       hydrALAZINE 100 MG tablet  Commonly known as: APRESOLINE      TAKE 1 TABLET THREE TIMES A DAY       hydroxyurea 500 MG capsule  Commonly known as: HYDREA      Take 1 capsule by mouth Daily.       memantine 5 MG tablet  Commonly known as: NAMENDA      Take 2 tablets by mouth Daily.       metFORMIN  MG 24 hr tablet  Commonly known as: GLUCOPHAGE-XR      2 tablets twice daily with meals       montelukast 10 MG tablet  Commonly known as: SINGULAIR      TAKE 1 TABLET DAILY       multivitamin with minerals tablet tablet      Take 1 tablet by mouth Daily.       potassium chloride 20 MEQ CR tablet  Commonly known as:  KLOR-CON      Take 1 tablet by mouth 3 (Three) Times a Day.       Probiotic Daily capsule      Take 1 capsule by mouth Daily.       rosuvastatin 40 MG tablet  Commonly known as: CRESTOR      TAKE 1 TABLET DAILY       tamsulosin 0.4 MG capsule 24 hr capsule  Commonly known as: FLOMAX      Take 1 capsule by mouth every night at bedtime.       torsemide 20 MG tablet  Commonly known as: DEMADEX      TAKE 1 TABLET BY MOUTH TWICE A DAY.       valsartan 320 MG tablet  Commonly known as: DIOVAN      TAKE 1 TABLET DAILY                  Monisha Avendano, MUKUL  09/13/23  10:54 AM EDT

## 2023-09-20 DIAGNOSIS — I25.10 CORONARY ARTERY DISEASE INVOLVING NATIVE CORONARY ARTERY OF NATIVE HEART WITHOUT ANGINA PECTORIS: ICD-10-CM

## 2023-09-20 DIAGNOSIS — I10 PRIMARY HYPERTENSION: ICD-10-CM

## 2023-09-21 RX ORDER — ASPIRIN 81 MG/1
TABLET, COATED ORAL
Qty: 90 TABLET | Refills: 3 | Status: SHIPPED | OUTPATIENT
Start: 2023-09-21

## 2023-09-21 RX ORDER — CARVEDILOL 12.5 MG/1
TABLET ORAL
Qty: 180 TABLET | Refills: 3 | OUTPATIENT
Start: 2023-09-21

## 2023-10-09 ENCOUNTER — OFFICE VISIT (OUTPATIENT)
Dept: FAMILY MEDICINE CLINIC | Facility: CLINIC | Age: 78
End: 2023-10-09
Payer: MEDICARE

## 2023-10-09 VITALS
SYSTOLIC BLOOD PRESSURE: 142 MMHG | HEART RATE: 63 BPM | OXYGEN SATURATION: 95 % | DIASTOLIC BLOOD PRESSURE: 78 MMHG | HEIGHT: 71 IN | TEMPERATURE: 98 F | WEIGHT: 262.4 LBS | BODY MASS INDEX: 36.73 KG/M2

## 2023-10-09 DIAGNOSIS — G89.29 CHRONIC BILATERAL LOW BACK PAIN WITH BILATERAL SCIATICA: ICD-10-CM

## 2023-10-09 DIAGNOSIS — J30.9 ALLERGIC RHINITIS, UNSPECIFIED SEASONALITY, UNSPECIFIED TRIGGER: ICD-10-CM

## 2023-10-09 DIAGNOSIS — E11.3293 TYPE 2 DIABETES MELLITUS WITH BOTH EYES AFFECTED BY MILD NONPROLIFERATIVE RETINOPATHY WITHOUT MACULAR EDEMA, WITHOUT LONG-TERM CURRENT USE OF INSULIN: Primary | ICD-10-CM

## 2023-10-09 DIAGNOSIS — I50.32 CHRONIC DIASTOLIC CONGESTIVE HEART FAILURE: ICD-10-CM

## 2023-10-09 DIAGNOSIS — M54.41 CHRONIC BILATERAL LOW BACK PAIN WITH BILATERAL SCIATICA: ICD-10-CM

## 2023-10-09 DIAGNOSIS — M54.42 CHRONIC BILATERAL LOW BACK PAIN WITH BILATERAL SCIATICA: ICD-10-CM

## 2023-10-09 DIAGNOSIS — G31.84 MILD COGNITIVE IMPAIRMENT WITH MEMORY LOSS: ICD-10-CM

## 2023-10-09 DIAGNOSIS — I10 ESSENTIAL (PRIMARY) HYPERTENSION: ICD-10-CM

## 2023-10-09 DIAGNOSIS — Z12.11 ENCOUNTER FOR SCREENING FOR MALIGNANT NEOPLASM OF COLON: ICD-10-CM

## 2023-10-09 DIAGNOSIS — N18.2 CKD (CHRONIC KIDNEY DISEASE) STAGE 2, GFR 60-89 ML/MIN: ICD-10-CM

## 2023-10-09 DIAGNOSIS — I48.0 PAROXYSMAL ATRIAL FIBRILLATION: ICD-10-CM

## 2023-10-09 DIAGNOSIS — D45 POLYCYTHEMIA VERA: ICD-10-CM

## 2023-10-09 DIAGNOSIS — E78.2 MIXED HYPERLIPIDEMIA: ICD-10-CM

## 2023-10-09 DIAGNOSIS — R60.0 LOCALIZED EDEMA: ICD-10-CM

## 2023-10-09 RX ORDER — TORSEMIDE 20 MG/1
20 TABLET ORAL 2 TIMES DAILY
Qty: 60 TABLET | Refills: 0 | Status: SHIPPED | OUTPATIENT
Start: 2023-10-09 | End: 2023-10-10 | Stop reason: SDUPTHER

## 2023-10-09 NOTE — PROGRESS NOTES
Subjective   Erlin Blanco is a 78 y.o. male.     Chief Complaint   Patient presents with    Diabetes       History of Present Illness   Patient presents for follow up DM2: takes Glimepiride and Metformin twice daily; last A1c 7.0%; has seen endo in past; monitors blood sugar, typically runs 110-120s; watches intake of carbs/sugars in diet; has been eating healthier; no formal exercise, has been doing physical therapy for back and helps some; sees ortho for back; has mid lower back pain with radiation of pain down bilateral legs; will feel numbness in bilateral knees; walks with cane; still gets numbness in left hand if bends over to pick something up; has seen neurosurgery in past; last eye exam 12/2022.     F/U HTN: takes Valsartan daily, Carvedilol and Torsemide twice daily, and Hydralazine TID; takes KCL twice daily; stopped Amlodipine and started Doxazosin nightly per renal; monitors BP, typically runs 150s/70-80s; no headaches; some lightheadedness with balance problem; has been doing PT; swelling has improved; sees Dr. Arias cardiology.    F/U Hyperlipidemia: takes Rosuvastatin daily; no myalgias; has been watching saturated fats in diet; fasting today.     F/U atrial fib: takes Eliquis twice daily and aspirin daily; had follow up with cardiology about 2 months ago.     F/U allergic rhinitis: takes Montelukast and Flonase daily and help; has been sneezing recently; has had stuffy nose; no sinus pressure; no ear pain or sore throat.     Takes Namenda daily; sees neurology; has follow up in December.     F/U polycythemia vera: sees hematology with [facility]; takes Hydroxyurea daily; had phlebotomy about 1 month ago.     Sees urology and they manage prostate; occasional blood in urine, attributed to Eliquis.    Also, c/o bilateral lower back pain; will have numbness in bilateral knees when stands for period of time; has had multiple falls; bilateral legs are weak; saw ortho to see if problem with knees, but told  knees good; has being doing physical for about 1 month; had previously done PT for 6 months and symptoms seem to be worsening.      The following portions of the patient's history were reviewed and updated as appropriate: allergies, current medications, past family history, past medical history, past social history, past surgical history and problem list.      Current Outpatient Medications   Medication Sig Dispense Refill    Accu-Chek FastClix Lancets misc TEST BLOOD SUGAR 1-2 TIMES DAILY AND AS NEEDED      apixaban (Eliquis) 5 MG tablet tablet TAKE 1 TABLET EVERY 12 HOURS 180 tablet 1    Aspirin Low Dose 81 MG EC tablet TAKE 1 TABLET DAILY 90 tablet 3    Blood Glucose Monitoring Suppl (Accu-Chek Guide Me) w/Device kit TEST BLOOD SUGAR 1-2 TIMES DAILY AND AS NEEDED      carvedilol (COREG) 6.25 MG tablet TAKE 1 TABLET TWICE A DAY WITH MEALS 180 tablet 3    doxazosin (CARDURA) 1 MG tablet Take 1 tablet by mouth Every Night.      fluticasone (FLONASE) 50 MCG/ACT nasal spray 1 spray into the nostril(s) as directed by provider Daily. 48 g 3    glimepiride (AMARYL) 2 MG tablet TAKE 1 TABLET BY MOUTH TWO TIMES A DAY WITH MEALS 60 tablet 2    glucosamine-chondroitin 500-400 MG capsule capsule Take 1 capsule by mouth Daily.      glucose blood (Accu-Chek Guide) test strip Use as instructed to test blood sugar twice daily. 100 each 2    guaiFENesin (MUCINEX) 600 MG 12 hr tablet Take 2 tablets by mouth 2 (Two) Times a Day.      hydrALAZINE (APRESOLINE) 100 MG tablet TAKE 1 TABLET THREE TIMES A  tablet 1    hydroxyurea (HYDREA) 500 MG capsule Take 1 capsule by mouth Daily.      memantine (NAMENDA) 5 MG tablet Take 2 tablets by mouth Daily. 90 tablet 1    metFORMIN ER (GLUCOPHAGE-XR) 500 MG 24 hr tablet 2 tablets twice daily with meals 360 tablet 1    montelukast (SINGULAIR) 10 MG tablet TAKE 1 TABLET DAILY 90 tablet 3    Multiple Vitamins-Minerals (MULTIVITAMIN ADULT PO) Take 1 tablet by mouth Daily.      potassium  chloride (KLOR-CON) 20 MEQ CR tablet Take 1 tablet by mouth 3 (Three) Times a Day. (Patient taking differently: Take 1 tablet by mouth 2 (Two) Times a Day.) 30 tablet 0    Probiotic Product (Probiotic Daily) capsule Take 1 capsule by mouth Daily. 90 capsule 1    rosuvastatin (CRESTOR) 40 MG tablet TAKE 1 TABLET DAILY 90 tablet 3    tamsulosin (FLOMAX) 0.4 MG capsule 24 hr capsule Take 1 capsule by mouth every night at bedtime. 90 capsule 1    torsemide (DEMADEX) 20 MG tablet Take 1 tablet by mouth 2 (Two) Times a Day. 180 tablet 1    valsartan (DIOVAN) 320 MG tablet TAKE 1 TABLET DAILY 90 tablet 1     No current facility-administered medications for this visit.       Past Medical History:   Diagnosis Date    Abnormal ECG     Abnormal stress test     Abrasion of face 01/29/2020    Acute bronchitis     Acute hypokalemia 09/16/2020    Acute non-recurrent sinusitis 01/13/2020    Acute pyelonephritis 10/09/2020    Acute sinusitis     DORI (acute kidney injury) 04/28/2017    Allergic rhinitis     Aortic root dilation     Arthritis     Bacteremia due to Escherichia coli 08/17/2020    Balanitis 05/16/2021    Benign enlargement of prostate     C. difficile colitis 09/16/2020    Cellulitis of knee, left 05/04/2017    CHF (congestive heart failure)     Chronic diastolic congestive heart failure     Constipation 11/14/2020    Contusion of face 01/29/2020    Coronary artery disease     COVID-19 virus infection     CVA (cerebral vascular accident) 2020    Diminished pulse     in lower extremities    Discitis of lumbar region 10/09/2020    Edema     Elevated hematocrit     Elevated hemoglobin     Essential hypertension     Hearing loss     mala hearing aids    Heart valve disease     High risk medication use     History of back pain     History of dysuria     History of echocardiogram     History of intracranial hemorrhage     History of scoliosis     History of stroke     Hyperlipidemia     Hypokalemia 01/07/2021    Injury of toe,  left, initial encounter     Irregular heart rate     Knee pain, left     Left bundle branch block     Leg wound, left     Leukocytosis 2020    Low back pain     Maxillary sinusitis 2021    Medicare annual wellness visit, subsequent     Minor head injury without loss of consciousness 2020    Murmur     Muscle cramps     Need for hepatitis C screening test     Paraphimosis 2021    Pneumonia of left lung due to infectious organism 2021    Polycythemia     Polycythemia vera     Pressure injury of buttock, stage 2 2020    Prostate hyperplasia without urinary obstruction     Prostatitis     Proteinuria     Pulmonary hypertension     Sacroiliitis 10/09/2020    Scoliosis     Sepsis due to Escherichia coli 2020    Sepsis with metabolic encephalopathy 2020    Stroke     SVT (supraventricular tachycardia)     Tachycardia     Thrombocytosis     TIA (transient ischemic attack)         Type 2 diabetes mellitus     Type 2 diabetes mellitus with hyperglycemia 2021    Urinary tract infection due to extended-spectrum beta lactamase (ESBL) producing Escherichia coli 2020    Valvular heart disease        Past Surgical History:   Procedure Laterality Date    CARDIAC CATHETERIZATION      CATARACT EXTRACTION Left 2021    CATARACT EXTRACTION Right 2022    COLONOSCOPY      did Cologuard approx 2019 and negative    HAND SURGERY      JOINT REPLACEMENT Right     SC ARTHRP KNE CONDYLE&PLATU MEDIAL&LAT COMPARTMENTS Left 2017    Procedure: LT TOTAL KNEE ARTHROPLASTY;  Surgeon: Bertin Perrin MD;  Location: [facility];  Service: Orthopedics    PROSTATE SURGERY      Rezum steam treatment to shrink prostate by dr. guerrero       Family History   Problem Relation Age of Onset    Hypertension Mother     Other Father         ruptured appendix,  when pt. was 14 years old.    Diabetes Paternal Aunt     Diabetes Paternal Uncle     No Known Problems Other      "Migraines Sister     Stroke Sister     Dementia Brother        Social History     Socioeconomic History    Marital status:    Tobacco Use    Smoking status: Former     Types: Cigarettes     Quit date:      Years since quittin.8     Passive exposure: Past    Smokeless tobacco: Former    Tobacco comments:     Caffeine daily   Vaping Use    Vaping Use: Never used   Substance and Sexual Activity    Alcohol use: No    Drug use: No    Sexual activity: Defer       Review of Systems   Constitutional:  Negative for appetite change, chills, fever, unexpected weight gain and unexpected weight loss. Fatigue: has been doing pretty good.  HENT:  Negative for postnasal drip and trouble swallowing.    Eyes:  Negative for blurred vision and discharge.   Respiratory:  Negative for chest tightness and shortness of breath. Cough: some recently with allergies; nonproductive cough.   Cardiovascular:  Negative for chest pain and palpitations.   Gastrointestinal:  Negative for abdominal pain, blood in stool, constipation and diarrhea.   Endocrine: Negative for cold intolerance, heat intolerance and polydipsia.   Genitourinary:  Negative for dysuria and frequency.   Musculoskeletal:  Back pain: see HPI; has weakness in bilateral legs; no bladder or bowel dysfunction.   Skin:  Negative for rash.   Neurological:  Negative for syncope.   Hematological:  Bruises/bleeds easily: some with Eliquis.   Psychiatric/Behavioral:  Negative for depressed mood. The patient is not nervous/anxious.        Objective   Vitals:    10/09/23 0929   BP: 142/78   BP Location: Left arm   Patient Position: Sitting   Cuff Size: Large Adult   Pulse: 63   Temp: 98 øF (36.7 øC)   TempSrc: Temporal   SpO2: 95%   Weight: 119 kg (262 lb 6.4 oz)   Height: 180.3 cm (70.98\")     Body mass index is 36.61 kg/mý.    Physical Exam  Vitals and nursing note reviewed.   Constitutional:       General: He is not in acute distress.     Appearance: He is well-developed " and well-groomed. He is not diaphoretic.   HENT:      Head: Normocephalic.      Right Ear: External ear normal. Right ear decreased hearing: has hearing aid. Right ear middle ear effusion: mild. Tympanic membrane is not erythematous.      Left Ear: External ear normal. Left ear decreased hearing: has hearing aid. Left ear middle ear effusion: mild. Tympanic membrane is not erythematous.      Nose: Nose normal.      Right Sinus: No maxillary sinus tenderness or frontal sinus tenderness.      Left Sinus: No maxillary sinus tenderness or frontal sinus tenderness.      Mouth/Throat:      Pharynx: No oropharyngeal exudate. Posterior oropharyngeal erythema: mild in posterior pharynx.  Eyes:      Conjunctiva/sclera: Conjunctivae normal.   Neck:      Vascular: No carotid bruit.   Cardiovascular:      Rate and Rhythm: Normal rate and regular rhythm.      Pulses: Normal pulses.      Heart sounds: Normal heart sounds. Murmur heard.      Systolic murmur is present with a grade of 2/6.      Comments: At RUSB  Pulmonary:      Effort: Pulmonary effort is normal. No respiratory distress.      Breath sounds: Normal breath sounds.   Abdominal:      General: Bowel sounds are normal.      Palpations: Abdomen is soft.      Tenderness: There is no abdominal tenderness. There is no guarding.   Musculoskeletal:      Cervical back: Normal range of motion and neck supple.      Right lower leg: No edema.      Left lower leg: Left lower leg edema: 1+ pretibial and ankle, venous stasis; no change.   Lymphadenopathy:      Cervical: No cervical adenopathy.   Skin:     General: Skin is warm and dry.   Neurological:      Mental Status: He is alert and oriented to person, place, and time.      Gait: Abnormal gait: guarded gait, limping on left, walks with cane.   Psychiatric:         Mood and Affect: Mood normal.         Behavior: Behavior normal.         Thought Content: Thought content normal.         Cognition and Memory: Cognition normal.          Judgment: Judgment normal.         9/12/23 CMP WNL except K+ 3.4, glucose 140    Lab Results   Component Value Date    WBC 8.39 02/13/2023    RBC 4.48 02/13/2023    HGB 15.1 02/13/2023    HCT 43.3 02/13/2023    MCV 96.7 02/13/2023    MCH 33.7 (H) 02/13/2023    MCHC 34.9 02/13/2023    RDW 13.5 02/13/2023    RDWSD 46.4 03/25/2022    MPV 10.6 03/25/2022     02/13/2023    NEUTRORELPCT 66.1 02/13/2023    LYMPHORELPCT 16.8 (L) 02/13/2023    MONORELPCT 5.2 02/13/2023    EOSRELPCT 9.4 (H) 02/13/2023    BASORELPCT 1.9 (H) 02/13/2023    AUTOIGPER 0.4 03/25/2022    NEUTROABS 6.0 09/12/2023    LYMPHSABS 1.41 02/13/2023    MONOSABS 0.44 02/13/2023    EOSABS 1.0 (H) 09/12/2023    BASOSABS 0.1 09/12/2023    AUTOIGNUM 0.03 03/25/2022    NRBC 0.2 02/13/2023       Lab Results   Component Value Date    TSH 2.930 02/13/2023       Assessment    Problem List Items Addressed This Visit       Type 2 diabetes mellitus with both eyes affected by mild nonproliferative retinopathy without macular edema, without long-term current use of insulin - Primary    Current Assessment & Plan     Diabetes is  pending lab results .   Continue current treatment regimen.  Reminded to get yearly retinal exam.  Diabetes will be reassessed  4 months .  Continue Glimepiride and Metformin twice daily.         Relevant Orders    Comprehensive Metabolic Panel (Completed)    Lipid Panel With LDL / HDL Ratio (Completed)    Hemoglobin A1c (Completed)    Essential (primary) hypertension    Current Assessment & Plan     Hypertension is  stable .  Continue current medications.  Ambulatory blood pressure monitoring.  Blood pressure will be reassessed at the next regular appointment.  Continue Valsartan daily and Carvedilol and Torsemide twice daily.  Continue Hydralazine 3 times daily and Doxazosin nightly.         Relevant Medications    torsemide (DEMADEX) 20 MG tablet    Hyperlipidemia    Current Assessment & Plan     Continue Rosuvastatin daily.  Continue to  work on healthy diet and exercise as tolerated.         Relevant Orders    Comprehensive Metabolic Panel (Completed)    Lipid Panel With LDL / HDL Ratio (Completed)    Polycythemia vera    Current Assessment & Plan     Continue Hydroxyurea daily.  Follow up as scheduled with hematology.         Allergic rhinitis    Current Assessment & Plan     You Montelukast and Flonase daily.  Try over the counter antihistamine, such as Claritin or Allegra, daily.         Chronic diastolic congestive heart failure    Overview     Description: RHC (02/03/2017) demonstrating PA (29/21/28) and PCWP 20.; Echo (03/26/2018) demonstrating impaired relaxation with elevated filling pressure.         Current Assessment & Plan     Continue current medications.  Follow up as scheduled with cardiology.         Relevant Medications    torsemide (DEMADEX) 20 MG tablet    Other Relevant Orders    Comprehensive Metabolic Panel (Completed)    CKD (chronic kidney disease) stage 2, GFR 60-89 ml/min    Current Assessment & Plan     Stable.  Follow up as scheduled with renal.         Relevant Medications    torsemide (DEMADEX) 20 MG tablet    Paroxysmal atrial fibrillation    Current Assessment & Plan     Stable.  Continue Eliquis twice daily.  Follow up as scheduled with cardiology.         Low back pain    Current Assessment & Plan     Continue physical therapy.         Relevant Orders    MRI Lumbar Spine Without Contrast    Localized edema    Current Assessment & Plan     Stable.  Continue Torsemide and KCL twice daily         Mild cognitive impairment with memory loss    Current Assessment & Plan     Continue Namenda daily.  Follow up as scheduled with neurology.          Other Visit Diagnoses       Encounter for screening for malignant neoplasm of colon        Relevant Orders    Cologuard - Stool, Per Rectum            Return in about 4 months (around 2/9/2024) for Recheck.  or sooner if symptoms persist or worsen.  Patient has had ongoing  discomfort in lower back and weakness in legs seems to be worsening despite physical therapy; will get MRI for further evaluation of symptoms.

## 2023-10-09 NOTE — PATIENT INSTRUCTIONS
Try over the counter antihistamine, such as Claritin or Allegra, daily.  Continue to monitor your blood sugar once daily before a meal and record results.  Continue to monitor your blood pressure periodically and record results.  Continue to work on healthy diet and exercise.  Follow up pending lab results.  Follow up in 4 months, or sooner if problems or concerns.   Follow up as scheduled with ortho.

## 2023-10-10 PROBLEM — U07.1 COVID-19 VIRUS INFECTION: Status: RESOLVED | Noted: 2023-04-11 | Resolved: 2023-10-10

## 2023-10-10 LAB
ALBUMIN SERPL-MCNC: 4.8 G/DL (ref 3.5–5.2)
ALBUMIN/GLOB SERPL: 2.3 G/DL
ALP SERPL-CCNC: 86 U/L (ref 39–117)
ALT SERPL-CCNC: 16 U/L (ref 1–41)
AST SERPL-CCNC: 31 U/L (ref 1–40)
BILIRUB SERPL-MCNC: 0.8 MG/DL (ref 0–1.2)
BUN SERPL-MCNC: 13 MG/DL (ref 8–23)
BUN/CREAT SERPL: 13.5 (ref 7–25)
CALCIUM SERPL-MCNC: 9.8 MG/DL (ref 8.6–10.5)
CHLORIDE SERPL-SCNC: 109 MMOL/L (ref 98–107)
CHOLEST SERPL-MCNC: 87 MG/DL (ref 0–200)
CO2 SERPL-SCNC: 27.8 MMOL/L (ref 22–29)
CREAT SERPL-MCNC: 0.96 MG/DL (ref 0.76–1.27)
EGFRCR SERPLBLD CKD-EPI 2021: 81.9 ML/MIN/1.73
GLOBULIN SER CALC-MCNC: 2.1 GM/DL
GLUCOSE SERPL-MCNC: 142 MG/DL (ref 65–99)
HBA1C MFR BLD: 7.1 % (ref 4.8–5.6)
HDLC SERPL-MCNC: 44 MG/DL (ref 40–60)
LDLC SERPL CALC-MCNC: 29 MG/DL (ref 0–100)
LDLC/HDLC SERPL: 0.72 {RATIO}
POTASSIUM SERPL-SCNC: 4.4 MMOL/L (ref 3.5–5.2)
PROT SERPL-MCNC: 6.9 G/DL (ref 6–8.5)
SODIUM SERPL-SCNC: 146 MMOL/L (ref 136–145)
TRIGL SERPL-MCNC: 57 MG/DL (ref 0–150)
VLDLC SERPL CALC-MCNC: 14 MG/DL (ref 5–40)

## 2023-10-10 RX ORDER — TORSEMIDE 20 MG/1
20 TABLET ORAL 2 TIMES DAILY
Qty: 180 TABLET | Refills: 1 | Status: SHIPPED | OUTPATIENT
Start: 2023-10-10

## 2023-10-10 NOTE — ASSESSMENT & PLAN NOTE
You Montelukast and Flonase daily.  Try over the counter antihistamine, such as Claritin or Allegra, daily.

## 2023-10-10 NOTE — ASSESSMENT & PLAN NOTE
Diabetes is  pending lab results .   Continue current treatment regimen.  Reminded to get yearly retinal exam.  Diabetes will be reassessed  4 months .  Continue Glimepiride and Metformin twice daily.

## 2023-10-12 ENCOUNTER — TELEPHONE (OUTPATIENT)
Dept: FAMILY MEDICINE CLINIC | Facility: CLINIC | Age: 78
End: 2023-10-12

## 2023-10-12 ENCOUNTER — TELEPHONE (OUTPATIENT)
Dept: FAMILY MEDICINE CLINIC | Facility: CLINIC | Age: 78
End: 2023-10-12
Payer: MEDICARE

## 2023-10-12 NOTE — TELEPHONE ENCOUNTER
Spoke with spouse and daughter-in-law Tiffany (on HIPAA) over the phone; questions answered; patient has had several falls and has MRI of lumbar spine tomorrow; daughter-in-law will clarify the patient has been taking the Doxazosin since stopped Amlodipine; follow up pending MRI results.

## 2023-10-12 NOTE — TELEPHONE ENCOUNTER
Caller: Erlin Dockery    Relationship: Self    Best call back number:[phone]    What is the best time to reach you: ANY TIME    Who are you requesting to speak with (clinical staff, provider,  specific staff member): MS BAIG OR HER MA    Do you know the name of the person who called: ERLIN DOCKERY    What was the call regarding: PATIENT IS CALLING TO STATE HE JUST MISSED A CALL.  HE STATES CAN LEAVE HIM A MESSAGE IN MY CHART.  UNABLE TO WARM TRANSFER.    Is it okay if the provider responds through MyChart: YES

## 2023-10-12 NOTE — TELEPHONE ENCOUNTER
Patient would like to talk with Zamzam about rehab options and previously discussed medication he would like to start again. patient can be contacted at [phone]

## 2023-10-13 ENCOUNTER — OFFICE VISIT (OUTPATIENT)
Dept: ENDOCRINOLOGY | Age: 78
End: 2023-10-13
Payer: MEDICARE

## 2023-10-13 VITALS
WEIGHT: 258 LBS | HEART RATE: 88 BPM | TEMPERATURE: 96.8 F | DIASTOLIC BLOOD PRESSURE: 80 MMHG | HEIGHT: 71 IN | BODY MASS INDEX: 36.12 KG/M2 | SYSTOLIC BLOOD PRESSURE: 130 MMHG | OXYGEN SATURATION: 97 %

## 2023-10-13 DIAGNOSIS — E78.2 MIXED HYPERLIPIDEMIA: ICD-10-CM

## 2023-10-13 DIAGNOSIS — I10 ESSENTIAL (PRIMARY) HYPERTENSION: ICD-10-CM

## 2023-10-13 DIAGNOSIS — E11.3293 TYPE 2 DIABETES MELLITUS WITH BOTH EYES AFFECTED BY MILD NONPROLIFERATIVE RETINOPATHY WITHOUT MACULAR EDEMA, WITHOUT LONG-TERM CURRENT USE OF INSULIN: Primary | ICD-10-CM

## 2023-10-13 NOTE — PROGRESS NOTES
"Chief Complaint  Chief Complaint   Patient presents with    Diabetes     Checks blood sugars \"not often.\"       Subjective          History of Present Illness    rElin Blanco 78 y.o. presents for a follow-up evaluation for type 2 DM     He has been diabetic since 2014     Pt is on the following medications for their DM: glimepiride 2 mg 1 tablet twice a day and metformin  mg 2 tablets twice daily.        Past medications Lantus - it was making his blood sugar variable  Steglatro caused balanitis      Denies diarrhea, constipation, chest pain, shortness of breath, vision changes or numbness and tingling in feet/hands.    Weight gain of 2 lbs since last visit.    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  Last eye exam was 12/22     Pt does have a history of nephropathy.  Patient is currently taking ARB - follows with Nephrology     Pt does not have neuropathy.  Pt follows with podiatry every 2-3 months     Pt does have a history of CVA - He had a stroke more than 10 years ago with left leg weakness     Last A1C in 10/23 was 7.1    Last microalbumin in 07/23 was positive           Blood Sugars    Blood glucoses are checked 1/day.    Fasting blood glucoses: around 120    Pt has no episodes of hypoglycemia.            Hyperlipidemia     Pt is currently taking Crestor 40 mg HS     Last lipid panel in 10/23 showed Total 87, HDL 44, LDL 29 and Triglycerides 57            Hypertension     He has history of paroxysmal atrial fibrillation      Pt denies any chest pain, palpitations, shortness of breath or headache     Current regimen includes torsemide 20 mg twice a day, Coreg 6.25 mg twice a day, hydralazine 100 mg 3 times a day and valsartan 320 mg daily      He follows with Dr. Arias                     Other History     He has history of polycythemia vera and is on hydroxyurea prescribed by Dr. Thomas. His periodically has phlebotomy.            I have reviewed the patient's " Discharge Summary    Patient: Kajal López MRN: [id number]  CSN: [id number]    YOB: 1959  Age: 66 y.o. Sex: male    DOA: 1/4/2023 LOS:  LOS: 4 days   Discharge Date:      Primary Care Provider:  UZIEL Hui    Admission Diagnoses: Respiratory failure [facility]) [J96.90]    Discharge Diagnoses:    Hospital Problems  Date Reviewed: 11/15/2018            Codes Class Noted POA    Emphysema lung ([facility] ICD-10-CM: J43.9  ICD-9-CM: 492.8  1/7/2023 Unknown        * (Principal) Acute respiratory failure with hypoxia ([facility] ICD-10-CM: J96.01  ICD-9-CM: 518.81  1/4/2023 Unknown        Sleep apnea ICD-10-CM: G47.30  ICD-9-CM: 780.57  Unknown Unknown    Overview Signed 1/4/2023  1:29 PM by Olivia Coffey MD     not diagnosed by a doctor, but patient suspects that he may have             Hypertension ICD-10-CM: I10  ICD-9-CM: 401.9  Unknown Unknown        GERD (gastroesophageal reflux disease) ICD-10-CM: K21.9  ICD-9-CM: 530.81  Unknown Unknown    Overview Signed 1/4/2023  1:29 PM by Olivia Coffey MD     resolved, no issues for a long time             Chronic obstructive pulmonary disease with acute exacerbation ([facility] ICD-10-CM: J44.1  ICD-9-CM: 491.21  Unknown Unknown        Stage 3 chronic kidney disease ([facility] ICD-10-CM: N18.30  ICD-9-CM: 585.3  1/4/2023 Unknown           Discharge Condition: stable     Discharge Medications:     Current Discharge Medication List        START taking these medications    Details   albuterol-ipratropium (DUO-NEB) 2.5 mg-0.5 mg/3 ml nebu 3 mL by Nebulization route every four (4) hours as needed for Shortness of Breath or Respiratory Distress. Qty: 30 Each, Refills: 0  Start date: 1/8/2023      doxycycline (MONODOX) 100 mg capsule Take 1 Capsule by mouth two (2) times a day for 5 days.   Qty: 10 Capsule, Refills: 0  Start date: 1/8/2023, End date: 1/13/2023      predniSONE (STERAPRED) 5 mg dose pack See administration instruction per 5mg dose pack  Qty: 21 Tablet, Refills: 0  Start "allergies, medicines, past medical hx, family hx and social hx.    Objective   Vital Signs:   /80   Pulse 88   Temp 96.8 øF (36 øC) (Temporal)   Ht 180.3 cm (70.98\")   Wt 117 kg (258 lb)   SpO2 97%   BMI 36.00 kg/mý       Physical Exam   Physical Exam  Constitutional:       General: He is not in acute distress.     Appearance: Normal appearance. He is not diaphoretic.   HENT:      Head: Normocephalic and atraumatic.   Eyes:      General:         Right eye: No discharge.         Left eye: No discharge.   Skin:     General: Skin is warm and dry.   Neurological:      Mental Status: He is alert.   Psychiatric:         Mood and Affect: Mood normal.         Behavior: Behavior normal.                    Results Review:   Hemoglobin A1C   Date Value Ref Range Status   10/09/2023 7.10 (H) 4.80 - 5.60 % Final     Comment:     Hemoglobin A1C Ranges:  Increased Risk for Diabetes  5.7% to 6.4%  Diabetes                     >= 6.5%  Diabetic Goal                < 7.0%     09/18/2021 7.20 (H) 4.80 - 5.60 % Final     Triglycerides   Date Value Ref Range Status   10/09/2023 57 0 - 150 mg/dL Final     HDL Cholesterol   Date Value Ref Range Status   10/09/2023 44 40 - 60 mg/dL Final     LDL Chol Calc (NIH)   Date Value Ref Range Status   10/09/2023 29 0 - 100 mg/dL Final     VLDL Cholesterol Gary   Date Value Ref Range Status   10/09/2023 14 5 - 40 mg/dL Final     LDL/HDL RATIO   Date Value Ref Range Status   10/09/2023 0.72  Final         Assessment and Plan {CC Problem List  Visit Diagnosis  ROS  Review (Popup)  Health Maintenance  Quality  BestPractice  Medications  SmartSets  SnapShot Encounters  Media :23  Diagnoses and all orders for this visit:    1. Type 2 diabetes mellitus with both eyes affected by mild nonproliferative retinopathy without macular edema, without long-term current use of insulin (Primary)    A1c is 7.1%  Continue with glimepiride 2 mg 1 tablet twice a day and metformin  mg 2 " date: 1/8/2023      Nicotine 21-14-7 mg/24 hr ptds 1 Patch by TransDERmal route daily. Qty: 56 Patch, Refills: 0  Start date: 1/8/2023      fluticasone propion-salmeteroL (Advair Diskus) 250-50 mcg/dose diskus inhaler Take 1 Puff by inhalation every twelve (12) hours. Qty: 1 Each, Refills: 0  Start date: 1/8/2023           CONTINUE these medications which have CHANGED    Details   omeprazole (PRILOSEC) 20 mg capsule Take 1 Capsule by mouth daily for 10 days. Qty: 10 Capsule, Refills: 0  Start date: 1/8/2023, End date: 1/18/2023      albuterol (PROVENTIL HFA, VENTOLIN HFA, PROAIR HFA) 90 mcg/actuation inhaler Take 1 Puff by inhalation every four (4) hours as needed for Wheezing. Qty: 18 g, Refills: 0  Start date: 1/8/2023           CONTINUE these medications which have NOT CHANGED    Details   lidocaine (LIDODERM) 5 % Apply patch to the affected area for 12 hours a day and remove for 12 hours a day. Qty: 1 Package, Refills: 0      oxyCODONE-acetaminophen (PERCOCET) 5-325 mg per tablet Take 1-2 tablets by mouth every 4-6 hours as needed for pain. Qty: 60 Tab, Refills: 0    Associated Diagnoses: Primary osteoarthritis of left hip      amLODIPine (NORVASC) 5 mg tablet Take 5 mg by mouth daily. losartan (COZAAR) 50 mg tablet Take 50 mg by mouth daily. fenofibrate (LOFIBRA) 160 mg tablet Take 160 mg by mouth daily. STOP taking these medications       baclofen (LIORESAL) 20 mg tablet Comments:   Reason for Stopping:               Procedures : none     Consults: None      PHYSICAL EXAM   Visit Vitals  BP (!) 147/77   Pulse 74   Temp 97.6 °F (36.4 °C)   Resp 18   Ht 5' 9\" (1.753 m)   Wt 101 kg (222 lb 10.6 oz)   SpO2 96%   BMI 32.88 kg/m²     General: Awake, cooperative, no acute distress    HEENT: NC, Atraumatic. PERRLA, EOMI. Anicteric sclerae. Lungs:  CTA Bilaterally. No Wheezing/Rhonchi/Rales. Heart:  Regular  rhythm,  No murmur, No Rubs, No Gallops  Abdomen: Soft, Non distended, Non tender. +Bowel sounds,   Extremities: No c/c/e  Psych:   Not anxious or agitated. Neurologic:  No acute neurological deficits. Admission HPI :   Esteban Erazo is a 66 y.o. male with history of COPD, chronic pain on narcotics, hypertension hyperlipidemia presented to ER from surgical clinic due to hypoxia. Pt has scheduled anterior cervical decompression fusion cervical four/five, cervical five/six per / [facility] today. He was found  hypoxia around 70s at RA. Pt has no symptoms. Cxr interstitial edema possible. Rapid covid 19 and flu are negative, trop was wnl .ekg no st seg change. His pro-bnp >1800s. He was put on 6 L oxygen in ER. Reported coughs and some wheezing. He received steroid in ER. He knows he has renal issue not seeing nephrologist . He still smokes over 40 years. Wife and daughter were at the bed side. Hospital Course :   Pt was admitted to the hospital due to hypoxia. Hypoxia -acute respiratory failure with hypoxia -due to mild chf exacerbation and copd exacerbation and emphysema     Cta  chest no PE , COVID-19 and rapid flu negative. Echo done with mild diastolic dysfunction, he received iv lasix and setroid and breathing tx. He was able to wean off oxygen on discharge. Acute chf improving- he received lasix and echo done with   Ef wnl and mild diastolic dysfunction. Lasix was hold due to renal function and resolving cxr  and pt clinically off oxygen      COPD excerbation and cta chest emphysemas change , still smoking , received iv steroid and abx, he also received education for smoking cessation, he was put on nicotine patch. He needs to be seen per pulmonologist for pft. Nebulizer machine prescribed with weaning off steroid and inhelar at home on discharge.         GERD  PPI     Chronic pain, continue home pain management     Ckd 2-3 Back to base line now       HTN, accelerated  Continue norvasc      Discharge planning discussed with patient, pt agrees tablets twice daily  Continue with BG checks      2. Mixed hyperlipidemia    Lipid panel is good.    Continue with statin.      3. Essential (primary) hypertension    Stable  Continue with current medication regimen  Defer management to PCP/Nephrology           No refills needed at this time      RTC on 02/19/24 with Dr. Yang      Follow Up     Patient was given instructions and counseling regarding her condition or for health maintenance advice. Please see specific information pulled into the AVS if appropriate.              Mendy Wiseman, APRN  10/13/23              with the plan and no questions and concerns at this point. Activity: Activity as tolerated    Diet: Regular Diet    Follow-up: PCP pulmonologist     Disposition: home     Minutes spent on discharge: 45 min       Labs: Results:       Chemistry Recent Labs     01/08/23 0317 01/07/23 0055 01/06/23 0213   * 136* 162*    134* 137   K 4.7 4.4 4.8    105 105   CO2 21 22 21   BUN 44* 50* 43*   CREA 1.48* 1.56* 1.80*   CA 8.6 8.6 8.8   AGAP 9 7 11   BUCR 30* 32* 24*      CBC w/Diff Recent Labs     01/08/23 0317 01/07/23 0055 01/06/23 0213   WBC 9.6 12.9 9.2   RBC 5.66* 5.78* 6.08*   HGB 17.3* 17.3* 18.3*   HCT 51.1* 51.9* 54.6*    190 189   GRANS 87* 88* 87*   LYMPH 6* 6* 9*   EOS 0 0 0      Cardiac Enzymes No results for input(s): CPK, CKND1, PUSHPA in the last 72 hours. No lab exists for component: CKRMB, TROIP   Coagulation No results for input(s): PTP, INR, APTT, INREXT, INREXT in the last 72 hours. Lipid Panel No results found for: CHOL, CHOLPOCT, CHOLX, CHLST, CHOLV, 254158, HDL, HDLP, LDL, LDLC, DLDLP, 276780, VLDLC, VLDL, TGLX, TRIGL, TRIGP, TGLPOCT, CHHD, CHHDX   BNP No results for input(s): BNPP in the last 72 hours. Liver Enzymes No results for input(s): TP, ALB, TBIL, AP in the last 72 hours. No lab exists for component: SGOT, GPT, DBIL   Thyroid Studies No results found for: T4, T3U, TSH, TSHEXT, TSHEXT         Significant Diagnostic Studies: XR CHEST PA LAT    Result Date: 1/4/2023  EXAM: Two-view chest CLINICAL HISTORY: Chest Pain , COMPARISON: None FINDINGS: Frontal and lateral views of the chest demonstrates slight interstitial prominence bilaterally otherwise clear. . Cardiac silhouette is normal in size and contour. No acute bony or soft tissue abnormality. Slight nonspecific interstitial prominence towards the lung bases. Mild/early interstitial edema possible.  Otherwise fairly clear    CTA CHEST W OR W WO CONT    Result Date: 1/4/2023  EXAM: CTA Chest INDICATION: Chest pain. COMPARISON: None. TECHNIQUE: Axial CT imaging from the thoracic inlet through the diaphragm with intravenous contrast. Coronal and sagittal MIP reformats were generated. One or more dose reduction techniques were used on this CT: automated exposure control, adjustment of the mAs and/or kVp according to patient size, and iterative reconstruction techniques. The specific techniques used on this CT exam have been documented in the patient's electronic medical record. Digital Imaging and Communications in Medicine (DICOM) format image data are available to nonaffiliated external healthcare facilities or entities on a secure, media free, reciprocally searchable basis with patient authorization for at least a 12-month period after this study. . _______________ FINDINGS: EXAM QUALITY: Overall exam quality is adequate PULMONARY ARTERIES: No evidence of pulmonary embolism. MEDIASTINUM: Normal heart size. No evidence of right heart strain. Aorta is unremarkable. No pericardial effusion. LYMPH NODES: Moderate and borderline-enlarged mediastinal and bilateral hilar nodes are present. For example right paratracheal node measuring 1.5 cm short axis. . AIRWAY: Normal. LUNGS: Background of emphysematous changes. . Minimal dependent probable atelectasis toward lung bases otherwise fairly clear. PLEURA: Normal. Specifically, no pneumothorax or pleural effusion. UPPER ABDOMEN: Unremarkable. OTHER: No acute or aggressive osseous abnormalities identified. _______________     No evidence of PE. No acute pulmonary process identified. Background of emphysematous changes and minimal probable atelectasis towards the lung bases. Mediastinal and bilateral hilar adenopathy. Adenopathy is nonspecific but suspect most likely reactive in nature. XR CHEST PORT    Result Date: 1/7/2023  CLINICAL HISTORY:  Shortness of breath COMPARISONS:  Chest CT 1/4/2023.  Chest x-ray 1/4/2023 TECHNIQUE:  single frontal view of the chest ------------------------------------------ FINDINGS: Lungs:  No consolidation or pleural fluid. Mediastinum: Moderate cardiomegaly. Atherosclerotic arterial calcification. Bones: No evidence of fracture or suspicious bone lesion. ------------------------------------------    No acute cardiopulmonary process. ECHO ADULT COMPLETE    Result Date: 1/5/2023    Left Ventricle: Normal left ventricular systolic function with a visually estimated EF of 55 - 60%. Not well visualized. Left ventricle size is normal. Normal wall thickness. Normal wall motion. Grade I diastolic dysfunction present with normal LV EF. Right Ventricle: Right ventricle is dilated. Right Atrium: Right atrium is dilated. Tricuspid Valve: Unable to assess RVSP due to inadequate or insignificant tricuspid regurgitation.              [facility]     CC: Greg Staples

## 2023-10-27 ENCOUNTER — OFFICE VISIT (OUTPATIENT)
Dept: ENDOCRINOLOGY | Age: 78
End: 2023-10-27
Payer: MEDICARE

## 2023-10-27 VITALS
HEIGHT: 71 IN | TEMPERATURE: 97.5 F | SYSTOLIC BLOOD PRESSURE: 128 MMHG | OXYGEN SATURATION: 96 % | WEIGHT: 253.6 LBS | DIASTOLIC BLOOD PRESSURE: 78 MMHG | HEART RATE: 77 BPM | BODY MASS INDEX: 35.5 KG/M2

## 2023-10-27 DIAGNOSIS — D45 POLYCYTHEMIA VERA: ICD-10-CM

## 2023-10-27 DIAGNOSIS — E11.8 TYPE 2 DIABETES MELLITUS WITH COMPLICATIONS: Primary | ICD-10-CM

## 2023-10-27 DIAGNOSIS — E78.2 MIXED HYPERLIPIDEMIA: ICD-10-CM

## 2023-10-27 DIAGNOSIS — I10 ESSENTIAL (PRIMARY) HYPERTENSION: ICD-10-CM

## 2023-10-27 DIAGNOSIS — N40.0 BENIGN PROSTATIC HYPERPLASIA WITHOUT URINARY OBSTRUCTION: ICD-10-CM

## 2023-10-27 PROCEDURE — 3074F SYST BP LT 130 MM HG: CPT | Performed by: INTERNAL MEDICINE

## 2023-10-27 PROCEDURE — 3078F DIAST BP <80 MM HG: CPT | Performed by: INTERNAL MEDICINE

## 2023-10-27 PROCEDURE — 99214 OFFICE O/P EST MOD 30 MIN: CPT | Performed by: INTERNAL MEDICINE

## 2023-10-27 NOTE — PROGRESS NOTES
Subjective   Erlin Blanco is a 78 y.o. male.     History of Present Illness        He is known diabetes mellitus since 2014.  He is on glimepiride 2 mg twice a day, and metformin  mg 2 tablet every morning and 2 tablets every evening.  He has balanitis with Steglatro in the past.  He is not circumcised.  He checks his blood sugar 1-2 times a day.  -125.  He denies severe hypoglycemic episodes.  He denies significant weight change.  He is unable to exercise because of chronic back pain.  Hemoglobin A1c done in October 2023 7.1%.     His last eye examination was in December 2022.  He has mild nonproliferative diabetic retinopathy without macular edema.  He denies numbness, tingling or burning in his hands or feet.  Urine microalbumin was elevated in October 2022.  He is being followed by Dr. Carreon.     He has hyperlipidemia and is on rosuvastatin 40 mg/day.  He denies myalgia.  Lipid panel done in done in October 2023 are as follows: Cholesterol 87.  HDL 44.  LDL 29.  Triglycerides 57.     He has hypertension but no history of heart attack.  He had a stroke more than 10 years ago with left leg weakness.  He has history of paroxysmal atrial fibrillation.  He is on torsemide 20 mg twice a day, Coreg 6.25 mg twice a day, hydralazine 100 mg 3 times a day, valsartan 320 mg once a day,  and Eliquis 5 mg twice a day.  He denies chest pain,  SOB or palpitations.  He follows with Dr. Arias.     He has history of polycythemia vera and is on hydroxyurea prescribed by Dr. Thomas.  His last phlebotomy was in Sept. 2023.    He has BPH and follows with Dr. Sahil De La Rosa.  He is on Flomax 0.4 mg daily.  Insurance will not pay for Gemtesa. He denies dribbling or retention.  He has urinary urgency.     He smoked 1/2 pack of cigarettes per day for about 10 years and quit smoking in 1973.  He denies alcohol use.          The following portions of the patient's history were reviewed and updated as appropriate:  "allergies, current medications, past family history, past medical history, past social history, past surgical history, and problem list.    Review of Systems   Eyes:  Negative for visual disturbance.   Respiratory:  Negative for shortness of breath.    Cardiovascular:  Negative for chest pain and palpitations.   Gastrointestinal: Negative.    Genitourinary:  Positive for urgency. Negative for dysuria and hematuria.   Musculoskeletal:  Negative for myalgias.   Neurological:  Negative for numbness.     Vitals:    10/27/23 1133   BP: 128/78   Pulse: 77   Temp: 97.5 °F (36.4 °C)   TempSrc: Temporal   SpO2: 96%   Weight: 115 kg (253 lb 9.6 oz)   Height: 180.3 cm (70.98\")      Objective   Physical Exam  Constitutional:       Appearance: Normal appearance.   Eyes:      General: No scleral icterus.        Right eye: No discharge.         Left eye: No discharge.      Extraocular Movements: Extraocular movements intact.      Conjunctiva/sclera: Conjunctivae normal.   Neck:      Vascular: No carotid bruit.   Cardiovascular:      Rate and Rhythm: Regular rhythm.      Heart sounds: No murmur heard.     No gallop.   Pulmonary:      Breath sounds: No rales.   Abdominal:      Palpations: Abdomen is soft.      Tenderness: There is no abdominal tenderness.   Musculoskeletal:      Right lower leg: Edema present.      Left lower leg: Edema present.      Comments: Chronic venous stasis changes on distal lower extremities.   Lymphadenopathy:      Cervical: No cervical adenopathy.   Neurological:      Mental Status: He is alert and oriented to person, place, and time.       Office Visit on 10/09/2023   Component Date Value Ref Range Status    Glucose 10/09/2023 142 (H)  65 - 99 mg/dL Final    BUN 10/09/2023 13  8 - 23 mg/dL Final    Creatinine 10/09/2023 0.96  0.76 - 1.27 mg/dL Final    EGFR Result 10/09/2023 81.9  >60.0 mL/min/1.73 Final    Comment: GFR Normal >60  Chronic Kidney Disease <60  Kidney Failure <15  The GFR formula is only " valid for adults with stable renal  function between ages 18 and 70.      BUN/Creatinine Ratio 10/09/2023 13.5  7.0 - 25.0 Final    Sodium 10/09/2023 146 (H)  136 - 145 mmol/L Final    Potassium 10/09/2023 4.4  3.5 - 5.2 mmol/L Final    Chloride 10/09/2023 109 (H)  98 - 107 mmol/L Final    Total CO2 10/09/2023 27.8  22.0 - 29.0 mmol/L Final    Calcium 10/09/2023 9.8  8.6 - 10.5 mg/dL Final    Total Protein 10/09/2023 6.9  6.0 - 8.5 g/dL Final    Albumin 10/09/2023 4.8  3.5 - 5.2 g/dL Final    Globulin 10/09/2023 2.1  gm/dL Final    A/G Ratio 10/09/2023 2.3  g/dL Final    Total Bilirubin 10/09/2023 0.8  0.0 - 1.2 mg/dL Final    Alkaline Phosphatase 10/09/2023 86  39 - 117 U/L Final    AST (SGOT) 10/09/2023 31  1 - 40 U/L Final    ALT (SGPT) 10/09/2023 16  1 - 41 U/L Final    Total Cholesterol 10/09/2023 87  0 - 200 mg/dL Final    Comment: Cholesterol Reference Ranges  (U.S. Department of Health and Human Services ATP III  Classifications)  Desirable          <200 mg/dL  Borderline High    200-239 mg/dL  High Risk          >240 mg/dL  Triglyceride Reference Ranges  (U.S. Department of Health and Human Services ATP III  Classifications)  Normal           <150 mg/dL  Borderline High  150-199 mg/dL  High             200-499 mg/dL  Very High        >500 mg/dL  HDL Reference Ranges  (U.S. Department of Health and Human Services ATP III  Classifications)  Low     <40 mg/dl (major risk factor for CHD)  High    >60 mg/dl ('negative' risk factor for CHD)  LDL Reference Ranges  (U.S. Department of Health and Human Services ATP III  Classifications)  Optimal          <100 mg/dL  Near Optimal     100-129 mg/dL  Borderline High  130-159 mg/dL  High             160-189 mg/dL  Very High        >189 mg/dL      Triglycerides 10/09/2023 57  0 - 150 mg/dL Final    HDL Cholesterol 10/09/2023 44  40 - 60 mg/dL Final    VLDL Cholesterol Gary 10/09/2023 14  5 - 40 mg/dL Final    LDL Chol Calc ([facility]) 10/09/2023 29  0 - 100 mg/dL Final     LDL/HDL RATIO 10/09/2023 0.72   Final    Hemoglobin A1C 10/09/2023 7.10 (H)  4.80 - 5.60 % Final    Comment: Hemoglobin A1C Ranges:  Increased Risk for Diabetes  5.7% to 6.4%  Diabetes                     >= 6.5%  Diabetic Goal                < 7.0%       Assessment & Plan   Diagnoses and all orders for this visit:    1. 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 Mixed hyperlipidemia  -     Comprehensive Metabolic Panel; Future  -     Lipid Panel; Future  -     TSH; Future    3. Essential (primary) hypertension  -     Comprehensive Metabolic Panel; Future    4. Polycythemia vera    5. Benign prostatic hyperplasia without urinary obstruction    Other orders  -     SITagliptin (Januvia) 100 MG tablet; Take 1 tablet by mouth Daily.  Dispense: 90 tablet; Refill: 1      Start Januvia 100 mg/day.  Continue glimepiride 2 mg twice a day and metformin  mg 2 tablets twice a day.  Continue rosuvastatin 40 mg/day.  Continue torsemide, Coreg, hydralazine, valsartan, and Eliquis per cardiologist.  Follow-up with Dr. Thomas   Continue Flomax.  Follow-up with Dr. Sahil De La Rosa.    RTC 4 mos with LATOYA Wiseman NP

## 2023-10-27 NOTE — LETTER
October 27, 2023     Zamzam John, MUKUL  [address]  [address]  [facility] [address]    Patient: Erlin Blanco   YOB: 1945   Date of Visit: 10/27/2023     Dear MUKUL Dover:       Thank you for referring Erlin Blanco to me for evaluation. Below are the relevant portions of my assessment and plan of care.    If you have questions, please do not hesitate to call me. I look forward to following Erlin along with you.         Sincerely,        Arnold Yang MD        CC: MD Sahil Tubbs MD Yson, Arnold SALAZAR MD  10/27/23 1224  Sign when Signing Visit  Subjective  Erlin Blanco is a 78 y.o. male.     History of Present Illness        He is known diabetes mellitus since 2014.  He is on glimepiride 2 mg twice a day, and metformin  mg 2 tablet every morning and 2 tablets every evening.  He has balanitis with Steglatro in the past.  He is not circumcised.  He checks his blood sugar 1-2 times a day.  -125.  He denies severe hypoglycemic episodes.  He denies significant weight change.  He is unable to exercise because of chronic back pain.  Hemoglobin A1c done in October 2023 7.1%.     His last eye examination was in December 2022.  He has mild nonproliferative diabetic retinopathy without macular edema.  He denies numbness, tingling or burning in his hands or feet.  Urine microalbumin was elevated in October 2022.  He is being followed by Dr. Carreon.     He has hyperlipidemia and is on rosuvastatin 40 mg/day.  He denies myalgia.  Lipid panel done in done in October 2023 are as follows: Cholesterol 87.  HDL 44.  LDL 29.  Triglycerides 57.     He has hypertension but no history of heart attack.  He had a stroke more than 10 years ago with left leg weakness.  He has history of paroxysmal atrial fibrillation.  He is on torsemide 20 mg twice a day, Coreg 6.25 mg twice a day, hydralazine 100 mg 3 times a day, valsartan 320 mg once a day,  and  "Eliquis 5 mg twice a day.  He denies chest pain,  SOB or palpitations.  He follows with Dr. Arias.     He has history of polycythemia vera and is on hydroxyurea prescribed by Dr. Thomas.  His last phlebotomy was in Sept. 2023.    He has BPH and follows with Dr. Sahil De La Rosa.  He is on Flomax 0.4 mg daily.  Insurance will not pay for Gemtesa. He denies dribbling or retention.  He has urinary urgency.     He smoked 1/2 pack of cigarettes per day for about 10 years and quit smoking in 1973.  He denies alcohol use.          The following portions of the patient's history were reviewed and updated as appropriate: allergies, current medications, past family history, past medical history, past social history, past surgical history, and problem list.    Review of Systems   Eyes:  Negative for visual disturbance.   Respiratory:  Negative for shortness of breath.    Cardiovascular:  Negative for chest pain and palpitations.   Gastrointestinal: Negative.    Genitourinary:  Positive for urgency. Negative for dysuria and hematuria.   Musculoskeletal:  Negative for myalgias.   Neurological:  Negative for numbness.     Vitals:    10/27/23 1133   BP: 128/78   Pulse: 77   Temp: 97.5 °F (36.4 °C)   TempSrc: Temporal   SpO2: 96%   Weight: 115 kg (253 lb 9.6 oz)   Height: 180.3 cm (70.98\")      Objective  Physical Exam  Constitutional:       Appearance: Normal appearance.   Eyes:      General: No scleral icterus.        Right eye: No discharge.         Left eye: No discharge.      Extraocular Movements: Extraocular movements intact.      Conjunctiva/sclera: Conjunctivae normal.   Neck:      Vascular: No carotid bruit.   Cardiovascular:      Rate and Rhythm: Regular rhythm.      Heart sounds: No murmur heard.     No gallop.   Pulmonary:      Breath sounds: No rales.   Abdominal:      Palpations: Abdomen is soft.      Tenderness: There is no abdominal tenderness.   Musculoskeletal:      Right lower leg: Edema present.      Left " lower leg: Edema present.      Comments: Chronic venous stasis changes on distal lower extremities.   Lymphadenopathy:      Cervical: No cervical adenopathy.   Neurological:      Mental Status: He is alert and oriented to person, place, and time.       Office Visit on 10/09/2023   Component Date Value Ref Range Status   • Glucose 10/09/2023 142 (H)  65 - 99 mg/dL Final   • BUN 10/09/2023 13  8 - 23 mg/dL Final   • Creatinine 10/09/2023 0.96  0.76 - 1.27 mg/dL Final   • EGFR Result 10/09/2023 81.9  >60.0 mL/min/1.73 Final    Comment: GFR Normal >60  Chronic Kidney Disease <60  Kidney Failure <15  The GFR formula is only valid for adults with stable renal  function between ages 18 and 70.     • BUN/Creatinine Ratio 10/09/2023 13.5  7.0 - 25.0 Final   • Sodium 10/09/2023 146 (H)  136 - 145 mmol/L Final   • Potassium 10/09/2023 4.4  3.5 - 5.2 mmol/L Final   • Chloride 10/09/2023 109 (H)  98 - 107 mmol/L Final   • Total CO2 10/09/2023 27.8  22.0 - 29.0 mmol/L Final   • Calcium 10/09/2023 9.8  8.6 - 10.5 mg/dL Final   • Total Protein 10/09/2023 6.9  6.0 - 8.5 g/dL Final   • Albumin 10/09/2023 4.8  3.5 - 5.2 g/dL Final   • Globulin 10/09/2023 2.1  gm/dL Final   • A/G Ratio 10/09/2023 2.3  g/dL Final   • Total Bilirubin 10/09/2023 0.8  0.0 - 1.2 mg/dL Final   • Alkaline Phosphatase 10/09/2023 86  39 - 117 U/L Final   • AST (SGOT) 10/09/2023 31  1 - 40 U/L Final   • ALT (SGPT) 10/09/2023 16  1 - 41 U/L Final   • Total Cholesterol 10/09/2023 87  0 - 200 mg/dL Final    Comment: Cholesterol Reference Ranges  (U.S. Department of Health and Human Services ATP III  Classifications)  Desirable          <200 mg/dL  Borderline High    200-239 mg/dL  High Risk          >240 mg/dL  Triglyceride Reference Ranges  (U.S. Department of Health and Human Services ATP III  Classifications)  Normal           <150 mg/dL  Borderline High  150-199 mg/dL  High             200-499 mg/dL  Very High        >500 mg/dL  HDL Reference Ranges  (U.S.  Department of Health and Human Services ATP III  Classifications)  Low     <40 mg/dl (major risk factor for CHD)  High    >60 mg/dl ('negative' risk factor for CHD)  LDL Reference Ranges  (U.S. Department of Health and Human Services ATP III  Classifications)  Optimal          <100 mg/dL  Near Optimal     100-129 mg/dL  Borderline High  130-159 mg/dL  High             160-189 mg/dL  Very High        >189 mg/dL     • Triglycerides 10/09/2023 57  0 - 150 mg/dL Final   • HDL Cholesterol 10/09/2023 44  40 - 60 mg/dL Final   • VLDL Cholesterol Gary 10/09/2023 14  5 - 40 mg/dL Final   • LDL Chol Calc ([facility]) 10/09/2023 29  0 - 100 mg/dL Final   • LDL/HDL RATIO 10/09/2023 0.72   Final   • Hemoglobin A1C 10/09/2023 7.10 (H)  4.80 - 5.60 % Final    Comment: Hemoglobin A1C Ranges:  Increased Risk for Diabetes  5.7% to 6.4%  Diabetes                     >= 6.5%  Diabetic Goal                < 7.0%       Assessment & Plan  Diagnoses and all orders for this visit:    1. 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 Mixed hyperlipidemia  -     Comprehensive Metabolic Panel; Future  -     Lipid Panel; Future  -     TSH; Future    3. Essential (primary) hypertension  -     Comprehensive Metabolic Panel; Future    4. Polycythemia vera    5. Benign prostatic hyperplasia without urinary obstruction    Other orders  -     SITagliptin (Januvia) 100 MG tablet; Take 1 tablet by mouth Daily.  Dispense: 90 tablet; Refill: 1      Start Januvia 100 mg/day.  Continue glimepiride 2 mg twice a day and metformin  mg 2 tablets twice a day.  Continue rosuvastatin 40 mg/day.  Continue torsemide, Coreg, hydralazine, valsartan, and Eliquis per cardiologist.  Follow-up with Dr. Thomas   Continue Flomax.  Follow-up with Dr. Baxter  Rj.    RTC 4 mos with LATOYA Wiseman NP

## 2023-11-06 ENCOUNTER — TELEPHONE (OUTPATIENT)
Dept: FAMILY MEDICINE CLINIC | Facility: CLINIC | Age: 78
End: 2023-11-06
Payer: MEDICARE

## 2023-11-06 NOTE — TELEPHONE ENCOUNTER
JAYLEN WITH [facility] CALLED TO ASK IF THE FORM REGARDING PATIENTS ELIQUIST CAN BE HELD FOR 3 DAYS BEFORE & RESTARTED 24 HOURS LATER THE  PRODUCE NEEDED IS LISTED ON THE FORM THAT HAS BEEN PUT INTO MEDIA     PHONE [phone]  [phone]

## 2023-11-06 NOTE — TELEPHONE ENCOUNTER
Paperwork scanned into chart but not filled out this needs to come from cardiology called patient on Friday no answer left vm for patient to call back has not yet called tressa carpio

## 2023-11-09 ENCOUNTER — TELEPHONE (OUTPATIENT)
Age: 78
End: 2023-11-09
Payer: MEDICARE

## 2023-11-09 DIAGNOSIS — G31.84 MILD COGNITIVE IMPAIRMENT WITH MEMORY LOSS: ICD-10-CM

## 2023-11-09 RX ORDER — MEMANTINE HYDROCHLORIDE 5 MG/1
10 TABLET ORAL DAILY
Qty: 90 TABLET | Refills: 1 | Status: SHIPPED | OUTPATIENT
Start: 2023-11-09

## 2023-11-09 NOTE — TELEPHONE ENCOUNTER
Received call from patient that he will be having back surgery at [facility] with Francois Cummings PA-C and is needing to stop Eliquis , if okay for how long ?

## 2023-11-14 DIAGNOSIS — I50.32 CHRONIC DIASTOLIC CONGESTIVE HEART FAILURE: ICD-10-CM

## 2023-11-15 RX ORDER — TORSEMIDE 20 MG/1
20 TABLET ORAL 2 TIMES DAILY
Qty: 60 TABLET | Refills: 0 | Status: SHIPPED | OUTPATIENT
Start: 2023-11-15

## 2023-12-12 ENCOUNTER — TELEPHONE (OUTPATIENT)
Dept: CARDIOLOGY | Facility: CLINIC | Age: 78
End: 2023-12-12
Payer: MEDICARE

## 2023-12-12 NOTE — TELEPHONE ENCOUNTER
Caller: Erlin Blanco    Relationship: Self    Best call back number: [phone]    What is the best time to reach you: ANYTIME    Who are you requesting to speak with (clinical staff, provider,  specific staff member): ANYONE    What was the call regarding: PT IS RETURNING A CALL TO DR. [facility]ER[facility]S MEDICAL ASSISTANT TO LET THEM KNOW ABOUT THE INFO THEY CALLED ABOUT. HE SAID THE PHONE NUMBER [phone]    Is it okay if the provider responds through MyChart: NO

## 2023-12-12 NOTE — TELEPHONE ENCOUNTER
Patient will be having back injections on Monday , patient is currently on Eiquis an is needing to hold Eliquis for 3 days then start back Monday night ?  Called patient to see what injections he was getting patient stated he was unclear this is from his pain management doctor .    Please advise patient can be reached at [phone].

## 2023-12-19 ENCOUNTER — OFFICE VISIT (OUTPATIENT)
Dept: FAMILY MEDICINE CLINIC | Facility: CLINIC | Age: 78
End: 2023-12-19
Payer: MEDICARE

## 2023-12-19 VITALS
DIASTOLIC BLOOD PRESSURE: 82 MMHG | HEART RATE: 77 BPM | OXYGEN SATURATION: 98 % | HEIGHT: 72 IN | SYSTOLIC BLOOD PRESSURE: 138 MMHG | BODY MASS INDEX: 35.62 KG/M2 | WEIGHT: 263 LBS

## 2023-12-19 DIAGNOSIS — G89.29 CHRONIC BILATERAL LOW BACK PAIN, UNSPECIFIED WHETHER SCIATICA PRESENT: ICD-10-CM

## 2023-12-19 DIAGNOSIS — E78.2 MIXED HYPERLIPIDEMIA: ICD-10-CM

## 2023-12-19 DIAGNOSIS — I50.32 CHRONIC DIASTOLIC CONGESTIVE HEART FAILURE: ICD-10-CM

## 2023-12-19 DIAGNOSIS — E11.3293 TYPE 2 DIABETES MELLITUS WITH BOTH EYES AFFECTED BY MILD NONPROLIFERATIVE RETINOPATHY WITHOUT MACULAR EDEMA, WITHOUT LONG-TERM CURRENT USE OF INSULIN: Primary | ICD-10-CM

## 2023-12-19 DIAGNOSIS — R60.0 LOCALIZED EDEMA: ICD-10-CM

## 2023-12-19 DIAGNOSIS — D45 POLYCYTHEMIA VERA: ICD-10-CM

## 2023-12-19 DIAGNOSIS — J30.9 ALLERGIC RHINITIS, UNSPECIFIED SEASONALITY, UNSPECIFIED TRIGGER: ICD-10-CM

## 2023-12-19 DIAGNOSIS — G31.84 MILD COGNITIVE IMPAIRMENT WITH MEMORY LOSS: ICD-10-CM

## 2023-12-19 DIAGNOSIS — M54.50 CHRONIC BILATERAL LOW BACK PAIN, UNSPECIFIED WHETHER SCIATICA PRESENT: ICD-10-CM

## 2023-12-19 DIAGNOSIS — I48.0 PAROXYSMAL ATRIAL FIBRILLATION: ICD-10-CM

## 2023-12-19 DIAGNOSIS — I10 PRIMARY HYPERTENSION: ICD-10-CM

## 2023-12-19 RX ORDER — CETIRIZINE HYDROCHLORIDE 10 MG/1
10 TABLET ORAL DAILY
COMMUNITY

## 2023-12-19 RX ORDER — ACETAMINOPHEN 500 MG
1000 TABLET ORAL EVERY 8 HOURS PRN
COMMUNITY

## 2023-12-19 RX ORDER — VIBEGRON 75 MG/1
1 TABLET, FILM COATED ORAL DAILY
COMMUNITY

## 2023-12-19 NOTE — PROGRESS NOTES
Subjective   Erlin Blanco is a 78 y.o. male.     Chief Complaint   Patient presents with    Follow-up    Diabetes     Follow up    Hypertension     Follow up       History of Present Illness   Patient presents for follow up DM2: takes Glimepiride and Metformin twice daily and Januvia daily; last A1c 7.1%; had follow up with endo 10/27/23 and pt would like PCP to manage; monitors blood sugar, typically runs 120s; watches intake of carbs/sugars in diet; some exercise, could do better; no longer doing PT due to back was getting worse; has been seeing pain management and had injection yesterday; injection has helped some with bending over; no numbness or tingling other than tingling in fingers with bending over; has seen neurosurgery for this in past; last eye exam 12/2022, needs to schedule.     F/U HTN: takes Valsartan daily, Carvedilol and Torsemide twice daily, and Hydralazine TID; also takes KCL twice daily; stopped Amlodipine and started Doxazosin nightly per renal; monitors BP, typically runs 120-130s/70s; no headaches; no orthostasis; swelling has improved since off Amlodipine; sees Dr. Arias cardiology and has upcoming follow up in March; has follow up with renal 12/30/23.     F/U Hyperlipidemia: takes Rosuvastatin daily; no myalgias; watches intake of saturated fats; fasting today.     F/U atrial fib: takes Eliquis twice daily and aspirin daily; resumed Eliquis today after off for injection yesterday.     F/U allergic rhinitis: takes Montelukast and Flonase daily and working well; needs refills today--mail order.     Takes Namenda daily; sees neurology; has follow up in January.     F/U polycythemia vera: sees hematology with [facility]; takes Hydroxyurea daily; no recent phlebotomy.     Sees urology and they manage prostate; occasional blood in urine, attributed to Eliquis.     F/U bilateral lower back pain: will have numbness in bilateral knees when stands for period of time; no recent falls; has tried to  avoid going out alone; walks with cane; no bladder or bowel dysfunction; had recent MRI; getting injections per pain management.      The following portions of the patient's history were reviewed and updated as appropriate: allergies, current medications, past family history, past medical history, past social history, past surgical history and problem list.    Current Outpatient Medications on File Prior to Visit   Medication Sig    Accu-Chek FastClix Lancets misc TEST BLOOD SUGAR 1-2 TIMES DAILY AND AS NEEDED    acetaminophen (TYLENOL) 500 MG tablet Take 2 tablets by mouth Every 8 (Eight) Hours As Needed for Moderate Pain.    apixaban (Eliquis) 5 MG tablet tablet TAKE 1 TABLET EVERY 12 HOURS    Aspirin Low Dose 81 MG EC tablet TAKE 1 TABLET DAILY    Blood Glucose Monitoring Suppl (Accu-Chek Guide Me) w/Device kit TEST BLOOD SUGAR 1-2 TIMES DAILY AND AS NEEDED    carvedilol (COREG) 6.25 MG tablet TAKE 1 TABLET TWICE A DAY WITH MEALS    cetirizine (zyrTEC) 10 MG tablet Take 1 tablet by mouth Daily.    doxazosin (CARDURA) 1 MG tablet Take 1 tablet by mouth Every Night.    glimepiride (AMARYL) 2 MG tablet TAKE 1 TABLET BY MOUTH TWO TIMES A DAY WITH MEALS    glucosamine-chondroitin 500-400 MG capsule capsule Take 1 capsule by mouth Daily.    glucose blood (Accu-Chek Guide) test strip Use as instructed to test blood sugar twice daily.    hydrALAZINE (APRESOLINE) 100 MG tablet TAKE 1 TABLET THREE TIMES A DAY    hydroxyurea (HYDREA) 500 MG capsule Take 1 capsule by mouth Daily.    memantine (NAMENDA) 5 MG tablet TAKE 2 TABLETS DAILY    metFORMIN ER (GLUCOPHAGE-XR) 500 MG 24 hr tablet 2 tablets twice daily with meals    Multiple Vitamins-Minerals (MULTIVITAMIN ADULT PO) Take 1 tablet by mouth Daily.    potassium chloride (KLOR-CON) 20 MEQ CR tablet Take 1 tablet by mouth 3 (Three) Times a Day. (Patient taking differently: Take 1 tablet by mouth 2 (Two) Times a Day.)    Probiotic Product (Probiotic Daily) capsule Take 1  capsule by mouth Daily.    rosuvastatin (CRESTOR) 40 MG tablet TAKE 1 TABLET DAILY    SITagliptin (Januvia) 100 MG tablet Take 1 tablet by mouth Daily.    tamsulosin (FLOMAX) 0.4 MG capsule 24 hr capsule Take 1 capsule by mouth every night at bedtime.    Vibegron (Gemtesa) 75 MG tablet Take 1 tablet by mouth Daily.    [DISCONTINUED] fluticasone (FLONASE) 50 MCG/ACT nasal spray 1 spray into the nostril(s) as directed by provider Daily.    [DISCONTINUED] montelukast (SINGULAIR) 10 MG tablet TAKE 1 TABLET DAILY    [DISCONTINUED] torsemide (DEMADEX) 20 MG tablet TAKE 1 TABLET BY MOUTH 2 TIMES A DAY    [DISCONTINUED] valsartan (DIOVAN) 320 MG tablet TAKE 1 TABLET DAILY    guaiFENesin (MUCINEX) 600 MG 12 hr tablet Take 2 tablets by mouth 2 (Two) Times a Day. (Patient not taking: Reported on 10/27/2023)     No current facility-administered medications on file prior to visit.        Past Medical History:   Diagnosis Date    Abnormal ECG     Abnormal stress test     Abrasion of face 01/29/2020    Acute bronchitis     Acute hypokalemia 09/16/2020    Acute non-recurrent sinusitis 01/13/2020    Acute pyelonephritis 10/09/2020    Acute sinusitis     DORI (acute kidney injury) 04/28/2017    Allergic rhinitis     Aortic root dilation     Arthritis     Bacteremia due to Escherichia coli 08/17/2020    Balanitis 05/16/2021    Benign enlargement of prostate     C. difficile colitis 09/16/2020    Cellulitis of knee, left 05/04/2017    CHF (congestive heart failure)     Chronic diastolic congestive heart failure     Constipation 11/14/2020    Contusion of face 01/29/2020    Coronary artery disease     COVID-19 virus infection     CVA (cerebral vascular accident) 2020    Diminished pulse     in lower extremities    Discitis of lumbar region 10/09/2020    Edema     Elevated hematocrit     Elevated hemoglobin     Essential hypertension     Hearing loss     mala hearing aids    Heart valve disease     High risk medication use     History of  back pain     History of dysuria     History of echocardiogram     History of intracranial hemorrhage     History of scoliosis     History of stroke     Hyperlipidemia     Hypokalemia 01/07/2021    Injury of toe, left, initial encounter     Irregular heart rate     Knee pain, left     Left bundle branch block     Leg wound, left     Leukocytosis 09/16/2020    Low back pain     Maxillary sinusitis 07/16/2021    Medicare annual wellness visit, subsequent     Minor head injury without loss of consciousness 01/29/2020    Murmur     Muscle cramps     Need for hepatitis C screening test     Paraphimosis 05/16/2021    Pneumonia of left lung due to infectious organism 09/17/2021    Polycythemia     Polycythemia vera     Pressure injury of buttock, stage 2 09/18/2020    Prostate hyperplasia without urinary obstruction     Prostatitis     Proteinuria     Pulmonary hypertension     Sacroiliitis 10/09/2020    Scoliosis     Sepsis due to Escherichia coli 08/17/2020    Sepsis with metabolic encephalopathy 08/17/2020    Stroke     SVT (supraventricular tachycardia)     Tachycardia     Thrombocytosis     TIA (transient ischemic attack)     2006    Type 2 diabetes mellitus     Type 2 diabetes mellitus with hyperglycemia 05/16/2021    Urinary tract infection due to extended-spectrum beta lactamase (ESBL) producing Escherichia coli 08/18/2020    Valvular heart disease        Past Surgical History:   Procedure Laterality Date    CARDIAC CATHETERIZATION      CATARACT EXTRACTION Left 04/2021    CATARACT EXTRACTION Right 07/26/2022    COLONOSCOPY      did Cologuard approx 6/2019 and negative    HAND SURGERY      JOINT REPLACEMENT Right 2014    DC ARTHRP KNE CONDYLE&PLATU MEDIAL&LAT COMPARTMENTS Left 04/27/2017    Procedure: LT TOTAL KNEE ARTHROPLASTY;  Surgeon: Bertin Perrin MD;  Location: [facility];  Service: Orthopedics    PROSTATE SURGERY      Rezum steam treatment to shrink prostate by dr. guerrero       Family History  "  Problem Relation Age of Onset    Hypertension Mother     Other Father         ruptured appendix,  when pt. was 14 years old.    Diabetes Paternal Aunt     Diabetes Paternal Uncle     No Known Problems Other     Migraines Sister     Stroke Sister     Dementia Brother        Social History     Socioeconomic History    Marital status:    Tobacco Use    Smoking status: Former     Types: Cigarettes     Quit date:      Years since quittin.0     Passive exposure: Past    Smokeless tobacco: Former    Tobacco comments:     Caffeine daily   Vaping Use    Vaping Use: Never used   Substance and Sexual Activity    Alcohol use: No    Drug use: No    Sexual activity: Defer       Review of Systems   Constitutional:  Negative for appetite change, chills, fatigue, fever, unexpected weight gain and unexpected weight loss.   HENT:  Negative for ear pain, sore throat and trouble swallowing.    Eyes:  Negative for blurred vision.   Respiratory:  Negative for cough, chest tightness and shortness of breath.    Cardiovascular:  Negative for chest pain and palpitations.   Gastrointestinal:  Negative for abdominal pain, blood in stool and diarrhea. Constipation: mild, probiotic helps.  Endocrine: Negative for cold intolerance, heat intolerance and polydipsia.   Genitourinary:  Negative for dysuria. Frequency: no change.  Musculoskeletal:  Back pain: see HPI.   Skin:  Negative for rash.   Neurological:  Negative for syncope and weakness.       Objective   Vitals:    23 1116   BP: 138/82   BP Location: Left arm   Patient Position: Sitting   Cuff Size: Adult   Pulse: 77   SpO2: 98%   Weight: 119 kg (263 lb)   Height: 182.9 cm (72\")     Body mass index is 35.67 kg/m².    Physical Exam  Vitals and nursing note reviewed.   Constitutional:       General: He is not in acute distress.     Appearance: He is well-developed and well-groomed. He is not diaphoretic.   HENT:      Head: Normocephalic.      Right Ear: External " ear normal.      Left Ear: External ear normal.   Eyes:      Conjunctiva/sclera: Conjunctivae normal.   Neck:      Vascular: No carotid bruit.   Cardiovascular:      Rate and Rhythm: Normal rate and regular rhythm.      Pulses: Normal pulses.      Heart sounds: Murmur heard.      Systolic murmur is present with a grade of 2/6.   Pulmonary:      Effort: Pulmonary effort is normal. No respiratory distress.      Breath sounds: Normal breath sounds.   Abdominal:      General: Bowel sounds are normal.      Palpations: Abdomen is soft. There is no hepatomegaly or splenomegaly.      Tenderness: There is no abdominal tenderness. There is no guarding.   Musculoskeletal:      Cervical back: Normal range of motion and neck supple.      Right lower leg: Right lower leg edema: trace pretibial and ankle.      Left lower leg: Left lower leg edema: 1+ pretibial and ankle, nonpitting, chronic.   Skin:     General: Skin is warm and dry.   Neurological:      Mental Status: He is alert and oriented to person, place, and time.      Gait: Abnormal gait: guarded gait, limping on left, walks with cane.      Deep Tendon Reflexes:      Reflex Scores:       Patellar reflexes are 2+ on the right side and 2+ on the left side.  Psychiatric:         Mood and Affect: Mood normal.         Behavior: Behavior normal.         Thought Content: Thought content normal.         Cognition and Memory: Cognition normal.         Judgment: Judgment normal.         Lab Results   Component Value Date    WBC 8.39 02/13/2023    RBC 4.48 02/13/2023    HGB 15.1 02/13/2023    HCT 43.3 02/13/2023    MCV 96.7 02/13/2023    MCH 33.7 (H) 02/13/2023    MCHC 34.9 02/13/2023    RDW 13.5 02/13/2023    RDWSD 46.4 03/25/2022    MPV 10.6 03/25/2022     02/13/2023    NEUTRORELPCT 66.1 02/13/2023    LYMPHORELPCT 16.8 (L) 02/13/2023    MONORELPCT 5.2 02/13/2023    EOSRELPCT 9.4 (H) 02/13/2023    BASORELPCT 1.9 (H) 02/13/2023    AUTOIGPER 0.4 03/25/2022    NEUTROABS 5.2  12/05/2023    LYMPHSABS 1.41 02/13/2023    MONOSABS 0.44 02/13/2023    EOSABS 0.7 12/05/2023    BASOSABS 0.1 12/05/2023    AUTOIGNUM 0.03 03/25/2022    NRBC 0.2 02/13/2023     Lab Results   Component Value Date    GLUCOSE 142 (H) 10/09/2023    BUN 13 10/09/2023    CREATININE 0.96 10/09/2023    EGFRIFNONA 57 (L) 01/10/2022    EGFRIFAFRI 66 01/10/2022    BCR 13.5 10/09/2023    K 4.4 10/09/2023    CO2 27.8 10/09/2023    CALCIUM 9.8 10/09/2023    PROTENTOTREF 6.9 10/09/2023    ALBUMIN 4.8 10/09/2023    LABIL2 2.3 10/09/2023    AST 31 10/09/2023    ALT 16 10/09/2023      Lab Results   Component Value Date    CHLPL 87 10/09/2023    TRIG 57 10/09/2023    HDL 44 10/09/2023    VLDL 14 10/09/2023    LDL 29 10/09/2023     Lab Results   Component Value Date    TSH 2.930 02/13/2023     Lab Results   Component Value Date    HGBA1C 7.10 (H) 10/09/2023     12/5/23 CBC WNL except lymph 14%, eosinophils 9.3      Assessment    Problem List Items Addressed This Visit       Type 2 diabetes mellitus with both eyes affected by mild nonproliferative retinopathy without macular edema, without long-term current use of insulin - Primary    Current Assessment & Plan     Diabetes is  pending lab results .   Continue current treatment regimen.  Regular aerobic exercise.  Reminded to get yearly retinal exam.  Diabetes will be reassessed in 1 month.  Continue Glimepiride and Metformin twice daily.  Continue Januvia daily.         Relevant Orders    Hemoglobin A1c    Lipid Panel With LDL / HDL Ratio    Comprehensive Metabolic Panel    Primary hypertension    Current Assessment & Plan     Hypertension is  stable .  Continue current medications.  Ambulatory blood pressure monitoring.  Blood pressure will be reassessed at the next regular appointment.  Continue Valsartan daily and Doxazosin nightly.  Continue Carvedilol and Torsemide twice daily.  Continue Hydralazine TID and KCL twice daily.  Follow up as scheduled with cardiology and renal.          Relevant Medications    valsartan (DIOVAN) 320 MG tablet    torsemide (DEMADEX) 20 MG tablet    Other Relevant Orders    Lipid Panel With LDL / HDL Ratio    Comprehensive Metabolic Panel    Hyperlipidemia    Current Assessment & Plan     Continue Rosuvastatin daily.  Continue to work on healthy diet and exercise.         Polycythemia vera    Current Assessment & Plan     Continue Hydroxyurea daily.  Follow up as scheduled with hematology.         Allergic rhinitis    Current Assessment & Plan     Stable.  Continue Montelukast and Flonase daily.         Relevant Medications    fluticasone (FLONASE) 50 MCG/ACT nasal spray    montelukast (SINGULAIR) 10 MG tablet    Chronic diastolic congestive heart failure    Overview     Description: [facility] (02/03/2017) demonstrating PA (29/21/28) and PCWP 20.; Echo (03/26/2018) demonstrating impaired relaxation with elevated filling pressure.         Current Assessment & Plan     Continue current medications.  Follow up as scheduled with cardiology.         Relevant Medications    torsemide (DEMADEX) 20 MG tablet    Paroxysmal atrial fibrillation    Current Assessment & Plan     Continue Carvedilol and Eliquis twice daily.  Follow up as scheduled with cardiology.         Back pain    Overview     scoliosis         Current Assessment & Plan     Follow up as scheduled with pain management.         Localized edema    Current Assessment & Plan     Stable.  Continue Torsemide twice daily.         Mild cognitive impairment with memory loss    Current Assessment & Plan     Continue Namenda daily.  Follow up as scheduled with neurology.             Come in for repeat labs in 1 month.  Return in about 4 months (around 4/19/2024) for Medicare Wellness, Recheck; needs lab appointment in 1 month.or sooner if symptoms problems or concerns.  Too soon to recheck A1c today; will come back for labs in 1 month.

## 2023-12-19 NOTE — PATIENT INSTRUCTIONS
Come in for repeat labs in 1 month.  Continue to monitor your blood sugar once daily before a meal and record results.  Continue to monitor your blood pressure periodically and record results.  Continue to work on healthy diet and exercise as tolerated.  Follow up pending lab results.  Follow up in 4 months for Medicare Wellness, or sooner if problems or concerns.

## 2023-12-21 RX ORDER — FLUTICASONE PROPIONATE 50 MCG
1 SPRAY, SUSPENSION (ML) NASAL DAILY
Qty: 48 G | Refills: 3 | Status: SHIPPED | OUTPATIENT
Start: 2023-12-21

## 2023-12-21 RX ORDER — MONTELUKAST SODIUM 10 MG/1
10 TABLET ORAL DAILY
Qty: 90 TABLET | Refills: 3 | Status: SHIPPED | OUTPATIENT
Start: 2023-12-21

## 2023-12-21 RX ORDER — VALSARTAN 320 MG/1
320 TABLET ORAL DAILY
Qty: 90 TABLET | Refills: 1 | Status: SHIPPED | OUTPATIENT
Start: 2023-12-21

## 2023-12-21 RX ORDER — TORSEMIDE 20 MG/1
20 TABLET ORAL 2 TIMES DAILY
Qty: 180 TABLET | Refills: 1 | Status: SHIPPED | OUTPATIENT
Start: 2023-12-21

## 2023-12-21 NOTE — ASSESSMENT & PLAN NOTE
Diabetes is  pending lab results .   Continue current treatment regimen.  Regular aerobic exercise.  Reminded to get yearly retinal exam.  Diabetes will be reassessed in 1 month.  Continue Glimepiride and Metformin twice daily.  Continue Januvia daily.

## 2023-12-21 NOTE — ASSESSMENT & PLAN NOTE
Hypertension is  stable .  Continue current medications.  Ambulatory blood pressure monitoring.  Blood pressure will be reassessed at the next regular appointment.  Continue Valsartan daily and Doxazosin nightly.  Continue Carvedilol and Torsemide twice daily.  Continue Hydralazine TID and KCL twice daily.  Follow up as scheduled with cardiology and renal.

## 2023-12-29 DIAGNOSIS — E11.3293 TYPE 2 DIABETES MELLITUS WITH BOTH EYES AFFECTED BY MILD NONPROLIFERATIVE RETINOPATHY WITHOUT MACULAR EDEMA, WITHOUT LONG-TERM CURRENT USE OF INSULIN: ICD-10-CM

## 2023-12-29 DIAGNOSIS — I10 PRIMARY HYPERTENSION: ICD-10-CM

## 2024-01-01 ENCOUNTER — HOSPITAL ENCOUNTER (INPATIENT)
Facility: HOSPITAL | Age: 79
LOS: 3 days | DRG: 871 | End: 2024-04-29
Attending: EMERGENCY MEDICINE | Admitting: INTERNAL MEDICINE
Payer: MEDICARE

## 2024-01-01 ENCOUNTER — HOME HEALTH ADMISSION (OUTPATIENT)
Dept: HOME HEALTH SERVICES | Facility: HOME HEALTHCARE | Age: 79
End: 2024-01-01
Payer: MEDICARE

## 2024-01-01 ENCOUNTER — APPOINTMENT (OUTPATIENT)
Dept: GENERAL RADIOLOGY | Facility: HOSPITAL | Age: 79
DRG: 871 | End: 2024-01-01
Payer: MEDICARE

## 2024-01-01 ENCOUNTER — TELEPHONE (OUTPATIENT)
Dept: FAMILY MEDICINE CLINIC | Facility: CLINIC | Age: 79
End: 2024-01-01

## 2024-01-01 ENCOUNTER — APPOINTMENT (OUTPATIENT)
Dept: CT IMAGING | Facility: HOSPITAL | Age: 79
DRG: 871 | End: 2024-01-01
Payer: MEDICARE

## 2024-01-01 ENCOUNTER — HOSPITAL ENCOUNTER (INPATIENT)
Facility: HOSPITAL | Age: 79
LOS: 3 days | DRG: 951 | End: 2024-05-02
Attending: INTERNAL MEDICINE | Admitting: INTERNAL MEDICINE
Payer: COMMERCIAL

## 2024-01-01 ENCOUNTER — HOME CARE VISIT (OUTPATIENT)
Dept: HOME HEALTH SERVICES | Facility: HOME HEALTHCARE | Age: 79
End: 2024-01-01
Payer: MEDICARE

## 2024-01-01 VITALS — TEMPERATURE: 103.9 F | SYSTOLIC BLOOD PRESSURE: 102 MMHG | DIASTOLIC BLOOD PRESSURE: 60 MMHG

## 2024-01-01 VITALS
HEIGHT: 72 IN | DIASTOLIC BLOOD PRESSURE: 81 MMHG | OXYGEN SATURATION: 93 % | RESPIRATION RATE: 18 BRPM | HEART RATE: 87 BPM | SYSTOLIC BLOOD PRESSURE: 138 MMHG | WEIGHT: 225.09 LBS | BODY MASS INDEX: 30.49 KG/M2 | TEMPERATURE: 97.1 F

## 2024-01-01 DIAGNOSIS — G81.91 RIGHT HEMIPARESIS: ICD-10-CM

## 2024-01-01 DIAGNOSIS — N39.0 ACUTE UTI: Primary | ICD-10-CM

## 2024-01-01 DIAGNOSIS — Z86.73 HISTORY OF STROKE: ICD-10-CM

## 2024-01-01 DIAGNOSIS — R47.01 APHASIA: ICD-10-CM

## 2024-01-01 DIAGNOSIS — Z66 DNR (DO NOT RESUSCITATE): ICD-10-CM

## 2024-01-01 DIAGNOSIS — R73.9 HYPERGLYCEMIA: ICD-10-CM

## 2024-01-01 LAB
ALBUMIN SERPL-MCNC: 2.6 G/DL (ref 3.5–5.2)
ALBUMIN SERPL-MCNC: 3.4 G/DL (ref 3.5–5.2)
ALBUMIN/GLOB SERPL: 1.1 G/DL
ALP SERPL-CCNC: 98 U/L (ref 39–117)
ALT SERPL W P-5'-P-CCNC: 21 U/L (ref 1–41)
ANION GAP SERPL CALCULATED.3IONS-SCNC: 10 MMOL/L (ref 5–15)
ANION GAP SERPL CALCULATED.3IONS-SCNC: 10.2 MMOL/L (ref 5–15)
ANION GAP SERPL CALCULATED.3IONS-SCNC: 12 MMOL/L (ref 5–15)
ANION GAP SERPL CALCULATED.3IONS-SCNC: 13.6 MMOL/L (ref 5–15)
ANION GAP SERPL CALCULATED.3IONS-SCNC: 13.9 MMOL/L (ref 5–15)
AST SERPL-CCNC: 22 U/L (ref 1–40)
B PARAPERT DNA SPEC QL NAA+PROBE: NOT DETECTED
B PERT DNA SPEC QL NAA+PROBE: NOT DETECTED
BACTERIA SPEC AEROBE CULT: ABNORMAL
BACTERIA SPEC AEROBE CULT: NORMAL
BACTERIA SPEC AEROBE CULT: NORMAL
BACTERIA UR QL AUTO: ABNORMAL /HPF
BACTERIA UR QL AUTO: ABNORMAL /HPF
BASOPHILS # BLD AUTO: 0.06 10*3/MM3 (ref 0–0.2)
BASOPHILS # BLD AUTO: 0.07 10*3/MM3 (ref 0–0.2)
BASOPHILS # BLD AUTO: 0.07 10*3/MM3 (ref 0–0.2)
BASOPHILS # BLD AUTO: 0.09 10*3/MM3 (ref 0–0.2)
BASOPHILS NFR BLD AUTO: 0.4 % (ref 0–1.5)
BASOPHILS NFR BLD AUTO: 0.6 % (ref 0–1.5)
BILIRUB SERPL-MCNC: 0.6 MG/DL (ref 0–1.2)
BILIRUB UR QL STRIP: NEGATIVE
BILIRUB UR QL STRIP: NEGATIVE
BUN SERPL-MCNC: 19 MG/DL (ref 8–23)
BUN SERPL-MCNC: 23 MG/DL (ref 8–23)
BUN SERPL-MCNC: 41 MG/DL (ref 8–23)
BUN SERPL-MCNC: 62 MG/DL (ref 8–23)
BUN SERPL-MCNC: 71 MG/DL (ref 8–23)
BUN/CREAT SERPL: 18.9 (ref 7–25)
BUN/CREAT SERPL: 20.2 (ref 7–25)
BUN/CREAT SERPL: 20.7 (ref 7–25)
BUN/CREAT SERPL: 22.8 (ref 7–25)
BUN/CREAT SERPL: 23.5 (ref 7–25)
C PNEUM DNA NPH QL NAA+NON-PROBE: NOT DETECTED
CALCIUM SPEC-SCNC: 8.9 MG/DL (ref 8.6–10.5)
CALCIUM SPEC-SCNC: 9 MG/DL (ref 8.6–10.5)
CALCIUM SPEC-SCNC: 9.1 MG/DL (ref 8.6–10.5)
CALCIUM SPEC-SCNC: 9.2 MG/DL (ref 8.6–10.5)
CALCIUM SPEC-SCNC: 9.7 MG/DL (ref 8.6–10.5)
CHLORIDE SERPL-SCNC: 105 MMOL/L (ref 98–107)
CHLORIDE SERPL-SCNC: 109 MMOL/L (ref 98–107)
CHLORIDE SERPL-SCNC: 112 MMOL/L (ref 98–107)
CHLORIDE SERPL-SCNC: 117 MMOL/L (ref 98–107)
CHLORIDE SERPL-SCNC: 120 MMOL/L (ref 98–107)
CLARITY UR: ABNORMAL
CLARITY UR: ABNORMAL
CO2 SERPL-SCNC: 16.1 MMOL/L (ref 22–29)
CO2 SERPL-SCNC: 18.4 MMOL/L (ref 22–29)
CO2 SERPL-SCNC: 21 MMOL/L (ref 22–29)
CO2 SERPL-SCNC: 23 MMOL/L (ref 22–29)
CO2 SERPL-SCNC: 23.8 MMOL/L (ref 22–29)
COLOR UR: ABNORMAL
COLOR UR: YELLOW
CREAT SERPL-MCNC: 0.81 MG/DL (ref 0.76–1.27)
CREAT SERPL-MCNC: 1.01 MG/DL (ref 0.76–1.27)
CREAT SERPL-MCNC: 2.17 MG/DL (ref 0.76–1.27)
CREAT SERPL-MCNC: 2.99 MG/DL (ref 0.76–1.27)
CREAT SERPL-MCNC: 3.51 MG/DL (ref 0.76–1.27)
D-LACTATE SERPL-SCNC: 1.4 MMOL/L (ref 0.5–2)
DEPRECATED RDW RBC AUTO: 54.2 FL (ref 37–54)
DEPRECATED RDW RBC AUTO: 54.5 FL (ref 37–54)
DEPRECATED RDW RBC AUTO: 55.5 FL (ref 37–54)
DEPRECATED RDW RBC AUTO: 56.4 FL (ref 37–54)
DEPRECATED RDW RBC AUTO: 57.3 FL (ref 37–54)
EGFRCR SERPLBLD CKD-EPI 2021: 17.3 ML/MIN/1.73
EGFRCR SERPLBLD CKD-EPI 2021: 21 ML/MIN/1.73
EGFRCR SERPLBLD CKD-EPI 2021: 30.8 ML/MIN/1.73
EGFRCR SERPLBLD CKD-EPI 2021: 77.1 ML/MIN/1.73
EGFRCR SERPLBLD CKD-EPI 2021: 91.4 ML/MIN/1.73
EOSINOPHIL # BLD AUTO: 0.01 10*3/MM3 (ref 0–0.4)
EOSINOPHIL # BLD AUTO: 0.04 10*3/MM3 (ref 0–0.4)
EOSINOPHIL NFR BLD AUTO: 0 % (ref 0.3–6.2)
EOSINOPHIL NFR BLD AUTO: 0.1 % (ref 0.3–6.2)
EOSINOPHIL NFR BLD AUTO: 0.1 % (ref 0.3–6.2)
EOSINOPHIL NFR BLD AUTO: 0.3 % (ref 0.3–6.2)
ERYTHROCYTE [DISTWIDTH] IN BLOOD BY AUTOMATED COUNT: 15.6 % (ref 12.3–15.4)
ERYTHROCYTE [DISTWIDTH] IN BLOOD BY AUTOMATED COUNT: 15.9 % (ref 12.3–15.4)
ERYTHROCYTE [DISTWIDTH] IN BLOOD BY AUTOMATED COUNT: 16.1 % (ref 12.3–15.4)
FLUAV SUBTYP SPEC NAA+PROBE: NOT DETECTED
FLUBV RNA ISLT QL NAA+PROBE: NOT DETECTED
GEN 5 2HR TROPONIN T REFLEX: 52 NG/L
GLOBULIN UR ELPH-MCNC: 3.1 GM/DL
GLUCOSE BLDC GLUCOMTR-MCNC: 125 MG/DL (ref 70–130)
GLUCOSE BLDC GLUCOMTR-MCNC: 130 MG/DL (ref 70–130)
GLUCOSE BLDC GLUCOMTR-MCNC: 139 MG/DL (ref 70–130)
GLUCOSE BLDC GLUCOMTR-MCNC: 140 MG/DL (ref 70–130)
GLUCOSE BLDC GLUCOMTR-MCNC: 144 MG/DL (ref 70–130)
GLUCOSE BLDC GLUCOMTR-MCNC: 158 MG/DL (ref 70–130)
GLUCOSE BLDC GLUCOMTR-MCNC: 164 MG/DL (ref 70–130)
GLUCOSE BLDC GLUCOMTR-MCNC: 171 MG/DL (ref 70–130)
GLUCOSE BLDC GLUCOMTR-MCNC: 189 MG/DL (ref 70–130)
GLUCOSE BLDC GLUCOMTR-MCNC: 198 MG/DL (ref 70–130)
GLUCOSE BLDC GLUCOMTR-MCNC: 209 MG/DL (ref 70–130)
GLUCOSE BLDC GLUCOMTR-MCNC: 223 MG/DL (ref 70–130)
GLUCOSE BLDC GLUCOMTR-MCNC: 229 MG/DL (ref 70–130)
GLUCOSE BLDC GLUCOMTR-MCNC: 256 MG/DL (ref 70–130)
GLUCOSE BLDC GLUCOMTR-MCNC: 266 MG/DL (ref 70–130)
GLUCOSE BLDC GLUCOMTR-MCNC: 272 MG/DL (ref 70–130)
GLUCOSE BLDC GLUCOMTR-MCNC: 278 MG/DL (ref 70–130)
GLUCOSE BLDC GLUCOMTR-MCNC: 303 MG/DL (ref 70–130)
GLUCOSE BLDC GLUCOMTR-MCNC: 324 MG/DL (ref 70–130)
GLUCOSE BLDC GLUCOMTR-MCNC: 342 MG/DL (ref 70–130)
GLUCOSE BLDC GLUCOMTR-MCNC: 359 MG/DL (ref 70–130)
GLUCOSE BLDC GLUCOMTR-MCNC: 374 MG/DL (ref 70–130)
GLUCOSE SERPL-MCNC: 146 MG/DL (ref 65–99)
GLUCOSE SERPL-MCNC: 188 MG/DL (ref 65–99)
GLUCOSE SERPL-MCNC: 202 MG/DL (ref 65–99)
GLUCOSE SERPL-MCNC: 305 MG/DL (ref 65–99)
GLUCOSE SERPL-MCNC: 405 MG/DL (ref 65–99)
GLUCOSE UR STRIP-MCNC: ABNORMAL MG/DL
GLUCOSE UR STRIP-MCNC: NEGATIVE MG/DL
HADV DNA SPEC NAA+PROBE: NOT DETECTED
HCOV 229E RNA SPEC QL NAA+PROBE: NOT DETECTED
HCOV HKU1 RNA SPEC QL NAA+PROBE: NOT DETECTED
HCOV NL63 RNA SPEC QL NAA+PROBE: NOT DETECTED
HCOV OC43 RNA SPEC QL NAA+PROBE: NOT DETECTED
HCT VFR BLD AUTO: 39.5 % (ref 37.5–51)
HCT VFR BLD AUTO: 40.8 % (ref 37.5–51)
HCT VFR BLD AUTO: 41 % (ref 37.5–51)
HCT VFR BLD AUTO: 41.6 % (ref 37.5–51)
HCT VFR BLD AUTO: 46.1 % (ref 37.5–51)
HGB BLD-MCNC: 12.7 G/DL (ref 13–17.7)
HGB BLD-MCNC: 13.3 G/DL (ref 13–17.7)
HGB BLD-MCNC: 13.4 G/DL (ref 13–17.7)
HGB BLD-MCNC: 13.8 G/DL (ref 13–17.7)
HGB BLD-MCNC: 14.7 G/DL (ref 13–17.7)
HGB UR QL STRIP.AUTO: ABNORMAL
HGB UR QL STRIP.AUTO: ABNORMAL
HMPV RNA NPH QL NAA+NON-PROBE: NOT DETECTED
HPIV1 RNA ISLT QL NAA+PROBE: NOT DETECTED
HPIV2 RNA SPEC QL NAA+PROBE: NOT DETECTED
HPIV3 RNA NPH QL NAA+PROBE: NOT DETECTED
HPIV4 P GENE NPH QL NAA+PROBE: NOT DETECTED
HYALINE CASTS UR QL AUTO: ABNORMAL /LPF
HYALINE CASTS UR QL AUTO: ABNORMAL /LPF
IMM GRANULOCYTES # BLD AUTO: 0.11 10*3/MM3 (ref 0–0.05)
IMM GRANULOCYTES # BLD AUTO: 0.17 10*3/MM3 (ref 0–0.05)
IMM GRANULOCYTES # BLD AUTO: 0.2 10*3/MM3 (ref 0–0.05)
IMM GRANULOCYTES # BLD AUTO: 0.23 10*3/MM3 (ref 0–0.05)
IMM GRANULOCYTES NFR BLD AUTO: 0.6 % (ref 0–0.5)
IMM GRANULOCYTES NFR BLD AUTO: 1 % (ref 0–0.5)
IMM GRANULOCYTES NFR BLD AUTO: 1 % (ref 0–0.5)
IMM GRANULOCYTES NFR BLD AUTO: 1.9 % (ref 0–0.5)
KETONES UR QL STRIP: NEGATIVE
KETONES UR QL STRIP: NEGATIVE
LEUKOCYTE ESTERASE UR QL STRIP.AUTO: ABNORMAL
LEUKOCYTE ESTERASE UR QL STRIP.AUTO: ABNORMAL
LYMPHOCYTES # BLD AUTO: 0.72 10*3/MM3 (ref 0.7–3.1)
LYMPHOCYTES # BLD AUTO: 0.72 10*3/MM3 (ref 0.7–3.1)
LYMPHOCYTES # BLD AUTO: 0.73 10*3/MM3 (ref 0.7–3.1)
LYMPHOCYTES # BLD AUTO: 1 10*3/MM3 (ref 0.7–3.1)
LYMPHOCYTES NFR BLD AUTO: 4.2 % (ref 19.6–45.3)
LYMPHOCYTES NFR BLD AUTO: 4.3 % (ref 19.6–45.3)
LYMPHOCYTES NFR BLD AUTO: 4.8 % (ref 19.6–45.3)
LYMPHOCYTES NFR BLD AUTO: 5.8 % (ref 19.6–45.3)
M PNEUMO IGG SER IA-ACNC: NOT DETECTED
MAGNESIUM SERPL-MCNC: 1.8 MG/DL (ref 1.6–2.4)
MCH RBC QN AUTO: 29.7 PG (ref 26.6–33)
MCH RBC QN AUTO: 30.3 PG (ref 26.6–33)
MCH RBC QN AUTO: 30.9 PG (ref 26.6–33)
MCH RBC QN AUTO: 31.1 PG (ref 26.6–33)
MCH RBC QN AUTO: 31.3 PG (ref 26.6–33)
MCHC RBC AUTO-ENTMCNC: 31.9 G/DL (ref 31.5–35.7)
MCHC RBC AUTO-ENTMCNC: 32.2 G/DL (ref 31.5–35.7)
MCHC RBC AUTO-ENTMCNC: 32.4 G/DL (ref 31.5–35.7)
MCHC RBC AUTO-ENTMCNC: 32.8 G/DL (ref 31.5–35.7)
MCHC RBC AUTO-ENTMCNC: 33.2 G/DL (ref 31.5–35.7)
MCV RBC AUTO: 92.5 FL (ref 79–97)
MCV RBC AUTO: 94.2 FL (ref 79–97)
MCV RBC AUTO: 94.3 FL (ref 79–97)
MCV RBC AUTO: 95.1 FL (ref 79–97)
MCV RBC AUTO: 95.8 FL (ref 79–97)
MONOCYTES # BLD AUTO: 0.66 10*3/MM3 (ref 0.1–0.9)
MONOCYTES # BLD AUTO: 1.09 10*3/MM3 (ref 0.1–0.9)
MONOCYTES # BLD AUTO: 1.43 10*3/MM3 (ref 0.1–0.9)
MONOCYTES # BLD AUTO: 1.65 10*3/MM3 (ref 0.1–0.9)
MONOCYTES NFR BLD AUTO: 5.4 % (ref 5–12)
MONOCYTES NFR BLD AUTO: 6.4 % (ref 5–12)
MONOCYTES NFR BLD AUTO: 7.9 % (ref 5–12)
MONOCYTES NFR BLD AUTO: 8.3 % (ref 5–12)
NEUTROPHILS NFR BLD AUTO: 10.61 10*3/MM3 (ref 1.7–7)
NEUTROPHILS NFR BLD AUTO: 14.88 10*3/MM3 (ref 1.7–7)
NEUTROPHILS NFR BLD AUTO: 15.02 10*3/MM3 (ref 1.7–7)
NEUTROPHILS NFR BLD AUTO: 18.05 10*3/MM3 (ref 1.7–7)
NEUTROPHILS NFR BLD AUTO: 85.9 % (ref 42.7–76)
NEUTROPHILS NFR BLD AUTO: 86 % (ref 42.7–76)
NEUTROPHILS NFR BLD AUTO: 86.4 % (ref 42.7–76)
NEUTROPHILS NFR BLD AUTO: 87.8 % (ref 42.7–76)
NITRITE UR QL STRIP: NEGATIVE
NITRITE UR QL STRIP: POSITIVE
NRBC BLD AUTO-RTO: 0 /100 WBC (ref 0–0.2)
NT-PROBNP SERPL-MCNC: 1981 PG/ML (ref 0–1800)
PH UR STRIP.AUTO: 5.5 [PH] (ref 5–8)
PH UR STRIP.AUTO: 6.5 [PH] (ref 5–8)
PHOSPHATE SERPL-MCNC: 3.6 MG/DL (ref 2.5–4.5)
PLATELET # BLD AUTO: 370 10*3/MM3 (ref 140–450)
PLATELET # BLD AUTO: 392 10*3/MM3 (ref 140–450)
PLATELET # BLD AUTO: 394 10*3/MM3 (ref 140–450)
PLATELET # BLD AUTO: 405 10*3/MM3 (ref 140–450)
PLATELET # BLD AUTO: 440 10*3/MM3 (ref 140–450)
PMV BLD AUTO: 10.6 FL (ref 6–12)
PMV BLD AUTO: 9.7 FL (ref 6–12)
PMV BLD AUTO: 9.7 FL (ref 6–12)
PMV BLD AUTO: 9.8 FL (ref 6–12)
PMV BLD AUTO: 9.8 FL (ref 6–12)
POTASSIUM SERPL-SCNC: 3.8 MMOL/L (ref 3.5–5.2)
POTASSIUM SERPL-SCNC: 4 MMOL/L (ref 3.5–5.2)
POTASSIUM SERPL-SCNC: 4.3 MMOL/L (ref 3.5–5.2)
POTASSIUM SERPL-SCNC: 4.4 MMOL/L (ref 3.5–5.2)
POTASSIUM SERPL-SCNC: 4.6 MMOL/L (ref 3.5–5.2)
PROCALCITONIN SERPL-MCNC: 0.23 NG/ML (ref 0–0.25)
PROCALCITONIN SERPL-MCNC: 0.98 NG/ML (ref 0–0.25)
PROT SERPL-MCNC: 6.5 G/DL (ref 6–8.5)
PROT UR QL STRIP: ABNORMAL
PROT UR QL STRIP: ABNORMAL
QT INTERVAL: 425 MS
QT INTERVAL: 433 MS
QTC INTERVAL: 482 MS
QTC INTERVAL: 484 MS
RBC # BLD AUTO: 4.27 10*6/MM3 (ref 4.14–5.8)
RBC # BLD AUTO: 4.28 10*6/MM3 (ref 4.14–5.8)
RBC # BLD AUTO: 4.33 10*6/MM3 (ref 4.14–5.8)
RBC # BLD AUTO: 4.41 10*6/MM3 (ref 4.14–5.8)
RBC # BLD AUTO: 4.85 10*6/MM3 (ref 4.14–5.8)
RBC # UR STRIP: ABNORMAL /HPF
RBC # UR STRIP: ABNORMAL /HPF
REF LAB TEST METHOD: ABNORMAL
REF LAB TEST METHOD: ABNORMAL
RHINOVIRUS RNA SPEC NAA+PROBE: NOT DETECTED
RSV RNA NPH QL NAA+NON-PROBE: NOT DETECTED
SARS-COV-2 RNA NPH QL NAA+NON-PROBE: NOT DETECTED
SODIUM SERPL-SCNC: 139 MMOL/L (ref 136–145)
SODIUM SERPL-SCNC: 142 MMOL/L (ref 136–145)
SODIUM SERPL-SCNC: 145 MMOL/L (ref 136–145)
SODIUM SERPL-SCNC: 149 MMOL/L (ref 136–145)
SODIUM SERPL-SCNC: 150 MMOL/L (ref 136–145)
SP GR UR STRIP: 1.02 (ref 1–1.03)
SP GR UR STRIP: 1.02 (ref 1–1.03)
SQUAMOUS #/AREA URNS HPF: ABNORMAL /HPF
SQUAMOUS #/AREA URNS HPF: ABNORMAL /HPF
TROPONIN T DELTA: -6 NG/L
TROPONIN T SERPL HS-MCNC: 58 NG/L
URATE SERPL-MCNC: 7.8 MG/DL (ref 3.4–7)
UROBILINOGEN UR QL STRIP: ABNORMAL
UROBILINOGEN UR QL STRIP: ABNORMAL
WBC # UR STRIP: ABNORMAL /HPF
WBC # UR STRIP: ABNORMAL /HPF
WBC NRBC COR # BLD AUTO: 10.07 10*3/MM3 (ref 3.4–10.8)
WBC NRBC COR # BLD AUTO: 12.33 10*3/MM3 (ref 3.4–10.8)
WBC NRBC COR # BLD AUTO: 17.08 10*3/MM3 (ref 3.4–10.8)
WBC NRBC COR # BLD AUTO: 17.22 10*3/MM3 (ref 3.4–10.8)
WBC NRBC COR # BLD AUTO: 21 10*3/MM3 (ref 3.4–10.8)

## 2024-01-01 PROCEDURE — 25010000002 MORPHINE PER 10 MG: Performed by: INTERNAL MEDICINE

## 2024-01-01 PROCEDURE — 63710000001 INSULIN GLARGINE PER 5 UNITS: Performed by: INTERNAL MEDICINE

## 2024-01-01 PROCEDURE — 25010000002 GLYCOPYRROLATE 0.2 MG/ML SOLUTION: Performed by: INTERNAL MEDICINE

## 2024-01-01 PROCEDURE — 25010000002 LEVETRIRACETAM PER 10 MG: Performed by: INTERNAL MEDICINE

## 2024-01-01 PROCEDURE — 81001 URINALYSIS AUTO W/SCOPE: CPT | Performed by: INTERNAL MEDICINE

## 2024-01-01 PROCEDURE — 25010000002 ERTAPENEM PER 500 MG: Performed by: EMERGENCY MEDICINE

## 2024-01-01 PROCEDURE — 87040 BLOOD CULTURE FOR BACTERIA: CPT | Performed by: EMERGENCY MEDICINE

## 2024-01-01 PROCEDURE — 80053 COMPREHEN METABOLIC PANEL: CPT | Performed by: EMERGENCY MEDICINE

## 2024-01-01 PROCEDURE — 74176 CT ABD & PELVIS W/O CONTRAST: CPT

## 2024-01-01 PROCEDURE — 82948 REAGENT STRIP/BLOOD GLUCOSE: CPT

## 2024-01-01 PROCEDURE — 92610 EVALUATE SWALLOWING FUNCTION: CPT

## 2024-01-01 PROCEDURE — 99232 SBSQ HOSP IP/OBS MODERATE 35: CPT | Performed by: INTERNAL MEDICINE

## 2024-01-01 PROCEDURE — 83605 ASSAY OF LACTIC ACID: CPT | Performed by: EMERGENCY MEDICINE

## 2024-01-01 PROCEDURE — 0 DEXTROSE 5 % SOLUTION: Performed by: INTERNAL MEDICINE

## 2024-01-01 PROCEDURE — 85025 COMPLETE CBC W/AUTO DIFF WBC: CPT | Performed by: INTERNAL MEDICINE

## 2024-01-01 PROCEDURE — 25810000003 SODIUM CHLORIDE 0.9 % SOLUTION: Performed by: EMERGENCY MEDICINE

## 2024-01-01 PROCEDURE — 71250 CT THORAX DX C-: CPT

## 2024-01-01 PROCEDURE — 25810000003 SODIUM CHLORIDE 0.9 % SOLUTION: Performed by: INTERNAL MEDICINE

## 2024-01-01 PROCEDURE — 81001 URINALYSIS AUTO W/SCOPE: CPT | Performed by: EMERGENCY MEDICINE

## 2024-01-01 PROCEDURE — 25010000002 ONDANSETRON PER 1 MG: Performed by: INTERNAL MEDICINE

## 2024-01-01 PROCEDURE — G0378 HOSPITAL OBSERVATION PER HR: HCPCS

## 2024-01-01 PROCEDURE — 25010000002 ERTAPENEM PER 500 MG: Performed by: INTERNAL MEDICINE

## 2024-01-01 PROCEDURE — 85025 COMPLETE CBC W/AUTO DIFF WBC: CPT | Performed by: EMERGENCY MEDICINE

## 2024-01-01 PROCEDURE — 92526 ORAL FUNCTION THERAPY: CPT

## 2024-01-01 PROCEDURE — 93005 ELECTROCARDIOGRAM TRACING: CPT | Performed by: INTERNAL MEDICINE

## 2024-01-01 PROCEDURE — 71045 X-RAY EXAM CHEST 1 VIEW: CPT

## 2024-01-01 PROCEDURE — 93005 ELECTROCARDIOGRAM TRACING: CPT | Performed by: EMERGENCY MEDICINE

## 2024-01-01 PROCEDURE — 99285 EMERGENCY DEPT VISIT HI MDM: CPT

## 2024-01-01 PROCEDURE — 84484 ASSAY OF TROPONIN QUANT: CPT | Performed by: EMERGENCY MEDICINE

## 2024-01-01 PROCEDURE — 84550 ASSAY OF BLOOD/URIC ACID: CPT | Performed by: INTERNAL MEDICINE

## 2024-01-01 PROCEDURE — 93010 ELECTROCARDIOGRAM REPORT: CPT | Performed by: INTERNAL MEDICINE

## 2024-01-01 PROCEDURE — 70450 CT HEAD/BRAIN W/O DYE: CPT

## 2024-01-01 PROCEDURE — 99233 SBSQ HOSP IP/OBS HIGH 50: CPT | Performed by: INTERNAL MEDICINE

## 2024-01-01 PROCEDURE — 25010000002 MIDAZOLAM PER 1 MG: Performed by: INTERNAL MEDICINE

## 2024-01-01 PROCEDURE — 0202U NFCT DS 22 TRGT SARS-COV-2: CPT | Performed by: EMERGENCY MEDICINE

## 2024-01-01 PROCEDURE — 80069 RENAL FUNCTION PANEL: CPT | Performed by: INTERNAL MEDICINE

## 2024-01-01 PROCEDURE — 83735 ASSAY OF MAGNESIUM: CPT | Performed by: EMERGENCY MEDICINE

## 2024-01-01 PROCEDURE — 36415 COLL VENOUS BLD VENIPUNCTURE: CPT

## 2024-01-01 PROCEDURE — 80048 BASIC METABOLIC PNL TOTAL CA: CPT | Performed by: INTERNAL MEDICINE

## 2024-01-01 PROCEDURE — 63710000001 INSULIN REGULAR HUMAN PER 5 UNITS: Performed by: EMERGENCY MEDICINE

## 2024-01-01 PROCEDURE — 63710000001 INSULIN LISPRO (HUMAN) PER 5 UNITS: Performed by: INTERNAL MEDICINE

## 2024-01-01 PROCEDURE — 84145 PROCALCITONIN (PCT): CPT | Performed by: INTERNAL MEDICINE

## 2024-01-01 PROCEDURE — 99205 OFFICE O/P NEW HI 60 MIN: CPT | Performed by: INTERNAL MEDICINE

## 2024-01-01 PROCEDURE — 63710000001 INSULIN REGULAR HUMAN PER 5 UNITS: Performed by: INTERNAL MEDICINE

## 2024-01-01 PROCEDURE — 87086 URINE CULTURE/COLONY COUNT: CPT | Performed by: EMERGENCY MEDICINE

## 2024-01-01 PROCEDURE — 83880 ASSAY OF NATRIURETIC PEPTIDE: CPT | Performed by: EMERGENCY MEDICINE

## 2024-01-01 PROCEDURE — 36415 COLL VENOUS BLD VENIPUNCTURE: CPT | Performed by: INTERNAL MEDICINE

## 2024-01-01 PROCEDURE — P9612 CATHETERIZE FOR URINE SPEC: HCPCS

## 2024-01-01 PROCEDURE — 85027 COMPLETE CBC AUTOMATED: CPT | Performed by: INTERNAL MEDICINE

## 2024-01-01 PROCEDURE — 84145 PROCALCITONIN (PCT): CPT | Performed by: EMERGENCY MEDICINE

## 2024-01-01 RX ORDER — MIDAZOLAM HYDROCHLORIDE 1 MG/ML
1 INJECTION INTRAMUSCULAR; INTRAVENOUS
Status: DISCONTINUED | OUTPATIENT
Start: 2024-01-01 | End: 2024-01-01

## 2024-01-01 RX ORDER — LEVETIRACETAM 500 MG/5ML
500 INJECTION, SOLUTION, CONCENTRATE INTRAVENOUS ONCE
Status: COMPLETED | OUTPATIENT
Start: 2024-01-01 | End: 2024-01-01

## 2024-01-01 RX ORDER — MIDAZOLAM HYDROCHLORIDE 1 MG/ML
1 INJECTION INTRAMUSCULAR; INTRAVENOUS
Status: DISCONTINUED | OUTPATIENT
Start: 2024-01-01 | End: 2024-01-01 | Stop reason: HOSPADM

## 2024-01-01 RX ORDER — LORAZEPAM 2 MG/ML
2 CONCENTRATE ORAL
Status: CANCELLED | OUTPATIENT
Start: 2024-01-01 | End: 2024-05-03

## 2024-01-01 RX ORDER — LORAZEPAM 1 MG/1
1 TABLET ORAL
Status: DISCONTINUED | OUTPATIENT
Start: 2024-01-01 | End: 2024-05-02 | Stop reason: HOSPADM

## 2024-01-01 RX ORDER — ACETAMINOPHEN 650 MG/1
650 SUPPOSITORY RECTAL EVERY 4 HOURS PRN
Status: DISCONTINUED | OUTPATIENT
Start: 2024-01-01 | End: 2024-05-02 | Stop reason: HOSPADM

## 2024-01-01 RX ORDER — LORAZEPAM 2 MG/ML
2 CONCENTRATE ORAL
Status: DISCONTINUED | OUTPATIENT
Start: 2024-01-01 | End: 2024-01-01

## 2024-01-01 RX ORDER — LORAZEPAM 2 MG/ML
1 CONCENTRATE ORAL
Status: CANCELLED | OUTPATIENT
Start: 2024-01-01 | End: 2024-05-03

## 2024-01-01 RX ORDER — LORAZEPAM 2 MG/ML
0.5 CONCENTRATE ORAL
Status: CANCELLED | OUTPATIENT
Start: 2024-01-01 | End: 2024-05-03

## 2024-01-01 RX ORDER — ONDANSETRON 4 MG/1
4 TABLET, ORALLY DISINTEGRATING ORAL EVERY 6 HOURS PRN
Status: DISCONTINUED | OUTPATIENT
Start: 2024-01-01 | End: 2024-01-01

## 2024-01-01 RX ORDER — MORPHINE SULFATE 4 MG/ML
4 INJECTION, SOLUTION INTRAMUSCULAR; INTRAVENOUS
Status: DISCONTINUED | OUTPATIENT
Start: 2024-01-01 | End: 2024-05-02 | Stop reason: HOSPADM

## 2024-01-01 RX ORDER — ATROPINE SULFATE 10 MG/ML
2 SOLUTION/ DROPS OPHTHALMIC 2 TIMES DAILY PRN
Status: DISCONTINUED | OUTPATIENT
Start: 2024-01-01 | End: 2024-05-02 | Stop reason: HOSPADM

## 2024-01-01 RX ORDER — LORAZEPAM 2 MG/ML
2 CONCENTRATE ORAL
Status: DISCONTINUED | OUTPATIENT
Start: 2024-01-01 | End: 2024-01-01 | Stop reason: HOSPADM

## 2024-01-01 RX ORDER — PROCHLORPERAZINE MALEATE 10 MG
5 TABLET ORAL EVERY 6 HOURS PRN
Status: DISCONTINUED | OUTPATIENT
Start: 2024-01-01 | End: 2024-05-02 | Stop reason: HOSPADM

## 2024-01-01 RX ORDER — LEVETIRACETAM 500 MG/5ML
500 INJECTION, SOLUTION, CONCENTRATE INTRAVENOUS EVERY 12 HOURS SCHEDULED
Status: DISCONTINUED | OUTPATIENT
Start: 2024-01-01 | End: 2024-01-01

## 2024-01-01 RX ORDER — CLONIDINE HYDROCHLORIDE 0.1 MG/1
0.2 TABLET ORAL EVERY 8 HOURS SCHEDULED
Status: DISCONTINUED | OUTPATIENT
Start: 2024-01-01 | End: 2024-01-01

## 2024-01-01 RX ORDER — LORAZEPAM 1 MG/1
1 TABLET ORAL
Status: DISCONTINUED | OUTPATIENT
Start: 2024-01-01 | End: 2024-01-01

## 2024-01-01 RX ORDER — ACETAMINOPHEN 325 MG/1
650 TABLET ORAL EVERY 4 HOURS PRN
Status: DISCONTINUED | OUTPATIENT
Start: 2024-01-01 | End: 2024-01-01

## 2024-01-01 RX ORDER — IBUPROFEN 600 MG/1
1 TABLET ORAL
Status: DISCONTINUED | OUTPATIENT
Start: 2024-01-01 | End: 2024-01-01

## 2024-01-01 RX ORDER — VALSARTAN 320 MG/1
320 TABLET ORAL DAILY
Status: DISCONTINUED | OUTPATIENT
Start: 2024-01-01 | End: 2024-01-01

## 2024-01-01 RX ORDER — LEVETIRACETAM 500 MG/5ML
500 INJECTION, SOLUTION, CONCENTRATE INTRAVENOUS EVERY 12 HOURS SCHEDULED
Status: CANCELLED | OUTPATIENT
Start: 2024-01-01

## 2024-01-01 RX ORDER — HYDROMORPHONE HYDROCHLORIDE 1 MG/ML
0.5 INJECTION, SOLUTION INTRAMUSCULAR; INTRAVENOUS; SUBCUTANEOUS
Status: DISCONTINUED | OUTPATIENT
Start: 2024-01-01 | End: 2024-05-02 | Stop reason: HOSPADM

## 2024-01-01 RX ORDER — HYDROMORPHONE HYDROCHLORIDE 1 MG/ML
0.5 INJECTION, SOLUTION INTRAMUSCULAR; INTRAVENOUS; SUBCUTANEOUS
Status: CANCELLED | OUTPATIENT
Start: 2024-01-01 | End: 2024-05-08

## 2024-01-01 RX ORDER — GLYCOPYRROLATE 0.2 MG/ML
0.2 INJECTION INTRAMUSCULAR; INTRAVENOUS
Status: CANCELLED | OUTPATIENT
Start: 2024-01-01

## 2024-01-01 RX ORDER — HYDROMORPHONE HYDROCHLORIDE 2 MG/ML
1.5 INJECTION, SOLUTION INTRAMUSCULAR; INTRAVENOUS; SUBCUTANEOUS
Status: DISCONTINUED | OUTPATIENT
Start: 2024-01-01 | End: 2024-01-01 | Stop reason: HOSPADM

## 2024-01-01 RX ORDER — DEXTROSE MONOHYDRATE 25 G/50ML
25 INJECTION, SOLUTION INTRAVENOUS
Status: DISCONTINUED | OUTPATIENT
Start: 2024-01-01 | End: 2024-01-01

## 2024-01-01 RX ORDER — ONDANSETRON 2 MG/ML
4 INJECTION INTRAMUSCULAR; INTRAVENOUS EVERY 6 HOURS PRN
Status: DISCONTINUED | OUTPATIENT
Start: 2024-01-01 | End: 2024-01-01

## 2024-01-01 RX ORDER — MORPHINE SULFATE 2 MG/ML
2 INJECTION, SOLUTION INTRAMUSCULAR; INTRAVENOUS
Status: DISCONTINUED | OUTPATIENT
Start: 2024-01-01 | End: 2024-05-02 | Stop reason: HOSPADM

## 2024-01-01 RX ORDER — PROCHLORPERAZINE EDISYLATE 5 MG/ML
5 INJECTION INTRAMUSCULAR; INTRAVENOUS EVERY 6 HOURS PRN
Status: DISCONTINUED | OUTPATIENT
Start: 2024-01-01 | End: 2024-05-02 | Stop reason: HOSPADM

## 2024-01-01 RX ORDER — SODIUM CHLORIDE 9 MG/ML
40 INJECTION, SOLUTION INTRAVENOUS AS NEEDED
Status: DISCONTINUED | OUTPATIENT
Start: 2024-01-01 | End: 2024-01-01

## 2024-01-01 RX ORDER — LORAZEPAM 0.5 MG/1
0.5 TABLET ORAL
Status: DISCONTINUED | OUTPATIENT
Start: 2024-01-01 | End: 2024-01-01

## 2024-01-01 RX ORDER — MIDAZOLAM HYDROCHLORIDE 1 MG/ML
4 INJECTION INTRAMUSCULAR; INTRAVENOUS
Status: DISCONTINUED | OUTPATIENT
Start: 2024-01-01 | End: 2024-01-01

## 2024-01-01 RX ORDER — NICOTINE POLACRILEX 4 MG
15 LOZENGE BUCCAL
Status: DISCONTINUED | OUTPATIENT
Start: 2024-01-01 | End: 2024-01-01

## 2024-01-01 RX ORDER — LEVETIRACETAM 500 MG/5ML
500 INJECTION, SOLUTION, CONCENTRATE INTRAVENOUS EVERY 12 HOURS SCHEDULED
Status: DISCONTINUED | OUTPATIENT
Start: 2024-01-01 | End: 2024-05-02 | Stop reason: HOSPADM

## 2024-01-01 RX ORDER — MIDAZOLAM HYDROCHLORIDE 1 MG/ML
4 INJECTION INTRAMUSCULAR; INTRAVENOUS
Status: CANCELLED | OUTPATIENT
Start: 2024-01-01 | End: 2024-05-03

## 2024-01-01 RX ORDER — HYDROMORPHONE HYDROCHLORIDE 2 MG/ML
1.5 INJECTION, SOLUTION INTRAMUSCULAR; INTRAVENOUS; SUBCUTANEOUS
Status: DISCONTINUED | OUTPATIENT
Start: 2024-01-01 | End: 2024-05-02 | Stop reason: HOSPADM

## 2024-01-01 RX ORDER — DIPHENHYDRAMINE HYDROCHLORIDE AND LIDOCAINE HYDROCHLORIDE AND ALUMINUM HYDROXIDE AND MAGNESIUM HYDRO
5 KIT EVERY 4 HOURS PRN
Status: CANCELLED | OUTPATIENT
Start: 2024-01-01

## 2024-01-01 RX ORDER — MEMANTINE HYDROCHLORIDE 10 MG/1
10 TABLET ORAL 2 TIMES DAILY
Status: DISCONTINUED | OUTPATIENT
Start: 2024-01-01 | End: 2024-01-01

## 2024-01-01 RX ORDER — ECHINACEA PURPUREA EXTRACT 125 MG
2 TABLET ORAL AS NEEDED
Status: CANCELLED | OUTPATIENT
Start: 2024-01-01

## 2024-01-01 RX ORDER — MORPHINE SULFATE 20 MG/ML
5 SOLUTION ORAL
Status: CANCELLED | OUTPATIENT
Start: 2024-01-01 | End: 2024-05-08

## 2024-01-01 RX ORDER — MORPHINE SULFATE 20 MG/ML
20 SOLUTION ORAL
Status: DISCONTINUED | OUTPATIENT
Start: 2024-01-01 | End: 2024-01-01 | Stop reason: HOSPADM

## 2024-01-01 RX ORDER — ATROPINE SULFATE 10 MG/ML
2 SOLUTION/ DROPS OPHTHALMIC 2 TIMES DAILY PRN
Status: DISCONTINUED | OUTPATIENT
Start: 2024-01-01 | End: 2024-01-01 | Stop reason: HOSPADM

## 2024-01-01 RX ORDER — ECHINACEA PURPUREA EXTRACT 125 MG
2 TABLET ORAL AS NEEDED
Status: DISCONTINUED | OUTPATIENT
Start: 2024-01-01 | End: 2024-05-02 | Stop reason: HOSPADM

## 2024-01-01 RX ORDER — MIDAZOLAM HYDROCHLORIDE 1 MG/ML
2 INJECTION INTRAMUSCULAR; INTRAVENOUS
Status: CANCELLED | OUTPATIENT
Start: 2024-01-01 | End: 2024-05-03

## 2024-01-01 RX ORDER — MORPHINE SULFATE 4 MG/ML
4 INJECTION, SOLUTION INTRAMUSCULAR; INTRAVENOUS
Status: CANCELLED | OUTPATIENT
Start: 2024-01-01 | End: 2024-05-08

## 2024-01-01 RX ORDER — MIDAZOLAM HYDROCHLORIDE 1 MG/ML
1 INJECTION INTRAMUSCULAR; INTRAVENOUS
Status: DISCONTINUED | OUTPATIENT
Start: 2024-01-01 | End: 2024-05-02 | Stop reason: HOSPADM

## 2024-01-01 RX ORDER — INSULIN LISPRO 100 [IU]/ML
3-14 INJECTION, SOLUTION INTRAVENOUS; SUBCUTANEOUS
Status: DISCONTINUED | OUTPATIENT
Start: 2024-01-01 | End: 2024-01-01

## 2024-01-01 RX ORDER — FAMOTIDINE 20 MG/1
20 TABLET, FILM COATED ORAL DAILY
Status: DISCONTINUED | OUTPATIENT
Start: 2024-01-01 | End: 2024-01-01

## 2024-01-01 RX ORDER — HYDROMORPHONE HYDROCHLORIDE 2 MG/ML
1.5 INJECTION, SOLUTION INTRAMUSCULAR; INTRAVENOUS; SUBCUTANEOUS
Status: CANCELLED | OUTPATIENT
Start: 2024-01-01 | End: 2024-05-08

## 2024-01-01 RX ORDER — PROCHLORPERAZINE 25 MG
25 SUPPOSITORY, RECTAL RECTAL EVERY 12 HOURS PRN
Status: DISCONTINUED | OUTPATIENT
Start: 2024-01-01 | End: 2024-05-02 | Stop reason: HOSPADM

## 2024-01-01 RX ORDER — ECHINACEA PURPUREA EXTRACT 125 MG
2 TABLET ORAL AS NEEDED
Status: DISCONTINUED | OUTPATIENT
Start: 2024-01-01 | End: 2024-01-01 | Stop reason: HOSPADM

## 2024-01-01 RX ORDER — PROCHLORPERAZINE 25 MG
25 SUPPOSITORY, RECTAL RECTAL EVERY 12 HOURS PRN
Status: CANCELLED | OUTPATIENT
Start: 2024-01-01

## 2024-01-01 RX ORDER — MIDAZOLAM HYDROCHLORIDE 1 MG/ML
1 INJECTION INTRAMUSCULAR; INTRAVENOUS
Status: CANCELLED | OUTPATIENT
Start: 2024-01-01 | End: 2024-05-03

## 2024-01-01 RX ORDER — MORPHINE SULFATE 4 MG/ML
4 INJECTION, SOLUTION INTRAMUSCULAR; INTRAVENOUS
Status: DISCONTINUED | OUTPATIENT
Start: 2024-01-01 | End: 2024-01-01 | Stop reason: HOSPADM

## 2024-01-01 RX ORDER — MORPHINE SULFATE 20 MG/ML
20 SOLUTION ORAL
Status: CANCELLED | OUTPATIENT
Start: 2024-01-01 | End: 2024-05-08

## 2024-01-01 RX ORDER — GLYCOPYRROLATE 0.2 MG/ML
0.2 INJECTION INTRAMUSCULAR; INTRAVENOUS
Status: DISCONTINUED | OUTPATIENT
Start: 2024-01-01 | End: 2024-05-02 | Stop reason: HOSPADM

## 2024-01-01 RX ORDER — OLANZAPINE 5 MG/1
5 TABLET, ORALLY DISINTEGRATING ORAL NIGHTLY
Status: DISCONTINUED | OUTPATIENT
Start: 2024-01-01 | End: 2024-01-01

## 2024-01-01 RX ORDER — LEVETIRACETAM 500 MG/1
500 TABLET ORAL 2 TIMES DAILY
Status: DISCONTINUED | OUTPATIENT
Start: 2024-01-01 | End: 2024-01-01

## 2024-01-01 RX ORDER — MIDAZOLAM HYDROCHLORIDE 1 MG/ML
4 INJECTION INTRAMUSCULAR; INTRAVENOUS
Status: DISCONTINUED | OUTPATIENT
Start: 2024-01-01 | End: 2024-05-02 | Stop reason: HOSPADM

## 2024-01-01 RX ORDER — AMOXICILLIN 250 MG
2 CAPSULE ORAL 2 TIMES DAILY
Status: DISCONTINUED | OUTPATIENT
Start: 2024-01-01 | End: 2024-01-01

## 2024-01-01 RX ORDER — GLYCOPYRROLATE 0.2 MG/ML
0.4 INJECTION INTRAMUSCULAR; INTRAVENOUS
Status: DISCONTINUED | OUTPATIENT
Start: 2024-01-01 | End: 2024-01-01 | Stop reason: HOSPADM

## 2024-01-01 RX ORDER — HYDROXYUREA 500 MG/1
500 CAPSULE ORAL DAILY
Status: DISCONTINUED | OUTPATIENT
Start: 2024-01-01 | End: 2024-01-01

## 2024-01-01 RX ORDER — LORAZEPAM 0.5 MG/1
0.5 TABLET ORAL
Status: DISCONTINUED | OUTPATIENT
Start: 2024-01-01 | End: 2024-05-02 | Stop reason: HOSPADM

## 2024-01-01 RX ORDER — POLYETHYLENE GLYCOL 3350 17 G/17G
17 POWDER, FOR SOLUTION ORAL DAILY PRN
Status: DISCONTINUED | OUTPATIENT
Start: 2024-01-01 | End: 2024-01-01

## 2024-01-01 RX ORDER — CHOLECALCIFEROL (VITAMIN D3) 125 MCG
5 CAPSULE ORAL NIGHTLY PRN
Status: DISCONTINUED | OUTPATIENT
Start: 2024-01-01 | End: 2024-01-01

## 2024-01-01 RX ORDER — MIDAZOLAM HYDROCHLORIDE 1 MG/ML
2 INJECTION INTRAMUSCULAR; INTRAVENOUS
Status: DISCONTINUED | OUTPATIENT
Start: 2024-01-01 | End: 2024-01-01 | Stop reason: HOSPADM

## 2024-01-01 RX ORDER — LORAZEPAM 2 MG/ML
0.5 CONCENTRATE ORAL
Status: DISCONTINUED | OUTPATIENT
Start: 2024-01-01 | End: 2024-01-01 | Stop reason: HOSPADM

## 2024-01-01 RX ORDER — LORAZEPAM 0.5 MG/1
0.5 TABLET ORAL
Status: CANCELLED | OUTPATIENT
Start: 2024-01-01 | End: 2024-05-03

## 2024-01-01 RX ORDER — DIPHENHYDRAMINE HYDROCHLORIDE AND LIDOCAINE HYDROCHLORIDE AND ALUMINUM HYDROXIDE AND MAGNESIUM HYDRO
5 KIT EVERY 4 HOURS PRN
Status: DISCONTINUED | OUTPATIENT
Start: 2024-01-01 | End: 2024-01-01 | Stop reason: HOSPADM

## 2024-01-01 RX ORDER — ACETAMINOPHEN 650 MG/1
650 SUPPOSITORY RECTAL EVERY 4 HOURS PRN
Status: CANCELLED | OUTPATIENT
Start: 2024-01-01

## 2024-01-01 RX ORDER — MORPHINE SULFATE 2 MG/ML
2 INJECTION, SOLUTION INTRAMUSCULAR; INTRAVENOUS
Status: DISCONTINUED | OUTPATIENT
Start: 2024-01-01 | End: 2024-01-01

## 2024-01-01 RX ORDER — DIPHENHYDRAMINE HYDROCHLORIDE AND LIDOCAINE HYDROCHLORIDE AND ALUMINUM HYDROXIDE AND MAGNESIUM HYDRO
5 KIT EVERY 4 HOURS PRN
Status: DISCONTINUED | OUTPATIENT
Start: 2024-01-01 | End: 2024-05-02 | Stop reason: HOSPADM

## 2024-01-01 RX ORDER — ROSUVASTATIN CALCIUM 40 MG/1
40 TABLET, COATED ORAL NIGHTLY
Status: DISCONTINUED | OUTPATIENT
Start: 2024-01-01 | End: 2024-01-01

## 2024-01-01 RX ORDER — ACETAMINOPHEN 160 MG/5ML
650 SOLUTION ORAL EVERY 4 HOURS PRN
Status: DISCONTINUED | OUTPATIENT
Start: 2024-01-01 | End: 2024-05-02 | Stop reason: HOSPADM

## 2024-01-01 RX ORDER — MIDAZOLAM HYDROCHLORIDE 1 MG/ML
2 INJECTION INTRAMUSCULAR; INTRAVENOUS
Status: DISCONTINUED | OUTPATIENT
Start: 2024-01-01 | End: 2024-05-02 | Stop reason: HOSPADM

## 2024-01-01 RX ORDER — ASPIRIN 81 MG/1
81 TABLET ORAL DAILY
Status: DISCONTINUED | OUTPATIENT
Start: 2024-01-01 | End: 2024-01-01

## 2024-01-01 RX ORDER — LORAZEPAM 2 MG/ML
1 CONCENTRATE ORAL
Status: DISCONTINUED | OUTPATIENT
Start: 2024-01-01 | End: 2024-01-01

## 2024-01-01 RX ORDER — ACETAMINOPHEN 160 MG/5ML
650 SOLUTION ORAL EVERY 4 HOURS PRN
Status: DISCONTINUED | OUTPATIENT
Start: 2024-01-01 | End: 2024-01-01 | Stop reason: HOSPADM

## 2024-01-01 RX ORDER — MORPHINE SULFATE 2 MG/ML
2 INJECTION, SOLUTION INTRAMUSCULAR; INTRAVENOUS
Status: DISCONTINUED | OUTPATIENT
Start: 2024-01-01 | End: 2024-01-01 | Stop reason: HOSPADM

## 2024-01-01 RX ORDER — PROCHLORPERAZINE EDISYLATE 5 MG/ML
5 INJECTION INTRAMUSCULAR; INTRAVENOUS EVERY 6 HOURS PRN
Status: DISCONTINUED | OUTPATIENT
Start: 2024-01-01 | End: 2024-01-01 | Stop reason: HOSPADM

## 2024-01-01 RX ORDER — CARVEDILOL 12.5 MG/1
12.5 TABLET ORAL 2 TIMES DAILY WITH MEALS
Status: DISCONTINUED | OUTPATIENT
Start: 2024-01-01 | End: 2024-01-01

## 2024-01-01 RX ORDER — MIDAZOLAM HYDROCHLORIDE 1 MG/ML
2 INJECTION INTRAMUSCULAR; INTRAVENOUS
Status: DISCONTINUED | OUTPATIENT
Start: 2024-01-01 | End: 2024-01-01

## 2024-01-01 RX ORDER — MORPHINE SULFATE 2 MG/ML
2 INJECTION, SOLUTION INTRAMUSCULAR; INTRAVENOUS
Status: CANCELLED | OUTPATIENT
Start: 2024-01-01 | End: 2024-05-08

## 2024-01-01 RX ORDER — LORAZEPAM 1 MG/1
2 TABLET ORAL
Status: CANCELLED | OUTPATIENT
Start: 2024-01-01 | End: 2024-05-03

## 2024-01-01 RX ORDER — DIPHENOXYLATE HYDROCHLORIDE AND ATROPINE SULFATE 2.5; .025 MG/1; MG/1
1 TABLET ORAL
Status: DISCONTINUED | OUTPATIENT
Start: 2024-01-01 | End: 2024-01-01 | Stop reason: HOSPADM

## 2024-01-01 RX ORDER — LORAZEPAM 2 MG/ML
1 CONCENTRATE ORAL
Status: DISCONTINUED | OUTPATIENT
Start: 2024-01-01 | End: 2024-05-02 | Stop reason: HOSPADM

## 2024-01-01 RX ORDER — HYDROMORPHONE HYDROCHLORIDE 1 MG/ML
0.5 INJECTION, SOLUTION INTRAMUSCULAR; INTRAVENOUS; SUBCUTANEOUS
Status: DISCONTINUED | OUTPATIENT
Start: 2024-01-01 | End: 2024-01-01 | Stop reason: HOSPADM

## 2024-01-01 RX ORDER — SCOLOPAMINE TRANSDERMAL SYSTEM 1 MG/1
1 PATCH, EXTENDED RELEASE TRANSDERMAL
Status: CANCELLED | OUTPATIENT
Start: 2024-01-01

## 2024-01-01 RX ORDER — PROCHLORPERAZINE EDISYLATE 5 MG/ML
5 INJECTION INTRAMUSCULAR; INTRAVENOUS EVERY 6 HOURS PRN
Status: CANCELLED | OUTPATIENT
Start: 2024-01-01

## 2024-01-01 RX ORDER — SCOLOPAMINE TRANSDERMAL SYSTEM 1 MG/1
1 PATCH, EXTENDED RELEASE TRANSDERMAL
Status: DISCONTINUED | OUTPATIENT
Start: 2024-01-01 | End: 2024-01-01 | Stop reason: HOSPADM

## 2024-01-01 RX ORDER — TAMSULOSIN HYDROCHLORIDE 0.4 MG/1
1 CAPSULE ORAL NIGHTLY
Status: ON HOLD | COMMUNITY
End: 2024-01-01 | Stop reason: ALTCHOICE

## 2024-01-01 RX ORDER — MORPHINE SULFATE 20 MG/ML
10 SOLUTION ORAL
Status: CANCELLED | OUTPATIENT
Start: 2024-01-01 | End: 2024-05-08

## 2024-01-01 RX ORDER — LORAZEPAM 2 MG/ML
0.5 CONCENTRATE ORAL
Status: DISCONTINUED | OUTPATIENT
Start: 2024-01-01 | End: 2024-01-01

## 2024-01-01 RX ORDER — LEVETIRACETAM 500 MG/5ML
500 INJECTION, SOLUTION, CONCENTRATE INTRAVENOUS EVERY 12 HOURS SCHEDULED
Status: DISCONTINUED | OUTPATIENT
Start: 2024-01-01 | End: 2024-01-01 | Stop reason: HOSPADM

## 2024-01-01 RX ORDER — GLYCOPYRROLATE 0.2 MG/ML
0.4 INJECTION INTRAMUSCULAR; INTRAVENOUS
Status: DISCONTINUED | OUTPATIENT
Start: 2024-01-01 | End: 2024-05-02 | Stop reason: HOSPADM

## 2024-01-01 RX ORDER — SPIRONOLACTONE 50 MG/1
50 TABLET, FILM COATED ORAL DAILY
Status: DISCONTINUED | OUTPATIENT
Start: 2024-01-01 | End: 2024-01-01

## 2024-01-01 RX ORDER — DIPHENOXYLATE HYDROCHLORIDE AND ATROPINE SULFATE 2.5; .025 MG/1; MG/1
1 TABLET ORAL
Status: CANCELLED | OUTPATIENT
Start: 2024-01-01

## 2024-01-01 RX ORDER — ACETAMINOPHEN 325 MG/1
650 TABLET ORAL EVERY 4 HOURS PRN
Status: CANCELLED | OUTPATIENT
Start: 2024-01-01

## 2024-01-01 RX ORDER — MORPHINE SULFATE 20 MG/ML
20 SOLUTION ORAL
Status: DISCONTINUED | OUTPATIENT
Start: 2024-01-01 | End: 2024-05-02 | Stop reason: HOSPADM

## 2024-01-01 RX ORDER — PROCHLORPERAZINE MALEATE 10 MG
5 TABLET ORAL EVERY 6 HOURS PRN
Status: CANCELLED | OUTPATIENT
Start: 2024-01-01

## 2024-01-01 RX ORDER — LORAZEPAM 2 MG/ML
0.5 CONCENTRATE ORAL
Status: DISCONTINUED | OUTPATIENT
Start: 2024-01-01 | End: 2024-05-02 | Stop reason: HOSPADM

## 2024-01-01 RX ORDER — MIDAZOLAM HYDROCHLORIDE 1 MG/ML
4 INJECTION INTRAMUSCULAR; INTRAVENOUS
Status: DISCONTINUED | OUTPATIENT
Start: 2024-01-01 | End: 2024-01-01 | Stop reason: HOSPADM

## 2024-01-01 RX ORDER — POLYETHYLENE GLYCOL 3350 17 G/17G
17 POWDER, FOR SOLUTION ORAL 2 TIMES DAILY
Status: DISCONTINUED | OUTPATIENT
Start: 2024-01-01 | End: 2024-01-01

## 2024-01-01 RX ORDER — GLYCOPYRROLATE 0.2 MG/ML
0.4 INJECTION INTRAMUSCULAR; INTRAVENOUS
Status: CANCELLED | OUTPATIENT
Start: 2024-01-01

## 2024-01-01 RX ORDER — SODIUM CHLORIDE 0.9 % (FLUSH) 0.9 %
10 SYRINGE (ML) INJECTION EVERY 12 HOURS SCHEDULED
Status: DISCONTINUED | OUTPATIENT
Start: 2024-01-01 | End: 2024-01-01

## 2024-01-01 RX ORDER — ACETAMINOPHEN 325 MG/1
650 TABLET ORAL EVERY 4 HOURS PRN
Status: DISCONTINUED | OUTPATIENT
Start: 2024-01-01 | End: 2024-05-02 | Stop reason: HOSPADM

## 2024-01-01 RX ORDER — CALCIUM CARBONATE 500 MG/1
2 TABLET, CHEWABLE ORAL 2 TIMES DAILY PRN
Status: DISCONTINUED | OUTPATIENT
Start: 2024-01-01 | End: 2024-01-01

## 2024-01-01 RX ORDER — LORAZEPAM 2 MG/ML
2 CONCENTRATE ORAL
Status: DISCONTINUED | OUTPATIENT
Start: 2024-01-01 | End: 2024-05-02 | Stop reason: HOSPADM

## 2024-01-01 RX ORDER — ACETAMINOPHEN 160 MG/5ML
650 SOLUTION ORAL EVERY 4 HOURS PRN
Status: CANCELLED | OUTPATIENT
Start: 2024-01-01

## 2024-01-01 RX ORDER — MORPHINE SULFATE 20 MG/ML
10 SOLUTION ORAL
Status: DISCONTINUED | OUTPATIENT
Start: 2024-01-01 | End: 2024-01-01 | Stop reason: HOSPADM

## 2024-01-01 RX ORDER — OXYBUTYNIN CHLORIDE 5 MG/1
5 TABLET, EXTENDED RELEASE ORAL DAILY
Status: DISCONTINUED | OUTPATIENT
Start: 2024-01-01 | End: 2024-01-01

## 2024-01-01 RX ORDER — MORPHINE SULFATE 20 MG/ML
5 SOLUTION ORAL
Status: DISCONTINUED | OUTPATIENT
Start: 2024-01-01 | End: 2024-01-01 | Stop reason: HOSPADM

## 2024-01-01 RX ORDER — AMLODIPINE BESYLATE 10 MG/1
10 TABLET ORAL DAILY
Status: DISCONTINUED | OUTPATIENT
Start: 2024-01-01 | End: 2024-01-01

## 2024-01-01 RX ORDER — LORAZEPAM 1 MG/1
2 TABLET ORAL
Status: DISCONTINUED | OUTPATIENT
Start: 2024-01-01 | End: 2024-01-01 | Stop reason: HOSPADM

## 2024-01-01 RX ORDER — NITROGLYCERIN 0.4 MG/1
0.4 TABLET SUBLINGUAL
Status: DISCONTINUED | OUTPATIENT
Start: 2024-01-01 | End: 2024-01-01

## 2024-01-01 RX ORDER — FLUTICASONE PROPIONATE 50 MCG
1 SPRAY, SUSPENSION (ML) NASAL DAILY
Status: DISCONTINUED | OUTPATIENT
Start: 2024-01-01 | End: 2024-01-01

## 2024-01-01 RX ORDER — GLYCOPYRROLATE 0.2 MG/ML
0.2 INJECTION INTRAMUSCULAR; INTRAVENOUS
Status: DISCONTINUED | OUTPATIENT
Start: 2024-01-01 | End: 2024-01-01 | Stop reason: HOSPADM

## 2024-01-01 RX ORDER — MORPHINE SULFATE 10 MG/ML
6 INJECTION INTRAMUSCULAR; INTRAVENOUS; SUBCUTANEOUS
Status: DISCONTINUED | OUTPATIENT
Start: 2024-01-01 | End: 2024-01-01 | Stop reason: HOSPADM

## 2024-01-01 RX ORDER — ATROPINE SULFATE 10 MG/ML
2 SOLUTION/ DROPS OPHTHALMIC 2 TIMES DAILY PRN
Status: CANCELLED | OUTPATIENT
Start: 2024-01-01

## 2024-01-01 RX ORDER — BISACODYL 10 MG
10 SUPPOSITORY, RECTAL RECTAL DAILY PRN
Status: DISCONTINUED | OUTPATIENT
Start: 2024-01-01 | End: 2024-01-01

## 2024-01-01 RX ORDER — ACETAMINOPHEN 650 MG/1
650 SUPPOSITORY RECTAL EVERY 4 HOURS PRN
Status: DISCONTINUED | OUTPATIENT
Start: 2024-01-01 | End: 2024-01-01

## 2024-01-01 RX ORDER — TAMSULOSIN HYDROCHLORIDE 0.4 MG/1
0.4 CAPSULE ORAL NIGHTLY
Status: DISCONTINUED | OUTPATIENT
Start: 2024-01-01 | End: 2024-01-01

## 2024-01-01 RX ORDER — FAMOTIDINE 10 MG/ML
20 INJECTION, SOLUTION INTRAVENOUS DAILY
Status: DISCONTINUED | OUTPATIENT
Start: 2024-01-01 | End: 2024-01-01

## 2024-01-01 RX ORDER — SCOLOPAMINE TRANSDERMAL SYSTEM 1 MG/1
1 PATCH, EXTENDED RELEASE TRANSDERMAL
Status: DISCONTINUED | OUTPATIENT
Start: 2024-01-01 | End: 2024-05-02 | Stop reason: HOSPADM

## 2024-01-01 RX ORDER — SODIUM CHLORIDE 9 MG/ML
125 INJECTION, SOLUTION INTRAVENOUS CONTINUOUS
Status: ACTIVE | OUTPATIENT
Start: 2024-01-01 | End: 2024-01-01

## 2024-01-01 RX ORDER — MORPHINE SULFATE 10 MG/ML
6 INJECTION INTRAMUSCULAR; INTRAVENOUS; SUBCUTANEOUS
Status: CANCELLED | OUTPATIENT
Start: 2024-01-01 | End: 2024-05-08

## 2024-01-01 RX ORDER — PROCHLORPERAZINE 25 MG
25 SUPPOSITORY, RECTAL RECTAL EVERY 12 HOURS PRN
Status: DISCONTINUED | OUTPATIENT
Start: 2024-01-01 | End: 2024-01-01 | Stop reason: HOSPADM

## 2024-01-01 RX ORDER — LORAZEPAM 1 MG/1
2 TABLET ORAL
Status: DISCONTINUED | OUTPATIENT
Start: 2024-01-01 | End: 2024-05-02 | Stop reason: HOSPADM

## 2024-01-01 RX ORDER — AMOXICILLIN 250 MG
2 CAPSULE ORAL 2 TIMES DAILY PRN
Status: DISCONTINUED | OUTPATIENT
Start: 2024-01-01 | End: 2024-01-01

## 2024-01-01 RX ORDER — LORAZEPAM 2 MG/ML
1 CONCENTRATE ORAL
Status: DISCONTINUED | OUTPATIENT
Start: 2024-01-01 | End: 2024-01-01 | Stop reason: HOSPADM

## 2024-01-01 RX ORDER — BISACODYL 5 MG/1
5 TABLET, DELAYED RELEASE ORAL DAILY PRN
Status: DISCONTINUED | OUTPATIENT
Start: 2024-01-01 | End: 2024-01-01

## 2024-01-01 RX ORDER — PROCHLORPERAZINE MALEATE 10 MG
5 TABLET ORAL EVERY 6 HOURS PRN
Status: DISCONTINUED | OUTPATIENT
Start: 2024-01-01 | End: 2024-01-01 | Stop reason: HOSPADM

## 2024-01-01 RX ORDER — LORAZEPAM 1 MG/1
1 TABLET ORAL
Status: CANCELLED | OUTPATIENT
Start: 2024-01-01 | End: 2024-05-03

## 2024-01-01 RX ORDER — LORAZEPAM 1 MG/1
2 TABLET ORAL
Status: DISCONTINUED | OUTPATIENT
Start: 2024-01-01 | End: 2024-01-01

## 2024-01-01 RX ORDER — DEXTROSE MONOHYDRATE 50 MG/ML
100 INJECTION, SOLUTION INTRAVENOUS CONTINUOUS
Status: DISCONTINUED | OUTPATIENT
Start: 2024-01-01 | End: 2024-01-01

## 2024-01-01 RX ORDER — MORPHINE SULFATE 20 MG/ML
10 SOLUTION ORAL
Status: DISCONTINUED | OUTPATIENT
Start: 2024-01-01 | End: 2024-05-02 | Stop reason: HOSPADM

## 2024-01-01 RX ORDER — MORPHINE SULFATE 10 MG/ML
6 INJECTION INTRAMUSCULAR; INTRAVENOUS; SUBCUTANEOUS
Status: DISCONTINUED | OUTPATIENT
Start: 2024-01-01 | End: 2024-05-02 | Stop reason: HOSPADM

## 2024-01-01 RX ORDER — SODIUM CHLORIDE 9 MG/ML
125 INJECTION, SOLUTION INTRAVENOUS CONTINUOUS
Status: DISCONTINUED | OUTPATIENT
Start: 2024-01-01 | End: 2024-01-01

## 2024-01-01 RX ORDER — ACETAMINOPHEN 160 MG/5ML
650 SOLUTION ORAL EVERY 4 HOURS PRN
Status: DISCONTINUED | OUTPATIENT
Start: 2024-01-01 | End: 2024-01-01

## 2024-01-01 RX ORDER — LORAZEPAM 0.5 MG/1
0.5 TABLET ORAL
Status: DISCONTINUED | OUTPATIENT
Start: 2024-01-01 | End: 2024-01-01 | Stop reason: HOSPADM

## 2024-01-01 RX ORDER — SODIUM CHLORIDE 0.9 % (FLUSH) 0.9 %
10 SYRINGE (ML) INJECTION AS NEEDED
Status: DISCONTINUED | OUTPATIENT
Start: 2024-01-01 | End: 2024-01-01

## 2024-01-01 RX ORDER — DIPHENOXYLATE HYDROCHLORIDE AND ATROPINE SULFATE 2.5; .025 MG/1; MG/1
1 TABLET ORAL
Status: DISCONTINUED | OUTPATIENT
Start: 2024-01-01 | End: 2024-05-02 | Stop reason: HOSPADM

## 2024-01-01 RX ORDER — LORAZEPAM 1 MG/1
1 TABLET ORAL
Status: DISCONTINUED | OUTPATIENT
Start: 2024-01-01 | End: 2024-01-01 | Stop reason: HOSPADM

## 2024-01-01 RX ORDER — ACETAMINOPHEN 325 MG/1
650 TABLET ORAL EVERY 4 HOURS PRN
Status: DISCONTINUED | OUTPATIENT
Start: 2024-01-01 | End: 2024-01-01 | Stop reason: HOSPADM

## 2024-01-01 RX ORDER — ACETAMINOPHEN 650 MG/1
650 SUPPOSITORY RECTAL EVERY 4 HOURS PRN
Status: DISCONTINUED | OUTPATIENT
Start: 2024-01-01 | End: 2024-01-01 | Stop reason: HOSPADM

## 2024-01-01 RX ORDER — MORPHINE SULFATE 20 MG/ML
5 SOLUTION ORAL
Status: DISCONTINUED | OUTPATIENT
Start: 2024-01-01 | End: 2024-05-02 | Stop reason: HOSPADM

## 2024-01-01 RX ADMIN — GLYCOPYRROLATE 0.2 MG: 0.2 INJECTION INTRAMUSCULAR; INTRAVENOUS at 13:47

## 2024-01-01 RX ADMIN — POLYETHYLENE GLYCOL 3350 17 G: 17 POWDER, FOR SOLUTION ORAL at 09:03

## 2024-01-01 RX ADMIN — DOCUSATE SODIUM 50MG AND SENNOSIDES 8.6MG 2 TABLET: 8.6; 5 TABLET, FILM COATED ORAL at 21:17

## 2024-01-01 RX ADMIN — MIDAZOLAM 2 MG: 1 INJECTION INTRAMUSCULAR; INTRAVENOUS at 20:57

## 2024-01-01 RX ADMIN — SODIUM CHLORIDE 125 ML/HR: 9 INJECTION, SOLUTION INTRAVENOUS at 17:18

## 2024-01-01 RX ADMIN — MORPHINE SULFATE 2 MG: 2 INJECTION, SOLUTION INTRAMUSCULAR; INTRAVENOUS at 21:32

## 2024-01-01 RX ADMIN — ANORECTAL OINTMENT 1 APPLICATION: 15.7; .44; 24; 20.6 OINTMENT TOPICAL at 20:42

## 2024-01-01 RX ADMIN — TAMSULOSIN HYDROCHLORIDE 0.4 MG: 0.4 CAPSULE ORAL at 21:16

## 2024-01-01 RX ADMIN — GLYCOPYRROLATE 0.4 MG: 0.2 INJECTION INTRAMUSCULAR; INTRAVENOUS at 23:21

## 2024-01-01 RX ADMIN — Medication 10 ML: at 21:19

## 2024-01-01 RX ADMIN — ACETAMINOPHEN 650 MG: 650 SUPPOSITORY RECTAL at 21:41

## 2024-01-01 RX ADMIN — Medication 10 ML: at 11:17

## 2024-01-01 RX ADMIN — GLYCOPYRROLATE 0.2 MG: 0.2 INJECTION INTRAMUSCULAR; INTRAVENOUS at 12:48

## 2024-01-01 RX ADMIN — INSULIN GLARGINE 25 UNITS: 100 INJECTION, SOLUTION SUBCUTANEOUS at 20:30

## 2024-01-01 RX ADMIN — LORAZEPAM 0.5 MG: 0.5 TABLET ORAL at 09:11

## 2024-01-01 RX ADMIN — ANORECTAL OINTMENT 1 APPLICATION: 15.7; .44; 24; 20.6 OINTMENT TOPICAL at 08:47

## 2024-01-01 RX ADMIN — LEVETIRACETAM 500 MG: 100 INJECTION INTRAVENOUS at 08:09

## 2024-01-01 RX ADMIN — METOPROLOL TARTRATE 2.5 MG: 1 INJECTION, SOLUTION INTRAVENOUS at 00:17

## 2024-01-01 RX ADMIN — ACETAMINOPHEN 650 MG: 650 SUPPOSITORY RECTAL at 14:27

## 2024-01-01 RX ADMIN — LORAZEPAM 0.5 MG: 2 LIQUID ORAL at 21:36

## 2024-01-01 RX ADMIN — LORAZEPAM 0.5 MG: 2 LIQUID ORAL at 13:31

## 2024-01-01 RX ADMIN — ERTAPENEM SODIUM 500 MG: 1 INJECTION, POWDER, LYOPHILIZED, FOR SOLUTION INTRAMUSCULAR; INTRAVENOUS at 14:39

## 2024-01-01 RX ADMIN — GLYCOPYRROLATE 0.2 MG: 0.2 INJECTION INTRAMUSCULAR; INTRAVENOUS at 21:45

## 2024-01-01 RX ADMIN — ACETAMINOPHEN 650 MG: 650 SUPPOSITORY RECTAL at 08:10

## 2024-01-01 RX ADMIN — CLONIDINE HYDROCHLORIDE 0.2 MG: 0.1 TABLET ORAL at 06:28

## 2024-01-01 RX ADMIN — GLYCOPYRROLATE 0.2 MG: 0.2 INJECTION INTRAMUSCULAR; INTRAVENOUS at 09:11

## 2024-01-01 RX ADMIN — GLYCOPYRROLATE 0.2 MG: 0.2 INJECTION INTRAMUSCULAR; INTRAVENOUS at 04:40

## 2024-01-01 RX ADMIN — SODIUM CHLORIDE 125 ML/HR: 9 INJECTION, SOLUTION INTRAVENOUS at 00:13

## 2024-01-01 RX ADMIN — GLYCOPYRROLATE 0.2 MG: 0.2 INJECTION INTRAMUSCULAR; INTRAVENOUS at 05:01

## 2024-01-01 RX ADMIN — METOPROLOL TARTRATE 2.5 MG: 1 INJECTION, SOLUTION INTRAVENOUS at 08:47

## 2024-01-01 RX ADMIN — SODIUM CHLORIDE 125 ML/HR: 9 INJECTION, SOLUTION INTRAVENOUS at 08:47

## 2024-01-01 RX ADMIN — OLANZAPINE 5 MG: 5 TABLET, ORALLY DISINTEGRATING ORAL at 21:41

## 2024-01-01 RX ADMIN — METFORMIN HYDROCHLORIDE 1000 MG: 1000 TABLET, FILM COATED ORAL at 11:11

## 2024-01-01 RX ADMIN — SPIRONOLACTONE 50 MG: 50 TABLET, FILM COATED ORAL at 11:05

## 2024-01-01 RX ADMIN — FAMOTIDINE 20 MG: 10 INJECTION INTRAVENOUS at 13:47

## 2024-01-01 RX ADMIN — LORAZEPAM 1 MG: 2 LIQUID ORAL at 13:16

## 2024-01-01 RX ADMIN — LORAZEPAM 1 MG: 2 LIQUID ORAL at 04:56

## 2024-01-01 RX ADMIN — ASPIRIN 81 MG: 81 TABLET, COATED ORAL at 11:01

## 2024-01-01 RX ADMIN — METOPROLOL TARTRATE 2.5 MG: 1 INJECTION, SOLUTION INTRAVENOUS at 08:10

## 2024-01-01 RX ADMIN — LEVETIRACETAM 500 MG: 100 INJECTION INTRAVENOUS at 21:44

## 2024-01-01 RX ADMIN — Medication 10 ML: at 21:41

## 2024-01-01 RX ADMIN — HYDROXYUREA 500 MG: 500 CAPSULE ORAL at 11:12

## 2024-01-01 RX ADMIN — MORPHINE SULFATE 2 MG: 2 INJECTION, SOLUTION INTRAMUSCULAR; INTRAVENOUS at 20:50

## 2024-01-01 RX ADMIN — MORPHINE SULFATE 2 MG: 2 INJECTION, SOLUTION INTRAMUSCULAR; INTRAVENOUS at 04:41

## 2024-01-01 RX ADMIN — GLYCOPYRROLATE 0.4 MG: 0.2 INJECTION INTRAMUSCULAR; INTRAVENOUS at 08:35

## 2024-01-01 RX ADMIN — LORAZEPAM 0.5 MG: 2 LIQUID ORAL at 15:53

## 2024-01-01 RX ADMIN — GLYCOPYRROLATE 0.2 MG: 0.2 INJECTION INTRAMUSCULAR; INTRAVENOUS at 00:59

## 2024-01-01 RX ADMIN — GLYCOPYRROLATE 0.4 MG: 0.2 INJECTION INTRAMUSCULAR; INTRAVENOUS at 20:57

## 2024-01-01 RX ADMIN — Medication 10 ML: at 08:10

## 2024-01-01 RX ADMIN — LEVETIRACETAM 500 MG: 500 TABLET, FILM COATED ORAL at 08:50

## 2024-01-01 RX ADMIN — METOPROLOL TARTRATE 2.5 MG: 1 INJECTION, SOLUTION INTRAVENOUS at 12:28

## 2024-01-01 RX ADMIN — GLYCOPYRROLATE 0.2 MG: 0.2 INJECTION INTRAMUSCULAR; INTRAVENOUS at 13:31

## 2024-01-01 RX ADMIN — MORPHINE SULFATE 4 MG: 4 INJECTION, SOLUTION INTRAMUSCULAR; INTRAVENOUS at 20:57

## 2024-01-01 RX ADMIN — Medication 10 ML: at 08:47

## 2024-01-01 RX ADMIN — GLYCOPYRROLATE 0.4 MG: 0.2 INJECTION INTRAMUSCULAR; INTRAVENOUS at 04:55

## 2024-01-01 RX ADMIN — FLUTICASONE PROPIONATE 1 SPRAY: 50 SPRAY, METERED NASAL at 11:16

## 2024-01-01 RX ADMIN — MEMANTINE HYDROCHLORIDE 10 MG: 10 TABLET, FILM COATED ORAL at 08:50

## 2024-01-01 RX ADMIN — LORAZEPAM 1 MG: 2 LIQUID ORAL at 20:49

## 2024-01-01 RX ADMIN — MORPHINE SULFATE 2 MG: 2 INJECTION, SOLUTION INTRAMUSCULAR; INTRAVENOUS at 13:36

## 2024-01-01 RX ADMIN — LEVETIRACETAM 500 MG: 500 TABLET, FILM COATED ORAL at 21:17

## 2024-01-01 RX ADMIN — LORAZEPAM 0.5 MG: 2 LIQUID ORAL at 08:59

## 2024-01-01 RX ADMIN — LORAZEPAM 0.5 MG: 0.5 TABLET ORAL at 17:05

## 2024-01-01 RX ADMIN — LEVETIRACETAM 500 MG: 100 INJECTION INTRAVENOUS at 21:31

## 2024-01-01 RX ADMIN — LORAZEPAM 0.5 MG: 0.5 TABLET ORAL at 12:49

## 2024-01-01 RX ADMIN — MORPHINE SULFATE 2 MG: 2 INJECTION, SOLUTION INTRAMUSCULAR; INTRAVENOUS at 16:23

## 2024-01-01 RX ADMIN — MEMANTINE HYDROCHLORIDE 10 MG: 10 TABLET, FILM COATED ORAL at 11:05

## 2024-01-01 RX ADMIN — INSULIN HUMAN 6 UNITS: 100 INJECTION, SOLUTION PARENTERAL at 12:37

## 2024-01-01 RX ADMIN — SODIUM CHLORIDE 500 ML: 9 INJECTION, SOLUTION INTRAVENOUS at 12:36

## 2024-01-01 RX ADMIN — FLUTICASONE PROPIONATE 1 SPRAY: 50 SPRAY, METERED NASAL at 09:02

## 2024-01-01 RX ADMIN — DOCUSATE SODIUM 50MG AND SENNOSIDES 8.6MG 2 TABLET: 8.6; 5 TABLET, FILM COATED ORAL at 11:04

## 2024-01-01 RX ADMIN — GLYCOPYRROLATE 0.2 MG: 0.2 INJECTION INTRAMUSCULAR; INTRAVENOUS at 17:05

## 2024-01-01 RX ADMIN — OLANZAPINE 5 MG: 5 TABLET, ORALLY DISINTEGRATING ORAL at 21:19

## 2024-01-01 RX ADMIN — LORAZEPAM 0.5 MG: 2 LIQUID ORAL at 00:59

## 2024-01-01 RX ADMIN — INSULIN HUMAN 4 UNITS: 100 INJECTION, SOLUTION PARENTERAL at 06:12

## 2024-01-01 RX ADMIN — ONDANSETRON 4 MG: 2 INJECTION INTRAMUSCULAR; INTRAVENOUS at 18:15

## 2024-01-01 RX ADMIN — MORPHINE SULFATE 2 MG: 2 INJECTION, SOLUTION INTRAMUSCULAR; INTRAVENOUS at 12:37

## 2024-01-01 RX ADMIN — CLONIDINE HYDROCHLORIDE 0.2 MG: 0.1 TABLET ORAL at 21:40

## 2024-01-01 RX ADMIN — MORPHINE SULFATE 4 MG: 4 INJECTION, SOLUTION INTRAMUSCULAR; INTRAVENOUS at 13:15

## 2024-01-01 RX ADMIN — LORAZEPAM 1 MG: 2 LIQUID ORAL at 01:06

## 2024-01-01 RX ADMIN — GLYCOPYRROLATE 0.2 MG: 0.2 INJECTION INTRAMUSCULAR; INTRAVENOUS at 20:49

## 2024-01-01 RX ADMIN — INSULIN LISPRO 8 UNITS: 100 INJECTION, SOLUTION INTRAVENOUS; SUBCUTANEOUS at 11:36

## 2024-01-01 RX ADMIN — MORPHINE SULFATE 2 MG: 2 INJECTION, SOLUTION INTRAMUSCULAR; INTRAVENOUS at 17:03

## 2024-01-01 RX ADMIN — OXYBUTYNIN CHLORIDE 5 MG: 5 TABLET, EXTENDED RELEASE ORAL at 08:50

## 2024-01-01 RX ADMIN — SODIUM CHLORIDE 125 ML/HR: 9 INJECTION, SOLUTION INTRAVENOUS at 12:00

## 2024-01-01 RX ADMIN — SODIUM CHLORIDE 125 ML/HR: 9 INJECTION, SOLUTION INTRAVENOUS at 14:00

## 2024-01-01 RX ADMIN — INSULIN HUMAN 10 UNITS: 100 INJECTION, SOLUTION PARENTERAL at 14:59

## 2024-01-01 RX ADMIN — Medication 10 ML: at 21:32

## 2024-01-01 RX ADMIN — SCOPALAMINE 1 PATCH: 1 PATCH, EXTENDED RELEASE TRANSDERMAL at 23:21

## 2024-01-01 RX ADMIN — LEVETIRACETAM 500 MG: 500 INJECTION, SOLUTION INTRAVENOUS at 09:11

## 2024-01-01 RX ADMIN — METOPROLOL TARTRATE 2.5 MG: 1 INJECTION, SOLUTION INTRAVENOUS at 12:46

## 2024-01-01 RX ADMIN — Medication 10 ML: at 20:42

## 2024-01-01 RX ADMIN — LEVETIRACETAM 500 MG: 500 INJECTION, SOLUTION INTRAVENOUS at 21:30

## 2024-01-01 RX ADMIN — GLYCOPYRROLATE 0.2 MG: 0.2 INJECTION INTRAMUSCULAR; INTRAVENOUS at 16:39

## 2024-01-01 RX ADMIN — LORAZEPAM 1 MG: 2 LIQUID ORAL at 16:39

## 2024-01-01 RX ADMIN — DOCUSATE SODIUM 50MG AND SENNOSIDES 8.6MG 2 TABLET: 8.6; 5 TABLET, FILM COATED ORAL at 08:50

## 2024-01-01 RX ADMIN — MORPHINE SULFATE 2 MG: 2 INJECTION, SOLUTION INTRAMUSCULAR; INTRAVENOUS at 08:59

## 2024-01-01 RX ADMIN — SODIUM CHLORIDE 125 ML/HR: 9 INJECTION, SOLUTION INTRAVENOUS at 06:33

## 2024-01-01 RX ADMIN — MORPHINE SULFATE 2 MG: 2 INJECTION, SOLUTION INTRAMUSCULAR; INTRAVENOUS at 05:01

## 2024-01-01 RX ADMIN — LORAZEPAM 0.5 MG: 2 LIQUID ORAL at 21:45

## 2024-01-01 RX ADMIN — MORPHINE SULFATE 2 MG: 2 INJECTION, SOLUTION INTRAMUSCULAR; INTRAVENOUS at 15:26

## 2024-01-01 RX ADMIN — GLYCOPYRROLATE 0.4 MG: 0.2 INJECTION INTRAMUSCULAR; INTRAVENOUS at 13:16

## 2024-01-01 RX ADMIN — LINAGLIPTIN 5 MG: 5 TABLET, FILM COATED ORAL at 08:50

## 2024-01-01 RX ADMIN — LINAGLIPTIN 5 MG: 5 TABLET, FILM COATED ORAL at 11:07

## 2024-01-01 RX ADMIN — ERTAPENEM SODIUM 1000 MG: 1 INJECTION, POWDER, LYOPHILIZED, FOR SOLUTION INTRAMUSCULAR; INTRAVENOUS at 14:25

## 2024-01-01 RX ADMIN — MORPHINE SULFATE 2 MG: 2 INJECTION, SOLUTION INTRAMUSCULAR; INTRAVENOUS at 06:12

## 2024-01-01 RX ADMIN — METOPROLOL TARTRATE 2.5 MG: 1 INJECTION, SOLUTION INTRAVENOUS at 18:18

## 2024-01-01 RX ADMIN — OXYBUTYNIN CHLORIDE 5 MG: 5 TABLET, EXTENDED RELEASE ORAL at 11:01

## 2024-01-01 RX ADMIN — DOCUSATE SODIUM 50MG AND SENNOSIDES 8.6MG 2 TABLET: 8.6; 5 TABLET, FILM COATED ORAL at 21:41

## 2024-01-01 RX ADMIN — HYDROXYUREA 500 MG: 500 CAPSULE ORAL at 09:04

## 2024-01-01 RX ADMIN — MORPHINE SULFATE 2 MG: 2 INJECTION, SOLUTION INTRAMUSCULAR; INTRAVENOUS at 08:52

## 2024-01-01 RX ADMIN — GLYCOPYRROLATE 0.4 MG: 0.2 INJECTION INTRAMUSCULAR; INTRAVENOUS at 16:54

## 2024-01-01 RX ADMIN — GLYCOPYRROLATE 0.2 MG: 0.2 INJECTION INTRAMUSCULAR; INTRAVENOUS at 08:59

## 2024-01-01 RX ADMIN — GLYCOPYRROLATE 0.2 MG: 0.2 INJECTION INTRAMUSCULAR; INTRAVENOUS at 01:14

## 2024-01-01 RX ADMIN — SODIUM CHLORIDE 125 ML/HR: 9 INJECTION, SOLUTION INTRAVENOUS at 20:30

## 2024-01-01 RX ADMIN — MORPHINE SULFATE 2 MG: 2 INJECTION, SOLUTION INTRAMUSCULAR; INTRAVENOUS at 16:39

## 2024-01-01 RX ADMIN — LEVETIRACETAM 500 MG: 100 INJECTION INTRAVENOUS at 08:46

## 2024-01-01 RX ADMIN — ROSUVASTATIN CALCIUM 40 MG: 40 TABLET, FILM COATED ORAL at 21:16

## 2024-01-01 RX ADMIN — ERTAPENEM SODIUM 1000 MG: 1 INJECTION, POWDER, LYOPHILIZED, FOR SOLUTION INTRAMUSCULAR; INTRAVENOUS at 15:03

## 2024-01-01 RX ADMIN — METOPROLOL TARTRATE 2.5 MG: 1 INJECTION, SOLUTION INTRAVENOUS at 00:16

## 2024-01-01 RX ADMIN — MORPHINE SULFATE 4 MG: 4 INJECTION, SOLUTION INTRAMUSCULAR; INTRAVENOUS at 04:56

## 2024-01-01 RX ADMIN — MORPHINE SULFATE 2 MG: 2 INJECTION, SOLUTION INTRAMUSCULAR; INTRAVENOUS at 12:48

## 2024-01-01 RX ADMIN — LEVETIRACETAM 500 MG: 100 INJECTION INTRAVENOUS at 20:42

## 2024-01-01 RX ADMIN — LORAZEPAM 0.5 MG: 2 LIQUID ORAL at 01:14

## 2024-01-01 RX ADMIN — CARVEDILOL 12.5 MG: 12.5 TABLET, FILM COATED ORAL at 09:02

## 2024-01-01 RX ADMIN — SODIUM CHLORIDE 250 ML: 9 INJECTION, SOLUTION INTRAVENOUS at 12:17

## 2024-01-01 RX ADMIN — LEVETIRACETAM 500 MG: 100 INJECTION INTRAVENOUS at 20:49

## 2024-01-01 RX ADMIN — SERTRALINE 50 MG: 50 TABLET, FILM COATED ORAL at 21:17

## 2024-01-01 RX ADMIN — MORPHINE SULFATE 2 MG: 2 INJECTION, SOLUTION INTRAMUSCULAR; INTRAVENOUS at 21:45

## 2024-01-01 RX ADMIN — INSULIN LISPRO 10 UNITS: 100 INJECTION, SOLUTION INTRAVENOUS; SUBCUTANEOUS at 21:41

## 2024-01-01 RX ADMIN — INSULIN LISPRO 5 UNITS: 100 INJECTION, SOLUTION INTRAVENOUS; SUBCUTANEOUS at 21:15

## 2024-01-01 RX ADMIN — MIDAZOLAM 2 MG: 1 INJECTION INTRAMUSCULAR; INTRAVENOUS at 17:12

## 2024-01-01 RX ADMIN — MORPHINE SULFATE 2 MG: 2 INJECTION, SOLUTION INTRAMUSCULAR; INTRAVENOUS at 01:14

## 2024-01-01 RX ADMIN — ANORECTAL OINTMENT 1 APPLICATION: 15.7; .44; 24; 20.6 OINTMENT TOPICAL at 21:32

## 2024-01-01 RX ADMIN — ASPIRIN 81 MG: 81 TABLET, COATED ORAL at 08:50

## 2024-01-01 RX ADMIN — LORAZEPAM 0.5 MG: 2 LIQUID ORAL at 04:40

## 2024-01-01 RX ADMIN — ANORECTAL OINTMENT 1 APPLICATION: 15.7; .44; 24; 20.6 OINTMENT TOPICAL at 13:08

## 2024-01-01 RX ADMIN — MORPHINE SULFATE 2 MG: 2 INJECTION, SOLUTION INTRAMUSCULAR; INTRAVENOUS at 20:49

## 2024-01-01 RX ADMIN — INSULIN LISPRO 3 UNITS: 100 INJECTION, SOLUTION INTRAVENOUS; SUBCUTANEOUS at 17:31

## 2024-01-01 RX ADMIN — Medication 10 ML: at 09:03

## 2024-01-01 RX ADMIN — LEVETIRACETAM 500 MG: 100 INJECTION INTRAVENOUS at 20:58

## 2024-01-01 RX ADMIN — SERTRALINE 50 MG: 50 TABLET, FILM COATED ORAL at 21:41

## 2024-01-01 RX ADMIN — MORPHINE SULFATE 2 MG: 2 INJECTION, SOLUTION INTRAMUSCULAR; INTRAVENOUS at 00:59

## 2024-01-01 RX ADMIN — LEVETIRACETAM 500 MG: 100 INJECTION INTRAVENOUS at 08:35

## 2024-01-01 RX ADMIN — ONDANSETRON 4 MG: 2 INJECTION INTRAMUSCULAR; INTRAVENOUS at 00:07

## 2024-01-01 RX ADMIN — ERTAPENEM SODIUM 1000 MG: 1 INJECTION, POWDER, LYOPHILIZED, FOR SOLUTION INTRAMUSCULAR; INTRAVENOUS at 16:23

## 2024-01-01 RX ADMIN — ANORECTAL OINTMENT 1 APPLICATION: 15.7; .44; 24; 20.6 OINTMENT TOPICAL at 08:10

## 2024-01-01 RX ADMIN — LORAZEPAM 0.5 MG: 2 LIQUID ORAL at 05:01

## 2024-01-01 RX ADMIN — MORPHINE SULFATE 2 MG: 2 INJECTION, SOLUTION INTRAMUSCULAR; INTRAVENOUS at 01:06

## 2024-01-01 RX ADMIN — MORPHINE SULFATE 4 MG: 4 INJECTION, SOLUTION INTRAMUSCULAR; INTRAVENOUS at 08:35

## 2024-01-01 RX ADMIN — INSULIN LISPRO 3 UNITS: 100 INJECTION, SOLUTION INTRAVENOUS; SUBCUTANEOUS at 08:59

## 2024-01-01 RX ADMIN — CLONIDINE HYDROCHLORIDE 0.2 MG: 0.1 TABLET ORAL at 21:16

## 2024-01-01 RX ADMIN — MEMANTINE HYDROCHLORIDE 10 MG: 10 TABLET, FILM COATED ORAL at 21:16

## 2024-01-01 RX ADMIN — INSULIN LISPRO 5 UNITS: 100 INJECTION, SOLUTION INTRAVENOUS; SUBCUTANEOUS at 17:04

## 2024-01-01 RX ADMIN — LEVETIRACETAM 500 MG: 500 TABLET, FILM COATED ORAL at 11:01

## 2024-01-01 RX ADMIN — DEXTROSE MONOHYDRATE 100 ML/HR: 50 INJECTION, SOLUTION INTRAVENOUS at 00:17

## 2024-01-01 RX ADMIN — SODIUM CHLORIDE 125 ML/HR: 9 INJECTION, SOLUTION INTRAVENOUS at 03:34

## 2024-01-01 RX ADMIN — LEVETIRACETAM 500 MG: 100 INJECTION INTRAVENOUS at 22:53

## 2024-01-01 RX ADMIN — GLYCOPYRROLATE 0.2 MG: 0.2 INJECTION INTRAMUSCULAR; INTRAVENOUS at 21:35

## 2024-01-01 RX ADMIN — INSULIN GLARGINE 25 UNITS: 100 INJECTION, SOLUTION SUBCUTANEOUS at 21:15

## 2024-01-01 RX ADMIN — MORPHINE SULFATE 2 MG: 2 INJECTION, SOLUTION INTRAMUSCULAR; INTRAVENOUS at 09:11

## 2024-01-01 RX ADMIN — SODIUM CHLORIDE 125 ML/HR: 9 INJECTION, SOLUTION INTRAVENOUS at 22:26

## 2024-01-01 RX ADMIN — LORAZEPAM 1 MG: 2 LIQUID ORAL at 08:35

## 2024-01-01 RX ADMIN — MORPHINE SULFATE 2 MG: 2 INJECTION, SOLUTION INTRAMUSCULAR; INTRAVENOUS at 21:35

## 2024-01-01 RX ADMIN — METOPROLOL TARTRATE 2.5 MG: 1 INJECTION, SOLUTION INTRAVENOUS at 17:31

## 2024-01-01 RX ADMIN — BISACODYL 10 MG: 10 SUPPOSITORY RECTAL at 11:38

## 2024-01-01 RX ADMIN — DEXTROSE MONOHYDRATE 100 ML/HR: 50 INJECTION, SOLUTION INTRAVENOUS at 10:11

## 2024-01-01 RX ADMIN — LEVETIRACETAM 500 MG: 100 INJECTION INTRAVENOUS at 09:10

## 2024-01-04 ENCOUNTER — OFFICE VISIT (OUTPATIENT)
Dept: NEUROLOGY | Facility: CLINIC | Age: 79
End: 2024-01-04
Payer: MEDICARE

## 2024-01-04 VITALS
DIASTOLIC BLOOD PRESSURE: 90 MMHG | WEIGHT: 258 LBS | BODY MASS INDEX: 34.95 KG/M2 | HEART RATE: 76 BPM | SYSTOLIC BLOOD PRESSURE: 200 MMHG | OXYGEN SATURATION: 98 % | HEIGHT: 72 IN

## 2024-01-04 DIAGNOSIS — Z86.73 HISTORY OF CVA (CEREBROVASCULAR ACCIDENT): Primary | ICD-10-CM

## 2024-01-04 DIAGNOSIS — R41.3 MEMORY DIFFICULTIES: ICD-10-CM

## 2024-01-04 DIAGNOSIS — G31.84 MILD COGNITIVE IMPAIRMENT WITH MEMORY LOSS: ICD-10-CM

## 2024-01-04 PROCEDURE — 99214 OFFICE O/P EST MOD 30 MIN: CPT | Performed by: PSYCHIATRY & NEUROLOGY

## 2024-01-04 PROCEDURE — 3080F DIAST BP >= 90 MM HG: CPT | Performed by: PSYCHIATRY & NEUROLOGY

## 2024-01-04 PROCEDURE — 3077F SYST BP >= 140 MM HG: CPT | Performed by: PSYCHIATRY & NEUROLOGY

## 2024-01-04 RX ORDER — MEMANTINE HYDROCHLORIDE 10 MG/1
10 TABLET ORAL 2 TIMES DAILY
Qty: 180 TABLET | Refills: 3 | Status: SHIPPED | OUTPATIENT
Start: 2024-01-04

## 2024-01-04 NOTE — ASSESSMENT & PLAN NOTE
78 year old man who returns to neurology clinic for follow up evaluation and treatment of history of stroke and memory impairment/mild cognitive impairment.  Of note he was evaluated in the hospital for extended period of time starting in 8/2020 for Ecoli sepsis and discitis leading to left leg weakness and confusion for an extended period of time and since September 2020 they have noticed trouble with his short term memory.  He had a brain MRI scan on 9/22/2020 which demonstrates old lacunar strokes and a more acute stroke in the left corona radiata and atrophy.  They tell me that during this hospitalization he was placed on combination of Eliquis and aspirin which he continues to take.  He has not had any further stroke symptoms since being discharged from the hospital where he was evaluated by my colleague Dr. Ledbetter.  He continues to take the aspirin, Eliquis and rosuvastatin combination.  His BP is elevated today.  His BP is high due to getting shots in his back and having some pain.  I advised him to get his BP routinely checked and follow up with PCP as high blood pressure is #1 risk factor for stroke and also risk factor for intracerebral hemorrhage.

## 2024-01-04 NOTE — PROGRESS NOTES
Chief Complaint  Memory Loss and Cerebrovascular Accident    Subjective          Erlin Blanco presents to [facility] for   HISTORY OF PRESENT ILLNESS:    Erlin Blanco is a 78 year old man who returns to neurology clinic for follow up evaluation and treatment of history of stroke and memory impairment/mild cognitive impairment.  Of note he was evaluated in the hospital for extended period of time starting in 8/2020 for Ecoli sepsis and discitis leading to left leg weakness and confusion for an extended period of time and since September 2020 they have noticed trouble with his short term memory.  He had a brain MRI scan on 9/22/2020 which demonstrates old lacunar strokes and a more acute stroke in the left corona radiata and atrophy.  They tell me that during this hospitalization he was placed on combination of Eliquis and aspirin which he continues to take.  He has not had any further stroke symptoms since being discharged from the hospital where he was evaluated by my colleague Dr. Ledbetter.  He has had short term memory problems since this time and he does not remember picking things up and forgetting to set the alarm or put the garage door down.  He will forget that they have had conversations.  His older brother had Alzheimer's dementia.  No other family members that they are aware of with dementia.  He is not having any problems with driving.  He has good long term memory.  His personality has changed somewhat and can have short temper and quick to get upset.  He uses a cane to ambulate.  He has had lab work done with normal TSH and Vit B12 and folate.  He does not drink alcohol and does not smoke.  I prescribed him donepezil which caused hallucinations so this medicine was discontinued and instead he is on memantine 5 mg BID. He is tolerating this medicine well without any side effects.  His wife is helping with their finances.  He has POA and Living Will.  No MVA accidents and no  tickets and he denies any issues driving and he tries not to drive at night time.  His BP is high due to getting shots in his back and having some pain.  He has had falls and had head CT scan which did not demonstrate any acute findings.  He has been evaluated by PT.      Past Medical History:   Diagnosis Date    Abnormal ECG     Abnormal stress test     Abrasion of face 01/29/2020    Acute bronchitis     Acute hypokalemia 09/16/2020    Acute non-recurrent sinusitis 01/13/2020    Acute pyelonephritis 10/09/2020    Acute sinusitis     DORI (acute kidney injury) 04/28/2017    Allergic rhinitis     Aortic root dilation     Arthritis     Bacteremia due to Escherichia coli 08/17/2020    Balanitis 05/16/2021    Benign enlargement of prostate     C. difficile colitis 09/16/2020    Cellulitis of knee, left 05/04/2017    CHF (congestive heart failure)     Chronic diastolic congestive heart failure     Constipation 11/14/2020    Contusion of face 01/29/2020    Coronary artery disease     COVID-19 virus infection     CVA (cerebral vascular accident) 2020    Diminished pulse     in lower extremities    Discitis of lumbar region 10/09/2020    Edema     Elevated hematocrit     Elevated hemoglobin     Essential hypertension     Hearing loss     mala hearing aids    Heart valve disease     High risk medication use     History of back pain     History of dysuria     History of echocardiogram     History of intracranial hemorrhage     History of scoliosis     History of stroke     Hyperlipidemia     Hypokalemia 01/07/2021    Injury of toe, left, initial encounter     Irregular heart rate     Knee pain, left     Left bundle branch block     Leg wound, left     Leukocytosis 09/16/2020    Low back pain     Maxillary sinusitis 07/16/2021    Medicare annual wellness visit, subsequent     Minor head injury without loss of consciousness 01/29/2020    Murmur     Muscle cramps     Need for hepatitis C screening test     Paraphimosis 05/16/2021     Pneumonia of left lung due to infectious organism 2021    Polycythemia     Polycythemia vera     Pressure injury of buttock, stage 2 2020    Prostate hyperplasia without urinary obstruction     Prostatitis     Proteinuria     Pulmonary hypertension     Sacroiliitis 10/09/2020    Scoliosis     Sepsis due to Escherichia coli 2020    Sepsis with metabolic encephalopathy 2020    Stroke     SVT (supraventricular tachycardia)     Tachycardia     Thrombocytosis     TIA (transient ischemic attack)         Type 2 diabetes mellitus     Type 2 diabetes mellitus with hyperglycemia 2021    Urinary tract infection due to extended-spectrum beta lactamase (ESBL) producing Escherichia coli 2020    Valvular heart disease         Family History   Problem Relation Age of Onset    Hypertension Mother     Other Father         ruptured appendix,  when pt. was 14 years old.    Diabetes Paternal Aunt     Diabetes Paternal Uncle     No Known Problems Other     Migraines Sister     Stroke Sister     Dementia Brother         Social History     Socioeconomic History    Marital status:    Tobacco Use    Smoking status: Former     Types: Cigarettes     Quit date:      Years since quittin.0     Passive exposure: Past    Smokeless tobacco: Former    Tobacco comments:     Caffeine daily   Vaping Use    Vaping Use: Never used   Substance and Sexual Activity    Alcohol use: No    Drug use: No    Sexual activity: Defer        I have reviewed and confirmed the accuracy of the ROS as documented by the MA/LPN/RN Inderjit Adams MD   Review of Systems   Neurological:  Negative for dizziness, tremors, seizures, syncope, facial asymmetry, speech difficulty, weakness, light-headedness, numbness, headache, memory problem and confusion.   Psychiatric/Behavioral:  Negative for agitation, behavioral problems, decreased concentration, dysphoric mood, hallucinations, self-injury, sleep disturbance,  "suicidal ideas, negative for hyperactivity, depressed mood and stress. The patient is not nervous/anxious.         Objective   Vital Signs:   BP (!) 200/90 Comment: pt reports having a injection procedure before this visit  Pulse 76   Ht 182.9 cm (72.01\")   Wt 117 kg (258 lb)   SpO2 98%   BMI 34.98 kg/m²       PHYSICAL EXAM:    General   Mental Status - Alert. General Appearance - Well developed, Well groomed, Oriented and Cooperative.        Head and Neck  Head - normocephalic, atraumatic with no lesions or palpable masses.  Neck    Global Assessment - supple.       Eye   Sclera/Conjunctiva - Bilateral - Normal.    ENMT  Mouth and Throat   Oral Cavity/Oropharynx: Oropharynx - the soft palate,uvula and tongue are normal in appearance.    Chest and Lung Exam   Chest - lung clear to auscultation bilaterally.    Cardiovascular   Cardiovascular examination reveals  - normal heart sounds, regular rate and rhythm.    Neurologic   Mental Status: Speech - Normal. Cognitive function - SLUMS 15/30 with 1/5 object recall today and 23/30 with 3/5 object recall last visit compared to previous 24/30, 1/5 object recall, appropriate fund of knowledge. No impairment of attention, Impairment of concentration, impairment of long term memory but does have impairment of short term memory.  Cranial Nerves:   II Optic: Visual acuity - Left - Normal. Right - Normal. Visual fields - Normal (to confrontation).  III Oculomotor: Pupillary constriction - Left - Normal. Right - Normal.  VII Facial: - Normal Bilaterally.  VIII Acoustic - Bilateral - Hearing normal and (Hearing tested by finger rub).   IX Glossopharyngeal / X Vagus - Normal.  XI Accessory: Trapezius - Bilateral - Normal. Sternocleidomastoid - Bilateral - Normal.  XII Hypoglossal - Bilateral - Normal.  Eye Movements: - Normal Bilaterally.  Sensory:   Light Touch: Intact - Globally.  Motor:   Bulk and Contour: - Normal.  Tone: - Normal.  Tremor: Not present.  Strength: 5/5 " normal muscle strength - on the right side and 4/5 on the left side.            General Assessment of Reflexes: - deep tendon reflexes are normal. Coordination - No Impairment of finger-to-nose or Impairment of rapid alternating movements. Gait - left sided hemiparetic, uses cane to ambulate.    Result Review :                 Assessment and Plan    Problem List Items Addressed This Visit          Neuro    History of CVA (cerebrovascular accident) - Primary    Current Assessment & Plan     78 year old man who returns to neurology clinic for follow up evaluation and treatment of history of stroke and memory impairment/mild cognitive impairment.  Of note he was evaluated in the hospital for extended period of time starting in 8/2020 for Ecoli sepsis and discitis leading to left leg weakness and confusion for an extended period of time and since September 2020 they have noticed trouble with his short term memory.  He had a brain MRI scan on 9/22/2020 which demonstrates old lacunar strokes and a more acute stroke in the left corona radiata and atrophy.  They tell me that during this hospitalization he was placed on combination of Eliquis and aspirin which he continues to take.  He has not had any further stroke symptoms since being discharged from the hospital where he was evaluated by my colleague Dr. Ledbetter.  He continues to take the aspirin, Eliquis and rosuvastatin combination.  His BP is elevated today.  His BP is high due to getting shots in his back and having some pain.  I advised him to get his BP routinely checked and follow up with PCP as high blood pressure is #1 risk factor for stroke and also risk factor for intracerebral hemorrhage.           Memory difficulties    Current Assessment & Plan      He has had short term memory problems since this time and he does not remember picking things up and forgetting to set the alarm or put the garage door down.  He will forget that they have had conversations.  His  older brother had Alzheimer's dementia.  No other family members that they are aware of with dementia.  He is not having any problems with driving.  He has good long term memory.  His personality has changed somewhat and can have short temper and quick to get upset.  He uses a cane to ambulate.  He has had lab work done with normal TSH and Vit B12 and folate.  He does not drink alcohol and does not smoke.  I prescribed him donepezil which caused hallucinations so this medicine was discontinued and instead he is on memantine 5 mg BID. He is tolerating this medicine well without any side effects.  His wife is helping with their finances.  He has POA and Living Will.  No MVA accidents and no tickets and he denies any issues driving and he tries not to drive at night time.  I advised him to exercise and socialize as much as possible.  I will increase his dose of memantine to 10 mg BID today for further assistance with his memory.  PCP can take over prescribing this in the future and if patient would like he can return in a year.          Relevant Medications    memantine (NAMENDA) 10 MG tablet    Mild cognitive impairment with memory loss    Relevant Medications    memantine (NAMENDA) 10 MG tablet       Follow Up   Return if symptoms worsen or fail to improve.  Patient was given instructions and counseling regarding his condition or for health maintenance advice. Please see specific information pulled into the AVS if appropriate.

## 2024-01-04 NOTE — ASSESSMENT & PLAN NOTE
He has had short term memory problems since this time and he does not remember picking things up and forgetting to set the alarm or put the garage door down.  He will forget that they have had conversations.  His older brother had Alzheimer's dementia.  No other family members that they are aware of with dementia.  He is not having any problems with driving.  He has good long term memory.  His personality has changed somewhat and can have short temper and quick to get upset.  He uses a cane to ambulate.  He has had lab work done with normal TSH and Vit B12 and folate.  He does not drink alcohol and does not smoke.  I prescribed him donepezil which caused hallucinations so this medicine was discontinued and instead he is on memantine 5 mg BID. He is tolerating this medicine well without any side effects.  His wife is helping with their finances.  He has POA and Living Will.  No MVA accidents and no tickets and he denies any issues driving and he tries not to drive at night time.  I advised him to exercise and socialize as much as possible.  I will increase his dose of memantine to 10 mg BID today for further assistance with his memory.  PCP can take over prescribing this in the future and if patient would like he can return in a year.

## 2024-01-11 ENCOUNTER — OFFICE VISIT (OUTPATIENT)
Dept: FAMILY MEDICINE CLINIC | Facility: CLINIC | Age: 79
End: 2024-01-11
Payer: MEDICARE

## 2024-01-11 VITALS
SYSTOLIC BLOOD PRESSURE: 180 MMHG | BODY MASS INDEX: 34.87 KG/M2 | DIASTOLIC BLOOD PRESSURE: 84 MMHG | TEMPERATURE: 98.9 F | WEIGHT: 257.2 LBS | OXYGEN SATURATION: 96 % | HEART RATE: 63 BPM

## 2024-01-11 DIAGNOSIS — R35.0 URINARY FREQUENCY: Primary | ICD-10-CM

## 2024-01-11 DIAGNOSIS — R41.82 ALTERED MENTAL STATUS, UNSPECIFIED ALTERED MENTAL STATUS TYPE: ICD-10-CM

## 2024-01-11 DIAGNOSIS — F03.90 DEMENTIA, UNSPECIFIED DEMENTIA SEVERITY, UNSPECIFIED DEMENTIA TYPE, UNSPECIFIED WHETHER BEHAVIORAL, PSYCHOTIC, OR MOOD DISTURBANCE OR ANXIETY: ICD-10-CM

## 2024-01-11 LAB
BILIRUB BLD-MCNC: NEGATIVE MG/DL
CLARITY, POC: ABNORMAL
COLOR UR: YELLOW
EXPIRATION DATE: ABNORMAL
GLUCOSE UR STRIP-MCNC: NEGATIVE MG/DL
KETONES UR QL: NEGATIVE
LEUKOCYTE EST, POC: NEGATIVE
Lab: ABNORMAL
NITRITE UR-MCNC: NEGATIVE MG/ML
PH UR: 6 [PH] (ref 5–8)
PROT UR STRIP-MCNC: ABNORMAL MG/DL
RBC # UR STRIP: NEGATIVE /UL
SP GR UR: 1.02 (ref 1–1.03)
UROBILINOGEN UR QL: NORMAL

## 2024-01-11 NOTE — PROGRESS NOTES
"Chief Complaint  gait distubance, urine freq, forgetful (Face flushed); Fatigue; and Hypertension (sleepier)    Subjective        Erlin Blanco presents to [facility]  History of Present Illness  Patient and daughter-in-law here to discuss a few issues. 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 His bp was high, as he hadn't taken his meds, his bs was 235. Family states memory is getting worse. His urinary frequency is worse. He seems to be more irritable. Family is concerned with worsening mental status, but patient just saw neuro and he his namenda was increased, but no family member went to know what was discussed. He is not taking his meds regularly due to 'forgetting' frequently. Family is worried he has had a new stroke or other health concern which is exacerbating his urinary incontinence and memory. He is also sleeping more and falling asleep often sitting up.      Urology just placed him on gemtesa as well.     Patient is hard of hearing and doesn't like to wear them.     Patient sees a pain management specialist for his chronic back pain.     Objective   Vital Signs:  /84 (BP Location: Left arm, Patient Position: Sitting, Cuff Size: Adult)   Pulse 63   Temp 98.9 °F (37.2 °C) (Temporal)   Wt 117 kg (257 lb 3.2 oz)   SpO2 96%   BMI 34.87 kg/m²   Estimated body mass index is 34.87 kg/m² as calculated from the following:    Height as of 1/4/24: 182.9 cm (72.01\").    Weight as of this encounter: 117 kg (257 lb 3.2 oz).               Physical Exam  Constitutional:       Appearance: Normal appearance.   HENT:      Head: Normocephalic.   Cardiovascular:      Rate and Rhythm: Normal rate and regular rhythm.   Pulmonary:      Effort: Pulmonary effort is normal.      Breath sounds: Normal breath sounds.   Musculoskeletal:         General: Normal range of motion.   Skin:     General: Skin is warm and dry. "   Neurological:      General: No focal deficit present.      Mental Status: He is alert. He is disoriented.      Motor: Weakness present.      Coordination: Coordination abnormal.      Gait: Gait abnormal.   Psychiatric:         Mood and Affect: Mood normal.         Behavior: Behavior is cooperative.         Cognition and Memory: Memory is impaired.         Judgment: Judgment is inappropriate.        Result Review :                   Assessment and Plan   Diagnoses and all orders for this visit:    1. Urinary frequency (Primary)  -     POCT urinalysis dipstick, automated    2. Altered mental status, unspecified altered mental status type  -     CT Head Without Contrast; Future    3. Dementia, unspecified dementia severity, unspecified dementia type, unspecified whether behavioral, psychotic, or mood disturbance or anxiety  -     Ambulatory Referral to Case Management Medication Adherence, Caregiving/Support      Family is to monitor patient's medication adherence and encourage better hygiene practices due to urinary incontinence.     Case management is being referred to help with resources for self care, med adherence.        Follow Up   Return if symptoms worsen or fail to improve.  Patient was given instructions and counseling regarding his condition or for health maintenance advice. Please see specific information pulled into the AVS if appropriate.

## 2024-01-12 ENCOUNTER — PATIENT OUTREACH (OUTPATIENT)
Dept: CASE MANAGEMENT | Facility: OTHER | Age: 79
End: 2024-01-12
Payer: MEDICARE

## 2024-01-12 ENCOUNTER — HOSPITAL ENCOUNTER (OUTPATIENT)
Facility: HOSPITAL | Age: 79
Discharge: HOME | End: 2024-01-12
Admitting: NURSE PRACTITIONER
Payer: MEDICARE

## 2024-01-12 ENCOUNTER — TELEPHONE (OUTPATIENT)
Dept: FAMILY MEDICINE CLINIC | Facility: CLINIC | Age: 79
End: 2024-01-12
Payer: MEDICARE

## 2024-01-12 ENCOUNTER — REFERRAL TRIAGE (OUTPATIENT)
Dept: CASE MANAGEMENT | Facility: OTHER | Age: 79
End: 2024-01-12
Payer: MEDICARE

## 2024-01-12 DIAGNOSIS — E11.3293 TYPE 2 DIABETES MELLITUS WITH BOTH EYES AFFECTED BY MILD NONPROLIFERATIVE RETINOPATHY WITHOUT MACULAR EDEMA, WITHOUT LONG-TERM CURRENT USE OF INSULIN: Primary | ICD-10-CM

## 2024-01-12 DIAGNOSIS — I25.10 CORONARY ARTERY DISEASE INVOLVING NATIVE CORONARY ARTERY OF NATIVE HEART WITHOUT ANGINA PECTORIS: ICD-10-CM

## 2024-01-12 DIAGNOSIS — R41.82 ALTERED MENTAL STATUS, UNSPECIFIED ALTERED MENTAL STATUS TYPE: ICD-10-CM

## 2024-01-12 DIAGNOSIS — I10 PRIMARY HYPERTENSION: ICD-10-CM

## 2024-01-12 PROCEDURE — 70450 CT HEAD/BRAIN W/O DYE: CPT

## 2024-01-12 NOTE — TELEPHONE ENCOUNTER
[facility] FOR CAT SCAN RESULTS    **HUB/FO** MAY RELAY MESSAGE      ----- Message from MUKUL Jimenez sent at 1/12/2024  1:57 PM EST -----  Let patient and wife know that his CT head was negative for acute concerns that could explain his recent changes. Thanks.

## 2024-01-12 NOTE — OUTREACH NOTE
AMBULATORY CASE MANAGEMENT NOTE    Name and Relationship of Patient/Support Person: Erlin Blanco L - Self    CCM Interim Update    Called and spoke to patient for CCM services per provider referral. Introduced self and role. Discussed CCM services, goals and benefits. Reviewed possible cost and ability to stop the program at any time. Verbal consent obtained.     Patient referred to ACM for medication adherence, caregiving/support. He states that he usually doesn't have issues taking his medications but lately he's been forgetful. One event happened few nights ago wherein he came home and his routine got messed up and he forgot to take his medications. Discussed with patient availability of pill packs which might be beneficial and easier for him to remember. He refused this service at this time.     He lives with his wife and granddaughter, he states they help him manage and remember to take his medications. He's also being followed by Neurology to address memory issues and is on Namenda.    I discussed with patient his urinary frequency and offered assistance and education with incontinence. He states he's seeing Urology and will let them address it that issues. He states he's been good in drinking plenty of water.     Discussed his high BP readings, he states that he has Chronic back pain and that he does have BP cuff available at home for him to check. I encouraged patient to check BP daily every morning and try to keep log. He verbalizes understanding.     Reviewed with patient per Chart Review from Alicia GILMAN, today's CT result of his head was negative for acute concerns. Patient verbalizes understanding.     ACM will follow progress and assess needs.      Adult Patient Profile  Questions/Answers      Flowsheet Row Most Recent Value   Symptoms/Conditions Managed at Home cardiovascular, diabetes, type 2, neurological   Barriers to Managing Health stress of chronic illness, age, understanding health  advice   Cardiovascular Symptoms/Conditions hypertension   Cardiovascular Management Strategies medication therapy, routine screening   Cardiovascular Self-Management Outcome 3 (uncertain)   Cardiovascular Comment Patient states he has a chronic back pain and receives shots, and pain contributes to his high BP   Diabetes Management Strategies medication therapy, routine screenings   Diabetes Self-Management Outcome 3 (uncertain)   Neurological Symptoms/Conditions dementia   Neurological Management Strategies medication therapy, routine screening   Neurological Self-Management Outcome 3 (uncertain)   Neurological Comment Patient states he's starting to be forgetful at times.   Equipment Currently Used at Home bp cuff, glucometer  [cane]            Education Documentation  medication management, taught by Erlin Abreu, RN at 1/12/2024  3:27 PM.  Learner: Patient  Readiness: Acceptance  Method: Explanation  Response: Verbalizes Understanding    Blood Pressure Monitoring, taught by Erlin Abreu, RN at 1/12/2024  3:27 PM.  Learner: Patient  Readiness: Acceptance  Method: Explanation  Response: Verbalizes Understanding          Erlin EARL  Ambulatory Case Management    1/12/2024, 15:27 EST

## 2024-01-15 NOTE — TELEPHONE ENCOUNTER
Name: Erlin Blanco    Relationship: Self    Best Callback Number: [phone]     HUB PROVIDED THE RELAY MESSAGE FROM THE OFFICE   PATIENT VOICED UNDERSTANDING AND HAS NO FURTHER QUESTIONS AT THIS TIME

## 2024-01-16 DIAGNOSIS — E11.3293 TYPE 2 DIABETES MELLITUS WITH BOTH EYES AFFECTED BY MILD NONPROLIFERATIVE RETINOPATHY WITHOUT MACULAR EDEMA, WITHOUT LONG-TERM CURRENT USE OF INSULIN: ICD-10-CM

## 2024-01-16 RX ORDER — CARVEDILOL 6.25 MG/1
TABLET ORAL
Qty: 180 TABLET | Refills: 3 | Status: SHIPPED | OUTPATIENT
Start: 2024-01-16

## 2024-01-16 RX ORDER — METFORMIN HYDROCHLORIDE 500 MG/1
TABLET, EXTENDED RELEASE ORAL
Qty: 360 TABLET | Refills: 0 | Status: SHIPPED | OUTPATIENT
Start: 2024-01-16

## 2024-01-16 NOTE — TELEPHONE ENCOUNTER
Rx Refill Note  Requested Prescriptions     Pending Prescriptions Disp Refills    metFORMIN ER (GLUCOPHAGE-XR) 500 MG 24 hr tablet [Pharmacy Med Name: METFORMIN HCL ER TABS 500MG] 360 tablet 3     Sig: TAKE 2 TABLETS TWICE A DAY WITH MEALS      Last office visit with prescribing clinician: 10/27/2023   Last telemedicine visit with prescribing clinician: Visit date not found   Next office visit with prescribing clinician: Visit date not found                         Would you like a call back once the refill request has been completed: [] Yes [] No    If the office needs to give you a call back, can they leave a voicemail: [] Yes [] No    Jocelyne Sterling MA  01/16/24, 09:58 EST

## 2024-01-20 LAB
ALBUMIN SERPL-MCNC: 4.6 G/DL (ref 3.5–5.2)
ALBUMIN/GLOB SERPL: 2 G/DL
ALP SERPL-CCNC: 84 U/L (ref 39–117)
ALT SERPL-CCNC: 19 U/L (ref 1–41)
AST SERPL-CCNC: 25 U/L (ref 1–40)
BILIRUB SERPL-MCNC: 0.6 MG/DL (ref 0–1.2)
BUN SERPL-MCNC: 24 MG/DL (ref 8–23)
BUN/CREAT SERPL: 17.3 (ref 7–25)
CALCIUM SERPL-MCNC: 10.3 MG/DL (ref 8.6–10.5)
CHLORIDE SERPL-SCNC: 103 MMOL/L (ref 98–107)
CHOLEST SERPL-MCNC: 112 MG/DL (ref 0–200)
CO2 SERPL-SCNC: 30.1 MMOL/L (ref 22–29)
CREAT SERPL-MCNC: 1.39 MG/DL (ref 0.76–1.27)
EGFRCR SERPLBLD CKD-EPI 2021: 52.5 ML/MIN/1.73
GLOBULIN SER CALC-MCNC: 2.3 GM/DL
GLUCOSE SERPL-MCNC: 129 MG/DL (ref 65–99)
HBA1C MFR BLD: 7.7 % (ref 4.8–5.6)
HDLC SERPL-MCNC: 47 MG/DL (ref 40–60)
LDLC SERPL CALC-MCNC: 49 MG/DL (ref 0–100)
LDLC/HDLC SERPL: 1.03 {RATIO}
POTASSIUM SERPL-SCNC: 4.8 MMOL/L (ref 3.5–5.2)
PROT SERPL-MCNC: 6.9 G/DL (ref 6–8.5)
SODIUM SERPL-SCNC: 143 MMOL/L (ref 136–145)
TRIGL SERPL-MCNC: 82 MG/DL (ref 0–150)
VLDLC SERPL CALC-MCNC: 16 MG/DL (ref 5–40)

## 2024-01-22 ENCOUNTER — TELEPHONE (OUTPATIENT)
Dept: FAMILY MEDICINE CLINIC | Facility: CLINIC | Age: 79
End: 2024-01-22

## 2024-01-22 NOTE — TELEPHONE ENCOUNTER
The patient is trying to submit his Cologuard but they are saying that they need other medical records? He doesn't understand exactly what they are needing. Please advise [phone].

## 2024-01-23 NOTE — TELEPHONE ENCOUNTER
Patient doesn't know what the paper said as fas as more information waiting on wife to get home and will call us back

## 2024-01-25 DIAGNOSIS — E78.2 MIXED HYPERLIPIDEMIA: ICD-10-CM

## 2024-01-25 RX ORDER — ROSUVASTATIN CALCIUM 40 MG/1
TABLET, COATED ORAL
Qty: 90 TABLET | Refills: 3 | Status: SHIPPED | OUTPATIENT
Start: 2024-01-25

## 2024-01-26 ENCOUNTER — TELEPHONE (OUTPATIENT)
Dept: CASE MANAGEMENT | Facility: OTHER | Age: 79
End: 2024-01-26
Payer: MEDICARE

## 2024-01-26 ENCOUNTER — PATIENT OUTREACH (OUTPATIENT)
Dept: CASE MANAGEMENT | Facility: OTHER | Age: 79
End: 2024-01-26
Payer: MEDICARE

## 2024-01-26 DIAGNOSIS — I10 PRIMARY HYPERTENSION: ICD-10-CM

## 2024-01-26 DIAGNOSIS — I25.10 CORONARY ARTERY DISEASE INVOLVING NATIVE CORONARY ARTERY OF NATIVE HEART WITHOUT ANGINA PECTORIS: ICD-10-CM

## 2024-01-26 DIAGNOSIS — E11.3293 TYPE 2 DIABETES MELLITUS WITH BOTH EYES AFFECTED BY MILD NONPROLIFERATIVE RETINOPATHY WITHOUT MACULAR EDEMA, WITHOUT LONG-TERM CURRENT USE OF INSULIN: Primary | ICD-10-CM

## 2024-01-26 NOTE — OUTREACH NOTE
AMBULATORY CASE MANAGEMENT NOTE    Name and Relationship of Patient/Support Person: Jeff Blancomit L - Self    CCM Interim Update    Patient returned call and spoke to [facility]. Reviewed BP log as follows:    1/26 159/85   HR 88   1/25 175/81   HR 80   Unknown date 139/53   yesterday 130/69     His most recent blood sugars were running . He keeps logs of his BP and Blood sugar readings. He denies any symptoms when his BP gets elevated. He states he remembers to take his medications now and have all his medications available. Patient verbalizes his fatigue has improved. Provided patient my contact information should he need assistance as needed.    Reminded of future appointment with Zamzam GILMAN on 2/5 and bring log. CCM will follow.     Care Coordination    Sent PCP BP and blood sugar log.         Education Documentation  No documentation found.        Erlin EARL  Ambulatory Case Management    1/26/2024, 12:56 EST

## 2024-01-26 NOTE — TELEPHONE ENCOUNTER
Zamzam,    This is just a FYI, I followed up with patient today for CCM update. Here are his BP readings, he does his best to check it everyday and keep log. He's asymptomatic when it gets elevated.    1/26 159/85   HR 88   1/25 175/81   HR 80   Unknown date 139/53   yesterday 130/69     Patient reports, blood sugars recently running .       Thank you.  LAQUITA Slade  Ambulatory Case Management  [phone]

## 2024-01-30 ENCOUNTER — PATIENT OUTREACH (OUTPATIENT)
Dept: CASE MANAGEMENT | Facility: OTHER | Age: 79
End: 2024-01-30
Payer: MEDICARE

## 2024-01-30 DIAGNOSIS — E11.3293 TYPE 2 DIABETES MELLITUS WITH BOTH EYES AFFECTED BY MILD NONPROLIFERATIVE RETINOPATHY WITHOUT MACULAR EDEMA, WITHOUT LONG-TERM CURRENT USE OF INSULIN: Primary | ICD-10-CM

## 2024-01-30 DIAGNOSIS — I25.10 CORONARY ARTERY DISEASE INVOLVING NATIVE CORONARY ARTERY OF NATIVE HEART WITHOUT ANGINA PECTORIS: ICD-10-CM

## 2024-01-30 DIAGNOSIS — I10 PRIMARY HYPERTENSION: ICD-10-CM

## 2024-01-30 NOTE — OUTREACH NOTE
[facility] End of Month Documentation    This Chronic Medical Management Care Plan for Erlin Blanco, 78 y.o. male, has been a new plan of care implemented; established and a new plan of care implemented for the month of January.  A cumulative time of 41  minutes was spent on this patient record this month, including chart review; phone call with patient; electronic communication with primary care provider, Assessment, Medication adherence, Home management. Assessed needs and Reviewed chart. Reviewed providers. BP readings and Blood sugar readings. Care coordination with PCP..    Regarding the patient's problems: has Osteoarthritis, knee; Type 2 diabetes mellitus with both eyes affected by mild nonproliferative retinopathy without macular edema, without long-term current use of insulin; Primary hypertension; Coronary artery disease; Supraventricular tachycardia; TIA (transient ischemic attack); Hyperlipidemia; Benign prostatic hyperplasia without urinary obstruction; Class 2 severe obesity due to excess calories with serious comorbidity and body mass index (BMI) of 36.0 to 36.9 in adult; Polycythemia vera; Acute non-recurrent sinusitis; Pain in both knees; Allergic rhinitis; Left bundle-branch block; Fatigue; Atrial flutter; History of CVA (cerebrovascular accident); Cervical radiculitis; Chronic diastolic congestive heart failure; CKD (chronic kidney disease) stage 2, GFR 60-89 ml/min; Glucosuria; Paroxysmal atrial fibrillation; Memory difficulties; Bradycardia; Anemia; Low back pain; Back pain; Cardiovascular stress test abnormal; Prostatitis; Localized edema; Murmur; Pulmonary hypertension; Thrombocytosis; Arthritis; Proteinuria; Fall at home; Cognitive communication deficit; and Mild cognitive impairment with memory loss on their problem list., the following items were addressed: medical records; medications and any changes can be found within the plan section of the note.  A detailed listing of time spent for  "chronic care management is tracked within each outreach encounter.  Current medications include:  has a current medication list which includes the following prescription(s): accu-chek fastclix lancets, acetaminophen, eliquis, aspirin low dose, accu-chek guide me, carvedilol, fluticasone, glimepiride, glucosamine-chondroitin, glucose blood, guaifenesin, hydralazine, hydroxyurea, memantine, metformin er, montelukast, multiple vitamin, potassium chloride, probiotic daily, rosuvastatin, sitagliptin, tamsulosin, torsemide, valsartan, and gemtesa. and the patient is reported to be patient is noncompliant with medication protocol, Forgetful, Patient states he \"usually\" doesn't have issues taking his medications. Lately he does forget to take. I adviced that Pill packs are available help him maybe rememeber as they help manage and package medications for him. He refused this service for now, he states he has enough help assisting him to remember at home. He lives with wife and granddaughter.,  Medications are reported to be non-effective in controlling symptoms and changes have been made to the medication protocol.  Regarding these diagnoses, referrals were made to the following provider(s):  n/a.  All notes on chart for PCP to review.    The patient was monitored remotely for blood pressure; blood glucose; activity level; pain; medications.    The patient's physical needs include:  help taking medications as prescribed; physical healthcare.     The patient's mental support needs include:  increased support    The patient's cognitive support needs include:  medication; increased support; health care    The patient's psychosocial support needs include:  need for increased support    The patient's functional needs include: medication education; physical healthcare    The patient's environmental needs include:  not applicable, n/a    Care Plan overall comments:  No data recorded    Refer to previous outreach notes for more " information on the areas listed above.    Monthly Billing Diagnoses  (E11.3293) Type 2 diabetes mellitus with both eyes affected by mild nonproliferative retinopathy without macular edema, without long-term current use of insulin    (I10) Primary hypertension    (I25.10) Coronary artery disease involving native coronary artery of native heart without angina pectoris    Medications   Medications have been reconciled    Care Plan progress this month:      Recently Modified Care Plans Updates made since 12/30/2023 12:00 AM       Diabetes Type 2 (Adult)           Problem Priority Last Modified     Glycemic Management (Diabetes, Type 2) --  1/12/2024  3:20 PM by Erlin Abreu, RN              Goal Recent Progress Last Modified     Glycemic Management Optimized --  1/12/2024  3:20 PM by Erlin Abreu RN     Evidence-based guidance:   Anticipate A1C testing (point-of-care) every 3 to 6 months based on goal attainment.   Review mutually-set A1C goal or target range.   Anticipate use of antihyperglycemic with or without insulin and periodic adjustments; consider active involvement of pharmacist.   Provide medical nutrition therapy and development of individualized eating.   Compare self-reported symptoms of hypo or hyperglycemia to blood glucose levels, diet and fluid intake, current medications, psychosocial and physiologic stressors, change in activity and barriers to care adherence.   Promote self-monitoring of blood glucose levels.   Assess and address barriers to management plan, such as food insecurity, age, developmental ability, depression, anxiety, fear of hypoglycemia or weight gain, as well as medication cost, side effects and complicated regimen.   Consider referral to community-based diabetes education program, visiting nurse, community health worker or health .   Encourage regular dental care for treatment of periodontal disease; refer to dental provider when needed.    Notes:            Task Due  Date Last Modified     Alleviate Barriers to Glycemic Management --  1/26/2024 12:50 PM by Erlin Abreu RN     Care Management Activities:      - barriers to adherence to treatment plan identified  - blood glucose monitoring encouraged  - blood glucose readings reviewed  - use of blood glucose monitoring log promoted      Notes:                Problem Priority Last Modified     Disease Progression (Diabetes, Type 2) --  1/12/2024  3:20 PM by Erlin Abreu RN              Goal Recent Progress Last Modified     Disease Progression Prevented or Minimized --  1/12/2024  3:20 PM by Erlin Abreu RN     Evidence-based guidance:   Prepare patient for laboratory and diagnostic exams based on risk and presentation.   Encourage lifestyle changes, such as increased intake of plant-based foods, stress reduction, consistent physical activity and smoking cessation to prevent long-term complications and chronic disease.    Individualize activity and exercise recommendations while considering potential limitations, such as neuropathy, retinopathy or the ability to prevent hyperglycemia or hypoglycemia.    Prepare patient for use of pharmacologic therapy that may include antihypertensive, analgesic, prostaglandin E1 with periodic adjustments, based on presenting chronic condition and laboratory results.   Assess signs/symptoms and risk factors for hypertension, sleep-disordered breathing, neuropathy (including changes in gait and balance), retinopathy, nephropathy and sexual dysfunction.   Address pregnancy planning and contraceptive choice, especially when prescribing antihypertensive or statin.   Ensure completion of annual comprehensive foot exam and dilated eye exam.    Implement additional individualized goals and interventions based on identified risk factors.   Prepare patient for consultation or referral for specialist care, such as ophthalmology, neurology, cardiology, podiatry, nephrology or perinatology.   "  Notes:            Task Due Date Last Modified     Monitor and Manage Follow-up for Comorbidities --  1/26/2024 12:51 PM by Erlin Abreu RN     Care Management Activities:      - activity based on tolerance and functional limitations encouraged  - vital signs and trends reviewed      Notes:                     Hypertension (Adult)           Problem Priority Last Modified     Hypertension (Hypertension) --  1/12/2024  3:20 PM by Erlin Abreu RN              Goal Recent Progress Last Modified     Hypertension Monitored --  1/12/2024  3:20 PM by Erlin Abreu RN     Evidence-based guidance:   Promote initial use of ambulatory blood pressure measurements (for 3 days) to rule out \"white-coat\" effect; identify masked hypertension and presence or absence of nocturnal \"dipping\" of blood pressure.    Encourage continued use of home blood pressure monitoring and recording in blood pressure log; include symptoms of hypotension or potential medication side effects in log.   Review blood pressure measurements taken inside and outside of the provider office; establish baseline and monitor trends; compare to target ranges or patient goal.   Share overall cardiovascular risk with patient; encourage changes to lifestyle risk factors, including alcohol consumption, smoking, inadequate exercise, poor dietary habits and stress.    Notes:            Task Due Date Last Modified     Identify and Monitor Blood Pressure Elevation --  1/26/2024 12:52 PM by Erlin Abreu RN     Care Management Activities:      - blood pressure trends reviewed  - home or ambulatory blood pressure monitoring encouraged      Notes:                Problem Priority Last Modified     Disease Progression (Hypertension) --  1/12/2024  3:20 PM by Erlin Abreu RN              Goal Recent Progress Last Modified     Disease Progression Prevented or Minimized --  1/12/2024  3:20 PM by Erlin Abreu RN     Evidence-based guidance:   Tailor " lifestyle advice to individual; review progress regularly; give frequent encouragement and respond positively to incremental successes.   Assess for and promote awareness of worsening disease or development of comorbidity.   Prepare patient for laboratory and diagnostic exams based on risk and presentation.   Prepare patient for use of pharmacologic therapy that may include diuretic, beta-blocker, beta-blocker/thiazide combination, angiotensin-converting enzyme inhibitor, renin-angiotensin blocker or calcium-channel blocker.   Expect periodic adjustments to pharmacologic therapy; manage side effects.   Promote a healthy diet that includes primarily plant-based foods, such as fruits, vegetables, whole grains, beans and legumes, low-fat dairy and lean meats.    Consider moderate reduction in sodium intake by avoiding the addition of salt to prepared foods and limiting processed meats, canned soup, frozen meals and salty snacks.    Promote a regular, daily exercise goal of 150 minutes per week of moderate exercise based on tolerance, ability and patient choice; consider referral to physical therapist, community wellness and/or activity program.   Encourage the avoidance of no more than 2 hours per day of sedentary activity, such as recreational screen time.   Review sources of stress; explore current coping strategies and encourage use of mindfulness, yoga, meditation or exercise to manage stress.    Notes:            Task Due Date Last Modified     Alleviate Barriers to Hypertension Treatment --  1/26/2024 12:53 PM by Erlin Abreu RN     Care Management Activities:      - patient response to treatment assessed  - not discussed during this outreach      Notes:                Problem Priority Last Modified     Resistant Hypertension (Hypertension) --  1/12/2024  3:20 PM by Erlin Abreu RN              Goal Recent Progress Last Modified     Response to Treatment Maximized --  1/12/2024  3:20 PM by Brady  Erlin RN     Evidence-based guidance:   Assess patient response to treatment, including presence or absence of medication side effects, degree of blood pressure control and patient satisfaction.   Assess technique (including cuff size and placement), measurement times, condition and calibration of blood pressure cuff set (both at-home and in-office equipment).   Assess factors that may influence response to treatment, including nonadherence to pharmacologic treatment plan, diet or activity changes and/or presence of pain, stress or sleep disturbance.   Screen for signs and symptoms of depression; if present, refer for or complete a comprehensive assessment.   Evaluate social and economic barriers that may affect adherence to treatment plan   Address pharmacologic nonadherence by simplifying dosing regimen, counseling or support by pharmacist, financial assistance, self-monitoring of blood pressure, use of motivational interviewing, voice or text messages.   Encourage behavioral adherence strategies, like habit-based interventions that link medication taking with existing daily routines.   Assess barriers to regular, daily physical activity; support family or support person-oriented activity changes and utilization of community activity or sports program.   Address barriers to dietary changes, especially sodium restriction, with referrals to community programs, like cooking classes, meal services or intensive education when available.   Refer to community-based peer support program or nurse home-visiting program.   Assess for chronic pain; when present add additional goals (Chronic Pain Care Plan Guide) as needed.   Provide frequent follow-up by telephone, telemonitoring, patient-practice portal or with home visit.   Review alcohol use screen; address using brief intervention beginning with risk that interferes with blood pressure control; refer for treatment when excessive alcohol use is noted.   Screen for  obstructive sleep apnea; prepare patient for polysomnography based on risk and presentation and use of noninvasive ventilation to relieve obstructive sleep apnea when present.    Notes:            Task Due Date Last Modified     Facilitate Adherence to Lifestyle Change --  1/26/2024 12:54 PM by Erlin Abreu RN     Care Management Activities:      - barriers to lifestyle change identified  - follow-up frequency increased  - medication adherence assessment completed  - pain assessed and managed  - success praised  - support and encouragement provided      Notes:                          Current Specialty Plan of Care Status signed by both patient and provider    Instructions   Patient was provided an electronic copy of care plan  CCM services were explained and offered and patient has accepted these services.  Patient has given their written consent to receive CCM services and understands that this includes the authorization of electronic communication of medical information with the other treating providers.  Patient understands that they may stop CCM services at any time and these changes will be effective at the end of the calendar month and will effectively revocate the agreement of CCM services.  Patient understands that only one practitioner can furnish and be paid for CCM services during one calendar month.  Patient also understands that there may be co-payment or deductible fees in association with CCM services.  Patient will continue with at least monthly follow-up calls with the Ambulatory .    Erlin EARL  Ambulatory Case Management    1/30/2024, 12:15 EST

## 2024-02-04 NOTE — PROGRESS NOTES
Subjective   Erlin Blanco is a 78 y.o. male.     Chief Complaint   Patient presents with    Diabetes     Follow up    Hypertension     Follow up     Answers submitted by the patient for this visit:  Primary Reason for Visit (Submitted on 2/5/2024)  What is the primary reason for your visit?: Other  Other (Submitted on 2/5/2024)  Please describe your symptoms.: Follow up  Have you had these symptoms before?: Yes  How long have you been having these symptoms?: Greater than 2 weeks    History of Present Illness   Patient presents for follow up DM2: takes Glimepiride and Metformin twice daily and Januvia daily; last A1c 7.7%; had not been watching diet prior to last labs; has been watching carbs/sugars in diet better since last labs; has been monitoring fasting blood sugar and has been running 100-120s, a couple of blood sugar readings 77; gets protein in diet; has been watching carbs, has been eating 2 meals daily and a snack; no numbness or tingling; last eye exam 7/2023.    Daughter-in-law has helped organize medications each week to make sure taking consistently and has seemed to help both blood sugars and BP readings.     F/U HTN: takes Valsartan daily, Carvedilol and Torsemide twice daily, and Hydralazine TID; also takes KCL TID; has not been taking Doxazosin nightly; monitors BP, typically runs 120-140s/70-80s; swelling has improved since off Amlodipine; no headaches; no orthostasis; sees Dr. Arias cardiology and has upcoming follow up in March; had follow up with renal 12/2023; has been taking Hydralazine in midday recently, previously had not been taking TID until recently.     F/U Hyperlipidemia: takes Rosuvastatin daily; no myalgias.     F/U atrial fib: takes Eliquis twice daily and aspirin daily; has had some blood in urine recently; no blood every time urinates, just randomly; urology has attributed to Eliquis in past; has follow up with urology next week; takes Flomax daily; started on Gemtessa and has  helped urinary urge incontinence.     F/U allergic rhinitis: takes Montelukast daily and Flonase daily as needed and works well.     F/U memory concerns: takes Namenda twice daily; sees neurology; had follow up with January and increased Namenda to twice daily.     F/U polycythemia vera: sees hematology with [facility]; takes Hydroxyurea daily; no recent phlebotomy.     F/U bilateral lower back pain: has discomfort in bilateral lower back; has been getting injections per pain management and have helped some; numbness in bilateral knees has improved with injections; has unsteady gait and walks with cane; no recent falls; walks stairs multiple times daily and does well.    Has been sleeping a lot during the day; will stay up late and then gets up several times per night to urinate, then will get up around 0500 in morning and then fall asleep in chair; daughter-in-law recently found has been taking Torsemide at night and will adjust timing; working on getting a medical bed for better sleep; has been sleeping in recliner; lost cousin recently and may be affecting sleep; has had typical grief with loss of cousin; used to to a lot of things together; no SI/HI; tries to do chores around the house, does dishes each night; frustrated about not being able to drive; has started having groceries delivered.    Needs repeat BMP today.    Daughter-in-law  was present during the history-taking and subsequent discussion  with this patient.  Patient agrees to the presence of the individual during this visit.         The following portions of the patient's history were reviewed and updated as appropriate: allergies, current medications, past family history, past medical history, past social history, past surgical history and problem list.    Current Outpatient Medications on File Prior to Visit   Medication Sig    Accu-Chek FastClix Lancets misc TEST BLOOD SUGAR 1-2 TIMES DAILY AND AS NEEDED    acetaminophen (TYLENOL) 500 MG tablet Take 2  tablets by mouth Every 8 (Eight) Hours As Needed for Moderate Pain.    apixaban (Eliquis) 5 MG tablet tablet TAKE 1 TABLET EVERY 12 HOURS    Aspirin Low Dose 81 MG EC tablet TAKE 1 TABLET DAILY    Blood Glucose Monitoring Suppl (Accu-Chek Guide Me) w/Device kit TEST BLOOD SUGAR 1-2 TIMES DAILY AND AS NEEDED    carvedilol (COREG) 6.25 MG tablet TAKE 1 TABLET TWICE A DAY WITH MEALS    fluticasone (FLONASE) 50 MCG/ACT nasal spray 1 spray into the nostril(s) as directed by provider Daily.    glimepiride (AMARYL) 2 MG tablet TAKE 1 TABLET BY MOUTH TWO TIMES A DAY WITH MEALS    glucosamine-chondroitin 500-400 MG capsule capsule Take 1 capsule by mouth Daily.    glucose blood (Accu-Chek Guide) test strip Use as instructed to test blood sugar twice daily.    hydrALAZINE (APRESOLINE) 100 MG tablet TAKE 1 TABLET THREE TIMES A DAY    hydroxyurea (HYDREA) 500 MG capsule Take 1 capsule by mouth Daily.    memantine (NAMENDA) 10 MG tablet Take 1 tablet by mouth 2 (Two) Times a Day.    metFORMIN ER (GLUCOPHAGE-XR) 500 MG 24 hr tablet TAKE 2 TABLETS TWICE A DAY WITH MEALS    montelukast (SINGULAIR) 10 MG tablet Take 1 tablet by mouth Daily.    Multiple Vitamins-Minerals (MULTIVITAMIN ADULT PO) Take 1 tablet by mouth Daily.    potassium chloride (KLOR-CON) 20 MEQ CR tablet Take 1 tablet by mouth 3 (Three) Times a Day.    Probiotic Product (Probiotic Daily) capsule Take 1 capsule by mouth Daily.    rosuvastatin (CRESTOR) 40 MG tablet TAKE 1 TABLET DAILY    SITagliptin (Januvia) 100 MG tablet Take 1 tablet by mouth Daily.    tamsulosin (FLOMAX) 0.4 MG capsule 24 hr capsule Take 1 capsule by mouth every night at bedtime.    torsemide (DEMADEX) 20 MG tablet Take 1 tablet by mouth 2 (Two) Times a Day.    valsartan (DIOVAN) 320 MG tablet Take 1 tablet by mouth Daily.    Vibegron (Gemtesa) 75 MG tablet Take 1 tablet by mouth Daily.    guaiFENesin (MUCINEX) 600 MG 12 hr tablet Take 2 tablets by mouth 2 (Two) Times a Day. (Patient not taking:  Reported on 2/5/2024)     No current facility-administered medications on file prior to visit.        Past Medical History:   Diagnosis Date    Abnormal ECG     Abnormal stress test     Abrasion of face 01/29/2020    Acute bronchitis     Acute hypokalemia 09/16/2020    Acute non-recurrent sinusitis 01/13/2020    Acute pyelonephritis 10/09/2020    Acute sinusitis     DORI (acute kidney injury) 04/28/2017    Allergic rhinitis     Aortic root dilation     Arthritis     Bacteremia due to Escherichia coli 08/17/2020    Balanitis 05/16/2021    Benign enlargement of prostate     C. difficile colitis 09/16/2020    Cellulitis of knee, left 05/04/2017    CHF (congestive heart failure)     Chronic diastolic congestive heart failure     Constipation 11/14/2020    Contusion of face 01/29/2020    Coronary artery disease     COVID-19 virus infection     CVA (cerebral vascular accident) 2020    Diminished pulse     in lower extremities    Discitis of lumbar region 10/09/2020    Edema     Elevated hematocrit     Elevated hemoglobin     Essential hypertension     Hearing loss     mala hearing aids    Heart valve disease     High risk medication use     History of back pain     History of dysuria     History of echocardiogram     History of intracranial hemorrhage     History of scoliosis     History of stroke     Hyperlipidemia     Hypokalemia 01/07/2021    Injury of toe, left, initial encounter     Irregular heart rate     Knee pain, left     Left bundle branch block     Leg wound, left     Leukocytosis 09/16/2020    Low back pain     Maxillary sinusitis 07/16/2021    Medicare annual wellness visit, subsequent     Minor head injury without loss of consciousness 01/29/2020    Murmur     Muscle cramps     Need for hepatitis C screening test     Paraphimosis 05/16/2021    Pneumonia of left lung due to infectious organism 09/17/2021    Polycythemia     Polycythemia vera     Pressure injury of buttock, stage 2 09/18/2020    Prostate  hyperplasia without urinary obstruction     Prostatitis     Proteinuria     Pulmonary hypertension     Sacroiliitis 10/09/2020    Scoliosis     Sepsis due to Escherichia coli 2020    Sepsis with metabolic encephalopathy 2020    Stroke     SVT (supraventricular tachycardia)     Tachycardia     Thrombocytosis     TIA (transient ischemic attack)         Type 2 diabetes mellitus     Type 2 diabetes mellitus with hyperglycemia 2021    Urinary tract infection due to extended-spectrum beta lactamase (ESBL) producing Escherichia coli 2020    Valvular heart disease        Past Surgical History:   Procedure Laterality Date    CARDIAC CATHETERIZATION      CATARACT EXTRACTION Left 2021    CATARACT EXTRACTION Right 2022    COLONOSCOPY      did Cologuard approx 2019 and negative    HAND SURGERY      JOINT REPLACEMENT Right     NM ARTHRP KNE CONDYLE&PLATU MEDIAL&LAT COMPARTMENTS Left 2017    Procedure: LT TOTAL KNEE ARTHROPLASTY;  Surgeon: Bertin Perrin MD;  Location: [facility] OR;  Service: Orthopedics    PROSTATE SURGERY      Rezum steam treatment to shrink prostate by dr. guerrero       Family History   Problem Relation Age of Onset    Hypertension Mother     Other Father         ruptured appendix,  when pt. was 14 years old.    Diabetes Paternal Aunt     Diabetes Paternal Uncle     No Known Problems Other     Migraines Sister     Stroke Sister     Dementia Brother        Social History     Socioeconomic History    Marital status:    Tobacco Use    Smoking status: Former     Types: Cigarettes     Quit date: 1973     Years since quittin.1     Passive exposure: Past    Smokeless tobacco: Former    Tobacco comments:     Caffeine daily   Vaping Use    Vaping Use: Never used   Substance and Sexual Activity    Alcohol use: No    Drug use: No    Sexual activity: Defer       Review of Systems   Constitutional:  Positive for fatigue. Negative for appetite  "change, chills, fever, unexpected weight gain and unexpected weight loss.   HENT:  Negative for ear pain, sinus pressure, sore throat and trouble swallowing.    Eyes:  Negative for blurred vision.   Respiratory:  Negative for cough, chest tightness and shortness of breath.    Cardiovascular:  Negative for chest pain and palpitations.   Gastrointestinal:  Negative for abdominal pain, blood in stool, constipation (some since has been out of probiotic recently, plans to resume), diarrhea, GERD and indigestion.   Endocrine: Negative for polydipsia.   Genitourinary:  Positive for frequency. Negative for dysuria.   Musculoskeletal:  Arthralgias: has trouble with left knee and walks with cane. Back pain: see HPI.   Skin:  Negative for rash.   Neurological:  Negative for syncope and weakness (some in general; left side weaker than right since stroke in past).       Objective   Vitals:    02/05/24 1450   BP: 118/66   BP Location: Left arm   Patient Position: Sitting   Cuff Size: Large Adult   Pulse: 94   Temp: 98.7 °F (37.1 °C)   TempSrc: Temporal   SpO2: 98%   Weight: 113 kg (249 lb)   Height: 182.9 cm (72.01\")     Body mass index is 33.76 kg/m².    Physical Exam  Vitals and nursing note reviewed.   Constitutional:       General: He is not in acute distress.     Appearance: He is well-developed and well-groomed. He is not diaphoretic.   HENT:      Head: Normocephalic.      Right Ear: External ear normal.      Left Ear: External ear normal.   Eyes:      Conjunctiva/sclera: Conjunctivae normal.   Neck:      Vascular: No carotid bruit.   Cardiovascular:      Rate and Rhythm: Normal rate and regular rhythm.      Pulses: Normal pulses.      Heart sounds: Murmur heard.      No systolic murmur is present with a grade of 2/6.   Pulmonary:      Effort: Pulmonary effort is normal. No respiratory distress.      Breath sounds: Normal breath sounds.   Abdominal:      General: Bowel sounds are normal.      Palpations: Abdomen is soft. " There is no hepatomegaly or splenomegaly.      Tenderness: There is no abdominal tenderness. There is no right CVA tenderness, left CVA tenderness or guarding.   Musculoskeletal:      Cervical back: Normal range of motion and neck supple. No bony tenderness.      Thoracic back: No bony tenderness.      Lumbar back: No bony tenderness.      Right lower leg: No edema.      Left lower leg: Left lower leg edema: trace pretibial and ankle.   Skin:     General: Skin is warm and dry.      Findings: No rash.   Neurological:      Mental Status: He is alert and oriented to person, place, and time.      Gait: Abnormal gait: guarded gait, limping on left using cane.      Deep Tendon Reflexes:      Reflex Scores:       Patellar reflexes are 2+ on the right side and 2+ on the left side.  Psychiatric:         Mood and Affect: Mood normal.         Behavior: Behavior normal.         Thought Content: Thought content normal.         Cognition and Memory: Cognition normal.         Judgment: Judgment normal.         Lab Results   Component Value Date    WBC 8.39 02/13/2023    RBC 4.48 02/13/2023    HGB 15.1 02/13/2023    HCT 43.3 02/13/2023    MCV 96.7 02/13/2023    MCH 33.7 (H) 02/13/2023    MCHC 34.9 02/13/2023    RDW 13.5 02/13/2023    RDWSD 46.4 03/25/2022    MPV 10.6 03/25/2022     02/13/2023    NEUTRORELPCT 66.1 02/13/2023    LYMPHORELPCT 16.8 (L) 02/13/2023    MONORELPCT 5.2 02/13/2023    EOSRELPCT 9.4 (H) 02/13/2023    BASORELPCT 1.9 (H) 02/13/2023    AUTOIGPER 0.4 03/25/2022    NEUTROABS 5.2 12/05/2023    LYMPHSABS 1.41 02/13/2023    MONOSABS 0.44 02/13/2023    EOSABS 0.7 12/05/2023    BASOSABS 0.1 12/05/2023    AUTOIGNUM 0.03 03/25/2022    NRBC 0.2 02/13/2023     Lab Results   Component Value Date    GLUCOSE 129 (H) 01/19/2024    BUN 24 (H) 01/19/2024    CREATININE 1.39 (H) 01/19/2024    EGFRIFNONA 57 (L) 01/10/2022    EGFRIFAFRI 66 01/10/2022    BCR 17.3 01/19/2024    K 4.8 01/19/2024    CO2 30.1 (H) 01/19/2024     CALCIUM 10.3 01/19/2024    PROTENTOTREF 6.9 01/19/2024    ALBUMIN 4.6 01/19/2024    LABIL2 2.0 01/19/2024    AST 25 01/19/2024    ALT 19 01/19/2024      Lab Results   Component Value Date    CHLPL 112 01/19/2024    TRIG 82 01/19/2024    HDL 47 01/19/2024    VLDL 16 01/19/2024    LDL 49 01/19/2024     Lab Results   Component Value Date    TSH 2.930 02/13/2023     Lab Results   Component Value Date    HGBA1C 7.70 (H) 01/19/2024     Lab Results   Component Value Date    RBCUA 0-2 09/16/2021    BACTERIA None Seen 09/16/2021    LABPH 7.5 06/02/2015    COLORU Yellow 02/05/2024    CLARITYU Clear 02/05/2024    LEUKOCYTESUR Trace (A) 02/05/2024    GLUCOSEU Negative 09/16/2021    BLOODU Negative 09/16/2021    BILIRUBINUR 1 mg/dL (A) 02/05/2024    NITRITEU Negative 09/16/2021          Assessment    Problem List Items Addressed This Visit       Type 2 diabetes mellitus with both eyes affected by mild nonproliferative retinopathy without macular edema, without long-term current use of insulin - Primary    Current Assessment & Plan     Diabetes is  improving with diet changes .   Continue current treatment regimen.  Reminded to get yearly retinal exam.  Diabetes will be reassessed in 3 months.  Continue Glimepiride and Metformin twice daily and Januvia daily.         Relevant Orders    Vitamin B12 & Folate    Basic Metabolic Panel    Primary hypertension    Current Assessment & Plan     Hypertension is  stable .  Continue current medications.  Ambulatory blood pressure monitoring.  Blood pressure will be reassessed in 3 months.  Continue Valsartan daily and Carvedilol and Torsemide twice daily.  Continue Hydralazine TID.         Relevant Orders    Basic Metabolic Panel    Hyperlipidemia    Current Assessment & Plan     Continue Rosuvastatin daily.  Continue to work on healthy diet.         Benign prostatic hyperplasia without urinary obstruction    Current Assessment & Plan     Continue Gemtessa and Flomax as per urology.          Polycythemia vera    Current Assessment & Plan     Continue Hydroxyurea daily.  Follow up as scheduled with hematology.         Allergic rhinitis    Current Assessment & Plan     Stable.  Continue Montelukast daily and Flonase daily as needed.         History of CVA (cerebrovascular accident)    Current Assessment & Plan     Stable.  Continue Rosuvastatin and aspirin daily.         Chronic diastolic congestive heart failure    Overview     Description: RHC (02/03/2017) demonstrating PA (29/21/28) and PCWP 20.; Echo (03/26/2018) demonstrating impaired relaxation with elevated filling pressure.         Current Assessment & Plan     Continue Torsemide twice daily.  Follow up as scheduled with cardiology.         Paroxysmal atrial fibrillation    Current Assessment & Plan     Continue Eliquis twice daily.  Follow up as scheduled with cardiology.         Localized edema    Current Assessment & Plan     Improved since stopped Amlodipine.  Continue Torsemide twice daily.         Mild cognitive impairment with memory loss    Current Assessment & Plan     Stable.  Continue Namenda twice daily.         Hematuria    Current Assessment & Plan     Follow up as scheduled with urology.         Relevant Orders    POC Urinalysis Dipstick, Automated (Completed)    CBC & Differential    Urine Culture - Urine, Urine, Clean Catch        Return in about 3 months (around 5/5/2024) for Medicare Wellness, Recheck.  Family recently realized pt had been taking Torsemide at bedtime; will see if taking in late afternoon instead helps urinary frequency at night.       I spent 45 minutes caring for Erlin on this date of service. This time includes time spent by me in the following activities:reviewing tests, obtaining and/or reviewing a separately obtained history, performing a medically appropriate examination and/or evaluation , counseling and educating the patient/family/caregiver, ordering medications, tests, or procedures, and documenting  information in the medical record

## 2024-02-05 ENCOUNTER — OFFICE VISIT (OUTPATIENT)
Dept: FAMILY MEDICINE CLINIC | Facility: CLINIC | Age: 79
End: 2024-02-05
Payer: MEDICARE

## 2024-02-05 VITALS
DIASTOLIC BLOOD PRESSURE: 66 MMHG | HEART RATE: 94 BPM | TEMPERATURE: 98.7 F | WEIGHT: 249 LBS | OXYGEN SATURATION: 98 % | SYSTOLIC BLOOD PRESSURE: 118 MMHG | BODY MASS INDEX: 33.72 KG/M2 | HEIGHT: 72 IN

## 2024-02-05 DIAGNOSIS — J30.9 ALLERGIC RHINITIS, UNSPECIFIED SEASONALITY, UNSPECIFIED TRIGGER: ICD-10-CM

## 2024-02-05 DIAGNOSIS — E11.3293 TYPE 2 DIABETES MELLITUS WITH BOTH EYES AFFECTED BY MILD NONPROLIFERATIVE RETINOPATHY WITHOUT MACULAR EDEMA, WITHOUT LONG-TERM CURRENT USE OF INSULIN: Primary | ICD-10-CM

## 2024-02-05 DIAGNOSIS — R31.9 HEMATURIA, UNSPECIFIED TYPE: ICD-10-CM

## 2024-02-05 DIAGNOSIS — Z86.73 HISTORY OF CVA (CEREBROVASCULAR ACCIDENT): ICD-10-CM

## 2024-02-05 DIAGNOSIS — R60.0 LOCALIZED EDEMA: ICD-10-CM

## 2024-02-05 DIAGNOSIS — G31.84 MILD COGNITIVE IMPAIRMENT WITH MEMORY LOSS: ICD-10-CM

## 2024-02-05 DIAGNOSIS — I48.0 PAROXYSMAL ATRIAL FIBRILLATION: ICD-10-CM

## 2024-02-05 DIAGNOSIS — N40.0 BENIGN PROSTATIC HYPERPLASIA WITHOUT URINARY OBSTRUCTION: ICD-10-CM

## 2024-02-05 DIAGNOSIS — I10 PRIMARY HYPERTENSION: ICD-10-CM

## 2024-02-05 DIAGNOSIS — E78.2 MIXED HYPERLIPIDEMIA: ICD-10-CM

## 2024-02-05 DIAGNOSIS — D45 POLYCYTHEMIA VERA: ICD-10-CM

## 2024-02-05 DIAGNOSIS — I50.32 CHRONIC DIASTOLIC CONGESTIVE HEART FAILURE: ICD-10-CM

## 2024-02-05 LAB
BILIRUB BLD-MCNC: ABNORMAL MG/DL
CLARITY, POC: CLEAR
COLOR UR: YELLOW
EXPIRATION DATE: ABNORMAL
GLUCOSE UR STRIP-MCNC: NEGATIVE MG/DL
KETONES UR QL: NEGATIVE
LEUKOCYTE EST, POC: ABNORMAL
Lab: ABNORMAL
NITRITE UR-MCNC: NEGATIVE MG/ML
PH UR: 6 [PH] (ref 5–8)
PROT UR STRIP-MCNC: ABNORMAL MG/DL
RBC # UR STRIP: NEGATIVE /UL
SP GR UR: 1.01 (ref 1–1.03)
UROBILINOGEN UR QL: NORMAL

## 2024-02-05 PROCEDURE — 3078F DIAST BP <80 MM HG: CPT | Performed by: NURSE PRACTITIONER

## 2024-02-05 PROCEDURE — 1160F RVW MEDS BY RX/DR IN RCRD: CPT | Performed by: NURSE PRACTITIONER

## 2024-02-05 PROCEDURE — 1159F MED LIST DOCD IN RCRD: CPT | Performed by: NURSE PRACTITIONER

## 2024-02-05 PROCEDURE — 81003 URINALYSIS AUTO W/O SCOPE: CPT | Performed by: NURSE PRACTITIONER

## 2024-02-05 PROCEDURE — G2211 COMPLEX E/M VISIT ADD ON: HCPCS | Performed by: NURSE PRACTITIONER

## 2024-02-05 PROCEDURE — 3074F SYST BP LT 130 MM HG: CPT | Performed by: NURSE PRACTITIONER

## 2024-02-05 PROCEDURE — 99215 OFFICE O/P EST HI 40 MIN: CPT | Performed by: NURSE PRACTITIONER

## 2024-02-06 LAB
BASOPHILS # BLD AUTO: 0.1 X10E3/UL (ref 0–0.2)
BASOPHILS NFR BLD AUTO: 1 %
BUN SERPL-MCNC: 30 MG/DL (ref 8–27)
BUN/CREAT SERPL: 20 (ref 10–24)
CALCIUM SERPL-MCNC: 10.4 MG/DL (ref 8.6–10.2)
CHLORIDE SERPL-SCNC: 102 MMOL/L (ref 96–106)
CO2 SERPL-SCNC: 25 MMOL/L (ref 20–29)
CREAT SERPL-MCNC: 1.47 MG/DL (ref 0.76–1.27)
EGFRCR SERPLBLD CKD-EPI 2021: 49 ML/MIN/1.73
EOSINOPHIL # BLD AUTO: 0.1 X10E3/UL (ref 0–0.4)
EOSINOPHIL NFR BLD AUTO: 1 %
ERYTHROCYTE [DISTWIDTH] IN BLOOD BY AUTOMATED COUNT: 15.7 % (ref 11.6–15.4)
FOLATE SERPL-MCNC: 19.2 NG/ML
GLUCOSE SERPL-MCNC: 185 MG/DL (ref 70–99)
HCT VFR BLD AUTO: 49.8 % (ref 37.5–51)
HGB BLD-MCNC: 16.6 G/DL (ref 13–17.7)
IMM GRANULOCYTES # BLD AUTO: 0.1 X10E3/UL (ref 0–0.1)
IMM GRANULOCYTES NFR BLD AUTO: 1 %
LYMPHOCYTES # BLD AUTO: 1.2 X10E3/UL (ref 0.7–3.1)
LYMPHOCYTES NFR BLD AUTO: 8 %
MCH RBC QN AUTO: 29 PG (ref 26.6–33)
MCHC RBC AUTO-ENTMCNC: 33.3 G/DL (ref 31.5–35.7)
MCV RBC AUTO: 87 FL (ref 79–97)
MONOCYTES # BLD AUTO: 1.2 X10E3/UL (ref 0.1–0.9)
MONOCYTES NFR BLD AUTO: 8 %
NEUTROPHILS # BLD AUTO: 12.3 X10E3/UL (ref 1.4–7)
NEUTROPHILS NFR BLD AUTO: 81 %
PLATELET # BLD AUTO: 239 X10E3/UL (ref 150–450)
POTASSIUM SERPL-SCNC: 4.8 MMOL/L (ref 3.5–5.2)
RBC # BLD AUTO: 5.72 X10E6/UL (ref 4.14–5.8)
SODIUM SERPL-SCNC: 142 MMOL/L (ref 134–144)
VIT B12 SERPL-MCNC: 420 PG/ML (ref 232–1245)
WBC # BLD AUTO: 14.9 X10E3/UL (ref 3.4–10.8)

## 2024-02-06 NOTE — ASSESSMENT & PLAN NOTE
Diabetes is  improving with diet changes .   Continue current treatment regimen.  Reminded to get yearly retinal exam.  Diabetes will be reassessed in 3 months.  Continue Glimepiride and Metformin twice daily and Januvia daily.

## 2024-02-06 NOTE — ASSESSMENT & PLAN NOTE
Hypertension is  stable .  Continue current medications.  Ambulatory blood pressure monitoring.  Blood pressure will be reassessed in 3 months.  Continue Valsartan daily and Carvedilol and Torsemide twice daily.  Continue Hydralazine TID.

## 2024-02-08 ENCOUNTER — PATIENT OUTREACH (OUTPATIENT)
Dept: CASE MANAGEMENT | Facility: OTHER | Age: 79
End: 2024-02-08
Payer: MEDICARE

## 2024-02-08 DIAGNOSIS — I10 PRIMARY HYPERTENSION: ICD-10-CM

## 2024-02-08 DIAGNOSIS — E11.3293 TYPE 2 DIABETES MELLITUS WITH BOTH EYES AFFECTED BY MILD NONPROLIFERATIVE RETINOPATHY WITHOUT MACULAR EDEMA, WITHOUT LONG-TERM CURRENT USE OF INSULIN: Primary | ICD-10-CM

## 2024-02-08 NOTE — OUTREACH NOTE
AMBULATORY CASE MANAGEMENT NOTE    Name and Relationship of Patient/Support Person: Erlin Blanco L - Self    CCM Interim Update    Called and spoke to patient for CCM update, wife also on the line. Reviewed recent PCP visit and he states that it went well. He is doing better remembering to take his medications and his daughter in law helps him set it up. Pain is managed so far, he walks with cane and no recent falls reported. Discussed timing of taking torsemide, advised patient timing on taking it early in the day or noon to prevent waking up during sleep. Patient verbalizes he's ok taking it the same way he is taking it. He states he still is able to get enough sleep despite having to void frequent at night.    He continues to monitor his BP and blood sugars at home. SBP runs in the 120s. And recent highest blood sugar was 114. Encouraged patient to continue this management since his numbers are on track. No symptoms reported. He is currently on eliquis, he do notice blood tinged urine at times when he urinates but nothing unusual, followed by Urology. Encouraged to stay hydrated and stay active. Patient verbalizes understanding.       Education Documentation  medication management, taught by Erlin Abreu, RN at 2/8/2024  9:51 AM.  Learner: Patient  Readiness: Acceptance  Method: Explanation  Response: Verbalizes Understanding    Blood Pressure Monitoring, taught by Erlin Abreu, RN at 2/8/2024  9:51 AM.  Learner: Patient  Readiness: Acceptance  Method: Explanation  Response: Verbalizes Understanding    Blood Glucose Monitoring, taught by Erlin Abreu, RN at 2/8/2024  9:51 AM.  Learner: Patient  Readiness: Acceptance  Method: Explanation  Response: Verbalizes Understanding          Erlin EARL  Ambulatory Case Management    2/8/2024, 09:51 EST

## 2024-02-09 DIAGNOSIS — D72.829 LEUKOCYTOSIS, UNSPECIFIED TYPE: ICD-10-CM

## 2024-02-09 DIAGNOSIS — I10 PRIMARY HYPERTENSION: ICD-10-CM

## 2024-02-09 DIAGNOSIS — N30.00 ACUTE CYSTITIS WITHOUT HEMATURIA: Primary | ICD-10-CM

## 2024-02-09 DIAGNOSIS — E83.52 HYPERCALCEMIA: ICD-10-CM

## 2024-02-09 LAB
BACTERIA UR CULT: ABNORMAL
BACTERIA UR CULT: ABNORMAL
OTHER ANTIBIOTIC SUSC ISLT: ABNORMAL

## 2024-02-09 RX ORDER — AMOXICILLIN AND CLAVULANATE POTASSIUM 875; 125 MG/1; MG/1
1 TABLET, FILM COATED ORAL 2 TIMES DAILY
Qty: 14 TABLET | Refills: 0 | Status: SHIPPED | OUTPATIENT
Start: 2024-02-09 | End: 2024-02-16

## 2024-02-10 DIAGNOSIS — E11.3293 TYPE 2 DIABETES MELLITUS WITH BOTH EYES AFFECTED BY MILD NONPROLIFERATIVE RETINOPATHY WITHOUT MACULAR EDEMA, WITHOUT LONG-TERM CURRENT USE OF INSULIN: ICD-10-CM

## 2024-02-12 RX ORDER — GLIMEPIRIDE 2 MG/1
2 TABLET ORAL 2 TIMES DAILY WITH MEALS
Qty: 180 TABLET | Refills: 1 | Status: SHIPPED | OUTPATIENT
Start: 2024-02-12

## 2024-02-24 ENCOUNTER — APPOINTMENT (OUTPATIENT)
Dept: CT IMAGING | Facility: HOSPITAL | Age: 79
DRG: 064 | End: 2024-02-24
Payer: MEDICARE

## 2024-02-24 ENCOUNTER — APPOINTMENT (OUTPATIENT)
Dept: GENERAL RADIOLOGY | Facility: HOSPITAL | Age: 79
DRG: 064 | End: 2024-02-24
Payer: MEDICARE

## 2024-02-24 ENCOUNTER — HOSPITAL ENCOUNTER (INPATIENT)
Facility: HOSPITAL | Age: 79
LOS: 18 days | Discharge: NURSING, REHAB OR LONG-TERM CARE | DRG: 064 | End: 2024-03-14
Attending: EMERGENCY MEDICINE | Admitting: INTERNAL MEDICINE
Payer: MEDICARE

## 2024-02-24 DIAGNOSIS — Z79.01 CHRONIC ANTICOAGULATION: ICD-10-CM

## 2024-02-24 DIAGNOSIS — R29.810 FACIAL DROOP: ICD-10-CM

## 2024-02-24 DIAGNOSIS — R47.01 EXPRESSIVE APHASIA: ICD-10-CM

## 2024-02-24 DIAGNOSIS — I61.9 INTRAPARENCHYMAL HEMORRHAGE OF BRAIN: Primary | ICD-10-CM

## 2024-02-24 DIAGNOSIS — R29.898 WEAKNESS OF RIGHT UPPER EXTREMITY: ICD-10-CM

## 2024-02-24 LAB
ABO GROUP BLD: NORMAL
ALBUMIN SERPL-MCNC: 4.1 G/DL (ref 3.5–5.2)
ALBUMIN/GLOB SERPL: 1.6 G/DL
ALP SERPL-CCNC: 71 U/L (ref 39–117)
ALT SERPL W P-5'-P-CCNC: 15 U/L (ref 1–41)
ANION GAP SERPL CALCULATED.3IONS-SCNC: 7 MMOL/L (ref 5–15)
APTT PPP: 32.8 SECONDS (ref 22.7–35.4)
AST SERPL-CCNC: 24 U/L (ref 1–40)
BASOPHILS # BLD AUTO: 0.16 10*3/MM3 (ref 0–0.2)
BASOPHILS NFR BLD AUTO: 2 % (ref 0–1.5)
BILIRUB SERPL-MCNC: 0.4 MG/DL (ref 0–1.2)
BLD GP AB SCN SERPL QL: NEGATIVE
BUN SERPL-MCNC: 27 MG/DL (ref 8–23)
BUN/CREAT SERPL: 21.4 (ref 7–25)
CALCIUM SPEC-SCNC: 9.8 MG/DL (ref 8.6–10.5)
CHLORIDE SERPL-SCNC: 105 MMOL/L (ref 98–107)
CO2 SERPL-SCNC: 31 MMOL/L (ref 22–29)
CREAT SERPL-MCNC: 1.26 MG/DL (ref 0.76–1.27)
DEPRECATED RDW RBC AUTO: 49.1 FL (ref 37–54)
EGFRCR SERPLBLD CKD-EPI 2021: 59.1 ML/MIN/1.73
EOSINOPHIL # BLD AUTO: 0.38 10*3/MM3 (ref 0–0.4)
EOSINOPHIL NFR BLD AUTO: 4.7 % (ref 0.3–6.2)
ERYTHROCYTE [DISTWIDTH] IN BLOOD BY AUTOMATED COUNT: 15.7 % (ref 12.3–15.4)
GLOBULIN UR ELPH-MCNC: 2.5 GM/DL
GLUCOSE SERPL-MCNC: 160 MG/DL (ref 65–99)
HCT VFR BLD AUTO: 44.8 % (ref 37.5–51)
HGB BLD-MCNC: 14.5 G/DL (ref 13–17.7)
HOLD SPECIMEN: NORMAL
HOLD SPECIMEN: NORMAL
IMM GRANULOCYTES # BLD AUTO: 0.04 10*3/MM3 (ref 0–0.05)
IMM GRANULOCYTES NFR BLD AUTO: 0.5 % (ref 0–0.5)
INR PPP: 1.28 (ref 0.9–1.1)
LYMPHOCYTES # BLD AUTO: 1.55 10*3/MM3 (ref 0.7–3.1)
LYMPHOCYTES NFR BLD AUTO: 19.1 % (ref 19.6–45.3)
MCH RBC QN AUTO: 28.3 PG (ref 26.6–33)
MCHC RBC AUTO-ENTMCNC: 32.4 G/DL (ref 31.5–35.7)
MCV RBC AUTO: 87.3 FL (ref 79–97)
MONOCYTES # BLD AUTO: 0.69 10*3/MM3 (ref 0.1–0.9)
MONOCYTES NFR BLD AUTO: 8.5 % (ref 5–12)
NEUTROPHILS NFR BLD AUTO: 5.28 10*3/MM3 (ref 1.7–7)
NEUTROPHILS NFR BLD AUTO: 65.2 % (ref 42.7–76)
NRBC BLD AUTO-RTO: 0 /100 WBC (ref 0–0.2)
PLATELET # BLD AUTO: 407 10*3/MM3 (ref 140–450)
PMV BLD AUTO: 9.2 FL (ref 6–12)
POTASSIUM SERPL-SCNC: 3.9 MMOL/L (ref 3.5–5.2)
PROT SERPL-MCNC: 6.6 G/DL (ref 6–8.5)
PROTHROMBIN TIME: 16.2 SECONDS (ref 11.7–14.2)
RBC # BLD AUTO: 5.13 10*6/MM3 (ref 4.14–5.8)
RH BLD: POSITIVE
SODIUM SERPL-SCNC: 143 MMOL/L (ref 136–145)
T&S EXPIRATION DATE: NORMAL
TROPONIN T SERPL HS-MCNC: 41 NG/L
WBC NRBC COR # BLD AUTO: 8.1 10*3/MM3 (ref 3.4–10.8)
WHOLE BLOOD HOLD COAG: NORMAL
WHOLE BLOOD HOLD SPECIMEN: NORMAL

## 2024-02-24 PROCEDURE — 85730 THROMBOPLASTIN TIME PARTIAL: CPT | Performed by: EMERGENCY MEDICINE

## 2024-02-24 PROCEDURE — 85610 PROTHROMBIN TIME: CPT | Performed by: EMERGENCY MEDICINE

## 2024-02-24 PROCEDURE — 25010000002 LABETALOL 5 MG/ML SOLUTION: Performed by: EMERGENCY MEDICINE

## 2024-02-24 PROCEDURE — 84484 ASSAY OF TROPONIN QUANT: CPT | Performed by: EMERGENCY MEDICINE

## 2024-02-24 PROCEDURE — 80053 COMPREHEN METABOLIC PANEL: CPT | Performed by: EMERGENCY MEDICINE

## 2024-02-24 PROCEDURE — 93005 ELECTROCARDIOGRAM TRACING: CPT | Performed by: EMERGENCY MEDICINE

## 2024-02-24 PROCEDURE — 86850 RBC ANTIBODY SCREEN: CPT | Performed by: EMERGENCY MEDICINE

## 2024-02-24 PROCEDURE — 25810000003 SODIUM CHLORIDE 0.9 % SOLUTION: Performed by: EMERGENCY MEDICINE

## 2024-02-24 PROCEDURE — 86900 BLOOD TYPING SEROLOGIC ABO: CPT | Performed by: EMERGENCY MEDICINE

## 2024-02-24 PROCEDURE — 25010000002 LEVETRIRACETAM PER 10 MG: Performed by: EMERGENCY MEDICINE

## 2024-02-24 PROCEDURE — 85025 COMPLETE CBC W/AUTO DIFF WBC: CPT | Performed by: EMERGENCY MEDICINE

## 2024-02-24 PROCEDURE — 25010000002 PROTHROMBIN COMPLEX CONC HUMAN 1000 UNITS KIT: Performed by: STUDENT IN AN ORGANIZED HEALTH CARE EDUCATION/TRAINING PROGRAM

## 2024-02-24 PROCEDURE — 86901 BLOOD TYPING SEROLOGIC RH(D): CPT | Performed by: EMERGENCY MEDICINE

## 2024-02-24 PROCEDURE — 99285 EMERGENCY DEPT VISIT HI MDM: CPT

## 2024-02-24 PROCEDURE — 70496 CT ANGIOGRAPHY HEAD: CPT

## 2024-02-24 PROCEDURE — 82565 ASSAY OF CREATININE: CPT

## 2024-02-24 PROCEDURE — 71045 X-RAY EXAM CHEST 1 VIEW: CPT

## 2024-02-24 PROCEDURE — 36415 COLL VENOUS BLD VENIPUNCTURE: CPT

## 2024-02-24 PROCEDURE — 25010000002 NICARDIPINE 2.5 MG/ML SOLUTION: Performed by: EMERGENCY MEDICINE

## 2024-02-24 PROCEDURE — 93010 ELECTROCARDIOGRAM REPORT: CPT | Performed by: INTERNAL MEDICINE

## 2024-02-24 RX ORDER — ONDANSETRON 2 MG/ML
4 INJECTION INTRAMUSCULAR; INTRAVENOUS ONCE AS NEEDED
Status: DISCONTINUED | OUTPATIENT
Start: 2024-02-24 | End: 2024-03-15 | Stop reason: HOSPADM

## 2024-02-24 RX ORDER — LABETALOL HYDROCHLORIDE 5 MG/ML
20 INJECTION, SOLUTION INTRAVENOUS ONCE
Status: COMPLETED | OUTPATIENT
Start: 2024-02-24 | End: 2024-02-24

## 2024-02-24 RX ORDER — SODIUM CHLORIDE 0.9 % (FLUSH) 0.9 %
10 SYRINGE (ML) INJECTION AS NEEDED
Status: DISCONTINUED | OUTPATIENT
Start: 2024-02-24 | End: 2024-03-15 | Stop reason: HOSPADM

## 2024-02-24 RX ORDER — ACETAMINOPHEN 650 MG/1
650 SUPPOSITORY RECTAL EVERY 4 HOURS PRN
Status: DISCONTINUED | OUTPATIENT
Start: 2024-02-24 | End: 2024-03-15 | Stop reason: HOSPADM

## 2024-02-24 RX ORDER — LEVETIRACETAM 500 MG/5ML
1000 INJECTION, SOLUTION, CONCENTRATE INTRAVENOUS ONCE
Status: COMPLETED | OUTPATIENT
Start: 2024-02-24 | End: 2024-02-24

## 2024-02-24 RX ADMIN — LABETALOL HYDROCHLORIDE 20 MG: 5 INJECTION, SOLUTION INTRAVENOUS at 22:50

## 2024-02-24 RX ADMIN — LEVETIRACETAM 1000 MG: 500 INJECTION, SOLUTION INTRAVENOUS at 22:50

## 2024-02-24 RX ADMIN — PROTHROMBIN, COAGULATION FACTOR VII HUMAN, COAGULATION FACTOR IX HUMAN, COAGULATION FACTOR X HUMAN, PROTEIN C, PROTEIN S HUMAN, AND WATER 4515 UNITS: KIT at 23:48

## 2024-02-24 RX ADMIN — NICARDIPINE HYDROCHLORIDE 5 MG/HR: 25 INJECTION, SOLUTION INTRAVENOUS at 22:51

## 2024-02-25 ENCOUNTER — APPOINTMENT (OUTPATIENT)
Dept: CT IMAGING | Facility: HOSPITAL | Age: 79
DRG: 064 | End: 2024-02-25
Payer: MEDICARE

## 2024-02-25 LAB
ANION GAP SERPL CALCULATED.3IONS-SCNC: 12.3 MMOL/L (ref 5–15)
BASOPHILS # BLD AUTO: 0.13 10*3/MM3 (ref 0–0.2)
BASOPHILS NFR BLD AUTO: 1.3 % (ref 0–1.5)
BUN SERPL-MCNC: 27 MG/DL (ref 8–23)
BUN/CREAT SERPL: 23.3 (ref 7–25)
CALCIUM SPEC-SCNC: 9.2 MG/DL (ref 8.6–10.5)
CHLORIDE SERPL-SCNC: 107 MMOL/L (ref 98–107)
CHOLEST SERPL-MCNC: 90 MG/DL (ref 0–200)
CO2 SERPL-SCNC: 22.7 MMOL/L (ref 22–29)
CREAT SERPL-MCNC: 1.16 MG/DL (ref 0.76–1.27)
DEPRECATED RDW RBC AUTO: 51.4 FL (ref 37–54)
EGFRCR SERPLBLD CKD-EPI 2021: 65.3 ML/MIN/1.73
EOSINOPHIL # BLD AUTO: 0.09 10*3/MM3 (ref 0–0.4)
EOSINOPHIL NFR BLD AUTO: 0.9 % (ref 0.3–6.2)
ERYTHROCYTE [DISTWIDTH] IN BLOOD BY AUTOMATED COUNT: 15.9 % (ref 12.3–15.4)
GLUCOSE BLDC GLUCOMTR-MCNC: 148 MG/DL (ref 70–130)
GLUCOSE BLDC GLUCOMTR-MCNC: 228 MG/DL (ref 70–130)
GLUCOSE BLDC GLUCOMTR-MCNC: 282 MG/DL (ref 70–130)
GLUCOSE BLDC GLUCOMTR-MCNC: 290 MG/DL (ref 70–130)
GLUCOSE SERPL-MCNC: 314 MG/DL (ref 65–99)
HBA1C MFR BLD: 7.1 % (ref 4.8–5.6)
HCT VFR BLD AUTO: 45.8 % (ref 37.5–51)
HDLC SERPL-MCNC: 38 MG/DL (ref 40–60)
HGB BLD-MCNC: 14.7 G/DL (ref 13–17.7)
IMM GRANULOCYTES # BLD AUTO: 0.05 10*3/MM3 (ref 0–0.05)
IMM GRANULOCYTES NFR BLD AUTO: 0.5 % (ref 0–0.5)
INR PPP: 1.2 (ref 0.9–1.1)
LDLC SERPL CALC-MCNC: 39 MG/DL (ref 0–100)
LDLC/HDLC SERPL: 1.09 {RATIO}
LYMPHOCYTES # BLD AUTO: 0.68 10*3/MM3 (ref 0.7–3.1)
LYMPHOCYTES NFR BLD AUTO: 6.6 % (ref 19.6–45.3)
MAGNESIUM SERPL-MCNC: 2.3 MG/DL (ref 1.6–2.4)
MCH RBC QN AUTO: 28.8 PG (ref 26.6–33)
MCHC RBC AUTO-ENTMCNC: 32.1 G/DL (ref 31.5–35.7)
MCV RBC AUTO: 89.8 FL (ref 79–97)
MONOCYTES # BLD AUTO: 0.43 10*3/MM3 (ref 0.1–0.9)
MONOCYTES NFR BLD AUTO: 4.1 % (ref 5–12)
NEUTROPHILS NFR BLD AUTO: 86.6 % (ref 42.7–76)
NEUTROPHILS NFR BLD AUTO: 9 10*3/MM3 (ref 1.7–7)
NRBC BLD AUTO-RTO: 0 /100 WBC (ref 0–0.2)
PLATELET # BLD AUTO: 358 10*3/MM3 (ref 140–450)
PMV BLD AUTO: 9.6 FL (ref 6–12)
POTASSIUM SERPL-SCNC: 3.6 MMOL/L (ref 3.5–5.2)
PROTHROMBIN TIME: 15.4 SECONDS (ref 11.7–14.2)
QT INTERVAL: 486 MS
QTC INTERVAL: 532 MS
RBC # BLD AUTO: 5.1 10*6/MM3 (ref 4.14–5.8)
SODIUM SERPL-SCNC: 142 MMOL/L (ref 136–145)
TRIGL SERPL-MCNC: 53 MG/DL (ref 0–150)
VLDLC SERPL-MCNC: 13 MG/DL (ref 5–40)
WBC NRBC COR # BLD AUTO: 10.38 10*3/MM3 (ref 3.4–10.8)

## 2024-02-25 PROCEDURE — 99223 1ST HOSP IP/OBS HIGH 75: CPT | Performed by: PSYCHIATRY & NEUROLOGY

## 2024-02-25 PROCEDURE — 85610 PROTHROMBIN TIME: CPT

## 2024-02-25 PROCEDURE — 80061 LIPID PANEL: CPT

## 2024-02-25 PROCEDURE — 80048 BASIC METABOLIC PNL TOTAL CA: CPT

## 2024-02-25 PROCEDURE — 99223 1ST HOSP IP/OBS HIGH 75: CPT | Performed by: NEUROLOGICAL SURGERY

## 2024-02-25 PROCEDURE — 25010000002 CLEVIDIPINE BUTYRATE PER 1 MG

## 2024-02-25 PROCEDURE — 85025 COMPLETE CBC W/AUTO DIFF WBC: CPT

## 2024-02-25 PROCEDURE — 25810000003 SODIUM CHLORIDE 0.9 % SOLUTION: Performed by: EMERGENCY MEDICINE

## 2024-02-25 PROCEDURE — 70450 CT HEAD/BRAIN W/O DYE: CPT

## 2024-02-25 PROCEDURE — 63710000001 INSULIN REGULAR HUMAN PER 5 UNITS

## 2024-02-25 PROCEDURE — 36415 COLL VENOUS BLD VENIPUNCTURE: CPT

## 2024-02-25 PROCEDURE — 25010000002 LABETALOL 5 MG/ML SOLUTION 20 ML VIAL: Performed by: INTERNAL MEDICINE

## 2024-02-25 PROCEDURE — 25010000002 HYDRALAZINE PER 20 MG

## 2024-02-25 PROCEDURE — 25010000002 LEVETRIRACETAM PER 10 MG

## 2024-02-25 PROCEDURE — 82948 REAGENT STRIP/BLOOD GLUCOSE: CPT

## 2024-02-25 PROCEDURE — 25010000002 NICARDIPINE 2.5 MG/ML SOLUTION: Performed by: EMERGENCY MEDICINE

## 2024-02-25 PROCEDURE — 25810000003 SODIUM CHLORIDE 0.9 % SOLUTION 250 ML FLEX CONT: Performed by: INTERNAL MEDICINE

## 2024-02-25 PROCEDURE — 83735 ASSAY OF MAGNESIUM: CPT

## 2024-02-25 PROCEDURE — 83036 HEMOGLOBIN GLYCOSYLATED A1C: CPT

## 2024-02-25 PROCEDURE — C9248 INJ, CLEVIDIPINE BUTYRATE: HCPCS

## 2024-02-25 PROCEDURE — 25010000002 LABETALOL 5 MG/ML SOLUTION

## 2024-02-25 RX ORDER — CARVEDILOL 6.25 MG/1
6.25 TABLET ORAL 2 TIMES DAILY WITH MEALS
COMMUNITY
Start: 2024-01-16

## 2024-02-25 RX ORDER — POLYETHYLENE GLYCOL 3350 17 G/17G
17 POWDER, FOR SOLUTION ORAL DAILY PRN
Status: DISCONTINUED | OUTPATIENT
Start: 2024-02-25 | End: 2024-03-12

## 2024-02-25 RX ORDER — ROSUVASTATIN CALCIUM 40 MG/1
40 TABLET, COATED ORAL NIGHTLY
COMMUNITY
Start: 2024-01-25

## 2024-02-25 RX ORDER — VALSARTAN 320 MG/1
320 TABLET ORAL DAILY
COMMUNITY
Start: 2023-12-21

## 2024-02-25 RX ORDER — TAMSULOSIN HYDROCHLORIDE 0.4 MG/1
1 CAPSULE ORAL DAILY
COMMUNITY
Start: 2024-01-25

## 2024-02-25 RX ORDER — LABETALOL HYDROCHLORIDE 5 MG/ML
20 INJECTION, SOLUTION INTRAVENOUS
Status: DISCONTINUED | OUTPATIENT
Start: 2024-02-25 | End: 2024-02-25

## 2024-02-25 RX ORDER — TORSEMIDE 20 MG/1
20 TABLET ORAL 2 TIMES DAILY
COMMUNITY
Start: 2024-01-14 | End: 2024-03-14 | Stop reason: HOSPADM

## 2024-02-25 RX ORDER — BISACODYL 5 MG/1
5 TABLET, DELAYED RELEASE ORAL DAILY PRN
Status: DISCONTINUED | OUTPATIENT
Start: 2024-02-25 | End: 2024-03-12

## 2024-02-25 RX ORDER — GLIMEPIRIDE 2 MG/1
2 TABLET ORAL 2 TIMES DAILY
COMMUNITY
Start: 2024-02-12 | End: 2024-03-14 | Stop reason: HOSPADM

## 2024-02-25 RX ORDER — METFORMIN HYDROCHLORIDE 500 MG/1
1000 TABLET, EXTENDED RELEASE ORAL 2 TIMES DAILY
COMMUNITY
Start: 2024-01-16 | End: 2024-03-14 | Stop reason: HOSPADM

## 2024-02-25 RX ORDER — L.ACID,CASEI/B.ANIMAL/S.THERMO 16B CELL
1 CAPSULE ORAL DAILY
COMMUNITY
End: 2024-03-14 | Stop reason: HOSPADM

## 2024-02-25 RX ORDER — MEMANTINE HYDROCHLORIDE 10 MG/1
10 TABLET ORAL 2 TIMES DAILY
COMMUNITY
Start: 2024-01-04

## 2024-02-25 RX ORDER — SODIUM CHLORIDE 0.9 % (FLUSH) 0.9 %
10 SYRINGE (ML) INJECTION EVERY 12 HOURS SCHEDULED
Status: DISCONTINUED | OUTPATIENT
Start: 2024-02-25 | End: 2024-03-15 | Stop reason: HOSPADM

## 2024-02-25 RX ORDER — SODIUM CHLORIDE 9 MG/ML
40 INJECTION, SOLUTION INTRAVENOUS AS NEEDED
Status: DISCONTINUED | OUTPATIENT
Start: 2024-02-25 | End: 2024-03-15 | Stop reason: HOSPADM

## 2024-02-25 RX ORDER — ONDANSETRON 2 MG/ML
4 INJECTION INTRAMUSCULAR; INTRAVENOUS EVERY 6 HOURS PRN
Status: DISCONTINUED | OUTPATIENT
Start: 2024-02-25 | End: 2024-03-15 | Stop reason: HOSPADM

## 2024-02-25 RX ORDER — HYDRALAZINE HYDROCHLORIDE 20 MG/ML
10 INJECTION INTRAMUSCULAR; INTRAVENOUS EVERY 4 HOURS PRN
Status: DISCONTINUED | OUTPATIENT
Start: 2024-02-25 | End: 2024-02-27

## 2024-02-25 RX ORDER — ASPIRIN 81 MG/1
81 TABLET, COATED ORAL DAILY
COMMUNITY
Start: 2023-11-23 | End: 2024-03-14 | Stop reason: HOSPADM

## 2024-02-25 RX ORDER — SODIUM CHLORIDE 0.9 % (FLUSH) 0.9 %
10 SYRINGE (ML) INJECTION AS NEEDED
Status: DISCONTINUED | OUTPATIENT
Start: 2024-02-25 | End: 2024-03-15 | Stop reason: HOSPADM

## 2024-02-25 RX ORDER — POTASSIUM CHLORIDE 1.5 G/1.58G
20 POWDER, FOR SOLUTION ORAL 3 TIMES DAILY
COMMUNITY
End: 2024-03-14 | Stop reason: HOSPADM

## 2024-02-25 RX ORDER — LABETALOL HYDROCHLORIDE 5 MG/ML
40 INJECTION, SOLUTION INTRAVENOUS
Status: DISCONTINUED | OUTPATIENT
Start: 2024-02-25 | End: 2024-03-04

## 2024-02-25 RX ORDER — APIXABAN 5 MG/1
5 TABLET, FILM COATED ORAL EVERY 12 HOURS SCHEDULED
COMMUNITY
Start: 2023-11-09 | End: 2024-03-14 | Stop reason: HOSPADM

## 2024-02-25 RX ORDER — NICOTINE POLACRILEX 4 MG
15 LOZENGE BUCCAL
Status: DISCONTINUED | OUTPATIENT
Start: 2024-02-25 | End: 2024-03-15 | Stop reason: HOSPADM

## 2024-02-25 RX ORDER — AMOXICILLIN 250 MG
2 CAPSULE ORAL 2 TIMES DAILY
Status: DISCONTINUED | OUTPATIENT
Start: 2024-02-25 | End: 2024-03-12

## 2024-02-25 RX ORDER — FLUTICASONE PROPIONATE 50 MCG
1 SPRAY, SUSPENSION (ML) NASAL DAILY
COMMUNITY
Start: 2024-02-18

## 2024-02-25 RX ORDER — SITAGLIPTIN 100 MG/1
100 TABLET, FILM COATED ORAL DAILY
COMMUNITY
Start: 2024-02-10

## 2024-02-25 RX ORDER — NITROGLYCERIN 0.4 MG/1
0.4 TABLET SUBLINGUAL
Status: DISCONTINUED | OUTPATIENT
Start: 2024-02-25 | End: 2024-03-15 | Stop reason: HOSPADM

## 2024-02-25 RX ORDER — ACETAMINOPHEN 650 MG/1
650 SUPPOSITORY RECTAL EVERY 4 HOURS PRN
Status: DISCONTINUED | OUTPATIENT
Start: 2024-02-25 | End: 2024-03-15 | Stop reason: HOSPADM

## 2024-02-25 RX ORDER — LEVETIRACETAM 500 MG/5ML
500 INJECTION, SOLUTION, CONCENTRATE INTRAVENOUS EVERY 12 HOURS SCHEDULED
Status: DISCONTINUED | OUTPATIENT
Start: 2024-02-25 | End: 2024-02-28

## 2024-02-25 RX ORDER — DEXTROSE MONOHYDRATE 25 G/50ML
25 INJECTION, SOLUTION INTRAVENOUS
Status: DISCONTINUED | OUTPATIENT
Start: 2024-02-25 | End: 2024-03-15 | Stop reason: HOSPADM

## 2024-02-25 RX ORDER — HYDROXYUREA 500 MG/1
500 CAPSULE ORAL DAILY
COMMUNITY
Start: 2024-01-18

## 2024-02-25 RX ORDER — HYDRALAZINE HYDROCHLORIDE 100 MG/1
100 TABLET, FILM COATED ORAL 3 TIMES DAILY
COMMUNITY
Start: 2023-11-09 | End: 2024-03-14 | Stop reason: HOSPADM

## 2024-02-25 RX ORDER — IBUPROFEN 600 MG/1
1 TABLET ORAL
Status: DISCONTINUED | OUTPATIENT
Start: 2024-02-25 | End: 2024-03-15 | Stop reason: HOSPADM

## 2024-02-25 RX ORDER — BISACODYL 10 MG
10 SUPPOSITORY, RECTAL RECTAL DAILY PRN
Status: DISCONTINUED | OUTPATIENT
Start: 2024-02-25 | End: 2024-03-12

## 2024-02-25 RX ADMIN — NICARDIPINE HYDROCHLORIDE 15 MG/HR: 25 INJECTION, SOLUTION INTRAVENOUS at 05:11

## 2024-02-25 RX ADMIN — LABETALOL HYDROCHLORIDE 20 MG: 5 INJECTION, SOLUTION INTRAVENOUS at 01:32

## 2024-02-25 RX ADMIN — CLEVIPIDINE 16 MG/HR: 0.5 EMULSION INTRAVENOUS at 14:39

## 2024-02-25 RX ADMIN — LABETALOL HYDROCHLORIDE 40 MG: 5 INJECTION, SOLUTION INTRAVENOUS at 04:34

## 2024-02-25 RX ADMIN — Medication 10 ML: at 02:59

## 2024-02-25 RX ADMIN — NICARDIPINE HYDROCHLORIDE 12.5 MG/HR: 25 INJECTION, SOLUTION INTRAVENOUS at 10:31

## 2024-02-25 RX ADMIN — NICARDIPINE HYDROCHLORIDE 12.5 MG/HR: 25 INJECTION, SOLUTION INTRAVENOUS at 14:36

## 2024-02-25 RX ADMIN — LABETALOL HYDROCHLORIDE 0.5 MG/MIN: 5 INJECTION INTRAVENOUS at 14:36

## 2024-02-25 RX ADMIN — NICARDIPINE HYDROCHLORIDE 10 MG/HR: 25 INJECTION, SOLUTION INTRAVENOUS at 07:31

## 2024-02-25 RX ADMIN — CLEVIPIDINE 2 MG/HR: 0.5 EMULSION INTRAVENOUS at 01:54

## 2024-02-25 RX ADMIN — CLEVIPIDINE 7 MG/HR: 0.5 EMULSION INTRAVENOUS at 05:33

## 2024-02-25 RX ADMIN — NICARDIPINE HYDROCHLORIDE 15 MG/HR: 25 INJECTION, SOLUTION INTRAVENOUS at 02:57

## 2024-02-25 RX ADMIN — LEVETIRACETAM 500 MG: 500 INJECTION, SOLUTION INTRAVENOUS at 20:00

## 2024-02-25 RX ADMIN — Medication 10 ML: at 20:00

## 2024-02-25 RX ADMIN — INSULIN HUMAN 4 UNITS: 100 INJECTION, SOLUTION PARENTERAL at 18:41

## 2024-02-25 RX ADMIN — CLEVIPIDINE 16 MG/HR: 0.5 EMULSION INTRAVENOUS at 11:04

## 2024-02-25 RX ADMIN — INSULIN HUMAN 6 UNITS: 100 INJECTION, SOLUTION PARENTERAL at 12:46

## 2024-02-25 RX ADMIN — CLEVIPIDINE 16 MG/HR: 0.5 EMULSION INTRAVENOUS at 12:46

## 2024-02-25 RX ADMIN — CLEVIPIDINE 12 MG/HR: 0.5 EMULSION INTRAVENOUS at 18:34

## 2024-02-25 RX ADMIN — CLEVIPIDINE 14 MG/HR: 0.5 EMULSION INTRAVENOUS at 16:15

## 2024-02-25 RX ADMIN — CLEVIPIDINE 16 MG/HR: 0.5 EMULSION INTRAVENOUS at 22:00

## 2024-02-25 RX ADMIN — NICARDIPINE HYDROCHLORIDE 15 MG/HR: 25 INJECTION, SOLUTION INTRAVENOUS at 01:29

## 2024-02-25 RX ADMIN — Medication 10 ML: at 08:47

## 2024-02-25 RX ADMIN — LEVETIRACETAM 500 MG: 500 INJECTION, SOLUTION INTRAVENOUS at 08:08

## 2024-02-25 RX ADMIN — CLEVIPIDINE 12 MG/HR: 0.5 EMULSION INTRAVENOUS at 20:00

## 2024-02-25 RX ADMIN — CLEVIPIDINE 14 MG/HR: 0.5 EMULSION INTRAVENOUS at 09:12

## 2024-02-25 RX ADMIN — NICARDIPINE HYDROCHLORIDE 15 MG/HR: 25 INJECTION, SOLUTION INTRAVENOUS at 12:46

## 2024-02-25 RX ADMIN — INSULIN HUMAN 6 UNITS: 100 INJECTION, SOLUTION PARENTERAL at 08:09

## 2024-02-25 RX ADMIN — HYDRALAZINE HYDROCHLORIDE 10 MG: 20 INJECTION INTRAMUSCULAR; INTRAVENOUS at 01:35

## 2024-02-25 RX ADMIN — LABETALOL HYDROCHLORIDE 2 MG/MIN: 5 INJECTION INTRAVENOUS at 21:00

## 2024-02-25 NOTE — CONSULTS
Neurology Consult Note    Consult Date: 2/25/2024    Referring MD: Dr. Rivero    Reason for Consult I have been asked to see the patient in neurological consultation to render advice and opinion regarding intracerebral hemorrhage    Erlin Blanco is a 78 y.o. male with history of hypertension, polycythemia vera on hydroxyurea, A-fib on Eliquis, hyperlipidemia, diabetes, mild cognitive impairment, prior stroke with residual left-sided weakness.  His wife does endorse that his hypertension is fairly poorly controlled.  At baseline he takes Eliquis for A-fib.    He developed acute onset yesterday at 9 PM of aphasia which progressed to right facial droop and right hemiparesis.  911 was called and he was brought to the emergency department where he was found to have an acute left thalamic intracerebral hemorrhage.  Markedly hypertensive on presentation.  Overnight he developed progressive worsening of the hemorrhage with intraventricular hemorrhage.  Today he is encephalopathic with right hemiplegia but follows commands on the left side.    Past medical history: A-fib, hypertension, diabetes, hyperlipidemia, MCI, polycythemia vera    Exam  /82   Pulse 78   Temp 97.8 °F (36.6 °C) (Oral)   Resp 16   Wt 115 kg (254 lb 10.1 oz)   SpO2 96%   Gen: NAD, vitals reviewed  MS: Lethargic but arouses to voice, follows some commands, inattentive, language appears to be intact  CN: Pupils equal, conjugate gaze, roving eye movements with nystagmus, right facial droop, severe dysarthria  Motor: Extends to pain right upper extremity, triple flexion to pain right lower extremity, moving left side spontaneously and to command    DATA:    Lab Results   Component Value Date    GLUCOSE 314 (H) 02/25/2024    CALCIUM 9.2 02/25/2024     02/25/2024    K 3.6 02/25/2024    CO2 22.7 02/25/2024     02/25/2024    BUN 27 (H) 02/25/2024    CREATININE 1.16 02/25/2024    BCR 23.3 02/25/2024    ANIONGAP 12.3 02/25/2024     Lab  Results   Component Value Date    WBC 10.38 02/25/2024    HGB 14.7 02/25/2024    HCT 45.8 02/25/2024    MCV 89.8 02/25/2024     02/25/2024       Lab review: CBC, BMP unremarkable    Imaging review: I personally reviewed his initial head CT as well as a subsequent head CTs overnight.  The initial CT shows a sizable left thalamic intracerebral hemorrhage, subsequent CT showed expansion of the hematoma with development of IVH.  Radiology reports reviewed.    Diagnoses:  Spontaneous left thalamic intracerebral hemorrhage  Hypertensive emergency  Paroxysmal atrial fibrillation on Eliquis  Mixed hyperlipidemia  Polycythemia vera    ICH score on presentation 1 for GCS, now 2    Comment: Spontaneous left thalamic ICH now with hematoma expansion and IVH. Unfortunately he had multiple factors predisposing to hematoma expansion including Eliquis use and polycythemia vera.    PLAN:  BP less than 140 per neurosurgery  Surveillance CTs per neurosurgery  Prophylactic Keppra has been started    I will defer further management to Dr. Miller and see him prn if I can add anything to his care.    Management discussed with Dr. Rivero

## 2024-02-25 NOTE — SIGNIFICANT NOTE
02/25/24 1014   OTHER   Discipline occupational therapist   Rehab Time/Intention   Session Not Performed unable to evaluate, medical status change  (Spoke w/ RN who reports pt is not medically stable to participate in therapy this date. OT will f/u next service date.)   Recommendation   OT - Next Appointment 02/26/24

## 2024-02-25 NOTE — CONSULTS
Patient Care Team:  Zamazm John APRN as PCP - General (Family Medicine)    Chief complaint hypertensive hemorrhagic stroke    Subjective .     History of present illness: This patient apparently presented to the emergency room yesterday evening with high blood pressure.  He had some headache and a little weakness on his right side.  Subsequent to that his CT was done which showed some bleeding.  Currently the patient is a little bit drowsy and has a right hemiplegia but otherwise does awaken and talk.  He does have a history of hypertension with coronary disease as well as diabetes chronic kidney disease and a previous stroke in the right brain.    Review of Systems  Pertinent items are noted in HPI.  History    History reviewed. No pertinent past medical history., History reviewed. No pertinent surgical history., History reviewed. No pertinent family history.,   Social History     Socioeconomic History    Marital status:    Tobacco Use    Smoking status: Former     Types: Cigarettes     Quit date:      Years since quittin.1     Passive exposure: Past    Smokeless tobacco: Never   Vaping Use    Vaping Use: Never used    Passive vaping exposure: Yes   Substance and Sexual Activity    Alcohol use: Never    Drug use: Never     E-cigarette/Vaping    E-cigarette/Vaping Use Never User     Passive Exposure Yes     Counseling Given Yes      E-cigarette/Vaping Substances    Nicotine No     THC No     CBD No     Flavoring No      E-cigarette/Vaping Devices    Disposable No     Pre-filled or Refillable Cartridge No     Refillable Tank No     Pre-filled Pod No          ,   Medications Prior to Admission   Medication Sig Dispense Refill Last Dose    Aspirin Low Dose 81 MG EC tablet Take 1 tablet by mouth Daily.   Unknown    carvedilol (COREG) 6.25 MG tablet Take 1 tablet by mouth 2 (Two) Times a Day With Meals.   Unknown    Eliquis 5 MG tablet tablet Take 1 tablet by mouth Every 12 (Twelve) Hours.   Unknown     fluticasone (FLONASE) 50 MCG/ACT nasal spray 1 spray into the nostril(s) as directed by provider Daily.   Unknown    glimepiride (AMARYL) 2 MG tablet Take 1 tablet by mouth 2 (Two) Times a Day.   Unknown    hydrALAZINE (APRESOLINE) 100 MG tablet Take 1 tablet by mouth 3 (Three) Times a Day.   Unknown    hydroxyurea (HYDREA) 500 MG capsule Take 1 capsule by mouth Daily.   Unknown    Januvia 100 MG tablet Take 1 tablet by mouth Daily.   Unknown    memantine (NAMENDA) 10 MG tablet Take 1 tablet by mouth 2 (Two) Times a Day.   Unknown    metFORMIN ER (GLUCOPHAGE-XR) 500 MG 24 hr tablet Take 2 tablets by mouth 2 (Two) Times a Day.   Unknown    Multiple Vitamins-Minerals (b complex-C-E-zinc) tablet Take 1 tablet by mouth Daily.   Unknown    potassium chloride (KLOR-CON) 20 MEQ packet Take 20 mEq by mouth 3 (Three) Times a Day.   Unknown    Probiotic Product (Risaquad-2) capsule capsule Take 1 capsule by mouth Daily.   Unknown    rosuvastatin (CRESTOR) 40 MG tablet Take 1 tablet by mouth Every Night.   Unknown    tamsulosin (FLOMAX) 0.4 MG capsule 24 hr capsule Take 1 capsule by mouth Daily.   Unknown    torsemide (DEMADEX) 20 MG tablet Take 1 tablet by mouth 2 (Two) Times a Day.   Unknown    valsartan (DIOVAN) 320 MG tablet Take 1 tablet by mouth Daily.   Unknown   , and Allergies:  Patient has no known allergies.    Objective     Vital Signs   Temp:  [97.6 °F (36.4 °C)-98.1 °F (36.7 °C)] 97.7 °F (36.5 °C)  Heart Rate:  [65-92] 68  Resp:  [15-18] 16  BP: (115-210)/() 136/63    Physical Exam:   Physical Exam  Eyes:      Extraocular Movements: EOM normal.      Pupils: Pupils are equal, round, and reactive to light.   Neurological:      Mental Status: He is oriented to person, place, and time.      Coordination: Finger-Nose-Finger Test and Heel to Shin Test normal.      Gait: Gait is intact.      Deep Tendon Reflexes:      Reflex Scores:       Tricep reflexes are 2+ on the right side and 2+ on the left side.        Bicep reflexes are 2+ on the right side and 2+ on the left side.       Brachioradialis reflexes are 2+ on the right side and 2+ on the left side.       Patellar reflexes are 2+ on the right side and 2+ on the left side.       Achilles reflexes are 2+ on the right side and 2+ on the left side.  Psychiatric:         Speech: Speech normal.          Neurologic Exam     Mental Status   Oriented to person, place, and time.   Registration of memory: Good recent and remote memory.   Attention: normal. Concentration: normal.   Speech: speech is normal   Level of consciousness: alert ,  drowsy  Knowledge: consistent with education.     Cranial Nerves     CN II   Visual fields full to confrontation.   Visual acuity: normal    CN III, IV, VI   Pupils are equal, round, and reactive to light.  Extraocular motions are normal.     CN V   Facial sensation intact.   Right corneal reflex: normal  Left corneal reflex: normal    CN VII   Facial expression full, symmetric.   Right facial weakness: central  Left facial weakness: none    CN VIII   Hearing: intact    CN IX, X   Palate: symmetric    CN XI   Right sternocleidomastoid strength: normal  Left sternocleidomastoid strength: normal    CN XII   Tongue: not atrophic  Tongue deviation: none    Motor Exam   Muscle bulk: normal  Left arm tone: normal  Left leg tone: normal    Strength   Strength 5/5 except as noted. Patient has a right hemiplegia     Sensory Exam   Light touch normal.     Gait, Coordination, and Reflexes     Gait  Gait: normal    Coordination   Finger to nose coordination: normal  Heel to shin coordination: normal    Reflexes   Right brachioradialis: 2+  Left brachioradialis: 2+  Right biceps: 2+  Left biceps: 2+  Right triceps: 2+  Left triceps: 2+  Right patellar: 2+  Left patellar: 2+  Right achilles: 2+  Left achilles: 2+  Right : 2+  Left : 2+      Results Review:   I reviewed the patient's new clinical results.  I reviewed his CT which was done this  morning.  This shows a hypertensive bleed in the left basal ganglia.  He has had 2 previous scans and each 1 is gotten just a little bit worse.  The one this morning is about 3 mm larger than it was at 1:00 in the morning.      Assessment & Plan       Intraparenchymal hemorrhage of brain      I talked to the patient and his son about the situation.  I explained that surgery for this abnormality would likely result in severe neurologic deficits.  We would only do it to save his life but this may not be appropriate in a 78-year-old with a history of a previous stroke.  I recommended that they discuss it now while the patient is still awake and able to contribute to the decision making so that if we need to make that decision we have it already teed up.  We will scan him again in about 6 hours from the last 1.    I discussed the patient's findings and my recommendations with patient, family, and nursing staff    Josué Miller MD  02/25/24  08:32 EST

## 2024-02-25 NOTE — NURSING NOTE
Patient was scheduled with Brijesh Ngo MD for EGD (Upper Endoscopy) with possible banding  on 04.05.2023 at [facility]. EGD instructions [facility].    Patient advised to contact insurance company to verify coverage verbally/in instructions.     Upon arrival to ICU from ED at 0048, pt scoring NIH of 24, increased from NIH 11 in ED. Pt immediately taken down to CT and Dr. Miller updated with CT results. No new orders were placed, plan to continue strict BP management. Repeat head CT ordered at 0530. Dr. Miller called to look at pending CT results at 0626, no new orders received at this time. Last several Bps in the 100-140 target goal, maintaining in the 120s. Cardene and cleviprex titrated per MAR. Q1 neuro checks completed along with NIHs hourly. Aguila inserted per orders from Lucia GILMAN. Otherwise, VSS throughout night. Pt handed off to day shift nurse.

## 2024-02-25 NOTE — ED PROVIDER NOTES
EMERGENCY DEPARTMENT ENCOUNTER  Room Number:  17/17  PCP: Zamzam John APRN  Independent Historians: Patient, EMS      HPI:  Chief Complaint: had concerns including Stroke.  Right-sided weakness    A complete HPI/ROS/PMH/PSH/SH/FH are unobtainable due to: Aphasia      Context: Erlin Blanco is a 78 y.o. male with a medical history of prior stroke with history of left-sided deficits who presents to the ED c/o acute right facial droop, right arm weakness and speech disturbance onset at 9:05 PM tonight.  His wife was with him when she noted the facial droop.  Patient denies headache.  EMS states that he was hypertensive prior to arrival, blood glucose within normal limits.  Patient reportedly takes Eliquis.  He believes that his last dose was this morning.  It is unclear from EMS if patient has any chronic neurologic deficits related to previous stroke.      Review of prior external notes (non-ED) -and- Review of prior external test results outside of this encounter: N/A        PAST MEDICAL HISTORY  Active Ambulatory Problems     Diagnosis Date Noted    No Active Ambulatory Problems     Resolved Ambulatory Problems     Diagnosis Date Noted    No Resolved Ambulatory Problems     No Additional Past Medical History         PAST SURGICAL HISTORY  No past surgical history on file.      FAMILY HISTORY  No family history on file.      SOCIAL HISTORY  Social History     Socioeconomic History    Marital status:          ALLERGIES  Patient has no known allergies.      REVIEW OF SYSTEMS  Unable to obtain complete review of systems due to patient's aphasia      PHYSICAL EXAM    I have reviewed the triage vital signs and nursing notes.    ED Triage Vitals   Temp Heart Rate Resp BP SpO2   02/24/24 2209 02/24/24 2208 02/24/24 2208 02/24/24 2208 02/24/24 2208   98.1 °F (36.7 °C) 80 16 (!) 210/113 98 %      Temp src Heart Rate Source Patient Position BP Location FiO2 (%)   02/24/24 2209 -- -- -- --   Oral              GENERAL: awake and alert, no acute distress  HENT: Normocephalic, atraumatic  EYES: no scleral icterus, pupils 3 mm reactive bilaterally, patient is able to track my finger normally with extraocular movement testing and visual fields appear full  CV: regular rhythm, regular rate  RESPIRATORY: normal effort  ABDOMEN: soft, nondistended  MUSCULOSKELETAL: no deformity  NEURO: alert, there is right lower facial droop.  Tongue protrudes midline.  Normal strength left upper and left lower extremity without drift.  There is no effort against gravity in the right upper extremity however when I raise his right arm he appears to have some effort against gravity but his arm falls to the bed.  He appears to have intact strength in the right lower extremity.  Sensation is grossly intact throughout all extremities.  No definite extinction to sensation however testing is limited due to patient's aphasia.  PSYCH: calm, cooperative  SKIN: Warm, dry          LAB RESULTS  Recent Results (from the past 24 hour(s))   Comprehensive Metabolic Panel    Collection Time: 02/24/24 10:13 PM    Specimen: Blood   Result Value Ref Range    Glucose 160 (H) 65 - 99 mg/dL    BUN 27 (H) 8 - 23 mg/dL    Creatinine 1.26 0.76 - 1.27 mg/dL    Sodium 143 136 - 145 mmol/L    Potassium 3.9 3.5 - 5.2 mmol/L    Chloride 105 98 - 107 mmol/L    CO2 31.0 (H) 22.0 - 29.0 mmol/L    Calcium 9.8 8.6 - 10.5 mg/dL    Total Protein 6.6 6.0 - 8.5 g/dL    Albumin 4.1 3.5 - 5.2 g/dL    ALT (SGPT) 15 1 - 41 U/L    AST (SGOT) 24 1 - 40 U/L    Alkaline Phosphatase 71 39 - 117 U/L    Total Bilirubin 0.4 0.0 - 1.2 mg/dL    Globulin 2.5 gm/dL    A/G Ratio 1.6 g/dL    BUN/Creatinine Ratio 21.4 7.0 - 25.0    Anion Gap 7.0 5.0 - 15.0 mmol/L    eGFR 59.1 (L) >60.0 mL/min/1.73   Protime-INR    Collection Time: 02/24/24 10:13 PM    Specimen: Blood   Result Value Ref Range    Protime 16.2 (H) 11.7 - 14.2 Seconds    INR 1.28 (H) 0.90 - 1.10   aPTT    Collection Time: 02/24/24  10:13 PM    Specimen: Blood   Result Value Ref Range    PTT 32.8 22.7 - 35.4 seconds   Single High Sensitivity Troponin T    Collection Time: 02/24/24 10:13 PM    Specimen: Blood   Result Value Ref Range    HS Troponin T 41 (H) <22 ng/L   Type & Screen    Collection Time: 02/24/24 10:13 PM    Specimen: Blood   Result Value Ref Range    ABO Type O     RH type Positive     Antibody Screen Negative     T&S Expiration Date 2/27/2024 11:59:59 PM    Green Top (Gel)    Collection Time: 02/24/24 10:13 PM   Result Value Ref Range    Extra Tube Hold for add-ons.    Lavender Top    Collection Time: 02/24/24 10:13 PM   Result Value Ref Range    Extra Tube hold for add-on    Gold Top - SST    Collection Time: 02/24/24 10:13 PM   Result Value Ref Range    Extra Tube Hold for add-ons.    Light Blue Top    Collection Time: 02/24/24 10:13 PM   Result Value Ref Range    Extra Tube Hold for add-ons.    CBC Auto Differential    Collection Time: 02/24/24 10:13 PM    Specimen: Blood   Result Value Ref Range    WBC 8.10 3.40 - 10.80 10*3/mm3    RBC 5.13 4.14 - 5.80 10*6/mm3    Hemoglobin 14.5 13.0 - 17.7 g/dL    Hematocrit 44.8 37.5 - 51.0 %    MCV 87.3 79.0 - 97.0 fL    MCH 28.3 26.6 - 33.0 pg    MCHC 32.4 31.5 - 35.7 g/dL    RDW 15.7 (H) 12.3 - 15.4 %    RDW-SD 49.1 37.0 - 54.0 fl    MPV 9.2 6.0 - 12.0 fL    Platelets 407 140 - 450 10*3/mm3    Neutrophil % 65.2 42.7 - 76.0 %    Lymphocyte % 19.1 (L) 19.6 - 45.3 %    Monocyte % 8.5 5.0 - 12.0 %    Eosinophil % 4.7 0.3 - 6.2 %    Basophil % 2.0 (H) 0.0 - 1.5 %    Immature Grans % 0.5 0.0 - 0.5 %    Neutrophils, Absolute 5.28 1.70 - 7.00 10*3/mm3    Lymphocytes, Absolute 1.55 0.70 - 3.10 10*3/mm3    Monocytes, Absolute 0.69 0.10 - 0.90 10*3/mm3    Eosinophils, Absolute 0.38 0.00 - 0.40 10*3/mm3    Basophils, Absolute 0.16 0.00 - 0.20 10*3/mm3    Immature Grans, Absolute 0.04 0.00 - 0.05 10*3/mm3    nRBC 0.0 0.0 - 0.2 /100 WBC   ECG 12 Lead Stroke Evaluation    Collection Time: 02/24/24  10:43 PM   Result Value Ref Range    QT Interval 486 ms    QTC Interval 532 ms       The above labs were ordered by me and independently reviewed by me.     RADIOLOGY  XR Chest 1 View    Result Date: 2/24/2024  CHEST SINGLE VIEW  HISTORY: Stroke  COMPARISON: None  FINDINGS: Heart size is mildly enlarged. Lungs appear clear of focal airspace disease and there is no evidence for pulmonary edema or pleural effusion or infiltrate. Cardiac monitor and leads are present. Atherosclerotic calcifications are present overlying the thoracic aorta.      Cardiomegaly. Atherosclerotic disease. No evidence for active disease in the chest  This report was finalized on 2/24/2024 11:24 PM by Dr. Aftab Duke M.D on Workstation: [id number]      CT Angiogram Head w AI Analysis of LVO    Result Date: 2/24/2024  CT HEAD WITHOUT CONTRAST  HISTORY: Neurologic deficit. Mental status change.  TECHNIQUE:  Head CT includes axial imaging from the base of skull to the vertex without intravenous contrast. Radiation dose reduction techniques were utilized, including automated exposure control and exposure modulation based on body size.  COMPARISON: None  FINDINGS: There is an acute intraparenchymal hematoma centered within the left thalamus and extending slightly anteromedial to the left thalamus measuring approximately 2.6 x 2 x 1.8 cm. This exhibits surrounding edema and there is localized mass effect. No additional intraparenchymal hemorrhage is evident. There is moderate chronic small vessel ischemic white matter change demonstrated as patchy deep cerebral white matter hypodensity. Intracranial atherosclerotic calcifications are present.      1. Acute intraparenchymal hemorrhage centered within the left thalamus measuring 2.6 x 2 x 1.8 cm with surrounding cerebral edema. Follow-up evaluation recommended. 2. Moderate to severe chronic small vessel ischemic white matter change. Intracranial atherosclerotic calcifications.   Findings discussed  with the stroke neurologist on call on 02/24/2024 at 10:28 p.m.  Radiation dose reduction techniques were utilized, including automated exposure control and exposure modulation based on body size.   This report was finalized on 2/24/2024 10:46 PM by Dr. Aftab Duke M.D on Workstation: [id number]       The above radiology studies were ordered by me.  See ED course for independent interpretations.     MEDICATIONS GIVEN IN ER  Medications   sodium chloride 0.9 % flush 10 mL (has no administration in time range)   ondansetron (ZOFRAN) injection 4 mg (has no administration in time range)   acetaminophen (TYLENOL) suppository 650 mg (has no administration in time range)   niCARdipine (CARDENE) 25 mg in 250 mL NS infusion kit (15 mg/hr Intravenous Currently Infusing 2/24/24 2348)   niCARdipine (CARDENE) 25 mg in 250 mL NS infusion kit (5 mg/hr Intravenous Given 2/24/24 2251)   labetalol (NORMODYNE,TRANDATE) injection 20 mg (20 mg Intravenous Given 2/24/24 2250)   levETIRAcetam (KEPPRA) injection 1,000 mg (1,000 mg Intravenous Given 2/24/24 2250)   prothrombin complex conc human (KCentra) IV solution 4,515 Units (4,515 Units Intravenous Given 2/24/24 2348)         ORDERS PLACED DURING THIS VISIT:  Orders Placed This Encounter   Procedures    CT Angiogram Head w AI Analysis of LVO    XR Chest 1 View    [place] Draw    Comprehensive Metabolic Panel    Protime-INR    aPTT    Single High Sensitivity Troponin T    CBC Auto Differential    NPO Diet NPO Type: Strict NPO    Initiate Department's Acute Stroke Process (Team D, Code 19, etc.)    Perform NIH Stroke Scale    Measure Actual Weight    Notify MD for SBP < 80 or > 200    Notify Provider for SBP greater than 140 if hemorrhagic stroke    Head of Bed 30 Degrees or Less    Undress and Gown    Continuous Pulse Oximetry    Vital Signs    Neuro Checks    No Hypotonic Fluids    Nursing Dysphagia Screening (Complete Prior to Giving anything PO)    RN to Place Order SLP Consult  (IF swallow screen failed) - Eval & Treat Choosing Reason of RN Dysphagia Screen Failed    Notify Neurology immediately for emergent consultation if any seizure activity is appreciated.    Elevate HOB ~ 45 degrees    Maintain blood pressure as follows:    Inpatient Neurology Consult Stroke    Inpatient Neurology Consult Stroke    Neurosurgery (on-call MD unless specified)    Pulmonology (on-call MD unless specified)    Oxygen Therapy- Nasal Cannula; Titrate 1-6 LPM Per SpO2; 90 - 95%    POC Glucose Once    ECG 12 Lead Stroke Evaluation    Type & Screen    Insert Large-Bore Peripheral IV - RIGHT AC Preferred    Inpatient Admission    Seizure precautions    CBC & Differential    Green Top (Gel)    Lavender Top    Gold Top - SST    Light Blue Top         OUTPATIENT MEDICATION MANAGEMENT:  Current Facility-Administered Medications Ordered in Epic   Medication Dose Route Frequency Provider Last Rate Last Admin    acetaminophen (TYLENOL) suppository 650 mg  650 mg Rectal Q4H PRN David Pineda MD        niCARdipine (CARDENE) 25 mg in 250 mL NS infusion kit  5-15 mg/hr Intravenous Titrated David Pineda  mL/hr at 02/24/24 2348 15 mg/hr at 02/24/24 2348    ondansetron (ZOFRAN) injection 4 mg  4 mg Intravenous Once PRN David Pineda MD        sodium chloride 0.9 % flush 10 mL  10 mL Intravenous PRN David Pineda MD         No current [facility]-ordered outpatient medications on file.         PROCEDURES  Procedures      Critical care provider statement:    Critical care time (minutes): .   Critical care time was exclusive of:  Separately billable procedures and treating other patients   Critical care was necessary to treat or prevent imminent or life-threatening deterioration of the following conditions:  CNS Failure   Critical care was time spent personally by me on the following activities:  Development of treatment plan with patient or surrogate, discussions with consultants,  evaluation of patient's response to treatment, examination of patient, obtaining history from patient or surrogate, ordering and performing treatments and interventions, ordering and review of laboratory studies, ordering and review of radiographic studies, pulse oximetry, re-evaluation of patient's condition and review of old charts. Critical Care indicators: Stroke Vasodilators: nitro, nipride, et al.      PROGRESS, DATA ANALYSIS, CONSULTS, AND MEDICAL DECISION MAKING  All labs have been independently interpreted by me.  All radiology studies have been reviewed by me. All EKG's have been independently viewed and interpreted by me.  Discussion below represents my analysis of pertinent findings related to patient's condition, differential diagnosis, treatment plan and final disposition.    Differential diagnosis includes but is not limited to:  Acute ischemic stroke  Acute hemorrhagic stroke  TIA  Seizure with Rey's paralysis      Clinical Scores:              ICH Score:  0 Points (GCS 13 to 15)  0 Points (ICH volume < 30 cm3)  0 Points (Intraventricular Extension Absent)   0 Points (Infratentorial Origin - No )  0 Points (Age < 80 years)  The total ICS Score for this patient is 0 at 00:30 EST on 02/25/24      ED Course as of 02/25/24 0027   Sat Feb 24, 2024 2209 BP(!): 210/113  With EMS [JR]   2209 Patient has expressive aphasia, right facial droop, right upper extremity weakness.  Symptom onset around 9:05 PM.  He would not be candidate for thrombolytic therapy as he is on Eliquis and he believes his last dose was this morning.  Will obtain stroke imaging studies to determine if he may have a large vessel occlusion amenable to intervention.  [JR]   2214 NIH Stroke Scale/Score (NIHSS) - MDCalc  Calculated on Feb 24 2024 10:14 PM  7 points -> NIH Stroke Scale [JR]   2214 Discussed with Dr. Bales, stroke neurologist, who agreed with plan to obtain CTA, CT perfusion studies. [JR]   2227 CT brain without  contrast independently interpreted in PACS.  There appears to be intraparenchymal hemorrhage within the left thalamic region without intraventricular extension. [JR]   2230 I have ordered IV labetalol for immediate blood pressure reduction, Cardene infusion, Keppra for seizure prophylaxis, and Kcentra for reversal of Eliquis.  Neurosurgery has been consulted as well as pulmonology for ICU admission. [JR]   2235 Discussed with Dr. Miller, neurosurgery.  He requested systolic blood pressure goal less than 160.  He also requested a repeat CT brain for the morning.  He agreed with other interventions and plan for admission to ICU. [JR]   2244 Discussed with MUKUL Tanner for pulmonology, who agrees to admit to ICU on behalf of Dr. Dale.  [JR]   Sun Feb 25, 2024   0015 EKG          EKG time: 2243  Rhythm/Rate: Sinus rhythm, 72  P waves and GA: Normal  QRS, axis: LBBB  ST and T waves: No acute ischemic changes    Interpreted Contemporaneously by me, independently viewed         [JR]      ED Course User Index  [JR] David Pineda MD       Patient and his family including his son and daughter-in-law were updated on CT findings and treatment plan at the bedside.  I explained the risk and benefits of reversing his Eliquis, as well as the potential for this bleed to worsen resulting in permanent disability, coma, or death.  All questions were answered prior to admission.    AS OF 00:27 EST VITALS:    BP - 154/77  HR - 84  TEMP - 98.1 °F (36.7 °C) (Oral)  O2 SATS - 92%    COMPLEXITY OF CARE  The patient requires admission.      Chronic or social conditions impacting patient care (Social Determinants of Health):     DIAGNOSIS  Final diagnoses:   Intraparenchymal hemorrhage of brain   Facial droop   Expressive aphasia   Weakness of right upper extremity   Chronic anticoagulation           DISPOSITION  Admit      Prescription drug monitoring program review:           Please note that portions of this document were  completed with a voice recognition program.    Note Disclaimer: At [facility], we believe that sharing information builds trust and better relationships. You are receiving this note because you recently visited [facility]. It is possible you will see health information before a provider has talked with you about it. This kind of information can be easy to misunderstand. To help you fully understand what it means for your health, we urge you to discuss this note with your provider.         David Pineda MD  02/25/24 0029       David Pineda MD  02/25/24 0030

## 2024-02-25 NOTE — H&P
Group: [facility]        HISTORY AND PHYSICAL    Patient Identification:  Erlin Blanco  78 y.o.  male  1945  [id number]            CC: Facial droop, dysarthria    History of Present Illness:  Erlin Blanco is a 78-year-old male with a past medical history of hypertension, coronary artery disease, SVT, hyperlipidemia, atrial flutter, chronic diastolic congestive heart failure, type 2 diabetes mellitus, BPH, CKD, and history of stroke with some residual left-sided deficits.  He is on chronic anticoagulation with apixaban for his history of atrial fibrillation.    He presented to the ED on 2/24/2024 with complaints of right facial droop, right arm weakness and speech disturbance.  EMS was called and patient was noticed hypertensive with blood glucose within normal limits.  Upon arrival to the ED, patient's blood pressure was 210/113.  Patient denied headache.  ED evaluation included a CTA head which revealed an acute intraparenchymal hemorrhage within the left thalamus measuring 2.6 x 2 x 1.8 cm with surrounding cerebral edema.  Dr. Miller, with neurosurgery was contacted from the emergency department and he recommended systolic blood pressure goal less than 160, repeat CT head in the morning.  Patient received IV labetalol and was started on nicardipine for hypertension.  He also received Keppra for seizure prophylaxis and Kcentra for Eliquis reversal.    In the emergency department, patient had right-sided facial droop, was able to follow commands with the right hand however had little effort against gravity.  He had dysarthria.  Upon arrival to the ICU, patient had decreased motor strength in the right upper and lower extremity with neglect.      Review of Systems:  Unable to obtain, patient is able to answer yes/no to some questions, however has dysarthria and expressive aphasia.     Past Medical History:  No past medical history on file.    Past Surgical History:  No past surgical history on  "file.     Home Meds:  No medications prior to admission.       Allergies:  No Known Allergies    Social History:   Social History     Socioeconomic History    Marital status:        Family History:  No family history on file.    Physical Exam:  /77   Pulse 84   Temp 98.1 °F (36.7 °C) (Oral)   Resp 16   Wt 116 kg (255 lb 1.2 oz)   SpO2 92%  There is no height or weight on file to calculate BMI. 92% 116 kg (255 lb 1.2 oz)    Constitutional: Elderly male pt in bed, No acute respiratory distress, no accessory muscle use  Head: - NCAT  Eyes: No pallor, Anicteric conjunctiva, EOMI.  ENMT:  Mallampati 3, no oral thrush. Dry MM.   NECK: Trachea midline, No thyromegaly, no palpable cervical lymphadenopathy  Heart: RRR, no murmur. No pedal edema   Lungs: JOSE ANTONIO +, No wheezes/ crackles heard    Abdomen: Soft. No tenderness, guarding or rigidity. No palpable masses  Extremities: Extremities warm and well perfused. No cyanosis/ clubbing  Neuro: Alert, oriented to self, strength 5/5 in left upper and lower extremity, 1/5 in right upper extremity, 0/5 in LLE, dysarthria, expressive aphasia, pupils deviated up and to the left    PPE recommended per [facility] infectious disease Isolation protocol for the current clinical scenario(as mentioned below) was followed.      LABS:  No results found for: \"COVID19\"    Lab Results   Component Value Date    CALCIUM 9.8 02/24/2024     Results from last 7 days   Lab Units 02/24/24 2213   SODIUM mmol/L 143   POTASSIUM mmol/L 3.9   CHLORIDE mmol/L 105   CO2 mmol/L 31.0*   BUN mg/dL 27*   CREATININE mg/dL 1.26   GLUCOSE mg/dL 160*   CALCIUM mg/dL 9.8   WBC 10*3/mm3 8.10   HEMOGLOBIN g/dL 14.5   PLATELETS 10*3/mm3 407   ALT (SGPT) U/L 15   AST (SGOT) U/L 24     Lab Results   Component Value Date    TROPONINT 41 (H) 02/24/2024     Results from last 7 days   Lab Units 02/24/24 2213   HSTROP T ng/L 41*                     Results from last 7 days   Lab Units 02/24/24 2213   INR " " 1.28*         No results found for: \"TSH\"  CrCl cannot be calculated (Unknown ideal weight.).         Imaging: I personally visualized the images of scans/x-rays performed within last 3 days.      Assessment:  Intraparenchymal hemorrhage  Right sided weakness, neglect  Expressive aphasia, Dysarthria  Hypertensive emergency  Coronary artery disease  Atrial fibrillation/flutter  Diastolic congestive heart failure  Hyperglycemia type 2 diabetes mellitus  BPH  Chronic kidney disease  Chronic anticoagulation with apixaban    Recommendations:  Intraparenchymal hemorrhage  Right sided weakness, neglect  Expressive aphasia  Dysarthria  CT head from 2/24/2024 at 2233 shows 2.6 x 2 x 1.8 cm intraparenchymal hemorrhage within the left thalamus with surrounding cerebral edema  Repeat CT head from 2/25/2024 at 0123 shows 2.7 x 2.8 x 3.1 left thymic intraparenchymal hemorrhage with vasogenic edema with intraventricular extension of the hemorrhage into the left lateral ventricle with increased mass effect upon the lateral ventricle with a subtle 3 mm leftward midline shift.  Neurosurgery, Dr. Miller, contacted from the ER and then again after repeat CT head was completed--goals for systolic less than 140 ordered.  Loading dose Keppra given in the emergency department for seizure prophylaxis, continue 500 mg twice daily.  Apixaban reversed with Kcentra in the emergency department.  Repeat INR in the morning.  Neurology consulted.  SLP/OT/PT consulted.  Hold anticoagulation.    Hypertensive emergency  Profoundly hypertensive upon arrival with blood pressure 210/113.  Goal SBP per neurosurgery is less than 140.  Currently on max dose of nicardipine, receiving regular injections of labetalol and hydralazine.  Add Cleviprex.    Coronary artery disease  EKG showing chronic left bundle branch block, follows with Dr. Arias in the outpatient setting.    Atrial fibrillation/flutter  Currently rate controlled, no anticoagulation given brain " bleed.    Diastolic congestive heart failure  Appears euvolemic on exam, reports torsemide as an outpatient home medication.    Hyperglycemia type 2 diabetes mellitus  Blood glucose 160, repeat 148.  No indication for subcutaneous insulin at this time.    BPH  Reports Flomax is home medication, requiring Aguila catheter due to activity restriction and severe agitation.    Chronic kidney disease  Creatinine 1.26, BUN 27, GFR 59.1.  Monitor strict I's and O's, avoid nephrotoxins.    Chronic anticoagulation with apixaban  Hold given acute bleed.    NPO  SCDS  Full code    Spoke to patient's family in the ER and then upon arrival into the ICU, confirmed that patient is a full code.  Will reconcile home medication list once input by RN.    Patient was placed in face mask upon entering room and kept mask on throughout our encounter. I wore full protective equipment throughout this patient encounter including a face mask, gown and gloves. Hand hygiene was performed before donning protective equipment and after removal when leaving the room.    Lucia Pagan, APRN  2/25/2024  01:08 EST      Much of this encounter note is an electronic transcription/translation of spoken language to printed text using Dragon Software.

## 2024-02-25 NOTE — PROGRESS NOTES
Pulm/CC Note    Upon arrival to the ICU, patient having worsening neuro status with total loss of motor function in the right upper extremity.  STAT CT head without contrast was performed showing increase in thalamic bleed size with intraventricular extension and worsening mass effect of 3mm.  Dr. Miller was notified by nursing staff upon completion of CT head of neurological changes and worsening results- no new orders were placed, plan to continue strict BP management and repeat CT head after 0530.

## 2024-02-25 NOTE — PROGRESS NOTES
[facility] Acute Rehab Note:    Referral received via stroke order set.  Please note this is a screening only, rehab admissions will not actively be evaluating this patient.  If felt patient is appropriate for our services once therapies begin, please call our office at [phone], to initiate a full referral.    Thank you,   Wendy Wade, RN  Rehab Admission Nurse

## 2024-02-25 NOTE — PROGRESS NOTES
LOS: 1 day   Patient Care Team:  Zamzam John APRN as PCP - General (Family Medicine)    Subjective     Patient new to me today I am picking up critical care coverage from Dr. Dale I have reviewed his and Sheila Pagan notes along with other records.  Presented with facial droop and dysarthria 78-year-old male with hypertension coronary artery disease, hyperlipidemia atrial flutter chronic diastolic heart failure type 2 diabetes chronic kidney disease prior stroke with some residual left-sided deficits on chronic anticoagulation with apixaban for his A-fib evaluation in ED revealed a acute left thalamic hemorrhage and surrounding cerebral edema.  At worsening neurostatus and loss of total motor function in the right upper extremity repeat CT head showed worsening of the thalamic bleed with some intraventricular extension.  Patient is not following commands he did make some moaning type sounds but really was not talking.  Review of Systems:          Objective     Vital Signs  Vital Sign Min/Max for last 24 hours  Temp  Min: 97.6 °F (36.4 °C)  Max: 98.1 °F (36.7 °C)   BP  Min: 115/84  Max: 210/113   Pulse  Min: 65  Max: 92   Resp  Min: 15  Max: 18   SpO2  Min: 90 %  Max: 98 %   Flow (L/min)  Min: 2  Max: 2   Weight  Min: 115 kg (254 lb 10.1 oz)  Max: 116 kg (255 lb 1.2 oz)        Ventilator/Non-Invasive Ventilation Settings (From admission, onward)      None                         There is no height or weight on file to calculate BMI.  I/O last 3 completed shifts:  In: 1210.6 [I.V.:1210.6]  Out: 350 [Urine:350]  No intake/output data recorded.        Physical Exam:  General Appearance: Older white male lying in bed on a couple liters nasal cannula O2 oxygen saturation 98% he is on clevidipine at 32 and Cardene at 15 mg an hour  Eyes: Conjunctiva are injected, anicteric, pupils are equal they are about 2-1/2 to 3 mm they are equal and reactive to light  ENT: Mucous membranes are dry Mallampati type II  airway, nasal septum midline  Neck: Trachea midline no adenopathy and no jugular venous distention  Lungs: Clear nonlabored symmetric expansion  Cardiac: Irregularly irregular and there is a murmur heard loudest over the right upper sternal border systolic and wife says this is a known murmur  Abdomen: Soft nontender no hepatosplenomegaly, obese  : Not examined  Musculoskeletal: He has trace edema of all 4 extremities  Skin: Warm and dry no jaundice no petechiae he has some chronic venous type insufficiency changes in the lower extremities bilaterally  Neuro: Patient is not following commands he withdraws pretty vigorously to nailbed pressure with the left upper and lower extremities even had some spontaneous movement.  Minimal with the left upper extremity somewhere in between with the left lower extremity  Extremities/P Vascular: Palpable radial and dorsalis pedis pulses  MSE: Unable to assess       Labs:  Results from last 7 days   Lab Units 02/24/24  2213   GLUCOSE mg/dL 160*   SODIUM mmol/L 143   POTASSIUM mmol/L 3.9   CO2 mmol/L 31.0*   CHLORIDE mmol/L 105   ANION GAP mmol/L 7.0   CREATININE mg/dL 1.26   BUN mg/dL 27*   BUN / CREAT RATIO  21.4   CALCIUM mg/dL 9.8   ALK PHOS U/L 71   TOTAL PROTEIN g/dL 6.6   ALT (SGPT) U/L 15   AST (SGOT) U/L 24   BILIRUBIN mg/dL 0.4   ALBUMIN g/dL 4.1   GLOBULIN gm/dL 2.5     CrCl cannot be calculated (Unknown ideal weight.).      Results from last 7 days   Lab Units 02/24/24  2213   WBC 10*3/mm3 8.10   RBC 10*6/mm3 5.13   HEMOGLOBIN g/dL 14.5   HEMATOCRIT % 44.8   MCV fL 87.3   MCH pg 28.3   MCHC g/dL 32.4   RDW % 15.7*   RDW-SD fl 49.1   MPV fL 9.2   PLATELETS 10*3/mm3 407   NEUTROPHIL % % 65.2   LYMPHOCYTE % % 19.1*   MONOCYTES % % 8.5   EOSINOPHIL % % 4.7   BASOPHIL % % 2.0*   IMM GRAN % % 0.5   NEUTROS ABS 10*3/mm3 5.28   LYMPHS ABS 10*3/mm3 1.55   MONOS ABS 10*3/mm3 0.69   EOS ABS 10*3/mm3 0.38   BASOS ABS 10*3/mm3 0.16   IMMATURE GRANS (ABS) 10*3/mm3 0.04   NRBC /100  WBC 0.0         Results from last 7 days   Lab Units 02/24/24  2213   HSTROP T ng/L 41*                 Results from last 7 days   Lab Units 02/24/24  2213   INR  1.28*     Microbiology Results (last 10 days)       ** No results found for the last 240 hours. **                insulin regular, 2-9 Units, Subcutaneous, Q6H  levETIRAcetam, 500 mg, Intravenous, Q12H  senna-docusate sodium, 2 tablet, Oral, BID  sodium chloride, 10 mL, Intravenous, Q12H      clevidipine, 2-32 mg/hr, Last Rate: 7 mg/hr (02/25/24 0533)  niCARdipine, 5-15 mg/hr, Last Rate: 10 mg/hr (02/25/24 0643)        Diagnostics:  CT Head Without Contrast    Addendum Date: 2/25/2024    ADDENDUM: 02 25 24 02:07 Call Doctor Regarding Intracranial Hemorrhage, called NP Dena Pagan on 02 25 02:07 (-05:00)     Result Date: 2/25/2024  CR Patient: FELIPE DOCKERY  Time Out: 01:54 Exam(s): CT HEAD Without Contrast EXAM:   CT Head Without Intravenous Contrast CLINICAL HISTORY:    Reason for exam: Neuro deficit, acute, stroke suspected. TECHNIQUE:   Axial computed tomography images of the head brain without intravenous contrast.  This CT exam was performed according to the principle of ALARA (As Low As Reasonably Achievable) by using one or more of the following dose reduction techniques: automated exposure control, adjustment of the mA and or kV according to patient size, and or use of iterative reconstruction technique. COMPARISON:   No relevant prior studies available. FINDINGS:   Brain:  2.7 x 2.8 x 3.1 cm left thalamic intraparenchymal hemorrhage with associated vasogenic edema.  This has increased in size from the prior study when it measured 2.2 x 1.9 x 1.6 cm.  Additionally there has been interval intraventricular extension of the hemorrhage into the left lateral ventricle with greater mass-effect upon the lateral ventricle with subtle 3 mm left to right midline shift.  Small amount of hemorrhage is also noted within the dependent portion of the right  lateral ventricle.   Ventricles:  See above.   Bones joints:  Unremarkable.  No acute fracture.   Soft tissues:  Unremarkable.   Sinuses:  Unremarkable as visualized.  No acute sinusitis.   Mastoid air cells:  Unremarkable as visualized.  No mastoid effusion. IMPRESSION:       Increasing left thalamic hemorrhage measuring 3.1 cm in greatest dimension on the current study increased from 2.2 cm on the prior exam.  This results in greater mass-effect upon the left lateral ventricle, with intraventricular extension and hemorrhage seen within both lateral ventricles.     Communications:  Call Doctor Intracranial Hemorrhage Electronically signed by Noman Winchester MD on 02-25-24 at 0154    XR Chest 1 View    Result Date: 2/24/2024  CHEST SINGLE VIEW  HISTORY: Stroke  COMPARISON: None  FINDINGS: Heart size is mildly enlarged. Lungs appear clear of focal airspace disease and there is no evidence for pulmonary edema or pleural effusion or infiltrate. Cardiac monitor and leads are present. Atherosclerotic calcifications are present overlying the thoracic aorta.      Cardiomegaly. Atherosclerotic disease. No evidence for active disease in the chest  This report was finalized on 2/24/2024 11:24 PM by Dr. Aftab Duke M.D on Workstation: [id number]      CT Angiogram Head w AI Analysis of LVO    Result Date: 2/24/2024  CT HEAD WITHOUT CONTRAST  HISTORY: Neurologic deficit. Mental status change.  TECHNIQUE:  Head CT includes axial imaging from the base of skull to the vertex without intravenous contrast. Radiation dose reduction techniques were utilized, including automated exposure control and exposure modulation based on body size.  COMPARISON: None  FINDINGS: There is an acute intraparenchymal hematoma centered within the left thalamus and extending slightly anteromedial to the left thalamus measuring approximately 2.6 x 2 x 1.8 cm. This exhibits surrounding edema and there is localized mass effect. No additional  intraparenchymal hemorrhage is evident. There is moderate chronic small vessel ischemic white matter change demonstrated as patchy deep cerebral white matter hypodensity. Intracranial atherosclerotic calcifications are present.      1. Acute intraparenchymal hemorrhage centered within the left thalamus measuring 2.6 x 2 x 1.8 cm with surrounding cerebral edema. Follow-up evaluation recommended. 2. Moderate to severe chronic small vessel ischemic white matter change. Intracranial atherosclerotic calcifications.   Findings discussed with the stroke neurologist on call on 02/24/2024 at 10:28 p.m.  Radiation dose reduction techniques were utilized, including automated exposure control and exposure modulation based on body size.   This report was finalized on 2/24/2024 10:46 PM by Dr. Aftab Duke M.D on Workstation: [id number]          Reviewed just had a CT head repeat stable left thalamic hemorrhage with some intraventricular hemorrhage    Active Hospital Problems    Diagnosis  POA    **Intraparenchymal hemorrhage of brain [I61.9]  Yes      Resolved Hospital Problems   No resolved problems to display.         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  There was some enlargement of his bleeding on repeat scanning the recent scanning was just completed moments ago looks stable.  Neurosurgery is following and has discussed with the family and patient potentials for surgery.  Patient on prophylactic Keppra.  Paroxysmal atrial fibrillation on Eliquis chronically, patient received Kcentra in the emergency department and of course with his intracranial hemorrhage anticoagulation will be on hold.  Rate seems to be controlled.  Hypertensive emergency blood pressure goal now less than 140 he is on 2 calcium blockers and get a try and get this changed around.  Possible polycythemia vera  Hyperlipidemia  Diabetes mellitus  type 2 blood sugars high we will try and get better control.    Plan for disposition:    Chet Rivero Jr, MD  02/25/24  07:07 EST    Time: Care time 44 minutes

## 2024-02-26 ENCOUNTER — APPOINTMENT (OUTPATIENT)
Dept: CT IMAGING | Facility: HOSPITAL | Age: 79
DRG: 064 | End: 2024-02-26
Payer: MEDICARE

## 2024-02-26 LAB
ANION GAP SERPL CALCULATED.3IONS-SCNC: 11 MMOL/L (ref 5–15)
BASOPHILS # BLD AUTO: 0.1 10*3/MM3 (ref 0–0.2)
BASOPHILS NFR BLD AUTO: 0.8 % (ref 0–1.5)
BUN SERPL-MCNC: 23 MG/DL (ref 8–23)
BUN/CREAT SERPL: 19.3 (ref 7–25)
CALCIUM SPEC-SCNC: 8.9 MG/DL (ref 8.6–10.5)
CHLORIDE SERPL-SCNC: 112 MMOL/L (ref 98–107)
CO2 SERPL-SCNC: 22 MMOL/L (ref 22–29)
CREAT BLDA-MCNC: 1.6 MG/DL (ref 0.6–1.3)
CREAT SERPL-MCNC: 1.19 MG/DL (ref 0.76–1.27)
DEPRECATED RDW RBC AUTO: 52.8 FL (ref 37–54)
EGFRCR SERPLBLD CKD-EPI 2021: 63.3 ML/MIN/1.73
EOSINOPHIL # BLD AUTO: 0.06 10*3/MM3 (ref 0–0.4)
EOSINOPHIL NFR BLD AUTO: 0.5 % (ref 0.3–6.2)
ERYTHROCYTE [DISTWIDTH] IN BLOOD BY AUTOMATED COUNT: 16.2 % (ref 12.3–15.4)
GLUCOSE BLDC GLUCOMTR-MCNC: 165 MG/DL (ref 70–130)
GLUCOSE BLDC GLUCOMTR-MCNC: 168 MG/DL (ref 70–130)
GLUCOSE BLDC GLUCOMTR-MCNC: 171 MG/DL (ref 70–130)
GLUCOSE BLDC GLUCOMTR-MCNC: 176 MG/DL (ref 70–130)
GLUCOSE BLDC GLUCOMTR-MCNC: 192 MG/DL (ref 70–130)
GLUCOSE BLDC GLUCOMTR-MCNC: 209 MG/DL (ref 70–130)
GLUCOSE SERPL-MCNC: 239 MG/DL (ref 65–99)
HCT VFR BLD AUTO: 43 % (ref 37.5–51)
HGB BLD-MCNC: 13.9 G/DL (ref 13–17.7)
IMM GRANULOCYTES # BLD AUTO: 0.05 10*3/MM3 (ref 0–0.05)
IMM GRANULOCYTES NFR BLD AUTO: 0.4 % (ref 0–0.5)
LYMPHOCYTES # BLD AUTO: 0.65 10*3/MM3 (ref 0.7–3.1)
LYMPHOCYTES NFR BLD AUTO: 5.2 % (ref 19.6–45.3)
MAGNESIUM SERPL-MCNC: 2 MG/DL (ref 1.6–2.4)
MCH RBC QN AUTO: 29.3 PG (ref 26.6–33)
MCHC RBC AUTO-ENTMCNC: 32.3 G/DL (ref 31.5–35.7)
MCV RBC AUTO: 90.5 FL (ref 79–97)
MONOCYTES # BLD AUTO: 0.62 10*3/MM3 (ref 0.1–0.9)
MONOCYTES NFR BLD AUTO: 5 % (ref 5–12)
NEUTROPHILS NFR BLD AUTO: 10.92 10*3/MM3 (ref 1.7–7)
NEUTROPHILS NFR BLD AUTO: 88.1 % (ref 42.7–76)
NRBC BLD AUTO-RTO: 0 /100 WBC (ref 0–0.2)
PLATELET # BLD AUTO: 350 10*3/MM3 (ref 140–450)
PMV BLD AUTO: 9.8 FL (ref 6–12)
POTASSIUM SERPL-SCNC: 3.5 MMOL/L (ref 3.5–5.2)
RBC # BLD AUTO: 4.75 10*6/MM3 (ref 4.14–5.8)
SODIUM SERPL-SCNC: 145 MMOL/L (ref 136–145)
WBC NRBC COR # BLD AUTO: 12.4 10*3/MM3 (ref 3.4–10.8)

## 2024-02-26 PROCEDURE — 63710000001 INSULIN REGULAR HUMAN PER 5 UNITS

## 2024-02-26 PROCEDURE — 80048 BASIC METABOLIC PNL TOTAL CA: CPT

## 2024-02-26 PROCEDURE — 25810000003 SODIUM CHLORIDE 0.9 % SOLUTION 250 ML FLEX CONT: Performed by: INTERNAL MEDICINE

## 2024-02-26 PROCEDURE — 85025 COMPLETE CBC W/AUTO DIFF WBC: CPT

## 2024-02-26 PROCEDURE — 92610 EVALUATE SWALLOWING FUNCTION: CPT

## 2024-02-26 PROCEDURE — 82948 REAGENT STRIP/BLOOD GLUCOSE: CPT

## 2024-02-26 PROCEDURE — 70450 CT HEAD/BRAIN W/O DYE: CPT

## 2024-02-26 PROCEDURE — 25010000002 LABETALOL 5 MG/ML SOLUTION 20 ML VIAL: Performed by: INTERNAL MEDICINE

## 2024-02-26 PROCEDURE — 99232 SBSQ HOSP IP/OBS MODERATE 35: CPT | Performed by: NEUROLOGICAL SURGERY

## 2024-02-26 PROCEDURE — 25010000002 CLEVIDIPINE BUTYRATE PER 1 MG

## 2024-02-26 PROCEDURE — 63710000001 INSULIN GLARGINE PER 5 UNITS: Performed by: INTERNAL MEDICINE

## 2024-02-26 PROCEDURE — C9248 INJ, CLEVIDIPINE BUTYRATE: HCPCS

## 2024-02-26 PROCEDURE — 83735 ASSAY OF MAGNESIUM: CPT

## 2024-02-26 PROCEDURE — 25010000002 LEVETRIRACETAM PER 10 MG

## 2024-02-26 RX ORDER — LABETALOL 100 MG/1
100 TABLET, FILM COATED ORAL EVERY 12 HOURS SCHEDULED
Status: DISCONTINUED | OUTPATIENT
Start: 2024-02-26 | End: 2024-02-27

## 2024-02-26 RX ORDER — AMLODIPINE BESYLATE 10 MG/1
10 TABLET ORAL
Status: DISCONTINUED | OUTPATIENT
Start: 2024-02-26 | End: 2024-02-28

## 2024-02-26 RX ORDER — POTASSIUM CHLORIDE 7.45 MG/ML
10 INJECTION INTRAVENOUS
Status: DISCONTINUED | OUTPATIENT
Start: 2024-02-26 | End: 2024-02-26

## 2024-02-26 RX ADMIN — LABETALOL HYDROCHLORIDE 100 MG: 100 TABLET, FILM COATED ORAL at 20:00

## 2024-02-26 RX ADMIN — CLEVIPIDINE 6 MG/HR: 0.5 EMULSION INTRAVENOUS at 19:00

## 2024-02-26 RX ADMIN — AMLODIPINE BESYLATE 10 MG: 10 TABLET ORAL at 12:10

## 2024-02-26 RX ADMIN — CLEVIPIDINE 10 MG/HR: 0.5 EMULSION INTRAVENOUS at 04:00

## 2024-02-26 RX ADMIN — Medication 10 ML: at 20:05

## 2024-02-26 RX ADMIN — SENNOSIDES AND DOCUSATE SODIUM 2 TABLET: 50; 8.6 TABLET ORAL at 20:06

## 2024-02-26 RX ADMIN — LABETALOL HYDROCHLORIDE 1.5 MG/MIN: 5 INJECTION INTRAVENOUS at 22:35

## 2024-02-26 RX ADMIN — LABETALOL HYDROCHLORIDE 2 MG/MIN: 5 INJECTION INTRAVENOUS at 12:42

## 2024-02-26 RX ADMIN — CLEVIPIDINE 10 MG/HR: 0.5 EMULSION INTRAVENOUS at 23:00

## 2024-02-26 RX ADMIN — CLEVIPIDINE 6 MG/HR: 0.5 EMULSION INTRAVENOUS at 12:41

## 2024-02-26 RX ADMIN — LABETALOL HYDROCHLORIDE 1.5 MG/MIN: 5 INJECTION INTRAVENOUS at 09:21

## 2024-02-26 RX ADMIN — LEVETIRACETAM 500 MG: 500 INJECTION, SOLUTION INTRAVENOUS at 09:34

## 2024-02-26 RX ADMIN — LABETALOL HYDROCHLORIDE 2 MG/MIN: 5 INJECTION INTRAVENOUS at 02:00

## 2024-02-26 RX ADMIN — CLEVIPIDINE 6 MG/HR: 0.5 EMULSION INTRAVENOUS at 16:44

## 2024-02-26 RX ADMIN — Medication 10 ML: at 09:35

## 2024-02-26 RX ADMIN — LEVETIRACETAM 500 MG: 500 INJECTION, SOLUTION INTRAVENOUS at 20:00

## 2024-02-26 RX ADMIN — CLEVIPIDINE 10 MG/HR: 0.5 EMULSION INTRAVENOUS at 02:00

## 2024-02-26 RX ADMIN — INSULIN HUMAN 2 UNITS: 100 INJECTION, SOLUTION PARENTERAL at 18:44

## 2024-02-26 RX ADMIN — LABETALOL HYDROCHLORIDE 100 MG: 100 TABLET, FILM COATED ORAL at 12:09

## 2024-02-26 RX ADMIN — LABETALOL HYDROCHLORIDE 2 MG/MIN: 5 INJECTION INTRAVENOUS at 04:00

## 2024-02-26 RX ADMIN — INSULIN HUMAN 2 UNITS: 100 INJECTION, SOLUTION PARENTERAL at 06:14

## 2024-02-26 RX ADMIN — LABETALOL HYDROCHLORIDE 1.5 MG/MIN: 5 INJECTION INTRAVENOUS at 19:00

## 2024-02-26 RX ADMIN — INSULIN HUMAN 2 UNITS: 100 INJECTION, SOLUTION PARENTERAL at 23:10

## 2024-02-26 RX ADMIN — LABETALOL HYDROCHLORIDE 1.5 MG/MIN: 5 INJECTION INTRAVENOUS at 16:17

## 2024-02-26 RX ADMIN — INSULIN GLARGINE 10 UNITS: 100 INJECTION, SOLUTION SUBCUTANEOUS at 20:00

## 2024-02-26 RX ADMIN — LABETALOL HYDROCHLORIDE 2 MG/MIN: 5 INJECTION INTRAVENOUS at 00:00

## 2024-02-26 RX ADMIN — CLEVIPIDINE 16 MG/HR: 0.5 EMULSION INTRAVENOUS at 00:00

## 2024-02-26 RX ADMIN — INSULIN HUMAN 4 UNITS: 100 INJECTION, SOLUTION PARENTERAL at 01:29

## 2024-02-26 NOTE — PROGRESS NOTES
LOS: 2 days   Patient Care Team:  Zamzam John APRN as PCP - General (Family Medicine)    Subjective     Presented with facial droop and dysarthria 78-year-old male with hypertension coronary artery disease, hyperlipidemia atrial flutter chronic diastolic heart failure type 2 diabetes chronic kidney disease prior stroke with some residual left-sided deficits on chronic anticoagulation with apixaban for his A-fib evaluation in ED revealed a acute left thalamic hemorrhage and surrounding cerebral edema.  At worsening neurostatus and loss of total motor function in the right upper extremity repeat CT head showed worsening of the thalamic bleed with some intraventricular extension.    Review of Systems:          Objective     Vital Signs  Vital Sign Min/Max for last 24 hours  Temp  Min: 97.5 °F (36.4 °C)  Max: 99 °F (37.2 °C)   BP  Min: 101/78  Max: 155/68   Pulse  Min: 62  Max: 83   Resp  Min: 14  Max: 26   SpO2  Min: 90 %  Max: 100 %   Flow (L/min)  Min: 0  Max: 2   Weight  Min: 113 kg (250 lb)  Max: 113 kg (250 lb)        Ventilator/Non-Invasive Ventilation Settings (From admission, onward)      None                         There is no height or weight on file to calculate BMI.  I/O last 3 completed shifts:  In: 3171.6 [I.V.:3171.6]  Out: 1700 [Urine:1700]  No intake/output data recorded.        Physical Exam:  General Appearance: Older white male lying in bed he is on a clevidipine drip at 6 mg an hour and labetalol at 6 blood pressure is in the 140 range right now  Eyes: Conjunctiva are injected, anicteric, pupils are equal they are about  3 mm they are equal and reactive to light  ENT: Mucous membranes are dry Mallampati type II airway, nasal septum midline  Neck: Trachea midline no adenopathy and no jugular venous distention  Lungs: Clear nonlabored symmetric expansion  Cardiac: Regular rhythm and looks to be sinus on the monitor he has a murmur at the right upper sternal border  Abdomen: Soft  nontender no hepatosplenomegaly, obese  : Not examined  Musculoskeletal: He has trace edema of all 4 extremities  Skin: Warm and dry no jaundice no petechiae he has some chronic venous type insufficiency changes in the lower extremities bilaterally  Neuro: He is awake he is talking a little bit at not necessarily appropriately words but he is making words and he is following commands with his left hand and left leg and weakly his right foot he is not able to move his right upper extremity.  He has a clear facial droop on the left  Extremities/P Vascular: Palpable radial and dorsalis pedis pulses  MSE: He seems to get a little frustrated.       Labs:  Results from last 7 days   Lab Units 02/26/24 0258 02/25/24 0720 02/24/24 2221 02/24/24 2213   GLUCOSE mg/dL 239* 314*  --  160*   SODIUM mmol/L 145 142  --  143   POTASSIUM mmol/L 3.5 3.6  --  3.9   MAGNESIUM mg/dL 2.0 2.3  --   --    CO2 mmol/L 22.0 22.7  --  31.0*   CHLORIDE mmol/L 112* 107  --  105   ANION GAP mmol/L 11.0 12.3  --  7.0   CREATININE mg/dL 1.19 1.16 1.60* 1.26   BUN mg/dL 23 27*  --  27*   BUN / CREAT RATIO  19.3 23.3  --  21.4   CALCIUM mg/dL 8.9 9.2  --  9.8   ALK PHOS U/L  --   --   --  71   TOTAL PROTEIN g/dL  --   --   --  6.6   ALT (SGPT) U/L  --   --   --  15   AST (SGOT) U/L  --   --   --  24   BILIRUBIN mg/dL  --   --   --  0.4   ALBUMIN g/dL  --   --   --  4.1   GLOBULIN gm/dL  --   --   --  2.5     CrCl cannot be calculated (Unknown ideal weight.).      Results from last 7 days   Lab Units 02/26/24 0258 02/25/24 0720 02/24/24 2213   WBC 10*3/mm3 12.40* 10.38 8.10   RBC 10*6/mm3 4.75 5.10 5.13   HEMOGLOBIN g/dL 13.9 14.7 14.5   HEMATOCRIT % 43.0 45.8 44.8   MCV fL 90.5 89.8 87.3   MCH pg 29.3 28.8 28.3   MCHC g/dL 32.3 32.1 32.4   RDW % 16.2* 15.9* 15.7*   RDW-SD fl 52.8 51.4 49.1   MPV fL 9.8 9.6 9.2   PLATELETS 10*3/mm3 350 358 407   NEUTROPHIL % % 88.1* 86.6* 65.2   LYMPHOCYTE % % 5.2* 6.6* 19.1*   MONOCYTES % % 5.0 4.1* 8.5    EOSINOPHIL % % 0.5 0.9 4.7   BASOPHIL % % 0.8 1.3 2.0*   IMM GRAN % % 0.4 0.5 0.5   NEUTROS ABS 10*3/mm3 10.92* 9.00* 5.28   LYMPHS ABS 10*3/mm3 0.65* 0.68* 1.55   MONOS ABS 10*3/mm3 0.62 0.43 0.69   EOS ABS 10*3/mm3 0.06 0.09 0.38   BASOS ABS 10*3/mm3 0.10 0.13 0.16   IMMATURE GRANS (ABS) 10*3/mm3 0.05 0.05 0.04   NRBC /100 WBC 0.0 0.0 0.0         Results from last 7 days   Lab Units 02/24/24  2213   HSTROP T ng/L 41*                 Results from last 7 days   Lab Units 02/25/24  0720 02/24/24  2213   INR  1.20* 1.28*     Microbiology Results (last 10 days)       ** No results found for the last 240 hours. **                insulin regular, 2-9 Units, Subcutaneous, Q6H  levETIRAcetam, 500 mg, Intravenous, Q12H  potassium chloride, 10 mEq, Intravenous, Q1H  senna-docusate sodium, 2 tablet, Oral, BID  sodium chloride, 10 mL, Intravenous, Q12H      clevidipine, 2-32 mg/hr, Last Rate: 2 mg/hr (02/26/24 0615)  labetalol, 0.5-2 mg/min, Last Rate: 2 mg/min (02/26/24 0400)  niCARdipine, 5-15 mg/hr, Last Rate: Stopped (02/25/24 1543)        Diagnostics:  CT Head Without Contrast    Addendum Date: 2/25/2024    ADDENDUM: 02 25 24 02:07 Call Doctor Regarding Intracranial Hemorrhage, called MIRI Pagan on 02 25 02:07 (-05:00)     Result Date: 2/25/2024  CR Patient: FELIPE DOCKERY  Time Out: 01:54 Exam(s): CT HEAD Without Contrast EXAM:   CT Head Without Intravenous Contrast CLINICAL HISTORY:    Reason for exam: Neuro deficit, acute, stroke suspected. TECHNIQUE:   Axial computed tomography images of the head brain without intravenous contrast.  This CT exam was performed according to the principle of ALARA (As Low As Reasonably Achievable) by using one or more of the following dose reduction techniques: automated exposure control, adjustment of the mA and or kV according to patient size, and or use of iterative reconstruction technique. COMPARISON:   No relevant prior studies available. FINDINGS:   Brain:  2.7 x 2.8 x 3.1  cm left thalamic intraparenchymal hemorrhage with associated vasogenic edema.  This has increased in size from the prior study when it measured 2.2 x 1.9 x 1.6 cm.  Additionally there has been interval intraventricular extension of the hemorrhage into the left lateral ventricle with greater mass-effect upon the lateral ventricle with subtle 3 mm left to right midline shift.  Small amount of hemorrhage is also noted within the dependent portion of the right lateral ventricle.   Ventricles:  See above.   Bones joints:  Unremarkable.  No acute fracture.   Soft tissues:  Unremarkable.   Sinuses:  Unremarkable as visualized.  No acute sinusitis.   Mastoid air cells:  Unremarkable as visualized.  No mastoid effusion. IMPRESSION:       Increasing left thalamic hemorrhage measuring 3.1 cm in greatest dimension on the current study increased from 2.2 cm on the prior exam.  This results in greater mass-effect upon the left lateral ventricle, with intraventricular extension and hemorrhage seen within both lateral ventricles.     Communications:  Call Doctor Intracranial Hemorrhage Electronically signed by Noman Winchester MD on 02-25-24 at 0154    XR Chest 1 View    Result Date: 2/24/2024  CHEST SINGLE VIEW  HISTORY: Stroke  COMPARISON: None  FINDINGS: Heart size is mildly enlarged. Lungs appear clear of focal airspace disease and there is no evidence for pulmonary edema or pleural effusion or infiltrate. Cardiac monitor and leads are present. Atherosclerotic calcifications are present overlying the thoracic aorta.      Cardiomegaly. Atherosclerotic disease. No evidence for active disease in the chest  This report was finalized on 2/24/2024 11:24 PM by Dr. Aftab Duke M.D on Workstation: [id number]      CT Angiogram Head w AI Analysis of LVO    Result Date: 2/24/2024  CT HEAD WITHOUT CONTRAST  HISTORY: Neurologic deficit. Mental status change.  TECHNIQUE:  Head CT includes axial imaging from the base of skull to the  vertex without intravenous contrast. Radiation dose reduction techniques were utilized, including automated exposure control and exposure modulation based on body size.  COMPARISON: None  FINDINGS: There is an acute intraparenchymal hematoma centered within the left thalamus and extending slightly anteromedial to the left thalamus measuring approximately 2.6 x 2 x 1.8 cm. This exhibits surrounding edema and there is localized mass effect. No additional intraparenchymal hemorrhage is evident. There is moderate chronic small vessel ischemic white matter change demonstrated as patchy deep cerebral white matter hypodensity. Intracranial atherosclerotic calcifications are present.      1. Acute intraparenchymal hemorrhage centered within the left thalamus measuring 2.6 x 2 x 1.8 cm with surrounding cerebral edema. Follow-up evaluation recommended. 2. Moderate to severe chronic small vessel ischemic white matter change. Intracranial atherosclerotic calcifications.   Findings discussed with the stroke neurologist on call on 02/24/2024 at 10:28 p.m.  Radiation dose reduction techniques were utilized, including automated exposure control and exposure modulation based on body size.   This report was finalized on 2/24/2024 10:46 PM by Dr. Aftab Duke M.D on Workstation: [id number]          I reviewed just had a CT head repeat stable left thalamic hemorrhage with mild edema and about 2 mm shift.  There is slight increase in intraventricular blood per report    Active Hospital Problems    Diagnosis  POA    **Intraparenchymal hemorrhage of brain [I61.9]  Yes      Resolved Hospital Problems   No resolved problems to display.         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  There was some enlargement of his bleeding on initial repeat scanning but subsequent scans have been stable.  Neurosurgery is following and has discussed  with the family and patient potentials for surgery.  Patient on prophylactic Keppra.  Paroxysmal atrial fibrillation on Eliquis chronically, patient received Kcentra in the emergency department and of course with his intracranial hemorrhage anticoagulation will be on hold.  Rate seems to be controlled.  Hypertensive emergency blood pressure goal less than 140.  He is on labetalol and clevidipine they have been able to reduce the doses and he was cleared for some p.o. medications I will start Norvasc and labetalol and see if we can wean drips off  polycythemia vera by history  Hyperlipidemia  Diabetes mellitus type 2 blood sugars high can add some scheduled insulin to the sliding scale.  Fluids/electrolytes/nutrition speech just evaluating cleared him for p.o. medications.  They will follow-up tomorrow hopefully he will be good enough for p.o. intake and we can avoid a feeding tube.      Discussed with wife and other family members at bedside he does seem to have made significant improvement in his neurologic exam from yesterday    Plan for disposition:    Chet Rivero Jr, MD  02/26/24  07:03 EST    Time: Critical care time 34 minutes

## 2024-02-26 NOTE — CASE MANAGEMENT/SOCIAL WORK
Discharge Planning Assessment  [facility]     Patient Name: Erlin Blanco  MRN: [id number]  Today's Date: 2/26/2024    Admit Date: 2/24/2024    Plan: Pending   Discharge Needs Assessment       Row Name 02/26/24 1544       Living Environment    People in Home spouse;grandchild(clifford)    Potentially Unsafe Housing Conditions none    Primary Care Provided by self    Provides Primary Care For no one    Family Caregiver if Needed spouse;grandchild(clifford), adult    Quality of Family Relationships helpful;involved;supportive    Able to Return to Prior Arrangements yes       Resource/Environmental Concerns    Transportation Concerns none       Transition Planning    Patient/Family Anticipates Transition to inpatient rehabilitation facility    Patient/Family Anticipated Services at Transition none    Transportation Anticipated health plan transportation       Discharge Needs Assessment    Readmission Within the Last 30 Days no previous admission in last 30 days    Equipment Currently Used at Home hospital bed;cane, quad;power chair,(recliner lift)    Anticipated Changes Related to Illness none    Equipment Needed After Discharge none    Provided Post Acute Provider List? N/A    Provided Post Acute Provider Quality & Resource List? N/A                   Discharge Plan       Row Name 02/26/24 1547       Plan    Plan Pending    Patient/Family in Agreement with Plan yes    Plan Comments CCP met with patient and spouse at bedside. Introduced self and explained role of CCP. Patient confirmed the information on the patients face sheet is accurate. Patient is enrolled into [facility]. Patient PCP is Zamzam John. Patient is enrolled into [facility]. Patient lives at home with spouse granddaughter. Patient uses a cane and has a hospital bed. Patient has no history of HH or SNF. PT/OT still pending. Family would like [facility] if therapy recommended inpatient rehab. CCP waiting for therapy notes.                  Continued Care and Services - Admitted  Since 2/24/2024    Coordination has not been started for this encounter.          Demographic Summary       Row Name 02/26/24 1544       General Information    Admission Type inpatient    Arrived From emergency department    Referral Source admission list    Reason for Consult discharge planning    Preferred Language English                   Functional Status       Row Name 02/26/24 1544       Functional Status    Usual Activity Tolerance good    Current Activity Tolerance good       Functional Status, IADL    Medications independent    Meal Preparation independent    Housekeeping independent    Laundry independent    Shopping independent       Mental Status    General Appearance WDL WDL       Mental Status Summary    Recent Changes in Mental Status/Cognitive Functioning no changes       Employment/    Employment Status retired                   Psychosocial    No documentation.                  Abuse/Neglect    No documentation.                  Legal    No documentation.                  Substance Abuse    No documentation.                  Patient Forms    No documentation.

## 2024-02-26 NOTE — PROGRESS NOTES
LOS: 2 days   Patient Care Team:  Zamzam John APRN as PCP - General (Family Medicine)    Chief Complaint: Hypertensive basal ganglia bleed    Subjective     The patient is awake and alert.  He does have a nearly complete paralysis on the right side.  He does answer questions although he is disoriented.    Interval History:     History taken from: patient chart family    Objective      Right hemiplegia    Vital Signs  Temp:  [97.5 °F (36.4 °C)-99 °F (37.2 °C)] 97.9 °F (36.6 °C)  Heart Rate:  [62-83] 73  Resp:  [14-26] 16  BP: (101-150)/(57-99) 145/75       Results Review:     I reviewed the patient's new clinical results.  His CT over the last 24 hours have shown no change in the size of the bleed.  There has been a little bit of dilatation of the ventricular system but not severe.      Assessment & Plan       Intraparenchymal hemorrhage of brain      I told the patient and his family that we will check another CT tomorrow.  As long as he does not develop hydrocephalus I think the blood clot is stable at this point and will gradually begin to reabsorb over time.  I told the family that it would take a couple of months for the blood clot to go away.      Josué Miller MD  02/26/24  09:44 EST

## 2024-02-26 NOTE — PLAN OF CARE
Goal Outcome Evaluation:  Plan of Care Reviewed With: patient           Outcome Evaluation: Patient seen for clinical swallow assessment. Limited speech. Voice clear and strong. Oral mech exam revealed R lingual, labial, and buccal weakness. No overt s/s of aspiration with ice or puree. Pt at times required cues to close his mouth around spoon/cup/straw and to close his mouth an trigger a swallow. Voice change and audible swallow with thins via spoon and nectar via cup. No overt s/s of aspiration with honey via spoon/cup, but audible swallow and throat clear with honey via straw. SLP recs free water protocol with ice chips and PO meds whole or crushed with puree. Hopeful for diet initiation when patient is not requiring cues to swallow. Will check back next date as indicated.

## 2024-02-26 NOTE — THERAPY EVALUATION
Acute Care - Speech Language Pathology   Swallow Initial Evaluation [facility]     Patient Name: Erlin Blanco  : 1945  MRN: [id number]  Today's Date: 2024               Admit Date: 2024    Visit Dx:     ICD-10-CM ICD-9-CM   1. Intraparenchymal hemorrhage of brain  I61.9 431   2. Facial droop  R29.810 781.94   3. Expressive aphasia  R47.01 784.3   4. Weakness of right upper extremity  R29.898 729.89   5. Chronic anticoagulation  Z79.01 V58.61     Patient Active Problem List   Diagnosis    Intraparenchymal hemorrhage of brain     History reviewed. No pertinent past medical history.  History reviewed. No pertinent surgical history.    SLP Recommendation and Plan  SLP Swallowing Diagnosis: oral dysphagia, R/O pharyngeal dysphagia (24 1300)  SLP Diet Recommendation: ice chips between meals after oral care, with supervision (24 1300)     SLP Rec. for Method of Medication Administration: meds whole, meds crushed, with puree, as tolerated (24 1300)     Monitor for Signs of Aspiration: yes, notify SLP if any concerns (24 1300)  Recommended Diagnostics: reassess via clinical swallow evaluation (24 1300)           Therapy Frequency (Swallow): PRN (24 1300)  Predicted Duration Therapy Intervention (Days): until discharge (24 1300)  Oral Care Recommendations: Oral Care BID/PRN, Before ice/water (24 1300)                                        Plan of Care Reviewed With: patient  Outcome Evaluation: Patient seen for clinical swallow assessment. Limited speech. Voice clear and strong. Oral [facility] exam revealed R lingual, labial, and buccal weakness. No overt s/s of aspiration with ice or puree. Pt at times required cues to close his mouth around spoon/cup/straw and to close his mouth an trigger a swallow. Voice change and audible swallow with thins via spoon and nectar via cup. No overt s/s of aspiration with honey via spoon/cup, but audible swallow and throat  clear with honey via straw. SLP recs free water protocol with ice chips and PO meds whole or crushed with puree. Hopeful for diet initiation when patient is not requiring cues to swallow. Will check back next date as indicated.      SWALLOW EVALUATION (last 72 hours)       SLP Adult Swallow Evaluation       Row Name 02/26/24 1300                   Rehab Evaluation    Document Type evaluation  -SH        Patient Effort fair  -SH           General Information    Patient Profile Reviewed yes  -SH        Pertinent History Of Current Problem Spontaneous left thalamic ICH now with hematoma expansion and IVH, now with R weakness. Prior stroke with residual left-sided weakness.  -SH        Current Method of Nutrition NPO  -        Precautions/Limitations, Vision difficult to assess;other (see comments)  patient kept eyes shut during most of the assessment  -        Precautions/Limitations, Hearing difficult to assess  -        Prior Level of Function-Swallowing no diet consistency restrictions  -        Plans/Goals Discussed with patient;family;agreed upon  -        Barriers to Rehab cognitive status  -        Patient's Goals for Discharge patient did not state  -        Family Goals for Discharge family did not state  -           Pain    Additional Documentation Pain Scale: FACES Pre/Post-Treatment (Group)  -           Pain Scale: FACES Pre/Post-Treatment    Pain: FACES Scale, Pretreatment 2-->hurts little bit  -           Oral Musculature and Cranial Nerve Assessment    Oral Motor General Assessment mandibular impairment;oral labial or buccal impairment;lingual impairment  -           Clinical Swallow Eval    Clinical Swallow Evaluation Summary Patient seen for clinical swallow assessment. Limited speech. Voice clear and strong. Oral mech exam revealed R lingual, labial, and buccal weakness. No overt s/s of aspiration with ice or puree. Pt at times required cues to close his mouth around  spoon/cup/straw and to close his mouth an trigger a swallow. Voice change and audible swallow with thins via spoon and nectar via cup. No overt s/s of aspiration with honey via spoon/cup, but audible swallow and throat clear with honey via straw. SLP recs free water protocol with ice chips and PO meds whole or crushed with puree. Hopeful for diet initiation when patient is not requiring cues to swallow. Will check back next date as indicated.  -           SLP Evaluation Clinical Impression    SLP Swallowing Diagnosis oral dysphagia;R/O pharyngeal dysphagia  -           Recommendations    Therapy Frequency (Swallow) PRN  -        Predicted Duration Therapy Intervention (Days) until discharge  -        SLP Diet Recommendation ice chips between meals after oral care, with supervision  -        Recommended Diagnostics reassess via clinical swallow evaluation  -        Oral Care Recommendations Oral Care BID/PRN;Before ice/water  -        SLP Rec. for Method of Medication Administration meds whole;meds crushed;with puree;as tolerated  -        Monitor for Signs of Aspiration yes;notify SLP if any concerns  -           Swallow Goals (SLP)    Swallow LTGs Patient will demonstrate functional swallow for  -           (LTG) Patient will demonstrate functional swallow for    Diet Texture (Demonstrate functional swallow) regular textures  -        Liquid viscosity (Demonstrate functional swallow) thin liquids  -        [place] (Demonstrate functional swallow) independently (over 90% accuracy)  -        Time Frame (Demonstrate functional swallow) by discharge  -                  User Key  (r) = Recorded By, (t) = Taken By, (c) = Cosigned By      Initials Name Effective Dates     Sonia Schwartz SLP 01/05/24 -                     EDUCATION  The patient has been educated in the following areas:   Dysphagia (Swallowing Impairment).        SLP GOALS       Row Name 02/26/24 1300             (LTG)  Patient will demonstrate functional swallow for    Diet Texture (Demonstrate functional swallow) regular textures  -      Liquid viscosity (Demonstrate functional swallow) thin liquids  -      [place] (Demonstrate functional swallow) independently (over 90% accuracy)  -      Time Frame (Demonstrate functional swallow) by discharge  -                User Key  (r) = Recorded By, (t) = Taken By, (c) = Cosigned By      Initials Name Provider Type     Sonia Schwartz, SLP Speech and Language Pathologist                       Time Calculation:    Time Calculation- SLP       Row Name 02/26/24 8410             Time Calculation- [facility]    SLP Start Time 0930  -      SLP Received On 02/26/24  -                User Key  (r) = Recorded By, (t) = Taken By, (c) = Cosigned By      Initials Name Provider Type     Sonia Schwartz SLP Speech and Language Pathologist                    Therapy Charges for Today       Code Description Service Date Service Provider Modifiers Qty    [id number] [facility] EVAL ORAL PHARYNG SWALLOW 4 2/26/2024 Sonia Schwartz SLP GN 1                 PATRICK Funk  2/26/2024

## 2024-02-26 NOTE — SIGNIFICANT NOTE
02/26/24 0938   OTHER   Discipline physical therapist   Rehab Time/Intention   Session Not Performed other (see comments)  (Not appropriate for PT evaluation this date per discussion with RN. PT will follow up tomorrow.)   Recommendation   PT - Next Appointment 02/27/24

## 2024-02-26 NOTE — PLAN OF CARE
Problem: Restraint, Nonviolent  Goal: Absence of Harm or Injury  Outcome: Ongoing, Not Progressing    Goal Outcome Evaluation:  Plan of Care Reviewed With: patient, family        Progress: no change     Pt neuro status unchanged from prior. Continues to wax and wane. NIH 21. Continues to require LUE non violent soft limb restraint for restlessness & reaching for lines/cords/devices. AM CT head done and results pending. On 1L O2 NC, reports no pain, afebrile, and meeting SBP goal of 100-140 on 2mg/hr of IV cleviprex & labetalol at 2mg/min. No BM over PM & UOP from rinaldi was 750cc. No other changes or events over PM.

## 2024-02-26 NOTE — SIGNIFICANT NOTE
02/26/24 0934   OTHER   Discipline occupational therapist   Rehab Time/Intention   Session Not Performed other (see comments)  (not appropriate for therapy today, will f/u tomorrow for OT eval as appropriate.)   Recommendation   OT - Next Appointment 02/27/24

## 2024-02-27 ENCOUNTER — APPOINTMENT (OUTPATIENT)
Dept: CT IMAGING | Facility: HOSPITAL | Age: 79
DRG: 064 | End: 2024-02-27
Payer: MEDICARE

## 2024-02-27 LAB
ANION GAP SERPL CALCULATED.3IONS-SCNC: 11.5 MMOL/L (ref 5–15)
BASOPHILS # BLD AUTO: 0.1 10*3/MM3 (ref 0–0.2)
BASOPHILS NFR BLD AUTO: 1 % (ref 0–1.5)
BUN SERPL-MCNC: 13 MG/DL (ref 8–23)
BUN/CREAT SERPL: 15.7 (ref 7–25)
CALCIUM SPEC-SCNC: 8.7 MG/DL (ref 8.6–10.5)
CHLORIDE SERPL-SCNC: 114 MMOL/L (ref 98–107)
CO2 SERPL-SCNC: 21.5 MMOL/L (ref 22–29)
CREAT SERPL-MCNC: 0.83 MG/DL (ref 0.76–1.27)
DEPRECATED RDW RBC AUTO: 55 FL (ref 37–54)
EGFRCR SERPLBLD CKD-EPI 2021: 90.7 ML/MIN/1.73
EOSINOPHIL # BLD AUTO: 0.38 10*3/MM3 (ref 0–0.4)
EOSINOPHIL NFR BLD AUTO: 3.6 % (ref 0.3–6.2)
ERYTHROCYTE [DISTWIDTH] IN BLOOD BY AUTOMATED COUNT: 16.7 % (ref 12.3–15.4)
GLUCOSE BLDC GLUCOMTR-MCNC: 137 MG/DL (ref 70–130)
GLUCOSE BLDC GLUCOMTR-MCNC: 138 MG/DL (ref 70–130)
GLUCOSE BLDC GLUCOMTR-MCNC: 186 MG/DL (ref 70–130)
GLUCOSE BLDC GLUCOMTR-MCNC: 187 MG/DL (ref 70–130)
GLUCOSE SERPL-MCNC: 158 MG/DL (ref 65–99)
HCT VFR BLD AUTO: 45.3 % (ref 37.5–51)
HGB BLD-MCNC: 14.4 G/DL (ref 13–17.7)
IMM GRANULOCYTES # BLD AUTO: 0.07 10*3/MM3 (ref 0–0.05)
IMM GRANULOCYTES NFR BLD AUTO: 0.7 % (ref 0–0.5)
LYMPHOCYTES # BLD AUTO: 1.06 10*3/MM3 (ref 0.7–3.1)
LYMPHOCYTES NFR BLD AUTO: 10.1 % (ref 19.6–45.3)
MAGNESIUM SERPL-MCNC: 2.4 MG/DL (ref 1.6–2.4)
MCH RBC QN AUTO: 28.9 PG (ref 26.6–33)
MCHC RBC AUTO-ENTMCNC: 31.8 G/DL (ref 31.5–35.7)
MCV RBC AUTO: 90.8 FL (ref 79–97)
MONOCYTES # BLD AUTO: 0.82 10*3/MM3 (ref 0.1–0.9)
MONOCYTES NFR BLD AUTO: 7.8 % (ref 5–12)
NEUTROPHILS NFR BLD AUTO: 76.8 % (ref 42.7–76)
NEUTROPHILS NFR BLD AUTO: 8.09 10*3/MM3 (ref 1.7–7)
NRBC BLD AUTO-RTO: 0 /100 WBC (ref 0–0.2)
PLATELET # BLD AUTO: 327 10*3/MM3 (ref 140–450)
PMV BLD AUTO: 10 FL (ref 6–12)
POTASSIUM SERPL-SCNC: 3.8 MMOL/L (ref 3.5–5.2)
RBC # BLD AUTO: 4.99 10*6/MM3 (ref 4.14–5.8)
SODIUM SERPL-SCNC: 147 MMOL/L (ref 136–145)
TRIGL SERPL-MCNC: 75 MG/DL (ref 0–150)
WBC NRBC COR # BLD AUTO: 10.52 10*3/MM3 (ref 3.4–10.8)

## 2024-02-27 PROCEDURE — 84478 ASSAY OF TRIGLYCERIDES: CPT | Performed by: INTERNAL MEDICINE

## 2024-02-27 PROCEDURE — 25810000003 SODIUM CHLORIDE 0.9 % SOLUTION: Performed by: INTERNAL MEDICINE

## 2024-02-27 PROCEDURE — 63710000001 INSULIN GLARGINE PER 5 UNITS: Performed by: INTERNAL MEDICINE

## 2024-02-27 PROCEDURE — 70450 CT HEAD/BRAIN W/O DYE: CPT

## 2024-02-27 PROCEDURE — 97167 OT EVAL HIGH COMPLEX 60 MIN: CPT

## 2024-02-27 PROCEDURE — 97110 THERAPEUTIC EXERCISES: CPT

## 2024-02-27 PROCEDURE — C9248 INJ, CLEVIDIPINE BUTYRATE: HCPCS

## 2024-02-27 PROCEDURE — 25010000002 LEVETRIRACETAM PER 10 MG

## 2024-02-27 PROCEDURE — 97535 SELF CARE MNGMENT TRAINING: CPT

## 2024-02-27 PROCEDURE — 63710000001 INSULIN REGULAR HUMAN PER 5 UNITS

## 2024-02-27 PROCEDURE — 92526 ORAL FUNCTION THERAPY: CPT

## 2024-02-27 PROCEDURE — 25010000002 LABETALOL 5 MG/ML SOLUTION 20 ML VIAL: Performed by: INTERNAL MEDICINE

## 2024-02-27 PROCEDURE — 25010000002 LABETALOL 5 MG/ML SOLUTION: Performed by: INTERNAL MEDICINE

## 2024-02-27 PROCEDURE — 97162 PT EVAL MOD COMPLEX 30 MIN: CPT

## 2024-02-27 PROCEDURE — 85025 COMPLETE CBC W/AUTO DIFF WBC: CPT

## 2024-02-27 PROCEDURE — 99231 SBSQ HOSP IP/OBS SF/LOW 25: CPT | Performed by: NURSE PRACTITIONER

## 2024-02-27 PROCEDURE — 80048 BASIC METABOLIC PNL TOTAL CA: CPT

## 2024-02-27 PROCEDURE — 25810000003 SODIUM CHLORIDE 0.9 % SOLUTION 250 ML FLEX CONT: Performed by: INTERNAL MEDICINE

## 2024-02-27 PROCEDURE — 82948 REAGENT STRIP/BLOOD GLUCOSE: CPT

## 2024-02-27 PROCEDURE — 83735 ASSAY OF MAGNESIUM: CPT

## 2024-02-27 PROCEDURE — 36415 COLL VENOUS BLD VENIPUNCTURE: CPT

## 2024-02-27 PROCEDURE — 97530 THERAPEUTIC ACTIVITIES: CPT

## 2024-02-27 PROCEDURE — 25010000002 CLEVIDIPINE BUTYRATE PER 1 MG

## 2024-02-27 RX ORDER — VIBEGRON 75 MG/1
75 TABLET, FILM COATED ORAL
COMMUNITY

## 2024-02-27 RX ORDER — MONTELUKAST SODIUM 10 MG/1
10 TABLET ORAL NIGHTLY
COMMUNITY
Start: 2023-12-21 | End: 2024-03-14 | Stop reason: HOSPADM

## 2024-02-27 RX ORDER — HYDRALAZINE HYDROCHLORIDE 20 MG/ML
10 INJECTION INTRAMUSCULAR; INTRAVENOUS EVERY 4 HOURS PRN
Status: DISCONTINUED | OUTPATIENT
Start: 2024-02-27 | End: 2024-03-04

## 2024-02-27 RX ORDER — HYDRALAZINE HYDROCHLORIDE 50 MG/1
100 TABLET, FILM COATED ORAL 3 TIMES DAILY
Status: DISCONTINUED | OUTPATIENT
Start: 2024-02-27 | End: 2024-02-27

## 2024-02-27 RX ORDER — HYDRALAZINE HYDROCHLORIDE 50 MG/1
100 TABLET, FILM COATED ORAL EVERY 8 HOURS SCHEDULED
Status: DISCONTINUED | OUTPATIENT
Start: 2024-02-27 | End: 2024-02-28

## 2024-02-27 RX ORDER — CARVEDILOL 6.25 MG/1
6.25 TABLET ORAL 2 TIMES DAILY WITH MEALS
Status: DISCONTINUED | OUTPATIENT
Start: 2024-02-27 | End: 2024-02-28

## 2024-02-27 RX ORDER — VALSARTAN 320 MG/1
320 TABLET ORAL DAILY
Status: DISCONTINUED | OUTPATIENT
Start: 2024-02-27 | End: 2024-02-28

## 2024-02-27 RX ADMIN — HYDRALAZINE HYDROCHLORIDE 100 MG: 50 TABLET ORAL at 21:00

## 2024-02-27 RX ADMIN — AMLODIPINE BESYLATE 10 MG: 10 TABLET ORAL at 09:21

## 2024-02-27 RX ADMIN — Medication 10 ML: at 09:45

## 2024-02-27 RX ADMIN — Medication 10 ML: at 20:18

## 2024-02-27 RX ADMIN — CARVEDILOL 6.25 MG: 6.25 TABLET, FILM COATED ORAL at 18:37

## 2024-02-27 RX ADMIN — LABETALOL HYDROCHLORIDE 40 MG: 5 INJECTION, SOLUTION INTRAVENOUS at 20:40

## 2024-02-27 RX ADMIN — LABETALOL HYDROCHLORIDE 100 MG: 100 TABLET, FILM COATED ORAL at 09:21

## 2024-02-27 RX ADMIN — LEVETIRACETAM 500 MG: 500 INJECTION, SOLUTION INTRAVENOUS at 20:00

## 2024-02-27 RX ADMIN — CLEVIPIDINE 4 MG/HR: 0.5 EMULSION INTRAVENOUS at 10:35

## 2024-02-27 RX ADMIN — LEVETIRACETAM 500 MG: 500 INJECTION, SOLUTION INTRAVENOUS at 09:21

## 2024-02-27 RX ADMIN — LABETALOL HYDROCHLORIDE 1.5 MG/MIN: 5 INJECTION INTRAVENOUS at 06:00

## 2024-02-27 RX ADMIN — INSULIN GLARGINE 10 UNITS: 100 INJECTION, SOLUTION SUBCUTANEOUS at 20:19

## 2024-02-27 RX ADMIN — INSULIN HUMAN 2 UNITS: 100 INJECTION, SOLUTION PARENTERAL at 05:50

## 2024-02-27 RX ADMIN — CLEVIPIDINE 5 MG/HR: 0.5 EMULSION INTRAVENOUS at 02:00

## 2024-02-27 RX ADMIN — LABETALOL HYDROCHLORIDE 1.5 MG/MIN: 5 INJECTION INTRAVENOUS at 10:35

## 2024-02-27 RX ADMIN — LABETALOL HYDROCHLORIDE 1.5 MG/MIN: 5 INJECTION INTRAVENOUS at 14:14

## 2024-02-27 RX ADMIN — HYDRALAZINE HYDROCHLORIDE 100 MG: 50 TABLET ORAL at 13:38

## 2024-02-27 RX ADMIN — SENNOSIDES AND DOCUSATE SODIUM 2 TABLET: 50; 8.6 TABLET ORAL at 20:23

## 2024-02-27 RX ADMIN — INSULIN HUMAN 2 UNITS: 100 INJECTION, SOLUTION PARENTERAL at 13:32

## 2024-02-27 RX ADMIN — LABETALOL HYDROCHLORIDE 1.5 MG/MIN: 5 INJECTION INTRAVENOUS at 03:00

## 2024-02-27 RX ADMIN — VALSARTAN 320 MG: 320 TABLET, FILM COATED ORAL at 13:38

## 2024-02-27 RX ADMIN — SENNOSIDES AND DOCUSATE SODIUM 2 TABLET: 50; 8.6 TABLET ORAL at 09:21

## 2024-02-27 NOTE — CONSULTS
"Nutrition Services    Patient Name:  Erlin Blanco  YOB: 1945  MRN: [id number]  Admit Date:  2/24/2024    Assessment Date:  02/27/24    Comment: Nutrition assessment initiated due to TF/Cortrak consult. 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 He also has Afib, HTN, DM, hyperlipidemia. Pt was cleared for po meds only yesterday by SLP but was unable to stay awake to participate in his VFSS today. NG Cortrak placed. Meds, skin, labs reviewed. Glu 158-187, Na+ 147.    Plan/Recommendations:  Begin TF's with Diabetisource AC at 20mL/hr, Adv as tolerates to goal of 60mL/hr  Minimal Free water for now due to concern for hydrocephalus.     Will follow clinical course, nutrition needs.    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 Hx Afib, HTN, DM, hyperlipidemia   Medical & Surgical Hx History reviewed. No pertinent past medical history.    History reviewed. No pertinent surgical history.     Current Problems Not safe for po     Encounter Information        Nutrition History    Food Preferences    Supplements    Factors Affecting Intake altered mental status, altered/poor sleep   Tests/Procedures CT scan     Anthropometrics        Current Height   Current Weight  BMI kg/m2 Height: 182.9 cm (72\")  Weight: 115 kg (252 lb 6.8 oz) (02/27/24 0500)  Body mass index is 34.24 kg/m².     Adj BMI (if applicable)    BMI Category Obese, Class I (30 - 34.9)       Admission Weight    Ideal Body Weight (IBW) 77.6kg     Adj IBW (if applicable) 255lb   Usual Body Weight (UBW) ~250lb   Weight Change/Trend Stable       Weight history: Wt Readings from Last 30 Encounters:   02/27/24 0500 115 kg (252 lb 6.8 oz)   02/26/24 0400 113 kg (250 lb)   02/25/24 0600 115 kg (254 lb 10.1 oz)   02/24/24 2244 " "116 kg (255 lb 1.2 oz)        Estimated/Assessed Needs        Energy Requirements    Weight for Calculation 115lg   Method for Estimation  15 kcal/kg   EST Needs (kcal/day) 1725       Protein Requirements    Weight for Calculation 77.6kg   EST Protein Needs (g/kg) 1.5 gm/kg   EST Daily Needs (g/day) 116       Fluid Requirements     Method for Estimation Defer to physician    Estimated Needs (mL/day)        Fluid Deficit    Current Na Level (mEq/L) 147   Desired Na Level (mEq/L) unknown   Estimated Fluid Deficit (L)       Labs        Pertinent Labs    Results from last 7 days   Lab Units 02/27/24  0604 02/26/24 0258 02/25/24  0720 02/24/24 2221 02/24/24 2213   SODIUM mmol/L 147* 145 142  --  143   POTASSIUM mmol/L 3.8 3.5 3.6  --  3.9   CHLORIDE mmol/L 114* 112* 107  --  105   CO2 mmol/L 21.5* 22.0 22.7  --  31.0*   BUN mg/dL 13 23 27*  --  27*   CREATININE mg/dL 0.83 1.19 1.16   < > 1.26   CALCIUM mg/dL 8.7 8.9 9.2  --  9.8   BILIRUBIN mg/dL  --   --   --   --  0.4   ALK PHOS U/L  --   --   --   --  71   ALT (SGPT) U/L  --   --   --   --  15   AST (SGOT) U/L  --   --   --   --  24   GLUCOSE mg/dL 158* 239* 314*  --  160*    < > = values in this interval not displayed.     Results from last 7 days   Lab Units 02/27/24  0604 02/26/24 0258 02/25/24 0720 02/24/24 2213 02/24/24 2213   MAGNESIUM mg/dL 2.4 2.0 2.3  --   --    HEMOGLOBIN g/dL 14.4 13.9 14.7  --  14.5   HEMATOCRIT % 45.3 43.0 45.8  --  44.8   WBC 10*3/mm3 10.52 12.40* 10.38  --  8.10   TRIGLYCERIDES mg/dL 75  --  53   < >  --    ALBUMIN g/dL  --   --   --   --  4.1    < > = values in this interval not displayed.     Results from last 7 days   Lab Units 02/27/24  0604 02/26/24  0258 02/25/24  0720 02/24/24  2213   INR   --   --  1.20* 1.28*   APTT seconds  --   --   --  32.8   PLATELETS 10*3/mm3 327 350 358 407     No results found for: \"COVID19\"  Lab Results   Component Value Date    HGBA1C 7.10 (H) 02/25/2024          Medications            Scheduled " Medications amLODIPine, 10 mg, Oral, Q24H  carvedilol, 6.25 mg, Oral, BID With Meals  hydrALAZINE, 100 mg, Oral, Q8H  insulin glargine, 10 Units, Subcutaneous, Nightly  insulin regular, 2-9 Units, Subcutaneous, Q6H  levETIRAcetam, 500 mg, Intravenous, Q12H  senna-docusate sodium, 2 tablet, Oral, BID  sodium chloride, 10 mL, Intravenous, Q12H  valsartan, 320 mg, Oral, Daily        Infusions clevidipine, 2-32 mg/hr, Last Rate: 4 mg/hr (02/27/24 1035)  labetalol, 0.5-2 mg/min, Last Rate: 1.5 mg/min (02/27/24 1035)        PRN Medications   acetaminophen    acetaminophen    senna-docusate sodium **AND** polyethylene glycol **AND** bisacodyl **AND** bisacodyl    Calcium Replacement - Follow Nurse / BPA Driven Protocol    dextrose    dextrose    glucagon (human recombinant)    hydrALAZINE    labetalol    Magnesium Standard Dose Replacement - Follow Nurse / BPA Driven Protocol    nitroglycerin    ondansetron    ondansetron    Phosphorus Replacement - Follow Nurse / BPA Driven Protocol    Potassium Replacement - Follow Nurse / BPA Driven Protocol    sodium chloride    sodium chloride    sodium chloride     Physical Findings          Physical Appearance facial droop, hemiparesis , obese, responds/arouses to pain, somnolent   Oral/Mouth Cavity dental appliance, tooth or teeth missing   Edema  1+ (trace), 2+ (mild)   Gastrointestinal last bowel movement: 2/24   Skin  pressure injury: stage 2 perineum   Tubes/Drains/Lines Cortrak, NG tube, bridle in place   NFPE No clinical signs of muscle wasting or fat loss   --  Current Nutrition Orders & Evaluation of Intake       Oral Nutrition     Food Allergies NKFA   Current PO Diet NPO Diet NPO Type: Strict NPO   Supplement    PO Evaluation     Trending % PO Intake    --  Nutrition Diagnosis        Nutrition Dx Problem 1 Problem: Inadequate Oral Intake  Etiology: Medical Diagnosis - Spontaneous left thalamic intracranial hemorrhage with intraventricular extension  Signs/Symptoms: NPO  and SLP/Swallow Evaluation    Comment:      INTERVENTION / PLAN OF CARE  Intervention Goal        Intervention Goal(s) Maintain nutrition status, Reduce/improve symptoms, Meet estimated needs, Disease management/therapy, Initiate TF/PN, Transition TF to PO, and No significant weight loss     Nutrition Intervention        RD Action Continue to monitor, Care plan reviewed, and Recommend/order: EN     Prescription         Diet     Supplement/Snack    EN/PN     Prescription Ordered       Enteral Prescription:     Enteral Route NG    TF Delivery Method Continuous    Enteral Product Diabetisource AC    Modular None    Propofol Rate/Kcal     TF Start Rate 20    TF Goal Rate 60    Free Water Flush 30mL q 4h    TF Provision at Goal: 1728 kcal, 86 gm protein, 1181 mL free water + 180 mL water flushes         Calories 100 % needs met         Protein  74 % needs met         Fluid (mL) 1361mL    Prescription Ordered Yes     Monitor/Evaluation        Monitor Per protocol   Discharge Plan Pending clinical course   Education Will instruct as appropriate     RD to follow per protocol.       Electronically signed by:  Allegra Wiseman RD  02/27/24 13:45 EST

## 2024-02-27 NOTE — PLAN OF CARE
Goal Outcome Evaluation:  Nutrition assessment complete, Cortrak placed since unable to participate in VFSS.  TF's to being. RD following.     Problem: Aspiration (Enteral Nutrition)  Goal: Absence of Aspiration Signs and Symptoms  Outcome: Ongoing, Progressing     Problem: Device-Related Complication Risk (Enteral Nutrition)  Goal: Safe, Effective Therapy Delivery  Outcome: Ongoing, Progressing     Problem: Feeding Intolerance (Enteral Nutrition)  Goal: Feeding Tolerance  Outcome: Ongoing, Progressing  Intervention: Prevent and Manage Feeding Intolerance  Flowsheets (Taken 2/27/2024 1412)  Nutrition Support Management: tube feeding initiated

## 2024-02-27 NOTE — PLAN OF CARE
Problem: Adult Inpatient Plan of Care  Goal: Plan of Care Review  Outcome: Ongoing, Not Progressing  Flowsheets (Taken 2/27/2024 0716)  Progress: no change  Plan of Care Reviewed With:   patient   family   Goal Outcome Evaluation:  Plan of Care Reviewed With: patient, family        Progress: no change      Pt neuro status unchanged from prior. Continues to wax and wane. NIH 20. Continues to require LUE non violent soft limb restraint for restlessness & reaching for lines/cords/devices. AM CT head done and results pending. Pt now on RA, reports no pain, afebrile, and meeting SBP goal of 100-140 on 6mg/hr of IV cleviprex & labetalol at 1.5mg/min. No BM over PM & UOP from rinaldi adequate. No other changes or events over PM.

## 2024-02-27 NOTE — THERAPY EVALUATION
Patient Name: Erlin Blanco  : 1945    MRN: [id number]                              Today's Date: 2024       Admit Date: 2024    Visit Dx:     ICD-10-CM ICD-9-CM   1. Intraparenchymal hemorrhage of brain  I61.9 431   2. Facial droop  R29.810 781.94   3. Expressive aphasia  R47.01 784.3   4. Weakness of right upper extremity  R29.898 729.89   5. Chronic anticoagulation  Z79.01 V58.61     Patient Active Problem List   Diagnosis    Intraparenchymal hemorrhage of brain     History reviewed. No pertinent past medical history.  History reviewed. No pertinent surgical history.   General Information       Row Name 24 1312          Physical Therapy Time and Intention    Document Type evaluation  -MS     Mode of Treatment physical therapy;co-treatment;occupational therapy  -MS       Row Name 24 1312          General Information    Prior Level of Function independent:;all household mobility;community mobility;ADL's  ambulating with a cane, stair navigation, + fall hx  -MS     Existing Precautions/Restrictions fall;NPO  -MS     Barriers to Rehab medically complex;previous functional deficit;cognitive status  -MS       Row Name 24 1312          Living Environment    People in Home spouse;child(clifford), adult  -MS       Row Name 24 1312          Stairs Within Home, Primary    Stairs, Within Home, Primary patient remains in basement of home.  -MS       Row Name 24 1312          Cognition    Orientation Status (Cognition) --  responds yes to all questions, aphasia, stated name  -MS       Row Name 24 1312          Safety Issues, Functional Mobility    Impairments Affecting Function (Mobility) balance;cognition;endurance/activity tolerance;postural/trunk control;range of motion (ROM);coordination;grasp;strength;visual/perceptual;sensation/sensory awareness  -MS     Comment, Safety Issues/Impairments (Mobility) Co treatment medically appropriate and necessary due to patient acuity  level, activity tolerance and safety of patient and staff. Evaluation established to achieve all goals in POC.  -MS               User Key  (r) = Recorded By, (t) = Taken By, (c) = Cosigned By      Initials Name Provider Type    MS Edwards Lyla GABINO, PT Physical Therapist                   Mobility       Row Name 02/27/24 1314          Bed Mobility    Bed Mobility supine-sit;sit-supine;scooting/bridging  -MS     Scooting/Bridging [place] (Bed Mobility) dependent (less than 25% patient effort);2 person assist;verbal cues;nonverbal cues (demo/gesture)  -MS     Supine-Sit [place] (Bed Mobility) maximum assist (25% patient effort);2 person assist  -MS     Sit-Supine [place] (Bed Mobility) maximum assist (25% patient effort);2 person assist  -MS     Assistive Device (Bed Mobility) bed rails;head of bed elevated  -MS     Comment, (Bed Mobility) Strong R lateral and posterior lean. Incorporated L lateral trunk flexion stretch 3 trials x 20-30s. Alertness improved but did fatigue quickly.  -MS       Row Name 02/27/24 1314          Transfers    Comment, (Transfers) Not approprite to assess at this time.  -MS               User Key  (r) = Recorded By, (t) = Taken By, (c) = Cosigned By      Initials Name Provider Type    MS EdwardsLyla, PT Physical Therapist                   Obj/Interventions       Row Name 02/27/24 1316          Range of Motion Comprehensive    Comment, General Range of Motion L LE WFL, R LE passively 75% WFL, very rigid  -MS       Row Name 02/27/24 1316          Strength Comprehensive (MMT)    Comment, General Manual Muscle Testing (MMT) Assessment Difficult to assess due to difficulty following commands- able to bend L knee, no muscle activation noted in R LE.  -MS       Row Name 02/27/24 1316          Balance    Balance Assessment sitting static balance  -MS     Static Sitting Balance maximum assist;dependent  -MS     Position, Sitting Balance sitting edge of bed  -MS     Balance  Interventions sitting;static;weight shifting activity  -MS     Comment, Balance Heavy pushing to R side, posterior lean noted. Emphasis on posture and achieving midline.  -MS               User Key  (r) = Recorded By, (t) = Taken By, (c) = Cosigned By      Initials Name Provider Type    Lyla Linares GABINO, PT Physical Therapist                   Goals/Plan       Row Name 02/27/24 1319          Bed Mobility Goal 1 (PT)    Activity/Assistive Device (Bed Mobility Goal 1, PT) bed mobility activities, all  -MS     [place] Level/Cues Needed (Bed Mobility Goal 1, PT) moderate assist (50-74% patient effort)  -MS     Time Frame (Bed Mobility Goal 1, PT) 1 week  -MS       Row Name 02/27/24 1319          Transfer Goal 1 (PT)    Activity/Assistive Device (Transfer Goal 1, PT) transfers, all  -MS     [place] Level/Cues Needed (Transfer Goal 1, PT) moderate assist (50-74% patient effort)  -MS     Time Frame (Transfer Goal 1, PT) 1 week  -MS       Row Name 02/27/24 1319          Therapy Assessment/Plan (PT)    Planned Therapy Interventions (PT) balance training;bed mobility training;gait training;home exercise program;postural re-education;patient/family education;ROM (range of motion);stair training;strengthening;transfer training  -MS               User Key  (r) = Recorded By, (t) = Taken By, (c) = Cosigned By      Initials Name Provider Type    MS EdwardsLyla, PT Physical Therapist                   Clinical Impression       Row Name 02/27/24 1318          Pain    Pre/Posttreatment Pain Comment No acute grimacing noted.  -MS       Row Name 02/27/24 1318          Therapy Assessment/Plan (PT)    Rehab Potential (PT) good, to achieve stated therapy goals  -MS     Criteria for Skilled Interventions Met (PT) yes;meets criteria  -MS     Therapy Frequency (PT) 6 times/wk  -MS       Row Name 02/27/24 1318          Positioning and Restraints    Pre-Treatment Position in bed  -MS     Post Treatment Position bed  -MS      In Bed notified nsg;fowlers;call light within reach;encouraged to call for assist;exit alarm on;heels elevated  -MS               User Key  (r) = Recorded By, (t) = Taken By, (c) = Cosigned By      Initials Name Provider Type    Lyla Linares GABINO, PT Physical Therapist                   Outcome Measures       Row Name 02/27/24 1319 02/27/24 0400       How much help from another person do you currently need...    Turning from your back to your side while in flat bed without using bedrails? 1  -MS 2  -KM    Moving from lying on back to sitting on the side of a flat bed without bedrails? 1  -MS 2  -KM    Moving to and from a bed to a chair (including a wheelchair)? 1  -MS 1  -KM    Standing up from a chair using your arms (e.g., wheelchair, bedside chair)? 1  -MS 1  -KM    Climbing 3-5 steps with a railing? 1  -MS 1  -KM    To walk in hospital room? 1  -MS 1  -KM    AM-PAC 6 Clicks Score (PT) 6  -MS 8  -KM    Highest Level of Mobility Goal 2 --> Bed activities/dependent transfer  -MS 3 --> Sit at edge of bed  -KM      Row Name 02/27/24 1313          Modified Kidder Scale    Modified Calli Scale 5 - Severe disability.  Bedridden, incontinent, and requiring constant nursing care and attention.  -       Row Name 02/27/24 1313          Functional Assessment    Outcome Measure Options AM-PAC 6 Clicks Daily Activity (OT);Modified [place]der  -               User Key  (r) = Recorded By, (t) = Taken By, (c) = Cosigned By      Initials Name Provider Type    MS EdwardsLyla, PT Physical Therapist    Sonia Alfaro OT Occupational Therapist    Ilda Covington, RN Registered Nurse                                 Physical Therapy Education       Title: PT OT SLP Therapies (In Progress)       Topic: Physical Therapy (In Progress)       Point: Mobility training (In Progress)       Learning Progress Summary             Patient Acceptance, E,TB, NR,NL by MS at 2/27/2024 1320                         Point: Home  exercise program (Not Started)       Learner Progress:  Not documented in this visit.              Point: Body mechanics (In Progress)       Learning Progress Summary             Patient Acceptance, E,TB, NR,NL by MS at 2/27/2024 1320                         Point: Precautions (In Progress)       Learning Progress Summary             Patient Acceptance, E,TB, NR,NL by MS at 2/27/2024 1320                                         User Key       Initials Effective Dates Name Provider Type Discipline    MS 06/16/21 -  Lyla Edwards PT Physical Therapist PT                  PT Recommendation and Plan  Planned Therapy Interventions (PT): balance training, bed mobility training, gait training, home exercise program, postural re-education, patient/family education, ROM (range of motion), stair training, strengthening, transfer training        Time Calculation:         PT Charges       Row Name 02/27/24 1306             Time Calculation    Start Time 0935  -MS      Stop Time 1002  -MS      Time Calculation (min) 27 min  -MS      PT Received On 02/27/24  -MS      PT - Next Appointment 02/28/24  -MS      PT Goal Re-Cert Due Date 03/05/24  -MS                User Key  (r) = Recorded By, (t) = Taken By, (c) = Cosigned By      Initials Name Provider Type    MS EdwardsLyla, PT Physical Therapist                  Therapy Charges for Today       Code Description Service Date Service Provider Modifiers Qty    76876208875 HC PT EVAL MOD COMPLEXITY 3 2/27/2024 Lyla Edwards, PT GP 1    [id number]  PT THER PROC EA 15 MIN 2/27/2024 Lyla Edwards, PT GP 1    [id number]  PT THERAPEUTIC ACT EA 15 MIN 2/27/2024 Lyla Edwards, PT GP 1            PT G-Codes  Outcome Measure Options: AM-PAC 6 Clicks Daily Activity (OT), Modified Calli  AM-PAC 6 Clicks Score (PT): 6  AM-PAC 6 Clicks Score (OT): 7  Modified Calli Scale: 5 - Severe disability.  Bedridden, incontinent, and requiring constant nursing care and  attention.  PT Discharge Summary  Anticipated Discharge Disposition (PT): inpatient rehabilitation facility (pending medical course and progress made.)    Lyla Edwards, PT  2/27/2024

## 2024-02-27 NOTE — THERAPY RE-EVALUATION
Acute Care - Speech Language Pathology   Swallow Re-Assessment [facility]     Patient Name: Erlin Blanco  : 1945  MRN: [id number]  Today's Date: 2024               Admit Date: 2024    Visit Dx:     ICD-10-CM ICD-9-CM   1. Intraparenchymal hemorrhage of brain  I61.9 431   2. Facial droop  R29.810 781.94   3. Expressive aphasia  R47.01 784.3   4. Weakness of right upper extremity  R29.898 729.89   5. Chronic anticoagulation  Z79.01 V58.61     Patient Active Problem List   Diagnosis    Intraparenchymal hemorrhage of brain     History reviewed. No pertinent past medical history.  History reviewed. No pertinent surgical history.    SLP Recommendation and Plan  SLP Swallowing Diagnosis: oral dysphagia, R/O pharyngeal dysphagia (24 1300)  SLP Diet Recommendation: ice chips between meals after oral care, with supervision (24 1300)     SLP Rec. for Method of Medication Administration: meds whole, meds crushed, with puree, as tolerated (24 1300)     Monitor for Signs of Aspiration: yes, notify SLP if any concerns (24 1300)  Recommended Diagnostics: reassess via clinical swallow evaluation (24 1300)           Therapy Frequency (Swallow): PRN (24 1300)  Predicted Duration Therapy Intervention (Days): until discharge (24 1300)  Oral Care Recommendations: Oral Care BID/PRN, Before ice/water (24 1300)                                        Plan of Care Reviewed With: patient  Outcome Evaluation: Patient seen for re evaluation of swallow function. Family present. Pt woke with verbal, tactile, and visual cues and maintainted alertness for entire assessment. Eyes were open. Pt fed self with restraint off at times. Restraint replaced following assessment. No overt s/s of aspiration with ice. Audible swallow and suspected mistiming with thins via spoon, nectar via cup, and honey via cup. Slight wheeze after liquid trials. No overt s/s of aspiration with puree. SLP  recs VFSS to further assess swallow function. Despite performance on VFSS, may still need cortrak d/t fluctuating alertness. Family aware. Discussed with RN, VFSS to be canceled if patient will not alert in PM. If that occurs, SLP recs cortrak.      SWALLOW EVALUATION (last 72 hours)       SLP Adult Swallow Evaluation       Row Name 02/27/24 1100       Rehab Evaluation    Document Type evaluation  -    Patient Effort adequate  -       General Information    Patient Profile Reviewed yes  -       Pain    Additional Documentation Pain Scale: FACES Pre/Post-Treatment (Group)  -       Pain Scale: FACES Pre/Post-Treatment    Pain: FACES Scale, Pretreatment 0-->no hurt  -       Oral Musculature and Cranial Nerve Assessment              User Key  (r) = Recorded By, (t) = Taken By, (c) = Cosigned By      Initials Name Effective Dates     Sonia Schwartz, SLP 01/05/24 -                     EDUCATION  The patient has been educated in the following areas:   Dysphagia (Swallowing Impairment).        SLP GOALS       Row Name 02/27/24 1100 02/26/24 1300          (LTG) Patient will demonstrate functional swallow for    Diet Texture (Demonstrate functional swallow) regular textures  - regular textures  -     Liquid viscosity (Demonstrate functional swallow) thin liquids  - thin liquids  -     [place] (Demonstrate functional swallow) independently (over 90% accuracy)  - independently (over 90% accuracy)  -     Time Frame (Demonstrate functional swallow) by discharge  - by discharge  -     Comment (Demonstrate functional swallow) Patient seen for re evaluation of swallow function. Family present. Pt woke with verbal, tactile, and visual cues and maintainted alertness for entire assessment. Eyes were open. Pt fed self with restraint off at times. Restraint replaced following assessment. Family reports baseline throat clear with PO, but no hx of aspiration. No overt s/s of aspiration with ice. Audible  swallow and suspected mistiming with thins via spoon, nectar via cup, and honey via cup. Slight wheeze after liquid trials. No overt s/s of aspiration with puree. SLP recs VFSS to further assess swallow function. Despite performance on VFSS, may still need cortrak d/t fluctuating alertness. Family aware. Discussed with RN, VFSS to be canceled if patient will not alert in PM. If that occurs, SLP recs cortrak.  - --               User Key  (r) = Recorded By, (t) = Taken By, (c) = Cosigned By      Initials Name Provider Type    Sonia Baca SLP Speech and Language Pathologist                       Time Calculation:    Time Calculation- SLP       Row Name 02/27/24 1112             Time Calculation- [facility]    SLP Start Time 0900  -      SLP Received On 02/27/24  -                User Key  (r) = Recorded By, (t) = Taken By, (c) = Cosigned By      Initials Name Provider Type    Sonia Baca SLP Speech and Language Pathologist                    Therapy Charges for Today       Code Description Service Date Service Provider Modifiers Qty    [id number]  ST EVAL ORAL PHARYNG SWALLOW 4 2/26/2024 Sonia Schwartz SLP GN 1    [id number]  ST TREATMENT SWALLOW 4 2/27/2024 Sonia Schwartz SLP GN 1                 PATRICK Funk  2/27/2024

## 2024-02-27 NOTE — PLAN OF CARE
Goal Outcome Evaluation:  Plan of Care Reviewed With: patient           Outcome Evaluation: Pt is a 78 y.o male admitted with L thalamic hemorrhage, HTN emergency. He has hx of mild cog impairement, stroke with L sided weakness. Family report pt is typically independent and can complete his own ADLs, assist with household tasks, complete mobility independent with a hx of falls when pt forgets to use his cane. Pt has has hx of orthopedic issues with bilateral TKR and back issues- which they feel may contribute to prior falls. Family report prior stroke- pt's L leg was affected but he is able to use LUE without difficulty. Pt presents with multiple deficits from acute CVA severally limiting engagement in ADLs, mobility at this time including R hemiparesis, poor postural control/pusher, cogntion, aphasia, R sided sensory deficits, R sided neglect. Pt follows one step commands mostly appropriately during session and sat EOB today for eval with heavy assist of 2. Family is present and very supportive in regards to pt's recovery. Further OT recommended to address improve ADL, transfer, RUE functioning.      Anticipated Discharge Disposition (OT): inpatient rehabilitation facility

## 2024-02-27 NOTE — THERAPY EVALUATION
Patient Name: Erlin Blanco  : 1945    MRN: [id number]                              Today's Date: 2024       Admit Date: 2024    Visit Dx:     ICD-10-CM ICD-9-CM   1. Intraparenchymal hemorrhage of brain  I61.9 431   2. Facial droop  R29.810 781.94   3. Expressive aphasia  R47.01 784.3   4. Weakness of right upper extremity  R29.898 729.89   5. Chronic anticoagulation  Z79.01 V58.61     Patient Active Problem List   Diagnosis    Intraparenchymal hemorrhage of brain     History reviewed. No pertinent past medical history.  History reviewed. No pertinent surgical history.   General Information       Row Name 24 1256          OT Time and Intention    Document Type evaluation  -     Mode of Treatment occupational therapy;co-treatment  -       Row Name 24 1256          General Information    Patient Profile Reviewed yes  -SM     Prior Level of Function independent:;ADL's;all household mobility;community mobility;using stairs  -     Existing Precautions/Restrictions fall;NPO  -     Barriers to Rehab medically complex  -       Row Name 24 1256          Occupational Profile    Environmental Supports and Barriers (Occupational Profile) Home DME includes quad cane, lift chair, hospital bed  -       Row Name 24 1256          Living Environment    People in Home spouse;child(clifford), adult  -       Row Name 24 1256          Stairs Within Home, Primary    Stairs, Within Home, Primary pt stays in basement of home  -       Row Name 24 1256          Cognition    Orientation Status (Cognition) unable/difficult to assess;oriented to;person  states name, responds yes to all questions, aphasia  -       Row Name 24 1256          Safety Issues, Functional Mobility    Impairments Affecting Function (Mobility) balance;cognition;endurance/activity tolerance;postural/trunk control;range of motion (ROM);coordination;grasp;strength;visual/perceptual;sensation/sensory  awareness  -     Comment, Safety Issues/Impairments (Mobility) PT/OT cotreatment medically appropriate and necessary due to patient acuity level, to maximize therapeutic benefit due to impaired act tolerance, and for safety of patient and staff. Treatment focused on progression of care and goals established in POC.  -               User Key  (r) = Recorded By, (t) = Taken By, (c) = Cosigned By      Initials Name Provider Type     oSnia Mcclure OT Occupational Therapist                     Mobility/ADL's       Row Name 02/27/24 1300          Bed Mobility    Bed Mobility supine-sit;sit-supine  -     Supine-Sit [place] (Bed Mobility) maximum assist (25% patient effort);2 person assist  -     Sit-Supine [place] (Bed Mobility) maximum assist (25% patient effort);2 person assist  -[facility] Name 02/27/24 1300          Transfers    Comment, (Transfers) not appropriate/safe to complete at this time  -       Row Name 02/27/24 1300          Activities of Daily Living    BADL Assessment/Intervention grooming;toileting;lower body dressing  -       Row Name 02/27/24 1300          Grooming Assessment/Training    [place] Level (Grooming) moderate assist (50% patient effort);verbal cues;hair care, combing/brushing;wash face, hands  -     Position (Grooming) edge of bed sitting  -     Comment, (Grooming) Pt able to comb L side of head only, but able to use appropriately when given grooming item, pt unable to attend to washing R hand with max cues, washing face with cues  -       Row Name 02/27/24 1300          Toileting Assessment/Training    [place] Level (Toileting) dependent (less than 25% patient effort)  -     Position (Toileting) supine  -       Row Name 02/27/24 1300          Lower Body Dressing Assessment/Training    [place] Level (Lower Body Dressing) dependent (less than 25% patient effort)  -     Position (Lower Body Dressing) supine  -               User Key   (r) = Recorded By, (t) = Taken By, (c) = Cosigned By      Initials Name Provider Type    Sonia Alfaro OT Occupational Therapist                   Obj/Interventions       Row Name 02/27/24 1301          Sensory Assessment (Somatosensory)    Sensory Assessment no response to stimuli RUE  -SM       [facility] Name 02/27/24 1301          Vision Assessment/Intervention    Visual Processing Deficit natalie-inattention/neglect, right  -     Vision Assessment Comment unable to track past midline  -       Row Name 02/27/24 1301          Range of Motion Comprehensive    Comment, General Range of Motion no AROM RUE, LUE AROM WFL  -[facility] Name 02/27/24 1301          Strength Comprehensive (MMT)    Comment, General Manual Muscle Testing (MMT) Assessment RUE flaccid 0/5, LUE very strong with bed mobility assessment, anticipate 5/5  -[facility] Name 02/27/24 1301          Motor Skills    Motor Skills coordination  -     Coordination fine motor deficit;gross motor deficit;severe impairment;right;upper extremity  -[facility] Name 02/27/24 1301          Balance    Balance Assessment sitting static balance  -     Static Sitting Balance maximum assist;2-person assist  -SM     Comment, Balance heavy pushing to R side, weight bearing down to LUE completed X3 reps with ~30 sec hold with only minimal improvement in postural correction when attempted to reachieve midline.  -SM               User Key  (r) = Recorded By, (t) = Taken By, (c) = Cosigned By      Initials Name Provider Type     Sonia Mcclure OT Occupational Therapist                   Goals/Plan       Row Name 02/27/24 1310          Bed Mobility Goal 1 (OT)    Activity/Assistive Device (Bed Mobility Goal 1, OT) sit to supine/supine to sit  -SM     [place] Level/Cues Needed (Bed Mobility Goal 1, OT) maximum assist (25-49% patient effort)  -SM     Time Frame (Bed Mobility Goal 1, OT) short term goal (STG);2 weeks  -     Progress/Outcomes (Bed Mobility  Goal 1, OT) goal ongoing  -SM       Row Name 02/27/24 1310          Transfer Goal 1 (OT)    Activity/Assistive Device (Transfer Goal 1, OT) commode, bedside without drop arms;sit-to-stand/stand-to-sit;bed-to-chair/chair-to-bed  -SM     [place] Level/Cues Needed (Transfer Goal 1, OT) moderate assist (50-74% patient effort)  2 person  -SM     Strategies/Barriers (Transfers Goal 1, OT) pivot transfer  -SM       Row Name 02/27/24 1310          Dressing Goal 1 (OT)    Activity/Device (Dressing Goal 1, OT) dressing skills, all  -SM     [place]/Cues Needed (Dressing Goal 1, OT) maximum assist (25-49% patient effort)  -SM     Time Frame (Dressing Goal 1, OT) short term goal (STG);2 weeks  -SM     Progress/Outcome (Dressing Goal 1, OT) goal ongoing  -SM       Row Name 02/27/24 1310          Grooming Goal 1 (OT)    Activity/Device (Grooming Goal 1, OT) grooming skills, all  -SM     [place] (Grooming Goal 1, OT) minimum assist (75% or more patient effort)  -SM     Time Frame (Grooming Goal 1, OT) short term goal (STG);2 weeks  -SM     Progress/Outcome (Grooming Goal 1, OT) goal ongoing  -SM       Row Name 02/27/24 1310          ROM Goal 1 (OT)    ROM Goal 1 (OT) Family to be independent with RUE PROM/AAROM HEP in prep to faciliating improve strength RUE for ADLs.  -SM     Time Frame (ROM Goal 1, OT) short term goal (STG);2 weeks  -SM     Progress/Outcome (ROM Goal 1, OT) goal ongoing  -SM       Row Name 02/27/24 1310          Problem Specific Goal 1 (OT)    Problem Specific Goal 1 (OT) Pt to be able to sit EOB for 5 minutes Min A for sitting balance during ADL or functional task.  -SM     Time Frame (Problem Specific Goal 1, OT) short term goal (STG);2 weeks  -SM     Progress/Outcome (Problem Specific Goal 1, OT) goal ongoing  -SM       Row Name 02/27/24 1310          Therapy Assessment/Plan (OT)    Planned Therapy Interventions (OT) activity tolerance training;adaptive equipment training;BADL  retraining;cognitive/visual perception retraining;neuromuscular control/coordination retraining;patient/caregiver education/training;transfer/mobility retraining;strengthening exercise;ROM/therapeutic exercise;prosthetic fitting/training;occupation/activity based interventions;functional balance retraining;orthotic fabrication/fitting/training;passive ROM/stretching  -               User Key  (r) = Recorded By, (t) = Taken By, (c) = Cosigned By      Initials Name Provider Type     Sonia Mcclure, FLORESITA Occupational Therapist                   Clinical Impression       Row Name 02/27/24 5947          Pain Scale: FACES Pre/Post-Treatment    Pain: FACES Scale, Pretreatment 0-->no hurt  -       Row Name 02/27/24 8218          Plan of Care Review    Plan of Care Reviewed With patient  -     Outcome Evaluation Pt is a 78 y.o male admitted with L thalamic hemorrhage, HTN emergency. He has hx of mild cog impairement, stroke with L sided weakness. Family report pt is typically independent and can complete his own ADLs, assist with household tasks, complete mobility independent with a hx of falls when pt forgets to use his cane. Pt has has hx of orthopedic issues with bilateral TKR and back issues- which they feel may contribute to prior falls. Family report prior stroke- pt's L leg was affected but he is able to use LUE without difficulty. Pt presents with multiple deficits from acute CVA severally limiting engagement in ADLs, mobility at this time including R hemiparesis, poor postural control/pusher, cogntion, aphasia, R sided sensory deficits, R sided neglect. Pt follows one step commands mostly appropriately during session and sat EOB today for eval with heavy assist of 2. Family is present and very supportive in regards to pt's recovery. Further OT recommended to address improve ADL, transfer, RUE functioning.  -       Row Name 02/27/24 6531          Therapy Assessment/Plan (OT)    Rehab Potential (OT) good,  to achieve stated therapy goals  -     Criteria for Skilled Therapeutic Interventions Met (OT) yes;skilled treatment is necessary  -     Therapy Frequency (OT) 5 times/wk  -       Row Name 02/27/24 1304          Therapy Plan Review/Discharge Plan (OT)    Anticipated Discharge Disposition (OT) inpatient rehabilitation facility  -       Row Name 02/27/24 1304          Vital Signs    Pre Systolic BP Rehab 159  -SM     Pre Treatment Diastolic BP 84  -SM     Post Systolic BP Rehab 156  -SM     Post Treatment Diastolic BP 83  -SM     Pretreatment Heart Rate (beats/min) 71  -SM     Posttreatment Heart Rate (beats/min) 73  -SM     Pre SpO2 (%) 92  -SM     O2 Delivery Pre Treatment room air  -     Post SpO2 (%) 94  -SM     O2 Delivery Post Treatment room air  -       Row Name 02/27/24 1304          Positioning and Restraints    Pre-Treatment Position in bed  -SM     Post Treatment Position bed  -SM     In Bed fowlers;call light within reach;encouraged to call for assist;exit alarm on;notified nsg;with family/caregiver  -     Restraints reapplied:;soft limb  -               User Key  (r) = Recorded By, (t) = Taken By, (c) = Cosigned By      Initials Name Provider Type     Sonia Mcclure, OT Occupational Therapist                   Outcome Measures       Row Name 02/27/24 1313          How much help from another is currently needed...    Putting on and taking off regular lower body clothing? 1  -SM     Bathing (including washing, rinsing, and drying) 1  -SM     Toileting (which includes using toilet bed pan or urinal) 1  -SM     Putting on and taking off regular upper body clothing 1  -SM     Taking care of personal grooming (such as brushing teeth) 2  -SM     Eating meals 1  -SM     AM-PAC 6 Clicks Score (OT) 7  -SM       Row Name 02/27/24 0400          How much help from another person do you currently need...    Turning from your back to your side while in flat bed without using bedrails? 2  -KM      Moving from lying on back to sitting on the side of a flat bed without bedrails? 2  -KM     Moving to and from a bed to a chair (including a wheelchair)? 1  -KM     Standing up from a chair using your arms (e.g., wheelchair, bedside chair)? 1  -KM     Climbing 3-5 steps with a railing? 1  -KM     To walk in hospital room? 1  -KM     AM-PAC 6 Clicks Score (PT) 8  -KM     Highest Level of Mobility Goal 3 --> Sit at edge of bed  -KM       Row Name 02/27/24 1313          Modified [place] Scale    Modified [place]i Scale 5 - Severe disability.  Bedridden, incontinent, and requiring constant nursing care and attention.  -       Row Name 02/27/24 1313          Functional Assessment    Outcome Measure Options AM-PAC 6 Clicks Daily Activity (OT);Modified [place]  -               User Key  (r) = Recorded By, (t) = Taken By, (c) = Cosigned By      Initials Name Provider Type     Sonia Mcclure OT Occupational Therapist    Ilda Covington, RN Registered Nurse                    Occupational Therapy Education       Title: PT OT SLP Therapies (In Progress)       Topic: Occupational Therapy (In Progress)       Point: ADL training (Not Started)       Description:   Instruct learner(s) on proper safety adaptation and remediation techniques during self care or transfers.   Instruct in proper use of assistive devices.                  Learner Progress:  Not documented in this visit.              Point: Home exercise program (Not Started)       Description:   Instruct learner(s) on appropriate technique for monitoring, assisting and/or progressing therapeutic exercises/activities.                  Learner Progress:  Not documented in this visit.              Point: Precautions (Done)       Description:   Instruct learner(s) on prescribed precautions during self-care and functional transfers.                  Learning Progress Summary             Family Acceptance, SHANITA LAMBERT DU by  at 2/27/2024 0952    Comment: RUE ROM, massage,  safe positioning for subluxation prevention, OT role and dc recommendations                         Point: Body mechanics (Not Started)       Description:   Instruct learner(s) on proper positioning and spine alignment during self-care, functional mobility activities and/or exercises.                  Learner Progress:  Not documented in this visit.                              User Key       Initials Effective Dates Name Provider Type Discipline     04/02/20 -  Sonia Mcclure OT Occupational Therapist OT                  OT Recommendation and Plan  Planned Therapy Interventions (OT): activity tolerance training, adaptive equipment training, BADL retraining, cognitive/visual perception retraining, neuromuscular control/coordination retraining, patient/caregiver education/training, transfer/mobility retraining, strengthening exercise, ROM/therapeutic exercise, prosthetic fitting/training, occupation/activity based interventions, functional balance retraining, orthotic fabrication/fitting/training, passive ROM/stretching  Therapy Frequency (OT): 5 times/wk  Plan of Care Review  Plan of Care Reviewed With: patient  Outcome Evaluation: Pt is a 78 y.o male admitted with L thalamic hemorrhage, HTN emergency. He has hx of mild cog impairement, stroke with L sided weakness. Family report pt is typically independent and can complete his own ADLs, assist with household tasks, complete mobility independent with a hx of falls when pt forgets to use his cane. Pt has has hx of orthopedic issues with bilateral TKR and back issues- which they feel may contribute to prior falls. Family report prior stroke- pt's L leg was affected but he is able to use LUE without difficulty. Pt presents with multiple deficits from acute CVA severally limiting engagement in ADLs, mobility at this time including R hemiparesis, poor postural control/pusher, cogntion, aphasia, R sided sensory deficits, R sided neglect. Pt follows one step commands  mostly appropriately during session and sat EOB today for eval with heavy assist of 2. Family is present and very supportive in regards to pt's recovery. Further OT recommended to address improve ADL, transfer, RUE functioning.     Time Calculation:   Evaluation Complexity (OT)  Review Occupational Profile/Medical/Therapy History Complexity: extensive/high complexity  Assessment, Occupational Performance/Identification of Deficit Complexity: 5 or more performance deficits  Clinical Decision Making Complexity (OT): comprehensive assessment/high complexity  Overall Complexity of Evaluation (OT): high complexity     Time Calculation- OT       Row Name 02/27/24 1315             Time Calculation- OT    OT Start Time 0935  -      OT Stop Time 1000  -SM      OT Time Calculation (min) 25 min  -SM      Total Timed Code Minutes- OT 10 minute(s)  -SM      OT Received On 02/27/24  -      OT - Next Appointment 02/28/24  -      OT Goal Re-Cert Due Date 03/12/24  -         Timed Charges    85068 - OT Self Care/Mgmt Minutes 10  -SM         Untimed Charges    OT Eval/Re-eval Minutes 15  -SM         Total Minutes    Timed Charges Total Minutes 10  -SM      Untimed Charges Total Minutes 15  -SM       Total Minutes 25  -SM                User Key  (r) = Recorded By, (t) = Taken By, (c) = Cosigned By      Initials Name Provider Type     Sonia Mcclure OT Occupational Therapist                  Therapy Charges for Today       Code Description Service Date Service Provider Modifiers Qty    [id number]  OT SELF CARE/MGMT/TRAIN EA 15 MIN 2/27/2024 Sonia Mcclure OT GO 1    [id number]  OT EVAL HIGH COMPLEXITY 3 2/27/2024 Sonia Mcclure OT GO 1                 Sonia Mcclure OT  2/27/2024

## 2024-02-27 NOTE — CONSULTS
met with pt at bedside; numerous friends/family present. Family requested prayer;  provided prayer support for gratitude, peace, comfort, and healing.  remains available as needed.

## 2024-02-27 NOTE — PLAN OF CARE
Goal Outcome Evaluation:  Plan of Care Reviewed With: patient           Outcome Evaluation: Patient unable to maintain alertness, VFSS canceled. SLP recs cortrak placement at this time.

## 2024-02-27 NOTE — PLAN OF CARE
Goal Outcome Evaluation:      Patient is a 78 y.o. male admitted to [facility] for L thalamic hemorrhage and HTN emergency on 2/24/2024. PMHx includes mild cognitive impairment and CVA with L residual weakness primarily in LLE. Patient was able to state his name and responded yes to all questions. Supportive family at bedside reports patient ambulates with a cane at baseline and has hx of falls. Patient also has extensive hx involving bilateral TKA and orthopedic issues. Patient was able to navigate stairs with no issues per family at bedside. Today, patient required maxA-depx2 for all mobility. Strong R lateral lean in sitting as well as posterior lean. Not safe to attempt transfers this date. Difficult to assess strength in R LE due to inability to follow commands though strength and sensation deficits appear present. R inattention and neglect noted during examination as well. Patient may benefit from skilled PT services acutely to address functional deficits as well as improve level of independence prior to discharge. Anticipate inpatient rehab pending medical course and progress made upon DC.      Anticipated Discharge Disposition (PT): inpatient rehabilitation facility (pending medical course and progress made.)

## 2024-02-27 NOTE — PROGRESS NOTES
[facility] NEUROSURGERY INTRACRANIAL PROGRESS NOTE    PATIENT IDENTIFICATION:   Name:  Erlin Blanco      MRN:  [id number]     78 y.o.  male               CC: spontaneous L thalamic ICH w/IVH and HCP      Subjective     Interval History:  Nursing reports more lethargic than a few days ago but still awakens follows commands.  Reports mild headache.  Had CT head this morning.  To be evaluated by speech therapy for swallow eval.  On Cleviprex and labetalol.    ROS:  Constitutional: No fever, chills  HEENT: Mild headache  GI: No vomiting  Neuro: Right-sided weakness    Objective     Vital signs in last 24 hours:  Temp:  [97.4 °F (36.3 °C)-98 °F (36.7 °C)] 97.6 °F (36.4 °C)  Heart Rate:  [53-76] 72  Resp:  [13-20] 15  BP: (121-178)/() 145/76      Intake/Output this shift:  I/O this shift:  In: 10 [Other:10]  Out: 100 [Urine:100]      Intake/Output last 3 shifts:  I/O last 3 completed shifts:  In: 4784.2 [I.V.:4784.2]  Out: 2690 [Urine:2690]    LABS:  Results from last 7 days   Lab Units 02/27/24  0604 02/26/24  0258 02/25/24  0720   WBC 10*3/mm3 10.52 12.40* 10.38   HEMOGLOBIN g/dL 14.4 13.9 14.7   HEMATOCRIT % 45.3 43.0 45.8   PLATELETS 10*3/mm3 327 350 358     Results from last 7 days   Lab Units 02/27/24  0604 02/26/24  0258 02/25/24  0720 02/24/24  2221 02/24/24  2213   SODIUM mmol/L 147* 145 142  --  143   POTASSIUM mmol/L 3.8 3.5 3.6  --  3.9   CHLORIDE mmol/L 114* 112* 107  --  105   CO2 mmol/L 21.5* 22.0 22.7  --  31.0*   BUN mg/dL 13 23 27*  --  27*   CREATININE mg/dL 0.83 1.19 1.16   < > 1.26   CALCIUM mg/dL 8.7 8.9 9.2  --  9.8   BILIRUBIN mg/dL  --   --   --   --  0.4   ALK PHOS U/L  --   --   --   --  71   ALT (SGPT) U/L  --   --   --   --  15   AST (SGOT) U/L  --   --   --   --  24   GLUCOSE mg/dL 158* 239* 314*  --  160*    < > = values in this interval not displayed.       IMAGING STUDIES:  CT head-stable left thalamic and intraventricular hemorrhage.  Remains to have some dilatation of ventricles  although not progressive    I personally viewed and interpreted the patient's CT head.  Also reviewed by and discussed with Dr. Miller.    Meds reviewed/changed: Yes    Current Facility-Administered Medications:     acetaminophen (TYLENOL) suppository 650 mg, 650 mg, Rectal, Q4H PRN, David Pineda MD    acetaminophen (TYLENOL) suppository 650 mg, 650 mg, Rectal, Q4H PRN, Lucia Pagan APRN    amLODIPine (NORVASC) tablet 10 mg, 10 mg, Oral, Q24H, Chet Rivero Jr., MD, 10 mg at 02/27/24 0921    sennosides-docusate (PERICOLACE) 8.6-50 MG per tablet 2 tablet, 2 tablet, Oral, BID, 2 tablet at 02/27/24 0921 **AND** polyethylene glycol (MIRALAX) packet 17 g, 17 g, Oral, Daily PRN **AND** bisacodyl (DULCOLAX) EC tablet 5 mg, 5 mg, Oral, Daily PRN **AND** bisacodyl (DULCOLAX) suppository 10 mg, 10 mg, Rectal, Daily PRN, Lucia Pagan APRN    Calcium Replacement - Follow Nurse / BPA Driven Protocol, , Does not apply, PRN, Lucia Pagan APRN    carvedilol (COREG) tablet 6.25 mg, 6.25 mg, Oral, BID With Meals, Chet Rivero Jr., MD    clevidipine (CLEVIPREX) infusion 0.5 mg/mL, 2-32 mg/hr, Intravenous, Titrated, Lucia Pagan APRN, Last Rate: 8 mL/hr at 02/27/24 1035, 4 mg/hr at 02/27/24 1035    dextrose (D50W) (25 g/50 mL) IV injection 25 g, 25 g, Intravenous, Q15 Min PRN, Lucia Pagan APRN    dextrose (GLUTOSE) oral gel 15 g, 15 g, Oral, Q15 Min PRN, Lucia Pagan APRN    glucagon (GLUCAGEN) injection 1 mg, 1 mg, Intramuscular, Q15 Min PRN, Lucia Pagan APRN    hydrALAZINE (APRESOLINE) injection 10 mg, 10 mg, Intravenous, Q4H PRN, Lucia Pagan, APRN, 10 mg at 02/25/24 0135    hydrALAZINE (APRESOLINE) tablet 100 mg, 100 mg, Oral, Q8H, Chet Rivero Jr., MD, 100 mg at 02/27/24 1338    insulin glargine (LANTUS, SEMGLEE) injection 10 Units, 10 Units, Subcutaneous, Nightly, Chet Rivero Jr., MD, 10 Units at 02/26/24 2000    insulin regular (humuLIN R,novoLIN R) injection  2-9 Units, 2-9 Units, Subcutaneous, Q6H, Lucia Pagan APRN, 2 Units at 02/27/24 1332    labetalol (NORMODYNE,TRANDATE) 300 mg in sodium chloride 0.9 % 300 mL infusion, 0.5-2 mg/min, Intravenous, Titrated, Chet Rivero Jr., MD, Last Rate: 90 mL/hr at 02/27/24 1414, 1.5 mg/min at 02/27/24 1414    labetalol (NORMODYNE,TRANDATE) injection 40 mg, 40 mg, Intravenous, Q2H PRN, Lucia Pagan APRN, 40 mg at 02/25/24 0434    levETIRAcetam (KEPPRA) injection 500 mg, 500 mg, Intravenous, Q12H, Lucia Pagan APRN, 500 mg at 02/27/24 0921    Magnesium Standard Dose Replacement - Follow Nurse / BPA Driven Protocol, , Does not apply, PRN, Lucia Pagan APRN    nitroglycerin (NITROSTAT) SL tablet 0.4 mg, 0.4 mg, Sublingual, Q5 Min PRN, Lucia Pagan APRN    ondansetron (ZOFRAN) injection 4 mg, 4 mg, Intravenous, Once PRN, David Pineda MD    ondansetron (ZOFRAN) injection 4 mg, 4 mg, Intravenous, Q6H PRN, Lucia Pagan APRN    Phosphorus Replacement - Follow Nurse / BPA Driven Protocol, , Does not apply, PRNEj Kelley J, APRN    Potassium Replacement - Follow Nurse / BPA Driven Protocol, , Does not apply, PREj FLANNERY Kelley J, APRN    sodium chloride 0.9 % flush 10 mL, 10 mL, Intravenous, PRN, David Pineda MD    sodium chloride 0.9 % flush 10 mL, 10 mL, Intravenous, Q12H, Lucia Pagan APRN, 10 mL at 02/27/24 0945    sodium chloride 0.9 % flush 10 mL, 10 mL, Intravenous, PRN, Lucia Pagan APRN    sodium chloride 0.9 % infusion 40 mL, 40 mL, Intravenous, PRN, Lucia Pagan APRN    valsartan (DIOVAN) tablet 320 mg, 320 mg, Oral, Daily, Chet Rivero Jr., MD, 320 mg at 02/27/24 1338      Physical Exam:    General:   Lethargic but opens eyes to voice.  States name and city.  Returns to sleep quickly.  Follows commands.  CN III IV VI:   Pupils pinpoint  CN VII:   Right-sided facial weakness motor:    Right upper extremity flaccid, withdraws briskly right lower extremity.   Follows commands on the left.  Generalized weakness on left  Sensation:   Sensation diminished left side but does feel pain  Station and Gait:  Per PT note 2/27-required maxA-depx2 for all mobility. Strong R lateral lean in sitting as well as posterior lean. Not safe to attempt transfers this date. Difficult to assess strength in R LE due to inability to follow commands though strength and sensation deficits appear present. R inattention and neglect noted during examination as well.   Coordination:  Patient does not participate enough to assess finger-to-nose status  Extremities:   Wearing SCD    Assessment & Plan     ASSESSMENT:      Intraparenchymal hemorrhage of brain    Patient with spontaneous intraparenchymal hemorrhage likely due to hypertensive crisis.  Hemorrhage progressed some and developed intraventricular hemorrhage and some hydrocephalus but has overall stabilized.  He was on Eliquis at presentation but this has been held and he was given Kcentra.  He is a bit more lethargic in the last couple of days but he awakens, follows commands and only reports some mild headache.  Blood pressure has been controlled with IV medications.  Will relax parameter some today and begin working towards transfer out of ICU once off IV meds.  Likely need alternative means of nutrition, but speech therapy to evaluate.    PLAN:   Will relax blood pressure parameters, keep SBP<160  May transfer to floor once off IV antihypertensives and blood pressure remains within parameters  Speech therapy to eval for swallow, but likely needs alternative means of nutrition  Continue every 2 hours neurochecks    I discussed the patient's findings and my recommendations with patient, family, nursing staff, and Dr. Miller    During patient visit, I utilized appropriate personal protective equipment including gloves. Appropriate PPE was worn during the entire visit.  Hand hygiene was completed before and after.      LOS: 3 days       Lucia HALL  "Ermias, APRN  2/27/2024  15:27 EST    \"Dictated utilizing Dragon dictation\".      "

## 2024-02-27 NOTE — PROGRESS NOTES
LOS: 3 days   Patient Care Team:  Zamzam John APRN as PCP - General (Family Medicine)    Subjective     Presented with facial droop and dysarthria 78-year-old male with hypertension coronary artery disease, hyperlipidemia atrial flutter chronic diastolic heart failure type 2 diabetes chronic kidney disease prior stroke with some residual left-sided deficits on chronic anticoagulation with apixaban for his A-fib evaluation in ED revealed a acute left thalamic hemorrhage and surrounding cerebral edema.      Review of Systems:          Objective     Vital Signs  Vital Sign Min/Max for last 24 hours  Temp  Min: 97.4 °F (36.3 °C)  Max: 98.5 °F (36.9 °C)   BP  Min: 121/78  Max: 178/79   Pulse  Min: 53  Max: 78   Resp  Min: 13  Max: 20   SpO2  Min: 90 %  Max: 98 %   Flow (L/min)  Min: 1  Max: 1   Weight  Min: 115 kg (252 lb 6.8 oz)  Max: 115 kg (252 lb 6.8 oz)        Ventilator/Non-Invasive Ventilation Settings (From admission, onward)      None                         There is no height or weight on file to calculate BMI.  I/O last 3 completed shifts:  In: 3278.2 [I.V.:3278.2]  Out: 2400 [Urine:2400]  I/O this shift:  In: 1506 [I.V.:1506]  Out: 890 [Urine:890]        Physical Exam:  General Appearance: Older white male lying in bed he is on a clevidipine drip at 20 mg an hour and labetalol at 6 blood pressure is in the 140 range right now  Eyes: Conjunctiva are injected, anicteric, pupils are equal they are about  3 mm they are equal and reactive to light  ENT: Mucous membranes are dry Mallampati type II airway, nasal septum midline  Neck: Trachea midline no adenopathy and no jugular venous distention  Lungs: Clear nonlabored symmetric expansion  Cardiac: Regular rhythm and looks to be sinus on the monitor he has a murmur at the right upper sternal border  Abdomen: Soft nontender no hepatosplenomegaly, obese  : Not examined  Musculoskeletal: He has trace edema of all 4 extremities  Skin: Warm and dry no  jaundice no petechiae he has some chronic venous type insufficiency changes in the lower extremities bilaterally  Neuro: More lethargic this late morning early afternoon apparently was more awake this morning he was more awake yesterday morning he is following some simple commands with his left upper and lower extremity he is not really answering questions but he tells me he is not going to answer questions he says no.  He is not able to follow commands with his right side he does withdraw his right foot a little bit to nailbed pressure he grimaces and moves his left upper extremity with nailbed pressure on the right but not his right.  Extremities/P Vascular: Palpable radial and dorsalis pedis pulses  MSE: He seems to get a little frustrated.       Labs:  Results from last 7 days   Lab Units 02/26/24 0258 02/25/24 0720 02/24/24 2221 02/24/24 2213   GLUCOSE mg/dL 239* 314*  --  160*   SODIUM mmol/L 145 142  --  143   POTASSIUM mmol/L 3.5 3.6  --  3.9   MAGNESIUM mg/dL 2.0 2.3  --   --    CO2 mmol/L 22.0 22.7  --  31.0*   CHLORIDE mmol/L 112* 107  --  105   ANION GAP mmol/L 11.0 12.3  --  7.0   CREATININE mg/dL 1.19 1.16 1.60* 1.26   BUN mg/dL 23 27*  --  27*   BUN / CREAT RATIO  19.3 23.3  --  21.4   CALCIUM mg/dL 8.9 9.2  --  9.8   ALK PHOS U/L  --   --   --  71   TOTAL PROTEIN g/dL  --   --   --  6.6   ALT (SGPT) U/L  --   --   --  15   AST (SGOT) U/L  --   --   --  24   BILIRUBIN mg/dL  --   --   --  0.4   ALBUMIN g/dL  --   --   --  4.1   GLOBULIN gm/dL  --   --   --  2.5     CrCl cannot be calculated (Unknown ideal weight.).      Results from last 7 days   Lab Units 02/26/24 0258 02/25/24 0720 02/24/24 2213   WBC 10*3/mm3 12.40* 10.38 8.10   RBC 10*6/mm3 4.75 5.10 5.13   HEMOGLOBIN g/dL 13.9 14.7 14.5   HEMATOCRIT % 43.0 45.8 44.8   MCV fL 90.5 89.8 87.3   MCH pg 29.3 28.8 28.3   MCHC g/dL 32.3 32.1 32.4   RDW % 16.2* 15.9* 15.7*   RDW-SD fl 52.8 51.4 49.1   MPV fL 9.8 9.6 9.2   PLATELETS 10*3/mm3 350  358 407   NEUTROPHIL % % 88.1* 86.6* 65.2   LYMPHOCYTE % % 5.2* 6.6* 19.1*   MONOCYTES % % 5.0 4.1* 8.5   EOSINOPHIL % % 0.5 0.9 4.7   BASOPHIL % % 0.8 1.3 2.0*   IMM GRAN % % 0.4 0.5 0.5   NEUTROS ABS 10*3/mm3 10.92* 9.00* 5.28   LYMPHS ABS 10*3/mm3 0.65* 0.68* 1.55   MONOS ABS 10*3/mm3 0.62 0.43 0.69   EOS ABS 10*3/mm3 0.06 0.09 0.38   BASOS ABS 10*3/mm3 0.10 0.13 0.16   IMMATURE GRANS (ABS) 10*3/mm3 0.05 0.05 0.04   NRBC /100 WBC 0.0 0.0 0.0         Results from last 7 days   Lab Units 02/24/24  2213   HSTROP T ng/L 41*                 Results from last 7 days   Lab Units 02/25/24  0720 02/24/24  2213   INR  1.20* 1.28*     Microbiology Results (last 10 days)       ** No results found for the last 240 hours. **                amLODIPine, 10 mg, Oral, Q24H  insulin glargine, 10 Units, Subcutaneous, Nightly  insulin regular, 2-9 Units, Subcutaneous, Q6H  labetalol, 100 mg, Oral, Q12H  levETIRAcetam, 500 mg, Intravenous, Q12H  senna-docusate sodium, 2 tablet, Oral, BID  sodium chloride, 10 mL, Intravenous, Q12H      clevidipine, 2-32 mg/hr, Last Rate: 6 mg/hr (02/27/24 0615)  labetalol, 0.5-2 mg/min, Last Rate: 1.5 mg/min (02/27/24 0600)        Diagnostics:  CT Head Without Contrast    Addendum Date: 2/25/2024    ADDENDUM: 02 25 24 02:07 Call Doctor Regarding Intracranial Hemorrhage, called MIRI Pagan on 02 25 02:07 (-05:00)     Result Date: 2/25/2024  CR Patient: FELIPE DOCKERY  Time Out: 01:54 Exam(s): CT HEAD Without Contrast EXAM:   CT Head Without Intravenous Contrast CLINICAL HISTORY:    Reason for exam: Neuro deficit, acute, stroke suspected. TECHNIQUE:   Axial computed tomography images of the head brain without intravenous contrast.  This CT exam was performed according to the principle of ALARA (As Low As Reasonably Achievable) by using one or more of the following dose reduction techniques: automated exposure control, adjustment of the mA and or kV according to patient size, and or use of iterative  reconstruction technique. COMPARISON:   No relevant prior studies available. FINDINGS:   Brain:  2.7 x 2.8 x 3.1 cm left thalamic intraparenchymal hemorrhage with associated vasogenic edema.  This has increased in size from the prior study when it measured 2.2 x 1.9 x 1.6 cm.  Additionally there has been interval intraventricular extension of the hemorrhage into the left lateral ventricle with greater mass-effect upon the lateral ventricle with subtle 3 mm left to right midline shift.  Small amount of hemorrhage is also noted within the dependent portion of the right lateral ventricle.   Ventricles:  See above.   Bones joints:  Unremarkable.  No acute fracture.   Soft tissues:  Unremarkable.   Sinuses:  Unremarkable as visualized.  No acute sinusitis.   Mastoid air cells:  Unremarkable as visualized.  No mastoid effusion. IMPRESSION:       Increasing left thalamic hemorrhage measuring 3.1 cm in greatest dimension on the current study increased from 2.2 cm on the prior exam.  This results in greater mass-effect upon the left lateral ventricle, with intraventricular extension and hemorrhage seen within both lateral ventricles.     Communications:  Call Doctor Intracranial Hemorrhage Electronically signed by Noman Winchester MD on 02-25-24 at 0154    XR Chest 1 View    Result Date: 2/24/2024  CHEST SINGLE VIEW  HISTORY: Stroke  COMPARISON: None  FINDINGS: Heart size is mildly enlarged. Lungs appear clear of focal airspace disease and there is no evidence for pulmonary edema or pleural effusion or infiltrate. Cardiac monitor and leads are present. Atherosclerotic calcifications are present overlying the thoracic aorta.      Cardiomegaly. Atherosclerotic disease. No evidence for active disease in the chest  This report was finalized on 2/24/2024 11:24 PM by Dr. Aftab Duke M.D on Workstation: [id number]      CT Angiogram Head w AI Analysis of LVO    Result Date: 2/24/2024  CT HEAD WITHOUT CONTRAST  HISTORY:  Neurologic deficit. Mental status change.  TECHNIQUE:  Head CT includes axial imaging from the base of skull to the vertex without intravenous contrast. Radiation dose reduction techniques were utilized, including automated exposure control and exposure modulation based on body size.  COMPARISON: None  FINDINGS: There is an acute intraparenchymal hematoma centered within the left thalamus and extending slightly anteromedial to the left thalamus measuring approximately 2.6 x 2 x 1.8 cm. This exhibits surrounding edema and there is localized mass effect. No additional intraparenchymal hemorrhage is evident. There is moderate chronic small vessel ischemic white matter change demonstrated as patchy deep cerebral white matter hypodensity. Intracranial atherosclerotic calcifications are present.      1. Acute intraparenchymal hemorrhage centered within the left thalamus measuring 2.6 x 2 x 1.8 cm with surrounding cerebral edema. Follow-up evaluation recommended. 2. Moderate to severe chronic small vessel ischemic white matter change. Intracranial atherosclerotic calcifications.   Findings discussed with the stroke neurologist on call on 02/24/2024 at 10:28 p.m.  Radiation dose reduction techniques were utilized, including automated exposure control and exposure modulation based on body size.   This report was finalized on 2/24/2024 10:46 PM by Dr. Aftab Duke M.D on Workstation: [id number]          I reviewed just had a CT head repeat stable left thalamic hemorrhage with mild edema and about 2 mm shift.  There is slight increase in intraventricular blood per report    Active Hospital Problems    Diagnosis  POA    **Intraparenchymal hemorrhage of brain [I61.9]  Yes      Resolved Hospital Problems   No resolved problems to display.         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  There was some enlargement of his  bleeding on initial repeat scanning but subsequent scans have been stable.  Neurosurgery is following and has discussed with the family and patient potentials for surgery.  Patient on prophylactic Keppra.  Paroxysmal atrial fibrillation on Eliquis chronically, patient received Kcentra in the emergency department and of course with his intracranial hemorrhage anticoagulation will be on hold.  Rate seems to be controlled.  Hypertensive emergency blood pressure goal less than 140.  But unable to get him off the labetalol and clevidipine we finally got his home medications complete order set and he is on a number of medications at home for blood pressure control going to try and restart those.  polycythemia vera by history  Hyperlipidemia  Diabetes mellitus type 2 blood sugars high can add some scheduled insulin to the sliding scale.  Fluids/electrolytes/nutrition speech just evaluating cleared him for p.o. medications.  They will follow-up tomorrow hopefully he will be good enough for p.o. intake and we can avoid a feeding tube.        Plan for disposition:    Chet Rivero Jr, MD  02/27/24  06:42 EST    Time: Critical care time 39 minutes

## 2024-02-27 NOTE — PLAN OF CARE
Goal Outcome Evaluation:  Plan of Care Reviewed With: patient           Outcome Evaluation: Patient seen for re evaluation of swallow function. Family present. Pt woke with verbal, tactile, and visual cues and maintainted alertness for entire assessment. Eyes were open. Pt fed self with restraint off at times. Restraint replaced following assessment. Family reports baseline throat clear with PO, but no hx of aspiration. No overt s/s of aspiration with ice. Audible swallow and suspected mistiming with thins via spoon, nectar via cup, and honey via cup. Slight wheeze after liquid trials. No overt s/s of aspiration with puree. SLP recs VFSS to further assess swallow function. Despite performance on VFSS, may still need cortrak d/t fluctuating alertness. Family aware. Discussed with RN, VFSS to be canceled if patient will not alert in PM. If that occurs, SLP recs cortrak.

## 2024-02-28 ENCOUNTER — PATIENT OUTREACH (OUTPATIENT)
Dept: CASE MANAGEMENT | Facility: OTHER | Age: 79
End: 2024-02-28
Payer: MEDICARE

## 2024-02-28 DIAGNOSIS — E11.3293 TYPE 2 DIABETES MELLITUS WITH BOTH EYES AFFECTED BY MILD NONPROLIFERATIVE RETINOPATHY WITHOUT MACULAR EDEMA, WITHOUT LONG-TERM CURRENT USE OF INSULIN: Primary | ICD-10-CM

## 2024-02-28 DIAGNOSIS — I10 PRIMARY HYPERTENSION: ICD-10-CM

## 2024-02-28 LAB
ANION GAP SERPL CALCULATED.3IONS-SCNC: 10.6 MMOL/L (ref 5–15)
BUN SERPL-MCNC: 14 MG/DL (ref 8–23)
BUN/CREAT SERPL: 16.1 (ref 7–25)
CALCIUM SPEC-SCNC: 8.6 MG/DL (ref 8.6–10.5)
CHLORIDE SERPL-SCNC: 115 MMOL/L (ref 98–107)
CO2 SERPL-SCNC: 21.4 MMOL/L (ref 22–29)
CREAT SERPL-MCNC: 0.87 MG/DL (ref 0.76–1.27)
EGFRCR SERPLBLD CKD-EPI 2021: 89.4 ML/MIN/1.73
GLUCOSE BLDC GLUCOMTR-MCNC: 141 MG/DL (ref 70–130)
GLUCOSE BLDC GLUCOMTR-MCNC: 154 MG/DL (ref 70–130)
GLUCOSE BLDC GLUCOMTR-MCNC: 154 MG/DL (ref 70–130)
GLUCOSE BLDC GLUCOMTR-MCNC: 175 MG/DL (ref 70–130)
GLUCOSE BLDC GLUCOMTR-MCNC: 179 MG/DL (ref 70–130)
GLUCOSE BLDC GLUCOMTR-MCNC: 194 MG/DL (ref 70–130)
GLUCOSE SERPL-MCNC: 167 MG/DL (ref 65–99)
POTASSIUM SERPL-SCNC: 3.6 MMOL/L (ref 3.5–5.2)
POTASSIUM SERPL-SCNC: 4.2 MMOL/L (ref 3.5–5.2)
SODIUM SERPL-SCNC: 147 MMOL/L (ref 136–145)

## 2024-02-28 PROCEDURE — 97112 NEUROMUSCULAR REEDUCATION: CPT

## 2024-02-28 PROCEDURE — 99231 SBSQ HOSP IP/OBS SF/LOW 25: CPT | Performed by: NEUROLOGICAL SURGERY

## 2024-02-28 PROCEDURE — 97110 THERAPEUTIC EXERCISES: CPT

## 2024-02-28 PROCEDURE — 63710000001 INSULIN REGULAR HUMAN PER 5 UNITS

## 2024-02-28 PROCEDURE — 97535 SELF CARE MNGMENT TRAINING: CPT

## 2024-02-28 PROCEDURE — 97530 THERAPEUTIC ACTIVITIES: CPT

## 2024-02-28 PROCEDURE — 80048 BASIC METABOLIC PNL TOTAL CA: CPT

## 2024-02-28 PROCEDURE — 25010000002 LABETALOL 5 MG/ML SOLUTION: Performed by: INTERNAL MEDICINE

## 2024-02-28 PROCEDURE — 84132 ASSAY OF SERUM POTASSIUM: CPT | Performed by: INTERNAL MEDICINE

## 2024-02-28 PROCEDURE — 82948 REAGENT STRIP/BLOOD GLUCOSE: CPT

## 2024-02-28 PROCEDURE — 25010000002 LEVETRIRACETAM PER 10 MG

## 2024-02-28 PROCEDURE — 63710000001 INSULIN GLARGINE PER 5 UNITS: Performed by: INTERNAL MEDICINE

## 2024-02-28 RX ORDER — VALSARTAN 320 MG/1
320 TABLET ORAL DAILY
Status: DISCONTINUED | OUTPATIENT
Start: 2024-02-29 | End: 2024-03-10

## 2024-02-28 RX ORDER — POTASSIUM CHLORIDE 1.5 G/1.58G
40 POWDER, FOR SOLUTION ORAL EVERY 4 HOURS
Status: COMPLETED | OUTPATIENT
Start: 2024-02-28 | End: 2024-02-28

## 2024-02-28 RX ORDER — CARVEDILOL 6.25 MG/1
6.25 TABLET ORAL 2 TIMES DAILY WITH MEALS
Status: DISCONTINUED | OUTPATIENT
Start: 2024-02-28 | End: 2024-02-28

## 2024-02-28 RX ORDER — LEVETIRACETAM 500 MG/1
500 TABLET ORAL 2 TIMES DAILY
Status: DISCONTINUED | OUTPATIENT
Start: 2024-02-28 | End: 2024-02-28

## 2024-02-28 RX ORDER — LEVETIRACETAM 500 MG/1
500 TABLET ORAL 2 TIMES DAILY
Status: DISCONTINUED | OUTPATIENT
Start: 2024-02-28 | End: 2024-03-15 | Stop reason: HOSPADM

## 2024-02-28 RX ORDER — AMLODIPINE BESYLATE 10 MG/1
10 TABLET ORAL
Status: DISCONTINUED | OUTPATIENT
Start: 2024-02-29 | End: 2024-03-15 | Stop reason: HOSPADM

## 2024-02-28 RX ORDER — HYDRALAZINE HYDROCHLORIDE 50 MG/1
100 TABLET, FILM COATED ORAL EVERY 8 HOURS SCHEDULED
Status: DISCONTINUED | OUTPATIENT
Start: 2024-02-28 | End: 2024-03-09

## 2024-02-28 RX ORDER — CARVEDILOL 12.5 MG/1
12.5 TABLET ORAL 2 TIMES DAILY WITH MEALS
Status: DISCONTINUED | OUTPATIENT
Start: 2024-02-28 | End: 2024-02-29

## 2024-02-28 RX ORDER — CARVEDILOL 6.25 MG/1
6.25 TABLET ORAL ONCE
Status: COMPLETED | OUTPATIENT
Start: 2024-02-28 | End: 2024-02-28

## 2024-02-28 RX ORDER — ACETAMINOPHEN 325 MG/1
650 TABLET ORAL EVERY 4 HOURS PRN
Status: DISCONTINUED | OUTPATIENT
Start: 2024-02-28 | End: 2024-03-15 | Stop reason: HOSPADM

## 2024-02-28 RX ADMIN — HYDRALAZINE HYDROCHLORIDE 100 MG: 50 TABLET ORAL at 13:13

## 2024-02-28 RX ADMIN — INSULIN HUMAN 2 UNITS: 100 INJECTION, SOLUTION PARENTERAL at 00:17

## 2024-02-28 RX ADMIN — Medication 10 ML: at 09:00

## 2024-02-28 RX ADMIN — INSULIN HUMAN 2 UNITS: 100 INJECTION, SOLUTION PARENTERAL at 12:07

## 2024-02-28 RX ADMIN — INSULIN HUMAN 2 UNITS: 100 INJECTION, SOLUTION PARENTERAL at 23:11

## 2024-02-28 RX ADMIN — ACETAMINOPHEN 325MG 650 MG: 325 TABLET ORAL at 17:03

## 2024-02-28 RX ADMIN — ACETAMINOPHEN 650 MG: 650 SUPPOSITORY RECTAL at 09:27

## 2024-02-28 RX ADMIN — CARVEDILOL 12.5 MG: 12.5 TABLET, FILM COATED ORAL at 17:51

## 2024-02-28 RX ADMIN — CARVEDILOL 6.25 MG: 6.25 TABLET, FILM COATED ORAL at 17:04

## 2024-02-28 RX ADMIN — LEVETIRACETAM 500 MG: 500 TABLET, FILM COATED ORAL at 20:00

## 2024-02-28 RX ADMIN — AMLODIPINE BESYLATE 10 MG: 10 TABLET ORAL at 08:48

## 2024-02-28 RX ADMIN — VALSARTAN 320 MG: 320 TABLET, FILM COATED ORAL at 08:48

## 2024-02-28 RX ADMIN — HYDRALAZINE HYDROCHLORIDE 100 MG: 50 TABLET ORAL at 20:00

## 2024-02-28 RX ADMIN — SENNOSIDES AND DOCUSATE SODIUM 2 TABLET: 50; 8.6 TABLET ORAL at 08:48

## 2024-02-28 RX ADMIN — LEVETIRACETAM 500 MG: 500 INJECTION, SOLUTION INTRAVENOUS at 08:49

## 2024-02-28 RX ADMIN — CARVEDILOL 6.25 MG: 6.25 TABLET, FILM COATED ORAL at 08:48

## 2024-02-28 RX ADMIN — SENNOSIDES AND DOCUSATE SODIUM 2 TABLET: 50; 8.6 TABLET ORAL at 20:15

## 2024-02-28 RX ADMIN — LABETALOL HYDROCHLORIDE 40 MG: 5 INJECTION, SOLUTION INTRAVENOUS at 21:45

## 2024-02-28 RX ADMIN — INSULIN HUMAN 2 UNITS: 100 INJECTION, SOLUTION PARENTERAL at 18:31

## 2024-02-28 RX ADMIN — LABETALOL HYDROCHLORIDE 40 MG: 5 INJECTION, SOLUTION INTRAVENOUS at 14:42

## 2024-02-28 RX ADMIN — POTASSIUM CHLORIDE 40 MEQ: 1.5 POWDER, FOR SOLUTION ORAL at 09:38

## 2024-02-28 RX ADMIN — LABETALOL HYDROCHLORIDE 40 MG: 5 INJECTION, SOLUTION INTRAVENOUS at 05:40

## 2024-02-28 RX ADMIN — POTASSIUM CHLORIDE 40 MEQ: 1.5 POWDER, FOR SOLUTION ORAL at 12:08

## 2024-02-28 RX ADMIN — HYDRALAZINE HYDROCHLORIDE 100 MG: 50 TABLET ORAL at 05:40

## 2024-02-28 RX ADMIN — Medication 10 ML: at 20:15

## 2024-02-28 RX ADMIN — INSULIN GLARGINE 10 UNITS: 100 INJECTION, SOLUTION SUBCUTANEOUS at 20:15

## 2024-02-28 NOTE — PROGRESS NOTES
LOS: 4 days   Patient Care Team:  Zamzam John APRN as PCP - General (Family Medicine)    Subjective     Presented with facial droop and dysarthria 78-year-old male with hypertension coronary artery disease, hyperlipidemia atrial flutter chronic diastolic heart failure type 2 diabetes chronic kidney disease prior stroke with some residual left-sided deficits on chronic anticoagulation with apixaban for his A-fib evaluation in ED revealed a acute left thalamic hemorrhage and surrounding cerebral edema.    Patient more awake today continues to wax and wane some in his level of arousal.  He is not expressing any specific complaints.  Review of Systems:          Objective     Vital Signs  Vital Sign Min/Max for last 24 hours  Temp  Min: 97 °F (36.1 °C)  Max: 99.1 °F (37.3 °C)   BP  Min: 126/67  Max: 200/96   Pulse  Min: 54  Max: 98   Resp  Min: 15  Max: 25   SpO2  Min: 91 %  Max: 96 %   No data recorded   No data recorded        Ventilator/Non-Invasive Ventilation Settings (From admission, onward)      None                         Body mass index is 34.24 kg/m².  I/O last 3 completed shifts:  In: 2813.2 [I.V.:2803.2; Other:10]  Out: 2690 [Urine:2690]  I/O this shift:  In: 1573 [I.V.:1108; Other:120; NG/GT:345]  Out: 1150 [Urine:1150]        Physical Exam:  General Appearance: Older white male lying in bed he is off all antihypertensive drips.  He does not appear in acute distress  Eyes: Conjunctiva are injected, anicteric, pupils are equal they are about  3 mm they are equal and reactive to light  ENT: Mucous membranes are dry Mallampati type II airway, nasal septum midline nasal  enteric tube  Neck: Trachea midline no adenopathy and no jugular venous distention  Lungs: Clear nonlabored symmetric expansion  Cardiac: Regular rhythm and looks to be sinus on the monitor he has a murmur at the right upper sternal border  Abdomen: Soft nontender no hepatosplenomegaly, obese  : Not examined  Musculoskeletal: No  significant edema  Skin: Warm and dry no jaundice no petechiae he has some chronic venous type insufficiency changes in the lower extremities bilaterally  Neuro: He continues to wax and wane on his responsiveness he is currently following commands he has good  on the left and he is wiggling his toes on the left to command very minimal movement of toes on the right to repeated commands and he did have a very weak squeeze my hands when asking to  with the right upper extremity this is improvement from yesterday.  He is still troubles with word finding.  Most of his responses are no and yes  Extremities/P Vascular: Palpable radial and dorsalis pedis pulses  MSE: He seems to get a little frustrated.       Labs:  Results from last 7 days   Lab Units 02/27/24  0604 02/26/24  0258 02/25/24  0720 02/24/24  2221 02/24/24  2213   GLUCOSE mg/dL 158* 239* 314*  --  160*   SODIUM mmol/L 147* 145 142  --  143   POTASSIUM mmol/L 3.8 3.5 3.6  --  3.9   MAGNESIUM mg/dL 2.4 2.0 2.3  --   --    CO2 mmol/L 21.5* 22.0 22.7  --  31.0*   CHLORIDE mmol/L 114* 112* 107  --  105   ANION GAP mmol/L 11.5 11.0 12.3  --  7.0   CREATININE mg/dL 0.83 1.19 1.16 1.60* 1.26   BUN mg/dL 13 23 27*  --  27*   BUN / CREAT RATIO  15.7 19.3 23.3  --  21.4   CALCIUM mg/dL 8.7 8.9 9.2  --  9.8   ALK PHOS U/L  --   --   --   --  71   TOTAL PROTEIN g/dL  --   --   --   --  6.6   ALT (SGPT) U/L  --   --   --   --  15   AST (SGOT) U/L  --   --   --   --  24   BILIRUBIN mg/dL  --   --   --   --  0.4   ALBUMIN g/dL  --   --   --   --  4.1   GLOBULIN gm/dL  --   --   --   --  2.5     Estimated Creatinine Clearance: 99.2 mL/min (by C-G formula based on SCr of 0.83 mg/dL).      Results from last 7 days   Lab Units 02/27/24  0604 02/26/24  0258 02/25/24  0720 02/24/24  2213   WBC 10*3/mm3 10.52 12.40* 10.38 8.10   RBC 10*6/mm3 4.99 4.75 5.10 5.13   HEMOGLOBIN g/dL 14.4 13.9 14.7 14.5   HEMATOCRIT % 45.3 43.0 45.8 44.8   MCV fL 90.8 90.5 89.8 87.3   MCH pg 28.9  29.3 28.8 28.3   MCHC g/dL 31.8 32.3 32.1 32.4   RDW % 16.7* 16.2* 15.9* 15.7*   RDW-SD fl 55.0* 52.8 51.4 49.1   MPV fL 10.0 9.8 9.6 9.2   PLATELETS 10*3/mm3 327 350 358 407   NEUTROPHIL % % 76.8* 88.1* 86.6* 65.2   LYMPHOCYTE % % 10.1* 5.2* 6.6* 19.1*   MONOCYTES % % 7.8 5.0 4.1* 8.5   EOSINOPHIL % % 3.6 0.5 0.9 4.7   BASOPHIL % % 1.0 0.8 1.3 2.0*   IMM GRAN % % 0.7* 0.4 0.5 0.5   NEUTROS ABS 10*3/mm3 8.09* 10.92* 9.00* 5.28   LYMPHS ABS 10*3/mm3 1.06 0.65* 0.68* 1.55   MONOS ABS 10*3/mm3 0.82 0.62 0.43 0.69   EOS ABS 10*3/mm3 0.38 0.06 0.09 0.38   BASOS ABS 10*3/mm3 0.10 0.10 0.13 0.16   IMMATURE GRANS (ABS) 10*3/mm3 0.07* 0.05 0.05 0.04   NRBC /100 WBC 0.0 0.0 0.0 0.0         Results from last 7 days   Lab Units 02/24/24  2213   HSTROP T ng/L 41*                 Results from last 7 days   Lab Units 02/25/24  0720 02/24/24  2213   INR  1.20* 1.28*     Microbiology Results (last 10 days)       ** No results found for the last 240 hours. **                amLODIPine, 10 mg, Oral, Q24H  carvedilol, 6.25 mg, Oral, BID With Meals  hydrALAZINE, 100 mg, Oral, Q8H  insulin glargine, 10 Units, Subcutaneous, Nightly  insulin regular, 2-9 Units, Subcutaneous, Q6H  levETIRAcetam, 500 mg, Intravenous, Q12H  senna-docusate sodium, 2 tablet, Oral, BID  sodium chloride, 10 mL, Intravenous, Q12H  valsartan, 320 mg, Oral, Daily      clevidipine, 2-32 mg/hr, Last Rate: Stopped (02/27/24 1556)  labetalol, 0.5-2 mg/min, Last Rate: Stopped (02/27/24 1842)        Diagnostics:  CT Head Without Contrast    Addendum Date: 2/25/2024    ADDENDUM: 02 25 24 02:07 Call Doctor Regarding Intracranial Hemorrhage, called MIRI Pagan on 02 25 02:07 (-05:00)     Result Date: 2/25/2024  CR Patient: FELIPE DOCKERY  Time Out: 01:54 Exam(s): CT HEAD Without Contrast EXAM:   CT Head Without Intravenous Contrast CLINICAL HISTORY:    Reason for exam: Neuro deficit, acute, stroke suspected. TECHNIQUE:   Axial computed tomography images of the head brain  without intravenous contrast.  This CT exam was performed according to the principle of ALARA (As Low As Reasonably Achievable) by using one or more of the following dose reduction techniques: automated exposure control, adjustment of the mA and or kV according to patient size, and or use of iterative reconstruction technique. COMPARISON:   No relevant prior studies available. FINDINGS:   Brain:  2.7 x 2.8 x 3.1 cm left thalamic intraparenchymal hemorrhage with associated vasogenic edema.  This has increased in size from the prior study when it measured 2.2 x 1.9 x 1.6 cm.  Additionally there has been interval intraventricular extension of the hemorrhage into the left lateral ventricle with greater mass-effect upon the lateral ventricle with subtle 3 mm left to right midline shift.  Small amount of hemorrhage is also noted within the dependent portion of the right lateral ventricle.   Ventricles:  See above.   Bones joints:  Unremarkable.  No acute fracture.   Soft tissues:  Unremarkable.   Sinuses:  Unremarkable as visualized.  No acute sinusitis.   Mastoid air cells:  Unremarkable as visualized.  No mastoid effusion. IMPRESSION:       Increasing left thalamic hemorrhage measuring 3.1 cm in greatest dimension on the current study increased from 2.2 cm on the prior exam.  This results in greater mass-effect upon the left lateral ventricle, with intraventricular extension and hemorrhage seen within both lateral ventricles.     Communications:  Call Doctor Intracranial Hemorrhage Electronically signed by Noman Winchester MD on 02-25-24 at 0154    XR Chest 1 View    Result Date: 2/24/2024  CHEST SINGLE VIEW  HISTORY: Stroke  COMPARISON: None  FINDINGS: Heart size is mildly enlarged. Lungs appear clear of focal airspace disease and there is no evidence for pulmonary edema or pleural effusion or infiltrate. Cardiac monitor and leads are present. Atherosclerotic calcifications are present overlying the thoracic aorta.       Cardiomegaly. Atherosclerotic disease. No evidence for active disease in the chest  This report was finalized on 2/24/2024 11:24 PM by Dr. Aftab Duke M.D on Workstation: [id number]      CT Angiogram Head w AI Analysis of LVO    Result Date: 2/24/2024  CT HEAD WITHOUT CONTRAST  HISTORY: Neurologic deficit. Mental status change.  TECHNIQUE:  Head CT includes axial imaging from the base of skull to the vertex without intravenous contrast. Radiation dose reduction techniques were utilized, including automated exposure control and exposure modulation based on body size.  COMPARISON: None  FINDINGS: There is an acute intraparenchymal hematoma centered within the left thalamus and extending slightly anteromedial to the left thalamus measuring approximately 2.6 x 2 x 1.8 cm. This exhibits surrounding edema and there is localized mass effect. No additional intraparenchymal hemorrhage is evident. There is moderate chronic small vessel ischemic white matter change demonstrated as patchy deep cerebral white matter hypodensity. Intracranial atherosclerotic calcifications are present.      1. Acute intraparenchymal hemorrhage centered within the left thalamus measuring 2.6 x 2 x 1.8 cm with surrounding cerebral edema. Follow-up evaluation recommended. 2. Moderate to severe chronic small vessel ischemic white matter change. Intracranial atherosclerotic calcifications.   Findings discussed with the stroke neurologist on call on 02/24/2024 at 10:28 p.m.  Radiation dose reduction techniques were utilized, including automated exposure control and exposure modulation based on body size.   This report was finalized on 2/24/2024 10:46 PM by Dr. Aftab Duke M.D on Workstation: [id number]          I reviewed just had a CT head repeat stable left thalamic hemorrhage with mild edema and about 2 mm shift.  There is slight increase in intraventricular blood per report    Active Hospital Problems    Diagnosis  POA     **Intraparenchymal hemorrhage of brain [I61.9]  Yes      Resolved Hospital Problems   No resolved problems to display.         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  There was some enlargement of his bleeding on initial repeat scanning but subsequent scans have been stable.  Neurosurgery is following and has discussed with the family and patient potentials for surgery.  Patient on prophylactic Keppra.  Right hemiparesis dysphagia dysarthria I am seeing slight improvement in his right hemiparesis today  Paroxysmal atrial fibrillation on Eliquis chronically, patient received Kcentra in the emergency department and of course with his intracranial hemorrhage anticoagulation will be on hold.  Rate seems to be controlled.  Hypertensive emergency blood pressure goal less than 140.  Blood pressure under better control now off drips.  polycythemia vera by history  Hyperlipidemia  Diabetes mellitus type 2 blood sugars high can add some scheduled insulin to the sliding scale.  Fluids/electrolytes/nutrition speech just evaluating cleared him for p.o. medications.  I think his ability to take p.o. is going to be determined up on Monday he seems to wax and wane quite a bit and his responsiveness      Addendum: Patient's blood pressure dropped has had a couple of doses of labetalol IV to try and control, discussed with hospitalist will keep in unit up his scheduled medicines and see if we can get his blood pressure under better control prior to transferring out.      Plan for disposition: Patient is probably need some kind of placement.  Neurosurgery is okay with him transferring to neurosurgery floor.    Chet Rivero Jr, MD  02/28/24  06:23 EST    Time:

## 2024-02-28 NOTE — CONSULTS
Patient Name:  Erlin Blanco  YOB: 1945  MRN:  [id number]  Admit Date:  2024  Patient Care Team:  Zamzam John APRN as PCP - General (Family Medicine)      Subjective   History Present Illness     Chief Complaint   Patient presents with   • Stroke     This is a 78-year-old male with hypertension, coronary artery disease, chronic diastolic heart failure, type 2 diabetes, atrial fibrillation on Eliquis who presents to the hospital after experiencing a right-sided facial droop and right arm weakness with speech disturbance.  A CT angiogram showed an acute intraparenchymal hemorrhage and neurosurgery was consulted.  Blood pressure management was recommended to keep systolic blood pressure less than 160.  Nicardipine was initially started and he received Keppra for seizure prophylaxis and Kcentra for Eliquis reversal.  Ultimately, the nicardipine drip was discontinued and his blood pressures remained within acceptable range.  Repeat CT head with stability of ICH.  He was subsequently transferred out of the ICU to a telemetry floor.          Personal History     History reviewed. No pertinent past medical history.  History reviewed. No pertinent surgical history.  History reviewed. No pertinent family history.  Social History     Tobacco Use   • Smoking status: Former     Types: Cigarettes     Quit date:      Years since quittin.1     Passive exposure: Past   • Smokeless tobacco: Never   Vaping Use   • Vaping Use: Never used   • Passive vaping exposure: Yes   Substance Use Topics   • Alcohol use: Never   • Drug use: Never     No current facility-administered medications on file prior to encounter.     Current Outpatient Medications on File Prior to Encounter   Medication Sig Dispense Refill   • montelukast (SINGULAIR) 10 MG tablet Take 1 tablet by mouth Every Night.     • Aspirin Low Dose 81 MG EC tablet Take 1 tablet by mouth Daily.     • carvedilol (COREG) 6.25 MG tablet Take 1 tablet  by mouth 2 (Two) Times a Day With Meals.     • Eliquis 5 MG tablet tablet Take 1 tablet by mouth Every 12 (Twelve) Hours.     • fluticasone (FLONASE) 50 MCG/ACT nasal spray 1 spray into the nostril(s) as directed by provider Daily.     • glimepiride (AMARYL) 2 MG tablet Take 1 tablet by mouth 2 (Two) Times a Day.     • hydrALAZINE (APRESOLINE) 100 MG tablet Take 1 tablet by mouth 3 (Three) Times a Day.     • hydroxyurea (HYDREA) 500 MG capsule Take 1 capsule by mouth Daily.     • Januvia 100 MG tablet Take 1 tablet by mouth Daily.     • memantine (NAMENDA) 10 MG tablet Take 1 tablet by mouth 2 (Two) Times a Day.     • metFORMIN ER (GLUCOPHAGE-XR) 500 MG 24 hr tablet Take 2 tablets by mouth 2 (Two) Times a Day.     • Multiple Vitamins-Minerals (b complex-C-E-zinc) tablet Take 1 tablet by mouth Daily.     • potassium chloride (KLOR-CON) 20 MEQ packet Take 20 mEq by mouth 3 (Three) Times a Day.     • Probiotic Product (Risaquad-2) capsule capsule Take 1 capsule by mouth Daily.     • rosuvastatin (CRESTOR) 40 MG tablet Take 1 tablet by mouth Every Night.     • tamsulosin (FLOMAX) 0.4 MG capsule 24 hr capsule Take 1 capsule by mouth Daily.     • torsemide (DEMADEX) 20 MG tablet Take 1 tablet by mouth 2 (Two) Times a Day.     • valsartan (DIOVAN) 320 MG tablet Take 1 tablet by mouth Daily.     • Vibegron (Gemtesa) 75 MG tablet Take 1 tablet by mouth Daily With Breakfast.       No Known Allergies    Objective    Objective     Vital Signs  Temp:  [97 °F (36.1 °C)-99.3 °F (37.4 °C)] 97.9 °F (36.6 °C)  Heart Rate:  [54-98] 79  Resp:  [12-25] 18  BP: (126-200)/(55-96) 160/78  SpO2:  [92 %-96 %] 93 %  on   ;   Device (Oxygen Therapy): room air  Body mass index is 34.47 kg/m².    Physical Exam  Constitutional:       General: He is not in acute distress.     Appearance: He is not toxic-appearing.   HENT:      Head: Normocephalic and atraumatic.      Nose:      Comments: NG tube noted  Cardiovascular:      Rate and Rhythm: Normal  rate and regular rhythm.   Pulmonary:      Effort: Pulmonary effort is normal. No respiratory distress.   Abdominal:      General: There is no distension.      Palpations: Abdomen is soft.      Tenderness: There is no abdominal tenderness.   Skin:     General: Skin is warm and dry.   Neurological:      Comments: sedated         Results Review:  I reviewed the patient's new clinical results.  I reviewed the patient's new imaging results and agree with the interpretation.  I reviewed the patient's other test results and agree with the interpretation  I personally viewed and interpreted the patient's EKG/Telemetry data  Discussed with ED provider.    Lab Results (last 24 hours)       Procedure Component Value Units Date/Time    POC Glucose Once [[id number]]  (Abnormal) Collected: 02/27/24 1811    Specimen: Blood Updated: 02/27/24 1827     Glucose 137 mg/dL     POC Glucose Once [[id number]]  (Abnormal) Collected: 02/27/24 2010    Specimen: Blood Updated: 02/27/24 2012     Glucose 138 mg/dL     POC Glucose Once [[id number]]  (Abnormal) Collected: 02/28/24 0002    Specimen: Blood Updated: 02/28/24 0003     Glucose 179 mg/dL     POC Glucose Once [[id number]]  (Abnormal) Collected: 02/28/24 0625    Specimen: Blood Updated: 02/28/24 0627     Glucose 141 mg/dL     Basic Metabolic Panel [[id number]]  (Abnormal) Collected: 02/28/24 0804    Specimen: Blood Updated: 02/28/24 0843     Glucose 167 mg/dL      BUN 14 mg/dL      Creatinine 0.87 mg/dL      Sodium 147 mmol/L      Potassium 3.6 mmol/L      Chloride 115 mmol/L      CO2 21.4 mmol/L      Calcium 8.6 mg/dL      BUN/Creatinine Ratio 16.1     Anion Gap 10.6 mmol/L      eGFR 89.4 mL/min/1.73     Narrative:      GFR Normal >60  Chronic Kidney Disease <60  Kidney Failure <15    The GFR formula is only valid for adults with stable renal function between ages 18 and 70.    POC Glucose Once [[id number]]  (Abnormal) Collected: 02/28/24 1143    Specimen: Blood Updated: 02/28/24 1201      Glucose 154 mg/dL             No results found for this or any previous visit.    Imaging Results (Last 24 Hours)       ** No results found for the last 24 hours. **                ECG 12 Lead Stroke Evaluation   Final Result   HEART RATE= 72  bpm   RR Interval= 833  ms   DE Interval=   ms   P Horizontal Axis= 11  deg   P Front Axis= 5  deg   QRSD Interval= 170  ms   QT Interval= 486  ms   QTcB= 532  ms   QRS Axis= -1  deg   T Wave Axis= 141  deg   - ABNORMAL ECG -   Sinus rhythm   Left bundle branch block   No Prior Tracing for Comparison   Electronically Signed By: Marika Arias ([facility]) 25-Feb-2024 17:59:30   Date and Time of Study: 2024-02-24 22:43:02                 Assessment/Plan     Active Hospital Problems    Diagnosis  POA   • **Intraparenchymal hemorrhage of brain [I61.9]  Yes      Resolved Hospital Problems   No resolved problems to display.     Intraparenchymal hemorrhage of the brain  Resistant hypertension  Currently he is on amlodipine 10, Coreg 12.5 mg twice daily, rallying 100 mg every 8 hours, valsartan 320 mg daily.  He required 3 different drips to maintain systolic blood pressure less than 180 and was just weaned off today.  He also looks like he has gotten 40 mg of IV labetalol x 2 today.  I would rather that he be monitored in the ICU for another 12 hours and ensure that he does not need to be placed back on drip.  If his blood pressure remains adequate in the morning, he can be transferred to the floor  Keppra 500mg BID    Type 2 diabetes  A1c 7.1  Currently on glargine 10 units along with sliding scale.  Blood sugars are acceptable    I discussed the patient's findings and my recommendations with family and consulting provider.    VTE Prophylaxis - SCDs.  Code Status - Full code.       Kapil Eid MD  [facility]  02/28/24  14:51 EST

## 2024-02-28 NOTE — PLAN OF CARE
Problem: Adult Inpatient Plan of Care  Goal: Plan of Care Review  Outcome: Ongoing, Progressing  Flowsheets (Taken 2/28/2024 0736)  Progress: improving  Plan of Care Reviewed With: patient   Goal Outcome Evaluation:  Plan of Care Reviewed With: patient        Progress: improving      Pt neuro status unchanged. Continues to wax and wane. NIH 21. Still showing neglect on right side. Continues to require LUE non violent soft limb restraint for restlessness & reaching for lines/cords/devices. Pt on RA, reports no pain, & afebrile. Pt either restless or drowsy. When restless- Pt BP is elevated above goal, but when calm/sleeping- pt meets BP goal. Pt required x2 doses of PRN IV labetalol over PM in addition to scheduled hydralazine to achieve SBP goal of <160. No BM over PM & UOP adequate. Tolerating TF and will continue to increase per order to goal rate. No other changes or events over PM.

## 2024-02-28 NOTE — CASE MANAGEMENT/SOCIAL WORK
Continued Stay Note  [facility]     Patient Name: Erlin Blanco  MRN: [id number]  Today's Date: 2/28/2024    Admit Date: 2/24/2024    Plan: BAR- referral pending   Discharge Plan       Row Name 02/28/24 1511       Plan    Plan BAR- referral pending    Plan Comments Spoke with Isai who stated they are reviewing  for [facility]. CCP to follow. Kevin CORNELL RN                   Discharge Codes    No documentation.                       Kevin Garcia RN

## 2024-02-28 NOTE — PLAN OF CARE
Goal Outcome Evaluation:  Plan of Care Reviewed With: patient, spouse, family        Progress: improving  Outcome Evaluation: Pt is following 1 step commands today with cues. His sitting balance is much improved sitting EOB where he could use his LUE on foot board to assist. Pt was able to sit CGA with support from LUE. Pt requires min/mod A to sit EOB while engaging in ADLs using L hand. Pt continues to have signficant R sided neglect but some improvement noted today with cues for attention to R side. He is able to comb R side of his hair today and brush R side of mouth with cues. Pt also noted to have increased  R hand and intermitten muscle contractions RUE once OT initated movement/cued but again neglect can be limiting. Family at bedside and very supportive of carrying over therapy recommendations for R sided attention/room positioning, ROM RUE.      Anticipated Discharge Disposition (OT): inpatient rehabilitation facility

## 2024-02-28 NOTE — PLAN OF CARE
Goal Outcome Evaluation:  Plan of Care Reviewed With: patient, spouse, family        Progress: improving  Outcome Evaluation: Patient resting in bed upon arrival with supportive family at bedside. PT/OT cotreatment completed this date- patient continues to reside in ICU. Today, attempted EOB towards the R side. Max VCs to scan environment and reach for bedrail with L UE to promote ind level. maxAx2 continues to be required to achieve EOB but noted improvement with sitting balance this date. With L UE supported on bedrail, patient able to maintain sitting balance with CGA. When unsupported, patient required up mod-maxA. Improved attention to R side noted as well. PT will continue to follow and advance as able.    Anticipated Discharge Disposition (PT): inpatient rehabilitation facility (pending medical course and progress made.)

## 2024-02-28 NOTE — PROGRESS NOTES
LOS: 4 days   Patient Care Team:  Zamzam John APRN as PCP - General (Family Medicine)    Chief Complaint: Hemorrhagic stroke in the left basal ganglia    Subjective     The patient is much more awake today.  More so than he was even over the weekend.  He easily arouses with just some gentle shaking.  He is still quite confused however.    Interval History:     History taken from: patient chart family RN    Objective      I do not detect much movement on the right side.  He has good movement on the left side to command.    Vital Signs  Temp:  [97 °F (36.1 °C)-99.3 °F (37.4 °C)] 99.3 °F (37.4 °C)  Heart Rate:  [54-98] 74  Resp:  [14-25] 15  BP: (126-200)/(55-96) 140/55       Results Review:     I reviewed the patient's new clinical results.  I reviewed his CT done yesterday.  This shows the bleed in the left basal ganglia with surrounding edema.  There is some blood in the ventricles as expected as well.  There is no change in the ventricular size.      Assessment & Plan       Intraparenchymal hemorrhage of brain      From my point of view he can moved to a monitored bed at any time.  I would recommend having rehab see him as I think he will be a good candidate for rehab.  We will check 1 more scan in a couple of days and if that is stable then he can be discharged to rehab anytime.      Josué Miller MD  02/28/24  09:15 EST

## 2024-02-28 NOTE — THERAPY TREATMENT NOTE
Patient Name: Erlin Blanco  : 1945    MRN: [id number]                              Today's Date: 2024       Admit Date: 2024    Visit Dx:     ICD-10-CM ICD-9-CM   1. Intraparenchymal hemorrhage of brain  I61.9 431   2. Facial droop  R29.810 781.94   3. Expressive aphasia  R47.01 784.3   4. Weakness of right upper extremity  R29.898 729.89   5. Chronic anticoagulation  Z79.01 V58.61     Patient Active Problem List   Diagnosis    Intraparenchymal hemorrhage of brain     History reviewed. No pertinent past medical history.  History reviewed. No pertinent surgical history.   General Information       Row Name 24 1027          Physical Therapy Time and Intention    Document Type therapy note (daily note)  -MS     Mode of Treatment co-treatment;occupational therapy;physical therapy;other (see comments)  -MS       Row Name 24 1027          General Information    Existing Precautions/Restrictions fall;NPO  -MS     Barriers to Rehab medically complex;previous functional deficit;cognitive status  -MS       Row Name 24 1027          Cognition    Orientation Status (Cognition) --  responds yes to all questions, oriented to person  -MS       Row Name 24 1024          Safety Issues, Functional Mobility    Impairments Affecting Function (Mobility) balance;cognition;endurance/activity tolerance;postural/trunk control;range of motion (ROM);coordination;grasp;strength;visual/perceptual;sensation/sensory awareness  -MS     Comment, Safety Issues/Impairments (Mobility) Co treatment medically appropriate and necessary due to patient acuity level, activity tolerance and safety of patient and staff. Treatment is focusing on progression of care and goals established in the POC.  -MS               User Key  (r) = Recorded By, (t) = Taken By, (c) = Cosigned By      Initials Name Provider Type    Lyla Linares PT Physical Therapist                   Mobility       Row Name 24 1027           Bed Mobility    Scooting/Bridging [place] (Bed Mobility) dependent (less than 25% patient effort);2 person assist;verbal cues;nonverbal cues (demo/gesture)  -MS     Supine-Sit [place] (Bed Mobility) maximum assist (25% patient effort);2 person assist  -MS     Sit-Supine [place] (Bed Mobility) maximum assist (25% patient effort);2 person assist  -MS     Assistive Device (Bed Mobility) bed rails;head of bed elevated  -MS     Comment, (Bed Mobility) Achieved EOB towards R side this date. Max VCs to reach for bed rail to promote ind level. Once EOB, able to maintain sitting balance with CGA when supporting L hand on bed rail. Emphasis on achieving midline posture. Sat EOB for up to 12 mins. Fatigue and weakness limiting.  -MS       Row Name 02/28/24 1029          Transfers    Comment, (Transfers) Not appropriate to assess at this time.  -MS               User Key  (r) = Recorded By, (t) = Taken By, (c) = Cosigned By      Initials Name Provider Type    MS Lyla Edwards, PT Physical Therapist                   Obj/Interventions       Row Name 02/28/24 1033          Motor Skills    Therapeutic Exercise --  Seated LAQ x 10 reps with L, assist needed for MIPs, passive R knee ext/flex and R hip flex. No muscle acitivation noted this date. Very rigid.  -MS       Row Name 02/28/24 1033          Balance    Balance Assessment sitting static balance  -MS     Static Sitting Balance contact guard;minimal assist;moderate assist  -MS     Position, Sitting Balance supported;unsupported;sitting edge of bed  -MS     Balance Interventions sitting;weight shifting activity;UE activity with balance activity  -MS     Comment, Balance Improvement with achieving midline this date. With L UE on bed rail, able to maintain sitting balance with CGA. Fatigued towards end. With UE activity and unsupported, up to mod-maxA required.  -MS               User Key  (r) = Recorded By, (t) = Taken By, (c) = Cosigned By      Initials  Name Provider Type    Lyla Linares, PT Physical Therapist                   Goals/Plan    No documentation.                  Clinical Impression       Row Name 02/28/24 1036          Pain    Pre/Posttreatment Pain Comment No acute grimacing noted. Difficult to formally assess as patient responds yes to all questions currently.  -MS       Row Name 02/28/24 1036          Plan of Care Review    Plan of Care Reviewed With patient;spouse;family  -MS     Progress improving  -MS     Outcome Evaluation Patient resting in bed upon arrival with supportive family at bedside. PT/OT cotreatment completed this date- patient continues to reside in ICU. Today, attempted EOB towards the R side. Max VCs to scan environment and reach for bedrail with L UE to promote ind level. maxAx2 continues to be required to achieve EOB but noted improvement with sitting balance this date. With L UE supported on bedrail, patient able to maintain sitting balance with CGA. When unsupported, patient required up mod-maxA. PT will continue to advance as able. Improved attention to R side noted as well. PT will continue to follow and advance as able.  -MS       Row Name 02/28/24 1036          Vital Signs    O2 Delivery Pre Treatment room air  -MS       Row Name 02/28/24 1036          Positioning and Restraints    Pre-Treatment Position in bed  -MS     Post Treatment Position bed  -MS     In Bed notified nsg;fowlers;call light within reach;encouraged to call for assist;exit alarm on;with family/caregiver;heels elevated  -MS               User Key  (r) = Recorded By, (t) = Taken By, (c) = Cosigned By      Initials Name Provider Type    Lyla Linares, PT Physical Therapist                   Outcome Measures       Row Name 02/28/24 1043 02/28/24 0400       How much help from another person do you currently need...    Turning from your back to your side while in flat bed without using bedrails? 2  -MS 2  -KM    Moving from lying on back to  sitting on the side of a flat bed without bedrails? 1  -MS 2  -KM    Moving to and from a bed to a chair (including a wheelchair)? 1  -MS 1  -KM    Standing up from a chair using your arms (e.g., wheelchair, bedside chair)? 1  -MS 1  -KM    Climbing 3-5 steps with a railing? 1  -MS 1  -KM    To walk in hospital room? 1  -MS 1  -KM    AM-PAC 6 Clicks Score (PT) 7  -MS 8  -KM    Highest Level of Mobility Goal 2 --> Bed activities/dependent transfer  -MS 3 --> Sit at edge of bed  -KM      Row Name 02/28/24 0000          How much help from another person do you currently need...    Turning from your back to your side while in flat bed without using bedrails? 2  -KM     Moving from lying on back to sitting on the side of a flat bed without bedrails? 2  -KM     Moving to and from a bed to a chair (including a wheelchair)? 1  -KM     Standing up from a chair using your arms (e.g., wheelchair, bedside chair)? 1  -KM     Climbing 3-5 steps with a railing? 1  -KM     To walk in hospital room? 1  -KM     AM-PAC 6 Clicks Score (PT) 8  -KM     Highest Level of Mobility Goal 3 --> Sit at edge of bed  -KM               User Key  (r) = Recorded By, (t) = Taken By, (c) = Cosigned By      Initials Name Provider Type    Lyla Linares, PT Physical Therapist    KM Ilda Ortega, RN Registered Nurse                                 Physical Therapy Education       Title: PT OT SLP Therapies (In Progress)       Topic: Physical Therapy (In Progress)       Point: Mobility training (In Progress)       Learning Progress Summary             Patient Acceptance, E,TB, NR,NL by MS at 2/27/2024 1320                         Point: Home exercise program (Not Started)       Learner Progress:  Not documented in this visit.              Point: Body mechanics (In Progress)       Learning Progress Summary             Patient Acceptance, E,TB, NR,NL by MS at 2/27/2024 1320                         Point: Precautions (In Progress)       Learning  Progress Summary             Patient Acceptance, E,TB, NR,NL by MS at 2/27/2024 1320                                         User Key       Initials Effective Dates Name Provider Type Discipline    MS 06/16/21 -  Lyla Edwards PT Physical Therapist PT                  PT Recommendation and Plan  Planned Therapy Interventions (PT): balance training, bed mobility training, gait training, home exercise program, postural re-education, patient/family education, ROM (range of motion), stair training, strengthening, transfer training  Plan of Care Reviewed With: patient, spouse, family  Progress: improving  Outcome Evaluation: Patient resting in bed upon arrival with supportive family at bedside. PT/OT cotreatment completed this date- patient continues to reside in ICU. Today, attempted EOB towards the R side. Max VCs to scan environment and reach for bedrail with L UE to promote ind level. maxAx2 continues to be required to achieve EOB but noted improvement with sitting balance this date. With L UE supported on bedrail, patient able to maintain sitting balance with CGA. When unsupported, patient required up mod-maxA. PT will continue to advance as able. Improved attention to R side noted as well. PT will continue to follow and advance as able.     Time Calculation:         PT Charges       Row Name 02/28/24 1026             Time Calculation    Start Time 0937  -MS      Stop Time 1003  -MS      Time Calculation (min) 26 min  -MS      PT Received On 02/28/24  -MS      PT - Next Appointment 02/29/24  -MS                User Key  (r) = Recorded By, (t) = Taken By, (c) = Cosigned By      Initials Name Provider Type    MS Lyla Edwards PT Physical Therapist                  Therapy Charges for Today       Code Description Service Date Service Provider Modifiers Qty    26467338102  PT EVAL MOD COMPLEXITY 3 2/27/2024 Lyla Edwards, PT GP 1    25432117817 HC PT THER PROC EA 15 MIN 2/27/2024 Lyla Edwards,  PT GP 1    98382666291  PT THERAPEUTIC ACT EA 15 MIN 2/27/2024 Lyla Edwards, PT GP 1    44908680237 HC PT THER PROC EA 15 MIN 2/28/2024 Lyla Edwards, PT GP 1    [id number]  PT THERAPEUTIC ACT EA 15 MIN 2/28/2024 Lyla Edwards, PT GP 1            PT G-Codes  Outcome Measure Options: AM-PAC 6 Clicks Daily Activity (OT), Modified [place]  AM-PAC 6 Clicks Score (PT): 7  AM-PAC 6 Clicks Score (OT): 7  Modified [place] Scale: 5 - Severe disability.  Bedridden, incontinent, and requiring constant nursing care and attention.  PT Discharge Summary  Anticipated Discharge Disposition (PT): inpatient rehabilitation facility (pending medical course and progress made.)    Lyla Edwards, PT  2/28/2024

## 2024-02-28 NOTE — THERAPY TREATMENT NOTE
Patient Name: Erlin Blanco  : 1945    MRN: [id number]                              Today's Date: 2024       Admit Date: 2024    Visit Dx:     ICD-10-CM ICD-9-CM   1. Intraparenchymal hemorrhage of brain  I61.9 431   2. Facial droop  R29.810 781.94   3. Expressive aphasia  R47.01 784.3   4. Weakness of right upper extremity  R29.898 729.89   5. Chronic anticoagulation  Z79.01 V58.61     Patient Active Problem List   Diagnosis    Intraparenchymal hemorrhage of brain     History reviewed. No pertinent past medical history.  History reviewed. No pertinent surgical history.   General Information       Row Name 24 1236          OT Time and Intention    Document Type therapy note (daily note)  -     Mode of Treatment occupational therapy;co-treatment  co treat to maximize therapy while pt is alert, pt overall much improved in alertness today  -       Row Name 24 1236          General Information    Patient Profile Reviewed yes  -SM     Existing Precautions/Restrictions fall;NPO  SBP < 160  -       Row Name 24 1236          Cognition    Orientation Status (Cognition) oriented to;person  yes bias, aphasia  -       Row Name 24 1236          Safety Issues, Functional Mobility    Impairments Affecting Function (Mobility) balance;cognition;endurance/activity tolerance;postural/trunk control;range of motion (ROM);coordination;grasp;strength;visual/perceptual;sensation/sensory awareness;motor control  -     Comment, Safety Issues/Impairments (Mobility) PT/OT cotreatment medically appropriate and necessary due to patient acuity level, to maximize therapeutic benefit due to impaired act tolerance, and for safety of patient and staff. Treatment focused on progression of care and goals established in POC.  -               User Key  (r) = Recorded By, (t) = Taken By, (c) = Cosigned By      Initials Name Provider Type    Sonia Alfaro OT Occupational Therapist                      Mobility/ADL's       Row Name 02/28/24 1237          Bed Mobility    Supine-Sit [place] (Bed Mobility) maximum assist (25% patient effort);2 person assist  -     Sit-Supine [place] (Bed Mobility) maximum assist (25% patient effort);2 person assist  -     Assistive Device (Bed Mobility) bed rails;head of bed elevated  -       Row Name 02/28/24 1237          Transfers    Comment, (Transfers) not appropriate to assess today but sitting balance is improving  -[facility] Name 02/28/24 1237          Grooming Assessment/Training    [place] Level (Grooming) oral care regimen;hair care, combing/brushing;minimum assist (75% patient effort);verbal cues;nonverbal cues (demo/gesture)  oral swab  -     Position (Grooming) edge of bed sitting  -[facility] Name 02/28/24 1237          Lower Body Dressing Assessment/Training    [place] Level (Lower Body Dressing) dependent (less than 25% patient effort)  -     Position (Lower Body Dressing) supine  -               User Key  (r) = Recorded By, (t) = Taken By, (c) = Cosigned By      Initials Name Provider Type     Sonia Mcclure OT Occupational Therapist                   Obj/Interventions       [facility] Name 02/28/24 1238          Vision Assessment/Intervention    Visual Processing Deficit natalie-inattention/neglect, right  -     Vision Assessment Comment cues through session for visual tracking to R side, attention to RUE.  -[facility] Name 02/28/24 1238          Shoulder (Therapeutic Exercise)    Shoulder (Therapeutic Exercise) AAROM (active assistive range of motion)  -     Shoulder AAROM (Therapeutic Exercise) right;flexion;extension;10 repetitions  -[facility] Name 02/28/24 1238          Elbow/Forearm (Therapeutic Exercise)    Elbow/Forearm (Therapeutic Exercise) AAROM (active assistive range of motion)  -     Elbow/Forearm AAROM (Therapeutic Exercise) right;flexion;extension;10 repetitions  -[facility] Name 02/28/24 1238           Hand (Therapeutic Exercise)    Hand (Therapeutic Exercise) AROM (active range of motion)  -     Hand AROM/AAROM (Therapeutic Exercise) right;AAROM (active assistive range of motion);finger extension;finger flexion;10 repetitions  -[facility] Name 02/28/24 1238          Motor Skills    Motor Skills motor control/coordination interventions  -     Motor Control/Coordination Interventions weight-bearing activities  to RUE X3 reps, max A for core control during to faciliate sensory feedback, tapping  -     Therapeutic Exercise shoulder;elbow/forearm;hand  -[facility] Name 02/28/24 1238          Balance    Balance Assessment sitting dynamic balance  -     Static Sitting Balance contact guard;minimal assist  -     Dynamic Sitting Balance moderate assist  -     Position, Sitting Balance sitting edge of bed  -     Balance Interventions sitting;occupation based/functional task  -               User Key  (r) = Recorded By, (t) = Taken By, (c) = Cosigned By      Initials Name Provider Type     Sonia Mcclure OT Occupational Therapist                   Goals/Plan    No documentation.                  Clinical Impression       Row Name 02/28/24 1245          Pain Assessment    Additional Documentation Pain Scale: FACES Pre/Post-Treatment (Group)  -SM       Row Name 02/28/24 1242          Pain Scale: FACES Pre/Post-Treatment    Pain: FACES Scale, Pretreatment 0-->no hurt  -     Posttreatment Pain Rating 0-->no hurt  -SM       Row Name 02/28/24 1248          Plan of Care Review    Plan of Care Reviewed With patient;spouse;family  -     Progress improving  -     Outcome Evaluation Pt is following 1 step commands today with cues. His sitting balance is much improved sitting EOB where he could use his LUE on foot board to assist. Pt was able to sit CGA with support from LUE. Pt requires min/mod A to sit EOB while engaging in ADLs using L hand. Pt continues to have signficant R sided neglect but  some improvement noted today with cues for attention to R side. He is able to comb R side of his hair today and brush R side of mouth with cues. Pt also noted to have increased  R hand and intermitten muscle contractions RUE once OT initated movement/cued but again neglect can be limiting. Family at bedside and very supportive of carrying over therapy recommendations for R sided attention/room positioning, ROM RUE.  -       Row Name 02/28/24 1245          Therapy Plan Review/Discharge Plan (OT)    Anticipated Discharge Disposition (OT) inpatient rehabilitation facility  -       Row Name 02/28/24 1245          Vital Signs    Pre Systolic BP Rehab 144  -SM     Pre Treatment Diastolic BP 60  -SM     Post Systolic BP Rehab 148  -SM     Post Treatment Diastolic BP 81  -SM       Row Name 02/28/24 1245          Positioning and Restraints    Pre-Treatment Position in bed  -SM     Post Treatment Position bed  -SM     In Bed fowlers;call light within reach;encouraged to call for assist;exit alarm on;notified nsg;with family/caregiver  -               User Key  (r) = Recorded By, (t) = Taken By, (c) = Cosigned By      Initials Name Provider Type    Sonia Alfaro, OT Occupational Therapist                   Outcome Measures       Row Name 02/28/24 1251          How much help from another is currently needed...    Putting on and taking off regular lower body clothing? 1  -SM     Bathing (including washing, rinsing, and drying) 1  -SM     Toileting (which includes using toilet bed pan or urinal) 1  -SM     Putting on and taking off regular upper body clothing 2  -SM     Taking care of personal grooming (such as brushing teeth) 2  -SM     Eating meals 1  -SM     AM-PAC 6 Clicks Score (OT) 8  -SM       Row Name 02/28/24 1043 02/28/24 0400       How much help from another person do you currently need...    Turning from your back to your side while in flat bed without using bedrails? 2  -MS 2  -KM    Moving from  lying on back to sitting on the side of a flat bed without bedrails? 1  -MS 2  -KM    Moving to and from a bed to a chair (including a wheelchair)? 1  -MS 1  -KM    Standing up from a chair using your arms (e.g., wheelchair, bedside chair)? 1  -MS 1  -KM    Climbing 3-5 steps with a railing? 1  -MS 1  -KM    To walk in hospital room? 1  -MS 1  -KM    AM-PAC 6 Clicks Score (PT) 7  -MS 8  -KM    Highest Level of Mobility Goal 2 --> Bed activities/dependent transfer  -MS 3 --> Sit at edge of bed  -KM              User Key  (r) = Recorded By, (t) = Taken By, (c) = Cosigned By      Initials Name Provider Type    MS Edwards Lyla A, PT Physical Therapist    Sonia Alfaro, OT Occupational Therapist    Ilda Covington, RN Registered Nurse                    Occupational Therapy Education       Title: PT OT SLP Therapies (In Progress)       Topic: Occupational Therapy (In Progress)       Point: ADL training (Not Started)       Description:   Instruct learner(s) on proper safety adaptation and remediation techniques during self care or transfers.   Instruct in proper use of assistive devices.                  Learner Progress:  Not documented in this visit.              Point: Home exercise program (Not Started)       Description:   Instruct learner(s) on appropriate technique for monitoring, assisting and/or progressing therapeutic exercises/activities.                  Learner Progress:  Not documented in this visit.              Point: Precautions (Done)       Description:   Instruct learner(s) on prescribed precautions during self-care and functional transfers.                  Learning Progress Summary             Family Acceptance, FAUSTO, SHANITA,DU by  at 2/27/2024 5914    Comment: RUE ROM, massage, safe positioning for subluxation prevention, OT role and dc recommendations                         Point: Body mechanics (Not Started)       Description:   Instruct learner(s) on proper positioning and spine  alignment during self-care, functional mobility activities and/or exercises.                  Learner Progress:  Not documented in this visit.                              User Key       Initials Effective Dates Name Provider Type Discipline     04/02/20 -  Sonia Mcclure OT Occupational Therapist OT                  OT Recommendation and Plan  Planned Therapy Interventions (OT): activity tolerance training, adaptive equipment training, BADL retraining, cognitive/visual perception retraining, neuromuscular control/coordination retraining, patient/caregiver education/training, transfer/mobility retraining, strengthening exercise, ROM/therapeutic exercise, prosthetic fitting/training, occupation/activity based interventions, functional balance retraining, orthotic fabrication/fitting/training, passive ROM/stretching  Therapy Frequency (OT): 5 times/wk  Plan of Care Review  Plan of Care Reviewed With: patient, spouse, family  Progress: improving  Outcome Evaluation: Pt is following 1 step commands today with cues. His sitting balance is much improved sitting EOB where he could use his LUE on foot board to assist. Pt was able to sit CGA with support from LUE. Pt requires min/mod A to sit EOB while engaging in ADLs using L hand. Pt continues to have signficant R sided neglect but some improvement noted today with cues for attention to R side. He is able to comb R side of his hair today and brush R side of mouth with cues. Pt also noted to have increased  R hand and intermitten muscle contractions RUE once OT initated movement/cued but again neglect can be limiting. Family at bedside and very supportive of carrying over therapy recommendations for R sided attention/room positioning, ROM RUE.     Time Calculation:   Evaluation Complexity (OT)  Review Occupational Profile/Medical/Therapy History Complexity: extensive/high complexity  Assessment, Occupational Performance/Identification of Deficit Complexity: 5 or  more performance deficits  Clinical Decision Making Complexity (OT): comprehensive assessment/high complexity  Overall Complexity of Evaluation (OT): high complexity     Time Calculation- OT       Row Name 02/28/24 1251             Time Calculation- OT    OT Start Time 0937  -      OT Stop Time 1003  -      OT Time Calculation (min) 26 min  -      Total Timed Code Minutes- OT 26 minute(s)  -SM      OT Received On 02/28/24  -      OT - Next Appointment 02/29/24  -         Timed Charges    82919 -  OT Neuromuscular Reeducation Minutes 18  -SM      45058 - OT Self Care/Mgmt Minutes 8  -SM         Total Minutes    Timed Charges Total Minutes 26  -SM       Total Minutes 26  -SM                User Key  (r) = Recorded By, (t) = Taken By, (c) = Cosigned By      Initials Name Provider Type     Sonia Mcclure OT Occupational Therapist                  Therapy Charges for Today       Code Description Service Date Service Provider Modifiers Qty    1721947 HC OT SELF CARE/MGMT/TRAIN EA 15 MIN 2/27/2024 Sonia Mcclure OT GO 1    63376791827 HC OT EVAL HIGH COMPLEXITY 3 2/27/2024 Sonia Mcclure OT GO 1    63590025754 HC OT NEUROMUSC RE EDUCATION EA 15 MIN 2/28/2024 Sonia Mcclure OT GO 1    85741868039 HC OT SELF CARE/MGMT/TRAIN EA 15 MIN 2/28/2024 Sonia Mcclure OT GO 1                 Sonia Mcclure OT  2/28/2024

## 2024-02-28 NOTE — OUTREACH NOTE
[facility] End of Month Documentation    This Chronic Medical Management Care Plan for Erlin Blanco, 78 y.o. male, has been monitored and managed; reviewed and a new plan of care implemented for the month of February.  A cumulative time of 20  minutes was spent on this patient record this month, including chart review; phone call with patient, F/U PCP visit, BP and blood sugar log, medication management. Sleep assessment. Disccused health and wellness..    Regarding the patient's problems: has Osteoarthritis, knee; Type 2 diabetes mellitus with both eyes affected by mild nonproliferative retinopathy without macular edema, without long-term current use of insulin; Primary hypertension; Coronary artery disease; Supraventricular tachycardia; TIA (transient ischemic attack); Hyperlipidemia; Benign prostatic hyperplasia without urinary obstruction; Class 2 severe obesity due to excess calories with serious comorbidity and body mass index (BMI) of 36.0 to 36.9 in adult; Polycythemia vera; Acute non-recurrent sinusitis; Pain in both knees; Allergic rhinitis; Left bundle-branch block; Fatigue; Atrial flutter; History of CVA (cerebrovascular accident); Cervical radiculitis; Chronic diastolic congestive heart failure; CKD (chronic kidney disease) stage 2, GFR 60-89 ml/min; Glucosuria; Paroxysmal atrial fibrillation; Memory difficulties; Bradycardia; Anemia; Low back pain; Back pain; Cardiovascular stress test abnormal; Prostatitis; Localized edema; Murmur; Pulmonary hypertension; Thrombocytosis; Arthritis; Proteinuria; Fall at home; Cognitive communication deficit; Mild cognitive impairment with memory loss; and Hematuria on their problem list., the following items were addressed: medical records; medications and any changes can be found within the plan section of the note.  A detailed listing of time spent for chronic care management is tracked within each outreach encounter.  Current medications include:  has a current  medication list which includes the following prescription(s): accu-chek fastclix lancets, acetaminophen, eliquis, aspirin low dose, accu-chek guide me, carvedilol, fluticasone, glimepiride, glucosamine-chondroitin, glucose blood, guaifenesin, hydralazine, hydroxyurea, memantine, metformin er, montelukast, multiple vitamin, potassium chloride, probiotic daily, rosuvastatin, sitagliptin, tamsulosin, torsemide, valsartan, and gemtesa. and the patient is reported to be caregiver will take responsibility for med compliance; patient is compliant with medication protocol, Daughter in law help patient with setting and medication management,  Medications are reported to be effective.  Regarding these diagnoses, referrals were made to the following provider(s):  n/a.  All notes on chart for PCP to review.    The patient was monitored remotely for blood pressure; blood glucose; activity level; pain; medications.    The patient's physical needs include:  help taking medications as prescribed; physical healthcare.     The patient's mental support needs include:  continued support    The patient's cognitive support needs include:  medication; health care; continued support    The patient's psychosocial support needs include:  continued support    The patient's functional needs include: medication education; physical healthcare    The patient's environmental needs include:  not applicable, n/a    Care Plan overall comments:  No data recorded    Refer to previous outreach notes for more information on the areas listed above.    Monthly Billing Diagnoses  (E11.3293) Type 2 diabetes mellitus with both eyes affected by mild nonproliferative retinopathy without macular edema, without long-term current use of insulin    (I10) Primary hypertension    Medications   Medications have been reconciled    Care Plan progress this month:      Recently Modified Care Plans Updates made since 1/28/2024 12:00 AM       Diabetes Type 2 (Adult)            Problem Priority Last Modified     Disease Progression (Diabetes, Type 2) --  1/12/2024  3:20 PM by Erlin Abreu RN              Goal Recent Progress Last Modified     Disease Progression Prevented or Minimized --  1/12/2024  3:20 PM by Erlin Abrue RN     Evidence-based guidance:   Prepare patient for laboratory and diagnostic exams based on risk and presentation.   Encourage lifestyle changes, such as increased intake of plant-based foods, stress reduction, consistent physical activity and smoking cessation to prevent long-term complications and chronic disease.    Individualize activity and exercise recommendations while considering potential limitations, such as neuropathy, retinopathy or the ability to prevent hyperglycemia or hypoglycemia.    Prepare patient for use of pharmacologic therapy that may include antihypertensive, analgesic, prostaglandin E1 with periodic adjustments, based on presenting chronic condition and laboratory results.   Assess signs/symptoms and risk factors for hypertension, sleep-disordered breathing, neuropathy (including changes in gait and balance), retinopathy, nephropathy and sexual dysfunction.   Address pregnancy planning and contraceptive choice, especially when prescribing antihypertensive or statin.   Ensure completion of annual comprehensive foot exam and dilated eye exam.    Implement additional individualized goals and interventions based on identified risk factors.   Prepare patient for consultation or referral for specialist care, such as ophthalmology, neurology, cardiology, podiatry, nephrology or perinatology.    Notes:            Task Due Date Last Modified     Monitor and Manage Follow-up for Comorbidities --  2/8/2024  9:46 AM by Erlin Abreu RN     Care Management Activities:      - activity based on tolerance and functional limitations encouraged  - healthy lifestyle promoted  - quality of sleep assessed  - response to pharmacologic therapy  monitored  - vital signs and trends reviewed      Notes:                     Hypertension (Adult)           Problem Priority Last Modified     Disease Progression (Hypertension) --  1/12/2024  3:20 PM by Erlin Abreu RN              Goal Recent Progress Last Modified     Disease Progression Prevented or Minimized --  1/12/2024  3:20 PM by Erlin Abreu RN     Evidence-based guidance:   Tailor lifestyle advice to individual; review progress regularly; give frequent encouragement and respond positively to incremental successes.   Assess for and promote awareness of worsening disease or development of comorbidity.   Prepare patient for laboratory and diagnostic exams based on risk and presentation.   Prepare patient for use of pharmacologic therapy that may include diuretic, beta-blocker, beta-blocker/thiazide combination, angiotensin-converting enzyme inhibitor, renin-angiotensin blocker or calcium-channel blocker.   Expect periodic adjustments to pharmacologic therapy; manage side effects.   Promote a healthy diet that includes primarily plant-based foods, such as fruits, vegetables, whole grains, beans and legumes, low-fat dairy and lean meats.    Consider moderate reduction in sodium intake by avoiding the addition of salt to prepared foods and limiting processed meats, canned soup, frozen meals and salty snacks.    Promote a regular, daily exercise goal of 150 minutes per week of moderate exercise based on tolerance, ability and patient choice; consider referral to physical therapist, community wellness and/or activity program.   Encourage the avoidance of no more than 2 hours per day of sedentary activity, such as recreational screen time.   Review sources of stress; explore current coping strategies and encourage use of mindfulness, yoga, meditation or exercise to manage stress.    Notes:            Task Due Date Last Modified     Alleviate Barriers to Hypertension Treatment --  2/8/2024  9:46 AM by  Erlin Abreu, RN     Care Management Activities:      - healthy diet promoted  - patient response to treatment assessed  - quality of sleep assessed  - not discussed during this outreach      Notes:                          Instructions   Patient was provided an electronic copy of care plan  CCM services were explained and offered and patient has accepted these services.  Patient has given their written consent to receive CCM services and understands that this includes the authorization of electronic communication of medical information with the other treating providers.  Patient understands that they may stop CCM services at any time and these changes will be effective at the end of the calendar month and will effectively revocate the agreement of CCM services.  Patient understands that only one practitioner can furnish and be paid for CCM services during one calendar month.  Patient also understands that there may be co-payment or deductible fees in association with CCM services.  Patient will continue with at least monthly follow-up calls with the Ambulatory .    Erlin EARL  Ambulatory Case Management    2/28/2024, 12:05 EST

## 2024-02-29 ENCOUNTER — APPOINTMENT (OUTPATIENT)
Dept: CT IMAGING | Facility: HOSPITAL | Age: 79
DRG: 064 | End: 2024-02-29
Payer: MEDICARE

## 2024-02-29 PROBLEM — R56.9 SEIZURES: Status: ACTIVE | Noted: 2024-02-29

## 2024-02-29 LAB
GLUCOSE BLDC GLUCOMTR-MCNC: 184 MG/DL (ref 70–130)
GLUCOSE BLDC GLUCOMTR-MCNC: 198 MG/DL (ref 70–130)
GLUCOSE BLDC GLUCOMTR-MCNC: 199 MG/DL (ref 70–130)
GLUCOSE BLDC GLUCOMTR-MCNC: 226 MG/DL (ref 70–130)

## 2024-02-29 PROCEDURE — 82948 REAGENT STRIP/BLOOD GLUCOSE: CPT

## 2024-02-29 PROCEDURE — 97530 THERAPEUTIC ACTIVITIES: CPT

## 2024-02-29 PROCEDURE — C9248 INJ, CLEVIDIPINE BUTYRATE: HCPCS | Performed by: INTERNAL MEDICINE

## 2024-02-29 PROCEDURE — 97535 SELF CARE MNGMENT TRAINING: CPT

## 2024-02-29 PROCEDURE — 25010000002 CLEVIDIPINE BUTYRATE PER 1 MG: Performed by: INTERNAL MEDICINE

## 2024-02-29 PROCEDURE — 92526 ORAL FUNCTION THERAPY: CPT

## 2024-02-29 PROCEDURE — 99231 SBSQ HOSP IP/OBS SF/LOW 25: CPT | Performed by: NEUROLOGICAL SURGERY

## 2024-02-29 PROCEDURE — 25010000002 LABETALOL 5 MG/ML SOLUTION: Performed by: INTERNAL MEDICINE

## 2024-02-29 PROCEDURE — 70450 CT HEAD/BRAIN W/O DYE: CPT

## 2024-02-29 PROCEDURE — 63710000001 INSULIN REGULAR HUMAN PER 5 UNITS

## 2024-02-29 PROCEDURE — 63710000001 INSULIN GLARGINE PER 5 UNITS: Performed by: INTERNAL MEDICINE

## 2024-02-29 PROCEDURE — 25010000002 HYDRALAZINE PER 20 MG: Performed by: INTERNAL MEDICINE

## 2024-02-29 RX ORDER — CARVEDILOL 25 MG/1
25 TABLET ORAL 2 TIMES DAILY WITH MEALS
Status: DISCONTINUED | OUTPATIENT
Start: 2024-02-29 | End: 2024-02-29

## 2024-02-29 RX ORDER — LABETALOL 200 MG/1
200 TABLET, FILM COATED ORAL EVERY 12 HOURS SCHEDULED
Status: DISCONTINUED | OUTPATIENT
Start: 2024-02-29 | End: 2024-03-01

## 2024-02-29 RX ORDER — MEMANTINE HYDROCHLORIDE 5 MG/1
10 TABLET ORAL EVERY 12 HOURS SCHEDULED
Status: DISCONTINUED | OUTPATIENT
Start: 2024-02-29 | End: 2024-03-15 | Stop reason: HOSPADM

## 2024-02-29 RX ORDER — CARVEDILOL 12.5 MG/1
12.5 TABLET ORAL ONCE
Status: COMPLETED | OUTPATIENT
Start: 2024-02-29 | End: 2024-02-29

## 2024-02-29 RX ADMIN — INSULIN HUMAN 4 UNITS: 100 INJECTION, SOLUTION PARENTERAL at 17:55

## 2024-02-29 RX ADMIN — INSULIN HUMAN 2 UNITS: 100 INJECTION, SOLUTION PARENTERAL at 12:31

## 2024-02-29 RX ADMIN — HYDRALAZINE HYDROCHLORIDE 100 MG: 50 TABLET ORAL at 06:00

## 2024-02-29 RX ADMIN — INSULIN HUMAN 2 UNITS: 100 INJECTION, SOLUTION PARENTERAL at 06:16

## 2024-02-29 RX ADMIN — LABETALOL HYDROCHLORIDE 200 MG: 200 TABLET, FILM COATED ORAL at 13:44

## 2024-02-29 RX ADMIN — ACETAMINOPHEN 325MG 650 MG: 325 TABLET ORAL at 20:16

## 2024-02-29 RX ADMIN — HYDRALAZINE HYDROCHLORIDE 100 MG: 50 TABLET ORAL at 21:07

## 2024-02-29 RX ADMIN — CLEVIPIDINE 9 MG/HR: 0.5 EMULSION INTRAVENOUS at 19:00

## 2024-02-29 RX ADMIN — VALSARTAN 320 MG: 320 TABLET, FILM COATED ORAL at 08:59

## 2024-02-29 RX ADMIN — LABETALOL HYDROCHLORIDE 200 MG: 200 TABLET, FILM COATED ORAL at 20:06

## 2024-02-29 RX ADMIN — CLEVIPIDINE 8 MG/HR: 0.5 EMULSION INTRAVENOUS at 06:05

## 2024-02-29 RX ADMIN — AMLODIPINE BESYLATE 10 MG: 10 TABLET ORAL at 08:59

## 2024-02-29 RX ADMIN — CLEVIPIDINE 10 MG/HR: 0.5 EMULSION INTRAVENOUS at 21:04

## 2024-02-29 RX ADMIN — CLEVIPIDINE 9 MG/HR: 0.5 EMULSION INTRAVENOUS at 23:18

## 2024-02-29 RX ADMIN — Medication 10 ML: at 20:07

## 2024-02-29 RX ADMIN — LEVETIRACETAM 500 MG: 500 TABLET, FILM COATED ORAL at 20:06

## 2024-02-29 RX ADMIN — LABETALOL HYDROCHLORIDE 40 MG: 5 INJECTION, SOLUTION INTRAVENOUS at 02:55

## 2024-02-29 RX ADMIN — CARVEDILOL 12.5 MG: 12.5 TABLET, FILM COATED ORAL at 08:59

## 2024-02-29 RX ADMIN — LEVETIRACETAM 500 MG: 500 TABLET, FILM COATED ORAL at 08:59

## 2024-02-29 RX ADMIN — MEMANTINE HYDROCHLORIDE 10 MG: 10 TABLET, FILM COATED ORAL at 20:07

## 2024-02-29 RX ADMIN — CARVEDILOL 12.5 MG: 12.5 TABLET, FILM COATED ORAL at 10:00

## 2024-02-29 RX ADMIN — CLEVIPIDINE 9 MG/HR: 0.5 EMULSION INTRAVENOUS at 16:28

## 2024-02-29 RX ADMIN — MEMANTINE HYDROCHLORIDE 10 MG: 10 TABLET, FILM COATED ORAL at 13:44

## 2024-02-29 RX ADMIN — CLEVIPIDINE 2 MG/HR: 0.5 EMULSION INTRAVENOUS at 04:45

## 2024-02-29 RX ADMIN — HYDRALAZINE HYDROCHLORIDE 10 MG: 20 INJECTION INTRAMUSCULAR; INTRAVENOUS at 03:30

## 2024-02-29 RX ADMIN — INSULIN GLARGINE 10 UNITS: 100 INJECTION, SOLUTION SUBCUTANEOUS at 20:07

## 2024-02-29 RX ADMIN — HYDRALAZINE HYDROCHLORIDE 100 MG: 50 TABLET ORAL at 13:44

## 2024-02-29 RX ADMIN — SENNOSIDES AND DOCUSATE SODIUM 2 TABLET: 50; 8.6 TABLET ORAL at 08:59

## 2024-02-29 RX ADMIN — CLEVIPIDINE 9 MG/HR: 0.5 EMULSION INTRAVENOUS at 09:52

## 2024-02-29 NOTE — PROGRESS NOTES
LOS: 5 days   Patient Care Team:  Zamzam John APRN as PCP - General (Family Medicine)    Subjective     Presented with facial droop and dysarthria 78-year-old male with hypertension coronary artery disease, hyperlipidemia atrial flutter chronic diastolic heart failure type 2 diabetes chronic kidney disease prior stroke with some residual left-sided deficits on chronic anticoagulation with apixaban for his A-fib evaluation in ED revealed a acute left thalamic hemorrhage and surrounding cerebral edema.    Patient more awake today continues to wax and wane he answers yep to everything  Review of Systems:          Objective     Vital Signs  Vital Sign Min/Max for last 24 hours  Temp  Min: 97.2 °F (36.2 °C)  Max: 99.4 °F (37.4 °C)   BP  Min: 137/64  Max: 204/97   Pulse  Min: 56  Max: 96   Resp  Min: 12  Max: 26   SpO2  Min: 91 %  Max: 96 %   No data recorded   No data recorded        Ventilator/Non-Invasive Ventilation Settings (From admission, onward)      None                         Body mass index is 34.47 kg/m².  I/O last 3 completed shifts:  In: 2034 [I.V.:1108; Other:220; NG/GT:706]  Out: 2750 [Urine:2750]  I/O this shift:  In: 382 [I.V.:110; Other:60; NG/GT:212]  Out: 1100 [Urine:1100]        Physical Exam:  General Appearance: Older white male lying in bed he is on nicardipine drip at 8 mg an hour he does not look in acute distress  Eyes: Conjunctiva are injected, anicteric, pupils are equal they are about  3 mm they are equal and reactive to light  ENT: Mucous membranes are dry Mallampati type II airway, nasal septum midline nasal  enteric tube  Neck: Trachea midline no adenopathy and no jugular venous distention  Lungs: Clear nonlabored symmetric expansion  Cardiac: Regular rhythm and looks to be sinus on the monitor he has a murmur at the right upper sternal border  Abdomen: Soft nontender no hepatosplenomegaly, obese  : Not examined  Musculoskeletal: No significant edema  Skin: Warm and dry no  jaundice no petechiae he has some chronic venous type insufficiency changes in the lower extremities bilaterally  Neuro: He continues to wax and wane on his responsiveness he is currently following some commands he did  and release my hand with the left I could not get him to follow commands with his left foot he definitely had brisk withdrawal he has just a little bit of withdrawal to noxious stimuli in the right lower extremity and no response in the right upper extremity.  He is dysarthric  Extremities/P Vascular: Palpable radial and dorsalis pedis pulses  MSE: Hard to tell he seems frustrated       Labs:  Results from last 7 days   Lab Units 02/28/24  1757 02/28/24  0804 02/27/24  0604 02/26/24  0258 02/25/24  0720 02/24/24  2221 02/24/24  2213   GLUCOSE mg/dL  --  167* 158* 239* 314*  --  160*   SODIUM mmol/L  --  147* 147* 145 142  --  143   POTASSIUM mmol/L 4.2 3.6 3.8 3.5 3.6  --  3.9   MAGNESIUM mg/dL  --   --  2.4 2.0 2.3  --   --    CO2 mmol/L  --  21.4* 21.5* 22.0 22.7  --  31.0*   CHLORIDE mmol/L  --  115* 114* 112* 107  --  105   ANION GAP mmol/L  --  10.6 11.5 11.0 12.3  --  7.0   CREATININE mg/dL  --  0.87 0.83 1.19 1.16 1.60* 1.26   BUN mg/dL  --  14 13 23 27*  --  27*   BUN / CREAT RATIO   --  16.1 15.7 19.3 23.3  --  21.4   CALCIUM mg/dL  --  8.6 8.7 8.9 9.2  --  9.8   ALK PHOS U/L  --   --   --   --   --   --  71   TOTAL PROTEIN g/dL  --   --   --   --   --   --  6.6   ALT (SGPT) U/L  --   --   --   --   --   --  15   AST (SGOT) U/L  --   --   --   --   --   --  24   BILIRUBIN mg/dL  --   --   --   --   --   --  0.4   ALBUMIN g/dL  --   --   --   --   --   --  4.1   GLOBULIN gm/dL  --   --   --   --   --   --  2.5     Estimated Creatinine Clearance: 94.6 mL/min (by C-G formula based on SCr of 0.87 mg/dL).      Results from last 7 days   Lab Units 02/27/24  0604 02/26/24  0258 02/25/24  0720 02/24/24  2213   WBC 10*3/mm3 10.52 12.40* 10.38 8.10   RBC 10*6/mm3 4.99 4.75 5.10 5.13   HEMOGLOBIN  g/dL 14.4 13.9 14.7 14.5   HEMATOCRIT % 45.3 43.0 45.8 44.8   MCV fL 90.8 90.5 89.8 87.3   MCH pg 28.9 29.3 28.8 28.3   MCHC g/dL 31.8 32.3 32.1 32.4   RDW % 16.7* 16.2* 15.9* 15.7*   RDW-SD fl 55.0* 52.8 51.4 49.1   MPV fL 10.0 9.8 9.6 9.2   PLATELETS 10*3/mm3 327 350 358 407   NEUTROPHIL % % 76.8* 88.1* 86.6* 65.2   LYMPHOCYTE % % 10.1* 5.2* 6.6* 19.1*   MONOCYTES % % 7.8 5.0 4.1* 8.5   EOSINOPHIL % % 3.6 0.5 0.9 4.7   BASOPHIL % % 1.0 0.8 1.3 2.0*   IMM GRAN % % 0.7* 0.4 0.5 0.5   NEUTROS ABS 10*3/mm3 8.09* 10.92* 9.00* 5.28   LYMPHS ABS 10*3/mm3 1.06 0.65* 0.68* 1.55   MONOS ABS 10*3/mm3 0.82 0.62 0.43 0.69   EOS ABS 10*3/mm3 0.38 0.06 0.09 0.38   BASOS ABS 10*3/mm3 0.10 0.10 0.13 0.16   IMMATURE GRANS (ABS) 10*3/mm3 0.07* 0.05 0.05 0.04   NRBC /100 WBC 0.0 0.0 0.0 0.0         Results from last 7 days   Lab Units 02/24/24  2213   HSTROP T ng/L 41*                 Results from last 7 days   Lab Units 02/25/24  0720 02/24/24  2213   INR  1.20* 1.28*     Microbiology Results (last 10 days)       ** No results found for the last 240 hours. **                amLODIPine, 10 mg, Nasogastric, Q24H  carvedilol, 12.5 mg, Nasogastric, BID With Meals  hydrALAZINE, 100 mg, Nasogastric, Q8H  insulin glargine, 10 Units, Subcutaneous, Nightly  insulin regular, 2-9 Units, Subcutaneous, Q6H  levETIRAcetam, 500 mg, Nasogastric, BID  senna-docusate sodium, 2 tablet, Oral, BID  sodium chloride, 10 mL, Intravenous, Q12H  valsartan, 320 mg, Nasogastric, Daily      clevidipine, 2-32 mg/hr, Last Rate: 9 mg/hr (02/29/24 0615)  labetalol, 0.5-2 mg/min, Last Rate: Stopped (02/27/24 1842)        Diagnostics:  CT Head Without Contrast    Addendum Date: 2/25/2024    ADDENDUM: 02 25 24 02:07 Call Doctor Regarding Intracranial Hemorrhage, called MIRI Pagan on 02 25 02:07 (-05:00)     Result Date: 2/25/2024  CR Patient: FELIPE DOCKERY  Time Out: 01:54 Exam(s): CT HEAD Without Contrast EXAM:   CT Head Without Intravenous Contrast CLINICAL  HISTORY:    Reason for exam: Neuro deficit, acute, stroke suspected. TECHNIQUE:   Axial computed tomography images of the head brain without intravenous contrast.  This CT exam was performed according to the principle of ALARA (As Low As Reasonably Achievable) by using one or more of the following dose reduction techniques: automated exposure control, adjustment of the mA and or kV according to patient size, and or use of iterative reconstruction technique. COMPARISON:   No relevant prior studies available. FINDINGS:   Brain:  2.7 x 2.8 x 3.1 cm left thalamic intraparenchymal hemorrhage with associated vasogenic edema.  This has increased in size from the prior study when it measured 2.2 x 1.9 x 1.6 cm.  Additionally there has been interval intraventricular extension of the hemorrhage into the left lateral ventricle with greater mass-effect upon the lateral ventricle with subtle 3 mm left to right midline shift.  Small amount of hemorrhage is also noted within the dependent portion of the right lateral ventricle.   Ventricles:  See above.   Bones joints:  Unremarkable.  No acute fracture.   Soft tissues:  Unremarkable.   Sinuses:  Unremarkable as visualized.  No acute sinusitis.   Mastoid air cells:  Unremarkable as visualized.  No mastoid effusion. IMPRESSION:       Increasing left thalamic hemorrhage measuring 3.1 cm in greatest dimension on the current study increased from 2.2 cm on the prior exam.  This results in greater mass-effect upon the left lateral ventricle, with intraventricular extension and hemorrhage seen within both lateral ventricles.     Communications:  Call Doctor Intracranial Hemorrhage Electronically signed by Noman Winchester MD on 02-25-24 at 0154    XR Chest 1 View    Result Date: 2/24/2024  CHEST SINGLE VIEW  HISTORY: Stroke  COMPARISON: None  FINDINGS: Heart size is mildly enlarged. Lungs appear clear of focal airspace disease and there is no evidence for pulmonary edema or pleural  effusion or infiltrate. Cardiac monitor and leads are present. Atherosclerotic calcifications are present overlying the thoracic aorta.      Cardiomegaly. Atherosclerotic disease. No evidence for active disease in the chest  This report was finalized on 2/24/2024 11:24 PM by Dr. Aftab Duke M.D on Workstation: [id number]      CT Angiogram Head w AI Analysis of LVO    Result Date: 2/24/2024  CT HEAD WITHOUT CONTRAST  HISTORY: Neurologic deficit. Mental status change.  TECHNIQUE:  Head CT includes axial imaging from the base of skull to the vertex without intravenous contrast. Radiation dose reduction techniques were utilized, including automated exposure control and exposure modulation based on body size.  COMPARISON: None  FINDINGS: There is an acute intraparenchymal hematoma centered within the left thalamus and extending slightly anteromedial to the left thalamus measuring approximately 2.6 x 2 x 1.8 cm. This exhibits surrounding edema and there is localized mass effect. No additional intraparenchymal hemorrhage is evident. There is moderate chronic small vessel ischemic white matter change demonstrated as patchy deep cerebral white matter hypodensity. Intracranial atherosclerotic calcifications are present.      1. Acute intraparenchymal hemorrhage centered within the left thalamus measuring 2.6 x 2 x 1.8 cm with surrounding cerebral edema. Follow-up evaluation recommended. 2. Moderate to severe chronic small vessel ischemic white matter change. Intracranial atherosclerotic calcifications.   Findings discussed with the stroke neurologist on call on 02/24/2024 at 10:28 p.m.  Radiation dose reduction techniques were utilized, including automated exposure control and exposure modulation based on body size.   This report was finalized on 2/24/2024 10:46 PM by Dr. Aftab Duke M.D on Workstation: [id number]          I reviewed just had a CT head repeat stable left thalamic hemorrhage with mild edema and about 2  mm shift.  There is slight increase in intraventricular blood per report    Active Hospital Problems    Diagnosis  POA    **Intraparenchymal hemorrhage of brain [I61.9]  Yes      Resolved Hospital Problems   No resolved problems to display.         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    Patient on prophylactic Keppra.  Surgery following the clinic scanning again tomorrow  Right hemiparesis dysphagia dysarthria does not seem to make any improvement overnight although he waxes and wanes quite a bit.  Paroxysmal atrial fibrillation on Eliquis chronically, patient received Kcentra in the emergency department and of course with his intracranial hemorrhage anticoagulation will be on hold.  Rate seems to be controlled.  Hypertensive emergency blood pressure goal less than 140.  Blood pressure has been extremely difficult to control I am going to go back to labetalol he seemed to do better when he was on that.  polycythemia vera by history  Hyperlipidemia  Diabetes mellitus type 2 blood sugars high can add some scheduled insulin to the sliding scale.  Fluids/electrolytes/nutrition speech just evaluating cleared him for p.o. medications.  I think his ability to take p.o. is looking more and doubt he waxes and wanes so much.  He may need a feeding tube            Plan for disposition: Patient is probably need some kind of placement.  Neurosurgery is okay with him transferring to neurosurgery floor.  But we need to get his blood pressure under control to do this.  Chet Rivero Jr, MD  02/29/24  06:26 EST    Time:  critical care time 39 minutes

## 2024-02-29 NOTE — PROGRESS NOTES
"Nutrition Services    Patient Name:  Erlin Blanco  YOB: 1945  MRN: [id number]  Admit Date:  2/24/2024    Assessment Date:  02/29/24    Comment: TF's at goal of 60mL/hr with Diabetisource AC, min free water. More alert but tired today as he was up several times during the night, had a few BM's per dtr. Meds, skin, labs reviewed. Glu 154/175/184. No other labs today. Back on cleviprex. Poss re-eval by SLP today    Plan/Recommendations:  Continue Diabetisource AC at goal of 60mL/hr  Increase Free water to 30mL/hr if OK with medical team  Monitor Na+ levels.  Adv diet per SLP eval    Will follow clinical course, nutrition needs.    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 Hx Afib, HTN, DM, hyperlipidemia   Current Problems Not safe for po     Encounter Information        Nutrition History    Food Preferences    Supplements    Factors Affecting Intake altered mental status, swallow impairment   Tests/Procedures Clinical Swallow Evaluation - ? today     Anthropometrics        Current Height   Current Weight  BMI kg/m2 Height: 182.9 cm (72\")  Weight: 112 kg (247 lb 9.2 oz) (02/29/24 0600)  Body mass index is 33.58 kg/m².     Adj BMI (if applicable)    BMI Category Obese, Class I (30 - 34.9)       Admission Weight    Ideal Body Weight (IBW) 77.6kg     Adj IBW (if applicable) 255lb   Usual Body Weight (UBW) ~250lb   Weight Change/Trend Stable       Weight history: Wt Readings from Last 30 Encounters:   02/29/24 0600 112 kg (247 lb 9.2 oz)   02/28/24 0600 115 kg (254 lb 3.1 oz)   02/27/24 0500 115 kg (252 lb 6.8 oz)   02/26/24 0400 113 kg (250 lb)   02/25/24 0600 115 kg (254 lb 10.1 oz)   02/24/24 2244 116 kg (255 lb 1.2 oz)        Estimated/Assessed Needs        Energy Requirements    Weight for Calculation 115lg   Method for Estimation  15 kcal/kg   EST Needs (kcal/day) " "1725       Protein Requirements    Weight for Calculation 77.6kg   EST Protein Needs (g/kg) 1.5 gm/kg   EST Daily Needs (g/day) 116       Fluid Requirements     Method for Estimation Defer to physician    Estimated Needs (mL/day)        Fluid Deficit    Current Na Level (mEq/L) 147   Desired Na Level (mEq/L) unknown   Estimated Fluid Deficit (L)       Labs        Pertinent Labs    Results from last 7 days   Lab Units 02/28/24  1757 02/28/24  0804 02/27/24  0604 02/26/24 0258 02/24/24 2221 02/24/24 2213   SODIUM mmol/L  --  147* 147* 145   < > 143   POTASSIUM mmol/L 4.2 3.6 3.8 3.5   < > 3.9   CHLORIDE mmol/L  --  115* 114* 112*   < > 105   CO2 mmol/L  --  21.4* 21.5* 22.0   < > 31.0*   BUN mg/dL  --  14 13 23   < > 27*   CREATININE mg/dL  --  0.87 0.83 1.19   < > 1.26   CALCIUM mg/dL  --  8.6 8.7 8.9   < > 9.8   BILIRUBIN mg/dL  --   --   --   --   --  0.4   ALK PHOS U/L  --   --   --   --   --  71   ALT (SGPT) U/L  --   --   --   --   --  15   AST (SGOT) U/L  --   --   --   --   --  24   GLUCOSE mg/dL  --  167* 158* 239*   < > 160*    < > = values in this interval not displayed.     Results from last 7 days   Lab Units 02/27/24  0604 02/26/24  0258 02/25/24  0720 02/24/24 2213 02/24/24 2213   MAGNESIUM mg/dL 2.4 2.0 2.3  --   --    HEMOGLOBIN g/dL 14.4 13.9 14.7  --  14.5   HEMATOCRIT % 45.3 43.0 45.8  --  44.8   WBC 10*3/mm3 10.52 12.40* 10.38  --  8.10   TRIGLYCERIDES mg/dL 75  --  53   < >  --    ALBUMIN g/dL  --   --   --   --  4.1    < > = values in this interval not displayed.     Results from last 7 days   Lab Units 02/27/24  0604 02/26/24  0258 02/25/24  0720 02/24/24  2213   INR   --   --  1.20* 1.28*   APTT seconds  --   --   --  32.8   PLATELETS 10*3/mm3 327 350 358 407     No results found for: \"COVID19\"  Lab Results   Component Value Date    HGBA1C 7.10 (H) 02/25/2024          Medications            Scheduled Medications amLODIPine, 10 mg, Nasogastric, Q24H  carvedilol, 12.5 mg, Nasogastric, " Once  carvedilol, 25 mg, Nasogastric, BID With Meals  hydrALAZINE, 100 mg, Nasogastric, Q8H  insulin glargine, 10 Units, Subcutaneous, Nightly  insulin regular, 2-9 Units, Subcutaneous, Q6H  levETIRAcetam, 500 mg, Nasogastric, BID  memantine, 10 mg, Nasogastric, Q12H  senna-docusate sodium, 2 tablet, Oral, BID  sodium chloride, 10 mL, Intravenous, Q12H  valsartan, 320 mg, Nasogastric, Daily        Infusions clevidipine, 2-32 mg/hr, Last Rate: 9 mg/hr (02/29/24 0615)        PRN Medications   acetaminophen    acetaminophen    acetaminophen    senna-docusate sodium **AND** polyethylene glycol **AND** bisacodyl **AND** bisacodyl    Calcium Replacement - Follow Nurse / BPA Driven Protocol    dextrose    dextrose    glucagon (human recombinant)    hydrALAZINE    labetalol    Magnesium Standard Dose Replacement - Follow Nurse / BPA Driven Protocol    nitroglycerin    ondansetron    ondansetron    Phosphorus Replacement - Follow Nurse / BPA Driven Protocol    Potassium Replacement - Follow Nurse / BPA Driven Protocol    sodium chloride    sodium chloride    sodium chloride     Physical Findings          Physical Appearance alert, facial droop, hemiparesis , obese   Oral/Mouth Cavity dental appliance, tooth or teeth missing   Edema  1+ (trace), 2+ (mild)   Gastrointestinal last bowel movement: 2/29   Skin  pressure injury: stage 2 perineum   Tubes/Drains/Lines Cortrak, NG tube, bridle in place   NFPE No clinical signs of muscle wasting or fat loss   --  Current Nutrition Orders & Evaluation of Intake       Oral Nutrition     Food Allergies NKFA   Current PO Diet NPO Diet NPO Type: Strict NPO   Supplement    PO Evaluation     Trending % PO Intake       Enteral Nutrition     Enteral Route NG    TF Delivery Method Continuous    Propofol Rate/Kcal     Current TF Order/Rate  Diabetisource AC @ 60 mL/hr    TF Goal Rate 60 mL/hr    Current Water Flush 30 mL Q 4 hr    Modular None    TF Residual  no or minimal residual    TF  Tolerance tolerating    TF Observation Verified correct TF and water flush infusing per orders     Nutrition Diagnosis        Nutrition Dx Problem 1 Problem: Inadequate Oral Intake  Etiology: Medical Diagnosis - Spontaneous left thalamic intracranial hemorrhage with intraventricular extension  Signs/Symptoms: NPO and SLP/Swallow Evaluation    Comment:      INTERVENTION / PLAN OF CARE  Intervention Goal        Intervention Goal(s) Maintain nutrition status, Reduce/improve symptoms, Meet estimated needs, Disease management/therapy, Initiate TF/PN, Transition TF to PO, and No significant weight loss     Nutrition Intervention        RD Action Continue to monitor, Care plan reviewed, and Recommend/order: EN     Prescription         Diet     Supplement/Snack    EN/PN     Prescription Ordered       Enteral Prescription:     Enteral Route NG    TF Delivery Method Continuous    Enteral Product Diabetisource AC    Modular None    Propofol Rate/Kcal     TF Start Rate 20    TF Goal Rate 60    Free Water Flush 30mL q 1h    TF Provision at Goal: 1728 kcal, 86 gm protein, 1181 mL free water + 720 mL water flushes         Calories 100 % needs met         Protein  74 % needs met         Fluid (mL) 1901mL    Prescription Ordered No, recommended     Monitor/Evaluation        Monitor Per protocol   Discharge Plan Pending clinical course   Education Will instruct as appropriate     RD to follow per protocol.       Electronically signed by:  Allegra Wiseman RD  02/29/24 09:29 EST

## 2024-02-29 NOTE — THERAPY TREATMENT NOTE
Patient Name: Erlin Blanco  : 1945    MRN: [id number]                              Today's Date: 2024       Admit Date: 2024    Visit Dx:     ICD-10-CM ICD-9-CM   1. Intraparenchymal hemorrhage of brain  I61.9 431   2. Facial droop  R29.810 781.94   3. Expressive aphasia  R47.01 784.3   4. Weakness of right upper extremity  R29.898 729.89   5. Chronic anticoagulation  Z79.01 V58.61     Patient Active Problem List   Diagnosis    Intraparenchymal hemorrhage of brain    Seizures     History reviewed. No pertinent past medical history.  History reviewed. No pertinent surgical history.   General Information       Row Name 24 0908          Physical Therapy Time and Intention    Document Type therapy note (daily note)  -MS (r) MH (t) MS (c)     Mode of Treatment occupational therapy;co-treatment;physical therapy  -MS (r) MH (t) MS (c)       Row Name 24 0908          General Information    Patient Profile Reviewed yes  -MS (r) MH (t) MS (c)     Existing Precautions/Restrictions fall;NPO  -MS (r) MH (t) MS (c)       Row Name 24 0908          Cognition    Orientation Status (Cognition) oriented to;person  -MS (r) MH (t) MS (c)       Row Name 24 0908          Safety Issues, Functional Mobility    Safety Issues Affecting Function (Mobility) safety precaution awareness;safety precautions follow-through/compliance;sequencing abilities;problem-solving;insight into deficits/self-awareness  -MS (r) MH (t) MS (c)     Impairments Affecting Function (Mobility) balance;cognition;endurance/activity tolerance;postural/trunk control;range of motion (ROM);coordination;grasp;strength;visual/perceptual;sensation/sensory awareness;motor control  -MS (r) MH (t) MS (c)     Comment, Safety Issues/Impairments (Mobility) Co treatment medically appropriate and necessary due to patient acuity level, activity tolerance and safety of patient and staff. Treatment is focusing on progression of care and goals  established in the POC.  -MS (r) MH (t) MS (c)               User Key  (r) = Recorded By, (t) = Taken By, (c) = Cosigned By      Initials Name Provider Type    Lyla Linares, PT Physical Therapist    Rick Pelaez, PT Student PT Student                   Mobility       Row Name 02/29/24 0909          Bed Mobility    Bed Mobility supine-sit;sit-supine;scooting/bridging  -MS (r) MH (t) MS (c)     Scooting/Bridging Sullivan (Bed Mobility) dependent (less than 25% patient effort);2 person assist;verbal cues;nonverbal cues (demo/gesture)  -MS (r) MH (t) MS (c)     Supine-Sit [place] (Bed Mobility) maximum assist (25% patient effort);2 person assist  -MS (r) MH (t) MS (c)     Sit-Supine [place] (Bed Mobility) maximum assist (25% patient effort);2 person assist  -MS (r) MH (t) MS (c)     Assistive Device (Bed Mobility) bed rails;head of bed elevated  -MS (r) MH (t) MS (c)     Comment, (Bed Mobility) Pt needed max VCs to reach for bed rail to promote ind level. Pt was able to sit on EOB for about 15 min this date, much emphasis was made to achieve midline posture. Pt fatigued quickly, however vitals stayed WNL.  -MS (r) MH (t) MS (c)       Row Name 02/29/24 0909          Transfers    Comment, (Transfers) Not appropriate to assess today, but sitting balance is improving  -MS (r) MH (t) MS (c)       Row Name 02/29/24 0909          Gait/Stairs (Locomotion)    Patient was able to Ambulate no, other medical factors prevent ambulation  -MS (r) MH (t) MS (c)     Reason Patient was unable to Ambulate Excessive Weakness  -MS (r) MH (t) MS (c)     Comment, (Gait/Stairs) No able to assess this date.  -MS (r) MH (t) MS (c)               User Key  (r) = Recorded By, (t) = Taken By, (c) = Cosigned By      Initials Name Provider Type    Lyla Linares, PT Physical Therapist    Rick Pelaez, PT Student PT Student                   Obj/Interventions       Row Name 02/29/24 0911          Balance     Balance Assessment sitting static balance;sitting dynamic balance  -MS (r) MH (t) MS (c)     Static Sitting Balance minimal assist;non-verbal cues (demo/gesture);verbal cues;2-person assist  -MS (r) MH (t) MS (c)     Dynamic Sitting Balance maximum assist;moderate assist;verbal cues;non-verbal cues (demo/gesture);2-person assist  -MS (r) MH (t) MS (c)     Position, Sitting Balance supported;sitting edge of bed  -MS (r) MH (t) MS (c)     Balance Interventions sitting;static;dynamic;supported  -MS (r) MH (t) MS (c)     Comment, Balance Pt required min A x 2 when pt was supporting self with L UE on bed rail, however needed mod/max A x 2 once pt was cued to perform functional tasks with L UE.  -MS (r) MH (t) MS (c)               User Key  (r) = Recorded By, (t) = Taken By, (c) = Cosigned By      Initials Name Provider Type    Lyla Linares, PT Physical Therapist    Rick Pelaez, PT Student PT Student                   Goals/Plan    No documentation.                  Clinical Impression       Row Name 02/29/24 0915          Pain    Pre/Posttreatment Pain Comment No pain reported this date.  -MS (r) MH (t) MS (c)       Row Name 02/29/24 0915          Plan of Care Review    Plan of Care Reviewed With patient  -MS (r) MH (t) MS (c)     Progress improving  -MS (r) MH (t) MS (c)     Outcome Evaluation Pt was agreeable to PT/OT co-treatment this session. Pt required max A x 2 for supine<>sitting EOB. Pt was able to sit on EOB for about 15 min, pt sitting balance required min x 2 when pt was supporting self with L UE on bed rail. However, pt required mod/max x 2 when letting go of bed rail and performing functional tasks with L UE, showing an improvement in sitting balance since the previous session. Pt required max VCs to achieve midline and to maintain upright posture. Pt fatigued quickly, however vitals stayed WNL, besides BP being slightly elevated. Pt was dependent x 2 for scooting. PT will continue to  follow as pt progresses, however is recommends inpatient rehab at this time d/t pt' PLOF. Pt was left supine with call light in reach and bed alarm activated.  -MS (r) MH (t) MS (c)       Row Name 02/29/24 0915          Vital Signs    Post Systolic BP Rehab 148  -MS (r) MH (t) MS (c)     Post Treatment Diastolic BP 72  -MS (r) MH (t) MS (c)     Pre SpO2 (%) 93  -MS (r) MH (t) MS (c)     O2 Delivery Pre Treatment room air  -MS (r) MH (t) MS (c)     O2 Delivery Intra Treatment room air  -MS (r) MH (t) MS (c)     O2 Delivery Post Treatment room air  -MS (r) MH (t) MS (c)     Pre Patient Position Supine  -MS (r) MH (t) MS (c)     Intra Patient Position Sitting  sitting EOB  -MS (r) MH (t) MS (c)     Post Patient Position Supine  -MS (r) MH (t) MS (c)     Rest Breaks  1  -MS (r) MH (t) MS (c)       Row Name 02/29/24 0915          Positioning and Restraints    Pre-Treatment Position in bed  -MS (r) MH (t) MS (c)     Post Treatment Position bed  -MS (r) MH (t) MS (c)     In Bed notified nsg;with family/caregiver;exit alarm on;encouraged to call for assist;heels elevated;supine;call light within reach;fowlers  -MS (r) MH (t) MS (c)               User Key  (r) = Recorded By, (t) = Taken By, (c) = Cosigned By      Initials Name Provider Type    Lyla Linares, PT Physical Therapist     Rick Maynard, PT Student PT Student                   Outcome Measures       Row Name 02/29/24 0920 02/29/24 0400       How much help from another person do you currently need...    Turning from your back to your side while in flat bed without using bedrails? 2  -MS (r) MH (t) MS (c) 2  -KM    Moving from lying on back to sitting on the side of a flat bed without bedrails? 2  -MS (r) MH (t) MS (c) 2  -KM    Moving to and from a bed to a chair (including a wheelchair)? 1  -MS (r) MH (t) MS (c) 1  -KM    Standing up from a chair using your arms (e.g., wheelchair, bedside chair)? 1  -MS (r) MH (t) MS (c) 1  -KM    Climbing 3-5 steps  with a railing? 1  -MS (r) MH (t) MS (c) 1  -KM    To walk in hospital room? 1  -MS (r) MH (t) MS (c) 1  -KM    AM-PAC 6 Clicks Score (PT) 8  -MS (r) MH (t) 8  -KM    Highest Level of Mobility Goal 3 --> Sit at edge of bed  -MS (r) MH (t) 3 --> Sit at edge of bed  -KM      Row Name 02/29/24 0000          How much help from another person do you currently need...    Turning from your back to your side while in flat bed without using bedrails? 2  -KM     Moving from lying on back to sitting on the side of a flat bed without bedrails? 2  -KM     Moving to and from a bed to a chair (including a wheelchair)? 1  -KM     Standing up from a chair using your arms (e.g., wheelchair, bedside chair)? 1  -KM     Climbing 3-5 steps with a railing? 1  -KM     To walk in hospital room? 1  -KM     AM-PAC 6 Clicks Score (PT) 8  -KM     Highest Level of Mobility Goal 3 --> Sit at edge of bed  -KM       Row Name 02/29/24 1130 02/29/24 0920       Functional Assessment    Outcome Measure Options AM-PAC 6 Clicks Daily Activity (OT)  - AM-PAC 6 Clicks Basic Mobility (PT)  -MS (r) MH (t) MS (c)              User Key  (r) = Recorded By, (t) = Taken By, (c) = Cosigned By      Initials Name Provider Type    MS JerryLyla, PT Physical Therapist    Ilda Covington, RN Registered Nurse     Bart Cary, OT Occupational Therapist     Rick Maynard, PT Student PT Student                                 Physical Therapy Education       Title: PT OT SLP Therapies (In Progress)       Topic: Physical Therapy (In Progress)       Point: Mobility training (In Progress)       Learning Progress Summary             Patient Acceptance, E,TB, NR,NL by MS at 2/27/2024 1320                         Point: Home exercise program (Not Started)       Learner Progress:  Not documented in this visit.              Point: Body mechanics (Done)       Learning Progress Summary             Patient Acceptance, E,TB, VU,NR by  at 2/29/2024 0920     Acceptance, E,TB, NR,NL by MS at 2/27/2024 1320   Family Acceptance, E,TB, VU,NR by  at 2/29/2024 0920                         Point: Precautions (Done)       Learning Progress Summary             Patient Acceptance, E,TB, VU,NR by  at 2/29/2024 0920    Acceptance, E,TB, NR,NL by MS at 2/27/2024 1320   Family Acceptance, E,TB, VU,NR by  at 2/29/2024 0920                                         User Key       Initials Effective Dates Name Provider Type Discipline    MS 06/16/21 -  Lyla Edwards, PT Physical Therapist PT     01/24/24 -  Rick Maynard PT Student PT Student PT                  PT Recommendation and Plan     Plan of Care Reviewed With: patient  Progress: improving  Outcome Evaluation: Pt was agreeable to PT/OT co-treatment this session. Pt required max A x 2 for supine<>sitting EOB. Pt was able to sit on EOB for about 15 min, pt sitting balance required min x 2 when pt was supporting self with L UE on bed rail. However, pt required mod/max x 2 when letting go of bed rail and performing functional tasks with L UE, showing an improvement in sitting balance since the previous session. Pt required max VCs to achieve midline and to maintain upright posture. Pt fatigued quickly, however vitals stayed WNL, besides BP being slightly elevated. Pt was dependent x 2 for scooting. PT will continue to follow as pt progresses, however is recommends inpatient rehab at this time d/t pt' PLOF. Pt was left supine with call light in reach and bed alarm activated.     Time Calculation:         PT Charges       Row Name 02/29/24 0920             Time Calculation    Start Time 0822  -MS (r) MH (t) MS (c)      Stop Time 0845  -MS (r) MH (t) MS (c)      Time Calculation (min) 23 min  -MS (r) MH (t)      PT Received On 02/29/24  -MS (r) MH (t) MS (c)      PT - Next Appointment 03/01/24  -MS (r) MH (t) MS (c)         Time Calculation- PT    Total Timed Code Minutes- PT 23 minute(s)  -MS (r) MH (t) MS (c)          Timed Charges    77123 - PT Therapeutic Activity Minutes 23  -MS (r) MH (t) MS (c)         Total Minutes    Timed Charges Total Minutes 23  -MS (r) MH (t)       Total Minutes 23  -MS (r) MH (t)                User Key  (r) = Recorded By, (t) = Taken By, (c) = Cosigned By      Initials Name Provider Type    Lyla Linares, PT Physical Therapist    Rick Pelaez, PT Student PT Student                  Therapy Charges for Today       Code Description Service Date Service Provider Modifiers Qty    96366089019  PT THERAPEUTIC ACT EA 15 MIN 2/29/2024 Rick Maynard, PT Student GP 2            PT G-Codes  Outcome Measure Options: AM-PAC 6 Clicks Daily Activity (OT)  AM-PAC 6 Clicks Score (PT): 8  AM-PAC 6 Clicks Score (OT): 8  Modified [place] Scale: 5 - Severe disability.  Bedridden, incontinent, and requiring constant nursing care and attention.  PT Discharge Summary  Anticipated Discharge Disposition (PT): inpatient rehabilitation facility    JASMINE Chow  2/29/2024

## 2024-02-29 NOTE — PLAN OF CARE
Goal Outcome Evaluation:  Plan of Care Reviewed With: patient        Progress: improving  Outcome Evaluation: Pt was agreeable to PT/OT co-treatment this session. Pt required max A x 2 for supine<>sitting EOB. Pt was able to sit on EOB for about 15 min, pt sitting balance required min x 2 when pt was supporting self with L UE on bed rail. However, pt required mod/max x 2 when letting go of bed rail and performing functional tasks with L UE, showing an improvement in sitting balance since the previous session. Pt required max VCs to achieve midline and to maintain upright posture. Pt fatigued quickly, however vitals stayed WNL, besides BP being slightly elevated. Pt was dependent x 2 for scooting. PT will continue to follow as pt progresses, however is recommends inpatient rehab at this time d/t pt' PLOF. Pt was left supine with call light in reach and bed alarm activated.      Anticipated Discharge Disposition (PT): inpatient rehabilitation facility

## 2024-02-29 NOTE — PLAN OF CARE
Goal Outcome Evaluation:  Plan of Care Reviewed With: patient, daughter        Progress: improving  Outcome Evaluation: RN consent, pt. received supine in bed agreeable to OT/PT cotx with dtr at bedside. Pt. noted with LUE soft restraint, unclipped and pt. was able to transition to EOB to R side max A x2. Pt. following simple 1 steps commands, excess time to process, noted heavy R side neglect during transitions and ADLS. Max VCs provided for LUE hand placement using bed rail for support. With support pt. min A for seated balance, mod-max A no UE support. RUE weight bearing completed EOB with max A for placement. Pt. noted with increased fatigue and returned to supine max A x2. Pt. milly's slow and steady progress, will continue to benefit from skilled OT services. OT recommending IRF      Anticipated Discharge Disposition (OT): inpatient rehabilitation facility

## 2024-02-29 NOTE — THERAPY RE-EVALUATION
Acute Care - Speech Language Pathology   Swallow Re-Evaluation [facility]     Patient Name: Erlin Blanco  : 1945  MRN: [id number]  Today's Date: 2024               Admit Date: 2024    Visit Dx:     ICD-10-CM ICD-9-CM   1. Intraparenchymal hemorrhage of brain  I61.9 431   2. Facial droop  R29.810 781.94   3. Expressive aphasia  R47.01 784.3   4. Weakness of right upper extremity  R29.898 729.89   5. Chronic anticoagulation  Z79.01 V58.61     Patient Active Problem List   Diagnosis    Intraparenchymal hemorrhage of brain    Seizures     History reviewed. No pertinent past medical history.  History reviewed. No pertinent surgical history.    SLP Recommendation and Plan  SLP Swallowing Diagnosis: oral dysphagia, R/O pharyngeal dysphagia (24 1030)  SLP Diet Recommendation: ice chips between meals after oral care, with supervision (24 1030)     SLP Rec. for Method of Medication Administration: meds via alternate route (24 103)     Monitor for Signs of Aspiration: yes, notify SLP if any concerns (24 1030)  Recommended Diagnostics: reassess via clinical swallow evaluation (24 1030)  Swallow Criteria for Skilled Therapeutic Interventions Met: demonstrates skilled criteria (24 103)     Rehab Potential/Prognosis, Swallowing: good, to achieve stated therapy goals (24 1030)  Therapy Frequency (Swallow): PRN (24 1030)  Predicted Duration Therapy Intervention (Days): until discharge (24 1030)  Oral Care Recommendations: Oral Care BID/PRN, Before ice/water (24 1030)                                        Plan of Care Reviewed With: patient, family  Outcome Evaluation: Re-eval of swallow completed. Recommend continue NPO with alternate means of nutrition and meds alternate route. Recommend ice chips sparingly 30 mins s/p oral care. Supervision with all ice chips. SLP to follow for re-eval as status improves and patient's alertness becomes  consistent.      SWALLOW EVALUATION (last 72 hours)       SLP Adult Swallow Evaluation       Row Name 02/29/24 1030 02/27/24 1100                Rehab Evaluation    Document Type re-evaluation  -OC evaluation  -SH       Subjective Information no complaints  -OC --       Patient Observations alert;cooperative;agree to therapy  -OC --       Patient Effort fair  -OC adequate  -SH          General Information    Patient Profile Reviewed yes  -OC yes  -SH       Current Method of Nutrition NPO  -OC --       Precautions/Limitations, Vision difficult to assess  -OC --       Precautions/Limitations, Hearing difficult to assess  -OC --       Prior Level of Function-Communication unknown  -OC --       Prior Level of Function-Swallowing no diet consistency restrictions  -OC --       Plans/Goals Discussed with patient;family;agreed upon  -OC --       Barriers to Rehab cognitive status  -OC --       Patient's Goals for Discharge patient did not state  -OC --       Family Goals for Discharge family did not state  -OC --          Pain    Additional Documentation -- Pain Scale: FACES Pre/Post-Treatment (Group)  -          Pain Scale: Numbers Pre/Post-Treatment    Pretreatment Pain Rating 0/10 - no pain  -OC --       Posttreatment Pain Rating 0/10 - no pain  -OC --          Pain Scale: FACES Pre/Post-Treatment    Pain: FACES Scale, Pretreatment -- 0-->no hurt  -SH          Clinical Swallow Eval    Clinical Swallow Evaluation Summary Re-eval completed. Patient with fluctuating alertness. Patient with baseline throat clearing and coughing as SLP elevated HOB. SLP with initial concern for secretion management. Patient with no overt s/s aspiration ice chips. Patient demonstrated decreased coordination of acceptance of trials with breathing pattern. Increased throat clear, coughing, and multiple swallow across trials of nectar thick and honey thick liquids via spoon. No further trials provided.  -OC --          SLP Evaluation Clinical  Impression    SLP Swallowing Diagnosis oral dysphagia;R/O pharyngeal dysphagia  -OC --       Functional Impact risk of aspiration/pneumonia  -OC --       Rehab Potential/Prognosis, Swallowing good, to achieve stated therapy goals  -OC --       Swallow Criteria for Skilled Therapeutic Interventions Met demonstrates skilled criteria  -OC --          Recommendations    Therapy Frequency (Swallow) PRN  -OC --       Predicted Duration Therapy Intervention (Days) until discharge  -OC --       SLP Diet Recommendation ice chips between meals after oral care, with supervision  -OC --       Recommended Diagnostics reassess via clinical swallow evaluation  -OC --       Oral Care Recommendations Oral Care BID/PRN;Before ice/water  -OC --       SLP Rec. for Method of Medication Administration meds via alternate route  -OC --       Monitor for Signs of Aspiration yes;notify SLP if any concerns  -OC --                 User Key  (r) = Recorded By, (t) = Taken By, (c) = Cosigned By      Initials Name Effective Dates    Kristine Coates SLP 08/28/23 -     SH Sonia Schwartz SLP 01/05/24 -                     EDUCATION  The patient has been educated in the following areas:   Dysphagia (Swallowing Impairment).        SLP GOALS       Row Name 02/27/24 1100             (LTG) Patient will demonstrate functional swallow for    Diet Texture (Demonstrate functional swallow) regular textures  -      Liquid viscosity (Demonstrate functional swallow) thin liquids  -      [place] (Demonstrate functional swallow) independently (over 90% accuracy)  -      Time Frame (Demonstrate functional swallow) by discharge  -      Comment (Demonstrate functional swallow) Patient seen for re evaluation of swallow function. Family present. Pt woke with verbal, tactile, and visual cues and maintainted alertness for entire assessment. Eyes were open. Pt fed self with restraint off at times. Restraint replaced following assessment. Family reports  baseline throat clear with PO, but no hx of aspiration. No overt s/s of aspiration with ice. Audible swallow and suspected mistiming with thins via spoon, nectar via cup, and honey via cup. Slight wheeze after liquid trials. No overt s/s of aspiration with puree. SLP recs VFSS to further assess swallow function. Despite performance on VFSS, may still need cortrak d/t fluctuating alertness. Family aware. Discussed with RN, VFSS to be canceled if patient will not alert in PM. If that occurs, SLP recs cortrak.  -                User Key  (r) = Recorded By, (t) = Taken By, (c) = Cosigned By      Initials Name Provider Type     Sonia Schwartz SLP Speech and Language Pathologist                       Time Calculation:    Time Calculation- SLP       Row Name 02/29/24 1321             Time Calculation- SLP    SLP Start Time 1030  -OC      SLP Received On 02/29/24  -OC         Untimed Charges    SLP Treatment ST Treatment Swallow Minutes  - 91626  -OC      74392-FT Treatment Swallow Minutes 60  -OC         Total Minutes    Untimed Charges Total Minutes 60  -OC       Total Minutes 60  -OC                User Key  (r) = Recorded By, (t) = Taken By, (c) = Cosigned By      Initials Name Provider Type    Kristine Coates SLP Speech and Language Pathologist                    Therapy Charges for Today       Code Description Service Date Service Provider Modifiers Qty    75295913710  ST TREATMENT SWALLOW 4 2/29/2024 Kristine Jaimes SLP GN 1                 PATRICK Howell  2/29/2024

## 2024-02-29 NOTE — PROGRESS NOTES
[facility] Acute [facility]pt [facility]    Continuing to monitor progress with therapies to determine most appropriate level of rehab for patient at time of DC.  Noted PT/ OT co-treating, patient fatigues quickly.   Patient would need to be able to tolerate 3 hours of therapy 5 days per week.  Will continue to follow progress.    Thank you,  Wendy Hartmann RN  Rehab Admission Nurse

## 2024-02-29 NOTE — THERAPY TREATMENT NOTE
Patient Name: Erlin Blanco  : 1945    MRN: [id number]                              Today's Date: 2024       Admit Date: 2024    Visit Dx:     ICD-10-CM ICD-9-CM   1. Intraparenchymal hemorrhage of brain  I61.9 431   2. Facial droop  R29.810 781.94   3. Expressive aphasia  R47.01 784.3   4. Weakness of right upper extremity  R29.898 729.89   5. Chronic anticoagulation  Z79.01 V58.61     Patient Active Problem List   Diagnosis    Intraparenchymal hemorrhage of brain    Seizures     History reviewed. No pertinent past medical history.  History reviewed. No pertinent surgical history.   General Information       Row Name 24 1000          OT Time and Intention    Document Type therapy note (daily note)  -     Mode of Treatment occupational therapy;co-treatment  cotx required to promote safe pt handling and mobility progression  -       Row Name 24 1000          General Information    Patient Profile Reviewed yes  -AG     Existing Precautions/Restrictions fall;NPO  SBP < 160  -AG     Barriers to Rehab medically complex;previous functional deficit;cognitive status  -       Row Name 24 1000          Cognition    Orientation Status (Cognition) oriented to;person  -       Row Name 24 1000          Safety Issues, Functional Mobility    Safety Issues Affecting Function (Mobility) judgment;insight into deficits/self-awareness;problem-solving;safety precaution awareness;sequencing abilities;ability to follow commands  -     Impairments Affecting Function (Mobility) balance;cognition;endurance/activity tolerance;postural/trunk control;range of motion (ROM);coordination;grasp;strength;visual/perceptual;sensation/sensory awareness;motor control  -     Cognitive Impairments, Mobility Safety/Performance attention;insight into deficits/self-awareness;judgment;problem-solving/reasoning;safety precaution awareness;safety precaution follow-through;sequencing abilities  -                User Key  (r) = Recorded By, (t) = Taken By, (c) = Cosigned By      Initials Name Provider Type    Bart Nagy OT Occupational Therapist                     Mobility/ADL's       Row Name 02/29/24 1002          Bed Mobility    Bed Mobility supine-sit;sit-supine;scooting/bridging  -AG     Scooting/Bridging Weber (Bed Mobility) dependent (less than 25% patient effort);2 person assist;verbal cues;nonverbal cues (demo/gesture)  -AG     Supine-Sit Weber (Bed Mobility) maximum assist (25% patient effort);2 person assist  -AG     Sit-Supine Weber (Bed Mobility) maximum assist (25% patient effort);2 person assist  -AG     Assistive Device (Bed Mobility) bed rails;head of bed elevated  -AG     Comment, (Bed Mobility) transfer to R side, seated EOB with LUE use for support min A x2 with bed rail, VC for midline posture and sitting balance. Pt. able to sit ~10 mins while completing ADLS  -AG       Row Name 02/29/24 1002          Transfers    Comment, (Transfers) unable to safely progress to  -AG       Row Name 02/29/24 1002          Activities of Daily Living    BADL Assessment/Intervention grooming  -AG       Row Name 02/29/24 1002          Grooming Assessment/Training    Weber Level (Grooming) oral care regimen;hair care, combing/brushing;minimum assist (75% patient effort);verbal cues;nonverbal cues (demo/gesture)  -AG     Position (Grooming) edge of bed sitting  -AG     Comment, (Grooming) pt. completed combing of hair L side, VC for R side. Washed face on bilateral sides, oral hygiene performed R/L side, however intially pt. used mouth swab as a hair comb although, after OT placed clean mouth swab in mouth pt. was able to manipulate and use swab appropriately  -AG               User Key  (r) = Recorded By, (t) = Taken By, (c) = Cosigned By      Initials Name Provider Type    Bart Nagy OT Occupational Therapist                   Obj/Interventions       Row Name 02/29/24  1110          Motor Skills    Motor Control/Coordination Interventions weight-bearing activities  RUE while seated EOB, dep A for positioning  -AG       Row Name 02/29/24 1110          Balance    Balance Assessment sitting static balance;sitting dynamic balance  -AG     Static Sitting Balance maximum assist;minimal assist  max-min A seated balance fluctuating between UE support vs. no support  -AG     Dynamic Sitting Balance moderate assist;maximum assist  -AG     Position, Sitting Balance supported;sitting edge of bed  -AG     Balance Interventions sitting;supported;static;dynamic;minimal challenge;occupation based/functional task  -AG     Comment, Balance Pt. able to maintain static seated balance with midline posture with min A and VCs  -AG               User Key  (r) = Recorded By, (t) = Taken By, (c) = Cosigned By      Initials Name Provider Type    Bart Nagy OT Occupational Therapist                   Goals/Plan    No documentation.                  Clinical Impression       Row Name 02/29/24 1112          Pain Assessment    Pretreatment Pain Rating 0/10 - no pain  -AG     Posttreatment Pain Rating 0/10 - no pain  -AG     Pre/Posttreatment Pain Comment pt. unable to report pain however no noted grimacing/wincing with movement  -AG     Pain Intervention(s) Repositioned;Rest  -AG       Row Name 02/29/24 1112          Plan of Care Review    Plan of Care Reviewed With patient;daughter  -AG     Progress improving  -AG     Outcome Evaluation RN consent, pt. received supine in bed agreeable to OT/PT cotx with dtr at bedside. Pt. noted with LUE soft restraint, unclipped and pt. was able to transition to EOB to R side max A x2. Pt. following simple 1 steps commands, excess time to process, noted heavy R side neglect during transitions and ADLS. Max VCs provided for LUE hand placement using bed rail for support. With support pt. min A for seated balance, mod-max A no UE support. RUE weight bearing completed  EOB with max A for placement. Pt. noted with increased fatigue and returned to supine max A x2. Pt. milly's slow and steady progress, will continue to benefit from skilled OT services. OT recommending IRF  -AG       Row Name 02/29/24 1112          Therapy Assessment/Plan (OT)    Rehab Potential (OT) good, to achieve stated therapy goals  -AG     Criteria for Skilled Therapeutic Interventions Met (OT) yes;skilled treatment is necessary  -AG     Therapy Frequency (OT) 5 times/wk  -AG       Row Name 02/29/24 1112          Therapy Plan Review/Discharge Plan (OT)    Anticipated Discharge Disposition (OT) inpatient rehabilitation facility  -AG       Row Name 02/29/24 1112          Vital Signs    Post Systolic BP Rehab 148  -AG     Post Treatment Diastolic BP 72  -AG     Pre SpO2 (%) 95  -AG     O2 Delivery Pre Treatment room air  -AG     Post SpO2 (%) 96  -AG     O2 Delivery Post Treatment room air  -AG     Pre Patient Position Supine  -AG     Intra Patient Position Sitting  -AG     Post Patient Position Supine  -AG       Row Name 02/29/24 1112          Positioning and Restraints    Pre-Treatment Position in bed  -AG     Post Treatment Position bed  -AG     In Bed notified nsg;call light within reach;encouraged to call for assist;fowlers;exit alarm on;with nsg;with family/caregiver  -AG     Restraints released:;reapplied:;soft limb  -AG               User Key  (r) = Recorded By, (t) = Taken By, (c) = Cosigned By      Initials Name Provider Type    AG Bart Cary, OT Occupational Therapist                   Outcome Measures       Row Name 02/29/24 1130          How much help from another is currently needed...    Putting on and taking off regular lower body clothing? 1  -AG     Bathing (including washing, rinsing, and drying) 1  -AG     Toileting (which includes using toilet bed pan or urinal) 1  -AG     Putting on and taking off regular upper body clothing 2  -AG     Taking care of personal grooming (such as brushing  teeth) 2  -AG     Eating meals 1  -AG     AM-PAC 6 Clicks Score (OT) 8  -AG       Row Name 02/29/24 0920 02/29/24 0400       How much help from another person do you currently need...    Turning from your back to your side while in flat bed without using bedrails? 2 (P)   - 2  -KM    Moving from lying on back to sitting on the side of a flat bed without bedrails? 2 (P)   - 2  -KM    Moving to and from a bed to a chair (including a wheelchair)? 1 (P)   - 1  -KM    Standing up from a chair using your arms (e.g., wheelchair, bedside chair)? 1 (P)   - 1  -KM    Climbing 3-5 steps with a railing? 1 (P)   - 1  -KM    To walk in hospital room? 1 (P)   - 1  -KM    AM-PAC 6 Clicks Score (PT) 8 (P)   - 8  -KM    Highest Level of Mobility Goal 3 --> Sit at edge of bed (P)   - 3 --> Sit at edge of bed  -KM      Row Name 02/29/24 0000          How much help from another person do you currently need...    Turning from your back to your side while in flat bed without using bedrails? 2  -KM     Moving from lying on back to sitting on the side of a flat bed without bedrails? 2  -KM     Moving to and from a bed to a chair (including a wheelchair)? 1  -KM     Standing up from a chair using your arms (e.g., wheelchair, bedside chair)? 1  -KM     Climbing 3-5 steps with a railing? 1  -KM     To walk in hospital room? 1  -KM     AM-PAC 6 Clicks Score (PT) 8  -KM     Highest Level of Mobility Goal 3 --> Sit at edge of bed  -KM       Row Name 02/29/24 1130 02/29/24 0920       Functional Assessment    Outcome Measure Options AM-PAC 6 Clicks Daily Activity (OT)  - AM-PAC 6 Clicks Basic Mobility (PT) (P)   -              User Key  (r) = Recorded By, (t) = Taken By, (c) = Cosigned By      Initials Name Provider Type    Ilda Covington, RN Registered Nurse    Bart Nagy, OT Occupational Therapist    MH Aleyda, Rick, PT Student PT Student                    Occupational Therapy Education       Title: PT OT  SLP Therapies (In Progress)       Topic: Occupational Therapy (In Progress)       Point: ADL training (Not Started)       Description:   Instruct learner(s) on proper safety adaptation and remediation techniques during self care or transfers.   Instruct in proper use of assistive devices.                  Learner Progress:  Not documented in this visit.              Point: Home exercise program (Not Started)       Description:   Instruct learner(s) on appropriate technique for monitoring, assisting and/or progressing therapeutic exercises/activities.                  Learner Progress:  Not documented in this visit.              Point: Precautions (Done)       Description:   Instruct learner(s) on prescribed precautions during self-care and functional transfers.                  Learning Progress Summary             Family Acceptance, E, VU,DU by  at 2/27/2024 8736    Comment: RUE ROM, massage, safe positioning for subluxation prevention, OT role and dc recommendations                         Point: Body mechanics (Not Started)       Description:   Instruct learner(s) on proper positioning and spine alignment during self-care, functional mobility activities and/or exercises.                  Learner Progress:  Not documented in this visit.                              User Key       Initials Effective Dates Name Provider Type Discipline     04/02/20 -  Sonia Mcclure OT Occupational Therapist OT                  OT Recommendation and Plan  Therapy Frequency (OT): 5 times/wk  Plan of Care Review  Plan of Care Reviewed With: patient, daughter  Progress: improving  Outcome Evaluation: RN consent, pt. received supine in bed agreeable to OT/PT cotx with dtr at bedside. Pt. noted with LUE soft restraint, unclipped and pt. was able to transition to EOB to R side max A x2. Pt. following simple 1 steps commands, excess time to process, noted heavy R side neglect during transitions and ADLS. Max VCs provided for LUE  hand placement using bed rail for support. With support pt. min A for seated balance, mod-max A no UE support. RUE weight bearing completed EOB with max A for placement. Pt. noted with increased fatigue and returned to supine max A x2. Pt. milly's slow and steady progress, will continue to benefit from skilled OT services. OT recommending IRF     Time Calculation:         Time Calculation- OT       Row Name 02/29/24 1130             Time Calculation- OT    OT Start Time 0822  -AG      OT Stop Time 0845  -AG      OT Time Calculation (min) 23 min  -AG      Total Timed Code Minutes- OT 23 minute(s)  -AG      OT Received On 02/29/24  -AG      OT - Next Appointment 03/01/24  -AG      OT Goal Re-Cert Due Date 03/12/24  -AG         Timed Charges    24637 -  OT Neuromuscular Reeducation Minutes 5  -AG      42973 - OT Therapeutic Activity Minutes 10  -AG      99507 - OT Self Care/Mgmt Minutes 8  -AG         Total Minutes    Timed Charges Total Minutes 23  -AG       Total Minutes 23  -AG                User Key  (r) = Recorded By, (t) = Taken By, (c) = Cosigned By      Initials Name Provider Type    AG Bart Cary OT Occupational Therapist                  Therapy Charges for Today       Code Description Service Date Service Provider Modifiers Qty    49086370668 HC OT THERAPEUTIC ACT EA 15 MIN 2/29/2024 Bart Cary OT GO 1    92378442941 HC OT SELF CARE/MGMT/TRAIN EA 15 MIN 2/29/2024 Bart Cary OT GO 1                 Bart Cary OT  2/29/2024

## 2024-02-29 NOTE — PROGRESS NOTES
[facility]    Referral received and evaluation in progress.  Will monitor progress with therapies to determine most appropriate level of rehab for patient when medically stable.    Thank you,  Wendy Hartmann, RN  Rehab Admission Nurse

## 2024-02-29 NOTE — PROGRESS NOTES
LOS: 5 days   Patient Care Team:  Zamzam John APRN as PCP - General (Family Medicine)    Chief Complaint: Hypertensive bleed    Subjective     Patient easily awakens to just voice.  He still has some slurred speech and is a little bit confused but otherwise wakes up easily.    Interval History:     History taken from: patient chart family    Objective      He has decreased movement on the right side but decent movement on the left.    Vital Signs  Temp:  [97.2 °F (36.2 °C)-99.4 °F (37.4 °C)] 97.2 °F (36.2 °C)  Heart Rate:  [57-96] 73  Resp:  [12-26] 15  BP: (135-204)/() 135/62       Results Review:     I reviewed the patient's new clinical results.  I reviewed his last CT done on the 27th.  This shows no change in the size of the bleed or the ventricular size.      Assessment & Plan       Intraparenchymal hemorrhage of brain      I told the patient that from my point of view I would scan him again tomorrow.  If that shows stability then I think we can move toward rehab at this point.  I will defer to the primary doctors as to when to move him out of ICU but it is fine with me anytime.      Josué Miller MD  02/29/24  08:23 EST

## 2024-02-29 NOTE — PLAN OF CARE
Goal Outcome Evaluation:  Plan of Care Reviewed With: patient, family           Outcome Evaluation: Re-eval of swallow completed. Recommend continue NPO with alternate means of nutrition and meds alternate route. Recommend ice chips sparingly 30 mins s/p oral care. Supervision with all ice chips. SLP to follow for re-eval as status improves and patient's alertness becomes consistent.                 SLP Swallowing Diagnosis: oral dysphagia, R/O pharyngeal dysphagia (02/29/24 1030)

## 2024-02-29 NOTE — PLAN OF CARE
Problem: Adult Inpatient Plan of Care  Goal: Plan of Care Review  Outcome: Ongoing, Not Progressing  Flowsheets (Taken 2/29/2024 0743)  Progress: declining  Plan of Care Reviewed With: patient   Goal Outcome Evaluation:  Plan of Care Reviewed With: patient        Progress: declining      Pt neuro status unchanged. Continues to wax and wane. NIH 19. Still showing neglect on right side. Right sided facial droop improving. Continues to require LUE non violent soft limb restraint for restlessness & reaching for lines/cords/devices. Pt on RA, reports no pain, & afebrile. Pt was restless the whole evening and did not sleep. BP also elevated and was not able to be managed with scheduled hydralazine. PRN IV labetalol given x2 & PRN IV hydralazine x1. Still no improvement in BP. NP CHASTITY Finleys notified of pt restlessness and elevated BP despite the scheduled and PRN med doses. NP Bills did not want to order anything for pt restlessness bc we did not want to mask pt's mental/neuro status and I was advised to turn the Cleviprex drip back on which is now running at a dose of 9mg/hr. Pt now meeting SBP goal of <160. NP Bills also notified that no morning labs were ordered and gave verbal order that labs were not needed this AM for patient. UOP adequate & x4 small BM's over PM. Tolerating TF and is now at goal rate. No other changes or events over PM.

## 2024-03-01 ENCOUNTER — APPOINTMENT (OUTPATIENT)
Dept: CT IMAGING | Facility: HOSPITAL | Age: 79
DRG: 064 | End: 2024-03-01
Payer: MEDICARE

## 2024-03-01 ENCOUNTER — APPOINTMENT (OUTPATIENT)
Dept: NEUROLOGY | Facility: HOSPITAL | Age: 79
DRG: 064 | End: 2024-03-01
Payer: MEDICARE

## 2024-03-01 LAB
ANION GAP SERPL CALCULATED.3IONS-SCNC: 9 MMOL/L (ref 5–15)
BUN SERPL-MCNC: 22 MG/DL (ref 8–23)
BUN/CREAT SERPL: 21.4 (ref 7–25)
CALCIUM SPEC-SCNC: 8.9 MG/DL (ref 8.6–10.5)
CHLORIDE SERPL-SCNC: 112 MMOL/L (ref 98–107)
CO2 SERPL-SCNC: 25 MMOL/L (ref 22–29)
CREAT SERPL-MCNC: 1.03 MG/DL (ref 0.76–1.27)
EGFRCR SERPLBLD CKD-EPI 2021: 75.3 ML/MIN/1.73
GLUCOSE BLDC GLUCOMTR-MCNC: 236 MG/DL (ref 70–130)
GLUCOSE BLDC GLUCOMTR-MCNC: 244 MG/DL (ref 70–130)
GLUCOSE BLDC GLUCOMTR-MCNC: 250 MG/DL (ref 70–130)
GLUCOSE BLDC GLUCOMTR-MCNC: 255 MG/DL (ref 70–130)
GLUCOSE SERPL-MCNC: 280 MG/DL (ref 65–99)
POTASSIUM SERPL-SCNC: 3.5 MMOL/L (ref 3.5–5.2)
POTASSIUM SERPL-SCNC: 4.3 MMOL/L (ref 3.5–5.2)
QT INTERVAL: 464 MS
QTC INTERVAL: 508 MS
SODIUM SERPL-SCNC: 146 MMOL/L (ref 136–145)

## 2024-03-01 PROCEDURE — 82948 REAGENT STRIP/BLOOD GLUCOSE: CPT

## 2024-03-01 PROCEDURE — 95819 EEG AWAKE AND ASLEEP: CPT | Performed by: STUDENT IN AN ORGANIZED HEALTH CARE EDUCATION/TRAINING PROGRAM

## 2024-03-01 PROCEDURE — C9248 INJ, CLEVIDIPINE BUTYRATE: HCPCS | Performed by: INTERNAL MEDICINE

## 2024-03-01 PROCEDURE — 95819 EEG AWAKE AND ASLEEP: CPT

## 2024-03-01 PROCEDURE — 93005 ELECTROCARDIOGRAM TRACING: CPT

## 2024-03-01 PROCEDURE — 70450 CT HEAD/BRAIN W/O DYE: CPT

## 2024-03-01 PROCEDURE — 84132 ASSAY OF SERUM POTASSIUM: CPT | Performed by: INTERNAL MEDICINE

## 2024-03-01 PROCEDURE — 99232 SBSQ HOSP IP/OBS MODERATE 35: CPT

## 2024-03-01 PROCEDURE — 63710000001 INSULIN GLARGINE PER 5 UNITS: Performed by: INTERNAL MEDICINE

## 2024-03-01 PROCEDURE — 99232 SBSQ HOSP IP/OBS MODERATE 35: CPT | Performed by: STUDENT IN AN ORGANIZED HEALTH CARE EDUCATION/TRAINING PROGRAM

## 2024-03-01 PROCEDURE — 80048 BASIC METABOLIC PNL TOTAL CA: CPT | Performed by: INTERNAL MEDICINE

## 2024-03-01 PROCEDURE — 25010000002 HYDRALAZINE PER 20 MG: Performed by: INTERNAL MEDICINE

## 2024-03-01 PROCEDURE — 63710000001 INSULIN REGULAR HUMAN PER 5 UNITS: Performed by: INTERNAL MEDICINE

## 2024-03-01 PROCEDURE — 25010000002 LABETALOL 5 MG/ML SOLUTION: Performed by: INTERNAL MEDICINE

## 2024-03-01 PROCEDURE — 93010 ELECTROCARDIOGRAM REPORT: CPT | Performed by: INTERNAL MEDICINE

## 2024-03-01 PROCEDURE — 25010000002 CLEVIDIPINE BUTYRATE PER 1 MG: Performed by: INTERNAL MEDICINE

## 2024-03-01 PROCEDURE — 63710000001 INSULIN REGULAR HUMAN PER 5 UNITS

## 2024-03-01 PROCEDURE — 97112 NEUROMUSCULAR REEDUCATION: CPT

## 2024-03-01 PROCEDURE — 97535 SELF CARE MNGMENT TRAINING: CPT

## 2024-03-01 PROCEDURE — 97530 THERAPEUTIC ACTIVITIES: CPT | Performed by: PHYSICAL THERAPIST

## 2024-03-01 RX ORDER — LABETALOL 100 MG/1
100 TABLET, FILM COATED ORAL ONCE
Status: COMPLETED | OUTPATIENT
Start: 2024-03-01 | End: 2024-03-01

## 2024-03-01 RX ORDER — LABETALOL 300 MG/1
300 TABLET, FILM COATED ORAL EVERY 12 HOURS SCHEDULED
Status: DISCONTINUED | OUTPATIENT
Start: 2024-03-01 | End: 2024-03-01

## 2024-03-01 RX ORDER — LABETALOL 200 MG/1
400 TABLET, FILM COATED ORAL EVERY 12 HOURS SCHEDULED
Status: DISCONTINUED | OUTPATIENT
Start: 2024-03-01 | End: 2024-03-04

## 2024-03-01 RX ORDER — POTASSIUM CHLORIDE 1.5 G/1.58G
40 POWDER, FOR SOLUTION ORAL EVERY 4 HOURS
Status: COMPLETED | OUTPATIENT
Start: 2024-03-01 | End: 2024-03-01

## 2024-03-01 RX ADMIN — CLEVIPIDINE 7 MG/HR: 0.5 EMULSION INTRAVENOUS at 12:19

## 2024-03-01 RX ADMIN — LABETALOL HYDROCHLORIDE 40 MG: 5 INJECTION, SOLUTION INTRAVENOUS at 05:53

## 2024-03-01 RX ADMIN — Medication 10 ML: at 09:11

## 2024-03-01 RX ADMIN — CLEVIPIDINE 4 MG/HR: 0.5 EMULSION INTRAVENOUS at 23:04

## 2024-03-01 RX ADMIN — CLEVIPIDINE 11 MG/HR: 0.5 EMULSION INTRAVENOUS at 01:27

## 2024-03-01 RX ADMIN — INSULIN HUMAN 2 UNITS: 100 INJECTION, SOLUTION PARENTERAL at 00:09

## 2024-03-01 RX ADMIN — CLEVIPIDINE 11 MG/HR: 0.5 EMULSION INTRAVENOUS at 05:53

## 2024-03-01 RX ADMIN — INSULIN HUMAN 4 UNITS: 100 INJECTION, SOLUTION PARENTERAL at 12:10

## 2024-03-01 RX ADMIN — LABETALOL HYDROCHLORIDE 40 MG: 5 INJECTION, SOLUTION INTRAVENOUS at 09:09

## 2024-03-01 RX ADMIN — AMLODIPINE BESYLATE 10 MG: 10 TABLET ORAL at 09:08

## 2024-03-01 RX ADMIN — LABETALOL HYDROCHLORIDE 40 MG: 5 INJECTION, SOLUTION INTRAVENOUS at 03:12

## 2024-03-01 RX ADMIN — LABETALOL HYDROCHLORIDE 400 MG: 100 TABLET, FILM COATED ORAL at 21:38

## 2024-03-01 RX ADMIN — LABETALOL HYDROCHLORIDE 300 MG: 200 TABLET, FILM COATED ORAL at 09:08

## 2024-03-01 RX ADMIN — MEMANTINE HYDROCHLORIDE 10 MG: 10 TABLET, FILM COATED ORAL at 09:08

## 2024-03-01 RX ADMIN — INSULIN HUMAN 6 UNITS: 100 INJECTION, SOLUTION PARENTERAL at 06:59

## 2024-03-01 RX ADMIN — POTASSIUM CHLORIDE 40 MEQ: 1.5 POWDER, FOR SOLUTION ORAL at 09:09

## 2024-03-01 RX ADMIN — MEMANTINE HYDROCHLORIDE 10 MG: 10 TABLET, FILM COATED ORAL at 21:38

## 2024-03-01 RX ADMIN — CLEVIPIDINE 11 MG/HR: 0.5 EMULSION INTRAVENOUS at 09:34

## 2024-03-01 RX ADMIN — HYDRALAZINE HYDROCHLORIDE 100 MG: 50 TABLET ORAL at 14:20

## 2024-03-01 RX ADMIN — INSULIN HUMAN 4 UNITS: 100 INJECTION, SOLUTION PARENTERAL at 18:59

## 2024-03-01 RX ADMIN — LEVETIRACETAM 500 MG: 500 TABLET, FILM COATED ORAL at 09:10

## 2024-03-01 RX ADMIN — VALSARTAN 320 MG: 320 TABLET, FILM COATED ORAL at 09:09

## 2024-03-01 RX ADMIN — CLEVIPIDINE 3 MG/HR: 0.5 EMULSION INTRAVENOUS at 16:24

## 2024-03-01 RX ADMIN — HYDRALAZINE HYDROCHLORIDE 100 MG: 50 TABLET ORAL at 06:59

## 2024-03-01 RX ADMIN — INSULIN GLARGINE 10 UNITS: 100 INJECTION, SOLUTION SUBCUTANEOUS at 21:38

## 2024-03-01 RX ADMIN — HYDRALAZINE HYDROCHLORIDE 10 MG: 20 INJECTION INTRAMUSCULAR; INTRAVENOUS at 09:44

## 2024-03-01 RX ADMIN — HYDRALAZINE HYDROCHLORIDE 10 MG: 20 INJECTION INTRAMUSCULAR; INTRAVENOUS at 16:36

## 2024-03-01 RX ADMIN — POTASSIUM CHLORIDE 40 MEQ: 1.5 POWDER, FOR SOLUTION ORAL at 14:21

## 2024-03-01 RX ADMIN — LEVETIRACETAM 500 MG: 500 TABLET, FILM COATED ORAL at 21:38

## 2024-03-01 RX ADMIN — CLEVIPIDINE 11 MG/HR: 0.5 EMULSION INTRAVENOUS at 03:41

## 2024-03-01 RX ADMIN — HYDRALAZINE HYDROCHLORIDE 100 MG: 50 TABLET ORAL at 23:01

## 2024-03-01 RX ADMIN — LABETALOL HCL 100 MG: 100 TABLET, FILM COATED ORAL at 12:15

## 2024-03-01 RX ADMIN — Medication 10 ML: at 21:39

## 2024-03-01 NOTE — SIGNIFICANT NOTE
03/01/24 0922   OTHER   Discipline physical therapist   Rehab Time/Intention   Session Not Performed patient unavailable for treatment  (EEG. will follow up later today as time allows.)

## 2024-03-01 NOTE — CASE MANAGEMENT/SOCIAL WORK
Continued Stay Note  [facility]     Patient Name: Erlin Blanco  MRN: [id number]  Today's Date: 3/1/2024    Admit Date: 2/24/2024    Plan: [facility] referral pending clinical course   Discharge Plan       Row Name 03/01/24 1013       Plan    Plan [facility] referral pending clinical course    Patient/Family in Agreement with Plan yes    Plan Comments [facility] continues to follow per chart notes. CCP remains following for discharge planning needs. Clinicals reviewed. Bernardo GHOTRA RN CCP                   Discharge Codes    No documentation.                       Bernardo Nice RN

## 2024-03-01 NOTE — CONSULTS
"Neurology Consult Note    Consult Date: 3/1/2024    Referring MD: No ref. provider found    Reason for Consult I have been asked to see the patient in neurological consultation to render advice and opinion regarding transient altered mental status with gaze deviation to the left    Erlin Blanco is a 78 y.o. white male with known diagnosis of hypertension, polycythemia vera on hydroxyurea, A-fib on Eliquis, type 2 diabetes, mixed hyperlipidemia, MCI, prior stroke 17 years ago with residual left-sided weakness presented to the hospital on 2/24 with altered mental status and hypertensive emergency blood pressure 210/113.  Images showed left thalamic spontaneous IPH.  Neurology initially consulted to start the patient on prophylactic Keppra for seizures.  Neurosurgery managing left thalamic IPH with bilateral IVH.  Yesterday neurology Reconsulted due to transient altered mental status with reportedly gaze deviation to the left side, by assessment this morning this was resolved.  On exam the patient was able to follow few simple commands, struggling with repetition and naming, right facial droop, blinks to threat bilaterally, right-sided weakness.  Repeated CT head for the neurological change showed stable finding of left thalamic IPH with bilateral IVH.  Patient was getting ready to get hooked to the EEG this morning.  Chart review showed fluctuating blood pressure from 120s to 200s for the past few days, blood sugar ranging from 180s to 200s.        Exam  /80   Pulse 80   Temp 98.7 °F (37.1 °C) (Axillary)   Resp 17   Ht 182.9 cm (72\")   Wt 112 kg (247 lb 9.2 oz)   SpO2 92%   BMI 33.58 kg/m²   Gen: NAD, vitals reviewed  MS: Awake, alert and tracks but oriented x0, fair attention/concentration, difficulty with naming and repetition, no neglect.  CN: Blinks to threat bilaterally, PERRL, EOMI, right lower facial droop, severe dysarthria  Motor: Spontaneously moving the left side, right-sided weakness " 2/5    DATA:    Lab Results   Component Value Date    GLUCOSE 280 (H) 03/01/2024    CALCIUM 8.9 03/01/2024     (H) 03/01/2024    K 3.5 03/01/2024    CO2 25.0 03/01/2024     (H) 03/01/2024    BUN 22 03/01/2024    CREATININE 1.03 03/01/2024    BCR 21.4 03/01/2024    ANIONGAP 9.0 03/01/2024     Lab Results   Component Value Date    WBC 10.52 02/27/2024    HGB 14.4 02/27/2024    HCT 45.3 02/27/2024    MCV 90.8 02/27/2024     02/27/2024       Lab review: Blood glucose 280, sodium 146, BUN 22, creatinine 1.03    Imaging review: I personally reviewed his repeat his CT scan of the head yesterday which showed stable left thalamic IPH with bilateral IVH.    IMPRESSION:  1. No significant interval change when compared to most recent prior  head CT 02/27/2024, two days ago.  2. There is a stable ovoid acute intraparenchymal hemorrhage centered in  the left thalamus with some extension into the body of the left caudate  nucleus.  It maximally measures 3.2 x 2.5 x 3.5 cm with 3.5 cm  surrounding vasogenic edema and some mild mass effect on the body of the  left lateral ventricle and third ventricle and at most 2 mm left to  right midline shift and there is stable intraventricular extension of  the hemorrhage with a small amount of acute intraventricular hemorrhage  in the posterior trigones and occipital horns of the lateral ventricles.   There is stable mild prominence in size of the lateral and third  ventricles, some of which is due to volume loss.  3. There is mild-to-moderate small vessel disease in the cerebral white  matter and there is a 7 x 4 mm old lacunar infarct in the posterior  aspect of the posterior limb of the right internal capsule.  The  remainder of the head CT is unremarkable. If the patient truly has new  onset weakness today, could consider an MRI of the brain for more  complete assessment.  The results were discussed with Dr. Rivero by  telephone 02/29/2024 at 2:20 p.m.     Radiation  dose reduction techniques were utilized, including automated  exposure control and exposure modulation based on body size.        This report was finalized on 3/1/2024 6:35 AM by Dr. 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  -Atrial fibrillation was on Eliquis prior to arrival  -Altered mental status secondary to the above plus possible ICU delirium, rule out seizures  -Uncontrolled fluctuating blood pressure        PLAN:  -Blood pressure goal less than 150 use Cardene drip if needed to reach the goal  -Management of IPH with IVH as per neurosurgery  -Hold anticoagulation for 8 weeks until 4/24/2024 then repeat CT head prior to restarting  -Recommend controlling blood sugar between 140-180   -Increase Keppra to 1 g twice daily  -Neurology will follow peripherally on the EEG  -Follow delirium precautions    Delirium precautions:    1. Frequent reorientation, reminding patient not questioning patient   2. Shades up during day/down at night   3. TV off except for soft music only   4. Familiar objects at bedside   5. Encourage friends/family to spend the night   6. Minimize anticholingeric medications   7. Minimize polypharmacy   8. Minimize restraints, includes lines and/or foleys and/or feeding tubes as able   9. Minimize overnight checks/vitals to encourage restful consistent sleep patterns   10. Out of bed during day as much as possible     Discussed with Dr. Rivero    No further workup needed at this time will sign off and see again as needed.

## 2024-03-01 NOTE — PLAN OF CARE
Goal Outcome Evaluation:              Outcome Evaluation: Pt was alert and slighlt restless whn PT entered the room. Wife was present and engaged pt throughout the session. Pt continues to require max A x2 for bed mobility. Mod A needed for sitting balance initially, but improved to CGA-min A at times. R lean noted initially with pt also pushing to R with LUE at times. Able to maintain balance with trunk in midline for short periods of time. R side remains flaccid, but pt was rotating his head slightly past midline towards the R to find his wife or track therapist's glove when he was reclined back in bed.      Anticipated Discharge Disposition (PT): inpatient rehabilitation facility

## 2024-03-01 NOTE — PLAN OF CARE
Goal Outcome Evaluation:  Plan of Care Reviewed With: patient, spouse        Progress: improving  Outcome Evaluation: Pt participated in OT session today, he remains in ICU. He follows 1 step commands. OT session to focus on trunk stabilty for sitting balance, attention to R side/RUE, EOB ADL engagement, neuro faciliation exercises for RUE. Pt intermittently follows commands for squeezing OT hand and does have muscle contraction R shoulder, elbow when OT initates movement. Pt needs multiple cues for attention to RUE during bed mobility. He has R sided neglect but does track to midline or slightly to R visual feild with max cues today. Family is at bedside and very supportive of pt's recovery.      Anticipated Discharge Disposition (OT): inpatient rehabilitation facility

## 2024-03-01 NOTE — PROGRESS NOTES
[facility]    Continuing to monitor progress with therapies to determine most appropriate level of rehab when medically stable.    Thank you,  Wendy Hartmann RN  Rehab Admission Nurse

## 2024-03-01 NOTE — THERAPY TREATMENT NOTE
Patient Name: Erlin Blanco  : 1945    MRN: [id number]                              Today's Date: 3/1/2024       Admit Date: 2024    Visit Dx:     ICD-10-CM ICD-9-CM   1. Intraparenchymal hemorrhage of brain  I61.9 431   2. Facial droop  R29.810 781.94   3. Expressive aphasia  R47.01 784.3   4. Weakness of right upper extremity  R29.898 729.89   5. Chronic anticoagulation  Z79.01 V58.61     Patient Active Problem List   Diagnosis    Intraparenchymal hemorrhage of brain    Seizures     History reviewed. No pertinent past medical history.  History reviewed. No pertinent surgical history.   General Information       Row Name 24 1144          OT Time and Intention    Document Type therapy note (daily note)  -     Mode of Treatment occupational therapy;individual therapy  -       Row Name 24 1144          General Information    Patient Profile Reviewed yes  -     Existing Precautions/Restrictions fall;NPO  -       Row Name 24 1144          Cognition    Orientation Status (Cognition) oriented to;person  -       Row Name 24 1144          Safety Issues, Functional Mobility    Impairments Affecting Function (Mobility) balance;cognition;endurance/activity tolerance;strength;postural/trunk control;sensation/sensory awareness;visual/perceptual;motor control;grasp;coordination  -               User Key  (r) = Recorded By, (t) = Taken By, (c) = Cosigned By      Initials Name Provider Type     Sonia Mcclure OT Occupational Therapist                     Mobility/ADL's       Row Name 24 1145          Bed Mobility    Supine-Sit [place] (Bed Mobility) maximum assist (25% patient effort);2 person assist  -     Sit-Supine [place] (Bed Mobility) maximum assist (25% patient effort);2 person assist  -     Assistive Device (Bed Mobility) bed rails;head of bed elevated  -       Row Name 24 1145          Transfers    Comment, (Transfers) pt continues to work on  prepatory activites to stand  -[facility] Name 03/01/24 1145          Grooming Assessment/Training    [place] Level (Grooming) hair care, combing/brushing;minimum assist (75% patient effort)  -     Position (Grooming) edge of bed sitting  -     Comment, (Grooming) Vc's for attention to R side  -[facility] Name 03/01/24 1145          Toileting Assessment/Training    [place] Level (Toileting) dependent (less than 25% patient effort)  -     Position (Toileting) supine  -[facility] Name 03/01/24 1145          Lower Body Dressing Assessment/Training    [place] Level (Lower Body Dressing) dependent (less than 25% patient effort)  -     Position (Lower Body Dressing) supine  -               User Key  (r) = Recorded By, (t) = Taken By, (c) = Cosigned By      Initials Name Provider Type    Sonia Alfaro, OT Occupational Therapist                   Obj/Interventions       [facility] Name 03/01/24 1219          Vision Assessment/Intervention    Visual Processing Deficit natalie-inattention/neglect, right  -     Vision Assessment Comment X10 reps tracking to R side to reach for item from OT using LUE sitting EOB  -[facility] Name 03/01/24 1219          Shoulder (Therapeutic Exercise)    Shoulder AAROM (Therapeutic Exercise) right;flexion;extension;10 repetitions;aBduction;aDduction;supine  -[facility] Name 03/01/24 1219          Elbow/Forearm (Therapeutic Exercise)    Elbow/Forearm AAROM (Therapeutic Exercise) right;flexion;extension;10 repetitions  -[facility] Name 03/01/24 1219          Hand (Therapeutic Exercise)    Hand AROM/AAROM (Therapeutic Exercise) right;AROM (active range of motion);finger flexion;finger extension;10 repetitions  -[facility] Name 03/01/24 1219          Motor Skills    Motor Control/Coordination Interventions weight-bearing activities  tapping, RUE X3 reps down to elbow for neurosensory feedback, faciliation  -[facility] Name 03/01/24 1219          Balance     Static Sitting Balance contact guard;minimal assist  -SM     Dynamic Sitting Balance moderate assist  -SM     Position, Sitting Balance sitting edge of bed  -     Comment, Balance better sitting balance when pt uses LUE on foot board of bed, needs constant cues for midline orientation, weight shifting to achieve midline  -               User Key  (r) = Recorded By, (t) = Taken By, (c) = Cosigned By      Initials Name Provider Type     Sonia Mcclure OT Occupational Therapist                   Goals/Plan    No documentation.                  Clinical Impression       Row Name 03/01/24 1222          Pain Scale: FACES Pre/Post-Treatment    Pain: FACES Scale, Pretreatment 0-->no hurt  -SM     Posttreatment Pain Rating 0-->no hurt  -SM       Row Name 03/01/24 1222          Plan of Care Review    Plan of Care Reviewed With patient;spouse  -     Progress improving  -     Outcome Evaluation Pt participated in OT session today, he remains in ICU. He follows 1 step commands. OT session to focus on trunk stabilty for sitting balance, attention to R side/RUE, EOB ADL engagement, neuro faciliation exercises for RUE. Pt intermittently follows commands for squeezing OT hand and does have muscle contraction R shoulder, elbow when OT initates movement. Pt needs multiple cues for attention to RUE during bed mobility. He has R sided neglect but does track to midline or slightly to R visual feild with max cues today. Family is at bedside and very supportive of pt's recovery.  -       Row Name 03/01/24 1222          Therapy Plan Review/Discharge Plan (OT)    Anticipated Discharge Disposition (OT) inpatient rehabilitation facility  -       Row Name 03/01/24 1222          Positioning and Restraints    Pre-Treatment Position in bed  -     Post Treatment Position bed  -SM     In Bed fowlers;call light within reach;encouraged to call for assist;exit alarm on;notified nsg  -               User Key  (r) = Recorded By, (t)  = Taken By, (c) = Cosigned By      Initials Name Provider Type    Sonia Alfaro OT Occupational Therapist                   Outcome Measures       Row Name 03/01/24 1226          How much help from another is currently needed...    Putting on and taking off regular lower body clothing? 1  -SM     Bathing (including washing, rinsing, and drying) 1  -SM     Toileting (which includes using toilet bed pan or urinal) 1  -SM     Putting on and taking off regular upper body clothing 2  -SM     Taking care of personal grooming (such as brushing teeth) 2  -SM     Eating meals 1  -SM     AM-PAC 6 Clicks Score (OT) 8  -SM               User Key  (r) = Recorded By, (t) = Taken By, (c) = Cosigned By      Initials Name Provider Type    Sonia Alfaro OT Occupational Therapist                    Occupational Therapy Education       Title: PT OT SLP Therapies (In Progress)       Topic: Occupational Therapy (In Progress)       Point: ADL training (Not Started)       Description:   Instruct learner(s) on proper safety adaptation and remediation techniques during self care or transfers.   Instruct in proper use of assistive devices.                  Learner Progress:  Not documented in this visit.              Point: Home exercise program (Not Started)       Description:   Instruct learner(s) on appropriate technique for monitoring, assisting and/or progressing therapeutic exercises/activities.                  Learner Progress:  Not documented in this visit.              Point: Precautions (Done)       Description:   Instruct learner(s) on prescribed precautions during self-care and functional transfers.                  Learning Progress Summary             Family Acceptance, SHANITA LAMBERTDU by  at 2/27/2024 5323    Comment: RUE ROM, massage, safe positioning for subluxation prevention, OT role and dc recommendations                         Point: Body mechanics (Not Started)       Description:   Instruct learner(s) on  proper positioning and spine alignment during self-care, functional mobility activities and/or exercises.                  Learner Progress:  Not documented in this visit.                              User Key       Initials Effective Dates Name Provider Type Discipline     04/02/20 -  Sonia Mcclure OT Occupational Therapist OT                  OT Recommendation and Plan  Planned Therapy Interventions (OT): activity tolerance training, adaptive equipment training, BADL retraining, cognitive/visual perception retraining, neuromuscular control/coordination retraining, patient/caregiver education/training, transfer/mobility retraining, strengthening exercise, ROM/therapeutic exercise, prosthetic fitting/training, occupation/activity based interventions, functional balance retraining, orthotic fabrication/fitting/training, passive ROM/stretching  Therapy Frequency (OT): 5 times/wk  Plan of Care Review  Plan of Care Reviewed With: patient, spouse  Progress: improving  Outcome Evaluation: Pt participated in OT session today, he remains in ICU. He follows 1 step commands. OT session to focus on trunk stabilty for sitting balance, attention to R side/RUE, EOB ADL engagement, neuro faciliation exercises for RUE. Pt intermittently follows commands for squeezing OT hand and does have muscle contraction R shoulder, elbow when OT initates movement. Pt needs multiple cues for attention to RUE during bed mobility. He has R sided neglect but does track to midline or slightly to R visual feild with max cues today. Family is at bedside and very supportive of pt's recovery.     Time Calculation:   Evaluation Complexity (OT)  Review Occupational Profile/Medical/Therapy History Complexity: extensive/high complexity  Assessment, Occupational Performance/Identification of Deficit Complexity: 5 or more performance deficits  Clinical Decision Making Complexity (OT): comprehensive assessment/high complexity  Overall Complexity of  Evaluation (OT): high complexity     Time Calculation- OT       Row Name 03/01/24 1226             Time Calculation- OT    OT Start Time 0957  -      OT Stop Time 1020  -      OT Time Calculation (min) 23 min  -SM      Total Timed Code Minutes- OT 23 minute(s)  -SM      OT Received On 03/01/24  -      OT - Next Appointment 03/04/24  -         Timed Charges    21360 -  OT Neuromuscular Reeducation Minutes 15  -SM      73530 - OT Self Care/Mgmt Minutes 8  -SM         Total Minutes    Timed Charges Total Minutes 23  -SM       Total Minutes 23  -SM                User Key  (r) = Recorded By, (t) = Taken By, (c) = Cosigned By      Initials Name Provider Type     Sonia Mcclure OT Occupational Therapist                  Therapy Charges for Today       Code Description Service Date Service Provider Modifiers Qty    77803556208 HC OT NEUROMUSC RE EDUCATION EA 15 MIN 3/1/2024 Sonia Mcclure OT GO 1    20561828563 HC OT SELF CARE/MGMT/TRAIN EA 15 MIN 3/1/2024 Sonia Mcclure OT GO 1    19089988928 HC OT THER SUPP EA 15 MIN 3/1/2024 Sonia Mcclure OT GO 2                 Sonia Mcclure OT  3/1/2024

## 2024-03-01 NOTE — THERAPY TREATMENT NOTE
Patient Name: Erlin Blanco  : 1945    MRN: [id number]                              Today's Date: 3/1/2024       Admit Date: 2024    Visit Dx:     ICD-10-CM ICD-9-CM   1. Intraparenchymal hemorrhage of brain  I61.9 431   2. Facial droop  R29.810 781.94   3. Expressive aphasia  R47.01 784.3   4. Weakness of right upper extremity  R29.898 729.89   5. Chronic anticoagulation  Z79.01 V58.61     Patient Active Problem List   Diagnosis    Intraparenchymal hemorrhage of brain    Seizures     History reviewed. No pertinent past medical history.  History reviewed. No pertinent surgical history.   General Information       Row Name 24 1545          Physical Therapy Time and Intention    Document Type therapy note (daily note)  -     Mode of Treatment individual therapy;physical therapy  -               User Key  (r) = Recorded By, (t) = Taken By, (c) = Cosigned By      Initials Name Provider Type    Ilda Wadsworth PT Physical Therapist                   Mobility       Row Name 24 1545          Bed Mobility    Scooting/Bridging Telephone (Bed Mobility) dependent (less than 25% patient effort);2 person assist;nonverbal cues (demo/gesture);verbal cues  -     Supine-Sit Telephone (Bed Mobility) maximum assist (25% patient effort);2 person assist  -     Sit-Supine Telephone (Bed Mobility) maximum assist (25% patient effort);2 person assist  -     Assistive Device (Bed Mobility) bed rails;head of bed elevated  -               User Key  (r) = Recorded By, (t) = Taken By, (c) = Cosigned By      Initials Name Provider Type    Ilda Wadsworth PT Physical Therapist                   Obj/Interventions       Row Name 24 1546          Motor Skills    Therapeutic Exercise --  AAROM of LLE and PROm of RLE x 10 reps  -       Row Name 24 1546          Balance    Balance Assessment sitting static balance  -     Static Sitting Balance contact guard;minimal  assist  -     Dynamic Sitting Balance moderate assist  -     Position, Sitting Balance sitting edge of bed;supported  -     Balance Interventions sitting;supported;minimal challenge;dynamic;static;trunk training exercise  -FIDELIA               User Key  (r) = Recorded By, (t) = Taken By, (c) = Cosigned By      Initials Name Provider Type    Ilda Wadsworth, PT Physical Therapist                   Goals/Plan    No documentation.                  Clinical Impression       Row Name 03/01/24 1547          Pain    Pretreatment Pain Rating 0/10 - no pain  -FIDELIA     Posttreatment Pain Rating 0/10 - no pain  -       Row Name 03/01/24 1547          Plan of Care Review    Outcome Evaluation Pt was alert and slighlt restless whn PT entered the room. Wife was present and engaged pt throughout the session. Pt continues to require max A x2 for bed mobility. Mod A needed for sitting balance initially, but improved to CGA-min A at times. R lean noted initially with pt also pushing to R with LUE at times. Able to maintain balance with trunk in midline for short periods of time. R side remains flaccid, but pt was rotating his head slightly past midline towards the R to find his wife or track therapist's glove when he was reclined back in bed.  -FIDELIA       Row Name 03/01/24 1547          Positioning and Restraints    Pre-Treatment Position in bed  -     Post Treatment Position bed  -KH     In Bed fowlers;call light within reach;encouraged to call for assist;exit alarm on;notified nsg;with family/caregiver  -FIDELIA               User Key  (r) = Recorded By, (t) = Taken By, (c) = Cosigned By      Initials Name Provider Type    Ilda Wadsworth PT Physical Therapist                   Outcome Measures       Row Name 03/01/24 1552 03/01/24 1200       How much help from another person do you currently need...    Turning from your back to your side while in flat bed without using bedrails? 2  -FIDELIA 2  -KW    Moving from  lying on back to sitting on the side of a flat bed without bedrails? 1  -KH 1  -KW    Moving to and from a bed to a chair (including a wheelchair)? 1  -KH 1  -KW    Standing up from a chair using your arms (e.g., wheelchair, bedside chair)? 1  -KH 1  -KW    Climbing 3-5 steps with a railing? 1  -KH 1  -KW    To walk in hospital room? 1  -KH 1  -KW    AM-PAC 6 Clicks Score (PT) 7  -KH 7  -KW    Highest Level of Mobility Goal 2 --> Bed activities/dependent transfer  -KH 2 --> Bed activities/dependent transfer  -KW      Row Name 03/01/24 1552          Functional Assessment    Outcome Measure Options AM-PAC 6 Clicks Basic Mobility (PT)  -               User Key  (r) = Recorded By, (t) = Taken By, (c) = Cosigned By      Initials Name Provider Type    Ilda Wadsworth, PT Physical Therapist    Wilder Berry, RN Registered Nurse                                 Physical Therapy Education       Title: PT OT SLP Therapies (In Progress)       Topic: Physical Therapy (In Progress)       Point: Mobility training (In Progress)       Learning Progress Summary             Patient Acceptance, E,TB, NR,NL by MS at 2/27/2024 1320                         Point: Home exercise program (Not Started)       Learner Progress:  Not documented in this visit.              Point: Body mechanics (Done)       Learning Progress Summary             Patient Acceptance, E,TB, VU,NR by  at 2/29/2024 0920    Acceptance, E,TB, NR,NL by MS at 2/27/2024 1320   Family Acceptance, E,TB, VU,NR by  at 2/29/2024 0920                         Point: Precautions (Done)       Learning Progress Summary             Patient Acceptance, E,TB, VU,NR by  at 2/29/2024 0920    Acceptance, E,TB, NR,NL by MS at 2/27/2024 1320   Family Acceptance, E,TB, VU,NR by  at 2/29/2024 0920                                         User Key       Initials Effective Dates Name Provider Type Discipline    MS 06/16/21 -  Lyla Edwards, PT Physical Therapist  PT     01/24/24 -  Rick Maynard PT Student PT Student PT                  PT Recommendation and Plan     Outcome Evaluation: Pt was alert and slighlt restless whn PT entered the room. Wife was present and engaged pt throughout the session. Pt continues to require max A x2 for bed mobility. Mod A needed for sitting balance initially, but improved to CGA-min A at times. R lean noted initially with pt also pushing to R with LUE at times. Able to maintain balance with trunk in midline for short periods of time. R side remains flaccid, but pt was rotating his head slightly past midline towards the R to find his wife or track therapist's glove when he was reclined back in bed.     Time Calculation:         PT Charges       Row Name 03/01/24 1552             Time Calculation    Start Time 1437  -KH      Stop Time 1510  -KH      Time Calculation (min) 33 min  -KH      PT Received On 03/01/24  -      PT - Next Appointment 03/02/24  -KH         Time Calculation- PT    Total Timed Code Minutes- PT 33 minute(s)  -KH         Timed Charges    34504 - PT Therapeutic Exercise Minutes 8  -KH      87370 - PT Therapeutic Activity Minutes 25  -KH         Total Minutes    Timed Charges Total Minutes 33  -KH       Total Minutes 33  -KH                User Key  (r) = Recorded By, (t) = Taken By, (c) = Cosigned By      Initials Name Provider Type    Ilda Wadsworth, PT Physical Therapist                  Therapy Charges for Today       Code Description Service Date Service Provider Modifiers Qty    [id number] HC PT THERAPEUTIC ACT EA 15 MIN 3/1/2024 Ilda Braun, PT GP 2            PT G-Codes  Outcome Measure Options: AM-PAC 6 Clicks Basic Mobility (PT)  AM-PAC 6 Clicks Score (PT): 7  AM-PAC 6 Clicks Score (OT): 8  Modified Calli Scale: 5 - Severe disability.  Bedridden, incontinent, and requiring constant nursing care and attention.  PT Discharge Summary  Anticipated Discharge Disposition (PT): inpatient  rehabilitation facility    Ilda Braun, PT  3/1/2024

## 2024-03-01 NOTE — PLAN OF CARE
Shift summary: No significant events overnight. EKG obtained for rhythm change. AM CT complete. PRN given for BP. Cleviprex to be D/C.    Goal Outcome Evaluation:     Problem: Adult Inpatient Plan of Care  Goal: Plan of Care Review  Outcome: Ongoing, Progressing  Goal: Patient-Specific Goal (Individualized)  Outcome: Ongoing, Progressing  Goal: Absence of Hospital-Acquired Illness or Injury  Outcome: Ongoing, Progressing  Intervention: Identify and Manage Fall Risk  Recent Flowsheet Documentation  Taken 3/1/2024 0700 by Katie Duff RN  Safety Promotion/Fall Prevention:   fall prevention program maintained   safety round/check completed  Taken 3/1/2024 0600 by Katie Duff RN  Safety Promotion/Fall Prevention:   fall prevention program maintained   safety round/check completed  Taken 3/1/2024 0500 by Katie Duff RN  Safety Promotion/Fall Prevention:   fall prevention program maintained   safety round/check completed  Taken 3/1/2024 0400 by Katie Duff RN  Safety Promotion/Fall Prevention:   fall prevention program maintained   safety round/check completed  Taken 3/1/2024 0300 by Katie Duff RN  Safety Promotion/Fall Prevention:   fall prevention program maintained   safety round/check completed  Taken 3/1/2024 0200 by Katie Duff RN  Safety Promotion/Fall Prevention:   fall prevention program maintained   safety round/check completed  Taken 3/1/2024 0100 by Katie Duff RN  Safety Promotion/Fall Prevention:   fall prevention program maintained   safety round/check completed  Taken 3/1/2024 0000 by Katie Duff RN  Safety Promotion/Fall Prevention:   fall prevention program maintained   safety round/check completed  Taken 2/29/2024 2300 by Katie Duff RN  Safety Promotion/Fall Prevention:   fall prevention program maintained   safety round/check completed  Taken 2/29/2024 2200 by Katie Duff RN  Safety Promotion/Fall Prevention:   fall prevention program maintained   safety round/check  completed  Taken 2/29/2024 2100 by Katie Duff RN  Safety Promotion/Fall Prevention:   fall prevention program maintained   safety round/check completed  Taken 2/29/2024 2000 by Katie Duff RN  Safety Promotion/Fall Prevention:   fall prevention program maintained   safety round/check completed  Intervention: Prevent Skin Injury  Recent Flowsheet Documentation  Taken 3/1/2024 0600 by Katie Duff RN  Body Position: supine  Taken 3/1/2024 0400 by Katie Duff RN  Body Position: left  Taken 3/1/2024 0200 by Katie Duff RN  Body Position: right  Taken 3/1/2024 0000 by Katie Duff RN  Body Position: supine  Taken 2/29/2024 2200 by Katie Duff RN  Body Position: left  Taken 2/29/2024 2000 by Katie Duff RN  Body Position: right  Intervention: Prevent and Manage VTE (Venous Thromboembolism) Risk  Recent Flowsheet Documentation  Taken 3/1/2024 0400 by Katie Duff RN  Activity Management: bedrest  Taken 3/1/2024 0000 by Katie Duff RN  Activity Management: bedrest  Taken 2/29/2024 2000 by Katie Duff RN  Activity Management: bedrest  VTE Prevention/Management:   bilateral   sequential compression devices on  Range of Motion:   active ROM (range of motion) encouraged   ROM (range of motion) performed  Intervention: Prevent Infection  Recent Flowsheet Documentation  Taken 3/1/2024 0700 by Katie Duff RN  Infection Prevention:   environmental surveillance performed   hand hygiene promoted   personal protective equipment utilized   single patient room provided   rest/sleep promoted  Taken 3/1/2024 0600 by Katie Duff RN  Infection Prevention:   environmental surveillance performed   hand hygiene promoted   personal protective equipment utilized   rest/sleep promoted   single patient room provided  Taken 3/1/2024 0500 by Katie Duff RN  Infection Prevention:   environmental surveillance performed   hand hygiene promoted   personal protective equipment utilized   rest/sleep promoted    single patient room provided  Taken 3/1/2024 0400 by Katie Duff RN  Infection Prevention:   environmental surveillance performed   equipment surfaces disinfected   hand hygiene promoted   personal protective equipment utilized   rest/sleep promoted   single patient room provided  Taken 3/1/2024 0300 by Kaite Duff RN  Infection Prevention:   environmental surveillance performed   hand hygiene promoted   personal protective equipment utilized   rest/sleep promoted   single patient room provided  Taken 3/1/2024 0200 by Katie Duff RN  Infection Prevention:   environmental surveillance performed   hand hygiene promoted   personal protective equipment utilized   rest/sleep promoted   single patient room provided  Taken 3/1/2024 0100 by Katei Duff RN  Infection Prevention:   environmental surveillance performed   hand hygiene promoted   personal protective equipment utilized   rest/sleep promoted   single patient room provided  Taken 3/1/2024 0000 by Katie Duff RN  Infection Prevention:   environmental surveillance performed   equipment surfaces disinfected   hand hygiene promoted   personal protective equipment utilized   rest/sleep promoted   single patient room provided  Taken 2/29/2024 2300 by Katie Duff RN  Infection Prevention:   environmental surveillance performed   hand hygiene promoted   personal protective equipment utilized   rest/sleep promoted   single patient room provided  Taken 2/29/2024 2200 by Katie Duff RN  Infection Prevention:   environmental surveillance performed   hand hygiene promoted   personal protective equipment utilized   rest/sleep promoted   single patient room provided  Taken 2/29/2024 2100 by Katie Duff RN  Infection Prevention:   environmental surveillance performed   hand hygiene promoted   personal protective equipment utilized   rest/sleep promoted   single patient room provided  Taken 2/29/2024 2000 by Katie Duff RN  Infection Prevention:    environmental surveillance performed   equipment surfaces disinfected   hand hygiene promoted   personal protective equipment utilized   rest/sleep promoted   single patient room provided  Goal: Optimal Comfort and Wellbeing  Outcome: Ongoing, Progressing  Intervention: Provide Person-Centered Care  Recent Flowsheet Documentation  Taken 2/29/2024 2000 by Katie Duff RN  Trust Relationship/Rapport:   care explained   reassurance provided  Goal: Readiness for Transition of Care  Outcome: Ongoing, Progressing     Problem: Fall Injury Risk  Goal: Absence of Fall and Fall-Related Injury  Outcome: Ongoing, Progressing  Intervention: Identify and Manage Contributors  Recent Flowsheet Documentation  Taken 3/1/2024 0700 by Katie Duff RN  Medication Review/Management: medications reviewed  Taken 3/1/2024 0600 by Katie Duff RN  Medication Review/Management: medications reviewed  Taken 3/1/2024 0500 by Katie Duff RN  Medication Review/Management: medications reviewed  Taken 3/1/2024 0400 by Katie Duff RN  Medication Review/Management: medications reviewed  Taken 3/1/2024 0300 by Katie Duff RN  Medication Review/Management: medications reviewed  Taken 3/1/2024 0200 by Katie Duff RN  Medication Review/Management: medications reviewed  Taken 3/1/2024 0100 by Katie Duff RN  Medication Review/Management: medications reviewed  Taken 3/1/2024 0000 by Katie Duff RN  Medication Review/Management: medications reviewed  Taken 2/29/2024 2300 by Katie Duff RN  Medication Review/Management: medications reviewed  Taken 2/29/2024 2200 by Katie Duff RN  Medication Review/Management: medications reviewed  Taken 2/29/2024 2100 by Katie Duff RN  Medication Review/Management: medications reviewed  Taken 2/29/2024 2000 by Katie Duff RN  Medication Review/Management: medications reviewed  Intervention: Promote Injury-Free Environment  Recent Flowsheet Documentation  Taken 3/1/2024 0700 by Omega  LAQUITA Wilson  Safety Promotion/Fall Prevention:   fall prevention program maintained   safety round/check completed  Taken 3/1/2024 0600 by Katie Duff RN  Safety Promotion/Fall Prevention:   fall prevention program maintained   safety round/check completed  Taken 3/1/2024 0500 by Katie Duff RN  Safety Promotion/Fall Prevention:   fall prevention program maintained   safety round/check completed  Taken 3/1/2024 0400 by Katie Duff RN  Safety Promotion/Fall Prevention:   fall prevention program maintained   safety round/check completed  Taken 3/1/2024 0300 by Katie Duff RN  Safety Promotion/Fall Prevention:   fall prevention program maintained   safety round/check completed  Taken 3/1/2024 0200 by Katie Duff RN  Safety Promotion/Fall Prevention:   fall prevention program maintained   safety round/check completed  Taken 3/1/2024 0100 by Katie Duff RN  Safety Promotion/Fall Prevention:   fall prevention program maintained   safety round/check completed  Taken 3/1/2024 0000 by Katie Duff RN  Safety Promotion/Fall Prevention:   fall prevention program maintained   safety round/check completed  Taken 2/29/2024 2300 by Katie Duff RN  Safety Promotion/Fall Prevention:   fall prevention program maintained   safety round/check completed  Taken 2/29/2024 2200 by Katie Duff RN  Safety Promotion/Fall Prevention:   fall prevention program maintained   safety round/check completed  Taken 2/29/2024 2100 by Katie Duff RN  Safety Promotion/Fall Prevention:   fall prevention program maintained   safety round/check completed  Taken 2/29/2024 2000 by Katie Duff RN  Safety Promotion/Fall Prevention:   fall prevention program maintained   safety round/check completed     Problem: Skin Injury Risk Increased  Goal: Skin Health and Integrity  Outcome: Ongoing, Progressing  Intervention: Optimize Skin Protection  Recent Flowsheet Documentation  Taken 3/1/2024 0600 by Katie Duff RN  Head of Bed (HOB)  Positioning: HOB at 30 degrees  Taken 3/1/2024 0400 by Katie Duff RN  Head of Bed (HOB) Positioning: HOB at 30 degrees  Taken 3/1/2024 0200 by Katie Duff RN  Head of Bed (HOB) Positioning: HOB at 30 degrees  Taken 3/1/2024 0000 by Katie Duff RN  Head of Bed (HOB) Positioning: HOB at 30 degrees  Taken 2/29/2024 2200 by Katie Duff RN  Head of Bed (HOB) Positioning: HOB at 30-45 degrees  Taken 2/29/2024 2000 by Katie Duff RN  Head of Bed (HOB) Positioning: HOB at 30-45 degrees     Problem: Restraint, Nonviolent  Goal: Absence of Harm or Injury  Outcome: Ongoing, Progressing  Intervention: Implement Least Restrictive Safety Strategies  Recent Flowsheet Documentation  Taken 3/1/2024 0700 by Katie Duff RN  Medical Device Protection: tubing secured  Taken 3/1/2024 0600 by Katie Duff RN  Medical Device Protection: tubing secured  Taken 3/1/2024 0500 by Katie Duff RN  Medical Device Protection: tubing secured  Taken 3/1/2024 0400 by Katie Duff RN  Medical Device Protection: tubing secured  Less Restrictive Alternative:   bed alarm in use   calming techniques promoted   emotional support provided   environment adjusted   positive reinforcement provided   safety enhancements provided   sensory stimulation limited  De-Escalation Techniques:   increased round frequency   quiet time facilitated   reoriented   stimulation decreased  Diversional Activities: other (see comments)  Taken 3/1/2024 0300 by Katie Duff RN  Medical Device Protection: tubing secured  Taken 3/1/2024 0200 by Katie Duff RN  Medical Device Protection: tubing secured  Less Restrictive Alternative:   bed alarm in use   calming techniques promoted   emotional support provided   environment adjusted   positive reinforcement provided   safety enhancements provided   sensory stimulation limited  De-Escalation Techniques:   increased round frequency   quiet time facilitated   reoriented   stimulation decreased  Diversional  Activities: other (see comments)  Taken 3/1/2024 0100 by Katie Duff RN  Medical Device Protection: tubing secured  Taken 3/1/2024 0000 by Katie Duff RN  Medical Device Protection:   IV pole/bag removed from visual field   tubing secured  Less Restrictive Alternative:   bed alarm in use   calming techniques promoted   emotional support provided   environment adjusted   positive reinforcement provided   safety enhancements provided   sensory stimulation limited  De-Escalation Techniques:   increased round frequency   quiet time facilitated   reoriented   stimulation decreased  Diversional Activities: other (see comments)  Taken 2/29/2024 2300 by Katie Duff RN  Medical Device Protection:   IV pole/bag removed from visual field   tubing secured  Taken 2/29/2024 2200 by Katie Duff RN  Medical Device Protection:   IV pole/bag removed from visual field   tubing secured  Less Restrictive Alternative:   bed alarm in use   calming techniques promoted   emotional support provided   environment adjusted   positive reinforcement provided   safety enhancements provided   sensory stimulation limited  De-Escalation Techniques:   increased round frequency   quiet time facilitated   reoriented   stimulation decreased  Diversional Activities: television  Taken 2/29/2024 2100 by Katie Duff RN  Medical Device Protection:   IV pole/bag removed from visual field   tubing secured  Taken 2/29/2024 2000 by Katie Duff RN  Medical Device Protection:   IV pole/bag removed from visual field   tubing secured  Less Restrictive Alternative:   bed alarm in use   calming techniques promoted   emotional support provided   environment adjusted   positive reinforcement provided   safety enhancements provided   sensory stimulation limited  De-Escalation Techniques:   increased round frequency   quiet time facilitated   reoriented   stimulation decreased  Diversional Activities: television  Intervention: Protect Dignity, Rights,  and Personal Wellbeing  Recent Flowsheet Documentation  Taken 2/29/2024 2000 by Katie Duff RN  Trust Relationship/Rapport:   care explained   reassurance provided  Intervention: Protect Skin and Joint Integrity  Recent Flowsheet Documentation  Taken 3/1/2024 0600 by Katie Duff RN  Body Position: supine  Taken 3/1/2024 0400 by Katie Duff RN  Body Position: left  Taken 3/1/2024 0200 by Katie Duff RN  Body Position: right  Taken 3/1/2024 0000 by Katie Duff RN  Body Position: supine  Taken 2/29/2024 2200 by Katie Duff RN  Body Position: left  Taken 2/29/2024 2000 by Katie Duff RN  Body Position: right  Range of Motion:   active ROM (range of motion) encouraged   ROM (range of motion) performed     Problem: Pain Acute  Goal: Acceptable Pain Control and Functional Ability  Outcome: Ongoing, Progressing  Intervention: Prevent or Manage Pain  Recent Flowsheet Documentation  Taken 3/1/2024 0700 by Katie Duff RN  Medication Review/Management: medications reviewed  Taken 3/1/2024 0600 by Katie Duff RN  Medication Review/Management: medications reviewed  Taken 3/1/2024 0500 by Katie Duff RN  Medication Review/Management: medications reviewed  Taken 3/1/2024 0400 by Katie Duff RN  Medication Review/Management: medications reviewed  Taken 3/1/2024 0300 by Katie Duff RN  Medication Review/Management: medications reviewed  Taken 3/1/2024 0200 by Katie Duff RN  Medication Review/Management: medications reviewed  Taken 3/1/2024 0100 by Katie Duff RN  Medication Review/Management: medications reviewed  Taken 3/1/2024 0000 by Katie Duff RN  Medication Review/Management: medications reviewed  Taken 2/29/2024 2300 by Katie Duff RN  Medication Review/Management: medications reviewed  Taken 2/29/2024 2200 by Katie Duff RN  Medication Review/Management: medications reviewed  Taken 2/29/2024 2100 by Katie Duff RN  Medication Review/Management: medications reviewed  Taken  2/29/2024 2000 by Katie Duff RN  Medication Review/Management: medications reviewed  Intervention: Optimize Psychosocial Wellbeing  Recent Flowsheet Documentation  Taken 3/1/2024 0400 by Katie Duff RN  Diversional Activities: other (see comments)  Taken 3/1/2024 0200 by Katie Duff RN  Diversional Activities: other (see comments)  Taken 3/1/2024 0000 by Katie Duff RN  Diversional Activities: other (see comments)  Taken 2/29/2024 2200 by Katie Duff RN  Diversional Activities: television  Taken 2/29/2024 2000 by Katie Duff RN  Diversional Activities: television     Problem: Adjustment to Illness (Stroke, Hemorrhagic)  Goal: Optimal Coping  Outcome: Ongoing, Progressing  Intervention: Support Psychosocial Response to Stroke  Recent Flowsheet Documentation  Taken 2/29/2024 2000 by Katie Duff RN  Family/Support System Care: support provided     Problem: Bowel Elimination Impaired (Stroke, Hemorrhagic)  Goal: Effective Bowel Elimination  Outcome: Ongoing, Progressing     Problem: Cerebral Tissue Perfusion (Stroke, Hemorrhagic)  Goal: Optimal Cerebral Tissue Perfusion  Outcome: Ongoing, Progressing     Problem: Cognitive Impairment (Stroke, Hemorrhagic)  Goal: Optimal Cognitive Function  Outcome: Ongoing, Progressing     Problem: Communication Impairment (Stroke, Hemorrhagic)  Goal: Effective Communication Skills  Outcome: Ongoing, Progressing     Problem: Functional Ability Impaired (Stroke, Hemorrhagic)  Goal: Optimal Functional Ability  Outcome: Ongoing, Progressing  Intervention: Optimize Functional Ability  Recent Flowsheet Documentation  Taken 3/1/2024 0400 by Katie Duff RN  Activity Management: bedrest  Taken 3/1/2024 0000 by Katie Duff RN  Activity Management: bedrest  Taken 2/29/2024 2000 by Katie Duff RN  Activity Management: bedrest     Problem: Pain (Stroke, Hemorrhagic)  Goal: Acceptable Pain Control  Outcome: Ongoing, Progressing     Problem: Respiratory Compromise  (Stroke, Hemorrhagic)  Goal: Effective Oxygenation and Ventilation  Outcome: Ongoing, Progressing  Intervention: Optimize Oxygenation and Ventilation  Recent Flowsheet Documentation  Taken 3/1/2024 0600 by Katie Duff RN  Head of Bed (HOB) Positioning: HOB at 30 degrees  Taken 3/1/2024 0400 by Katie Duff RN  Head of Bed (HOB) Positioning: HOB at 30 degrees  Taken 3/1/2024 0200 by Katie Duff RN  Head of Bed (HOB) Positioning: HOB at 30 degrees  Taken 3/1/2024 0000 by Katie Duff RN  Head of Bed (HOB) Positioning: HOB at 30 degrees  Taken 2/29/2024 2200 by Katie Duff RN  Head of Bed (HOB) Positioning: HOB at 30-45 degrees  Taken 2/29/2024 2000 by Katie Duff RN  Head of Bed (HOB) Positioning: HOB at 30-45 degrees     Problem: Sensorimotor Impairment (Stroke, Hemorrhagic)  Goal: Improved Sensorimotor Function  Outcome: Ongoing, Progressing  Intervention: Optimize Range of Motion, Motor Control and Function  Recent Flowsheet Documentation  Taken 3/1/2024 0600 by Katie Duff RN  Positioning/Transfer Devices: pillows  Taken 3/1/2024 0400 by Katie Duff RN  Positioning/Transfer Devices: pillows  Taken 3/1/2024 0200 by Katie Duff RN  Positioning/Transfer Devices: pillows  Taken 3/1/2024 0000 by Katie Duff RN  Positioning/Transfer Devices: pillows  Taken 2/29/2024 2200 by Katie Duff RN  Positioning/Transfer Devices: pillows  Taken 2/29/2024 2000 by Katie Duff RN  Positioning/Transfer Devices: pillows  Range of Motion:   active ROM (range of motion) encouraged   ROM (range of motion) performed     Problem: Swallowing Impairment (Stroke, Hemorrhagic)  Goal: Optimal Eating and Swallowing Without Aspiration  Outcome: Ongoing, Progressing     Problem: Urinary Elimination Impaired (Stroke, Hemorrhagic)  Goal: Effective Urinary Elimination  Outcome: Ongoing, Progressing     Problem: Gas Exchange Impaired  Goal: Optimal Gas Exchange  Outcome: Ongoing, Progressing  Intervention: Optimize  Oxygenation and Ventilation  Recent Flowsheet Documentation  Taken 3/1/2024 0600 by Katie Duff RN  Head of Bed (HOB) Positioning: HOB at 30 degrees  Taken 3/1/2024 0400 by Katie Duff RN  Head of Bed (HOB) Positioning: HOB at 30 degrees  Taken 3/1/2024 0200 by Katie Duff RN  Head of Bed (HOB) Positioning: HOB at 30 degrees  Taken 3/1/2024 0000 by Katie Duff RN  Head of Bed (HOB) Positioning: HOB at 30 degrees  Taken 2/29/2024 2200 by Katie Duff RN  Head of Bed (HOB) Positioning: HOB at 30-45 degrees  Taken 2/29/2024 2000 by Katie Duff RN  Head of Bed (HOB) Positioning: HOB at 30-45 degrees     Problem: Hypertension Acute  Goal: Blood Pressure Within Desired Range  Outcome: Ongoing, Progressing  Intervention: Normalize Blood Pressure  Recent Flowsheet Documentation  Taken 3/1/2024 0700 by Katie Duff RN  Medication Review/Management: medications reviewed  Taken 3/1/2024 0600 by Katie Duff RN  Medication Review/Management: medications reviewed  Taken 3/1/2024 0500 by Katie Duff RN  Medication Review/Management: medications reviewed  Taken 3/1/2024 0400 by Katie Duff RN  Medication Review/Management: medications reviewed  Taken 3/1/2024 0300 by Katie Duff RN  Medication Review/Management: medications reviewed  Taken 3/1/2024 0200 by Katie Duff RN  Medication Review/Management: medications reviewed  Taken 3/1/2024 0100 by Katie Duff RN  Medication Review/Management: medications reviewed  Taken 3/1/2024 0000 by Katie Duff RN  Medication Review/Management: medications reviewed  Taken 2/29/2024 2300 by Katie Duff RN  Medication Review/Management: medications reviewed  Taken 2/29/2024 2200 by Katie Duff RN  Medication Review/Management: medications reviewed  Taken 2/29/2024 2100 by Katie Duff RN  Medication Review/Management: medications reviewed  Taken 2/29/2024 2000 by Katie Duff RN  Medication Review/Management: medications reviewed     Problem:  Seizure, Active Management  Goal: Absence of Seizure/Seizure-Related Injury  Outcome: Ongoing, Progressing     Problem: Hyperglycemia  Goal: Blood Glucose Level Within Targeted Range  Outcome: Ongoing, Progressing     Problem: Aspiration (Enteral Nutrition)  Goal: Absence of Aspiration Signs and Symptoms  Outcome: Ongoing, Progressing  Intervention: Minimize Aspiration Risk  Recent Flowsheet Documentation  Taken 3/1/2024 0600 by Katie Duff RN  Head of Bed (HOB) Positioning: HOB at 30 degrees  Taken 3/1/2024 0400 by Katie Duff RN  Head of Bed (HOB) Positioning: HOB at 30 degrees  Taken 3/1/2024 0200 by Katie Duff RN  Head of Bed (HOB) Positioning: HOB at 30 degrees  Taken 3/1/2024 0029 by Katie Duff RN  Oral Care:   lip/mouth moisturizer applied   suction provided   swabbed with antiseptic solution  Taken 3/1/2024 0000 by Katie Duff RN  Head of Bed (HOB) Positioning: HOB at 30 degrees  Taken 2/29/2024 2200 by Katie Duff RN  Head of Bed (HOB) Positioning: HOB at 30-45 degrees  Taken 2/29/2024 2000 by Katie Duff RN  Head of Bed ([facility]) Positioning: HOB at 30-45 degrees  Goal: Absence of Aspiration Signs and Symptoms  Outcome: Ongoing, Progressing  Intervention: Minimize Aspiration Risk  Recent Flowsheet Documentation  Taken 3/1/2024 0600 by Katie Duff RN  Head of Bed ([facility]) Positioning: HOB at 30 degrees  Taken 3/1/2024 0400 by Katie Duff RN  Head of Bed (HOB) Positioning: HOB at 30 degrees  Taken 3/1/2024 0200 by Katie Duff RN  Head of Bed (HOB) Positioning: HOB at 30 degrees  Taken 3/1/2024 0029 by Katie Duff RN  Oral Care:   lip/mouth moisturizer applied   suction provided   swabbed with antiseptic solution  Taken 3/1/2024 0000 by Katie Duff RN  Head of Bed (HOB) Positioning: HOB at 30 degrees  Taken 2/29/2024 2200 by Katie Duff RN  Head of Bed (HOB) Positioning: HOB at 30-45 degrees  Taken 2/29/2024 2000 by Katie Duff RN  Head of Bed (HOB) Positioning: HOB at  30-45 degrees     Problem: Device-Related Complication Risk (Enteral Nutrition)  Goal: Safe, Effective Therapy Delivery  Outcome: Ongoing, Progressing  Goal: Safe, Effective Therapy Delivery  Outcome: Ongoing, Progressing     Problem: Feeding Intolerance (Enteral Nutrition)  Goal: Feeding Tolerance  Outcome: Ongoing, Progressing  Goal: Feeding Tolerance  Outcome: Ongoing, Progressing

## 2024-03-01 NOTE — PROGRESS NOTES
LOS: 6 days   Patient Care Team:  Zamzam John APRN as PCP - General (Family Medicine)    Subjective     Presented with facial droop and dysarthria 78-year-old male with hypertension coronary artery disease, hyperlipidemia atrial flutter chronic diastolic heart failure type 2 diabetes chronic kidney disease prior stroke with some residual left-sided deficits on chronic anticoagulation with apixaban for his A-fib evaluation in ED revealed a acute left thalamic hemorrhage and surrounding cerebral edema.    Patient continues to wax and wane on his status we thought maybe at first it might be related to blood pressure but does not appear to be that way.    Review of Systems:          Objective     Vital Signs  Vital Sign Min/Max for last 24 hours  Temp  Min: 97.4 °F (36.3 °C)  Max: 98.9 °F (37.2 °C)   BP  Min: 120/61  Max: 191/81   Pulse  Min: 68  Max: 97   Resp  Min: 15  Max: 20   SpO2  Min: 91 %  Max: 96 %   Flow (L/min)  Min: 2  Max: 2   No data recorded        Ventilator/Non-Invasive Ventilation Settings (From admission, onward)      None                         Body mass index is 33.58 kg/m².  I/O last 3 completed shifts:  In: 2418 [I.V.:345; Other:330; NG/GT:1743]  Out: 2600 [Urine:2600]  I/O this shift:  In: 810 [Other:270; NG/GT:540]  Out: 650 [Urine:650]        Physical Exam:  General Appearance: Older white male lying in bed he is on nicardipine drip at 4 mg an hour he does not look in acute distress  Eyes: Conjunctiva are injected, anicteric, pupils are equal they are about  3 mm they are equal and reactive to light  ENT: Mucous membranes are dry Mallampati type II airway, nasal septum midline nasal  enteric tube  Neck: Trachea midline no adenopathy and no jugular venous distention  Lungs: Clear nonlabored symmetric expansion  Cardiac: Regular rhythm and looks to be sinus on the monitor he has a murmur at the right upper sternal border  Abdomen: Soft nontender no hepatosplenomegaly, obese  : Not  examined  Musculoskeletal: No significant edema  Skin: Warm and dry no jaundice no petechiae he has some chronic venous type insufficiency changes in the lower extremities bilaterally  Neuro: He continues to wax and wane on his responsiveness he is currently following some commands he did  and release my hand with the left hand and he weakly actually  with his right hand to command.  He wiggles his left foot since command I really could not get him to move his right toes although his wife says he has done that to command several times today.  Speech is mostly yup no and okay  Extremities/P Vascular: Palpable radial and dorsalis pedis pulses  MSE: Hard to tell he seems frustrated       Labs:  Results from last 7 days   Lab Units 02/28/24  1757 02/28/24  0804 02/27/24  0604 02/26/24  0258 02/25/24  0720 02/24/24  2221 02/24/24  2213   GLUCOSE mg/dL  --  167* 158* 239* 314*  --  160*   SODIUM mmol/L  --  147* 147* 145 142  --  143   POTASSIUM mmol/L 4.2 3.6 3.8 3.5 3.6  --  3.9   MAGNESIUM mg/dL  --   --  2.4 2.0 2.3  --   --    CO2 mmol/L  --  21.4* 21.5* 22.0 22.7  --  31.0*   CHLORIDE mmol/L  --  115* 114* 112* 107  --  105   ANION GAP mmol/L  --  10.6 11.5 11.0 12.3  --  7.0   CREATININE mg/dL  --  0.87 0.83 1.19 1.16 1.60* 1.26   BUN mg/dL  --  14 13 23 27*  --  27*   BUN / CREAT RATIO   --  16.1 15.7 19.3 23.3  --  21.4   CALCIUM mg/dL  --  8.6 8.7 8.9 9.2  --  9.8   ALK PHOS U/L  --   --   --   --   --   --  71   TOTAL PROTEIN g/dL  --   --   --   --   --   --  6.6   ALT (SGPT) U/L  --   --   --   --   --   --  15   AST (SGOT) U/L  --   --   --   --   --   --  24   BILIRUBIN mg/dL  --   --   --   --   --   --  0.4   ALBUMIN g/dL  --   --   --   --   --   --  4.1   GLOBULIN gm/dL  --   --   --   --   --   --  2.5     Estimated Creatinine Clearance: 93.4 mL/min (by C-G formula based on SCr of 0.87 mg/dL).      Results from last 7 days   Lab Units 02/27/24  0604 02/26/24  0258 02/25/24  0720 02/24/24  1018    WBC 10*3/mm3 10.52 12.40* 10.38 8.10   RBC 10*6/mm3 4.99 4.75 5.10 5.13   HEMOGLOBIN g/dL 14.4 13.9 14.7 14.5   HEMATOCRIT % 45.3 43.0 45.8 44.8   MCV fL 90.8 90.5 89.8 87.3   MCH pg 28.9 29.3 28.8 28.3   MCHC g/dL 31.8 32.3 32.1 32.4   RDW % 16.7* 16.2* 15.9* 15.7*   RDW-SD fl 55.0* 52.8 51.4 49.1   MPV fL 10.0 9.8 9.6 9.2   PLATELETS 10*3/mm3 327 350 358 407   NEUTROPHIL % % 76.8* 88.1* 86.6* 65.2   LYMPHOCYTE % % 10.1* 5.2* 6.6* 19.1*   MONOCYTES % % 7.8 5.0 4.1* 8.5   EOSINOPHIL % % 3.6 0.5 0.9 4.7   BASOPHIL % % 1.0 0.8 1.3 2.0*   IMM GRAN % % 0.7* 0.4 0.5 0.5   NEUTROS ABS 10*3/mm3 8.09* 10.92* 9.00* 5.28   LYMPHS ABS 10*3/mm3 1.06 0.65* 0.68* 1.55   MONOS ABS 10*3/mm3 0.82 0.62 0.43 0.69   EOS ABS 10*3/mm3 0.38 0.06 0.09 0.38   BASOS ABS 10*3/mm3 0.10 0.10 0.13 0.16   IMMATURE GRANS (ABS) 10*3/mm3 0.07* 0.05 0.05 0.04   NRBC /100 WBC 0.0 0.0 0.0 0.0         Results from last 7 days   Lab Units 02/24/24  2213   HSTROP T ng/L 41*                 Results from last 7 days   Lab Units 02/25/24  0720 02/24/24  2213   INR  1.20* 1.28*     Microbiology Results (last 10 days)       ** No results found for the last 240 hours. **                amLODIPine, 10 mg, Nasogastric, Q24H  hydrALAZINE, 100 mg, Nasogastric, Q8H  insulin glargine, 10 Units, Subcutaneous, Nightly  insulin regular, 2-9 Units, Subcutaneous, Q6H  labetalol, 200 mg, Nasogastric, Q12H  levETIRAcetam, 500 mg, Nasogastric, BID  memantine, 10 mg, Nasogastric, Q12H  senna-docusate sodium, 2 tablet, Oral, BID  sodium chloride, 10 mL, Intravenous, Q12H  valsartan, 320 mg, Nasogastric, Daily             Diagnostics:  CT Head Without Contrast    Addendum Date: 2/25/2024    ADDENDUM: 02 25 24 02:07 Call Doctor Regarding Intracranial Hemorrhage, called MIRI Pagan on 02 25 02:07 (-05:00)     Result Date: 2/25/2024  CR Patient: FELIPE DOCKERY  Time Out: 01:54 Exam(s): CT HEAD Without Contrast EXAM:   CT Head Without Intravenous Contrast CLINICAL HISTORY:     Reason for exam: Neuro deficit, acute, stroke suspected. TECHNIQUE:   Axial computed tomography images of the head brain without intravenous contrast.  This CT exam was performed according to the principle of ALARA (As Low As Reasonably Achievable) by using one or more of the following dose reduction techniques: automated exposure control, adjustment of the mA and or kV according to patient size, and or use of iterative reconstruction technique. COMPARISON:   No relevant prior studies available. FINDINGS:   Brain:  2.7 x 2.8 x 3.1 cm left thalamic intraparenchymal hemorrhage with associated vasogenic edema.  This has increased in size from the prior study when it measured 2.2 x 1.9 x 1.6 cm.  Additionally there has been interval intraventricular extension of the hemorrhage into the left lateral ventricle with greater mass-effect upon the lateral ventricle with subtle 3 mm left to right midline shift.  Small amount of hemorrhage is also noted within the dependent portion of the right lateral ventricle.   Ventricles:  See above.   Bones joints:  Unremarkable.  No acute fracture.   Soft tissues:  Unremarkable.   Sinuses:  Unremarkable as visualized.  No acute sinusitis.   Mastoid air cells:  Unremarkable as visualized.  No mastoid effusion. IMPRESSION:       Increasing left thalamic hemorrhage measuring 3.1 cm in greatest dimension on the current study increased from 2.2 cm on the prior exam.  This results in greater mass-effect upon the left lateral ventricle, with intraventricular extension and hemorrhage seen within both lateral ventricles.     Communications:  Call Doctor Intracranial Hemorrhage Electronically signed by Noman Winchester MD on 02-25-24 at 0154    XR Chest 1 View    Result Date: 2/24/2024  CHEST SINGLE VIEW  HISTORY: Stroke  COMPARISON: None  FINDINGS: Heart size is mildly enlarged. Lungs appear clear of focal airspace disease and there is no evidence for pulmonary edema or pleural effusion or  infiltrate. Cardiac monitor and leads are present. Atherosclerotic calcifications are present overlying the thoracic aorta.      Cardiomegaly. Atherosclerotic disease. No evidence for active disease in the chest  This report was finalized on 2/24/2024 11:24 PM by Dr. Aftab Duke M.D on Workstation: [id number]      CT Angiogram Head w AI Analysis of LVO    Result Date: 2/24/2024  CT HEAD WITHOUT CONTRAST  HISTORY: Neurologic deficit. Mental status change.  TECHNIQUE:  Head CT includes axial imaging from the base of skull to the vertex without intravenous contrast. Radiation dose reduction techniques were utilized, including automated exposure control and exposure modulation based on body size.  COMPARISON: None  FINDINGS: There is an acute intraparenchymal hematoma centered within the left thalamus and extending slightly anteromedial to the left thalamus measuring approximately 2.6 x 2 x 1.8 cm. This exhibits surrounding edema and there is localized mass effect. No additional intraparenchymal hemorrhage is evident. There is moderate chronic small vessel ischemic white matter change demonstrated as patchy deep cerebral white matter hypodensity. Intracranial atherosclerotic calcifications are present.      1. Acute intraparenchymal hemorrhage centered within the left thalamus measuring 2.6 x 2 x 1.8 cm with surrounding cerebral edema. Follow-up evaluation recommended. 2. Moderate to severe chronic small vessel ischemic white matter change. Intracranial atherosclerotic calcifications.   Findings discussed with the stroke neurologist on call on 02/24/2024 at 10:28 p.m.  Radiation dose reduction techniques were utilized, including automated exposure control and exposure modulation based on body size.   This report was finalized on 2/24/2024 10:46 PM by Dr. Aftab Duke M.D on Workstation: [id number]          I reviewed just had a CT head repeat stable left thalamic hemorrhage with mild edema and about 2 mm shift.   There is slight increase in intraventricular blood per report    Active Hospital Problems    Diagnosis  POA    **Intraparenchymal hemorrhage of brain [I61.9]  Yes    Seizures [R56.9]  Yes      Resolved Hospital Problems   No resolved problems to display.         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    Patient on prophylactic Keppra.  Surgery following the clinic scanning again tomorrow  Right hemiparesis dysphagia dysarthria does not seem to make any improvement overnight although he waxes and wanes quite a bit.  Paroxysmal atrial fibrillation on Eliquis chronically, patient received Kcentra in the emergency department and of course with his intracranial hemorrhage anticoagulation will be on hold.  Rate seems to be controlled.  Hypertensive emergency blood pressure goal less than 140.  Blood pressure has been extremely difficult to control I am going to go back to labetalol he seemed to do better when he was on that.  We continue to titrate up medications we have been very aggressive I do not want to drop his pressure too low we will continue uptitrating as needed and try and get him off the Cardene drip.  polycythemia vera by history  Hyperlipidemia  Diabetes mellitus type 2 blood sugars high can add some scheduled insulin to the sliding scale.  Fluids/electrolytes/nutrition speech just evaluating cleared him for p.o. medications.  I think his ability to take p.o. is looking more and doubt he waxes and wanes so much.  He may need a feeding tube            Plan for disposition: Patient is probably going to need some kind of placement.  Neurosurgery is okay with him transferring to neurosurgery floor.  But we need to get his blood pressure under control to do this.  Chet Rivero Jr, MD  03/01/24  06:57 EST    Time:  critical care time 34 minutes

## 2024-03-01 NOTE — PROGRESS NOTES
[facility] NEUROSURGERY INTRACRANIAL PROGRESS NOTE    PATIENT IDENTIFICATION:   Name:  Erlin Blanco      MRN:  [id number]     78 y.o.  male               CC: Hemorrhagic stroke in left basal ganglia      Subjective     Interval History: No acute issues overnight.  Neuroexam reported to be stable per nursing.    ROS:  Constitutional: No fever, chills  HEENT: No headache, no vision changes, no tinnitus, no hearing difficulties  Neck: no stiffness  GI: No nausea, vomiting, no swallow difficulties  Neuro: No numbness, tingling, or weakness, no speech difficulties, no balance difficulties    Objective     Vital signs in last 24 hours:  Temp:  [97.4 °F (36.3 °C)-98.7 °F (37.1 °C)] 98.3 °F (36.8 °C)  Heart Rate:  [59-97] 64  Resp:  [17-20] 20  BP: (120-191)/() 138/71      Intake/Output this shift:  No intake/output data recorded.      Intake/Output last 3 shifts:  I/O last 3 completed shifts:  In: 2777 [I.V.:345; Other:510; NG/GT:1922]  Out: 2750 [Urine:2750]    LABS:  .  Results from last 7 days   Lab Units 02/27/24  0604 02/26/24  0258 02/25/24  0720   WBC 10*3/mm3 10.52 12.40* 10.38   HEMOGLOBIN g/dL 14.4 13.9 14.7   HEMATOCRIT % 45.3 43.0 45.8   PLATELETS 10*3/mm3 327 350 358     .  Results from last 7 days   Lab Units 03/01/24  0757 02/28/24  1757 02/28/24  0804 02/27/24  0604 02/24/24  2221 02/24/24  2213   SODIUM mmol/L 146*  --  147* 147*   < > 143   POTASSIUM mmol/L 3.5 4.2 3.6 3.8   < > 3.9   CHLORIDE mmol/L 112*  --  115* 114*   < > 105   CO2 mmol/L 25.0  --  21.4* 21.5*   < > 31.0*   BUN mg/dL 22  --  14 13   < > 27*   CREATININE mg/dL 1.03  --  0.87 0.83   < > 1.26   CALCIUM mg/dL 8.9  --  8.6 8.7   < > 9.8   BILIRUBIN mg/dL  --   --   --   --   --  0.4   ALK PHOS U/L  --   --   --   --   --  71   ALT (SGPT) U/L  --   --   --   --   --  15   AST (SGOT) U/L  --   --   --   --   --  24   GLUCOSE mg/dL 280*  --  167* 158*   < > 160*    < > = values in this interval not displayed.          IMAGING  STUDIES:  CT head without contrast 3/1/2024:  Overall shows stable intracerebral hemorrhage with associated ventriculomegaly.  This study again shows an acute intraparenchymal hemorrhage in the left thalamus area.  There is an area of low-attenuation that is suspicious for an evolving acute infarct.  There is also extension of intraparenchymal hemorrhage into the ventricles.  There does not appear to be enlargement of the fourth ventricle.  Degree of enlargement of lateral and third ventricles appears stable.  I personally viewed and interpreted the patient's labs, imaging study, medications and chart.    Meds reviewed/changed: Yes    Current Facility-Administered Medications:     acetaminophen (TYLENOL) suppository 650 mg, 650 mg, Rectal, Q4H PRN, Chet Rivero Jr., MD, 650 mg at 02/28/24 0927    acetaminophen (TYLENOL) suppository 650 mg, 650 mg, Rectal, Q4H PRN, Chet Rivero Jr., MD    acetaminophen (TYLENOL) tablet 650 mg, 650 mg, Nasogastric, Q4H PRN, Chet Rivero Jr., MD, 650 mg at 02/29/24 2016    amLODIPine (NORVASC) tablet 10 mg, 10 mg, Nasogastric, Q24H, Chet Rivero Jr., MD, 10 mg at 03/01/24 0908    sennosides-docusate (PERICOLACE) 8.6-50 MG per tablet 2 tablet, 2 tablet, Oral, BID, 2 tablet at 02/29/24 0859 **AND** polyethylene glycol (MIRALAX) packet 17 g, 17 g, Oral, Daily PRN **AND** bisacodyl (DULCOLAX) EC tablet 5 mg, 5 mg, Oral, Daily PRN **AND** bisacodyl (DULCOLAX) suppository 10 mg, 10 mg, Rectal, Daily PRN, Chet Rivero Jr., MD    Calcium Replacement - Follow Nurse / BPA Driven Protocol, , Does not apply, PRN, Chet Rivero Jr., MD    clevidipine (CLEVIPREX) infusion 0.5 mg/mL, 2-32 mg/hr, Intravenous, Titrated, Chet Rivero Jr., MD, Last Rate: 14 mL/hr at 03/01/24 1219, 7 mg/hr at 03/01/24 1219    dextrose (D50W) (25 g/50 mL) IV injection 25 g, 25 g, Intravenous, Q15 Min PRN, Chet Rivero Jr., MD    dextrose (GLUTOSE) oral gel 15 g, 15 g,  Oral, Q15 Min PRN, Chet Rivero Jr., MD    glucagon (GLUCAGEN) injection 1 mg, 1 mg, Intramuscular, Q15 Min PRN, Chet Rivero Jr., MD    hydrALAZINE (APRESOLINE) injection 10 mg, 10 mg, Intravenous, Q4H PRN, Chet Rivero Jr., MD, 10 mg at 03/01/24 0944    hydrALAZINE (APRESOLINE) tablet 100 mg, 100 mg, Nasogastric, Q8H, Chet Rivero Jr., MD, 100 mg at 03/01/24 0659    insulin glargine (LANTUS, SEMGLEE) injection 10 Units, 10 Units, Subcutaneous, Nightly, Chet Rivero Jr., MD, 10 Units at 02/29/24 2007    insulin regular (humuLIN R,novoLIN R) injection 2-9 Units, 2-9 Units, Subcutaneous, Q6H, Chet Rivero Jr., MD, 4 Units at 03/01/24 1210    labetalol (NORMODYNE) tablet 400 mg, 400 mg, Nasogastric, Q12H, Chet Rivero Jr., MD    labetalol (NORMODYNE,TRANDATE) injection 40 mg, 40 mg, Intravenous, Q2H PRN, Chet Rivero Jr., MD, 40 mg at 03/01/24 0909    levETIRAcetam (KEPPRA) tablet 500 mg, 500 mg, Nasogastric, BID, Chet Rivero Jr., MD, 500 mg at 03/01/24 0910    Magnesium Standard Dose Replacement - Follow Nurse / BPA Driven Protocol, , Does not apply, PRN, Chet Rivero Jr., MD    memantine (NAMENDA) tablet 10 mg, 10 mg, Nasogastric, Q12H, Chet Rivero Jr., MD, 10 mg at 03/01/24 0908    nitroglycerin (NITROSTAT) SL tablet 0.4 mg, 0.4 mg, Sublingual, Q5 Min PRN, Chet Rivero Jr., MD    ondansetron (ZOFRAN) injection 4 mg, 4 mg, Intravenous, Once PRN, Chet Rivero Jr., MD    ondansetron (ZOFRAN) injection 4 mg, 4 mg, Intravenous, Q6H PRN, Chet Rivero Jr., MD    Phosphorus Replacement - Follow Nurse / BPA Driven Protocol, , Does not apply, Josefa SCHMIDT [place] Jr., MD    potassium chloride (KLOR-CON) packet 40 mEq, 40 mEq, Oral, Q4H, Chet Rivero Jr., MD, 40 mEq at 03/01/24 0909    Potassium Replacement - Follow Nurse / BPA Driven Protocol, , Does not apply, Josefa SCHMIDT [place] Jr., MD    sodium chloride 0.9  % flush 10 mL, 10 mL, Intravenous, PRN, Chet Rivero Jr., MD    sodium chloride 0.9 % flush 10 mL, 10 mL, Intravenous, Q12H, Chet Rivero Jr., MD, 10 mL at 03/01/24 0911    sodium chloride 0.9 % flush 10 mL, 10 mL, Intravenous, PRN, Chet Rivero Jr., MD    sodium chloride 0.9 % infusion 40 mL, 40 mL, Intravenous, PRN, Chet Rivero Jr., MD    valsartan (DIOVAN) tablet 320 mg, 320 mg, Nasogastric, Daily, Chet Rivero Jr., MD, 320 mg at 03/01/24 0909      Physical Exam:    General:   Awake, alert, oriented x3.  Moderate to severe dysarthria, expressive and receptive aphasia.  All at baseline per nursing.    Neck:    supple  CN II:    Patient has difficulty discerning objects in front of face and is unable to count fingers.  Blinks to threat  CN III IV VI:   Left gaze preference, PERRL 3 mm bilaterally and brisk to light.  CN VII:    Facial movements are symmetric. No weakness.  Motor:    Right upper and lower extremity minimally responsive to noxious stimuli.  Follows commands intermittently on left upper and lower extremity.  In left upper extremity restraint.  Patient also moves bulk and tone in upper and lower extremities.  No fasciculations, rigidity, spasticity, or abnormal movements.    Sensation:   Normal to light touch; no extinction  Station and Gait:  Gait deferred.  Per PT note 2/29/2024:However, pt required mod/max x 2 when letting go of bed rail and performing functional tasks with L UE, showing an improvement in sitting balance since the previous session. Pt required max VCs to achieve midline and to maintain upright posture. Pt fatigued quickly, however vitals stayed WNL, besides BP being slightly elevated. Pt was dependent x 2 for scooting. PT will continue to follow as pt progresses, however is recommends inpatient rehab at this time d/t pt' PLOF. Pt was left supine with call light in reach and bed alarm activated.   Per OT note 3/1/2024:  Pt participated in OT  "session today, he remains in ICU. He follows 1 step commands. OT session to focus on trunk stabilty for sitting balance, attention to R side/RUE, EOB ADL engagement, neuro faciliation exercises for RUE. Pt intermittently follows commands for squeezing OT hand and does have muscle contraction R shoulder, elbow when OT initates movement. Pt needs multiple cues for attention to RUE during bed mobility. He has R sided neglect but does track to midline or slightly to R visual feild with max cues today. Family is at bedside and very supportive of pt's recovery.     Coordination:   Finger-to-nose and heel-to-shin test shows no dysmetria.  Rapid alternating movements are normal. Able to tandem walk. Romberg negative.  Extremities:   Wearing SCD    Assessment & Plan     ASSESSMENT:      Intraparenchymal hemorrhage of brain    Seizures    78-year-old male who unfortunately had intraparenchymal hemorrhage likely secondary to hypertension.  CT scan fairly stable today showing intraventricular extension as well as possibly evolving acute infarct.  Neurology to evaluate.  At this time we can continue to maintain systolic blood pressure less than 160.  We will defer seizure prophylaxis to neurology.  He remains on Cleviprex and restraints at this time.    PLAN:     Care per ICU intensivist  SBP less than 160  Continue neurochecks  Seizure prophylaxis per neurology  Continue to hold Eliquis      I discussed the patient's findings and my recommendations with patient, family, and Dr. Miller.     During patient visit, I utilized appropriate personal protective equipment including mask and gloves.  Mask used was standard procedure mask. Appropriate PPE was worn during the entire visit.  Hand hygiene was completed before and after.      LOS: 6 days       Bakari Galloway, APRN  3/1/2024  12:38 EST    \"Dictated utilizing Dragon dictation\".    "

## 2024-03-01 NOTE — NURSING NOTE
Pt remains confused this shift. Follows commands in all extremities except the right arm .Pt PO hypertensive meds increased, weaned down on cleviprex drip. Left sided eye deviation shows slight improvement. Family supported and educated .

## 2024-03-02 ENCOUNTER — APPOINTMENT (OUTPATIENT)
Dept: GENERAL RADIOLOGY | Facility: HOSPITAL | Age: 79
DRG: 064 | End: 2024-03-02
Payer: MEDICARE

## 2024-03-02 LAB
GLUCOSE BLDC GLUCOMTR-MCNC: 208 MG/DL (ref 70–130)
GLUCOSE BLDC GLUCOMTR-MCNC: 216 MG/DL (ref 70–130)
GLUCOSE BLDC GLUCOMTR-MCNC: 222 MG/DL (ref 70–130)
TRIGL SERPL-MCNC: 85 MG/DL (ref 0–150)

## 2024-03-02 PROCEDURE — 74018 RADEX ABDOMEN 1 VIEW: CPT

## 2024-03-02 PROCEDURE — 25010000002 HYDRALAZINE PER 20 MG: Performed by: INTERNAL MEDICINE

## 2024-03-02 PROCEDURE — 97530 THERAPEUTIC ACTIVITIES: CPT

## 2024-03-02 PROCEDURE — 92523 SPEECH SOUND LANG COMPREHEN: CPT

## 2024-03-02 PROCEDURE — 97110 THERAPEUTIC EXERCISES: CPT

## 2024-03-02 PROCEDURE — 25010000002 CLEVIDIPINE BUTYRATE PER 1 MG: Performed by: INTERNAL MEDICINE

## 2024-03-02 PROCEDURE — 84478 ASSAY OF TRIGLYCERIDES: CPT | Performed by: INTERNAL MEDICINE

## 2024-03-02 PROCEDURE — C9248 INJ, CLEVIDIPINE BUTYRATE: HCPCS | Performed by: INTERNAL MEDICINE

## 2024-03-02 PROCEDURE — 82948 REAGENT STRIP/BLOOD GLUCOSE: CPT

## 2024-03-02 PROCEDURE — 63710000001 INSULIN REGULAR HUMAN PER 5 UNITS: Performed by: INTERNAL MEDICINE

## 2024-03-02 PROCEDURE — 25010000002 LABETALOL 5 MG/ML SOLUTION: Performed by: INTERNAL MEDICINE

## 2024-03-02 PROCEDURE — 63710000001 INSULIN GLARGINE PER 5 UNITS: Performed by: INTERNAL MEDICINE

## 2024-03-02 PROCEDURE — 92526 ORAL FUNCTION THERAPY: CPT

## 2024-03-02 RX ORDER — CLONIDINE HYDROCHLORIDE 0.1 MG/1
0.1 TABLET ORAL EVERY 12 HOURS SCHEDULED
Status: DISCONTINUED | OUTPATIENT
Start: 2024-03-02 | End: 2024-03-03

## 2024-03-02 RX ADMIN — CLEVIPIDINE 6 MG/HR: 0.5 EMULSION INTRAVENOUS at 09:12

## 2024-03-02 RX ADMIN — INSULIN HUMAN 4 UNITS: 100 INJECTION, SOLUTION PARENTERAL at 18:30

## 2024-03-02 RX ADMIN — HYDRALAZINE HYDROCHLORIDE 100 MG: 50 TABLET ORAL at 21:31

## 2024-03-02 RX ADMIN — CLEVIPIDINE 3 MG/HR: 0.5 EMULSION INTRAVENOUS at 18:20

## 2024-03-02 RX ADMIN — INSULIN HUMAN 4 UNITS: 100 INJECTION, SOLUTION PARENTERAL at 13:22

## 2024-03-02 RX ADMIN — LABETALOL HYDROCHLORIDE 400 MG: 100 TABLET, FILM COATED ORAL at 21:31

## 2024-03-02 RX ADMIN — LEVETIRACETAM 500 MG: 500 TABLET, FILM COATED ORAL at 08:28

## 2024-03-02 RX ADMIN — SENNOSIDES AND DOCUSATE SODIUM 2 TABLET: 50; 8.6 TABLET ORAL at 08:18

## 2024-03-02 RX ADMIN — HYDRALAZINE HYDROCHLORIDE 10 MG: 20 INJECTION INTRAMUSCULAR; INTRAVENOUS at 01:50

## 2024-03-02 RX ADMIN — CLONIDINE HYDROCHLORIDE 0.1 MG: 0.1 TABLET ORAL at 21:31

## 2024-03-02 RX ADMIN — HYDRALAZINE HYDROCHLORIDE 10 MG: 20 INJECTION INTRAMUSCULAR; INTRAVENOUS at 06:33

## 2024-03-02 RX ADMIN — HYDRALAZINE HYDROCHLORIDE 10 MG: 20 INJECTION INTRAMUSCULAR; INTRAVENOUS at 16:55

## 2024-03-02 RX ADMIN — LABETALOL HYDROCHLORIDE 40 MG: 5 INJECTION, SOLUTION INTRAVENOUS at 15:58

## 2024-03-02 RX ADMIN — HYDRALAZINE HYDROCHLORIDE 100 MG: 50 TABLET ORAL at 13:22

## 2024-03-02 RX ADMIN — LABETALOL HYDROCHLORIDE 40 MG: 5 INJECTION, SOLUTION INTRAVENOUS at 04:56

## 2024-03-02 RX ADMIN — CLEVIPIDINE 6 MG/HR: 0.5 EMULSION INTRAVENOUS at 04:39

## 2024-03-02 RX ADMIN — MEMANTINE HYDROCHLORIDE 10 MG: 10 TABLET, FILM COATED ORAL at 21:31

## 2024-03-02 RX ADMIN — HYDRALAZINE HYDROCHLORIDE 10 MG: 20 INJECTION INTRAMUSCULAR; INTRAVENOUS at 11:37

## 2024-03-02 RX ADMIN — Medication 10 ML: at 08:17

## 2024-03-02 RX ADMIN — AMLODIPINE BESYLATE 10 MG: 10 TABLET ORAL at 08:17

## 2024-03-02 RX ADMIN — LABETALOL HYDROCHLORIDE 40 MG: 5 INJECTION, SOLUTION INTRAVENOUS at 00:08

## 2024-03-02 RX ADMIN — MEMANTINE HYDROCHLORIDE 10 MG: 10 TABLET, FILM COATED ORAL at 08:17

## 2024-03-02 RX ADMIN — Medication 10 ML: at 22:21

## 2024-03-02 RX ADMIN — INSULIN GLARGINE 15 UNITS: 100 INJECTION, SOLUTION SUBCUTANEOUS at 21:37

## 2024-03-02 RX ADMIN — VALSARTAN 320 MG: 320 TABLET, FILM COATED ORAL at 08:17

## 2024-03-02 RX ADMIN — CLEVIPIDINE 3 MG/HR: 0.5 EMULSION INTRAVENOUS at 22:20

## 2024-03-02 RX ADMIN — LEVETIRACETAM 500 MG: 500 TABLET, FILM COATED ORAL at 21:31

## 2024-03-02 RX ADMIN — LABETALOL HYDROCHLORIDE 40 MG: 5 INJECTION, SOLUTION INTRAVENOUS at 02:49

## 2024-03-02 RX ADMIN — HYDRALAZINE HYDROCHLORIDE 100 MG: 50 TABLET ORAL at 05:50

## 2024-03-02 RX ADMIN — LABETALOL HYDROCHLORIDE 40 MG: 5 INJECTION, SOLUTION INTRAVENOUS at 07:13

## 2024-03-02 RX ADMIN — LABETALOL HYDROCHLORIDE 400 MG: 100 TABLET, FILM COATED ORAL at 08:16

## 2024-03-02 RX ADMIN — INSULIN HUMAN 6 UNITS: 100 INJECTION, SOLUTION PARENTERAL at 00:02

## 2024-03-02 RX ADMIN — LABETALOL HYDROCHLORIDE 40 MG: 5 INJECTION, SOLUTION INTRAVENOUS at 18:30

## 2024-03-02 RX ADMIN — INSULIN HUMAN 4 UNITS: 100 INJECTION, SOLUTION PARENTERAL at 06:18

## 2024-03-02 NOTE — PLAN OF CARE
"Goal Outcome Evaluation:    SLP completed bedside swallow re-evaluation and speech language assessment.     Bedside swallow initial evaluation completed 2.26, daily re-eval with recs for VFSS, cancelled 2.27, pt has remained NPO since admit. No prior hx dysphagia. Pt with dried oral mucosa coating lingual surface and palate, resistant to oral suction and oral care. Ice chips loosen, eventual manual removal per SLP. Pt with adequate manipulation of ice chips, transport of puree and thins by tsp/straw. Intermittent R spillage with straw sips, did not assess cups d/t UE in restraints. Pharyngeal phase with x1 throat clear thins by straw, severe impulsivity in bolus size and rate. Cued cough weak. Max cues required for initiation of second swallow.          D/w spouse, appears improved at bedside, alertness WFL, however, concerns for pharyngeal impairment in setting of voice impairment and poor cough quality, dried oral secretions. Recommend proceeding with instrumental to r/o pharyngeal impairment - all in agreement.     Continue NPO until VFSS completed.   Pt is cleared for water protocol which states: may have thin ice/water following oral care, cease if s/s aspiration or clinical difficulties noted.     Portions of [place] Dysarthria Assessment and MS Aphasia Screening Test/Western Aphasia Battery Bedside completed with the following impressions:   Auditory comprehension: follows 1 step commands with 8/10, 80% accuracy; 2 step commands 0/3 accuracy max cues/repetitions, yes/no: 50% with \"yes\" bias, unreliable, ID tangible items field of 2 5/5, 100%   Verbal expression: 5/8, 63% naming, biographical information: intact for name, spouse relationship, city, not number of children; orientation: x0 I'ly can increase to place, situation, month from field of 2; automatics: intact for counting to 10, JAYA, 3/12 SHAW, Repetition: intact for single and multisyllabic words, short phrases and sentences, unable to repeat at 7+ " "word level sentence. Primary communication in 1 word utterances, perseveration on \"yeah.\"   Motor speech: R labial droop, strained/strangled quality, MPT 2 seconds with pitch breaks, adequate loudness, slow rate, slow regular AMRs  Clinical impression:moderate to severe Broca's aphasia; moderate spastic dysarthria.     D/w spouse automatic speech tasks, additional stimuli provided in written format. Ongoing SLP services indicated while IP and at next level of care.       "

## 2024-03-02 NOTE — PLAN OF CARE
Goal Outcome Evaluation: Pt showed improvement in orientation today. Deviation in the eyes have improved. Pt currently off cleviprex for two hours. Using PRNs to maintain SBP below 150. Pt had possible infiltration of cleviprex in the right arm. See order set for interventions. Pt pulled at [facility] ordered for verification of placement. Family notified of all events at the bedside.

## 2024-03-02 NOTE — PROGRESS NOTES
[facility]    Patient continues Max A x 2 for bed mobility. Will monitor for progress with therapies for determination of most appropriate level rehab at discharge.    Radha Hernandez RN  Rehab Admissions Coordinator  [phone]

## 2024-03-02 NOTE — PROGRESS NOTES
[facility]  [phone]  Dr. Yariel Dominguez    Subjective:  LOS: 7    Chief Complaint: Basal ganglia bleed    Awake and alert.  Follows commands.  According to the wife he worked well with speech therapy and with improvement.  Still with right facial droop and right hemiparesis.  Still with dysarthria.    Objective   Vital Signs past 24hrs  Temp range: Temp (24hrs), Av.9 °F (36.6 °C), Min:97.3 °F (36.3 °C), Max:98.4 °F (36.9 °C)    BP range: BP: (124-178)/(60-99) 141/86  Pulse range: Heart Rate:  [59-80] 78  Resp rate range:    Device (Oxygen Therapy): room air;other (see comments)Flow (L/min):  [2] 2  Oxygen range:SpO2:  [92 %-98 %] 94 %   Mechanical Ventilator:     Physical Exam  Constitutional:       Appearance: He is obese.   Eyes:      Pupils: Pupils are equal, round, and reactive to light.   Cardiovascular:      Rate and Rhythm: Normal rate and regular rhythm.   Pulmonary:      Effort: Pulmonary effort is normal.      Breath sounds: Normal breath sounds.   Abdominal:      General: Bowel sounds are normal.      Palpations: Abdomen is soft. There is no mass.      Tenderness: There is no abdominal tenderness.   Musculoskeletal:         General: No swelling.   Neurological:      Mental Status: He is alert.      Comments: Right hemiparesis  Dysarthria  Right facial droop       Results Review:    I have reviewed the laboratory and imaging data [facility]  My annotations are noted in assessment and plan.      Result Review:  I have personally reviewed the results from [facility] to 3/2/2024 14:15 EST and agree with these findings:  [x]  Laboratory list / accordion  [x]  Microbiology  [x]  Radiology  []  EKG/Telemetry   []  Cardiology/Vascular   []  Pathology  []  Old records  []  Other:    Medication Review:  I have reviewed the current MAR.  My annotations are noted in assessment and plan.    amLODIPine, 10 mg, Nasogastric, Q24H  hydrALAZINE, 100 mg,  Nasogastric, Q8H  insulin glargine, 10 Units, Subcutaneous, Nightly  insulin regular, 2-9 Units, Subcutaneous, Q6H  labetalol, 400 mg, Nasogastric, Q12H  levETIRAcetam, 500 mg, Nasogastric, BID  memantine, 10 mg, Nasogastric, Q12H  senna-docusate sodium, 2 tablet, Oral, BID  sodium chloride, 10 mL, Intravenous, Q12H  valsartan, 320 mg, Nasogastric, Daily        clevidipine, 2-32 mg/hr, Last Rate: 3 mg/hr (03/02/24 1323)      Lines, Drains & Airways       Active LDAs       Name Placement date Placement time Site Days    Peripheral IV 02/24/24 2237 Anterior;Right Forearm 02/24/24  2237  Forearm  6    Peripheral IV 03/01/24 0953 Anterior;Left Forearm 03/01/24  0953  Forearm  1    NG/OG Tube Nasogastric 10 Fr Right nostril 02/27/24  1315  Right nostril  4    External Urinary Catheter 02/27/24 2000  --  3                  Isolation status: No active isolations    Dietary Orders (From admission, onward)       Start     Ordered    02/29/24 1026  Diabetisource AC (Glucerna 1.2); Tube Feeding Type: Continuous; Continuous Tube Feeding Start Rate (mL/hr): 20; Then Advance Rate By (mL/hr): 20; Every __ Hours: 12; To Goal Rate of (mL/hr): 60  Diet Effective Now        Question Answer Comment   Tube Feeding Formula: Diabetisource AC (Glucerna 1.2)    Tube Feeding Type Continuous    Continuous Tube Feeding Start Rate (mL/hr) 20    Then Advance Rate By (mL/hr) 20    Every __ Hours 12    To Goal Rate of (mL/hr) 60    Water Flush Amount (mL) 30    Water Flush Frequency Every 1 Hour        02/29/24 1026    02/27/24 1336  Place Feeding Tube Per Cortrak System  (Tube Feeding Placement DARY)  Until Discontinued        Question:  Tube Feeding Route  Answer:  NG - Nasogastric    02/27/24 1335    02/27/24 1335  NPO Diet NPO Type: Strict NPO  Diet Effective Now        Question:  NPO Type  Answer:  Strict NPO    02/27/24 1335                    PCCM Problems  Left thalamic intracranial hemorrhage with intraventricular extension  Right  hemiparesis, dysphagia, dysarthria  Altered mental status with acute metabolic encephalopathy  Chronic anticoagulation with Eliquis at home  Atrial fibrillation  Hypertensive emergency  Polycythemia vera on hydroxyurea  Diabetes mellitus type 2      THESE ARE NEW MEDICAL PROBLEMS TO ME.    Plan of Treatment    Basal ganglia bleed likely hypertensive.  Appreciate neurosurgery input.  Remains on Cardene drip.  Add clonidine.    Working with speech therapy and some improvement.  Still with tube feeds.    Altered mental status looks improved.  Patient appears alert to my exam.  He appears oriented to place person and time.    A-fib is rate controlled and without current issues.    On sliding scale and subcutaneous Lantus insulin for diabetes. Increase lantus.    On tube feeds.     Check labs in the morning.    Yariel Dominguez MD  03/02/24  14:15 EST      Part of this note may be an electronic transcription/translation of spoken language to printed text using the Dragon Dictation System.

## 2024-03-02 NOTE — THERAPY TREATMENT NOTE
Patient Name: Erlin Blanco  : 1945    MRN: [id number]                              Today's Date: 3/2/2024       Admit Date: 2024    Visit Dx:     ICD-10-CM ICD-9-CM   1. Intraparenchymal hemorrhage of brain  I61.9 431   2. Facial droop  R29.810 781.94   3. Expressive aphasia  R47.01 784.3   4. Weakness of right upper extremity  R29.898 729.89   5. Chronic anticoagulation  Z79.01 V58.61     Patient Active Problem List   Diagnosis    Intraparenchymal hemorrhage of brain    Seizures     History reviewed. No pertinent past medical history.  History reviewed. No pertinent surgical history.   General Information       Row Name 24 1509          Physical Therapy Time and Intention    Document Type therapy note (daily note)  -CB     Mode of Treatment co-treatment;occupational therapy;physical therapy;other (see comments)  -CB       Row Name 24 1509          General Information    Existing Precautions/Restrictions fall  -CB       Row Name 24 1509          Cognition    Orientation Status (Cognition) --  pt very sleepy during session but was more awake at start of session when SLP was leaving room  -CB       Row Name 24 1509          Safety Issues, Functional Mobility    Safety Issues Affecting Function (Mobility) insight into deficits/self-awareness;awareness of need for assistance;judgment;problem-solving;ability to follow commands  -CB     Impairments Affecting Function (Mobility) balance;cognition;endurance/activity tolerance;strength;postural/trunk control;sensation/sensory awareness;visual/perceptual;motor control;grasp;coordination  -CB     Comment, Safety Issues/Impairments (Mobility) Co treatment medically appropriate and necessary due to patient acuity level, activity tolerance and safety of patient and staff. Treatment is focusing on progression of care and goals established in the POC.  -CB               User Key  (r) = Recorded By, (t) = Taken By, (c) = Cosigned By      Initials  Name Provider Type    Mag Porter PT Physical Therapist                   Mobility       Row Name 03/02/24 1511          Bed Mobility    Bed Mobility supine-sit;sit-supine;scooting/bridging  -CB     Scooting/Bridging [place] (Bed Mobility) dependent (less than 25% patient effort);2 person assist;nonverbal cues (demo/gesture);verbal cues  -CB     Supine-Sit [place] (Bed Mobility) maximum assist (25% patient effort);2 person assist  -CB     Sit-Supine [place] (Bed Mobility) maximum assist (25% patient effort);2 person assist  -CB     Assistive Device (Bed Mobility) bed rails;head of bed elevated;leg ;draw sheet  -CB     Comment, (Bed Mobility) Pt uses LUE as support on foot of bed for sitting balance and VC and assist to extend cspine as well as rotate to R.  -CB       Row Name 03/02/24 1511          Bed-Chair Transfer    Bed-Chair [place] (Transfers) unable to assess  -CB       Row Name 03/02/24 1511          Sit-Stand Transfer    Sit-Stand [place] (Transfers) unable to assess  -CB       Row Name 03/02/24 1511          Gait/Stairs (Locomotion)    [place] Level (Gait) unable to assess  -CB               User Key  (r) = Recorded By, (t) = Taken By, (c) = Cosigned By      Initials Name Provider Type    Mag Porter PT Physical Therapist                   Obj/Interventions       Row Name 03/02/24 1514          Motor Skills    Therapeutic Exercise --  PROM LLE with education provided to spouse at bedside to complete throughout the day  -CB       Row Name 03/02/24 1514          Balance    Balance Assessment sitting static balance  -CB     Static Sitting Balance minimal assist;moderate assist;verbal cues  -CB     Position, Sitting Balance sitting edge of bed;supported  -CB     Balance Interventions sitting;supported;static;trunk training exercise  -CB               User Key  (r) = Recorded By, (t) = Taken By, (c) = Cosigned By      Initials Name Provider Type    SUKHJINDER Monson  JASMINE Hathaway Physical Therapist                   Goals/Plan    No documentation.                  Clinical Impression       Row Name 03/02/24 1517          Pain    Pretreatment Pain Rating 0/10 - no pain  -CB       Row Name 03/02/24 1517          Plan of Care Review    Plan of Care Reviewed With patient;spouse  -CB     Progress no change  -CB     Outcome Evaluation Patient more alert when PT entered room as SLP was just at bedside. Pt did become increasingly fatigued throughout session. He continues to require maxAx2 for bed mobility and min-modA for sitting balance at EOB and holding onto bed footboard with LUE. VC and assist for upright posture as well as cervical extension and R rotation. PT educated spouse regarding RLE ROM to perform throughout the day and continue to encourage pt to look to R side. Will continue to progress as pt tolerates.  -CB       Row Name 03/02/24 1517          Positioning and Restraints    Pre-Treatment Position in bed  -CB     Post Treatment Position bed  -CB     In Bed notified nsg;fowlers;call light within reach;encouraged to call for assist;exit alarm on;SCD pump applied;with nsg;RUE elevated;with family/caregiver  -CB     Restraints released:;reapplied:;soft limb  -CB               User Key  (r) = Recorded By, (t) = Taken By, (c) = Cosigned By      Initials Name Provider Type    CB Mag Monson PT Physical Therapist                   Outcome Measures       Row Name 03/02/24 1520 03/02/24 0800       How much help from another person do you currently need...    Turning from your back to your side while in flat bed without using bedrails? 2  -CB 2  -KW    Moving from lying on back to sitting on the side of a flat bed without bedrails? 1  -CB 1  -KW    Moving to and from a bed to a chair (including a wheelchair)? 1  -CB 1  -KW    Standing up from a chair using your arms (e.g., wheelchair, bedside chair)? 1  -CB 1  -KW    Climbing 3-5 steps with a railing? 1  -CB 1  -KW    To walk in  hospital room? 1  -CB 1  -KW    AM-PAC 6 Clicks Score (PT) 7  -CB 7  -KW    Highest Level of Mobility Goal 2 --> Bed activities/dependent transfer  -CB 2 --> Bed activities/dependent transfer  -KW      Row Name 03/02/24 1520          Modified Calli Scale    Modified Calli Scale 5 - Severe disability.  Bedridden, incontinent, and requiring constant nursing care and attention.  -CB       Row Name 03/02/24 1520          Functional Assessment    Outcome Measure Options AM-PAC 6 Clicks Basic Mobility (PT)  -CB               User Key  (r) = Recorded By, (t) = Taken By, (c) = Cosigned By      Initials Name Provider Type    CB Mag Monson, PT Physical Therapist    KW Wilder Adams, RN Registered Nurse                                 Physical Therapy Education       Title: PT OT SLP Therapies (In Progress)       Topic: Physical Therapy (In Progress)       Point: Mobility training (In Progress)       Learning Progress Summary             Patient Acceptance, E,TB, NR by CB at 3/2/2024 1520    Acceptance, E,TB, NR,NL by MS at 2/27/2024 1320   Family Acceptance, E,TB, NR by CB at 3/2/2024 1520                         Point: Home exercise program (In Progress)       Learning Progress Summary             Patient Acceptance, E,TB, NR by CB at 3/2/2024 1520   Family Acceptance, E,TB, NR by CB at 3/2/2024 1520                         Point: Body mechanics (In Progress)       Learning Progress Summary             Patient Acceptance, E,TB, NR by CB at 3/2/2024 1520    Acceptance, E,TB, VU,NR by  at 2/29/2024 0920    Acceptance, E,TB, NR,NL by MS at 2/27/2024 1320   Family Acceptance, E,TB, NR by CB at 3/2/2024 1520    Acceptance, E,TB, VU,NR by  at 2/29/2024 0920                         Point: Precautions (In Progress)       Learning Progress Summary             Patient Acceptance, E,TB, NR by CB at 3/2/2024 1520    Acceptance, E,TB, VU,NR by  at 2/29/2024 0920    Acceptance, E,TB, NR,NL by MS at 2/27/2024 1320   Family  Acceptance, E,TB, NR by CB at 3/2/2024 1520    Acceptance, E,TB, VU,NR by  at 2/29/2024 0920                                         User Key       Initials Effective Dates Name Provider Type Discipline    MS 06/16/21 -  Lyla Edwards, PT Physical Therapist PT    CB 10/22/21 -  Mag Monson, JASMINE Physical Therapist PT     01/24/24 -  Rick Maynard PT Student PT Student PT                  PT Recommendation and Plan     Plan of Care Reviewed With: patient, spouse  Progress: no change  Outcome Evaluation: Patient more alert when PT entered room as SLP was just at bedside. Pt did become increasingly fatigued throughout session. He continues to require maxAx2 for bed mobility and min-modA for sitting balance at EOB and holding onto bed footboard with LUE. VC and assist for upright posture as well as cervical extension and R rotation. PT educated spouse regarding RLE ROM to perform throughout the day and continue to encourage pt to look to R side. Will continue to progress as pt tolerates.     Time Calculation:         PT Charges       Row Name 03/02/24 1522             Time Calculation    Start Time 1438  -CB      Stop Time 1505  -CB      Time Calculation (min) 27 min  -CB      PT Received On 03/02/24  -CB      PT - Next Appointment 03/04/24  -CB         Time Calculation- PT    Total Timed Code Minutes- PT 27 minute(s)  -CB         Timed Charges    42175 - PT Therapeutic Activity Minutes 27  -CB         Total Minutes    Timed Charges Total Minutes 27  -CB       Total Minutes 27  -CB                User Key  (r) = Recorded By, (t) = Taken By, (c) = Cosigned By      Initials Name Provider Type    CB Mag Monson, PT Physical Therapist                  Therapy Charges for Today       Code Description Service Date Service Provider Modifiers Qty    [id number]  PT THERAPEUTIC ACT EA 15 MIN 3/2/2024 Mag Monson, PT GP 2            PT G-Codes  Outcome Measure Options: AM-PAC 6 Clicks Basic Mobility  (PT)  AM-PAC 6 Clicks Score (PT): 7  AM-PAC 6 Clicks Score (OT): 8  Modified [place] Scale: 5 - Severe disability.  Bedridden, incontinent, and requiring constant nursing care and attention.  PT Discharge Summary  Anticipated Discharge Disposition (PT): inpatient rehabilitation facility    Mag Monson, PT  3/2/2024

## 2024-03-02 NOTE — THERAPY TREATMENT NOTE
Patient Name: Erlin Blanco  : 1945    MRN: [id number]                              Today's Date: 3/2/2024       Admit Date: 2024    Visit Dx:     ICD-10-CM ICD-9-CM   1. Intraparenchymal hemorrhage of brain  I61.9 431   2. Facial droop  R29.810 781.94   3. Expressive aphasia  R47.01 784.3   4. Weakness of right upper extremity  R29.898 729.89   5. Chronic anticoagulation  Z79.01 V58.61     Patient Active Problem List   Diagnosis    Intraparenchymal hemorrhage of brain    Seizures     History reviewed. No pertinent past medical history.  History reviewed. No pertinent surgical history.   General Information       Row Name 24 1512          OT Time and Intention    Document Type therapy note (daily note)  -BS     Mode of Treatment co-treatment;occupational therapy;physical therapy;other (see comments)  Cotx d/t pt acuity level, pt and staff safety  -BS       Row Name 24 1512          General Information    Patient Profile Reviewed yes  -BS     Existing Precautions/Restrictions fall  -BS       Row Name 24 1512          Safety Issues, Functional Mobility    Safety Issues Affecting Function (Mobility) ability to follow commands;awareness of need for assistance;impulsivity;insight into deficits/self-awareness;safety precaution awareness;safety precautions follow-through/compliance  -BS     Impairments Affecting Function (Mobility) balance;cognition;endurance/activity tolerance;strength;postural/trunk control;sensation/sensory awareness;visual/perceptual;motor control;grasp;coordination  -BS     Cognitive Impairments, Mobility Safety/Performance attention;awareness, need for assistance;impulsivity;insight into deficits/self-awareness;problem-solving/reasoning  -BS               User Key  (r) = Recorded By, (t) = Taken By, (c) = Cosigned By      Initials Name Provider Type    BS Alexandria De La Rosa OT Occupational Therapist                     Mobility/ADL's       Row Name 24 1514          Bed  Mobility    Bed Mobility supine-sit;sit-supine  -BS     Supine-Sit Aubrey (Bed Mobility) maximum assist (25% patient effort);2 person assist  -BS     Sit-Supine Aubrey (Bed Mobility) maximum assist (25% patient effort);2 person assist  -BS     Bed Mobility, Safety Issues cognitive deficits limit understanding;decreased use of arms for pushing/pulling;decreased use of legs for bridging/pushing;impaired trunk control for bed mobility  -BS     Assistive Device (Bed Mobility) bed rails;head of bed elevated  -BS     Comment, (Bed Mobility) Max cueing for participation/ arousal  -BS       Row Name 03/02/24 1514          Transfers    Comment, (Transfers) Unsafe to attempt at this time  -BS               User Key  (r) = Recorded By, (t) = Taken By, (c) = Cosigned By      Initials Name Provider Type    Alexandria Phelps OT Occupational Therapist                   Obj/Interventions       Row Name 03/02/24 1516          Vision Assessment/Intervention    Vision Assessment Comment x5 reps tracking to Right sitting EOB  -       Row Name 03/02/24 1516          Shoulder (Therapeutic Exercise)    Shoulder (Therapeutic Exercise) PROM (passive range of motion)  -     Shoulder PROM (Therapeutic Exercise) right;flexion;extension;aBduction;aDduction;external rotation;internal rotation;10 repetitions  -       Row Name 03/02/24 1516          Elbow/Forearm (Therapeutic Exercise)    Elbow/Forearm (Therapeutic Exercise) AAROM (active assistive range of motion)  -     Elbow/Forearm AAROM (Therapeutic Exercise) right;flexion;extension;10 repetitions  -       Row Name 03/02/24 1516          Hand (Therapeutic Exercise)    Hand (Therapeutic Exercise) PROM (passive range of motion)  -     Hand AROM/AAROM (Therapeutic Exercise) bilateral;finger flexion;finger extension;5 repetitions  -       Row Name 03/02/24 1516          Motor Skills    Motor Control/Coordination Interventions gross motor coordination  activities;neuro-muscular re-education;occupation/activity based treatment;weight-bearing activities  Weight shifting / WB RUE  -BS     Therapeutic Exercise shoulder;elbow/forearm;hand  -BS       Row Name 03/02/24 1516          Balance    Balance Assessment sitting static balance  -BS     Static Sitting Balance independent;modified independence  -BS     Position, Sitting Balance supported;sitting edge of bed  -BS     Balance Interventions sitting;static;supported;occupation based/functional task;weight shifting activity  -BS     Comment, Balance Cues for orientation, postural and head control  -BS       Row Name 03/02/24 1516          Neuromuscular Re-education    Interventions (Neuromuscular Re-education) deep pressure awareness;facilitation/inhibition;joint approximation;weight bearing;weight shifting  -BS               User Key  (r) = Recorded By, (t) = Taken By, (c) = Cosigned By      Initials Name Provider Type    BS Alexandria De La Rosa, FLORESITA Occupational Therapist                   Goals/Plan    No documentation.                  Clinical Impression       Row Name 03/02/24 1521          Pain Assessment    Pretreatment Pain Rating 0/10 - no pain  -BS     Posttreatment Pain Rating 0/10 - no pain  -BS       Row Name 03/02/24 1521          Plan of Care Review    Plan of Care Reviewed With patient  -BS     Progress no change  -BS     Outcome Evaluation Pt agreeable to OT session this p.m. Pt participated in bed mobility with Max Ax2, Min/Mod Ax1 for sitting balance EOB, and BUE TE. Max cues for participation and posture d/t fatigue. Pt continues to present with decreased strength, functional endurance, and balance impacting ADL independence. Continue per POC. Rec IRF pending progress.  -BS       Row Name 03/02/24 1521          Therapy Assessment/Plan (OT)    Rehab Potential (OT) good, to achieve stated therapy goals  -BS     Criteria for Skilled Therapeutic Interventions Met (OT) yes;skilled treatment is necessary  -BS      Therapy Frequency (OT) 5 times/wk  -BS       Row Name 03/02/24 1521          Therapy Plan Review/Discharge Plan (OT)    Anticipated Discharge Disposition (OT) inpatient rehabilitation facility  -BS       Row Name 03/02/24 1521          Positioning and Restraints    Pre-Treatment Position in bed  -BS     Post Treatment Position bed  -BS     In Bed notified nsg;supine;with nsg;exit alarm on;with family/caregiver  -BS               User Key  (r) = Recorded By, (t) = Taken By, (c) = Cosigned By      Initials Name Provider Type    BS Alexandria De La Rosa, FLORESITA Occupational Therapist                   Outcome Measures       Row Name 03/02/24 1528          How much help from another is currently needed...    Putting on and taking off regular lower body clothing? 1  -BS     Bathing (including washing, rinsing, and drying) 1  -BS     Toileting (which includes using toilet bed pan or urinal) 1  -BS     Putting on and taking off regular upper body clothing 2  -BS     Taking care of personal grooming (such as brushing teeth) 2  -BS     Eating meals 1  -BS     AM-PAC 6 Clicks Score (OT) 8  -BS       Row Name 03/02/24 1520 03/02/24 0800       How much help from another person do you currently need...    Turning from your back to your side while in flat bed without using bedrails? 2  -CB 2  -KW    Moving from lying on back to sitting on the side of a flat bed without bedrails? 1  -CB 1  -KW    Moving to and from a bed to a chair (including a wheelchair)? 1  -CB 1  -KW    Standing up from a chair using your arms (e.g., wheelchair, bedside chair)? 1  -CB 1  -KW    Climbing 3-5 steps with a railing? 1  -CB 1  -KW    To walk in hospital room? 1  -CB 1  -KW    AM-PAC 6 Clicks Score (PT) 7  -CB 7  -KW    Highest Level of Mobility Goal 2 --> Bed activities/dependent transfer  -CB 2 --> Bed activities/dependent transfer  -KW      Row Name 03/02/24 1520          Modified Nescopeck Scale    Modified [place]ck Scale 5 - Severe disability.  Bedridden,  incontinent, and requiring constant nursing care and attention.  -CB       Row Name 03/02/24 1528 03/02/24 1520       Functional Assessment    Outcome Measure Options AM-PAC 6 Clicks Daily Activity (OT)  -BS AM-PAC 6 Clicks Basic Mobility (PT)  -CB              User Key  (r) = Recorded By, (t) = Taken By, (c) = Cosigned By      Initials Name Provider Type    CB Mag Monson, PT Physical Therapist    Wilder Berry, RN Registered Nurse    Alexandria Phelps, OT Occupational Therapist                    Occupational Therapy Education       Title: PT OT SLP Therapies (In Progress)       Topic: Occupational Therapy (In Progress)       Point: ADL training (Not Started)       Description:   Instruct learner(s) on proper safety adaptation and remediation techniques during self care or transfers.   Instruct in proper use of assistive devices.                  Learner Progress:  Not documented in this visit.              Point: Home exercise program (Done)       Description:   Instruct learner(s) on appropriate technique for monitoring, assisting and/or progressing therapeutic exercises/activities.                  Learning Progress Summary             Family Acceptance, E, VU by BS at 3/2/2024 1530    Comment: RUE ROM / HEP to adress swelling   Significant Other Acceptance, E, VU by BS at 3/2/2024 1530    Comment: RUE ROM / HEP to adress swelling                         Point: Precautions (Done)       Description:   Instruct learner(s) on prescribed precautions during self-care and functional transfers.                  Learning Progress Summary             Family Acceptance, E, VU,DU by  at 2/27/2024 1315    Comment: RUE ROM, massage, safe positioning for subluxation prevention, OT role and dc recommendations                         Point: Body mechanics (Not Started)       Description:   Instruct learner(s) on proper positioning and spine alignment during self-care, functional mobility activities and/or exercises.                   Learner Progress:  Not documented in this visit.                              User Key       Initials Effective Dates Name Provider Type Discipline     04/02/20 -  Sonia Mcclure OT Occupational Therapist OT     11/14/22 -  Alexandria De La Rosa OT Occupational Therapist OT                  OT Recommendation and Plan  Therapy Frequency (OT): 5 times/wk  Plan of Care Review  Plan of Care Reviewed With: patient  Progress: no change  Outcome Evaluation: Pt agreeable to OT session this p.m. Pt participated in bed mobility with Max Ax2, Min/Mod Ax1 for sitting balance EOB, and BUE TE. Max cues for participation and posture d/t fatigue. Pt continues to present with decreased strength, functional endurance, and balance impacting ADL independence. Continue per POC. Rec IRF pending progress.     Time Calculation:         Time Calculation- OT       Row Name 03/02/24 1530             Time Calculation- OT    OT Start Time 1443  -BS      OT Stop Time 1502  -BS      OT Time Calculation (min) 19 min  -BS      Total Timed Code Minutes- OT 19 minute(s)  -BS      OT Received On 03/02/24  -BS      OT - Next Appointment 03/04/24  -BS      OT Goal Re-Cert Due Date 03/12/24  -BS         Timed Charges    87108 - OT Therapeutic Exercise Minutes 10  -BS      67755 - OT Therapeutic Activity Minutes 9  -BS         Total Minutes    Timed Charges Total Minutes 19  -BS       Total Minutes 19  -BS                User Key  (r) = Recorded By, (t) = Taken By, (c) = Cosigned By      Initials Name Provider Type    BS Alexandria De La Rosa OT Occupational Therapist                  Therapy Charges for Today       Code Description Service Date Service Provider Modifiers Qty    57623423912 HC OT THER PROC EA 15 MIN 3/2/2024 Alexandria De La Rosa OT GO 1                 Alexandria De La Rosa OT  3/2/2024

## 2024-03-02 NOTE — THERAPY EVALUATION
Acute Care - Speech Language Pathology Initial Evaluation  [facility]     Patient Name: Erlin Blanco  : 1945  MRN: [id number]  Today's Date: 3/2/2024               Admit Date: 2024     Visit Dx:    ICD-10-CM ICD-9-CM   1. Intraparenchymal hemorrhage of brain  I61.9 431   2. Facial droop  R29.810 781.94   3. Expressive aphasia  R47.01 784.3   4. Weakness of right upper extremity  R29.898 729.89   5. Chronic anticoagulation  Z79.01 V58.61     Patient Active Problem List   Diagnosis    Intraparenchymal hemorrhage of brain    Seizures     History reviewed. No pertinent past medical history.  History reviewed. No pertinent surgical history.    SLP Recommendation and Plan  Clinical impression:moderate to severe Broca's aphasia; moderate spastic dysarthria.      D/w spouse automatic speech tasks, additional stimuli provided in written format. Ongoing SLP services indicated while IP and at next level of care.       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  -AB    Precautions/Limitations, Vision WFL;for purposes of eval  -AB    Precautions/Limitations, Hearing WFL;for purposes of eval  -AB    Prior Level of Function-Communication WFL  -AB    Plans/Goals Discussed with patient;family;agreed upon  -AB    Barriers to Rehab none identified  -AB       Comprehension Assessment/Intervention    Comprehension Assessment/Intervention Auditory Comprehension  -AB       Auditory Comprehension Assessment/Intervention    Auditory  "Comprehension (Communication) mild impairment;moderate impairment  -AB    Auditory Comprehension Communication, Comment Auditory comprehension: follows 1 step commands with 8/10, 80% accuracy; 2 step commands 0/3 accuracy max cues/repetitions, yes/no: 50% with \"yes\" bias, unreliable, ID tangible items field of 2 5/5, 100%  -AB       Expression Assessment/Intervention    Expression Assessment/Intervention verbal expression  -AB       Verbal Expression Assessment/Intervention    Verbal Expression moderate impairment;severe impairment  -AB    Verbal Expression, Comment Verbal expression: 5/8, 63% naming, biographical information: intact for name, spouse relationship, city, not number of children; orientation: x0 I'ly can increase to place, situation, month from field of 2; automatics: intact for counting to 10, JAYA, 3/12 SHAW, Repetition: intact for single and multisyllabic words, short phrases and sentences, unable to repeat at 7+ word level sentence. Primary communication in 1 word utterances, perseveration on \"yeah.\"  -AB       Oral Motor Structure and Function    Oral Motor Structure and Function mild impairment;moderate impairment  -AB    Oral Lesions or Structural Abnormalities and/or variants dried oral mucosa  -AB    Dentition Assessment natural, present and adequate  -AB    Mucosal Quality dry  -AB       Oral Musculature and Cranial Nerve Assessment    Oral Motor General Assessment mandibular impairment;oral labial or buccal impairment;lingual impairment  -AB    Mandibular Impairment Detail, Cranial Nerve V (Trigeminal) reduced strength on right  -AB    Oral Labial or Buccal Impairment, Detail, Cranial Nerve VII (Facial): right labial droop  -AB       Motor Speech Assessment/Intervention    Motor Speech Function moderate impairment  -AB    Motor Speech, Comment Motor speech: R labial droop, strained/strangled quality, MPT 2 seconds with pitch breaks, adequate loudness, slow rate, slow regular AMRs  -AB       " SLP Evaluation Clinical Impressions    SLP Diagnosis moderate;aphasia;dysarthria  -AB    Rehab Potential/Prognosis good  -AB    SLC Criteria for Skilled Therapy Interventions Met yes  -AB    Functional Impact difficulty communicating wants, needs;decreased ability to respond to situations safely  -AB       SLP Treatment Clinical Impressions    Care Plan Review evaluation/treatment results reviewed;care plan/treatment goals reviewed;risks/benefits reviewed;current/potential barriers reviewed;patient/other agree to care plan  -AB       Recommendations    Therapy Frequency (SLP SLC) PRN  -AB    Predicted Duration Therapy Intervention (Days) until discharge  -AB    Anticipated Discharge Disposition (SLP) anticipate therapy at next level of care  -AB       Communication Treatment Objective and Progress Goals (SLP)    Auditory Comprehension Treatment Objectives Auditory Comprehension Treatment Objectives (Group)  -AB    Verbal Expression Treatment Objectives Verbal Expression Treatment Objectives (Group)  -AB       Auditory Comprehension Treatment Objectives    Comprehend Questions Selection comprehend questions, SLP goal 1  -AB    Follow Directions Selection follow directions, SLP goal 1  -AB       Comprehend Questions Goal 1 (SLP)    Improve Ability to Comprehend Questions Goal 1 (SLP) simple yes/no questions;80%;with minimal cues (75-90%)  -AB    Time Frame (Comprehend Questions Goal 1, SLP) short term goal (STG)  -AB    Progress/Outcomes (Comprehend Questions Goal 1, SLP) new goal  -AB       Follow Directions Goal 2 (SLP)    Improve Ability to Follow Directions Goal 1 (SLP) 2 step commands;80%;with moderate cues (50-74%)  -AB    Time Frame (Follow Directions Goal 1, SLP) short term goal (STG)  -AB    Progress/Outcomes (Follow Directions Goal 1, SLP) new goal  -AB       Verbal Expression Treatment Objectives    Word Retrieval Skills Selection word retrieval, SLP goal 1;word retrieval, SLP goal 2  -AB       Word  Retrieval Skills Goal 1 (SLP)    Improve Word Retrieval Skills By Goal 1 (SLP) responsive naming task;80%;with minimal cues (75-90%)  -AB    Time Frame (Word Retrieval Goal 1, SLP) short term goal (STG)  -AB    Progress/Outcomes (Word Retrieval Goal 1, SLP) new goal  -AB       Word Retrieval Skills Goal 2 (SLP)    Improve Word Retrieval Skills By Goal 2 (SLP) confrontational naming task;80%;with minimal cues (75-90%)  -AB    Time Frame (Word Retrieval Goal 2, SLP) short term goal (STG)  -AB    Progress/Outcomes (Word Retrieval Goal 2, SLP) new goal  -AB              User Key  (r) = Recorded By, (t) = Taken By, (c) = Cosigned By      Initials Name Effective Dates    AB Tiffany Allen, SLP 01/05/24 -                        EDUCATION  The patient has been educated in the following areas:     Cognitive Impairment Communication Impairment.           SLP GOALS       Row Name 03/02/24 1458          Comprehend Questions Goal 1 (SLP)    Improve Ability to Comprehend Questions Goal 1 (SLP) simple yes/no questions;80%;with minimal cues (75-90%)  -AB    Time Frame (Comprehend Questions Goal 1, SLP) short term goal (STG)  -AB    Progress/Outcomes (Comprehend Questions Goal 1, SLP) new goal  -AB       Follow Directions Goal 2 (SLP)    Improve Ability to Follow Directions Goal 1 (SLP) 2 step commands;80%;with moderate cues (50-74%)  -AB    Time Frame (Follow Directions Goal 1, SLP) short term goal (STG)  -AB    Progress/Outcomes (Follow Directions Goal 1, SLP) new goal  -AB       Word Retrieval Skills Goal 1 (SLP)    Improve Word Retrieval Skills By Goal 1 (SLP) responsive naming task;80%;with minimal cues (75-90%)  -AB    Time Frame (Word Retrieval Goal 1, SLP) short term goal (STG)  -AB    Progress/Outcomes (Word Retrieval Goal 1, SLP) new goal  -AB       Word Retrieval Skills Goal 2 (SLP)    Improve Word Retrieval Skills By Goal 2 (SLP) confrontational naming task;80%;with minimal cues (75-90%)  -AB    Time Frame (Word  Retrieval Goal 2, SLP) short term goal (STG)  -AB    Progress/Outcomes (Word Retrieval Goal 2, SLP) new goal  -AB              User Key  (r) = Recorded By, (t) = Taken By, (c) = Cosigned By      Initials Name Provider Type    Tiffany Calles SLP Speech and Language Pathologist                            Time Calculation:      Time Calculation- SLP       Row Name 03/02/24 1503             Time Calculation- SLP    SLP Start Time 1330  -AB      SLP Received On 03/02/24  -AB                User Key  (r) = Recorded By, (t) = Taken By, (c) = Cosigned By      Initials Name Provider Type    Tiffany Calles SLP Speech and Language Pathologist                    Therapy Charges for Today       Code Description Service Date Service Provider Modifiers Qty    13159217285 HC ST TREATMENT SWALLOW 3 3/2/2024 Tiffany Allen, SLP GN 1    [id number]  ST EVAL SPEECH AND PROD W LANG  3 3/2/2024 Tiffany Allen, SLP GN 1                       PATRICK Ceron  3/2/2024

## 2024-03-02 NOTE — THERAPY RE-EVALUATION
Acute Care - Speech Language Pathology   Swallow Re-Evaluation [facility]     Patient Name: Erlin Blanco  : 1945  MRN: [id number]  Today's Date: 3/2/2024               Admit Date: 2024    Visit Dx:     ICD-10-CM ICD-9-CM   1. Intraparenchymal hemorrhage of brain  I61.9 431   2. Facial droop  R29.810 781.94   3. Expressive aphasia  R47.01 784.3   4. Weakness of right upper extremity  R29.898 729.89   5. Chronic anticoagulation  Z79.01 V58.61     Patient Active Problem List   Diagnosis    Intraparenchymal hemorrhage of brain    Seizures     History reviewed. No pertinent past medical history.  History reviewed. No pertinent surgical history.    SLP Recommendation and Plan  D/w spouse, appears improved at bedside, alertness WFL, however, concerns for pharyngeal impairment in setting of voice impairment and poor cough quality, dried oral secretions. Recommend proceeding with instrumental to r/o pharyngeal impairment - all in agreement.          Continue NPO until VFSS completed.   Pt is cleared for water protocol which states: may have thin ice/water following oral care, cease if s/s aspiration or clinical difficulties noted.      SWALLOW EVALUATION (Last 72 Hours)       SLP Adult Swallow Evaluation       Row Name 24 1400       Rehab Evaluation    Document Type re-evaluation  -AB    Subjective Information no complaints  -AB    Patient Observations alert;cooperative;agree to therapy  -AB    Patient/Family/Caregiver Comments/Observations seen in [id number]  -AB    Patient Effort good  -AB    Symptoms Noted During/After Treatment none  -AB       Clinical Swallow Eval    Clinical Swallow Evaluation Summary Bedside swallow initial evaluation completed 2.26, daily re-eval with recs for VFSS, cancelled 2.27, pt has remained NPO since admit. No prior hx dysphagia. Pt with dried oral mucosa coating lingual surface and palate, resistant to oral suction and oral care. Ice chips loosen, eventual manual removal per  SLP. Pt with adequate manipulation of ice chips, transport of puree and thins by tsp/straw. Intermittent R spillage with straw sips, did not assess cups d/t UE in restraints. Pharyngeal phase with x1 throat clear thins by straw, severe impulsivity in bolus size and rate. Cued cough weak. Max cues required for initiation of second swallow.      D/w spouse, appears improved at bedside, alertness WFL, however, concerns for pharyngeal impairment in setting of voice impairment and poor cough quality, dried oral secretions. Recommend proceeding with instrumental to r/o pharyngeal impairment - all in agreement.      Continue NPO until VFSS completed.   Pt is cleared for water protocol which states: may have thin ice/water following oral care, cease if s/s aspiration or clinical difficulties noted.  -AB       SLP Evaluation Clinical Impression    SLP Swallowing Diagnosis oral dysphagia;R/O pharyngeal dysphagia  -AB    Functional Impact risk of aspiration/pneumonia  -AB    Rehab Potential/Prognosis, Swallowing good, to achieve stated therapy goals  -AB    Swallow Criteria for Skilled Therapeutic Interventions Met demonstrates skilled criteria  -AB       SLP Treatment Clinical Impressions    Care Plan Review evaluation/treatment results reviewed;care plan/treatment goals reviewed;risks/benefits reviewed;current/potential barriers reviewed;patient/other agree to care plan  -AB       Recommendations    Therapy Frequency (Swallow) PRN  -AB    Predicted Duration Therapy Intervention (Days) until discharge  -AB    SLP Diet Recommendation ice chips between meals after oral care, with supervision;water between meals after oral care, with supervision  -AB    Recommended Diagnostics VFSS (MBS)  -AB    Oral Care Recommendations Oral Care BID/PRN;Before ice/water  -AB    SLP Rec. for Method of Medication Administration meds via alternate route  -AB    Monitor for Signs of Aspiration yes;notify SLP if any concerns  -AB    Anticipated  Discharge Disposition (SLP) anticipate therapy at next level of care  -AB       (LTG) Patient will demonstrate functional swallow for    Diet Texture (Demonstrate functional swallow) regular textures  -AB    Liquid viscosity (Demonstrate functional swallow) thin liquids  -AB    [place] (Demonstrate functional swallow) independently (over 90% accuracy)  -AB    Time Frame (Demonstrate functional swallow) by discharge  -AB    Progress/Outcomes (Demonstrate functional swallow) progress slower than expected  -AB              User Key  (r) = Recorded By, (t) = Taken By, (c) = Cosigned By      Initials Name Effective Dates    Kristine Coates, SLP 08/28/23 -     AB Tiffany Allen, PATRICK 01/05/24 -                     EDUCATION  The patient has been educated in the following areas:   Dysphagia (Swallowing Impairment) Oral Care/Hydration NPO rationale.        SLP GOALS       Row Name 03/02/24 1400       (LTG) Patient will demonstrate functional swallow for    Diet Texture (Demonstrate functional swallow) regular textures  -AB    Liquid viscosity (Demonstrate functional swallow) thin liquids  -AB    [place] (Demonstrate functional swallow) independently (over 90% accuracy)  -AB    Time Frame (Demonstrate functional swallow) by discharge  -AB    Progress/Outcomes (Demonstrate functional swallow) progress slower than expected  -AB              User Key  (r) = Recorded By, (t) = Taken By, (c) = Cosigned By      Initials Name Provider Type    Tiffany Calles, SLP Speech and Language Pathologist                       Time Calculation:    Time Calculation- SLP       Row Name 03/02/24 1503             Time Calculation- SLP    SLP Start Time 1330  -AB      SLP Received On 03/02/24  -AB                User Key  (r) = Recorded By, (t) = Taken By, (c) = Cosigned By      Initials Name Provider Type    Tiffany Calles, SLP Speech and Language Pathologist                    Therapy Charges for Today       Code Description  Service Date Service Provider Modifiers Qty    [id number]  ST TREATMENT SWALLOW 3 3/2/2024 Tiffany Allen, SLP GN 1    [id number] [facility] EVAL SPEECH AND PROD W LANG  3 3/2/2024 Tiffany Allen, SLP GN 1                 Tiffany Allen, SLP  3/2/2024

## 2024-03-02 NOTE — PLAN OF CARE
Goal Outcome Evaluation:  Plan of Care Reviewed With: patient, spouse        Progress: no change  Outcome Evaluation: Patient more alert when PT entered room as SLP was just at bedside. Pt did become increasingly fatigued throughout session. He continues to require maxAx2 for bed mobility and min-modA for sitting balance at EOB and holding onto bed footboard with LUE. VC and assist for upright posture as well as cervical extension and R rotation. PT educated spouse regarding RLE ROM to perform throughout the day and continue to encourage pt to look to R side. Will continue to progress as pt tolerates.      Anticipated Discharge Disposition (PT): inpatient rehabilitation facility

## 2024-03-02 NOTE — PLAN OF CARE
Shift summary: Had a hard time controlling blood pressure overnight, PRN's given. Cleviprex continues to infuse, unable to wean. Cortrack was pulled, re-advanced and checked by [facility]. Tube feeds remain at goal.    Goal Outcome Evaluation:     Problem: Adult Inpatient Plan of Care  Goal: Plan of Care Review  Outcome: Ongoing, Progressing  Goal: Patient-Specific Goal (Individualized)  Outcome: Ongoing, Progressing  Goal: Absence of Hospital-Acquired Illness or Injury  Outcome: Ongoing, Progressing  Intervention: Identify and Manage Fall Risk  Recent Flowsheet Documentation  Taken 3/2/2024 0500 by Katie Duff RN  Safety Promotion/Fall Prevention:   fall prevention program maintained   safety round/check completed  Taken 3/2/2024 0400 by Katie Duff RN  Safety Promotion/Fall Prevention:   fall prevention program maintained   safety round/check completed  Taken 3/2/2024 0300 by Katie Duff RN  Safety Promotion/Fall Prevention:   fall prevention program maintained   safety round/check completed  Taken 3/2/2024 0200 by Katie Duff RN  Safety Promotion/Fall Prevention:   fall prevention program maintained   safety round/check completed  Taken 3/2/2024 0100 by Katie Duff RN  Safety Promotion/Fall Prevention:   fall prevention program maintained   safety round/check completed  Taken 3/2/2024 0000 by Katie Duff RN  Safety Promotion/Fall Prevention:   fall prevention program maintained   safety round/check completed  Taken 3/1/2024 2300 by Katie Duff RN  Safety Promotion/Fall Prevention:   fall prevention program maintained   safety round/check completed  Taken 3/1/2024 2200 by Katie Duff RN  Safety Promotion/Fall Prevention:   fall prevention program maintained   safety round/check completed  Taken 3/1/2024 2100 by Katie Duff RN  Safety Promotion/Fall Prevention:   fall prevention program maintained   safety round/check completed  Taken 3/1/2024 2000 by Katie Duff RN  Safety Promotion/Fall  Prevention:   fall prevention program maintained   safety round/check completed  Intervention: Prevent Skin Injury  Recent Flowsheet Documentation  Taken 3/2/2024 0400 by Katie Duff RN  Body Position: left  Taken 3/2/2024 0200 by Katie Duff RN  Body Position: right  Taken 3/2/2024 0000 by Katie Duff RN  Body Position: supine  Taken 3/1/2024 2200 by Katie Duff RN  Body Position: right  Taken 3/1/2024 2000 by Katie Duff RN  Body Position: left  Intervention: Prevent and Manage VTE (Venous Thromboembolism) Risk  Recent Flowsheet Documentation  Taken 3/2/2024 0400 by Katie Duff RN  Activity Management: bedrest  Taken 3/2/2024 0000 by Katie Duff RN  Activity Management: bedrest  Taken 3/1/2024 2000 by Katie Duff RN  Activity Management: bedrest  VTE Prevention/Management:   bilateral   sequential compression devices on  Range of Motion:   active ROM (range of motion) encouraged   ROM (range of motion) performed  Intervention: Prevent Infection  Recent Flowsheet Documentation  Taken 3/2/2024 0500 by Katie Duff RN  Infection Prevention:   environmental surveillance performed   hand hygiene promoted   personal protective equipment utilized   rest/sleep promoted   single patient room provided  Taken 3/2/2024 0400 by Katie Duff RN  Infection Prevention:   environmental surveillance performed   equipment surfaces disinfected   hand hygiene promoted   personal protective equipment utilized   rest/sleep promoted   single patient room provided  Taken 3/2/2024 0300 by Katie Duff RN  Infection Prevention:   environmental surveillance performed   hand hygiene promoted   personal protective equipment utilized   rest/sleep promoted   single patient room provided  Taken 3/2/2024 0200 by Katie Duff RN  Infection Prevention:   environmental surveillance performed   hand hygiene promoted   personal protective equipment utilized   rest/sleep promoted   single patient room provided  Taken  3/2/2024 0100 by Ktaie Duff RN  Infection Prevention:   environmental surveillance performed   hand hygiene promoted   personal protective equipment utilized   rest/sleep promoted   single patient room provided  Taken 3/2/2024 0000 by Katie Duff RN  Infection Prevention:   environmental surveillance performed   equipment surfaces disinfected   hand hygiene promoted   personal protective equipment utilized   rest/sleep promoted   single patient room provided  Taken 3/1/2024 2300 by Katie Duff RN  Infection Prevention:   environmental surveillance performed   hand hygiene promoted   personal protective equipment utilized   rest/sleep promoted   single patient room provided  Taken 3/1/2024 2200 by Katie Duff RN  Infection Prevention:   environmental surveillance performed   hand hygiene promoted   personal protective equipment utilized   rest/sleep promoted   single patient room provided  Taken 3/1/2024 2100 by Katie Duff RN  Infection Prevention:   environmental surveillance performed   hand hygiene promoted   personal protective equipment utilized   rest/sleep promoted   single patient room provided  Taken 3/1/2024 2000 by Katie Duff RN  Infection Prevention:   environmental surveillance performed   equipment surfaces disinfected   hand hygiene promoted   personal protective equipment utilized   rest/sleep promoted   single patient room provided  Goal: Optimal Comfort and Wellbeing  Outcome: Ongoing, Progressing  Intervention: Provide Person-Centered Care  Recent Flowsheet Documentation  Taken 3/1/2024 2000 by Katie Duff RN  Trust Relationship/Rapport:   care explained   reassurance provided  Goal: Readiness for Transition of Care  Outcome: Ongoing, Progressing     Problem: Fall Injury Risk  Goal: Absence of Fall and Fall-Related Injury  Outcome: Ongoing, Progressing  Intervention: Identify and Manage Contributors  Recent Flowsheet Documentation  Taken 3/2/2024 0500 by Katie Duff  RN  Medication Review/Management: medications reviewed  Taken 3/2/2024 0400 by Katie Duff RN  Medication Review/Management: medications reviewed  Taken 3/2/2024 0300 by Katie Duff RN  Medication Review/Management: medications reviewed  Taken 3/2/2024 0200 by Katie Duff RN  Medication Review/Management: medications reviewed  Taken 3/2/2024 0100 by Katie Duff RN  Medication Review/Management: medications reviewed  Taken 3/2/2024 0000 by Katie Duff RN  Medication Review/Management: medications reviewed  Taken 3/1/2024 2300 by Katie Duff RN  Medication Review/Management: medications reviewed  Taken 3/1/2024 2200 by Katie Duff RN  Medication Review/Management: medications reviewed  Taken 3/1/2024 2100 by Katie Duff RN  Medication Review/Management: medications reviewed  Taken 3/1/2024 2000 by Katie Duff RN  Medication Review/Management: medications reviewed  Intervention: Promote Injury-Free Environment  Recent Flowsheet Documentation  Taken 3/2/2024 0500 by Katie Duff RN  Safety Promotion/Fall Prevention:   fall prevention program maintained   safety round/check completed  Taken 3/2/2024 0400 by Katie Duff RN  Safety Promotion/Fall Prevention:   fall prevention program maintained   safety round/check completed  Taken 3/2/2024 0300 by Katie Duff RN  Safety Promotion/Fall Prevention:   fall prevention program maintained   safety round/check completed  Taken 3/2/2024 0200 by Katie Duff RN  Safety Promotion/Fall Prevention:   fall prevention program maintained   safety round/check completed  Taken 3/2/2024 0100 by Katie Duff RN  Safety Promotion/Fall Prevention:   fall prevention program maintained   safety round/check completed  Taken 3/2/2024 0000 by Katie Duff RN  Safety Promotion/Fall Prevention:   fall prevention program maintained   safety round/check completed  Taken 3/1/2024 2300 by Katie Duff RN  Safety Promotion/Fall Prevention:   fall prevention  program maintained   safety round/check completed  Taken 3/1/2024 2200 by Katie Duff RN  Safety Promotion/Fall Prevention:   fall prevention program maintained   safety round/check completed  Taken 3/1/2024 2100 by Katie Duff RN  Safety Promotion/Fall Prevention:   fall prevention program maintained   safety round/check completed  Taken 3/1/2024 2000 by Katie Duff RN  Safety Promotion/Fall Prevention:   fall prevention program maintained   safety round/check completed     Problem: Skin Injury Risk Increased  Goal: Skin Health and Integrity  Outcome: Ongoing, Progressing  Intervention: Optimize Skin Protection  Recent Flowsheet Documentation  Taken 3/2/2024 0400 by Katie Duff RN  Head of Bed (HOB) Positioning: HOB at 30 degrees  Taken 3/2/2024 0200 by Katie Duff RN  Head of Bed (HOB) Positioning: HOB at 30 degrees  Taken 3/2/2024 0000 by Katie Duff RN  Head of Bed (HOB) Positioning: HOB at 30 degrees  Taken 3/1/2024 2200 by Katie Duff RN  Head of Bed (HOB) Positioning: HOB at 30 degrees  Taken 3/1/2024 2000 by Katie Duff RN  Head of Bed (HOB) Positioning: HOB at 30 degrees     Problem: Restraint, Nonviolent  Goal: Absence of Harm or Injury  Outcome: Ongoing, Progressing  Intervention: Implement Least Restrictive Safety Strategies  Recent Flowsheet Documentation  Taken 3/2/2024 0600 by Katie Duff RN  Medical Device Protection:   IV pole/bag removed from visual field   tubing secured  Less Restrictive Alternative:   bed alarm in use   calming techniques promoted   emotional support provided   environment adjusted   positive reinforcement provided   safety enhancements provided   sensory stimulation limited  De-Escalation Techniques:   increased round frequency   quiet time facilitated   reoriented   stimulation decreased  Diversional Activities: other (see comments)  Taken 3/2/2024 0500 by Katie Duff RN  Medical Device Protection:   IV pole/bag removed from visual field   tubing  secured  Taken 3/2/2024 0400 by Katie Duff RN  Medical Device Protection:   IV pole/bag removed from visual field   tubing secured  Less Restrictive Alternative:   bed alarm in use   calming techniques promoted   emotional support provided   environment adjusted   positive reinforcement provided   safety enhancements provided   sensory stimulation limited  De-Escalation Techniques:   increased round frequency   quiet time facilitated   reoriented   stimulation decreased  Diversional Activities: other (see comments)  Taken 3/2/2024 0300 by Katie Duff RN  Medical Device Protection:   IV pole/bag removed from visual field   tubing secured  Taken 3/2/2024 0200 by Katie Duff RN  Medical Device Protection:   IV pole/bag removed from visual field   tubing secured  Less Restrictive Alternative:   bed alarm in use   calming techniques promoted   emotional support provided   environment adjusted   positive reinforcement provided   safety enhancements provided   sensory stimulation limited  De-Escalation Techniques:   increased round frequency   quiet time facilitated   reoriented   stimulation decreased  Diversional Activities: other (see comments)  Taken 3/2/2024 0100 by Katie Duff RN  Medical Device Protection:   IV pole/bag removed from visual field   tubing secured  Taken 3/2/2024 0000 by Katie Duff RN  Medical Device Protection:   IV pole/bag removed from visual field   tubing secured  Less Restrictive Alternative:   bed alarm in use   calming techniques promoted   emotional support provided   environment adjusted   positive reinforcement provided   safety enhancements provided   sensory stimulation limited  De-Escalation Techniques:   increased round frequency   quiet time facilitated   reoriented   stimulation decreased  Diversional Activities: other (see comments)  Taken 3/1/2024 2300 by Katie Duff RN  Medical Device Protection:   IV pole/bag removed from visual field   tubing secured  Taken  3/1/2024 2200 by Katie Duff RN  Medical Device Protection:   IV pole/bag removed from visual field   tubing secured  Less Restrictive Alternative:   bed alarm in use   calming techniques promoted   emotional support provided   environment adjusted   positive reinforcement provided   safety enhancements provided   sensory stimulation limited  De-Escalation Techniques:   increased round frequency   quiet time facilitated   reoriented   stimulation decreased  Diversional Activities: television  Taken 3/1/2024 2100 by Katie Duff RN  Medical Device Protection:   IV pole/bag removed from visual field   tubing secured  Taken 3/1/2024 2000 by Katie Duff RN  Medical Device Protection:   IV pole/bag removed from visual field   tubing secured  Less Restrictive Alternative:   bed alarm in use   calming techniques promoted   emotional support provided   environment adjusted   positive reinforcement provided   safety enhancements provided   sensory stimulation limited  De-Escalation Techniques:   increased round frequency   quiet time facilitated   reoriented   stimulation decreased  Diversional Activities: television  Intervention: Protect Dignity, Rights, and Personal Wellbeing  Recent Flowsheet Documentation  Taken 3/1/2024 2000 by Katie Duff RN  Trust Relationship/Rapport:   care explained   reassurance provided  Intervention: Protect Skin and Joint Integrity  Recent Flowsheet Documentation  Taken 3/2/2024 0400 by Katie Duff RN  Body Position: left  Taken 3/2/2024 0200 by Katie Duff RN  Body Position: right  Taken 3/2/2024 0000 by Katie Duff RN  Body Position: supine  Taken 3/1/2024 2200 by Katie Duff RN  Body Position: right  Taken 3/1/2024 2000 by Katie Duff RN  Body Position: left  Range of Motion:   active ROM (range of motion) encouraged   ROM (range of motion) performed     Problem: Pain Acute  Goal: Acceptable Pain Control and Functional Ability  Outcome: Ongoing,  Progressing  Intervention: Prevent or Manage Pain  Recent Flowsheet Documentation  Taken 3/2/2024 0500 by Katie Duff RN  Medication Review/Management: medications reviewed  Taken 3/2/2024 0400 by Katie Duff RN  Medication Review/Management: medications reviewed  Taken 3/2/2024 0300 by Katie Duff RN  Medication Review/Management: medications reviewed  Taken 3/2/2024 0200 by Katie Duff RN  Medication Review/Management: medications reviewed  Taken 3/2/2024 0100 by Katie Duff RN  Medication Review/Management: medications reviewed  Taken 3/2/2024 0000 by Katie Duff RN  Medication Review/Management: medications reviewed  Taken 3/1/2024 2300 by Katie Duff RN  Medication Review/Management: medications reviewed  Taken 3/1/2024 2200 by Katie Duff RN  Medication Review/Management: medications reviewed  Taken 3/1/2024 2100 by Katie Duff RN  Medication Review/Management: medications reviewed  Taken 3/1/2024 2000 by Katie Duff RN  Medication Review/Management: medications reviewed  Intervention: Optimize Psychosocial Wellbeing  Recent Flowsheet Documentation  Taken 3/2/2024 0600 by Katie Duff RN  Diversional Activities: other (see comments)  Taken 3/2/2024 0400 by Katie Duff RN  Diversional Activities: other (see comments)  Taken 3/2/2024 0200 by Katie Duff RN  Diversional Activities: other (see comments)  Taken 3/2/2024 0000 by Katie Duff RN  Diversional Activities: other (see comments)  Taken 3/1/2024 2200 by Katie Duff RN  Diversional Activities: television  Taken 3/1/2024 2000 by Katie Duff RN  Diversional Activities: television     Problem: Adjustment to Illness (Stroke, Hemorrhagic)  Goal: Optimal Coping  Outcome: Ongoing, Progressing  Intervention: Support Psychosocial Response to Stroke  Recent Flowsheet Documentation  Taken 3/1/2024 2000 by Katie Duff RN  Family/Support System Care: support provided     Problem: Bowel Elimination Impaired (Stroke,  Hemorrhagic)  Goal: Effective Bowel Elimination  Outcome: Ongoing, Progressing     Problem: Cerebral Tissue Perfusion (Stroke, Hemorrhagic)  Goal: Optimal Cerebral Tissue Perfusion  Outcome: Ongoing, Progressing     Problem: Cognitive Impairment (Stroke, Hemorrhagic)  Goal: Optimal Cognitive Function  Outcome: Ongoing, Progressing     Problem: Communication Impairment (Stroke, Hemorrhagic)  Goal: Effective Communication Skills  Outcome: Ongoing, Progressing     Problem: Functional Ability Impaired (Stroke, Hemorrhagic)  Goal: Optimal Functional Ability  Outcome: Ongoing, Progressing  Intervention: Optimize Functional Ability  Recent Flowsheet Documentation  Taken 3/2/2024 0400 by Katie Duff RN  Activity Management: bedrest  Taken 3/2/2024 0000 by Katie Duff RN  Activity Management: bedrest  Taken 3/1/2024 2000 by Katie Duff RN  Activity Management: bedrest     Problem: Pain (Stroke, Hemorrhagic)  Goal: Acceptable Pain Control  Outcome: Ongoing, Progressing     Problem: Respiratory Compromise (Stroke, Hemorrhagic)  Goal: Effective Oxygenation and Ventilation  Outcome: Ongoing, Progressing  Intervention: Optimize Oxygenation and Ventilation  Recent Flowsheet Documentation  Taken 3/2/2024 0400 by Katie Duff RN  Head of Bed (HOB) Positioning: HOB at 30 degrees  Taken 3/2/2024 0200 by Katie Duff RN  Head of Bed (HOB) Positioning: HOB at 30 degrees  Taken 3/2/2024 0000 by Katie Duff RN  Head of Bed (HOB) Positioning: HOB at 30 degrees  Taken 3/1/2024 2200 by Ktaie Duff RN  Head of Bed (HOB) Positioning: HOB at 30 degrees  Taken 3/1/2024 2000 by Katie Duff RN  Head of Bed (HOB) Positioning: HOB at 30 degrees     Problem: Sensorimotor Impairment (Stroke, Hemorrhagic)  Goal: Improved Sensorimotor Function  Outcome: Ongoing, Progressing  Intervention: Optimize Range of Motion, Motor Control and Function  Recent Flowsheet Documentation  Taken 3/2/2024 0400 by Katie Duff  RN  Positioning/Transfer Devices: pillows  Taken 3/2/2024 0200 by Katie Duff RN  Positioning/Transfer Devices: pillows  Taken 3/2/2024 0000 by Katie Duff RN  Positioning/Transfer Devices: pillows  Taken 3/1/2024 2200 by Katie Duff RN  Positioning/Transfer Devices: pillows  Taken 3/1/2024 2000 by Katie Duff RN  Positioning/Transfer Devices: pillows  Range of Motion:   active ROM (range of motion) encouraged   ROM (range of motion) performed     Problem: Swallowing Impairment (Stroke, Hemorrhagic)  Goal: Optimal Eating and Swallowing Without Aspiration  Outcome: Ongoing, Progressing     Problem: Urinary Elimination Impaired (Stroke, Hemorrhagic)  Goal: Effective Urinary Elimination  Outcome: Ongoing, Progressing     Problem: Gas Exchange Impaired  Goal: Optimal Gas Exchange  Outcome: Ongoing, Progressing  Intervention: Optimize Oxygenation and Ventilation  Recent Flowsheet Documentation  Taken 3/2/2024 0400 by Katie Duff RN  Head of Bed (HOB) Positioning: HOB at 30 degrees  Taken 3/2/2024 0200 by Katie Duff RN  Head of Bed (HOB) Positioning: HOB at 30 degrees  Taken 3/2/2024 0000 by Katie Duff RN  Head of Bed (HOB) Positioning: HOB at 30 degrees  Taken 3/1/2024 2200 by Katie Duff RN  Head of Bed (HOB) Positioning: HOB at 30 degrees  Taken 3/1/2024 2000 by Katie Duff RN  Head of Bed (HOB) Positioning: HOB at 30 degrees     Problem: Hypertension Acute  Goal: Blood Pressure Within Desired Range  Outcome: Ongoing, Progressing  Intervention: Normalize Blood Pressure  Recent Flowsheet Documentation  Taken 3/2/2024 0500 by Katie Duff RN  Medication Review/Management: medications reviewed  Taken 3/2/2024 0400 by Katie Duff RN  Medication Review/Management: medications reviewed  Taken 3/2/2024 0300 by Katie Duff RN  Medication Review/Management: medications reviewed  Taken 3/2/2024 0200 by Katie Duff RN  Medication Review/Management: medications reviewed  Taken 3/2/2024 0100  by Katie Duff RN  Medication Review/Management: medications reviewed  Taken 3/2/2024 0000 by Katie Duff RN  Medication Review/Management: medications reviewed  Taken 3/1/2024 2300 by Katie Duff RN  Medication Review/Management: medications reviewed  Taken 3/1/2024 2200 by Katie Duff RN  Medication Review/Management: medications reviewed  Taken 3/1/2024 2100 by Katie Duff RN  Medication Review/Management: medications reviewed  Taken 3/1/2024 2000 by Katie Duff RN  Medication Review/Management: medications reviewed     Problem: Seizure, Active Management  Goal: Absence of Seizure/Seizure-Related Injury  Outcome: Ongoing, Progressing     Problem: Hyperglycemia  Goal: Blood Glucose Level Within Targeted Range  Outcome: Ongoing, Progressing     Problem: Aspiration (Enteral Nutrition)  Goal: Absence of Aspiration Signs and Symptoms  Outcome: Ongoing, Progressing  Intervention: Minimize Aspiration Risk  Recent Flowsheet Documentation  Taken 3/2/2024 0400 by Katie Duff RN  Head of Bed (HOB) Positioning: HOB at 30 degrees  Taken 3/2/2024 0200 by Katie Duff RN  Head of Bed ([facility]) Positioning: HOB at 30 degrees  Taken 3/2/2024 0029 by Katie Duff RN  Oral Care:   lip/mouth moisturizer applied   suction provided   swabbed with antiseptic solution  Taken 3/2/2024 0000 by Katie Duff RN  Head of Bed (HOB) Positioning: HOB at 30 degrees  Taken 3/1/2024 2200 by Katie Duff RN  Head of Bed (HOB) Positioning: HOB at 30 degrees  Taken 3/1/2024 2000 by Katie Duff RN  Head of Bed (HOB) Positioning: HOB at 30 degrees  Goal: Absence of Aspiration Signs and Symptoms  Outcome: Ongoing, Progressing  Intervention: Minimize Aspiration Risk  Recent Flowsheet Documentation  Taken 3/2/2024 0400 by Katie Duff RN  Head of Bed ([facility]) Positioning: HOB at 30 degrees  Taken 3/2/2024 0200 by Katie Duff RN  Head of Bed ([facility]) Positioning: HOB at 30 degrees  Taken 3/2/2024 0029 by Katie Duff RN  Oral  Care:   lip/mouth moisturizer applied   suction provided   swabbed with antiseptic solution  Taken 3/2/2024 0000 by Katie uDff RN  Head of Bed (\[facility]\"") Positioning: HOB at 30 degrees  Taken 3/1/2024 2200 by Katie Duff RN  Head of Bed (\[facility]\"") Positioning: HOB at 30 degrees  Taken 3/1/2024 2000 by Katie Duff RN  Head of Bed (\[facility]\"") Positioning: HOB at 30 degrees     Problem: Device-Related Complication Risk (Enteral Nutrition)  Goal: Safe, Effective Therapy Delivery  Outcome: Ongoing, Progressing  Goal: Safe, Effective Therapy Delivery  Outcome: Ongoing, Progressing     Problem: Feeding Intolerance (Enteral Nutrition)  Goal: Feeding Tolerance  Outcome: Ongoing, Progressing  Goal: Feeding Tolerance  Outcome: Ongoing, Progressing

## 2024-03-03 LAB
ALBUMIN SERPL-MCNC: 3.5 G/DL (ref 3.5–5.2)
ANION GAP SERPL CALCULATED.3IONS-SCNC: 11 MMOL/L (ref 5–15)
BUN SERPL-MCNC: 18 MG/DL (ref 8–23)
BUN/CREAT SERPL: 20.7 (ref 7–25)
CALCIUM SPEC-SCNC: 9 MG/DL (ref 8.6–10.5)
CHLORIDE SERPL-SCNC: 109 MMOL/L (ref 98–107)
CO2 SERPL-SCNC: 21 MMOL/L (ref 22–29)
CREAT SERPL-MCNC: 0.87 MG/DL (ref 0.76–1.27)
DEPRECATED RDW RBC AUTO: 53.7 FL (ref 37–54)
EGFRCR SERPLBLD CKD-EPI 2021: 89.4 ML/MIN/1.73
ERYTHROCYTE [DISTWIDTH] IN BLOOD BY AUTOMATED COUNT: 16.6 % (ref 12.3–15.4)
GLUCOSE BLDC GLUCOMTR-MCNC: 180 MG/DL (ref 70–130)
GLUCOSE BLDC GLUCOMTR-MCNC: 191 MG/DL (ref 70–130)
GLUCOSE BLDC GLUCOMTR-MCNC: 211 MG/DL (ref 70–130)
GLUCOSE BLDC GLUCOMTR-MCNC: 232 MG/DL (ref 70–130)
GLUCOSE SERPL-MCNC: 210 MG/DL (ref 65–99)
HCT VFR BLD AUTO: 44.9 % (ref 37.5–51)
HGB BLD-MCNC: 14.5 G/DL (ref 13–17.7)
MAGNESIUM SERPL-MCNC: 2.5 MG/DL (ref 1.6–2.4)
MCH RBC QN AUTO: 28.9 PG (ref 26.6–33)
MCHC RBC AUTO-ENTMCNC: 32.3 G/DL (ref 31.5–35.7)
MCV RBC AUTO: 89.6 FL (ref 79–97)
PHOSPHATE SERPL-MCNC: 2.6 MG/DL (ref 2.5–4.5)
PLATELET # BLD AUTO: 369 10*3/MM3 (ref 140–450)
PMV BLD AUTO: 10.6 FL (ref 6–12)
POTASSIUM SERPL-SCNC: 3.8 MMOL/L (ref 3.5–5.2)
RBC # BLD AUTO: 5.01 10*6/MM3 (ref 4.14–5.8)
SODIUM SERPL-SCNC: 141 MMOL/L (ref 136–145)
WBC NRBC COR # BLD AUTO: 9.27 10*3/MM3 (ref 3.4–10.8)

## 2024-03-03 PROCEDURE — 83735 ASSAY OF MAGNESIUM: CPT | Performed by: INTERNAL MEDICINE

## 2024-03-03 PROCEDURE — 63710000001 INSULIN REGULAR HUMAN PER 5 UNITS: Performed by: INTERNAL MEDICINE

## 2024-03-03 PROCEDURE — 85027 COMPLETE CBC AUTOMATED: CPT | Performed by: INTERNAL MEDICINE

## 2024-03-03 PROCEDURE — 25010000002 LABETALOL 5 MG/ML SOLUTION: Performed by: INTERNAL MEDICINE

## 2024-03-03 PROCEDURE — 63710000001 INSULIN GLARGINE PER 5 UNITS: Performed by: INTERNAL MEDICINE

## 2024-03-03 PROCEDURE — 25010000002 CLEVIDIPINE BUTYRATE PER 1 MG: Performed by: INTERNAL MEDICINE

## 2024-03-03 PROCEDURE — C9248 INJ, CLEVIDIPINE BUTYRATE: HCPCS | Performed by: INTERNAL MEDICINE

## 2024-03-03 PROCEDURE — 80069 RENAL FUNCTION PANEL: CPT | Performed by: INTERNAL MEDICINE

## 2024-03-03 PROCEDURE — 82948 REAGENT STRIP/BLOOD GLUCOSE: CPT

## 2024-03-03 RX ORDER — CLONIDINE HYDROCHLORIDE 0.1 MG/1
0.1 TABLET ORAL EVERY 8 HOURS SCHEDULED
Status: DISCONTINUED | OUTPATIENT
Start: 2024-03-03 | End: 2024-03-04

## 2024-03-03 RX ADMIN — LABETALOL HYDROCHLORIDE 400 MG: 100 TABLET, FILM COATED ORAL at 20:23

## 2024-03-03 RX ADMIN — MEMANTINE HYDROCHLORIDE 10 MG: 10 TABLET, FILM COATED ORAL at 09:22

## 2024-03-03 RX ADMIN — CLEVIPIDINE 4 MG/HR: 0.5 EMULSION INTRAVENOUS at 06:08

## 2024-03-03 RX ADMIN — HYDRALAZINE HYDROCHLORIDE 100 MG: 50 TABLET ORAL at 22:05

## 2024-03-03 RX ADMIN — LEVETIRACETAM 500 MG: 500 TABLET, FILM COATED ORAL at 20:23

## 2024-03-03 RX ADMIN — HYDRALAZINE HYDROCHLORIDE 100 MG: 50 TABLET ORAL at 06:08

## 2024-03-03 RX ADMIN — MEMANTINE HYDROCHLORIDE 10 MG: 10 TABLET, FILM COATED ORAL at 20:23

## 2024-03-03 RX ADMIN — INSULIN HUMAN 4 UNITS: 100 INJECTION, SOLUTION PARENTERAL at 18:59

## 2024-03-03 RX ADMIN — CLONIDINE HYDROCHLORIDE 0.1 MG: 0.1 TABLET ORAL at 22:05

## 2024-03-03 RX ADMIN — INSULIN GLARGINE 15 UNITS: 100 INJECTION, SOLUTION SUBCUTANEOUS at 20:24

## 2024-03-03 RX ADMIN — HYDRALAZINE HYDROCHLORIDE 100 MG: 50 TABLET ORAL at 14:58

## 2024-03-03 RX ADMIN — INSULIN HUMAN 4 UNITS: 100 INJECTION, SOLUTION PARENTERAL at 14:58

## 2024-03-03 RX ADMIN — INSULIN HUMAN 2 UNITS: 100 INJECTION, SOLUTION PARENTERAL at 00:50

## 2024-03-03 RX ADMIN — CLONIDINE HYDROCHLORIDE 0.1 MG: 0.1 TABLET ORAL at 09:21

## 2024-03-03 RX ADMIN — LABETALOL HYDROCHLORIDE 40 MG: 5 INJECTION, SOLUTION INTRAVENOUS at 13:18

## 2024-03-03 RX ADMIN — Medication 10 ML: at 09:23

## 2024-03-03 RX ADMIN — AMLODIPINE BESYLATE 10 MG: 10 TABLET ORAL at 09:22

## 2024-03-03 RX ADMIN — SENNOSIDES AND DOCUSATE SODIUM 2 TABLET: 50; 8.6 TABLET ORAL at 08:00

## 2024-03-03 RX ADMIN — LEVETIRACETAM 500 MG: 500 TABLET, FILM COATED ORAL at 09:22

## 2024-03-03 RX ADMIN — VALSARTAN 320 MG: 320 TABLET, FILM COATED ORAL at 09:22

## 2024-03-03 RX ADMIN — SENNOSIDES AND DOCUSATE SODIUM 2 TABLET: 50; 8.6 TABLET ORAL at 20:23

## 2024-03-03 RX ADMIN — INSULIN HUMAN 2 UNITS: 100 INJECTION, SOLUTION PARENTERAL at 06:11

## 2024-03-03 RX ADMIN — Medication 10 ML: at 22:11

## 2024-03-03 RX ADMIN — LABETALOL HYDROCHLORIDE 400 MG: 100 TABLET, FILM COATED ORAL at 09:22

## 2024-03-03 NOTE — PLAN OF CARE
Goal Outcome Evaluation: Pt showed improvement in speech, was able to follow commands, weaned off of cleviprex , right infiltrated arm has decreased in swelling  and redness. Left restraint continued,  Family educated at bedside .

## 2024-03-03 NOTE — PROGRESS NOTES
[facility]  [phone]  Dr. Yariel Dominguez    Subjective:  LOS: 8    Chief Complaint: Basal ganglia bleed    Awake, alert. Follows commands. Still needing Cleviprex.    Objective   Vital Signs past 24hrs  Temp range: Temp (24hrs), Av.8 °F (36.6 °C), Min:97.3 °F (36.3 °C), Max:98.8 °F (37.1 °C)    BP range: BP: (104-181)/() 117/70  Pulse range: Heart Rate:  [61-90] 79  Resp rate range: Resp:  [16-18] 18  Device (Oxygen Therapy): room air   Oxygen range:SpO2:  [91 %-98 %] 91 %   Mechanical Ventilator:     Physical Exam  Constitutional:       Appearance: He is obese.   Eyes:      Pupils: Pupils are equal, round, and reactive to light.   Cardiovascular:      Rate and Rhythm: Normal rate and regular rhythm.   Pulmonary:      Effort: Pulmonary effort is normal.      Breath sounds: Normal breath sounds.   Abdominal:      General: Bowel sounds are normal.      Palpations: Abdomen is soft. There is no mass.      Tenderness: There is no abdominal tenderness.   Musculoskeletal:         General: No swelling.   Neurological:      Mental Status: He is alert.      Comments: Right hemiparesis  Dysarthria  Right facial droop       Results Review:    I have reviewed the laboratory and imaging data since the last note by [facility] physician.  My annotations are noted in assessment and plan.      Result Review:  I have personally reviewed the results from last note by [facility] physician to 3/3/2024 11:15 EST and agree with these findings:  [x]  Laboratory list / accordion  [x]  Microbiology  [x]  Radiology  []  EKG/Telemetry   []  Cardiology/Vascular   []  Pathology  []  Old records  []  Other:    Medication Review:  I have reviewed the current MAR.  My annotations are noted in assessment and plan.    amLODIPine, 10 mg, Nasogastric, Q24H  cloNIDine, 0.1 mg, Oral, Q12H  hydrALAZINE, 100 mg, Nasogastric, Q8H  insulin glargine, 15 Units, Subcutaneous, Nightly  insulin regular, 2-9 Units, Subcutaneous, Q6H  labetalol, 400  mg, Nasogastric, Q12H  levETIRAcetam, 500 mg, Nasogastric, BID  memantine, 10 mg, Nasogastric, Q12H  senna-docusate sodium, 2 tablet, Oral, BID  sodium chloride, 10 mL, Intravenous, Q12H  valsartan, 320 mg, Nasogastric, Daily        clevidipine, 2-32 mg/hr, Last Rate: 3 mg/hr (03/03/24 1039)      Lines, Drains & Airways       Active LDAs       Name Placement date Placement time Site Days    Peripheral IV 02/24/24 2237 Anterior;Right Forearm 02/24/24  2237  Forearm  6    Peripheral IV 03/01/24 0953 Anterior;Left Forearm 03/01/24  0953  Forearm  1    NG/OG Tube Nasogastric 10 Fr Right nostril 02/27/24  1315  Right nostril  4    External Urinary Catheter 02/27/24 2000  --  3                  Isolation status: No active isolations    Dietary Orders (From admission, onward)       Start     Ordered    02/29/24 1026  Diabetisource AC (Glucerna 1.2); Tube Feeding Type: Continuous; Continuous Tube Feeding Start Rate (mL/hr): 20; Then Advance Rate By (mL/hr): 20; Every __ Hours: 12; To Goal Rate of (mL/hr): 60  Diet Effective Now        Question Answer Comment   Tube Feeding Formula: Diabetisource AC (Glucerna 1.2)    Tube Feeding Type Continuous    Continuous Tube Feeding Start Rate (mL/hr) 20    Then Advance Rate By (mL/hr) 20    Every __ Hours 12    To Goal Rate of (mL/hr) 60    Water Flush Amount (mL) 30    Water Flush Frequency Every 1 Hour        02/29/24 1026    02/27/24 1336  Place Feeding Tube Per Cortrak System  (Tube Feeding Placement DARY)  Until Discontinued        Question:  Tube Feeding Route  Answer:  NG - Nasogastric    02/27/24 1335    02/27/24 1335  NPO Diet NPO Type: Strict NPO  Diet Effective Now        Question:  NPO Type  Answer:  Strict NPO    02/27/24 1335                    PCCM Problems  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  Appreciate neurosurgery input.  Taper Cardene drip.  Increase clonidine to tid.    Working with speech therapy and some improvement.  Still with tube feeds.  Plan for video swallow study tomorrow morning.    Altered mental status improved.     A-fib is rate controlled and without current issues.    On sliding scale and subcutaneous Lantus insulin for diabetes.     On tube feeds.    Possibly transfer out tomorrow.      Yariel Dominguez MD  03/03/24  11:15 EST      Part of this note may be an electronic transcription/translation of spoken language to printed text using the Dragon Dictation System.

## 2024-03-04 ENCOUNTER — APPOINTMENT (OUTPATIENT)
Dept: GENERAL RADIOLOGY | Facility: HOSPITAL | Age: 79
DRG: 064 | End: 2024-03-04
Payer: MEDICARE

## 2024-03-04 PROBLEM — I10 HTN (HYPERTENSION): Status: ACTIVE | Noted: 2024-03-04

## 2024-03-04 PROBLEM — E11.9 DM (DIABETES MELLITUS): Status: ACTIVE | Noted: 2024-03-04

## 2024-03-04 LAB
ALBUMIN SERPL-MCNC: 3.4 G/DL (ref 3.5–5.2)
ANION GAP SERPL CALCULATED.3IONS-SCNC: 10.1 MMOL/L (ref 5–15)
BUN SERPL-MCNC: 23 MG/DL (ref 8–23)
BUN/CREAT SERPL: 24.5 (ref 7–25)
CALCIUM SPEC-SCNC: 8.8 MG/DL (ref 8.6–10.5)
CHLORIDE SERPL-SCNC: 107 MMOL/L (ref 98–107)
CO2 SERPL-SCNC: 25.9 MMOL/L (ref 22–29)
CREAT SERPL-MCNC: 0.94 MG/DL (ref 0.76–1.27)
DEPRECATED RDW RBC AUTO: 50.2 FL (ref 37–54)
EGFRCR SERPLBLD CKD-EPI 2021: 84 ML/MIN/1.73
ERYTHROCYTE [DISTWIDTH] IN BLOOD BY AUTOMATED COUNT: 16.1 % (ref 12.3–15.4)
GLUCOSE BLDC GLUCOMTR-MCNC: 178 MG/DL (ref 70–130)
GLUCOSE BLDC GLUCOMTR-MCNC: 206 MG/DL (ref 70–130)
GLUCOSE SERPL-MCNC: 207 MG/DL (ref 65–99)
HCT VFR BLD AUTO: 43.4 % (ref 37.5–51)
HGB BLD-MCNC: 13.9 G/DL (ref 13–17.7)
MCH RBC QN AUTO: 28 PG (ref 26.6–33)
MCHC RBC AUTO-ENTMCNC: 32 G/DL (ref 31.5–35.7)
MCV RBC AUTO: 87.3 FL (ref 79–97)
PHOSPHATE SERPL-MCNC: 3.1 MG/DL (ref 2.5–4.5)
PLATELET # BLD AUTO: 396 10*3/MM3 (ref 140–450)
PMV BLD AUTO: 10.1 FL (ref 6–12)
POTASSIUM SERPL-SCNC: 3.8 MMOL/L (ref 3.5–5.2)
RBC # BLD AUTO: 4.97 10*6/MM3 (ref 4.14–5.8)
SODIUM SERPL-SCNC: 143 MMOL/L (ref 136–145)
WBC NRBC COR # BLD AUTO: 8.09 10*3/MM3 (ref 3.4–10.8)

## 2024-03-04 PROCEDURE — 97110 THERAPEUTIC EXERCISES: CPT

## 2024-03-04 PROCEDURE — 97112 NEUROMUSCULAR REEDUCATION: CPT

## 2024-03-04 PROCEDURE — 63710000001 INSULIN GLARGINE PER 5 UNITS: Performed by: INTERNAL MEDICINE

## 2024-03-04 PROCEDURE — 25010000002 HYDRALAZINE PER 20 MG: Performed by: INTERNAL MEDICINE

## 2024-03-04 PROCEDURE — 82948 REAGENT STRIP/BLOOD GLUCOSE: CPT

## 2024-03-04 PROCEDURE — 25010000002 LABETALOL 5 MG/ML SOLUTION: Performed by: INTERNAL MEDICINE

## 2024-03-04 PROCEDURE — 80069 RENAL FUNCTION PANEL: CPT | Performed by: INTERNAL MEDICINE

## 2024-03-04 PROCEDURE — 63710000001 INSULIN REGULAR HUMAN PER 5 UNITS: Performed by: INTERNAL MEDICINE

## 2024-03-04 PROCEDURE — 97530 THERAPEUTIC ACTIVITIES: CPT

## 2024-03-04 PROCEDURE — 92611 MOTION FLUOROSCOPY/SWALLOW: CPT

## 2024-03-04 PROCEDURE — 85027 COMPLETE CBC AUTOMATED: CPT | Performed by: INTERNAL MEDICINE

## 2024-03-04 PROCEDURE — 74230 X-RAY XM SWLNG FUNCJ C+: CPT

## 2024-03-04 PROCEDURE — 99232 SBSQ HOSP IP/OBS MODERATE 35: CPT | Performed by: NURSE PRACTITIONER

## 2024-03-04 RX ORDER — LABETALOL 200 MG/1
200 TABLET, FILM COATED ORAL EVERY 12 HOURS SCHEDULED
Status: DISCONTINUED | OUTPATIENT
Start: 2024-03-04 | End: 2024-03-08

## 2024-03-04 RX ORDER — CLONIDINE HYDROCHLORIDE 0.1 MG/1
0.2 TABLET ORAL EVERY 12 HOURS SCHEDULED
Status: DISCONTINUED | OUTPATIENT
Start: 2024-03-04 | End: 2024-03-07

## 2024-03-04 RX ADMIN — MEMANTINE HYDROCHLORIDE 10 MG: 10 TABLET, FILM COATED ORAL at 08:23

## 2024-03-04 RX ADMIN — SENNOSIDES AND DOCUSATE SODIUM 2 TABLET: 50; 8.6 TABLET ORAL at 08:22

## 2024-03-04 RX ADMIN — INSULIN HUMAN 2 UNITS: 100 INJECTION, SOLUTION PARENTERAL at 00:53

## 2024-03-04 RX ADMIN — MEMANTINE HYDROCHLORIDE 10 MG: 10 TABLET, FILM COATED ORAL at 22:45

## 2024-03-04 RX ADMIN — INSULIN HUMAN 3 UNITS: 100 INJECTION, SOLUTION PARENTERAL at 13:39

## 2024-03-04 RX ADMIN — HYDRALAZINE HYDROCHLORIDE 100 MG: 50 TABLET ORAL at 13:40

## 2024-03-04 RX ADMIN — HYDRALAZINE HYDROCHLORIDE 100 MG: 50 TABLET ORAL at 05:33

## 2024-03-04 RX ADMIN — Medication 10 ML: at 08:24

## 2024-03-04 RX ADMIN — Medication 10 ML: at 22:45

## 2024-03-04 RX ADMIN — LABETALOL HYDROCHLORIDE 40 MG: 5 INJECTION, SOLUTION INTRAVENOUS at 02:10

## 2024-03-04 RX ADMIN — HYDRALAZINE HYDROCHLORIDE 10 MG: 20 INJECTION INTRAMUSCULAR; INTRAVENOUS at 02:36

## 2024-03-04 RX ADMIN — LEVETIRACETAM 500 MG: 500 TABLET, FILM COATED ORAL at 08:23

## 2024-03-04 RX ADMIN — LEVETIRACETAM 500 MG: 500 TABLET, FILM COATED ORAL at 22:44

## 2024-03-04 RX ADMIN — AMLODIPINE BESYLATE 10 MG: 10 TABLET ORAL at 08:22

## 2024-03-04 RX ADMIN — LABETALOL HYDROCHLORIDE 400 MG: 100 TABLET, FILM COATED ORAL at 08:23

## 2024-03-04 RX ADMIN — INSULIN GLARGINE 15 UNITS: 100 INJECTION, SOLUTION SUBCUTANEOUS at 23:09

## 2024-03-04 RX ADMIN — BARIUM SULFATE 4 ML: 980 POWDER, FOR SUSPENSION ORAL at 12:25

## 2024-03-04 RX ADMIN — HYDRALAZINE HYDROCHLORIDE 100 MG: 50 TABLET ORAL at 22:45

## 2024-03-04 RX ADMIN — CLONIDINE HYDROCHLORIDE 0.1 MG: 0.1 TABLET ORAL at 05:33

## 2024-03-04 RX ADMIN — VALSARTAN 320 MG: 320 TABLET, FILM COATED ORAL at 08:22

## 2024-03-04 RX ADMIN — POLYETHYLENE GLYCOL 3350 17 G: 17 POWDER, FOR SOLUTION ORAL at 08:23

## 2024-03-04 RX ADMIN — CLONIDINE HYDROCHLORIDE 0.2 MG: 0.1 TABLET ORAL at 22:45

## 2024-03-04 RX ADMIN — BARIUM SULFATE 55 ML: 0.81 POWDER, FOR SUSPENSION ORAL at 12:25

## 2024-03-04 RX ADMIN — LABETALOL HYDROCHLORIDE 200 MG: 200 TABLET, FILM COATED ORAL at 22:45

## 2024-03-04 RX ADMIN — BARIUM SULFATE 50 ML: 400 SUSPENSION ORAL at 12:25

## 2024-03-04 RX ADMIN — INSULIN HUMAN 4 UNITS: 100 INJECTION, SOLUTION PARENTERAL at 06:55

## 2024-03-04 NOTE — PROGRESS NOTES
[facility]    Remains low level with therapy- unsafe to attempt to tsfr OOB and with fatigue. Will continue to follow for progress and ability to participate in and tolerate 3 hours of therapy a day    Aileen Frausto RN  Acute Rehab Admission Nurse

## 2024-03-04 NOTE — CONSULTS
Internal Medicine Consult  INTERNAL MEDICINE   [facility]       Patient Identification:  Name: Erlin Blanco  Age: 78 y.o.  Sex: male  :  1945  MRN: [id number]                   Primary Care Physician: Zamzam John APRN                               Requesting physician: Dr. Dominguez  Date of consultation: 3/4/2024    Reason for consultation: Management of diabetes and hypertension out of ICU.    History of Present Illness:   Patient is a 78-year-old male with past medical history remarkable for hypertension, coronary artery disease, diabetes mellitus, benign prostatic hyperplasia, chronic kidney disease, prior history of stroke with residual left-sided deficits as well as chronic anticoagulation for atrial fibrillation and chronic diastolic congestive heart failure and atrial flutter presented to the emergency room on 2024 with right-sided facial droop right arm weakness and speech disturbances.  Workup revealed blood pressure of 210/113, acute intraparenchymal hemorrhage involving the left thalamus.  Patient received Kcentra for Eliquis reversal started on Keppra for seizure prophylaxis and neurosurgery service was consulted with plans for keeping systolic blood pressure less than 160 and patient was admitted to ICU.  Over the course of 9 days while in ICU patient neurological status stabilized with some improvement in mental status.  Nasogastric feeding tube is in place swallow evaluation is being performed patient is able to participate in physical therapy but his speech is still dysarthric and appears to have expressive aphasia.  Patient did not require any surgical intervention and was recommended to continue to be observe off of Eliquis with systolic blood pressure to be less than 160 and plans to repeat CT scan of the head on 3/6/2024.  Patient was considered stable hemodynamically and cleared for transfer out of ICU.  His diabetes is being managed with long-acting Lantus and  sliding scale coverage.  Patient himself follows commands but is still unable to say things to where he wants to say.  Internal medicine service consulted to manage his diabetes and high blood pressure and coordination of care and management of persistent dysphagia.  Patient did have swallow evaluation today and was noted to have mild oropharyngeal dysphagia.  Patient is recommended to have puréed diet with nectar thick liquids and medications to be crushed in purée and upright for meals..      Past Medical History:  History reviewed. No pertinent past medical history.  Past Surgical History:  History reviewed. No pertinent surgical history.   Home Meds:  Medications Prior to Admission   Medication Sig Dispense Refill Last Dose    montelukast (SINGULAIR) 10 MG tablet Take 1 tablet by mouth Every Night.       Aspirin Low Dose 81 MG EC tablet Take 1 tablet by mouth Daily.   Unknown    carvedilol (COREG) 6.25 MG tablet Take 1 tablet by mouth 2 (Two) Times a Day With Meals.   Unknown    Eliquis 5 MG tablet tablet Take 1 tablet by mouth Every 12 (Twelve) Hours.   Unknown    fluticasone (FLONASE) 50 MCG/ACT nasal spray 1 spray into the nostril(s) as directed by provider Daily.   Unknown    glimepiride (AMARYL) 2 MG tablet Take 1 tablet by mouth 2 (Two) Times a Day.   Unknown    hydrALAZINE (APRESOLINE) 100 MG tablet Take 1 tablet by mouth 3 (Three) Times a Day.   Unknown    hydroxyurea (HYDREA) 500 MG capsule Take 1 capsule by mouth Daily.   Unknown    Januvia 100 MG tablet Take 1 tablet by mouth Daily.   Unknown    memantine (NAMENDA) 10 MG tablet Take 1 tablet by mouth 2 (Two) Times a Day.   Unknown    metFORMIN ER (GLUCOPHAGE-XR) 500 MG 24 hr tablet Take 2 tablets by mouth 2 (Two) Times a Day.   Unknown    Multiple Vitamins-Minerals (b complex-C-E-zinc) tablet Take 1 tablet by mouth Daily.   Unknown    potassium chloride (KLOR-CON) 20 MEQ packet Take 20 mEq by mouth 3 (Three) Times a Day.   Unknown    Probiotic Product  (Risaquad-2) capsule capsule Take 1 capsule by mouth Daily.   Unknown    rosuvastatin (CRESTOR) 40 MG tablet Take 1 tablet by mouth Every Night.   Unknown    tamsulosin (FLOMAX) 0.4 MG capsule 24 hr capsule Take 1 capsule by mouth Daily.   Unknown    torsemide (DEMADEX) 20 MG tablet Take 1 tablet by mouth 2 (Two) Times a Day.   Unknown    valsartan (DIOVAN) 320 MG tablet Take 1 tablet by mouth Daily.   Unknown    Vibegron (Gemtesa) 75 MG tablet Take 1 tablet by mouth Daily With Breakfast.        Current Meds:     Current Facility-Administered Medications:     acetaminophen (TYLENOL) suppository 650 mg, 650 mg, Rectal, Q4H PRN, Chet Rivero Jr., MD, 650 mg at 02/28/24 0927    acetaminophen (TYLENOL) suppository 650 mg, 650 mg, Rectal, Q4H PRN, Chet Rivero Jr., MD    acetaminophen (TYLENOL) tablet 650 mg, 650 mg, Nasogastric, Q4H PRN, Chet Rivero Jr., MD, 650 mg at 02/29/24 2016    amLODIPine (NORVASC) tablet 10 mg, 10 mg, Nasogastric, Q24H, Chet Rivero Jr., MD, 10 mg at 03/04/24 0822    sennosides-docusate (PERICOLACE) 8.6-50 MG per tablet 2 tablet, 2 tablet, Oral, BID, 2 tablet at 03/04/24 0822 **AND** polyethylene glycol (MIRALAX) packet 17 g, 17 g, Oral, Daily PRN, 17 g at 03/04/24 0823 **AND** bisacodyl (DULCOLAX) EC tablet 5 mg, 5 mg, Oral, Daily PRN **AND** bisacodyl (DULCOLAX) suppository 10 mg, 10 mg, Rectal, Daily PRN, Chet Rivero Jr., MD    Calcium Replacement - Follow Nurse / BPA Driven Protocol, , Does not apply, PRN, Chet Rivero Jr., MD    cloNIDine (CATAPRES) tablet 0.2 mg, 0.2 mg, Oral, Q12H, Yariel Dominguez MD    dextrose (D50W) (25 g/50 mL) IV injection 25 g, 25 g, Intravenous, Q15 Min PRN, Chet Rviero Jr., MD    dextrose (GLUTOSE) oral gel 15 g, 15 g, Oral, Q15 Min PRN, Chet Rivero Jr., MD    glucagon (GLUCAGEN) injection 1 mg, 1 mg, Intramuscular, Q15 Min PRN, Chet Rivero Jr., MD    hydrALAZINE (APRESOLINE) tablet 100 mg, 100 mg,  Nasogastric, Q8H, Chet Rivero Jr., MD, 100 mg at 03/04/24 1340    insulin glargine (LANTUS, SEMGLEE) injection 15 Units, 15 Units, Subcutaneous, Nightly, Yariel Dominguez MD, 15 Units at 03/03/24 2024    insulin regular (humuLIN R,novoLIN R) injection 2-9 Units, 2-9 Units, Subcutaneous, Q6H, Chet Rivero Jr., MD, 3 Units at 03/04/24 1339    labetalol (NORMODYNE) tablet 200 mg, 200 mg, Nasogastric, Q12H, Yariel Dominguez MD    levETIRAcetam (KEPPRA) tablet 500 mg, 500 mg, Nasogastric, BID, Chet Rivero Jr., MD, 500 mg at 03/04/24 0823    Magnesium Standard Dose Replacement - Follow Nurse / BPA Driven Protocol, , Does not apply, PRNJosefa Harold Dale Jr., MD    memantine (NAMENDA) tablet 10 mg, 10 mg, Nasogastric, Q12H, Chet Rivero Jr., MD, 10 mg at 03/04/24 0823    nitroglycerin (NITROSTAT) SL tablet 0.4 mg, 0.4 mg, Sublingual, Q5 Min PRN, Chet Rivero Jr., MD    ondansetron (ZOFRAN) injection 4 mg, 4 mg, Intravenous, Once PRN, Chet Rivero Jr., MD    ondansetron (ZOFRAN) injection 4 mg, 4 mg, Intravenous, Q6H PRN, Chet Rivero Jr., MD    Phosphorus Replacement - Follow Nurse / BPA Driven Protocol, , Does not apply, Josefa SCHMIDT Harold Dale Jr., MD    Potassium Replacement - Follow Nurse / BPA Driven Protocol, , Does not apply, Josefa SCHMIDT Harold Dale Jr., MD    sodium chloride 0.9 % flush 10 mL, 10 mL, Intravenous, PRNJosefa Harold Dale Jr., MD    sodium chloride 0.9 % flush 10 mL, 10 mL, Intravenous, Q12H, Chet Rivero Jr., MD, 10 mL at 03/04/24 0824    sodium chloride 0.9 % flush 10 mL, 10 mL, Intravenous, PRN, Chet Rivero Jr., MD    sodium chloride 0.9 % infusion 40 mL, 40 mL, Intravenous, PRN, Chet Rivero Jr., MD    valsartan (DIOVAN) tablet 320 mg, 320 mg, Nasogastric, Daily, Chet Rivero Jr., MD, 320 mg at 03/04/24 0822  Allergies:  No Known Allergies  Social History:   Social History     Tobacco Use    Smoking status: Former      "Current packs/day: 0.00     Types: Cigarettes     Quit date:      Years since quittin.2     Passive exposure: Past    Smokeless tobacco: Never   Substance Use Topics    Alcohol use: Never      Family History:  History reviewed. No pertinent family history.       Review of Systems  See history of present illness and past medical history.        Vitals:   /88 (BP Location: Left arm, Patient Position: Lying)   Pulse 59   Temp 97.7 °F (36.5 °C) (Oral)   Resp 20   Ht 182.9 cm (72\")   Wt 116 kg (255 lb 11.7 oz)   SpO2 97%   BMI 34.68 kg/m²   I/O:   Intake/Output Summary (Last 24 hours) at 3/4/2024 1602  Last data filed at 3/4/2024 0600  Gross per 24 hour   Intake --   Output 1200 ml   Net -1200 ml     Exam:  Patient is examined using the personal protective equipment as per guidelines from infection control for this particular patient as enacted.  Hand washing was performed before and after patient interaction.  General Appearance:  Awake interactive morbidly obese male significantly dysarthric but appears to follow commands.   Head:    Normocephalic, without obvious abnormality, atraumatic   Eyes:    PERRL, conjunctiva/corneas clear, EOM's intact, both eyes   Ears:    Normal external ear canals, both ears   Nose: Nasogastric tube in place   Throat:   Lips, tongue, gums normal; oral mucosa pink and moist   Neck: Supple   Back:     Symmetric, no curvature, ROM normal, no CVA tenderness   Lungs:     Clear to auscultation bilaterally, respirations unlabored   Chest Wall:    No tenderness or deformity    Heart:  S1-S2 irregular   Abdomen:   Obese soft nontender   Extremities: Diffuse edema of the left upper extremity and right lower extremity noted   Pulses:   Pulses palpable in all extremities; symmetric all extremities   Skin: Bruising and ecchymotic changes noted   Neurologic: Dense right-sided hemiplegia       Data Review:      I reviewed the patient's new clinical results.  Results from last 7 days "   Lab Units 03/04/24  0957 03/03/24  0414 02/27/24  0604   WBC 10*3/mm3 8.09 9.27 10.52   HEMOGLOBIN g/dL 13.9 14.5 14.4   PLATELETS 10*3/mm3 396 369 327     Results from last 7 days   Lab Units 03/04/24  0957 03/03/24  0414 03/01/24  1805 03/01/24  0757 02/28/24  1757 02/28/24  0804 02/27/24  0604   SODIUM mmol/L 143 141  --  146*  --  147* 147*   POTASSIUM mmol/L 3.8 3.8 4.3 3.5 4.2 3.6 3.8   CHLORIDE mmol/L 107 109*  --  112*  --  115* 114*   CO2 mmol/L 25.9 21.0*  --  25.0  --  21.4* 21.5*   BUN mg/dL 23 18  --  22  --  14 13   CREATININE mg/dL 0.94 0.87  --  1.03  --  0.87 0.83   CALCIUM mg/dL 8.8 9.0  --  8.9  --  8.6 8.7   GLUCOSE mg/dL 207* 210*  --  280*  --  167* 158*     CT Head Without Contrast    Result Date: 3/1/2024  1. No significant interval change when compared to most recent prior head CT 02/27/2024, two days ago. 2. There is a stable ovoid acute intraparenchymal hemorrhage centered in the left thalamus with some extension into the body of the left caudate nucleus.  It maximally measures 3.2 x 2.5 x 3.5 cm with 3.5 cm surrounding vasogenic edema and some mild mass effect on the body of the left lateral ventricle and third ventricle and at most 2 mm left to right midline shift and there is stable intraventricular extension of the hemorrhage with a small amount of acute intraventricular hemorrhage in the posterior trigones and occipital horns of the lateral ventricles.  There is stable mild prominence in size of the lateral and third ventricles, some of which is due to volume loss. 3. There is mild-to-moderate small vessel disease in the cerebral white matter and there is a 7 x 4 mm old lacunar infarct in the posterior aspect of the posterior limb of the right internal capsule.  The remainder of the head CT is unremarkable. If the patient truly has new onset weakness today, could consider an MRI of the brain for more complete assessment.  The results were discussed with Dr. Rivero by telephone  02/29/2024 at 2:20 p.m.  Radiation dose reduction techniques were utilized, including automated exposure control and exposure modulation based on body size.   This report was finalized on 3/1/2024 6:35 AM by Dr. Vernon Feliz M.D on Workstation: [id number]      CT Head Without Contrast    Result Date: 3/1/2024  Electronically signed by Cheryl Boland MD on 03-01-24 at 0524    CT Head Without Contrast    Result Date: 2/27/2024  Electronically signed by Randy Mejia MD on 02-27-24 at 0528    CT Head Without Contrast    Result Date: 2/26/2024  1. Moderately large left thalamic hemorrhage appears relatively stable in size from the earlier study today. 2. There is hyperdense hemorrhage in the occipital horns of both lateral ventricles which may be slightly increased from the earlier study today. 3. Overall size of the lateral ventricles appears slightly increased from the earlier study today at 1212 hours.  Radiation dose reduction techniques were utilized, including automated exposure control and exposure modulation based on body size.   This report was finalized on 2/26/2024 1:34 PM by Dr. Nicolás Page M.D on Workstation: [id number]      CT Head Without Contrast    Result Date: 2/26/2024  Electronically signed by Iwona Pham MD on 02-26-24 at 0536    CT Head Without Contrast    Result Date: 2/25/2024  Intraparenchymal hemorrhage centered within the left thalamus is without significant change when compared to the prior head CT same date approximately 6 hours previous. There is surrounding edema with mass effect and 3 mm left to right midline shift. There is also acute intraventricular hemorrhage layering dependently within the occipital horns of the lateral ventricles with mild ventricular enlargement without change.  This report was finalized on 2/25/2024 1:14 PM by Dr. Aftab Duke M.D on Workstation: [id number]      CT Head Without Contrast    Result Date: 2/25/2024   Further interval worsening, as  described.    Discussed by telephone with patient's nurse, Zoya, at time of interpretation,  This report was finalized on 2/25/2024 8:23 AM by Dr. Akhil Nugent M.D on Workstation: [id number]      CT Head Without Contrast    Addendum Date: 2/25/2024    ADDENDUM: 02 25 24 02:07 Call Doctor Regarding Intracranial Hemorrhage, called NP Denamelissa Pagan on 02 25 02:07 (-05:00)     Result Date: 2/25/2024  Communications:  Call Doctor Intracranial Hemorrhage Electronically signed by Noman Winchester MD on 02-25-24 at 0154    XR Chest 1 View    Result Date: 2/24/2024  Cardiomegaly. Atherosclerotic disease. No evidence for active disease in the chest  This report was finalized on 2/24/2024 11:24 PM by Dr. Aftab Duke M.D on Workstation: [id number]      CT Angiogram Head w AI Analysis of LVO    Result Date: 2/24/2024  1. Acute intraparenchymal hemorrhage centered within the left thalamus measuring 2.6 x 2 x 1.8 cm with surrounding cerebral edema. Follow-up evaluation recommended. 2. Moderate to severe chronic small vessel ischemic white matter change. Intracranial atherosclerotic calcifications.   Findings discussed with the stroke neurologist on call on 02/24/2024 at 10:28 p.m.  Radiation dose reduction techniques were utilized, including automated exposure control and exposure modulation based on body size.   This report was finalized on 2/24/2024 10:46 PM by Dr. Aftab Duke M.D on Workstation: [email or web address]     Microbiology Results (last 10 days)       ** No results found for the last 240 hours. **          ECG 12 Lead Rhythm Change   Final Result   HEART RATE= 72  bpm   RR Interval= 833  ms   CA Interval= 198  ms   P Horizontal Axis= -60  deg   P Front Axis= 28  deg   QRSD Interval= 172  ms   QT Interval= 464  ms   QTcB= 508  ms   QRS Axis= 27  deg   T Wave Axis= 119  deg   - ABNORMAL ECG -   Sinus rhythm   Left bundle branch block   When compared with ECG of 24-Feb-2024 22:43:02,   No significant change    Electronically Signed By: Redd Graham ([facility]) 01-Mar-2024 17:02:32   Date and Time of Study: 2024-03-01 01:56:35      ECG 12 Lead Stroke Evaluation   Final Result   HEART RATE= 72  bpm   RR Interval= 833  ms   WV Interval=   ms   P Horizontal Axis= 11  deg   P Front Axis= 5  deg   QRSD Interval= 170  ms   QT Interval= 486  ms   QTcB= 532  ms   QRS Axis= -1  deg   T Wave Axis= 141  deg   - ABNORMAL ECG -   Sinus rhythm   Left bundle branch block   No Prior Tracing for Comparison   Electronically Signed By: Marika Arias ([facility]) 25-Feb-2024 17:59:30   Date and Time of Study: 2024-02-24 22:43:02      Telemetry Scan   Final Result      Telemetry Scan   Final Result          Assessment:  Active Hospital Problems    Diagnosis  POA    **Intraparenchymal hemorrhage of brain [I61.9]  Yes    DM (diabetes mellitus) [E11.9]  Unknown    HTN (hypertension) [I10]  Unknown    Seizures [R56.9]  Yes   Previous history of CVA with left-sided residual weakness  Hemorrhagic stroke on the right side due to hypertensive crisis and ongoing anticoagulation therapy with right-sided hemiplegia and dysphagia  History of atrial fibrillation/flutter on chronic anticoagulation therapy of Eliquis      Medical decision making/care plan:  Hemorrhagic stroke due to intraparenchymal hemorrhage in the setting of hypertensive crisis and ongoing use of Eliquis-continue with current plan of keeping systolic blood pressure less than 160, continue to hold Eliquis, and continue with aspiration precautions OT PT evaluation and periodic speech assessment while upgrading his diet.  Diabetes mellitus-continue current regimen may increase his Lantus and once his diet is established provide him with Premeal insulin.  Watch for hypoglycemia.  Intracranial hemorrhage-continue with antiseizure medications  History of atrial fibrillation/flutter-continue to hold anticoagulation therapy because of intracranial bleed and rate control    Fausto Robison  MD   3/4/2024  16:02 EST    Parts of this note may be an electronic transcription/translation of spoken language to printed text using the Dragon dictation system.

## 2024-03-04 NOTE — PLAN OF CARE
Goal Outcome Evaluation:  Plan of Care Reviewed With: patient, family        Progress: improving  Outcome Evaluation: Pt awake and following commands, good participation and effort, pt required max a x 2 to come to sit, attempted standing but pt not able to initiate stand, pt still pushing strongly to the right, able to work on sitting balance, trunk control, and attend to R side. Pt is a good candidate for acute rehab.      Anticipated Discharge Disposition (PT): inpatient rehabilitation facility

## 2024-03-04 NOTE — THERAPY TREATMENT NOTE
Patient Name: Erlin Blanco  : 1945    MRN: [id number]                              Today's Date: 3/4/2024       Admit Date: 2024    Visit Dx:     ICD-10-CM ICD-9-CM   1. Intraparenchymal hemorrhage of brain  I61.9 431   2. Facial droop  R29.810 781.94   3. Expressive aphasia  R47.01 784.3   4. Weakness of right upper extremity  R29.898 729.89   5. Chronic anticoagulation  Z79.01 V58.61     Patient Active Problem List   Diagnosis    Intraparenchymal hemorrhage of brain    Seizures     History reviewed. No pertinent past medical history.  History reviewed. No pertinent surgical history.   General Information       Row Name 24 1101          OT Time and Intention    Document Type therapy note (daily note)  -AG     Mode of Treatment co-treatment;occupational therapy;physical therapy;other (see comments)  cotx for safe pt. handling and progression of mobility  -AG       Row Name 24 1101          General Information    Patient Profile Reviewed yes  -AG     Existing Precautions/Restrictions fall  -AG       Row Name 24 1101          Cognition    Orientation Status (Cognition) oriented to;person;place  -AG       Row Name 24 1101          Safety Issues, Functional Mobility    Safety Issues Affecting Function (Mobility) ability to follow commands;awareness of need for assistance;impulsivity;insight into deficits/self-awareness;judgment;problem-solving;safety precaution awareness;safety precautions follow-through/compliance;sequencing abilities  -AG     Impairments Affecting Function (Mobility) balance;cognition;endurance/activity tolerance;strength;postural/trunk control;sensation/sensory awareness;visual/perceptual;motor control;grasp;coordination  -AG     Cognitive Impairments, Mobility Safety/Performance attention;awareness, need for assistance;impulsivity;insight into deficits/self-awareness;judgment;problem-solving/reasoning;safety precaution awareness  -AG               User Key  (r) =  Recorded By, (t) = Taken By, (c) = Cosigned By      Initials Name Provider Type    AG Bart Cary, OT Occupational Therapist                     Mobility/ADL's       Row Name 03/04/24 1102          Bed Mobility    Bed Mobility supine-sit;sit-supine;scooting/bridging  -AG     Scooting/Bridging [place] (Bed Mobility) dependent (less than 25% patient effort);2 person assist;nonverbal cues (demo/gesture);verbal cues  -AG     Supine-Sit [place] (Bed Mobility) maximum assist (25% patient effort);2 person assist  -AG     Sit-Supine [place] (Bed Mobility) maximum assist (25% patient effort);2 person assist  -AG     Bed Mobility, Safety Issues cognitive deficits limit understanding;decreased use of arms for pushing/pulling;decreased use of legs for bridging/pushing;impaired trunk control for bed mobility  -AG     Assistive Device (Bed Mobility) bed rails;head of bed elevated  -AG     Comment, (Bed Mobility) difficulty with sequencing/following commands  -AG       Row Name 03/04/24 1102          Transfers    Comment, (Transfers) unable to safely progress to  -AG       [facility] Name 03/04/24 1102          Bed-Chair Transfer    Bed-Chair [place] (Transfers) unable to assess  -AG       [facility] Name 03/04/24 1102          Sit-Stand Transfer    Sit-Stand [place] (Transfers) maximum assist (25% patient effort);dependent (less than 25% patient effort);2 person assist  -AG     Comment, (Sit-Stand Transfer) STS attemped x2, unable to achieve upright posture, poor gluteal clearance  -AG       Row Name 03/04/24 1102          Functional Mobility    Functional Mobility- Comment unable to progress to  -AG       [facility] Name 03/04/24 1102          Activities of Daily Living    BADL Assessment/Intervention grooming;lower body dressing  -AG       Row Name 03/04/24 1102          Grooming Assessment/Training    [place] Level (Grooming) grooming skills;oral care regimen;minimum assist (75% patient effort)  -AG     Position  (Grooming) edge of bed sitting  -     Comment, (Grooming) VC for attention to R side, able to carryover with cues  -       Row Name 03/04/24 1102          Lower Body Dressing Assessment/Training    [place] Level (Lower Body Dressing) dependent (less than 25% patient effort)  -     Position (Lower Body Dressing) supine  -               User Key  (r) = Recorded By, (t) = Taken By, (c) = Cosigned By      Initials Name Provider Type     Bart Cary OT Occupational Therapist                   Obj/Interventions       Row Name 03/04/24 1105          Shoulder (Therapeutic Exercise)    Shoulder PROM (Therapeutic Exercise) right;flexion;aBduction;5 repetitions  -       Row Name 03/04/24 1105          Elbow/Forearm (Therapeutic Exercise)    Elbow/Forearm (Therapeutic Exercise) AAROM (active assistive range of motion)  -     Elbow/Forearm AAROM (Therapeutic Exercise) right;flexion;extension;10 repetitions  -       Row Name 03/04/24 1105          Wrist (Therapeutic Exercise)    Wrist (Therapeutic Exercise) AAROM (active assistive range of motion)  -     Wrist AAROM (Therapeutic Exercise) right;flexion;extension;5 repetitions  -       Row Name 03/04/24 1105          Hand (Therapeutic Exercise)    Hand AROM/AAROM (Therapeutic Exercise) right;finger extension;finger aBduction;5 repetitions  -       Row Name 03/04/24 1105          Motor Skills    Motor Skills functional endurance  -     Functional Endurance poor - improving from previous sessions  -     Therapeutic Exercise shoulder;elbow/forearm;wrist;hand  -       Row Name 03/04/24 1105          Balance    Balance Assessment sitting static balance;sitting dynamic balance;sit to stand dynamic balance  -     Static Sitting Balance maximum assist;moderate assist;2-person assist  -AG     Dynamic Sitting Balance maximum assist;2-person assist  -     Position, Sitting Balance sitting edge of bed;supported  -AG     Sit to Stand Dynamic Balance  unable to assess;other (see comments)  x2 attempts made, pt. unable to assist to achieve upright posture.  -AG     Balance Interventions sitting;sit to stand;supported;static;dynamic;minimal challenge;occupation based/functional task  -AG     Comment, Balance Heavy R lean, multisensory cues for hand placement and positioning to promote balance. VC for upright posture, midline positioning, head control and visual scanning of environment working to cross midline  -AG               User Key  (r) = Recorded By, (t) = Taken By, (c) = Cosigned By      Initials Name Provider Type    AG Bart Cary, FLORESITA Occupational Therapist                   Goals/Plan    No documentation.                  Clinical Impression       Row Name 03/04/24 1110          Pain Assessment    Pretreatment Pain Rating 0/10 - no pain  -AG     Posttreatment Pain Rating 0/10 - no pain  -AG     Pre/Posttreatment Pain Comment no pain reported or facial gramices made during session  -AG       Row Name 03/04/24 1110          Plan of Care Review    Plan of Care Reviewed With patient;family  -     Progress improving  -AG     Outcome Evaluation RN consent, pt. received semi woodward in bed agreeable to OT/PT cotx. Pt. was able to demo improving RUE muscle activation with ability to now initiate digit flexion/extension, wrist flexion/extension and elbow flexion. Pt. continues to display poor seated balance with heavy R lean and difficulty maintaining midline although noted ability to attend to R side more often and improved crossing of midline during visual scanning. Pt. will continue to benefit from skilled OT services to address deficit mentioned above. OT recommending IRF  -AG       Row Name 03/04/24 1110          Therapy Assessment/Plan (OT)    Rehab Potential (OT) good, to achieve stated therapy goals  -AG     Criteria for Skilled Therapeutic Interventions Met (OT) yes;skilled treatment is necessary  -AG     Therapy Frequency (OT) 5 times/wk  -AG        Row Name 03/04/24 1110          Therapy Plan Review/Discharge Plan (OT)    Anticipated Discharge Disposition (OT) inpatient rehabilitation facility  -       Row Name 03/04/24 1110          Vital Signs    O2 Delivery Pre Treatment room air  -AG     O2 Delivery Intra Treatment room air  -AG     O2 Delivery Post Treatment room air  -AG     Pre Patient Position Supine  -AG     Intra Patient Position Sitting  -AG     Post Patient Position Supine  -AG       Row Name 03/04/24 1110          Positioning and Restraints    In Bed notified nsg;call light within reach;encouraged to call for assist;exit alarm on;fowlers;with family/caregiver  -AG     Restraints released:;reapplied:;soft limb  LUE only  -AG               User Key  (r) = Recorded By, (t) = Taken By, (c) = Cosigned By      Initials Name Provider Type    Bart Nagy, FLORESITA Occupational Therapist                   Outcome Measures       Row Name 03/04/24 1113          How much help from another is currently needed...    Putting on and taking off regular lower body clothing? 1  -AG     Bathing (including washing, rinsing, and drying) 1  -AG     Toileting (which includes using toilet bed pan or urinal) 1  -AG     Putting on and taking off regular upper body clothing 2  -AG     Taking care of personal grooming (such as brushing teeth) 2  -AG     Eating meals 1  -AG     AM-PAC 6 Clicks Score (OT) 8  -AG       Row Name 03/04/24 1100          How much help from another person do you currently need...    Turning from your back to your side while in flat bed without using bedrails? 2  -PC     Moving from lying on back to sitting on the side of a flat bed without bedrails? 1  -PC     Moving to and from a bed to a chair (including a wheelchair)? 1  -PC     Standing up from a chair using your arms (e.g., wheelchair, bedside chair)? 1  -PC     Climbing 3-5 steps with a railing? 1  -PC     To walk in hospital room? 1  -PC     AM-PAC 6 Clicks Score (PT) 7  -PC      Highest Level of Mobility Goal 2 --> Bed activities/dependent transfer  -PC       Row Name 03/04/24 1113          Functional Assessment    Outcome Measure Options AM-PAC 6 Clicks Daily Activity (OT)  -AG               User Key  (r) = Recorded By, (t) = Taken By, (c) = Cosigned By      Initials Name Provider Type    PC Audrey Ramirez, PT Physical Therapist    AG Bart Cary, OT Occupational Therapist                    Occupational Therapy Education       Title: PT OT SLP Therapies (In Progress)       Topic: Occupational Therapy (In Progress)       Point: ADL training (Not Started)       Description:   Instruct learner(s) on proper safety adaptation and remediation techniques during self care or transfers.   Instruct in proper use of assistive devices.                  Learner Progress:  Not documented in this visit.              Point: Home exercise program (Done)       Description:   Instruct learner(s) on appropriate technique for monitoring, assisting and/or progressing therapeutic exercises/activities.                  Learning Progress Summary             Family Acceptance, E, VU by BS at 3/2/2024 1530    Comment: RUE ROM / HEP to adress swelling   Significant Other Acceptance, E, VU by BS at 3/2/2024 1530    Comment: RUE ROM / HEP to adress swelling                         Point: Precautions (Done)       Description:   Instruct learner(s) on prescribed precautions during self-care and functional transfers.                  Learning Progress Summary             Family Acceptance, E, VU,DU by  at 2/27/2024 1315    Comment: RUE ROM, massage, safe positioning for subluxation prevention, OT role and dc recommendations                         Point: Body mechanics (Not Started)       Description:   Instruct learner(s) on proper positioning and spine alignment during self-care, functional mobility activities and/or exercises.                  Learner Progress:  Not documented in this visit.                               User Key       Initials Effective Dates Name Provider Type Discipline     04/02/20 -  Sonia Mcclure OT Occupational Therapist OT    BS 11/14/22 -  Alexandria De La Rosa OT Occupational Therapist OT                  OT Recommendation and Plan  Therapy Frequency (OT): 5 times/wk  Plan of Care Review  Plan of Care Reviewed With: patient, family  Progress: improving  Outcome Evaluation: RN consent, pt. received semi woodward in bed agreeable to OT/PT cotx. Pt. was able to demo improving RUE muscle activation with ability to now initiate digit flexion/extension, wrist flexion/extension and elbow flexion. Pt. continues to display poor seated balance with heavy R lean and difficulty maintaining midline although noted ability to attend to R side more often and improved crossing of midline during visual scanning. Pt. will continue to benefit from skilled OT services to address deficit mentioned above. OT recommending IRF     Time Calculation:         Time Calculation- OT       Row Name 03/04/24 1115 03/04/24 1114          Time Calculation- OT    OT Start Time -- 0846  -AG     OT Stop Time -- 0911  -AG     OT Time Calculation (min) -- 25 min  -AG     Total Timed Code Minutes- OT -- 25 minute(s)  -AG     OT Received On -- 03/04/24  -     OT - Next Appointment -- 03/05/24  -     OT Goal Re-Cert Due Date -- 03/12/24  -AG        Timed Charges    43732 - OT Therapeutic Exercise Minutes -- 6  -AG     65327 - OT Therapeutic Activity Minutes -- 12  -AG     51390 - OT Self Care/Mgmt Minutes --  -AG 7  -AG        Total Minutes    Timed Charges Total Minutes --  -AG 25  -AG      Total Minutes --  -AG 25  -AG               User Key  (r) = Recorded By, (t) = Taken By, (c) = Cosigned By      Initials Name Provider Type    AG Bart Cary, FLORESITA Occupational Therapist                  Therapy Charges for Today       Code Description Service Date Service Provider Modifiers Qty    83090602937  OT THER PROC EA 15 MIN  3/4/2024 Bart Cary, OT GO 1    [id number]  OT THERAPEUTIC ACT EA 15 MIN 3/4/2024 Bart Cary, FLORESITA GO 1                 Bart Cary, FLORESITA  3/4/2024

## 2024-03-04 NOTE — PLAN OF CARE
Goal Outcome Evaluation:  Plan of Care Reviewed With: patient, family        Progress: improving  Outcome Evaluation: RN consent, pt. received semi woodward in bed agreeable to OT/PT cotx. Pt. was able to demo improving RUE muscle activation with ability to now initiate digit flexion/extension, wrist flexion/extension and elbow flexion. Pt. continues to display poor seated balance with heavy R lean and difficulty maintaining midline although noted ability to attend to R side more often and improved crossing of midline during visual scanning. Pt. will continue to benefit from skilled OT services to address deficit mentioned above. OT recommending IRF      Anticipated Discharge Disposition (OT): inpatient rehabilitation facility

## 2024-03-04 NOTE — NURSING NOTE
Pt okay to go to swallow study without RN, Neuro checks changed to Q4 ,Labetalol adjusted per MD Dominguez

## 2024-03-04 NOTE — PROGRESS NOTES
[facility]  [phone]  Dr. Yariel Dominguez    Subjective:  LOS: 9    Chief Complaint: Basal ganglia bleed    Awake and alert.  Looks comfortable.  Following commands.    Objective   Vital Signs past 24hrs  Temp range: Temp (24hrs), Av.7 °F (36.5 °C), Min:97.3 °F (36.3 °C), Max:98.1 °F (36.7 °C)    BP range: BP: (106-173)/(56-94) 128/68  Pulse range: Heart Rate:  [59-81] 72  Resp rate range:    Device (Oxygen Therapy): room air   Oxygen range:SpO2:  [92 %-97 %] 95 %   Mechanical Ventilator:     Physical Exam  Constitutional:       Appearance: He is obese.   Eyes:      Pupils: Pupils are equal, round, and reactive to light.   Cardiovascular:      Rate and Rhythm: Normal rate and regular rhythm.   Pulmonary:      Effort: Pulmonary effort is normal.      Breath sounds: Normal breath sounds.   Abdominal:      General: Bowel sounds are normal.      Palpations: Abdomen is soft. There is no mass.      Tenderness: There is no abdominal tenderness.   Musculoskeletal:         General: No swelling.   Neurological:      Mental Status: He is alert.      Comments: Right hemiparesis - slight hand movement  Dysarthria  Right facial droop       Results Review:    I have reviewed the laboratory and imaging data since the last note by [facility] physician.  My annotations are noted in assessment and plan.      Result Review:  I have personally reviewed the results from last note by [facility] physician to 3/4/2024 12:11 EST and agree with these findings:  [x]  Laboratory list / accordion  [x]  Microbiology  [x]  Radiology  []  EKG/Telemetry   []  Cardiology/Vascular   []  Pathology  []  Old records  []  Other:    Medication Review:  I have reviewed the current MAR.  My annotations are noted in assessment and plan.    amLODIPine, 10 mg, Nasogastric, Q24H  cloNIDine, 0.2 mg, Oral, Q12H  hydrALAZINE, 100 mg, Nasogastric, Q8H  insulin glargine, 15 Units, Subcutaneous, Nightly  insulin regular, 2-9 Units, Subcutaneous,  Q6H  labetalol, 200 mg, Nasogastric, Q12H  levETIRAcetam, 500 mg, Nasogastric, BID  memantine, 10 mg, Nasogastric, Q12H  senna-docusate sodium, 2 tablet, Oral, BID  sodium chloride, 10 mL, Intravenous, Q12H  valsartan, 320 mg, Nasogastric, Daily        clevidipine, 2-32 mg/hr, Last Rate: Stopped (03/03/24 1300)      Lines, Drains & Airways       Active LDAs       Name Placement date Placement time Site Days    Peripheral IV 02/24/24 2237 Anterior;Right Forearm 02/24/24  2237  Forearm  6    Peripheral IV 03/01/24 0953 Anterior;Left Forearm 03/01/24  0953  Forearm  1    NG/OG Tube Nasogastric 10 Fr Right nostril 02/27/24  1315  Right nostril  4    External Urinary Catheter 02/27/24 2000  --  3                  Isolation status: No active isolations    Dietary Orders (From admission, onward)       Start     Ordered    03/04/24 1033  NPO Diet NPO Type: Ice Chips  Diet Effective Now        Question:  NPO Type  Answer:  Ice Chips    03/04/24 1033    02/29/24 1026  Diabetisource AC (Glucerna 1.2); Tube Feeding Type: Continuous; Continuous Tube Feeding Start Rate (mL/hr): 20; Then Advance Rate By (mL/hr): 20; Every __ Hours: 12; To Goal Rate of (mL/hr): 60  Diet Effective Now        Question Answer Comment   Tube Feeding Formula: Diabetisource AC (Glucerna 1.2)    Tube Feeding Type Continuous    Continuous Tube Feeding Start Rate (mL/hr) 20    Then Advance Rate By (mL/hr) 20    Every __ Hours 12    To Goal Rate of (mL/hr) 60    Water Flush Amount (mL) 30    Water Flush Frequency Every 1 Hour        02/29/24 1026    02/27/24 1336  Place Feeding Tube Per Cortrak System  (Tube Feeding Placement DARY)  Until Discontinued        Question:  Tube Feeding Route  Answer:  NG - Nasogastric    02/27/24 1335                    PCCM Problems  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  Neurosurgery is on 3/1/2024.  Now off Cardene drip.  Heart rate slightly lower therefore reduce labetalol and increase clonidine 0.2 twice daily.    Working with speech therapy and some improvement.  Still with tube feeds.  Pending video swallow study today.    Altered mental status improved.     A-fib is rate controlled and without current issues.  Not on anticoagulation for obvious reasons.    On sliding scale and subcutaneous Lantus insulin for diabetes.     On tube feeds.    Discussed with spouse.    Transfer out of ICU.    Consult [facility] to help manage blood sugars.      Yariel Dominguez MD  03/04/24  12:11 EST      Part of this note may be an electronic transcription/translation of spoken language to printed text using the Dragon Dictation System.

## 2024-03-04 NOTE — PROGRESS NOTES
"Nutrition Services    Patient Name:  Erlin Blanco  YOB: 1945  MRN: [id number]  Admit Date:  2/24/2024    Assessment Date:  03/04/24    Comment: TF's at goal of 60mL/hr with Diabetisource AC, min free water. Passed his VFSS today and diet advanced to puree/nectar thick liquids, po pending. SLP is hopeful for diet upgrade fairly soon as he continues to improve. Meds, skin, labs reviewed. Glu 206/207/178. Finally off cleviprex.    Plan/Recommendations:  Hold TF's and see how he does with dinner.  DC Cortrak if eats 50% or more    Will follow clinical course, nutrition needs.    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 Hx Afib, HTN, DM, hyperlipidemia   Current Problems Not safe for po     Encounter Information        Nutrition History    Food Preferences    Supplements    Factors Affecting Intake altered mental status, swallow impairment   Tests/Procedures Clinical Swallow Evaluation - ? today     Anthropometrics        Current Height   Current Weight  BMI kg/m2 Height: 182.9 cm (72\")  Weight: 116 kg (255 lb 11.7 oz) (03/04/24 0600)  Body mass index is 34.68 kg/m².     Adj BMI (if applicable)    BMI Category Obese, Class I (30 - 34.9)       Admission Weight    Ideal Body Weight (IBW) 77.6kg     Adj IBW (if applicable) 255lb   Usual Body Weight (UBW) ~250lb   Weight Change/Trend Stable       Weight history: Wt Readings from Last 30 Encounters:   03/04/24 0600 116 kg (255 lb 11.7 oz)   03/03/24 0600 113 kg (248 lb 14.4 oz)   03/02/24 0600 119 kg (261 lb 11 oz)   02/29/24 0600 112 kg (247 lb 9.2 oz)   02/28/24 0600 115 kg (254 lb 3.1 oz)   02/27/24 0500 115 kg (252 lb 6.8 oz)   02/26/24 0400 113 kg (250 lb)   02/25/24 0600 115 kg (254 lb 10.1 oz)   02/24/24 2244 116 kg (255 lb 1.2 oz)        Estimated/Assessed Needs        Energy Requirements    Weight for Calculation " "115lg   Method for Estimation  15 kcal/kg   EST Needs (kcal/day) 1725       Protein Requirements    Weight for Calculation 77.6kg   EST Protein Needs (g/kg) 1.5 gm/kg   EST Daily Needs (g/day) 116       Fluid Requirements     Method for Estimation Defer to physician    Estimated Needs (mL/day)        Fluid Deficit    Current Na Level (mEq/L) 147   Desired Na Level (mEq/L) unknown   Estimated Fluid Deficit (L)       Labs        Pertinent Labs    Results from last 7 days   Lab Units 03/04/24 0957 03/03/24 0414 03/01/24  1805 03/01/24  0757   SODIUM mmol/L 143 141  --  146*   POTASSIUM mmol/L 3.8 3.8 4.3 3.5   CHLORIDE mmol/L 107 109*  --  112*   CO2 mmol/L 25.9 21.0*  --  25.0   BUN mg/dL 23 18  --  22   CREATININE mg/dL 0.94 0.87  --  1.03   CALCIUM mg/dL 8.8 9.0  --  8.9   GLUCOSE mg/dL 207* 210*  --  280*     Results from last 7 days   Lab Units 03/04/24  0957 03/03/24 0414 03/02/24  0722 02/27/24  0604 02/27/24  0604   MAGNESIUM mg/dL  --  2.5*  --   --  2.4   PHOSPHORUS mg/dL 3.1 2.6  --    < >  --    HEMOGLOBIN g/dL 13.9 14.5  --   --  14.4   HEMATOCRIT % 43.4 44.9  --   --  45.3   WBC 10*3/mm3 8.09 9.27  --   --  10.52   TRIGLYCERIDES mg/dL  --   --  85  --  75   ALBUMIN g/dL 3.4* 3.5  --    < >  --     < > = values in this interval not displayed.     Results from last 7 days   Lab Units 03/04/24 0957 03/03/24 0414 02/27/24  0604   PLATELETS 10*3/mm3 396 369 327     No results found for: \"COVID19\"  Lab Results   Component Value Date    HGBA1C 7.10 (H) 02/25/2024          Medications            Scheduled Medications amLODIPine, 10 mg, Nasogastric, Q24H  cloNIDine, 0.2 mg, Oral, Q12H  hydrALAZINE, 100 mg, Nasogastric, Q8H  insulin glargine, 15 Units, Subcutaneous, Nightly  insulin regular, 2-9 Units, Subcutaneous, Q6H  labetalol, 200 mg, Nasogastric, Q12H  levETIRAcetam, 500 mg, Nasogastric, BID  memantine, 10 mg, Nasogastric, Q12H  senna-docusate sodium, 2 tablet, Oral, BID  sodium chloride, 10 mL, " Intravenous, Q12H  valsartan, 320 mg, Nasogastric, Daily        Infusions        PRN Medications   acetaminophen    acetaminophen    acetaminophen    senna-docusate sodium **AND** polyethylene glycol **AND** bisacodyl **AND** bisacodyl    Calcium Replacement - Follow Nurse / BPA Driven Protocol    dextrose    dextrose    glucagon (human recombinant)    Magnesium Standard Dose Replacement - Follow Nurse / BPA Driven Protocol    nitroglycerin    ondansetron    ondansetron    Phosphorus Replacement - Follow Nurse / BPA Driven Protocol    Potassium Replacement - Follow Nurse / BPA Driven Protocol    sodium chloride    sodium chloride    sodium chloride     Physical Findings          Physical Appearance alert, facial droop, hemiparesis , obese   Oral/Mouth Cavity dental appliance, tooth or teeth missing   Edema  1+ (trace), 3+ (moderate)   Gastrointestinal last bowel movement: 3/1   Skin  pressure injury: stage 2 perineum   Tubes/Drains/Lines Cortrak, NG tube, bridle in place   NFPE No clinical signs of muscle wasting or fat loss   --  Current Nutrition Orders & Evaluation of Intake       Oral Nutrition     Food Allergies NKFA   Current PO Diet Diet: Regular/House; Texture: Pureed (NDD 1); Fluid Consistency: Nectar Thick   Supplement    PO Evaluation     Trending % PO Intake pending      Enteral Nutrition     Enteral Route NG    TF Delivery Method Continuous    Propofol Rate/Kcal     Current TF Order/Rate  Diabetisource AC @ 0 mL/hr    TF Goal Rate 60 mL/hr    Current Water Flush 30 mL Q 4 hr    Modular None    TF Residual  no or minimal residual    TF Tolerance tolerating    TF Observation Verified TF held at this time     Nutrition Diagnosis        Nutrition Dx Problem 1 Problem: Inadequate Oral Intake  Etiology: Medical Diagnosis - Spontaneous left thalamic intracranial hemorrhage with intraventricular extension  Signs/Symptoms: NPO and SLP/Swallow Evaluation    Comment:      INTERVENTION / PLAN OF CARE  Intervention  Goal        Intervention Goal(s) Maintain nutrition status, Reduce/improve symptoms, Meet estimated needs, Disease management/therapy, Initiate TF/PN, Transition TF to PO, and No significant weight loss     Nutrition Intervention        RD Action Continue to monitor, Care plan reviewed, and Recommend/order: EN     Prescription         Diet     Supplement/Snack    EN/PN     Prescription Ordered       Enteral Prescription:     Enteral Route NG    TF Delivery Method Continuous    Enteral Product Diabetisource AC    Modular None    Propofol Rate/Kcal     TF Start Rate 20    TF Goal Rate 60    Free Water Flush 30mL q 1h    TF Provision at Goal: 1728 kcal, 86 gm protein, 1181 mL free water + 720 mL water flushes         Calories 100 % needs met         Protein  74 % needs met         Fluid (mL) 1901mL    Prescription Ordered No, recommended DC If eats well     Monitor/Evaluation        Monitor Per protocol   Discharge Plan Pending clinical course   Education Will instruct as appropriate     RD to follow per protocol.       Electronically signed by:  Allegra Wiseman RD  03/04/24 13:22 EST

## 2024-03-04 NOTE — PROGRESS NOTES
[facility] NEUROSURGERY INTRACRANIAL PROGRESS NOTE    PATIENT IDENTIFICATION:   Name:  Erlin Blanco      MRN:  [id number]     78 y.o.  male               CC: spontaneous L thalamic ICH w/IVH and HCP      Subjective     Interval History: Just completed working with PT and OT per wife who is at bedside.  Cleviprex stopped yesterday.  Anticipating video swallow today.  Tolerating tube feeds.    ROS:  Constitutional: No fever, chills  HEENT: Mild headache  GI: No vomiting  Neuro: Right-sided weakness    Objective     Vital signs in last 24 hours:  Temp:  [97.3 °F (36.3 °C)-98.1 °F (36.7 °C)] 97.9 °F (36.6 °C)  Heart Rate:  [56-81] 56  BP: (106-173)/(56-92) 173/88      Intake/Output this shift:  No intake/output data recorded.      Intake/Output last 3 shifts:  I/O last 3 completed shifts:  In: 830 [Other:120; NG/GT:710]  Out: 2550 [Urine:2550]    LABS:  Results from last 7 days   Lab Units 03/04/24  0957 03/03/24  0414 02/27/24  0604   WBC 10*3/mm3 8.09 9.27 10.52   HEMOGLOBIN g/dL 13.9 14.5 14.4   HEMATOCRIT % 43.4 44.9 45.3   PLATELETS 10*3/mm3 396 369 327     Results from last 7 days   Lab Units 03/04/24  0957 03/03/24  0414 03/01/24  1805 03/01/24  0757   SODIUM mmol/L 143 141  --  146*   POTASSIUM mmol/L 3.8 3.8 4.3 3.5   CHLORIDE mmol/L 107 109*  --  112*   CO2 mmol/L 25.9 21.0*  --  25.0   BUN mg/dL 23 18  --  22   CREATININE mg/dL 0.94 0.87  --  1.03   CALCIUM mg/dL 8.8 9.0  --  8.9   GLUCOSE mg/dL 207* 210*  --  280*     EEG: 3/1  Clinical Interpretation:  This routine EEG recording is normal in the awake and sleep states.  No potentially epileptogenic activity, seizure activity, or focal slowing is present.     IMAGING STUDIES:  No new MICAELA imaging    I personally viewed and interpreted the patient's chart.      Meds reviewed/changed: Yes    Current Facility-Administered Medications:     acetaminophen (TYLENOL) suppository 650 mg, 650 mg, Rectal, Q4H PRN, Chet Rivero Jr., MD, 650 mg at 02/28/24 0950     acetaminophen (TYLENOL) suppository 650 mg, 650 mg, Rectal, Q4H PRN, Chet Rivero Jr., MD    acetaminophen (TYLENOL) tablet 650 mg, 650 mg, Nasogastric, Q4H PRN, Chet Rivero Jr., MD, 650 mg at 02/29/24 2016    amLODIPine (NORVASC) tablet 10 mg, 10 mg, Nasogastric, Q24H, Chet Rivero Jr., MD, 10 mg at 03/04/24 0822    sennosides-docusate (PERICOLACE) 8.6-50 MG per tablet 2 tablet, 2 tablet, Oral, BID, 2 tablet at 03/04/24 0822 **AND** polyethylene glycol (MIRALAX) packet 17 g, 17 g, Oral, Daily PRN, 17 g at 03/04/24 0823 **AND** bisacodyl (DULCOLAX) EC tablet 5 mg, 5 mg, Oral, Daily PRN **AND** bisacodyl (DULCOLAX) suppository 10 mg, 10 mg, Rectal, Daily PRN, Chet Rivero Jr., MD    Calcium Replacement - Follow Nurse / BPA Driven Protocol, , Does not apply, PRN, Chet Rivero Jr., MD    cloNIDine (CATAPRES) tablet 0.2 mg, 0.2 mg, Oral, Q12H, Yariel Dominguez MD    dextrose (D50W) (25 g/50 mL) IV injection 25 g, 25 g, Intravenous, Q15 Min PRN, Chet Rivero Jr., MD    dextrose (GLUTOSE) oral gel 15 g, 15 g, Oral, Q15 Min PRN, Chet Rivero Jr., MD    glucagon (GLUCAGEN) injection 1 mg, 1 mg, Intramuscular, Q15 Min PRN, Chet Rivero Jr., MD    hydrALAZINE (APRESOLINE) tablet 100 mg, 100 mg, Nasogastric, Q8H, Chet Rivero Jr., MD, 100 mg at 03/04/24 1340    insulin glargine (LANTUS, SEMGLEE) injection 15 Units, 15 Units, Subcutaneous, Nightly, Yariel Dominguez MD, 15 Units at 03/03/24 2024    insulin regular (humuLIN R,novoLIN R) injection 2-9 Units, 2-9 Units, Subcutaneous, Q6H, Chet Rivero Jr., MD, 3 Units at 03/04/24 1339    labetalol (NORMODYNE) tablet 200 mg, 200 mg, Nasogastric, Q12H, Yariel Dominguez MD    levETIRAcetam (KEPPRA) tablet 500 mg, 500 mg, Nasogastric, BID, Chet Rivero Jr., MD, 500 mg at 03/04/24 0823    Magnesium Standard Dose Replacement - Follow Nurse / BPA Driven Protocol, , Does not apply, PRN, Chet Rivero Jr., MD     "memantine (NAMENDA) tablet 10 mg, 10 mg, Nasogastric, Q12H, Chet Rivero Jr., MD, 10 mg at 03/04/24 0823    nitroglycerin (NITROSTAT) SL tablet 0.4 mg, 0.4 mg, Sublingual, Q5 Min PRN, Chet Rivero Jr., MD    ondansetron (ZOFRAN) injection 4 mg, 4 mg, Intravenous, Once PRN, Chet Rivero Jr., MD    ondansetron (ZOFRAN) injection 4 mg, 4 mg, Intravenous, Q6H PRN, Chet Rivero Jr., MD    Phosphorus Replacement - Follow Nurse / BPA Driven Protocol, , Does not apply, Josefa SCHMIDT [place] Jr., MD    Potassium Replacement - Follow Nurse / BPA Driven Protocol, , Does not apply, PRN, Chet Rivero Jr., MD    sodium chloride 0.9 % flush 10 mL, 10 mL, Intravenous, PRN, Chet Rivero Jr., MD    sodium chloride 0.9 % flush 10 mL, 10 mL, Intravenous, Q12H, Chet Rivero Jr., MD, 10 mL at 03/04/24 0824    sodium chloride 0.9 % flush 10 mL, 10 mL, Intravenous, PRN, Chet Rivero Jr., MD    sodium chloride 0.9 % infusion 40 mL, 40 mL, Intravenous, PRN, Chet Rivero Jr., MD    valsartan (DIOVAN) tablet 320 mg, 320 mg, Nasogastric, Daily, Chet Rivero Jr., MD, 320 mg at 03/04/24 0822      Physical Exam:    General:   Lethargic but opens eyes to voice.  When asked name states \"I do not know\".  Follows commands on the left   CN III IV VI:   Pupils 3 mm brisk  CN VII:   Right-sided facial weakness   motor:    Right upper extremity flaccid.  Withdraws briskly right lower extremity.  Follows commands on the left   Station and Gait:  Per PT note 3/4-awake and following commands, good participation and effort, pt required max a x 2 to come to sit, attempted standing but pt not able to initiate stand, pt still pushing strongly to the right, able to work on sitting balance, trunk control, and attend to R side.   Coordination:  Patient does not participate enough to assess finger-to-nose status  Extremities:   Wearing SCD    Assessment & Plan     ASSESSMENT:      " "Intraparenchymal hemorrhage of brain    Seizures    Patient now 9 days from presentation with spontaneous large intraparenchymal hemorrhage due to hypertensive crisis.  His imaging has been stable of late despite early increase in IPH and development of intraventricular hemorrhage due to being on Eliquis.  He remains lethargic at times but was working with PT and OT not too long prior to my presentation and their notes indicate awake and alert.  He will open eyes and follow commands but falls to sleep readily.  He is off IV antihypertensives and will have swallow eval today with speech therapy video.  From neurosurgery standpoint he can transfer to floor begin working towards stretcher rehab.  Continue off Eliquis for now.  Patient with spontaneous intraparenchymal hemorrhage likely due to hypertensive crisis.     PLAN:   Repeat CT head 3/6  Continue therapies and rehab eval  keep SBP<160  Every 4 neurochecks  We will follow  Continue to hold Eliquis  Cont Keppra 1gm BID (per neurology dosing change 3/1)    I discussed the patient's findings and my recommendations with patient, family, nursing staff, and Dr. Miller    During patient visit, I utilized appropriate personal protective equipment including gloves. Appropriate PPE was worn during the entire visit.  Hand hygiene was completed before and after.      chief complaint, exam, MDM data copied forward from previous encounters in EMR is unchanged.        LOS: 9 days       Lucia Monson, APRN  3/4/2024  14:26 EST    \"Dictated utilizing Dragon dictation\".      "

## 2024-03-04 NOTE — PLAN OF CARE
Goal Outcome Evaluation:  Plan of Care Reviewed With: patient, family      Pt is showing some signs of improving, he follows commands and tries to answer questions.  He has said a few words clearly.  Family remains at bedside.  Still has issues with elevated BP however was able to remain off of his Drips.

## 2024-03-04 NOTE — PLAN OF CARE
Problem: Adult Inpatient Plan of Care  Goal: Absence of Hospital-Acquired Illness or Injury  Intervention: Identify and Manage Fall Risk  Recent Flowsheet Documentation  Taken 3/4/2024 1552 by Antonia Lindquist, RN  Safety Promotion/Fall Prevention:   safety round/check completed   nonskid shoes/slippers when out of bed     Problem: Adult Inpatient Plan of Care  Goal: Absence of Hospital-Acquired Illness or Injury  Intervention: Prevent and Manage VTE (Venous Thromboembolism) Risk  Recent Flowsheet Documentation  Taken 3/4/2024 1552 by Antonia Lindquist, RN  VTE Prevention/Management:   bilateral   sequential compression devices on  Range of Motion:   active ROM (range of motion) encouraged   ROM (range of motion) performed   Goal Outcome Evaluation:

## 2024-03-04 NOTE — PLAN OF CARE
Goal Outcome Evaluation:              Outcome Evaluation: VFSS complete. Radiologist, Dr. Sanches, present during study. Patient presents mild oropharyngeal dysphagia. Recommend puree diet with nectar thick liquids. Meds whole or crushed in puree. Upright for meals, slow rate, small bites/sips, supervision/assist with meals. This is a conservative diet. Suspect patient will be appropriate for upgrade (mechanical ground with thins, no straws) at the bedside as mental status and oral stage improves, per SLP discretion. Speech to follow.      Anticipated Discharge Disposition (SLP): anticipate therapy at next level of care          SLP Swallowing Diagnosis: mild, oral dysphagia, pharyngeal dysphagia (03/04/24 1200)

## 2024-03-04 NOTE — MBS/VFSS/FEES
Acute Care - Speech Language Pathology   Swallow Initial Evaluation [facility]     Patient Name: Erlin Blanco  : 1945  MRN: [id number]  Today's Date: 3/4/2024               Admit Date: 2024    Visit Dx:     ICD-10-CM ICD-9-CM   1. Intraparenchymal hemorrhage of brain  I61.9 431   2. Facial droop  R29.810 781.94   3. Expressive aphasia  R47.01 784.3   4. Weakness of right upper extremity  R29.898 729.89   5. Chronic anticoagulation  Z79.01 V58.61     Patient Active Problem List   Diagnosis    Intraparenchymal hemorrhage of brain    Seizures     History reviewed. No pertinent past medical history.  History reviewed. No pertinent surgical history.    SLP Recommendation and Plan  SLP Swallowing Diagnosis: mild, oral dysphagia, pharyngeal dysphagia (24)  SLP Diet Recommendation: puree, nectar thick liquids (24)  Recommended Precautions and Strategies: upright posture during/after eating, small bites of food and sips of liquid, multiple swallows per bite of food, multiple swallows per sip of liquid, alternate between small bites of food and sips of liquid, 1:1 supervision, assist with feeding (24)  SLP Rec. for Method of Medication Administration: meds whole, meds crushed, with puree, as tolerated (24)     Monitor for Signs of Aspiration: yes, notify SLP if any concerns (24)  Recommended Diagnostics: reassess via clinical swallow evaluation, reassess via VFSS ([facility] [place]) (24)  Swallow Criteria for Skilled Therapeutic Interventions Met: demonstrates skilled criteria (24)  Anticipated Discharge Disposition (SLP): anticipate therapy at next level of care (24)  Rehab Potential/Prognosis, Swallowing: good, to achieve stated therapy goals (24)  Therapy Frequency (Swallow): PRN (24)  Predicted Duration Therapy Intervention (Days): until discharge (24)  Oral Care Recommendations: Oral Care BID/PRN,  Before ice/water (03/04/24 1200)                                        Outcome Evaluation: VFSS complete. Radiologist, Dr. Sanches, present during study. Patient presents mild oropharyngeal dysphagia. Recommend puree diet with nectar thick liquids. Meds whole or crushed in puree. Upright for meals, slow rate, small bites/sips, supervision/assist with meals. This is a conservative diet. Suspect patient will be appropriate for upgrade (mechanical ground with thins, no straws) at the bedside as mental status and oral stage improves, per SLP discretion. Speech to follow.      SWALLOW EVALUATION (Last 72 Hours)       SLP Adult Swallow Evaluation       Row Name 03/04/24 1200 03/02/24 1400                Rehab Evaluation    Document Type evaluation  -CR re-evaluation  -AB       Subjective Information no complaints  -CR no complaints  -AB       Patient Observations alert;cooperative  -CR alert;cooperative;agree to therapy  -AB       Patient/Family/Caregiver Comments/Observations -- seen in [id number]  -AB       Patient Effort good  -CR good  -AB       Symptoms Noted During/After Treatment none  -CR none  -AB          General Information    Patient Profile Reviewed yes  -CR --       Pertinent History Of Current Problem 79 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  -CR --       Current Method of Nutrition NPO  -CR --       Precautions/Limitations, Vision WFL;for purposes of eval  -CR --       Precautions/Limitations, Hearing WFL;for purposes of eval  -CR --       Prior Level of Function-Swallowing no diet consistency restrictions  -CR --       Plans/Goals Discussed with patient;family;agreed upon  -CR --       Barriers to Rehab none identified  -CR --          Pain Scale: Numbers Pre/Post-Treatment    Pretreatment Pain Rating 0/10 - no pain  -CR --          Oral Motor Structure and Function    Dentition Assessment natural, present and adequate  -CR --       Secretion Management dried secretions in oral cavity  -CR --       Mucosal Quality dry   -CR --          Oral Musculature and Cranial Nerve Assessment    Oral Motor General Assessment mandibular impairment;oral labial or buccal impairment;lingual impairment  -CR --       Mandibular Impairment Detail, Cranial Nerve V (Trigeminal) reduced strength on right  -CR --       Oral Labial or Buccal Impairment, Detail, Cranial Nerve VII (Facial): right labial droop  -CR --       Vocal Impairment, Detail. Cranial Nerve X (Vagus) --  hoarse vocal quality  -CR --          Clinical Swallow Eval    Clinical Swallow Evaluation Summary -- Bedside swallow evaluation completed 2.26, daily re-eval with recs for VFSS, cancelled, pt has remained NPO since admit. No prior hx dysphagia. Pt with dried oral mucosa coating lingual surface and palate, resistant to oral suction and oral care. Ice chips loosen, eventual manual removal per SLP. Pt with adequate manipulation of ice chips, transport of puree and thins by tsp/straw. Intermittent R spillage with straw sips, did not assess cups d/t UE in restraints. Pharyngeal phase with x1 throat clear thins by straw, severe impulsivity in bolus size and rate. Cued cough weak. Max cues required for initiation of second swallow.      D/w spouse, appears improved at bedside, alertness WFL, however, concerns for pharyngeal impairment in setting of voice impairment and poor cough quality, dried oral secretions. Recommend proceeding with instrumental to r/o pharyngeal impairment - all in agreement.      Continue NPO until VFSS completed.   Pt is cleared for water protocol which states: may have thin ice/water following oral care, cease if s/s aspiration or clinical difficulties noted.  -AB          MBS/VFSS Interpretation    VFSS Summary VFSS complete. Radiologist, Dr. Sanches, present during study. Patient presents mild oropharyngeal dysphagia characterized by reduced hyolaryngeal elevation/excursion, delayed swallow, reduced upper esophageal sphincter opening, and poor oral prep stage.  Suspect mental status is also impacting swallow as patient with difficulties following directions. Spill to the valleculae with thin and nectar trials via spoon. Spill to the pyriforms with cup/straw trials. Deep silent penetration to the vocal cords during the swallow with thin liquid by straw. Difficult to determine if penetration was transient or slightly trace nontransient. No penetration with nectar by straw or puree trial. Cued double swallow to reduce mild-moderate oral/pharyngeal residue. Patient with prolonged disorganized munching mastication. No penetration/aspiration of portion of bolus. Remaining portion of bolus manually removed from left side of oral cavity. Recommend puree diet with nectar thick liquids. Meds whole or crushed in puree. Upright for meals, slow rate, small bites/sips, supervision/assist with meals. This is a conservative diet. Suspect patient will be appropriate for upgrade (mechanical ground with thins, no straws) at the bedside as mental status and oral stage improves, per SLP discretion. Speech to follow.  -CR --          SLP Communication to Radiology    Summary Statement VFSS complete. Radiologist, Dr. Sanches, present during the study. Penetration with thin liquids via straw, question trace nontransient versus transient penetration. No other penetration/aspiration visualized. Mild-moderate oral/pharyngeal residue post swallow reduced with cued double swallow.  -CR --          SLP Evaluation Clinical Impression    SLP Swallowing Diagnosis mild;oral dysphagia;pharyngeal dysphagia  -CR oral dysphagia;R/O pharyngeal dysphagia  -AB       Functional Impact risk of aspiration/pneumonia  -CR risk of aspiration/pneumonia  -AB       Rehab Potential/Prognosis, Swallowing good, to achieve stated therapy goals  -CR good, to achieve stated therapy goals  -AB       Swallow Criteria for Skilled Therapeutic Interventions Met demonstrates skilled criteria  -CR demonstrates skilled criteria  -AB           SLP Treatment Clinical Impressions    Care Plan Review -- evaluation/treatment results reviewed;care plan/treatment goals reviewed;risks/benefits reviewed;current/potential barriers reviewed;patient/other agree to care plan  -AB          Recommendations    Therapy Frequency (Swallow) PRN  -CR PRN  -AB       Predicted Duration Therapy Intervention (Days) until discharge  -CR until discharge  -AB       SLP Diet Recommendation puree;nectar thick liquids  -CR ice chips between meals after oral care, with supervision;water between meals after oral care, with supervision  -AB       Recommended Diagnostics reassess via clinical swallow evaluation;reassess via VFSS (MBS)  -CR VFSS (MBS)  -AB       Recommended Precautions and Strategies upright posture during/after eating;small bites of food and sips of liquid;multiple swallows per bite of food;multiple swallows per sip of liquid;alternate between small bites of food and sips of liquid;1:1 supervision;assist with feeding  -CR --       Oral Care Recommendations Oral Care BID/PRN;Before ice/water  -CR Oral Care BID/PRN;Before ice/water  -AB       SLP Rec. for Method of Medication Administration meds whole;meds crushed;with puree;as tolerated  -CR meds via alternate route  -AB       Monitor for Signs of Aspiration yes;notify SLP if any concerns  -CR yes;notify SLP if any concerns  -AB       Anticipated Discharge Disposition (SLP) anticipate therapy at next level of care  -CR anticipate therapy at next level of care  -AB          (LTG) Patient will demonstrate functional swallow for    Diet Texture (Demonstrate functional swallow) -- regular textures  -AB       Liquid viscosity (Demonstrate functional swallow) -- thin liquids  -AB       [place] (Demonstrate functional swallow) -- independently (over 90% accuracy)  -AB       Time Frame (Demonstrate functional swallow) -- by discharge  -AB       Progress/Outcomes (Demonstrate functional swallow) -- progress slower than  expected  -AB                 User Key  (r) = Recorded By, (t) = Taken By, (c) = Cosigned By      Initials Name Effective Dates    AB Tiffany Allen, SLP 01/05/24 -     Alesha Hawley, SLP 08/28/23 -                     EDUCATION  The patient has been educated in the following areas:   Dysphagia (Swallowing Impairment).        SLP GOALS       Row Name 03/02/24 1458 03/02/24 1400          (LTG) Patient will demonstrate functional swallow for    Diet Texture (Demonstrate functional swallow) -- regular textures  -AB     Liquid viscosity (Demonstrate functional swallow) -- thin liquids  -AB     [place] (Demonstrate functional swallow) -- independently (over 90% accuracy)  -AB     Time Frame (Demonstrate functional swallow) -- by discharge  -AB     Progress/Outcomes (Demonstrate functional swallow) -- progress slower than expected  -AB        Comprehend Questions Goal 1 (SLP)    Improve Ability to Comprehend Questions Goal 1 (SLP) simple yes/no questions;80%;with minimal cues (75-90%)  -AB --     Time Frame (Comprehend Questions Goal 1, SLP) short term goal (STG)  -AB --     Progress/Outcomes (Comprehend Questions Goal 1, SLP) new goal  -AB --        Follow Directions Goal 2 (SLP)    Improve Ability to Follow Directions Goal 1 (SLP) 2 step commands;80%;with moderate cues (50-74%)  -AB --     Time Frame (Follow Directions Goal 1, SLP) short term goal (STG)  -AB --     Progress/Outcomes (Follow Directions Goal 1, SLP) new goal  -AB --        Word Retrieval Skills Goal 1 (SLP)    Improve Word Retrieval Skills By Goal 1 (SLP) responsive naming task;80%;with minimal cues (75-90%)  -AB --     Time Frame (Word Retrieval Goal 1, SLP) short term goal (STG)  -AB --     Progress/Outcomes (Word Retrieval Goal 1, SLP) new goal  -AB --        Word Retrieval Skills Goal 2 (SLP)    Improve Word Retrieval Skills By Goal 2 (SLP) confrontational naming task;80%;with minimal cues (75-90%)  -AB --     Time Frame (Word Retrieval  Goal 2, SLP) short term goal (STG)  -AB --     Progress/Outcomes (Word Retrieval Goal 2, SLP) new goal  -AB --               User Key  (r) = Recorded By, (t) = Taken By, (c) = Cosigned By      Initials Name Provider Type    Tiffany Calles SLP Speech and Language Pathologist                       Time Calculation:    Time Calculation- SLP       Row Name 03/04/24 1320             Time Calculation- SLP    SLP Start Time 0745  -CR      SLP Received On 03/04/24  -CR         Untimed Charges    93306-NM Motion Fluoro Eval Swallow Minutes 60  -CR         Total Minutes    Untimed Charges Total Minutes 60  -CR       Total Minutes 60  -CR                User Key  (r) = Recorded By, (t) = Taken By, (c) = Cosigned By      Initials Name Provider Type    Alesha Hawley SLP Speech and Language Pathologist                    Therapy Charges for Today       Code Description Service Date Service Provider Modifiers Qty    71581443390 HC ST MOTION FLUORO EVAL SWALLOW 4 3/4/2024 Alesha Shaikh SLP GN 1                 PATRICK Tomlin  3/4/2024

## 2024-03-04 NOTE — THERAPY TREATMENT NOTE
Patient Name: Erlin Blanco  : 1945    MRN: [id number]                              Today's Date: 3/4/2024       Admit Date: 2024    Visit Dx:     ICD-10-CM ICD-9-CM   1. Intraparenchymal hemorrhage of brain  I61.9 431   2. Facial droop  R29.810 781.94   3. Expressive aphasia  R47.01 784.3   4. Weakness of right upper extremity  R29.898 729.89   5. Chronic anticoagulation  Z79.01 V58.61     Patient Active Problem List   Diagnosis    Intraparenchymal hemorrhage of brain    Seizures     History reviewed. No pertinent past medical history.  History reviewed. No pertinent surgical history.   General Information       Row Name 24 1048          Physical Therapy Time and Intention    Document Type therapy note (daily note)  -     Mode of Treatment co-treatment;occupational therapy;physical therapy;other (see comments)  -PC       Row Name 24 1048          General Information    Existing Precautions/Restrictions fall  -PC       Row Name 24 1048          Cognition    Orientation Status (Cognition) oriented to;person;place  -PC       Row Name 24 1048          Safety Issues, Functional Mobility    Comment, Safety Issues/Impairments (Mobility) Co treatment medically appropriate and necessary due to patient acuity level, activity tolerance and safety of patient and staff. Treatment is focusing on progression of care and goals established in the POC.  -PC               User Key  (r) = Recorded By, (t) = Taken By, (c) = Cosigned By      Initials Name Provider Type    PC Audrey Ramirez PT Physical Therapist                   Mobility       Row Name 24 1051          Bed Mobility    Supine-Sit [place] (Bed Mobility) maximum assist (25% patient effort);2 person assist  -PC     Sit-Supine [place] (Bed Mobility) maximum assist (25% patient effort);2 person assist  -PC     Assistive Device (Bed Mobility) bed rails;head of bed elevated  -PC     Comment, (Bed Mobility) Max A x 2 with  heavy push to R  -PC       Row Name 03/04/24 1051          Sit-Stand Transfer    Sit-Stand [place] (Transfers) maximum assist (25% patient effort);dependent (less than 25% patient effort);2 person assist  -PC     Comment, (Sit-Stand Transfer) attmepted sit to stand twice, pt not able to initiate stand, not able to clear buttocks  -PC               User Key  (r) = Recorded By, (t) = Taken By, (c) = Cosigned By      Initials Name Provider Type    Audrey Pool PT Physical Therapist                   Obj/Interventions       Row Name 03/04/24 1053          Motor Skills    Therapeutic Exercise --  perf PROM R UE and RLE, no active movement RLE  -PC       Row Name 03/04/24 1053          Balance    Static Sitting Balance moderate assist;maximum assist;2-person assist  -PC     Dynamic Sitting Balance maximum assist;2-person assist  -PC     Position, Sitting Balance sitting edge of bed;supported  -PC     Balance Interventions sitting;static;core stability exercise;dynamic reaching;occupation based/functional task  -PC     Comment, Balance pt leaning Right and pushing strongly to right, perf activites in sitting to diminish pushing and worked on sitting balance, midline orientation, head control, pt attending to the right a little more than previous visits per OT and family  -PC               User Key  (r) = Recorded By, (t) = Taken By, (c) = Cosigned By      Initials Name Provider Type    PC Audrey Ramirez PT Physical Therapist                   Goals/Plan    No documentation.                  Clinical Impression       Row Name 03/04/24 1056          Pain    Pretreatment Pain Rating 0/10 - no pain  -PC       Row Name 03/04/24 1056          Plan of Care Review    Plan of Care Reviewed With patient;family  -PC     Progress improving  -PC     Outcome Evaluation Pt awake and following commands, good participation and effort, pt required max a x 2 to come to sit, attempted standing but pt not able to initiate stand,  pt still pushing strongly to the right, able to work on sitting balance, trunk control, and attend to R side. Pt is a good candidate for acute rehab.  -PC       Row Name 03/04/24 1056          Positioning and Restraints    Pre-Treatment Position in bed  -PC     Post Treatment Position bed  -PC     In Bed supine;call light within reach;encouraged to call for assist;with family/caregiver  -PC               User Key  (r) = Recorded By, (t) = Taken By, (c) = Cosigned By      Initials Name Provider Type    PC Audrey Ramirez PT Physical Therapist                   Outcome Measures       Row Name 03/04/24 1100          How much help from another person do you currently need...    Turning from your back to your side while in flat bed without using bedrails? 2  -PC     Moving from lying on back to sitting on the side of a flat bed without bedrails? 1  -PC     Moving to and from a bed to a chair (including a wheelchair)? 1  -PC     Standing up from a chair using your arms (e.g., wheelchair, bedside chair)? 1  -PC     Climbing 3-5 steps with a railing? 1  -PC     To walk in hospital room? 1  -PC     AM-PAC 6 Clicks Score (PT) 7  -PC     Highest Level of Mobility Goal 2 --> Bed activities/dependent transfer  -PC               User Key  (r) = Recorded By, (t) = Taken By, (c) = Cosigned By      Initials Name Provider Type    Audrey Pool PT Physical Therapist                                 Physical Therapy Education       Title: PT OT SLP Therapies (In Progress)       Topic: Physical Therapy (In Progress)       Point: Mobility training (In Progress)       Learning Progress Summary             Patient Acceptance, E,D, DU by PC at 3/4/2024 1100    Acceptance, E,TB, NR by CB at 3/2/2024 1520    Acceptance, E,TB, NR,NL by MS at 2/27/2024 1320   Family Acceptance, E,TB, NR by CB at 3/2/2024 1520                         Point: Home exercise program (In Progress)       Learning Progress Summary             Patient Acceptance,  E,D, DU by PC at 3/4/2024 1100    Acceptance, E,TB, NR by CB at 3/2/2024 1520   Family Acceptance, E,TB, NR by CB at 3/2/2024 1520                         Point: Body mechanics (In Progress)       Learning Progress Summary             Patient Acceptance, E,D, DU by PC at 3/4/2024 1100    Acceptance, E,TB, NR by CB at 3/2/2024 1520    Acceptance, E,TB, VU,NR by  at 2/29/2024 0920    Acceptance, E,TB, NR,NL by MS at 2/27/2024 1320   Family Acceptance, E,TB, NR by CB at 3/2/2024 1520    Acceptance, E,TB, VU,NR by  at 2/29/2024 0920                         Point: Precautions (In Progress)       Learning Progress Summary             Patient Acceptance, E,D, DU by PC at 3/4/2024 1100    Acceptance, E,TB, NR by CB at 3/2/2024 1520    Acceptance, E,TB, VU,NR by  at 2/29/2024 0920    Acceptance, E,TB, NR,NL by MS at 2/27/2024 1320   Family Acceptance, E,TB, NR by CB at 3/2/2024 1520    Acceptance, E,TB, VU,NR by  at 2/29/2024 0920                                         User Key       Initials Effective Dates Name Provider Type Discipline     06/16/21 -  Audrey Ramirez, PT Physical Therapist PT    MS 06/16/21 -  Lyla Edwards, PT Physical Therapist PT     10/22/21 -  Mag Monson, PT Physical Therapist PT     01/24/24 -  Rick Maynard, PT Student PT Student PT                  PT Recommendation and Plan     Plan of Care Reviewed With: patient, family  Progress: improving  Outcome Evaluation: Pt awake and following commands, good participation and effort, pt required max a x 2 to come to sit, attempted standing but pt not able to initiate stand, pt still pushing strongly to the right, able to work on sitting balance, trunk control, and attend to R side. Pt is a good candidate for acute rehab.     Time Calculation:         PT Charges       Row Name 03/04/24 1101             Time Calculation    Start Time 0846  -PC      Stop Time 0912  -PC      Time Calculation (min) 26 min  -PC      PT Received On  03/04/24  -PC      PT - Next Appointment 03/05/24  -PC                User Key  (r) = Recorded By, (t) = Taken By, (c) = Cosigned By      Initials Name Provider Type    PC Audrey Ramirez PT Physical Therapist                  Therapy Charges for Today       Code Description Service Date Service Provider Modifiers Qty    04338492533  PT NEUROMUSC RE EDUCATION EA 15 MIN 3/4/2024 Audrey Ramirez, PT GP 1    46296120622 HC PT THER PROC EA 15 MIN 3/4/2024 Audrey Ramirez, PT GP 1            PT G-Codes  Outcome Measure Options: AM-PAC 6 Clicks Daily Activity (OT)  AM-PAC 6 Clicks Score (PT): 7  AM-PAC 6 Clicks Score (OT): 8  Modified Calli Scale: 5 - Severe disability.  Bedridden, incontinent, and requiring constant nursing care and attention.  PT Discharge Summary  Anticipated Discharge Disposition (PT): inpatient rehabilitation facility    Audrey Ramirez PT  3/4/2024

## 2024-03-05 PROBLEM — R13.12 OROPHARYNGEAL DYSPHAGIA: Status: ACTIVE | Noted: 2024-03-05

## 2024-03-05 PROBLEM — I48.0 PAROXYSMAL ATRIAL FIBRILLATION: Status: ACTIVE | Noted: 2024-03-05

## 2024-03-05 PROBLEM — G81.91 RIGHT HEMIPARESIS: Status: ACTIVE | Noted: 2024-03-05

## 2024-03-05 LAB
ALBUMIN SERPL-MCNC: 3.6 G/DL (ref 3.5–5.2)
ANION GAP SERPL CALCULATED.3IONS-SCNC: 8 MMOL/L (ref 5–15)
BUN SERPL-MCNC: 18 MG/DL (ref 8–23)
BUN/CREAT SERPL: 17 (ref 7–25)
CALCIUM SPEC-SCNC: 9.3 MG/DL (ref 8.6–10.5)
CHLORIDE SERPL-SCNC: 107 MMOL/L (ref 98–107)
CO2 SERPL-SCNC: 26 MMOL/L (ref 22–29)
CREAT SERPL-MCNC: 1.06 MG/DL (ref 0.76–1.27)
DEPRECATED RDW RBC AUTO: 50.5 FL (ref 37–54)
EGFRCR SERPLBLD CKD-EPI 2021: 72.7 ML/MIN/1.73
ERYTHROCYTE [DISTWIDTH] IN BLOOD BY AUTOMATED COUNT: 16 % (ref 12.3–15.4)
GLUCOSE BLDC GLUCOMTR-MCNC: 147 MG/DL (ref 70–130)
GLUCOSE BLDC GLUCOMTR-MCNC: 165 MG/DL (ref 70–130)
GLUCOSE BLDC GLUCOMTR-MCNC: 208 MG/DL (ref 70–130)
GLUCOSE BLDC GLUCOMTR-MCNC: 246 MG/DL (ref 70–130)
GLUCOSE BLDC GLUCOMTR-MCNC: 288 MG/DL (ref 70–130)
GLUCOSE SERPL-MCNC: 175 MG/DL (ref 65–99)
HCT VFR BLD AUTO: 44.9 % (ref 37.5–51)
HGB BLD-MCNC: 14.5 G/DL (ref 13–17.7)
MCH RBC QN AUTO: 28.2 PG (ref 26.6–33)
MCHC RBC AUTO-ENTMCNC: 32.3 G/DL (ref 31.5–35.7)
MCV RBC AUTO: 87.4 FL (ref 79–97)
PHOSPHATE SERPL-MCNC: 3 MG/DL (ref 2.5–4.5)
PLATELET # BLD AUTO: 414 10*3/MM3 (ref 140–450)
PMV BLD AUTO: 10.1 FL (ref 6–12)
POTASSIUM SERPL-SCNC: 3.8 MMOL/L (ref 3.5–5.2)
RBC # BLD AUTO: 5.14 10*6/MM3 (ref 4.14–5.8)
SODIUM SERPL-SCNC: 141 MMOL/L (ref 136–145)
WBC NRBC COR # BLD AUTO: 8.59 10*3/MM3 (ref 3.4–10.8)

## 2024-03-05 PROCEDURE — 97530 THERAPEUTIC ACTIVITIES: CPT

## 2024-03-05 PROCEDURE — 63710000001 INSULIN GLARGINE PER 5 UNITS: Performed by: INTERNAL MEDICINE

## 2024-03-05 PROCEDURE — 87102 FUNGUS ISOLATION CULTURE: CPT | Performed by: INTERNAL MEDICINE

## 2024-03-05 PROCEDURE — 25010000002 PROCHLORPERAZINE 10 MG/2ML SOLUTION: Performed by: INTERNAL MEDICINE

## 2024-03-05 PROCEDURE — 97112 NEUROMUSCULAR REEDUCATION: CPT

## 2024-03-05 PROCEDURE — 63710000001 INSULIN REGULAR HUMAN PER 5 UNITS: Performed by: INTERNAL MEDICINE

## 2024-03-05 PROCEDURE — 80069 RENAL FUNCTION PANEL: CPT | Performed by: INTERNAL MEDICINE

## 2024-03-05 PROCEDURE — 82948 REAGENT STRIP/BLOOD GLUCOSE: CPT

## 2024-03-05 PROCEDURE — 85027 COMPLETE CBC AUTOMATED: CPT | Performed by: INTERNAL MEDICINE

## 2024-03-05 RX ORDER — CHOLECALCIFEROL (VITAMIN D3) 125 MCG
5 CAPSULE ORAL NIGHTLY
Status: DISCONTINUED | OUTPATIENT
Start: 2024-03-05 | End: 2024-03-15 | Stop reason: HOSPADM

## 2024-03-05 RX ORDER — PROCHLORPERAZINE EDISYLATE 5 MG/ML
5 INJECTION INTRAMUSCULAR; INTRAVENOUS EVERY 6 HOURS PRN
Status: DISCONTINUED | OUTPATIENT
Start: 2024-03-05 | End: 2024-03-15 | Stop reason: HOSPADM

## 2024-03-05 RX ADMIN — CLONIDINE HYDROCHLORIDE 0.2 MG: 0.1 TABLET ORAL at 21:08

## 2024-03-05 RX ADMIN — LABETALOL HYDROCHLORIDE 200 MG: 200 TABLET, FILM COATED ORAL at 08:38

## 2024-03-05 RX ADMIN — INSULIN GLARGINE 15 UNITS: 100 INJECTION, SOLUTION SUBCUTANEOUS at 22:10

## 2024-03-05 RX ADMIN — VALSARTAN 320 MG: 320 TABLET, FILM COATED ORAL at 08:38

## 2024-03-05 RX ADMIN — HYDRALAZINE HYDROCHLORIDE 100 MG: 50 TABLET ORAL at 08:38

## 2024-03-05 RX ADMIN — ACETAMINOPHEN 325MG 650 MG: 325 TABLET ORAL at 23:36

## 2024-03-05 RX ADMIN — MEMANTINE HYDROCHLORIDE 10 MG: 10 TABLET, FILM COATED ORAL at 21:08

## 2024-03-05 RX ADMIN — SENNOSIDES AND DOCUSATE SODIUM 2 TABLET: 50; 8.6 TABLET ORAL at 21:08

## 2024-03-05 RX ADMIN — CLONIDINE HYDROCHLORIDE 0.2 MG: 0.1 TABLET ORAL at 08:37

## 2024-03-05 RX ADMIN — PROCHLORPERAZINE EDISYLATE 5 MG: 5 INJECTION INTRAMUSCULAR; INTRAVENOUS at 17:15

## 2024-03-05 RX ADMIN — HYDRALAZINE HYDROCHLORIDE 100 MG: 50 TABLET ORAL at 21:08

## 2024-03-05 RX ADMIN — Medication 10 ML: at 21:09

## 2024-03-05 RX ADMIN — LABETALOL HYDROCHLORIDE 200 MG: 200 TABLET, FILM COATED ORAL at 21:08

## 2024-03-05 RX ADMIN — INSULIN HUMAN 4 UNITS: 100 INJECTION, SOLUTION PARENTERAL at 12:00

## 2024-03-05 RX ADMIN — MEMANTINE HYDROCHLORIDE 10 MG: 10 TABLET, FILM COATED ORAL at 08:38

## 2024-03-05 RX ADMIN — INSULIN HUMAN 4 UNITS: 100 INJECTION, SOLUTION PARENTERAL at 17:54

## 2024-03-05 RX ADMIN — SENNOSIDES AND DOCUSATE SODIUM 2 TABLET: 50; 8.6 TABLET ORAL at 08:39

## 2024-03-05 RX ADMIN — HYDRALAZINE HYDROCHLORIDE 100 MG: 50 TABLET ORAL at 16:30

## 2024-03-05 RX ADMIN — Medication 5 MG: at 21:08

## 2024-03-05 RX ADMIN — LEVETIRACETAM 500 MG: 500 TABLET, FILM COATED ORAL at 08:38

## 2024-03-05 RX ADMIN — AMLODIPINE BESYLATE 10 MG: 10 TABLET ORAL at 08:38

## 2024-03-05 RX ADMIN — LEVETIRACETAM 500 MG: 500 TABLET, FILM COATED ORAL at 21:08

## 2024-03-05 NOTE — PROGRESS NOTES
Continued Stay Note  [facility]     Patient Name: Erlin Blanco  MRN: [id number]  Today's Date: 3/5/2024    Admit Date: 2/24/2024    Plan: Pending   Discharge Plan       Row Name 03/05/24 0919       Plan    Plan Pending    Patient/Family in Agreement with Plan yes    Plan Comments Rehab referral to [facility] acute pending clinical course. Pt now in restraints. CCP to continue to follow to make appropriate post hospital arrangements. Jaimee Tao LCSW                   Discharge Codes    No documentation.                       Yajaira Tao LCSW

## 2024-03-05 NOTE — PLAN OF CARE
Goal Outcome Evaluation:  Plan of Care Reviewed With: patient, spouse           Outcome Evaluation: Pt continues to require a lot of assistance with bed mobility and sit to stand transfers. Pt with strong R lean secondary to pushing ot L UE. PT and OT assisted pt with L UE weight bearing for 30 seconds which temporarily improved sitting balance. PT and OT assisted patient to partial stand x3 with depx3. PT will continue to follow to address strength, mobility, balance, and transfers.      Anticipated Discharge Disposition (PT): inpatient rehabilitation facility, skilled nursing facility (pending progress)

## 2024-03-05 NOTE — PLAN OF CARE
Problem: Restraint, Nonviolent  Goal: Absence of Harm or Injury  3/5/2024 0627 by Nikki Chen RN  Outcome: Ongoing, Progressing  3/5/2024 0627 by Nikki Chen RN  Outcome: Ongoing, Progressing  3/5/2024 0625 by Nikki Chen RN  Outcome: Ongoing, Progressing  3/5/2024 0128 by Nikki Chen RN  Outcome: Ongoing, Progressing  Intervention: Implement Least Restrictive Safety Strategies  Recent Flowsheet Documentation  Taken 3/5/2024 0600 by Nikki Chen RN  Medical Device Protection:   torso covered   tubing secured  Less Restrictive Alternative:   family presence promoted   positive reinforcement provided   safety enhancements provided  De-Escalation Techniques:   verbally redirected   reoriented  Diversional Activities: television  Taken 3/5/2024 0400 by Nikki Chen RN  Medical Device Protection:   torso covered   tubing secured   IV pole/bag removed from visual field  Less Restrictive Alternative:   family presence promoted   environment adjusted  De-Escalation Techniques:   verbally redirected   quiet time facilitated   reoriented  Diversional Activities: (quiet time) other (see comments)  Taken 3/5/2024 0200 by Nikki Chen RN  Medical Device Protection:   torso covered   tubing secured  Less Restrictive Alternative:   family presence promoted   environment adjusted  De-Escalation Techniques: quiet time facilitated  Diversional Activities: television  Taken 3/5/2024 0000 by Nikki Chen RN  Medical Device Protection:   torso covered   tubing secured  Less Restrictive Alternative:   family presence promoted   environment adjusted  De-Escalation Techniques: quiet time facilitated  Diversional Activities: (quiet time) other (see comments)  Taken 3/4/2024 2300 by Nikki Chen RN  Diversional Activities:   toys   television  Taken 3/4/2024 2200 by Nikki Chen RN  Medical Device Protection:   IV pole/bag removed from visual field   torso covered   tubing secured  Less  Restrictive Alternative:   family presence promoted   safety enhancements provided  De-Escalation Techniques:   appropriate behavior reinforced   increased round frequency  Diversional Activities:   television   toys  Taken 3/4/2024 2000 by Nikki Chen RN  Medical Device Protection:   torso covered   tubing secured  Less Restrictive Alternative:   family presence promoted   safety enhancements provided  De-Escalation Techniques:   increased round frequency   quiet time facilitated  Diversional Activities:   toys   television  Intervention: Protect Dignity, Rights, and Personal Wellbeing  Recent Flowsheet Documentation  Taken 3/4/2024 2300 by Nikki Chen RN  Trust Relationship/Rapport:   care explained   reassurance provided   Goal Outcome Evaluation:                   Pt. Continues to need soft wrist restraint to left wrist due to attempts of pulling at lines and NG tube.

## 2024-03-05 NOTE — PLAN OF CARE
Problem: Adult Inpatient Plan of Care  Goal: Absence of Hospital-Acquired Illness or Injury  Intervention: Identify and Manage Fall Risk  Recent Flowsheet Documentation  Taken 3/5/2024 1800 by Antonia Lindquist RN  Safety Promotion/Fall Prevention:   safety round/check completed   nonskid shoes/slippers when out of bed  Taken 3/5/2024 1600 by Antonia Lindquist RN  Safety Promotion/Fall Prevention:   safety round/check completed   nonskid shoes/slippers when out of bed  Taken 3/5/2024 1400 by Antonia Lindquist RN  Safety Promotion/Fall Prevention:   safety round/check completed   nonskid shoes/slippers when out of bed  Taken 3/5/2024 1200 by Antonia Lindquist RN  Safety Promotion/Fall Prevention:   safety round/check completed   nonskid shoes/slippers when out of bed  Taken 3/5/2024 1000 by Antonia Lindquist RN  Safety Promotion/Fall Prevention:   safety round/check completed   nonskid shoes/slippers when out of bed  Taken 3/5/2024 0800 by Antonia Lindquist RN  Safety Promotion/Fall Prevention:   safety round/check completed   nonskid shoes/slippers when out of bed     Problem: Adult Inpatient Plan of Care  Goal: Absence of Hospital-Acquired Illness or Injury  Intervention: Prevent Skin Injury  Recent Flowsheet Documentation  Taken 3/5/2024 1800 by Antonia Lindquist RN  Body Position: supine, legs elevated  Taken 3/5/2024 1600 by Antonia Lindquist RN  Body Position:   turned   left  Taken 3/5/2024 1400 by Antonia Lindquist RN  Body Position:   turned   right  Taken 3/5/2024 1200 by Antonia Lindquist RN  Body Position: supine, legs elevated  Taken 3/5/2024 1000 by Antonia Lindquist RN  Body Position:   turned   left  Taken 3/5/2024 0830 by Antonia Lindquist RN  Skin Protection:   adhesive use limited   incontinence pads utilized   transparent dressing maintained   tubing/devices free from skin contact  Taken 3/5/2024 0800 by Antonia Lindquist RN  Body Position: supine, legs elevated   Goal Outcome Evaluation:

## 2024-03-05 NOTE — PROGRESS NOTES
Events noted chart reviewed  Hospitalist following on the floor and managing medical issues  No acute critical care or pulmonary issues at this time  We will sign off

## 2024-03-05 NOTE — PROGRESS NOTES
Nutrition Services    Patient Name:  Erlin Blanco  YOB: 1945  MRN: [id number]  Admit Date:  2/24/2024    Pt's wife requested RD come speak with her about feeding pt. RD instructed wife to feed pt at meal times like normal and to not try and force feed him. Pt wife was agreeable. RD informed wife that if pt eats well we can see about discontinuing Tfs.     Plan to hold Tfs for today and evaluate pt PO intake and appetite tomorrow.     RD to continue following.     Electronically signed by:  Ankit Yan  03/05/24 14:46 EST

## 2024-03-05 NOTE — THERAPY TREATMENT NOTE
Patient Name: Erlin Blanco  : 1945    MRN: [id number]                              Today's Date: 3/5/2024       Admit Date: 2024    Visit Dx:     ICD-10-CM ICD-9-CM   1. Intraparenchymal hemorrhage of brain  I61.9 431   2. Facial droop  R29.810 781.94   3. Expressive aphasia  R47.01 784.3   4. Weakness of right upper extremity  R29.898 729.89   5. Chronic anticoagulation  Z79.01 V58.61     Patient Active Problem List   Diagnosis    Intraparenchymal hemorrhage of brain    Seizures    DM (diabetes mellitus)    HTN (hypertension)     History reviewed. No pertinent past medical history.  History reviewed. No pertinent surgical history.   General Information       Row Name 24 [facility]          Physical Therapy Time and Intention    Document Type therapy note (daily note)  -     Mode of Treatment co-treatment;physical therapy;occupational therapy  -       Row Name 24 142          General Information    Existing Precautions/Restrictions fall  -     Barriers to Rehab medically complex;previous functional deficit  -       Row Name 24 142          Cognition    Orientation Status (Cognition) oriented to;person;place  -       Row Name 24 142          Safety Issues, Functional Mobility    Impairments Affecting Function (Mobility) balance;cognition;endurance/activity tolerance;strength;postural/trunk control;sensation/sensory awareness;visual/perceptual;motor control;grasp;coordination  -     Cognitive Impairments, Mobility Safety/Performance attention;awareness, need for assistance;insight into deficits/self-awareness;judgment;problem-solving/reasoning;safety precaution awareness  -     Comment, Safety Issues/Impairments (Mobility) PT and OT performed cotreatment to maximize patient's functional mobility potential and for the safety of the patient and staff. Pt unable to tolerate multiple therapy sessions at this time.  -               User Key  (r) = Recorded By, (t) = Taken  By, (c) = Cosigned By      Initials Name Provider Type     Татьяна Billingsley, PT Physical Therapist                   Mobility       Row Name 03/05/24 1424          Bed Mobility    Bed Mobility supine-sit;sit-supine  -     Supine-Sit [place] (Bed Mobility) verbal cues;nonverbal cues (demo/gesture);maximum assist (25% patient effort);2 person assist  -     Sit-Supine [place] (Bed Mobility) verbal cues;nonverbal cues (demo/gesture);maximum assist (25% patient effort);2 person assist  -     Assistive Device (Bed Mobility) bed rails;draw sheet;head of bed elevated  -     Comment, (Bed Mobility) pt with strong right lean when sitting EOB, pushing through L UE, repeated cues and assistance ot keep L hand in lap  -       Row Name 03/05/24 1424          Transfers    Comment, (Transfers) acheived partial stand x3, cleared bottom from bed but unable to stand fully erect  -       Row Name 03/05/24 1424          Sit-Stand Transfer    Sit-Stand [place] (Transfers) verbal cues;nonverbal cues (demo/gesture);dependent (less than 25% patient effort)  x3  -     Comment, (Sit-Stand Transfer) assist x3, PT and OT on each side blocking LEs and student behind patient pulling up on belt and providing manual cues for hip extension  -       Row Name 03/05/24 1424          Gait/Stairs (Locomotion)    [place] Level (Gait) unable to assess  -               User Key  (r) = Recorded By, (t) = Taken By, (c) = Cosigned By      Initials Name Provider Type    Татьяна Wolff PT Physical Therapist                   Obj/Interventions       Row Name 03/05/24 1432          Motor Skills    Therapeutic Exercise --  RLE PROM LAQ and AP, LLE AAROM LAQ and AP  -               User Key  (r) = Recorded By, (t) = Taken By, (c) = Cosigned By      Initials Name Provider Type    Татьяна Wolff, PT Physical Therapist                   Goals/Plan       Row Name 03/05/24 1444          Bed Mobility Goal 1 (PT)     Activity/Assistive Device (Bed Mobility Goal 1, PT) bed mobility activities, all  -CH     [place] Level/Cues Needed (Bed Mobility Goal 1, PT) moderate assist (50-74% patient effort)  -     Time Frame (Bed Mobility Goal 1, PT) 1 week  -     Progress/Outcomes (Bed Mobility Goal 1, PT) goal ongoing  -       Row Name 03/05/24 1444          Transfer Goal 1 (PT)    Activity/Assistive Device (Transfer Goal 1, PT) transfers, all  -CH     [place] Level/Cues Needed (Transfer Goal 1, PT) moderate assist (50-74% patient effort)  -     Time Frame (Transfer Goal 1, PT) 1 week  -CH     Progress/Outcome (Transfer Goal 1, PT) goal ongoing  -               User Key  (r) = Recorded By, (t) = Taken By, (c) = Cosigned By      Initials Name Provider Type     Татьяна Billingsley, PT Physical Therapist                   Clinical Impression       Row Name 03/05/24 1438          Pain    Pretreatment Pain Rating 0/10 - no pain  -     Posttreatment Pain Rating 0/10 - no pain  -     Pre/Posttreatment Pain Comment no complaints of pain with activity  -       Row Name 03/05/24 1438          Plan of Care Review    Plan of Care Reviewed With patient;spouse  -     Outcome Evaluation Pt continues to require a lot of assistance with bed mobility and sit to stand transfers. Pt with strong R lean secondary to pushing ot L UE. PT and OT assisted pt with L UE weight bearing for 30 seconds which temporarily improved sitting balance. PT and OT assisted patient to partial stand x3 with depx3. PT will continue to follow to address strength, mobility, balance, and transfers.  -       Row Name 03/05/24 1432          Positioning and Restraints    Pre-Treatment Position in bed  -     Post Treatment Position bed  -CH     In Bed supine;call light within reach;encouraged to call for assist;exit alarm on;with family/caregiver  -               User Key  (r) = Recorded By, (t) = Taken By, (c) = Cosigned By      Initials Name  Provider Type    Татьяна Wolff, PT Physical Therapist                   Outcome Measures       Row Name 03/05/24 1442 03/05/24 0800       How much help from another person do you currently need...    Turning from your back to your side while in flat bed without using bedrails? 2  -CH 2  -MB    Moving from lying on back to sitting on the side of a flat bed without bedrails? 2  -CH 1  -MB    Moving to and from a bed to a chair (including a wheelchair)? 1  -CH 1  -MB    Standing up from a chair using your arms (e.g., wheelchair, bedside chair)? 1  -CH 1  -MB    Climbing 3-5 steps with a railing? 1  -CH 1  -MB    To walk in hospital room? 1  -CH 1  -MB    AM-PAC 6 Clicks Score (PT) 8  -CH 7  -MB    Highest Level of Mobility Goal 3 --> Sit at edge of bed  -CH 2 --> Bed activities/dependent transfer  -MB      Row Name 03/05/24 1442          Functional Assessment    Outcome Measure Options AM-PAC 6 Clicks Basic Mobility (PT)  -               User Key  (r) = Recorded By, (t) = Taken By, (c) = Cosigned By      Initials Name Provider Type    Татьяна Wolff, PT Physical Therapist    Antonia Reinoso, RN Registered Nurse                                 Physical Therapy Education       Title: PT OT SLP Therapies (In Progress)       Topic: Physical Therapy (In Progress)       Point: Mobility training (In Progress)       Learning Progress Summary             Patient Acceptance, E,TB,D, VU,NR by  at 3/5/2024 1444    Acceptance, E,D, DU by PC at 3/4/2024 1100    Acceptance, E,TB, NR by CB at 3/2/2024 1520    Acceptance, E,TB, NR,NL by MS at 2/27/2024 1320   Family Acceptance, E,TB, NR by CB at 3/2/2024 1520                         Point: Home exercise program (In Progress)       Learning Progress Summary             Patient Acceptance, E,TB,D, VU,NR by  at 3/5/2024 1444    Acceptance, E,D, DU by PC at 3/4/2024 1100    Acceptance, E,TB, NR by CB at 3/2/2024 1520   Family Acceptance, E,TB, NR by CB at 3/2/2024  1520                         Point: Body mechanics (In Progress)       Learning Progress Summary             Patient Acceptance, E,TB,D, VU,NR by  at 3/5/2024 1444    Acceptance, E,D, DU by PC at 3/4/2024 1100    Acceptance, E,TB, NR by CB at 3/2/2024 1520    Acceptance, E,TB, VU,NR by  at 2/29/2024 0920    Acceptance, E,TB, NR,NL by MS at 2/27/2024 1320   Family Acceptance, E,TB, NR by CB at 3/2/2024 1520    Acceptance, E,TB, VU,NR by  at 2/29/2024 0920                         Point: Precautions (In Progress)       Learning Progress Summary             Patient Acceptance, E,TB,D, VU,NR by  at 3/5/2024 1444    Acceptance, E,D, DU by PC at 3/4/2024 1100    Acceptance, E,TB, NR by CB at 3/2/2024 1520    Acceptance, E,TB, VU,NR by  at 2/29/2024 0920    Acceptance, E,TB, NR,NL by MS at 2/27/2024 1320   Family Acceptance, E,TB, NR by CB at 3/2/2024 1520    Acceptance, E,TB, VU,NR by  at 2/29/2024 0920                                         User Key       Initials Effective Dates Name Provider Type Discipline     06/16/21 -  Татьяна Billingsley, PT Physical Therapist PT    PC 06/16/21 -  Audrey Ramirez, PT Physical Therapist PT    MS 06/16/21 -  Lyla Edwards PT Physical Therapist PT    CB 10/22/21 -  Mag Monson, PT Physical Therapist PT     01/24/24 -  Rick Maynard PT Student PT Student PT                  PT Recommendation and Plan     Plan of Care Reviewed With: patient, spouse  Outcome Evaluation: Pt continues to require a lot of assistance with bed mobility and sit to stand transfers. Pt with strong R lean secondary to pushing ot L UE. PT and OT assisted pt with L UE weight bearing for 30 seconds which temporarily improved sitting balance. PT and OT assisted patient to partial stand x3 with depx3. PT will continue to follow to address strength, mobility, balance, and transfers.     Time Calculation:         PT Charges       Row Name 03/05/24 2552             Time Calculation    Start  Time 1332  -      Stop Time 1358  -      Time Calculation (min) 26 min  -      PT Received On 03/05/24  -      PT - Next Appointment 03/06/24  -      PT Goal Re-Cert Due Date 03/12/24  -         Time Calculation- PT    Total Timed Code Minutes- PT 26 minute(s)  -                User Key  (r) = Recorded By, (t) = Taken By, (c) = Cosigned By      Initials Name Provider Type     Татьяна Billingsley, PT Physical Therapist                  Therapy Charges for Today       Code Description Service Date Service Provider Modifiers Qty    [id number]  PT THERAPEUTIC ACT EA 15 MIN 3/5/2024 Татьяна Billingsley, PT GP 2            PT G-Codes  Outcome Measure Options: AM-PAC 6 Clicks Basic Mobility (PT)  AM-PAC 6 Clicks Score (PT): 8  AM-PAC 6 Clicks Score (OT): 8  Modified Calli Scale: 5 - Severe disability.  Bedridden, incontinent, and requiring constant nursing care and attention.  PT Discharge Summary  Anticipated Discharge Disposition (PT): inpatient rehabilitation facility, skilled nursing facility (pending progress)    Татьяна Billingsley, JASMINE  3/5/2024

## 2024-03-05 NOTE — THERAPY TREATMENT NOTE
Patient Name: Erlin Blanco  : 1945    MRN: [id number]                              Today's Date: 3/5/2024       Admit Date: 2024    Visit Dx:     ICD-10-CM ICD-9-CM   1. Intraparenchymal hemorrhage of brain  I61.9 431   2. Facial droop  R29.810 781.94   3. Expressive aphasia  R47.01 784.3   4. Weakness of right upper extremity  R29.898 729.89   5. Chronic anticoagulation  Z79.01 V58.61     Patient Active Problem List   Diagnosis    Intraparenchymal hemorrhage of brain    Seizures    DM (diabetes mellitus)    HTN (hypertension)     History reviewed. No pertinent past medical history.  History reviewed. No pertinent surgical history.   General Information       Row Name 24 1512          OT Time and Intention    Document Type therapy note (daily note)  -     Mode of Treatment co-treatment;physical therapy;occupational therapy  -       Row Name 24 1512          General Information    Patient Profile Reviewed yes  -     Existing Precautions/Restrictions fall  NG  -     Barriers to Rehab medically complex  -       Row Name 24 1512          Cognition    Orientation Status (Cognition) oriented to;person;place;verbal cues/prompts needed for orientation  -       Row Name 24 1512          Safety Issues, Functional Mobility    Safety Issues Affecting Function (Mobility) problem-solving;safety precaution awareness;insight into deficits/self-awareness;safety precautions follow-through/compliance;sequencing abilities;judgment;ability to follow commands;awareness of need for assistance  -     Impairments Affecting Function (Mobility) balance;cognition;endurance/activity tolerance;strength;postural/trunk control;sensation/sensory awareness;visual/perceptual;motor control;grasp;coordination  -     Cognitive Impairments, Mobility Safety/Performance attention;awareness, need for assistance;impulsivity;problem-solving/reasoning;judgment;insight into deficits/self-awareness;sequencing  abilities;safety precaution follow-through;safety precaution awareness  -     Comment, Safety Issues/Impairments (Mobility) Co-treatment medically necessary and appropriate d/t pt's acuity level, decreased endurance and activity tolerance, and safety of patient and staff. Treatment focused on progression of care and goals established in POC. Gait belt and non-skid socks worn.  -               User Key  (r) = Recorded By, (t) = Taken By, (c) = Cosigned By      Initials Name Provider Type     Nathalie Woodard OT Occupational Therapist                     Mobility/ADL's       Row Name 03/05/24 1512          Bed Mobility    Bed Mobility supine-sit;sit-supine  -     Scooting/Bridging [place] (Bed Mobility) dependent (less than 25% patient effort);2 person assist;nonverbal cues (demo/gesture);verbal cues  -     Supine-Sit [place] (Bed Mobility) verbal cues;nonverbal cues (demo/gesture);maximum assist (25% patient effort);2 person assist  -     Sit-Supine [place] (Bed Mobility) verbal cues;nonverbal cues (demo/gesture);maximum assist (25% patient effort);2 person assist;dependent (less than 25% patient effort)  -     Bed Mobility, Safety Issues cognitive deficits limit understanding;decreased use of arms for pushing/pulling;decreased use of legs for bridging/pushing;impaired trunk control for bed mobility  -     Assistive Device (Bed Mobility) bed rails;draw sheet;head of bed elevated  -     Comment, (Bed Mobility) strong R leaning, pushing through LUE, max cues for focus on posture/sit balance tasks  -       Row Name 03/05/24 1512          Transfers    Transfers sit-stand transfer  -       Row Name 03/05/24 1512          Bed-Chair Transfer    Bed-Chair [place] (Transfers) unable to assess  -       Row Name 03/05/24 1512          Sit-Stand Transfer    Sit-Stand [place] (Transfers) verbal cues;nonverbal cues (demo/gesture);dependent (less than 25% patient effort);other (see  comments)  depx3  -     Comment, (Sit-Stand Transfer) x3 STS completed with assist x3, blocking BLEs required and student in rear assisting with supporting trunk off beside, max cues required  -       Row Name 03/05/24 1512          Activities of Daily Living    BADL Assessment/Intervention lower body dressing;feeding;toileting  -       Row Name 03/05/24 1512          Toileting Assessment/Training    [place] Level (Toileting) dependent (less than 25% patient effort)  -     Position (Toileting) supine  -       Row Name 03/05/24 1512          Lower Body Dressing Assessment/Training    [place] Level (Lower Body Dressing) dependent (less than 25% patient effort);socks  -     Position (Lower Body Dressing) supine  -[facility] Name 03/05/24 1512          Self-Feeding Assessment/Training    [place] Level (Feeding) feeding skills;dependent (less than 25% patient effort)  -     Comment, (Feeding) feeding tube  -               User Key  (r) = Recorded By, (t) = Taken By, (c) = Cosigned By      Initials Name Provider Type    MW Nathalie Woodard OT Occupational Therapist                   Obj/Interventions       [facility] Name 03/05/24 1514          Vision Assessment/Intervention    Visual Processing Deficit natalie-inattention/neglect, right  -     Vision Assessment Comment strong R inattention, unable to cross midline with eyes  -[facility] Name 03/05/24 1514          Motor Skills    Motor Control/Coordination Interventions neuro-muscular re-education;weight-bearing activities;therapeutic exercise/ROM  -       Row Name 03/05/24 1514          Balance    Balance Assessment sitting static balance;sitting dynamic balance;sit to stand dynamic balance  -     Static Sitting Balance maximum assist;minimal assist  varying levels of assist  -     Dynamic Sitting Balance maximum assist;minimal assist  -     Position, Sitting Balance supported;sitting edge of bed  -     Sit to Stand Dynamic Balance  maximum assist;other (see comments)  Ax3  -MW     Balance Interventions sitting;supported;static;dynamic;sit to stand;highly challenging;weight shifting activity;UE activity with balance activity;trunk training exercise  -MW     Comment, Balance heavy R lean, vc's required for posture/balance, midline positioning, visual scanning with LUE func reaching task as well as WB'ing onto L elbow to prevent strong pushing to R, requiring max A for support  -               User Key  (r) = Recorded By, (t) = Taken By, (c) = Cosigned By      Initials Name Provider Type    Nathalie Hoff, FLORESITA Occupational Therapist                   Goals/Plan    No documentation.                  Clinical Impression       Row Name 03/05/24 1517          Pain Assessment    Pretreatment Pain Rating 0/10 - no pain  -     Posttreatment Pain Rating 0/10 - no pain  -       Row Name 03/05/24 1517          Plan of Care Review    Plan of Care Reviewed With patient;spouse;friend  -     Progress no change  -     Outcome Evaluation Pt seen for OT/PT tx session in PM, pt awake, agreeable and family present encouraging. He continues to demo strong R lean pushing with LUE on bedside and bed rail, requiring varying levels of assist at EOB, max A to min A throughout functional reach with LUE, weight bearing, strong focus on midline posture at EOB with neuro re-ed tasks. He requires depx3 for STS attempts, partial stands noted, completed three times. Difficulty with command following throughout, significant R side inattention. He will continue to benefit from skilled OT to address noted functional deficits and progress toward goals, IRF vs SNF pending progress  -       Row Name 03/05/24 1517          Therapy Plan Review/Discharge Plan (OT)    Anticipated Discharge Disposition (OT) inpatient rehabilitation facility;skilled nursing facility  pending progress  -       Row Name 03/05/24 1517          Vital Signs    O2 Delivery Pre Treatment room  air  -MW       Row Name 03/05/24 1517          Positioning and Restraints    Pre-Treatment Position in bed  -MW     Post Treatment Position bed  -MW     In Bed notified nsg;fowlers;call light within reach;encouraged to call for assist;exit alarm on;with family/caregiver;side rails up x3;SCD pump applied;RUE elevated  -MW     Restraints released:;reapplied:;soft limb  LUE  -MW               User Key  (r) = Recorded By, (t) = Taken By, (c) = Cosigned By      Initials Name Provider Type    Nathalie Hoff, FLORESITA Occupational Therapist                   Outcome Measures       Row Name 03/05/24 1520          How much help from another is currently needed...    Putting on and taking off regular lower body clothing? 1  -MW     Bathing (including washing, rinsing, and drying) 1  -MW     Toileting (which includes using toilet bed pan or urinal) 1  -MW     Putting on and taking off regular upper body clothing 2  -MW     Taking care of personal grooming (such as brushing teeth) 2  -MW     Eating meals 1  -MW     AM-PAC 6 Clicks Score (OT) 8  -MW       Row Name 03/05/24 1442 03/05/24 0800       How much help from another person do you currently need...    Turning from your back to your side while in flat bed without using bedrails? 2  -CH 2  -MB    Moving from lying on back to sitting on the side of a flat bed without bedrails? 2  -CH 1  -MB    Moving to and from a bed to a chair (including a wheelchair)? 1  -CH 1  -MB    Standing up from a chair using your arms (e.g., wheelchair, bedside chair)? 1  -CH 1  -MB    Climbing 3-5 steps with a railing? 1  -CH 1  -MB    To walk in hospital room? 1  -CH 1  -MB    AM-PAC 6 Clicks Score (PT) 8  -CH 7  -MB    Highest Level of Mobility Goal 3 --> Sit at edge of bed  -CH 2 --> Bed activities/dependent transfer  -MB      Row Name 03/05/24 1520 03/05/24 1442       Functional Assessment    Outcome Measure Options AM-PAC 6 Clicks Daily Activity (OT)  -MW AM-PAC 6 Clicks Basic Mobility (PT)   -              User Key  (r) = Recorded By, (t) = Taken By, (c) = Cosigned By      Initials Name Provider Type     Татьяна Billingsley, PT Physical Therapist    Nathalie Hoff, OT Occupational Therapist    Antonia Reinoso, RN Registered Nurse                    Occupational Therapy Education       Title: PT OT SLP Therapies (In Progress)       Topic: Occupational Therapy (In Progress)       Point: ADL training (Not Started)       Description:   Instruct learner(s) on proper safety adaptation and remediation techniques during self care or transfers.   Instruct in proper use of assistive devices.                  Learner Progress:  Not documented in this visit.              Point: Home exercise program (Done)       Description:   Instruct learner(s) on appropriate technique for monitoring, assisting and/or progressing therapeutic exercises/activities.                  Learning Progress Summary             Family Acceptance, E, VU by BS at 3/2/2024 1530    Comment: RUE ROM / HEP to adress swelling   Significant Other Acceptance, E, VU by BS at 3/2/2024 1530    Comment: RUE ROM / HEP to adress swelling                         Point: Precautions (Done)       Description:   Instruct learner(s) on prescribed precautions during self-care and functional transfers.                  Learning Progress Summary             Family Acceptance, E, VU,DU by  at 2/27/2024 1315    Comment: RUE ROM, massage, safe positioning for subluxation prevention, OT role and dc recommendations                         Point: Body mechanics (Not Started)       Description:   Instruct learner(s) on proper positioning and spine alignment during self-care, functional mobility activities and/or exercises.                  Learner Progress:  Not documented in this visit.                              User Key       Initials Effective Dates Name Provider Type [facility] 04/02/20 -  Sonia Mcclure OT Occupational Therapist FLORESITA MARTINEZ  11/14/22 -  Alexandria De La Rosa OT Occupational Therapist OT                  OT Recommendation and Plan     Plan of Care Review  Plan of Care Reviewed With: patient, spouse, friend  Progress: no change  Outcome Evaluation: Pt seen for OT/PT tx session in PM, pt awake, agreeable and family present encouraging. He continues to demo strong R lean pushing with LUE on bedside and bed rail, requiring varying levels of assist at EOB, max A to min A throughout functional reach with LUE, weight bearing, strong focus on midline posture at EOB with neuro re-ed tasks. He requires depx3 for STS attempts, partial stands noted, completed three times. Difficulty with command following throughout, significant R side inattention. He will continue to benefit from skilled OT to address noted functional deficits and progress toward goals, IRF vs SNF pending progress     Time Calculation:         Time Calculation- OT       Row Name 03/05/24 1520             Time Calculation- OT    OT Start Time 1330  -MW      OT Stop Time 1400  -MW      OT Time Calculation (min) 30 min  -MW      Total Timed Code Minutes- OT 30 minute(s)  -MW      OT Received On 03/05/24  -MW      OT - Next Appointment 03/06/24  -MW         Timed Charges    20105 -  OT Neuromuscular Reeducation Minutes 20  -MW      89501 - OT Therapeutic Activity Minutes 10  -MW         Total Minutes    Timed Charges Total Minutes 30  -MW       Total Minutes 30  -MW                User Key  (r) = Recorded By, (t) = Taken By, (c) = Cosigned By      Initials Name Provider Type     Nathalie Woodard OT Occupational Therapist                  Therapy Charges for Today       Code Description Service Date Service Provider Modifiers Qty    70377231618  OT NEUROMUSC RE EDUCATION EA 15 MIN 3/5/2024 Nathalie Woodard OT GO 1    [id number]  OT THERAPEUTIC ACT EA 15 MIN 3/5/2024 Nathalie Woodard OT GO 1                 Nathalie Woodard OT  3/5/2024

## 2024-03-05 NOTE — PROGRESS NOTES
Name: Erlin Blanco ADMIT: 2024   : 1945  PCP: Maria A Johnjonatan HAIDER, APRN    MRN: [id number] LOS: 10 days   AGE/SEX: 78 y.o. male  ROOM: [facility]     Subjective   Subjective   No acute events. Patient complains of a headache and has had some restlessness after PT but denies new complaints. His wife and friend are at bedside.    Objective   Objective   Vital Signs  Temp:  [97.3 °F (36.3 °C)-98.4 °F (36.9 °C)] 98.2 °F (36.8 °C)  Heart Rate:  [62-82] 73  Resp:  [18-20] 18  BP: (123-164)/(60-97) 149/97  SpO2:  [93 %-97 %] 97 %  on   ;   Device (Oxygen Therapy): room air  Body mass index is 34.68 kg/m².  Physical Exam  Vitals and nursing note reviewed.   Constitutional:       General: He is not in acute distress.     Appearance: He is not toxic-appearing or diaphoretic.   HENT:      Head: Normocephalic and atraumatic.      Nose: Nose normal.      Comments: Dobhoff tube in place     Mouth/Throat:      Mouth: Mucous membranes are moist.      Pharynx: Oropharynx is clear.   Eyes:      Conjunctiva/sclera: Conjunctivae normal.      Pupils: Pupils are equal, round, and reactive to light.   Cardiovascular:      Rate and Rhythm: Normal rate and regular rhythm.      Pulses: Normal pulses.   Pulmonary:      Effort: Pulmonary effort is normal.   Abdominal:      General: Bowel sounds are normal.      Palpations: Abdomen is soft.      Tenderness: There is no abdominal tenderness.   Musculoskeletal:      Cervical back: Neck supple.   Skin:     General: Skin is warm and dry.      Capillary Refill: Capillary refill takes less than 2 seconds.   Neurological:      Mental Status: He is alert.      Comments: Right hemiparesis  Right facial droop  dysarthria   Psychiatric:         Mood and Affect: Mood normal.         Behavior: Behavior normal.       Results Review     I reviewed the patient's new clinical results.  Results from last 7 days   Lab Units 24  0436 24  0957 24  0414   WBC 10*3/mm3 8.59 8.09 9.27    HEMOGLOBIN g/dL 14.5 13.9 14.5   PLATELETS 10*3/mm3 414 396 369     Results from last 7 days   Lab Units 03/05/24 0436 03/04/24  0957 03/03/24 0414 03/01/24  1805 03/01/24  0757   SODIUM mmol/L 141 143 141  --  146*   POTASSIUM mmol/L 3.8 3.8 3.8 4.3 3.5   CHLORIDE mmol/L 107 107 109*  --  112*   CO2 mmol/L 26.0 25.9 21.0*  --  25.0   BUN mg/dL 18 23 18  --  22   CREATININE mg/dL 1.06 0.94 0.87  --  1.03   GLUCOSE mg/dL 175* 207* 210*  --  280*   EGFR mL/min/1.73 72.7 84.0 89.4  --  75.3     Results from last 7 days   Lab Units 03/05/24 0436 03/04/24 0957 03/03/24  0414   ALBUMIN g/dL 3.6 3.4* 3.5     Results from last 7 days   Lab Units 03/05/24 0436 03/04/24 0957 03/03/24 0414 03/01/24  0757   CALCIUM mg/dL 9.3 8.8 9.0 8.9   ALBUMIN g/dL 3.6 3.4* 3.5  --    MAGNESIUM mg/dL  --   --  2.5*  --    PHOSPHORUS mg/dL 3.0 3.1 2.6  --        Glucose   Date/Time Value Ref Range Status   03/05/2024 1215 208 (H) 70 - 130 mg/dL Final   03/05/2024 0605 147 (H) 70 - 130 mg/dL Final   03/05/2024 0008 165 (H) 70 - 130 mg/dL Final   03/04/2024 1849 178 (H) 70 - 130 mg/dL Final   03/04/2024 1248 178 (H) 70 - 130 mg/dL Final   03/04/2024 0652 206 (H) 70 - 130 mg/dL Final   03/04/2024 0026 178 (H) 70 - 130 mg/dL Final       No radiology results for the last day    I have personally reviewed all medications:  Scheduled Medications  amLODIPine, 10 mg, Nasogastric, Q24H  cloNIDine, 0.2 mg, Oral, Q12H  hydrALAZINE, 100 mg, Nasogastric, Q8H  insulin glargine, 15 Units, Subcutaneous, Nightly  insulin regular, 2-9 Units, Subcutaneous, TID AC  labetalol, 200 mg, Nasogastric, Q12H  levETIRAcetam, 500 mg, Nasogastric, BID  melatonin, 5 mg, Oral, Nightly  memantine, 10 mg, Nasogastric, Q12H  senna-docusate sodium, 2 tablet, Oral, BID  sodium chloride, 10 mL, Intravenous, Q12H  valsartan, 320 mg, Nasogastric, Daily    Infusions   Diet  Diet: Regular/House; Texture: Pureed (NDD 1); Fluid Consistency: Nectar Thick    I have personally  reviewed:  [x]  Laboratory   [x]  Microbiology   [x]  Radiology   [x]  EKG/Telemetry  []  Cardiology/Vascular   []  Pathology    [x]  Records    Assessment/Plan     Active Hospital Problems    Diagnosis  POA    **Intraparenchymal hemorrhage of brain [I61.9]  Yes    Right hemiparesis [G81.91]  Yes    Oropharyngeal dysphagia [R13.12]  Yes    Type 2 diabetes mellitus [E11.9]  Yes    HTN (hypertension) [I10]  Yes    Seizures [R56.9]  Yes      Resolved Hospital Problems   No resolved problems to display.   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  - due to uncontrolled hypertension in the setting of anticoagulation and polycythemia vera  - has right hemiparesis and dysarthria as well as oropharyngeal dysphagia  - continue PT/OT/SLP  - cleared for modified diet by SLP-will leave dobhoff tube in place and continue tube feeds for now-can possibly pull this tomorrow if his intake is sufficient  - continue BP control with goal SBP<160mmHg  - continue keppra for seizure prophylaxis  - PRN IV compazine for headaches  - follow up CT head tomorrow   - appreciate MICAELA recs    HTN  - BP acceptable  - continue norvasc, clonidine, hydralazine, labetalol, and diovan    Type 2 DM  - BG overall acceptable although he has a couple of high outliers  - continue on lantus but will increase to 20 units nightly  - continue ssi/hypoglycemia protocol but will switch to humalog  - hold metformin, glimepiride, and januvia    PAF  - HR acceptable, continue BB  - was on eliquis prior to admission but he is off of this now due to intracranial hemorrhage    Polycythemia Vera  - on hydroxyurea as an outpatient    SCDs for DVT prophylaxis.  Full code.  Discussed with patient, spouse, family, and nursing staff.  Anticipate discharge to SNU facility timing yet to be determined.      Randy Hernandez MD  [facility]  03/05/24  16:47 EST

## 2024-03-05 NOTE — PROGRESS NOTES
[facility] Acute Rehab    Spoke with pt, wife, and friend (at bedside). Acute vs Subacute Rehab discussed. To return home, pt would need to be modified independent as wife is unable to physically asst. Could stay on main floor with 1/2 bath, but his bed/ full bath in basement down x5 steps.     Will follow progress with therapy to help determine the most appropriate level of rehab needed at the time of dc    Aileen Frausto RN  Acute Rehab Admission Nurse

## 2024-03-05 NOTE — PLAN OF CARE
Goal Outcome Evaluation:         Pt. Continues to need left arm soft wrist restraint, tries to pull at NG tube, lines, equipment, etc. Educated on Restraints. Family remains at bedside.

## 2024-03-05 NOTE — PLAN OF CARE
Goal Outcome Evaluation:  Plan of Care Reviewed With: patient, spouse, friend        Progress: no change  Outcome Evaluation: Pt seen for OT/PT tx session in PM, pt awake, agreeable and family present encouraging. He continues to demo strong R lean pushing with LUE on bedside and bed rail, requiring varying levels of assist at EOB, max A to min A throughout functional reach with LUE, weight bearing, strong focus on midline posture at EOB with neuro re-ed tasks. He requires depx3 for STS attempts, partial stands noted, completed three times. Difficulty with command following throughout, significant R side inattention. He will continue to benefit from skilled OT to address noted functional deficits and progress toward goals, IRF vs SNF pending progress      Anticipated Discharge Disposition (OT): inpatient rehabilitation facility, skilled nursing facility (pending progress)

## 2024-03-06 ENCOUNTER — TELEPHONE (OUTPATIENT)
Dept: NEUROSURGERY | Facility: CLINIC | Age: 79
End: 2024-03-06
Payer: MEDICARE

## 2024-03-06 ENCOUNTER — APPOINTMENT (OUTPATIENT)
Dept: CT IMAGING | Facility: HOSPITAL | Age: 79
DRG: 064 | End: 2024-03-06
Payer: MEDICARE

## 2024-03-06 ENCOUNTER — PATIENT OUTREACH (OUTPATIENT)
Dept: CASE MANAGEMENT | Facility: OTHER | Age: 79
End: 2024-03-06
Payer: MEDICARE

## 2024-03-06 DIAGNOSIS — E11.3293 TYPE 2 DIABETES MELLITUS WITH BOTH EYES AFFECTED BY MILD NONPROLIFERATIVE RETINOPATHY WITHOUT MACULAR EDEMA, WITHOUT LONG-TERM CURRENT USE OF INSULIN: Primary | ICD-10-CM

## 2024-03-06 DIAGNOSIS — I10 PRIMARY HYPERTENSION: ICD-10-CM

## 2024-03-06 DIAGNOSIS — I61.9 INTRAPARENCHYMAL HEMORRHAGE OF BRAIN: Primary | ICD-10-CM

## 2024-03-06 DIAGNOSIS — I25.10 CORONARY ARTERY DISEASE INVOLVING NATIVE CORONARY ARTERY OF NATIVE HEART WITHOUT ANGINA PECTORIS: ICD-10-CM

## 2024-03-06 LAB
ALBUMIN SERPL-MCNC: 3.3 G/DL (ref 3.5–5.2)
ANION GAP SERPL CALCULATED.3IONS-SCNC: 12.5 MMOL/L (ref 5–15)
BUN SERPL-MCNC: 16 MG/DL (ref 8–23)
BUN/CREAT SERPL: 17 (ref 7–25)
CALCIUM SPEC-SCNC: 8.9 MG/DL (ref 8.6–10.5)
CHLORIDE SERPL-SCNC: 105 MMOL/L (ref 98–107)
CO2 SERPL-SCNC: 24.5 MMOL/L (ref 22–29)
CREAT SERPL-MCNC: 0.94 MG/DL (ref 0.76–1.27)
DEPRECATED RDW RBC AUTO: 50.2 FL (ref 37–54)
EGFRCR SERPLBLD CKD-EPI 2021: 84 ML/MIN/1.73
ERYTHROCYTE [DISTWIDTH] IN BLOOD BY AUTOMATED COUNT: 15.9 % (ref 12.3–15.4)
GLUCOSE BLDC GLUCOMTR-MCNC: 188 MG/DL (ref 70–130)
GLUCOSE BLDC GLUCOMTR-MCNC: 226 MG/DL (ref 70–130)
GLUCOSE BLDC GLUCOMTR-MCNC: 285 MG/DL (ref 70–130)
GLUCOSE BLDC GLUCOMTR-MCNC: 285 MG/DL (ref 70–130)
GLUCOSE SERPL-MCNC: 199 MG/DL (ref 65–99)
HCT VFR BLD AUTO: 44.3 % (ref 37.5–51)
HGB BLD-MCNC: 14.2 G/DL (ref 13–17.7)
MCH RBC QN AUTO: 28.1 PG (ref 26.6–33)
MCHC RBC AUTO-ENTMCNC: 32.1 G/DL (ref 31.5–35.7)
MCV RBC AUTO: 87.5 FL (ref 79–97)
PHOSPHATE SERPL-MCNC: 3 MG/DL (ref 2.5–4.5)
PLATELET # BLD AUTO: 392 10*3/MM3 (ref 140–450)
PMV BLD AUTO: 10.3 FL (ref 6–12)
POTASSIUM SERPL-SCNC: 3.5 MMOL/L (ref 3.5–5.2)
RBC # BLD AUTO: 5.06 10*6/MM3 (ref 4.14–5.8)
SODIUM SERPL-SCNC: 142 MMOL/L (ref 136–145)
WBC NRBC COR # BLD AUTO: 9.09 10*3/MM3 (ref 3.4–10.8)

## 2024-03-06 PROCEDURE — 70450 CT HEAD/BRAIN W/O DYE: CPT

## 2024-03-06 PROCEDURE — 80069 RENAL FUNCTION PANEL: CPT | Performed by: INTERNAL MEDICINE

## 2024-03-06 PROCEDURE — 85027 COMPLETE CBC AUTOMATED: CPT | Performed by: INTERNAL MEDICINE

## 2024-03-06 PROCEDURE — 63710000001 INSULIN GLARGINE PER 5 UNITS: Performed by: HOSPITALIST

## 2024-03-06 PROCEDURE — 97110 THERAPEUTIC EXERCISES: CPT

## 2024-03-06 PROCEDURE — 97112 NEUROMUSCULAR REEDUCATION: CPT

## 2024-03-06 PROCEDURE — 82948 REAGENT STRIP/BLOOD GLUCOSE: CPT

## 2024-03-06 PROCEDURE — 92526 ORAL FUNCTION THERAPY: CPT

## 2024-03-06 PROCEDURE — 97530 THERAPEUTIC ACTIVITIES: CPT

## 2024-03-06 PROCEDURE — 99232 SBSQ HOSP IP/OBS MODERATE 35: CPT

## 2024-03-06 PROCEDURE — 63710000001 INSULIN REGULAR HUMAN PER 5 UNITS: Performed by: INTERNAL MEDICINE

## 2024-03-06 RX ORDER — POTASSIUM CHLORIDE 750 MG/1
40 TABLET, FILM COATED, EXTENDED RELEASE ORAL EVERY 4 HOURS
Status: COMPLETED | OUTPATIENT
Start: 2024-03-06 | End: 2024-03-06

## 2024-03-06 RX ORDER — OLANZAPINE 5 MG/1
5 TABLET, ORALLY DISINTEGRATING ORAL EVERY 12 HOURS PRN
Status: DISCONTINUED | OUTPATIENT
Start: 2024-03-06 | End: 2024-03-15 | Stop reason: HOSPADM

## 2024-03-06 RX ADMIN — INSULIN HUMAN 2 UNITS: 100 INJECTION, SOLUTION PARENTERAL at 09:20

## 2024-03-06 RX ADMIN — INSULIN HUMAN 6 UNITS: 100 INJECTION, SOLUTION PARENTERAL at 17:45

## 2024-03-06 RX ADMIN — POTASSIUM CHLORIDE 40 MEQ: 750 TABLET, EXTENDED RELEASE ORAL at 15:45

## 2024-03-06 RX ADMIN — HYDRALAZINE HYDROCHLORIDE 100 MG: 50 TABLET ORAL at 21:27

## 2024-03-06 RX ADMIN — Medication 10 ML: at 21:29

## 2024-03-06 RX ADMIN — SENNOSIDES AND DOCUSATE SODIUM 2 TABLET: 50; 8.6 TABLET ORAL at 21:21

## 2024-03-06 RX ADMIN — HYDRALAZINE HYDROCHLORIDE 100 MG: 50 TABLET ORAL at 15:45

## 2024-03-06 RX ADMIN — HYDRALAZINE HYDROCHLORIDE 100 MG: 50 TABLET ORAL at 05:29

## 2024-03-06 RX ADMIN — OLANZAPINE 5 MG: 5 TABLET, ORALLY DISINTEGRATING ORAL at 21:30

## 2024-03-06 RX ADMIN — Medication 5 MG: at 21:21

## 2024-03-06 RX ADMIN — INSULIN GLARGINE 15 UNITS: 100 INJECTION, SOLUTION SUBCUTANEOUS at 21:29

## 2024-03-06 RX ADMIN — AMLODIPINE BESYLATE 10 MG: 10 TABLET ORAL at 09:15

## 2024-03-06 RX ADMIN — LEVETIRACETAM 500 MG: 500 TABLET, FILM COATED ORAL at 09:15

## 2024-03-06 RX ADMIN — ACETAMINOPHEN 325MG 650 MG: 325 TABLET ORAL at 21:43

## 2024-03-06 RX ADMIN — CLONIDINE HYDROCHLORIDE 0.2 MG: 0.1 TABLET ORAL at 21:21

## 2024-03-06 RX ADMIN — VALSARTAN 320 MG: 320 TABLET, FILM COATED ORAL at 09:15

## 2024-03-06 RX ADMIN — Medication 10 ML: at 09:16

## 2024-03-06 RX ADMIN — LABETALOL HYDROCHLORIDE 200 MG: 200 TABLET, FILM COATED ORAL at 09:15

## 2024-03-06 RX ADMIN — MEMANTINE HYDROCHLORIDE 10 MG: 10 TABLET, FILM COATED ORAL at 09:15

## 2024-03-06 RX ADMIN — INSULIN HUMAN 6 UNITS: 100 INJECTION, SOLUTION PARENTERAL at 12:48

## 2024-03-06 RX ADMIN — MEMANTINE HYDROCHLORIDE 10 MG: 10 TABLET, FILM COATED ORAL at 21:28

## 2024-03-06 RX ADMIN — SENNOSIDES AND DOCUSATE SODIUM 2 TABLET: 50; 8.6 TABLET ORAL at 09:20

## 2024-03-06 RX ADMIN — POTASSIUM CHLORIDE 40 MEQ: 750 TABLET, EXTENDED RELEASE ORAL at 21:20

## 2024-03-06 RX ADMIN — LABETALOL HYDROCHLORIDE 200 MG: 200 TABLET, FILM COATED ORAL at 21:28

## 2024-03-06 RX ADMIN — LEVETIRACETAM 500 MG: 500 TABLET, FILM COATED ORAL at 21:27

## 2024-03-06 RX ADMIN — CLONIDINE HYDROCHLORIDE 0.2 MG: 0.1 TABLET ORAL at 09:15

## 2024-03-06 NOTE — PLAN OF CARE
Goal Outcome Evaluation:  Plan of Care Reviewed With: patient, family        Progress: no change  Outcome Evaluation: Pt seen for OT/PT tx session in AM, pt awake, agreeable to therapy. Focus on sit balance and weight bearing onto bilat forearms, increased assist to return to midline max A x2 when coming up from R side. Improved support at EOB when LUE holding onto foot rail. PROM completed to RUE this date while at EOB, CGA/min for sit balance throughout. Max cues required for attention to R side. He completed x2 STS trials, depx3 for STS attempts, unable to come to full stand. He will continue to benefit from skilled OT to address noted func deficits and progress toward improved (I) with OOB activity required for ADLs. Rec acute rehab at this time.      Anticipated Discharge Disposition (OT): inpatient rehabilitation facility, skilled nursing facility (pending progress)

## 2024-03-06 NOTE — PLAN OF CARE
Goal Outcome Evaluation:  Plan of Care Reviewed With: patient           Outcome Evaluation: Pt awake and alert, up to EOB with maxAx2, able to hold onto foot rail with L hand once positioned in sitting on EOB, decreased pushing when holding on to rail. patient able to sit upright with CGA once positioned with tactile and verbal cues for upright posture. patient attempted sit to stand x 2 trials, requiring dep assist x3 to clear buttocks off bed. Pt demonstrates improved sitting balance today. Anticipate patient will need prolonged course of rehab.

## 2024-03-06 NOTE — PLAN OF CARE
Problem: Adult Inpatient Plan of Care  Goal: Plan of Care Review  Outcome: Ongoing, Progressing  Flowsheets (Taken 3/6/2024 0413)  Plan of Care Reviewed With: patient  Outcome Evaluation: Pt unable to answer questions or follow commands. Repeats words heard. Tolerated diet. Dependent for all ADLs. VSS. On fall precautions. Q2 hour turns. Non violent  soft restraints to LUE.  Goal: Absence of Hospital-Acquired Illness or Injury  Outcome: Ongoing, Progressing  Intervention: Identify and Manage Fall Risk  Recent Flowsheet Documentation  Taken 3/6/2024 0413 by Seema Queen, RN  Safety Promotion/Fall Prevention:   assistive device/personal items within reach   clutter free environment maintained   fall prevention program maintained   lighting adjusted   nonskid shoes/slippers when out of bed   room organization consistent   safety round/check completed  Taken 3/6/2024 0208 by Seema Queen, RN  Safety Promotion/Fall Prevention:   assistive device/personal items within reach   clutter free environment maintained   fall prevention program maintained   lighting adjusted   nonskid shoes/slippers when out of bed   room organization consistent   safety round/check completed  Taken 3/6/2024 0012 by Seema Queen, RN  Safety Promotion/Fall Prevention:   assistive device/personal items within reach   clutter free environment maintained   fall prevention program maintained   lighting adjusted   nonskid shoes/slippers when out of bed   safety round/check completed   room organization consistent  Taken 3/5/2024 2200 by Seema Queen, RN  Safety Promotion/Fall Prevention:   assistive device/personal items within reach   clutter free environment maintained   fall prevention program maintained   lighting adjusted   nonskid shoes/slippers when out of bed   room organization consistent   safety round/check completed  Taken 3/5/2024 2034 by Seema Queen, RN  Safety Promotion/Fall Prevention:   activity supervised   assistive  device/personal items within reach   clutter free environment maintained   fall prevention program maintained   lighting adjusted   nonskid shoes/slippers when out of bed   room organization consistent   safety round/check completed  Intervention: Prevent Skin Injury  Recent Flowsheet Documentation  Taken 3/6/2024 0413 by Seema Queen RN  Body Position:   turned   supine  Taken 3/6/2024 0208 by Seema Queen RN  Body Position:   right   turned  Taken 3/6/2024 0012 by Seema Queen RN  Body Position:   left   turned  Taken 3/5/2024 2200 by Seema Queen RN  Body Position:   right   turned  Taken 3/5/2024 2034 by Seema Queen RN  Body Position:   left   turned  Skin Protection:   adhesive use limited   incontinence pads utilized   pectin skin barriers applied   transparent dressing maintained   tubing/devices free from skin contact  Intervention: Prevent and Manage VTE (Venous Thromboembolism) Risk  Recent Flowsheet Documentation  Taken 3/6/2024 0413 by Seema Queen RN  Activity Management: (asleep) other (see comments)  Taken 3/6/2024 0208 by Seema Queen RN  Activity Management: (asleep) other (see comments)  Taken 3/6/2024 0012 by Seema Queen RN  Activity Management: activity minimized  Taken 3/5/2024 2200 by Seema Queen RN  Activity Management: activity minimized  Taken 3/5/2024 2034 by Seema Queen RN  Activity Management:   activity encouraged   dorsiflexion/plantar flexion performed  VTE Prevention/Management:   bilateral   sequential compression devices on  Intervention: Prevent Infection  Recent Flowsheet Documentation  Taken 3/5/2024 2034 by Seema Queen RN  Infection Prevention:   environmental surveillance performed   hand hygiene promoted   personal protective equipment utilized   rest/sleep promoted   single patient room provided  Goal: Optimal Comfort and Wellbeing  Outcome: Ongoing, Progressing  Intervention: Provide Person-Centered Care  Recent Flowsheet Documentation  Taken  3/5/2024 2034 by Seema Queen RN  Trust Relationship/Rapport:   care explained   questions answered   questions encouraged  Goal: Readiness for Transition of Care  Outcome: Ongoing, Progressing     Problem: Fall Injury Risk  Goal: Absence of Fall and Fall-Related Injury  Outcome: Ongoing, Progressing  Intervention: Identify and Manage Contributors  Recent Flowsheet Documentation  Taken 3/5/2024 2034 by Seema Queen, RN  Medication Review/Management: medications reviewed  Intervention: Promote Injury-Free Environment  Recent Flowsheet Documentation  Taken 3/6/2024 0413 by Seema Queen RN  Safety Promotion/Fall Prevention:   assistive device/personal items within reach   clutter free environment maintained   fall prevention program maintained   lighting adjusted   nonskid shoes/slippers when out of bed   room organization consistent   safety round/check completed  Taken 3/6/2024 0208 by Seema Queen RN  Safety Promotion/Fall Prevention:   assistive device/personal items within reach   clutter free environment maintained   fall prevention program maintained   lighting adjusted   nonskid shoes/slippers when out of bed   room organization consistent   safety round/check completed  Taken 3/6/2024 0012 by Seema Queen, LAQUITA  Safety Promotion/Fall Prevention:   assistive device/personal items within reach   clutter free environment maintained   fall prevention program maintained   lighting adjusted   nonskid shoes/slippers when out of bed   safety round/check completed   room organization consistent  Taken 3/5/2024 2200 by Seema Queen RN  Safety Promotion/Fall Prevention:   assistive device/personal items within reach   clutter free environment maintained   fall prevention program maintained   lighting adjusted   nonskid shoes/slippers when out of bed   room organization consistent   safety round/check completed  Taken 3/5/2024 2034 by Seema Queen, RN  Safety Promotion/Fall Prevention:   activity supervised    assistive device/personal items within reach   clutter free environment maintained   fall prevention program maintained   lighting adjusted   nonskid shoes/slippers when out of bed   room organization consistent   safety round/check completed     Problem: Skin Injury Risk Increased  Goal: Skin Health and Integrity  Outcome: Ongoing, Progressing  Intervention: Optimize Skin Protection  Recent Flowsheet Documentation  Taken 3/5/2024 2034 by Seema Queen RN  Pressure Reduction Techniques:   frequent weight shift encouraged   heels elevated off bed   pressure points protected   weight shift assistance provided  Pressure Reduction Devices:   alternating pressure pump (ADD)   foam padding utilized   heel offloading device utilized   positioning supports utilized  Skin Protection:   adhesive use limited   incontinence pads utilized   pectin skin barriers applied   transparent dressing maintained   tubing/devices free from skin contact     Problem: Restraint, Nonviolent  Goal: Absence of Harm or Injury  Outcome: Ongoing, Progressing  Intervention: Implement Least Restrictive Safety Strategies  Recent Flowsheet Documentation  Taken 3/6/2024 0200 by Seema Queen RN  Medical Device Protection: tubing secured  Less Restrictive Alternative: bed alarm in use  De-Escalation Techniques: increased round frequency  Diversional Activities: television  Taken 3/6/2024 0000 by Seema Queen RN  Medical Device Protection:   torso covered   tubing secured  Less Restrictive Alternative:   bed alarm in use   medication offered   surveillance provided  De-Escalation Techniques: increased round frequency  Diversional Activities: television  Taken 3/5/2024 2200 by Seema Queen RN  Medical Device Protection:   tubing secured   torso covered  Less Restrictive Alternative:   bed alarm in use   calming techniques promoted   surveillance provided  De-Escalation Techniques:   increased round frequency   reoriented  Diversional Activities:  television  Taken 3/5/2024 2034 by Seema Queen RN  Diversional Activities: television  Taken 3/5/2024 2000 by Seema Queen RN  Medical Device Protection:   torso covered   tubing secured  Less Restrictive Alternative:   bed alarm in use   calming techniques promoted   family presence promoted   surveillance provided  De-Escalation Techniques:   increased round frequency   reoriented  Diversional Activities: television  Intervention: Protect Dignity, Rights, and Personal Wellbeing  Recent Flowsheet Documentation  Taken 3/5/2024 2034 by Seema Queen RN  Trust Relationship/Rapport:   care explained   questions answered   questions encouraged  Intervention: Protect Skin and Joint Integrity  Recent Flowsheet Documentation  Taken 3/6/2024 0413 by Seema Queen RN  Body Position:   turned   supine  Taken 3/6/2024 0208 by Seema Queen RN  Body Position:   right   turned  Taken 3/6/2024 0012 by Seema Queen RN  Body Position:   left   turned  Taken 3/5/2024 2200 by Seema Queen RN  Body Position:   right   turned  Taken 3/5/2024 2034 by Seema Queen RN  Body Position:   left   turned  Goal: Absence of Harm or Injury  Outcome: Ongoing, Progressing  Intervention: Implement Least Restrictive Safety Strategies  Recent Flowsheet Documentation  Taken 3/6/2024 0200 by Seema Queen RN  Medical Device Protection: tubing secured  Less Restrictive Alternative: bed alarm in use  De-Escalation Techniques: increased round frequency  Diversional Activities: television  Taken 3/6/2024 0000 by Seema Queen RN  Medical Device Protection:   torso covered   tubing secured  Less Restrictive Alternative:   bed alarm in use   medication offered   surveillance provided  De-Escalation Techniques: increased round frequency  Diversional Activities: television  Taken 3/5/2024 2200 by Seema Queen RN  Medical Device Protection:   tubing secured   torso covered  Less Restrictive Alternative:   bed alarm in use   calming techniques  promoted   surveillance provided  De-Escalation Techniques:   increased round frequency   reoriented  Diversional Activities: television  Taken 3/5/2024 2034 by Seema Queen RN  Diversional Activities: television  Taken 3/5/2024 2000 by Seema Queen RN  Medical Device Protection:   torso covered   tubing secured  Less Restrictive Alternative:   bed alarm in use   calming techniques promoted   family presence promoted   surveillance provided  De-Escalation Techniques:   increased round frequency   reoriented  Diversional Activities: television  Intervention: Protect Dignity, Rights, and Personal Wellbeing  Recent Flowsheet Documentation  Taken 3/5/2024 2034 by Seema Queen RN  Trust Relationship/Rapport:   care explained   questions answered   questions encouraged  Intervention: Protect Skin and Joint Integrity  Recent Flowsheet Documentation  Taken 3/6/2024 0413 by Seema Queen RN  Body Position:   turned   supine  Taken 3/6/2024 0208 by Seema Queen RN  Body Position:   right   turned  Taken 3/6/2024 0012 by Seema Queen RN  Body Position:   left   turned  Taken 3/5/2024 2200 by Seema Queen RN  Body Position:   right   turned  Taken 3/5/2024 2034 by Seema Queen RN  Body Position:   left   turned     Problem: Pain Acute  Goal: Acceptable Pain Control and Functional Ability  Outcome: Ongoing, Progressing  Intervention: Prevent or Manage Pain  Recent Flowsheet Documentation  Taken 3/5/2024 2034 by Seema Queen RN  Medication Review/Management: medications reviewed  Intervention: Optimize Psychosocial Wellbeing  Recent Flowsheet Documentation  Taken 3/6/2024 0200 by Seema Queen RN  Diversional Activities: television  Taken 3/6/2024 0000 by Seema Queen RN  Diversional Activities: television  Taken 3/5/2024 2200 by Seema Queen RN  Diversional Activities: television  Taken 3/5/2024 2034 by Seema Queen RN  Diversional Activities: television  Taken 3/5/2024 2000 by Seema Queen  RN  Diversional Activities: television   Goal Outcome Evaluation:  Plan of Care Reviewed With: patient           Outcome Evaluation: Pt unable to answer questions or follow commands. Repeats words heard. Tolerated diet. Dependent for all ADLs. VSS. On fall precautions. Q2 hour turns. Non violent  soft restraints to LUE.

## 2024-03-06 NOTE — DISCHARGE PLACEMENT REQUEST
"Felipe Dockery (78 y.o. Male)       Date of Birth   1945    Social Security Number       Address   [address] [facility] [address]    Home Phone   [phone]    MRN   [id number]       [employer]   None    Marital Status                               Admission Date   2/24/24    Admission Type   Emergency    Admitting Provider   Chet Rivero Jr., MD    Attending Provider   Cachorro Knox MD    Department, Room/Bed   [address], P586/1       Discharge Date       Discharge Disposition       Discharge Destination                                 Attending Provider: Cachorro Knox MD    Allergies: No Known Allergies    Isolation: None   Infection: None   Code Status: CPR    Ht: 182.9 cm (72\")   Wt: 116 kg (255 lb 11.7 oz)    Admission Cmt: None   Principal Problem: 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    NAHEDJAME (Son) -- -- [phone]    NahedTiffany (Daughter) -- -- [phone]                "

## 2024-03-06 NOTE — THERAPY RE-EVALUATION
Acute Care - Speech Language Pathology   Swallow Re-Evaluation [facility]     Patient Name: Erlin Blanco  : 1945  MRN: [id number]  Today's Date: 3/6/2024               Admit Date: 2024    Visit Dx:     ICD-10-CM ICD-9-CM   1. Intraparenchymal hemorrhage of brain  I61.9 431   2. Facial droop  R29.810 781.94   3. Expressive aphasia  R47.01 784.3   4. Weakness of right upper extremity  R29.898 729.89   5. Chronic anticoagulation  Z79.01 V58.61     Patient Active Problem List   Diagnosis    Intraparenchymal hemorrhage of brain    Seizures    Type 2 diabetes mellitus    HTN (hypertension)    Right hemiparesis    Oropharyngeal dysphagia    Paroxysmal atrial fibrillation     History reviewed. No pertinent past medical history.  History reviewed. No pertinent surgical history.    SLP Recommendation and Plan  SLP Swallowing Diagnosis: mild, oral dysphagia, pharyngeal dysphagia (24)  SLP Diet Recommendation: puree, nectar thick liquids, ice chips between meals after oral care, with supervision (24)  Recommended Precautions and Strategies: upright posture during/after eating, small bites of food and sips of liquid, multiple swallows per bite of food, multiple swallows per sip of liquid, alternate between small bites of food and sips of liquid, 1:1 supervision, assist with feeding (24)  SLP Rec. for Method of Medication Administration: meds whole, meds crushed, with puree, as tolerated (24)     Monitor for Signs of Aspiration: yes, notify SLP if any concerns (24)  Recommended Diagnostics: reassess via clinical swallow evaluation, reassess via VFSS (MBS) (24)  Swallow Criteria for Skilled Therapeutic Interventions Met: demonstrates skilled criteria (24)  Anticipated Discharge Disposition (SLP): anticipate therapy at next level of care (24)  Rehab Potential/Prognosis, Swallowing: good, to achieve stated therapy goals (24  1500)  Therapy Frequency (Swallow): PRN (03/06/24 1500)  Predicted Duration Therapy Intervention (Days): until discharge (03/06/24 1500)  Oral Care Recommendations: Oral Care BID/PRN, Before ice/water (03/06/24 1500)                                        Outcome Evaluation: Re-evaluation of swallow completed. Reviewed VFSS results/recs with wife at bedside. Patient required MAX auditory/visual/tactile stimulation to increase and maintain alertness. Significant coughing with thin via spoon. No overt s/s of aspiration with ice chip, nectar by spoon/cup, or puree trials. Patient with unsuccessful and disorganized munching mastication with soft/chopped solid. SLP removed remaining portion of bolus from oral cavity. Recommend patient continue puree diet with nectar thick liquids. Meds whole or crushed in puree. Upright for meals, small bites/sips, total feeding assist/supervision, feed only when alert, double swallows, and alterante solids/liquids. Patient OK for free water protocol with ice chips, after oral care. Speech to follow.      SWALLOW EVALUATION (Last 72 Hours)       SLP Adult Swallow Evaluation       Row Name 03/06/24 1500 03/04/24 1200                Rehab Evaluation    Document Type re-evaluation  -CR evaluation  -CR       Subjective Information no complaints  -CR no complaints  -CR       Patient Observations cooperative;lethargic  -CR alert;cooperative  -CR       Patient Effort adequate  -CR good  -CR       Symptoms Noted During/After Treatment none  -CR none  -CR          General Information    Patient Profile Reviewed yes  -CR yes  -CR       Pertinent History Of Current Problem -- 77 y/o male L thalamic IP.  -CR       Current Method of Nutrition -- NPO  -CR       Precautions/Limitations, Vision -- WFL;for purposes of eval  -CR       Precautions/Limitations, Hearing -- WFL;for purposes of eval  -CR       Prior Level of Function-Swallowing -- no diet consistency restrictions  -CR       Plans/Goals  Discussed with -- patient;family;agreed upon  -CR       Barriers to Rehab -- none identified  -CR          Pain Scale: Numbers Pre/Post-Treatment    Pretreatment Pain Rating 0/10 - no pain  -CR 0/10 - no pain  -CR          Oral Motor Structure and Function    Dentition Assessment -- natural, present and adequate  -CR       Secretion Management -- dried secretions in oral cavity  -CR       Mucosal Quality -- dry  -CR          Oral Musculature and Cranial Nerve Assessment    Oral Motor General Assessment -- mandibular impairment;oral labial or buccal impairment;lingual impairment  -CR       Mandibular Impairment Detail, Cranial Nerve V (Trigeminal) -- reduced strength on right  -CR       Oral Labial or Buccal Impairment, Detail, Cranial Nerve VII (Facial): -- right labial droop  -CR       Vocal Impairment, Detail. Cranial Nerve X (Vagus) -- --  hoarse vocal quality  -CR          MBS/VFSS Interpretation    VFSS Summary -- VFSS complete. Radiologist, Dr. Sanches, present during study. Patient presents mild oropharyngeal dysphagia characterized by reduced hyolaryngeal elevation/excursion, delayed swallow, reduced upper esophageal sphincter opening, and poor oral prep stage. Suspect mental status is also impacting swallow as patient with difficulties following directions. Spill to the valleculae with thin and nectar trials via spoon. Spill to the pyriforms with cup/straw trials. Deep silent penetration to the vocal cords during the swallow with thin liquid by straw. Difficult to determine if penetration was transient or slightly trace nontransient. No penetration with nectar by straw or puree trial. Cued double swallow to reduce mild-moderate oral/pharyngeal residue. Patient with prolonged disorganized munching mastication. No penetration/aspiration of portion of bolus. Remaining portion of bolus manually removed from left side of oral cavity. Recommend puree diet with nectar thick liquids. Meds whole or crushed in puree.  Upright for meals, slow rate, small bites/sips, supervision/assist with meals. This is a conservative diet. Suspect patient will be appropriate for upgrade (mechanical ground with thins, no straws) at the bedside as mental status and oral stage improves, per SLP discretion. Speech to follow.  -CR          SLP Communication to Radiology    Summary Statement -- VFSS complete. Radiologist, Dr. Sanches, present during the study. Penetration with thin liquids via straw, question trace nontransient versus transient penetration. No other penetration/aspiration visualized. Mild-moderate oral/pharyngeal residue post swallow reduced with cued double swallow.  -CR          SLP Evaluation Clinical Impression    SLP Swallowing Diagnosis mild;oral dysphagia;pharyngeal dysphagia  -CR mild;oral dysphagia;pharyngeal dysphagia  -CR       Functional Impact risk of aspiration/pneumonia  -CR risk of aspiration/pneumonia  -CR       Rehab Potential/Prognosis, Swallowing good, to achieve stated therapy goals  -CR good, to achieve stated therapy goals  -CR       Swallow Criteria for Skilled Therapeutic Interventions Met demonstrates skilled criteria  -CR demonstrates skilled criteria  -CR          Recommendations    Therapy Frequency (Swallow) PRN  -CR PRN  -CR       Predicted Duration Therapy Intervention (Days) until discharge  -CR until discharge  -CR       SLP Diet Recommendation puree;nectar thick liquids;ice chips between meals after oral care, with supervision  -CR puree;nectar thick liquids  -CR       Recommended Diagnostics reassess via clinical swallow evaluation;reassess via VFSS (MBS)  -CR reassess via clinical swallow evaluation;reassess via VFSS (MBS)  -CR       Recommended Precautions and Strategies upright posture during/after eating;small bites of food and sips of liquid;multiple swallows per bite of food;multiple swallows per sip of liquid;alternate between small bites of food and sips of liquid;1:1 supervision;assist with  feeding  -CR upright posture during/after eating;small bites of food and sips of liquid;multiple swallows per bite of food;multiple swallows per sip of liquid;alternate between small bites of food and sips of liquid;1:1 supervision;assist with feeding  -CR       Oral Care Recommendations Oral Care BID/PRN;Before ice/water  -CR Oral Care BID/PRN;Before ice/water  -CR       SLP Rec. for Method of Medication Administration meds whole;meds crushed;with puree;as tolerated  -CR meds whole;meds crushed;with puree;as tolerated  -CR       Monitor for Signs of Aspiration yes;notify SLP if any concerns  -CR yes;notify SLP if any concerns  -CR       Anticipated Discharge Disposition (SLP) anticipate therapy at next level of care  -CR anticipate therapy at next level of care  -CR          (LTG) Patient will demonstrate functional swallow for    Diet Texture (Demonstrate functional swallow) regular textures  -CR --       Liquid viscosity (Demonstrate functional swallow) thin liquids  -CR --       [place] (Demonstrate functional swallow) independently (over 90% accuracy)  -CR --       Time Frame (Demonstrate functional swallow) by discharge  -CR --       Progress/Outcomes (Demonstrate functional swallow) progress slower than expected  -CR --       Comment (Demonstrate functional swallow) Re-evaluation of swallow completed. Reviewed VFSS results/recs with wife at bedside. Patient required MAX auditory/visual/tactile stimulation to increase and maintain alertness. Significant coughing with thin via spoon. No overt s/s of aspiration with ice chip, nectar by spoon/cup, or puree trials. Patient with unsuccessful and disorganized munching mastication with soft/chopped solid. SLP removed remaining portion of bolus from oral cavity. Recommend patient continue puree diet with nectar thick liquids. Meds whole or crushed in puree. Upright for meals, small bites/sips, total feeding assist/supervision, feed only when alert, double  swallows, and alterante solids/liquids. Patient OK for free water protocol with ice chips, after oral care.  -CR --                 User Key  (r) = Recorded By, (t) = Taken By, (c) = Cosigned By      Initials Name Effective Dates    Alesha Hawley SLP 08/28/23 -                     EDUCATION  The patient has been educated in the following areas:   Dysphagia (Swallowing Impairment).        SLP GOALS       Row Name 03/06/24 1500             (LTG) Patient will demonstrate functional swallow for    Diet Texture (Demonstrate functional swallow) regular textures  -CR      Liquid viscosity (Demonstrate functional swallow) thin liquids  -CR      [place] (Demonstrate functional swallow) independently (over 90% accuracy)  -CR      Time Frame (Demonstrate functional swallow) by discharge  -CR      Progress/Outcomes (Demonstrate functional swallow) progress slower than expected  -CR      Comment (Demonstrate functional swallow) Re-evaluation of swallow completed. Reviewed VFSS results/recs with wife at bedside. Patient required MAX auditory/visual/tactile stimulation to increase and maintain alertness. Significant coughing with thin via spoon. No overt s/s of aspiration with ice chip, nectar by spoon/cup, or puree trials. Patient with unsuccessful and disorganized munching mastication with soft/chopped solid. SLP removed remaining portion of bolus from oral cavity. Recommend patient continue puree diet with nectar thick liquids. Meds whole or crushed in puree. Upright for meals, small bites/sips, total feeding assist/supervision, feed only when alert, double swallows, and alterante solids/liquids. Patient OK for free water protocol with ice chips, after oral care.  -CR                User Key  (r) = Recorded By, (t) = Taken By, (c) = Cosigned By      Initials Name Provider Type    Alesha Hawley SLP Speech and Language Pathologist                       Time Calculation:    Time Calculation- SLP       Row  Name 03/06/24 1525             Time Calculation- SLP    SLP Start Time 0750  -CR      SLP Received On 03/06/24  -CR         Untimed Charges    21179-QP Treatment Swallow Minutes 45  -CR         Total Minutes    Untimed Charges Total Minutes 45  -CR       Total Minutes 45  -CR                User Key  (r) = Recorded By, (t) = Taken By, (c) = Cosigned By      Initials Name Provider Type    CR Alesha Shaikh SLP Speech and Language Pathologist                    Therapy Charges for Today       Code Description Service Date Service Provider Modifiers Qty    [id number] HC ST TREATMENT SWALLOW 3 3/6/2024 Alesha Shaikh SLP GN 1                 PATRICK Tomlin  3/6/2024

## 2024-03-06 NOTE — TELEPHONE ENCOUNTER
Called to let family member know of patient's upcoming CT and appt. She questions what if pt goes in rehab or long term care in which I instructed her to call office. Informed her the CT and appt info will be on discharge paperwork the hospital will give.

## 2024-03-06 NOTE — PROGRESS NOTES
[facility] Acute Rehab    Discussed with Dr. Gutiérrez. He is recommending subacute rehab for a longer stay.     Msg left to inform CCP    Will sign off    Aileen kumar RN  Acute Rehab Admission Nurse

## 2024-03-06 NOTE — PROGRESS NOTES
Nutrition Services    Patient Name:  Erlin Blanco  YOB: 1945  MRN: [id number]  Admit Date:  2/24/2024    RD spoke with pt's wife again today and found wife had been keeping track of the percentage pt ate of each food brought at lunch and dinner yesterday and breakfast today. Pt ate 50-75% of both lunch and dinner yesterday and 100% of breakfast today.     RD recommends holding feeds again today to evaluate pt appetite and PO intake.    RD hopeful TF's can be discontinues in the next day or so.    Will follow tomorrow.    Electronically signed by:  Ankit Yan  03/06/24 12:04 EST

## 2024-03-06 NOTE — PROGRESS NOTES
Name: Erlin Blanco ADMIT: 2024   : 1945  PCP: Dora Zamzam HAIDER, APRN    MRN: [id number] LOS: 11 days   AGE/SEX: 78 y.o. male  ROOM: [facility]     Subjective   Subjective   Pt can say yes/no in answer to questions. Dtr says she isn't sure if answers are correct though. She says he is eating well this AM. Voiding well. No SOA or CP.        Objective   Objective   Vital Signs  Temp:  [97.5 °F (36.4 °C)-99.1 °F (37.3 °C)] 98.1 °F (36.7 °C)  Heart Rate:  [67-85] 73  Resp:  [18] 18  BP: (133-157)/(54-97) 143/69  SpO2:  [94 %-98 %] 98 %  on   ;   Device (Oxygen Therapy): room air  Body mass index is 34.68 kg/m².  Physical Exam  Vitals and nursing note reviewed. Exam conducted with a chaperone present (Dtr and RN).   Constitutional:       General: He is not in acute distress.     Appearance: He is ill-appearing. He is not toxic-appearing or diaphoretic.   HENT:      Head: Normocephalic.      Nose: Nose normal.      Comments: CorTrak in place     Mouth/Throat:      Mouth: Mucous membranes are moist.      Pharynx: Oropharynx is clear.   Eyes:      General: No scleral icterus.        Right eye: No discharge.         Left eye: No discharge.      Conjunctiva/sclera: Conjunctivae normal.   Cardiovascular:      Rate and Rhythm: Normal rate and regular rhythm.      Pulses: Normal pulses.   Pulmonary:      Effort: Pulmonary effort is normal. No respiratory distress.      Breath sounds: Normal breath sounds. No wheezing or rales.   Abdominal:      General: Bowel sounds are normal. There is no distension.      Palpations: Abdomen is soft.      Tenderness: There is no abdominal tenderness.   Musculoskeletal:         General: Swelling (RUE) present. No deformity.      Cervical back: Neck supple.      Comments: SCDs in place   Skin:     General: Skin is warm and dry.      Capillary Refill: Capillary refill takes less than 2 seconds.      Coloration: Skin is not jaundiced.   Neurological:      Mental Status: He is alert.       Comments: Right hemiparesis with facial droop  Dysarthria and expressive aphasia   Psychiatric:      Comments: Unable to assess       Results Review     I reviewed the patient's new clinical results.  Results from last 7 days   Lab Units 03/06/24  0654 03/05/24  0436 03/04/24  0957 03/03/24  0414   WBC 10*3/mm3 9.09 8.59 8.09 9.27   HEMOGLOBIN g/dL 14.2 14.5 13.9 14.5   PLATELETS 10*3/mm3 392 414 396 369     Results from last 7 days   Lab Units 03/06/24  0654 03/05/24  0436 03/04/24  0957 03/03/24  0414   SODIUM mmol/L 142 141 143 141   POTASSIUM mmol/L 3.5 3.8 3.8 3.8   CHLORIDE mmol/L 105 107 107 109*   CO2 mmol/L 24.5 26.0 25.9 21.0*   BUN mg/dL 16 18 23 18   CREATININE mg/dL 0.94 1.06 0.94 0.87   GLUCOSE mg/dL 199* 175* 207* 210*   EGFR mL/min/1.73 84.0 72.7 84.0 89.4     Results from last 7 days   Lab Units 03/06/24  0654 03/05/24  0436 03/04/24  0957 03/03/24  0414   ALBUMIN g/dL 3.3* 3.6 3.4* 3.5     Results from last 7 days   Lab Units 03/06/24  0654 03/05/24  0436 03/04/24  0957 03/03/24  0414   CALCIUM mg/dL 8.9 9.3 8.8 9.0   ALBUMIN g/dL 3.3* 3.6 3.4* 3.5   MAGNESIUM mg/dL  --   --   --  2.5*   PHOSPHORUS mg/dL 3.0 3.0 3.1 2.6       Glucose   Date/Time Value Ref Range Status   03/06/2024 0736 188 (H) 70 - 130 mg/dL Final   03/05/2024 2210 288 (H) 70 - 130 mg/dL Final   03/05/2024 1715 246 (H) 70 - 130 mg/dL Final   03/05/2024 1215 208 (H) 70 - 130 mg/dL Final   03/05/2024 0605 147 (H) 70 - 130 mg/dL Final   03/05/2024 0008 165 (H) 70 - 130 mg/dL Final   03/04/2024 1849 178 (H) 70 - 130 mg/dL Final       CT Head Without Contrast    Result Date: 3/6/2024  There is expected evolution of an intraparenchymal hemorrhage involving the basal ganglia on the left. It is similar in size as compared to 03/01/2024 but decreased in attenuation. The margins are less distinct. Surrounding edema appears similar. The volume of intraventricular blood has also decreased in attenuation but is similar in size. The lateral  ventricles are mildly prominent but unchanged as compared to 03/01/2024. There is no evidence of new hemorrhage.  Radiation dose reduction techniques were utilized, including automated exposure control and exposure modulation based on body size.   This report was finalized on 3/6/2024 8:14 AM by Dr. Alfred Rivera M.D on Workstation: [id number]       I have personally reviewed all medications:  Scheduled Medications  amLODIPine, 10 mg, Nasogastric, Q24H  cloNIDine, 0.2 mg, Oral, Q12H  hydrALAZINE, 100 mg, Nasogastric, Q8H  insulin glargine, 15 Units, Subcutaneous, Nightly  insulin regular, 2-9 Units, Subcutaneous, TID AC  labetalol, 200 mg, Nasogastric, Q12H  levETIRAcetam, 500 mg, Nasogastric, BID  melatonin, 5 mg, Oral, Nightly  memantine, 10 mg, Nasogastric, Q12H  senna-docusate sodium, 2 tablet, Oral, BID  sodium chloride, 10 mL, Intravenous, Q12H  valsartan, 320 mg, Nasogastric, Daily    Infusions   Diet  Diet: Diabetic; Consistent Carbohydrate; Texture: Pureed (NDD 1); Fluid Consistency: Nectar Thick    I have personally reviewed:  [x]  Laboratory   []  Microbiology   [x]  Radiology   [x]  EKG/Telemetry  []  Cardiology/Vascular   []  Pathology    [x]  Records       Assessment/Plan     Active Hospital Problems    Diagnosis  POA    **Intraparenchymal hemorrhage of brain [I61.9]  Yes    Right hemiparesis [G81.91]  Yes    Oropharyngeal dysphagia [R13.12]  Yes    Paroxysmal atrial fibrillation [I48.0]  Yes    Type 2 diabetes mellitus [E11.9]  Yes    HTN (hypertension) [I10]  Yes    Seizures [R56.9]  Yes      Resolved Hospital Problems   No resolved problems to display.       78-year-old gentleman with hypertension, coronary artery disease, chronic diastolic heart failure, type 2 diabetes, and atrial fibrillation on Eliquis, who presented to the hospital after experiencing a right-sided facial droop and right arm weakness with speech disturbance. CT angiogram showed an acute intraparenchymal hemorrhage and  Neurosurgery was consulted. Blood pressure management was recommended to keep systolic blood pressure less than 160. Nicardipine was initially started and he received Keppra for seizure prophylaxis and Kcentra for Eliquis reversal. He was admitted to ICU. We were asked to assume care when pt came out of ICU.    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  - due to uncontrolled hypertension in the setting of anticoagulation and polycythemia vera  - Eliquis on hold until further notice  - has right hemiparesis and dysarthria as well as oropharyngeal dysphagia  - continue PT/OT/SLP, plan rehab--acute vs subacute  - tolerating modified diet per SLP recs, will dc [place] today  - continue BP control with goal SBP<160mmHg, currently at goal on amlodipine, valsartan, hydralazine, clonidine, and labetalol  - continue Keppra for seizure prophylaxis  - PRN IV Compazine for headaches  - follow up CT head this AM was reassuring  - appreciate MICAELA recs     HTN  - BP at goal as above  - continue amlodipine, clonidine, hydralazine, labetalol, and valsartan     Type 2 DM  - BG overall acceptable although he has a couple of high outliers  - A1c 7.1  - continue Lantus at 15 units QPM  - continue SSI/hypoglycemia protocol  - holding metformin, glimepiride, and Januvia     PAF  - HRs acceptable, continue BB  - was on Eliquis prior to admission but he is off of this now due to spontaneous intracranial hemorrhage     Polycythemia Vera  - on hydroxyurea as an outpatient       SCDs for DVT prophylaxis.  Full code.  Discussed with patient and dtr.  Anticipate discharge to SNU facility, timing yet to be determined.      Cachorro Knox MD  [facility]  03/06/24  10:02 EST

## 2024-03-06 NOTE — PROGRESS NOTES
[facility] NEUROSURGERY INTRACRANIAL PROGRESS NOTE    PATIENT IDENTIFICATION:   Name:  Erlin Blanco      MRN:  [id number]     78 y.o.  male               CC: Hypertensive crisis with head bleed      Subjective     Interval History: No acute issues overnight.  Core track in restraint vent remains in place.  Patient is on modified diet per speech therapy.  Family member who is a nurse states that she fed him yesterday any did fairly well with slow, careful feeding.    ROS:  Constitutional: No fever, chills  HEENT: No headache, no vision changes, no tinnitus, no hearing difficulties  Neck: no stiffness  GI: No nausea, vomiting, no swallow difficulties  Neuro: No numbness, tingling, or weakness, no speech difficulties, no balance difficulties    Objective     Vital signs in last 24 hours:  Temp:  [97.5 °F (36.4 °C)-99.1 °F (37.3 °C)] 98.1 °F (36.7 °C)  Heart Rate:  [67-85] 73  Resp:  [18] 18  BP: (133-157)/(54-97) 143/69      Intake/Output this shift:  No intake/output data recorded.      Intake/Output last 3 shifts:  I/O last 3 completed shifts:  In: 120 [P.O.:120]  Out: 380 [Urine:380]    LABS:  .  Results from last 7 days   Lab Units 03/06/24  0654 03/05/24  0436 03/04/24  0957   WBC 10*3/mm3 9.09 8.59 8.09   HEMOGLOBIN g/dL 14.2 14.5 13.9   HEMATOCRIT % 44.3 44.9 43.4   PLATELETS 10*3/mm3 392 414 396     .  Results from last 7 days   Lab Units 03/06/24  0654 03/05/24  0436 03/04/24  0957   SODIUM mmol/L 142 141 143   POTASSIUM mmol/L 3.5 3.8 3.8   CHLORIDE mmol/L 105 107 107   CO2 mmol/L 24.5 26.0 25.9   BUN mg/dL 16 18 23   CREATININE mg/dL 0.94 1.06 0.94   CALCIUM mg/dL 8.9 9.3 8.8   GLUCOSE mg/dL 199* 175* 207*          IMAGING STUDIES:  CT head without contrast 3/6/2024:  Shows expected evolution of intraparenchymal hemorrhage involving the left basal ganglia which is similar in size to CT head from 3/1/2024 but decrease in attenuation.  Degree of edema surrounding the area is similar in volume of  intraventricular blood is decreased in attenuation and similar size.  Ventricle is unchanged compared to last CT head scan.  There is no evidence of any new hemorrhage.    I personally viewed and interpreted the patient's labs, imaging study, medications and chart.    Meds reviewed/changed: Yes    Current Facility-Administered Medications:     acetaminophen (TYLENOL) suppository 650 mg, 650 mg, Rectal, Q4H PRN, Chet Rivero Jr., MD, 650 mg at 02/28/24 0927    acetaminophen (TYLENOL) suppository 650 mg, 650 mg, Rectal, Q4H PRN, Chet Rivero Jr., MD    acetaminophen (TYLENOL) tablet 650 mg, 650 mg, Nasogastric, Q4H PRN, Chet Rivero Jr., MD, 650 mg at 03/05/24 2336    amLODIPine (NORVASC) tablet 10 mg, 10 mg, Nasogastric, Q24H, Chet Rivero Jr., MD, 10 mg at 03/06/24 0915    sennosides-docusate (PERICOLACE) 8.6-50 MG per tablet 2 tablet, 2 tablet, Oral, BID, 2 tablet at 03/06/24 0920 **AND** polyethylene glycol (MIRALAX) packet 17 g, 17 g, Oral, Daily PRN, 17 g at 03/04/24 0823 **AND** bisacodyl (DULCOLAX) EC tablet 5 mg, 5 mg, Oral, Daily PRN **AND** bisacodyl (DULCOLAX) suppository 10 mg, 10 mg, Rectal, Daily PRN, Chet Rivero Jr., MD    Calcium Replacement - Follow Nurse / BPA Driven Protocol, , Does not apply, PRN, Chet Rivero Jr., MD    cloNIDine (CATAPRES) tablet 0.2 mg, 0.2 mg, Oral, Q12H, Yariel Dominguez MD, 0.2 mg at 03/06/24 0915    dextrose (D50W) (25 g/50 mL) IV injection 25 g, 25 g, Intravenous, Q15 Min PRN, Chet Rivero Jr., MD    dextrose (GLUTOSE) oral gel 15 g, 15 g, Oral, Q15 Min PRN, Chet Rivero Jr., MD    glucagon (GLUCAGEN) injection 1 mg, 1 mg, Intramuscular, Q15 Min PRN, Chet Rivero Jr., MD    hydrALAZINE (APRESOLINE) tablet 100 mg, 100 mg, Nasogastric, Q8H, Chet Rivero Jr., MD, 100 mg at 03/06/24 0529    insulin glargine (LANTUS, SEMGLEE) injection 15 Units, 15 Units, Subcutaneous, Nightly, Cachorro Knox MD    insulin  regular (humuLIN R,novoLIN R) injection 2-9 Units, 2-9 Units, Subcutaneous, TID AC, Annemarie Araujo MD, 2 Units at 03/06/24 0920    labetalol (NORMODYNE) tablet 200 mg, 200 mg, Nasogastric, Q12H, Yariel Dominguez MD, 200 mg at 03/06/24 0915    levETIRAcetam (KEPPRA) tablet 500 mg, 500 mg, Nasogastric, BID, Chet Rivero Jr., MD, 500 mg at 03/06/24 0915    Magnesium Standard Dose Replacement - Follow Nurse / BPA Driven Protocol, , Does not apply, PRN, Chet Rivero Jr., MD    melatonin tablet 5 mg, 5 mg, Oral, Nightly, Randy Hernandez MD, 5 mg at 03/05/24 2108    memantine (NAMENDA) tablet 10 mg, 10 mg, Nasogastric, Q12H, Chet Rivero Jr., MD, 10 mg at 03/06/24 0915    nitroglycerin (NITROSTAT) SL tablet 0.4 mg, 0.4 mg, Sublingual, Q5 Min PRN, Chet Rivero Jr., MD    ondansetron (ZOFRAN) injection 4 mg, 4 mg, Intravenous, Once PRN, Chet Rivero Jr., MD    ondansetron (ZOFRAN) injection 4 mg, 4 mg, Intravenous, Q6H PRN, Chet Rivero Jr., MD    Phosphorus Replacement - Follow Nurse / BPA Driven Protocol, , Does not apply, Josefa SCHMIDT Harold Dale Jr., MD    Potassium Replacement - Follow Nurse / BPA Driven Protocol, , Does not apply, Josefa SCHMIDT Harold Dale Jr., MD    prochlorperazine (COMPAZINE) injection 5 mg, 5 mg, Intravenous, Q6H PRN, Randy Hernandez MD, 5 mg at 03/05/24 1715    sodium chloride 0.9 % flush 10 mL, 10 mL, Intravenous, PRN, Chet Rivero Jr., MD    sodium chloride 0.9 % flush 10 mL, 10 mL, Intravenous, Q12H, Chet Rivero Jr., MD, 10 mL at 03/06/24 0916    sodium chloride 0.9 % flush 10 mL, 10 mL, Intravenous, PRN, Chet Rivero Jr., MD    sodium chloride 0.9 % infusion 40 mL, 40 mL, Intravenous, PRN, Chet Rivero Jr., MD    valsartan (DIOVAN) tablet 320 mg, 320 mg, Nasogastric, Daily, Chet Rivero Jr., MD, 320 mg at 03/06/24 0915      Physical Exam:    General:   Awake, alert, will to assess orientation adequately  given aphasia.  Mild to moderate dysarthria with moderately severe expressive aphasia and at least mild to moderate receptive aphasia.    Neck:    supple  CN II:    Visual fields full to confrontation  CN III IV VI:   Previously exhibited left gaze preference.  Now able to cross midline to the right.  PERRL 2 mm brisk to light  CN VII:    Mild flattened nasolabial fold on right side..   Motor:    RUE-able to very weakly squeeze hand.  RLE withdraws to pain.  L UE with no drift and strong.  LLE raises to command and strong.      Sensation:   Unable to assess degree of sensation loss  Station and Gait:  Per PT note 3/5/2024:Pt continues to require a lot of assistance with bed mobility and sit to stand transfers. Pt with strong R lean secondary to pushing ot L UE. PT and OT assisted pt with L UE weight bearing for 30 seconds which temporarily improved sitting balance. PT and OT assisted patient to partial stand x3 with depx3. PT will continue to follow to address strength, mobility, balance, and transfers.     I observed PT working with the patient today on 3/6/2024.  PT note pending  Coordination:   Finger-to-nose and heel-to-shin test shows no dysmetria.  Rapid alternating movements are normal. Able to tandem walk. Romberg negative.  Extremities:   Wearing SCD    Assessment & Plan     ASSESSMENT:      Intraparenchymal hemorrhage of brain    Seizures    Type 2 diabetes mellitus    HTN (hypertension)    Right hemiparesis    Oropharyngeal dysphagia    Paroxysmal atrial fibrillation    78-year-old male 11 days from large intraparenchymal hemorrhage secondary to hypertensive crisis.  Repeat CT head scan today is stable.  He continues to exhibit lethargy, moderately severe expressive and receptive aphasia, right-sided weakness.  It seems his gaze preference is starting to improve.  From a neurosurgical perspective, he should be evaluated for acute inpatient rehab.  We will have him follow-up in 2 weeks with a repeat CT  "head scan.        PLAN:     CT head stable  Evaluate for rehab  Follow-up with neurosurgery 2 weeks with CT head  Continue to hold blood thinners  Neurosurgery signing off  Continue Keppra per neurology    I discussed the patient's findings and my recommendations with patient, family, and Dr. Miller.     During patient visit, I utilized appropriate personal protective equipment including mask and gloves.  Mask used was standard procedure mask. Appropriate PPE was worn during the entire visit.  Hand hygiene was completed before and after.      LOS: 11 days       Bakari Galloway, APRN  3/6/2024  11:06 EST    \"Dictated utilizing Dragon dictation\".    "

## 2024-03-06 NOTE — OUTREACH NOTE
AMBULATORY CASE MANAGEMENT NOTE    Name and Relationship of Patient/Support Person: Yany Blanco - Emergency Contact    CCM Interim Update    Attempted to call patient, spoke to wife Yany. She states patient is still in the [facility]. Encouraged Yany to call RN-ACM if further assistance is needed if patient goes home. Yany grateful for the assistance and call.     Chart review done, patient currently still admitted in [facility]. Hospital case management following.         Education Documentation  No documentation found.        Erlin EARL  Ambulatory Case Management    3/6/2024, 15:08 EST

## 2024-03-06 NOTE — PLAN OF CARE
Goal Outcome Evaluation:              Outcome Evaluation: Re-evaluation of swallow completed. Reviewed VFSS results/recs with wife at bedside. Patient required MAX auditory/visual/tactile stimulation to increase and maintain alertness. Significant coughing with thin via spoon. No overt s/s of aspiration with ice chip, nectar by spoon/cup, or puree trials. Patient with unsuccessful and disorganized munching mastication with soft/chopped solid. SLP removed remaining portion of bolus from oral cavity. Recommend patient continue puree diet with nectar thick liquids. Meds whole or crushed in puree. Upright for meals, small bites/sips, total feeding assist/supervision, feed only when alert, double swallows, and alterante solids/liquids. Patient OK for free water protocol with ice chips, after oral care. Speech to follow.      Anticipated Discharge Disposition (SLP): anticipate therapy at next level of care          SLP Swallowing Diagnosis: mild, oral dysphagia, pharyngeal dysphagia (03/06/24 1500)

## 2024-03-06 NOTE — PLAN OF CARE
Problem: Adult Inpatient Plan of Care  Goal: Plan of Care Review  Outcome: Ongoing, Progressing  Goal: Patient-Specific Goal (Individualized)  Outcome: Ongoing, Progressing  Goal: Absence of Hospital-Acquired Illness or Injury  Outcome: Ongoing, Progressing  Intervention: Identify and Manage Fall Risk  Recent Flowsheet Documentation  Taken 3/6/2024 1803 by Esha Morales RN  Safety Promotion/Fall Prevention: safety round/check completed  Taken 3/6/2024 1600 by Esha Morales RN  Safety Promotion/Fall Prevention: safety round/check completed  Taken 3/6/2024 1400 by Esha Morales RN  Safety Promotion/Fall Prevention: safety round/check completed  Taken 3/6/2024 1200 by Esha Morales RN  Safety Promotion/Fall Prevention: safety round/check completed  Taken 3/6/2024 1000 by Esha Morales RN  Safety Promotion/Fall Prevention: safety round/check completed  Taken 3/6/2024 0845 by Esha Morales RN  Safety Promotion/Fall Prevention: safety round/check completed  Intervention: Prevent Skin Injury  Recent Flowsheet Documentation  Taken 3/6/2024 0845 by Esha Morales RN  Body Position: supine  Intervention: Prevent and Manage VTE (Venous Thromboembolism) Risk  Recent Flowsheet Documentation  Taken 3/6/2024 0845 by Esha Morales RN  Activity Management: (resting in bed) other (see comments)  VTE Prevention/Management:   bilateral   sequential compression devices on  Intervention: Prevent Infection  Recent Flowsheet Documentation  Taken 3/6/2024 1600 by Esha Morales RN  Infection Prevention: hand hygiene promoted  Taken 3/6/2024 1400 by Esha Morales RN  Infection Prevention: hand hygiene promoted  Taken 3/6/2024 0845 by Esha Morales RN  Infection Prevention: hand hygiene promoted  Goal: Optimal Comfort and Wellbeing  Outcome: Ongoing, Progressing  Intervention: Provide Person-Centered Care  Recent Flowsheet Documentation  Taken 3/6/2024 0845 by Carmen  LAQUITA Balbuena  Trust Relationship/Rapport:   care explained   emotional support provided  Goal: Readiness for Transition of Care  Outcome: Ongoing, Progressing     Problem: Fall Injury Risk  Goal: Absence of Fall and Fall-Related Injury  Outcome: Ongoing, Progressing  Intervention: Identify and Manage Contributors  Recent Flowsheet Documentation  Taken 3/6/2024 0845 by Esha Morales RN  Medication Review/Management: medications reviewed  Intervention: Promote Injury-Free Environment  Recent Flowsheet Documentation  Taken 3/6/2024 1803 by Esha Morales RN  Safety Promotion/Fall Prevention: safety round/check completed  Taken 3/6/2024 1600 by Esha Morales RN  Safety Promotion/Fall Prevention: safety round/check completed  Taken 3/6/2024 1400 by Esha Morales RN  Safety Promotion/Fall Prevention: safety round/check completed  Taken 3/6/2024 1200 by Esha Morales RN  Safety Promotion/Fall Prevention: safety round/check completed  Taken 3/6/2024 1000 by Esha Morales RN  Safety Promotion/Fall Prevention: safety round/check completed  Taken 3/6/2024 0845 by Esha Morales RN  Safety Promotion/Fall Prevention: safety round/check completed     Problem: Skin Injury Risk Increased  Goal: Skin Health and Integrity  Outcome: Ongoing, Progressing  Intervention: Optimize Skin Protection  Recent Flowsheet Documentation  Taken 3/6/2024 0845 by Esha Morales RN  Head of Bed (HOB) Positioning: HOB at 30-45 degrees     Problem: Restraint, Nonviolent  Goal: Absence of Harm or Injury  Outcome: Ongoing, Progressing  Intervention: Implement Least Restrictive Safety Strategies  Recent Flowsheet Documentation  Taken 3/6/2024 1200 by Esha Morales RN  Medical Device Protection: tubing secured  Less Restrictive Alternative: bed alarm in use  De-Escalation Techniques: appropriate behavior reinforced  Diversional Activities: television  Taken 3/6/2024 1019 by Esha Morales RN  Medical  Device Protection: tubing secured  Less Restrictive Alternative: bed alarm in use  De-Escalation Techniques:   reoriented   appropriate behavior reinforced  Diversional Activities: television  Taken 3/6/2024 1000 by Esha Morales RN  Medical Device Protection: tubing secured  Less Restrictive Alternative: bed alarm in use  De-Escalation Techniques:   increased round frequency   reoriented  Diversional Activities: television  Taken 3/6/2024 0845 by Esha Morales RN  Medical Device Protection: tubing secured  Diversional Activities: television  Taken 3/6/2024 0800 by Esha Morales RN  Medical Device Protection: tubing secured  Less Restrictive Alternative: bed alarm in use  De-Escalation Techniques: reoriented  Diversional Activities: television  Taken 3/6/2024 0600 by Esha Morales RN  Medical Device Protection: tubing secured  Less Restrictive Alternative: bed alarm in use  De-Escalation Techniques: increased round frequency  Diversional Activities: television  Intervention: Protect Dignity, Rights, and Personal Wellbeing  Recent Flowsheet Documentation  Taken 3/6/2024 0845 by Esha Morales RN  Trust Relationship/Rapport:   care explained   emotional support provided  Intervention: Protect Skin and Joint Integrity  Recent Flowsheet Documentation  Taken 3/6/2024 0845 by Esha Morales RN  Body Position: supine  Goal: Absence of Harm or Injury  Outcome: Ongoing, Progressing  Intervention: Implement Least Restrictive Safety Strategies  Recent Flowsheet Documentation  Taken 3/6/2024 1200 by Esha Morales RN  Medical Device Protection: tubing secured  Less Restrictive Alternative: bed alarm in use  De-Escalation Techniques: appropriate behavior reinforced  Diversional Activities: television  Taken 3/6/2024 1019 by Esha Morales RN  Medical Device Protection: tubing secured  Less Restrictive Alternative: bed alarm in use  De-Escalation Techniques:   reoriented   appropriate  behavior reinforced  Diversional Activities: television  Taken 3/6/2024 1000 by Esha Morales RN  Medical Device Protection: tubing secured  Less Restrictive Alternative: bed alarm in use  De-Escalation Techniques:   increased round frequency   reoriented  Diversional Activities: television  Taken 3/6/2024 0845 by Esha Morales RN  Medical Device Protection: tubing secured  Diversional Activities: television  Taken 3/6/2024 0800 by Esha Morales RN  Medical Device Protection: tubing secured  Less Restrictive Alternative: bed alarm in use  De-Escalation Techniques: reoriented  Diversional Activities: television  Taken 3/6/2024 0600 by Esha Morales RN  Medical Device Protection: tubing secured  Less Restrictive Alternative: bed alarm in use  De-Escalation Techniques: increased round frequency  Diversional Activities: television  Intervention: Protect Dignity, Rights, and Personal Wellbeing  Recent Flowsheet Documentation  Taken 3/6/2024 0845 by Esha Morales RN  Trust Relationship/Rapport:   care explained   emotional support provided  Intervention: Protect Skin and Joint Integrity  Recent Flowsheet Documentation  Taken 3/6/2024 0845 by Esha Morales RN  Body Position: supine     Problem: Pain Acute  Goal: Acceptable Pain Control and Functional Ability  Outcome: Ongoing, Progressing  Intervention: Prevent or Manage Pain  Recent Flowsheet Documentation  Taken 3/6/2024 0845 by Esha Morales RN  Medication Review/Management: medications reviewed  Intervention: Optimize Psychosocial Wellbeing  Recent Flowsheet Documentation  Taken 3/6/2024 1200 by Esha Morales RN  Diversional Activities: television  Taken 3/6/2024 1019 by Esha Morales RN  Diversional Activities: television  Taken 3/6/2024 1000 by Esha Morales RN  Diversional Activities: television  Taken 3/6/2024 0845 by Esha Morales RN  Diversional Activities: television  Taken 3/6/2024 0800 by  Esha Morales RN  Diversional Activities: television  Taken 3/6/2024 0600 by Esha Morales RN  Diversional Activities: television     Problem: Adjustment to Illness (Stroke, Hemorrhagic)  Goal: Optimal Coping  Outcome: Ongoing, Progressing  Intervention: Support Psychosocial Response to Stroke  Recent Flowsheet Documentation  Taken 3/6/2024 0845 by Esha Morales RN  Family/Support System Care: support provided     Problem: Bowel Elimination Impaired (Stroke, Hemorrhagic)  Goal: Effective Bowel Elimination  Outcome: Ongoing, Progressing     Problem: Cerebral Tissue Perfusion (Stroke, Hemorrhagic)  Goal: Optimal Cerebral Tissue Perfusion  Outcome: Ongoing, Progressing     Problem: Cognitive Impairment (Stroke, Hemorrhagic)  Goal: Optimal Cognitive Function  Outcome: Ongoing, Progressing     Problem: Communication Impairment (Stroke, Hemorrhagic)  Goal: Effective Communication Skills  Outcome: Ongoing, Progressing     Problem: Functional Ability Impaired (Stroke, Hemorrhagic)  Goal: Optimal Functional Ability  Outcome: Ongoing, Progressing  Intervention: Optimize Functional Ability  Recent Flowsheet Documentation  Taken 3/6/2024 0845 by Esha Morales RN  Activity Management: (resting in bed) other (see comments)     Problem: Pain (Stroke, Hemorrhagic)  Goal: Acceptable Pain Control  Outcome: Ongoing, Progressing     Problem: Respiratory Compromise (Stroke, Hemorrhagic)  Goal: Effective Oxygenation and Ventilation  Outcome: Ongoing, Progressing  Intervention: Optimize Oxygenation and Ventilation  Recent Flowsheet Documentation  Taken 3/6/2024 0845 by Esha Morales RN  Head of Bed (HOB) Positioning: HOB at 30-45 degrees     Problem: Sensorimotor Impairment (Stroke, Hemorrhagic)  Goal: Improved Sensorimotor Function  Outcome: Ongoing, Progressing     Problem: Swallowing Impairment (Stroke, Hemorrhagic)  Goal: Optimal Eating and Swallowing Without Aspiration  Outcome: Ongoing, Progressing      Problem: Urinary Elimination Impaired (Stroke, Hemorrhagic)  Goal: Effective Urinary Elimination  Outcome: Ongoing, Progressing     Problem: Gas Exchange Impaired  Goal: Optimal Gas Exchange  Outcome: Ongoing, Progressing  Intervention: Optimize Oxygenation and Ventilation  Recent Flowsheet Documentation  Taken 3/6/2024 0845 by Esha Morales RN  Head of Bed ([facility]) Positioning: [facility] at 30-45 degrees     Problem: Hypertension Acute  Goal: Blood Pressure Within Desired Range  Outcome: Ongoing, Progressing  Intervention: Normalize Blood Pressure  Recent Flowsheet Documentation  Taken 3/6/2024 0845 by Esha Morales RN  Medication Review/Management: medications reviewed     Problem: Seizure, Active Management  Goal: Absence of Seizure/Seizure-Related Injury  Outcome: Ongoing, Progressing     Problem: Hyperglycemia  Goal: Blood Glucose Level Within Targeted Range  Outcome: Ongoing, Progressing     Problem: Aspiration (Enteral Nutrition)  Goal: Absence of Aspiration Signs and Symptoms  Outcome: Ongoing, Progressing  Intervention: Minimize Aspiration Risk  Recent Flowsheet Documentation  Taken 3/6/2024 0845 by Esha Morales RN  Head of Bed ([facility]) Positioning: [facility] at 30-45 degrees  Goal: Absence of Aspiration Signs and Symptoms  Outcome: Ongoing, Progressing  Intervention: Minimize Aspiration Risk  Recent Flowsheet Documentation  Taken 3/6/2024 0845 by Esha Morales RN  Head of Bed ([facility]) Positioning: [facility] at 30-45 degrees     Problem: Device-Related Complication Risk (Enteral Nutrition)  Goal: Safe, Effective Therapy Delivery  Outcome: Ongoing, Progressing  Goal: Safe, Effective Therapy Delivery  Outcome: Ongoing, Progressing     Problem: Feeding Intolerance (Enteral Nutrition)  Goal: Feeding Tolerance  Outcome: Ongoing, Progressing  Goal: Feeding Tolerance  Outcome: Ongoing, Progressing   Goal Outcome Evaluation:      Pt remained disoriented throughout shift. Pt on room air and lung are diminished. [facility]  is at a 20 and remained without changes. Pt has no s/s of pain and vital signs are stable. Pt incontinent, wearing brief. Pt is q2 turn. Changed dressing to pt left buttox/scab. Pt took meds whole or crushed with applesauce and ate meals without issue. Coretrak was pulled today. Right arm remained swollen, circulation is present. Family at bedside all day and all questions, complaints, and concerns were noted. Will continue to follow for remainder of shift.

## 2024-03-06 NOTE — PROGRESS NOTES
[facility]    Updated therapies noted. Dependent of 3 people to stand (unable to fully stand). Will need to discuss with Dr. Gutiérrez as pt would need to be modified independent to go home with wife. May need SNU for longer stay.     Aileen Frausto RN  Acute Rehab Admission Nurse

## 2024-03-06 NOTE — PROGRESS NOTES
Continued Stay Note  [facility]     Patient Name: Erlin Blanco  MRN: [id number]  Today's Date: 3/6/2024    Admit Date: 2/24/2024    Plan: SNF referrals pending   Discharge Plan       Row Name 03/06/24 1632       Plan    Plan SNF referrals pending    Patient/Family in Agreement with Plan yes    Plan Comments CCP followed up with pt and wife at bedside to update that [facility] acute rehab has signed off. Wife agreeable to SNF referrals, prefers [facility] (referrals pending). CCP to follow for evals. Jaimee Tao LCSW                   Discharge Codes    No documentation.                 Expected Discharge Date and Time       Expected Discharge Date Expected Discharge Time    Mar 11, 2024               Yajaira Tao LCSW

## 2024-03-06 NOTE — TELEPHONE ENCOUNTER
----- Message from MUKUL Yee sent at 3/6/2024 11:14 AM EST -----  Can we please schedule 2 week follow-up with [facility].  I have already ordered a CT head scan.  The patient will need to obtain this prior to visit.  Diagnosis: Intraparenchymal hemorrhage secondary to hypertensive crisis

## 2024-03-06 NOTE — THERAPY TREATMENT NOTE
Patient Name: Erlin Blanco  : 1945    MRN: [id number]                              Today's Date: 3/6/2024       Admit Date: 2024    Visit Dx:     ICD-10-CM ICD-9-CM   1. Intraparenchymal hemorrhage of brain  I61.9 431   2. Facial droop  R29.810 781.94   3. Expressive aphasia  R47.01 784.3   4. Weakness of right upper extremity  R29.898 729.89   5. Chronic anticoagulation  Z79.01 V58.61     Patient Active Problem List   Diagnosis    Intraparenchymal hemorrhage of brain    Seizures    Type 2 diabetes mellitus    HTN (hypertension)    Right hemiparesis    Oropharyngeal dysphagia    Paroxysmal atrial fibrillation     History reviewed. No pertinent past medical history.  History reviewed. No pertinent surgical history.   General Information       Row Name 24 1521          OT Time and Intention    Document Type therapy note (daily note)  -     Mode of Treatment co-treatment;physical therapy;occupational therapy  progress safely with func transfers, quick to fatigue  -       Row Name 24 1521          General Information    Patient Profile Reviewed yes  -MW     Existing Precautions/Restrictions fall  -       Row Name 24 1521          Cognition    Orientation Status (Cognition) oriented to;person;place;verbal cues/prompts needed for orientation  -       Row Name 24 1521          Safety Issues, Functional Mobility    Safety Issues Affecting Function (Mobility) impulsivity;safety precaution awareness;problem-solving;safety precautions follow-through/compliance;sequencing abilities;insight into deficits/self-awareness;judgment  -     Impairments Affecting Function (Mobility) balance;cognition;endurance/activity tolerance;strength;postural/trunk control;sensation/sensory awareness;visual/perceptual;motor control;grasp;coordination  -     Cognitive Impairments, Mobility Safety/Performance sequencing abilities;safety precaution follow-through;safety precaution  awareness;problem-solving/reasoning;attention  -     Comment, Safety Issues/Impairments (Mobility) Co-treatment medically necessary and appropriate d/t pt's acuity level, decreased endurance and activity tolerance, and safety of patient and staff. Treatment focused on progression of care and goals established in POC. Gait belt and non-skid socks worn.  -               User Key  (r) = Recorded By, (t) = Taken By, (c) = Cosigned By      Initials Name Provider Type     Nathalie Woodard OT Occupational Therapist                     Mobility/ADL's       Row Name 03/06/24 1522          Bed Mobility    Bed Mobility rolling right;scooting/bridging;supine-sit;sit-supine  -     Rolling Right [place] (Bed Mobility) maximum assist (25% patient effort);2 person assist  -     Scooting/Bridging [place] (Bed Mobility) dependent (less than 25% patient effort);2 person assist;nonverbal cues (demo/gesture);verbal cues  -     Supine-Sit [place] (Bed Mobility) maximum assist (25% patient effort);2 person assist  -     Sit-Supine [place] (Bed Mobility) maximum assist (25% patient effort);2 person assist  -MW     Bed Mobility, Safety Issues cognitive deficits limit understanding;decreased use of arms for pushing/pulling;decreased use of legs for bridging/pushing;impaired trunk control for bed mobility  -     Assistive Device (Bed Mobility) head of bed elevated;draw sheet  -     Comment, (Bed Mobility) strong R leaning, pushing with LUE, improved sit balance with LUE using foot bed rail for stabilization  -       Row Name 03/06/24 1522          Transfers    Transfers sit-stand transfer  -       Row Name 03/06/24 1522          Bed-Chair Transfer    Bed-Chair [place] (Transfers) unable to assess  -       Row Name 03/06/24 1522          Sit-Stand Transfer    Sit-Stand [place] (Transfers) dependent (less than 25% patient effort);other (see comments)  x3 assist  -     Comment, (Sit-Stand  Transfer) x2 STS completed, unable to come to full stand, strong support in rear to assist in clearing of bottom  -[facility] Name 03/06/24 1522          Functional Mobility    Functional Mobility- Ind. Level unable to perform  -[facility] Name 03/06/24 1522          Activities of Daily Living    BADL Assessment/Intervention toileting;lower body dressing  -MW       Row Name 03/06/24 1522          Toileting Assessment/Training    [place] Level (Toileting) dependent (less than 25% patient effort)  -     Position (Toileting) supine  -     Comment, (Toileting) completed prior to session with nurse aide  -[facility] Name 03/06/24 1522          Lower Body Dressing Assessment/Training    [place] Level (Lower Body Dressing) dependent (less than 25% patient effort);socks  -     Position (Lower Body Dressing) supine  -               User Key  (r) = Recorded By, (t) = Taken By, (c) = Cosigned By      Initials Name Provider Type     Nathalie Woodard OT Occupational Therapist                   Obj/Interventions       [facility] Name 03/06/24 1524          Shoulder (Therapeutic Exercise)    Shoulder (Therapeutic Exercise) PROM (passive range of motion)  -     Shoulder PROM (Therapeutic Exercise) right;flexion;extension;aBduction;aDduction  -MW       Row Name 03/06/24 1524          Elbow/Forearm (Therapeutic Exercise)    Elbow/Forearm (Therapeutic Exercise) PROM (passive range of motion)  -     Elbow/Forearm PROM (Therapeutic Exercise) right;flexion;extension  -MW       Row Name 03/06/24 1524          Motor Skills    Functional Endurance quick to fatigue  -     Motor Control/Coordination Interventions neuro-muscular re-education;weight-bearing activities  -MW       Row Name 03/06/24 1524          Balance    Balance Assessment sitting static balance;sitting dynamic balance;sit to stand dynamic balance  -     Static Sitting Balance contact guard;moderate assist  when arm not stabilized, increased support  due to pushing to Right  -MW     Dynamic Sitting Balance moderate assist  -MW     Position, Sitting Balance supported;sitting edge of bed  -MW     Sit to Stand Dynamic Balance dependent;other (see comments)  x3  -MW     Balance Interventions sitting;sit to stand;static;dynamic  -MW     Comment, Balance foot rail with L hand for increased support, WB'ing onto right and left elbows x3 reps each with increased assist for coming to midline max Ax2 when going onto R elbow  -MW               User Key  (r) = Recorded By, (t) = Taken By, (c) = Cosigned By      Initials Name Provider Type    Nathalie Hoff OT Occupational Therapist                   Goals/Plan    No documentation.                  Clinical Impression       Row Name 03/06/24 1526          Pain Assessment    Pretreatment Pain Rating 0/10 - no pain  -MW     Posttreatment Pain Rating 0/10 - no pain  -MW       Row Name 03/06/24 1526          Pain Scale: FACES Pre/Post-Treatment    Pain: FACES Scale, Pretreatment 0-->no hurt  -MW     Posttreatment Pain Rating 0-->no hurt  -MW       Row Name 03/06/24 1526          Plan of Care Review    Plan of Care Reviewed With patient;family  -     Progress no change  -MW     Outcome Evaluation Pt seen for OT/PT tx session in AM, pt awake, agreeable to therapy. Focus on sit balance and weight bearing onto bilat forearms, increased assist to return to midline max A x2 when coming up from R side. Improved support at EOB when LUE holding onto foot rail. PROM completed to RUE this date while at EOB, CGA/min for sit balance throughout. Max cues required for attention to R side. He completed x2 STS trials, depx3 for STS attempts, unable to come to full stand. He will continue to benefit from skilled OT to address noted func deficits and progress toward improved (I) with OOB activity required for ADLs. Rec acute rehab at this time.  -       Row Name 03/06/24 1526          Therapy Plan Review/Discharge Plan (OT)     Anticipated Discharge Disposition (OT) inpatient rehabilitation facility;skilled nursing facility  pending progress  -       Row Name 03/06/24 1526          Vital Signs    O2 Delivery Pre Treatment room air  -       Row Name 03/06/24 1526          Positioning and Restraints    Pre-Treatment Position in bed  -MW     Post Treatment Position bed  -MW     In Bed notified nsg;fowlers;call light within reach;exit alarm on;encouraged to call for assist;with family/caregiver;side rails up x3;SCD pump applied;RUE elevated;legs elevated  -               User Key  (r) = Recorded By, (t) = Taken By, (c) = Cosigned By      Initials Name Provider Type    Nathalie Hoff, FLORESITA Occupational Therapist                   Outcome Measures       Row Name 03/06/24 1529          How much help from another is currently needed...    Putting on and taking off regular lower body clothing? 1  -MW     Toileting (which includes using toilet bed pan or urinal) 1  -MW     Putting on and taking off regular upper body clothing 2  -MW     Taking care of personal grooming (such as brushing teeth) 2  -MW     Eating meals 1  -MW       Row Name 03/06/24 1115          How much help from another person do you currently need...    Turning from your back to your side while in flat bed without using bedrails? 2  -EM     Moving from lying on back to sitting on the side of a flat bed without bedrails? 1  -EM     Moving to and from a bed to a chair (including a wheelchair)? 1  -EM     Standing up from a chair using your arms (e.g., wheelchair, bedside chair)? 1  -EM     Climbing 3-5 steps with a railing? 1  -EM     To walk in hospital room? 1  -EM     AM-PAC 6 Clicks Score (PT) 7  -EM     Highest Level of Mobility Goal 2 --> Bed activities/dependent transfer  -EM       Row Name 03/06/24 1529          Functional Assessment    Outcome Measure Options AM-PAC 6 Clicks Daily Activity (OT)  -               User Key  (r) = Recorded By, (t) = Taken By, (c) =  Cosigned By      Initials Name Provider Type    EM Erica Pennington, PT Physical Therapist    Nathalie Hoff, OT Occupational Therapist                    Occupational Therapy Education       Title: PT OT SLP Therapies (In Progress)       Topic: Occupational Therapy (In Progress)       Point: ADL training (Not Started)       Description:   Instruct learner(s) on proper safety adaptation and remediation techniques during self care or transfers.   Instruct in proper use of assistive devices.                  Learner Progress:  Not documented in this visit.              Point: Home exercise program (Done)       Description:   Instruct learner(s) on appropriate technique for monitoring, assisting and/or progressing therapeutic exercises/activities.                  Learning Progress Summary             Family Acceptance, E, VU by  at 3/2/2024 1530    Comment: RUE ROM / HEP to adress swelling   Significant Other Acceptance, E, VU by BS at 3/2/2024 1530    Comment: RUE ROM / HEP to adress swelling                         Point: Precautions (Done)       Description:   Instruct learner(s) on prescribed precautions during self-care and functional transfers.                  Learning Progress Summary             Family Acceptance, E, VU,DU by  at 2/27/2024 1315    Comment: RUE ROM, massage, safe positioning for subluxation prevention, OT role and dc recommendations                         Point: Body mechanics (Not Started)       Description:   Instruct learner(s) on proper positioning and spine alignment during self-care, functional mobility activities and/or exercises.                  Learner Progress:  Not documented in this visit.                              User Key       Initials Effective Dates Name Provider Type Discipline     04/02/20 -  Sonia Mcclure, OT Occupational Therapist OT     11/14/22 -  Alexandria De La Rosa OT Occupational Therapist OT                  OT Recommendation and Plan     Plan  of Care Review  Plan of Care Reviewed With: patient, family  Progress: no change  Outcome Evaluation: Pt seen for OT/PT tx session in AM, pt awake, agreeable to therapy. Focus on sit balance and weight bearing onto bilat forearms, increased assist to return to midline max A x2 when coming up from R side. Improved support at EOB when LUE holding onto foot rail. PROM completed to RUE this date while at EOB, CGA/min for sit balance throughout. Max cues required for attention to R side. He completed x2 STS trials, depx3 for STS attempts, unable to come to full stand. He will continue to benefit from skilled OT to address noted func deficits and progress toward improved (I) with OOB activity required for ADLs. Rec acute rehab at this time.     Time Calculation:         Time Calculation- OT       Row Name 03/06/24 1530             Time Calculation- OT    OT Start Time 0930  -MW      OT Stop Time 0957  -MW      OT Time Calculation (min) 27 min  -MW      Total Timed Code Minutes- OT 27 minute(s)  -MW      OT Received On 03/06/24  -MW      OT - Next Appointment 03/07/24  -MW         Timed Charges    44499 -  OT Neuromuscular Reeducation Minutes 17  -MW      03806 - OT Therapeutic Activity Minutes 10  -MW         Total Minutes    Timed Charges Total Minutes 27  -MW       Total Minutes 27  -MW                User Key  (r) = Recorded By, (t) = Taken By, (c) = Cosigned By      Initials Name Provider Type    Nathalie Hoff OT Occupational Therapist                  Therapy Charges for Today       Code Description Service Date Service Provider Modifiers Qty    07849856909 HC OT NEUROMUSC RE EDUCATION EA 15 MIN 3/5/2024 Nathalie Woodard OT GO 1    40095000065 HC OT THERAPEUTIC ACT EA 15 MIN 3/5/2024 Nathalie Woodard OT GO 1    08454839179 HC OT NEUROMUSC RE EDUCATION EA 15 MIN 3/6/2024 Nathalie Woodard OT GO 1    54768697844 HC OT THERAPEUTIC ACT EA 15 MIN 3/6/2024 Nathalie Woodard OT GO 1                 Nathalie  Yamilet, OT  3/6/2024

## 2024-03-06 NOTE — THERAPY TREATMENT NOTE
Patient Name: Erlni Blanco  : 1945    MRN: [id number]                              Today's Date: 3/6/2024       Admit Date: 2024    Visit Dx:     ICD-10-CM ICD-9-CM   1. Intraparenchymal hemorrhage of brain  I61.9 431   2. Facial droop  R29.810 781.94   3. Expressive aphasia  R47.01 784.3   4. Weakness of right upper extremity  R29.898 729.89   5. Chronic anticoagulation  Z79.01 V58.61     Patient Active Problem List   Diagnosis    Intraparenchymal hemorrhage of brain    Seizures    Type 2 diabetes mellitus    HTN (hypertension)    Right hemiparesis    Oropharyngeal dysphagia    Paroxysmal atrial fibrillation     History reviewed. No pertinent past medical history.  History reviewed. No pertinent surgical history.   General Information       Row Name 24 1105          Physical Therapy Time and Intention    Document Type therapy note (daily note)  -EM     Mode of Treatment co-treatment;occupational therapy;physical therapy;other (see comments)  -EM       Row Name 24 1105          General Information    Existing Precautions/Restrictions fall  -EM       Row Name 24 1105          Safety Issues, Functional Mobility    Comment, Safety Issues/Impairments (Mobility) Co treatment medically appropriate and necessary due to patient acuity level, activity tolerance and safety of patient and staff. Treatment is focusing on progression of care and goals established in the POC.  -EM               User Key  (r) = Recorded By, (t) = Taken By, (c) = Cosigned By      Initials Name Provider Type    EM Erica Pennington PT Physical Therapist                   Mobility       Row Name 24 1105          Bed Mobility    Bed Mobility rolling left;rolling right  -EM     Rolling Right [place] (Bed Mobility) maximum assist (25% patient effort);2 person assist  -EM     Supine-Sit [place] (Bed Mobility) maximum assist (25% patient effort);2 person assist  -EM     Sit-Supine [place] (Bed  Mobility) maximum assist (25% patient effort);2 person assist  -EM     Assistive Device (Bed Mobility) head of bed elevated;draw sheet  -EM       Row Name 03/06/24 1105          Transfers    Comment, (Transfers) able to clear buttocks off bed x2 trials but unable to achieve full standing  -EM       Row Name 03/06/24 1105          Sit-Stand Transfer    Sit-Stand [place] (Transfers) dependent (less than 25% patient effort);other (see comments)  dep x3, total assist to lift buttocks off bed, R knee blocked  -EM               User Key  (r) = Recorded By, (t) = Taken By, (c) = Cosigned By      Initials Name Provider Type    Erica Sargent PT Physical Therapist                   Obj/Interventions       Row Name 03/06/24 1108          Motor Skills    Therapeutic Exercise other (see comments)  on LLE: AP, LAQ x 5 reps. on RLE passive DF, LAQ x 5  -EM       Row Name 03/06/24 1108          Balance    Static Sitting Balance moderate assist;contact guard  fluctuating, improved as he sat on EOB and got positioned  -EM     Dynamic Sitting Balance moderate assist  -EM     Position, Sitting Balance unsupported;sitting edge of bed  -EM     Comment, Balance forward flexed when first up to EOB, tactile and verbal cues for upright posture and to raise head to neutral, pt holding on to foot rail with L hand, decreased pushing when holding on to rail and able to support self in sitting, side sitting to sit x3 reps to R and L with maxAx2  -EM               User Key  (r) = Recorded By, (t) = Taken By, (c) = Cosigned By      Initials Name Provider Type    Erica Sargent PT Physical Therapist                   Goals/Plan    No documentation.                  Clinical Impression       Row Name 03/06/24 1110          Pain    Pretreatment Pain Rating 0/10 - no pain  -EM     Pre/Posttreatment Pain Comment no c/o pain  -EM       Row Name 03/06/24 1110          Plan of Care Review    Plan of Care Reviewed With patient  -EM      Outcome Evaluation Pt awake and alert, up to EOB with maxAx2, able to hold onto foot rail with L hand once positioned in sitting on EOB, decreased pushing when holding on to rail. patient able to sit upright with CGA once positioned with tactile and verbal cues for upright posture. patient attempted sit to stand x 2 trials, requiring dep assist x3 to clear buttocks off bed. Pt demonstrates improved sitting balance today. Anticipate patient will need prolonged course of rehab.  -EM       Row Name 03/06/24 1110          Positioning and Restraints    Pre-Treatment Position in bed  -EM     Post Treatment Position bed  -EM     In Bed side lying right;call light within reach;exit alarm on;with family/caregiver  -EM               User Key  (r) = Recorded By, (t) = Taken By, (c) = Cosigned By      Initials Name Provider Type    Erica Sargent PT Physical Therapist                   Outcome Measures       Row Name 03/06/24 1115          How much help from another person do you currently need...    Turning from your back to your side while in flat bed without using bedrails? 2  -EM     Moving from lying on back to sitting on the side of a flat bed without bedrails? 1  -EM     Moving to and from a bed to a chair (including a wheelchair)? 1  -EM     Standing up from a chair using your arms (e.g., wheelchair, bedside chair)? 1  -EM     Climbing 3-5 steps with a railing? 1  -EM     To walk in hospital room? 1  -EM     AM-PAC 6 Clicks Score (PT) 7  -EM     Highest Level of Mobility Goal 2 --> Bed activities/dependent transfer  -EM               User Key  (r) = Recorded By, (t) = Taken By, (c) = Cosigned By      Initials Name Provider Type    Erica Sargent PT Physical Therapist                                 Physical Therapy Education       Title: PT OT SLP Therapies (In Progress)       Topic: Physical Therapy (In Progress)       Point: Mobility training (Done)       Learning Progress Summary              Patient Acceptance, E, VU,NR by EM at 3/6/2024 1116    Acceptance, E,TB,D, VU,NR by CH at 3/5/2024 1444    Acceptance, E,D, DU by PC at 3/4/2024 1100    Acceptance, E,TB, NR by CB at 3/2/2024 1520    Acceptance, E,TB, NR,NL by MS at 2/27/2024 1320   Family Acceptance, E, VU,NR by EM at 3/6/2024 1116    Acceptance, E,TB, NR by CB at 3/2/2024 1520                         Point: Home exercise program (In Progress)       Learning Progress Summary             Patient Acceptance, E,TB,D, VU,NR by CH at 3/5/2024 1444    Acceptance, E,D, DU by PC at 3/4/2024 1100    Acceptance, E,TB, NR by CB at 3/2/2024 1520   Family Acceptance, E,TB, NR by CB at 3/2/2024 1520                         Point: Body mechanics (In Progress)       Learning Progress Summary             Patient Acceptance, E,TB,D, VU,NR by CH at 3/5/2024 1444    Acceptance, E,D, DU by PC at 3/4/2024 1100    Acceptance, E,TB, NR by CB at 3/2/2024 1520    Acceptance, E,TB, VU,NR by MH at 2/29/2024 0920    Acceptance, E,TB, NR,NL by MS at 2/27/2024 1320   Family Acceptance, E,TB, NR by CB at 3/2/2024 1520    Acceptance, E,TB, VU,NR by  at 2/29/2024 0920                         Point: Precautions (In Progress)       Learning Progress Summary             Patient Acceptance, E,TB,D, VU,NR by CH at 3/5/2024 1444    Acceptance, E,D, DU by PC at 3/4/2024 1100    Acceptance, E,TB, NR by CB at 3/2/2024 1520    Acceptance, E,TB, VU,NR by MH at 2/29/2024 0920    Acceptance, E,TB, NR,NL by MS at 2/27/2024 1320   Family Acceptance, E,TB, NR by CB at 3/2/2024 1520    Acceptance, E,TB, VU,NR by MH at 2/29/2024 0920                                         User Key       Initials Effective Dates Name Provider Type Discipline     06/16/21 -  Татьяна Billingsley, PT Physical Therapist PT    PC 06/16/21 -  Audrey Ramirez, PT Physical Therapist PT    EM 06/16/21 -  Erica Pennington, PT Physical Therapist PT    MS 06/16/21 -  Lyla Edwards, PT Physical Therapist PT    CB  10/22/21 -  Mag Monson, PT Physical Therapist PT     01/24/24 -  Rick Maynard PT Student PT Student PT                  PT Recommendation and Plan     Plan of Care Reviewed With: patient  Outcome Evaluation: Pt awake and alert, up to EOB with maxAx2, able to hold onto foot rail with L hand once positioned in sitting on EOB, decreased pushing when holding on to rail. patient able to sit upright with CGA once positioned with tactile and verbal cues for upright posture. patient attempted sit to stand x 2 trials, requiring dep assist x3 to clear buttocks off bed. Pt demonstrates improved sitting balance today. Anticipate patient will need prolonged course of rehab.     Time Calculation:         PT Charges       Row Name 03/06/24 1117             Time Calculation    Start Time 0933  -EM      Stop Time 0957  -EM      Time Calculation (min) 24 min  -EM      PT Received On 03/06/24  -EM      PT - Next Appointment 03/07/24  -EM         Time Calculation- PT    Total Timed Code Minutes- PT 24 minute(s)  -EM         Timed Charges    38205 - PT Therapeutic Exercise Minutes 5  -EM      09298 - PT Therapeutic Activity Minutes 19  -EM         Total Minutes    Timed Charges Total Minutes 24  -EM       Total Minutes 24  -EM                User Key  (r) = Recorded By, (t) = Taken By, (c) = Cosigned By      Initials Name Provider Type     Erica Pennington PT Physical Therapist                  Therapy Charges for Today       Code Description Service Date Service Provider Modifiers Qty    92692190139  PT THER PROC EA 15 MIN 3/6/2024 Erica Pennington, PT GP 1    66688593588  PT THERAPEUTIC ACT EA 15 MIN 3/6/2024 Erica Pennington PT GP 1            PT G-Codes  Outcome Measure Options: AM-PAC 6 Clicks Daily Activity (OT)  AM-PAC 6 Clicks Score (PT): 7  AM-PAC 6 Clicks Score (OT): 8  Modified [place] Scale: 5 - Severe disability.  Bedridden, incontinent, and requiring constant nursing care and attention.        Erica Pennington, PT  3/6/2024

## 2024-03-06 NOTE — TELEPHONE ENCOUNTER
----- Message from Pepe Yates MA sent at 3/6/2024 11:34 AM EST -----  Schedule with Lucia 2/20 or Priya 2/22 thank you.  ----- Message -----  From: Bakari Galloway APRN  Sent: 3/6/2024  11:15 AM EST  To: Pepe Yates MA    Can we please schedule 2 week follow-up with [facility].  I have already ordered a CT head scan.  The patient will need to obtain this prior to visit.  Diagnosis: Intraparenchymal hemorrhage secondary to hypertensive crisis

## 2024-03-07 LAB
ALBUMIN SERPL-MCNC: 3.8 G/DL (ref 3.5–5.2)
ALBUMIN/GLOB SERPL: 1.2 G/DL
ALP SERPL-CCNC: 99 U/L (ref 39–117)
ALT SERPL W P-5'-P-CCNC: 36 U/L (ref 1–41)
ANION GAP SERPL CALCULATED.3IONS-SCNC: 10 MMOL/L (ref 5–15)
AST SERPL-CCNC: 36 U/L (ref 1–40)
BACTERIA UR QL AUTO: NORMAL /HPF
BILIRUB SERPL-MCNC: 0.8 MG/DL (ref 0–1.2)
BILIRUB UR QL STRIP: NEGATIVE
BUN SERPL-MCNC: 18 MG/DL (ref 8–23)
BUN/CREAT SERPL: 16.1 (ref 7–25)
CALCIUM SPEC-SCNC: 9.9 MG/DL (ref 8.6–10.5)
CHLORIDE SERPL-SCNC: 106 MMOL/L (ref 98–107)
CLARITY UR: CLEAR
CO2 SERPL-SCNC: 28 MMOL/L (ref 22–29)
COLOR UR: YELLOW
CREAT SERPL-MCNC: 1.12 MG/DL (ref 0.76–1.27)
DEPRECATED RDW RBC AUTO: 51.9 FL (ref 37–54)
EGFRCR SERPLBLD CKD-EPI 2021: 68.1 ML/MIN/1.73
ERYTHROCYTE [DISTWIDTH] IN BLOOD BY AUTOMATED COUNT: 16.3 % (ref 12.3–15.4)
GLOBULIN UR ELPH-MCNC: 3.1 GM/DL
GLUCOSE BLDC GLUCOMTR-MCNC: 158 MG/DL (ref 70–130)
GLUCOSE BLDC GLUCOMTR-MCNC: 237 MG/DL (ref 70–130)
GLUCOSE BLDC GLUCOMTR-MCNC: 254 MG/DL (ref 70–130)
GLUCOSE BLDC GLUCOMTR-MCNC: 257 MG/DL (ref 70–130)
GLUCOSE SERPL-MCNC: 194 MG/DL (ref 65–99)
GLUCOSE UR STRIP-MCNC: ABNORMAL MG/DL
HCT VFR BLD AUTO: 46.5 % (ref 37.5–51)
HGB BLD-MCNC: 15.2 G/DL (ref 13–17.7)
HGB UR QL STRIP.AUTO: NEGATIVE
HYALINE CASTS UR QL AUTO: NORMAL /LPF
KETONES UR QL STRIP: NEGATIVE
LEUKOCYTE ESTERASE UR QL STRIP.AUTO: NEGATIVE
MAGNESIUM SERPL-MCNC: 1.9 MG/DL (ref 1.6–2.4)
MCH RBC QN AUTO: 29.2 PG (ref 26.6–33)
MCHC RBC AUTO-ENTMCNC: 32.7 G/DL (ref 31.5–35.7)
MCV RBC AUTO: 89.3 FL (ref 79–97)
NITRITE UR QL STRIP: NEGATIVE
PH UR STRIP.AUTO: 6.5 [PH] (ref 5–8)
PHOSPHATE SERPL-MCNC: 2.8 MG/DL (ref 2.5–4.5)
PLATELET # BLD AUTO: 397 10*3/MM3 (ref 140–450)
PMV BLD AUTO: 10.2 FL (ref 6–12)
POTASSIUM SERPL-SCNC: 4.1 MMOL/L (ref 3.5–5.2)
POTASSIUM SERPL-SCNC: 4.7 MMOL/L (ref 3.5–5.2)
PROT SERPL-MCNC: 6.9 G/DL (ref 6–8.5)
PROT UR QL STRIP: ABNORMAL
RBC # BLD AUTO: 5.21 10*6/MM3 (ref 4.14–5.8)
RBC # UR STRIP: NORMAL /HPF
REF LAB TEST METHOD: NORMAL
SODIUM SERPL-SCNC: 144 MMOL/L (ref 136–145)
SP GR UR STRIP: 1.02 (ref 1–1.03)
SQUAMOUS #/AREA URNS HPF: NORMAL /HPF
UROBILINOGEN UR QL STRIP: ABNORMAL
WBC # UR STRIP: NORMAL /HPF
WBC NRBC COR # BLD AUTO: 8.43 10*3/MM3 (ref 3.4–10.8)

## 2024-03-07 PROCEDURE — 83735 ASSAY OF MAGNESIUM: CPT | Performed by: HOSPITALIST

## 2024-03-07 PROCEDURE — 97530 THERAPEUTIC ACTIVITIES: CPT

## 2024-03-07 PROCEDURE — 84132 ASSAY OF SERUM POTASSIUM: CPT | Performed by: HOSPITALIST

## 2024-03-07 PROCEDURE — 80053 COMPREHEN METABOLIC PANEL: CPT | Performed by: HOSPITALIST

## 2024-03-07 PROCEDURE — 97112 NEUROMUSCULAR REEDUCATION: CPT

## 2024-03-07 PROCEDURE — 97110 THERAPEUTIC EXERCISES: CPT

## 2024-03-07 PROCEDURE — 85027 COMPLETE CBC AUTOMATED: CPT | Performed by: INTERNAL MEDICINE

## 2024-03-07 PROCEDURE — 81001 URINALYSIS AUTO W/SCOPE: CPT | Performed by: HOSPITALIST

## 2024-03-07 PROCEDURE — 82948 REAGENT STRIP/BLOOD GLUCOSE: CPT

## 2024-03-07 PROCEDURE — 84100 ASSAY OF PHOSPHORUS: CPT | Performed by: INTERNAL MEDICINE

## 2024-03-07 PROCEDURE — 63710000001 INSULIN GLARGINE PER 5 UNITS: Performed by: HOSPITALIST

## 2024-03-07 PROCEDURE — 63710000001 INSULIN REGULAR HUMAN PER 5 UNITS: Performed by: INTERNAL MEDICINE

## 2024-03-07 RX ORDER — CLONIDINE HYDROCHLORIDE 0.1 MG/1
0.2 TABLET ORAL EVERY 8 HOURS SCHEDULED
Status: DISCONTINUED | OUTPATIENT
Start: 2024-03-07 | End: 2024-03-15 | Stop reason: HOSPADM

## 2024-03-07 RX ORDER — CLONIDINE HYDROCHLORIDE 0.1 MG/1
0.2 TABLET ORAL EVERY 8 HOURS SCHEDULED
Status: DISCONTINUED | OUTPATIENT
Start: 2024-03-07 | End: 2024-03-07

## 2024-03-07 RX ADMIN — SENNOSIDES AND DOCUSATE SODIUM 2 TABLET: 50; 8.6 TABLET ORAL at 21:21

## 2024-03-07 RX ADMIN — Medication 10 ML: at 08:34

## 2024-03-07 RX ADMIN — CLONIDINE HYDROCHLORIDE 0.2 MG: 0.1 TABLET ORAL at 21:27

## 2024-03-07 RX ADMIN — ACETAMINOPHEN 325MG 650 MG: 325 TABLET ORAL at 21:20

## 2024-03-07 RX ADMIN — CLONIDINE HYDROCHLORIDE 0.2 MG: 0.1 TABLET ORAL at 16:11

## 2024-03-07 RX ADMIN — OLANZAPINE 5 MG: 5 TABLET, ORALLY DISINTEGRATING ORAL at 23:10

## 2024-03-07 RX ADMIN — MEMANTINE HYDROCHLORIDE 10 MG: 10 TABLET, FILM COATED ORAL at 21:21

## 2024-03-07 RX ADMIN — LEVETIRACETAM 500 MG: 500 TABLET, FILM COATED ORAL at 21:21

## 2024-03-07 RX ADMIN — VALSARTAN 320 MG: 320 TABLET, FILM COATED ORAL at 08:21

## 2024-03-07 RX ADMIN — INSULIN HUMAN 4 UNITS: 100 INJECTION, SOLUTION PARENTERAL at 12:20

## 2024-03-07 RX ADMIN — CLONIDINE HYDROCHLORIDE 0.2 MG: 0.1 TABLET ORAL at 08:20

## 2024-03-07 RX ADMIN — INSULIN GLARGINE 20 UNITS: 100 INJECTION, SOLUTION SUBCUTANEOUS at 21:22

## 2024-03-07 RX ADMIN — HYDRALAZINE HYDROCHLORIDE 100 MG: 50 TABLET ORAL at 05:14

## 2024-03-07 RX ADMIN — HYDRALAZINE HYDROCHLORIDE 100 MG: 50 TABLET ORAL at 21:21

## 2024-03-07 RX ADMIN — Medication 5 MG: at 21:22

## 2024-03-07 RX ADMIN — SENNOSIDES AND DOCUSATE SODIUM 2 TABLET: 50; 8.6 TABLET ORAL at 08:29

## 2024-03-07 RX ADMIN — INSULIN HUMAN 2 UNITS: 100 INJECTION, SOLUTION PARENTERAL at 08:30

## 2024-03-07 RX ADMIN — Medication 10 ML: at 21:31

## 2024-03-07 RX ADMIN — MEMANTINE HYDROCHLORIDE 10 MG: 10 TABLET, FILM COATED ORAL at 08:21

## 2024-03-07 RX ADMIN — INSULIN HUMAN 6 UNITS: 100 INJECTION, SOLUTION PARENTERAL at 17:21

## 2024-03-07 RX ADMIN — AMLODIPINE BESYLATE 10 MG: 10 TABLET ORAL at 08:20

## 2024-03-07 RX ADMIN — LEVETIRACETAM 500 MG: 500 TABLET, FILM COATED ORAL at 08:21

## 2024-03-07 RX ADMIN — LABETALOL HYDROCHLORIDE 200 MG: 200 TABLET, FILM COATED ORAL at 21:21

## 2024-03-07 RX ADMIN — HYDRALAZINE HYDROCHLORIDE 100 MG: 50 TABLET ORAL at 14:02

## 2024-03-07 RX ADMIN — LABETALOL HYDROCHLORIDE 200 MG: 200 TABLET, FILM COATED ORAL at 08:21

## 2024-03-07 NOTE — PLAN OF CARE
No changes during this shift      Problem: Adjustment to Illness (Stroke, Hemorrhagic)  Goal: Optimal Coping  3/7/2024 0555 by Rosaura Nugent RN  Outcome: Ongoing, Progressing  3/7/2024 0553 by Rosaura Nugent RN  Outcome: Ongoing, Progressing  Intervention: Support Psychosocial Response to Stroke  Recent Flowsheet Documentation  Taken 3/6/2024 2143 by Rosaura Nugent RN  Family/Support System Care: support provided     Problem: Bowel Elimination Impaired (Stroke, Hemorrhagic)  Goal: Effective Bowel Elimination  3/7/2024 0555 by Rosaura Nugent RN  Outcome: Ongoing, Progressing  3/7/2024 0553 by Rosaura Nugent RN  Outcome: Ongoing, Progressing     Problem: Cerebral Tissue Perfusion (Stroke, Hemorrhagic)  Goal: Optimal Cerebral Tissue Perfusion  3/7/2024 0555 by Rosaura Nugent RN  Outcome: Ongoing, Progressing  3/7/2024 0553 by Rosaura Nugent RN  Outcome: Ongoing, Progressing  Intervention: Protect and Optimize Cerebral Perfusion  Recent Flowsheet Documentation  Taken 3/6/2024 2143 by Rosaura Nugent RN  Sensory Stimulation Regulation: care clustered  Cerebral Perfusion Promotion: blood pressure monitored     Problem: Cognitive Impairment (Stroke, Hemorrhagic)  Goal: Optimal Cognitive Function  3/7/2024 0555 by Rosaura Nugent RN  Outcome: Ongoing, Progressing  3/7/2024 0553 by Rosaura Nugent RN  Outcome: Ongoing, Progressing  Intervention: Optimize Cognitive Function  Recent Flowsheet Documentation  Taken 3/6/2024 2143 by Rosaura Nugent RN  Sensory Stimulation Regulation: care clustered  Reorientation Measures: reorientation provided     Problem: Communication Impairment (Stroke, Hemorrhagic)  Goal: Effective Communication Skills  3/7/2024 0555 by Rosaura Nugent RN  Outcome: Ongoing, Progressing  3/7/2024 0553 by Rosaura Nugent RN  Outcome: Ongoing, Progressing  Intervention: Optimize Communication Skills  Recent  Flowsheet Documentation  Taken 3/6/2024 2143 by Rosaura Nugent RN  Communication Enhancement Strategies:   extra time allowed for response   one-step directions provided   repetition utilized     Problem: Functional Ability Impaired (Stroke, Hemorrhagic)  Goal: Optimal Functional Ability  3/7/2024 0555 by Rosaura Nugent RN  Outcome: Ongoing, Progressing  3/7/2024 0553 by Rosaura Nugent RN  Outcome: Ongoing, Progressing  Intervention: Optimize Functional Ability  Recent Flowsheet Documentation  Taken 3/6/2024 2143 by Rosaura Nugent RN  Self-Care Promotion: independence encouraged     Problem: Pain (Stroke, Hemorrhagic)  Goal: Acceptable Pain Control  3/7/2024 0555 by Rosaura Nugent RN  Outcome: Ongoing, Progressing  3/7/2024 0553 by Rosaura Nugent RN  Outcome: Ongoing, Progressing  Intervention: Monitor and Manage Pain  Recent Flowsheet Documentation  Taken 3/6/2024 2143 by Rosaura Nugent RN  Pain Management Interventions: see MAR     Problem: Sensorimotor Impairment (Stroke, Hemorrhagic)  Goal: Improved Sensorimotor Function  3/7/2024 0555 by Rosaura Nugent RN  Outcome: Ongoing, Progressing  3/7/2024 0553 by Rosaura Nugent RN  Outcome: Ongoing, Progressing  Intervention: Optimize Range of Motion, Motor Control and Function  Recent Flowsheet Documentation  Taken 3/7/2024 0202 by Rosaura Nugent RN  Positioning/Transfer Devices:   pillows   in use  Taken 3/7/2024 0000 by Rosaura Nugent RN  Positioning/Transfer Devices:   pillows   in use  Taken 3/6/2024 2143 by Rosaura Nugent RN  Positioning/Transfer Devices:   pillows   in use  Range of Motion: active ROM (range of motion) encouraged  Taken 3/6/2024 2006 by Rosaura Nugent RN  Positioning/Transfer Devices:   pillows   in use  Intervention: Optimize Sensory and Perceptual Ability  Recent Flowsheet Documentation  Taken 3/6/2024 2143 by Rosaura Nugent  RN  Pressure Reduction Techniques:   frequent weight shift encouraged   heels elevated off bed   weight shift assistance provided   pressure points protected  Pressure Reduction Devices:   alternating pressure pump (ADD)   foam padding utilized   heel offloading device utilized   positioning supports utilized     Problem: Swallowing Impairment (Stroke, Hemorrhagic)  Goal: Optimal Eating and Swallowing Without Aspiration  3/7/2024 0555 by Rosaura Nugent RN  Outcome: Ongoing, Progressing  3/7/2024 0553 by Rosaura Nugent RN  Outcome: Ongoing, Progressing  Intervention: Optimize Eating and Swallowing  Recent Flowsheet Documentation  Taken 3/6/2024 2006 by Rosaura Nugent RN  Aspiration Precautions: upright posture maintained     Problem: Urinary Elimination Impaired (Stroke, Hemorrhagic)  Goal: Effective Urinary Elimination  3/7/2024 0555 by Rosaura Nugent RN  Outcome: Ongoing, Progressing  3/7/2024 0553 by Rosaura Nugent RN  Outcome: Ongoing, Progressing  Intervention: Promote Effective Bladder Elimination  Recent Flowsheet Documentation  Taken 3/6/2024 2143 by Rosaura Nugent RN  Urinary Elimination Promotion: absorbent pad/diaper use encouraged     Problem: Adult Inpatient Plan of Care  Goal: Plan of Care Review  3/7/2024 0555 by Rosaura Nugent RN  Outcome: Ongoing, Progressing  3/7/2024 0553 by Rosaura Nugent RN  Outcome: Ongoing, Progressing  Goal: Patient-Specific Goal (Individualized)  3/7/2024 0555 by Rosaura Nugent RN  Outcome: Ongoing, Progressing  3/7/2024 0553 by Rosaura Nugent RN  Outcome: Ongoing, Progressing  Goal: Absence of Hospital-Acquired Illness or Injury  3/7/2024 0555 by Rosaura Nugent RN  Outcome: Ongoing, Progressing  3/7/2024 0553 by Rosaura Nugent RN  Outcome: Ongoing, Progressing  Intervention: Identify and Manage Fall Risk  Recent Flowsheet Documentation  Taken 3/7/2024 0202 by Silas Baca  LAQUITA Kaplan  Safety Promotion/Fall Prevention:   activity supervised   assistive device/personal items within reach   clutter free environment maintained   fall prevention program maintained   nonskid shoes/slippers when out of bed   room organization consistent   safety round/check completed  Taken 3/7/2024 0000 by Rosaura Nugent RN  Safety Promotion/Fall Prevention:   activity supervised   assistive device/personal items within reach   clutter free environment maintained   fall prevention program maintained   nonskid shoes/slippers when out of bed   room organization consistent   safety round/check completed  Taken 3/6/2024 2143 by Rosaura Nugent RN  Safety Promotion/Fall Prevention:   activity supervised   assistive device/personal items within reach   clutter free environment maintained   fall prevention program maintained   nonskid shoes/slippers when out of bed   room organization consistent   safety round/check completed  Taken 3/6/2024 2006 by Rosaura Nugent RN  Safety Promotion/Fall Prevention:   activity supervised   assistive device/personal items within reach   clutter free environment maintained   fall prevention program maintained   nonskid shoes/slippers when out of bed   room organization consistent   safety round/check completed  Intervention: Prevent Skin Injury  Recent Flowsheet Documentation  Taken 3/7/2024 0202 by Rosaura Nugent RN  Body Position:   turned   left  Taken 3/7/2024 0000 by Rosaura Nugent RN  Body Position:   turned   right  Taken 3/6/2024 2143 by Rosaura Nugent RN  Body Position: turned  Skin Protection:   incontinence pads utilized   skin sealant/moisture barrier applied  Taken 3/6/2024 2006 by Rosaura Nugent RN  Body Position:   turned   right  Intervention: Prevent and Manage VTE (Venous Thromboembolism) Risk  Recent Flowsheet Documentation  Taken 3/6/2024 2143 by Rosaura Nugent RN  VTE Prevention/Management:    bilateral   sequential compression devices on  Range of Motion: active ROM (range of motion) encouraged  Goal: Optimal Comfort and Wellbeing  3/7/2024 0555 by Rosaura Nugent RN  Outcome: Ongoing, Progressing  3/7/2024 0553 by Rosaura Nugent RN  Outcome: Ongoing, Progressing  Intervention: Monitor Pain and Promote Comfort  Recent Flowsheet Documentation  Taken 3/6/2024 2143 by Rosaura Nugent RN  Pain Management Interventions: see MAR  Intervention: Provide Person-Centered Care  Recent Flowsheet Documentation  Taken 3/6/2024 2143 by Rosaura Nugent RN  Trust Relationship/Rapport: care explained  Goal: Readiness for Transition of Care  3/7/2024 0555 by Rosaura Nugent RN  Outcome: Ongoing, Progressing  3/7/2024 0553 by Rosaura Nugent RN  Outcome: Ongoing, Progressing     Problem: Fall Injury Risk  Goal: Absence of Fall and Fall-Related Injury  3/7/2024 0555 by Rosaura Nugent RN  Outcome: Ongoing, Progressing  3/7/2024 0553 by Rosaura Nugent RN  Outcome: Ongoing, Progressing  Intervention: Identify and Manage Contributors  Recent Flowsheet Documentation  Taken 3/6/2024 2143 by Rosaura Nugent RN  Medication Review/Management: medications reviewed  Self-Care Promotion: independence encouraged  Intervention: Promote Injury-Free Environment  Recent Flowsheet Documentation  Taken 3/7/2024 0202 by Rosaura Nugent RN  Safety Promotion/Fall Prevention:   activity supervised   assistive device/personal items within reach   clutter free environment maintained   fall prevention program maintained   nonskid shoes/slippers when out of bed   room organization consistent   safety round/check completed  Taken 3/7/2024 0000 by Rosaura Nugent, LAQUITA  Safety Promotion/Fall Prevention:   activity supervised   assistive device/personal items within reach   clutter free environment maintained   fall prevention program maintained   nonskid shoes/slippers when out  of bed   room organization consistent   safety round/check completed  Taken 3/6/2024 2143 by Rosaura Nugent, RN  Safety Promotion/Fall Prevention:   activity supervised   assistive device/personal items within reach   clutter free environment maintained   fall prevention program maintained   nonskid shoes/slippers when out of bed   room organization consistent   safety round/check completed  Taken 3/6/2024 2006 by Rosaura Nugent, RN  Safety Promotion/Fall Prevention:   activity supervised   assistive device/personal items within reach   clutter free environment maintained   fall prevention program maintained   nonskid shoes/slippers when out of bed   room organization consistent   safety round/check completed         Problem: Hypertension Acute  Goal: Blood Pressure Within Desired Range  3/7/2024 0555 by Rosaura Nugent, RN  Outcome: Ongoing, Progressing  3/7/2024 0553 by Rosaura Nugent, LAQUITA  Outcome: Ongoing, Progressing  Intervention: Normalize Blood Pressure  Recent Flowsheet Documentation  Taken 3/6/2024 2143 by Rosaura Nugent, RN  Sensory Stimulation Regulation: care clustered  Medication Review/Management: medications reviewed   Goal Outcome Evaluation:

## 2024-03-07 NOTE — THERAPY TREATMENT NOTE
Patient Name: Erlin Blanco  : 1945    MRN: [id number]                              Today's Date: 3/7/2024       Admit Date: 2024    Visit Dx:     ICD-10-CM ICD-9-CM   1. Intraparenchymal hemorrhage of brain  I61.9 431   2. Facial droop  R29.810 781.94   3. Expressive aphasia  R47.01 784.3   4. Weakness of right upper extremity  R29.898 729.89   5. Chronic anticoagulation  Z79.01 V58.61     Patient Active Problem List   Diagnosis    Intraparenchymal hemorrhage of brain    Seizures    Type 2 diabetes mellitus    HTN (hypertension)    Right hemiparesis    Oropharyngeal dysphagia    Paroxysmal atrial fibrillation     History reviewed. No pertinent past medical history.  History reviewed. No pertinent surgical history.   General Information       Row Name 24 1022          Physical Therapy Time and Intention    Document Type therapy note (daily note)  -EM     Mode of Treatment physical therapy;co-treatment  -EM       Row Name 24 1022          General Information    Patient Profile Reviewed yes  -EM     Existing Precautions/Restrictions fall  -EM       Row Name 24 1022          Safety Issues, Functional Mobility    Comment, Safety Issues/Impairments (Mobility) Co treatment medically appropriate and necessary due to patient acuity level, activity tolerance and safety of patient and staff. Treatment is focusing on progression of care and goals established in the POC.  -EM               User Key  (r) = Recorded By, (t) = Taken By, (c) = Cosigned By      Initials Name Provider Type    EM Erica Pennington, PT Physical Therapist                   Mobility       Row Name 24 1022          Bed Mobility    Bed Mobility supine-sit;sit-supine  -EM     Supine-Sit Haugen (Bed Mobility) maximum assist (25% patient effort);2 person assist  -EM     Sit-Supine [place]augen (Bed Mobility) maximum assist (25% patient effort);2 person assist  -EM     Assistive Device (Bed Mobility) draw sheet;head  of bed elevated  -EM     Comment, (Bed Mobility) strong R leaning, pushing with L UE, sitting balance improved with L Ue support from foot rial  -EM               User Key  (r) = Recorded By, (t) = Taken By, (c) = Cosigned By      Initials Name Provider Type    Erica Sargent PT Physical Therapist                   Obj/Interventions       Row Name 03/07/24 1024          Motor Skills    Therapeutic Exercise other (see comments)  R LE: passive AP, knee flexion, LTR in hooklying, bridging w/ MaxA x2 . L LE: AAROM AP, knee flexion  -EM       Row Name 03/07/24 1024          Balance    Balance Assessment sitting static balance;sitting dynamic balance  -EM     Static Sitting Balance moderate assist  pt fluctuates depending on position L UE  -EM     Comment, Balance side sittign to sit, elbow WB on R and L UE, postural correction for upright sitting, scapular approximation for posture improvement  -EM               User Key  (r) = Recorded By, (t) = Taken By, (c) = Cosigned By      Initials Name Provider Type    Erica Sargent PT Physical Therapist                   Goals/Plan    No documentation.                  Clinical Impression       Row Name 03/07/24 1031          Pain    Pretreatment Pain Rating 0/10 - no pain  -EM     Posttreatment Pain Rating 0/10 - no pain  -EM     Pre/Posttreatment Pain Comment no c/o pain this date  -EM       Row Name 03/07/24 1031          Plan of Care Review    Outcome Evaluation Pt was supine in bed at start of treatment. Supine exercises completed on B LE with passive on R LE. Pt unable to visually track beyond midline this date. Maximum tactile and verbal cues needed to reach beyond midline with L UE. Pt able to sit at EOB with modA x2 and L UE support on foot rail. Sidesitting to sit performed with maxA x2. Pt returned to supine in bed  at completion of session.  -EM       Row Name 03/07/24 1031          Positioning and Restraints    Pre-Treatment Position in bed  -EM      Post Treatment Position bed  -EM     In Bed supine;call light within reach;exit alarm on  -EM               User Key  (r) = Recorded By, (t) = Taken By, (c) = Cosigned By      Initials Name Provider Type    Erica Sargent PT Physical Therapist                   Outcome Measures       Row Name 03/07/24 1034          How much help from another person do you currently need...    Turning from your back to your side while in flat bed without using bedrails? 2  -EM     Moving from lying on back to sitting on the side of a flat bed without bedrails? 1  -EM     Moving to and from a bed to a chair (including a wheelchair)? 1  -EM     Standing up from a chair using your arms (e.g., wheelchair, bedside chair)? 1  -EM     Climbing 3-5 steps with a railing? 1  -EM     To walk in hospital room? 1  -EM     AM-PAC 6 Clicks Score (PT) 7  -EM     Highest Level of Mobility Goal 2 --> Bed activities/dependent transfer  -EM       Row Name 03/07/24 1034          Functional Assessment    Outcome Measure Options AM-PAC 6 Clicks Basic Mobility (PT)  -EM               User Key  (r) = Recorded By, (t) = Taken By, (c) = Cosigned By      Initials Name Provider Type    Erica Sargent PT Physical Therapist                                 Physical Therapy Education       Title: PT OT SLP Therapies (In Progress)       Topic: Physical Therapy (In Progress)       Point: Mobility training (Done)       Learning Progress Summary             Patient Acceptance, E, VU by EM at 3/7/2024 1035    Acceptance, E, VU,NR by EM at 3/6/2024 1116    Acceptance, E,TB,D, VU,NR by  at 3/5/2024 1444    Acceptance, E,D, DU by PC at 3/4/2024 1100    Acceptance, E,TB, NR by CB at 3/2/2024 1520    Acceptance, E,TB, NR,NL by MS at 2/27/2024 1320   Family Acceptance, E, VU,NR by EM at 3/6/2024 1116    Acceptance, E,TB, NR by CB at 3/2/2024 1520                         Point: Home exercise program (In Progress)       Learning Progress Summary              Patient Acceptance, E,TB,D, VU,NR by  at 3/5/2024 1444    Acceptance, E,D, DU by PC at 3/4/2024 1100    Acceptance, E,TB, NR by CB at 3/2/2024 1520   Family Acceptance, E,TB, NR by CB at 3/2/2024 1520                         Point: Body mechanics (In Progress)       Learning Progress Summary             Patient Acceptance, E,TB,D, VU,NR by  at 3/5/2024 1444    Acceptance, E,D, DU by PC at 3/4/2024 1100    Acceptance, E,TB, NR by CB at 3/2/2024 1520    Acceptance, E,TB, VU,NR by  at 2/29/2024 0920    Acceptance, E,TB, NR,NL by MS at 2/27/2024 1320   Family Acceptance, E,TB, NR by CB at 3/2/2024 1520    Acceptance, E,TB, VU,NR by  at 2/29/2024 0920                         Point: Precautions (In Progress)       Learning Progress Summary             Patient Acceptance, E,TB,D, VU,NR by  at 3/5/2024 1444    Acceptance, E,D, DU by PC at 3/4/2024 1100    Acceptance, E,TB, NR by CB at 3/2/2024 1520    Acceptance, E,TB, VU,NR by  at 2/29/2024 0920    Acceptance, E,TB, NR,NL by MS at 2/27/2024 1320   Family Acceptance, E,TB, NR by CB at 3/2/2024 1520    Acceptance, E,TB, VU,NR by  at 2/29/2024 0920                                         User Key       Initials Effective Dates Name Provider Type Discipline     06/16/21 -  Татьяна Billingsley, PT Physical Therapist PT    PC 06/16/21 -  Audrey Ramirez, PT Physical Therapist PT    EM 06/16/21 -  Erica Pennington, PT Physical Therapist PT    MS 06/16/21 -  Lyla Edwards, PT Physical Therapist PT    CB 10/22/21 -  Mag Monson, PT Physical Therapist PT     01/24/24 -  Rick Maynard, PT Student PT Student PT                  PT Recommendation and Plan     Plan of Care Reviewed With: patient  Outcome Evaluation: Pt was supine in bed at start of treatment. Supine exercises completed on B LE with passive on R LE. Pt unable to visually track beyond midline this date. Maximum tactile and verbal cues needed to reach beyond midline with L UE. Pt able  to sit at EOB with modA x2 and L UE support on foot rail. Sidesitting to sit performed with maxA x2. Pt returned to supine in bed  at completion of session.     Time Calculation:         PT Charges       Row Name 03/07/24 1035             Time Calculation    Start Time 0940  -EM      Stop Time 1005  -EM      Time Calculation (min) 25 min  -EM      PT Received On 03/07/24  -EM      PT - Next Appointment 03/08/24  -EM         Time Calculation- PT    Total Timed Code Minutes- PT 25 minute(s)  -EM         Timed Charges    17980 - PT Therapeutic Activity Minutes 25  -EM         Total Minutes    Timed Charges Total Minutes 25  -EM       Total Minutes 25  -EM                User Key  (r) = Recorded By, (t) = Taken By, (c) = Cosigned By      Initials Name Provider Type    EM Erica Pennington PT Physical Therapist                  Therapy Charges for Today       Code Description Service Date Service Provider Modifiers Qty    89514947299 HC PT THER PROC EA 15 MIN 3/6/2024 Erica Pennington, PT GP 1    05253180340 HC PT THERAPEUTIC ACT EA 15 MIN 3/6/2024 Erica Pennington, PT GP 1    91474524787 HC PT THERAPEUTIC ACT EA 15 MIN 3/7/2024 Erica Pennington, PT GP 2            PT G-Codes  Outcome Measure Options: AM-PAC 6 Clicks Basic Mobility (PT)  AM-PAC 6 Clicks Score (PT): 7  AM-PAC 6 Clicks Score (OT): 8  Modified Francisco Scale: 5 - Severe disability.  Bedridden, incontinent, and requiring constant nursing care and attention.  PT Discharge Summary  Anticipated Discharge Disposition (PT): skilled nursing facility    Erica Pennington PT  3/7/2024

## 2024-03-07 NOTE — THERAPY TREATMENT NOTE
Patient Name: Erlin Blanco  : 1945    MRN: [id number]                              Today's Date: 3/7/2024       Admit Date: 2024    Visit Dx:     ICD-10-CM ICD-9-CM   1. Intraparenchymal hemorrhage of brain  I61.9 431   2. Facial droop  R29.810 781.94   3. Expressive aphasia  R47.01 784.3   4. Weakness of right upper extremity  R29.898 729.89   5. Chronic anticoagulation  Z79.01 V58.61     Patient Active Problem List   Diagnosis    Intraparenchymal hemorrhage of brain    Seizures    Type 2 diabetes mellitus    HTN (hypertension)    Right hemiparesis    Oropharyngeal dysphagia    Paroxysmal atrial fibrillation     History reviewed. No pertinent past medical history.  History reviewed. No pertinent surgical history.   General Information       Row Name 24 1339          OT Time and Intention    Document Type therapy note (daily note)  -MW     Mode of Treatment co-treatment;physical therapy;occupational therapy  -       Row Name 24 1339          General Information    Patient Profile Reviewed yes  -MW     Existing Precautions/Restrictions fall  -MW       Row Name 24 1339          Cognition    Orientation Status (Cognition) oriented to;person  decreased motor planning noted this date, increased cues required for carryover  -MW       Row Name 24 1339          Safety Issues, Functional Mobility    Safety Issues Affecting Function (Mobility) safety precautions follow-through/compliance;safety precaution awareness;problem-solving;sequencing abilities;judgment  -     Impairments Affecting Function (Mobility) strength;balance;endurance/activity tolerance  -     Cognitive Impairments, Mobility Safety/Performance attention;awareness, need for assistance;safety precaution awareness;sequencing abilities;judgment;problem-solving/reasoning;safety precaution follow-through  -     Comment, Safety Issues/Impairments (Mobility) Co-treatment medically necessary and appropriate d/t pt's acuity  level, decreased endurance and activity tolerance, and safety of patient and staff. Treatment focused on progression of care and goals established in POC. Gait belt and non-skid socks worn.  -               User Key  (r) = Recorded By, (t) = Taken By, (c) = Cosigned By      Initials Name Provider Type    Nathalie Hoff OT Occupational Therapist                     Mobility/ADL's       Row Name 03/07/24 1340          Bed Mobility    Bed Mobility supine-sit;sit-supine;scooting/bridging  -     Scooting/Bridging [place] (Bed Mobility) dependent (less than 25% patient effort);2 person assist;nonverbal cues (demo/gesture);verbal cues  -     Supine-Sit [place] (Bed Mobility) maximum assist (25% patient effort);2 person assist  -MW     Sit-Supine [place] (Bed Mobility) maximum assist (25% patient effort);2 person assist  -     Comment, (Bed Mobility) strong R leaning, sit balance improved with LUE on foot rail for support  -               User Key  (r) = Recorded By, (t) = Taken By, (c) = Cosigned By      Initials Name Provider Type    Nathalie Hoff OT Occupational Therapist                   Obj/Interventions       Row Name 03/07/24 1341          Shoulder (Therapeutic Exercise)    Shoulder (Therapeutic Exercise) PROM (passive range of motion)  -     Shoulder PROM (Therapeutic Exercise) right;flexion;extension;scapular retraction;scapular protraction;10 repetitions  -       Row Name 03/07/24 1341          Elbow/Forearm (Therapeutic Exercise)    Elbow/Forearm (Therapeutic Exercise) PROM (passive range of motion)  -     Elbow/Forearm PROM (Therapeutic Exercise) right;extension;flexion;10 repetitions  -       Row Name 03/07/24 1341          Motor Skills    Motor Control/Coordination Interventions neuro-muscular re-education;therapeutic exercise/ROM;weight-bearing activities  -       Row Name 03/07/24 1341          Balance    Balance Assessment sitting static balance;sitting  dynamic balance  -MW     Static Sitting Balance moderate assist;minimal assist  min to moderate A throughout EOB, as pt fatigued, increased assist posteriorly  -MW     Dynamic Sitting Balance moderate assist  -     Position, Sitting Balance supported;sitting edge of bed  -     Comment, Balance WB'ing to RUE throughout lateral flexion onto bilat elbows, required max A x2 for coming back to midline on R elbow  -MW               User Key  (r) = Recorded By, (t) = Taken By, (c) = Cosigned By      Initials Name Provider Type     Nathalie Woodard OT Occupational Therapist                   Goals/Plan    No documentation.                  Clinical Impression       Row Name 03/07/24 1343          Pain Assessment    Pretreatment Pain Rating 0/10 - no pain  -     Posttreatment Pain Rating 0/10 - no pain  -       Row Name 03/07/24 1343          Plan of Care Review    Plan of Care Reviewed With patient  -     Progress improving  -     Outcome Evaluation Pt seen for therapy session in AM, pt alert to self, increased cues required with fair carryover. Difficulty motor planning and sequencing at times throughout. Focused on visual scanning this date, as well as neuro re-ed tasks to promote improved trunk control, weight bearing provided to RUE with lateral flexion reps while seated upright. PROM completed to RUE in supine and sitting, trace contractions noted in R digits. He required max A x2 for bed mobility. Continue to progress as pt able to tolerate.  -       Row Name 03/07/24 1343          Therapy Plan Review/Discharge Plan (OT)    Anticipated Discharge Disposition (OT) inpatient rehabilitation facility;skilled nursing facility  -       Row Name 03/07/24 1343          Vital Signs    O2 Delivery Pre Treatment room air  -       Row Name 03/07/24 1343          Positioning and Restraints    Pre-Treatment Position in bed  -     Post Treatment Position bed  -     In Bed notified radu;colleen;call light  within reach;encouraged to call for assist;exit alarm on;side rails up x3;RUE elevated  -MW               User Key  (r) = Recorded By, (t) = Taken By, (c) = Cosigned By      Initials Name Provider Type    Nathalie Hoff OT Occupational Therapist                   Outcome Measures       Row Name 03/07/24 1345          How much help from another is currently needed...    Putting on and taking off regular lower body clothing? 1  -MW     Bathing (including washing, rinsing, and drying) 1  -MW     Toileting (which includes using toilet bed pan or urinal) 1  -MW     Putting on and taking off regular upper body clothing 2  -MW     Taking care of personal grooming (such as brushing teeth) 2  -MW     Eating meals 2  -MW     AM-PAC 6 Clicks Score (OT) 9  -MW       Row Name 03/07/24 1034 03/07/24 0800       How much help from another person do you currently need...    Turning from your back to your side while in flat bed without using bedrails? 2  -EM 2  -MG    Moving from lying on back to sitting on the side of a flat bed without bedrails? 1  -EM 1  -MG    Moving to and from a bed to a chair (including a wheelchair)? 1  -EM 1  -MG    Standing up from a chair using your arms (e.g., wheelchair, bedside chair)? 1  -EM 1  -MG    Climbing 3-5 steps with a railing? 1  -EM 1  -MG    To walk in hospital room? 1  -EM 1  -MG    AM-PAC 6 Clicks Score (PT) 7  -EM 7  -MG    Highest Level of Mobility Goal 2 --> Bed activities/dependent transfer  -EM 2 --> Bed activities/dependent transfer  -MG      Row Name 03/07/24 1345 03/07/24 1034       Functional Assessment    Outcome Measure Options AM-PAC 6 Clicks Daily Activity (OT)  -MW AM-PAC 6 Clicks Basic Mobility (PT)  -EM              User Key  (r) = Recorded By, (t) = Taken By, (c) = Cosigned By      Initials Name Provider Type    Erica Sargent PT Physical Therapist    Nathalie Hoff, OT Occupational Therapist    Edith Baldwin RN Registered Nurse                     Occupational Therapy Education       Title: PT OT SLP Therapies (In Progress)       Topic: Occupational Therapy (In Progress)       Point: ADL training (Not Started)       Description:   Instruct learner(s) on proper safety adaptation and remediation techniques during self care or transfers.   Instruct in proper use of assistive devices.                  Learner Progress:  Not documented in this visit.              Point: Home exercise program (Done)       Description:   Instruct learner(s) on appropriate technique for monitoring, assisting and/or progressing therapeutic exercises/activities.                  Learning Progress Summary             Family Acceptance, E, VU by BS at 3/2/2024 1530    Comment: RUE ROM / HEP to adress swelling   Significant Other Acceptance, E, VU by BS at 3/2/2024 1530    Comment: RUE ROM / HEP to adress swelling                         Point: Precautions (Done)       Description:   Instruct learner(s) on prescribed precautions during self-care and functional transfers.                  Learning Progress Summary             Family Acceptance, E, VU,DU by  at 2/27/2024 1315    Comment: RUE ROM, massage, safe positioning for subluxation prevention, OT role and dc recommendations                         Point: Body mechanics (Not Started)       Description:   Instruct learner(s) on proper positioning and spine alignment during self-care, functional mobility activities and/or exercises.                  Learner Progress:  Not documented in this visit.                              User Key       Initials Effective Dates Name Provider Type Discipline     04/02/20 -  Sonia Mcclure, OT Occupational Therapist OT     11/14/22 -  Alexandria De La Rosa OT Occupational Therapist OT                  OT Recommendation and Plan     Plan of Care Review  Plan of Care Reviewed With: patient  Progress: improving  Outcome Evaluation: Pt seen for therapy session in AM, pt alert to self, increased  cues required with fair carryover. Difficulty motor planning and sequencing at times throughout. Focused on visual scanning this date, as well as neuro re-ed tasks to promote improved trunk control, weight bearing provided to JOHN with lateral flexion reps while seated upright. PROM completed to JOHN in supine and sitting, trace contractions noted in R digits. He required max A x2 for bed mobility. Continue to progress as pt able to tolerate.     Time Calculation:         Time Calculation- OT       Row Name 03/07/24 1345             Time Calculation- OT    OT Start Time 0940  -MW      OT Stop Time 1006  -MW      OT Time Calculation (min) 26 min  -MW      Total Timed Code Minutes- OT 26 minute(s)  -MW      OT Received On 03/07/24  -MW      OT - Next Appointment 03/08/24  -MW         Timed Charges    23673 - OT Therapeutic Exercise Minutes 10  -MW      16268 -  OT Neuromuscular Reeducation Minutes 16  -MW         Total Minutes    Timed Charges Total Minutes 26  -MW       Total Minutes 26  -MW                User Key  (r) = Recorded By, (t) = Taken By, (c) = Cosigned By      Initials Name Provider Type     Nathalie Woodard OT Occupational Therapist                  Therapy Charges for Today       Code Description Service Date Service Provider Modifiers Qty    83074075591 HC OT NEUROMUSC RE EDUCATION EA 15 MIN 3/6/2024 Nathalie Woodard OT GO 1    [id number]  OT THERAPEUTIC ACT EA 15 MIN 3/6/2024 Nathalie Woodard OT GO 1    [id number]  OT NEUROMUSC RE EDUCATION EA 15 MIN 3/7/2024 Nathalie Woodard OT GO 1    [id number]  OT THER PROC EA 15 MIN 3/7/2024 Nathalie Woodard  GO 1                 Nathalie Woodard OT  3/7/2024

## 2024-03-07 NOTE — CASE MANAGEMENT/SOCIAL WORK
Continued Stay Note  [facility]     Patient Name: Erlin Blanco  MRN: [id number]  Today's Date: 3/7/2024    Admit Date: 2/24/2024    Plan: SNF referral pending   Discharge Plan       Row Name 03/07/24 1641       Plan    Plan SNF referral pending    Patient/Family in Agreement with Plan yes    Plan Comments CCP spoke with DIL at bedside; spouse currently unavail due to illness; CCP explained first choice of [facility] not available; awaiting [facility] to call back on [facility]. CCP obtained consent to put in referrals for highly-rated facilities. CCP placed referrals per CMS Compare. Referrals placed to [facility] - no open beds at present; [facility] - no open beds at present; [facility] - left voicemail; [facility] - reviewing; [facility] - family declined due to distance; [facility] - left voicemail. Awaiting SNF evaluations to submit precert. Plan - SNF discharge pending; CCP will follow up on referrals, bed availability and continue to follow for discharge planning. MONICA Gomez CSW                   Discharge Codes    No documentation.                 Expected Discharge Date and Time       Expected Discharge Date Expected Discharge Time    Mar 11, 2024               Gabi Barbosa

## 2024-03-07 NOTE — PROGRESS NOTES
Nutrition Services    Patient Name:  Erlin Blanco  YOB: 1945  MRN: [id number]  Admit Date:  2/24/2024    Pt Hernandezrak removed last night by RN. Pt daughter in law reported pt ate 100% of dinner last night and breakfast this morning. Daughter in law reported pt not having any n/v. RD will discontinue TF orders and continue following.     Electronically signed by:  Ankit Yan  03/07/24 13:22 EST

## 2024-03-07 NOTE — PLAN OF CARE
Goal Outcome Evaluation:  Plan of Care Reviewed With: patient        Progress: improving  Outcome Evaluation: Pt seen for therapy session in AM, pt alert to self, increased cues required with fair carryover. Difficulty motor planning and sequencing at times throughout. Focused on visual scanning this date, as well as neuro re-ed tasks to promote improved trunk control, weight bearing provided to RUE with lateral flexion reps while seated upright. PROM completed to RUE in supine and sitting, trace contractions noted in R digits. He required max A x2 for bed mobility. Continue to progress as pt able to tolerate.      Anticipated Discharge Disposition (OT): inpatient rehabilitation facility, skilled nursing facility

## 2024-03-07 NOTE — NURSING NOTE
This nurse called [facility], pt BP was 182/83 at 05:05 this morning hydralazine schedule for 0600am was administered  at 0514am and follow up BP was 162/90 at 0629am.  MD order to notified if SBP greater than 140.  Waiting for new orders.

## 2024-03-07 NOTE — PLAN OF CARE
Problem: Adult Inpatient Plan of Care  Goal: Absence of Hospital-Acquired Illness or Injury  Intervention: Identify and Manage Fall Risk  Recent Flowsheet Documentation  Taken 3/7/2024 1400 by Edith Sullivan RN  Safety Promotion/Fall Prevention: safety round/check completed  Taken 3/7/2024 1200 by Edith Sullivan RN  Safety Promotion/Fall Prevention: safety round/check completed  Taken 3/7/2024 1000 by Edith Sullivan RN  Safety Promotion/Fall Prevention: safety round/check completed  Taken 3/7/2024 0800 by Edith Sullivan RN  Safety Promotion/Fall Prevention: safety round/check completed  Intervention: Prevent and Manage VTE (Venous Thromboembolism) Risk  Recent Flowsheet Documentation  Taken 3/7/2024 0800 by Edith Sullivan RN  Activity Management: bedrest  VTE Prevention/Management:   bilateral   sequential compression devices on  Range of Motion: active ROM (range of motion) encouraged  Intervention: Prevent Infection  Recent Flowsheet Documentation  Taken 3/7/2024 1400 by Edith Sullivan RN  Infection Prevention: single patient room provided  Taken 3/7/2024 1200 by Edith Sullivan RN  Infection Prevention: single patient room provided  Taken 3/7/2024 1000 by Edith Sullivan RN  Infection Prevention: single patient room provided  Taken 3/7/2024 0800 by Edith Sullivan RN  Infection Prevention:   single patient room provided   hand hygiene promoted     Problem: Adult Inpatient Plan of Care  Goal: Optimal Comfort and Wellbeing  Intervention: Provide Person-Centered Care  Recent Flowsheet Documentation  Taken 3/7/2024 0800 by Edith Sullivan RN  Trust Relationship/Rapport: care explained   Goal Outcome Evaluation:      Plan discussed with family.

## 2024-03-07 NOTE — PROGRESS NOTES
Name: Erlin Blanco ADMIT: 2024   : 1945  PCP: Zamzam John, MUKUL    MRN: [id number] LOS: 12 days   AGE/SEX: 78 y.o. male  ROOM: [facility]     Subjective   Subjective   Pt can say yes/no in answer to questions. Tolerating diet. Working with PT. Voiding well. No SOA or CP.        Objective   Objective   Vital Signs  Temp:  [97.7 °F (36.5 °C)-98.7 °F (37.1 °C)] 97.7 °F (36.5 °C)  Heart Rate:  [63-81] 78  Resp:  [18] 18  BP: (138-182)/() 151/109  SpO2:  [96 %-97 %] 96 %  on   ;   Device (Oxygen Therapy): room air  Body mass index is 34.68 kg/m².    (No change in exam today)    Physical Exam  Vitals and nursing note reviewed. Exam conducted with a chaperone present (PT x 3).   Constitutional:       General: He is not in acute distress.     Appearance: He is ill-appearing. He is not toxic-appearing or diaphoretic.   HENT:      Head: Normocephalic.      Nose: Nose normal.      Mouth/Throat:      Mouth: Mucous membranes are moist.      Pharynx: Oropharynx is clear.   Eyes:      General: No scleral icterus.        Right eye: No discharge.         Left eye: No discharge.      Conjunctiva/sclera: Conjunctivae normal.   Cardiovascular:      Rate and Rhythm: Normal rate and regular rhythm.      Pulses: Normal pulses.   Pulmonary:      Effort: Pulmonary effort is normal. No respiratory distress.      Breath sounds: Normal breath sounds. No wheezing or rales.   Abdominal:      General: Bowel sounds are normal. There is no distension.      Palpations: Abdomen is soft.      Tenderness: There is no abdominal tenderness.   Musculoskeletal:         General: Swelling (RUE) present. No deformity.      Cervical back: Neck supple.      Comments: SCDs in place   Skin:     General: Skin is warm and dry.      Capillary Refill: Capillary refill takes less than 2 seconds.      Coloration: Skin is not jaundiced.   Neurological:      Mental Status: He is alert.      Comments: Right hemiparesis with facial droop  Dysarthria  and expressive aphasia   Psychiatric:      Comments: Unable to assess  Flat affect       Results Review     I reviewed the patient's new clinical results.  Results from last 7 days   Lab Units 03/07/24  0534 03/06/24  0654 03/05/24 0436 03/04/24  0957   WBC 10*3/mm3 8.43 9.09 8.59 8.09   HEMOGLOBIN g/dL 15.2 14.2 14.5 13.9   PLATELETS 10*3/mm3 397 392 414 396     Results from last 7 days   Lab Units 03/07/24  0801 03/07/24  0045 03/06/24 0654 03/05/24 0436 03/04/24  0957   SODIUM mmol/L 144  --  142 141 143   POTASSIUM mmol/L 4.7 4.1 3.5 3.8 3.8   CHLORIDE mmol/L 106  --  105 107 107   CO2 mmol/L 28.0  --  24.5 26.0 25.9   BUN mg/dL 18  --  16 18 23   CREATININE mg/dL 1.12  --  0.94 1.06 0.94   GLUCOSE mg/dL 194*  --  199* 175* 207*   EGFR mL/min/1.73 68.1  --  84.0 72.7 84.0     Results from last 7 days   Lab Units 03/07/24  0801 03/06/24 0654 03/05/24 0436 03/04/24  0957   ALBUMIN g/dL 3.8 3.3* 3.6 3.4*   BILIRUBIN mg/dL 0.8  --   --   --    ALK PHOS U/L 99  --   --   --    AST (SGOT) U/L 36  --   --   --    ALT (SGPT) U/L 36  --   --   --      Results from last 7 days   Lab Units 03/07/24  0801 03/07/24  0534 03/06/24  0654 03/05/24 0436 03/04/24  0957 03/03/24  0414   CALCIUM mg/dL 9.9  --  8.9 9.3 8.8 9.0   ALBUMIN g/dL 3.8  --  3.3* 3.6 3.4* 3.5   MAGNESIUM mg/dL 1.9  --   --   --   --  2.5*   PHOSPHORUS mg/dL  --  2.8 3.0 3.0 3.1 2.6       Glucose   Date/Time Value Ref Range Status   03/07/2024 0732 158 (H) 70 - 130 mg/dL Final   03/06/2024 2016 226 (H) 70 - 130 mg/dL Final   03/06/2024 1645 285 (H) 70 - 130 mg/dL Final   03/06/2024 1113 285 (H) 70 - 130 mg/dL Final   03/06/2024 0736 188 (H) 70 - 130 mg/dL Final   03/05/2024 2210 288 (H) 70 - 130 mg/dL Final   03/05/2024 1715 246 (H) 70 - 130 mg/dL Final       CT Head Without Contrast    Result Date: 3/6/2024  There is expected evolution of an intraparenchymal hemorrhage involving the basal ganglia on the left. It is similar in size as compared to  03/01/2024 but decreased in attenuation. The margins are less distinct. Surrounding edema appears similar. The volume of intraventricular blood has also decreased in attenuation but is similar in size. The lateral ventricles are mildly prominent but unchanged as compared to 03/01/2024. There is no evidence of new hemorrhage.  Radiation dose reduction techniques were utilized, including automated exposure control and exposure modulation based on body size.   This report was finalized on 3/6/2024 8:14 AM by Dr. Alfred Rivera M.D on Workstation: [id number]       I have personally reviewed all medications:  Scheduled Medications  amLODIPine, 10 mg, Nasogastric, Q24H  cloNIDine, 0.2 mg, Oral, Q8H  hydrALAZINE, 100 mg, Nasogastric, Q8H  insulin glargine, 15 Units, Subcutaneous, Nightly  insulin regular, 2-9 Units, Subcutaneous, TID AC  labetalol, 200 mg, Nasogastric, Q12H  levETIRAcetam, 500 mg, Nasogastric, BID  melatonin, 5 mg, Oral, Nightly  memantine, 10 mg, Nasogastric, Q12H  senna-docusate sodium, 2 tablet, Oral, BID  sodium chloride, 10 mL, Intravenous, Q12H  valsartan, 320 mg, Nasogastric, Daily    Infusions   Diet  Diet: Diabetic; Consistent Carbohydrate; Texture: Pureed (NDD 1); Fluid Consistency: Nectar Thick    I have personally reviewed:  [x]  Laboratory   []  Microbiology   []  Radiology   [x]  EKG/Telemetry  []  Cardiology/Vascular   []  Pathology    []  Records       Assessment/Plan     Active Hospital Problems    Diagnosis  POA    **Intraparenchymal hemorrhage of brain [I61.9]  Yes    Right hemiparesis [G81.91]  Yes    Oropharyngeal dysphagia [R13.12]  Yes    Paroxysmal atrial fibrillation [I48.0]  Yes    Type 2 diabetes mellitus [E11.9]  Yes    HTN (hypertension) [I10]  Yes    Seizures [R56.9]  Yes      Resolved Hospital Problems   No resolved problems to display.       78-year-old gentleman with hypertension, coronary artery disease, chronic diastolic heart failure, type 2 diabetes, and atrial  fibrillation on Eliquis, who presented to the hospital after experiencing a right-sided facial droop and right arm weakness with speech disturbance. CT angiogram showed an acute intraparenchymal hemorrhage and Neurosurgery was consulted. Blood pressure management was recommended to keep systolic blood pressure less than 160. Nicardipine was initially started and he received Keppra for seizure prophylaxis and Kcentra for Eliquis reversal. He was admitted to ICU. We were asked to assume care when pt came out of ICU.    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  - due to uncontrolled hypertension in the setting of anticoagulation and polycythemia vera  - Eliquis on hold until further notice  - has right hemiparesis and dysarthria as well as oropharyngeal dysphagia  - continue PT/OT/SLP, plan rehab--acute vs subacute  - tolerating modified diet per SLP recs, dc'd Dobhoff 3/6  - continue BP control with goal SBP<160mmHg, currently on amlodipine, valsartan, hydralazine, clonidine, and labetalol  - continue Keppra for seizure prophylaxis  - PRN IV Compazine for headaches  - follow up CT head was reassuring  - appreciate MICAELA attention to pt  - f/u with MICAELA in office in 2 weeks, repeat CT Head at that time     HTN  - BP labile-- at 0500  - continue amlodipine, clonidine, hydralazine, labetalol, and valsartan  - increase clonidine to TID     Type 2 DM  - sugars high on PO diet  - A1c 7.1  - continue Lantus and increase to 20 units QPM  - continue SSI/hypoglycemia protocol  - holding metformin, glimepiride, and Januvia     PAF  - HRs acceptable, continue BB  - was on Eliquis prior to admission but he is off of this now due to spontaneous intracranial hemorrhage     Polycythemia Vera  - on hydroxyurea as an outpatient       SCDs for DVT prophylaxis.  Full code.  Discussed with patient, PT, and RN, no family present.  Anticipate discharge to SNU facility, timing yet to be determined. Maybe as  early as tomorrow.      Cachorro Knox MD  [facility]  03/07/24  09:52 EST

## 2024-03-07 NOTE — PLAN OF CARE
Goal Outcome Evaluation:  Plan of Care Reviewed With: patient           Outcome Evaluation: Pt was supine in bed at start of treatment. Supine exercises completed on B LE with passive on R LE. Pt unable to visually track beyond midline this date. Maximum tactile and verbal cues needed to reach beyond midline with L UE. Pt able to sit at EOB with modA x2 and L UE support on foot rail. Sidesitting to sit performed with maxA x2. Pt returned to supine in bed  at completion of session.      Anticipated Discharge Disposition (PT): skilled nursing facility

## 2024-03-08 LAB
ALBUMIN SERPL-MCNC: 3.4 G/DL (ref 3.5–5.2)
ANION GAP SERPL CALCULATED.3IONS-SCNC: 11.2 MMOL/L (ref 5–15)
BUN SERPL-MCNC: 20 MG/DL (ref 8–23)
BUN/CREAT SERPL: 23.3 (ref 7–25)
CALCIUM SPEC-SCNC: 9.4 MG/DL (ref 8.6–10.5)
CHLORIDE SERPL-SCNC: 109 MMOL/L (ref 98–107)
CO2 SERPL-SCNC: 22.8 MMOL/L (ref 22–29)
CREAT SERPL-MCNC: 0.86 MG/DL (ref 0.76–1.27)
DEPRECATED RDW RBC AUTO: 55.2 FL (ref 37–54)
EGFRCR SERPLBLD CKD-EPI 2021: 89.7 ML/MIN/1.73
ERYTHROCYTE [DISTWIDTH] IN BLOOD BY AUTOMATED COUNT: 16.5 % (ref 12.3–15.4)
GLUCOSE BLDC GLUCOMTR-MCNC: 129 MG/DL (ref 70–130)
GLUCOSE BLDC GLUCOMTR-MCNC: 230 MG/DL (ref 70–130)
GLUCOSE BLDC GLUCOMTR-MCNC: 235 MG/DL (ref 70–130)
GLUCOSE BLDC GLUCOMTR-MCNC: 248 MG/DL (ref 70–130)
GLUCOSE SERPL-MCNC: 165 MG/DL (ref 65–99)
HCT VFR BLD AUTO: 46.8 % (ref 37.5–51)
HGB BLD-MCNC: 15.2 G/DL (ref 13–17.7)
MAGNESIUM SERPL-MCNC: 2.4 MG/DL (ref 1.6–2.4)
MCH RBC QN AUTO: 29.7 PG (ref 26.6–33)
MCHC RBC AUTO-ENTMCNC: 32.5 G/DL (ref 31.5–35.7)
MCV RBC AUTO: 91.4 FL (ref 79–97)
PHOSPHATE SERPL-MCNC: 3.2 MG/DL (ref 2.5–4.5)
PLATELET # BLD AUTO: 405 10*3/MM3 (ref 140–450)
PMV BLD AUTO: 10.3 FL (ref 6–12)
POTASSIUM SERPL-SCNC: 3.9 MMOL/L (ref 3.5–5.2)
RBC # BLD AUTO: 5.12 10*6/MM3 (ref 4.14–5.8)
SODIUM SERPL-SCNC: 143 MMOL/L (ref 136–145)
WBC NRBC COR # BLD AUTO: 9.65 10*3/MM3 (ref 3.4–10.8)

## 2024-03-08 PROCEDURE — 82948 REAGENT STRIP/BLOOD GLUCOSE: CPT

## 2024-03-08 PROCEDURE — 92526 ORAL FUNCTION THERAPY: CPT

## 2024-03-08 PROCEDURE — 63710000001 INSULIN REGULAR HUMAN PER 5 UNITS: Performed by: INTERNAL MEDICINE

## 2024-03-08 PROCEDURE — 97530 THERAPEUTIC ACTIVITIES: CPT

## 2024-03-08 PROCEDURE — 97110 THERAPEUTIC EXERCISES: CPT

## 2024-03-08 PROCEDURE — 80069 RENAL FUNCTION PANEL: CPT | Performed by: INTERNAL MEDICINE

## 2024-03-08 PROCEDURE — 97112 NEUROMUSCULAR REEDUCATION: CPT

## 2024-03-08 PROCEDURE — 63710000001 INSULIN GLARGINE PER 5 UNITS: Performed by: HOSPITALIST

## 2024-03-08 PROCEDURE — 97535 SELF CARE MNGMENT TRAINING: CPT

## 2024-03-08 PROCEDURE — 85027 COMPLETE CBC AUTOMATED: CPT | Performed by: INTERNAL MEDICINE

## 2024-03-08 PROCEDURE — 83735 ASSAY OF MAGNESIUM: CPT | Performed by: HOSPITALIST

## 2024-03-08 PROCEDURE — 99222 1ST HOSP IP/OBS MODERATE 55: CPT | Performed by: NURSE PRACTITIONER

## 2024-03-08 RX ORDER — SPIRONOLACTONE 25 MG/1
25 TABLET ORAL DAILY
Status: DISCONTINUED | OUTPATIENT
Start: 2024-03-08 | End: 2024-03-10

## 2024-03-08 RX ORDER — CARVEDILOL 6.25 MG/1
6.25 TABLET ORAL 2 TIMES DAILY WITH MEALS
Status: DISCONTINUED | OUTPATIENT
Start: 2024-03-08 | End: 2024-03-15 | Stop reason: HOSPADM

## 2024-03-08 RX ADMIN — Medication 10 ML: at 21:12

## 2024-03-08 RX ADMIN — Medication 10 ML: at 08:55

## 2024-03-08 RX ADMIN — CLONIDINE HYDROCHLORIDE 0.2 MG: 0.1 TABLET ORAL at 06:31

## 2024-03-08 RX ADMIN — MEMANTINE HYDROCHLORIDE 10 MG: 10 TABLET, FILM COATED ORAL at 21:03

## 2024-03-08 RX ADMIN — MEMANTINE HYDROCHLORIDE 10 MG: 10 TABLET, FILM COATED ORAL at 08:55

## 2024-03-08 RX ADMIN — SPIRONOLACTONE 25 MG: 25 TABLET ORAL at 14:33

## 2024-03-08 RX ADMIN — HYDRALAZINE HYDROCHLORIDE 100 MG: 50 TABLET ORAL at 21:09

## 2024-03-08 RX ADMIN — AMLODIPINE BESYLATE 10 MG: 10 TABLET ORAL at 08:54

## 2024-03-08 RX ADMIN — INSULIN HUMAN 4 UNITS: 100 INJECTION, SOLUTION PARENTERAL at 17:43

## 2024-03-08 RX ADMIN — HYDRALAZINE HYDROCHLORIDE 100 MG: 50 TABLET ORAL at 06:35

## 2024-03-08 RX ADMIN — LEVETIRACETAM 500 MG: 500 TABLET, FILM COATED ORAL at 08:55

## 2024-03-08 RX ADMIN — SENNOSIDES AND DOCUSATE SODIUM 2 TABLET: 50; 8.6 TABLET ORAL at 21:09

## 2024-03-08 RX ADMIN — INSULIN HUMAN 4 UNITS: 100 INJECTION, SOLUTION PARENTERAL at 11:50

## 2024-03-08 RX ADMIN — VALSARTAN 320 MG: 320 TABLET, FILM COATED ORAL at 09:02

## 2024-03-08 RX ADMIN — Medication 5 MG: at 22:54

## 2024-03-08 RX ADMIN — SENNOSIDES AND DOCUSATE SODIUM 2 TABLET: 50; 8.6 TABLET ORAL at 08:59

## 2024-03-08 RX ADMIN — LEVETIRACETAM 500 MG: 500 TABLET, FILM COATED ORAL at 21:03

## 2024-03-08 RX ADMIN — CLONIDINE HYDROCHLORIDE 0.2 MG: 0.1 TABLET ORAL at 22:53

## 2024-03-08 RX ADMIN — HYDRALAZINE HYDROCHLORIDE 100 MG: 50 TABLET ORAL at 13:19

## 2024-03-08 RX ADMIN — INSULIN GLARGINE 20 UNITS: 100 INJECTION, SOLUTION SUBCUTANEOUS at 21:03

## 2024-03-08 RX ADMIN — CLONIDINE HYDROCHLORIDE 0.2 MG: 0.1 TABLET ORAL at 13:19

## 2024-03-08 NOTE — PLAN OF CARE
Goal Outcome Evaluation:  Plan of Care Reviewed With: patient        Progress: improving  Outcome Evaluation: Pt seen for OT/PT co-tx session this AM, pt alert to self, and still req increased cues w/ fair carryover. Difficulty motor planning and sequencing at times t/o session. Focused on visual scanning, following commands and participation in light grooming act this date, as well as neuro re-ed tasks to promote improved trunk control, weight bearing provided to RUE with lateral flexion reps while seated upright. Pt was SBA for washing face once aroused using LUE. PROM completed to Head/neck, RUE in supine and sitting, trace contractions noted in R digits and increased tone. He required max A x2 for bed mobility, Dep for scooting. Continue to progress as pt able to tolerate.      Anticipated Discharge Disposition (OT): inpatient rehabilitation facility, skilled nursing facility

## 2024-03-08 NOTE — SIGNIFICANT NOTE
03/08/24 1647   Post Acute Pre-Cert Documentation   Request Submitted by Facility - Type: Hospital   Post-Acute Authorization Type Submitted: SNF   Date Post Acute Pre-Cert Inititated per Facility 03/08/24   Accepting Facility [facility]   Hospital Discharge Date Requested 03/08/24   All Clinicals Submitted? Yes   Had Accepting Facility at Time of Submission Yes   Response Communicated to:    Authorization Number: [id number]   Post Acute Pre-Cert Initiated Comment ADARSH BECERRA

## 2024-03-08 NOTE — PLAN OF CARE
Problem: Adult Inpatient Plan of Care  Goal: Absence of Hospital-Acquired Illness or Injury  Intervention: Identify and Manage Fall Risk  Recent Flowsheet Documentation  Taken 3/8/2024 1600 by Edith Sullivan RN  Safety Promotion/Fall Prevention: safety round/check completed  Taken 3/8/2024 1400 by Edith Sullivan RN  Safety Promotion/Fall Prevention: safety round/check completed  Taken 3/8/2024 1200 by Edith Sullivan RN  Safety Promotion/Fall Prevention: safety round/check completed  Taken 3/8/2024 1000 by Edith Sullivan RN  Safety Promotion/Fall Prevention: safety round/check completed  Taken 3/8/2024 0800 by Edith Sullivan RN  Safety Promotion/Fall Prevention: safety round/check completed  Intervention: Prevent and Manage VTE (Venous Thromboembolism) Risk  Recent Flowsheet Documentation  Taken 3/8/2024 0800 by Edith Sullivan RN  Activity Management: bedrest  Intervention: Prevent Infection  Recent Flowsheet Documentation  Taken 3/8/2024 1600 by Edith Sullivan RN  Infection Prevention: single patient room provided  Taken 3/8/2024 1400 by Edith Sullivan RN  Infection Prevention: single patient room provided  Taken 3/8/2024 0800 by Edith Sullivan RN  Infection Prevention: single patient room provided     Problem: Adult Inpatient Plan of Care  Goal: Absence of Hospital-Acquired Illness or Injury  Intervention: Prevent and Manage VTE (Venous Thromboembolism) Risk  Recent Flowsheet Documentation  Taken 3/8/2024 0800 by Edith Sullivan RN  Activity Management: bedrest   Goal Outcome Evaluation:      Plan discussed with family  Goal unchanged

## 2024-03-08 NOTE — CONSULTS
Patient Name: Erlin Blanco  :1945  78 y.o.    Date of Admission: 2024  Date of Consultation:  24  Encounter Provider: MUKUL Astorga  Place of Service: [facility] CARDIOLOGY  Referring Provider: No ref. provider found  Patient Care Team:  Zamzam John APRN as PCP - General (Family Medicine)      Chief complaint: hypertension, bradycardia    History of Present Illness:  Mr. Blanco is a 78 year old gentleman followed by Dr. Arias. He has coronary artery disease, diabetes mellitus type II, hypertension and hyperlipidemia, chronic diastolic heart failure, PAF on AC and ventricular tachycardia. He has had bradycardia in the past. Diltiazem was stopped and carvedilol was reduced. He has had difficult to control blood pressure. He was seen in the emergency room in  for high blood pressure. Losartan was changed to valsartan and ultimately amlodipine was added to his regimen. He was seen in the office 2022. BP was ok. He had a murmur. Echo was obtained and pretty unremarkable. He had moderate aortic valve sclerosis. At follow up in 2023, he continued to do well and BP was controlled. No changes were made. He saw MUKUL in the office in 2023. SBP 160s but generally well controlled at home.     He came to the emergency room on  with complaints of right sided facial droop , RUE weakness and speech disturbance. /100. Imaging showed intraparenchymal hemorrhage.  He received K centra. He was admitted to ICU for close BP monitoring and reduction. He has been started on clonidine and labetolol. Home valsartan and hydralazine continued.     His BP has improved however he is now bradycardia with rates in to the 40's. He is asymptomatic. He just worked with PT and HR is in the 70's.     History reviewed. No pertinent past medical history.    History reviewed. No pertinent surgical history.      Prior to Admission medications    Medication  Sig Start Date End Date Taking? Authorizing Provider   montelukast (SINGULAIR) 10 MG tablet Take 1 tablet by mouth Every Night. 12/21/23  Yes Keira Odonnell MD   Aspirin Low Dose 81 MG EC tablet Take 1 tablet by mouth Daily. 11/23/23   Keira Odonnell MD   carvedilol (COREG) 6.25 MG tablet Take 1 tablet by mouth 2 (Two) Times a Day With Meals. 1/16/24   Keira Odonnell MD   Eliquis 5 MG tablet tablet Take 1 tablet by mouth Every 12 (Twelve) Hours. 11/9/23   Keira Odonnell MD   fluticasone (FLONASE) 50 MCG/ACT nasal spray 1 spray into the nostril(s) as directed by provider Daily. 2/18/24   Keira Odonnell MD   glimepiride (AMARYL) 2 MG tablet Take 1 tablet by mouth 2 (Two) Times a Day. 2/12/24   Keira Odonnell MD   hydrALAZINE (APRESOLINE) 100 MG tablet Take 1 tablet by mouth 3 (Three) Times a Day. 11/9/23   Keira Odonnell MD   hydroxyurea (HYDREA) 500 MG capsule Take 1 capsule by mouth Daily. 1/18/24   Keira Odonnell MD   Januvia 100 MG tablet Take 1 tablet by mouth Daily. 2/10/24   Keira Odonnell MD   memantine (NAMENDA) 10 MG tablet Take 1 tablet by mouth 2 (Two) Times a Day. 1/4/24   Keira Odonnell MD   metFORMIN ER (GLUCOPHAGE-XR) 500 MG 24 hr tablet Take 2 tablets by mouth 2 (Two) Times a Day. 1/16/24   Keira Odonnell MD   Multiple Vitamins-Minerals (b complex-C-E-zinc) tablet Take 1 tablet by mouth Daily.    Keira Odonnell MD   potassium chloride (KLOR-CON) 20 MEQ packet Take 20 mEq by mouth 3 (Three) Times a Day.    Keira Odonnell MD   Probiotic Product (Risaquad-2) capsule capsule Take 1 capsule by mouth Daily.    Keira Odonnell MD   rosuvastatin (CRESTOR) 40 MG tablet Take 1 tablet by mouth Every Night. 1/25/24   Keira Odonnell MD   tamsulosin (FLOMAX) 0.4 MG capsule 24 hr capsule Take 1 capsule by mouth Daily. 1/25/24   Keira Odonnell MD   torsemide (DEMADEX) 20 MG tablet Take 1 tablet by mouth 2  (Two) Times a Day. 24   ProviderKeira MD   valsartan (DIOVAN) 320 MG tablet Take 1 tablet by mouth Daily. 23   Keira Odonnell MD   Vibegron (Gemtesa) 75 MG tablet Take 1 tablet by mouth Daily With Breakfast.    ProviderKeira MD       No Known Allergies    Social History     Socioeconomic History    Marital status:    Tobacco Use    Smoking status: Former     Current packs/day: 0.00     Types: Cigarettes     Quit date:      Years since quittin.2     Passive exposure: Past    Smokeless tobacco: Never   Vaping Use    Vaping status: Never Used    Passive vaping exposure: Yes   Substance and Sexual Activity    Alcohol use: Never    Drug use: Never       History reviewed. No pertinent family history.    REVIEW OF SYSTEMS:   All systems reviewed.  Pertinent positives identified in HPI.  All other systems are negative.      Objective:     Vitals:    24 0455 24 0631 24 0853 24 0900   BP: 126/65 151/66 139/61    BP Location: Right arm  Left arm    Patient Position: Lying  Lying    Pulse: (!) 46 (!) 44 69 (!) 46   Resp: 18  16    Temp:   96.2 °F (35.7 °C)    TempSrc:   Axillary    SpO2: 94%  94% 93%   Weight:       Height:         Body mass index is 34.68 kg/m².    Physical Exam:  Constitutional: He is oriented to person, place, and time. He appears well-developed. He does not appear ill.   HENT:   Head: Normocephalic and atraumatic. Head is without contusion.   Right Ear: Hearing normal. No drainage.   Left Ear: Hearing normal. No drainage.   Nose: No nasal deformity. No epistaxis.   Eyes: Lids are normal. Right eye exhibits no exudate. Left eye exhibits no exudate.  Neck: No JVD present. Carotid bruit is not present. No tracheal deviation present. No thyroid mass and no thyromegaly present.   Cardiovascular: Normal rate, regular rhythm and 2/6 murmur.    Pulses:       Posterior tibial pulses are 2+ on the right side, and 2+ on the left side.    Pulmonary/Chest: Effort normal and breath sounds normal.   Abdominal: Soft. Normal appearance and bowel sounds are normal. There is no tenderness.   Musculoskeletal: Normal range of motion.        Right shoulder: He exhibits no deformity.        Left shoulder: He exhibits no deformity.   Neurological: He is alert and oriented to person, place, and time. He has normal strength.   Skin: Skin is warm, dry and intact. No rash noted.   Psychiatric: He has a normal mood and affect. His behavior is normal. Thought content normal.   Vitals reviewed      Lab Review:     Results from last 7 days   Lab Units 03/08/24  0536 03/07/24  0801   SODIUM mmol/L 143 144   POTASSIUM mmol/L 3.9 4.7   CHLORIDE mmol/L 109* 106   CO2 mmol/L 22.8 28.0   BUN mg/dL 20 18   CREATININE mg/dL 0.86 1.12   CALCIUM mg/dL 9.4 9.9   BILIRUBIN mg/dL  --  0.8   ALK PHOS U/L  --  99   ALT (SGPT) U/L  --  36   AST (SGOT) U/L  --  36   GLUCOSE mg/dL 165* 194*         Results from last 7 days   Lab Units 03/08/24  0536   WBC 10*3/mm3 9.65   HEMOGLOBIN g/dL 15.2   HEMATOCRIT % 46.8   PLATELETS 10*3/mm3 405         Results from last 7 days   Lab Units 03/08/24  0536   MAGNESIUM mg/dL 2.4     Results from last 7 days   Lab Units 03/02/24  0722   TRIGLYCERIDES mg/dL 85                 Current Facility-Administered Medications:     acetaminophen (TYLENOL) suppository 650 mg, 650 mg, Rectal, Q4H PRN, Yariel Dominguez MD, 650 mg at 02/28/24 0927    acetaminophen (TYLENOL) suppository 650 mg, 650 mg, Rectal, Q4H PRN, Yariel Dominguez MD    acetaminophen (TYLENOL) tablet 650 mg, 650 mg, Nasogastric, Q4H PRN, Yariel Dominguez MD, 650 mg at 03/07/24 2120    amLODIPine (NORVASC) tablet 10 mg, 10 mg, Nasogastric, Q24H, Yariel Dominguez MD, 10 mg at 03/08/24 0854    sennosides-docusate (PERICOLACE) 8.6-50 MG per tablet 2 tablet, 2 tablet, Oral, BID, 2 tablet at 03/08/24 0859 **AND** polyethylene glycol (MIRALAX) packet 17 g, 17 g, Oral, Daily PRN, 17 g at 03/04/24 0823 **AND**  bisacodyl (DULCOLAX) EC tablet 5 mg, 5 mg, Oral, Daily PRN **AND** bisacodyl (DULCOLAX) suppository 10 mg, 10 mg, Rectal, Daily PRN, Yariel Dominguez MD    Calcium Replacement - Follow Nurse / BPA Driven Protocol, , Does not apply, PRN, Yariel Dominguez MD    cloNIDine (CATAPRES) tablet 0.2 mg, 0.2 mg, Oral, Q8H, Cachorro Knox MD, 0.2 mg at 03/08/24 0631    dextrose (D50W) (25 g/50 mL) IV injection 25 g, 25 g, Intravenous, Q15 Min PRN, Yariel Dominguez MD    dextrose (GLUTOSE) oral gel 15 g, 15 g, Oral, Q15 Min PRN, Yariel Dominguez MD    glucagon (GLUCAGEN) injection 1 mg, 1 mg, Intramuscular, Q15 Min PRN, Yariel Dominguez MD    hydrALAZINE (APRESOLINE) tablet 100 mg, 100 mg, Nasogastric, Q8H, Yariel Dominguez MD, 100 mg at 03/08/24 0635    insulin glargine (LANTUS, SEMGLEE) injection 20 Units, 20 Units, Subcutaneous, Nightly, Cachorro Knox MD, 20 Units at 03/07/24 2122    insulin regular (humuLIN R,novoLIN R) injection 2-9 Units, 2-9 Units, Subcutaneous, TID AC, Annemarie Araujo MD, 4 Units at 03/08/24 1150    [Held by provider] labetalol (NORMODYNE) tablet 200 mg, 200 mg, Nasogastric, Q12H, Yariel Dominguez MD, 200 mg at 03/07/24 2121    levETIRAcetam (KEPPRA) tablet 500 mg, 500 mg, Nasogastric, BID, Yariel Dominguez MD, 500 mg at 03/08/24 0855    Magnesium Standard Dose Replacement - Follow Nurse / BPA Driven Protocol, , Does not apply, PRN, Yariel Dominguez MD    melatonin tablet 5 mg, 5 mg, Oral, Nightly, Randy Hernandez MD, 5 mg at 03/07/24 2122    memantine (NAMENDA) tablet 10 mg, 10 mg, Nasogastric, Q12H, Yariel Dominguez MD, 10 mg at 03/08/24 0855    nitroglycerin (NITROSTAT) SL tablet 0.4 mg, 0.4 mg, Sublingual, Q5 Min PRN, Yariel Dominguez MD    OLANZapine zydis (zyPREXA) disintegrating tablet 5 mg, 5 mg, Oral, Q12H PRN, Cachorro Knox MD, 5 mg at 03/07/24 2310    ondansetron (ZOFRAN) injection 4 mg, 4 mg, Intravenous, Once PRN, Yariel Dominguez MD    ondansetron (ZOFRAN) injection 4 mg, 4 mg, Intravenous, Q6H PRN, Yariel Dominguez MD     Phosphorus Replacement - Follow Nurse / BPA Driven Protocol, , Does not apply, Alberto SCHMIDT Subin, MD    Potassium Replacement - Follow Nurse / BPA Driven Protocol, , Does not apply, Alberto SCHMIDT Subin, MD    prochlorperazine (COMPAZINE) injection 5 mg, 5 mg, Intravenous, Q6H PRN, Randy Hernandez MD, 5 mg at 03/05/24 1715    sodium chloride 0.9 % flush 10 mL, 10 mL, Intravenous, PRNAlberto Subin, MD    sodium chloride 0.9 % flush 10 mL, 10 mL, Intravenous, Q12H, Yariel Dominguez MD, 10 mL at 03/08/24 0855    sodium chloride 0.9 % flush 10 mL, 10 mL, Intravenous, PRNAlberto Subin, MD    sodium chloride 0.9 % infusion 40 mL, 40 mL, Intravenous, PRNAlberto Subin, MD    valsartan (DIOVAN) tablet 320 mg, 320 mg, Nasogastric, Daily, Yariel Dominguez MD, 320 mg at 03/08/24 0902    Assessment and Plan:       Hemorrhagic stroke due to hypertension  Hypertensive urgency  Paroxysmal atrial fibrillation , on apixaban prior to admission. Now stopped.   Diabetes mellitus type II  Polycythemia vera   Coronary artery disease  Chronic diastolic heart failure    He presented with a hypertensive brain bleed. Medications have been adjusted by primary team . Clonidine was added in addition to home meds of valsartan, hydralazine and amlodipine. Carvedilol was change to labetalol.     He has had bradycardia in the past. He was taken off diltiazem and carvedilol dose was reduced. He had been doing well on this.     He has been noted to be somewhat bradycardic this morning. HR 40's. Baseline LBBB. Sinus. No high degree heart block. He is tired today but seems asymptomatic. HR was in the 70s after working with physical therapy.     I worry more about hypertension than asymptomatic bradycardia. Labetalol and clonidine likely contributing. Will change labetalol back to carvedilol at home dose and add spironolactone. We may be able to wean clonidine. But in general -- I would be very hesitant to hold antihypertensives for asymptomatic sinus bradycardia.      Cate Villalba, APRN  03/08/24  12:09 EST

## 2024-03-08 NOTE — PROGRESS NOTES
Continued Stay Note  [facility]     Patient Name: Erlin Blanco  MRN: [id number]  Today's Date: 3/8/2024    Admit Date: 2/24/2024    Plan: [facility] skilled pending [employer] Medicare pre-cert, initiated today   Discharge Plan       Row Name 03/08/24 1449       Plan    Plan [facility] skilled pending [employer] Medicare pre-cert, initiated today    Patient/Family in Agreement with Plan yes    Plan Comments CCP followed up with pt's wife who selects [facility] from available facility options. Per Ricki Barrett can accept pt skilled pending [employer] Medicare pre-cert, initiated today. Pharmacy updated and packet on pt chart. Jaimee Tao LCSW                   Discharge Codes    No documentation.                 Expected Discharge Date and Time       Expected Discharge Date Expected Discharge Time    Mar 11, 2024               Yajaira Tao LCSW

## 2024-03-08 NOTE — PLAN OF CARE
Goal Outcome Evaluation:              Outcome Evaluation: Patient seen for re-evaluation of swallow function. Continuing to have lethargy, however, able to increase alertness given cues. Daughter and son-in-law at bedside report eating/drinking well with no coughing/choking. They report only feeding when alert and feeding by spoon only. No overt s/s of aspiration with ice, nectar by spoon, or puree trials. Recommend continuing puree diet with nectar thick liquids by spoon only when alert continues to be safest PO diet. Meds whole or crushed in puree. Sitting upright, slow rate, small bites/sips, check for residue, total feeding assistance and 1:1 supervision. OK for ice sparingly after oral care in between meals. Speech to follow.      Anticipated Discharge Disposition (SLP): anticipate therapy at next level of care          SLP Swallowing Diagnosis: mild, oral dysphagia, pharyngeal dysphagia (03/08/24 1100)

## 2024-03-08 NOTE — THERAPY TREATMENT NOTE
Patient Name: Erlin Blanco  : 1945    MRN: [id number]                              Today's Date: 3/8/2024       Admit Date: 2024    Visit Dx:     ICD-10-CM ICD-9-CM   1. Intraparenchymal hemorrhage of brain  I61.9 431   2. Facial droop  R29.810 781.94   3. Expressive aphasia  R47.01 784.3   4. Weakness of right upper extremity  R29.898 729.89   5. Chronic anticoagulation  Z79.01 V58.61     Patient Active Problem List   Diagnosis    Intraparenchymal hemorrhage of brain    Seizures    Type 2 diabetes mellitus    HTN (hypertension)    Right hemiparesis    Oropharyngeal dysphagia    Paroxysmal atrial fibrillation     History reviewed. No pertinent past medical history.  History reviewed. No pertinent surgical history.   General Information       Row Name 24 1330          OT Time and Intention    Document Type therapy note (daily note)  -PP     Mode of Treatment co-treatment;occupational therapy;physical therapy;other (see comments)  co-tx d/t pt being medically appropriate, and necessity for acuity level to maximize therapeutic benefit and safety for pt and staff.  -PP       Row Name 24 133          General Information    Patient Profile Reviewed yes  -PP     Existing Precautions/Restrictions fall  -PP       Row Name 240          Cognition    Orientation Status (Cognition) oriented to;person  -PP       Row Name 24          Safety Issues, Functional Mobility    Impairments Affecting Function (Mobility) strength;balance;endurance/activity tolerance  -PP     Comment, Safety Issues/Impairments (Mobility) gait belt and non skid socks worn for safety  -PP               User Key  (r) = Recorded By, (t) = Taken By, (c) = Cosigned By      Initials Name Provider Type    PP Jackie Deleon OT Occupational Therapist                     Mobility/ADL's       Row Name 24 1330          Bed Mobility    Rolling Right [place] (Bed Mobility) maximum assist (25% patient effort);2  person assist  -PP     Scooting/Bridging [place] (Bed Mobility) dependent (less than 25% patient effort);2 person assist  -PP     Supine-Sit [place] (Bed Mobility) maximum assist (25% patient effort);2 person assist  -PP     Sit-Supine [place] (Bed Mobility) maximum assist (25% patient effort);2 person assist  -PP     Bed Mobility, Safety Issues cognitive deficits limit understanding;decreased use of arms for pushing/pulling;decreased use of legs for bridging/pushing;impaired trunk control for bed mobility  -PP     Assistive Device (Bed Mobility) head of bed elevated;draw sheet  -PP       Row Name 03/08/24 1330          Transfers    Comment, (Transfers) deferred d/t decreased arousal and session focus on seated weight bearing through RUE and seated ADLs.  -PP       Row Name 03/08/24 1330          Grooming Assessment/Training    [place] Level (Grooming) grooming skills;wash face, hands;verbal cues;nonverbal cues (demo/gesture);standby assist  -PP     Position (Grooming) edge of bed sitting  -PP     Comment, (Grooming) VCs for initiation and for attention to task. OT provided [place] assist to LUE and pt able to move head to assist. However, pt progressed to using RUE w/ SBA  -PP               User Key  (r) = Recorded By, (t) = Taken By, (c) = Cosigned By      Initials Name Provider Type    PP Jackie Deleon OT Occupational Therapist                   Obj/Interventions       Row Name 03/08/24 1339          Shoulder (Therapeutic Exercise)    Shoulder PROM (Therapeutic Exercise) flexion;extension;10 repetitions;supine;right  -PP       Row Name 03/08/24 1339          Elbow/Forearm (Therapeutic Exercise)    Elbow/Forearm PROM (Therapeutic Exercise) flexion;extension;supine;right  -PP       Row Name 03/08/24 1339          Hand (Therapeutic Exercise)    Hand PROM (Therapeutic Exercise) right;finger flexion;finger extension  -PP       Row Name 03/08/24 1339          Motor Skills    Functional Endurance  "fatigues easily  -PP     Motor Control/Coordination Interventions neuro-muscular re-education;occupation/activity based treatment;therapeutic exercise/ROM;weight-bearing activities  -PP       Row Name 03/08/24 1339          Balance    Static Sitting Balance minimal assist;moderate assist  -PP     Dynamic Sitting Balance moderate assist;maximum assist  -PP     Position, Sitting Balance supported;sitting edge of bed  -PP     Balance Interventions sitting  -PP     Comment, Balance Pt ranged from MIN - MAX A for sitting bal EOB during face washing. Pt malik weight shifting R to L and demo'd pos lean at times. OT/PT engaged pt in dyn reaching crossing midline, tracking and cervical flex/ext.  -PP               User Key  (r) = Recorded By, (t) = Taken By, (c) = Cosigned By      Initials Name Provider Type    PP Jackie Deleon OT Occupational Therapist                   Goals/Plan    No documentation.                  Clinical Impression       Row Name 03/08/24 1345          Pain Assessment    Pretreatment Pain Rating 0/10 - no pain  -PP     Posttreatment Pain Rating 0/10 - no pain  -PP     Pre/Posttreatment Pain Comment Pt answers \"yes\" to pain when asked but unable to rate  -PP     Pain Intervention(s) Repositioned  -PP       Row Name 03/08/24 1345          Plan of Care Review    Plan of Care Reviewed With patient  -PP     Progress improving  -PP     Outcome Evaluation Pt seen for OT/PT co-tx session this AM, pt alert to self, and still req increased cues w/ fair carryover. Difficulty motor planning and sequencing at times t/o session. Focused on visual scanning, following commands and participation in light grooming act this date, as well as neuro re-ed tasks to promote improved trunk control, weight bearing provided to RUE with lateral flexion reps while seated upright. Pt was SBA for washing face once aroused using LUE. PROM completed to Head/neck, RUE in supine and sitting, trace contractions noted in R digits and " increased tone. He required max A x2 for bed mobility, Dep for scooting. Continue to progress as pt able to tolerate.  -PP       Row Name 03/08/24 1345          Therapy Plan Review/Discharge Plan (OT)    Anticipated Discharge Disposition (OT) inpatient rehabilitation facility;skilled nursing facility  -PP       Row Name 03/08/24 1345          Vital Signs    O2 Delivery Pre Treatment room air  -PP     Pre Patient Position Supine  -PP     Intra Patient Position Sitting  -PP     Post Patient Position Supine  -PP       Row Name 03/08/24 1345          Positioning and Restraints    Pre-Treatment Position in bed  -PP     Post Treatment Position bed  -PP     In Bed notified nsg;supine;with nsg  -PP               User Key  (r) = Recorded By, (t) = Taken By, (c) = Cosigned By      Initials Name Provider Type    Jackie Seymour OT Occupational Therapist                   Outcome Measures       Row Name 03/08/24 1347          How much help from another is currently needed...    Putting on and taking off regular lower body clothing? 1  -PP     Bathing (including washing, rinsing, and drying) 1  -PP     Toileting (which includes using toilet bed pan or urinal) 1  -PP     Putting on and taking off regular upper body clothing 2  -PP     Taking care of personal grooming (such as brushing teeth) 2  -PP     Eating meals 2  -PP     AM-PAC 6 Clicks Score (OT) 9  -PP       Row Name 03/08/24 1056 03/08/24 0800       How much help from another person do you currently need...    Turning from your back to your side while in flat bed without using bedrails? 2  -EM 2  -MG    Moving from lying on back to sitting on the side of a flat bed without bedrails? 1  -EM 1  -MG    Moving to and from a bed to a chair (including a wheelchair)? 1  -EM 1  -MG    Standing up from a chair using your arms (e.g., wheelchair, bedside chair)? 1  -EM 1  -MG    Climbing 3-5 steps with a railing? 1  -EM 1  -MG    To walk in hospital room? 1  -EM 1  -MG     AM-PAC 6 Clicks Score (PT) 7  -EM 7  -MG    Highest Level of Mobility Goal 2 --> Bed activities/dependent transfer  -EM 2 --> Bed activities/dependent transfer  -MG      Row Name 03/08/24 1347          Modified Calli Scale    Modified [place] Scale 5 - Severe disability.  Bedridden, incontinent, and requiring constant nursing care and attention.  -PP       Row Name 03/08/24 1347          Functional Assessment    Outcome Measure Options Modified Barthel Index;Modified Calli  -PP               User Key  (r) = Recorded By, (t) = Taken By, (c) = Cosigned By      Initials Name Provider Type    EM Erica Pennington, PT Physical Therapist    PP Jackie Deleon, OT Occupational Therapist    Edith Baldwin, RN Registered Nurse                    Occupational Therapy Education       Title: PT OT SLP Therapies (In Progress)       Topic: Occupational Therapy (In Progress)       Point: ADL training (Not Started)       Description:   Instruct learner(s) on proper safety adaptation and remediation techniques during self care or transfers.   Instruct in proper use of assistive devices.                  Learner Progress:  Not documented in this visit.              Point: Home exercise program (Done)       Description:   Instruct learner(s) on appropriate technique for monitoring, assisting and/or progressing therapeutic exercises/activities.                  Learning Progress Summary             Family Acceptance, E, VU by BS at 3/2/2024 1530    Comment: RUE ROM / HEP to adress swelling   Significant Other Acceptance, E, VU by BS at 3/2/2024 1530    Comment: RUE ROM / HEP to adress swelling                         Point: Precautions (Done)       Description:   Instruct learner(s) on prescribed precautions during self-care and functional transfers.                  Learning Progress Summary             Family Acceptance, E, VU,DU by  at 2/27/2024 1315    Comment: RUE ROM, massage, safe positioning for subluxation prevention,  OT role and dc recommendations                         Point: Body mechanics (Not Started)       Description:   Instruct learner(s) on proper positioning and spine alignment during self-care, functional mobility activities and/or exercises.                  Learner Progress:  Not documented in this visit.                              User Key       Initials Effective Dates Name Provider Type Discipline     04/02/20 -  Sonia Mcclure, OT Occupational Therapist OT    BS 11/14/22 -  Alexandria De La Rosa, OT Occupational Therapist OT                  OT Recommendation and Plan     Plan of Care Review  Plan of Care Reviewed With: patient  Progress: improving  Outcome Evaluation: Pt seen for OT/PT co-tx session this AM, pt alert to self, and still req increased cues w/ fair carryover. Difficulty motor planning and sequencing at times t/o session. Focused on visual scanning, following commands and participation in light grooming act this date, as well as neuro re-ed tasks to promote improved trunk control, weight bearing provided to RUE with lateral flexion reps while seated upright. Pt was SBA for washing face once aroused using LUE. PROM completed to Head/neck, RUE in supine and sitting, trace contractions noted in R digits and increased tone. He required max A x2 for bed mobility, Dep for scooting. Continue to progress as pt able to tolerate.     Time Calculation:         Time Calculation- OT       Row Name 03/08/24 1348             Time Calculation- OT    OT Start Time 1013  -PP      OT Stop Time 1039  -PP      OT Time Calculation (min) 26 min  -PP      Total Timed Code Minutes- OT 26 minute(s)  -PP      OT Received On 03/08/24  -PP      OT - Next Appointment 03/11/24  -PP         Timed Charges    44581 -  OT Neuromuscular Reeducation Minutes 17  -PP      27007 - OT Self Care/Mgmt Minutes 9  -PP         Total Minutes    Timed Charges Total Minutes 26  -PP       Total Minutes 26  -PP                User Key  (r) =  Recorded By, (t) = Taken By, (c) = Cosigned By      Initials Name Provider Type    PP Jackie Deleon OT Occupational Therapist                  Therapy Charges for Today       Code Description Service Date Service Provider Modifiers Qty    04739374633 HC OT NEUROMUSC RE EDUCATION EA 15 MIN 3/8/2024 Jackie Deleon OT GO 1    88540058546 HC OT SELF CARE/MGMT/TRAIN EA 15 MIN 3/8/2024 Jackie Deleon OT GO 1                 Jackie Deleon OT  3/8/2024

## 2024-03-08 NOTE — THERAPY TREATMENT NOTE
Patient Name: Erlin Blanco  : 1945    MRN: [id number]                              Today's Date: 3/8/2024       Admit Date: 2024    Visit Dx:     ICD-10-CM ICD-9-CM   1. Intraparenchymal hemorrhage of brain  I61.9 431   2. Facial droop  R29.810 781.94   3. Expressive aphasia  R47.01 784.3   4. Weakness of right upper extremity  R29.898 729.89   5. Chronic anticoagulation  Z79.01 V58.61     Patient Active Problem List   Diagnosis    Intraparenchymal hemorrhage of brain    Seizures    Type 2 diabetes mellitus    HTN (hypertension)    Right hemiparesis    Oropharyngeal dysphagia    Paroxysmal atrial fibrillation     History reviewed. No pertinent past medical history.  History reviewed. No pertinent surgical history.   General Information       Row Name 24 1046          Physical Therapy Time and Intention    Document Type therapy note (daily note)  -EM     Mode of Treatment co-treatment;occupational therapy;physical therapy;other (see comments)  -EM       Row Name 24 1046          General Information    Existing Precautions/Restrictions fall  -EM       Row Name 24 1046          Safety Issues, Functional Mobility    Comment, Safety Issues/Impairments (Mobility) Co treatment medically appropriate and necessary due to patient acuity level, activity tolerance and safety of patient and staff. Treatment is focusing on progression of care and goals established in the POC.  -EM               User Key  (r) = Recorded By, (t) = Taken By, (c) = Cosigned By      Initials Name Provider Type    EM Erica Pennington PT Physical Therapist                   Mobility       Row Name 24 1046          Bed Mobility    Rolling Right [place] (Bed Mobility) maximum assist (25% patient effort);2 person assist  -EM     Scooting/Bridging [place] (Bed Mobility) dependent (less than 25% patient effort);2 person assist  -EM     Supine-Sit [place] (Bed Mobility) maximum assist (25% patient  "effort);2 person assist  -EM     Sit-Supine [place] (Bed Mobility) maximum assist (25% patient effort);2 person assist  -EM     Assistive Device (Bed Mobility) head of bed elevated;draw sheet  -EM               User Key  (r) = Recorded By, (t) = Taken By, (c) = Cosigned By      Initials Name Provider Type    Erica Sargent, PT Physical Therapist                   Obj/Interventions       Row Name 03/08/24 1047          Motor Skills    Therapeutic Exercise other (see comments)  in supine: passive RLE ROM, LLE AAROM with maximal verbal and tactile cues, LTR in hooklying with assist, attempted bridging x 5 reps, pt not getting any hip extension even on LLE today  -EM       Row Name 03/08/24 1047          Balance    Static Sitting Balance minimal assist;moderate assist  -EM     Dynamic Sitting Balance moderate assist;maximum assist  -EM     Comment, Balance sitting at EOB with [place] once positioned, working on weight shifting R to L, upright posture with cervical extension and scapular approximation, reaching across midline, performing ADL tasks with OT assistance, following 1 and 2 step commands  -EM               User Key  (r) = Recorded By, (t) = Taken By, (c) = Cosigned By      Initials Name Provider Type    Erica Sargent, PT Physical Therapist                   Goals/Plan    No documentation.                  Clinical Impression       Row Name 03/08/24 1050          Pain    Pre/Posttreatment Pain Comment pt answers \"yes\" when asked about pain but cannot state where or rate on numeric scale  -EM     Pain Intervention(s) Repositioned  -EM       Row Name 03/08/24 1050          Plan of Care Review    Plan of Care Reviewed With patient  -EM     Outcome Evaluation Pt in supine, sleepy but arousable. Performed PROM RLE, AAROM LLE, lower trunk rotation in hookyling to increase trunk rotation. Patient performed bed mobility with maxAx2, sat on EOB for extended time with [place] once positioned, working on " upright posture with cervical extension and scapular approximation, weight shifting R to L, side sitting to sit with WB thru UEs with maxA to return to neutral position, reaching across midline with assist. Pt demonstrates very limited visual tracking, constant verbal and tactile cues today with all activities. Pt demonstrates improvement in sitting balance today, not pushing as hard thru LUE. d/c plans are SNU.  -EM       Row Name 03/08/24 1050          Positioning and Restraints    Pre-Treatment Position in bed  -EM     Post Treatment Position bed  -EM     In Bed supine;with nsg  -EM               User Key  (r) = Recorded By, (t) = Taken By, (c) = Cosigned By      Initials Name Provider Type    Erica Sargent PT Physical Therapist                   Outcome Measures       Row Name 03/08/24 1056 03/08/24 0800       How much help from another person do you currently need...    Turning from your back to your side while in flat bed without using bedrails? 2  -EM 2  -MG    Moving from lying on back to sitting on the side of a flat bed without bedrails? 1  -EM 1  -MG    Moving to and from a bed to a chair (including a wheelchair)? 1  -EM 1  -MG    Standing up from a chair using your arms (e.g., wheelchair, bedside chair)? 1  -EM 1  -MG    Climbing 3-5 steps with a railing? 1  -EM 1  -MG    To walk in hospital room? 1  -EM 1  -MG    AM-PAC 6 Clicks Score (PT) 7  -EM 7  -MG    Highest Level of Mobility Goal 2 --> Bed activities/dependent transfer  -EM 2 --> Bed activities/dependent transfer  -MG              User Key  (r) = Recorded By, (t) = Taken By, (c) = Cosigned By      Initials Name Provider Type    Erica Sargent PT Physical Therapist    Edith Baldwin RN Registered Nurse                                 Physical Therapy Education       Title: PT OT SLP Therapies (In Progress)       Topic: Physical Therapy (In Progress)       Point: Mobility training (In Progress)       Learning Progress Summary              Patient Acceptance, E, NR by EM at 3/8/2024 1057    Acceptance, E, VU by EM at 3/7/2024 1035    Acceptance, E, VU,NR by EM at 3/6/2024 1116    Acceptance, E,TB,D, VU,NR by CH at 3/5/2024 1444    Acceptance, E,D, DU by PC at 3/4/2024 1100    Acceptance, E,TB, NR by CB at 3/2/2024 1520    Acceptance, E,TB, NR,NL by MS at 2/27/2024 1320   Family Acceptance, E, VU,NR by EM at 3/6/2024 1116    Acceptance, E,TB, NR by CB at 3/2/2024 1520                         Point: Home exercise program (In Progress)       Learning Progress Summary             Patient Acceptance, E,TB,D, VU,NR by CH at 3/5/2024 1444    Acceptance, E,D, DU by PC at 3/4/2024 1100    Acceptance, E,TB, NR by CB at 3/2/2024 1520   Family Acceptance, E,TB, NR by CB at 3/2/2024 1520                         Point: Body mechanics (In Progress)       Learning Progress Summary             Patient Acceptance, E,TB,D, VU,NR by CH at 3/5/2024 1444    Acceptance, E,D, DU by PC at 3/4/2024 1100    Acceptance, E,TB, NR by CB at 3/2/2024 1520    Acceptance, E,TB, VU,NR by  at 2/29/2024 0920    Acceptance, E,TB, NR,NL by MS at 2/27/2024 1320   Family Acceptance, E,TB, NR by CB at 3/2/2024 1520    Acceptance, E,TB, VU,NR by  at 2/29/2024 0920                         Point: Precautions (In Progress)       Learning Progress Summary             Patient Acceptance, E,TB,D, VU,NR by CH at 3/5/2024 1444    Acceptance, E,D, DU by PC at 3/4/2024 1100    Acceptance, E,TB, NR by CB at 3/2/2024 1520    Acceptance, E,TB, VU,NR by  at 2/29/2024 0920    Acceptance, E,TB, NR,NL by MS at 2/27/2024 1320   Family Acceptance, E,TB, NR by CB at 3/2/2024 1520    Acceptance, E,TB, VU,NR by  at 2/29/2024 0920                                         User Key       Initials Effective Dates Name Provider Type Discipline     06/16/21 -  Татьяна Billingsley, PT Physical Therapist PT    PC 06/16/21 -  Audrey Ramirez, PT Physical Therapist PT    EM 06/16/21 -  Erica Pennington  GABINO, PT Physical Therapist PT    MS 06/16/21 -  Lyla Edwards, PT Physical Therapist PT    CB 10/22/21 -  Mag Monson, PT Physical Therapist PT     01/24/24 -  Rick Maynard PT Student PT Student PT                  PT Recommendation and Plan     Plan of Care Reviewed With: patient  Outcome Evaluation: Pt in supine, sleepy but arousable. Performed PROM RLE, AAROM LLE, lower trunk rotation in hookyling to increase trunk rotation. Patient performed bed mobility with maxAx2, sat on EOB for extended time with Thee once positioned, working on upright posture with cervical extension and scapular approximation, weight shifting R to L, side sitting to sit with WB thru UEs with maxA to return to neutral position, reaching across midline with assist. Pt demonstrates very limited visual tracking, constant verbal and tactile cues today with all activities. Pt demonstrates improvement in sitting balance today, not pushing as hard thru LUE. d/c plans are SNU.     Time Calculation:         PT Charges       Row Name 03/08/24 1057             Time Calculation    Start Time 1013  -EM      Stop Time 1039  -EM      Time Calculation (min) 26 min  -EM      PT Received On 03/08/24  -EM      PT - Next Appointment 03/09/24  -EM         Time Calculation- PT    Total Timed Code Minutes- PT 26 minute(s)  -EM         Timed Charges    79394 - PT Therapeutic Exercise Minutes 6  -EM      97250 - PT Therapeutic Activity Minutes 20  -EM         Total Minutes    Timed Charges Total Minutes 26  -EM       Total Minutes 26  -EM                User Key  (r) = Recorded By, (t) = Taken By, (c) = Cosigned By      Initials Name Provider Type    EM Erica Pennington, PT Physical Therapist                  Therapy Charges for Today       Code Description Service Date Service Provider Modifiers Qty    46572173635 HC PT THERAPEUTIC ACT EA 15 MIN 3/7/2024 Erica Pennington, PT GP 2    64334471464 HC PT THER PROC EA 15 MIN 3/8/2024 [place]  Erica LLOYD, PT GP 1    [id number]  PT THERAPEUTIC ACT EA 15 MIN 3/8/2024 Erica Pennington PT GP 1            PT G-Codes  Outcome Measure Options: AM-PAC 6 Clicks Daily Activity (OT)  AM-PAC 6 Clicks Score (PT): 7  AM-PAC 6 Clicks Score (OT): 9  Modified Calli Scale: 5 - Severe disability.  Bedridden, incontinent, and requiring constant nursing care and attention.  PT Discharge Summary  Anticipated Discharge Disposition (PT): skilled nursing facility    Erica Pennington, PT  3/8/2024

## 2024-03-08 NOTE — PROGRESS NOTES
Name: Erlin Blanco ADMIT: 2024   : 1945  PCP: Zamzam John, APRN    MRN: [id number] LOS: 13 days   AGE/SEX: 78 y.o. male  ROOM: [facility]     Subjective   Subjective   Pt can say yes/no in answer to questions. Tolerating diet. Working with PT. Voiding well. No SOA or CP. LOC waxing/waning per family and staff.        Objective   Objective   Vital Signs  Temp:  [96.2 °F (35.7 °C)-97.3 °F (36.3 °C)] 96.2 °F (35.7 °C)  Heart Rate:  [44-75] 46  Resp:  [16-18] 16  BP: (125-152)/(58-80) 139/61  SpO2:  [93 %-97 %] 93 %  on   ;   Device (Oxygen Therapy): room air  Body mass index is 34.68 kg/m².    (No change in exam today)    Physical Exam  Vitals and nursing note reviewed. Exam conducted with a chaperone present (Dtr and RN).   Constitutional:       General: He is not in acute distress.     Appearance: He is ill-appearing. He is not toxic-appearing or diaphoretic.   HENT:      Head: Normocephalic.      Nose: Nose normal.      Mouth/Throat:      Mouth: Mucous membranes are moist.      Pharynx: Oropharynx is clear.   Eyes:      General: No scleral icterus.        Right eye: No discharge.         Left eye: No discharge.      Conjunctiva/sclera: Conjunctivae normal.   Cardiovascular:      Rate and Rhythm: Normal rate and regular rhythm.      Pulses: Normal pulses.   Pulmonary:      Effort: Pulmonary effort is normal. No respiratory distress.      Breath sounds: Normal breath sounds. No wheezing or rales.   Abdominal:      General: Bowel sounds are normal. There is no distension.      Palpations: Abdomen is soft.      Tenderness: There is no abdominal tenderness.   Musculoskeletal:         General: Swelling (RUE) present. No deformity.      Cervical back: Neck supple.      Comments: SCDs in place   Skin:     General: Skin is warm and dry.      Capillary Refill: Capillary refill takes less than 2 seconds.      Coloration: Skin is not jaundiced.   Neurological:      Mental Status: He is alert.      Comments:  Right hemiparesis with facial droop  Dysarthria and expressive aphasia   Psychiatric:      Comments: Unable to assess  Flat affect       Results Review     I reviewed the patient's new clinical results.  Results from last 7 days   Lab Units 03/08/24 0536 03/07/24 0534 03/06/24 0654 03/05/24  0436   WBC 10*3/mm3 9.65 8.43 9.09 8.59   HEMOGLOBIN g/dL 15.2 15.2 14.2 14.5   PLATELETS 10*3/mm3 405 397 392 414     Results from last 7 days   Lab Units 03/08/24 0536 03/07/24  0801 03/07/24  0045 03/06/24 0654 03/05/24  0436   SODIUM mmol/L 143 144  --  142 141   POTASSIUM mmol/L 3.9 4.7 4.1 3.5 3.8   CHLORIDE mmol/L 109* 106  --  105 107   CO2 mmol/L 22.8 28.0  --  24.5 26.0   BUN mg/dL 20 18  --  16 18   CREATININE mg/dL 0.86 1.12  --  0.94 1.06   GLUCOSE mg/dL 165* 194*  --  199* 175*   EGFR mL/min/1.73 89.7 68.1  --  84.0 72.7     Results from last 7 days   Lab Units 03/08/24 0536 03/07/24 0801 03/06/24 0654 03/05/24  0436   ALBUMIN g/dL 3.4* 3.8 3.3* 3.6   BILIRUBIN mg/dL  --  0.8  --   --    ALK PHOS U/L  --  99  --   --    AST (SGOT) U/L  --  36  --   --    ALT (SGPT) U/L  --  36  --   --      Results from last 7 days   Lab Units 03/08/24 0536 03/07/24  0801 03/07/24  0534 03/06/24 0654 03/05/24  0436 03/04/24  0957 03/03/24  0414   CALCIUM mg/dL 9.4 9.9  --  8.9 9.3   < > 9.0   ALBUMIN g/dL 3.4* 3.8  --  3.3* 3.6   < > 3.5   MAGNESIUM mg/dL 2.4 1.9  --   --   --   --  2.5*   PHOSPHORUS mg/dL 3.2  --  2.8 3.0 3.0   < > 2.6    < > = values in this interval not displayed.       Glucose   Date/Time Value Ref Range Status   03/08/2024 0736 129 70 - 130 mg/dL Final   03/07/2024 1957 254 (H) 70 - 130 mg/dL Final   03/07/2024 1636 257 (H) 70 - 130 mg/dL Final   03/07/2024 1121 237 (H) 70 - 130 mg/dL Final   03/07/2024 0732 158 (H) 70 - 130 mg/dL Final   03/06/2024 2016 226 (H) 70 - 130 mg/dL Final   03/06/2024 1645 285 (H) 70 - 130 mg/dL Final       No radiology results for the last day    I have personally  reviewed all medications:  Scheduled Medications  amLODIPine, 10 mg, Nasogastric, Q24H  cloNIDine, 0.2 mg, Oral, Q8H  hydrALAZINE, 100 mg, Nasogastric, Q8H  insulin glargine, 20 Units, Subcutaneous, Nightly  insulin regular, 2-9 Units, Subcutaneous, TID AC  [Held by provider] labetalol, 200 mg, Nasogastric, Q12H  levETIRAcetam, 500 mg, Nasogastric, BID  melatonin, 5 mg, Oral, Nightly  memantine, 10 mg, Nasogastric, Q12H  senna-docusate sodium, 2 tablet, Oral, BID  sodium chloride, 10 mL, Intravenous, Q12H  valsartan, 320 mg, Nasogastric, Daily    Infusions   Diet  Diet: Diabetic; Consistent Carbohydrate; Texture: Pureed (NDD 1); Fluid Consistency: Nectar Thick    I have personally reviewed:  [x]  Laboratory   []  Microbiology   []  Radiology   [x]  EKG/Telemetry  []  Cardiology/Vascular   []  Pathology    []  Records       Assessment/Plan     Active Hospital Problems    Diagnosis  POA   • **Intraparenchymal hemorrhage of brain [I61.9]  Yes   • Right hemiparesis [G81.91]  Yes   • Oropharyngeal dysphagia [R13.12]  Yes   • Paroxysmal atrial fibrillation [I48.0]  Yes   • Type 2 diabetes mellitus [E11.9]  Yes   • HTN (hypertension) [I10]  Yes   • Seizures [R56.9]  Yes      Resolved Hospital Problems   No resolved problems to display.       78-year-old gentleman with hypertension, coronary artery disease, chronic diastolic heart failure, type 2 diabetes, and atrial fibrillation on Eliquis, who presented to the hospital after experiencing a right-sided facial droop and right arm weakness with speech disturbance. CT angiogram showed an acute intraparenchymal hemorrhage and Neurosurgery was consulted. Blood pressure management was recommended to keep systolic blood pressure less than 160. Nicardipine was initially started and he received Keppra for seizure prophylaxis and Kcentra for Eliquis reversal. He was admitted to ICU. We were asked to assume care when pt came out of ICU.    Left Thalamic Intracranial Hemorrhage with  Intraventricular Extension  - due to uncontrolled hypertension in the setting of anticoagulation and polycythemia vera  - Eliquis on hold until further notice  - has right hemiparesis and dysarthria as well as oropharyngeal dysphagia  - continue PT/OT/SLP, plan rehab (subacute) at dc  - tolerating modified diet per SLP recs, dc'd Dobhoff 3/6  - continue BP control with goal SBP<160mmHg, currently on amlodipine, valsartan, hydralazine, clonidine, and labetalol (see below)  - continue Keppra for seizure prophylaxis  - PRN IV Compazine for headaches  - follow up CT head was reassuring  - appreciate MICAELA attention to pt  - f/u with MICAELA in office in 2 weeks, repeat CT Head at that time  - mental status/LOC waxing/waning, family concerned he may have UTI as he was recently treated for one--checked UA and it was fine     HTN  - BP labile--SBP at goal now but HRs low so far today  - continue amlodipine, clonidine, hydralazine, and valsartan  - hold labetalol this AM and consult Card for assistance with BP mgmt     Type 2 DM  - sugars high on PO diet  - A1c 7.1  - continue Lantus, sugars improved after increased to 20 units QPM  - continue SSI/hypoglycemia protocol  - holding metformin, glimepiride, and Januvia     PAF  - HRs low (see above)  - hold labetalol this AM and consult Card  - was on Eliquis prior to admission but he is off of this now due to spontaneous intracranial hemorrhage     Polycythemia Vera  - on hydroxyurea as an outpatient       SCDs for DVT prophylaxis.  Full code.  Discussed with patient, dtr, and RN.  Anticipate discharge to SNU facility, timing yet to be determined. Maybe as early as tomorrow if BP & HR okay.      Cachorro Knox MD  [facility]  03/08/24  09:03 EST

## 2024-03-08 NOTE — PLAN OF CARE
Goal Outcome Evaluation:  Plan of Care Reviewed With: patient   V/S NORMAL, pt resting at bed, no changes during the sift

## 2024-03-08 NOTE — PLAN OF CARE
Goal Outcome Evaluation:  Plan of Care Reviewed With: patient           Outcome Evaluation: Pt in supine, sleepy but arousable. Performed PROM RLE, AAROM LLE, lower trunk rotation in hookyling to increase trunk rotation. Patient performed bed mobility with maxAx2, sat on EOB for extended time with Thee once positioned, working on upright posture with cervical extension and scapular approximation, weight shifting R to L, side sitting to sit with WB thru UEs with maxA to return to neutral position, reaching across midline with assist. Pt demonstrates very limited visual tracking, constant verbal and tactile cues today with all activities. Pt demonstrates improvement in sitting balance today, not pushing as hard thru LUE. d/c plans are SNU.      Anticipated Discharge Disposition (PT): skilled nursing facility

## 2024-03-08 NOTE — THERAPY RE-EVALUATION
Acute Care - Speech Language Pathology   Swallow Re-Evaluation [facility]     Patient Name: Erlin Blanco  : 1945  MRN: [id number]  Today's Date: 3/8/2024               Admit Date: 2024    Visit Dx:     ICD-10-CM ICD-9-CM   1. Intraparenchymal hemorrhage of brain  I61.9 431   2. Facial droop  R29.810 781.94   3. Expressive aphasia  R47.01 784.3   4. Weakness of right upper extremity  R29.898 729.89   5. Chronic anticoagulation  Z79.01 V58.61     Patient Active Problem List   Diagnosis    Intraparenchymal hemorrhage of brain    Seizures    Type 2 diabetes mellitus    HTN (hypertension)    Right hemiparesis    Oropharyngeal dysphagia    Paroxysmal atrial fibrillation     History reviewed. No pertinent past medical history.  History reviewed. No pertinent surgical history.    SLP Recommendation and Plan  SLP Swallowing Diagnosis: mild, oral dysphagia, pharyngeal dysphagia (24)  SLP Diet Recommendation: puree, nectar thick liquids, ice chips between meals after oral care, with supervision (24)  Recommended Precautions and Strategies: upright posture during/after eating, small bites of food and sips of liquid, multiple swallows per bite of food, multiple swallows per sip of liquid, alternate between small bites of food and sips of liquid, 1:1 supervision, assist with feeding (24)  SLP Rec. for Method of Medication Administration: meds whole, meds crushed, with puree, as tolerated (24)     Monitor for Signs of Aspiration: yes, notify SLP if any concerns (24)  Recommended Diagnostics: reassess via clinical swallow evaluation (24)  Swallow Criteria for Skilled Therapeutic Interventions Met: demonstrates skilled criteria (24)  Anticipated Discharge Disposition (SLP): anticipate therapy at next level of care (24)  Rehab Potential/Prognosis, Swallowing: good, to achieve stated therapy goals (24)  Therapy Frequency  (Swallow): PRN (03/08/24 1100)  Predicted Duration Therapy Intervention (Days): until discharge (03/08/24 1100)  Oral Care Recommendations: Oral Care BID/PRN, Before ice/water (03/08/24 1100)                                        Outcome Evaluation: Patient seen for re-evaluation of swallow function. Continuing to have lethargy, however, able to follow increase alertness given cues. Daughter and son-in-law at bedside report eating/drinking well with no coughing/choking. They report only feeding when alert and feeding by spoon only. No overt s/s of aspiration with ice, nectar by spoon, or puree trials. Recommend continuing puree diet with nectar thick liquids by spoon only when alert continues to be safest PO diet. Meds whole or crushed in puree. Sitting upright, slow rate, small bites/sips, check for residue, total feeding assistance and 1:1 supervision. OK for ice sparingly after oral care in between meals. Speech to follow.      SWALLOW EVALUATION (Last 72 Hours)       SLP Adult Swallow Evaluation       Row Name 03/08/24 1100 03/06/24 1500                Rehab Evaluation    Document Type therapy note (daily note)  -CR re-evaluation  -CR       Subjective Information no complaints  -CR no complaints  -CR       Patient Observations cooperative  -CR cooperative;lethargic  -CR       Patient Effort adequate  -CR adequate  -CR       Symptoms Noted During/After Treatment none  -CR none  -CR          General Information    Patient Profile Reviewed yes  -CR yes  -CR          Pain Scale: Numbers Pre/Post-Treatment    Pretreatment Pain Rating 0/10 - no pain  -CR 0/10 - no pain  -CR          SLP Evaluation Clinical Impression    SLP Swallowing Diagnosis mild;oral dysphagia;pharyngeal dysphagia  -CR mild;oral dysphagia;pharyngeal dysphagia  -CR       Functional Impact risk of aspiration/pneumonia  -CR risk of aspiration/pneumonia  -CR       Rehab Potential/Prognosis, Swallowing good, to achieve stated therapy goals  -CR good,  to achieve stated therapy goals  -CR       Swallow Criteria for Skilled Therapeutic Interventions Met demonstrates skilled criteria  -CR demonstrates skilled criteria  -CR          Recommendations    Therapy Frequency (Swallow) PRN  -CR PRN  -CR       Predicted Duration Therapy Intervention (Days) until discharge  -CR until discharge  -CR       SLP Diet Recommendation puree;nectar thick liquids;ice chips between meals after oral care, with supervision  -CR puree;nectar thick liquids;ice chips between meals after oral care, with supervision  -CR       Recommended Diagnostics reassess via clinical swallow evaluation  -CR reassess via clinical swallow evaluation;[place] [place]  [place]CR       Recommended Precautions and Strategies upright posture during/after eating;small bites of food and sips of liquid;multiple swallows per bite of food;multiple swallows per sip of liquid;alternate between small bites of food and sips of liquid;1:1 supervision;assist with feeding  -CR upright posture during/after eating;small bites of food and sips of liquid;multiple swallows per bite of food;multiple swallows per sip of liquid;alternate between small bites of food and sips of liquid;1:1 supervision;assist with feeding  -CR       Oral Care Recommendations Oral Care BID/PRN;Before ice/water  -CR Oral Care BID/PRN;Before ice/water  -CR       SLP Rec. for Method of Medication Administration meds whole;meds crushed;with puree;as tolerated  -CR meds whole;meds crushed;with puree;as tolerated  -CR       Monitor for Signs of Aspiration yes;notify SLP if any concerns  -CR yes;notify SLP if any concerns  -CR       Anticipated Discharge Disposition (SLP) anticipate therapy at next level of care  -CR anticipate therapy at next level of care  -CR          (LTG) Patient will demonstrate functional swallow for    Diet Texture (Demonstrate functional swallow) mechanical ground textures  -CR regular textures  -CR       Liquid viscosity  (Demonstrate functional swallow) thin liquids  -CR thin liquids  -CR       [place] (Demonstrate functional swallow) with minimal cues (75-90% accuracy)  -CR independently (over 90% accuracy)  -CR       Time Frame (Demonstrate functional swallow) by discharge  -CR by discharge  -CR       Progress/Outcomes (Demonstrate functional swallow) goal revised this date  -CR progress slower than expected  -CR       Comment (Demonstrate functional swallow) Patient seen for re-evaluation of swallow function. Continuing to have lethargy, however, able to follow increase alertness given cues. Daughter and son-in-law at bedside report eating/drinking well with no coughing/choking. They report only feeding when alert and feeding by spoon only. No overt s/s of aspiration with ice, nectar by spoon, or puree trials. Recommend continuing puree diet with nectar thick liquids by spoon only when alert continues to be safest PO diet. Meds whole or crushed in puree. Sitting upright, slow rate, small bites/sips, check for residue, total feeding assistance and 1:1 supervision. OK for ice sparingly after oral care in between meals. Speech to follow.  -CR Re-evaluation of swallow completed. Reviewed VFSS results/recs with wife at bedside. Patient required MAX auditory/visual/tactile stimulation to increase and maintain alertness. Significant coughing with thin via spoon. No overt s/s of aspiration with ice chip, nectar by spoon/cup, or puree trials. Patient with unsuccessful and disorganized munching mastication with soft/chopped solid. SLP removed remaining portion of bolus from oral cavity. Recommend patient continue puree diet with nectar thick liquids. Meds whole or crushed in puree. Upright for meals, small bites/sips, total feeding assist/supervision, feed only when alert, double swallows, and alterante solids/liquids. Patient OK for free water protocol with ice chips, after oral care.  -CR                 User Key  (r) = Recorded By,  (t) = Taken By, (c) = Cosigned By      Initials Name Effective Dates    CR Alesha Shaikh, SLP 08/28/23 -                     EDUCATION  The patient has been educated in the following areas:   Dysphagia (Swallowing Impairment).        SLP GOALS       Row Name 03/08/24 1100 03/06/24 1500          (LTG) Patient will demonstrate functional swallow for    Diet Texture (Demonstrate functional swallow) mechanical ground textures  -CR regular textures  -CR     Liquid viscosity (Demonstrate functional swallow) thin liquids  -CR thin liquids  -CR     [place] (Demonstrate functional swallow) with minimal cues (75-90% accuracy)  -CR independently (over 90% accuracy)  -CR     Time Frame (Demonstrate functional swallow) by discharge  -CR by discharge  -CR     Progress/Outcomes (Demonstrate functional swallow) goal revised this date  -CR progress slower than expected  -CR     Comment (Demonstrate functional swallow) Patient seen for re-evaluation of swallow function. Continuing to have lethargy, however, able to follow increase alertness given cues. Daughter and son-in-law at bedside report eating/drinking well with no coughing/choking. They report only feeding when alert and feeding by spoon only. No overt s/s of aspiration with ice, nectar by spoon, or puree trials. Recommend continuing puree diet with nectar thick liquids by spoon only when alert continues to be safest PO diet. Meds whole or crushed in puree. Sitting upright, slow rate, small bites/sips, check for residue, total feeding assistance and 1:1 supervision. OK for ice sparingly after oral care in between meals. Speech to follow.  -CR Re-evaluation of swallow completed. Reviewed VFSS results/recs with wife at bedside. Patient required MAX auditory/visual/tactile stimulation to increase and maintain alertness. Significant coughing with thin via spoon. No overt s/s of aspiration with ice chip, nectar by spoon/cup, or puree trials. Patient with unsuccessful and  disorganized munching mastication with soft/chopped solid. SLP removed remaining portion of bolus from oral cavity. Recommend patient continue puree diet with nectar thick liquids. Meds whole or crushed in puree. Upright for meals, small bites/sips, total feeding assist/supervision, feed only when alert, double swallows, and alterante solids/liquids. Patient OK for free water protocol with ice chips, after oral care.  -CR               User Key  (r) = Recorded By, (t) = Taken By, (c) = Cosigned By      Initials Name Provider Type    Alesha Hawley SLP Speech and Language Pathologist                       Time Calculation:    Time Calculation- SLP       Row Name 03/08/24 1207             Time Calculation- SLP    SLP Start Time 0750  -CR      SLP Received On 03/08/24  -CR         Untimed Charges    08814-YL Treatment Swallow Minutes 30  -CR         Total Minutes    Untimed Charges Total Minutes 30  -CR       Total Minutes 30  -CR                User Key  (r) = Recorded By, (t) = Taken By, (c) = Cosigned By      Initials Name Provider Type    Alesha Hawley SLP Speech and Language Pathologist                    Therapy Charges for Today       Code Description Service Date Service Provider Modifiers Qty    [id number] HC ST TREATMENT SWALLOW 2 3/8/2024 Alesha Shaikh SLP GN 1                 PATRICK Tomlin  3/8/2024

## 2024-03-09 LAB
ALBUMIN SERPL-MCNC: 3.6 G/DL (ref 3.5–5.2)
ANION GAP SERPL CALCULATED.3IONS-SCNC: 11 MMOL/L (ref 5–15)
BUN SERPL-MCNC: 26 MG/DL (ref 8–23)
BUN/CREAT SERPL: 23.4 (ref 7–25)
CALCIUM SPEC-SCNC: 9.4 MG/DL (ref 8.6–10.5)
CHLORIDE SERPL-SCNC: 107 MMOL/L (ref 98–107)
CO2 SERPL-SCNC: 26 MMOL/L (ref 22–29)
CREAT SERPL-MCNC: 1.11 MG/DL (ref 0.76–1.27)
DEPRECATED RDW RBC AUTO: 54.2 FL (ref 37–54)
EGFRCR SERPLBLD CKD-EPI 2021: 68.8 ML/MIN/1.73
ERYTHROCYTE [DISTWIDTH] IN BLOOD BY AUTOMATED COUNT: 16.3 % (ref 12.3–15.4)
GLUCOSE BLDC GLUCOMTR-MCNC: 116 MG/DL (ref 70–130)
GLUCOSE BLDC GLUCOMTR-MCNC: 165 MG/DL (ref 70–130)
GLUCOSE BLDC GLUCOMTR-MCNC: 194 MG/DL (ref 70–130)
GLUCOSE BLDC GLUCOMTR-MCNC: 215 MG/DL (ref 70–130)
GLUCOSE SERPL-MCNC: 219 MG/DL (ref 65–99)
HCT VFR BLD AUTO: 46.1 % (ref 37.5–51)
HGB BLD-MCNC: 15 G/DL (ref 13–17.7)
MAGNESIUM SERPL-MCNC: 2.6 MG/DL (ref 1.6–2.4)
MCH RBC QN AUTO: 29.5 PG (ref 26.6–33)
MCHC RBC AUTO-ENTMCNC: 32.5 G/DL (ref 31.5–35.7)
MCV RBC AUTO: 90.6 FL (ref 79–97)
PHOSPHATE SERPL-MCNC: 3.3 MG/DL (ref 2.5–4.5)
PLATELET # BLD AUTO: 417 10*3/MM3 (ref 140–450)
PMV BLD AUTO: 10.6 FL (ref 6–12)
POTASSIUM SERPL-SCNC: 4.2 MMOL/L (ref 3.5–5.2)
RBC # BLD AUTO: 5.09 10*6/MM3 (ref 4.14–5.8)
SODIUM SERPL-SCNC: 144 MMOL/L (ref 136–145)
WBC NRBC COR # BLD AUTO: 11.29 10*3/MM3 (ref 3.4–10.8)

## 2024-03-09 PROCEDURE — 85027 COMPLETE CBC AUTOMATED: CPT | Performed by: INTERNAL MEDICINE

## 2024-03-09 PROCEDURE — 80069 RENAL FUNCTION PANEL: CPT | Performed by: INTERNAL MEDICINE

## 2024-03-09 PROCEDURE — 63710000001 INSULIN REGULAR HUMAN PER 5 UNITS: Performed by: INTERNAL MEDICINE

## 2024-03-09 PROCEDURE — 82948 REAGENT STRIP/BLOOD GLUCOSE: CPT

## 2024-03-09 PROCEDURE — 63710000001 INSULIN GLARGINE PER 5 UNITS: Performed by: HOSPITALIST

## 2024-03-09 PROCEDURE — 83735 ASSAY OF MAGNESIUM: CPT | Performed by: HOSPITALIST

## 2024-03-09 PROCEDURE — 99233 SBSQ HOSP IP/OBS HIGH 50: CPT | Performed by: INTERNAL MEDICINE

## 2024-03-09 RX ADMIN — Medication 5 MG: at 20:41

## 2024-03-09 RX ADMIN — CARVEDILOL 6.25 MG: 6.25 TABLET, FILM COATED ORAL at 17:16

## 2024-03-09 RX ADMIN — LEVETIRACETAM 500 MG: 500 TABLET, FILM COATED ORAL at 22:09

## 2024-03-09 RX ADMIN — INSULIN GLARGINE 25 UNITS: 100 INJECTION, SOLUTION SUBCUTANEOUS at 20:41

## 2024-03-09 RX ADMIN — INSULIN HUMAN 2 UNITS: 100 INJECTION, SOLUTION PARENTERAL at 17:16

## 2024-03-09 RX ADMIN — SPIRONOLACTONE 25 MG: 25 TABLET ORAL at 09:18

## 2024-03-09 RX ADMIN — CLONIDINE HYDROCHLORIDE 0.2 MG: 0.1 TABLET ORAL at 05:35

## 2024-03-09 RX ADMIN — Medication 10 ML: at 20:42

## 2024-03-09 RX ADMIN — CLONIDINE HYDROCHLORIDE 0.2 MG: 0.1 TABLET ORAL at 22:08

## 2024-03-09 RX ADMIN — SENNOSIDES AND DOCUSATE SODIUM 2 TABLET: 50; 8.6 TABLET ORAL at 20:41

## 2024-03-09 RX ADMIN — HYDRALAZINE HYDROCHLORIDE 100 MG: 50 TABLET ORAL at 05:35

## 2024-03-09 RX ADMIN — AMLODIPINE BESYLATE 10 MG: 10 TABLET ORAL at 09:19

## 2024-03-09 RX ADMIN — HYDRALAZINE HYDROCHLORIDE 100 MG: 50 TABLET ORAL at 15:31

## 2024-03-09 RX ADMIN — INSULIN HUMAN 2 UNITS: 100 INJECTION, SOLUTION PARENTERAL at 12:14

## 2024-03-09 RX ADMIN — OLANZAPINE 5 MG: 5 TABLET, ORALLY DISINTEGRATING ORAL at 09:18

## 2024-03-09 RX ADMIN — MEMANTINE HYDROCHLORIDE 10 MG: 10 TABLET, FILM COATED ORAL at 15:32

## 2024-03-09 RX ADMIN — MEMANTINE HYDROCHLORIDE 10 MG: 10 TABLET, FILM COATED ORAL at 20:41

## 2024-03-09 RX ADMIN — SENNOSIDES AND DOCUSATE SODIUM 2 TABLET: 50; 8.6 TABLET ORAL at 09:18

## 2024-03-09 RX ADMIN — CLONIDINE HYDROCHLORIDE 0.2 MG: 0.1 TABLET ORAL at 15:33

## 2024-03-09 RX ADMIN — INSULIN HUMAN 4 UNITS: 100 INJECTION, SOLUTION PARENTERAL at 09:18

## 2024-03-09 RX ADMIN — VALSARTAN 320 MG: 320 TABLET, FILM COATED ORAL at 09:19

## 2024-03-09 RX ADMIN — CARVEDILOL 6.25 MG: 6.25 TABLET, FILM COATED ORAL at 09:19

## 2024-03-09 RX ADMIN — LEVETIRACETAM 500 MG: 500 TABLET, FILM COATED ORAL at 09:19

## 2024-03-09 NOTE — PLAN OF CARE
Goal Outcome Evaluation:  Plan for [facility]  when bed available

## 2024-03-09 NOTE — PROGRESS NOTES
Name: Erlin Blanco ADMIT: 2024   : 1945  PCP: Maria A Johnjonatan HAIDER, APRN    MRN: [id number] LOS: 14 days   AGE/SEX: 78 y.o. male  ROOM: [facility]     Subjective   Subjective   Pt can say yes/no in answer to questions. Tolerating diet. Working with PT. Voiding well. No SOA or CP. LOC waxing/waning per family and staff. Slept well last night after some agitation earlier in evening.       Objective   Objective   Vital Signs  Temp:  [96.2 °F (35.7 °C)-98.6 °F (37 °C)] 97.3 °F (36.3 °C)  Heart Rate:  [46-69] 59  Resp:  [16-18] 18  BP: (127-180)/(61-94) 167/78  SpO2:  [92 %-95 %] 95 %  on   ;   Device (Oxygen Therapy): room air  Body mass index is 34.68 kg/m².    (No change in exam today)    Physical Exam  Vitals and nursing note reviewed. Exam conducted with a chaperone present (Bro-in-law).   Constitutional:       General: He is not in acute distress.     Appearance: He is ill-appearing. He is not toxic-appearing or diaphoretic.   HENT:      Head: Normocephalic.      Nose: Nose normal.      Mouth/Throat:      Mouth: Mucous membranes are moist.      Pharynx: Oropharynx is clear.   Eyes:      General: No scleral icterus.        Right eye: No discharge.         Left eye: No discharge.      Conjunctiva/sclera: Conjunctivae normal.   Cardiovascular:      Rate and Rhythm: Normal rate and regular rhythm.      Pulses: Normal pulses.   Pulmonary:      Effort: Pulmonary effort is normal. No respiratory distress.      Breath sounds: Normal breath sounds. No wheezing or rales.   Abdominal:      General: Bowel sounds are normal. There is no distension.      Palpations: Abdomen is soft.      Tenderness: There is no abdominal tenderness.   Musculoskeletal:         General: Swelling (RUE) present. No deformity.      Cervical back: Neck supple.      Comments: SCDs in place   Skin:     General: Skin is warm and dry.      Capillary Refill: Capillary refill takes less than 2 seconds.      Coloration: Skin is not jaundiced.    Neurological:      Mental Status: He is alert.      Comments: Right hemiparesis with facial droop  Dysarthria and expressive aphasia   Psychiatric:      Comments: Unable to assess  Flat affect       Results Review     I reviewed the patient's new clinical results.  Results from last 7 days   Lab Units 03/09/24 0426 03/08/24 0536 03/07/24  0534 03/06/24  0654   WBC 10*3/mm3 11.29* 9.65 8.43 9.09   HEMOGLOBIN g/dL 15.0 15.2 15.2 14.2   PLATELETS 10*3/mm3 417 405 397 392     Results from last 7 days   Lab Units 03/09/24 0426 03/08/24 0536 03/07/24  0801 03/07/24  0045 03/06/24  0654   SODIUM mmol/L 144 143 144  --  142   POTASSIUM mmol/L 4.2 3.9 4.7 4.1 3.5   CHLORIDE mmol/L 107 109* 106  --  105   CO2 mmol/L 26.0 22.8 28.0  --  24.5   BUN mg/dL 26* 20 18  --  16   CREATININE mg/dL 1.11 0.86 1.12  --  0.94   GLUCOSE mg/dL 219* 165* 194*  --  199*   EGFR mL/min/1.73 68.8 89.7 68.1  --  84.0     Results from last 7 days   Lab Units 03/09/24 0426 03/08/24 0536 03/07/24  0801 03/06/24  0654   ALBUMIN g/dL 3.6 3.4* 3.8 3.3*   BILIRUBIN mg/dL  --   --  0.8  --    ALK PHOS U/L  --   --  99  --    AST (SGOT) U/L  --   --  36  --    ALT (SGPT) U/L  --   --  36  --      Results from last 7 days   Lab Units 03/09/24 0426 03/08/24 0536 03/07/24  0801 03/07/24  0534 03/06/24  0654 03/04/24  0957 03/03/24  0414   CALCIUM mg/dL 9.4 9.4 9.9  --  8.9   < > 9.0   ALBUMIN g/dL 3.6 3.4* 3.8  --  3.3*   < > 3.5   MAGNESIUM mg/dL 2.6* 2.4 1.9  --   --   --  2.5*   PHOSPHORUS mg/dL 3.3 3.2  --  2.8 3.0   < > 2.6    < > = values in this interval not displayed.       Glucose   Date/Time Value Ref Range Status   03/09/2024 0734 215 (H) 70 - 130 mg/dL Final   03/08/2024 1937 235 (H) 70 - 130 mg/dL Final   03/08/2024 1618 248 (H) 70 - 130 mg/dL Final   03/08/2024 1140 230 (H) 70 - 130 mg/dL Final   03/08/2024 0736 129 70 - 130 mg/dL Final   03/07/2024 1957 254 (H) 70 - 130 mg/dL Final   03/07/2024 1636 257 (H) 70 - 130 mg/dL Final        No radiology results for the last day    I have personally reviewed all medications:  Scheduled Medications  amLODIPine, 10 mg, Nasogastric, Q24H  carvedilol, 6.25 mg, Oral, BID With Meals  cloNIDine, 0.2 mg, Oral, Q8H  hydrALAZINE, 100 mg, Nasogastric, Q8H  insulin glargine, 20 Units, Subcutaneous, Nightly  insulin regular, 2-9 Units, Subcutaneous, TID AC  levETIRAcetam, 500 mg, Nasogastric, BID  melatonin, 5 mg, Oral, Nightly  memantine, 10 mg, Nasogastric, Q12H  senna-docusate sodium, 2 tablet, Oral, BID  sodium chloride, 10 mL, Intravenous, Q12H  spironolactone, 25 mg, Oral, Daily  valsartan, 320 mg, Nasogastric, Daily    Infusions   Diet  Diet: Diabetic; Consistent Carbohydrate; Feeding Assistance - Nursing; Texture: Pureed (NDD 1); Fluid Consistency: Nectar Thick    I have personally reviewed:  [x]  Laboratory   []  Microbiology   []  Radiology   [x]  EKG/Telemetry  []  Cardiology/Vascular   []  Pathology    []  Records       Assessment/Plan     Active Hospital Problems    Diagnosis  POA    **Intraparenchymal hemorrhage of brain [I61.9]  Yes    Right hemiparesis [G81.91]  Yes    Oropharyngeal dysphagia [R13.12]  Yes    Paroxysmal atrial fibrillation [I48.0]  Yes    Type 2 diabetes mellitus [E11.9]  Yes    HTN (hypertension) [I10]  Yes    Seizures [R56.9]  Yes      Resolved Hospital Problems   No resolved problems to display.       78-year-old gentleman with hypertension, coronary artery disease, chronic diastolic heart failure, type 2 diabetes, and atrial fibrillation on Eliquis, who presented to the hospital after experiencing a right-sided facial droop and right arm weakness with speech disturbance. CT angiogram showed an acute intraparenchymal hemorrhage and Neurosurgery was consulted. Blood pressure management was recommended to keep systolic blood pressure less than 160. Nicardipine was initially started and he received Keppra for seizure prophylaxis and Kcentra for Eliquis reversal. He was admitted  to ICU. We were asked to assume care when pt came out of ICU.    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  - due to uncontrolled hypertension in the setting of anticoagulation and polycythemia vera  - Eliquis on hold until further notice  - has right hemiparesis and dysarthria as well as oropharyngeal dysphagia  - continue PT/OT/SLP, plan rehab (subacute) at dc  - tolerating modified diet per SLP recs, dc'd Dobhoff 3/6  - continue BP control with goal SBP<160mmHg, currently on amlodipine, valsartan, hydralazine, clonidine, Aldactone, and Coreg (see below)  - continue Keppra for seizure prophylaxis  - PRN IV Compazine for headaches  - follow up CT head was reassuring  - appreciate MICAELA attention to pt  - f/u with MICAELA in office in 2 weeks, repeat CT Head at that time  - mental status/LOC waxing/waning, family concerned he may have UTI as he was recently treated for one--checked UA and it was fine     HTN  - BPs labile--SBP was at goal but bradycardia yesterday triggered some holding of meds and SBP has been high at times since  - consulted Card for bradycardia, they were not concerned as it was asymptomatic and recommend focusing more on BP control as long as bradycardia remains mild and asymptomatic  - continue amlodipine, clonidine, hydralazine, and valsartan  - Card has added Aldactone and changed labetalol back to his home Coreg  - monitor BPs and HRs today on new regimen     Type 2 DM  - sugars high on PO diet but no longer labile  - A1c 7.1  - continue Lantus, increase dose  - continue SSI/hypoglycemia protocol  - holding metformin, glimepiride, and Januvia     PAF  - HRs better today (see above)  - was on Eliquis prior to admission but he is off of this now due to spontaneous intracranial hemorrhage     Polycythemia Vera  - on hydroxyurea as an outpatient  - Hgb fine       SCDs for DVT prophylaxis.  Full code (see below)  Discussed with patient and bro-in-law. D/w wife this afternoon  regarding goals of care. She says that she and pt's son do not want any CPR or intubation. Will change code status to DNR.  Anticipate discharge to [place] when precert obtained.      Cachorro Knox MD  [facility]  03/09/24  08:14 EST

## 2024-03-09 NOTE — PROGRESS NOTES
LOS: 14 days   Patient Care Team:  Zamzam John APRN as PCP - General (Family Medicine)    Chief Complaint:   Follow-up bradycardia, hypertension    Interval History:     No acute events    Able to eat some today, but wife worried about nutrition    He responds to some questions with yes and no answers    I discussed his atrial fibrillation with his wife.      She does not ever recall him ever having symptoms of atrial fibrillation.     Objective   Vital Signs  Temp:  [97.3 °F (36.3 °C)-97.7 °F (36.5 °C)] 97.5 °F (36.4 °C)  Heart Rate:  [48-64] 59  Resp:  [18] 18  BP: (138-180)/(65-94) 155/86    Intake/Output Summary (Last 24 hours) at 3/9/2024 1715  Last data filed at 3/8/2024 2035  Gross per 24 hour   Intake 60 ml   Output --   Net 60 ml           Physical Exam  Vitals and nursing note reviewed.   Constitutional:       General: He is not in acute distress.     Appearance: He is not diaphoretic.   HENT:      Mouth/Throat:      Pharynx: No oropharyngeal exudate.   Eyes:      General: No scleral icterus.  Neck:      Trachea: No tracheal deviation.   Cardiovascular:      Rate and Rhythm: Normal rate and regular rhythm.   Pulmonary:      Effort: Pulmonary effort is normal. No respiratory distress.      Breath sounds: Normal breath sounds.   Abdominal:      Palpations: Abdomen is soft.   Skin:     General: Skin is dry.           Results Review:      Results from last 7 days   Lab Units 03/09/24  0426 03/08/24  0536 03/07/24  0801   SODIUM mmol/L 144 143 144   POTASSIUM mmol/L 4.2 3.9 4.7   CHLORIDE mmol/L 107 109* 106   CO2 mmol/L 26.0 22.8 28.0   BUN mg/dL 26* 20 18   CREATININE mg/dL 1.11 0.86 1.12   GLUCOSE mg/dL 219* 165* 194*   CALCIUM mg/dL 9.4 9.4 9.9         Results from last 7 days   Lab Units 03/09/24  0426 03/08/24  0536 03/07/24  0534   WBC 10*3/mm3 11.29* 9.65 8.43   HEMOGLOBIN g/dL 15.0 15.2 15.2   HEMATOCRIT % 46.1 46.8 46.5   PLATELETS 10*3/mm3 417 405 397             Results from last 7 days   Lab  Units 03/09/24  0426   MAGNESIUM mg/dL 2.6*           I reviewed the patient's new clinical results.  I personally viewed and interpreted the patient's EKG/Telemetry data        Medication Review:   amLODIPine, 10 mg, Nasogastric, Q24H  carvedilol, 6.25 mg, Oral, BID With Meals  cloNIDine, 0.2 mg, Oral, Q8H  insulin glargine, 25 Units, Subcutaneous, Nightly  insulin regular, 2-9 Units, Subcutaneous, TID AC  levETIRAcetam, 500 mg, Nasogastric, BID  melatonin, 5 mg, Oral, Nightly  memantine, 10 mg, Nasogastric, Q12H  senna-docusate sodium, 2 tablet, Oral, BID  sodium chloride, 10 mL, Intravenous, Q12H  spironolactone, 25 mg, Oral, Daily  valsartan, 320 mg, Nasogastric, Daily             Assessment & Plan       Intraparenchymal hemorrhage of brain    Seizures    Type 2 diabetes mellitus    HTN (hypertension)    Right hemiparesis    Oropharyngeal dysphagia    Paroxysmal atrial fibrillation    PAF    I reviewed his past EKG and Holter monitors.  I see nothing that is convincing for atrial fibrillation.  Appears to be an atrial tachycardia.  I would not empirically restart anticoagulation.   I would reevaluate atrial fibrillation diagnosis.     Bradycardia    Possibly related to ICH.  Asymptomatic.  Improved.     Hypertension    I don't recommend the use of hydralazine for hypertension.  We have been discussing removing it from the formulary.  I think we can achieve control with other agents.  I have discontinued it.   Will follow blood pressure and adjust as needed.     Freddy Porras MD  03/09/24  17:15 EST

## 2024-03-10 LAB
ALBUMIN SERPL-MCNC: 4 G/DL (ref 3.5–5.2)
ANION GAP SERPL CALCULATED.3IONS-SCNC: 7 MMOL/L (ref 5–15)
BACTERIA ISLT: NORMAL
BUN SERPL-MCNC: 19 MG/DL (ref 8–23)
BUN/CREAT SERPL: 21.3 (ref 7–25)
CALCIUM SPEC-SCNC: 9.9 MG/DL (ref 8.6–10.5)
CHLORIDE SERPL-SCNC: 107 MMOL/L (ref 98–107)
CO2 SERPL-SCNC: 28 MMOL/L (ref 22–29)
CREAT SERPL-MCNC: 0.89 MG/DL (ref 0.76–1.27)
DEPRECATED RDW RBC AUTO: 51.6 FL (ref 37–54)
EGFRCR SERPLBLD CKD-EPI 2021: 88.8 ML/MIN/1.73
ERYTHROCYTE [DISTWIDTH] IN BLOOD BY AUTOMATED COUNT: 16.1 % (ref 12.3–15.4)
GLUCOSE BLDC GLUCOMTR-MCNC: 213 MG/DL (ref 70–130)
GLUCOSE BLDC GLUCOMTR-MCNC: 214 MG/DL (ref 70–130)
GLUCOSE BLDC GLUCOMTR-MCNC: 272 MG/DL (ref 70–130)
GLUCOSE BLDC GLUCOMTR-MCNC: 98 MG/DL (ref 70–130)
GLUCOSE SERPL-MCNC: 111 MG/DL (ref 65–99)
HCT VFR BLD AUTO: 48.2 % (ref 37.5–51)
HGB BLD-MCNC: 15.7 G/DL (ref 13–17.7)
MAGNESIUM SERPL-MCNC: 2.3 MG/DL (ref 1.6–2.4)
MCH RBC QN AUTO: 28.6 PG (ref 26.6–33)
MCHC RBC AUTO-ENTMCNC: 32.6 G/DL (ref 31.5–35.7)
MCV RBC AUTO: 87.8 FL (ref 79–97)
PHOSPHATE SERPL-MCNC: 2.5 MG/DL (ref 2.5–4.5)
PLATELET # BLD AUTO: 443 10*3/MM3 (ref 140–450)
PMV BLD AUTO: 10.2 FL (ref 6–12)
POTASSIUM SERPL-SCNC: 3.7 MMOL/L (ref 3.5–5.2)
RBC # BLD AUTO: 5.49 10*6/MM3 (ref 4.14–5.8)
SODIUM SERPL-SCNC: 142 MMOL/L (ref 136–145)
WBC NRBC COR # BLD AUTO: 11.02 10*3/MM3 (ref 3.4–10.8)

## 2024-03-10 PROCEDURE — 82948 REAGENT STRIP/BLOOD GLUCOSE: CPT

## 2024-03-10 PROCEDURE — 99232 SBSQ HOSP IP/OBS MODERATE 35: CPT | Performed by: NURSE PRACTITIONER

## 2024-03-10 PROCEDURE — 97530 THERAPEUTIC ACTIVITIES: CPT | Performed by: PHYSICAL THERAPIST

## 2024-03-10 PROCEDURE — 63710000001 INSULIN GLARGINE PER 5 UNITS: Performed by: HOSPITALIST

## 2024-03-10 PROCEDURE — 63710000001 INSULIN REGULAR HUMAN PER 5 UNITS: Performed by: INTERNAL MEDICINE

## 2024-03-10 PROCEDURE — 83735 ASSAY OF MAGNESIUM: CPT | Performed by: HOSPITALIST

## 2024-03-10 PROCEDURE — 80069 RENAL FUNCTION PANEL: CPT | Performed by: INTERNAL MEDICINE

## 2024-03-10 PROCEDURE — 85027 COMPLETE CBC AUTOMATED: CPT | Performed by: INTERNAL MEDICINE

## 2024-03-10 RX ORDER — SPIRONOLACTONE 25 MG/1
25 TABLET ORAL EVERY MORNING
Status: DISCONTINUED | OUTPATIENT
Start: 2024-03-11 | End: 2024-03-13

## 2024-03-10 RX ORDER — VALSARTAN 320 MG/1
320 TABLET ORAL EVERY EVENING
Status: DISCONTINUED | OUTPATIENT
Start: 2024-03-10 | End: 2024-03-10

## 2024-03-10 RX ORDER — VALSARTAN 320 MG/1
320 TABLET ORAL EVERY EVENING
Status: DISCONTINUED | OUTPATIENT
Start: 2024-03-11 | End: 2024-03-15 | Stop reason: HOSPADM

## 2024-03-10 RX ORDER — OLANZAPINE 5 MG/1
5 TABLET, ORALLY DISINTEGRATING ORAL NIGHTLY
Status: DISCONTINUED | OUTPATIENT
Start: 2024-03-10 | End: 2024-03-15 | Stop reason: HOSPADM

## 2024-03-10 RX ADMIN — INSULIN HUMAN 4 UNITS: 100 INJECTION, SOLUTION PARENTERAL at 11:58

## 2024-03-10 RX ADMIN — OLANZAPINE 5 MG: 5 TABLET, ORALLY DISINTEGRATING ORAL at 03:44

## 2024-03-10 RX ADMIN — BISACODYL 5 MG: 5 TABLET, COATED ORAL at 20:43

## 2024-03-10 RX ADMIN — INSULIN GLARGINE 25 UNITS: 100 INJECTION, SOLUTION SUBCUTANEOUS at 20:36

## 2024-03-10 RX ADMIN — Medication 5 MG: at 20:37

## 2024-03-10 RX ADMIN — CARVEDILOL 6.25 MG: 6.25 TABLET, FILM COATED ORAL at 17:51

## 2024-03-10 RX ADMIN — CLONIDINE HYDROCHLORIDE 0.2 MG: 0.1 TABLET ORAL at 15:11

## 2024-03-10 RX ADMIN — CLONIDINE HYDROCHLORIDE 0.2 MG: 0.1 TABLET ORAL at 05:56

## 2024-03-10 RX ADMIN — Medication 10 ML: at 20:38

## 2024-03-10 RX ADMIN — CARVEDILOL 6.25 MG: 6.25 TABLET, FILM COATED ORAL at 08:57

## 2024-03-10 RX ADMIN — SENNOSIDES AND DOCUSATE SODIUM 2 TABLET: 50; 8.6 TABLET ORAL at 08:57

## 2024-03-10 RX ADMIN — CLONIDINE HYDROCHLORIDE 0.2 MG: 0.1 TABLET ORAL at 22:02

## 2024-03-10 RX ADMIN — INSULIN HUMAN 6 UNITS: 100 INJECTION, SOLUTION PARENTERAL at 17:51

## 2024-03-10 RX ADMIN — VALSARTAN 320 MG: 320 TABLET, FILM COATED ORAL at 17:54

## 2024-03-10 RX ADMIN — LEVETIRACETAM 500 MG: 500 TABLET, FILM COATED ORAL at 08:57

## 2024-03-10 RX ADMIN — AMLODIPINE BESYLATE 10 MG: 10 TABLET ORAL at 08:57

## 2024-03-10 RX ADMIN — MEMANTINE HYDROCHLORIDE 10 MG: 10 TABLET, FILM COATED ORAL at 08:57

## 2024-03-10 RX ADMIN — MEMANTINE HYDROCHLORIDE 10 MG: 10 TABLET, FILM COATED ORAL at 20:37

## 2024-03-10 RX ADMIN — POLYETHYLENE GLYCOL 3350 17 G: 17 POWDER, FOR SOLUTION ORAL at 08:57

## 2024-03-10 RX ADMIN — SENNOSIDES AND DOCUSATE SODIUM 2 TABLET: 50; 8.6 TABLET ORAL at 20:37

## 2024-03-10 RX ADMIN — LEVETIRACETAM 500 MG: 500 TABLET, FILM COATED ORAL at 20:36

## 2024-03-10 RX ADMIN — SPIRONOLACTONE 25 MG: 25 TABLET ORAL at 08:57

## 2024-03-10 RX ADMIN — OLANZAPINE 5 MG: 5 TABLET, ORALLY DISINTEGRATING ORAL at 20:38

## 2024-03-10 RX ADMIN — Medication 10 ML: at 08:57

## 2024-03-10 NOTE — PROGRESS NOTES
"[facility]    Patient Name: Erlin Blanco  :1945  78 y.o.  LOS: 15  Encounter Provider: MUKUL Meadows      Patient Care Team:  Zamzam John APRN as PCP - General (Family Medicine)    Chief Complaint: Follow-up hypertension, bradycardia    Interval History: Patient resting in bed.  Wife reports that he did not sleep well last night.  Blood pressure is more controlled this morning but has been noted to be elevated overnight and in the early mornings.       Objective   Vital Signs  Temp:  [97.5 °F (36.4 °C)-98.2 °F (36.8 °C)] 98.2 °F (36.8 °C)  Heart Rate:  [49-71] 49  Resp:  [18-20] 20  BP: (133-180)/(61-99) 135/61    Intake/Output Summary (Last 24 hours) at 3/10/2024 1143  Last data filed at 3/10/2024 0900  Gross per 24 hour   Intake 360 ml   Output --   Net 360 ml     Flowsheet Rows      Flowsheet Row First Filed Value   Admission Height 182.9 cm (72\") Documented at 2024 1300   Admission Weight 116 kg (255 lb 1.2 oz) Documented at 2024 2244              Constitutional:       Appearance: Normal appearance. Well-developed.   Eyes:      Conjunctiva/sclera: Conjunctivae normal.   Neck:      Vascular: No carotid bruit.   Pulmonary:      Effort: Pulmonary effort is normal.      Breath sounds: Normal breath sounds.   Cardiovascular:      Normal rate. Regular rhythm. Normal S1. Normal S2.       Murmurs: There is no murmur.      No gallop.  No click. No rub.   Edema:     Peripheral edema absent.   Musculoskeletal: Normal range of motion. Skin:     General: Skin is warm and dry.   Neurological:      Mental Status: Alert and oriented to person, place, and time.      GCS: GCS eye subscore is 4. GCS verbal subscore is 5. GCS motor subscore is 6.   Psychiatric:         Speech: Speech normal.         Behavior: Behavior normal.         Thought Content: Thought content normal.         Judgment: Judgment normal.           Pertinent Test Results:  Results from last 7 days   Lab " "Units 03/10/24  0418 03/09/24  0426 03/08/24  0536 03/07/24  0801 03/07/24  0045 03/06/24  0654 03/05/24  0436 03/04/24  0957   SODIUM mmol/L 142 144 143 144  --  142 141 143   POTASSIUM mmol/L 3.7 4.2 3.9 4.7 4.1 3.5 3.8 3.8   CHLORIDE mmol/L 107 107 109* 106  --  105 107 107   CO2 mmol/L 28.0 26.0 22.8 28.0  --  24.5 26.0 25.9   BUN mg/dL 19 26* 20 18  --  16 18 23   CREATININE mg/dL 0.89 1.11 0.86 1.12  --  0.94 1.06 0.94   GLUCOSE mg/dL 111* 219* 165* 194*  --  199* 175* 207*   CALCIUM mg/dL 9.9 9.4 9.4 9.9  --  8.9 9.3 8.8   AST (SGOT) U/L  --   --   --  36  --   --   --   --    ALT (SGPT) U/L  --   --   --  36  --   --   --   --          Results from last 7 days   Lab Units 03/10/24  0418 03/09/24  0426 03/08/24  0536 03/07/24  0534 03/06/24  0654 03/05/24  0436 03/04/24  0957   WBC 10*3/mm3 11.02* 11.29* 9.65 8.43 9.09 8.59 8.09   HEMOGLOBIN g/dL 15.7 15.0 15.2 15.2 14.2 14.5 13.9   HEMATOCRIT % 48.2 46.1 46.8 46.5 44.3 44.9 43.4   PLATELETS 10*3/mm3 443 417 405 397 392 414 396         Results from last 7 days   Lab Units 03/10/24  0418 03/09/24  0426 03/08/24  0536 03/07/24  0801   MAGNESIUM mg/dL 2.3 2.6* 2.4 1.9           Invalid input(s): \"LDLCALC\"                Medication Review:   amLODIPine, 10 mg, Nasogastric, Q24H  carvedilol, 6.25 mg, Oral, BID With Meals  cloNIDine, 0.2 mg, Oral, Q8H  insulin glargine, 25 Units, Subcutaneous, Nightly  insulin regular, 2-9 Units, Subcutaneous, TID AC  levETIRAcetam, 500 mg, Nasogastric, BID  melatonin, 5 mg, Oral, Nightly  memantine, 10 mg, Nasogastric, Q12H  OLANZapine zydis, 5 mg, Oral, Nightly  senna-docusate sodium, 2 tablet, Oral, BID  sodium chloride, 10 mL, Intravenous, Q12H  spironolactone, 25 mg, Oral, Daily  valsartan, 320 mg, Nasogastric, Daily              Assessment & Plan     Active Hospital Problems    Diagnosis  POA    **Intraparenchymal hemorrhage of brain [I61.9]  Yes    Right hemiparesis [G81.91]  Yes    Oropharyngeal dysphagia [R13.12]  Yes    " Paroxysmal atrial fibrillation [I48.0]  Yes    Type 2 diabetes mellitus [E11.9]  Yes    HTN (hypertension) [I10]  Yes    Seizures [R56.9]  Yes      Resolved Hospital Problems   No resolved problems to display.        Hemorrhagic stroke secondary to hypertension  Hypertensive urgency  Goal BP less than 160.  Patient's blood pressure is controlled this morning however it does elevate throughout the night.  I am going to adjust patient's regimen so that he will receive valsartan in the evenings instead of the mornings.  Regimen will be as follows:  Amlodipine every a.m.  Spironolactone every a.m.  Carvedilol twice daily  Clonidine 3 times daily  Valsartan every evening  PAF  Asymptomatic bradycardia  Now on home dose of carvedilol  Apixaban on hold secondary to #1  Type 2 diabetes  Polycythemia vera  CAD  Chronic diastolic heart failure           MUKUL Meadows  [facility]   [facility]  [address] [place] [address]  Office: [phone]    03/10/24  11:43 EDT

## 2024-03-10 NOTE — PLAN OF CARE
Goal Outcome Evaluation:      Pt restless most of shift. Attempted to hold off on giving prn zyprexa. Due to continued agitation, pulling telemetry monitor off repeatedly, throwing pillows, recurrently removing gown and blankets, family at bedside decided they wanted him to have it. Administered per order. Resting comfortably at this time. BP stable. Ate or drank anytime pt was offered a snack/beverage. Sounded wheezy this AM, on call provider notified stating to keep monitoring for now.

## 2024-03-10 NOTE — THERAPY TREATMENT NOTE
Patient Name: Erlin Blanco  : 1945    MRN: [id number]                              Today's Date: 3/10/2024       Admit Date: 2024    Visit Dx:     ICD-10-CM ICD-9-CM   1. Intraparenchymal hemorrhage of brain  I61.9 431   2. Facial droop  R29.810 781.94   3. Expressive aphasia  R47.01 784.3   4. Weakness of right upper extremity  R29.898 729.89   5. Chronic anticoagulation  Z79.01 V58.61     Patient Active Problem List   Diagnosis    Intraparenchymal hemorrhage of brain    Seizures    Type 2 diabetes mellitus    HTN (hypertension)    Right hemiparesis    Oropharyngeal dysphagia    Paroxysmal atrial fibrillation     History reviewed. No pertinent past medical history.  History reviewed. No pertinent surgical history.   General Information       Row Name 03/10/24 1229          Physical Therapy Time and Intention    Document Type therapy note (daily note)  -     Mode of Treatment individual therapy;physical therapy  -       Row Name 03/10/24 1229          General Information    Patient Profile Reviewed yes  -     Existing Precautions/Restrictions fall  Right-sided neglect  -     Barriers to Rehab medically complex  -       Row Name 03/10/24 1229          Cognition    Orientation Status (Cognition) oriented to;person;place  -       Row Name 03/10/24 1229          Safety Issues, Functional Mobility    Impairments Affecting Function (Mobility) balance;coordination;cognition;endurance/activity tolerance;motor control;motor planning;postural/trunk control;strength  -               User Key  (r) = Recorded By, (t) = Taken By, (c) = Cosigned By      Initials Name Provider Type     Akhil Cam, PT Physical Therapist                   Mobility       Row Name 03/10/24 1230          Bed Mobility    Supine-Sit Faith (Bed Mobility) set up;verbal cues;nonverbal cues (demo/gesture);moderate assist (50% patient effort);2 person assist  -     Sit-Supine Faith (Bed Mobility) set up;verbal  cues;nonverbal cues (demo/gesture);moderate assist (50% patient effort);maximum assist (25% patient effort);2 person assist  -     Assistive Device (Bed Mobility) bed rails;draw sheet;head of bed elevated  -       Row Name 03/10/24 1230          Sit-Stand Transfer    Sit-Stand [place] (Transfers) not tested  Not appropriate  -[facility] Name 03/10/24 1230          Gait/Stairs (Locomotion)    [place] Level (Gait) not tested  Not appropriate  -               User Key  (r) = Recorded By, (t) = Taken By, (c) = Cosigned By      Initials Name Provider Type     Akhil Cam, PT Physical Therapist                   Obj/Interventions       Row Name 03/10/24 1231          Range of Motion Comprehensive    Comment, General Range of Motion Right upper and right lower extremity active range of motion negligible.  Passive range of motion right shoulder right lower extremity within functional limits  -       Row Name 03/10/24 1231          Motor Skills    Therapeutic Exercise knee;ankle;hip;shoulder  -[facility] Name 03/10/24 1231          Shoulder (Therapeutic Exercise)    Shoulder (Therapeutic Exercise) --  Passive range of motion to the right shoulder and elbow x 10, patient perform active range of motion left shoulder flexion  -       Row Name 03/10/24 1231          Hip (Therapeutic Exercise)    Hip (Therapeutic Exercise) PROM (passive range of motion)  -     Hip AROM (Therapeutic Exercise) knee to chest stretch;10 repetitions;supine;left  -     Hip PROM (Therapeutic Exercise) right;10 repetitions;flexion  -       Row Name 03/10/24 1231          Knee (Therapeutic Exercise)    Knee (Therapeutic Exercise) AROM (active range of motion);AAROM (active assistive range of motion)  -     Knee AROM (Therapeutic Exercise) left;LAQ (long arc quad);sitting;10 repetitions  -     Knee AAROM (Therapeutic Exercise) right;flexion;extension;10 repetitions;sitting  -       Row Name 03/10/24 1231           Ankle (Therapeutic Exercise)    Ankle (Therapeutic Exercise) AROM (active range of motion)  -     Ankle AROM (Therapeutic Exercise) bilateral;dorsiflexion;plantarflexion;10 repetitions;sitting  -       Row Name 03/10/24 1231          Balance    Balance Assessment sitting static balance  -GJ     Static Sitting Balance set-up;verbal cues;non-verbal cues (demo/gesture);moderate assist;maximum assist  Sat edge of bed approximately 10 minutes performing lower extremity exercises, reaching across midline with left weighings to the right upper extremity (assist to block Relbow/hand  -GJ               User Key  (r) = Recorded By, (t) = Taken By, (c) = Cosigned By      Initials Name Provider Type    Akhil Tripp, PT Physical Therapist                   Goals/Plan    No documentation.                  Clinical Impression       Row Name 03/10/24 1236          Plan of Care Review    Plan of Care Reviewed With patient;family  -     Progress improving  -       Row Name 03/10/24 1236          Positioning and Restraints    Pre-Treatment Position in bed  -GJ     Post Treatment Position bed  -GJ     In Bed notified nsg;fowlers;call light within reach;encouraged to call for assist;exit alarm on;with family/caregiver  -GJ               User Key  (r) = Recorded By, (t) = Taken By, (c) = Cosigned By      Initials Name Provider Type    Akhil Tripp, PT Physical Therapist                   Outcome Measures       Row Name 03/10/24 1236 03/10/24 0857       How much help from another person do you currently need...    Turning from your back to your side while in flat bed without using bedrails? 2  -GJ 2  -CE    Moving from lying on back to sitting on the side of a flat bed without bedrails? 2  -GJ 1  -CE    Moving to and from a bed to a chair (including a wheelchair)? 1  -GJ 1  -CE    Standing up from a chair using your arms (e.g., wheelchair, bedside chair)? 2  -GJ 1  -CE    Climbing 3-5 steps with a railing? 1  -GJ 1   -CE    To walk in hospital room? 1  -GJ 1  -CE    AM-PAC 6 Clicks Score (PT) 9  -GJ 7  -CE    Highest Level of Mobility Goal 3 --> Sit at edge of bed  -GJ 2 --> Bed activities/dependent transfer  -CE      Row Name 03/10/24 1236          Functional Assessment    Outcome Measure Options AM-PAC 6 Clicks Basic Mobility (PT)  -GJ               User Key  (r) = Recorded By, (t) = Taken By, (c) = Cosigned By      Initials Name Provider Type    Akhil Tripp, PT Physical Therapist    Becca Castano RN Registered Nurse                                 Physical Therapy Education       Title: PT OT SLP Therapies (In Progress)       Topic: Physical Therapy (In Progress)       Point: Mobility training (In Progress)       Learning Progress Summary             Patient Acceptance, E, NR by EM at 3/8/2024 1057    Acceptance, E, VU by EM at 3/7/2024 1035    Acceptance, E, VU,NR by EM at 3/6/2024 1116    Acceptance, E,TB,D, VU,NR by  at 3/5/2024 1444    Acceptance, E,D, DU by PC at 3/4/2024 1100    Acceptance, E,TB, NR by CB at 3/2/2024 1520    Acceptance, E,TB, NR,NL by MS at 2/27/2024 1320   Family Acceptance, E, VU,NR by EM at 3/6/2024 1116    Acceptance, E,TB, NR by CB at 3/2/2024 1520                         Point: Home exercise program (In Progress)       Learning Progress Summary             Patient Acceptance, E,TB,D, VU,NR by  at 3/5/2024 1444    Acceptance, E,D, DU by PC at 3/4/2024 1100    Acceptance, E,TB, NR by CB at 3/2/2024 1520   Family Acceptance, E,TB, NR by CB at 3/2/2024 1520                         Point: Body mechanics (In Progress)       Learning Progress Summary             Patient Acceptance, E,TB,D, VU,NR by  at 3/5/2024 1444    Acceptance, E,D, DU by PC at 3/4/2024 1100    Acceptance, E,TB, NR by CB at 3/2/2024 1520    Acceptance, E,TB, VU,NR by  at 2/29/2024 0920    Acceptance, E,TB, NR,NL by MS at 2/27/2024 1320   Family Acceptance, E,TB, NR by CB at 3/2/2024 1520    Acceptance, E,TB,  VU,NR by  at 2/29/2024 0920                         Point: Precautions (In Progress)       Learning Progress Summary             Patient Acceptance, E,TB,D, VU,NR by  at 3/5/2024 1444    Acceptance, E,D, DU by PC at 3/4/2024 1100    Acceptance, E,TB, NR by CB at 3/2/2024 1520    Acceptance, E,TB, VU,NR by  at 2/29/2024 0920    Acceptance, E,TB, NR,NL by MS at 2/27/2024 1320   Family Acceptance, E,TB, NR by CB at 3/2/2024 1520    Acceptance, E,TB, VU,NR by  at 2/29/2024 0920                                         User Key       Initials Effective Dates Name Provider Type Discipline     06/16/21 -  Татьяна Billingsley, PT Physical Therapist PT    PC 06/16/21 -  Audrey Ramirez, PT Physical Therapist PT    EM 06/16/21 -  Erica Pennington, PT Physical Therapist PT    MS 06/16/21 -  Lyla Edwards, PT Physical Therapist PT    CB 10/22/21 -  Mag Monson, PT Physical Therapist PT     01/24/24 -  Rick Maynard, PT Student PT Student PT                  PT Recommendation and Plan     Plan of Care Reviewed With: patient, family  Progress: improving     Time Calculation:         PT Charges       Row Name 03/10/24 1238             Time Calculation    Start Time 0904  -GJ      Stop Time 0925  -GJ      Time Calculation (min) 21 min  -GJ      PT Received On 03/10/24  -GJ      PT - Next Appointment 03/11/24  -GJ         Timed Charges    20085 - PT Therapeutic Activity Minutes 20  -GJ         Total Minutes    Timed Charges Total Minutes 20  -GJ       Total Minutes 20  -GJ                User Key  (r) = Recorded By, (t) = Taken By, (c) = Cosigned By      Initials Name Provider Type    GJ Akhil Cam, PT Physical Therapist                  Therapy Charges for Today       Code Description Service Date Service Provider Modifiers Qty    35128654832 HC PT THERAPEUTIC ACT EA 15 MIN 3/10/2024 Akhil Cam, PT GP 1            PT G-Codes  Outcome Measure Options: AM-PAC 6 Clicks Basic Mobility (PT)  AM-PAC 6  Clicks Score (PT): 9  AM-PAC 6 Clicks Score (OT): 9  Modified Bristow Scale: 5 - Severe disability.  Bedridden, incontinent, and requiring constant nursing care and attention.  PT Discharge Summary  Anticipated Discharge Disposition (PT): skilled nursing facility, inpatient rehabilitation facility    Akhil Cam, PT  3/10/2024

## 2024-03-10 NOTE — PLAN OF CARE
Problem: Adjustment to Illness (Stroke, Hemorrhagic)  Goal: Optimal Coping  3/10/2024 1718 by Becca Krishnamurthy RN  Outcome: Ongoing, Progressing  3/10/2024 1718 by Becca Krishnamurthy RN  Outcome: Ongoing, Progressing     Problem: Bowel Elimination Impaired (Stroke, Hemorrhagic)  Goal: Effective Bowel Elimination  3/10/2024 1718 by Becca Krishnamurthy RN  Outcome: Ongoing, Progressing  3/10/2024 1718 by Becca Krishnamurthy RN  Outcome: Ongoing, Progressing     Problem: Cerebral Tissue Perfusion (Stroke, Hemorrhagic)  Goal: Optimal Cerebral Tissue Perfusion  3/10/2024 1718 by Becca Krishnamurthy RN  Outcome: Ongoing, Progressing  3/10/2024 1718 by Becca Krishnamurthy RN  Outcome: Ongoing, Progressing     Problem: Cognitive Impairment (Stroke, Hemorrhagic)  Goal: Optimal Cognitive Function  3/10/2024 1718 by Becca Krishnamurthy RN  Outcome: Ongoing, Progressing  3/10/2024 1718 by Becca Krishnamurthy RN  Outcome: Ongoing, Progressing     Problem: Communication Impairment (Stroke, Hemorrhagic)  Goal: Effective Communication Skills  3/10/2024 1718 by Becca Krishnamurthy RN  Outcome: Ongoing, Progressing  3/10/2024 1718 by Becca Krishnamurthy RN  Outcome: Ongoing, Progressing     Problem: Functional Ability Impaired (Stroke, Hemorrhagic)  Goal: Optimal Functional Ability  3/10/2024 1718 by Becca Krishnamurthy RN  Outcome: Ongoing, Progressing  3/10/2024 1718 by Becca Krishnamurthy RN  Outcome: Ongoing, Progressing  Intervention: Optimize Functional Ability  Recent Flowsheet Documentation  Taken 3/10/2024 0857 by Becca Krishnamurthy RN  Activity Management: activity encouraged     Problem: Pain (Stroke, Hemorrhagic)  Goal: Acceptable Pain Control  3/10/2024 1718 by Becca Krishnamurthy RN  Outcome: Ongoing, Progressing  3/10/2024 1718 by Becca Krishnamurthy RN  Outcome: Ongoing, Progressing     Problem: Sensorimotor Impairment (Stroke, Hemorrhagic)  Goal: Improved Sensorimotor Function  3/10/2024 1718 by Yessy  Becca CORNELL RN  Outcome: Ongoing, Progressing  3/10/2024 1718 by Becca Krishnamurthy RN  Outcome: Ongoing, Progressing     Problem: Swallowing Impairment (Stroke, Hemorrhagic)  Goal: Optimal Eating and Swallowing Without Aspiration  3/10/2024 1718 by Becca Krishnamurthy RN  Outcome: Ongoing, Progressing  3/10/2024 1718 by Becca Krishnamurthy RN  Outcome: Ongoing, Progressing     Problem: Urinary Elimination Impaired (Stroke, Hemorrhagic)  Goal: Effective Urinary Elimination  3/10/2024 1718 by Becca Krishnamurthy RN  Outcome: Ongoing, Progressing  3/10/2024 1718 by Becca Krishnamurthy RN  Outcome: Ongoing, Progressing     Problem: Adult Inpatient Plan of Care  Goal: Plan of Care Review  3/10/2024 1718 by Becca Krishnamurthy RN  Outcome: Ongoing, Progressing  Flowsheets (Taken 3/10/2024 1718)  Progress: no change  Plan of Care Reviewed With: patient  3/10/2024 1718 by Becca Krishnamurthy RN  Outcome: Ongoing, Progressing  Flowsheets (Taken 3/10/2024 1718)  Progress: no change  Plan of Care Reviewed With: patient  Goal: Patient-Specific Goal (Individualized)  3/10/2024 1718 by Becca Krishnamurthy RN  Outcome: Ongoing, Progressing  3/10/2024 1718 by Becca Krishnamurthy RN  Outcome: Ongoing, Progressing  Goal: Absence of Hospital-Acquired Illness or Injury  3/10/2024 1718 by Becca Krishnamurthy RN  Outcome: Ongoing, Progressing  3/10/2024 1718 by Becca Krishnamurthy RN  Outcome: Ongoing, Progressing  Intervention: Identify and Manage Fall Risk  Recent Flowsheet Documentation  Taken 3/10/2024 1600 by Becca Krishnamurthy RN  Safety Promotion/Fall Prevention:   activity supervised   assistive device/personal items within reach   clutter free environment maintained   fall prevention program maintained   nonskid shoes/slippers when out of bed   room organization consistent   safety round/check completed  Taken 3/10/2024 1400 by Becca Krishnamurthy RN  Safety Promotion/Fall Prevention:   activity supervised   assistive  device/personal items within reach   clutter free environment maintained   fall prevention program maintained   nonskid shoes/slippers when out of bed   room organization consistent   safety round/check completed  Taken 3/10/2024 1200 by Becca Krishnamurthy RN  Safety Promotion/Fall Prevention:   activity supervised   assistive device/personal items within reach   clutter free environment maintained   fall prevention program maintained   nonskid shoes/slippers when out of bed   room organization consistent   safety round/check completed  Taken 3/10/2024 1000 by Becca Krishnamurthy RN  Safety Promotion/Fall Prevention:   activity supervised   assistive device/personal items within reach   clutter free environment maintained   fall prevention program maintained   nonskid shoes/slippers when out of bed   room organization consistent   safety round/check completed  Taken 3/10/2024 0857 by Becca Krishnamurthy RN  Safety Promotion/Fall Prevention:   activity supervised   assistive device/personal items within reach   clutter free environment maintained   fall prevention program maintained   nonskid shoes/slippers when out of bed   room organization consistent   safety round/check completed  Taken 3/10/2024 0715 by Becca Krishnamurthy RN  Safety Promotion/Fall Prevention:   activity supervised   assistive device/personal items within reach   clutter free environment maintained   fall prevention program maintained   nonskid shoes/slippers when out of bed   room organization consistent   safety round/check completed  Intervention: Prevent Skin Injury  Recent Flowsheet Documentation  Taken 3/10/2024 1600 by Becca Krishnamurthy RN  Body Position: supine, legs elevated  Taken 3/10/2024 1400 by Becca Krishnamurthy RN  Body Position:   tilted   right  Taken 3/10/2024 1200 by Becca Krishnamurthy RN  Body Position:   tilted   left  Taken 3/10/2024 1000 by Becca Krishnamurthy RN  Body Position: supine, legs elevated  Taken 3/10/2024  0857 by Becca Krishnamurthy RN  Body Position: (pt at bedside to work with patient) supine  Taken 3/10/2024 0715 by Becca Krishnamurthy RN  Body Position: supine  Intervention: Prevent and Manage VTE (Venous Thromboembolism) Risk  Recent Flowsheet Documentation  Taken 3/10/2024 1000 by Becca Krishnamurthy RN  VTE Prevention/Management:   bilateral   sequential compression devices on  Taken 3/10/2024 0857 by Becca Krishnamurthy RN  Activity Management: activity encouraged  Goal: Optimal Comfort and Wellbeing  3/10/2024 1718 by Becca Krishnamurthy RN  Outcome: Ongoing, Progressing  3/10/2024 1718 by Becca Krishnamurthy RN  Outcome: Ongoing, Progressing  Goal: Readiness for Transition of Care  3/10/2024 1718 by Becca Krishnamurthy RN  Outcome: Ongoing, Progressing  3/10/2024 1718 by Becca Krishnamurthy RN  Outcome: Ongoing, Progressing     Problem: Fall Injury Risk  Goal: Absence of Fall and Fall-Related Injury  3/10/2024 1718 by Becca Krishnamurthy RN  Outcome: Ongoing, Progressing  3/10/2024 1718 by Becca Krishnamurthy RN  Outcome: Ongoing, Progressing  Intervention: Promote Injury-Free Environment  Recent Flowsheet Documentation  Taken 3/10/2024 1600 by Becca Krishnamurthy RN  Safety Promotion/Fall Prevention:   activity supervised   assistive device/personal items within reach   clutter free environment maintained   fall prevention program maintained   nonskid shoes/slippers when out of bed   room organization consistent   safety round/check completed  Taken 3/10/2024 1400 by Becca Krishnamurthy RN  Safety Promotion/Fall Prevention:   activity supervised   assistive device/personal items within reach   clutter free environment maintained   fall prevention program maintained   nonskid shoes/slippers when out of bed   room organization consistent   safety round/check completed  Taken 3/10/2024 1200 by Becca Krishnamurthy RN  Safety Promotion/Fall Prevention:   activity supervised   assistive device/personal items  within reach   clutter free environment maintained   fall prevention program maintained   nonskid shoes/slippers when out of bed   room organization consistent   safety round/check completed  Taken 3/10/2024 1000 by Becca Krishnamurthy RN  Safety Promotion/Fall Prevention:   activity supervised   assistive device/personal items within reach   clutter free environment maintained   fall prevention program maintained   nonskid shoes/slippers when out of bed   room organization consistent   safety round/check completed  Taken 3/10/2024 0857 by Becca Krishnamurthy RN  Safety Promotion/Fall Prevention:   activity supervised   assistive device/personal items within reach   clutter free environment maintained   fall prevention program maintained   nonskid shoes/slippers when out of bed   room organization consistent   safety round/check completed  Taken 3/10/2024 0715 by Becca Krishnamurthy RN  Safety Promotion/Fall Prevention:   activity supervised   assistive device/personal items within reach   clutter free environment maintained   fall prevention program maintained   nonskid shoes/slippers when out of bed   room organization consistent   safety round/check completed     Problem: Hypertension Acute  Goal: Blood Pressure Within Desired Range  3/10/2024 1718 by Becca Krishnamurthy RN  Outcome: Ongoing, Progressing  3/10/2024 1718 by Becca Krishnamurthy RN  Outcome: Ongoing, Progressing     Problem: Hypertension Comorbidity  Goal: Blood Pressure in Desired Range  3/10/2024 1718 by Becca Krishnamurthy RN  Outcome: Ongoing, Progressing  3/10/2024 1718 by Becca Krishnamurthy RN  Outcome: Ongoing, Progressing     Problem: Skin Injury Risk Increased  Goal: Skin Health and Integrity  3/10/2024 1718 by Becca Krishnamurthy RN  Outcome: Ongoing, Progressing  3/10/2024 1718 by Becca Krishnamurthy RN  Outcome: Ongoing, Progressing  Intervention: Optimize Skin Protection  Recent Flowsheet Documentation  Taken 3/10/2024 1600 by  Becca Krishnamurthy RN  Head of Bed (HOB) Positioning: HOB at 30-45 degrees  Taken 3/10/2024 1400 by Becca Krishnamurthy RN  Head of Bed (HOB) Positioning: HOB at 30-45 degrees  Taken 3/10/2024 1200 by Becca Krishnamurthy RN  Head of Bed (HOB) Positioning: HOB at 30 degrees  Taken 3/10/2024 1000 by Becca Krishnamurthy RN  Head of Bed (HOB) Positioning: HOB at 30-45 degrees  Taken 3/10/2024 0857 by Becca Krishnamurthy RN  Head of Bed (HOB) Positioning: HOB at 30-45 degrees  Taken 3/10/2024 0715 by Becca Krishnamurthy RN  Head of Bed (HOB) Positioning: HOB at 30-45 degrees     Problem: Restraint, Nonviolent  Goal: Absence of Harm or Injury  3/10/2024 1718 by Becca Krishnamurthy RN  Outcome: Ongoing, Progressing  3/10/2024 1718 by Becca Krishnamurthy RN  Outcome: Ongoing, Progressing  Intervention: Protect Skin and Joint Integrity  Recent Flowsheet Documentation  Taken 3/10/2024 1600 by Becca Krishnamurthy RN  Body Position: supine, legs elevated  Taken 3/10/2024 1400 by Becca Krishnamurthy RN  Body Position:   tilted   right  Taken 3/10/2024 1200 by Becca Krishnamurthy RN  Body Position:   tilted   left  Taken 3/10/2024 1000 by Becca Krishnamurthy RN  Body Position: supine, legs elevated  Taken 3/10/2024 0857 by Becca Krishnamurthy RN  Body Position: (pt at bedside to work with patient) supine  Taken 3/10/2024 0715 by Becca Krishnamurthy RN  Body Position: supine   Goal Outcome Evaluation:  Plan of Care Reviewed With: patient        Progress: no change

## 2024-03-10 NOTE — PLAN OF CARE
Goal Outcome Evaluation:  Plan of Care Reviewed With: patient, family        Mr. Blanco is in bed and his son present with therapy entered the room.  He is agreeable to physical therapy today.  He appears to be more alert today answering simple questions.  He is orientated to himself and to the place.  Requires mod to max assist x 2 for supine to sit transfer.  He is able to sit edge of bed x 10 minutes with mod to max assist of 1.  Right upper extremity and right lower extremity continue demonstrate minimal active range of motion.  We worked on trunk stabilization activities while sitting edge of bed as well as reaching across midline with his left upper extremity.  He demonstrates poor accuracy with reaching.  Anticipated discharge location SNF versus acute rehab. Mr. Blanco continues to be a good candidate for skilled physical therapy at this time  Progress: improving         Anticipated Discharge Disposition (PT): skilled nursing facility, inpatient rehabilitation facility

## 2024-03-10 NOTE — PROGRESS NOTES
Name: Erlin Blanco ADMIT: 2024   : 1945  PCP: Zamzam John, APRN    MRN: [id number] LOS: 15 days   AGE/SEX: 78 y.o. male  ROOM: [facility]     Subjective   Subjective   Pt can say yes/no in answer to questions. Tolerating diet. Working with PT. Voiding well. No SOA or CP. LOC still waxing/waning per family and staff. Agitated all night. Family finally consented to Zyprexa at 0230 and he didn't receive med until 0345. Now he is more calm but I worry he is going to sleep all day.       Objective   Objective   Vital Signs  Temp:  [97.5 °F (36.4 °C)-98.1 °F (36.7 °C)] 97.9 °F (36.6 °C)  Heart Rate:  [53-71] 57  Resp:  [18] 18  BP: (133-171)/(68-95) 171/83  SpO2:  [93 %-95 %] 93 %  on   ;   Device (Oxygen Therapy): room air  Body mass index is 34.68 kg/m².    (No change in exam today)    Physical Exam  Vitals and nursing note reviewed. Exam conducted with a chaperone present (Son).   Constitutional:       General: He is not in acute distress.     Appearance: He is ill-appearing. He is not toxic-appearing or diaphoretic.   HENT:      Head: Normocephalic.      Nose: Nose normal.      Mouth/Throat:      Mouth: Mucous membranes are moist.      Pharynx: Oropharynx is clear.   Eyes:      General: No scleral icterus.        Right eye: No discharge.         Left eye: No discharge.      Conjunctiva/sclera: Conjunctivae normal.   Cardiovascular:      Rate and Rhythm: Normal rate and regular rhythm.      Pulses: Normal pulses.   Pulmonary:      Effort: Pulmonary effort is normal. No respiratory distress.      Breath sounds: Normal breath sounds. No wheezing or rales.   Abdominal:      General: Bowel sounds are normal. There is no distension.      Palpations: Abdomen is soft.      Tenderness: There is no abdominal tenderness.   Musculoskeletal:         General: Swelling (RUE) present. No deformity.      Cervical back: Neck supple.      Comments: SCDs in place   Skin:     General: Skin is warm and dry.      Capillary  Refill: Capillary refill takes less than 2 seconds.      Coloration: Skin is not jaundiced.   Neurological:      Mental Status: He is alert.      Comments: Right hemiparesis with facial droop  Dysarthria and expressive aphasia   Psychiatric:      Comments: Unable to assess  Flat affect       Results Review     I reviewed the patient's new clinical results.  Results from last 7 days   Lab Units 03/10/24  0418 03/09/24  0426 03/08/24  0536 03/07/24  0534   WBC 10*3/mm3 11.02* 11.29* 9.65 8.43   HEMOGLOBIN g/dL 15.7 15.0 15.2 15.2   PLATELETS 10*3/mm3 443 417 405 397     Results from last 7 days   Lab Units 03/10/24  0418 03/09/24  0426 03/08/24  0536 03/07/24  0801   SODIUM mmol/L 142 144 143 144   POTASSIUM mmol/L 3.7 4.2 3.9 4.7   CHLORIDE mmol/L 107 107 109* 106   CO2 mmol/L 28.0 26.0 22.8 28.0   BUN mg/dL 19 26* 20 18   CREATININE mg/dL 0.89 1.11 0.86 1.12   GLUCOSE mg/dL 111* 219* 165* 194*   EGFR mL/min/1.73 88.8 68.8 89.7 68.1     Results from last 7 days   Lab Units 03/10/24  0418 03/09/24  0426 03/08/24  0536 03/07/24  0801   ALBUMIN g/dL 4.0 3.6 3.4* 3.8   BILIRUBIN mg/dL  --   --   --  0.8   ALK PHOS U/L  --   --   --  99   AST (SGOT) U/L  --   --   --  36   ALT (SGPT) U/L  --   --   --  36     Results from last 7 days   Lab Units 03/10/24  0418 03/09/24  0426 03/08/24  0536 03/07/24  0801 03/07/24  0534   CALCIUM mg/dL 9.9 9.4 9.4 9.9  --    ALBUMIN g/dL 4.0 3.6 3.4* 3.8  --    MAGNESIUM mg/dL 2.3 2.6* 2.4 1.9  --    PHOSPHORUS mg/dL 2.5 3.3 3.2  --  2.8       Glucose   Date/Time Value Ref Range Status   03/10/2024 0743 98 70 - 130 mg/dL Final   03/09/2024 2008 116 70 - 130 mg/dL Final   03/09/2024 1656 165 (H) 70 - 130 mg/dL Final   03/09/2024 1119 194 (H) 70 - 130 mg/dL Final   03/09/2024 0734 215 (H) 70 - 130 mg/dL Final   03/08/2024 1937 235 (H) 70 - 130 mg/dL Final   03/08/2024 1618 248 (H) 70 - 130 mg/dL Final       No radiology results for the last day    I have personally reviewed all  medications:  Scheduled Medications  amLODIPine, 10 mg, Nasogastric, Q24H  carvedilol, 6.25 mg, Oral, BID With Meals  cloNIDine, 0.2 mg, Oral, Q8H  insulin glargine, 25 Units, Subcutaneous, Nightly  insulin regular, 2-9 Units, Subcutaneous, TID AC  levETIRAcetam, 500 mg, Nasogastric, BID  melatonin, 5 mg, Oral, Nightly  memantine, 10 mg, Nasogastric, Q12H  senna-docusate sodium, 2 tablet, Oral, BID  sodium chloride, 10 mL, Intravenous, Q12H  spironolactone, 25 mg, Oral, Daily  valsartan, 320 mg, Nasogastric, Daily    Infusions   Diet  Diet: Diabetic; Consistent Carbohydrate; Feeding Assistance - Nursing; Texture: Pureed (NDD 1); Fluid Consistency: Nectar Thick    I have personally reviewed:  [x]  Laboratory   []  Microbiology   []  Radiology   []  EKG/Telemetry  []  Cardiology/Vascular   []  Pathology    []  Records       Assessment/Plan     Active Hospital Problems    Diagnosis  POA    **Intraparenchymal hemorrhage of brain [I61.9]  Yes    Right hemiparesis [G81.91]  Yes    Oropharyngeal dysphagia [R13.12]  Yes    Paroxysmal atrial fibrillation [I48.0]  Yes    Type 2 diabetes mellitus [E11.9]  Yes    HTN (hypertension) [I10]  Yes    Seizures [R56.9]  Yes      Resolved Hospital Problems   No resolved problems to display.       78-year-old gentleman with hypertension, coronary artery disease, chronic diastolic heart failure, type 2 diabetes, and atrial fibrillation on Eliquis, who presented to the hospital after experiencing a right-sided facial droop and right arm weakness with speech disturbance. CT angiogram showed an acute intraparenchymal hemorrhage and Neurosurgery was consulted. Blood pressure management was recommended to keep systolic blood pressure less than 160. Nicardipine was initially started and he received Keppra for seizure prophylaxis and Kcentra for Eliquis reversal. He was admitted to ICU. We were asked to assume care when pt came out of ICU.    Left Thalamic Intracranial Hemorrhage with  Intraventricular Extension  - due to uncontrolled hypertension in the setting of anticoagulation and polycythemia vera  - Eliquis on hold until further notice  - has right hemiparesis and dysarthria as well as oropharyngeal dysphagia  - continue PT/OT/SLP, plan rehab (subacute) at [place]  - tolerating modified diet per SLP recs, dc'd Dobhoff 3/6  - continue BP control with goal SBP<160mmHg, currently on amlodipine, valsartan, clonidine, Aldactone, and Coreg (see below)  - continue Keppra for seizure prophylaxis  - PRN IV Compazine for headaches  - follow up CT head was reassuring  - appreciate MICAELA attention to pt  - f/u with MICAELA in office in 2 weeks, repeat CT Head at that time  - mental status/LOC waxing/waning, family concerned he may have UTI as he was recently treated for one--checked UA and it was fine  - will schedule low-dose Zyprexa nightly and keep PRN on hand as well--need to try to get his sleep schedule back in line     HTN  - BPs labile--SBP was at goal but bradycardia triggered some adjustment of meds and SBP has been high at times since  - consulted Card for bradycardia, they were not concerned as it was asymptomatic and recommend focusing more on BP control as long as bradycardia remains mild and asymptomatic  - continue amlodipine, clonidine, and valsartan  - Card has added Aldactone, changed labetalol back to his home Coreg, and yesterday stopped his hydralazine altogether  - monitor BPs and HRs today on new regimen and defer further adjustment to Card     Type 2 DM  - sugars acceptable  - A1c 7.1  - continue Lantus  - continue SSI/hypoglycemia protocol  - holding metformin, glimepiride, and Januvia     PAF  - HRs better today (see above)  - was on Eliquis prior to admission but he is off of this now due to spontaneous intracranial hemorrhage     Polycythemia Vera  - on hydroxyurea as an outpatient  - Hgb fine       SCDs for DVT prophylaxis.  DNR.  Discussed with patient and RN. D/w son at  bedside.  Anticipate discharge to [place] when precert obtained.      Cachorro Knox MD  [facility]  03/10/24  08:26 EDT

## 2024-03-11 ENCOUNTER — APPOINTMENT (OUTPATIENT)
Dept: CT IMAGING | Facility: HOSPITAL | Age: 79
DRG: 064 | End: 2024-03-11
Payer: MEDICARE

## 2024-03-11 LAB
ALBUMIN SERPL-MCNC: 3.8 G/DL (ref 3.5–5.2)
ANION GAP SERPL CALCULATED.3IONS-SCNC: 11.6 MMOL/L (ref 5–15)
BUN SERPL-MCNC: 20 MG/DL (ref 8–23)
BUN/CREAT SERPL: 20.2 (ref 7–25)
CALCIUM SPEC-SCNC: 9.8 MG/DL (ref 8.6–10.5)
CHLORIDE SERPL-SCNC: 105 MMOL/L (ref 98–107)
CO2 SERPL-SCNC: 26.4 MMOL/L (ref 22–29)
CREAT SERPL-MCNC: 0.99 MG/DL (ref 0.76–1.27)
DEPRECATED RDW RBC AUTO: 53 FL (ref 37–54)
EGFRCR SERPLBLD CKD-EPI 2021: 78.9 ML/MIN/1.73
ERYTHROCYTE [DISTWIDTH] IN BLOOD BY AUTOMATED COUNT: 16.5 % (ref 12.3–15.4)
GLUCOSE BLDC GLUCOMTR-MCNC: 115 MG/DL (ref 70–130)
GLUCOSE BLDC GLUCOMTR-MCNC: 250 MG/DL (ref 70–130)
GLUCOSE BLDC GLUCOMTR-MCNC: 285 MG/DL (ref 70–130)
GLUCOSE BLDC GLUCOMTR-MCNC: 91 MG/DL (ref 70–130)
GLUCOSE SERPL-MCNC: 94 MG/DL (ref 65–99)
HCT VFR BLD AUTO: 47.1 % (ref 37.5–51)
HGB BLD-MCNC: 15.7 G/DL (ref 13–17.7)
MAGNESIUM SERPL-MCNC: 2.4 MG/DL (ref 1.6–2.4)
MCH RBC QN AUTO: 29.7 PG (ref 26.6–33)
MCHC RBC AUTO-ENTMCNC: 33.3 G/DL (ref 31.5–35.7)
MCV RBC AUTO: 89 FL (ref 79–97)
PHOSPHATE SERPL-MCNC: 3.2 MG/DL (ref 2.5–4.5)
PLATELET # BLD AUTO: 402 10*3/MM3 (ref 140–450)
PMV BLD AUTO: 10.2 FL (ref 6–12)
POTASSIUM SERPL-SCNC: 3.8 MMOL/L (ref 3.5–5.2)
RBC # BLD AUTO: 5.29 10*6/MM3 (ref 4.14–5.8)
SODIUM SERPL-SCNC: 143 MMOL/L (ref 136–145)
WBC NRBC COR # BLD AUTO: 10.48 10*3/MM3 (ref 3.4–10.8)

## 2024-03-11 PROCEDURE — 97112 NEUROMUSCULAR REEDUCATION: CPT

## 2024-03-11 PROCEDURE — 85027 COMPLETE CBC AUTOMATED: CPT | Performed by: INTERNAL MEDICINE

## 2024-03-11 PROCEDURE — 97530 THERAPEUTIC ACTIVITIES: CPT

## 2024-03-11 PROCEDURE — 99232 SBSQ HOSP IP/OBS MODERATE 35: CPT | Performed by: NURSE PRACTITIONER

## 2024-03-11 PROCEDURE — 83735 ASSAY OF MAGNESIUM: CPT | Performed by: HOSPITALIST

## 2024-03-11 PROCEDURE — 63710000001 INSULIN GLARGINE PER 5 UNITS: Performed by: HOSPITALIST

## 2024-03-11 PROCEDURE — 80069 RENAL FUNCTION PANEL: CPT | Performed by: INTERNAL MEDICINE

## 2024-03-11 PROCEDURE — 70450 CT HEAD/BRAIN W/O DYE: CPT

## 2024-03-11 PROCEDURE — 63710000001 INSULIN REGULAR HUMAN PER 5 UNITS: Performed by: INTERNAL MEDICINE

## 2024-03-11 PROCEDURE — 82948 REAGENT STRIP/BLOOD GLUCOSE: CPT

## 2024-03-11 RX ORDER — LEVETIRACETAM 500 MG/1
500 TABLET ORAL 2 TIMES DAILY
Start: 2024-03-11

## 2024-03-11 RX ORDER — CHOLECALCIFEROL (VITAMIN D3) 125 MCG
5 CAPSULE ORAL NIGHTLY
Start: 2024-03-11

## 2024-03-11 RX ORDER — AMOXICILLIN 250 MG
2 CAPSULE ORAL 2 TIMES DAILY
Start: 2024-03-11

## 2024-03-11 RX ORDER — CLONIDINE HYDROCHLORIDE 0.2 MG/1
0.2 TABLET ORAL EVERY 8 HOURS SCHEDULED
Start: 2024-03-11

## 2024-03-11 RX ORDER — ACETAMINOPHEN 325 MG/1
650 TABLET ORAL EVERY 4 HOURS PRN
Start: 2024-03-11

## 2024-03-11 RX ORDER — SPIRONOLACTONE 25 MG/1
25 TABLET ORAL EVERY MORNING
Start: 2024-03-12 | End: 2024-03-14 | Stop reason: HOSPADM

## 2024-03-11 RX ORDER — AMLODIPINE BESYLATE 10 MG/1
10 TABLET ORAL
Start: 2024-03-12

## 2024-03-11 RX ORDER — OLANZAPINE 5 MG/1
5 TABLET, ORALLY DISINTEGRATING ORAL NIGHTLY
Start: 2024-03-11

## 2024-03-11 RX ADMIN — Medication 10 ML: at 20:56

## 2024-03-11 RX ADMIN — VALSARTAN 320 MG: 320 TABLET, FILM COATED ORAL at 14:46

## 2024-03-11 RX ADMIN — CLONIDINE HYDROCHLORIDE 0.2 MG: 0.1 TABLET ORAL at 13:15

## 2024-03-11 RX ADMIN — LEVETIRACETAM 500 MG: 500 TABLET, FILM COATED ORAL at 08:31

## 2024-03-11 RX ADMIN — SENNOSIDES AND DOCUSATE SODIUM 2 TABLET: 50; 8.6 TABLET ORAL at 20:56

## 2024-03-11 RX ADMIN — ACETAMINOPHEN 325MG 650 MG: 325 TABLET ORAL at 14:50

## 2024-03-11 RX ADMIN — CARVEDILOL 6.25 MG: 6.25 TABLET, FILM COATED ORAL at 08:31

## 2024-03-11 RX ADMIN — MEMANTINE HYDROCHLORIDE 10 MG: 10 TABLET, FILM COATED ORAL at 08:31

## 2024-03-11 RX ADMIN — Medication 5 MG: at 20:56

## 2024-03-11 RX ADMIN — INSULIN GLARGINE 25 UNITS: 100 INJECTION, SOLUTION SUBCUTANEOUS at 20:56

## 2024-03-11 RX ADMIN — AMLODIPINE BESYLATE 10 MG: 10 TABLET ORAL at 08:31

## 2024-03-11 RX ADMIN — CARVEDILOL 6.25 MG: 6.25 TABLET, FILM COATED ORAL at 17:28

## 2024-03-11 RX ADMIN — OLANZAPINE 5 MG: 5 TABLET, ORALLY DISINTEGRATING ORAL at 20:56

## 2024-03-11 RX ADMIN — SENNOSIDES AND DOCUSATE SODIUM 2 TABLET: 50; 8.6 TABLET ORAL at 08:31

## 2024-03-11 RX ADMIN — MEMANTINE HYDROCHLORIDE 10 MG: 10 TABLET, FILM COATED ORAL at 20:56

## 2024-03-11 RX ADMIN — Medication 10 ML: at 08:32

## 2024-03-11 RX ADMIN — BISACODYL 10 MG: 10 SUPPOSITORY RECTAL at 13:13

## 2024-03-11 RX ADMIN — SPIRONOLACTONE 25 MG: 25 TABLET ORAL at 08:31

## 2024-03-11 RX ADMIN — CLONIDINE HYDROCHLORIDE 0.2 MG: 0.1 TABLET ORAL at 06:01

## 2024-03-11 RX ADMIN — INSULIN HUMAN 6 UNITS: 100 INJECTION, SOLUTION PARENTERAL at 17:28

## 2024-03-11 RX ADMIN — CLONIDINE HYDROCHLORIDE 0.2 MG: 0.1 TABLET ORAL at 21:05

## 2024-03-11 RX ADMIN — LEVETIRACETAM 500 MG: 500 TABLET, FILM COATED ORAL at 20:56

## 2024-03-11 NOTE — SIGNIFICANT NOTE
03/11/24 0938   Post Acute Pre-Cert Documentation   Request Submitted by Facility - Type: Hospital   Post-Acute Authorization Type Submitted: SNF   Date Post Acute Pre-Cert Inititated per Facility 03/08/24   Date Post Acute Pre-Cert Completed 03/11/24   Accepting Facility [facility] POST [facility]UTE   Hospital Discharge Date Requested 03/08/24   All Clinicals Submitted? Yes   Had Accepting Facility at Time of Submission Yes   Response Received from Insurance? Approval   Response Communicated to: ;Accepting Facility Liaison;Accepting Facility Auth Department   Authorization Number: [id number]   Post Acute Pre-Cert Initiated Comment ADARSH BECERRA

## 2024-03-11 NOTE — PLAN OF CARE
Goal Outcome Evaluation:  Plan of Care Reviewed With: patient, family        Progress: no change     No new neuro deficits noted or reported during this shift, No recent BM documented, family not sure of last BM date, pt on scheduled Senna and had already received PRN Miralax and Dulcolax PO, suppository administer today. Pt's wife noticed pt's face red this afternoon, BP elevated at that time, pt appeared to be straining to have a BM, CTH with no new changes, cardiology notified and Valsartan given early per cardio instructions, 2 small BM after suppository, BP stable towards end of shift.

## 2024-03-11 NOTE — PLAN OF CARE
Goal Outcome Evaluation:              Outcome Evaluation: VSS. Neurological status remains stable.

## 2024-03-11 NOTE — PLAN OF CARE
Goal Outcome Evaluation:  Plan of Care Reviewed With: patient           Outcome Evaluation: Pt supine, alert and awake, agreeable to therapy. Pt required MaxAx2 for supine to sit at EOB. Pt with strong L lean and pushing towards R side with L UE when sitting at EOB. Sitting balance improved with use of LUE on bed rail. Worked on dynamic sitting tasks with increased assist due to poor balance. Pt fluctuates between Jackson-MaxAx2 at EOB dependent upon LUE support. Will continue to follow and progress as able.

## 2024-03-11 NOTE — PROGRESS NOTES
Continued Stay Note  [facility]     Patient Name: Erlin Blanco  MRN: [id number]  Today's Date: 3/11/2024    Admit Date: 2/24/2024    Plan: [facility], [employer] Medicare pre-cert approved   Discharge Plan       Row Name 03/11/24 1607       Plan    Plan [place] skilled, [employer] Medicare pre-cert approved    Patient/Family in Agreement with Plan yes    Plan Comments Per Christi Small Medicare approves SNF. Per [facility] can accept. Pharmacy updated and packet on pt chart. Jaimee Tao LCSW                   Discharge Codes    No documentation.                 Expected Discharge Date and Time       Expected Discharge Date Expected Discharge Time    Mar 11, 2024               Yajaira Tao LCSW

## 2024-03-11 NOTE — PLAN OF CARE
Goal Outcome Evaluation:  Plan of Care Reviewed With: patient        Progress: no change  Outcome Evaluation: Pt seen for OT/PT session this date, pt lethargic on arrival however improved some with increased time at EOB. continues to require max Ax2 for bed mobility, max vc's for sequencing with bed mobility and midline posture. weight bearing and PROM completed while at EOB to RUE. LUE WB'ing x2 reps completed. Attempted to stand however depx3 and unable to successfully come upright. Varying levels of assist at EOBmin to max A with strong use of LUE on foot raill. Completion of g/h with LUE while at EOB. He will continue to benefit from skilled OT to address noted functional deficits, plans SNF at d/c for continued rehab.      Anticipated Discharge Disposition (OT): skilled nursing facility

## 2024-03-11 NOTE — PROGRESS NOTES
Name: Erlin Blanco ADMIT: 2024   : 1945  PCP: Zamzam John, APRN    MRN: [id number] LOS: 16 days   AGE/SEX: 78 y.o. male  ROOM: [facility]     Subjective   Subjective   Pt can say yes/no in answer to questions. Tolerating diet. Working with PT. Voiding well. No SOA or CP. LOC still waxing/waning per family and staff. Less agitated overnight.       Objective   Objective   Vital Signs  Temp:  [97.5 °F (36.4 °C)-98.6 °F (37 °C)] 98.6 °F (37 °C)  Heart Rate:  [49-68] 63  Resp:  [16-18] 18  BP: (116-158)/() 142/67  SpO2:  [96 %-100 %] 100 %  on   ;   Device (Oxygen Therapy): room air  Body mass index is 34.47 kg/m².    (No change in exam today)    Physical Exam  Vitals and nursing note reviewed. Exam conducted with a chaperone present (Granddtr).   Constitutional:       General: He is not in acute distress.     Appearance: He is ill-appearing. He is not toxic-appearing or diaphoretic.   HENT:      Head: Normocephalic.      Nose: Nose normal.      Mouth/Throat:      Mouth: Mucous membranes are moist.      Pharynx: Oropharynx is clear.   Eyes:      General: No scleral icterus.        Right eye: No discharge.         Left eye: No discharge.      Conjunctiva/sclera: Conjunctivae normal.   Cardiovascular:      Rate and Rhythm: Normal rate and regular rhythm.      Pulses: Normal pulses.   Pulmonary:      Effort: Pulmonary effort is normal. No respiratory distress.      Breath sounds: Normal breath sounds. No wheezing or rales.   Abdominal:      General: Bowel sounds are normal. There is no distension.      Palpations: Abdomen is soft.      Tenderness: There is no abdominal tenderness.   Musculoskeletal:         General: Swelling (RUE) present. No deformity.      Cervical back: Neck supple.      Comments: SCDs in place   Skin:     General: Skin is warm and dry.      Capillary Refill: Capillary refill takes less than 2 seconds.      Coloration: Skin is not jaundiced.   Neurological:      Mental Status: He is  alert.      Comments: Right hemiparesis with facial droop  Dysarthria and expressive aphasia   Psychiatric:      Comments: Unable to assess  Flat affect       Results Review     I reviewed the patient's new clinical results.  Results from last 7 days   Lab Units 03/11/24  0532 03/10/24  0418 03/09/24  0426 03/08/24  0536   WBC 10*3/mm3 10.48 11.02* 11.29* 9.65   HEMOGLOBIN g/dL 15.7 15.7 15.0 15.2   PLATELETS 10*3/mm3 402 443 417 405     Results from last 7 days   Lab Units 03/11/24  0532 03/10/24  0418 03/09/24  0426 03/08/24  0536   SODIUM mmol/L 143 142 144 143   POTASSIUM mmol/L 3.8 3.7 4.2 3.9   CHLORIDE mmol/L 105 107 107 109*   CO2 mmol/L 26.4 28.0 26.0 22.8   BUN mg/dL 20 19 26* 20   CREATININE mg/dL 0.99 0.89 1.11 0.86   GLUCOSE mg/dL 94 111* 219* 165*   EGFR mL/min/1.73 78.9 88.8 68.8 89.7     Results from last 7 days   Lab Units 03/11/24  0532 03/10/24  0418 03/09/24  0426 03/08/24  0536 03/07/24  0801   ALBUMIN g/dL 3.8 4.0 3.6 3.4* 3.8   BILIRUBIN mg/dL  --   --   --   --  0.8   ALK PHOS U/L  --   --   --   --  99   AST (SGOT) U/L  --   --   --   --  36   ALT (SGPT) U/L  --   --   --   --  36     Results from last 7 days   Lab Units 03/11/24  0532 03/10/24  0418 03/09/24  0426 03/08/24  0536   CALCIUM mg/dL 9.8 9.9 9.4 9.4   ALBUMIN g/dL 3.8 4.0 3.6 3.4*   MAGNESIUM mg/dL 2.4 2.3 2.6* 2.4   PHOSPHORUS mg/dL 3.2 2.5 3.3 3.2       Glucose   Date/Time Value Ref Range Status   03/11/2024 0710 91 70 - 130 mg/dL Final   03/10/2024 2026 213 (H) 70 - 130 mg/dL Final   03/10/2024 1649 272 (H) 70 - 130 mg/dL Final   03/10/2024 1123 214 (H) 70 - 130 mg/dL Final   03/10/2024 0743 98 70 - 130 mg/dL Final   03/09/2024 2008 116 70 - 130 mg/dL Final   03/09/2024 1656 165 (H) 70 - 130 mg/dL Final       No radiology results for the last day    I have personally reviewed all medications:  Scheduled Medications  amLODIPine, 10 mg, Nasogastric, Q24H  carvedilol, 6.25 mg, Oral, BID With Meals  cloNIDine, 0.2 mg, Oral,  Q8H  insulin glargine, 25 Units, Subcutaneous, Nightly  insulin regular, 2-9 Units, Subcutaneous, TID AC  levETIRAcetam, 500 mg, Nasogastric, BID  melatonin, 5 mg, Oral, Nightly  memantine, 10 mg, Nasogastric, Q12H  OLANZapine zydis, 5 mg, Oral, Nightly  senna-docusate sodium, 2 tablet, Oral, BID  sodium chloride, 10 mL, Intravenous, Q12H  spironolactone, 25 mg, Oral, QAM  valsartan, 320 mg, Nasogastric, Q PM    Infusions   Diet  Diet: Diabetic; Consistent Carbohydrate; Feeding Assistance - Nursing; Texture: Pureed (NDD 1); Fluid Consistency: Nectar Thick    I have personally reviewed:  [x]  Laboratory   []  Microbiology   []  Radiology   []  EKG/Telemetry  []  Cardiology/Vascular   []  Pathology    []  Records       Assessment/Plan     Active Hospital Problems    Diagnosis  POA    **Intraparenchymal hemorrhage of brain [I61.9]  Yes    Right hemiparesis [G81.91]  Yes    Oropharyngeal dysphagia [R13.12]  Yes    Paroxysmal atrial fibrillation [I48.0]  Yes    Type 2 diabetes mellitus [E11.9]  Yes    HTN (hypertension) [I10]  Yes    Seizures [R56.9]  Yes      Resolved Hospital Problems   No resolved problems to display.       78-year-old gentleman with hypertension, coronary artery disease, chronic diastolic heart failure, type 2 diabetes, and atrial fibrillation on Eliquis, who presented to the hospital after experiencing a right-sided facial droop and right arm weakness with speech disturbance. CT angiogram showed an acute intraparenchymal hemorrhage and Neurosurgery was consulted. Blood pressure management was recommended to keep systolic blood pressure less than 160. Nicardipine was initially started and he received Keppra for seizure prophylaxis and Kcentra for Eliquis reversal. He was admitted to ICU. We were asked to assume care when pt came out of ICU.    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  - due to uncontrolled hypertension in the setting of anticoagulation and polycythemia  vera  - Eliquis on hold until further notice  - has right hemiparesis and dysarthria as well as oropharyngeal dysphagia  - continue PT/OT/SLP, plan rehab (subacute) at [place]  - tolerating modified diet per SLP recs, dc'd Dobhoff 3/6  - continue BP control with goal SBP<160mmHg, currently on amlodipine, valsartan, clonidine, Aldactone, and Coreg (see below)  - continue Keppra for seizure prophylaxis  - PRN IV Compazine for headaches  - follow up CT head was reassuring  - appreciate MICAELA attention to pt  - f/u with MICAELA in office in 2 weeks, repeat CT Head at that time  - mental status/LOC waxing/waning, family concerned he may have UTI as he was recently treated for one--checked UA and it was fine  - have scheduled low-dose Zyprexa nightly and keep PRN on hand as well--need to try to get his sleep schedule back in line     HTN  - BPs labile--SBP was at goal but bradycardia triggered some adjustment of meds and SBP has been high at times since  - consulted Card for bradycardia, they were not concerned as it was asymptomatic and recommend focusing more on BP control as long as bradycardia remains mild and asymptomatic  - continue amlodipine, clonidine, and valsartan  - Card has added Aldactone, changed labetalol back to his home Coreg, and stopped his hydralazine altogether  - continue to monitor BPs and HRs today on new regimen and defer further adjustment to Card     Type 2 DM  - sugars acceptable  - A1c 7.1  - continue Lantus  - continue SSI/hypoglycemia protocol  - holding metformin, glimepiride, and Januvia     PAF  - HRs acceptable today (see above)  - was on Eliquis prior to admission but he is off of this now due to spontaneous intracranial hemorrhage     Polycythemia Vera  - on hydroxyurea as an outpatient  - Hgb fine       SCDs for DVT prophylaxis.  DNR.  Discussed with patient and granddtr.  Anticipate discharge to [place] when precert obtained.      Cachorro Knox MD  [facility]  03/11/24  08:06 EDT

## 2024-03-11 NOTE — PROGRESS NOTES
Hospital Follow Up    LOS:  LOS: 16 days   Patient Name: Erlin Blanco  Age/Sex: 78 y.o. male  : 1945  MRN: [id number]    Day of Service: 24   Length of Stay: 16  Encounter Provider: MUKUL Souza  Place of Service: [facility] CARDIOLOGY  Patient Care Team:  Zamzam John APRN as PCP - General (Family Medicine)    Subjective:     Chief Complaint: follow up hypertension, bradycardia    Interval History: Sitting up in bed, eating breakfast. No complaints of chest pain, shortness of breath or headache this morning. BP is a little high but improving with adjustment of regimen.    Objective:     Objective:  Temp:  [97.5 °F (36.4 °C)-98.6 °F (37 °C)] 98.4 °F (36.9 °C)  Heart Rate:  [49-68] 54  Resp:  [16-18] 18  BP: (116-165)/() 165/97     Intake/Output Summary (Last 24 hours) at 3/11/2024 0922  Last data filed at 3/10/2024 1800  Gross per 24 hour   Intake 360 ml   Output --   Net 360 ml     Body mass index is 34.47 kg/m².      24  0600 24  0600 24  0453   Weight: 113 kg (248 lb 14.4 oz) 116 kg (255 lb 11.7 oz) 115 kg (254 lb 3.1 oz)     Weight change:     Physical Exam:   General Appearance:    Awake alert and oriented in no acute distress.   Color:  Skin:  Neuro:  HEENT:    Lungs:     Pink  Warm and dry  Right hemiparesis with facial droop, expressive aphasia  Neck supple, pupils equal, round and reactive. No JVD, No Bruit  Clear to auscultation,respirations regular, even and                  unlabored    Heart:    Regular rate and rhythm, S1 and S2, no murmur, no gallop, no rub. No edema, DP/PT pulses are 2+   Chest Wall:    No abnormalities observed   Abdomen:     Normal bowel sounds, no masses, no organomegaly, soft        non-tender, non-distended, no guarding, no ascites noted   Extremities:   Moves all extremities well, no edema, no cyanosis, no redness       Lab Review:   Results from last 7 days   Lab Units 24  0528  "03/10/24  0418 03/08/24  0536 03/07/24  0801   SODIUM mmol/L 143 142   < > 144   POTASSIUM mmol/L 3.8 3.7   < > 4.7   CHLORIDE mmol/L 105 107   < > 106   CO2 mmol/L 26.4 28.0   < > 28.0   BUN mg/dL 20 19   < > 18   CREATININE mg/dL 0.99 0.89   < > 1.12   GLUCOSE mg/dL 94 111*   < > 194*   CALCIUM mg/dL 9.8 9.9   < > 9.9   AST (SGOT) U/L  --   --   --  36   ALT (SGPT) U/L  --   --   --  36    < > = values in this interval not displayed.         Results from last 7 days   Lab Units 03/11/24  0532 03/10/24  0418   WBC 10*3/mm3 10.48 11.02*   HEMOGLOBIN g/dL 15.7 15.7   HEMATOCRIT % 47.1 48.2   PLATELETS 10*3/mm3 402 443         Results from last 7 days   Lab Units 03/11/24  0532 03/10/24  0418   MAGNESIUM mg/dL 2.4 2.3           Invalid input(s): \"LDLCALC\"          I reviewed the patient's new clinical results.  I personally viewed and interpreted the patient's EKG  Current Medications:   Scheduled Meds:amLODIPine, 10 mg, Nasogastric, Q24H  carvedilol, 6.25 mg, Oral, BID With Meals  cloNIDine, 0.2 mg, Oral, Q8H  insulin glargine, 25 Units, Subcutaneous, Nightly  insulin regular, 2-9 Units, Subcutaneous, TID AC  levETIRAcetam, 500 mg, Nasogastric, BID  melatonin, 5 mg, Oral, Nightly  memantine, 10 mg, Nasogastric, Q12H  OLANZapine zydis, 5 mg, Oral, Nightly  senna-docusate sodium, 2 tablet, Oral, BID  sodium chloride, 10 mL, Intravenous, Q12H  spironolactone, 25 mg, Oral, QAM  valsartan, 320 mg, Nasogastric, Q PM      Continuous Infusions:     Allergies:  No Known Allergies    Assessment:       Intraparenchymal hemorrhage of brain    Seizures    Type 2 diabetes mellitus    HTN (hypertension)    Right hemiparesis    Oropharyngeal dysphagia    Paroxysmal atrial fibrillation        Plan:   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 Will continue with current regimen.  PAF: Dr. Porras " reviewed past EKG and Holter studies and feels this is atrial tachycardia. Does not recommend empirically restarting anticoagulation.  Type 2 diabetes:  Polycythemia vera  CAD: on beta blocker. Aspirin currently on hold. He was on rosuvastatin that was not continued upon admission. No anginal symptoms reported.   Chronic diastolic CHF: appears compensated on exam today.         MUKUL Souza  03/11/24  09:22 EDT  Electronically signed by MUKUL Souza, 03/11/24, 9:22 AM EDT.

## 2024-03-11 NOTE — THERAPY TREATMENT NOTE
Patient Name: Erlin Blanco  : 1945    MRN: [id number]                              Today's Date: 3/11/2024       Admit Date: 2024    Visit Dx:     ICD-10-CM ICD-9-CM   1. Intraparenchymal hemorrhage of brain  I61.9 431   2. Facial droop  R29.810 781.94   3. Expressive aphasia  R47.01 784.3   4. Weakness of right upper extremity  R29.898 729.89   5. Chronic anticoagulation  Z79.01 V58.61     Patient Active Problem List   Diagnosis    Intraparenchymal hemorrhage of brain    Seizures    Type 2 diabetes mellitus    HTN (hypertension)    Right hemiparesis    Oropharyngeal dysphagia    Paroxysmal atrial fibrillation     History reviewed. No pertinent past medical history.  History reviewed. No pertinent surgical history.   General Information       Row Name 24 1255          OT Time and Intention    Document Type therapy note (daily note)  -MW     Mode of Treatment co-treatment;physical therapy;occupational therapy  very quick to fatigue  -MW       Row Name 24 1255          General Information    Patient Profile Reviewed yes  -MW     Existing Precautions/Restrictions fall  -MW       Row Name 24 1255          Cognition    Orientation Status (Cognition) oriented to;person  -MW       Row Name 24 1255          Safety Issues, Functional Mobility    Safety Issues Affecting Function (Mobility) insight into deficits/self-awareness;safety precautions follow-through/compliance;safety precaution awareness;ability to follow commands;sequencing abilities;problem-solving;judgment  -MW     Impairments Affecting Function (Mobility) strength;balance;endurance/activity tolerance;cognition;coordination;postural/trunk control;range of motion (ROM);motor control;motor planning;visual/perceptual  -MW     Comment, Safety Issues/Impairments (Mobility) Co-treatment medically necessary and appropriate d/t pt's acuity level, decreased endurance and activity tolerance, and safety of patient and staff. Treatment  focused on progression of care and goals established in POC. Gait belt and non-skid socks worn.  -               User Key  (r) = Recorded By, (t) = Taken By, (c) = Cosigned By      Initials Name Provider Type    Nathalie Hoff OT Occupational Therapist                     Mobility/ADL's       Row Name 03/11/24 1256          Bed Mobility    Bed Mobility supine-sit;sit-supine;rolling right;rolling left;scooting/bridging  -MW     Rolling Left [place] (Bed Mobility) maximum assist (25% patient effort);2 person assist;verbal cues  -MW     Rolling Right [place] (Bed Mobility) maximum assist (25% patient effort);2 person assist  -MW     Scooting/Bridging [place] (Bed Mobility) dependent (less than 25% patient effort);2 person assist  -MW     Supine-Sit [place] (Bed Mobility) set up;verbal cues;nonverbal cues (demo/gesture);2 person assist;maximum assist (25% patient effort)  -MW     Sit-Supine [place] (Bed Mobility) set up;verbal cues;nonverbal cues (demo/gesture);moderate assist (50% patient effort);2 person assist  -MW     Bed Mobility, Safety Issues cognitive deficits limit understanding;decreased use of arms for pushing/pulling;decreased use of legs for bridging/pushing;impaired trunk control for bed mobility  -     Assistive Device (Bed Mobility) bed rails;draw sheet;head of bed elevated  -     Comment, (Bed Mobility) strong use of LUE on foot rail, increased R lean noted  -       Row Name 03/11/24 1256          Transfers    Transfers sit-stand transfer  -       Row Name 03/11/24 1256          Sit-Stand Transfer    Sit-Stand [place] (Transfers) dependent (less than 25% patient effort);other (see comments)  depx3 for attempt to clear bottom partially frmo EOB  -       Row Name 03/11/24 1256          Functional Mobility    Functional Mobility- Ind. Level unable to perform  -       Row Name 03/11/24 1256          Activities of Daily Living    BADL Assessment/Intervention  grooming  -       Row Name 03/11/24 1256          Grooming Assessment/Training    [place] Level (Grooming) grooming skills;wash face, hands;verbal cues;nonverbal cues (demo/gesture);standby assist  -     Position (Grooming) edge of bed sitting  -     Comment, (Grooming) vc's for initiation of face washing with LUE, increased assist required for R lean while pt washing face  -       Row Name 03/11/24 1256          Lower Body Dressing Assessment/Training    [place] Level (Lower Body Dressing) dependent (less than 25% patient effort);socks  -     Position (Lower Body Dressing) sitting up in bed  -               User Key  (r) = Recorded By, (t) = Taken By, (c) = Cosigned By      Initials Name Provider Type    Nathalie Hoff OT Occupational Therapist                   Obj/Interventions       Row Name 03/11/24 1257          Motor Skills    Motor Control/Coordination Interventions weight-bearing activities;neuro-muscular re-education  -       Row Name 03/11/24 1257          Balance    Balance Assessment sitting static balance;sitting dynamic balance;sit to stand dynamic balance  -     Static Sitting Balance contact guard;maximum assist  varying levels of assist, brief CGA assist level at EOB  -MW     Dynamic Sitting Balance moderate assist;maximum assist  -     Position, Sitting Balance supported;sitting edge of bed  -     Sit to Stand Dynamic Balance dependent;other (see comments)  x3  -MW     Balance Interventions sitting;sit to stand;supported;static;dynamic;highly challenging  -       Row Name 03/11/24 1257          Neuromuscular Re-education    Interventions (Neuromuscular Re-education) weight bearing  RUE and onto bilat forearms  -               User Key  (r) = Recorded By, (t) = Taken By, (c) = Cosigned By      Initials Name Provider Type    Nathalie Hoff OT Occupational Therapist                   Goals/Plan    No documentation.                  Clinical Impression        Row Name 03/11/24 1258          Pain Assessment    Pretreatment Pain Rating 0/10 - no pain  -     Posttreatment Pain Rating 0/10 - no pain  -       Row Name 03/11/24 1258          Plan of Care Review    Plan of Care Reviewed With patient  -     Progress no change  -     Outcome Evaluation Pt seen for OT/PT session this date, pt lethargic on arrival however improved some with increased time at EOB. continues to require max Ax2 for bed mobility, max vc's for sequencing with bed mobility and midline posture. weight bearing and PROM completed while at EOB to RUE. LUE WB'ing x2 reps completed. Attempted to stand however depx3 and unable to successfully come upright. Varying levels of assist at EOBmin to max A with strong use of LUE on foot raill. Completion of g/h with LUE while at EOB. He will continue to benefit from skilled OT to address noted functional deficits, plans SNF at d/c for continued rehab.  -       Row Name 03/11/24 1258          Therapy Plan Review/Discharge Plan (OT)    Anticipated Discharge Disposition (OT) skilled nursing facility  -       Row Name 03/11/24 1258          Vital Signs    O2 Delivery Pre Treatment room air  -       Row Name 03/11/24 1258          Positioning and Restraints    Pre-Treatment Position in bed  -     Post Treatment Position bed  -     In Bed notified nsg;fowlers;call light within reach;encouraged to call for assist;exit alarm on;with family/caregiver;side rails up x3;SCD pump applied;RUE elevated;LUE elevated;legs elevated  -               User Key  (r) = Recorded By, (t) = Taken By, (c) = Cosigned By      Initials Name Provider Type    Nathalie Hoff, OT Occupational Therapist                   Outcome Measures       Row Name 03/11/24 1929          How much help from another is currently needed...    Putting on and taking off regular lower body clothing? 1  -MW     Bathing (including washing, rinsing, and drying) 1  -MW     Toileting (which  includes using toilet bed pan or urinal) 1  -MW     Putting on and taking off regular upper body clothing 2  -MW     Taking care of personal grooming (such as brushing teeth) 2  -MW     Eating meals 2  -MW     AM-PAC 6 Clicks Score (OT) 9  -MW       Row Name 03/11/24 1008 03/11/24 0825       How much help from another person do you currently need...    Turning from your back to your side while in flat bed without using bedrails? 2  -CW 2  -LC    Moving from lying on back to sitting on the side of a flat bed without bedrails? 2  -CW 2  -LC    Moving to and from a bed to a chair (including a wheelchair)? 1  -CW 1  -LC    Standing up from a chair using your arms (e.g., wheelchair, bedside chair)? 1  -CW 1  -LC    Climbing 3-5 steps with a railing? 1  -CW 1  -LC    To walk in hospital room? 1  -CW 1  -LC    AM-PAC 6 Clicks Score (PT) 8  -CW 8  -LC    Highest Level of Mobility Goal 3 --> Sit at edge of bed  -CW 3 --> Sit at edge of bed  -LC      Row Name 03/11/24 1307 03/11/24 1008       Functional Assessment    Outcome Measure Options AM-PAC 6 Clicks Daily Activity (OT)  -MW AM-PAC 6 Clicks Basic Mobility (PT)  -CW              User Key  (r) = Recorded By, (t) = Taken By, (c) = Cosigned By      Initials Name Provider Type    Wendi Matson, JASMINE Physical Therapist    Lucrecia Carey, RN Registered Nurse    Nathalie Hoff OT Occupational Therapist                    Occupational Therapy Education       Title: PT OT SLP Therapies (In Progress)       Topic: Occupational Therapy (In Progress)       Point: ADL training (Not Started)       Description:   Instruct learner(s) on proper safety adaptation and remediation techniques during self care or transfers.   Instruct in proper use of assistive devices.                  Learner Progress:  Not documented in this visit.              Point: Home exercise program (Done)       Description:   Instruct learner(s) on appropriate technique for monitoring, assisting and/or  progressing therapeutic exercises/activities.                  Learning Progress Summary             Family Acceptance, E, VU by BS at 3/2/2024 1530    Comment: RUE ROM / HEP to adress swelling   Significant Other Acceptance, E, VU by BS at 3/2/2024 1530    Comment: RUE ROM / HEP to adress swelling                         Point: Precautions (Done)       Description:   Instruct learner(s) on prescribed precautions during self-care and functional transfers.                  Learning Progress Summary             Family Acceptance, E, VU,DU by  at 2/27/2024 1315    Comment: RUE ROM, massage, safe positioning for subluxation prevention, OT role and dc recommendations                         Point: Body mechanics (Not Started)       Description:   Instruct learner(s) on proper positioning and spine alignment during self-care, functional mobility activities and/or exercises.                  Learner Progress:  Not documented in this visit.                              User Key       Initials Effective Dates Name Provider Type Discipline     04/02/20 -  Sonia Mcclure OT Occupational Therapist OT    JUAN 11/14/22 -  Alexandria De La Rosa OT Occupational Therapist OT                  OT Recommendation and Plan     Plan of Care Review  Plan of Care Reviewed With: patient  Progress: no change  Outcome Evaluation: Pt seen for OT/PT session this date, pt lethargic on arrival however improved some with increased time at EOB. continues to require max Ax2 for bed mobility, max vc's for sequencing with bed mobility and midline posture. weight bearing and PROM completed while at EOB to RUE. LUE WB'ing x2 reps completed. Attempted to stand however depx3 and unable to successfully come upright. Varying levels of assist at EOBmin to max A with strong use of LUE on foot raill. Completion of g/h with LUE while at EOB. He will continue to benefit from skilled OT to address noted functional deficits, plans SNF at d/c for continued rehab.      Time Calculation:         Time Calculation- OT       Row Name 03/11/24 1307             Time Calculation- OT    OT Start Time 0918  -MW      OT Stop Time 0943  -MW      OT Time Calculation (min) 25 min  -MW      Total Timed Code Minutes- OT 25 minute(s)  -MW      OT Received On 03/11/24  -MW      OT - Next Appointment 03/12/24  -MW         Timed Charges    66552 -  OT Neuromuscular Reeducation Minutes 15  -MW      21832 - OT Therapeutic Activity Minutes 10  -MW         Total Minutes    Timed Charges Total Minutes 25  -MW       Total Minutes 25  -MW                User Key  (r) = Recorded By, (t) = Taken By, (c) = Cosigned By      Initials Name Provider Type    MW Nathalie Woodard OT Occupational Therapist                  Therapy Charges for Today       Code Description Service Date Service Provider Modifiers Qty    62757183172 HC OT NEUROMUSC RE EDUCATION EA 15 MIN 3/11/2024 Nathalie Woodard OT GO 1    51774284300 HC OT THERAPEUTIC ACT EA 15 MIN 3/11/2024 Nathalie Woodard OT GO 1                 Nathalie Woodard OT  3/11/2024

## 2024-03-11 NOTE — THERAPY TREATMENT NOTE
Patient Name: Erlin Blanco  : 1945    MRN: [id number]                              Today's Date: 3/11/2024       Admit Date: 2024    Visit Dx:     ICD-10-CM ICD-9-CM   1. Intraparenchymal hemorrhage of brain  I61.9 431   2. Facial droop  R29.810 781.94   3. Expressive aphasia  R47.01 784.3   4. Weakness of right upper extremity  R29.898 729.89   5. Chronic anticoagulation  Z79.01 V58.61     Patient Active Problem List   Diagnosis    Intraparenchymal hemorrhage of brain    Seizures    Type 2 diabetes mellitus    HTN (hypertension)    Right hemiparesis    Oropharyngeal dysphagia    Paroxysmal atrial fibrillation     History reviewed. No pertinent past medical history.  History reviewed. No pertinent surgical history.   General Information       Row Name 24 0951          Physical Therapy Time and Intention    Document Type therapy note (daily note)  -CW     Mode of Treatment co-treatment;physical therapy;occupational therapy  -CW       Row Name 24 0951          General Information    Patient Profile Reviewed yes  -CW     Existing Precautions/Restrictions fall  -CW       Row Name 24 0951          Cognition    Orientation Status (Cognition) oriented to;person  -CW       Row Name 24 0951          Safety Issues, Functional Mobility    Safety Issues Affecting Function (Mobility) insight into deficits/self-awareness;awareness of need for assistance;judgment;problem-solving;safety precautions follow-through/compliance;safety precaution awareness  -CW     Impairments Affecting Function (Mobility) strength;balance;endurance/activity tolerance;cognition;coordination;postural/trunk control;range of motion (ROM);motor control;motor planning;visual/perceptual  -CW               User Key  (r) = Recorded By, (t) = Taken By, (c) = Cosigned By      Initials Name Provider Type    CW Wendi Syed PT Physical Therapist                   Mobility       Row Name 24 0953          Bed Mobility     Bed Mobility supine-sit;sit-supine  -CW     Rolling Right [place] (Bed Mobility) maximum assist (25% patient effort);2 person assist  -CW     Scooting/Bridging [place] (Bed Mobility) dependent (less than 25% patient effort);2 person assist  -CW     Supine-Sit [place] (Bed Mobility) set up;verbal cues;nonverbal cues (demo/gesture);2 person assist;maximum assist (25% patient effort)  -CW     Sit-Supine [place] (Bed Mobility) set up;verbal cues;nonverbal cues (demo/gesture);moderate assist (50% patient effort);2 person assist  -CW     Comment, (Bed Mobility) strong R lean, sit balance improved with LUE on foot rail for support  -CW       Row Name 03/11/24 0953          Sit-Stand Transfer    Sit-Stand [place] (Transfers) dependent (less than 25% patient effort)  depx3  -CW     Comment, (Sit-Stand Transfer) STS attempted. Pt unable to come to full stand. Strong support needed from posterior to assist with clearing bottom from EOB, RLE blocked. depx3 for STS  -CW               User Key  (r) = Recorded By, (t) = Taken By, (c) = Cosigned By      Initials Name Provider Type    Wendi Matson PT Physical Therapist                   Obj/Interventions       Row Name 03/11/24 1000          Motor Skills    Therapeutic Exercise other (see comments)  -CW       Row Name 03/11/24 1000          Balance    Dynamic Sitting Balance moderate assist;maximum assist  -CW     Comment, Balance --  -CW               User Key  (r) = Recorded By, (t) = Taken By, (c) = Cosigned By      Initials Name Provider Type    Wendi Matson PT Physical Therapist                   Goals/Plan    No documentation.                  Clinical Impression       Row Name 03/11/24 1002          Pain    Pretreatment Pain Rating 0/10 - no pain  -CW     Posttreatment Pain Rating 0/10 - no pain  -CW     Pre/Posttreatment Pain Comment no c/o pain this date  -CW       Row Name 03/11/24 1002          Plan of Care Review    Plan of  Care Reviewed With patient  -CW     Outcome Evaluation Pt supine, alert and awake, agreeable to therapy. Pt required MaxAx2 for supine to sit at EOB. Pt with strong L lean and pushing towards R side with L UE when sitting at EOB. Sitting balance improved with use of LUE on bed rail. Worked on dynamic sitting tasks with increased assist due to poor balance. Pt fluctuates between [place]-MaxAx2 at EOB dependent upon LUE support. Will continue to follow and progress as able.  -CW       Row Name 03/11/24 1006 03/11/24 1002       Positioning and Restraints    Pre-Treatment Position --  -CW in bed  -CW    Post Treatment Position --  -CW bed  -CW    In Bed --  -CW notified nsg;call light within reach;encouraged to call for assist;exit alarm on;with family/caregiver;fowlers;side rails up x3  -CW              User Key  (r) = Recorded By, (t) = Taken By, (c) = Cosigned By      Initials Name Provider Type    CW Wendi Syed, PT Physical Therapist                   Outcome Measures       Row Name 03/11/24 1008 03/11/24 0825       How much help from another person do you currently need...    Turning from your back to your side while in flat bed without using bedrails? 2  -CW 2  -LC    Moving from lying on back to sitting on the side of a flat bed without bedrails? 2  -CW 2  -LC    Moving to and from a bed to a chair (including a wheelchair)? 1  -CW 1  -LC    Standing up from a chair using your arms (e.g., wheelchair, bedside chair)? 1  -CW 1  -LC    Climbing 3-5 steps with a railing? 1  -CW 1  -LC    To walk in hospital room? 1  -CW 1  -LC    AM-PAC 6 Clicks Score (PT) 8  -CW 8  -LC    Highest Level of Mobility Goal 3 --> Sit at edge of bed  -CW 3 --> Sit at edge of bed  -LC      Row Name 03/11/24 1307 03/11/24 1008       Functional Assessment    Outcome Measure Options AM-PAC 6 Clicks Daily Activity (OT)  -MW AM-PAC 6 Clicks Basic Mobility (PT)  -CW              User Key  (r) = Recorded By, (t) = Taken By, (c) = Cosigned By       Initials Name Provider Type    Wendi Matson, PT Physical Therapist    Lucrecia Carey, RN Registered Nurse    Nathalie Hoff OT Occupational Therapist                                 Physical Therapy Education       Title: PT OT SLP Therapies (In Progress)       Topic: Physical Therapy (In Progress)       Point: Mobility training (In Progress)       Learning Progress Summary             Patient Acceptance, E, NR by CW at 3/11/2024 1009    Acceptance, E, NR by EM at 3/8/2024 1057    Acceptance, E, VU by EM at 3/7/2024 1035    Acceptance, E, VU,NR by EM at 3/6/2024 1116    Acceptance, E,TB,D, VU,NR by  at 3/5/2024 1444    Acceptance, E,D, DU by PC at 3/4/2024 1100    Acceptance, E,TB, NR by CB at 3/2/2024 1520    Acceptance, E,TB, NR,NL by MS at 2/27/2024 1320   Family Acceptance, E, VU,NR by EM at 3/6/2024 1116    Acceptance, E,TB, NR by CB at 3/2/2024 1520                         Point: Home exercise program (In Progress)       Learning Progress Summary             Patient Acceptance, E,TB,D, VU,NR by CH at 3/5/2024 1444    Acceptance, E,D, DU by PC at 3/4/2024 1100    Acceptance, E,TB, NR by CB at 3/2/2024 1520   Family Acceptance, E,TB, NR by CB at 3/2/2024 1520                         Point: Body mechanics (In Progress)       Learning Progress Summary             Patient Acceptance, E, NR by CW at 3/11/2024 1009    Acceptance, E,TB,D, VU,NR by  at 3/5/2024 1444    Acceptance, E,D, DU by PC at 3/4/2024 1100    Acceptance, E,TB, NR by CB at 3/2/2024 1520    Acceptance, E,TB, VU,NR by  at 2/29/2024 0920    Acceptance, E,TB, NR,NL by MS at 2/27/2024 1320   Family Acceptance, E,TB, NR by CB at 3/2/2024 1520    Acceptance, E,TB, VU,NR by  at 2/29/2024 0920                         Point: Precautions (In Progress)       Learning Progress Summary             Patient Acceptance, E,TB,D, VU,NR by CH at 3/5/2024 1444    Acceptance, E,D, DU by PC at 3/4/2024 1100    Acceptance, E,TB, NR by CB at  3/2/2024 1520    Acceptance, E,TB, VU,NR by MH at 2/29/2024 0920    Acceptance, E,TB, NR,NL by MS at 2/27/2024 1320   Family Acceptance, E,TB, NR by CB at 3/2/2024 1520    Acceptance, E,TB, VU,NR by  at 2/29/2024 0920                                         User Key       Initials Effective Dates Name Provider Type Discipline    CH 06/16/21 -  Татьяна Billingsley, PT Physical Therapist PT    PC 06/16/21 -  Audrey Ramirez, PT Physical Therapist PT    EM 06/16/21 -  Erica Pennington, PT Physical Therapist PT    MS 06/16/21 -  Lyla Edwards, PT Physical Therapist PT    CW 12/13/22 -  Wendi Syed, PT Physical Therapist PT    CB 10/22/21 -  Mag Monson, PT Physical Therapist PT     01/24/24 -  Rick Maynard, PT Student PT Student PT                  PT Recommendation and Plan     Plan of Care Reviewed With: patient  Outcome Evaluation: Pt supine, alert and awake, agreeable to therapy. Pt required MaxAx2 for supine to sit at EOB. Pt with strong L lean and pushing towards R side with L UE when sitting at EOB. Sitting balance improved with use of LUE on bed rail. Worked on dynamic sitting tasks with increased assist due to poor balance. Pt fluctuates between [place]-MaxAx2 at EOB dependent upon LUE support. Will continue to follow and progress as able.     Time Calculation:         PT Charges       Row Name 03/11/24 1009             Time Calculation    Start Time 0920  -CW      Stop Time 0943  -CW      Time Calculation (min) 23 min  -CW      PT Received On 03/11/24  -CW      PT - Next Appointment 03/12/24  -CW         Time Calculation- PT    Total Timed Code Minutes- PT 23 minute(s)  -CW         Timed Charges    10646 - PT Therapeutic Activity Minutes 23  -CW         Total Minutes    Timed Charges Total Minutes 23  -CW       Total Minutes 23  -CW                User Key  (r) = Recorded By, (t) = Taken By, (c) = Cosigned By      Initials Name Provider Type    CW Wendi Syed, PT Physical  Therapist                  Therapy Charges for Today       Code Description Service Date Service Provider Modifiers Qty    27737319820  PT THERAPEUTIC ACT EA 15 MIN 3/11/2024 Wendi Syed, PT GP 2    [id number]  PT THER SUPP EA 15 MIN 3/11/2024 Wendi Syed, PT GP 1            PT G-Codes  Outcome Measure Options: AM-PAC 6 Clicks Daily Activity (OT)  AM-PAC 6 Clicks Score (PT): 8  AM-PAC 6 Clicks Score (OT): 9  Modified [place] Scale: 5 - Severe disability.  Bedridden, incontinent, and requiring constant nursing care and attention.       Wendi Syed, PT  3/11/2024

## 2024-03-11 NOTE — PROGRESS NOTES
RN called with BP of 198/102 and pt red-faced  RN thinks pt may be straining to have BM  Repeat /90  Have asked RN to call Card as they changed his antihypertensive regimen over the weekend and he has not been at goal SBP (<160) consistently since then  Will hold dc today  He cannot be discharged until SBP is consistently at goal or he would be at risk for bleed again  D/w RN and CCP

## 2024-03-11 NOTE — PROGRESS NOTES
Case Management Discharge Note      Final Note: Per Christi Small Medicare approves SNF. Per [facility] can accept today. Pharmacy updated and packet on pt chart. [facility] scheduled at 2:00 PM. Jaimee Tao LCSW  Addendum: Transport time moved to 10:00 PM per RN/MD instruction  Provided Post Acute Provider List?: N/A  Provided Post Acute Provider Quality & Resource List?: N/A    Selected Continued Care - Admitted Since 2/24/2024       Destination Coordination complete.      Service Provider Selected Services Address Phone Fax Patient Preferred    [facility] [address] , [facility] [address] [phone] --              Durable Medical Equipment    No services have been selected for the patient.                Dialysis/Infusion    No services have been selected for the patient.                Home Medical Care    No services have been selected for the patient.                Therapy    No services have been selected for the patient.                Community Resources    No services have been selected for the patient.                Community & DME    No services have been selected for the patient.                    Transportation Services  Ambulance: [facility]    Final Discharge Disposition Code: 03 - skilled nursing facility (SNF)

## 2024-03-11 NOTE — DISCHARGE SUMMARY
Patient Name: Erlin Blanco  : 1945  MRN: [id number]    Date of Admission: 2024  Date of Discharge:  3/11/2024  Primary Care Physician: Zamzam John APRN      Chief Complaint:   Stroke      Discharge Diagnoses     Active Hospital Problems    Diagnosis  POA   • **Intraparenchymal hemorrhage of brain [I61.9]  Yes   • Right hemiparesis [G81.91]  Yes   • Oropharyngeal dysphagia [R13.12]  Yes   • Paroxysmal atrial fibrillation [I48.0]  Yes   • Type 2 diabetes mellitus [E11.9]  Yes   • HTN (hypertension) [I10]  Yes   • Seizures [R56.9]  Yes      Resolved Hospital Problems   No resolved problems to display.        Hospital Course     78-year-old gentleman with hypertension, coronary artery disease, chronic diastolic heart failure, type 2 diabetes, and atrial fibrillation on Eliquis, who presented to the hospital after experiencing a right-sided facial droop and right arm weakness with speech disturbance. CT angiogram showed an acute intraparenchymal hemorrhage and Neurosurgery was consulted. Blood pressure management was recommended to keep systolic blood pressure less than 160. Nicardipine was initially started and he received Keppra for seizure prophylaxis and Kcentra for Eliquis reversal. He was admitted to ICU. We were asked to assume care when pt came out of ICU. Please see below for details of admission by problem:      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  - due to uncontrolled hypertension in the setting of anticoagulation and polycythemia vera  - Eliquis on hold until further notice  - has right hemiparesis and dysarthria as well as oropharyngeal dysphagia  - continue PT/OT/SLP, plan rehab (subacute) at dc  - tolerating modified diet per SLP recs, dc'd Dobhoff 3/6  - continue BP control with goal SBP<160mmHg, currently on amlodipine, valsartan, clonidine, Aldactone, and Coreg (see below)  - continue Keppra for seizure prophylaxis  - PRN IV Compazine for  headaches  - follow up CT head was reassuring  - appreciate MICAELA attention to pt  - f/u with MICAELA in office in 2 weeks, repeat CT Head at that time  - mental status/LOC waxing/waning, family concerned he may have UTI as he was recently treated for one--checked UA and it was fine  - have scheduled low-dose Zyprexa nightly--need to try to get his sleep schedule back in line, seems to be working well     HTN  - BPs labile--SBP was at goal but bradycardia triggered some adjustment of meds and SBP has been high at times since  - consulted Card for bradycardia, they were not concerned as it was asymptomatic and recommend focusing more on BP control as long as bradycardia remains mild and asymptomatic  - continued amlodipine, clonidine, and valsartan  - Card has added Aldactone, changed labetalol back to his home Coreg, and stopped his hydralazine altogether  - continue to monitor BPs and HRs on new regimen and defer further adjustment to medical staff at facility  - f/u with Card in 2 weeks     Type 2 DM  - sugars acceptable  - A1c 7.1  - continue Lantus  - continue SSI/hypoglycemia protocol  - holding metformin, glimepiride, and Januvia     PAF  - HRs acceptable today (see above)  - was on Eliquis prior to admission but he is off of this now due to spontaneous intracranial hemorrhage  - f/u with Card in 2 weeks     Polycythemia Vera  - on hydroxyurea as an outpatient--restart at [facility]  - Hgb fine        SCDs for DVT prophylaxis while here.  DNR confirmed.  Discussed with patient, jackie, RN, and CCP.  Anticipate discharge to [facility] later today after has BM.    Day of Discharge     Subjective:  Pt can say yes/no in answer to questions. Tolerating diet. Working with PT. Voiding well. No SOA or CP. LOC still waxing/waning per family and staff. Less agitated overnight.     Physical Exam:  Temp:  [98.1 °F (36.7 °C)-98.6 °F (37 °C)] 98.1 °F (36.7 °C)  Heart Rate:  [49-68] 58  Resp:  [16-18] 18  BP: (141-165)/()  142/63  Body mass index is 34.47 kg/m².  Physical Exam  Vitals and nursing note reviewed. Exam conducted with a chaperone present (Granddtr).   Constitutional:       General: He is not in acute distress.     Appearance: He is ill-appearing. He is not toxic-appearing or diaphoretic.   Cardiovascular:      Rate and Rhythm: Normal rate and regular rhythm.      Pulses: Normal pulses.   Pulmonary:      Effort: Pulmonary effort is normal. No respiratory distress.      Breath sounds: Normal breath sounds. No wheezing or rales.   Abdominal:      General: Bowel sounds are normal. There is no distension.      Palpations: Abdomen is soft.      Tenderness: There is no abdominal tenderness.   Musculoskeletal:         General: Swelling (RUE) present. No deformity.      Cervical back: Neck supple.      Comments: SCDs in place   Skin:     General: Skin is warm and dry.      Capillary Refill: Capillary refill takes less than 2 seconds.      Coloration: Skin is not jaundiced.   Neurological:      Mental Status: He is alert.      Comments: Right hemiparesis with facial droop  Dysarthria and expressive aphasia   Psychiatric:      Comments: Unable to assess  Flat affect         Consultants     Consult Orders (all) (From admission, onward)       Start     Ordered    03/08/24 0901  Inpatient Cardiology Consult  Once        Specialty:  Cardiology  Provider:  Cleveland Alvarado MD    03/08/24 0901    03/05/24 0850  Inpatient Case Management  Consult  Once        Provider:  (Not yet assigned)    03/05/24 0850    03/04/24 1216  Inpatient Hospitalist Consult  Once        Specialty:  Hospitalist  Provider:  Toams Erazo MD    03/04/24 1215    02/29/24 1520  Inpatient Neurology Consult General  Once        Specialty:  Neurology  Provider:  Ej Russell MD    02/29/24 1520    02/28/24 1057  Inpatient Hospitalist Consult  Once        Specialty:  Hospitalist  Provider:  Maxine Sandoval MD    02/28/24 1057     02/28/24 1013  Inpatient Rehab Admission Consult  Once        Provider:  (Not yet assigned)    02/28/24 1012    02/27/24 1335  Inpatient Consult to Nutrition Services  Once        Provider:  (Not yet assigned)    02/27/24 1335    02/25/24 0029  Notify Stroke Coordinator  Once        Provider:  (Not yet assigned)    02/25/24 0030    02/25/24 0029  Inpatient Rehab Admission Consult  Once,   Status:  Canceled        Provider:  (Not yet assigned)    02/25/24 0030    02/25/24 0029  Inpatient Case Management  Consult  Once        Provider:  (Not yet assigned)    02/25/24 0030    02/25/24 0029  Inpatient Diabetes Educator Consult  Once,   Status:  Canceled        Provider:  (Not yet assigned)    02/25/24 0030    02/25/24 0029  Inpatient Neurosurgery Consult  Once        Specialty:  Neurosurgery  Provider:  Josué Miller MD    02/25/24 0030    02/24/24 2230  Neurosurgery (on-call MD unless specified)  Once        Specialty:  Neurosurgery  Provider:  (Not yet assigned)    02/24/24 2230    02/24/24 2230  Pulmonology (on-call MD unless specified)  Once,   Status:  Canceled        Specialty:  Pulmonary Disease  Provider:  (Not yet assigned)    02/24/24 2230 02/24/24 2213  Inpatient Neurology Consult Stroke  Once        Specialty:  Neurology  Provider:  (Not yet assigned)    02/24/24 2212    02/24/24 2213  Inpatient Neurology Consult Stroke  Once        Specialty:  Neurology  Provider:  (Not yet assigned)    02/24/24 2212                  Procedures     * Surgery not found *    Imaging Results (All)       Procedure Component Value Units Date/Time    CT Head Without Contrast [[id number]] Collected: 03/06/24 0753     Updated: 03/06/24 0817    Narrative:      CT HEAD WITHOUT CONTRAST     HISTORY: Intracranial hemorrhage, follow-up.     COMPARISON: Comparison is made to multiple prior CT examinations of the  brain with the most recent examination being the CT examination of  03/01/2024.     FINDINGS: An  intraparenchymal hemorrhage involving the basal ganglia on  the left is again noted. The intraparenchymal hemorrhage measures  approximately 3 cm in the maximum transverse dimension which appears  similar to the prior examination. The margins are less distinct and the  attenuation of the hemorrhage has decreased consistent with an evolving  hemorrhage. Moderate adjacent edema is appreciated similar in appearance  as compared to the prior study. There is mass effect upon the anterior  aspect of the third ventricle which was the case previously. The lateral  ventricles are slightly more prominent as compared to the presenting CT  examination of 02/24/2024 but unchanged as compared to 03/01/2024.     Blood is present within the occipital horns similar in volume as  compared to the prior study but decreased in attenuation. There is no  evidence of new hemorrhage or of a new area of decreased attenuation to  suggest acute infarction. Moderate vascular calcification is noted.       Impression:      There is expected evolution of an intraparenchymal  hemorrhage involving the basal ganglia on the left. It is similar in  size as compared to 03/01/2024 but decreased in attenuation. The margins  are less distinct. Surrounding edema appears similar. The volume of  intraventricular blood has also decreased in attenuation but is similar  in size. The lateral ventricles are mildly prominent but unchanged as  compared to 03/01/2024. There is no evidence of new hemorrhage.     Radiation dose reduction techniques were utilized, including automated  exposure control and exposure modulation based on body size.        This report was finalized on 3/6/2024 8:14 AM by Dr. Alfred Rivera M.D  on Workstation: [id number]       FL Video Swallow Single Contrast [[id number]] Collected: 03/05/24 1248     Updated: 03/05/24 1314    Narrative:      VIDEO ESOPHAGRAM 03/04/2024     HISTORY: Dysphagia.     Fluoroscopy was used during performance of the  video esophagram by  speech pathology.     VFSS complete. Radiologist, Dr. Sanches, present during the study.  Penetration with thin liquids via straw, question trace nontransient  versus transient penetration. No other penetration/aspiration  visualized. Mild-moderate oral/pharyngeal residue post swallow reduced  with cued double swallow. Please see full speech pathology report.     FLUOROSCOPY TIME:  One minute 38 seconds, 2858 images, 5.9 mGy           This report was finalized on 3/5/2024 1:11 PM by Dr. Nicolás Page M.D on Workstation: [id number]       XR Abdomen KUB [[id number]] Collected: 03/02/24 1539     Updated: 03/02/24 1543    Narrative:      XR ABDOMEN KUB-     HISTORY: 78-year-old male status post feeding tube placement  verification.     FINDINGS: The feeding tube tip is within the gastric antrum angled  towards the proximal duodenum.     This report was finalized on 3/2/2024 3:40 PM by Dr. Yany Herr M.D on  Workstation: [id number]       XR Abdomen KUB [[id number]] Collected: 03/02/24 0636     Updated: 03/02/24 0640    Narrative:      ONE-VIEW PORTABLE ABDOMEN AT 6:29 A.M.     HISTORY: Feeding tube placement     FINDINGS: The feeding tube ends in the region of the pylorus or possibly  duodenal bulb. The visualized bowel gas pattern is unremarkable.     This report was finalized on 3/2/2024 6:37 AM by Dr. Sony Rachel M.D  on Workstation: [id number]       CT Head Without Contrast [[id number]] Collected: 02/29/24 1519     Updated: 03/01/24 0638    Narrative:      STAT CT SCAN OF THE HEAD WITHOUT CONTRAST ON 02/29/2022     CLINICAL HISTORY: Patient has an acute intraparenchymal hemorrhage and  has new onset left-sided deviation, possible stroke.      TECHNIQUE: Spiral CT images were obtained from the base of the skull to  the vertex without intravenous contrast. The images were reformatted and  are submitted in 3 mm thick axial, sagittal and coronal CT sections with  brain algorithm.      This study  is correlated to a prior head CT on 02/27/2024, two days ago  and additional head CTs on 02/26/2024 and 02/25/2024.     FINDINGS: There is an ovoid acute intraparenchymal hemorrhage that  extends from the posterior body of the left caudate nucleus and  throughout the left thalamus. It measures 3.2 x 2.5 cm in oblique  anterior posterior and medial lateral dimension and 3.5 cm in cranial  caudal dimension and is without change when compared to 02/27/2024.  There is some surrounding edema tracking in the remainder of the left  thalamus and in the genu and posterior limb of the left internal capsule  and into the mid to posterior left corona radiata region with edema  tracking up to 3.5 x 3.5 cm. There is mass effect on the body of the  left lateral ventricle and on the third ventricle and there is 2 mm left  to right midline shift. There is also intraventricular extension of  hemorrhage with small amount of hemorrhage in the posterior trigones and  occipital horns of the lateral ventricles, unchanged.  The lateral third  ventricles are minimally prominent in size, unchanged. There is some  mild patchy low-density in the periventricular white matter with some  extension in the subcortical white matter consistent with  mild-to-moderate small vessel disease. There is an old lacunar infarct  in the posterior aspect of the posterior limb of the right internal  capsule measuring 7 x 4 mm in size, unchanged.  Mild cerebral atrophy is  present.  I see no extra-axial fluid collections and no new intracranial  hemorrhage. Paranasal sinuses, mastoid air cells and middle ear cavities  are clear.       Impression:      1. No significant interval change when compared to most recent prior  head CT 02/27/2024, two days ago.  2. There is a stable ovoid acute intraparenchymal hemorrhage centered in  the left thalamus with some extension into the body of the left caudate  nucleus.  It maximally measures 3.2 x 2.5 x 3.5 cm with 3.5  cm  surrounding vasogenic edema and some mild mass effect on the body of the  left lateral ventricle and third ventricle and at most 2 mm left to  right midline shift and there is stable intraventricular extension of  the hemorrhage with a small amount of acute intraventricular hemorrhage  in the posterior trigones and occipital horns of the lateral ventricles.   There is stable mild prominence in size of the lateral and third  ventricles, some of which is due to volume loss.  3. There is mild-to-moderate small vessel disease in the cerebral white  matter and there is a 7 x 4 mm old lacunar infarct in the posterior  aspect of the posterior limb of the right internal capsule.  The  remainder of the head CT is unremarkable. If the patient truly has new  onset weakness today, could consider an MRI of the brain for more  complete assessment.  The results were discussed with Dr. Rivero by  telephone 02/29/2024 at 2:20 p.m.     Radiation dose reduction techniques were utilized, including automated  exposure control and exposure modulation based on body size.        This report was finalized on 3/1/2024 6:35 AM by Dr. Vernon Feliz M.D on  Workstation: [id number]       CT Head Without Contrast [[id number]] Collected: 03/01/24 0525     Updated: 03/01/24 0525    Narrative:        Patient: FELIPE DOCKERY  Time Out: 05:24  Exam(s): CT HEAD Without Contrast     EXAM:    CT Head Without Intravenous Contrast    CLINICAL HISTORY:     Reason for exam: Stroke, follow up.    TECHNIQUE:    Axial computed tomography images of the head brain without intravenous   contrast.  CTDI is 55.18 mGy and DLP is 1062.1 mGy-cm.  This CT exam was   performed according to the principle of ALARA (As Low As Reasonably   Achievable) by using one or more of the following dose reduction   techniques: automated exposure control, adjustment of the mA and or kV   according to patient size, and or use of iterative reconstruction   technique.    COMPARISON:     Noncontrast head CT 02 29 2024 at 1406 hrs.    FINDINGS:    Brain:  Acute intraparenchymal hemorrhage in the left thalamus is   redemonstrated, similar in size to prior measuring 2.6 x 3.2 x 3.4 cm in   transverse by AP by craniocaudal dimensions.  No interval loss of gray-  white differentiation or parenchymal low attenuation suspicious for an   evolving acute infarct.    Ventricles:  Acute intraventricular hemorrhage is also present,   reflecting extension of the intraparenchymal hemorrhage to the ventricles.    Similar enlargement of the lateral and third ventricles.  The fourth   ventricle is not enlarged.    Bones joints:  Unremarkable.  No acute fracture.    Soft tissues:  Unremarkable.    Sinuses:  Unremarkable as visualized.  No acute sinusitis.    Mastoid air cells:  Unremarkable as visualized.  No mastoid effusion.    IMPRESSION:         Stable intracranial hemorrhage with associated ventriculomegaly.      Impression:          Electronically signed by Cheryl Boland MD on 03-01-24 at 0524    CT Head Without Contrast [[id number]] Collected: 02/27/24 0528     Updated: 02/27/24 0528    Narrative:        Patient: FELIPE DOCKERY  Time Out: 05:28  Exam(s): CT HEAD Without Contrast     EXAM:    CT Head Without Intravenous Contrast    CLINICAL HISTORY:     Reason for exam: fu.    TECHNIQUE:    Axial computed tomography images of the head brain without intravenous   contrast.  CTDI is 55.48 mGy and DLP is 1147.5 mGy-cm.  This CT exam was   performed according to the principle of ALARA (As Low As Reasonably   Achievable) by using one or more of the following dose reduction   techniques: automated exposure control, adjustment of the mA and or kV   according to patient size, and or use of iterative reconstruction   technique.    COMPARISON:    CT Head dated 02 26 2024    FINDINGS:    Brain:  Areas of decreased attenuation in the deep cerebral white   matter are consistent with small vessel ischemic degenerative  changes.    The cerebral and cerebellar sulci are prominent consistent with brain   atrophy.  No hemorrhage.    Ventricles:  Unchanged left thalamic intraparenchymal hematoma with   intraventricular rupture.  This measures up to 3.1 cm with adjacent   vasogenic edema.  Dependent hemorrhage redemonstrated along the occipital   horns.  No new areas of hemorrhage identified.  Mildly dilated   ventricular system which may be due in part to expect your changes.    Consider continued attention on follow-up imaging.    Bones joints:  Motion artifact limits diagnostic accuracy.  No acute   fracture.    Soft tissues:  Unremarkable.    Vasculature:  Atherosclerotic disease.    Sinuses:  Unremarkable as visualized.    Mastoid air cells:  Unremarkable as visualized.  No mastoid effusion.    IMPRESSION:       1.  Motion artifact limits diagnostic accuracy.  2.  Unchanged left thalamic intraparenchymal hematoma with   intraventricular rupture.  This measures up to 3.1 cm with adjacent   vasogenic edema.  Dependent hemorrhage redemonstrated along the occipital   horns.  No new areas of hemorrhage identified.  3.  Small vessel ischemic degenerative changes.  4.  Cerebral and cerebellar atrophy.  5.  Mildly dilated ventricular system which may be due in part to expect   your changes.  Consider continued attention on follow-up imaging.      Impression:          Electronically signed by Randy Mejia MD on 02-27-24 at 0528    CT Head Without Contrast [[id number]] Collected: 02/25/24 1815     Updated: 02/26/24 1337    Narrative:      CT OF THE BRAIN WITHOUT CONTRAST 2/25/2024 at 5:34 p.m.     HISTORY: Neuro deficit. Follow-up hemorrhage.     TECHNIQUE: Axial images were obtained through the brain without  intravenous contrast.     COMPARISON: Previous CT of the brain from earlier today at 12:12 p.m. is  compared.     FINDINGS: There is a moderately large hyperdense hemorrhage in the  region of the left thalamus measuring  approximately 2.7 cm x 3.3 cm in  oblique transverse dimensions by approximately 3.4 cm in craniocaudal  dimension. Overall this finding appears relatively stable in size from  the earlier study today. There is a moderate amount of surrounding  edema.     There is mild to moderate enlargement of the lateral ventricles which  appears slightly more prominent in size than on the earlier study today.  Third ventricle does not appear significantly dilated. The fourth  ventricle is mildly prominent in size but relatively stable from the  previous study. There is moderate amount of blood layering in the  dependent portion of the occipital horns of both lateral ventricles.  This finding is more severe on the left than on the right and also may  be minimally increased in amount from the earlier study today.     No significant midline shift is seen. There is mild diffuse atrophy.  There is decreased attenuation of the periventricular white matter  bilaterally consistent with mild small vessel white matter ischemic  disease.     No other new areas of hemorrhage are seen.       Impression:      1. Moderately large left thalamic hemorrhage appears relatively stable  in size from the earlier study today.  2. There is hyperdense hemorrhage in the occipital horns of both lateral  ventricles which may be slightly increased from the earlier study today.  3. Overall size of the lateral ventricles appears slightly increased  from the earlier study today at 1212 hours.     Radiation dose reduction techniques were utilized, including automated  exposure control and exposure modulation based on body size.        This report was finalized on 2/26/2024 1:34 PM by Dr. Nicolás Page M.D on Workstation: [id number]       CT Head Without Contrast [[id number]] Collected: 02/26/24 0537     Updated: 02/26/24 0537    Narrative:        Patient: FELIPE DOCKERY  Time Out: 05:36  Exam(s): CT HEAD Without Contrast     EXAM:    CT Head Without  Intravenous Contrast    CLINICAL HISTORY:     Reason for exam: fu.    TECHNIQUE:    Axial computed tomography images of the head brain without intravenous   contrast.  CTDI is 55.18 mGy and DLP is 1069.4 mGy-cm.  This CT exam was   performed according to the principle of ALARA (As Low As Reasonably   Achievable) by using one or more of the following dose reduction   techniques: automated exposure control, adjustment of the mA and or kV   according to patient size, and or use of iterative reconstruction   technique.    COMPARISON:    2 25 2024    FINDINGS:    The parenchymal hemorrhage centered in the left thalamus is relatively   stable in size, with mild surrounding edema.  There is mild mass effect   upon the left lateral ventricle.  Stable minimal midline shift, 2 mm to   the right.  There has been interval increase in the amount of   intraventricular hemorrhage.  There is mild ventriculomegaly,  stable to   possibly minimally increased compared to the prior study.    IMPRESSION:       Slight interval increase in amount of intraventricular hemorrhage.  No   other significant interval change.    Impression:          Electronically signed by Iwona Pham MD on 02-26-24 at 0536    CT Head Without Contrast [[id number]] Collected: 02/25/24 1233     Updated: 02/25/24 1317    Narrative:      CT HEAD WITHOUT CONTRAST     HISTORY: Hemorrhagic stroke. Follow-up exam. Right arm weakness and  speech disturbance.     TECHNIQUE:  Head CT includes axial imaging from the base of skull to the  vertex without intravenous contrast. Radiation dose reduction techniques  were utilized, including automated exposure control and exposure  modulation based on body size.     COMPARISON: Head CTs without contrast 02/25/2024 at 5:58 a.m., 1:17  a.m., head CT 02/24/2024.     FINDINGS: Acute hemorrhage centered within the left thalamus measures  approximately 3 x 3.5 x 3.7 cm and this is without significant change  compared to prior exam At  5:59 a.m. The degree of surrounding vasogenic  edema is also without change. There is persistent intraventricular  hemorrhage layering dependently within the occipital horns of both  lateral ventricles, greater on the left. There is mild ventricular  enlargement without significant change. Intraparenchymal hemorrhage is  associated with localized mass effect with 3 mm left to right midline  shift that is without change.       Impression:      Intraparenchymal hemorrhage centered within the left  thalamus is without significant change when compared to the prior head  CT same date approximately 6 hours previous. There is surrounding edema  with mass effect and 3 mm left to right midline shift. There is also  acute intraventricular hemorrhage layering dependently within the  occipital horns of the lateral ventricles with mild ventricular  enlargement without change.     This report was finalized on 2/25/2024 1:14 PM by Dr. Aftab Duke M.D on Workstation: [id number]       CT Head Without Contrast [[id number]] Collected: 02/25/24 0816     Updated: 02/25/24 0826    Narrative:      CT HEAD WO CONTRAST-     INDICATIONS: Intracranial hemorrhage, follow-up     TECHNIQUE: Radiation dose reduction techniques were utilized, including  automated exposure control and exposure modulation based on body size.  Noncontrast head CT     COMPARISON: 2/25/2024 at 0118 hours     FINDINGS:     The intraparenchymal hemorrhage centered at the left basal ganglia  measures 3.0 x 3.5 x 3.7 cm, previously measured at 2.7 x 2.8 x 3.1 cm,  again with surrounding edema. Local mass effect effect is demonstrated  with partial effacement of the left lateral ventricle, and about 3 mm  rightward midline shift. Increased hemorrhage extends into the left  lateral ventricle, and posteriorly in the right lateral ventricle.     Moderate to prominent periventricular hypodensities suggest chronic  small vessel ischemic change in a patient this age.      Ventricles, cisterns, cerebral sulci are otherwise stable.        The visualized paranasal sinuses, orbits, mastoid air cells are  unremarkable. A 1.5 cm lucent focus is noted in the left frontal bone on  axial image 33, nonspecific.             Impression:         Further interval worsening, as described.           Discussed by telephone with patient's nurse, Zoya, at time of  interpretation,     This report was finalized on 2/25/2024 8:23 AM by Dr. Akhil Nugent M.D on Workstation: [id number]       CT Head Without Contrast [[id number]] Collected: 02/25/24 0154     Updated: 02/25/24 0208    Addenda:        ADDENDUM:  02 25 24 02:07 Call Doctor Regarding Intracranial Hemorrhage, called NP   Dena Pagan on 02 25 02:07 (-05:00)      Signed: 02/25/24 0154 by Noman Winchester MD    Narrative:      CR  Patient: FELIPE DOCKERY  Time Out: 01:54  Exam(s): CT HEAD Without Contrast     EXAM:    CT Head Without Intravenous Contrast    CLINICAL HISTORY:     Reason for exam: Neuro deficit, acute, stroke suspected.    TECHNIQUE:    Axial computed tomography images of the head brain without intravenous   contrast.  This CT exam was performed according to the principle of ALARA   (As Low As Reasonably Achievable) by using one or more of the following   dose reduction techniques: automated exposure control, adjustment of the   mA and or kV according to patient size, and or use of iterative   reconstruction technique.    COMPARISON:    No relevant prior studies available.    FINDINGS:    Brain:  2.7 x 2.8 x 3.1 cm left thalamic intraparenchymal hemorrhage   with associated vasogenic edema.  This has increased in size from the   prior study when it measured 2.2 x 1.9 x 1.6 cm.  Additionally there has   been interval intraventricular extension of the hemorrhage into the left   lateral ventricle with greater mass-effect upon the lateral ventricle   with subtle 3 mm left to right midline shift.  Small amount of hemorrhage   is  also noted within the dependent portion of the right lateral ventricle.      Ventricles:  See above.    Bones joints:  Unremarkable.  No acute fracture.    Soft tissues:  Unremarkable.    Sinuses:  Unremarkable as visualized.  No acute sinusitis.    Mastoid air cells:  Unremarkable as visualized.  No mastoid effusion.    IMPRESSION:         Increasing left thalamic hemorrhage measuring 3.1 cm in greatest   dimension on the current study increased from 2.2 cm on the prior exam.    This results in greater mass-effect upon the left lateral ventricle, with   intraventricular extension and hemorrhage seen within both lateral   ventricles.      Impression:            Communications:     Call Doctor Intracranial Hemorrhage    Electronically signed by Noman Winchester MD on 02-25-24 at 0154    XR Chest 1 View [[id number]] Collected: 02/24/24 2314     Updated: 02/24/24 2327    Narrative:      CHEST SINGLE VIEW     HISTORY: Stroke     COMPARISON: None     FINDINGS: Heart size is mildly enlarged. Lungs appear clear of focal  airspace disease and there is no evidence for pulmonary edema or pleural  effusion or infiltrate. Cardiac monitor and leads are present.  Atherosclerotic calcifications are present overlying the thoracic aorta.       Impression:      Cardiomegaly. Atherosclerotic disease. No evidence for  active disease in the chest     This report was finalized on 2/24/2024 11:24 PM by Dr. Aftab Duke M.D on Workstation: [id number]       CT Angiogram Head w AI Analysis of LVO [[id number]] Collected: 02/24/24 2241     Updated: 02/24/24 2250    Narrative:      CT HEAD WITHOUT CONTRAST     HISTORY: Neurologic deficit. Mental status change.     TECHNIQUE:  Head CT includes axial imaging from the base of skull to the  vertex without intravenous contrast. Radiation dose reduction techniques  were utilized, including automated exposure control and exposure  modulation based on body size.     COMPARISON: None     FINDINGS:  There is an acute intraparenchymal hematoma centered within  the left thalamus and extending slightly anteromedial to the left  thalamus measuring approximately 2.6 x 2 x 1.8 cm. This exhibits  surrounding edema and there is localized mass effect. No additional  intraparenchymal hemorrhage is evident. There is moderate chronic small  vessel ischemic white matter change demonstrated as patchy deep cerebral  white matter hypodensity. Intracranial atherosclerotic calcifications  are present.       Impression:      1. Acute intraparenchymal hemorrhage centered within the left thalamus  measuring 2.6 x 2 x 1.8 cm with surrounding cerebral edema. Follow-up  evaluation recommended.  2. Moderate to severe chronic small vessel ischemic white matter change.  Intracranial atherosclerotic calcifications.        Findings discussed with the stroke neurologist on call on 02/24/2024 at  10:28 p.m.     Radiation dose reduction techniques were utilized, including automated  exposure control and exposure modulation based on body size.        This report was finalized on 2/24/2024 10:46 PM by Dr. Aftab Duke M.D on Workstation: [id number]                 Pertinent Labs     Results from last 7 days   Lab Units 03/11/24  0532 03/10/24  0418 03/09/24  0426 03/08/24  0536   WBC 10*3/mm3 10.48 11.02* 11.29* 9.65   HEMOGLOBIN g/dL 15.7 15.7 15.0 15.2   PLATELETS 10*3/mm3 402 443 417 405     Results from last 7 days   Lab Units 03/11/24  0532 03/10/24  0418 03/09/24  0426 03/08/24  0536   SODIUM mmol/L 143 142 144 143   POTASSIUM mmol/L 3.8 3.7 4.2 3.9   CHLORIDE mmol/L 105 107 107 109*   CO2 mmol/L 26.4 28.0 26.0 22.8   BUN mg/dL 20 19 26* 20   CREATININE mg/dL 0.99 0.89 1.11 0.86   GLUCOSE mg/dL 94 111* 219* 165*   EGFR mL/min/1.73 78.9 88.8 68.8 89.7     Results from last 7 days   Lab Units 03/11/24  0532 03/10/24  0418 03/09/24  0426 03/08/24  0536 03/07/24  0801   ALBUMIN g/dL 3.8 4.0 3.6 3.4* 3.8   BILIRUBIN mg/dL  --   --   --    "--  0.8   ALK PHOS U/L  --   --   --   --  99   AST (SGOT) U/L  --   --   --   --  36   ALT (SGPT) U/L  --   --   --   --  36     Results from last 7 days   Lab Units 03/11/24  0532 03/10/24  0418 03/09/24  0426 03/08/24  0536   CALCIUM mg/dL 9.8 9.9 9.4 9.4   ALBUMIN g/dL 3.8 4.0 3.6 3.4*   MAGNESIUM mg/dL 2.4 2.3 2.6* 2.4   PHOSPHORUS mg/dL 3.2 2.5 3.3 3.2               Invalid input(s): \"LDLCALC\"          Test Results Pending at Discharge       Discharge Details        Discharge Medications        New Medications        Instructions Start Date   acetaminophen 325 MG tablet  Commonly known as: TYLENOL   650 mg, Oral, Every 4 Hours PRN      amLODIPine 10 MG tablet  Commonly known as: NORVASC   10 mg, Oral, Every 24 Hours Scheduled   Start Date: March 12, 2024     cloNIDine 0.2 MG tablet  Commonly known as: CATAPRES   0.2 mg, Oral, Every 8 Hours Scheduled      insulin glargine 100 UNIT/ML injection  Commonly known as: LANTUS, SEMGLEE   25 Units, Subcutaneous, Nightly      insulin regular 100 UNIT/ML injection  Commonly known as: humuLIN R,novoLIN R   2-9 Units, Subcutaneous, 3 Times Daily Before Meals      levETIRAcetam 500 MG tablet  Commonly known as: KEPPRA   500 mg, Oral, 2 Times Daily      melatonin 5 MG tablet tablet   5 mg, Oral, Nightly      OLANZapine zydis 5 MG disintegrating tablet  Commonly known as: zyPREXA   5 mg, Translingual, Nightly      sennosides-docusate 8.6-50 MG per tablet  Commonly known as: PERICOLACE   2 tablets, Oral, 2 Times Daily      spironolactone 25 MG tablet  Commonly known as: ALDACTONE   25 mg, Oral, Every Morning   Start Date: March 12, 2024            Continue These Medications        Instructions Start Date   b complex-C-E-zinc tablet   1 tablet, Oral, Daily      carvedilol 6.25 MG tablet  Commonly known as: COREG   6.25 mg, Oral, 2 Times Daily With Meals      fluticasone 50 MCG/ACT nasal spray  Commonly known as: FLONASE   1 spray, Nasal, Daily      Gemtesa 75 MG tablet  Generic " drug: Vibegron   75 mg, Oral, Daily With Breakfast      hydroxyurea 500 MG capsule  Commonly known as: HYDREA   500 mg, Oral, Daily      memantine 10 MG tablet  Commonly known as: NAMENDA   10 mg, Oral, 2 Times Daily      rosuvastatin 40 MG tablet  Commonly known as: CRESTOR   40 mg, Oral, Nightly      tamsulosin 0.4 MG capsule 24 hr capsule  Commonly known as: FLOMAX   1 capsule, Oral, Daily      valsartan 320 MG tablet  Commonly known as: DIOVAN   320 mg, Oral, Daily             Stop These Medications      Aspirin Low Dose 81 MG EC tablet  Generic drug: aspirin     Eliquis 5 MG tablet tablet  Generic drug: apixaban     glimepiride 2 MG tablet  Commonly known as: AMARYL     hydrALAZINE 100 MG tablet  Commonly known as: APRESOLINE     Januvia 100 MG tablet  Generic drug: SITagliptin     metFORMIN  MG 24 hr tablet  Commonly known as: GLUCOPHAGE-XR     montelukast 10 MG tablet  Commonly known as: SINGULAIR     potassium chloride 20 MEQ packet  Commonly known as: KLOR-CON     Risaquad-2 capsule capsule     torsemide 20 MG tablet  Commonly known as: DEMADEX              No Known Allergies    Discharge Disposition:  Skilled Nursing Facility (DC - External)      Discharge Diet:  Diet Order   Procedures   • Diet: Diabetic; Consistent Carbohydrate; Feeding Assistance - Nursing; Texture: Pureed (NDD 1); Fluid Consistency: Nectar Thick       Discharge Activity:   As tolerated, per PT    CODE STATUS:    Code Status and Medical Interventions:   Ordered at: 03/09/24 1703     Medical Intervention Limits:    NO intubation (DNI)    NO cardioversion     Level Of Support Discussed With:    Next of Kin (If No Surrogate)     Code Status (Patient has no pulse and is not breathing):    No CPR (Do Not Attempt to Resuscitate)     Medical Interventions (Patient has pulse or is breathing):    Limited Support       Future Appointments   Date Time Provider Department Center   3/20/2024  8:00 AM [facility] PET CT  [facility]   3/22/2024  2:15  PM Janae Davis APRN [facility]     Additional Instructions for the Follow-ups that You Need to Schedule       Discharge Follow-up with PCP   As directed       Currently Documented PCP:    Zamzam John APRN    PCP Phone Number:    [phone]     Follow Up Details: Dora NP (PCP) after dc from facility        Discharge Follow-up with Specified Provider: Jose CHERY (Card); 2 Weeks   As directed      To: Jose NP (Card)   Follow Up: 2 Weeks        Discharge Follow-up with Specified Provider: Medical Staff at facility; 1 Day   As directed      To: Medical Staff at facility   Follow Up: 1 Day        Discharge Follow-up with Specified Provider: Ryan NP (NeuroSurg) on March 22nd   As directed      To: Ryan NP (NeuroSurg) on March 22nd               Contact information for follow-up providers       Zamzam John APRN .    Specialty: Family Medicine  Why: Dora NP (PCP) after dc from facility  Contact information:  [address] [place]. \[facility].\"" [address] [phone]                       Contact information for after-discharge care       Destination       [facility] .    Service: Skilled Nursing  Contact information:  [address]   [facility] [address] [phone]                                   Additional Instructions for the Follow-ups that You Need to Schedule       Discharge Follow-up with PCP   As directed       Currently Documented PCP:    Zamzam John APRN    PCP Phone Number:    [phone]     Follow Up Details: Dora NP (PCP) after dc from facility        Discharge Follow-up with Specified Provider: Jose CHERY (Theodore); 2 Weeks   As directed      To: Jose CHERY (Card)   Follow Up: 2 Weeks        Discharge Follow-up with Specified Provider: Medical Staff at facility; 1 Day   As directed      To: Medical Staff at facility   Follow Up: 1 Day        Discharge Follow-up with Specified Provider: Ryan NP (NeuroSurg) on March 22nd   As  directed      To: Ryan CHERY (NeuroSurg) on March 22nd            Time Spent on Discharge:  Greater than 30 minutes      Cachorro Knox MD  [facility]  03/11/24  13:15 EDT

## 2024-03-12 ENCOUNTER — APPOINTMENT (OUTPATIENT)
Dept: CT IMAGING | Facility: HOSPITAL | Age: 79
DRG: 064 | End: 2024-03-12
Payer: MEDICARE

## 2024-03-12 ENCOUNTER — APPOINTMENT (OUTPATIENT)
Dept: GENERAL RADIOLOGY | Facility: HOSPITAL | Age: 79
DRG: 064 | End: 2024-03-12
Payer: MEDICARE

## 2024-03-12 LAB
ALBUMIN SERPL-MCNC: 3.8 G/DL (ref 3.5–5.2)
ANION GAP SERPL CALCULATED.3IONS-SCNC: 9 MMOL/L (ref 5–15)
BACTERIA UR QL AUTO: ABNORMAL /HPF
BILIRUB UR QL STRIP: NEGATIVE
BUN SERPL-MCNC: 22 MG/DL (ref 8–23)
BUN/CREAT SERPL: 22.4 (ref 7–25)
CALCIUM SPEC-SCNC: 9.8 MG/DL (ref 8.6–10.5)
CHLORIDE SERPL-SCNC: 108 MMOL/L (ref 98–107)
CLARITY UR: CLEAR
CO2 SERPL-SCNC: 26 MMOL/L (ref 22–29)
COLOR UR: ABNORMAL
CREAT SERPL-MCNC: 0.98 MG/DL (ref 0.76–1.27)
DEPRECATED RDW RBC AUTO: 51.5 FL (ref 37–54)
EGFRCR SERPLBLD CKD-EPI 2021: 79.9 ML/MIN/1.73
ERYTHROCYTE [DISTWIDTH] IN BLOOD BY AUTOMATED COUNT: 16 % (ref 12.3–15.4)
GLUCOSE BLDC GLUCOMTR-MCNC: 190 MG/DL (ref 70–130)
GLUCOSE BLDC GLUCOMTR-MCNC: 245 MG/DL (ref 70–130)
GLUCOSE SERPL-MCNC: 188 MG/DL (ref 65–99)
GLUCOSE UR STRIP-MCNC: ABNORMAL MG/DL
HCT VFR BLD AUTO: 49.1 % (ref 37.5–51)
HGB BLD-MCNC: 15.7 G/DL (ref 13–17.7)
HGB UR QL STRIP.AUTO: ABNORMAL
HYALINE CASTS UR QL AUTO: ABNORMAL /LPF
KETONES UR QL STRIP: NEGATIVE
LEUKOCYTE ESTERASE UR QL STRIP.AUTO: NEGATIVE
MCH RBC QN AUTO: 28.3 PG (ref 26.6–33)
MCHC RBC AUTO-ENTMCNC: 32 G/DL (ref 31.5–35.7)
MCV RBC AUTO: 88.6 FL (ref 79–97)
NITRITE UR QL STRIP: NEGATIVE
PH UR STRIP.AUTO: 5.5 [PH] (ref 5–8)
PHOSPHATE SERPL-MCNC: 2.9 MG/DL (ref 2.5–4.5)
PLATELET # BLD AUTO: 397 10*3/MM3 (ref 140–450)
PMV BLD AUTO: 9.9 FL (ref 6–12)
POTASSIUM SERPL-SCNC: 3.5 MMOL/L (ref 3.5–5.2)
PROT UR QL STRIP: ABNORMAL
RBC # BLD AUTO: 5.54 10*6/MM3 (ref 4.14–5.8)
RBC # UR STRIP: ABNORMAL /HPF
REF LAB TEST METHOD: ABNORMAL
SODIUM SERPL-SCNC: 143 MMOL/L (ref 136–145)
SP GR UR STRIP: 1.02 (ref 1–1.03)
SQUAMOUS #/AREA URNS HPF: ABNORMAL /HPF
UROBILINOGEN UR QL STRIP: ABNORMAL
WBC # UR STRIP: ABNORMAL /HPF
WBC NRBC COR # BLD AUTO: 13.9 10*3/MM3 (ref 3.4–10.8)

## 2024-03-12 PROCEDURE — 80069 RENAL FUNCTION PANEL: CPT | Performed by: INTERNAL MEDICINE

## 2024-03-12 PROCEDURE — 81001 URINALYSIS AUTO W/SCOPE: CPT | Performed by: INTERNAL MEDICINE

## 2024-03-12 PROCEDURE — 99232 SBSQ HOSP IP/OBS MODERATE 35: CPT | Performed by: INTERNAL MEDICINE

## 2024-03-12 PROCEDURE — 63710000001 INSULIN REGULAR HUMAN PER 5 UNITS: Performed by: INTERNAL MEDICINE

## 2024-03-12 PROCEDURE — 25810000003 SODIUM CHLORIDE 0.9 % SOLUTION: Performed by: INTERNAL MEDICINE

## 2024-03-12 PROCEDURE — 25010000002 CEFTRIAXONE PER 250 MG: Performed by: INTERNAL MEDICINE

## 2024-03-12 PROCEDURE — 85027 COMPLETE CBC AUTOMATED: CPT | Performed by: INTERNAL MEDICINE

## 2024-03-12 PROCEDURE — 63710000001 INSULIN GLARGINE PER 5 UNITS: Performed by: INTERNAL MEDICINE

## 2024-03-12 PROCEDURE — 71250 CT THORAX DX C-: CPT

## 2024-03-12 PROCEDURE — 82948 REAGENT STRIP/BLOOD GLUCOSE: CPT

## 2024-03-12 PROCEDURE — 71045 X-RAY EXAM CHEST 1 VIEW: CPT

## 2024-03-12 RX ORDER — AMOXICILLIN 250 MG
2 CAPSULE ORAL 2 TIMES DAILY
Status: DISCONTINUED | OUTPATIENT
Start: 2024-03-12 | End: 2024-03-14

## 2024-03-12 RX ORDER — BISACODYL 10 MG
10 SUPPOSITORY, RECTAL RECTAL DAILY PRN
Status: DISCONTINUED | OUTPATIENT
Start: 2024-03-12 | End: 2024-03-14

## 2024-03-12 RX ORDER — SODIUM CHLORIDE 9 MG/ML
100 INJECTION, SOLUTION INTRAVENOUS CONTINUOUS
Status: ACTIVE | OUTPATIENT
Start: 2024-03-12 | End: 2024-03-12

## 2024-03-12 RX ORDER — INSULIN LISPRO 100 [IU]/ML
3 INJECTION, SOLUTION INTRAVENOUS; SUBCUTANEOUS
Status: DISCONTINUED | OUTPATIENT
Start: 2024-03-12 | End: 2024-03-12

## 2024-03-12 RX ORDER — POLYETHYLENE GLYCOL 3350 17 G/17G
17 POWDER, FOR SOLUTION ORAL DAILY
Status: DISCONTINUED | OUTPATIENT
Start: 2024-03-12 | End: 2024-03-14

## 2024-03-12 RX ORDER — BISACODYL 5 MG/1
5 TABLET, DELAYED RELEASE ORAL DAILY PRN
Status: DISCONTINUED | OUTPATIENT
Start: 2024-03-12 | End: 2024-03-14

## 2024-03-12 RX ORDER — POTASSIUM CHLORIDE 1.5 G/1.58G
40 POWDER, FOR SOLUTION ORAL ONCE
Status: COMPLETED | OUTPATIENT
Start: 2024-03-12 | End: 2024-03-12

## 2024-03-12 RX ADMIN — Medication 10 ML: at 20:10

## 2024-03-12 RX ADMIN — VALSARTAN 320 MG: 320 TABLET, FILM COATED ORAL at 17:17

## 2024-03-12 RX ADMIN — OLANZAPINE 5 MG: 5 TABLET, ORALLY DISINTEGRATING ORAL at 20:08

## 2024-03-12 RX ADMIN — CARVEDILOL 6.25 MG: 6.25 TABLET, FILM COATED ORAL at 17:17

## 2024-03-12 RX ADMIN — ACETAMINOPHEN 325MG 650 MG: 325 TABLET ORAL at 01:43

## 2024-03-12 RX ADMIN — Medication 5 MG: at 20:08

## 2024-03-12 RX ADMIN — SPIRONOLACTONE 25 MG: 25 TABLET ORAL at 06:40

## 2024-03-12 RX ADMIN — POLYETHYLENE GLYCOL 3350 17 G: 17 POWDER, FOR SOLUTION ORAL at 17:17

## 2024-03-12 RX ADMIN — INSULIN HUMAN 4 UNITS: 100 INJECTION, SOLUTION PARENTERAL at 12:30

## 2024-03-12 RX ADMIN — DOCUSATE SODIUM 50MG AND SENNOSIDES 8.6MG 2 TABLET: 8.6; 5 TABLET, FILM COATED ORAL at 20:09

## 2024-03-12 RX ADMIN — AMLODIPINE BESYLATE 10 MG: 10 TABLET ORAL at 08:36

## 2024-03-12 RX ADMIN — MEMANTINE HYDROCHLORIDE 10 MG: 10 TABLET, FILM COATED ORAL at 08:36

## 2024-03-12 RX ADMIN — CARVEDILOL 6.25 MG: 6.25 TABLET, FILM COATED ORAL at 08:36

## 2024-03-12 RX ADMIN — CLONIDINE HYDROCHLORIDE 0.2 MG: 0.1 TABLET ORAL at 05:22

## 2024-03-12 RX ADMIN — POTASSIUM CHLORIDE 40 MEQ: 1.5 FOR SOLUTION ORAL at 12:31

## 2024-03-12 RX ADMIN — LEVETIRACETAM 500 MG: 500 TABLET, FILM COATED ORAL at 20:08

## 2024-03-12 RX ADMIN — INSULIN GLARGINE 20 UNITS: 100 INJECTION, SOLUTION SUBCUTANEOUS at 20:09

## 2024-03-12 RX ADMIN — SODIUM CHLORIDE 100 ML/HR: 9 INJECTION, SOLUTION INTRAVENOUS at 17:26

## 2024-03-12 RX ADMIN — INSULIN HUMAN 2 UNITS: 100 INJECTION, SOLUTION PARENTERAL at 17:38

## 2024-03-12 RX ADMIN — INSULIN HUMAN 2 UNITS: 100 INJECTION, SOLUTION PARENTERAL at 08:36

## 2024-03-12 RX ADMIN — ACETAMINOPHEN 325MG 650 MG: 325 TABLET ORAL at 12:31

## 2024-03-12 RX ADMIN — CEFTRIAXONE SODIUM 1000 MG: 1 INJECTION, POWDER, FOR SOLUTION INTRAMUSCULAR; INTRAVENOUS at 17:17

## 2024-03-12 RX ADMIN — Medication 10 ML: at 08:41

## 2024-03-12 RX ADMIN — LEVETIRACETAM 500 MG: 500 TABLET, FILM COATED ORAL at 08:36

## 2024-03-12 RX ADMIN — SENNOSIDES AND DOCUSATE SODIUM 2 TABLET: 50; 8.6 TABLET ORAL at 08:36

## 2024-03-12 RX ADMIN — MEMANTINE HYDROCHLORIDE 10 MG: 10 TABLET, FILM COATED ORAL at 20:08

## 2024-03-12 RX ADMIN — CLONIDINE HYDROCHLORIDE 0.2 MG: 0.1 TABLET ORAL at 22:30

## 2024-03-12 RX ADMIN — CLONIDINE HYDROCHLORIDE 0.2 MG: 0.1 TABLET ORAL at 16:09

## 2024-03-12 NOTE — SIGNIFICANT NOTE
03/12/24 1328   OTHER   Discipline speech language pathologist   Rehab Time/Intention   Session Not Performed other (see comments)  (RN contacted SLP due to concerns for tolerance of current diet (pureed with nectar thick liquids). Plan to repeat VFSS to re-assess swallow function. With worsening symptoms consider NPO until repeat VFSS.)

## 2024-03-12 NOTE — PROGRESS NOTES
[facility] Medicine Services  PROGRESS NOTE    Patient Name: Erlin Blanco  : 1945  MRN: [id number]    Date of Admission: 2024  Primary Care Physician: Zamzam John APRN    Subjective   Subjective     CC:  Follow-up hemorrhage    Subjective:  Patient is poor historian.  He does answer some yes or no questions.  No other new complaints    Review of Systems  Difficult to assess due to mental status       Objective   Objective     Vital Signs:   Temp:  [97.7 °F (36.5 °C)-98.6 °F (37 °C)] 98.6 °F (37 °C)  Heart Rate:  [53-79] 75  Resp:  [18] 18  BP: (131-198)/() 132/97  Total (NIH Stroke Scale): 22     Physical Exam:  Constitutional:Awake, alert, chronically ill-appearing but nontoxic  HENT: NCAT, mucous membranes moist, neck supple  Respiratory: No cough or wheezes, normal respirations, nonlabored breathing   Cardiovascular: Pulse rate is normal, palpable radial pulses  Gastrointestinal:  soft, nontender, nondistended  Musculoskeletal: Elderly frail and chronically debilitated in appearance, obese, BMI is 35, mild lower extremity edema  Psychiatric: Somewhat flat affect, cooperative, conversational  Neurologic: Right hemiparesis, dysarthria, expressive aphasia, alert  Skin: No rashes or jaundice, warm      Results Reviewed:  Results from last 7 days   Lab Units 24  0706 24  0532 03/10/24  0418   WBC 10*3/mm3 13.90* 10.48 11.02*   HEMOGLOBIN g/dL 15.7 15.7 15.7   HEMATOCRIT % 49.1 47.1 48.2   PLATELETS 10*3/mm3 397 402 443     Results from last 7 days   Lab Units 24  0706 24  0532 03/10/24  0418 24  0536 24  0801   SODIUM mmol/L 143 143 142   < > 144   POTASSIUM mmol/L 3.5 3.8 3.7   < > 4.7   CHLORIDE mmol/L 108* 105 107   < > 106   CO2 mmol/L 26.0 26.4 28.0   < > 28.0   BUN mg/dL 22 20 19   < > 18   CREATININE mg/dL 0.98 0.99 0.89   < > 1.12   GLUCOSE mg/dL 188* 94 111*   < > 194*   CALCIUM mg/dL 9.8 9.8 9.9   < > 9.9   ALK PHOS U/L  --   --   --   --  99    ALT (SGPT) U/L  --   --   --   --  36   AST (SGOT) U/L  --   --   --   --  36    < > = values in this interval not displayed.     Estimated Creatinine Clearance: 84 mL/min (by C-G formula based on SCr of 0.98 mg/dL).    Microbiology Results Abnormal       Procedure Component Value - Date/Time    CANDIDA AURIS SCREEN - Swab, Axilla Right, Axilla Left and Groin [[id number]]  (Normal) Collected: 03/05/24 1629    Lab Status: Final result Specimen: Swab from Axilla Right, Axilla Left and Groin Updated: 03/10/24 1745     Lyn Auris Screen Culture No Candida auris isolated at 5 days            Imaging Results (Last 24 Hours)       Procedure Component Value Units Date/Time    XR Chest 1 View [[id number]] Resulted: 03/12/24 1236     Updated: 03/12/24 1305    CT Head Without Contrast [[id number]] Collected: 03/11/24 1555     Updated: 03/11/24 1602    Narrative:      CT HEAD WO CONTRAST-     INDICATIONS: Intracranial hemorrhage, follow-up     TECHNIQUE: Radiation dose reduction techniques were utilized, including  automated exposure control and exposure modulation based on body size.  Noncontrast head CT     COMPARISON: 3/6/2024     FINDINGS:     Intraparenchymal hematoma at the left basal ganglia measures 2.9 cm on  axial image 35, previously 3.2 cm at similar level. Persistent  surrounding edema is noted, with partial effacement of the left lateral  ventricle and rightward midline shift measuring 3 mm on axial image 34,  not significantly changed. Layering blood in the lateral ventricles  appears stable.     No acute intracranial hemorrhage, midline shift or mass effect. No acute  territorial infarct is identified.     Moderate periventricular hypodensities suggest chronic small vessel  ischemic change in a patient this age.     Arterial calcifications are seen at the base of the brain.     Ventricles, cisterns, cerebral sulci are stable.        Small likely mucous retention cysts in the maxillary sinuses. The  visualized  paranasal sinuses, orbits, mastoid air cells are otherwise  unremarkable.                   Impression:         Left basal ganglia hematoma measures slightly smaller. Otherwise, no  significant change.     This report was finalized on 3/11/2024 3:59 PM by Dr. Akhil Nugent M.D on Workstation: [id number]                   I have reviewed the medications:  Scheduled Meds:amLODIPine, 10 mg, Nasogastric, Q24H  carvedilol, 6.25 mg, Oral, BID With Meals  cloNIDine, 0.2 mg, Oral, Q8H  insulin glargine, 25 Units, Subcutaneous, Nightly  insulin regular, 2-9 Units, Subcutaneous, TID AC  levETIRAcetam, 500 mg, Nasogastric, BID  melatonin, 5 mg, Oral, Nightly  memantine, 10 mg, Nasogastric, Q12H  OLANZapine zydis, 5 mg, Oral, Nightly  senna-docusate sodium, 2 tablet, Oral, BID  sodium chloride, 10 mL, Intravenous, Q12H  spironolactone, 25 mg, Oral, QAM  valsartan, 320 mg, Nasogastric, Q PM      Continuous Infusions:   PRN Meds:.  acetaminophen    acetaminophen    acetaminophen    senna-docusate sodium **AND** polyethylene glycol **AND** bisacodyl **AND** bisacodyl    Calcium Replacement - Follow Nurse / BPA Driven Protocol    dextrose    dextrose    glucagon (human recombinant)    Magnesium Standard Dose Replacement - Follow Nurse / BPA Driven Protocol    nitroglycerin    OLANZapine zydis    ondansetron    ondansetron    Phosphorus Replacement - Follow Nurse / BPA Driven Protocol    Potassium Replacement - Follow Nurse / BPA Driven Protocol    prochlorperazine    sodium chloride    sodium chloride    sodium chloride    Assessment & Plan   Assessment & Plan     Active Hospital Problems    Diagnosis  POA    **Intraparenchymal hemorrhage of brain [I61.9]  Yes    Right hemiparesis [G81.91]  Yes    Oropharyngeal dysphagia [R13.12]  Yes    Paroxysmal atrial fibrillation [I48.0]  Yes    Type 2 diabetes mellitus [E11.9]  Yes    HTN (hypertension) [I10]  Yes    Seizures [R56.9]  Yes      Resolved Hospital Problems   No resolved  "problems to display.        Brief Hospital Course to date:  Erlin Blanco is a 78 y.o. male \"with hypertension, coronary artery disease, chronic diastolic heart failure, type 2 diabetes, and atrial fibrillation on Eliquis, who presented to the hospital after experiencing a right-sided facial droop and right arm weakness with speech disturbance. CT angiogram showed an acute intraparenchymal hemorrhage and Neurosurgery was consulted. Blood pressure management was recommended to keep systolic blood pressure less than 160. Nicardipine was initially started and he received Keppra for seizure prophylaxis and Kcentra for Eliquis reversal. He was admitted to ICU. We were asked to assume care when pt came out of ICU \"    Discussion/plan for today:  All medical problems are new under my management today.  Leukocytosis is more significant on labs.  Most likely this could be reactive however will check chest x-ray and urinalysis to evaluate further.  Patient is currently afebrile and reasonably hemodynamically stable.  I discussed this plan with his wife was in agreement.  Glucose reviewed and prandial insulin added.      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  - due to uncontrolled hypertension in the setting of anticoagulation and polycythemia vera  - Eliquis on hold until further notice  - has right hemiparesis and dysarthria as well as oropharyngeal dysphagia  - continue PT/OT/SLP, plan rehab (subacute) at dc  - tolerating modified diet per SLP recs, dc'd [place] 3/6  - continue BP control with goal SBP<160mmHg, currently on amlodipine, valsartan, clonidine, Aldactone, and Coreg (see below)  - continue Keppra for seizure prophylaxis  - PRN IV Compazine for headaches  - follow up CT head was reassuring  - appreciate MICAELA attention to pt  - f/u with MICAELA in office in 2 weeks, repeat CT Head at that time  - mental status/LOC waxing/waning, family concerned he may have UTI as he was recently treated " for one--checked UA and it was fine  - have scheduled low-dose Zyprexa nightly and keep PRN on hand as well--need to try to get his sleep schedule back in line     HTN  - BPs labile--SBP was at goal but bradycardia triggered some adjustment of meds and SBP has been high at times since  - consulted Card for bradycardia, they were not concerned as it was asymptomatic and recommend focusing more on BP control as long as bradycardia remains mild and asymptomatic  - continue amlodipine, clonidine, and valsartan  - Card has added Aldactone, changed labetalol back to his home Coreg, and stopped his hydralazine altogether  - continue to monitor BPs and HRs today on new regimen and defer further adjustment to Card     Type 2 DM  -Monitoring glucose  - A1c 7.1  - continue Lantus  - continue SSI/hypoglycemia protocol  - holding metformin, glimepiride, and Januvia     PAF  - HRs acceptable today (see above)  - was on Eliquis prior to admission but he is off of this now due to spontaneous intracranial hemorrhage     Polycythemia Vera  - on hydroxyurea as an outpatient  - Hgb fine        SCDs for DVT prophylaxis.  DNR.  Discussed with wife  Anticipate discharge to [place] in 1 to 2 days      CODE STATUS:   Code Status and Medical Interventions:   Ordered at: 03/09/24 8418     Medical Intervention Limits:    NO intubation (DNI)    NO cardioversion     Level Of Support Discussed With:    Next of Kin (If No Surrogate)     Code Status (Patient has no pulse and is not breathing):    No CPR (Do Not Attempt to Resuscitate)     Medical Interventions (Patient has pulse or is breathing):    Limited Support       Alfred Hill MD  03/12/24

## 2024-03-12 NOTE — PLAN OF CARE
Goal Outcome Evaluation:      Pt resting with family at bedside. Multiple incont liquid stool. Incont care provided as needed. C/o headache, prn tylenol administered. Pt appeared very agitated until post tylenol administration. Admitting facility called for an update on pt arrival, plan explained. BP elevated on call physician notified. On reassessment, BP 150s, told to monitor for now.     Pt had small formed stool this AM.

## 2024-03-12 NOTE — PROGRESS NOTES
"Nutrition Services    Patient Name:  Erlin Blanco  YOB: 1945  MRN: [id number]  Admit Date:  2/24/2024    Assessment Date:  03/12/24    Comment: Pt laying in bed sleeping when I visited. Family members at bedside were able to provide hx. Family stated pt has been eating very well and eats about % of meals. Family reported pt not having any n/v and is tolerating pureed and nectar thick modifications.     Will follow clinical course, nutrition needs.    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 Hx Afib, HTN, DM, hyperlipidemia   Current Problems Not safe for po     Encounter Information        Nutrition History    Food Preferences    Supplements    Factors Affecting Intake altered mental status, swallow impairment   Tests/Procedures VFSS - Soon     Anthropometrics        Current Height   Current Weight  BMI kg/m2 Height: 182.9 cm (72\")  Weight: 115 kg (254 lb 3.1 oz) (03/11/24 0453)  Body mass index is 34.47 kg/m².     Adj BMI (if applicable)    BMI Category Obese, Class I (30 - 34.9)       Admission Weight    Ideal Body Weight (IBW) 77.6kg     Adj IBW (if applicable) 255lb   Usual Body Weight (UBW) ~250lb   Weight Change/Trend Stable       Weight history: Wt Readings from Last 30 Encounters:   03/11/24 0453 115 kg (254 lb 3.1 oz)   03/04/24 0600 116 kg (255 lb 11.7 oz)   03/03/24 0600 113 kg (248 lb 14.4 oz)   03/02/24 0600 119 kg (261 lb 11 oz)   02/29/24 0600 112 kg (247 lb 9.2 oz)   02/28/24 0600 115 kg (254 lb 3.1 oz)   02/27/24 0500 115 kg (252 lb 6.8 oz)   02/26/24 0400 113 kg (250 lb)   02/25/24 0600 115 kg (254 lb 10.1 oz)   02/24/24 2244 116 kg (255 lb 1.2 oz)        Estimated/Assessed Needs        Energy Requirements    Weight for Calculation 115lg   Method for Estimation  15 kcal/kg   EST Needs (kcal/day) 1725       Protein Requirements    Weight for " "Calculation 77.6kg   EST Protein Needs (g/kg) 1.5 gm/kg   EST Daily Needs (g/day) 116       Fluid Requirements     Method for Estimation Defer to physician    Estimated Needs (mL/day)          Labs        Pertinent Labs    Results from last 7 days   Lab Units 03/12/24  0706 03/11/24  0532 03/10/24  0418 03/08/24  0536 03/07/24  0801   SODIUM mmol/L 143 143 142   < > 144   POTASSIUM mmol/L 3.5 3.8 3.7   < > 4.7   CHLORIDE mmol/L 108* 105 107   < > 106   CO2 mmol/L 26.0 26.4 28.0   < > 28.0   BUN mg/dL 22 20 19   < > 18   CREATININE mg/dL 0.98 0.99 0.89   < > 1.12   CALCIUM mg/dL 9.8 9.8 9.9   < > 9.9   BILIRUBIN mg/dL  --   --   --   --  0.8   ALK PHOS U/L  --   --   --   --  99   ALT (SGPT) U/L  --   --   --   --  36   AST (SGOT) U/L  --   --   --   --  36   GLUCOSE mg/dL 188* 94 111*   < > 194*    < > = values in this interval not displayed.     Results from last 7 days   Lab Units 03/12/24  0706 03/11/24  0532 03/10/24  0418 03/09/24  0426   MAGNESIUM mg/dL  --  2.4 2.3 2.6*   PHOSPHORUS mg/dL 2.9 3.2 2.5 3.3   HEMOGLOBIN g/dL 15.7 15.7 15.7 15.0   HEMATOCRIT % 49.1 47.1 48.2 46.1   WBC 10*3/mm3 13.90* 10.48 11.02* 11.29*   ALBUMIN g/dL 3.8 3.8 4.0 3.6     Results from last 7 days   Lab Units 03/12/24  0706 03/11/24  0532 03/10/24  0418 03/09/24  0426 03/08/24  0536   PLATELETS 10*3/mm3 397 402 443 417 405     No results found for: \"COVID19\"  Lab Results   Component Value Date    HGBA1C 7.10 (H) 02/25/2024          Medications            Scheduled Medications amLODIPine, 10 mg, Nasogastric, Q24H  carvedilol, 6.25 mg, Oral, BID With Meals  cloNIDine, 0.2 mg, Oral, Q8H  insulin glargine, 25 Units, Subcutaneous, Nightly  insulin regular, 2-9 Units, Subcutaneous, TID AC  levETIRAcetam, 500 mg, Nasogastric, BID  melatonin, 5 mg, Oral, Nightly  memantine, 10 mg, Nasogastric, Q12H  OLANZapine zydis, 5 mg, Oral, Nightly  senna-docusate sodium, 2 tablet, Oral, BID  sodium chloride, 10 mL, Intravenous, Q12H  spironolactone, " 25 mg, Oral, QAM  valsartan, 320 mg, Nasogastric, Q PM        Infusions        PRN Medications   acetaminophen    acetaminophen    acetaminophen    senna-docusate sodium **AND** polyethylene glycol **AND** bisacodyl **AND** bisacodyl    Calcium Replacement - Follow Nurse / BPA Driven Protocol    dextrose    dextrose    glucagon (human recombinant)    Magnesium Standard Dose Replacement - Follow Nurse / BPA Driven Protocol    nitroglycerin    OLANZapine zydis    ondansetron    ondansetron    Phosphorus Replacement - Follow Nurse / BPA Driven Protocol    Potassium Replacement - Follow Nurse / BPA Driven Protocol    prochlorperazine    sodium chloride    sodium chloride    sodium chloride     Physical Findings          Physical Appearance alert, facial droop, hemiparesis , obese   Oral/Mouth Cavity dental appliance, tooth or teeth missing   Edema  generalized, lower extremity , upper extremity, 1+ (trace), 2+ (mild)   Gastrointestinal fecal incontinence, last bowel movement: 3/12   Skin  pressure injury: stage 2 perineum   Tubes/Drains/Lines None   NFPE No clinical signs of muscle wasting or fat loss   --  Current Nutrition Orders & Evaluation of Intake       Oral Nutrition     Food Allergies NKFA   Current PO Diet Diet: Diabetic; Consistent Carbohydrate; Feeding Assistance - Nursing; Texture: Pureed (NDD 1); Fluid Consistency: Nectar Thick   Supplement    PO Evaluation     Trending % PO Intake % of meals per family members     Nutrition Diagnosis        Nutrition Dx Problem 1 Problem: Inadequate Oral Intake  Etiology: Medical Diagnosis - Spontaneous left thalamic intracranial hemorrhage with intraventricular extension  Signs/Symptoms: NPO and SLP/Swallow Evaluation       INTERVENTION / PLAN OF CARE  Intervention Goal        Intervention Goal(s) Maintain nutrition status, Reduce/improve symptoms, Meet estimated needs, Disease management/therapy, No significant weight loss, and PO intake goal %: 75%     Nutrition  Intervention        RD Action Encourage intake, Continue to monitor, and Care plan reviewed     Prescription         Diet     Supplement/Snack    EN/PN     Prescription Ordered      Monitor/Evaluation        Monitor Per protocol   Discharge Plan Pending clinical course   Education Will instruct as appropriate     RD to follow per protocol.       Electronically signed by:  Ankit Yan  03/12/24 12:59 EDT

## 2024-03-12 NOTE — PLAN OF CARE
Goal Outcome Evaluation:  Plan of Care Reviewed With: patient, spouse        Progress: no change     Patient remained confused during this shift, No new neuro deficits noted or reported. Family assisting patient with feeds and report pt having no difficulty swallowing, good intake based on how much he is left on his trays, however, this nurse noted pt coughing after taking his meds crushed in pudding, nurse contacted speech therapy to re-assess swallow, MD notified, pt now NPO except meds until repeat video swallow.

## 2024-03-12 NOTE — PROGRESS NOTES
"[facility] Follow Up    Chief Complaint: follow up HTN    Interval History: Denies any chest pain or shortness of breath.  Blood pressures for the most part mildly elevated but overall better controlled.    Objective:     Objective:  Temp:  [98 °F (36.7 °C)-98.4 °F (36.9 °C)] 98.4 °F (36.9 °C)  Heart Rate:  [53-76] 74  Resp:  [18] 18  BP: (131-198)/() 150/92   No intake or output data in the 24 hours ending 03/12/24 0751  Body mass index is 34.47 kg/m².      03/03/24  0600 03/04/24  0600 03/11/24  0453   Weight: 113 kg (248 lb 14.4 oz) 116 kg (255 lb 11.7 oz) 115 kg (254 lb 3.1 oz)     Weight change:       Physical Exam:   General : Alert, cooperative, in no acute distress.  Neuro: Alert,cooperative and oriented.  Lungs: CTAB. Normal respiratory effort and rate.  CV: Regular rate and rhythm, normal S1 and S2, no murmurs, gallops or rubs.  ABD: Soft, nontender, nondistended. Positive bowel sounds.  Extr: No edema or cyanosis, moves all extremities.    Lab Review:   Results from last 7 days   Lab Units 03/11/24  0532 03/10/24  0418 03/08/24  0536 03/07/24  0801   SODIUM mmol/L 143 142   < > 144   POTASSIUM mmol/L 3.8 3.7   < > 4.7   CHLORIDE mmol/L 105 107   < > 106   CO2 mmol/L 26.4 28.0   < > 28.0   BUN mg/dL 20 19   < > 18   CREATININE mg/dL 0.99 0.89   < > 1.12   GLUCOSE mg/dL 94 111*   < > 194*   CALCIUM mg/dL 9.8 9.9   < > 9.9   AST (SGOT) U/L  --   --   --  36   ALT (SGPT) U/L  --   --   --  36    < > = values in this interval not displayed.         Results from last 7 days   Lab Units 03/11/24  0532 03/10/24  0418   WBC 10*3/mm3 10.48 11.02*   HEMOGLOBIN g/dL 15.7 15.7   HEMATOCRIT % 47.1 48.2   PLATELETS 10*3/mm3 402 443         Results from last 7 days   Lab Units 03/11/24  0532 03/10/24  0418   MAGNESIUM mg/dL 2.4 2.3           Invalid input(s): \"LDLCALC\"          I reviewed the patient's new clinical results.  I personally viewed and interpreted the patient's EKG  Current " Medications:   Scheduled Meds:amLODIPine, 10 mg, Nasogastric, Q24H  carvedilol, 6.25 mg, Oral, BID With Meals  cloNIDine, 0.2 mg, Oral, Q8H  insulin glargine, 25 Units, Subcutaneous, Nightly  insulin regular, 2-9 Units, Subcutaneous, TID AC  levETIRAcetam, 500 mg, Nasogastric, BID  melatonin, 5 mg, Oral, Nightly  memantine, 10 mg, Nasogastric, Q12H  OLANZapine zydis, 5 mg, Oral, Nightly  senna-docusate sodium, 2 tablet, Oral, BID  sodium chloride, 10 mL, Intravenous, Q12H  spironolactone, 25 mg, Oral, QAM  valsartan, 320 mg, Nasogastric, Q PM      Continuous Infusions:     Allergies:  No Known Allergies    Assessment/plan:     Hemorrhagic CVA.  Secondary to hypertension  Hypertension.  Blood pressures improved overall though remains mildly elevated.  Hydralazine discontinued.  Started on amlodipine and spironolactone this admission.  Paroxysmal atrial tachycardia.  Previously thought to have atrial fibrillation but past EKGs and Holter is reviewed by Dr. Porras who feels this was due to atrial tachycardia not fibrillation.  No plans to resume anticoagulation.    Type 2 diabetes:  Polycythemia vera  Coronary artery disease. on beta blocker. Aspirin currently on hold. He was on rosuvastatin that was not continued upon admission. No anginal symptoms reported.   Chronic diastolic congestive heart failure.  Compensated.     -Continue current blood pressure management.  Consider increasing spironolactone further as needed.    Marika Arias MD  03/12/24  07:51 EDT

## 2024-03-13 ENCOUNTER — APPOINTMENT (OUTPATIENT)
Dept: GENERAL RADIOLOGY | Facility: HOSPITAL | Age: 79
DRG: 064 | End: 2024-03-13
Payer: MEDICARE

## 2024-03-13 LAB
ALBUMIN SERPL-MCNC: 3.6 G/DL (ref 3.5–5.2)
ANION GAP SERPL CALCULATED.3IONS-SCNC: 9 MMOL/L (ref 5–15)
BUN SERPL-MCNC: 17 MG/DL (ref 8–23)
BUN/CREAT SERPL: 18.5 (ref 7–25)
CALCIUM SPEC-SCNC: 9.6 MG/DL (ref 8.6–10.5)
CHLORIDE SERPL-SCNC: 109 MMOL/L (ref 98–107)
CO2 SERPL-SCNC: 27 MMOL/L (ref 22–29)
CREAT SERPL-MCNC: 0.92 MG/DL (ref 0.76–1.27)
DEPRECATED RDW RBC AUTO: 54.4 FL (ref 37–54)
EGFRCR SERPLBLD CKD-EPI 2021: 86.2 ML/MIN/1.73
ERYTHROCYTE [DISTWIDTH] IN BLOOD BY AUTOMATED COUNT: 16.7 % (ref 12.3–15.4)
GLUCOSE BLDC GLUCOMTR-MCNC: 112 MG/DL (ref 70–130)
GLUCOSE BLDC GLUCOMTR-MCNC: 119 MG/DL (ref 70–130)
GLUCOSE BLDC GLUCOMTR-MCNC: 166 MG/DL (ref 70–130)
GLUCOSE BLDC GLUCOMTR-MCNC: 219 MG/DL (ref 70–130)
GLUCOSE BLDC GLUCOMTR-MCNC: 252 MG/DL (ref 70–130)
GLUCOSE SERPL-MCNC: 121 MG/DL (ref 65–99)
HCT VFR BLD AUTO: 48.3 % (ref 37.5–51)
HGB BLD-MCNC: 15.9 G/DL (ref 13–17.7)
MCH RBC QN AUTO: 29.6 PG (ref 26.6–33)
MCHC RBC AUTO-ENTMCNC: 32.9 G/DL (ref 31.5–35.7)
MCV RBC AUTO: 89.8 FL (ref 79–97)
PHOSPHATE SERPL-MCNC: 2.8 MG/DL (ref 2.5–4.5)
PLATELET # BLD AUTO: 424 10*3/MM3 (ref 140–450)
PMV BLD AUTO: 9.9 FL (ref 6–12)
POTASSIUM SERPL-SCNC: 3.8 MMOL/L (ref 3.5–5.2)
RBC # BLD AUTO: 5.38 10*6/MM3 (ref 4.14–5.8)
SODIUM SERPL-SCNC: 145 MMOL/L (ref 136–145)
WBC NRBC COR # BLD AUTO: 12.07 10*3/MM3 (ref 3.4–10.8)

## 2024-03-13 PROCEDURE — 85027 COMPLETE CBC AUTOMATED: CPT | Performed by: INTERNAL MEDICINE

## 2024-03-13 PROCEDURE — 74230 X-RAY XM SWLNG FUNCJ C+: CPT

## 2024-03-13 PROCEDURE — 97530 THERAPEUTIC ACTIVITIES: CPT

## 2024-03-13 PROCEDURE — 97112 NEUROMUSCULAR REEDUCATION: CPT

## 2024-03-13 PROCEDURE — 92611 MOTION FLUOROSCOPY/SWALLOW: CPT

## 2024-03-13 PROCEDURE — 63710000001 INSULIN GLARGINE PER 5 UNITS: Performed by: INTERNAL MEDICINE

## 2024-03-13 PROCEDURE — 82948 REAGENT STRIP/BLOOD GLUCOSE: CPT

## 2024-03-13 PROCEDURE — 63710000001 INSULIN REGULAR HUMAN PER 5 UNITS: Performed by: INTERNAL MEDICINE

## 2024-03-13 PROCEDURE — 25010000002 ONDANSETRON PER 1 MG: Performed by: INTERNAL MEDICINE

## 2024-03-13 PROCEDURE — 94664 DEMO&/EVAL PT USE INHALER: CPT

## 2024-03-13 PROCEDURE — 99232 SBSQ HOSP IP/OBS MODERATE 35: CPT | Performed by: NURSE PRACTITIONER

## 2024-03-13 PROCEDURE — 25010000002 CEFTRIAXONE PER 250 MG: Performed by: INTERNAL MEDICINE

## 2024-03-13 PROCEDURE — 80069 RENAL FUNCTION PANEL: CPT | Performed by: INTERNAL MEDICINE

## 2024-03-13 RX ORDER — SPIRONOLACTONE 50 MG/1
50 TABLET, FILM COATED ORAL EVERY MORNING
Status: DISCONTINUED | OUTPATIENT
Start: 2024-03-14 | End: 2024-03-15 | Stop reason: HOSPADM

## 2024-03-13 RX ADMIN — CLONIDINE HYDROCHLORIDE 0.2 MG: 0.1 TABLET ORAL at 06:18

## 2024-03-13 RX ADMIN — CARVEDILOL 6.25 MG: 6.25 TABLET, FILM COATED ORAL at 08:33

## 2024-03-13 RX ADMIN — BARIUM SULFATE 50 ML: 400 SUSPENSION ORAL at 09:27

## 2024-03-13 RX ADMIN — AMLODIPINE BESYLATE 10 MG: 10 TABLET ORAL at 08:32

## 2024-03-13 RX ADMIN — CARVEDILOL 6.25 MG: 6.25 TABLET, FILM COATED ORAL at 18:11

## 2024-03-13 RX ADMIN — OLANZAPINE 5 MG: 5 TABLET, ORALLY DISINTEGRATING ORAL at 08:33

## 2024-03-13 RX ADMIN — VALSARTAN 320 MG: 320 TABLET, FILM COATED ORAL at 18:07

## 2024-03-13 RX ADMIN — MEMANTINE HYDROCHLORIDE 10 MG: 10 TABLET, FILM COATED ORAL at 21:08

## 2024-03-13 RX ADMIN — ACETAMINOPHEN 325MG 650 MG: 325 TABLET ORAL at 18:54

## 2024-03-13 RX ADMIN — INSULIN GLARGINE 20 UNITS: 100 INJECTION, SOLUTION SUBCUTANEOUS at 21:09

## 2024-03-13 RX ADMIN — Medication 5 MG: at 21:09

## 2024-03-13 RX ADMIN — CLONIDINE HYDROCHLORIDE 0.2 MG: 0.1 TABLET ORAL at 21:08

## 2024-03-13 RX ADMIN — LEVETIRACETAM 500 MG: 500 TABLET, FILM COATED ORAL at 21:12

## 2024-03-13 RX ADMIN — DOCUSATE SODIUM 50MG AND SENNOSIDES 8.6MG 2 TABLET: 8.6; 5 TABLET, FILM COATED ORAL at 21:09

## 2024-03-13 RX ADMIN — LEVETIRACETAM 500 MG: 500 TABLET, FILM COATED ORAL at 08:32

## 2024-03-13 RX ADMIN — POLYETHYLENE GLYCOL 3350 17 G: 17 POWDER, FOR SOLUTION ORAL at 08:37

## 2024-03-13 RX ADMIN — MEMANTINE HYDROCHLORIDE 10 MG: 10 TABLET, FILM COATED ORAL at 08:32

## 2024-03-13 RX ADMIN — INSULIN HUMAN 4 UNITS: 100 INJECTION, SOLUTION PARENTERAL at 18:11

## 2024-03-13 RX ADMIN — CEFTRIAXONE 2000 MG: 2 INJECTION, POWDER, FOR SOLUTION INTRAMUSCULAR; INTRAVENOUS at 16:53

## 2024-03-13 RX ADMIN — SPIRONOLACTONE 25 MG: 25 TABLET ORAL at 06:18

## 2024-03-13 RX ADMIN — Medication 10 ML: at 21:09

## 2024-03-13 RX ADMIN — BARIUM SULFATE 4 ML: 980 POWDER, FOR SUSPENSION ORAL at 09:27

## 2024-03-13 RX ADMIN — BISACODYL 5 MG: 5 TABLET, COATED ORAL at 21:09

## 2024-03-13 RX ADMIN — CLONIDINE HYDROCHLORIDE 0.2 MG: 0.1 TABLET ORAL at 13:22

## 2024-03-13 RX ADMIN — INSULIN HUMAN 2 UNITS: 100 INJECTION, SOLUTION PARENTERAL at 12:48

## 2024-03-13 RX ADMIN — DOCUSATE SODIUM 50MG AND SENNOSIDES 8.6MG 2 TABLET: 8.6; 5 TABLET, FILM COATED ORAL at 08:34

## 2024-03-13 RX ADMIN — ONDANSETRON 4 MG: 2 INJECTION INTRAMUSCULAR; INTRAVENOUS at 21:08

## 2024-03-13 RX ADMIN — Medication 10 ML: at 08:37

## 2024-03-13 NOTE — PROGRESS NOTES
[facility] Medicine Services  PROGRESS NOTE    Patient Name: Erlin Blanco  : 1945  MRN: [id number]    Date of Admission: 2024  Primary Care Physician: Zamzam John APRN    Subjective   Subjective     CC:  Follow-up hemorrhage    Subjective:  Patient is more alert today.  Reportedly eating breakfast well.    Objective   Objective     Vital Signs:   Temp:  [98.2 °F (36.8 °C)-98.8 °F (37.1 °C)] 98.2 °F (36.8 °C)  Heart Rate:  [48-75] 51  Resp:  [18] 18  BP: (132-167)/(69-97) 162/73  Total (NIH Stroke Scale): 22     Physical Exam:  Constitutional:Awake, alert, chronically ill-appearing but nontoxic  HENT: NCAT, mucous membranes moist, neck supple  Respiratory: No cough or wheezes, normal respirations, nonlabored breathing   Cardiovascular: Pulse rate is normal, palpable radial pulses  Gastrointestinal:  soft, nontender, nondistended  Musculoskeletal: Elderly frail and chronically debilitated in appearance, obese, BMI is 35, mild lower extremity edema  Psychiatric: Somewhat flat affect, cooperative, conversational  Neurologic: Right hemiparesis, dysarthria, expressive aphasia, alert  Skin: No rashes or jaundice, warm      Results Reviewed:  Results from last 7 days   Lab Units 24  0622 24  0706 24  0532   WBC 10*3/mm3 12.07* 13.90* 10.48   HEMOGLOBIN g/dL 15.9 15.7 15.7   HEMATOCRIT % 48.3 49.1 47.1   PLATELETS 10*3/mm3 424 397 402     Results from last 7 days   Lab Units 24  0622 24  0706 24  0532 24  0536 24  0801   SODIUM mmol/L 145 143 143   < > 144   POTASSIUM mmol/L 3.8 3.5 3.8   < > 4.7   CHLORIDE mmol/L 109* 108* 105   < > 106   CO2 mmol/L 27.0 26.0 26.4   < > 28.0   BUN mg/dL 17 22 20   < > 18   CREATININE mg/dL 0.92 0.98 0.99   < > 1.12   GLUCOSE mg/dL 121* 188* 94   < > 194*   CALCIUM mg/dL 9.6 9.8 9.8   < > 9.9   ALK PHOS U/L  --   --   --   --  99   ALT (SGPT) U/L  --   --   --   --  36   AST (SGOT) U/L  --   --   --   --  36    < > =  values in this interval not displayed.     Estimated Creatinine Clearance: 90.2 mL/min (by C-G formula based on SCr of 0.92 mg/dL).    Microbiology Results Abnormal       Procedure Component Value - Date/Time    CANDIDA AURIS SCREEN - Swab, Axilla Right, Axilla Left and Groin [[id number]]  (Normal) Collected: 03/05/24 1629    Lab Status: Final result Specimen: Swab from Axilla Right, Axilla Left and Groin Updated: 03/10/24 1745     Lyn Auris Screen Culture No Candida auris isolated at 5 days            Imaging Results (Last 24 Hours)       Procedure Component Value Units Date/Time    FL Video Swallow Single Contrast [[id number]] Resulted: 03/13/24 0853     Updated: 03/13/24 0927    CT Chest Without Contrast Diagnostic [[id number]] Collected: 03/12/24 2006     Updated: 03/12/24 2006    Narrative:      CT OF THE CHEST WITHOUT CONTRAST 03/12/2024     HISTORY: Possible pneumonia.     TECHNIQUE: Axial images were obtained from the lung apices to the upper  abdomen. No intravenous contrast was given.     FINDINGS: On the lung windows there is somewhat heterogeneous density of  the bilateral lungs and some of the vessels appear somewhat poorly  defined. This heterogeneous ill-defined increased density may be related  to some mild air trapping with mosaic pattern versus some motion  artifact. There could be some minimal scattered ground-glass opacities  in the lungs. Tiny calcified granuloma appears to be present in the  right lower lobe on image 57. No other suspicious lung masses are seen.     There is some aortic and minimal coronary calcification. Small amount of  fluid is seen in pericardial recesses. No pathologically enlarged lymph  nodes are seen. There is hyperdense material layering in the dependent  portion of the gallbladder which may represent collection of very tiny  stones or hyperdense bile. No gallbladder inflammatory changes are seen.       Impression:      1. Somewhat heterogeneous appearance of the  bilateral lungs could be  related to breathing artifact and/or air-trapping. There could be some  minimal scattered ground-glass opacities consistent with minimal  pneumonia. Consider short-term follow-up CT of the chest in 2 months to  3 months with improved inspiration and lack of streak artifact and  motion for better evaluation.  2. Hyperdense bile or tiny stones in the gallbladder.     Radiation dose reduction techniques were utilized, including automated  exposure control and exposure modulation based on body size.          XR Chest 1 View [[id number]] Collected: 03/12/24 1536     Updated: 03/12/24 1543    Narrative:      Portable chest radiograph     HISTORY: Cough, worsening leukocytosis     TECHNIQUE: Single AP portable radiograph of the chest     COMPARISON: Chest radiograph 2/24/2024       Impression:      FINDINGS AND IMPRESSION:  Lungs are hypoinflated. There is pulmonary vascular congestion, as  before. Superimposed pulmonary pacification is present in the bilateral  lung bases. Constellation of findings are suggestive of atelectasis,  pneumonia and/or pulmonary edema in the appropriate clinical context and  correlation with patient history is recommended with follow-up chest CT  if clinically indicated. No pneumothorax is seen. Cardiac silhouette is  accentuated by low lung volumes.     This report was finalized on 3/12/2024 3:40 PM by Dr. Clarence Mccoy M.D  on Workstation: [id number]                   I have reviewed the medications:  Scheduled Meds:amLODIPine, 10 mg, Nasogastric, Q24H  carvedilol, 6.25 mg, Oral, BID With Meals  cefTRIAXone, 1,000 mg, Intravenous, Q24H  cloNIDine, 0.2 mg, Oral, Q8H  insulin glargine, 20 Units, Subcutaneous, Nightly  insulin regular, 2-9 Units, Subcutaneous, TID AC  levETIRAcetam, 500 mg, Nasogastric, BID  melatonin, 5 mg, Oral, Nightly  memantine, 10 mg, Nasogastric, Q12H  OLANZapine zydis, 5 mg, Oral, Nightly  senna-docusate sodium, 2 tablet, Oral, BID    "And  polyethylene glycol, 17 g, Oral, Daily  sodium chloride, 10 mL, Intravenous, Q12H  [START ON 3/14/2024] spironolactone, 50 mg, Oral, QAM  valsartan, 320 mg, Nasogastric, Q PM      Continuous Infusions:   PRN Meds:.•  acetaminophen  •  acetaminophen  •  acetaminophen  •  senna-docusate sodium **AND** polyethylene glycol **AND** bisacodyl **AND** bisacodyl  •  Calcium Replacement - Follow Nurse / BPA Driven Protocol  •  dextrose  •  dextrose  •  glucagon (human recombinant)  •  Magnesium Standard Dose Replacement - Follow Nurse / BPA Driven Protocol  •  nitroglycerin  •  OLANZapine zydis  •  ondansetron  •  ondansetron  •  Phosphorus Replacement - Follow Nurse / BPA Driven Protocol  •  Potassium Replacement - Follow Nurse / BPA Driven Protocol  •  prochlorperazine  •  sodium chloride  •  sodium chloride  •  sodium chloride    Assessment & Plan   Assessment & Plan     Active Hospital Problems    Diagnosis  POA   • **Intraparenchymal hemorrhage of brain [I61.9]  Yes   • Right hemiparesis [G81.91]  Yes   • Oropharyngeal dysphagia [R13.12]  Yes   • Paroxysmal atrial fibrillation [I48.0]  Yes   • Type 2 diabetes mellitus [E11.9]  Yes   • HTN (hypertension) [I10]  Yes   • Seizures [R56.9]  Yes      Resolved Hospital Problems   No resolved problems to display.        Brief Hospital Course to date:  Erlin Blanco is a 78 y.o. male \"with hypertension, coronary artery disease, chronic diastolic heart failure, type 2 diabetes, and atrial fibrillation on Eliquis, who presented to the hospital after experiencing a right-sided facial droop and right arm weakness with speech disturbance. CT angiogram showed an acute intraparenchymal hemorrhage and Neurosurgery was consulted. Blood pressure management was recommended to keep systolic blood pressure less than 160. Nicardipine was initially started and he received Keppra for seizure prophylaxis and Kcentra for Eliquis reversal. He was admitted to ICU. We were asked to assume care " "when pt came out of ICU \"    Discussion/plan for today:  Leukocytosis has trended down.  Chest x-ray images reviewed and recommended CT scan.  CT scan was completed and shows groundglass opacity.  Antimicrobial therapy has been started with ceftriaxone for broad-spectrum coverage.  Speech therapy consulted and discussed with speech therapy plan is for video swallow study today.  Dysphagia diet will be further modified.  Depending on how he does with this new diet we will see if he is ready to transfer potentially tomorrow.  Due to dysphagia I have stopped prandial insulin and decreased basal.    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  - due to uncontrolled hypertension in the setting of anticoagulation and polycythemia vera  - Eliquis on hold until further notice  - has right hemiparesis and dysarthria as well as oropharyngeal dysphagia  - continue PT/OT/SLP, plan rehab (subacute) at dc  - tolerating modified diet per SLP recs, dc'd [place] 3/6  - continue BP control with goal SBP<160mmHg, currently on amlodipine, valsartan, clonidine, Aldactone, and Coreg (see below)  - continue Keppra for seizure prophylaxis  - PRN IV Compazine for headaches  - follow up CT head was reassuring  - appreciate MICAELA attention to pt  - f/u with MICAELA in office in 2 weeks, repeat CT Head at that time  - mental status/LOC waxing/waning, family concerned he may have UTI as he was recently treated for one--checked UA and it was fine  - have scheduled low-dose Zyprexa nightly and keep PRN on hand as well--need to try to get his sleep schedule back in line     HTN  - BPs labile--SBP was at goal but bradycardia triggered some adjustment of meds and SBP has been high at times since  - consulted Card for bradycardia, they were not concerned as it was asymptomatic and recommend focusing more on BP control as long as bradycardia remains mild and asymptomatic  - continue amlodipine, clonidine, and valsartan  - Card has " added Aldactone, changed labetalol back to his home Coreg, and stopped his hydralazine altogether  - continue to monitor BPs and HRs today on new regimen and defer further adjustment to Card     Type 2 DM  -Monitoring glucose  - A1c 7.1  - continue Lantus  - continue SSI/hypoglycemia protocol  - holding metformin, glimepiride, and Januvia     PAF  - HRs acceptable today (see above)  - was on Eliquis prior to admission but he is off of this now due to spontaneous intracranial hemorrhage     Polycythemia Vera  - on hydroxyurea as an outpatient  - Hgb fine        SCDs for DVT prophylaxis.  DNR.  Discussed with wife  Anticipate discharge to [place] in 1 to 2 days      CODE STATUS:   Code Status and Medical Interventions:   Ordered at: 03/09/24 6106     Medical Intervention Limits:    NO intubation (DNI)    NO cardioversion     Level Of Support Discussed With:    Next of Kin (If No Surrogate)     Code Status (Patient has no pulse and is not breathing):    No CPR (Do Not Attempt to Resuscitate)     Medical Interventions (Patient has pulse or is breathing):    Limited Support       Alfred Hill MD  03/13/24

## 2024-03-13 NOTE — PROGRESS NOTES
Subjective   Patient ID: Erlin Blanco is a 78 y.o. male is here today for follow-up with CT head done on 3/20/24.     History of Present Illness    Mr. Blanco presents today for hospital follow-up for intraparenchymal hemorrhage.  Patient was admitted to 2424 after his wife noted him to have right-sided facial droop, right arm weakness and speech disturbance.  He had a CTA which showed an acute intraparenchymal hemorrhage.  Patient was on Eliquis for history of A-fib, this was reversed with Kcentra.  [facility].  Family states that he does complain of a headache proximately 1 day a week that is resolved with taking Tylenol.  Patient is aphasic and therefore his family provides details during visit today.  They also report that patient is sleeping a lot during the day and very restless in the evenings and through the night.  He has right hemiplegia.  He is participating in therapy at [facility].  He remains off his Eliquis.    He presents today in a wheelchair with his son and wife      The following portions of the patient's history were reviewed and updated as appropriate: allergies, current medications, past family history, past medical history, past social history, past surgical history, and problem list.    Review of Systems   Unable to perform ROS: Other   Eyes:  Negative for visual disturbance.   Musculoskeletal:  Positive for gait problem.   Neurological:  Positive for facial asymmetry (right side drooping), speech difficulty and headaches. Negative for numbness.   Psychiatric/Behavioral:  Positive for agitation, confusion and sleep disturbance.          Objective     Vitals:    03/22/24 1428   BP: 120/74   Pulse: 86   SpO2: 94%   Weight: Comment: wheelchair     There is no height or weight on file to calculate BMI.    Tobacco Use: Medium Risk (3/22/2024)    Patient History     Smoking Tobacco Use: Former     Smokeless Tobacco Use: Never     Passive Exposure: Past          Physical  Exam  Vitals reviewed.   Constitutional:       Comments: Drowsy but easily arousable   Eyes:      Extraocular Movements: EOM normal.      Pupils: Pupils are equal, round, and reactive to light.   Pulmonary:      Effort: Pulmonary effort is normal.   Skin:     General: Skin is warm and dry.       Neurologic Exam     Mental Status   Level of consciousness: drowsy    Patient is aphasic, he is oriented to person     Cranial Nerves     CN III, IV, VI   Pupils are equal, round, and reactive to light.  Extraocular motions are normal.   Right-sided facial droop     Motor Exam   Right upper and lower extremity flaccid, follows commands left upper and lower extremity       Gait, Coordination, and Reflexes   Patient in wheelchair, has right-sided hemiplegia, unable to test gait           Assessment & Plan   Independent Review of Radiographic Studies:      I personally reviewed the images from the following studies.    CT head performed 3/20/2024: Subacute intraparenchymal hematoma centered within the left thalamus.  Hematoma demonstrates interval diminished density compatible with interval evolutionary change.  Small to moderate amount of interventricular hemorrhage seen within the dependent aspects of the lateral ventricles which is stable.  No evidence for hydrocephalus.  No new areas of hemorrhage.    Medical Decision Making:      Patient presents today for hospital follow-up for intraparenchymal hemorrhage secondary to hypertensive crisis.  He currently is in [facility] rehab, has right-sided hemiplegia and is participating in therapy.  Son and wife are present today and provide information for today's visit.  Most recent CT head is stable and shows some improvement in the IPH.  Family wanting to know when patient can go back on Eliquis, I recommended they speak with cardiology to see if there is a another option for his paroxysmal A-fib, would prefer if patient could avoid going back on anticoagulants that he not.  I did  provide reassurance to the family regarding their role in taking care of the patient.  Did recommend that they look into support groups for families.  We will see patient back in the office in a month with a repeat CT head at that time.  However if new or concerning symptoms arise they should call the office immediately.    Plan: CT head and office follow-up in 1 month    Diagnoses and all orders for this visit:    1. Intraparenchymal hemorrhage of brain (Primary)  -     CT Head Without Contrast; Future      Return in about 4 weeks (around 4/19/2024).

## 2024-03-13 NOTE — PROGRESS NOTES
Continued Stay Note  [facility]     Patient Name: Erlin Blanco  MRN: [id number]  Today's Date: 3/13/2024    Admit Date: 2/24/2024    Plan: [facility] skilled pending [employer] Medicare pre-cert   Discharge Plan       Row Name 03/13/24 1015       Plan    Plan [facility] skilled pending [employer] Medicare pre-cert    Patient/Family in Agreement with Plan yes    Plan Comments Per [facility] MD, pt not ready for dc. Per [facility] has pre-cert through midnight tonight, 3/13, after which pt will need new pre-cert. CCP to follow. Jaimee Tao LCSW                   Discharge Codes    No documentation.                 Expected Discharge Date and Time       Expected Discharge Date Expected Discharge Time    Mar 13, 2024               Yajaira Tao LCSW

## 2024-03-13 NOTE — MBS/VFSS/FEES
Acute Care - Speech Language Pathology   Swallow Initial Evaluation [facility]     Patient Name: Erlin Blanco  : 1945  MRN: [id number]  Today's Date: 3/13/2024               Admit Date: 2024    Visit Dx:     ICD-10-CM ICD-9-CM   1. Intraparenchymal hemorrhage of brain  I61.9 431   2. Facial droop  R29.810 781.94   3. Expressive aphasia  R47.01 784.3   4. Weakness of right upper extremity  R29.898 729.89   5. Chronic anticoagulation  Z79.01 V58.61     Patient Active Problem List   Diagnosis    Intraparenchymal hemorrhage of brain    Seizures    Type 2 diabetes mellitus    HTN (hypertension)    Right hemiparesis    Oropharyngeal dysphagia    Paroxysmal atrial fibrillation     History reviewed. No pertinent past medical history.  History reviewed. No pertinent surgical history.    SLP Recommendation and Plan  SLP Swallowing Diagnosis: mild-moderate, oral dysphagia, pharyngeal dysphagia (24)  SLP Diet Recommendation: honey thick liquids, puree, other (see comments) (liquids via spoon only- NPO with further aspiration concerns) (24)  Recommended Precautions and Strategies: upright posture during/after eating, small bites of food and sips of liquid, multiple swallows per bite of food, multiple swallows per sip of liquid, alternate between small bites of food and sips of liquid, 1:1 supervision, assist with feeding (24)  SLP Rec. for Method of Medication Administration: meds crushed, with puree (an honey thick via spoon liquid wash) (24)     Monitor for Signs of Aspiration: yes, notify SLP if any concerns (24)     Swallow Criteria for Skilled Therapeutic Interventions Met: demonstrates skilled criteria (24)  Anticipated Discharge Disposition (SLP): anticipate therapy at next level of care (24)  Rehab Potential/Prognosis, Swallowing: good, to achieve stated therapy goals (24)  Therapy Frequency (Swallow): PRN (24  0845)  Predicted Duration Therapy Intervention (Days): until discharge (03/13/24 0845)  Oral Care Recommendations: Oral Care BID/PRN, Before ice/water (03/13/24 0845)                                        Plan of Care Reviewed With: patient, family  Outcome Evaluation: VFSS completed. Recommend puree and honey thick VIA SPOON only. Meds crushed with puree, honey thick via spoon liquid wash. Recommend 1:1 assist with all PO, alert for PO, upright (90 degrees), and liquids via spoon only.  Mild diffuse residuals, concern for increased residue with fatigue or decline/fluctuation in mental status. With negative lung changes or further concern for aspiration recommend NPO. Ice chips sparingly with RN supervision 30 mins after oral care between meals/PO.      SWALLOW EVALUATION (Last 72 Hours)       SLP Adult Swallow Evaluation       Row Name 03/13/24 0845                   Rehab Evaluation    Document Type evaluation  -OC        Subjective Information no complaints  -OC        Patient Observations alert;cooperative  -OC        Patient/Family/Caregiver Comments/Observations confused, slow to respond, max verbal/tactile cues to follow commands  -OC        Patient Effort adequate  -OC        Symptoms Noted During/After Treatment none  -OC           General Information    Patient Profile Reviewed yes  -OC        Current Method of Nutrition NPO  -OC        Precautions/Limitations, Vision WFL;for purposes of eval  -OC        Precautions/Limitations, Hearing WFL;for purposes of eval  -OC        Prior Level of Function-Communication unknown  -OC        Prior Level of Function-Swallowing other (see comments)  npo due to concerns for aspiration with nectar via spoon/puree  -OC        Plans/Goals Discussed with patient  -OC        Barriers to Rehab medically complex;cognitive status  -OC        Patient's Goals for Discharge patient did not state  -OC           Pain Scale: Numbers Pre/Post-Treatment    Pretreatment Pain Rating 0/10  - no pain  -OC           MBS/VFSS Interpretation    VFSS Summary VFSS completed, Neli GILMAN present. Patient presents with moderate oropharyngeal dysphagia characterized by mistiming, premature spillage, decreased base of tongue retraction, reduced/delayed laryngeal vestibule closure. Suspect mental status impacting swallow function. No penetration or aspiration with honey thick via spoon. Patient demonstrated penetration with honey thick via cup. Patient with silent aspiration nectar thick via cup, penetration nectar thick via spoon. No penetration with puree, mild diffuse residuals throughout pharynx. Able to partially clear with honey thick liquid wash via spoon.  -OC           SLP Communication to Radiology    Summary Statement VFSS completed, Neli GILMAN present. Patient presents with moderate oropharyngeal dysphagia characterized by mistiming, premature spillage, decreased base of tongue retraction, reduced/delayed laryngeal vestibule closure. Suspect mental status impacting swallow function. Silent aspiration nectar thick via cup. Penetration with nectar thick via spoon and honey thick via cup. No aspiration visualized with honey thick via spoon. Mild diffuse pharyngeal residue with puree, able to partially clear with honey thick via spoon.  -OC           SLP Evaluation Clinical Impression    SLP Swallowing Diagnosis mild-moderate;oral dysphagia;pharyngeal dysphagia  -OC        Functional Impact risk of aspiration/pneumonia  -OC        Rehab Potential/Prognosis, Swallowing good, to achieve stated therapy goals  -OC        Swallow Criteria for Skilled Therapeutic Interventions Met demonstrates skilled criteria  -OC           Recommendations    Therapy Frequency (Swallow) PRN  -OC        Predicted Duration Therapy Intervention (Days) until discharge  -OC        SLP Diet Recommendation honey thick liquids;puree;other (see comments)  liquids via spoon only- NPO with further aspiration concerns  -OC         Recommended Precautions and Strategies upright posture during/after eating;small bites of food and sips of liquid;multiple swallows per bite of food;multiple swallows per sip of liquid;alternate between small bites of food and sips of liquid;1:1 supervision;assist with feeding  -OC        Oral Care Recommendations Oral Care BID/PRN;Before ice/water  -OC        SLP Rec. for Method of Medication Administration meds crushed;with puree  an honey thick via spoon liquid wash  -OC        Monitor for Signs of Aspiration yes;notify SLP if any concerns  -OC        Anticipated Discharge Disposition (SLP) anticipate therapy at next level of care  -OC           Swallow Goals (SLP)    Swallow LTGs Swallow Long Term Goal (free text)  -OC           (LTG) Swallow    (LTG) Swallow Tolerate least restrictive diet with no overt s/s aspiration.  -OC        [place] (Swallow Long Term Goal) with 1:1 assist/ supervision  -OC        Time Frame (Swallow Long Term Goal) by discharge  -OC                  User Key  (r) = Recorded By, (t) = Taken By, (c) = Cosigned By      Initials Name Effective Dates    Kristine Coates SLP 08/28/23 -                     EDUCATION  The patient has been educated in the following areas:   Dysphagia (Swallowing Impairment).        SLP GOALS       Row Name 03/13/24 0845             (LTG) Swallow    (LTG) Swallow Tolerate least restrictive diet with no overt s/s aspiration.  -OC      [place] (Swallow Long Term Goal) with 1:1 assist/ supervision  -OC      Time Frame (Swallow Long Term Goal) by discharge  -OC                User Key  (r) = Recorded By, (t) = Taken By, (c) = Cosigned By      Initials Name Provider Type    Kristine Coates SLP Speech and Language Pathologist                       Time Calculation:    Time Calculation- SLP       Row Name 03/13/24 1355             Time Calculation- SLP    SLP Start Time 0845  -OC      SLP Received On 03/13/24  -OC         Untimed Charges    SLP Eval/Re-eval   ST [facility] Fluoro Eval Swallow - 56711  -OC      [id number] Motion Fluoro Eval Swallow Minutes 90  -OC         Total Minutes    Untimed Charges Total Minutes 90  -OC       Total Minutes 90  -OC                User Key  (r) = Recorded By, (t) = Taken By, (c) = Cosigned By      Initials Name Provider Type    Kristine Coates SLP Speech and Language Pathologist                    Therapy Charges for Today       Code Description Service Date Service Provider Modifiers Qty    [id number]  ST MOTION FLUORO EVAL SWALLOW 6 3/13/2024 Kristine Jaimes SLP GN 1                 PATRICK Howell  3/13/2024

## 2024-03-13 NOTE — PLAN OF CARE
Goal Outcome Evaluation:  Plan of Care Reviewed With: patient, spouse, family        Progress: no change  Outcome Evaluation: Pt seen for OT/PT tx session this date, pt very lethargic, attempting to increase alertness with g/h task. He requires max/depx2 for bed mobility this date due to fatigue. Focus on neuro re-ed tasks and weight bearing to RUE as well as LUE to faciliate improved trunk control, PROM to RUE completed as well. He will continue to benefit from skilled OT to address deficits and progress toward goals. Rec SNF.      Anticipated Discharge Disposition (OT): skilled nursing facility                         100

## 2024-03-13 NOTE — PLAN OF CARE
Goal Outcome Evaluation:  Plan of Care Reviewed With: patient, family           Outcome Evaluation: VFSS completed. Recommend puree and honey thick VIA SPOON only. Meds crushed with puree, honey thick via spoon liquid wash. Recommend 1:1 assist with all PO, alert for PO, upright (90 degrees), and liquids via spoon only.  Mild diffuse residuals, concern for increased residue with fatigue or decline/fluctuation in mental status. With negative lung changes or further concern for aspiration recommend NPO. Ice chips sparingly with RN supervision 30 mins after oral care between meals/PO.

## 2024-03-13 NOTE — THERAPY TREATMENT NOTE
Patient Name: Erlin Blanco  : 1945    MRN: [id number]                              Today's Date: 3/13/2024       Admit Date: 2024    Visit Dx:     ICD-10-CM ICD-9-CM   1. Intraparenchymal hemorrhage of brain  I61.9 431   2. Facial droop  R29.810 781.94   3. Expressive aphasia  R47.01 784.3   4. Weakness of right upper extremity  R29.898 729.89   5. Chronic anticoagulation  Z79.01 V58.61     Patient Active Problem List   Diagnosis    Intraparenchymal hemorrhage of brain    Seizures    Type 2 diabetes mellitus    HTN (hypertension)    Right hemiparesis    Oropharyngeal dysphagia    Paroxysmal atrial fibrillation     History reviewed. No pertinent past medical history.  History reviewed. No pertinent surgical history.   General Information       Row Name 24 1131          Physical Therapy Time and Intention    Document Type therapy note (daily note)  -CW (r) JG (t) CW (c)     Mode of Treatment co-treatment;physical therapy;occupational therapy  -CW (r) JG (t) CW (c)       Row Name 24 1131          General Information    Patient Profile Reviewed yes  -CW (r) JG (t) CW (c)     Existing Precautions/Restrictions fall  -CW (r) JG (t) CW (c)       Row Name 24 1131          Cognition    Orientation Status (Cognition) oriented to;person  -CW (r) JG (t) CW (c)       Row Name 24 1131          Safety Issues, Functional Mobility    Impairments Affecting Function (Mobility) strength;balance;endurance/activity tolerance;cognition;coordination;postural/trunk control;range of motion (ROM);motor control;motor planning;visual/perceptual  -CW (r) JG (t) CW (c)     Cognitive Impairments, Mobility Safety/Performance attention;awareness, need for assistance;safety precaution awareness;safety precaution follow-through;insight into deficits/self-awareness  -CW (r) JG (t) CW (c)     Comment, Safety Issues/Impairments (Mobility) Co-treatment medically necessary and appropriate d/t pt's acuity level, decreased  endurance and activity tolerance, and safety of patient and staff. Treatment focused on progression of care and goals established in POC. Gait belt and non-skid socks worn.  -CW (r) JG (t) CW (c)               User Key  (r) = Recorded By, (t) = Taken By, (c) = Cosigned By      Initials Name Provider Type    CW Wendi Syed, PT Physical Therapist    Omid Hunter, PT Student PT Student                   Mobility       Row Name 03/13/24 1132          Bed Mobility    Bed Mobility supine-sit;sit-supine;rolling right;rolling left;scooting/bridging  -CW (r) JG (t) CW (c)     Rolling Left [place] (Bed Mobility) maximum assist (25% patient effort);2 person assist;verbal cues  -CW (r) JG (t) CW (c)     Rolling Right [place] (Bed Mobility) maximum assist (25% patient effort);2 person assist;verbal cues  -CW (r) JG (t) CW (c)     Scooting/Bridging [place] (Bed Mobility) dependent (less than 25% patient effort);2 person assist;verbal cues  -CW (r) JG (t) CW (c)     Supine-Sit [place] (Bed Mobility) verbal cues;nonverbal cues (demo/gesture);2 person assist;maximum assist (25% patient effort)  -CW (r) JG (t) CW (c)     Sit-Supine [place] (Bed Mobility) verbal cues;moderate assist (50% patient effort);2 person assist  -CW (r) JG (t) CW (c)     Assistive Device (Bed Mobility) bed rails;draw sheet;head of bed elevated  -CW (r) JG (t) CW (c)     Comment, (Bed Mobility) increased assistance needed secondary to increased levels of fatigue this date  -CW (r) JG (t) CW (c)       Row Name 03/13/24 1132          Sit-Stand Transfer    Sit-Stand [place] (Transfers) unable to assess  -CW (r) JG (t) CW (c)       Row Name 03/13/24 1132          Gait/Stairs (Locomotion)    [place] Level (Gait) not tested  -CW (r) JG (t) CW (c)     Patient was able to Ambulate no, other medical factors prevent ambulation  -CW (r) JG (t) CW (c)               User Key  (r) = Recorded By, (t) = Taken By, (c) = Cosigned By       Initials Name Provider Type    Wendi Matson, PT Physical Therapist    Omid Hunter, PT Student PT Student                   Obj/Interventions       Row Name 03/13/24 1135          Balance    Balance Assessment sitting static balance;sitting dynamic balance  -CW (r) JG (t) CW (c)     Static Sitting Balance maximum assist;2-person assist;verbal cues  -CW (r) JG (t) CW (c)     Dynamic Sitting Balance maximum assist;2-person assist  -CW (r) JG (t) CW (c)     Position, Sitting Balance supported  -CW (r) JG (t) CW (c)     Comment, Balance Increased assistance required this date secondary to increased levels of fatigue. Strong posterior and R lean this date. Sitting balance at EOB significantly improved with L UE support on bed rail. Cueing for upright posture and cervical extension during all EOB sitting.  -CW (r) JG (t) CW (c)               User Key  (r) = Recorded By, (t) = Taken By, (c) = Cosigned By      Initials Name Provider Type    CW Wendi Syed, PT Physical Therapist    Omid Hunter, PT Student PT Student                   Goals/Plan       Row Name 03/13/24 1336          Bed Mobility Goal 1 (PT)    Activity/Assistive Device (Bed Mobility Goal 1, PT) bed mobility activities, all  -CW     [place] Level/Cues Needed (Bed Mobility Goal 1, PT) moderate assist (50-74% patient effort)  -CW     Time Frame (Bed Mobility Goal 1, PT) 1 week  -CW     Progress/Outcomes (Bed Mobility Goal 1, PT) goal ongoing;progress slower than expected  -CW       Row Name 03/13/24 1336          Transfer Goal 1 (PT)    Activity/Assistive Device (Transfer Goal 1, PT) transfers, all  -CW     [place] Level/Cues Needed (Transfer Goal 1, PT) maximum assist (25-49% patient effort)  -CW     Time Frame (Transfer Goal 1, PT) 1 week  -CW     Progress/Outcome (Transfer Goal 1, PT) goal ongoing;progress slower than expected  -CW               User Key  (r) = Recorded By, (t) = Taken By, (c) = Cosigned By      Initials  Name Provider Type    Wendi Matson, PT Physical Therapist                   Clinical Impression       Row Name 03/13/24 1139          Pain    Pretreatment Pain Rating 0/10 - no pain  -CW (r) JG (t) CW (c)     Pre/Posttreatment Pain Comment pain complaints throughout session but unable to localize pain or rate on numerical scale.  -CW (r) JG (t) CW (c)       Row Name 03/13/24 1139          Plan of Care Review    Plan of Care Reviewed With patient  -CW (r) JG (t) CW (c)     Progress no change  -CW (r) JG (t) CW (c)     Outcome Evaluation Pt agreeable to therapy this date. Pt was asleep when PT/OT entered room, requiring warm washcloth on face to wake up. Pt demonstrated increased levels of fatigue throughout the session. MaxAx2 for bed mobility and EOB sitting this date. Pt continues to demonstrate significant pushing to R side and a posterior lean when sitting at EOB. Sitting balance continues to be significantly improved when pt holds on to bed rail with L UE. Max cueing for upright posture and cervical extension during sitting. Continue to progress as indicated.  -CW (r) JG (t) CW (c)       Row Name 03/13/24 1139          Therapy Assessment/Plan (PT)    Rehab Potential (PT) fair, will monitor progress closely  -CW (r) JG (t) CW (c)     Criteria for Skilled Interventions Met (PT) yes;skilled treatment is necessary  -CW (r) JG (t) CW (c)     Therapy Frequency (PT) 5 times/wk  -CW (r) JG (t) CW (c)       Row Name 03/13/24 1137          Positioning and Restraints    Pre-Treatment Position in bed  -CW (r) JG (t) CW (c)     Post Treatment Position bed  -CW (r) JG (t) CW (c)     In Bed supine;call light within reach;encouraged to call for assist;exit alarm on;with family/caregiver;side rails up x3;SCD pump applied  -CW (r) JG (t) CW (c)               User Key  (r) = Recorded By, (t) = Taken By, (c) = Cosigned By      Initials Name Provider Type    Wendi Matson, PT Physical Therapist    Omid Hunter, PT  Student PT Student                   Outcome Measures       Row Name 03/13/24 1145 03/13/24 0830       How much help from another person do you currently need...    Turning from your back to your side while in flat bed without using bedrails? 2  -CW (r) JG (t) CW (c) 2  -MK    Moving from lying on back to sitting on the side of a flat bed without bedrails? 2  -CW (r) JG (t) CW (c) 2  -MK    Moving to and from a bed to a chair (including a wheelchair)? 1  -CW (r) JG (t) CW (c) 1  -MK    Standing up from a chair using your arms (e.g., wheelchair, bedside chair)? 1  -CW (r) JG (t) CW (c) 1  -MK    Climbing 3-5 steps with a railing? 1  -CW (r) JG (t) CW (c) 1  -MK    To walk in hospital room? 1  -CW (r) JG (t) CW (c) 1  -MK    AM-PAC 6 Clicks Score (PT) 8  -CW (r) JG (t) 8  -MK    Highest Level of Mobility Goal 3 --> Sit at edge of bed  -CW (r) JG (t) 3 --> Sit at edge of bed  -MK      Row Name 03/13/24 1253 03/13/24 1145       Functional Assessment    Outcome Measure Options AM-PAC 6 Clicks Daily Activity (OT)  -MW AM-PAC 6 Clicks Basic Mobility (PT)  -CW (r) JG (t) CW (c)              User Key  (r) = Recorded By, (t) = Taken By, (c) = Cosigned By      Initials Name Provider Type    Wendi Matson, PT Physical Therapist    Nathalie Hoff, OT Occupational Therapist    Minda Rodríguez, RN Registered Nurse    Omid Hunter, PT Student PT Student                                 Physical Therapy Education       Title: PT OT SLP Therapies (In Progress)       Topic: Physical Therapy (In Progress)       Point: Mobility training (Done)       Learning Progress Summary             Patient Acceptance, E, VU by JORDAN at 3/13/2024 1145    Acceptance, E, NR by CEDRICK at 3/11/2024 1009    Acceptance, E, NR by EM at 3/8/2024 1057    Acceptance, E, VU by EM at 3/7/2024 1035    Acceptance, E, VU,NR by EM at 3/6/2024 1116    Acceptance, E,TB,D, VU,NR by  at 3/5/2024 1444    Acceptance, E,D, DU by  at 3/4/2024 1100     Acceptance, E,TB, NR by CB at 3/2/2024 1520    Acceptance, E,TB, NR,NL by MS at 2/27/2024 1320   Family Acceptance, E, VU,NR by  at 3/6/2024 1116    Acceptance, E,TB, NR by CB at 3/2/2024 1520                         Point: Home exercise program (In Progress)       Learning Progress Summary             Patient Acceptance, E,TB,D, VU,NR by  at 3/5/2024 1444    Acceptance, E,D, DU by PC at 3/4/2024 1100    Acceptance, E,TB, NR by CB at 3/2/2024 1520   Family Acceptance, E,TB, NR by CB at 3/2/2024 1520                         Point: Body mechanics (In Progress)       Learning Progress Summary             Patient Acceptance, E, VU by JG at 3/13/2024 1145    Acceptance, E, NR by CW at 3/11/2024 1009    Acceptance, E,TB,D, VU,NR by  at 3/5/2024 1444    Acceptance, E,D, DU by PC at 3/4/2024 1100    Acceptance, E,TB, NR by CB at 3/2/2024 1520    Acceptance, E,TB, VU,NR by  at 2/29/2024 0920    Acceptance, E,TB, NR,NL by MS at 2/27/2024 1320   Family Acceptance, E,TB, NR by CB at 3/2/2024 1520    Acceptance, E,TB, VU,NR by  at 2/29/2024 0920                         Point: Precautions (In Progress)       Learning Progress Summary             Patient Acceptance, E,TB,D, VU,NR by  at 3/5/2024 1444    Acceptance, E,D, DU by PC at 3/4/2024 1100    Acceptance, E,TB, NR by CB at 3/2/2024 1520    Acceptance, E,TB, VU,NR by  at 2/29/2024 0920    Acceptance, E,TB, NR,NL by MS at 2/27/2024 1320   Family Acceptance, E,TB, NR by CB at 3/2/2024 1520    Acceptance, E,TB, VU,NR by  at 2/29/2024 0920                                         User Key       Initials Effective Dates Name Provider Type Discipline     06/16/21 -  Татьяна Billingsley, PT Physical Therapist PT    PC 06/16/21 -  Audrey Ramirez, PT Physical Therapist PT    EM 06/16/21 -  Erica Pennington, PT Physical Therapist PT    MS 06/16/21 -  Lyla Edwards, PT Physical Therapist PT    CW 12/13/22 -  Wendi Syed, PT Physical Therapist PT    CB  10/22/21 -  Mag Monson, PT Physical Therapist PT     01/24/24 -  Rick Maynard, PT Student PT Student PT     02/29/24 -  Omid Bond, PT Student PT Student PT                  PT Recommendation and Plan     Plan of Care Reviewed With: patient  Progress: no change  Outcome Evaluation: Pt agreeable to therapy this date. Pt was asleep when PT/OT entered room, requiring warm washcloth on face to wake up. Pt demonstrated increased levels of fatigue throughout the session. MaxAx2 for bed mobility and EOB sitting this date. Pt continues to demonstrate significant pushing to R side and a posterior lean when sitting at EOB. Sitting balance continues to be significantly improved when pt holds on to bed rail with L UE. Max cueing for upright posture and cervical extension during sitting. Continue to progress as indicated.     Time Calculation:         PT Charges       Row Name 03/13/24 1337 03/13/24 1145          Time Calculation    Start Time -- 1100  -CW (r) JG (t) CW (c)     Stop Time -- 1123  -CW (r) JG (t) CW (c)     Time Calculation (min) -- 23 min  -CW (r) JG (t)     PT Received On -- 03/13/24  -CW (r) JG (t) CW (c)     PT - Next Appointment -- 03/14/24  -CW (r) JG (t) CW (c)     PT Goal Re-Cert Due Date 03/20/24  -CW --        Time Calculation- PT    Total Timed Code Minutes- PT -- 23 minute(s)  -CW (r) JG (t) CW (c)        Timed Charges    42223 - PT Therapeutic Activity Minutes -- 23  -CW (r) JG (t) CW (c)        Total Minutes    Timed Charges Total Minutes -- 23  -CW (r) JG (t)      Total Minutes -- 23  -CW (r) JG (t)               User Key  (r) = Recorded By, (t) = Taken By, (c) = Cosigned By      Initials Name Provider Type    Wendi Matson, PT Physical Therapist    JOmid Diego, PT Student PT Student                  Therapy Charges for Today       Code Description Service Date Service Provider Modifiers Qty    [id number]  PT THERAPEUTIC ACT EA 15 MIN 3/13/2024 Omid Bond, PT  Student GP 2            PT G-Codes  Outcome Measure Options: AM-PAC 6 Clicks Daily Activity (OT)  AM-PAC 6 Clicks Score (PT): 8  AM-PAC 6 Clicks Score (OT): 9  Modified Limestone Scale: 5 - Severe disability.  Bedridden, incontinent, and requiring constant nursing care and attention.  PT Discharge Summary  Anticipated Discharge Disposition (PT): skilled nursing facility    Omid Bond PT Student  3/13/2024

## 2024-03-13 NOTE — PROGRESS NOTES
Hospital Follow Up    LOS:  LOS: 18 days   Patient Name: Erlin Blanco  Age/Sex: 78 y.o. male  : 1945  MRN: [id number]    Day of Service: 24   Length of Stay: 18  Encounter Provider: MUKUL Souza  Place of Service: [facility] CARDIOLOGY  Patient Care Team:  Zamzam John APRN as PCP - General (Family Medicine)    Subjective:     Chief Complaint: follow up hypertension, bradycardia    Interval History: Sitting up in bed, eating breakfast. No complaints of chest pain, shortness of breath or headache this morning. BP is a little high but improving with adjustment of regimen.    Objective:     Objective:  Temp:  [98.2 °F (36.8 °C)-98.8 °F (37.1 °C)] 98.2 °F (36.8 °C)  Heart Rate:  [48-75] 51  Resp:  [18] 18  BP: (132-167)/(69-97) 162/73     Intake/Output Summary (Last 24 hours) at 3/13/2024 0916  Last data filed at 3/12/2024 1726  Gross per 24 hour   Intake 500 ml   Output 200 ml   Net 300 ml     Body mass index is 34.86 kg/m².      24  0600 24  0453 24  0600   Weight: 116 kg (255 lb 11.7 oz) 115 kg (254 lb 3.1 oz) 117 kg (257 lb 0.9 oz)     Weight change:     Physical Exam:   General Appearance:    Awake alert and oriented in no acute distress.   Color:  Skin:  Neuro:  HEENT:    Lungs:     Pink  Warm and dry  Right hemiparesis with facial droop, expressive aphasia  Neck supple, pupils equal, round and reactive. No JVD, No Bruit  Clear to auscultation,respirations regular, even and                  unlabored    Heart:    Regular rate and rhythm, S1 and S2, no murmur, no gallop, no rub. No edema, DP/PT pulses are 2+   Chest Wall:    No abnormalities observed   Abdomen:     Normal bowel sounds, no masses, no organomegaly, soft        non-tender, non-distended, no guarding, no ascites noted   Extremities:   Moves all extremities well, no edema, no cyanosis, no redness       Lab Review:   Results from last 7 days   Lab Units 24  0625  "03/12/24  0706 03/08/24  0536 03/07/24  0801   SODIUM mmol/L 145 143   < > 144   POTASSIUM mmol/L 3.8 3.5   < > 4.7   CHLORIDE mmol/L 109* 108*   < > 106   CO2 mmol/L 27.0 26.0   < > 28.0   BUN mg/dL 17 22   < > 18   CREATININE mg/dL 0.92 0.98   < > 1.12   GLUCOSE mg/dL 121* 188*   < > 194*   CALCIUM mg/dL 9.6 9.8   < > 9.9   AST (SGOT) U/L  --   --   --  36   ALT (SGPT) U/L  --   --   --  36    < > = values in this interval not displayed.         Results from last 7 days   Lab Units 03/13/24  0622 03/12/24  0706   WBC 10*3/mm3 12.07* 13.90*   HEMOGLOBIN g/dL 15.9 15.7   HEMATOCRIT % 48.3 49.1   PLATELETS 10*3/mm3 424 397         Results from last 7 days   Lab Units 03/11/24  0532 03/10/24  0418   MAGNESIUM mg/dL 2.4 2.3           Invalid input(s): \"LDLCALC\"          I reviewed the patient's new clinical results.  I personally viewed and interpreted the patient's EKG  Current Medications:   Scheduled Meds:amLODIPine, 10 mg, Nasogastric, Q24H  carvedilol, 6.25 mg, Oral, BID With Meals  cefTRIAXone, 1,000 mg, Intravenous, Q24H  cloNIDine, 0.2 mg, Oral, Q8H  insulin glargine, 20 Units, Subcutaneous, Nightly  insulin regular, 2-9 Units, Subcutaneous, TID AC  levETIRAcetam, 500 mg, Nasogastric, BID  melatonin, 5 mg, Oral, Nightly  memantine, 10 mg, Nasogastric, Q12H  OLANZapine zydis, 5 mg, Oral, Nightly  senna-docusate sodium, 2 tablet, Oral, BID   And  polyethylene glycol, 17 g, Oral, Daily  sodium chloride, 10 mL, Intravenous, Q12H  [START ON 3/14/2024] spironolactone, 50 mg, Oral, QAM  valsartan, 320 mg, Nasogastric, Q PM      Continuous Infusions:     Allergies:  No Known Allergies    Assessment:       Intraparenchymal hemorrhage of brain    Seizures    Type 2 diabetes mellitus    HTN (hypertension)    Right hemiparesis    Oropharyngeal dysphagia    Paroxysmal atrial fibrillation        Plan:   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 Porras reviewed past EKG and Holter studies and feels this is atrial tachycardia. Does not recommend empirically restarting anticoagulation.  Type 2 diabetes:  Polycythemia vera  CAD: on beta blocker. Aspirin currently on hold. He was on rosuvastatin that was not continued upon admission. No anginal symptoms reported.   Chronic diastolic CHF: appears compensated on exam today.       -BP is still elevated. Will increase spironolactone to 50 mg daily  -chest xray performed for leukocytosis revealing likely pneumonia. Undergoing swallow study today to evaluate for possible aspiration  -plans for discharge to rehab in 1-2 days    MUKUL Souza  03/13/24  09:16 EDT  Electronically signed by MUKUL Souza, 03/11/24, 9:22 AM EDT.

## 2024-03-13 NOTE — THERAPY TREATMENT NOTE
Patient Name: Erlin Blanco  : 1945    MRN: [id number]                              Today's Date: 3/13/2024       Admit Date: 2024    Visit Dx:     ICD-10-CM ICD-9-CM   1. Intraparenchymal hemorrhage of brain  I61.9 431   2. Facial droop  R29.810 781.94   3. Expressive aphasia  R47.01 784.3   4. Weakness of right upper extremity  R29.898 729.89   5. Chronic anticoagulation  Z79.01 V58.61     Patient Active Problem List   Diagnosis    Intraparenchymal hemorrhage of brain    Seizures    Type 2 diabetes mellitus    HTN (hypertension)    Right hemiparesis    Oropharyngeal dysphagia    Paroxysmal atrial fibrillation     History reviewed. No pertinent past medical history.  History reviewed. No pertinent surgical history.   General Information       Row Name 24 1248          OT Time and Intention    Document Type therapy note (daily note)  -MW     Mode of Treatment co-treatment;physical therapy;occupational therapy  pt cindi lethargic and unable to tolerate multiple sessions  -       Row Name 24 1248          General Information    Patient Profile Reviewed yes  -MW     Existing Precautions/Restrictions fall  -MW       Row Name 24 1248          Cognition    Orientation Status (Cognition) oriented to;person  -MW       Row Name 24 1248          Safety Issues, Functional Mobility    Safety Issues Affecting Function (Mobility) ability to follow commands;judgment;safety precautions follow-through/compliance;safety precaution awareness;sequencing abilities;problem-solving  -MW     Impairments Affecting Function (Mobility) strength;balance;endurance/activity tolerance;cognition;coordination;postural/trunk control;range of motion (ROM);motor control;motor planning;visual/perceptual  -MW     Comment, Safety Issues/Impairments (Mobility) Co-treatment medically necessary and appropriate d/t pt's acuity level, decreased endurance and activity tolerance, and safety of patient and staff. Treatment  focused on progression of care and goals established in POC. Gait belt and non-skid socks worn.  -MW               User Key  (r) = Recorded By, (t) = Taken By, (c) = Cosigned By      Initials Name Provider Type    MW Nathalie Woodard OT Occupational Therapist                     Mobility/ADL's       Row Name 03/13/24 1248          Bed Mobility    Bed Mobility scooting/bridging;supine-sit;sit-supine  -MW     Scooting/Bridging [place] (Bed Mobility) dependent (less than 25% patient effort);2 person assist  -MW     Supine-Sit [place] (Bed Mobility) set up;verbal cues;nonverbal cues (demo/gesture);2 person assist;maximum assist (25% patient effort);dependent (less than 25% patient effort)  -MW     Sit-Supine [place] (Bed Mobility) set up;verbal cues;nonverbal cues (demo/gesture);2 person assist;maximum assist (25% patient effort);dependent (less than 25% patient effort)  -     Assistive Device (Bed Mobility) bed rails;draw sheet;head of bed elevated  -     Comment, (Bed Mobility) strong use of LUE on foot rail, cues for posture, more posterior leaning noted this date vs R lateral  -       Row Name 03/13/24 1248          Transfers    Comment, (Transfers) poor sit balance and pt lethargic, not approp to assess this date  -       Row Name 03/13/24 1248          Activities of Daily Living    BADL Assessment/Intervention lower body dressing;toileting;grooming  -       Row Name 03/13/24 1248          Grooming Assessment/Training    Comment, (Grooming) assist from spouse due to fatigue  -       Row Name 03/13/24 1248          Toileting Assessment/Training    [place] Level (Toileting) dependent (less than 25% patient effort)  -       Row Name 03/13/24 1248          Lower Body Dressing Assessment/Training    [place] Level (Lower Body Dressing) dependent (less than 25% patient effort);socks  -MW     Position (Lower Body Dressing) supine  -               User Key  (r) = Recorded By, (t) =  Taken By, (c) = Cosigned By      Initials Name Provider Type    Nathalie Hoff OT Occupational Therapist                   Obj/Interventions       Row Name 03/13/24 1250          Shoulder (Therapeutic Exercise)    Shoulder (Therapeutic Exercise) PROM (passive range of motion)  -MW     Shoulder PROM (Therapeutic Exercise) right;flexion;extension;sitting  -MW       Row Name 03/13/24 1250          Elbow/Forearm (Therapeutic Exercise)    Elbow/Forearm (Therapeutic Exercise) PROM (passive range of motion)  -MW     Elbow/Forearm AAROM (Therapeutic Exercise) right;flexion;extension  -MW       Row Name 03/13/24 1250          Motor Skills    Motor Control/Coordination Interventions neuro-muscular re-education;therapeutic exercise/ROM  -MW       Row Name 03/13/24 1250          Balance    Balance Assessment sitting static balance;sitting dynamic balance  -MW     Static Sitting Balance minimal assist;maximum assist  -MW     Dynamic Sitting Balance moderate assist;maximum assist  -MW     Position, Sitting Balance supported;sitting edge of bed  -       Row Name 03/13/24 1250          Neuromuscular Re-education    Interventions (Neuromuscular Re-education) weight bearing  -     Comment (Neuromuscular Re-education) weight bearing to LUE x5 reps and RUE x1 rep with max A x2 to come to midline  -               User Key  (r) = Recorded By, (t) = Taken By, (c) = Cosigned By      Initials Name Provider Type    Nathalie Hoff OT Occupational Therapist                   Goals/Plan    No documentation.                  Clinical Impression       Row Name 03/13/24 1251          Pain Assessment    Pretreatment Pain Rating 0/10 - no pain  -MW     Posttreatment Pain Rating 0/10 - no pain  -       Row Name 03/13/24 1251          Plan of Care Review    Plan of Care Reviewed With patient;spouse;family  -     Progress no change  -     Outcome Evaluation Pt seen for OT/PT tx session this date, pt very lethargic, attempting  to increase alertness with g/h task. He requires max/depx2 for bed mobility this date due to fatigue. Focus on neuro re-ed tasks and weight bearing to RUE as well as LUE to faciliate improved trunk control, PROM to RUE completed as well. He will continue to benefit from skilled OT to address deficits and progress toward goals. Rec SNF.  -       Row Name 03/13/24 1251          Therapy Plan Review/Discharge Plan (OT)    Anticipated Discharge Disposition (OT) skilled nursing facility  -       Row Name 03/13/24 1251          Vital Signs    O2 Delivery Pre Treatment room air  -       Row Name 03/13/24 1251          Positioning and Restraints    Pre-Treatment Position in bed  -     Post Treatment Position bed  -MW     In Bed notified nsg;fowlers;call light within reach;encouraged to call for assist;exit alarm on;with family/caregiver;side rails up x3;SCD pump applied;RUE elevated;legs elevated;LUE elevated  -               User Key  (r) = Recorded By, (t) = Taken By, (c) = Cosigned By      Initials Name Provider Type     Nathalie Woodard, OT Occupational Therapist                   Outcome Measures       Row Name 03/13/24 1253          How much help from another is currently needed...    Putting on and taking off regular lower body clothing? 1  -MW     Bathing (including washing, rinsing, and drying) 1  -MW     Toileting (which includes using toilet bed pan or urinal) 1  -MW     Putting on and taking off regular upper body clothing 2  -MW     Taking care of personal grooming (such as brushing teeth) 2  -MW     Eating meals 2  -MW     AM-PAC 6 Clicks Score (OT) 9  -MW       Row Name 03/13/24 1145 03/13/24 0830       How much help from another person do you currently need...    Turning from your back to your side while in flat bed without using bedrails? 2 (P)   -JG 2  -MK    Moving from lying on back to sitting on the side of a flat bed without bedrails? 2 (P)   -JG 2  -MK    Moving to and from a bed to a  chair (including a wheelchair)? 1 (P)   -JG 1  -MK    Standing up from a chair using your arms (e.g., wheelchair, bedside chair)? 1 (P)   -JG 1  -MK    Climbing 3-5 steps with a railing? 1 (P)   -JG 1  -MK    To walk in hospital room? 1 (P)   -JG 1  -MK    AM-PAC 6 Clicks Score (PT) 8 (P)   -JG 8  -MK    Highest Level of Mobility Goal 3 --> Sit at edge of bed (P)   -JG 3 --> Sit at edge of bed  -      Row Name 03/13/24 1253 03/13/24 1145       Functional Assessment    Outcome Measure Options AM-PAC 6 Clicks Daily Activity (OT)  -MW AM-PAC 6 Clicks Basic Mobility (PT) (P)   -J              User Key  (r) = Recorded By, (t) = Taken By, (c) = Cosigned By      Initials Name Provider Type    Nathalie Hoff, OT Occupational Therapist    Minda Rodríguez, RN Registered Nurse    Omid Hunter, PT Student PT Student                    Occupational Therapy Education       Title: PT OT SLP Therapies (In Progress)       Topic: Occupational Therapy (In Progress)       Point: ADL training (Not Started)       Description:   Instruct learner(s) on proper safety adaptation and remediation techniques during self care or transfers.   Instruct in proper use of assistive devices.                  Learner Progress:  Not documented in this visit.              Point: Home exercise program (Done)       Description:   Instruct learner(s) on appropriate technique for monitoring, assisting and/or progressing therapeutic exercises/activities.                  Learning Progress Summary             Family Acceptance, E, VU by BS at 3/2/2024 1530    Comment: RUE ROM / HEP to adress swelling   Significant Other Acceptance, E, VU by BS at 3/2/2024 1530    Comment: RUE ROM / HEP to adress swelling                         Point: Precautions (Done)       Description:   Instruct learner(s) on prescribed precautions during self-care and functional transfers.                  Learning Progress Summary             Family Acceptance, E, VU,BC  by  at 2/27/2024 1315    Comment: RUE ROM, massage, safe positioning for subluxation prevention, OT role and dc recommendations                         Point: Body mechanics (Not Started)       Description:   Instruct learner(s) on proper positioning and spine alignment during self-care, functional mobility activities and/or exercises.                  Learner Progress:  Not documented in this visit.                              User Key       Initials Effective Dates Name Provider Type Discipline     04/02/20 -  Sonia Mcclure OT Occupational Therapist OT    BS 11/14/22 -  Alexandria De La Rosa OT Occupational Therapist OT                  OT Recommendation and Plan     Plan of Care Review  Plan of Care Reviewed With: patient, spouse, family  Progress: no change  Outcome Evaluation: Pt seen for OT/PT tx session this date, pt very lethargic, attempting to increase alertness with g/h task. He requires max/depx2 for bed mobility this date due to fatigue. Focus on neuro re-ed tasks and weight bearing to RUE as well as LUE to faciliate improved trunk control, PROM to RUE completed as well. He will continue to benefit from skilled OT to address deficits and progress toward goals. Rec SNF.     Time Calculation:         Time Calculation- OT       Row Name 03/13/24 1253             Time Calculation- OT    OT Start Time 1104  -MW      OT Stop Time 1122  -MW      OT Time Calculation (min) 18 min  -MW      Total Timed Code Minutes- OT 18 minute(s)  -MW      OT Received On 03/13/24  -MW      OT - Next Appointment 03/14/24  -MW         Timed Charges    68066 -  OT Neuromuscular Reeducation Minutes 18  -MW         Total Minutes    Timed Charges Total Minutes 18  -MW       Total Minutes 18  -MW                User Key  (r) = Recorded By, (t) = Taken By, (c) = Cosigned By      Initials Name Provider Type    MW Nathalie Woodard OT Occupational Therapist                  Therapy Charges for Today       Code Description Service  Date Service Provider Modifiers Qty    [id number] HC OT NEUROMUSC RE EDUCATION EA 15 MIN 3/13/2024 Nathalie Woodard OT GO 1                 Nathalie Woodard OT  3/13/2024

## 2024-03-13 NOTE — PLAN OF CARE
Goal Outcome Evaluation:      Pt had multiple very small Bms that were liquid throughout this shift. CT chest completed. Family at bedside providing care and comfort to the pt.

## 2024-03-13 NOTE — PLAN OF CARE
Goal Outcome Evaluation:  Plan of Care Reviewed With: patient        Progress: no change  Outcome Evaluation: Pt agreeable to therapy this date. Pt was asleep when PT/OT entered room, requiring warm washcloth on face to wake up. Pt demonstrated increased levels of fatigue throughout the session. MaxAx2 for bed mobility and EOB sitting this date. Pt continues to demonstrate significant pushing to R side and a posterior lean when sitting at EOB. Sitting balance continues to be significantly improved when pt holds on to bed rail with L UE. Max cueing for upright posture and cervical extension during sitting. Continue to progress as indicated.      Anticipated Discharge Disposition (PT): skilled nursing facility

## 2024-03-14 VITALS
DIASTOLIC BLOOD PRESSURE: 68 MMHG | HEIGHT: 72 IN | TEMPERATURE: 98.8 F | OXYGEN SATURATION: 96 % | WEIGHT: 258.16 LBS | BODY MASS INDEX: 34.97 KG/M2 | HEART RATE: 53 BPM | RESPIRATION RATE: 18 BRPM | SYSTOLIC BLOOD PRESSURE: 137 MMHG

## 2024-03-14 PROBLEM — R47.01 EXPRESSIVE APHASIA: Status: ACTIVE | Noted: 2024-03-14

## 2024-03-14 LAB
ALBUMIN SERPL-MCNC: 3.5 G/DL (ref 3.5–5.2)
ANION GAP SERPL CALCULATED.3IONS-SCNC: 9 MMOL/L (ref 5–15)
BUN SERPL-MCNC: 22 MG/DL (ref 8–23)
BUN/CREAT SERPL: 19.6 (ref 7–25)
CALCIUM SPEC-SCNC: 9.4 MG/DL (ref 8.6–10.5)
CHLORIDE SERPL-SCNC: 109 MMOL/L (ref 98–107)
CO2 SERPL-SCNC: 27 MMOL/L (ref 22–29)
CREAT SERPL-MCNC: 1.12 MG/DL (ref 0.76–1.27)
DEPRECATED RDW RBC AUTO: 53.1 FL (ref 37–54)
EGFRCR SERPLBLD CKD-EPI 2021: 68.1 ML/MIN/1.73
ERYTHROCYTE [DISTWIDTH] IN BLOOD BY AUTOMATED COUNT: 15.9 % (ref 12.3–15.4)
GLUCOSE BLDC GLUCOMTR-MCNC: 208 MG/DL (ref 70–130)
GLUCOSE BLDC GLUCOMTR-MCNC: 227 MG/DL (ref 70–130)
GLUCOSE BLDC GLUCOMTR-MCNC: 238 MG/DL (ref 70–130)
GLUCOSE BLDC GLUCOMTR-MCNC: 312 MG/DL (ref 70–130)
GLUCOSE SERPL-MCNC: 261 MG/DL (ref 65–99)
HCT VFR BLD AUTO: 46.5 % (ref 37.5–51)
HGB BLD-MCNC: 15.2 G/DL (ref 13–17.7)
MCH RBC QN AUTO: 29.7 PG (ref 26.6–33)
MCHC RBC AUTO-ENTMCNC: 32.7 G/DL (ref 31.5–35.7)
MCV RBC AUTO: 90.8 FL (ref 79–97)
PHOSPHATE SERPL-MCNC: 3.4 MG/DL (ref 2.5–4.5)
PLATELET # BLD AUTO: 420 10*3/MM3 (ref 140–450)
PMV BLD AUTO: 10.3 FL (ref 6–12)
POTASSIUM SERPL-SCNC: 3.9 MMOL/L (ref 3.5–5.2)
RBC # BLD AUTO: 5.12 10*6/MM3 (ref 4.14–5.8)
SODIUM SERPL-SCNC: 145 MMOL/L (ref 136–145)
WBC NRBC COR # BLD AUTO: 9.92 10*3/MM3 (ref 3.4–10.8)

## 2024-03-14 PROCEDURE — 80069 RENAL FUNCTION PANEL: CPT | Performed by: INTERNAL MEDICINE

## 2024-03-14 PROCEDURE — 63710000001 INSULIN GLARGINE PER 5 UNITS: Performed by: INTERNAL MEDICINE

## 2024-03-14 PROCEDURE — 85027 COMPLETE CBC AUTOMATED: CPT | Performed by: INTERNAL MEDICINE

## 2024-03-14 PROCEDURE — 82948 REAGENT STRIP/BLOOD GLUCOSE: CPT

## 2024-03-14 PROCEDURE — 99232 SBSQ HOSP IP/OBS MODERATE 35: CPT | Performed by: NURSE PRACTITIONER

## 2024-03-14 PROCEDURE — 63710000001 INSULIN REGULAR HUMAN PER 5 UNITS: Performed by: INTERNAL MEDICINE

## 2024-03-14 RX ORDER — CEFPODOXIME PROXETIL 200 MG/1
200 TABLET, FILM COATED ORAL EVERY 12 HOURS
Start: 2024-03-14 | End: 2024-03-18

## 2024-03-14 RX ORDER — BISACODYL 10 MG
10 SUPPOSITORY, RECTAL RECTAL DAILY PRN
Status: DISCONTINUED | OUTPATIENT
Start: 2024-03-14 | End: 2024-03-15 | Stop reason: HOSPADM

## 2024-03-14 RX ORDER — METFORMIN HYDROCHLORIDE 500 MG/1
500 TABLET, EXTENDED RELEASE ORAL
Start: 2024-03-15

## 2024-03-14 RX ORDER — POLYETHYLENE GLYCOL 3350 17 G/17G
17 POWDER, FOR SOLUTION ORAL 2 TIMES DAILY
Status: DISCONTINUED | OUTPATIENT
Start: 2024-03-14 | End: 2024-03-15 | Stop reason: HOSPADM

## 2024-03-14 RX ORDER — METFORMIN HYDROCHLORIDE 500 MG/1
500 TABLET, EXTENDED RELEASE ORAL
Status: DISCONTINUED | OUTPATIENT
Start: 2024-03-14 | End: 2024-03-15 | Stop reason: HOSPADM

## 2024-03-14 RX ORDER — BISACODYL 5 MG/1
5 TABLET, DELAYED RELEASE ORAL DAILY PRN
Status: DISCONTINUED | OUTPATIENT
Start: 2024-03-14 | End: 2024-03-15 | Stop reason: HOSPADM

## 2024-03-14 RX ORDER — AMOXICILLIN 250 MG
2 CAPSULE ORAL 2 TIMES DAILY
Status: DISCONTINUED | OUTPATIENT
Start: 2024-03-14 | End: 2024-03-15 | Stop reason: HOSPADM

## 2024-03-14 RX ORDER — SPIRONOLACTONE 50 MG/1
50 TABLET, FILM COATED ORAL EVERY MORNING
Start: 2024-03-15

## 2024-03-14 RX ORDER — POLYETHYLENE GLYCOL 3350 17 G/17G
17 POWDER, FOR SOLUTION ORAL 2 TIMES DAILY
Start: 2024-03-14

## 2024-03-14 RX ADMIN — INSULIN HUMAN 4 UNITS: 100 INJECTION, SOLUTION PARENTERAL at 08:41

## 2024-03-14 RX ADMIN — POLYETHYLENE GLYCOL 3350 17 G: 17 POWDER, FOR SOLUTION ORAL at 08:41

## 2024-03-14 RX ADMIN — CARVEDILOL 6.25 MG: 6.25 TABLET, FILM COATED ORAL at 16:55

## 2024-03-14 RX ADMIN — CLONIDINE HYDROCHLORIDE 0.2 MG: 0.1 TABLET ORAL at 06:19

## 2024-03-14 RX ADMIN — INSULIN HUMAN 7 UNITS: 100 INJECTION, SOLUTION PARENTERAL at 20:16

## 2024-03-14 RX ADMIN — INSULIN HUMAN 4 UNITS: 100 INJECTION, SOLUTION PARENTERAL at 18:03

## 2024-03-14 RX ADMIN — MEMANTINE HYDROCHLORIDE 10 MG: 10 TABLET, FILM COATED ORAL at 08:42

## 2024-03-14 RX ADMIN — OLANZAPINE 5 MG: 5 TABLET, ORALLY DISINTEGRATING ORAL at 19:53

## 2024-03-14 RX ADMIN — CLONIDINE HYDROCHLORIDE 0.2 MG: 0.1 TABLET ORAL at 19:53

## 2024-03-14 RX ADMIN — AMLODIPINE BESYLATE 10 MG: 10 TABLET ORAL at 08:42

## 2024-03-14 RX ADMIN — SPIRONOLACTONE 50 MG: 50 TABLET, FILM COATED ORAL at 06:19

## 2024-03-14 RX ADMIN — Medication 5 MG: at 19:53

## 2024-03-14 RX ADMIN — LINAGLIPTIN 5 MG: 5 TABLET, FILM COATED ORAL at 09:29

## 2024-03-14 RX ADMIN — METFORMIN HYDROCHLORIDE 500 MG: 500 TABLET, EXTENDED RELEASE ORAL at 09:29

## 2024-03-14 RX ADMIN — CARVEDILOL 6.25 MG: 6.25 TABLET, FILM COATED ORAL at 08:42

## 2024-03-14 RX ADMIN — INSULIN HUMAN 4 UNITS: 100 INJECTION, SOLUTION PARENTERAL at 12:34

## 2024-03-14 RX ADMIN — OLANZAPINE 5 MG: 5 TABLET, ORALLY DISINTEGRATING ORAL at 00:54

## 2024-03-14 RX ADMIN — CLONIDINE HYDROCHLORIDE 0.2 MG: 0.1 TABLET ORAL at 16:54

## 2024-03-14 RX ADMIN — DOCUSATE SODIUM 50MG AND SENNOSIDES 8.6MG 2 TABLET: 8.6; 5 TABLET, FILM COATED ORAL at 08:42

## 2024-03-14 RX ADMIN — INSULIN GLARGINE 22 UNITS: 100 INJECTION, SOLUTION SUBCUTANEOUS at 20:16

## 2024-03-14 RX ADMIN — ACETAMINOPHEN 325MG 650 MG: 325 TABLET ORAL at 09:29

## 2024-03-14 RX ADMIN — LEVETIRACETAM 500 MG: 500 TABLET, FILM COATED ORAL at 08:41

## 2024-03-14 RX ADMIN — POLYETHYLENE GLYCOL 3350 17 G: 17 POWDER, FOR SOLUTION ORAL at 20:07

## 2024-03-14 RX ADMIN — VALSARTAN 320 MG: 320 TABLET, FILM COATED ORAL at 16:54

## 2024-03-14 RX ADMIN — MEMANTINE HYDROCHLORIDE 10 MG: 10 TABLET, FILM COATED ORAL at 19:51

## 2024-03-14 NOTE — PROGRESS NOTES
Case Management Discharge Note      Final Note: [facility] has obtained [employer] Medicare pre-cert and can accept pt today. Pharmacy updated and packet on pt chart. [facility] scheduled at 4:00 PM. Jaimee Toa LCSW  Addendum: [facility] delayed to 8:00 PM due to staffing [place]  Provided Post Acute Provider List?: N/A  Provided Post Acute Provider Quality & Resource List?: N/A    Selected Continued Care - Admitted Since 2/24/2024       Destination Coordination complete.      Service Provider Selected Services Address Phone Fax Patient Preferred    [facility] [address] , [facility] [id number] [phone] --              Durable Medical Equipment    No services have been selected for the patient.                Dialysis/Infusion    No services have been selected for the patient.                Home Medical Care    No services have been selected for the patient.                Therapy    No services have been selected for the patient.                Community Resources    No services have been selected for the patient.                Community & DME    No services have been selected for the patient.                    Transportation Services  Ambulance: [facility]    Final Discharge Disposition Code: 03 - skilled nursing facility (SNF)

## 2024-03-14 NOTE — PLAN OF CARE
Problem: Adult Inpatient Plan of Care  Goal: Absence of Hospital-Acquired Illness or Injury  Intervention: Identify and Manage Fall Risk  Recent Flowsheet Documentation  Taken 3/13/2024 2200 by Acacia Gutierrez RN  Safety Promotion/Fall Prevention:   assistive device/personal items within reach   clutter free environment maintained   fall prevention program maintained   lighting adjusted   nonskid shoes/slippers when out of bed   safety round/check completed   room organization consistent  Taken 3/13/2024 2000 by Acacia Gutierrez RN  Safety Promotion/Fall Prevention:   assistive device/personal items within reach   clutter free environment maintained   fall prevention program maintained   lighting adjusted   nonskid shoes/slippers when out of bed   safety round/check completed   room organization consistent  Intervention: Prevent Skin Injury  Recent Flowsheet Documentation  Taken 3/13/2024 2200 by Acacia Gutierrez RN  Body Position: position changed independently  Taken 3/13/2024 2000 by Acacia Gutierrez RN  Body Position: position changed independently  Skin Protection:   adhesive use limited   drying agents applied   incontinence pads utilized   protective footwear used   skin sealant/moisture barrier applied   transparent dressing maintained   tubing/devices free from skin contact  Intervention: Prevent and Manage VTE (Venous Thromboembolism) Risk  Recent Flowsheet Documentation  Taken 3/13/2024 2200 by Acacia Gutierrez RN  Activity Management: activity encouraged  Taken 3/13/2024 2000 by Acacia Gutierrez RN  Activity Management: activity encouraged  Range of Motion:   active ROM (range of motion) encouraged   ROM (range of motion) performed  Goal: Optimal Comfort and Wellbeing  Intervention: Provide Person-Centered Care  Recent Flowsheet Documentation  Taken 3/13/2024 2000 by Acacia Gutierrez RN  Trust Relationship/Rapport:   care explained   choices provided   questions answered   questions encouraged   reassurance  provided   thoughts/feelings acknowledged   Goal Outcome Evaluation:      Pt resting peacefully at this time. Family at bedside. RA. Incont care prn. Meds in puree, pt able to feed self with encouragement. C/o L knee pain rellieved by repositioning and prn tylenol.

## 2024-03-14 NOTE — PROGRESS NOTES
Hospital Follow Up    LOS:  LOS: 19 days   Patient Name: Erlin Blanco  Age/Sex: 78 y.o. male  : 1945  MRN: [id number]    Day of Service: 24   Length of Stay: 19  Encounter Provider: MUKUL Souza  Place of Service: [facility] CARDIOLOGY  Patient Care Team:  Zamzam John APRN as PCP - General (Family Medicine)    Subjective:     Chief Complaint: follow up hypertension, bradycardia    Interval History: No complaints of chest pain, shortness of breath. BP a little better.    Objective:     Objective:  Temp:  [97.7 °F (36.5 °C)-98.4 °F (36.9 °C)] 97.7 °F (36.5 °C)  Heart Rate:  [50-69] 63  Resp:  [18] 18  BP: (129-168)/(68-89) 168/82   No intake or output data in the 24 hours ending 24 0928    Body mass index is 35.01 kg/m².      24  0453 24  0600 24  0619   Weight: 115 kg (254 lb 3.1 oz) 117 kg (257 lb 0.9 oz) 117 kg (258 lb 2.5 oz)     Weight change: 0.5 kg (1 lb 1.6 oz)    Physical Exam:   General Appearance:    Awake alert and oriented in no acute distress.   Color:  Skin:  Neuro:  HEENT:    Lungs:     Pink  Warm and dry  Right hemiparesis with facial droop, expressive aphasia  Neck supple, pupils equal, round and reactive. No JVD, No Bruit  Clear to auscultation,respirations regular, even and                  unlabored    Heart:    Regular rate and rhythm, S1 and S2, no murmur, no gallop, no rub. No edema, DP/PT pulses are 2+   Chest Wall:    No abnormalities observed   Abdomen:     Normal bowel sounds, no masses, no organomegaly, soft        non-tender, non-distended, no guarding, no ascites noted   Extremities:   Moves all extremities well, no edema, no cyanosis, no redness       Lab Review:   Results from last 7 days   Lab Units 24  0604 24  0622   SODIUM mmol/L 145 145   POTASSIUM mmol/L 3.9 3.8   CHLORIDE mmol/L 109* 109*   CO2 mmol/L 27.0 27.0   BUN mg/dL 22 17   CREATININE mg/dL 1.12 0.92   GLUCOSE mg/dL 261* 121*  "  CALCIUM mg/dL 9.4 9.6         Results from last 7 days   Lab Units 03/14/24  0604 03/13/24  0622   WBC 10*3/mm3 9.92 12.07*   HEMOGLOBIN g/dL 15.2 15.9   HEMATOCRIT % 46.5 48.3   PLATELETS 10*3/mm3 420 424         Results from last 7 days   Lab Units 03/11/24  0532 03/10/24  0418   MAGNESIUM mg/dL 2.4 2.3           Invalid input(s): \"LDLCALC\"          I reviewed the patient's new clinical results.  I personally viewed and interpreted the patient's EKG  Current Medications:   Scheduled Meds:amLODIPine, 10 mg, Nasogastric, Q24H  carvedilol, 6.25 mg, Oral, BID With Meals  cefTRIAXone, 2,000 mg, Intravenous, Q24H  cloNIDine, 0.2 mg, Oral, Q8H  insulin glargine, 20 Units, Subcutaneous, Nightly  insulin regular, 2-9 Units, Subcutaneous, TID AC  levETIRAcetam, 500 mg, Nasogastric, BID  linagliptin, 5 mg, Oral, Daily  melatonin, 5 mg, Oral, Nightly  memantine, 10 mg, Nasogastric, Q12H  metFORMIN ER, 500 mg, Oral, Daily With Breakfast  OLANZapine zydis, 5 mg, Oral, Nightly  senna-docusate sodium, 2 tablet, Oral, BID   And  polyethylene glycol, 17 g, Oral, BID  sodium chloride, 10 mL, Intravenous, Q12H  spironolactone, 50 mg, Oral, QAM  valsartan, 320 mg, Nasogastric, Q PM      Continuous Infusions:     Allergies:  No Known Allergies    Assessment:       Intraparenchymal hemorrhage of brain    Seizures    Type 2 diabetes mellitus    HTN (hypertension)    Right hemiparesis    Oropharyngeal dysphagia    Paroxysmal atrial fibrillation        Plan:   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 Porras reviewed past EKG and Holter studies and feels this is atrial tachycardia. Does not recommend empirically restarting anticoagulation.  Type 2 diabetes:  Polycythemia vera  CAD: on beta blocker. Aspirin currently on hold. He was on rosuvastatin that was not continued upon admission. No anginal symptoms reported.   Chronic diastolic CHF: appears " compensated on exam today.       -BP improving with increase in spironolactone. Renals function and potassium stable. Continue with current dosing of antihypertensives.  -No changes from a cardiac standpoint. Ok with discharging at any time.  -Office to arrange follow up with me in 2-4 weeks.      MUKUL Souza  03/14/24  09:28 EDT  Electronically signed by MUKUL Souza, 03/11/24, 9:22 AM EDT.

## 2024-03-14 NOTE — PROGRESS NOTES
[facility] Medicine Services  PROGRESS NOTE    Patient Name: Erlin Blanco  : 1945  MRN: [id number]    Date of Admission: 2024  Primary Care Physician: Zamzam John APRN    Subjective   Subjective     CC:  Follow-up hemorrhage    Subjective:  Family is at the bedside and feel he is doing stable.  They agree with plan for transfer when possible.  They were requesting increase of his bowel regimen as he is only had smaller bowel movements so far.    Objective   Objective     Vital Signs:   Temp:  [97.7 °F (36.5 °C)-98.4 °F (36.9 °C)] 97.7 °F (36.5 °C)  Heart Rate:  [50-69] 63  Resp:  [18] 18  BP: (129-168)/(68-89) 168/82  Total (NIH Stroke Scale): 22     Physical Exam:  Constitutional:Awake, alert, chronically ill-appearing but nontoxic  HENT: NCAT, mucous membranes moist, neck supple  Respiratory: No cough or wheezes, normal respirations, nonlabored breathing   Cardiovascular: Pulse rate is normal, palpable radial pulses  Gastrointestinal:  soft, nontender, nondistended  Musculoskeletal: Elderly frail and chronically debilitated in appearance, obese, BMI is 35, mild lower extremity edema  Psychiatric: Somewhat flat affect, cooperative, conversational  Neurologic: Right hemiparesis, dysarthria, expressive aphasia, alert  Skin: No rashes or jaundice, warm  Examination stable    Results Reviewed:  Results from last 7 days   Lab Units 24  0604 24  0622 24  0706   WBC 10*3/mm3 9.92 12.07* 13.90*   HEMOGLOBIN g/dL 15.2 15.9 15.7   HEMATOCRIT % 46.5 48.3 49.1   PLATELETS 10*3/mm3 420 424 397     Results from last 7 days   Lab Units 24  0604 24  0622 24  0706   SODIUM mmol/L 145 145 143   POTASSIUM mmol/L 3.9 3.8 3.5   CHLORIDE mmol/L 109* 109* 108*   CO2 mmol/L 27.0 27.0 26.0   BUN mg/dL 22 17 22   CREATININE mg/dL 1.12 0.92 0.98   GLUCOSE mg/dL 261* 121* 188*   CALCIUM mg/dL 9.4 9.6 9.8     Estimated Creatinine Clearance: 74.1 mL/min (by C-G formula based on SCr of  1.12 mg/dL).    Microbiology Results Abnormal       Procedure Component Value - Date/Time    CANDIDA AURIS SCREEN - Swab, Axilla Right, Axilla Left and Groin [[id number]]  (Normal) Collected: 03/05/24 1629    Lab Status: Final result Specimen: Swab from Axilla Right, Axilla Left and Groin Updated: 03/10/24 1745     Lyn Auris Screen Culture No Candida auris isolated at 5 days            Imaging Results (Last 24 Hours)       Procedure Component Value Units Date/Time    FL Video Swallow Single Contrast [[id number]] Collected: 03/13/24 1600     Updated: 03/13/24 1603    Narrative:      VIDEO SWALLOW STUDY     HISTORY: Dysphagia.     Fluoroscopy was provided for the speech pathologist during a video  swallow study.             Impression:      Fluoroscopy was provided for the speech pathologist during a  video swallow study. For full details please see the speech pathology  report           This report was finalized on 3/13/2024 4:00 PM by Dr. Randy Sanches M.D on Workstation: [id number]       CT Chest Without Contrast Diagnostic [[id number]] Collected: 03/12/24 2006     Updated: 03/13/24 1507    Narrative:      CT OF THE CHEST WITHOUT CONTRAST 03/12/2024     HISTORY: Possible pneumonia.     TECHNIQUE: Axial images were obtained from the lung apices to the upper  abdomen. No intravenous contrast was given.     FINDINGS: On the lung windows there is somewhat heterogeneous density of  the bilateral lungs and some of the vessels appear somewhat poorly  defined. This heterogeneous ill-defined increased density may be related  to some mild air trapping with mosaic pattern versus some motion  artifact. There could be some minimal scattered ground-glass opacities  in the lungs. Tiny calcified granuloma appears to be present in the  right lower lobe on image 57. No other suspicious lung masses are seen.     There is some aortic and minimal coronary calcification. Small amount of  fluid is seen in pericardial recesses. No  pathologically enlarged lymph  nodes are seen. There is hyperdense material layering in the dependent  portion of the gallbladder which may represent collection of very tiny  stones or hyperdense bile. No gallbladder inflammatory changes are seen.       Impression:      1. Somewhat heterogeneous appearance of the bilateral lungs could be  related to breathing artifact and/or air-trapping. There could be some  minimal scattered ground-glass opacities consistent with minimal  pneumonia. Consider short-term follow-up CT of the chest in 2 months to  3 months with improved inspiration and lack of streak artifact and  motion for better evaluation.  2. Hyperdense bile or tiny stones in the gallbladder.     Radiation dose reduction techniques were utilized, including automated  exposure control and exposure modulation based on body size.        This report was finalized on 3/13/2024 3:04 PM by Dr. Nicolás Page M.D on Workstation: [id number]                   I have reviewed the medications:  Scheduled Meds:amLODIPine, 10 mg, Nasogastric, Q24H  carvedilol, 6.25 mg, Oral, BID With Meals  cefTRIAXone, 2,000 mg, Intravenous, Q24H  cloNIDine, 0.2 mg, Oral, Q8H  insulin glargine, 20 Units, Subcutaneous, Nightly  insulin regular, 2-9 Units, Subcutaneous, TID AC  levETIRAcetam, 500 mg, Nasogastric, BID  linagliptin, 5 mg, Oral, Daily  melatonin, 5 mg, Oral, Nightly  memantine, 10 mg, Nasogastric, Q12H  metFORMIN ER, 500 mg, Oral, Daily With Breakfast  OLANZapine zydis, 5 mg, Oral, Nightly  senna-docusate sodium, 2 tablet, Oral, BID   And  polyethylene glycol, 17 g, Oral, BID  sodium chloride, 10 mL, Intravenous, Q12H  spironolactone, 50 mg, Oral, QAM  valsartan, 320 mg, Nasogastric, Q PM      Continuous Infusions:   PRN Meds:.  acetaminophen    acetaminophen    acetaminophen    senna-docusate sodium **AND** polyethylene glycol **AND** bisacodyl **AND** bisacodyl    Calcium Replacement - Follow Nurse / BPA Driven Protocol     "dextrose    dextrose    glucagon (human recombinant)    Magnesium Standard Dose Replacement - Follow Nurse / BPA Driven Protocol    nitroglycerin    OLANZapine zydis    ondansetron    ondansetron    Phosphorus Replacement - Follow Nurse / BPA Driven Protocol    Potassium Replacement - Follow Nurse / BPA Driven Protocol    prochlorperazine    sodium chloride    sodium chloride    sodium chloride    Assessment & Plan   Assessment & Plan     Active Hospital Problems    Diagnosis  POA    **Intraparenchymal hemorrhage of brain [I61.9]  Yes    Right hemiparesis [G81.91]  Yes    Oropharyngeal dysphagia [R13.12]  Yes    Paroxysmal atrial fibrillation [I48.0]  Yes    Type 2 diabetes mellitus [E11.9]  Yes    HTN (hypertension) [I10]  Yes    Seizures [R56.9]  Yes      Resolved Hospital Problems   No resolved problems to display.        Brief Hospital Course to date:  Erlin Blanco is a 78 y.o. male \"with hypertension, coronary artery disease, chronic diastolic heart failure, type 2 diabetes, and atrial fibrillation on Eliquis, who presented to the hospital after experiencing a right-sided facial droop and right arm weakness with speech disturbance. CT angiogram showed an acute intraparenchymal hemorrhage and Neurosurgery was consulted. Blood pressure management was recommended to keep systolic blood pressure less than 160. Nicardipine was initially started and he received Keppra for seizure prophylaxis and Kcentra for Eliquis reversal. He was admitted to ICU. We were asked to assume care when pt came out of ICU \"    Discussion/plan for today:  Leukocytosis now normalized.  Bowel regimen increased per family request.  Tentative plans to discharge today if possible.  Restart metformin and formulary substitution for Januvia with Tradjenta.  Basal insulin slightly adjusted.  Continue cephalosporin therapy for pneumonia he appears to be responding to therapy.  Continue dysphagia diet.  Discussed with cardiology and plan is for " follow-up in their clinic in 2 to 4 weeks.  Hopefully can discharge later today otherwise we will continue to provide supportive care and symptom treatment.    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  - due to uncontrolled hypertension in the setting of anticoagulation and polycythemia vera  - Eliquis on hold until further notice  - has right hemiparesis and dysarthria as well as oropharyngeal dysphagia  - continue PT/OT/SLP, plan rehab (subacute) at dc  - tolerating modified diet per SLP recs, dc'd [facility] 3/6  - continue BP control with goal SBP<160mmHg, currently on amlodipine, valsartan, clonidine, Aldactone, and Coreg (see below)  - continue Keppra for seizure prophylaxis  - PRN IV Compazine for headaches  - follow up CT head was reassuring  - appreciate MICAELA attention to pt  - f/u with MICAELA in office in 2 weeks, repeat CT Head at that time  - mental status/LOC waxing/waning, family concerned he may have UTI as he was recently treated for one--checked UA and it was fine  - have scheduled low-dose Zyprexa nightly and keep PRN on hand as well--need to try to get his sleep schedule back in line     HTN  - BPs labile--SBP was at goal but bradycardia triggered some adjustment of meds and SBP has been high at times since  - consulted Card for bradycardia, they were not concerned as it was asymptomatic and recommend focusing more on BP control as long as bradycardia remains mild and asymptomatic  - continue amlodipine, clonidine, and valsartan  - Card has added Aldactone, changed labetalol back to his home Coreg, and stopped his hydralazine altogether  - continue to monitor BPs and HRs today on new regimen and defer further adjustment to Card     Type 2 DM  -Monitoring glucose  - A1c 7.1  - continue Lantus  - continue SSI/hypoglycemia protocol  -Initially held metformin, glimepiride, and Januvia, restart metformin and substitute Tradjenta for Januvia per hospital protocol     PAF  - HRs  acceptable today (see above)  - was on Eliquis prior to admission but he is off of this now due to spontaneous intracranial hemorrhage  -Cardiology clinic follow-up in 2 to 4 weeks     Polycythemia Vera  - on hydroxyurea as an outpatient  - Hgb fine        SCDs for DVT prophylaxis.  DNR.  Discussed with wife  Anticipate discharge to [place] once precertification obtained      CODE STATUS:   Code Status and Medical Interventions:   Ordered at: 03/09/24 0789     Medical Intervention Limits:    NO intubation (DNI)    NO cardioversion     Level Of Support Discussed With:    Next of Kin (If No Surrogate)     Code Status (Patient has no pulse and is not breathing):    No CPR (Do Not Attempt to Resuscitate)     Medical Interventions (Patient has pulse or is breathing):    Limited Support       Alfred Hill MD  03/14/24

## 2024-03-14 NOTE — PROGRESS NOTES
Continued Stay Note  [facility]     Patient Name: Erlin Blanco  MRN: [id number]  Today's Date: 3/14/2024    Admit Date: 2/24/2024    Plan: [facility] pending [employer] Medicare pre-cert, in process   Discharge Plan       Row Name 03/14/24 0922       Plan    Plan [facility] pending [employer] Medicare pre-cert, in process    Patient/Family in Agreement with Plan yes    Plan Comments [employer] Medicare pre-cert for SNF ended last night, reinitiated this AM. Per [facility] pending pre-cert. Pharmacy updated and packet in CCP office. Jaimee Tao LCSW                   Discharge Codes    No documentation.                 Expected Discharge Date and Time       Expected Discharge Date Expected Discharge Time    Mar 14, 2024               Yajaira Tao LCSW

## 2024-03-14 NOTE — DISCHARGE SUMMARY
"[facility] Medicine Services  DISCHARGE SUMMARY    Patient Name: Erlin Blanco  : 1945  MRN: [id number]    Date of Admission: 2024 10:11 PM  Date of Discharge: 3/14/2024  Primary Care Physician: Zamzam John APRN    Consults       Date and Time Order Name Status Description    3/8/2024  9:01 AM Inpatient Cardiology Consult Completed     3/4/2024 12:15 PM Inpatient Hospitalist Consult Completed     2024  3:20 PM Inpatient Neurology Consult General Completed     2024 10:57 AM Inpatient Hospitalist Consult Completed     2024 12:32 AM Inpatient Neurosurgery Consult Completed     2024 10:30 PM Neurosurgery (on-call MD unless specified) Completed     2024 10:12 PM Inpatient Neurology Consult Stroke Completed     2024 10:12 PM Inpatient Neurology Consult Stroke Completed             Hospital Course       Active Hospital Problems    Diagnosis  POA    **Intraparenchymal hemorrhage of brain [I61.9]  Yes    Expressive aphasia [R47.01]  Yes    Right hemiparesis [G81.91]  Yes    Oropharyngeal dysphagia [R13.12]  Yes    Paroxysmal atrial fibrillation [I48.0]  Yes    Type 2 diabetes mellitus [E11.9]  Yes    HTN (hypertension) [I10]  Yes    Seizures [R56.9]  Yes      Resolved Hospital Problems   No resolved problems to display.          Hospital Course:  Erlin Blanco is a 78 y.o. male \"with hypertension, coronary artery disease, chronic diastolic heart failure, type 2 diabetes, and atrial fibrillation on Eliquis, who presented to the hospital after experiencing a right-sided facial droop and right arm weakness with speech disturbance. CT angiogram showed an acute intraparenchymal hemorrhage and Neurosurgery was consulted. Blood pressure management was recommended to keep systolic blood pressure less than 160. Nicardipine was initially started and he received Keppra for seizure prophylaxis and Kcentra for Eliquis reversal. He was admitted to ICU. 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  - continue Keppra for seizure prophylaxis  - follow up CT head was reassuring  - appreciate MICAELA attention to pt  - f/u with MICAELA in office in 2 weeks, repeat CT Head at that time  - mental status/LOC waxing/waning, family concerned he may have UTI as he was recently treated for one--checked UA and it was fine  - have scheduled low-dose Zyprexa nightly and keep PRN on hand as well--need to try to get his sleep schedule back in line     Aspiration pneumonia and dysphagia:  Patient was found to have aspiration pneumonia.  He is responding to cephalosporin therapy and will continue cephalosporin therapy at time of discharge through 3/18.  He seems to be responding well to treatment.  His leukocytosis has now normalized.  He was seen by speech therapy and is currently on a dysphagia diet.    HTN  - BPs labile-however recently has made it to goal of systolic less than 160.  Recommend to continue to monitor blood pressure at skilled facility and can gradually titrate medication as needed.  Please call cardiology if there are questions.     Type 2 DM  -Monitoring glucose  - A1c 7.1  - continue Lantus  - continue SSI  -Initially held metformin, glimepiride, and Januvia, restart metformin today.  Can discharge back on Januvia.  Recommend primary care follow-up.     PAF  - HRs acceptable today  - was on Eliquis prior to " admission but he is off of this now due to spontaneous intracranial hemorrhage  -Cardiology clinic follow-up in 2 to 4 weeks     Polycythemia Vera  - on hydroxyurea as an outpatient, resume and follow-up with outpatient provider      At the time of discharge take all medications as prescribed, keep all follow-up appointments, and call your doctor or return to the hospital if you have any worsening or concerning symptoms.    Please note that this note was made using Dragon voice recognition software               Day of Discharge     Subjective:    Family is at the bedside and feel he is doing stable.  They agree with plan for transfer when possible.  They were requesting increase of his bowel regimen as he is only had smaller bowel movements so far.       Vital Signs:   Temp:  [97.5 °F (36.4 °C)-98.4 °F (36.9 °C)] 97.5 °F (36.4 °C)  Heart Rate:  [50-69] 63  Resp:  [18] 18  BP: (129-168)/(67-89) 142/67     Physical Exam:    Constitutional:Awake, alert, chronically ill-appearing but nontoxic  HENT: NCAT, mucous membranes moist, neck supple  Respiratory: No cough or wheezes, normal respirations, nonlabored breathing   Cardiovascular: Pulse rate is normal, palpable radial pulses  Gastrointestinal:  soft, nontender, nondistended  Musculoskeletal: Elderly frail and chronically debilitated in appearance, obese, BMI is 35, mild lower extremity edema  Psychiatric: Somewhat flat affect, cooperative, conversational  Neurologic: Right hemiparesis, dysarthria, expressive aphasia, alert  Skin: No rashes or jaundice, warm  Examination stable    Pertinent  and/or Most Recent Results     Results from last 7 days   Lab Units 03/14/24  0604 03/13/24  0622 03/12/24  0706 03/11/24  0532 03/10/24  0418 03/09/24  0426 03/08/24  0536   WBC 10*3/mm3 9.92 12.07* 13.90* 10.48 11.02* 11.29* 9.65   HEMOGLOBIN g/dL 15.2 15.9 15.7 15.7 15.7 15.0 15.2   HEMATOCRIT % 46.5 48.3 49.1 47.1 48.2 46.1 46.8   PLATELETS 10*3/mm3 420 424 397 402 443 417  405   SODIUM mmol/L 145 145 143 143 142 144 143   POTASSIUM mmol/L 3.9 3.8 3.5 3.8 3.7 4.2 3.9   CHLORIDE mmol/L 109* 109* 108* 105 107 107 109*   CO2 mmol/L 27.0 27.0 26.0 26.4 28.0 26.0 22.8   BUN mg/dL 22 17 22 20 19 26* 20   CREATININE mg/dL 1.12 0.92 0.98 0.99 0.89 1.11 0.86   GLUCOSE mg/dL 261* 121* 188* 94 111* 219* 165*   CALCIUM mg/dL 9.4 9.6 9.8 9.8 9.9 9.4 9.4       Brief Urine Lab Results  (Last result in the past 365 days)        Color   Clarity   Blood   Leuk Est   Nitrite   Protein   CREAT   Urine HCG        03/12/24 1150 Dark Yellow   Clear   Trace   Negative   Negative   100 mg/dL (2+)                   Microbiology Results Abnormal       Procedure Component Value - Date/Time    CANDIDA AURIS SCREEN - Swab, Axilla Right, Axilla Left and Groin [[id number]]  (Normal) Collected: 03/05/24 1629    Lab Status: Final result Specimen: Swab from Axilla Right, Axilla Left and Groin Updated: 03/10/24 1745     Lyn Auris Screen Culture No Candida auris isolated at 5 days              Discharge Details        Discharge Medications        New Medications        Instructions Start Date   acetaminophen 325 MG tablet  Commonly known as: TYLENOL   650 mg, Oral, Every 4 Hours PRN      amLODIPine 10 MG tablet  Commonly known as: NORVASC   10 mg, Oral, Every 24 Hours Scheduled      cefpodoxime 200 MG tablet  Commonly known as: VANTIN   200 mg, Oral, Every 12 Hours, Continue through 3/18/2024      cloNIDine 0.2 MG tablet  Commonly known as: CATAPRES   0.2 mg, Oral, Every 8 Hours Scheduled      insulin glargine 100 UNIT/ML injection  Commonly known as: LANTUS, SEMGLEE   22 Units, Subcutaneous, Nightly      insulin regular 100 UNIT/ML injection  Commonly known as: humuLIN R,novoLIN R   2-9 Units, Subcutaneous, 3 Times Daily Before Meals      levETIRAcetam 500 MG tablet  Commonly known as: KEPPRA   500 mg, Oral, 2 Times Daily      melatonin 5 MG tablet tablet   5 mg, Oral, Nightly      OLANZapine zydis 5 MG disintegrating  tablet  Commonly known as: zyPREXA   5 mg, Translingual, Nightly      polyethylene glycol 17 g packet  Commonly known as: MIRALAX   17 g, Oral, 2 Times Daily, Hold if loose stool      sennosides-docusate 8.6-50 MG per tablet  Commonly known as: PERICOLACE   2 tablets, Oral, 2 Times Daily      spironolactone 50 MG tablet  Commonly known as: ALDACTONE   50 mg, Oral, Every Morning   Start Date: March 15, 2024            Changes to Medications        Instructions Start Date   metFORMIN  MG 24 hr tablet  Commonly known as: GLUCOPHAGE-XR  What changed:   how much to take  when to take this   500 mg, Oral, Daily With Breakfast   Start Date: March 15, 2024            Continue These Medications        Instructions Start Date   b complex-C-E-zinc tablet   1 tablet, Oral, Daily      carvedilol 6.25 MG tablet  Commonly known as: COREG   6.25 mg, Oral, 2 Times Daily With Meals      fluticasone 50 MCG/ACT nasal spray  Commonly known as: FLONASE   1 spray, Nasal, Daily      Gemtesa 75 MG tablet  Generic drug: Vibegron   75 mg, Oral, Daily With Breakfast      hydroxyurea 500 MG capsule  Commonly known as: HYDREA   500 mg, Oral, Daily      Januvia 100 MG tablet  Generic drug: SITagliptin   100 mg, Oral, Daily      memantine 10 MG tablet  Commonly known as: NAMENDA   10 mg, Oral, 2 Times Daily      rosuvastatin 40 MG tablet  Commonly known as: CRESTOR   40 mg, Oral, Nightly      tamsulosin 0.4 MG capsule 24 hr capsule  Commonly known as: FLOMAX   1 capsule, Oral, Daily      valsartan 320 MG tablet  Commonly known as: DIOVAN   320 mg, Oral, Daily             Stop These Medications      Aspirin Low Dose 81 MG EC tablet  Generic drug: aspirin     Eliquis 5 MG tablet tablet  Generic drug: apixaban     glimepiride 2 MG tablet  Commonly known as: AMARYL     hydrALAZINE 100 MG tablet  Commonly known as: APRESOLINE     montelukast 10 MG tablet  Commonly known as: SINGULAIR     potassium chloride 20 MEQ packet  Commonly known as:  KLOR-CON     Risaquad-2 capsule capsule     torsemide 20 MG tablet  Commonly known as: DEMADEX              No Known Allergies      Discharge Disposition:  Skilled Nursing Facility (DC - External)    Diet:  Hospital:  Diet Order   Procedures    Diet: Diabetic; Consistent Carbohydrate; No Straw, Feeding Assistance - Nursing; Texture: Pureed (NDD 1); Fluid Consistency: Honey Thick       Activity:  Activity Instructions       Activity as Tolerated      Up WIth Assist                   CODE STATUS:    Code Status and Medical Interventions:   Ordered at: 03/09/24 1703     Medical Intervention Limits:    NO intubation (DNI)    NO cardioversion     Level Of Support Discussed With:    Next of Kin (If No Surrogate)     Code Status (Patient has no pulse and is not breathing):    No CPR (Do Not Attempt to Resuscitate)     Medical Interventions (Patient has pulse or is breathing):    Limited Support       Future Appointments   Date Time Provider Department Center   3/20/2024  8:00 AM DAYR PET CT [facility]   3/22/2024  2:15 PM Janae Davis APRN [facility]   4/11/2024  2:30 PM Monisha Avalos APRN [facility]             Additional Instructions for the Follow-ups that You Need to Schedule       Discharge Follow-up with PCP   As directed       Currently Documented PCP:    Zamzam John APRN    PCP Phone Number:    [phone]     Follow Up Details: Dora NP (PCP) after dc from facility        Discharge Follow-up with PCP   As directed       Currently Documented PCP:    Zamzam John APRN    PCP Phone Number:    [phone]     Follow Up Details: Recommend primary care provider follow-up within 1 week after discharge from skilled facility.  Please call the day you are discharged to schedule        Discharge Follow-up with Specified Provider: MUKUL Souza for cardiology clinic follow-up in 2 to 4 weeks.  Please call with any questions   As directed      To: MUKUL Souza for cardiology  clinic follow-up in 2 to 4 weeks.  Please call with any questions        Discharge Follow-up with Specified Provider: Follow-up with neurosurgery 2 weeks with CT head   As directed      To: Follow-up with neurosurgery 2 weeks with CT head        Discharge Follow-up with Specified Provider: Jose CHERY (Card); 2 Weeks   As directed      To: Jose CHERY (Card)   Follow Up: 2 Weeks        Discharge Follow-up with Specified Provider: Medical Staff at facility; 1 Day   As directed      To: Medical Staff at facility   Follow Up: 1 Day        Discharge Follow-up with Specified Provider: Ryan NP (NeuroSurg) on March 22nd   As directed      To: Ryan CHERY (NeuroSurg) on March 22nd               Contact information for follow-up providers       Zamzam John, APRN .    Specialty: Family Medicine  Why: Dora NP (PCP) after dc from facility  Contact information:  [address]  [facility] [address] [phone]               Zamzam John, APRN .    Specialty: Family Medicine  Why: Recommend primary care provider follow-up within 1 week after discharge from skilled facility.  Please call the day you are discharged to schedule  Contact information:  [address]  [facility] [address] [phone]                       Contact information for after-discharge care       Destination       [facility] .    Service: Skilled Nursing  Contact information:  [address] [place] [address] [phone]                                       Alfred Hill MD  03/14/24      Time Spent on Discharge:  I spent greater than 40 minutes on this discharge activity which included: face-to-face encounter with the patient, reviewing the data in the system, coordination of the care with the nursing staff as well as consultants, documentation, and entering orders.

## 2024-03-14 NOTE — SIGNIFICANT NOTE
03/14/24 1021   Post Acute Pre-Cert Documentation   New Pre-Cert Needed? Yes   Post Acute Pre-Cert #2 Documentation   Request Submitted by Facility - Type: Hospital   Post-Acute Authorization Type Submitted: SNF   Date Post Acute Pre-Cert Inititated per Facility 03/14/24   Accepting Facility [facility]   Hospital Discharge Date Requested 03/14/24   All Clinicals Submitted? Yes   Had Accepting Facility at Time of Submission Yes   Response Communicated to: ;Accepting Facility Liaison   Authorization Number: [id number]   Post Acute Pre-Cert Initiated Comment ADARSH BECERRA

## 2024-03-14 NOTE — SIGNIFICANT NOTE
03/14/24 1457   Post Acute Pre-Cert Documentation   Date Post Acute Pre-Cert Inititated per Facility 03/08/24   Date Post Acute Pre-Cert Completed 03/11/24   Accepting Facility [facility]   Hospital Discharge Date Requested 03/08/24   Post Acute Pre-Cert #2 Documentation   Request Submitted by Facility - Type: Hospital   Post-Acute Authorization Type Submitted: SNF   Date Post Acute Pre-Cert Inititated per Facility 03/14/24   Date Post Acute Pre-Cert Completed 03/14/24   Accepting Facility [facility]   Hospital Discharge Date Requested 03/14/24   All Clinicals Submitted? Yes   Had Accepting Facility at Time of Submission Yes   Response Received from Insurance? Approval   Response Communicated to: ;Accepting Facility Liaison;Accepting Facility Auth Department   Authorization Number: [id number]   Post Acute Pre-Cert Initiated Comment ADARSH BECERRA

## 2024-03-14 NOTE — NURSING NOTE
Attempted to call report to Ricki twice but no one will answer the phone.  got my name and number and states someone will call back for report.

## 2024-03-18 ENCOUNTER — TELEPHONE (OUTPATIENT)
Dept: NEUROSURGERY | Facility: CLINIC | Age: 79
End: 2024-03-18
Payer: MEDICARE

## 2024-03-18 NOTE — TELEPHONE ENCOUNTER
"Called and s/w patients daughter to advise of message from Bakari Galloway \"  It would not be appropriate to move his CT head scan as well as office appointment back.  We are currently holding his blood thinners and that would also delay him going back on his blood thinners.  I would request that he follows up at the scheduled time.\"    She expressed understanding and will keep scheduled f/u and CT appt.       "

## 2024-03-20 ENCOUNTER — HOSPITAL ENCOUNTER (OUTPATIENT)
Dept: PET IMAGING | Facility: HOSPITAL | Age: 79
Discharge: HOME | End: 2024-03-20
Payer: MEDICARE

## 2024-03-20 DIAGNOSIS — I61.9 INTRAPARENCHYMAL HEMORRHAGE OF BRAIN: ICD-10-CM

## 2024-03-20 PROCEDURE — 70450 CT HEAD/BRAIN W/O DYE: CPT

## 2024-03-21 ENCOUNTER — TELEPHONE (OUTPATIENT)
Dept: NEUROSURGERY | Facility: CLINIC | Age: 79
End: 2024-03-21
Payer: MEDICARE

## 2024-03-21 NOTE — TELEPHONE ENCOUNTER
Spoke with Mrs. Blanco and asked if they were able to come in @ 1:30 per provider, she will call back and let me know due transportation

## 2024-03-22 ENCOUNTER — OFFICE VISIT (OUTPATIENT)
Dept: NEUROSURGERY | Facility: CLINIC | Age: 79
End: 2024-03-22
Payer: MEDICARE

## 2024-03-22 VITALS — HEART RATE: 86 BPM | DIASTOLIC BLOOD PRESSURE: 74 MMHG | OXYGEN SATURATION: 94 % | SYSTOLIC BLOOD PRESSURE: 120 MMHG

## 2024-03-22 DIAGNOSIS — I61.9 INTRAPARENCHYMAL HEMORRHAGE OF BRAIN: Primary | ICD-10-CM

## 2024-04-01 ENCOUNTER — TELEPHONE (OUTPATIENT)
Dept: NEUROSURGERY | Facility: CLINIC | Age: 79
End: 2024-04-01
Payer: MEDICARE

## 2024-04-01 NOTE — TELEPHONE ENCOUNTER
Called to schedule follow and follow up on if CT was scheduled. CT has not been scheduled due to wheelchair/stretcher issue, gave central scheduling number [phone] informed pt's wife to contact me when ct appt is made so I can make office f/u after

## 2024-04-03 ENCOUNTER — PATIENT OUTREACH (OUTPATIENT)
Dept: CASE MANAGEMENT | Facility: OTHER | Age: 79
End: 2024-04-03
Payer: MEDICARE

## 2024-04-03 DIAGNOSIS — E11.3293 TYPE 2 DIABETES MELLITUS WITH BOTH EYES AFFECTED BY MILD NONPROLIFERATIVE RETINOPATHY WITHOUT MACULAR EDEMA, WITHOUT LONG-TERM CURRENT USE OF INSULIN: Primary | ICD-10-CM

## 2024-04-03 DIAGNOSIS — I10 PRIMARY HYPERTENSION: ICD-10-CM

## 2024-04-03 NOTE — OUTREACH NOTE
AMBULATORY CASE MANAGEMENT NOTE    Name and Relationship of Patient/Support Person: JOAN DOCKERY - Emergency Contact    CCM Interim Update    Called and spoke to patient's wife, Joan regarding patient's status. Patient currently in rehab and plan of wife is to DC to home or extend rehab stay depending on appeal. Discussed with wife patient's current needs and assist requirement in preparation for home safety. Joan states she is discussing it with her children how they will assist and take care of patient. Encouraged Joan to call insurance and see what benefits are available for the patient in regards to in home care. I will send Joan via email more information resources regarding in home services. Will assist with care coordination.     Education Documentation  No documentation found.        Erlin EARL  Ambulatory Case Management    4/3/2024, 15:19 EDT

## 2024-04-05 ENCOUNTER — PATIENT OUTREACH (OUTPATIENT)
Dept: CASE MANAGEMENT | Facility: OTHER | Age: 79
End: 2024-04-05
Payer: MEDICARE

## 2024-04-05 DIAGNOSIS — I10 PRIMARY HYPERTENSION: ICD-10-CM

## 2024-04-05 DIAGNOSIS — E11.3293 TYPE 2 DIABETES MELLITUS WITH BOTH EYES AFFECTED BY MILD NONPROLIFERATIVE RETINOPATHY WITHOUT MACULAR EDEMA, WITHOUT LONG-TERM CURRENT USE OF INSULIN: Primary | ICD-10-CM

## 2024-04-05 NOTE — OUTREACH NOTE
AMBULATORY CASE MANAGEMENT NOTE    Name and Relationship of Patient/Support Person: JOAN DOCKERY - Emergency Contact    CCM Interim Update    Called and spoke to wife, Joan. Following up if she had received email I sent her with home care information and resources. She states did not look into it yet and that she will. She will reach out should she have any questions.    Education Documentation  No documentation found.        Erlin EARL  Ambulatory Case Management    4/5/2024, 10:30 EDT

## 2024-04-06 ENCOUNTER — HOSPITAL ENCOUNTER (INPATIENT)
Facility: HOSPITAL | Age: 79
LOS: 3 days | Discharge: HOME HEALTH | DRG: 305 | End: 2024-04-13
Attending: EMERGENCY MEDICINE | Admitting: INTERNAL MEDICINE
Payer: MEDICARE

## 2024-04-06 ENCOUNTER — APPOINTMENT (OUTPATIENT)
Dept: GENERAL RADIOLOGY | Facility: HOSPITAL | Age: 79
DRG: 305 | End: 2024-04-06
Payer: MEDICARE

## 2024-04-06 DIAGNOSIS — I10 HYPERTENSION, UNCONTROLLED: Primary | ICD-10-CM

## 2024-04-06 DIAGNOSIS — E11.65 HYPERGLYCEMIA DUE TO DIABETES MELLITUS: ICD-10-CM

## 2024-04-06 LAB
ALBUMIN SERPL-MCNC: 3.8 G/DL (ref 3.5–5.2)
ALBUMIN/GLOB SERPL: 1.1 G/DL
ALP SERPL-CCNC: 112 U/L (ref 39–117)
ALT SERPL W P-5'-P-CCNC: 18 U/L (ref 1–41)
AMORPH URATE CRY URNS QL MICRO: ABNORMAL /HPF
ANION GAP SERPL CALCULATED.3IONS-SCNC: 15.4 MMOL/L (ref 5–15)
APTT PPP: 30.5 SECONDS (ref 22.7–35.4)
AST SERPL-CCNC: 41 U/L (ref 1–40)
BACTERIA UR QL AUTO: ABNORMAL /HPF
BASOPHILS # BLD AUTO: 0.14 10*3/MM3 (ref 0–0.2)
BASOPHILS NFR BLD AUTO: 1.2 % (ref 0–1.5)
BILIRUB SERPL-MCNC: 0.6 MG/DL (ref 0–1.2)
BILIRUB UR QL STRIP: NEGATIVE
BUN SERPL-MCNC: 20 MG/DL (ref 8–23)
BUN/CREAT SERPL: 19.2 (ref 7–25)
CALCIUM SPEC-SCNC: 9.8 MG/DL (ref 8.6–10.5)
CHLORIDE SERPL-SCNC: 107 MMOL/L (ref 98–107)
CLARITY UR: ABNORMAL
CO2 SERPL-SCNC: 19.6 MMOL/L (ref 22–29)
COD CRY URNS QL: ABNORMAL /HPF
COLOR UR: ABNORMAL
CREAT SERPL-MCNC: 1.04 MG/DL (ref 0.76–1.27)
DEPRECATED RDW RBC AUTO: 52.4 FL (ref 37–54)
EGFRCR SERPLBLD CKD-EPI 2021: 74.4 ML/MIN/1.73
EOSINOPHIL # BLD AUTO: 0.59 10*3/MM3 (ref 0–0.4)
EOSINOPHIL NFR BLD AUTO: 5.2 % (ref 0.3–6.2)
ERYTHROCYTE [DISTWIDTH] IN BLOOD BY AUTOMATED COUNT: 16.8 % (ref 12.3–15.4)
GEN 5 2HR TROPONIN T REFLEX: 54 NG/L
GLOBULIN UR ELPH-MCNC: 3.5 GM/DL
GLUCOSE BLDC GLUCOMTR-MCNC: 267 MG/DL (ref 70–130)
GLUCOSE SERPL-MCNC: 268 MG/DL (ref 65–99)
GLUCOSE UR STRIP-MCNC: NEGATIVE MG/DL
HCT VFR BLD AUTO: 56.2 % (ref 37.5–51)
HGB BLD-MCNC: 18.5 G/DL (ref 13–17.7)
HGB UR QL STRIP.AUTO: ABNORMAL
HYALINE CASTS UR QL AUTO: ABNORMAL /LPF
IMM GRANULOCYTES # BLD AUTO: 0.06 10*3/MM3 (ref 0–0.05)
IMM GRANULOCYTES NFR BLD AUTO: 0.5 % (ref 0–0.5)
INR PPP: 1.4 (ref 0.9–1.1)
KETONES UR QL STRIP: ABNORMAL
LEUKOCYTE ESTERASE UR QL STRIP.AUTO: NEGATIVE
LYMPHOCYTES # BLD AUTO: 1.27 10*3/MM3 (ref 0.7–3.1)
LYMPHOCYTES NFR BLD AUTO: 11.2 % (ref 19.6–45.3)
MCH RBC QN AUTO: 29.7 PG (ref 26.6–33)
MCHC RBC AUTO-ENTMCNC: 32.9 G/DL (ref 31.5–35.7)
MCV RBC AUTO: 90.4 FL (ref 79–97)
MONOCYTES # BLD AUTO: 0.7 10*3/MM3 (ref 0.1–0.9)
MONOCYTES NFR BLD AUTO: 6.2 % (ref 5–12)
NEUTROPHILS NFR BLD AUTO: 75.7 % (ref 42.7–76)
NEUTROPHILS NFR BLD AUTO: 8.54 10*3/MM3 (ref 1.7–7)
NITRITE UR QL STRIP: NEGATIVE
NRBC BLD AUTO-RTO: 0 /100 WBC (ref 0–0.2)
PH UR STRIP.AUTO: <=5 [PH] (ref 5–8)
PLATELET # BLD AUTO: 289 10*3/MM3 (ref 140–450)
PMV BLD AUTO: 9.9 FL (ref 6–12)
POTASSIUM SERPL-SCNC: 4.4 MMOL/L (ref 3.5–5.2)
PROT SERPL-MCNC: 7.3 G/DL (ref 6–8.5)
PROT UR QL STRIP: ABNORMAL
PROTHROMBIN TIME: 17.4 SECONDS (ref 11.7–14.2)
RBC # BLD AUTO: 6.22 10*6/MM3 (ref 4.14–5.8)
RBC # UR STRIP: ABNORMAL /HPF
REF LAB TEST METHOD: ABNORMAL
SODIUM SERPL-SCNC: 142 MMOL/L (ref 136–145)
SP GR UR STRIP: >=1.03 (ref 1–1.03)
SQUAMOUS #/AREA URNS HPF: ABNORMAL /HPF
TROPONIN T DELTA: -1 NG/L
TROPONIN T SERPL HS-MCNC: 55 NG/L
UROBILINOGEN UR QL STRIP: ABNORMAL
WBC # UR STRIP: ABNORMAL /HPF
WBC NRBC COR # BLD AUTO: 11.3 10*3/MM3 (ref 3.4–10.8)

## 2024-04-06 PROCEDURE — 85730 THROMBOPLASTIN TIME PARTIAL: CPT | Performed by: EMERGENCY MEDICINE

## 2024-04-06 PROCEDURE — G0378 HOSPITAL OBSERVATION PER HR: HCPCS

## 2024-04-06 PROCEDURE — 93010 ELECTROCARDIOGRAM REPORT: CPT | Performed by: INTERNAL MEDICINE

## 2024-04-06 PROCEDURE — 80053 COMPREHEN METABOLIC PANEL: CPT | Performed by: EMERGENCY MEDICINE

## 2024-04-06 PROCEDURE — 85610 PROTHROMBIN TIME: CPT | Performed by: EMERGENCY MEDICINE

## 2024-04-06 PROCEDURE — 36415 COLL VENOUS BLD VENIPUNCTURE: CPT

## 2024-04-06 PROCEDURE — 82948 REAGENT STRIP/BLOOD GLUCOSE: CPT

## 2024-04-06 PROCEDURE — 36415 COLL VENOUS BLD VENIPUNCTURE: CPT | Performed by: EMERGENCY MEDICINE

## 2024-04-06 PROCEDURE — 71045 X-RAY EXAM CHEST 1 VIEW: CPT

## 2024-04-06 PROCEDURE — 93005 ELECTROCARDIOGRAM TRACING: CPT | Performed by: EMERGENCY MEDICINE

## 2024-04-06 PROCEDURE — 85025 COMPLETE CBC W/AUTO DIFF WBC: CPT | Performed by: EMERGENCY MEDICINE

## 2024-04-06 PROCEDURE — 84484 ASSAY OF TROPONIN QUANT: CPT | Performed by: EMERGENCY MEDICINE

## 2024-04-06 PROCEDURE — P9612 CATHETERIZE FOR URINE SPEC: HCPCS

## 2024-04-06 PROCEDURE — 81001 URINALYSIS AUTO W/SCOPE: CPT | Performed by: EMERGENCY MEDICINE

## 2024-04-06 PROCEDURE — 25010000002 HYDRALAZINE PER 20 MG: Performed by: EMERGENCY MEDICINE

## 2024-04-06 PROCEDURE — 99285 EMERGENCY DEPT VISIT HI MDM: CPT

## 2024-04-06 PROCEDURE — 63710000001 INSULIN GLARGINE PER 5 UNITS: Performed by: INTERNAL MEDICINE

## 2024-04-06 RX ORDER — SPIRONOLACTONE 50 MG/1
50 TABLET, FILM COATED ORAL DAILY
Status: DISCONTINUED | OUTPATIENT
Start: 2024-04-07 | End: 2024-04-13 | Stop reason: HOSPADM

## 2024-04-06 RX ORDER — POLYETHYLENE GLYCOL 3350 17 G/17G
17 POWDER, FOR SOLUTION ORAL DAILY PRN
Status: DISCONTINUED | OUTPATIENT
Start: 2024-04-06 | End: 2024-04-13 | Stop reason: HOSPADM

## 2024-04-06 RX ORDER — LEVETIRACETAM 500 MG/1
500 TABLET ORAL 2 TIMES DAILY
Status: DISCONTINUED | OUTPATIENT
Start: 2024-04-06 | End: 2024-04-13 | Stop reason: HOSPADM

## 2024-04-06 RX ORDER — ASPIRIN 81 MG/1
81 TABLET ORAL DAILY
Status: DISCONTINUED | OUTPATIENT
Start: 2024-04-07 | End: 2024-04-07

## 2024-04-06 RX ORDER — ROSUVASTATIN CALCIUM 40 MG/1
40 TABLET, COATED ORAL NIGHTLY
Status: DISCONTINUED | OUTPATIENT
Start: 2024-04-06 | End: 2024-04-13 | Stop reason: HOSPADM

## 2024-04-06 RX ORDER — DEXTROSE MONOHYDRATE 25 G/50ML
25 INJECTION, SOLUTION INTRAVENOUS
Status: DISCONTINUED | OUTPATIENT
Start: 2024-04-06 | End: 2024-04-13 | Stop reason: HOSPADM

## 2024-04-06 RX ORDER — ONDANSETRON 4 MG/1
4 TABLET, ORALLY DISINTEGRATING ORAL EVERY 6 HOURS PRN
Status: DISCONTINUED | OUTPATIENT
Start: 2024-04-06 | End: 2024-04-13 | Stop reason: HOSPADM

## 2024-04-06 RX ORDER — HYDROXYUREA 500 MG/1
500 CAPSULE ORAL DAILY
Status: DISCONTINUED | OUTPATIENT
Start: 2024-04-07 | End: 2024-04-13 | Stop reason: HOSPADM

## 2024-04-06 RX ORDER — CASTOR OIL AND BALSAM, PERU 788; 87 MG/G; MG/G
1 OINTMENT TOPICAL 2 TIMES DAILY
Status: ON HOLD | COMMUNITY
End: 2024-04-08

## 2024-04-06 RX ORDER — MULTIPLE VITAMINS W/ MINERALS TAB 9MG-400MCG
1 TAB ORAL DAILY
Status: DISCONTINUED | OUTPATIENT
Start: 2024-04-07 | End: 2024-04-13 | Stop reason: HOSPADM

## 2024-04-06 RX ORDER — MEMANTINE HYDROCHLORIDE 10 MG/1
10 TABLET ORAL 2 TIMES DAILY
Status: DISCONTINUED | OUTPATIENT
Start: 2024-04-06 | End: 2024-04-13 | Stop reason: HOSPADM

## 2024-04-06 RX ORDER — HYDRALAZINE HYDROCHLORIDE 20 MG/ML
10 INJECTION INTRAMUSCULAR; INTRAVENOUS EVERY 4 HOURS PRN
Status: DISCONTINUED | OUTPATIENT
Start: 2024-04-06 | End: 2024-04-11

## 2024-04-06 RX ORDER — BISACODYL 5 MG/1
5 TABLET, DELAYED RELEASE ORAL DAILY PRN
Status: DISCONTINUED | OUTPATIENT
Start: 2024-04-06 | End: 2024-04-13 | Stop reason: HOSPADM

## 2024-04-06 RX ORDER — GUAIFENESIN 600 MG/1
1200 TABLET, EXTENDED RELEASE ORAL 2 TIMES DAILY
Status: DISCONTINUED | OUTPATIENT
Start: 2024-04-06 | End: 2024-04-13 | Stop reason: HOSPADM

## 2024-04-06 RX ORDER — VALSARTAN 320 MG/1
320 TABLET ORAL DAILY
Status: DISCONTINUED | OUTPATIENT
Start: 2024-04-07 | End: 2024-04-13 | Stop reason: HOSPADM

## 2024-04-06 RX ORDER — UREA 10 %
3 LOTION (ML) TOPICAL NIGHTLY PRN
Status: DISCONTINUED | OUTPATIENT
Start: 2024-04-06 | End: 2024-04-13 | Stop reason: HOSPADM

## 2024-04-06 RX ORDER — INSULIN LISPRO 100 [IU]/ML
2-7 INJECTION, SOLUTION INTRAVENOUS; SUBCUTANEOUS
Status: DISCONTINUED | OUTPATIENT
Start: 2024-04-07 | End: 2024-04-13 | Stop reason: HOSPADM

## 2024-04-06 RX ORDER — CLONIDINE HYDROCHLORIDE 0.1 MG/1
0.2 TABLET ORAL EVERY 8 HOURS SCHEDULED
Status: DISCONTINUED | OUTPATIENT
Start: 2024-04-06 | End: 2024-04-13 | Stop reason: HOSPADM

## 2024-04-06 RX ORDER — NICOTINE POLACRILEX 4 MG
15 LOZENGE BUCCAL
Status: DISCONTINUED | OUTPATIENT
Start: 2024-04-06 | End: 2024-04-13 | Stop reason: HOSPADM

## 2024-04-06 RX ORDER — ONDANSETRON 2 MG/ML
4 INJECTION INTRAMUSCULAR; INTRAVENOUS EVERY 6 HOURS PRN
Status: DISCONTINUED | OUTPATIENT
Start: 2024-04-06 | End: 2024-04-13 | Stop reason: HOSPADM

## 2024-04-06 RX ORDER — IBUPROFEN 600 MG/1
1 TABLET ORAL
Status: DISCONTINUED | OUTPATIENT
Start: 2024-04-06 | End: 2024-04-13 | Stop reason: HOSPADM

## 2024-04-06 RX ORDER — AMLODIPINE BESYLATE 10 MG/1
10 TABLET ORAL
Status: DISCONTINUED | OUTPATIENT
Start: 2024-04-07 | End: 2024-04-13 | Stop reason: HOSPADM

## 2024-04-06 RX ORDER — SODIUM CHLORIDE 0.9 % (FLUSH) 0.9 %
10 SYRINGE (ML) INJECTION AS NEEDED
Status: DISCONTINUED | OUTPATIENT
Start: 2024-04-06 | End: 2024-04-13 | Stop reason: HOSPADM

## 2024-04-06 RX ORDER — BISACODYL 10 MG
10 SUPPOSITORY, RECTAL RECTAL DAILY PRN
Status: DISCONTINUED | OUTPATIENT
Start: 2024-04-06 | End: 2024-04-13 | Stop reason: HOSPADM

## 2024-04-06 RX ORDER — FLUTICASONE PROPIONATE 50 MCG
1 SPRAY, SUSPENSION (ML) NASAL DAILY
Status: DISCONTINUED | OUTPATIENT
Start: 2024-04-07 | End: 2024-04-13 | Stop reason: HOSPADM

## 2024-04-06 RX ORDER — HYDRALAZINE HYDROCHLORIDE 20 MG/ML
10 INJECTION INTRAMUSCULAR; INTRAVENOUS ONCE
Status: COMPLETED | OUTPATIENT
Start: 2024-04-06 | End: 2024-04-06

## 2024-04-06 RX ORDER — HYDRALAZINE HYDROCHLORIDE 50 MG/1
100 TABLET, FILM COATED ORAL 3 TIMES DAILY
Status: DISCONTINUED | OUTPATIENT
Start: 2024-04-06 | End: 2024-04-07

## 2024-04-06 RX ORDER — OLANZAPINE 5 MG/1
5 TABLET, ORALLY DISINTEGRATING ORAL NIGHTLY
Status: DISCONTINUED | OUTPATIENT
Start: 2024-04-06 | End: 2024-04-13 | Stop reason: HOSPADM

## 2024-04-06 RX ORDER — CARVEDILOL 12.5 MG/1
12.5 TABLET ORAL 2 TIMES DAILY WITH MEALS
Status: DISCONTINUED | OUTPATIENT
Start: 2024-04-06 | End: 2024-04-13 | Stop reason: HOSPADM

## 2024-04-06 RX ORDER — TAMSULOSIN HYDROCHLORIDE 0.4 MG/1
0.4 CAPSULE ORAL DAILY
Status: DISCONTINUED | OUTPATIENT
Start: 2024-04-07 | End: 2024-04-13 | Stop reason: HOSPADM

## 2024-04-06 RX ORDER — L.ACID,PARA/B.BIFIDUM/S.THERM 8B CELL
1 CAPSULE ORAL DAILY
Status: DISCONTINUED | OUTPATIENT
Start: 2024-04-07 | End: 2024-04-13 | Stop reason: HOSPADM

## 2024-04-06 RX ORDER — ACETAMINOPHEN 325 MG/1
650 TABLET ORAL EVERY 4 HOURS PRN
Status: DISCONTINUED | OUTPATIENT
Start: 2024-04-06 | End: 2024-04-13 | Stop reason: HOSPADM

## 2024-04-06 RX ORDER — AMOXICILLIN 250 MG
2 CAPSULE ORAL 2 TIMES DAILY PRN
Status: DISCONTINUED | OUTPATIENT
Start: 2024-04-06 | End: 2024-04-13 | Stop reason: HOSPADM

## 2024-04-06 RX ORDER — TORSEMIDE 20 MG/1
20 TABLET ORAL
Status: DISCONTINUED | OUTPATIENT
Start: 2024-04-07 | End: 2024-04-07

## 2024-04-06 RX ORDER — AMOXICILLIN 250 MG
2 CAPSULE ORAL 2 TIMES DAILY
Status: DISCONTINUED | OUTPATIENT
Start: 2024-04-06 | End: 2024-04-13 | Stop reason: HOSPADM

## 2024-04-06 RX ADMIN — CLONIDINE HYDROCHLORIDE 0.2 MG: 0.1 TABLET ORAL at 23:33

## 2024-04-06 RX ADMIN — OLANZAPINE 5 MG: 5 TABLET, ORALLY DISINTEGRATING ORAL at 23:08

## 2024-04-06 RX ADMIN — DOCUSATE SODIUM 50MG AND SENNOSIDES 8.6MG 2 TABLET: 8.6; 5 TABLET, FILM COATED ORAL at 23:08

## 2024-04-06 RX ADMIN — MEMANTINE HYDROCHLORIDE 10 MG: 10 TABLET, FILM COATED ORAL at 23:34

## 2024-04-06 RX ADMIN — SERTRALINE 50 MG: 50 TABLET, FILM COATED ORAL at 23:34

## 2024-04-06 RX ADMIN — GUAIFENESIN 1200 MG: 600 TABLET, EXTENDED RELEASE ORAL at 23:08

## 2024-04-06 RX ADMIN — ROSUVASTATIN CALCIUM 40 MG: 40 TABLET, FILM COATED ORAL at 23:34

## 2024-04-06 RX ADMIN — CARVEDILOL 12.5 MG: 12.5 TABLET, FILM COATED ORAL at 23:08

## 2024-04-06 RX ADMIN — LEVETIRACETAM 500 MG: 500 TABLET, FILM COATED ORAL at 23:34

## 2024-04-06 RX ADMIN — HYDRALAZINE HYDROCHLORIDE 10 MG: 20 INJECTION, SOLUTION INTRAMUSCULAR; INTRAVENOUS at 17:53

## 2024-04-06 RX ADMIN — INSULIN GLARGINE 22 UNITS: 100 INJECTION, SOLUTION SUBCUTANEOUS at 23:08

## 2024-04-06 RX ADMIN — HYDRALAZINE HYDROCHLORIDE 100 MG: 50 TABLET ORAL at 23:34

## 2024-04-06 RX ADMIN — Medication 3 MG: at 23:08

## 2024-04-06 NOTE — ED NOTES
Pt arrives via ems from [facility] for hypertension. Pt was given 0.2 mg of clonidine at the facility. Pt is in the facility for a stroke with right side deficits

## 2024-04-06 NOTE — ED NOTES
"Nursing report ED to floor  Erlin Blanco  78 y.o.  male    HPI :  HPI (Adult)  Stated Reason for Visit: hypertension  History Obtained From: EMS    Chief Complaint  Chief Complaint   Patient presents with    Hypertension       Admitting doctor:   Catie Alcaraz MD    Admitting diagnosis:   The primary encounter diagnosis was Hypertension, uncontrolled. A diagnosis of Hyperglycemia due to diabetes mellitus was also pertinent to this visit.    Code status:   Current Code Status       Date Active Code Status Order ID Comments User Context       Prior            Allergies:   Donepezil and Steglatro [ertugliflozin]    Isolation:   No active isolations    Intake and Output  No intake or output data in the 24 hours ending 04/06/24 1757    Weight:       04/06/24  1554   Weight: 117 kg (257 lb 15 oz)       Most recent vitals:   Vitals:    04/06/24 1554 04/06/24 1600 04/06/24 1631   BP: 170/100 167/97 173/93   Pulse: 76  72   Resp: 16     Temp: 97.2 °F (36.2 °C)     TempSrc: Tympanic     SpO2: 96%  95%   Weight: 117 kg (257 lb 15 oz)     Height: 182.9 cm (72\")         Active LDAs/IV Access:   Lines, Drains & Airways       Active LDAs       Name Placement date Placement time Site Days    Peripheral IV 04/06/24 1753 Anterior;Left Forearm 04/06/24 1753  Forearm  less than 1                    Labs (abnormal labs have a star):   Labs Reviewed   COMPREHENSIVE METABOLIC PANEL - Abnormal; Notable for the following components:       Result Value    Glucose 268 (*)     CO2 19.6 (*)     AST (SGOT) 41 (*)     Anion Gap 15.4 (*)     All other components within normal limits    Narrative:     GFR Normal >60  Chronic Kidney Disease <60  Kidney Failure <15    The GFR formula is only valid for adults with stable renal function between ages 18 and 70.   PROTIME-INR - Abnormal; Notable for the following components:    Protime 17.4 (*)     INR 1.40 (*)     All other components within normal limits   SINGLE HS TROPONIN T - Abnormal; " Notable for the following components:    HS Troponin T 55 (*)     All other components within normal limits    Narrative:     High Sensitive Troponin T Reference Range:  <14.0 ng/L- Negative Female for AMI  <22.0 ng/L- Negative Male for AMI  >=14 - Abnormal Female indicating possible myocardial injury.  >=22 - Abnormal Male indicating possible myocardial injury.   Clinicians would have to utilize clinical acumen, EKG, Troponin, and serial changes to determine if it is an Acute Myocardial Infarction or myocardial injury due to an underlying chronic condition.        URINALYSIS W/ MICROSCOPIC IF INDICATED (NO CULTURE) - Abnormal; Notable for the following components:    Color, UA Dark Yellow (*)     Appearance, UA Cloudy (*)     Ketones, UA Trace (*)     Blood, UA Small (1+) (*)     Protein, UA >=300 mg/dL (3+) (*)     All other components within normal limits   CBC WITH AUTO DIFFERENTIAL - Abnormal; Notable for the following components:    WBC 11.30 (*)     RBC 6.22 (*)     Hemoglobin 18.5 (*)     Hematocrit 56.2 (*)     RDW 16.8 (*)     Lymphocyte % 11.2 (*)     Neutrophils, Absolute 8.54 (*)     Eosinophils, Absolute 0.59 (*)     Immature Grans, Absolute 0.06 (*)     All other components within normal limits   URINALYSIS, MICROSCOPIC ONLY - Abnormal; Notable for the following components:    RBC, UA 3-5 (*)     All other components within normal limits   APTT - Normal   HIGH SENSITIVITIY TROPONIN T 2HR   CBC AND DIFFERENTIAL    Narrative:     The following orders were created for panel order CBC & Differential.  Procedure                               Abnormality         Status                     ---------                               -----------         ------                     CBC Auto Differential[[id number]]        Abnormal            Final result                 Please view results for these tests on the individual orders.       EKG:   ECG 12 Lead Other; Hypertension   Preliminary Result   HEART RATE= 74   bpm   RR Interval= 811  ms   KY Interval= 233  ms   P Horizontal Axis= 232  deg   P Front Axis= 86  deg   QRSD Interval= 165  ms   QT Interval= 478  ms   QTcB= 531  ms   QRS Axis= -31  deg   T Wave Axis= 130  deg   - ABNORMAL ECG -   Sinus rhythm   Prolonged KY interval   Left bundle branch block   Electronically Signed By:    Date and Time of Study: 2024 16:45:14          Meds given in ED:   Medications   sodium chloride 0.9 % flush 10 mL (has no administration in time range)   hydrALAZINE (APRESOLINE) injection 10 mg (10 mg Intravenous Given 24 1753)       Imaging results:  XR Chest 1 View    Result Date: 2024  There is subtle interstitial prominence involving the right upper lobe without consolidation or of effusion. Interstitial infiltrate including COVID-pneumonia could not be excluded. There is no evidence of consolidation or of gross effusion. See above.  This report was finalized on 2024 5:09 PM by Dr. Alfred Rivera M.D on Workstation: [employer] [facility]       Ambulatory status:   - slide    Social issues:   Social History     Socioeconomic History    Marital status:    Tobacco Use    Smoking status: Former     Current packs/day: 0.00     Types: Cigarettes     Quit date:      Years since quittin.2     Passive exposure: Past    Smokeless tobacco: Never    Tobacco comments:     Caffeine daily   Vaping Use    Vaping status: Never Used    Passive vaping exposure: Yes   Substance and Sexual Activity    Alcohol use: Never    Drug use: Never    Sexual activity: Defer       Peripheral Neurovascular  Peripheral Neurovascular (Adult)  Peripheral Neurovascular WDL: WDL    Neuro Cognitive  Neuro Cognitive (Adult)  Cognitive/Neuro/Behavioral WDL: .WDL except    Learning  Learning Assessment (Adult)  Learning Readiness and Ability: cognitive limitation noted  Education Provided  Person Taught: patient    Respiratory  Respiratory WDL  Respiratory WDL: WDL    Abdominal Pain       Pain  Assessments  Pain (Adult)  (0-10) Pain Rating: Rest: 0  (0-10) Pain Rating: Activity: 0    NIH Stroke Scale       Luzma Llanos RN  04/06/24 17:57 EDT

## 2024-04-06 NOTE — ED PROVIDER NOTES
" EMERGENCY DEPARTMENT ENCOUNTER  Room Number:  09/09  PCP: Senia Dorsey MD  Independent Historians: Family -patient's wife, and EMS      HPI:  Chief Complaint: had concerns including Hypertension.     A complete HPI/ROS/PMH/PSH/SH/FH are unobtainable due to: Poor historian, limited verbal communication    Chronic or social conditions impacting patient care (Social Determinants of Health): None      Context: Erlin Blanco is a 78 y.o. male with a medical history of hypertension, CAD and hemorrhagic stroke who presents to the ED c/o acute elevated blood pressure readings at postacute care facility.  Patient recently suffered a hemorrhagic stroke on February 24 this year and had been hospitalized initially in the ICU and then transitioned to postacute rehab facility for continued recovery.  Over the past couple days he has had persistently elevated blood pressure readings in the facility despite taking the usual medications prescribed to him.  His wife tells me that he has had several readings with blood pressures in the 170, 180 and 190 range systolic.  Today he was again having very persistently elevated blood pressure readings so they gave him an extra dose of clonidine 0.2 mg.  Then he was transferred here for further evaluation.  His wife tells me that \"it seems like something is off with him for the past 2 days.\"  She says the staff noted that the patient seemed to be breathing faster and heavier than normal.  However she has not really observed any other specific changes in his clinical condition recently.      Review of prior external notes (non-ED) -and- Review of prior external test results outside of this encounter: I independently reviewed the hospital discharge summaries from March 11, 2024 and March 14, 2024.      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, without long-term current use of insulin 04/28/2017    Primary hypertension 04/28/2017    Coronary artery disease 04/28/2017    Supraventricular tachycardia 11/04/2018    TIA (transient ischemic attack) 06/18/2019    Hyperlipidemia     Benign prostatic hyperplasia without urinary obstruction 06/17/2014    Class 2 severe obesity due to excess calories with serious comorbidity and body mass index (BMI) of 36.0 to 36.9 in adult 12/10/2019    Polycythemia vera     Acute non-recurrent sinusitis 01/13/2020    Pain in both knees 01/29/2020    Allergic rhinitis     Left bundle-branch block 07/17/2015    Fatigue 06/24/2020    Atrial flutter 09/16/2020    History of CVA (cerebrovascular accident) 09/28/2020    Cervical radiculitis 12/29/2020    Chronic diastolic congestive heart failure 02/03/2017    CKD (chronic kidney disease) stage 2, GFR 60-89 ml/min 05/16/2021    Glucosuria 05/16/2021    Paroxysmal atrial fibrillation 06/22/2021    Memory difficulties 07/16/2021    Bradycardia 09/17/2021    Anemia 09/18/2021    Low back pain 04/18/2019    Back pain 01/10/2022    Cardiovascular stress test abnormal 08/15/2015    Prostatitis 08/30/2018    Localized edema 06/23/2016    Murmur 06/17/2014    Pulmonary hypertension 01/17/2017    Thrombocytosis 03/19/2018    Arthritis 01/10/2022    Proteinuria 11/08/2022    Fall at home 12/17/2022    Cognitive communication deficit 08/31/2020    Mild cognitive impairment with memory loss 06/15/2023    Hematuria 02/05/2024    Intraparenchymal hemorrhage of brain 02/24/2024    Seizures 02/29/2024    Type 2 diabetes mellitus 03/04/2024    HTN (hypertension) 03/04/2024    Right hemiparesis 03/05/2024    Oropharyngeal dysphagia 03/05/2024    Paroxysmal atrial fibrillation 03/05/2024    Expressive aphasia 03/14/2024     Resolved Ambulatory Problems     Diagnosis Date Noted    DORI (acute kidney injury) 04/28/2017    Cellulitis of knee, left 05/04/2017    Aortic root  dilation 11/04/2018    Acute bronchitis 01/13/2020    Abrasion of face 01/29/2020    Fall down steps 01/29/2020    Minor head injury without loss of consciousness 01/29/2020    Contusion of face 01/29/2020    Cough 04/14/2020    Fever in adult 08/16/2020    Sepsis due to Escherichia coli 08/17/2020    Bacteremia due to Escherichia coli 08/17/2020    Sepsis with metabolic encephalopathy 08/17/2020    Urinary tract infection due to extended-spectrum beta lactamase (ESBL) producing Escherichia coli 08/18/2020    Shock, septic 08/19/2020    Acute hypokalemia 09/16/2020    C. difficile colitis 09/16/2020    Leukocytosis 09/16/2020    Pressure injury of buttock, stage 2 09/18/2020    Sacroiliitis 10/09/2020    Discitis of lumbar region 10/09/2020    Acute pyelonephritis 10/09/2020    Constipation 11/14/2020    Hypokalemia 01/07/2021    Balanitis 05/16/2021    Type 2 diabetes mellitus with hyperglycemia 05/16/2021    Paraphimosis 05/16/2021    Maxillary sinusitis 07/16/2021    Pneumonia of left lung due to infectious organism 09/17/2021    COVID-19 virus infection 04/11/2023     Past Medical History:   Diagnosis Date    Abnormal ECG     Abnormal stress test     Acute sinusitis     Benign enlargement of prostate     CHF (congestive heart failure)     CVA (cerebral vascular accident) 2020    Diminished pulse     Edema     Elevated hematocrit     Elevated hemoglobin     Essential hypertension     Hearing loss     Heart valve disease     High risk medication use     History of back pain     History of dysuria     History of echocardiogram     History of intracranial hemorrhage     History of scoliosis     History of stroke     Injury of toe, left, initial encounter     Irregular heart rate     Knee pain, left     Left bundle branch block     Leg wound, left     Medicare annual wellness visit, subsequent     Muscle cramps     Need for hepatitis C screening test     Polycythemia     Prostate hyperplasia without urinary  obstruction     Scoliosis     Stroke     SVT (supraventricular tachycardia)     Tachycardia     Valvular heart disease          PAST SURGICAL HISTORY  Past Surgical History:   Procedure Laterality Date    CARDIAC CATHETERIZATION      CATARACT EXTRACTION Left 2021    CATARACT EXTRACTION Right 2022    COLONOSCOPY      did Cologuard approx 2019 and negative    HAND SURGERY      JOINT REPLACEMENT Right     N[place] ARTHRP KNE CONDYLE&PLATU MEDIAL&LAT COMPARTMENTS Left 2017    Procedure: LT TOTAL KNEE ARTHROPLASTY;  Surgeon: Bertin Perrin MD;  Location: [facility] OR;  Service: Orthopedics    PROSTATE SURGERY      Rezum steam treatment to shrink prostate by dr. guerrero         FAMILY HISTORY  Family History   Problem Relation Age of Onset    Hypertension Mother     Other Father         ruptured appendix,  when pt. was 14 years old.    Diabetes Paternal Aunt     Diabetes Paternal Uncle     No Known Problems Other     Migraines Sister     Stroke Sister     Dementia Brother          SOCIAL HISTORY  Social History     Socioeconomic History    Marital status:    Tobacco Use    Smoking status: Former     Current packs/day: 0.00     Types: Cigarettes     Quit date:      Years since quittin.2     Passive exposure: Past    Smokeless tobacco: Never    Tobacco comments:     Caffeine daily   Vaping Use    Vaping status: Never Used    Passive vaping exposure: Yes   Substance and Sexual Activity    Alcohol use: Never    Drug use: Never    Sexual activity: Defer         ALLERGIES  Donepezil and Steglatro [ertugliflozin]      REVIEW OF SYSTEMS  Review of Systems  Included in HPI  All systems reviewed and negative except for those discussed in HPI.      PHYSICAL EXAM    I have reviewed the triage vital signs and nursing notes.    ED Triage Vitals [24 1554]   Temp Heart Rate Resp BP SpO2   97.2 °F (36.2 °C) 76 16 170/100 96 %      Temp src Heart Rate Source Patient Position BP Location FiO2  (%)   Tympanic Monitor -- -- --       Physical Exam  GENERAL: alert, no acute distress  SKIN: Warm, dry, no rashes  HENT: Normocephalic, atraumatic  EYES: no scleral icterus, normal conjunctiva  CV: regular rhythm, regular rate  RESPIRATORY: normal effort, lungs clear bilaterally, slightly increased respiratory rate at times but then he returns to normal respiratory rate subsequently  ABDOMEN: soft, nondistended, nontender, no masses  MUSCULOSKELETAL: no deformity, no edema or asymmetry  NEURO: alert, opens eyes, will offer some single word responses to questions but otherwise limited verbal communication.  Right-sided paralysis noted consistent with previous record.      LAB RESULTS  Recent Results (from the past 24 hour(s))   ECG 12 Lead Other; Hypertension    Collection Time: 04/06/24  4:45 PM   Result Value Ref Range    QT Interval 478 ms    QTC Interval 531 ms   Urinalysis With Microscopic If Indicated (No Culture) - Straight Cath    Collection Time: 04/06/24  4:57 PM    Specimen: Straight Cath; Urine   Result Value Ref Range    Color, UA Dark Yellow (A) Yellow, Straw    Appearance, UA Cloudy (A) Clear    pH, UA <=5.0 5.0 - 8.0    Specific Gravity, UA >=1.030 1.005 - 1.030    Glucose, UA Negative Negative    Ketones, UA Trace (A) Negative    Bilirubin, UA Negative Negative    Blood, UA Small (1+) (A) Negative    Protein, UA >=300 mg/dL (3+) (A) Negative    Leuk Esterase, UA Negative Negative    Nitrite, UA Negative Negative    Urobilinogen, UA 1.0 E.U./dL 0.2 - 1.0 E.U./dL   Urinalysis, Microscopic Only - Straight Cath    Collection Time: 04/06/24  4:57 PM    Specimen: Straight Cath; Urine   Result Value Ref Range    RBC, UA 3-5 (A) None Seen, 0-2 /HPF    WBC, UA 0-2 None Seen, 0-2 /HPF    Bacteria, UA None Seen None Seen /HPF    Squamous Epithelial Cells, UA None Seen None Seen, 0-2 /HPF    Hyaline Casts, UA 0-2 None Seen /LPF    Calcium Oxalate Crystals, UA Small/1+ None Seen /HPF    Amorphous Crystals, UA  Small/1+ None Seen /HPF    Methodology Manual Light Microscopy    Comprehensive Metabolic Panel    Collection Time: 04/06/24  5:13 PM    Specimen: Blood   Result Value Ref Range    Glucose 268 (H) 65 - 99 mg/dL    BUN 20 8 - 23 mg/dL    Creatinine 1.04 0.76 - 1.27 mg/dL    Sodium 142 136 - 145 mmol/L    Potassium 4.4 3.5 - 5.2 mmol/L    Chloride 107 98 - 107 mmol/L    CO2 19.6 (L) 22.0 - 29.0 mmol/L    Calcium 9.8 8.6 - 10.5 mg/dL    Total Protein 7.3 6.0 - 8.5 g/dL    Albumin 3.8 3.5 - 5.2 g/dL    ALT (SGPT) 18 1 - 41 U/L    AST (SGOT) 41 (H) 1 - 40 U/L    Alkaline Phosphatase 112 39 - 117 U/L    Total Bilirubin 0.6 0.0 - 1.2 mg/dL    Globulin 3.5 gm/dL    A/G Ratio 1.1 g/dL    BUN/Creatinine Ratio 19.2 7.0 - 25.0    Anion Gap 15.4 (H) 5.0 - 15.0 mmol/L    eGFR 74.4 >60.0 mL/min/1.73   Protime-INR    Collection Time: 04/06/24  5:13 PM    Specimen: Blood   Result Value Ref Range    Protime 17.4 (H) 11.7 - 14.2 Seconds    INR 1.40 (H) 0.90 - 1.10   aPTT    Collection Time: 04/06/24  5:13 PM    Specimen: Blood   Result Value Ref Range    PTT 30.5 22.7 - 35.4 seconds   Single High Sensitivity Troponin T    Collection Time: 04/06/24  5:13 PM    Specimen: Blood   Result Value Ref Range    HS Troponin T 55 (C) <22 ng/L   CBC Auto Differential    Collection Time: 04/06/24  5:13 PM    Specimen: Blood   Result Value Ref Range    WBC 11.30 (H) 3.40 - 10.80 10*3/mm3    RBC 6.22 (H) 4.14 - 5.80 10*6/mm3    Hemoglobin 18.5 (H) 13.0 - 17.7 g/dL    Hematocrit 56.2 (H) 37.5 - 51.0 %    MCV 90.4 79.0 - 97.0 fL    MCH 29.7 26.6 - 33.0 pg    MCHC 32.9 31.5 - 35.7 g/dL    RDW 16.8 (H) 12.3 - 15.4 %    RDW-SD 52.4 37.0 - 54.0 fl    MPV 9.9 6.0 - 12.0 fL    Platelets 289 140 - 450 10*3/mm3    Neutrophil % 75.7 42.7 - 76.0 %    Lymphocyte % 11.2 (L) 19.6 - 45.3 %    Monocyte % 6.2 5.0 - 12.0 %    Eosinophil % 5.2 0.3 - 6.2 %    Basophil % 1.2 0.0 - 1.5 %    Immature Grans % 0.5 0.0 - 0.5 %    Neutrophils, Absolute 8.54 (H) 1.70 - 7.00  10*3/mm3    Lymphocytes, Absolute 1.27 0.70 - 3.10 10*3/mm3    Monocytes, Absolute 0.70 0.10 - 0.90 10*3/mm3    Eosinophils, Absolute 0.59 (H) 0.00 - 0.40 10*3/mm3    Basophils, Absolute 0.14 0.00 - 0.20 10*3/mm3    Immature Grans, Absolute 0.06 (H) 0.00 - 0.05 10*3/mm3    nRBC 0.0 0.0 - 0.2 /100 WBC         RADIOLOGY  XR Chest 1 View    Result Date: 4/6/2024  PORTABLE CHEST  HISTORY: Hypertension, tachypnea.  COMPARISON: Chest x-ray 03/12/2024.  FINDINGS: A single view of the chest demonstrates the heart to be within normal limits in size. There is interstitial prominence involving the right upper lobe which is new versus 03/12/2024. There is mild interstitial prominence involving the lung bases bilaterally, accentuated by inspiratory effort. There is no evidence of consolidation or gross effusion.      There is subtle interstitial prominence involving the right upper lobe without consolidation or of effusion. Interstitial infiltrate including COVID-pneumonia could not be excluded. There is no evidence of consolidation or of gross effusion. See above.  This report was finalized on 4/6/2024 5:09 PM by Dr. Alfred Rivera M.D on Workstation: [id number]         MEDICATIONS GIVEN IN ER  Medications   sodium chloride 0.9 % flush 10 mL (has no administration in time range)   hydrALAZINE (APRESOLINE) injection 10 mg (10 mg Intravenous Given 4/6/24 3547)         ORDERS PLACED DURING THIS VISIT:  Orders Placed This Encounter   Procedures    XR Chest 1 View    Comprehensive Metabolic Panel    Protime-INR    aPTT    Single High Sensitivity Troponin T    Urinalysis With Microscopic If Indicated (No Culture) - Urine, Catheter    CBC Auto Differential    Urinalysis, Microscopic Only - Urine, Clean Catch    High Sensitivity Troponin T 2Hr    Monitor Blood Pressure    ECG 12 Lead Other; Hypertension    Insert Peripheral IV    Initiate Observation Status    CBC & Differential         OUTPATIENT MEDICATION MANAGEMENT:  Current  Facility-Administered Medications Ordered in Epic   Medication Dose Route Frequency Provider Last Rate Last Admin    sodium chloride 0.9 % flush 10 mL  10 mL Intravenous PRN Robert Marie MD         Current Outpatient Medications Ordered in Epic   Medication Sig Dispense Refill    Accu-Chek FastClix Lancets misc TEST BLOOD SUGAR 1-2 TIMES DAILY AND AS NEEDED      acetaminophen (TYLENOL) 325 MG tablet Take 2 tablets by mouth Every 4 (Four) Hours As Needed for Mild Pain.      acetaminophen (TYLENOL) 500 MG tablet Take 2 tablets by mouth Every 8 (Eight) Hours As Needed for Moderate Pain.      amLODIPine (NORVASC) 10 MG tablet Take 1 tablet by mouth Daily.      apixaban (Eliquis) 5 MG tablet tablet TAKE 1 TABLET EVERY 12 HOURS 180 tablet 1    Aspirin Low Dose 81 MG EC tablet TAKE 1 TABLET DAILY 90 tablet 3    Blood Glucose Monitoring Suppl (Accu-Chek Guide Me) w/Device kit TEST BLOOD SUGAR 1-2 TIMES DAILY AND AS NEEDED      carvedilol (COREG) 6.25 MG tablet TAKE 1 TABLET TWICE A DAY WITH MEALS 180 tablet 3    carvedilol (COREG) 6.25 MG tablet Take 1 tablet by mouth 2 (Two) Times a Day With Meals.      cloNIDine (CATAPRES) 0.2 MG tablet Take 1 tablet by mouth Every 8 (Eight) Hours.      fluticasone (FLONASE) 50 MCG/ACT nasal spray 1 spray into the nostril(s) as directed by provider Daily. 48 g 3    fluticasone (FLONASE) 50 MCG/ACT nasal spray 1 spray into the nostril(s) as directed by provider Daily.      glimepiride (AMARYL) 2 MG tablet TAKE 1 TABLET TWICE A DAY WITH MEALS 180 tablet 1    glucosamine-chondroitin 500-400 MG capsule capsule Take 1 capsule by mouth Daily.      glucose blood (Accu-Chek Guide) test strip Use as instructed to test blood sugar twice daily. 100 each 2    guaiFENesin (MUCINEX) 600 MG 12 hr tablet Take 2 tablets by mouth 2 (Two) Times a Day.      hydrALAZINE (APRESOLINE) 100 MG tablet TAKE 1 TABLET THREE TIMES A  tablet 1    hydroxyurea (HYDREA) 500 MG capsule Take 1 capsule by mouth  Daily.      hydroxyurea (HYDREA) 500 MG capsule Take 1 capsule by mouth Daily.      insulin glargine (LANTUS, SEMGLEE) 100 UNIT/ML injection Inject 22 Units under the skin into the appropriate area as directed Every Night.      insulin regular (humuLIN R,novoLIN R) 100 UNIT/ML injection Inject 2-9 Units under the skin into the appropriate area as directed 3 (Three) Times a Day Before Meals.      Januvia 100 MG tablet Take 1 tablet by mouth Daily.      levETIRAcetam (KEPPRA) 500 MG tablet Take 1 tablet by mouth 2 (Two) Times a Day.      melatonin 5 MG tablet tablet Take 1 tablet by mouth Every Night.      memantine (NAMENDA) 10 MG tablet Take 1 tablet by mouth 2 (Two) Times a Day. 180 tablet 3    memantine (NAMENDA) 10 MG tablet Take 1 tablet by mouth 2 (Two) Times a Day.      metFORMIN ER (GLUCOPHAGE-XR) 500 MG 24 hr tablet TAKE 2 TABLETS TWICE A DAY WITH MEALS 360 tablet 0    metFORMIN ER (GLUCOPHAGE-XR) 500 MG 24 hr tablet Take 1 tablet by mouth Daily With Breakfast.      montelukast (SINGULAIR) 10 MG tablet Take 1 tablet by mouth Daily. 90 tablet 3    Multiple Vitamins-Minerals (b complex-C-E-zinc) tablet Take 1 tablet by mouth Daily.      Multiple Vitamins-Minerals (MULTIVITAMIN ADULT PO) Take 1 tablet by mouth Daily.      OLANZapine zydis (zyPREXA) 5 MG disintegrating tablet Place 1 tablet on the tongue Every Night.      polyethylene glycol (MIRALAX) 17 g packet Take 17 g by mouth 2 (Two) Times a Day. Hold if loose stool      potassium chloride (KLOR-CON) 20 MEQ CR tablet Take 1 tablet by mouth 3 (Three) Times a Day. 30 tablet 0    Probiotic Product (Probiotic Daily) capsule Take 1 capsule by mouth Daily. 90 capsule 1    rosuvastatin (CRESTOR) 40 MG tablet TAKE 1 TABLET DAILY 90 tablet 3    rosuvastatin (CRESTOR) 40 MG tablet Take 1 tablet by mouth Every Night.      sennosides-docusate (PERICOLACE) 8.6-50 MG per tablet Take 2 tablets by mouth 2 (Two) Times a Day.      SITagliptin (Januvia) 100 MG tablet Take 1  tablet by mouth Daily. 90 tablet 1    spironolactone (ALDACTONE) 50 MG tablet Take 1 tablet by mouth Every Morning.      tamsulosin (FLOMAX) 0.4 MG capsule 24 hr capsule Take 1 capsule by mouth every night at bedtime. 90 capsule 1    tamsulosin (FLOMAX) 0.4 MG capsule 24 hr capsule Take 1 capsule by mouth Daily.      torsemide (DEMADEX) 20 MG tablet Take 1 tablet by mouth 2 (Two) Times a Day. 180 tablet 1    valsartan (DIOVAN) 320 MG tablet Take 1 tablet by mouth Daily. 90 tablet 1    valsartan (DIOVAN) 320 MG tablet Take 1 tablet by mouth Daily.      Vibegron (Gemtesa) 75 MG tablet Take 1 tablet by mouth Daily.      Vibegron (Gemtesa) 75 MG tablet Take 1 tablet by mouth Daily With Breakfast.           PROCEDURES  Procedures            PROGRESS, DATA ANALYSIS, CONSULTS, AND MEDICAL DECISION MAKING  All labs have been independently interpreted by me.  All radiology studies have been reviewed by me. All EKG's have been independently viewed and interpreted by me.  Discussion below represents my analysis of pertinent findings related to patient's condition, differential diagnosis, treatment plan and final disposition.    Differential diagnosis includes but is not limited to hypertensive emergency, acute MI, pneumothorax, stroke, DORI.    Clinical Scores:                   ED Course as of 04/06/24 1754   Sat Apr 06, 2024   1631 Current blood pressure is 167/97.  This seems to be a little bit better than the values they were receiving in the postacute care facility earlier today.  As far as I can tell, there has not been any significant change in the patient's neurologic exam.  Therefore, right now I do not think we need to conduct a repeat head CT.  Will continue to monitor the blood pressure carefully while I proceed with typical cardiac and metabolic workup. [ALICIA]   1711 EKG         EKG time/Interp time: 1645/1708  Rhythm/Rate: Sinus rhythm, 74 bpm  P waves and VA: Present, 233 ms  QRS, axis: 165 ms, left bundle branch  block  ST and T waves: Interpretation is limited because of the bundle branch block.  No acute appearing ischemic changes are present.  Independently interpreted by me contemporaneously with treatment   [ALICIA]   1749 Blood pressure initially in the 160 systolic range.  However it seems to be creeping up now.  It seems that the postacute care staff have not everything they can to try and help manage his blood pressure at that facility.  Given the fact that he had a recent hemorrhagic stroke, I think his blood pressure regulation is rather important right now.  So I am giving him a dose of hydralazine.  Will ask our hospitalist to admit him for observation and further blood pressure management tonight. [ALICIA]   1750 I discussed with Dr. Alcaraz from [facility] about this patient.  She agrees to accept him for further medical management  [ALICIA]   1751 HS Troponin T(!!): 55 [ALICIA]   1751 Troponin is indeterminately elevated.  Similar to previous values on record.  No specific sign of ACS at this time and certainly given his recent hemorrhagic stroke, I would avoid anticoagulation for now. [ALICIA]   1752 I independently interpreted the Chest X-ray and my findings are: Low lung volumes, no Pneumothorax, No Effusion   [ALICIA]   1754 Glucose(!): 268 [ALICIA]   1754 WBC(!): 11.30 [ALICIA]      ED Course User Index  [ALICIA] Robert Marie MD           AS OF 17:54 EDT VITALS:    BP - 173/93  HR - 72  TEMP - 97.2 °F (36.2 °C) (Tympanic)  O2 SATS - 95%    COMPLEXITY OF CARE  The patient requires admission.      DIAGNOSIS  Final diagnoses:   Hypertension, uncontrolled   Hyperglycemia due to diabetes mellitus         DISPOSITION  ED Disposition       ED Disposition   Decision to Admit    Condition   --    Comment   Level of Care: Telemetry [5]   Diagnosis: Hypertension, uncontrolled [159352]   Admitting Physician: URBANO ALCARAZ [[id number]]   Attending Physician: URBANO ALCARAZ [[id number]]                  Please note that portions of this document were  completed with a voice recognition program.    Note Disclaimer: At [facility], we believe that sharing information builds trust and better relationships. You are receiving this note because you recently visited [facility]. It is possible you will see health information before a provider has talked with you about it. This kind of information can be easy to misunderstand. To help you fully understand what it means for your health, we urge you to discuss this note with your provider.         Robert Marie MD  04/06/24 2289

## 2024-04-06 NOTE — H&P
HISTORY AND PHYSICAL   [facility]        Date of Admission: 2024  Patient Identification:  Name: Erlin Blanco  Age: 78 y.o.  Sex: male  :  1945  MRN: [id number]                     Primary Care Physician: Senia Dorsey MD    Chief Complaint:  78 year old gentleman presented to the emergency room from a rehab facility due to uncontrolled hypertension;he was sent there after being hospitalized for a hemorrhagic stroke; he has no new neuro deficits; his wife is at the bedside; history was obtained from the ED provider and family; he has a history of expressive aphasia    History of Present Illness:   As above    Past Medical History:  Past Medical History:   Diagnosis Date    Abnormal ECG     Abnormal stress test     Abrasion of face 2020    Acute bronchitis     Acute hypokalemia 2020    Acute non-recurrent sinusitis 2020    Acute pyelonephritis 10/09/2020    Acute sinusitis     DORI (acute kidney injury) 2017    Allergic rhinitis     Aortic root dilation     Arthritis     Bacteremia due to Escherichia coli 2020    Balanitis 2021    Benign enlargement of prostate     C. difficile colitis 2020    Cellulitis of knee, left 2017    CHF (congestive heart failure)     Chronic diastolic congestive heart failure     Constipation 2020    Contusion of face 2020    Coronary artery disease     COVID-19 virus infection     CVA (cerebral vascular accident)     Diminished pulse     in lower extremities    Discitis of lumbar region 10/09/2020    Edema     Elevated hematocrit     Elevated hemoglobin     Essential hypertension     Hearing loss     mala hearing aids    Heart valve disease     High risk medication use     History of back pain     History of dysuria     History of echocardiogram     History of intracranial hemorrhage     History of scoliosis     History of stroke     Hyperlipidemia     Hypokalemia 2021    Injury of toe,  left, initial encounter     Irregular heart rate     Knee pain, left     Left bundle branch block     Leg wound, left     Leukocytosis 09/16/2020    Low back pain     Maxillary sinusitis 07/16/2021    Medicare annual wellness visit, subsequent     Minor head injury without loss of consciousness 01/29/2020    Murmur     Muscle cramps     Need for hepatitis C screening test     Paraphimosis 05/16/2021    Pneumonia of left lung due to infectious organism 09/17/2021    Polycythemia     Polycythemia vera     Pressure injury of buttock, stage 2 09/18/2020    Prostate hyperplasia without urinary obstruction     Prostatitis     Proteinuria     Pulmonary hypertension     Sacroiliitis 10/09/2020    Scoliosis     Sepsis due to Escherichia coli 08/17/2020    Sepsis with metabolic encephalopathy 08/17/2020    Stroke     SVT (supraventricular tachycardia)     Tachycardia     Thrombocytosis     TIA (transient ischemic attack)     2006    Type 2 diabetes mellitus     Type 2 diabetes mellitus with hyperglycemia 05/16/2021    Urinary tract infection due to extended-spectrum beta lactamase (ESBL) producing Escherichia coli 08/18/2020    Valvular heart disease      Past Surgical History:  Past Surgical History:   Procedure Laterality Date    CARDIAC CATHETERIZATION      CATARACT EXTRACTION Left 04/2021    CATARACT EXTRACTION Right 07/26/2022    COLONOSCOPY      did Cologuard approx 6/2019 and negative    HAND SURGERY      JOINT REPLACEMENT Right 2014    KS ARTHRP KNE CONDYLE&PLATU MEDIAL&LAT COMPARTMENTS Left 04/27/2017    Procedure: LT TOTAL KNEE ARTHROPLASTY;  Surgeon: Bertin Perrin MD;  Location: [facility];  Service: Orthopedics    PROSTATE SURGERY      Rezum steam treatment to shrink prostate by dr. guerrero      Home Meds:  Medications Prior to Admission   Medication Sig Dispense Refill Last Dose    Accu-Chek FastClix Lancets misc TEST BLOOD SUGAR 1-2 TIMES DAILY AND AS NEEDED       acetaminophen (TYLENOL) 325 MG tablet  Take 2 tablets by mouth Every 4 (Four) Hours As Needed for Mild Pain.       acetaminophen (TYLENOL) 500 MG tablet Take 2 tablets by mouth Every 8 (Eight) Hours As Needed for Moderate Pain.       amLODIPine (NORVASC) 10 MG tablet Take 1 tablet by mouth Daily.       apixaban (Eliquis) 5 MG tablet tablet TAKE 1 TABLET EVERY 12 HOURS 180 tablet 1     Aspirin Low Dose 81 MG EC tablet TAKE 1 TABLET DAILY 90 tablet 3     Blood Glucose Monitoring Suppl (Accu-Chek Guide Me) w/Device kit TEST BLOOD SUGAR 1-2 TIMES DAILY AND AS NEEDED       carvedilol (COREG) 6.25 MG tablet TAKE 1 TABLET TWICE A DAY WITH MEALS 180 tablet 3     carvedilol (COREG) 6.25 MG tablet Take 1 tablet by mouth 2 (Two) Times a Day With Meals.       cloNIDine (CATAPRES) 0.2 MG tablet Take 1 tablet by mouth Every 8 (Eight) Hours.       fluticasone (FLONASE) 50 MCG/ACT nasal spray 1 spray into the nostril(s) as directed by provider Daily. 48 g 3     fluticasone (FLONASE) 50 MCG/ACT nasal spray 1 spray into the nostril(s) as directed by provider Daily.       glimepiride (AMARYL) 2 MG tablet TAKE 1 TABLET TWICE A DAY WITH MEALS 180 tablet 1     glucosamine-chondroitin 500-400 MG capsule capsule Take 1 capsule by mouth Daily.       glucose blood (Accu-Chek Guide) test strip Use as instructed to test blood sugar twice daily. 100 each 2     guaiFENesin (MUCINEX) 600 MG 12 hr tablet Take 2 tablets by mouth 2 (Two) Times a Day.       hydrALAZINE (APRESOLINE) 100 MG tablet TAKE 1 TABLET THREE TIMES A  tablet 1     hydroxyurea (HYDREA) 500 MG capsule Take 1 capsule by mouth Daily.       hydroxyurea (HYDREA) 500 MG capsule Take 1 capsule by mouth Daily.       insulin glargine (LANTUS, SEMGLEE) 100 UNIT/ML injection Inject 22 Units under the skin into the appropriate area as directed Every Night.       insulin regular (humuLIN R,novoLIN R) 100 UNIT/ML injection Inject 2-9 Units under the skin into the appropriate area as directed 3 (Three) Times a Day Before  Meals.       Januvia 100 MG tablet Take 1 tablet by mouth Daily.       levETIRAcetam (KEPPRA) 500 MG tablet Take 1 tablet by mouth 2 (Two) Times a Day.       melatonin 5 MG tablet tablet Take 1 tablet by mouth Every Night.       memantine (NAMENDA) 10 MG tablet Take 1 tablet by mouth 2 (Two) Times a Day. 180 tablet 3     memantine (NAMENDA) 10 MG tablet Take 1 tablet by mouth 2 (Two) Times a Day.       metFORMIN ER (GLUCOPHAGE-XR) 500 MG 24 hr tablet TAKE 2 TABLETS TWICE A DAY WITH MEALS 360 tablet 0     metFORMIN ER (GLUCOPHAGE-XR) 500 MG 24 hr tablet Take 1 tablet by mouth Daily With Breakfast.       montelukast (SINGULAIR) 10 MG tablet Take 1 tablet by mouth Daily. 90 tablet 3     Multiple Vitamins-Minerals (b complex-C-E-zinc) tablet Take 1 tablet by mouth Daily.       Multiple Vitamins-Minerals (MULTIVITAMIN ADULT PO) Take 1 tablet by mouth Daily.       OLANZapine zydis (zyPREXA) 5 MG disintegrating tablet Place 1 tablet on the tongue Every Night.       polyethylene glycol (MIRALAX) 17 g packet Take 17 g by mouth 2 (Two) Times a Day. Hold if loose stool       potassium chloride (KLOR-CON) 20 MEQ CR tablet Take 1 tablet by mouth 3 (Three) Times a Day. 30 tablet 0     Probiotic Product (Probiotic Daily) capsule Take 1 capsule by mouth Daily. 90 capsule 1     rosuvastatin (CRESTOR) 40 MG tablet TAKE 1 TABLET DAILY 90 tablet 3     rosuvastatin (CRESTOR) 40 MG tablet Take 1 tablet by mouth Every Night.       sennosides-docusate (PERICOLACE) 8.6-50 MG per tablet Take 2 tablets by mouth 2 (Two) Times a Day.       SITagliptin (Januvia) 100 MG tablet Take 1 tablet by mouth Daily. 90 tablet 1     spironolactone (ALDACTONE) 50 MG tablet Take 1 tablet by mouth Every Morning.       tamsulosin (FLOMAX) 0.4 MG capsule 24 hr capsule Take 1 capsule by mouth every night at bedtime. 90 capsule 1     tamsulosin (FLOMAX) 0.4 MG capsule 24 hr capsule Take 1 capsule by mouth Daily.       torsemide (DEMADEX) 20 MG tablet Take 1 tablet  by mouth 2 (Two) Times a Day. 180 tablet 1     valsartan (DIOVAN) 320 MG tablet Take 1 tablet by mouth Daily. 90 tablet 1     valsartan (DIOVAN) 320 MG tablet Take 1 tablet by mouth Daily.       Vibegron (Gemtesa) 75 MG tablet Take 1 tablet by mouth Daily.       Vibegron (Gemtesa) 75 MG tablet Take 1 tablet by mouth Daily With Breakfast.          Allergies:  Allergies   Allergen Reactions    Donepezil Hallucinations    Steglatro [Ertugliflozin] Other (See Comments)     balanitis     Immunizations:  Immunization History   Administered Date(s) Administered    COVID-19 (PFIZER) Purple Cap Monovalent 2021, 2021, 10/08/2021    Covid-19 (Pfizer) Gray Cap Monovalent 2022    FLUAD TRI 65YR+ 2017    Fluad Quad 65+ 2023    Fluzone (or Fluarix & Flulaval for VFC) >6mos 10/23/2019    Fluzone High Dose =>65 Years ([employer] ONLY) 2014, 2015, 2016, 10/17/2018, 10/23/2019, 10/18/2021, 10/11/2022    Fluzone High-Dose 65+yrs 10/18/2021, 10/11/2022    Hepatitis A 2018    Influenza, Unspecified 2017    Pneumococcal Conjugate 13-Valent (PCV13) 2015    Pneumococcal Polysaccharide (PPSV23) 2007, 2017, 10/17/2018    Shingrix 2023, 2023    Tdap 2018, 2020    Zostavax 2012     Social History:   Social History     Social History Narrative    ** Merged History Encounter **          Social History     Socioeconomic History    Marital status:    Tobacco Use    Smoking status: Former     Current packs/day: 0.00     Types: Cigarettes     Quit date:      Years since quittin.2     Passive exposure: Past    Smokeless tobacco: Never    Tobacco comments:     Caffeine daily   Vaping Use    Vaping status: Never Used    Passive vaping exposure: Yes   Substance and Sexual Activity    Alcohol use: Never    Drug use: Never    Sexual activity: Defer       Family History:  Family History   Problem Relation Age of Onset    Hypertension  "Mother     Other Father         ruptured appendix,  when pt. was 14 years old.    Diabetes Paternal Aunt     Diabetes Paternal Uncle     No Known Problems Other     Migraines Sister     Stroke Sister     Dementia Brother         Review of Systems  Not obtainable from the patient    Objective:  T Max 24 hrs: Temp (24hrs), Av.5 °F (36.4 °C), Min:97.2 °F (36.2 °C), Max:97.7 °F (36.5 °C)    Vitals Ranges:   Temp:  [97.2 °F (36.2 °C)-97.7 °F (36.5 °C)] 97.7 °F (36.5 °C)  Heart Rate:  [72-86] 86  Resp:  [16-18] 18  BP: (143-173)/() 143/113      Exam:  BP (!) 143/113 (BP Location: Right arm, Patient Position: Lying)   Pulse 86   Temp 97.7 °F (36.5 °C) (Oral)   Resp 18   Ht 182.9 cm (72.01\")   Wt 99.1 kg (218 lb 7.6 oz)   SpO2 98%   BMI 29.62 kg/m²     General Appearance:    Alert, cooperative, no distress, appears stated age; chronically ill appearing   Head:    Normocephalic, without obvious abnormality, atraumatic   Eyes:    PERRL, conjunctivae/corneas clear, EOM's intact, both eyes   Ears:    Normal external ear canals, both ears   Nose:   Nares normal, septum midline, mucosa normal, no drainage    or sinus tenderness   Throat:   Lips, mucosa, and tongue normal   Neck:   Supple, symmetrical, trachea midline, no adenopathy;     thyroid:  no enlargement/tenderness/nodules; no carotid    bruit or JVD   Back:     Symmetric, no curvature, ROM normal, no CVA tenderness   Lungs:     Decreased breath sounds bilaterally, respirations unlabored   Chest Wall:    No tenderness or deformity    Heart:    Regular rate and rhythm, S1 and S2 normal, no murmur, rub   or gallop   Abdomen:     Soft, nontender, bowel sounds active all four quadrants,     no masses, no hepatomegaly, no splenomegaly   Extremities:   Extremities normal, atraumatic, no cyanosis or edema                       .    Data Review:  Labs in chart were reviewed.  WBC   Date Value Ref Range Status   2024 11.30 (H) 3.40 - 10.80 10*3/mm3 " Final     Hemoglobin   Date Value Ref Range Status   04/06/2024 18.5 (H) 13.0 - 17.7 g/dL Final     Hematocrit   Date Value Ref Range Status   04/06/2024 56.2 (H) 37.5 - 51.0 % Final     Platelets   Date Value Ref Range Status   04/06/2024 289 140 - 450 10*3/mm3 Final     Sodium   Date Value Ref Range Status   04/06/2024 142 136 - 145 mmol/L Final     Potassium   Date Value Ref Range Status   04/06/2024 4.4 3.5 - 5.2 mmol/L Final     Comment:     Slight hemolysis detected by analyzer. Result may be falsely elevated.     Chloride   Date Value Ref Range Status   04/06/2024 107 98 - 107 mmol/L Final     CO2   Date Value Ref Range Status   04/06/2024 19.6 (L) 22.0 - 29.0 mmol/L Final     BUN   Date Value Ref Range Status   04/06/2024 20 8 - 23 mg/dL Final     Creatinine   Date Value Ref Range Status   04/06/2024 1.04 0.76 - 1.27 mg/dL Final     Glucose   Date Value Ref Range Status   04/06/2024 268 (H) 65 - 99 mg/dL Final     Calcium   Date Value Ref Range Status   04/06/2024 9.8 8.6 - 10.5 mg/dL Final     AST (SGOT)   Date Value Ref Range Status   04/06/2024 41 (H) 1 - 40 U/L Final     Comment:     Slight hemolysis detected by analyzer. Result may be falsely elevated.     ALT (SGPT)   Date Value Ref Range Status   04/06/2024 18 1 - 41 U/L Final     Alkaline Phosphatase   Date Value Ref Range Status   04/06/2024 112 39 - 117 U/L Final                Imaging Results (All)       Procedure Component Value Units Date/Time    XR Chest 1 View [[id number]] Collected: 04/06/24 1654     Updated: 04/06/24 1712    Narrative:      PORTABLE CHEST     HISTORY: Hypertension, tachypnea.     COMPARISON: Chest x-ray 03/12/2024.     FINDINGS: A single view of the chest demonstrates the heart to be within  normal limits in size. There is interstitial prominence involving the  right upper lobe which is new versus 03/12/2024. There is mild  interstitial prominence involving the lung bases bilaterally,  accentuated by inspiratory effort. There  is no evidence of consolidation  or gross effusion.       Impression:      There is subtle interstitial prominence involving the right  upper lobe without consolidation or of effusion. Interstitial infiltrate  including COVID-pneumonia could not be excluded. There is no evidence of  consolidation or of gross effusion. See above.     This report was finalized on 4/6/2024 5:09 PM by Dr. Alfred Rivera M.D  on Workstation: [id number]                 Assessment:  Active Hospital Problems    Diagnosis  POA    **Hypertension, uncontrolled [I10]  Yes      Resolved Hospital Problems   No resolved problems to display.   H/o cva  Diabetes  Cad  P vera    Plan:  Will increase coreg  Continue prn hydralazine  Monitor on telemetry  Trend labs  Ask hematology to see him  Continue hydrea  Dw patient and wife as well as ed provider    Catie Alcaraz MD  4/6/2024  19:23 EDT

## 2024-04-07 ENCOUNTER — APPOINTMENT (OUTPATIENT)
Dept: CT IMAGING | Facility: HOSPITAL | Age: 79
DRG: 305 | End: 2024-04-07
Payer: MEDICARE

## 2024-04-07 LAB
ANION GAP SERPL CALCULATED.3IONS-SCNC: 14 MMOL/L (ref 5–15)
BUN SERPL-MCNC: 19 MG/DL (ref 8–23)
BUN/CREAT SERPL: 18.4 (ref 7–25)
CALCIUM SPEC-SCNC: 9.7 MG/DL (ref 8.6–10.5)
CHLORIDE SERPL-SCNC: 110 MMOL/L (ref 98–107)
CO2 SERPL-SCNC: 21 MMOL/L (ref 22–29)
CREAT SERPL-MCNC: 1.03 MG/DL (ref 0.76–1.27)
DEPRECATED RDW RBC AUTO: 52.4 FL (ref 37–54)
EGFRCR SERPLBLD CKD-EPI 2021: 75.3 ML/MIN/1.73
ERYTHROCYTE [DISTWIDTH] IN BLOOD BY AUTOMATED COUNT: 16.2 % (ref 12.3–15.4)
GLUCOSE BLDC GLUCOMTR-MCNC: 206 MG/DL (ref 70–130)
GLUCOSE BLDC GLUCOMTR-MCNC: 223 MG/DL (ref 70–130)
GLUCOSE BLDC GLUCOMTR-MCNC: 279 MG/DL (ref 70–130)
GLUCOSE BLDC GLUCOMTR-MCNC: 378 MG/DL (ref 70–130)
GLUCOSE SERPL-MCNC: 241 MG/DL (ref 65–99)
HCT VFR BLD AUTO: 52.9 % (ref 37.5–51)
HGB BLD-MCNC: 17.7 G/DL (ref 13–17.7)
MCH RBC QN AUTO: 29.9 PG (ref 26.6–33)
MCHC RBC AUTO-ENTMCNC: 33.5 G/DL (ref 31.5–35.7)
MCV RBC AUTO: 89.4 FL (ref 79–97)
PLATELET # BLD AUTO: 291 10*3/MM3 (ref 140–450)
PMV BLD AUTO: 10.1 FL (ref 6–12)
POTASSIUM SERPL-SCNC: 3.6 MMOL/L (ref 3.5–5.2)
RBC # BLD AUTO: 5.92 10*6/MM3 (ref 4.14–5.8)
SODIUM SERPL-SCNC: 145 MMOL/L (ref 136–145)
WBC NRBC COR # BLD AUTO: 11.58 10*3/MM3 (ref 3.4–10.8)

## 2024-04-07 PROCEDURE — 99205 OFFICE O/P NEW HI 60 MIN: CPT | Performed by: INTERNAL MEDICINE

## 2024-04-07 PROCEDURE — G0378 HOSPITAL OBSERVATION PER HR: HCPCS

## 2024-04-07 PROCEDURE — 02HV33Z INSERTION OF INFUSION DEVICE INTO SUPERIOR VENA CAVA, PERCUTANEOUS APPROACH: ICD-10-PCS | Performed by: INTERNAL MEDICINE

## 2024-04-07 PROCEDURE — 80048 BASIC METABOLIC PNL TOTAL CA: CPT | Performed by: INTERNAL MEDICINE

## 2024-04-07 PROCEDURE — 25810000003 SODIUM CHLORIDE 0.9 % SOLUTION: Performed by: INTERNAL MEDICINE

## 2024-04-07 PROCEDURE — 36410 VNPNXR 3YR/> PHY/QHP DX/THER: CPT

## 2024-04-07 PROCEDURE — C1751 CATH, INF, PER/CENT/MIDLINE: HCPCS

## 2024-04-07 PROCEDURE — 63710000001 INSULIN GLARGINE PER 5 UNITS: Performed by: INTERNAL MEDICINE

## 2024-04-07 PROCEDURE — 82948 REAGENT STRIP/BLOOD GLUCOSE: CPT

## 2024-04-07 PROCEDURE — 70450 CT HEAD/BRAIN W/O DYE: CPT

## 2024-04-07 PROCEDURE — 63710000001 INSULIN LISPRO (HUMAN) PER 5 UNITS: Performed by: INTERNAL MEDICINE

## 2024-04-07 PROCEDURE — 85027 COMPLETE CBC AUTOMATED: CPT | Performed by: INTERNAL MEDICINE

## 2024-04-07 RX ORDER — SODIUM CHLORIDE 0.9 % (FLUSH) 0.9 %
10 SYRINGE (ML) INJECTION EVERY 12 HOURS SCHEDULED
Status: DISCONTINUED | OUTPATIENT
Start: 2024-04-07 | End: 2024-04-13 | Stop reason: HOSPADM

## 2024-04-07 RX ORDER — SODIUM CHLORIDE 9 MG/ML
40 INJECTION, SOLUTION INTRAVENOUS AS NEEDED
Status: DISCONTINUED | OUTPATIENT
Start: 2024-04-07 | End: 2024-04-13 | Stop reason: HOSPADM

## 2024-04-07 RX ORDER — SODIUM CHLORIDE 9 MG/ML
50 INJECTION, SOLUTION INTRAVENOUS CONTINUOUS
Status: DISCONTINUED | OUTPATIENT
Start: 2024-04-07 | End: 2024-04-10

## 2024-04-07 RX ORDER — SODIUM CHLORIDE 0.9 % (FLUSH) 0.9 %
10 SYRINGE (ML) INJECTION AS NEEDED
Status: DISCONTINUED | OUTPATIENT
Start: 2024-04-07 | End: 2024-04-13 | Stop reason: HOSPADM

## 2024-04-07 RX ADMIN — LEVETIRACETAM 500 MG: 500 TABLET, FILM COATED ORAL at 20:47

## 2024-04-07 RX ADMIN — HYDROXYUREA 500 MG: 500 CAPSULE ORAL at 09:29

## 2024-04-07 RX ADMIN — OLANZAPINE 5 MG: 5 TABLET, ORALLY DISINTEGRATING ORAL at 20:47

## 2024-04-07 RX ADMIN — Medication 10 ML: at 22:47

## 2024-04-07 RX ADMIN — Medication 10 ML: at 22:48

## 2024-04-07 RX ADMIN — ASPIRIN 81 MG: 81 TABLET, COATED ORAL at 09:28

## 2024-04-07 RX ADMIN — AMLODIPINE BESYLATE 10 MG: 10 TABLET ORAL at 09:29

## 2024-04-07 RX ADMIN — INSULIN LISPRO 6 UNITS: 100 INJECTION, SOLUTION INTRAVENOUS; SUBCUTANEOUS at 20:50

## 2024-04-07 RX ADMIN — GUAIFENESIN 1200 MG: 600 TABLET, EXTENDED RELEASE ORAL at 20:47

## 2024-04-07 RX ADMIN — Medication 1 CAPSULE: at 09:29

## 2024-04-07 RX ADMIN — MEMANTINE HYDROCHLORIDE 10 MG: 10 TABLET, FILM COATED ORAL at 09:29

## 2024-04-07 RX ADMIN — CLONIDINE HYDROCHLORIDE 0.2 MG: 0.1 TABLET ORAL at 06:31

## 2024-04-07 RX ADMIN — CARVEDILOL 12.5 MG: 12.5 TABLET, FILM COATED ORAL at 18:46

## 2024-04-07 RX ADMIN — INSULIN GLARGINE 22 UNITS: 100 INJECTION, SOLUTION SUBCUTANEOUS at 20:50

## 2024-04-07 RX ADMIN — ACETAMINOPHEN 325MG 650 MG: 325 TABLET ORAL at 19:20

## 2024-04-07 RX ADMIN — DOCUSATE SODIUM 50MG AND SENNOSIDES 8.6MG 2 TABLET: 8.6; 5 TABLET, FILM COATED ORAL at 09:29

## 2024-04-07 RX ADMIN — GUAIFENESIN 1200 MG: 600 TABLET, EXTENDED RELEASE ORAL at 09:28

## 2024-04-07 RX ADMIN — TORSEMIDE 20 MG: 20 TABLET ORAL at 09:29

## 2024-04-07 RX ADMIN — MEMANTINE HYDROCHLORIDE 10 MG: 10 TABLET, FILM COATED ORAL at 20:47

## 2024-04-07 RX ADMIN — HYDRALAZINE HYDROCHLORIDE 100 MG: 50 TABLET ORAL at 09:29

## 2024-04-07 RX ADMIN — CARVEDILOL 12.5 MG: 12.5 TABLET, FILM COATED ORAL at 09:29

## 2024-04-07 RX ADMIN — ROSUVASTATIN CALCIUM 40 MG: 40 TABLET, FILM COATED ORAL at 20:47

## 2024-04-07 RX ADMIN — CLONIDINE HYDROCHLORIDE 0.2 MG: 0.1 TABLET ORAL at 20:47

## 2024-04-07 RX ADMIN — LINAGLIPTIN 5 MG: 5 TABLET, FILM COATED ORAL at 09:29

## 2024-04-07 RX ADMIN — Medication 1 TABLET: at 09:29

## 2024-04-07 RX ADMIN — INSULIN LISPRO 4 UNITS: 100 INJECTION, SOLUTION INTRAVENOUS; SUBCUTANEOUS at 17:00

## 2024-04-07 RX ADMIN — ANORECTAL OINTMENT 1 APPLICATION: 15.7; .44; 24; 20.6 OINTMENT TOPICAL at 22:47

## 2024-04-07 RX ADMIN — INSULIN LISPRO 3 UNITS: 100 INJECTION, SOLUTION INTRAVENOUS; SUBCUTANEOUS at 11:48

## 2024-04-07 RX ADMIN — SODIUM CHLORIDE 50 ML/HR: 9 INJECTION, SOLUTION INTRAVENOUS at 11:10

## 2024-04-07 RX ADMIN — DOCUSATE SODIUM 50MG AND SENNOSIDES 8.6MG 2 TABLET: 8.6; 5 TABLET, FILM COATED ORAL at 20:47

## 2024-04-07 RX ADMIN — TAMSULOSIN HYDROCHLORIDE 0.4 MG: 0.4 CAPSULE ORAL at 09:29

## 2024-04-07 RX ADMIN — LEVETIRACETAM 500 MG: 500 TABLET, FILM COATED ORAL at 09:29

## 2024-04-07 RX ADMIN — SPIRONOLACTONE 50 MG: 50 TABLET, FILM COATED ORAL at 09:29

## 2024-04-07 RX ADMIN — VALSARTAN 320 MG: 320 TABLET, FILM COATED ORAL at 09:29

## 2024-04-07 RX ADMIN — INSULIN LISPRO 3 UNITS: 100 INJECTION, SOLUTION INTRAVENOUS; SUBCUTANEOUS at 09:28

## 2024-04-07 RX ADMIN — SERTRALINE 50 MG: 50 TABLET, FILM COATED ORAL at 20:47

## 2024-04-07 NOTE — NURSING NOTE
Spoke with son, pt was taken off Eliquis due to concern of hemorraghic stroke and placed on Aspirin 81 daily and plavix (unsure of dose.) Spoke with [facility], they did not know since plavix was not on the medication list.

## 2024-04-07 NOTE — NURSING NOTE
IV team consulted for PICC line placement.  Order placed at 1608.  Spoke to primary RN and advised her that line will be placed tomorrow.

## 2024-04-07 NOTE — NURSING NOTE
Blood Bank technician unable to removed blood due to blood to thick even with a big cannula only removed 25 ml ; and requested Central line even she stick him three time and was unable to collect the blood and requesting a central line, notify Dr Fleming and state is okay central line, but blood bank technician state she will no be able to return because she is off in the afternoon and there will be no staff able to draw the blood and will be done tomorrow if patient still need it will need another order.

## 2024-04-07 NOTE — NURSING NOTE
Called and spoke with [facility] nurse in regards to whether pt is still taking Eliquis, they state he is not. [facility] paperwork also shows no Eliquis. Verifying this with family.

## 2024-04-07 NOTE — PLAN OF CARE
Problem: Adult Inpatient Plan of Care  Goal: Plan of Care Review  Outcome: Ongoing, Progressing   Goal Outcome Evaluation:         Pt rested well toward end of shift, taking meds crushed in applesauce, no complaints. On purewick, son at bedside. Q2turn. Pt pleasantly confused but cooperative. See vitals, specifically BP. Seems to have come down to more tolerable pressures.

## 2024-04-07 NOTE — CONSULTS
Subjective     REASON FOR CONSULTATION: POLYCYTHEMIA VERA WITH ELEVATED HEMATOCRIT, LIKELY ALSO VOLUME CONTRACTION.likely secondary hypertension due to reaction to slow perfusion due to high hematocrit.     Provide an opinion on any further workup or treatment                             REQUESTING PHYSICIAN:    Kapil Eid MD      Attending     Since 4/7/2024     [phone]       RECORDS OBTAINED:  Records of the patients history including those obtained from the referring provider were reviewed and summarized in detail.     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 He was sent back to the hospital from a nursing facility where they noticed significant elevation of his blood pressure that was impossible to control. The patient is known for having polycythemia vera and requiring periodic phlebotomies in the past. The patient is undergoing therapy with Hydrea for this through her oncologist that is not located at [facility]. In any event all the information pertinent to the patient's situation is provided to me by the wife and what is available in the medical record. Besides these issues, the patient's wife has noticed that the patient has been extremely flushed for the last 2 weeks or so and he has had significant difficulty with expression. He is very repetitious in regard to yes or no and does not speak too much of anything else. His appetite has been appropriate. He has not had any trouble swallowing but he has had obvious changes in mental status in the last 48 hours. He has proper bowel activity, proper urination. The patient is not drinking liquids like he is supposed to. The patient's wife denies any evidence of chest pain, cough, or shortness of breath. He has not had any febrile illness. He has not had any vomiting or diarrhea.     He has  not developed any edema.    `  Past Medical History:   Diagnosis Date    Abnormal ECG     Abnormal stress test     Abrasion of face 01/29/2020    Acute bronchitis     Acute hypokalemia 09/16/2020    Acute non-recurrent sinusitis 01/13/2020    Acute pyelonephritis 10/09/2020    Acute sinusitis     DORI (acute kidney injury) 04/28/2017    Allergic rhinitis     Aortic root dilation     Arthritis     Bacteremia due to Escherichia coli 08/17/2020    Balanitis 05/16/2021    Benign enlargement of prostate     C. difficile colitis 09/16/2020    Cellulitis of knee, left 05/04/2017    CHF (congestive heart failure)     Chronic diastolic congestive heart failure     Constipation 11/14/2020    Contusion of face 01/29/2020    Coronary artery disease     COVID-19 virus infection     CVA (cerebral vascular accident) 2020    Diminished pulse     in lower extremities    Discitis of lumbar region 10/09/2020    Edema     Elevated hematocrit     Elevated hemoglobin     Essential hypertension     Hearing loss     mala hearing aids    Heart valve disease     High risk medication use     History of back pain     History of dysuria     History of echocardiogram     History of intracranial hemorrhage     History of scoliosis     History of stroke     Hyperlipidemia     Hypokalemia 01/07/2021    Injury of toe, left, initial encounter     Irregular heart rate     Knee pain, left     Left bundle branch block     Leg wound, left     Leukocytosis 09/16/2020    Low back pain     Maxillary sinusitis 07/16/2021    Medicare annual wellness visit, subsequent     Minor head injury without loss of consciousness 01/29/2020    Murmur     Muscle cramps     Need for hepatitis C screening test     Paraphimosis 05/16/2021    Pneumonia of left lung due to infectious organism 09/17/2021    Polycythemia     Polycythemia vera     Pressure injury of buttock, stage 2 09/18/2020    Prostate hyperplasia without urinary obstruction     Prostatitis     Proteinuria      Pulmonary hypertension     Sacroiliitis 10/09/2020    Scoliosis     Sepsis due to Escherichia coli 08/17/2020    Sepsis with metabolic encephalopathy 08/17/2020    Stroke     SVT (supraventricular tachycardia)     Tachycardia     Thrombocytosis     TIA (transient ischemic attack)     2006    Type 2 diabetes mellitus     Type 2 diabetes mellitus with hyperglycemia 05/16/2021    Urinary tract infection due to extended-spectrum beta lactamase (ESBL) producing Escherichia coli 08/18/2020    Valvular heart disease         Past Surgical History:   Procedure Laterality Date    CARDIAC CATHETERIZATION      CATARACT EXTRACTION Left 04/2021    CATARACT EXTRACTION Right 07/26/2022    COLONOSCOPY      did Cologuard approx 6/2019 and negative    HAND SURGERY      1970s    JOINT REPLACEMENT Right 2014    GA ARTHRP KNE CONDYLE&PLATU MEDIAL&LAT COMPARTMENTS Left 04/27/2017    Procedure: LT TOTAL KNEE ARTHROPLASTY;  Surgeon: Bertin Perrin MD;  Location: [facility];  Service: Orthopedics    PROSTATE SURGERY      Rezu steam treatment to shrink prostate by dr. guerrero        No current facility-administered medications on file prior to encounter.     Current Outpatient Medications on File Prior to Encounter   Medication Sig Dispense Refill    Accu-Chek FastClix Lancets misc TEST BLOOD SUGAR 1-2 TIMES DAILY AND AS NEEDED      acetaminophen (TYLENOL) 325 MG tablet Take 2 tablets by mouth Every 4 (Four) Hours As Needed for Mild Pain.      amLODIPine (NORVASC) 10 MG tablet Take 1 tablet by mouth Daily.      carvedilol (COREG) 6.25 MG tablet TAKE 1 TABLET TWICE A DAY WITH MEALS 180 tablet 3    carvedilol (COREG) 6.25 MG tablet Take 1 tablet by mouth 2 (Two) Times a Day With Meals.      castor oil-balsam peru (VENELEX) ointment Apply 1 Application topically to the appropriate area as directed 2 (Two) Times a Day. Apply buttock and bilateral heals      cloNIDine (CATAPRES) 0.2 MG tablet Take 1 tablet by mouth Every 8 (Eight) Hours.       fluticasone (FLONASE) 50 MCG/ACT nasal spray 1 spray into the nostril(s) as directed by provider Daily.      hydroxyurea (HYDREA) 500 MG capsule Take 1 capsule by mouth Daily.      insulin glargine (LANTUS, SEMGLEE) 100 UNIT/ML injection Inject 22 Units under the skin into the appropriate area as directed Every Night.      insulin regular (humuLIN R,novoLIN R) 100 UNIT/ML injection Inject 2-9 Units under the skin into the appropriate area as directed 3 (Three) Times a Day Before Meals.      Januvia 100 MG tablet Take 1 tablet by mouth Daily.      levETIRAcetam (KEPPRA) 500 MG tablet Take 1 tablet by mouth 2 (Two) Times a Day.      melatonin 5 MG tablet tablet Take 1 tablet by mouth Every Night. (Patient taking differently: Take 1 tablet by mouth At Night As Needed.)      memantine (NAMENDA) 10 MG tablet Take 1 tablet by mouth 2 (Two) Times a Day.      metFORMIN ER (GLUCOPHAGE-XR) 500 MG 24 hr tablet Take 1 tablet by mouth Daily With Breakfast.      miconazole (MICOTIN) 2 % cream Apply 1 Application topically to the appropriate area as directed 2 (Two) Times a Day. Apply buttock and groin area      Multiple Vitamins-Minerals (b complex-C-E-zinc) tablet Take 1 tablet by mouth Daily.      OLANZapine zydis (zyPREXA) 5 MG disintegrating tablet Place 1 tablet on the tongue Every Night.      polyethylene glycol (MIRALAX) 17 g packet Take 17 g by mouth 2 (Two) Times a Day. Hold if loose stool      rosuvastatin (CRESTOR) 40 MG tablet Take 1 tablet by mouth Every Night.      sennosides-docusate (PERICOLACE) 8.6-50 MG per tablet Take 2 tablets by mouth 2 (Two) Times a Day.      sertraline (ZOLOFT) 50 MG tablet Take 1 tablet by mouth Every Night.      spironolactone (ALDACTONE) 50 MG tablet Take 1 tablet by mouth Every Morning.      tamsulosin (FLOMAX) 0.4 MG capsule 24 hr capsule Take 1 capsule by mouth Daily.      valsartan (DIOVAN) 320 MG tablet Take 1 tablet by mouth Daily.      Vibegron (Gemtesa) 75 MG tablet Take 1  tablet by mouth Daily With Breakfast.      acetaminophen (TYLENOL) 500 MG tablet Take 2 tablets by mouth Every 8 (Eight) Hours As Needed for Moderate Pain.      Aspirin Low Dose 81 MG EC tablet TAKE 1 TABLET DAILY 90 tablet 3    Blood Glucose Monitoring Suppl (Accu-Chek Guide Me) w/Device kit TEST BLOOD SUGAR 1-2 TIMES DAILY AND AS NEEDED      fluticasone (FLONASE) 50 MCG/ACT nasal spray 1 spray into the nostril(s) as directed by provider Daily. 48 g 3    glimepiride (AMARYL) 2 MG tablet TAKE 1 TABLET TWICE A DAY WITH MEALS 180 tablet 1    glucosamine-chondroitin 500-400 MG capsule capsule Take 1 capsule by mouth Daily.      glucose blood (Accu-Chek Guide) test strip Use as instructed to test blood sugar twice daily. 100 each 2    guaiFENesin (MUCINEX) 600 MG 12 hr tablet Take 2 tablets by mouth 2 (Two) Times a Day.      hydrALAZINE (APRESOLINE) 100 MG tablet TAKE 1 TABLET THREE TIMES A  tablet 1    hydroxyurea (HYDREA) 500 MG capsule Take 1 capsule by mouth Daily.      memantine (NAMENDA) 10 MG tablet Take 1 tablet by mouth 2 (Two) Times a Day. 180 tablet 3    metFORMIN ER (GLUCOPHAGE-XR) 500 MG 24 hr tablet TAKE 2 TABLETS TWICE A DAY WITH MEALS 360 tablet 0    montelukast (SINGULAIR) 10 MG tablet Take 1 tablet by mouth Daily. 90 tablet 3    Multiple Vitamins-Minerals (MULTIVITAMIN ADULT PO) Take 1 tablet by mouth Daily.      potassium chloride (KLOR-CON) 20 MEQ CR tablet Take 1 tablet by mouth 3 (Three) Times a Day. 30 tablet 0    Probiotic Product (Probiotic Daily) capsule Take 1 capsule by mouth Daily. 90 capsule 1    rosuvastatin (CRESTOR) 40 MG tablet TAKE 1 TABLET DAILY 90 tablet 3    SITagliptin (Januvia) 100 MG tablet Take 1 tablet by mouth Daily. 90 tablet 1    tamsulosin (FLOMAX) 0.4 MG capsule 24 hr capsule Take 1 capsule by mouth every night at bedtime. 90 capsule 1    torsemide (DEMADEX) 20 MG tablet Take 1 tablet by mouth 2 (Two) Times a Day. 180 tablet 1    valsartan (DIOVAN) 320 MG tablet  Take 1 tablet by mouth Daily. 90 tablet 1    Vibegron (Gemtesa) 75 MG tablet Take 1 tablet by mouth Daily.      [DISCONTINUED] apixaban (Eliquis) 5 MG tablet tablet TAKE 1 TABLET EVERY 12 HOURS 180 tablet 1        ALLERGIES:    Allergies   Allergen Reactions    Donepezil Hallucinations    Steglatro [Ertugliflozin] Other (See Comments)     balanitis        Social History     Socioeconomic History    Marital status:    Tobacco Use    Smoking status: Former     Current packs/day: 0.00     Types: Cigarettes     Quit date:      Years since quittin.2     Passive exposure: Past    Smokeless tobacco: Never    Tobacco comments:     Caffeine daily   Vaping Use    Vaping status: Never Used    Passive vaping exposure: Yes   Substance and Sexual Activity    Alcohol use: Never    Drug use: Never    Sexual activity: Defer        Family History   Problem Relation Age of Onset    Hypertension Mother     Other Father         ruptured appendix,  when pt. was 14 years old.    Diabetes Paternal Aunt     Diabetes Paternal Uncle     No Known Problems Other     Migraines Sister     Stroke Sister     Dementia Brother         Review of Systems   Constitutional:  Positive for activity change and fatigue.   HENT: Negative.     Respiratory: Negative.     Cardiovascular: Negative.    Gastrointestinal: Negative.    Genitourinary: Negative.    Musculoskeletal: Negative.    Neurological:  Positive for speech difficulty and weakness.   Hematological: Negative.    Psychiatric/Behavioral:  Positive for confusion.       Objective     Vitals:    24 0500 24 0631 24 0700 24 0900   BP:  118/69 114/72    BP Location:   Left arm    Patient Position:   Lying    Pulse:  77 77 77   Resp:   16    Temp:   98 °F (36.7 °C)    TempSrc:   Oral    SpO2:   93% 92%   Weight: 99.1 kg (218 lb 7.6 oz)      Height:              No data to display                Physical Exam  Constitutional:       Appearance: He is  ill-appearing.   HENT:      Head: Normocephalic.      Mouth/Throat:      Mouth: Mucous membranes are dry.      Pharynx: No oropharyngeal exudate or posterior oropharyngeal erythema.   Eyes:      General: No scleral icterus.     Conjunctiva/sclera: Conjunctivae normal.      Pupils: Pupils are equal, round, and reactive to light.   Cardiovascular:      Rate and Rhythm: Normal rate and regular rhythm.      Pulses: Normal pulses.      Heart sounds: Normal heart sounds. No murmur heard.     No gallop.   Pulmonary:      Effort: Pulmonary effort is normal.      Breath sounds: Normal breath sounds.   Abdominal:      General: Abdomen is flat. There is distension.      Palpations: There is no mass.      Tenderness: There is no abdominal tenderness. There is no guarding or rebound.   Musculoskeletal:      Right lower leg: No edema.      Left lower leg: No edema.   Lymphadenopathy:      Cervical: No cervical adenopathy.   Skin:     Comments: PIGMENTATION SKIN BOTH LE.    FACE IS VERY FLSUSHED   Neurological:      Mental Status: He is alert. He is disoriented.      Motor: Weakness present.      Comments: EXPRESSIVE APHASIA           RECENT LABS:  Hematology WBC   Date Value Ref Range Status   04/07/2024 11.58 (H) 3.40 - 10.80 10*3/mm3 Final     RBC   Date Value Ref Range Status   04/07/2024 5.92 (H) 4.14 - 5.80 10*6/mm3 Final     Hemoglobin   Date Value Ref Range Status   04/07/2024 17.7 13.0 - 17.7 g/dL Final     Hematocrit   Date Value Ref Range Status   04/07/2024 52.9 (H) 37.5 - 51.0 % Final     Platelets   Date Value Ref Range Status   04/07/2024 291 140 - 450 10*3/mm3 Final        Lab Results   Component Value Date    GLUCOSE 241 (H) 04/07/2024    CALCIUM 9.7 04/07/2024     04/07/2024    K 3.6 04/07/2024    CO2 21.0 (L) 04/07/2024     (H) 04/07/2024    BUN 19 04/07/2024    CREATININE 1.03 04/07/2024    EGFRRESULT 49 (L) 02/05/2024    EGFR 75.3 04/07/2024    BCR 18.4 04/07/2024    ANIONGAP 14.0 04/07/2024       Assessment & Plan     On 2024, this 78-year-old patient with previous history of hemorrhagic stroke plus expressive aphasia has been admitted with uncontrolled hypertension, coming from a nursing facility. It has been noticed that the patient's hematocrit is elevated; a few weeks ago the hematocrit was normal. He has minimal elevation of the white count, normal platelet count. The patient is known for having seen Dr. Thomas, Hematologist-Oncologist, and followed him for polycythemia vera for many years, taking Hydrea.     Today, the patient's hematocrit is elevated in the 53 category. Yesterday, it was 56. The patient's mouth is dry like sandpaper and obviously with the changes in mental status and expressive aphasia, the patient is not able to verbalize too much of anything. Even though the BUN and creatinine ratio is not abnormal, I expressed my concern that the patient has volume contraction and for that reason I have placed him on normal saline at 50 mL/h.     I also have ordered the blood bank to perform a phlebotomy on this patient given his high hematocrit. I wonder how much of the changes in the mental status is associated with sluggish transition of red cells through the brain and through the body in this patient who has an elevated hematocrit. Patients with polycythemia vera can have neurological defects and even vascular defect associated with slow transit, local blood clots and thrombosis.     The patient's white count seems to be minimally elevated. He is taking Hydrea and I find no reason to increase or modify the dose of this medicine at this time. The platelet count is okay. He has no clinical splenomegaly.     Therefore, my advice to the patient's wife is the followin. I will place the patient on normal saline 50 mL/h.   2. Reevaluate the chemistry profile tomorrow.   3. The patient will require a PICC line placement to remove 500 mL of red cells by the blood bank.   4. The patient  will require continuation of Hydrea.   5. The patient is in the process of being sent for radiology to have a new MRI of the brain done.   6. I would not be surprised if his lack of control of blood pressure is sluggish transit of blood flow throughout his body that triggers increased blood pressure reaction to sluggish transit.    Obviously in a patient who has expressive aphasia, the communication is difficult and seems to be that that is the case at this point.     The patient's wife supported me with all the pertinent information in regard to his hematological disorder. She has not mentioned anything related to previous blood clots or issues pertinent to P. vera on this patient before.

## 2024-04-07 NOTE — PROGRESS NOTES
REASON FOR FOLLOWUP/CHIEF COMPLAINT:  Polycythemia vera    HISTORY OF PRESENT ILLNESS:   Received a PICC line yesterday and had a phlebotomy this morning.  He is very sleepy and would not easily aroused.  His wife states he does this every time he has a phlebotomy.  His wife states he is not a difficult stick as an outpatient and she states she will make sure he gets follow-up with his regular outpatient hematologist, Dr. Thomas    Past Medical History, Past Surgical History, Social History, Family History have been reviewed and are without significant changes except as mentioned.    Review of Systems   Review of Systems   Constitutional:  Negative for activity change.   HENT:  Negative for nosebleeds and trouble swallowing.    Respiratory:  Negative for shortness of breath and wheezing.    Cardiovascular:  Negative for chest pain and palpitations.   Gastrointestinal:  Negative for constipation, diarrhea and nausea.   Genitourinary:  Negative for dysuria and hematuria.   Musculoskeletal:  Negative for arthralgias and myalgias.   Neurological:  Negative for seizures and syncope.   Hematological:  Negative for adenopathy. Does not bruise/bleed easily.   Psychiatric/Behavioral:  Negative for confusion.        Medications:  The current medication list was reviewed in the EMR    ALLERGIES:    Allergies   Allergen Reactions    Donepezil Hallucinations    Steglatro [Ertugliflozin] Other (See Comments)     balanitis              Vitals:    04/07/24 0700 04/07/24 0900 04/07/24 1106 04/07/24 1332   BP: 114/72  140/83 118/60   BP Location: Left arm  Left arm Right arm   Patient Position: Lying  Lying Lying   Pulse: 77 77 86 78   Resp: 16  17 18   Temp: 98 °F (36.7 °C)  99 °F (37.2 °C) 98.2 °F (36.8 °C)   TempSrc: Oral  Oral Oral   SpO2: 93% 92% 93% 92%   Weight:   98.8 kg (217 lb 13 oz)    Height:         Physical Exam    CONSTITUTIONAL:  Vital signs reviewed.  No distress, looks comfortable.  EYES:  Conjunctivae and  lids unremarkable.  PERRLA  EARS, NOSE, MOUTH, THROAT:  Ears and nose appear unremarkable.  Lips, teeth, gums appear unremarkable.  RESPIRATORY:  Normal respiratory effort.  Lungs clear to auscultation bilaterally.  CARDIOVASCULAR:  Normal S1, S2.  No murmurs, rubs or gallops.  No significant lower extremity edema.  GASTROINTESTINAL: Abdomen appears unremarkable.  Nontender.  No hepatomegaly.  No splenomegaly.  NEURO: Cranial nerves 2-12 grossly intact.  No focal deficits.  Appears to have equal strength all 4 extremities.  MUSCULOSKELETAL:  Unremarkable digits/nails.  No cyanosis or clubbing.  SKIN:  Warm.  No rashes.  PSYCHIATRIC:  Normal judgment and insight.  Normal mood and affect.      RECENT LABS:  WBC   Date Value Ref Range Status   04/07/2024 11.58 (H) 3.40 - 10.80 10*3/mm3 Final   04/06/2024 11.30 (H) 3.40 - 10.80 10*3/mm3 Final     Hemoglobin   Date Value Ref Range Status   04/07/2024 17.7 13.0 - 17.7 g/dL Final   04/06/2024 18.5 (H) 13.0 - 17.7 g/dL Final     Platelets   Date Value Ref Range Status   04/07/2024 291 140 - 450 10*3/mm3 Final   04/06/2024 289 140 - 450 10*3/mm3 Final       ASSESSMENT/PLAN:  Erlin MIRANDA Bella [id number]     *Polycythemia vera  Followed by Dr. Thomas, hematology oncology, for years, on Hydrea.    *Prior hemorrhagic stroke with expressive aphasia, lives in a nursing facility.    *Admitted 4/ 6/24 for uncontrolled hypertension    Plan  Could not do a phlebotomy through peripheral IV.  Therefore, PICC placed on 4/ 8/24 and phlebotomy occurred the morning of 4/9/2024.  Wife states he is not a difficult stick as an outpatient so I told her PICC would likely be removed upon discharge.  Wife will make sure he gets follow-up with his regular outpatient hematologist, Dr. Thomas, sometime after discharge  Continue Hydrea

## 2024-04-07 NOTE — NURSING NOTE
IV nurse came to report that there is no order for PICC but she received the phone call of this nurse patient need a PICC line; and nurse state it will be done tomorrow.because no staff available to do it today they have a list of patient before the  order place and need to evaluated patient first.

## 2024-04-07 NOTE — PROGRESS NOTES
"    Name: Erlin Blanco ADMIT: 2024   : 1945  PCP: Senia Dorsey MD    MRN: [id number] LOS: 0 days   AGE/SEX: 78 y.o. male  ROOM: [facility]     Subjective     This is a 78-year-old man with male \"with hypertension, coronary artery disease, chronic diastolic heart failure, type 2 diabetes, and atrial fibrillation on Eliquis who presents to the hospital with persistently elevated blood pressure.  He was recently discharged from this facility on  for a left thalamic intracranial hemorrhage with intraventricular extension.  At that time, neurosurgery has seen the patient and management was obtained with blood pressure control as well as serial CT scans.    There was also some concern about aspiration pneumonia and dysphagia.  He was placed on a modified diet after completing a course of antibiotic therapy.    His blood pressure in the outlying facility was noted to be as high as 190 systolic with his goal being less than 160.  He was discharged on quite a few blood pressure medications including amlodipine 10 mg every 24 hours, clonidine 0.2 mg every 8 hours, spironolactone 50 mg every morning, valsartan 320 mg daily.  Eliquis is obviously discontinued at that time.      Other medications, including hydralazine 100 and torsemide 20 mg were also discontinued.     Patient examined this morning with family at bedside.  There are some concerns about decreased level of attention.    Objective   Objective   Vital Signs  Temp:  [97.2 °F (36.2 °C)-99 °F (37.2 °C)] 99 °F (37.2 °C)  Heart Rate:  [72-90] 86  Resp:  [16-18] 17  BP: (114-173)/() 140/83  SpO2:  [92 %-98 %] 93 %  on   ;   Device (Oxygen Therapy): room air  Body mass index is 29.53 kg/m².  Physical Exam  Constitutional:       General: He is not in acute distress.  HENT:      Head: Normocephalic and atraumatic.   Cardiovascular:      Rate and Rhythm: Normal rate and regular rhythm.   Pulmonary:      Effort: Pulmonary effort is normal. No " respiratory distress.   Abdominal:      General: There is no distension.      Palpations: Abdomen is soft.      Tenderness: There is no abdominal tenderness.   Neurological:      General: No focal deficit present.      Mental Status: He is alert and oriented to person, place, and time.      Comments: Right-sided hemiparesis, facial droop         Results Review     I reviewed the patient's new clinical results.  Results from last 7 days   Lab Units 04/07/24  0531 04/06/24  1713 03/31/24  1857   WBC 10*3/mm3 11.58* 11.30* 8.17   HEMOGLOBIN g/dL 17.7 18.5* 16.2   PLATELETS 10*3/mm3 291 289 197     Results from last 7 days   Lab Units 04/07/24  0531 04/06/24  1713   SODIUM mmol/L 145 142   POTASSIUM mmol/L 3.6 4.4   CHLORIDE mmol/L 110* 107   CO2 mmol/L 21.0* 19.6*   BUN mg/dL 19 20   CREATININE mg/dL 1.03 1.04   GLUCOSE mg/dL 241* 268*   Estimated Creatinine Clearance: 74.3 mL/min (by C-G formula based on SCr of 1.03 mg/dL).  Results from last 7 days   Lab Units 04/06/24  1713   ALBUMIN g/dL 3.8   BILIRUBIN mg/dL 0.6   ALK PHOS U/L 112   AST (SGOT) U/L 41*   ALT (SGPT) U/L 18     Results from last 7 days   Lab Units 04/07/24  0531 04/06/24  1713   CALCIUM mg/dL 9.7 9.8   ALBUMIN g/dL  --  3.8       COVID19   Date Value Ref Range Status   03/30/2023 Detected (A) Not Detected - Ref. Range Final   09/17/2021 Not Detected Not Detected - Ref. Range Final     Glucose   Date/Time Value Ref Range Status   04/07/2024 0638 206 (H) 70 - 130 mg/dL Final   04/06/2024 2226 267 (H) 70 - 130 mg/dL Final     Results for orders placed or performed during the hospital encounter of 09/16/21   Blood Culture - Blood, Arm, Left    Specimen: Arm, Left; Blood   Result Value Ref Range    Blood Culture No growth at 5 days          XR Chest 1 View  Narrative: PORTABLE CHEST     HISTORY: Hypertension, tachypnea.     COMPARISON: Chest x-ray 03/12/2024.     FINDINGS: A single view of the chest demonstrates the heart to be within  normal limits in  size. There is interstitial prominence involving the  right upper lobe which is new versus 03/12/2024. There is mild  interstitial prominence involving the lung bases bilaterally,  accentuated by inspiratory effort. There is no evidence of consolidation  or gross effusion.     Impression: There is subtle interstitial prominence involving the right  upper lobe without consolidation or of effusion. Interstitial infiltrate  including COVID-pneumonia could not be excluded. There is no evidence of  consolidation or of gross effusion. See above.     This report was finalized on 4/6/2024 5:09 PM by Dr. Alfred Rivera M.D  on Workstation: [id number]       Scheduled Medications  amLODIPine, 10 mg, Oral, Q24H  carvedilol, 12.5 mg, Oral, BID With Meals  cloNIDine, 0.2 mg, Oral, Q8H  fluticasone, 1 spray, Nasal, Daily  guaiFENesin, 1,200 mg, Oral, BID  hydroxyurea, 500 mg, Oral, Daily  insulin glargine, 22 Units, Subcutaneous, Nightly  insulin lispro, 2-7 Units, Subcutaneous, 4x Daily AC & at Bedtime  lactobacillus acidophilus, 1 capsule, Oral, Daily  levETIRAcetam, 500 mg, Oral, BID  linagliptin, 5 mg, Oral, Daily  memantine, 10 mg, Oral, BID  multivitamin with minerals, 1 tablet, Oral, Daily  OLANZapine zydis, 5 mg, Oral, Nightly  rosuvastatin, 40 mg, Oral, Nightly  sennosides-docusate, 2 tablet, Oral, BID  sertraline, 50 mg, Oral, Nightly  spironolactone, 50 mg, Oral, Daily  tamsulosin, 0.4 mg, Oral, Daily  valsartan, 320 mg, Oral, Daily    Infusions  sodium chloride, 50 mL/hr, Last Rate: 50 mL/hr (04/07/24 1110)    Diet  Diet: Cardiac; Healthy Heart (2-3 Na+); Texture: Pureed (NDD 1); Fluid Consistency: Honey Thick       Assessment/Plan     Active Hospital Problems    Diagnosis  POA    **Hypertension, uncontrolled [I10]  Yes    Expressive aphasia [R47.01]  Yes    Paroxysmal atrial fibrillation [I48.0]  Yes    Type 2 diabetes mellitus [E11.9]  Yes    HTN (hypertension) [I10]  Yes    Paroxysmal atrial fibrillation [I48.0]  Yes     History of CVA (cerebrovascular accident) [Z86.73]  Not Applicable      Resolved Hospital Problems   No resolved problems to display.       78 y.o. male admitted with Hypertension, uncontrolled.    Hypertension  Uncontrolled upon presentation initially with a pulse of blood pressure in the 190s.  Currently it is controlled on Coreg 12.5 mg twice daily, clonidine 0.2 mg every 8 hours, spironolactone 50 mg daily, losartan 220 mg daily.  Started on hydralazine and torsemide which were discontinued during the previous hospitalization.  I have discontinued these medications.  Blood pressure actually dropped a little bit too precipitously.  Discontinuing hydralazine and torsemide and monitor response.    Type 2 diabetes  Currently on glargine 20 units nightly along with sliding scale insulin    Polycythemia vera  On hydroxyurea.  This is being continued and current and on hematology has been consulted    Paroxysmal atrial fibrillation  Holding off on any antiplatelets anticoagulants    Left thalmic intracranial hemorrhage with intraventricular extension  Dysphagia  Start subacute rehab on February 24, 2024.    Keppra for seizure prophylaxis.  Modified diet.  MRI of the brain      SCDs for DVT prophylaxis.  Full code.  Discussed with patient, family, and nursing staff.  Anticipate discharge to rehab, timing yet to be determined.     Kapil Eid MD  [facility]  04/07/24  11:22 EDT

## 2024-04-08 ENCOUNTER — APPOINTMENT (OUTPATIENT)
Dept: GENERAL RADIOLOGY | Facility: HOSPITAL | Age: 79
DRG: 305 | End: 2024-04-08
Payer: MEDICARE

## 2024-04-08 LAB
ALBUMIN SERPL-MCNC: 3.3 G/DL (ref 3.5–5.2)
ALBUMIN/GLOB SERPL: 1.3 G/DL
ALP SERPL-CCNC: 88 U/L (ref 39–117)
ALT SERPL W P-5'-P-CCNC: 14 U/L (ref 1–41)
ANION GAP SERPL CALCULATED.3IONS-SCNC: 11.4 MMOL/L (ref 5–15)
AST SERPL-CCNC: 24 U/L (ref 1–40)
BILIRUB SERPL-MCNC: 0.4 MG/DL (ref 0–1.2)
BUN SERPL-MCNC: 25 MG/DL (ref 8–23)
BUN/CREAT SERPL: 22.9 (ref 7–25)
CALCIUM SPEC-SCNC: 9.1 MG/DL (ref 8.6–10.5)
CHLORIDE SERPL-SCNC: 110 MMOL/L (ref 98–107)
CO2 SERPL-SCNC: 21.6 MMOL/L (ref 22–29)
CREAT SERPL-MCNC: 1.09 MG/DL (ref 0.76–1.27)
DEPRECATED RDW RBC AUTO: 53.6 FL (ref 37–54)
EGFRCR SERPLBLD CKD-EPI 2021: 70.3 ML/MIN/1.73
ERYTHROCYTE [DISTWIDTH] IN BLOOD BY AUTOMATED COUNT: 15.9 % (ref 12.3–15.4)
GLOBULIN UR ELPH-MCNC: 2.6 GM/DL
GLUCOSE BLDC GLUCOMTR-MCNC: 246 MG/DL (ref 70–130)
GLUCOSE BLDC GLUCOMTR-MCNC: 298 MG/DL (ref 70–130)
GLUCOSE BLDC GLUCOMTR-MCNC: 308 MG/DL (ref 70–130)
GLUCOSE BLDC GLUCOMTR-MCNC: 352 MG/DL (ref 70–130)
GLUCOSE SERPL-MCNC: 296 MG/DL (ref 65–99)
HCT VFR BLD AUTO: 50 % (ref 37.5–51)
HGB BLD-MCNC: 16.3 G/DL (ref 13–17.7)
MAGNESIUM SERPL-MCNC: 2.2 MG/DL (ref 1.6–2.4)
MCH RBC QN AUTO: 30 PG (ref 26.6–33)
MCHC RBC AUTO-ENTMCNC: 32.6 G/DL (ref 31.5–35.7)
MCV RBC AUTO: 91.9 FL (ref 79–97)
PLATELET # BLD AUTO: 259 10*3/MM3 (ref 140–450)
PMV BLD AUTO: 10.2 FL (ref 6–12)
POTASSIUM SERPL-SCNC: 3.4 MMOL/L (ref 3.5–5.2)
PROT SERPL-MCNC: 5.9 G/DL (ref 6–8.5)
QT INTERVAL: 478 MS
QTC INTERVAL: 531 MS
RBC # BLD AUTO: 5.44 10*6/MM3 (ref 4.14–5.8)
SODIUM SERPL-SCNC: 143 MMOL/L (ref 136–145)
WBC NRBC COR # BLD AUTO: 9.51 10*3/MM3 (ref 3.4–10.8)

## 2024-04-08 PROCEDURE — 63710000001 INSULIN GLARGINE PER 5 UNITS: Performed by: INTERNAL MEDICINE

## 2024-04-08 PROCEDURE — 97530 THERAPEUTIC ACTIVITIES: CPT | Performed by: PHYSICAL THERAPIST

## 2024-04-08 PROCEDURE — 63710000001 INSULIN LISPRO (HUMAN) PER 5 UNITS: Performed by: INTERNAL MEDICINE

## 2024-04-08 PROCEDURE — 85027 COMPLETE CBC AUTOMATED: CPT | Performed by: NURSE PRACTITIONER

## 2024-04-08 PROCEDURE — 83735 ASSAY OF MAGNESIUM: CPT | Performed by: STUDENT IN AN ORGANIZED HEALTH CARE EDUCATION/TRAINING PROGRAM

## 2024-04-08 PROCEDURE — 80053 COMPREHEN METABOLIC PANEL: CPT | Performed by: NURSE PRACTITIONER

## 2024-04-08 PROCEDURE — 82948 REAGENT STRIP/BLOOD GLUCOSE: CPT

## 2024-04-08 PROCEDURE — C1751 CATH, INF, PER/CENT/MIDLINE: HCPCS

## 2024-04-08 PROCEDURE — G0378 HOSPITAL OBSERVATION PER HR: HCPCS

## 2024-04-08 PROCEDURE — 25810000003 SODIUM CHLORIDE 0.9 % SOLUTION: Performed by: INTERNAL MEDICINE

## 2024-04-08 PROCEDURE — 63710000001 INSULIN LISPRO (HUMAN) PER 5 UNITS: Performed by: STUDENT IN AN ORGANIZED HEALTH CARE EDUCATION/TRAINING PROGRAM

## 2024-04-08 PROCEDURE — 97162 PT EVAL MOD COMPLEX 30 MIN: CPT | Performed by: PHYSICAL THERAPIST

## 2024-04-08 RX ORDER — INSULIN LISPRO 100 [IU]/ML
5 INJECTION, SOLUTION INTRAVENOUS; SUBCUTANEOUS
Status: DISCONTINUED | OUTPATIENT
Start: 2024-04-08 | End: 2024-04-10

## 2024-04-08 RX ORDER — ROSUVASTATIN CALCIUM 20 MG/1
2 TABLET, COATED ORAL DAILY
COMMUNITY

## 2024-04-08 RX ADMIN — INSULIN LISPRO 5 UNITS: 100 INJECTION, SOLUTION INTRAVENOUS; SUBCUTANEOUS at 17:32

## 2024-04-08 RX ADMIN — Medication 1 CAPSULE: at 08:43

## 2024-04-08 RX ADMIN — CARVEDILOL 12.5 MG: 12.5 TABLET, FILM COATED ORAL at 17:31

## 2024-04-08 RX ADMIN — HYDROXYUREA 500 MG: 500 CAPSULE ORAL at 08:43

## 2024-04-08 RX ADMIN — Medication 10 ML: at 21:43

## 2024-04-08 RX ADMIN — GUAIFENESIN 1200 MG: 600 TABLET, EXTENDED RELEASE ORAL at 21:42

## 2024-04-08 RX ADMIN — INSULIN LISPRO 5 UNITS: 100 INJECTION, SOLUTION INTRAVENOUS; SUBCUTANEOUS at 17:31

## 2024-04-08 RX ADMIN — INSULIN GLARGINE 22 UNITS: 100 INJECTION, SOLUTION SUBCUTANEOUS at 21:42

## 2024-04-08 RX ADMIN — VALSARTAN 320 MG: 320 TABLET, FILM COATED ORAL at 08:43

## 2024-04-08 RX ADMIN — CLONIDINE HYDROCHLORIDE 0.2 MG: 0.1 TABLET ORAL at 21:43

## 2024-04-08 RX ADMIN — INSULIN LISPRO 5 UNITS: 100 INJECTION, SOLUTION INTRAVENOUS; SUBCUTANEOUS at 21:42

## 2024-04-08 RX ADMIN — CARVEDILOL 12.5 MG: 12.5 TABLET, FILM COATED ORAL at 08:43

## 2024-04-08 RX ADMIN — ZINC OXIDE 1 APPLICATION: 200 OINTMENT TOPICAL at 17:42

## 2024-04-08 RX ADMIN — Medication 10 ML: at 08:56

## 2024-04-08 RX ADMIN — MEMANTINE HYDROCHLORIDE 10 MG: 10 TABLET, FILM COATED ORAL at 21:43

## 2024-04-08 RX ADMIN — LEVETIRACETAM 500 MG: 500 TABLET, FILM COATED ORAL at 08:43

## 2024-04-08 RX ADMIN — CLONIDINE HYDROCHLORIDE 0.2 MG: 0.1 TABLET ORAL at 15:21

## 2024-04-08 RX ADMIN — Medication 1 APPLICATION: at 15:08

## 2024-04-08 RX ADMIN — ACETAMINOPHEN 325MG 650 MG: 325 TABLET ORAL at 00:37

## 2024-04-08 RX ADMIN — Medication 1 APPLICATION: at 21:42

## 2024-04-08 RX ADMIN — SODIUM CHLORIDE 50 ML/HR: 9 INJECTION, SOLUTION INTRAVENOUS at 15:16

## 2024-04-08 RX ADMIN — Medication 1 TABLET: at 08:43

## 2024-04-08 RX ADMIN — SPIRONOLACTONE 50 MG: 50 TABLET, FILM COATED ORAL at 08:43

## 2024-04-08 RX ADMIN — ANORECTAL OINTMENT 1 APPLICATION: 15.7; .44; 24; 20.6 OINTMENT TOPICAL at 08:57

## 2024-04-08 RX ADMIN — AMLODIPINE BESYLATE 10 MG: 10 TABLET ORAL at 08:43

## 2024-04-08 RX ADMIN — DOCUSATE SODIUM 50MG AND SENNOSIDES 8.6MG 2 TABLET: 8.6; 5 TABLET, FILM COATED ORAL at 08:56

## 2024-04-08 RX ADMIN — ACETAMINOPHEN 325MG 650 MG: 325 TABLET ORAL at 11:43

## 2024-04-08 RX ADMIN — SERTRALINE 50 MG: 50 TABLET, FILM COATED ORAL at 21:42

## 2024-04-08 RX ADMIN — GUAIFENESIN 1200 MG: 600 TABLET, EXTENDED RELEASE ORAL at 08:56

## 2024-04-08 RX ADMIN — DOCUSATE SODIUM 50MG AND SENNOSIDES 8.6MG 2 TABLET: 8.6; 5 TABLET, FILM COATED ORAL at 21:42

## 2024-04-08 RX ADMIN — LEVETIRACETAM 500 MG: 500 TABLET, FILM COATED ORAL at 21:43

## 2024-04-08 RX ADMIN — MEMANTINE HYDROCHLORIDE 10 MG: 10 TABLET, FILM COATED ORAL at 08:43

## 2024-04-08 RX ADMIN — LINAGLIPTIN 5 MG: 5 TABLET, FILM COATED ORAL at 08:43

## 2024-04-08 RX ADMIN — CLONIDINE HYDROCHLORIDE 0.2 MG: 0.1 TABLET ORAL at 06:16

## 2024-04-08 RX ADMIN — TAMSULOSIN HYDROCHLORIDE 0.4 MG: 0.4 CAPSULE ORAL at 08:43

## 2024-04-08 RX ADMIN — ROSUVASTATIN CALCIUM 40 MG: 40 TABLET, FILM COATED ORAL at 21:43

## 2024-04-08 RX ADMIN — ZINC OXIDE 1 APPLICATION: 200 OINTMENT TOPICAL at 21:42

## 2024-04-08 RX ADMIN — INSULIN LISPRO 3 UNITS: 100 INJECTION, SOLUTION INTRAVENOUS; SUBCUTANEOUS at 08:42

## 2024-04-08 RX ADMIN — FLUTICASONE PROPIONATE 1 SPRAY: 50 SPRAY, METERED NASAL at 08:56

## 2024-04-08 RX ADMIN — OLANZAPINE 5 MG: 5 TABLET, ORALLY DISINTEGRATING ORAL at 21:43

## 2024-04-08 NOTE — PLAN OF CARE
Problem: Fall Injury Risk  Goal: Absence of Fall and Fall-Related Injury  Intervention: Promote Injury-Free Environment  Recent Flowsheet Documentation  Taken 4/8/2024 1800 by Emmanuel Burks RN  Safety Promotion/Fall Prevention:   activity supervised   assistive device/personal items within reach   room organization consistent   safety round/check completed  Taken 4/8/2024 1501 by Emmanuel Burks RN  Safety Promotion/Fall Prevention:   activity supervised   assistive device/personal items within reach   room organization consistent   safety round/check completed   nonskid shoes/slippers when out of bed  Taken 4/8/2024 1344 by Emmanuel Burks RN  Safety Promotion/Fall Prevention: patient off unit  Taken 4/8/2024 1338 by Emmanuel Burks RN  Safety Promotion/Fall Prevention: patient off unit  Taken 4/8/2024 1231 by Emmanuel Burks RN  Safety Promotion/Fall Prevention: patient off unit  Taken 4/8/2024 1145 by Emmanuel Burks RN  Safety Promotion/Fall Prevention: patient off unit  Taken 4/8/2024 0850 by Emmanuel Burks RN  Safety Promotion/Fall Prevention:   activity supervised   assistive device/personal items within reach   room organization consistent   safety round/check completed     Problem: Adult Inpatient Plan of Care  Goal: Absence of Hospital-Acquired Illness or Injury  Intervention: Identify and Manage Fall Risk  Recent Flowsheet Documentation  Taken 4/8/2024 1800 by Emmanuel Burks RN  Safety Promotion/Fall Prevention:   activity supervised   assistive device/personal items within reach   room organization consistent   safety round/check completed  Taken 4/8/2024 1501 by Emmanuel Burks RN  Safety Promotion/Fall Prevention:   activity supervised   assistive device/personal items within reach   room organization consistent   safety round/check completed   nonskid shoes/slippers when out of bed  Taken 4/8/2024 1344 by Emmanuel Burks RN  Safety Promotion/Fall Prevention: patient off unit  Taken 4/8/2024 1338 by Emmanuel Burks RN  Safety  Promotion/Fall Prevention: patient off unit  Taken 4/8/2024 1231 by Emmanuel Burks RN  Safety Promotion/Fall Prevention: patient off unit  Taken 4/8/2024 1145 by Emmanuel Burks RN  Safety Promotion/Fall Prevention: patient off unit  Taken 4/8/2024 0850 by Emmanuel Burks RN  Safety Promotion/Fall Prevention:   activity supervised   assistive device/personal items within reach   room organization consistent   safety round/check completed   Goal Outcome Evaluation:  Plan of Care Reviewed With: patient, spouse        Progress: no change  Outcome Evaluation: Patient AOx1 confused today have place a PICC line per IR but Therapeuric  phlebotomy was no done due no personal available after 2 pm will be done tomorrow if MD still want do to it lab in the morning IV fluid @ 50 ml/hr continue need total care and feeding too paralized right sidew take medication crush with applesauce, turn q 2 hrs side by side, ,Insuline dose increase today blodd pressure more stable.

## 2024-04-08 NOTE — CASE MANAGEMENT/SOCIAL WORK
Discharge Planning Assessment  [facility]     Patient Name: Erlin Blanco  MRN: [id number]  Today's Date: 4/8/2024    Admit Date: 4/6/2024    Plan: Home with wife and HH vs return to [facility]   Discharge Needs Assessment       Row Name 04/08/24 1547       Living Environment    People in Home spouse    Current Living Arrangements home    Potentially Unsafe Housing Conditions none    Primary Care Provided by spouse/significant other    Provides Primary Care For no one, unable/limited ability to care for self    Family Caregiver if Needed spouse    Quality of Family Relationships helpful;involved;supportive    Able to Return to Prior Arrangements other (see comments)  Pending, admitted from [facility]       Resource/Environmental Concerns    Resource/Environmental Concerns none    Transportation Concerns none       Transition Planning    Patient/Family Anticipates Transition to home with family;home with help/services;inpatient rehabilitation facility    Patient/Family Anticipated Services at Transition home health care;rehabilitation services;skilled nursing    Transportation Anticipated health plan transportation       Discharge Needs Assessment    Readmission Within the Last 30 Days current reason for admission unrelated to previous admission    Equipment Currently Used at Home walker, rolling;wheelchair;lift device;cane, straight;hospital bed    Concerns to be Addressed discharge planning    Anticipated Changes Related to Illness none    Equipment Needed After Discharge none    Outpatient/Agency/Support Group Needs homecare agency;inpatient rehabilitation facility;skilled nursing facility    Discharge Facility/Level of Care Needs home with home health;rehabilitation facility;nursing facility, skilled    Provided Post Acute Provider List? N/A    Provided Post Acute Provider Quality & Resource List? N/A    Patient's Choice of Community Agency(s) [facility]                   Discharge Plan       Row Name 04/08/24  1549       Plan    Plan Home with wife and HH vs return to [facility]    Patient/Family in Agreement with Plan yes    Plan Comments NAVEED noted. CCP met with the patient’s wife, Yany at bedside due to the patient being off the floor having a PICC line placed. Yany confirmed the information on the patient’s face sheet was accurate. She said that he lives with her at home but has been at [facility]. She said he was supposed to be discharged from [facility] today but returned to the hospital over the weekend. She said his PCP is MUKUL Crawford. She said that he has worked with [facility] in the past. She said he has been to [facility], [facility], and [facility] in the past. She said he normally uses a straight cane but he has a rolling walker, wheelchair hospital bed, and electric torsten lift at home. She denied any steps to get into the basement and said he enters the home through the walk-out basement and that is where he is set up with his hospital bed, torsten lift, ect. She said he will need wheelchair transport at discharge. PT recommending SNF, referral placed for [facility] in [facility]. CCP to follow. CD, CSW.                  Continued Care and Services - Admitted Since 4/6/2024       Destination       Service Provider Request Status Selected Services Address Phone Fax Patient Preferred    [facility] Pending - Request Sent N/A [address] [facility] [address] [phone] --                  Selected Continued Care - Episodes Includes continued care and service providers with selected services from the active episodes listed below      Chronic Care Management Episode start date: 1/12/2024 (Paused)   There are no active outsourced providers for this episode.                 Selected Continued Care - Prior Encounters Includes continued care and service providers with selected services from prior encounters from 1/7/2024 to 4/8/2024      Discharged on 3/14/2024 Admission date:  2/24/2024 - Discharge disposition: Skilled Nursing Facility (DC - External)      Destination       Service Provider Selected Services Address Phone Fax Patient Preferred    [facility] [address] [facility] [address] [phone] --                          Expected Discharge Date and Time       Expected Discharge Date Expected Discharge Time    Apr 9, 2024            Demographic Summary       Row Name 04/08/24 1547       General Information    Admission Type observation    Arrived From emergency department    Required Notices Provided Observation Status Notice    Referral Source admission list    Reason for Consult discharge planning    Preferred Language English                   Functional Status       Row Name 04/08/24 1547       Functional Status    Usual Activity Tolerance fair    Current Activity Tolerance poor       Functional Status, IADL    Medications assistive equipment and person    Meal Preparation assistive equipment and person    Housekeeping assistive equipment and person    Laundry assistive equipment and person    Shopping assistive equipment and person       Mental Status    General Appearance WDL WDL       Mental Status Summary    Recent Changes in Mental Status/Cognitive Functioning no changes       Employment/    Employment Status retired                   Psychosocial    No documentation.                  Abuse/Neglect    No documentation.                  Legal    No documentation.                  Substance Abuse    No documentation.                  Patient Forms    No documentation.

## 2024-04-08 NOTE — PLAN OF CARE
Goal Outcome Evaluation:  Plan of Care Reviewed With: patient, grandchild(clifford)           Outcome Evaluation: Pt was admitted from a rehab facility with HTN. Pt has recent h/o stroke with R sided weakness, expressive aphasia. Pt was in bed, granddaughter was present. Pt was alert and oriented x1 and had difficulty following commands. Pt presents with R sided weakness. Pt was dependent for all mobility. He sat EOB with mod A for balance with posterior LOB and R lean noted. Pushes to R with LUE at times. Unable to correct to neutral sitting balance with verbal or tactile cues. Pt would benefit from PT to address his impairments. Active participation was limited and pt fatigued quickly. Per family, plans to return to facility.      Anticipated Discharge Disposition (PT): skilled nursing facility

## 2024-04-08 NOTE — PROGRESS NOTES
"    Name: Erlin Blanco ADMIT: 2024   : 1945  PCP: Senia Dorsey MD    MRN: [id number] LOS: 0 days   AGE/SEX: 78 y.o. male  ROOM: [facility]     Subjective     This is a 78-year-old man with male \"with hypertension, coronary artery disease, chronic diastolic heart failure, type 2 diabetes, and atrial fibrillation on Eliquis who presents to the hospital with persistently elevated blood pressure.  He was recently discharged from this facility on  for a left thalamic intracranial hemorrhage with intraventricular extension.  At that time, neurosurgery has seen the patient and management was obtained with blood pressure control as well as serial CT scans.    There was also some concern about aspiration pneumonia and dysphagia.  He was placed on a modified diet after completing a course of antibiotic therapy.    His blood pressure in the outlying facility was noted to be as high as 190 systolic with his goal being less than 160.  He was discharged on quite a few blood pressure medications including amlodipine 10 mg every 24 hours, clonidine 0.2 mg every 8 hours, spironolactone 50 mg every morning, valsartan 320 mg daily.  Eliquis is obviously discontinued at that time.      Other medications, including hydralazine 100 and torsemide 20 mg were also discontinued.       : Examined at bedside with son and daughter.  Family reports that he is back to his mental baseline.  His blood pressure has also been better controlled however systolic blood pressure was as high as 174.      Objective   Objective   Vital Signs  Temp:  [97.9 °F (36.6 °C)-99.1 °F (37.3 °C)] 98.1 °F (36.7 °C)  Heart Rate:  [65-80] 65  Resp:  [18] 18  BP: (118-174)/(60-81) 137/77  SpO2:  [92 %-95 %] 93 %  on   ;   Device (Oxygen Therapy): room air  Body mass index is 29.47 kg/m².  Physical Exam  Constitutional:       General: He is not in acute distress.  HENT:      Head: Normocephalic and atraumatic.   Cardiovascular:      Rate and " Rhythm: Normal rate and regular rhythm.   Pulmonary:      Effort: Pulmonary effort is normal. No respiratory distress.   Abdominal:      General: There is no distension.      Palpations: Abdomen is soft.      Tenderness: There is no abdominal tenderness.   Neurological:      General: No focal deficit present.      Mental Status: He is alert and oriented to person, place, and time.      Comments: Right-sided hemiparesis, facial droop  At baseline         Results Review     I reviewed the patient's new clinical results.  Results from last 7 days   Lab Units 04/08/24  0429 04/07/24 0531 04/06/24  1713   WBC 10*3/mm3 9.51 11.58* 11.30*   HEMOGLOBIN g/dL 16.3 17.7 18.5*   PLATELETS 10*3/mm3 259 291 289     Results from last 7 days   Lab Units 04/08/24 0429 04/07/24 0531 04/06/24  1713   SODIUM mmol/L 143 145 142   POTASSIUM mmol/L 3.4* 3.6 4.4   CHLORIDE mmol/L 110* 110* 107   CO2 mmol/L 21.6* 21.0* 19.6*   BUN mg/dL 25* 19 20   CREATININE mg/dL 1.09 1.03 1.04   GLUCOSE mg/dL 296* 241* 268*   Estimated Creatinine Clearance: 70.1 mL/min (by C-G formula based on SCr of 1.09 mg/dL).  Results from last 7 days   Lab Units 04/08/24 0429 04/06/24  1713   ALBUMIN g/dL 3.3* 3.8   BILIRUBIN mg/dL 0.4 0.6   ALK PHOS U/L 88 112   AST (SGOT) U/L 24 41*   ALT (SGPT) U/L 14 18     Results from last 7 days   Lab Units 04/08/24  0806 04/08/24 0429 04/07/24  0531 04/06/24  1713   CALCIUM mg/dL  --  9.1 9.7 9.8   ALBUMIN g/dL  --  3.3*  --  3.8   MAGNESIUM mg/dL 2.2  --   --   --        COVID19   Date Value Ref Range Status   03/30/2023 Detected (A) Not Detected - Ref. Range Final   09/17/2021 Not Detected Not Detected - Ref. Range Final     Glucose   Date/Time Value Ref Range Status   04/08/2024 0612 246 (H) 70 - 130 mg/dL Final   04/07/2024 2026 378 (H) 70 - 130 mg/dL Final   04/07/2024 1623 279 (H) 70 - 130 mg/dL Final   04/07/2024 1123 223 (H) 70 - 130 mg/dL Final   04/07/2024 0638 206 (H) 70 - 130 mg/dL Final   04/06/2024 2226  267 (H) 70 - 130 mg/dL Final     Results for orders placed or performed during the hospital encounter of 09/16/21   Blood Culture - Blood, Arm, Left    Specimen: Arm, Left; Blood   Result Value Ref Range    Blood Culture No growth at 5 days          CT Head Without Contrast  Narrative: CT HEAD WO CONTRAST-     INDICATIONS: Neurologic deficit     TECHNIQUE: Radiation dose reduction techniques were utilized, including  automated exposure control and exposure modulation based on body size.  Noncontrast head CT     COMPARISON: 3/20/2024     FINDINGS:     The previous intraparenchymal hemorrhage of the left basal ganglia shows  interval decrease in size and density, with interval decrease in  associated mass effect. Intraventricular hemorrhage shows interval  decrease in volume. No new areas of intracranial hemorrhage are  identified.     No midline shift. No acute territorial infarct is identified. Small  parafalcine lipomas are noted.     Moderate periventricular hypodensities suggest chronic small vessel  ischemic change in a patient this age.     Arterial calcifications are seen at the base of the brain.     Interval decrease in partial effacement of the left lateral ventricle is  noted. Ventricles, cisterns, cerebral sulci are otherwise unremarkable  for patient age.     The visualized paranasal sinuses, orbits, mastoid air cells are  unremarkable.        Impression:    Partial improvement of previous intracranial hemorrhage. No new areas of  hemorrhage are identified. If there is further clinical concern, MRI  could be considered for further evaluation.     This report was finalized on 4/7/2024 4:29 PM by Dr. Akhil Nugent M.D on Workstation: [id number]       Scheduled Medications  amLODIPine, 10 mg, Oral, Q24H  carvedilol, 12.5 mg, Oral, BID With Meals  cloNIDine, 0.2 mg, Oral, Q8H  fluticasone, 1 spray, Nasal, Daily  guaiFENesin, 1,200 mg, Oral, BID  hydroxyurea, 500 mg, Oral, Daily  insulin glargine, 22  Units, Subcutaneous, Nightly  insulin lispro, 2-7 Units, Subcutaneous, 4x Daily AC & at Bedtime  lactobacillus acidophilus, 1 capsule, Oral, Daily  levETIRAcetam, 500 mg, Oral, BID  linagliptin, 5 mg, Oral, Daily  memantine, 10 mg, Oral, BID  Menthol-Zinc Oxide, 1 Application, Topical, BID  multivitamin with minerals, 1 tablet, Oral, Daily  OLANZapine zydis, 5 mg, Oral, Nightly  rosuvastatin, 40 mg, Oral, Nightly  sennosides-docusate, 2 tablet, Oral, BID  sertraline, 50 mg, Oral, Nightly  sodium chloride, 10 mL, Intravenous, Q12H  sodium chloride, 10 mL, Intravenous, Q12H  spironolactone, 50 mg, Oral, Daily  tamsulosin, 0.4 mg, Oral, Daily  valsartan, 320 mg, Oral, Daily    Infusions  sodium chloride, 50 mL/hr, Last Rate: 50 mL/hr (04/07/24 1824)    Diet  Diet: Cardiac; Healthy Heart (2-3 Na+); Texture: Pureed (NDD 1); Fluid Consistency: Honey Thick       Assessment/Plan     Active Hospital Problems    Diagnosis  POA   • **Hypertension, uncontrolled [I10]  Yes   • Expressive aphasia [R47.01]  Yes   • Paroxysmal atrial fibrillation [I48.0]  Yes   • Type 2 diabetes mellitus [E11.9]  Yes   • HTN (hypertension) [I10]  Yes   • Paroxysmal atrial fibrillation [I48.0]  Yes   • History of CVA (cerebrovascular accident) [Z86.73]  Not Applicable      Resolved Hospital Problems   No resolved problems to display.       78 y.o. male admitted with Hypertension, uncontrolled.    Hypertension  Uncontrolled upon presentation initially with a pulse of blood pressure in the 190s.  Currently it is controlled on Coreg 12.5 mg twice daily, clonidine 0.2 mg every 8 hours, spironolactone 50 mg daily, losartan 220 mg daily.  Started on hydralazine and torsemide which were discontinued during the previous hospitalization.  I have discontinued these medications.  Blood pressure actually dropped a little bit too precipitously.  Discontinuing hydralazine and torsemide and monitor response.    Type 2 diabetes  Currently on glargine 20 units  nightly along with sliding scale insulin.   Blood sugars are currently uncontrolled.  Will add scheduled short acting insulin    Polycythemia vera  On hydroxyurea.  This is being continued and current and on hematology has been consulted    Paroxysmal atrial fibrillation  Holding off on any antiplatelets anticoagulants    Left thalmic intracranial hemorrhage with intraventricular extension  Dysphagia  Start subacute rehab on February 24, 2024.    Keppra for seizure prophylaxis.  Modified diet.  CT head shows partial improvement of the partial intracranial hemorrhage      SCDs for DVT prophylaxis.  Full code.  Discussed with patient, family, and nursing staff.  Anticipate discharge to rehab, timing yet to be determined.     Kapil Eid MD  [facility]  04/08/24  13:24 EDT

## 2024-04-08 NOTE — SIGNIFICANT NOTE
"   04/07/24 2145   Midline Catheter - Double Lumen 04/07/24 Left Brachial   Placement date: If unknown, DO NOT use \"Add Comment\" note/Placement time: If unknown, DO NOT use \"Add Comment\" note: 04/07/24 2140   Hand Hygiene Completed: Yes  Site Prep: Chlorhexidine  All 5 Sterile Barriers Used (Gloves, Gown, Cap, Mask, Large Honorio...   Site Assessment Clean;Dry;Intact   #1 Lumen Status Blood return noted;Capped;Flushed;Normal saline locked   #2 Lumen Status Blood return noted;Capped;Flushed;Normal saline locked   Length camille (cm) 2 cm   Extremity Circumference (cm) 33 cm   Dressing Type Border Dressing;Transparent;Securing device;Antimicrobial dressing/disc   Dressing Status Clean;Dry;Intact   Dressing Intervention New dressing   Dressing Change Due 04/14/24   Indication/Daily Review of Necessity other (see comments);poor venous access  (TO REMOVE 500ML BLOOD DUE TO hEMATOCRIT TOO HIGH DUE TO POLYCYTHEMIA)     LOT# rehr [id number]  EXPIRES 2024-06-30    Attempted to place a picc line for 500ml of blood to be removed due to polycythemia. Resistance was met in axilla/lt subclavian. Line was trimmed to 10cm and tip is not central and should be considered a midline. No incompatible meds, no central meds to be given, No BPs or sticks in lt extremity posted above bed on wall. Also midline, not central, and no incompatible meds written on drsg. Reported all above info to Vandana GHOTRA RN If blood bank has issues getting 500ml of blood tomorrow then it would be recommended a central line or sending pt to radiology for a wire exchange under flouro.    3 Needles, 2 wires, and 1 scalpel counted and disposed of properly  "

## 2024-04-08 NOTE — PROGRESS NOTES
Nutrition Services    Patient Name:  Erlin Blanco  YOB: 1945  MRN: [id number]  Admit Date:  4/6/2024  Assessment Date:  04/08/24    Summary: Nutrition screen completed for skin integrity and MST per RN admission screen  This is a 79 yo male CAD, CHF, DM and recent hemorrhagic stroke with aphasia . He has been on a dysphagia diet per speech eval. Per wife he doesn't love the diet but will eat it with adequate po intake.  Diet Pureed HTL   BMI 29, wt loss noted  Labs: Glu 298  Skin stage 2  right medial gluteal    Plan/Recommendation  Good po intake and adequate protein intake encouraged  Add consistent carb to diet  Will offer magic cups BID  Will follow and monitor labs/skin/wt    CLINICAL NUTRITION ASSESSMENT      Reason for Assessment Age, MST score 2+, Pressure Injury and/or Non-Healing Wound     Diagnosis/Problem   CAD, CHF, DM and recent left thalamic intracranial hemorrhage with intraventricular extension.   Medical/Surgical History Past Medical History:   Diagnosis Date    Abnormal ECG     Abnormal stress test     Abrasion of face 01/29/2020    Acute bronchitis     Acute hypokalemia 09/16/2020    Acute non-recurrent sinusitis 01/13/2020    Acute pyelonephritis 10/09/2020    Acute sinusitis     DORI (acute kidney injury) 04/28/2017    Allergic rhinitis     Aortic root dilation     Arthritis     Bacteremia due to Escherichia coli 08/17/2020    Balanitis 05/16/2021    Benign enlargement of prostate     C. difficile colitis 09/16/2020    Cellulitis of knee, left 05/04/2017    CHF (congestive heart failure)     Chronic diastolic congestive heart failure     Constipation 11/14/2020    Contusion of face 01/29/2020    Coronary artery disease     COVID-19 virus infection     CVA (cerebral vascular accident) 2020    Diminished pulse     in lower extremities    Discitis of lumbar region 10/09/2020    Edema     Elevated hematocrit     Elevated hemoglobin     Essential hypertension     Hearing loss      mala hearing aids    Heart valve disease     High risk medication use     History of back pain     History of dysuria     History of echocardiogram     History of intracranial hemorrhage     History of scoliosis     History of stroke     Hyperlipidemia     Hypokalemia 01/07/2021    Injury of toe, left, initial encounter     Irregular heart rate     Knee pain, left     Left bundle branch block     Leg wound, left     Leukocytosis 09/16/2020    Low back pain     Maxillary sinusitis 07/16/2021    Medicare annual wellness visit, subsequent     Minor head injury without loss of consciousness 01/29/2020    Murmur     Muscle cramps     Need for hepatitis C screening test     Paraphimosis 05/16/2021    Pneumonia of left lung due to infectious organism 09/17/2021    Polycythemia     Polycythemia vera     Pressure injury of buttock, stage 2 09/18/2020    Prostate hyperplasia without urinary obstruction     Prostatitis     Proteinuria     Pulmonary hypertension     Sacroiliitis 10/09/2020    Scoliosis     Sepsis due to Escherichia coli 08/17/2020    Sepsis with metabolic encephalopathy 08/17/2020    Stroke     SVT (supraventricular tachycardia)     Tachycardia     Thrombocytosis     TIA (transient ischemic attack)     2006    Type 2 diabetes mellitus     Type 2 diabetes mellitus with hyperglycemia 05/16/2021    Urinary tract infection due to extended-spectrum beta lactamase (ESBL) producing Escherichia coli 08/18/2020    Valvular heart disease        Past Surgical History:   Procedure Laterality Date    CARDIAC CATHETERIZATION      CATARACT EXTRACTION Left 04/2021    CATARACT EXTRACTION Right 07/26/2022    COLONOSCOPY      did Cologuard approx 6/2019 and negative    HAND SURGERY      1970s    JOINT REPLACEMENT Right 2014    [place] ARTHRP KNE CONDYLE&PLATU MEDIAL&LAT COMPARTMENTS Left 04/27/2017    Procedure: LT TOTAL KNEE ARTHROPLASTY;  Surgeon: Bertin Perrin MD;  Location: [facility] OR;  Service: Orthopedics    PROSTATE  "SURGERY      Rezum steam treatment to shrink prostate by dr. guerrero        Anthropometrics        Current Height  Current Weight  BMI kg/m2 Height: 182.9 cm (72.01\")  Weight: 98.6 kg (217 lb 6 oz) (04/08/24 0440)  Body mass index is 29.47 kg/m².   Adjusted BMI (if applicable)    BMI Category Overweight (25 - 29.9)   Ideal Body Weight (IBW) 171   Usual Body Weight (UBW) 250's   Weight Trend Loss   Weight History Wt Readings from Last 30 Encounters:   04/08/24 0440 98.6 kg (217 lb 6 oz)   04/07/24 1106 98.8 kg (217 lb 13 oz)   04/07/24 0500 99.1 kg (218 lb 7.6 oz)   04/06/24 1855 99.1 kg (218 lb 7.6 oz)   04/06/24 1554 117 kg (257 lb 15 oz)   03/14/24 0619 117 kg (258 lb 2.5 oz)   03/13/24 0600 117 kg (257 lb 0.9 oz)   03/11/24 0453 115 kg (254 lb 3.1 oz)   03/04/24 0600 116 kg (255 lb 11.7 oz)   03/03/24 0600 113 kg (248 lb 14.4 oz)   03/02/24 0600 119 kg (261 lb 11 oz)   02/29/24 0600 112 kg (247 lb 9.2 oz)   02/28/24 0600 115 kg (254 lb 3.1 oz)   02/27/24 0500 115 kg (252 lb 6.8 oz)   02/26/24 0400 113 kg (250 lb)   02/25/24 0600 115 kg (254 lb 10.1 oz)   02/24/24 2244 116 kg (255 lb 1.2 oz)   02/05/24 1450 113 kg (249 lb)   01/11/24 1544 117 kg (257 lb 3.2 oz)   01/04/24 1424 117 kg (258 lb)   12/19/23 1116 119 kg (263 lb)   10/27/23 1133 115 kg (253 lb 9.6 oz)   10/13/23 1016 117 kg (258 lb)   10/09/23 0929 119 kg (262 lb 6.4 oz)   09/13/23 1055 117 kg (257 lb 12.8 oz)   06/15/23 0826 116 kg (255 lb)   06/13/23 1040 116 kg (256 lb 6.4 oz)   06/11/23 1322 116 kg (255 lb)   05/22/23 1057 116 kg (255 lb)   04/10/23 1048 116 kg (255 lb 4.8 oz)   03/07/23 1240 116 kg (255 lb 3.2 oz)   02/13/23 1201 115 kg (253 lb)   01/24/23 1130 115 kg (253 lb)   12/16/22 1113 117 kg (257 lb)   11/22/22 1402 116 kg (256 lb)   11/15/22 0936 116 kg (255 lb)   10/11/22 1024 127 kg (279 lb)   09/16/22 1012 122 kg (270 lb)   08/18/22 1320 123 kg (270 lb 6.4 oz)   07/11/22 1309 122 kg (269 lb 4.8 oz)   04/18/22 1009 122 kg (269 lb 14.4 " oz)   04/04/22 1422 121 kg (267 lb 3.2 oz)   03/25/22 1152 122 kg (268 lb)   03/25/22 0950 122 kg (268 lb)   03/22/22 0927 126 kg (278 lb)      --  Labs       Pertinent Labs    Results from last 7 days   Lab Units 04/08/24  0429 04/07/24  0531 04/06/24  1713   SODIUM mmol/L 143 145 142   POTASSIUM mmol/L 3.4* 3.6 4.4   CHLORIDE mmol/L 110* 110* 107   CO2 mmol/L 21.6* 21.0* 19.6*   BUN mg/dL 25* 19 20   CREATININE mg/dL 1.09 1.03 1.04   CALCIUM mg/dL 9.1 9.7 9.8   BILIRUBIN mg/dL 0.4  --  0.6   ALK PHOS U/L 88  --  112   ALT (SGPT) U/L 14  --  18   AST (SGOT) U/L 24  --  41*   GLUCOSE mg/dL 296* 241* 268*     Results from last 7 days   Lab Units 04/08/24  0806 04/08/24  0429   MAGNESIUM mg/dL 2.2  --    HEMOGLOBIN g/dL  --  16.3   HEMATOCRIT %  --  50.0   WBC 10*3/mm3  --  9.51   ALBUMIN g/dL  --  3.3*     Results from last 7 days   Lab Units 04/08/24  0429 04/07/24  0531 04/06/24  1713   INR   --   --  1.40*   APTT seconds  --   --  30.5   PLATELETS 10*3/mm3 259 291 289     COVID19   Date Value Ref Range Status   03/30/2023 Detected (A) Not Detected - Ref. Range Final     Lab Results   Component Value Date    HGBA1C 7.10 (H) 02/25/2024          Medications           Scheduled Medications amLODIPine, 10 mg, Oral, Q24H  carvedilol, 12.5 mg, Oral, BID With Meals  cloNIDine, 0.2 mg, Oral, Q8H  fluticasone, 1 spray, Nasal, Daily  guaiFENesin, 1,200 mg, Oral, BID  hydrocortisone-bacitracin-zinc oxide-nystatin, 1 Application, Topical, TID  hydroxyurea, 500 mg, Oral, Daily  insulin glargine, 22 Units, Subcutaneous, Nightly  insulin lispro, 2-7 Units, Subcutaneous, 4x Daily AC & at Bedtime  insulin lispro, 5 Units, Subcutaneous, TID With Meals  lactobacillus acidophilus, 1 capsule, Oral, Daily  levETIRAcetam, 500 mg, Oral, BID  linagliptin, 5 mg, Oral, Daily  memantine, 10 mg, Oral, BID  Menthol-Zinc Oxide, 1 Application, Topical, TID  multivitamin with minerals, 1 tablet, Oral, Daily  OLANZapine zydis, 5 mg, Oral,  Nightly  rosuvastatin, 40 mg, Oral, Nightly  sennosides-docusate, 2 tablet, Oral, BID  sertraline, 50 mg, Oral, Nightly  sodium chloride, 10 mL, Intravenous, Q12H  sodium chloride, 10 mL, Intravenous, Q12H  spironolactone, 50 mg, Oral, Daily  tamsulosin, 0.4 mg, Oral, Daily  valsartan, 320 mg, Oral, Daily       Infusions sodium chloride, 50 mL/hr, Last Rate: 50 mL/hr (04/07/24 1824)       PRN Medications   acetaminophen    senna-docusate sodium **AND** polyethylene glycol **AND** bisacodyl **AND** bisacodyl    dextrose    dextrose    glucagon (human recombinant)    hydrALAZINE    melatonin    ondansetron ODT **OR** ondansetron    [COMPLETED] Insert Peripheral IV **AND** sodium chloride    sodium chloride    sodium chloride     Physical Findings          General Findings alert, disoriented   Oral/Mouth Cavity tooth or teeth missing   Edema  no edema   Gastrointestinal normoactive   Skin  pressure injury: right medial glutea stage 2   Tubes/Drains/Lines none   NFPE No clinical signs of muscle wasting or fat loss   --  Estimated/Assessed Needs        Current Weight  Weight: 98.6 kg (217 lb 6 oz) (04/08/24 0440)       Energy Requirements    Weight for Calculation 98.6 kg   Method for Estimation  20 kcal/kg, 22 kcal/kg   EST Needs (kcal/day) 7256-0194       Protein Requirements    Weight for Calculation 98.6 kg   EST Protein Needs (g/kg) 1.2 gm/kg   EST Daily Needs (g/day) 118       Fluid Requirements     Method for Estimation 1 mL/kcal    EST Needs (mL/day)      Current Nutrition Orders & Evaluation of Intake       Oral Nutrition     Food Allergies NKFA   Current PO Diet Diet: Cardiac; Healthy Heart (2-3 Na+); Texture: Pureed (NDD 1); Fluid Consistency: Honey Thick   Supplement n/a   PO Evaluation     % PO Intake Po usually good per wife    Factors Affecting Intake: chewing difficulty, swallow impairment   --  PES STATEMENT / NUTRITION DIAGNOSIS      Nutrition Dx Problem  Problem: Unintentional Weight Loss, Increased  Nutrient Needs, Biting/Chewing Difficulty, and Swallowing Difficulty  Etiology: Factors Affecting Nutrition - dysphagia    Signs/Symptoms: PO intake, Report/Observation, and SLP/Swallow Evaluation     NUTRITION INTERVENTION / PLAN OF CARE      Intervention Goal(s) Reduce/improve symptoms, Meet estimated needs, Disease management/therapy, Tolerate PO , Increase intake, No significant weight loss, and PO intake goal %: 75+         RD Intervention/Action Interview for preferences, Encourage intake, Continue to monitor, Care plan reviewed, and Recommend/order: supplement   --      Prescription/Orders:       PO Diet       Supplements Magic cup      Enteral Nutrition       Parenteral Nutrition    New Prescription Ordered? Yes   --      Monitor/Evaluation Per protocol, PO intake, Supplement intake, Weight, GI status, Swallow function   Discharge Plan/Needs Pending clinical course   --    RD to follow per protocol.      Electronically signed by:  Rhina Pisano RD  04/08/24 15:15 EDT

## 2024-04-08 NOTE — THERAPY EVALUATION
Patient Name: Erlin Blanco  : 1945    MRN: [id number]                              Today's Date: 2024       Admit Date: 2024    Visit Dx:     ICD-10-CM ICD-9-CM   1. Hypertension, uncontrolled  I10 401.9   2. 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 without macular edema, without long-term current use of insulin    Primary hypertension    Coronary artery disease    Supraventricular tachycardia    TIA (transient ischemic attack)    Hyperlipidemia    Benign prostatic hyperplasia without urinary obstruction    Class 2 severe obesity due to excess calories with serious comorbidity and body mass index (BMI) of 36.0 to 36.9 in adult    Polycythemia vera    Acute non-recurrent sinusitis    Pain in both knees    Allergic rhinitis    Left bundle-branch block    Fatigue    Atrial flutter    History of CVA (cerebrovascular accident)    Cervical radiculitis    Chronic diastolic congestive heart failure    CKD (chronic kidney disease) stage 2, GFR 60-89 ml/min    Glucosuria    Paroxysmal atrial fibrillation    Memory difficulties    Bradycardia    Anemia    Low back pain    Back pain    Cardiovascular stress test abnormal    Prostatitis    Localized edema    Murmur    Pulmonary hypertension    Thrombocytosis    Arthritis    Proteinuria    Fall at home    Cognitive communication deficit    Mild cognitive impairment with memory loss    Hematuria    Hypertension, uncontrolled     Past Medical History:   Diagnosis Date    Abnormal ECG     Abnormal stress test     Abrasion of face 2020    Acute bronchitis     Acute hypokalemia 2020     Acute non-recurrent sinusitis 01/13/2020    Acute pyelonephritis 10/09/2020    Acute sinusitis     DORI (acute kidney injury) 04/28/2017    Allergic rhinitis     Aortic root dilation     Arthritis     Bacteremia due to Escherichia coli 08/17/2020    Balanitis 05/16/2021    Benign enlargement of prostate     C. difficile colitis 09/16/2020    Cellulitis of knee, left 05/04/2017    CHF (congestive heart failure)     Chronic diastolic congestive heart failure     Constipation 11/14/2020    Contusion of face 01/29/2020    Coronary artery disease     COVID-19 virus infection     CVA (cerebral vascular accident) 2020    Diminished pulse     in lower extremities    Discitis of lumbar region 10/09/2020    Edema     Elevated hematocrit     Elevated hemoglobin     Essential hypertension     Hearing loss     mala hearing aids    Heart valve disease     High risk medication use     History of back pain     History of dysuria     History of echocardiogram     History of intracranial hemorrhage     History of scoliosis     History of stroke     Hyperlipidemia     Hypokalemia 01/07/2021    Injury of toe, left, initial encounter     Irregular heart rate     Knee pain, left     Left bundle branch block     Leg wound, left     Leukocytosis 09/16/2020    Low back pain     Maxillary sinusitis 07/16/2021    Medicare annual wellness visit, subsequent     Minor head injury without loss of consciousness 01/29/2020    Murmur     Muscle cramps     Need for hepatitis C screening test     Paraphimosis 05/16/2021    Pneumonia of left lung due to infectious organism 09/17/2021    Polycythemia     Polycythemia vera     Pressure injury of buttock, stage 2 09/18/2020    Prostate hyperplasia without urinary obstruction     Prostatitis     Proteinuria     Pulmonary hypertension     Sacroiliitis 10/09/2020    Scoliosis     Sepsis due to Escherichia coli 08/17/2020    Sepsis with metabolic encephalopathy 08/17/2020    Stroke     SVT (supraventricular  tachycardia)     Tachycardia     Thrombocytosis     TIA (transient ischemic attack)     2006    Type 2 diabetes mellitus     Type 2 diabetes mellitus with hyperglycemia 05/16/2021    Urinary tract infection due to extended-spectrum beta lactamase (ESBL) producing Escherichia coli 08/18/2020    Valvular heart disease      Past Surgical History:   Procedure Laterality Date    CARDIAC CATHETERIZATION      CATARACT EXTRACTION Left 04/2021    CATARACT EXTRACTION Right 07/26/2022    COLONOSCOPY      did Cologuard approx 6/2019 and negative    HAND SURGERY      1970s    JOINT REPLACEMENT Right 2014    MO ARTHRP KNE CONDYLE&PLATU MEDIAL&LAT COMPARTMENTS Left 04/27/2017    Procedure: LT TOTAL KNEE ARTHROPLASTY;  Surgeon: Bertin Perrin MD;  Location: [facility] OR;  Service: Orthopedics    PROSTATE SURGERY      Rezum steam treatment to shrink prostate by dr. guerrero      General Information       Row Name 04/08/24 1006          Physical Therapy Time and Intention    Document Type evaluation  -     Mode of Treatment individual therapy;physical therapy  -       Row Name 04/08/24 1006          General Information    Patient Profile Reviewed yes  -     Prior Level of Function dependent:  Pt unable to say. Granddaughter present and unsure of prior level with therapy  -     Existing Precautions/Restrictions fall  -     Barriers to Rehab medically complex;previous functional deficit;cognitive status  -       Row Name 04/08/24 1006          Living Environment    People in Home facility resident  -       Row Name 04/08/24 1006          Cognition    Orientation Status (Cognition) oriented to;person  -       Row Name 04/08/24 1006          Safety Issues, Functional Mobility    Impairments Affecting Function (Mobility) endurance/activity tolerance;strength;postural/trunk control;balance  -               User Key  (r) = Recorded By, (t) = Taken By, (c) = Cosigned By      Initials Name Provider Type    FIDELIA Braun  Ilda Rabago, PT Physical Therapist                   Mobility       [facility] Name 04/08/24 1009          Bed Mobility    Bed Mobility supine-sit;sit-supine  -     Supine-Sit [place] (Bed Mobility) dependent (less than 25% patient effort);2 person assist  -     Sit-Supine [place] (Bed Mobility) dependent (less than 25% patient effort);2 person assist  -     Assistive Device (Bed Mobility) draw sheet  -               User Key  (r) = Recorded By, (t) = Taken By, (c) = Cosigned By      Initials Name Provider Type    Ilda Wadsworth, PT Physical Therapist                   Obj/Interventions       [facility] Name 04/08/24 1010          Range of Motion Comprehensive    Comment, General Range of Motion AAROM WFL  -[facility] Name 04/08/24 1010          Strength Comprehensive (MMT)    Comment, General Manual Muscle Testing (MMT) Assessment R side flaccid, L side difficult to assess but at least 3-/5 LUE, 2/5 LLE difficulty following commands  -[facility] Name 04/08/24 1010          Balance    Balance Assessment sitting static balance  -     Static Sitting Balance moderate assist;maximum assist  -     Position, Sitting Balance sitting edge of bed;supported  -               User Key  (r) = Recorded By, (t) = Taken By, (c) = Cosigned By      Initials Name Provider Type    Ilda Wadsworth, PT Physical Therapist                   Goals/Plan       Row Name 04/08/24 1317          Bed Mobility Goal 1 (PT)    Activity/Assistive Device (Bed Mobility Goal 1, PT) bed mobility activities, all  -     [place] Level/Cues Needed (Bed Mobility Goal 1, PT) moderate assist (50-74% patient effort)  -     Time Frame (Bed Mobility Goal 1, PT) 10 days  -[facility] Name 04/08/24 1317          Transfer Goal 1 (PT)    Activity/Assistive Device (Transfer Goal 1, PT) sit-to-stand/stand-to-sit  -     [place] Level/Cues Needed (Transfer Goal 1, PT) maximum assist (25-49% patient effort)  -     Time  Frame (Transfer Goal 1, PT) 10 days  -       Row Name 04/08/24 1317          Therapy Assessment/Plan (PT)    Planned Therapy Interventions (PT) bed mobility training;balance training;home exercise program;patient/family education;strengthening;transfer training  -               User Key  (r) = Recorded By, (t) = Taken By, (c) = Cosigned By      Initials Name Provider Type     Ilda Braun, PT Physical Therapist                   Clinical Impression       Row Name 04/08/24 1025          Pain    Pretreatment Pain Rating 0/10 - no pain  -     Posttreatment Pain Rating 0/10 - no pain  -[facility] Name 04/08/24 1025          Plan of Care Review    Plan of Care Reviewed With patient;grandchild(clifford)  -     Outcome Evaluation Pt was admitted from a rehab facility with HTN. Pt has recent h/o stroke with R sided weakness, expressive aphasia. Pt was in bed, granddaughter was present. Pt was alert and oriented x1 and had difficulty following commands. Pt presents with R sided weakness. Pt was dependent for all mobility. He sat EOB with mod A for balance with posterior LOB and R lean noted. Pushes to R with LUE at times. Unable to correct to neutral sitting balance with verbal or tactile cues. Pt would benefit from PT to address his impairments. Active participation was limited and pt fatigued quickly. Per family, plans to return to facility.  -       Row Name 04/08/24 1025          Therapy Assessment/Plan (PT)    Patient/Family Therapy Goals Statement (PT) pt unable to say  -     Rehab Potential (PT) good, to achieve stated therapy goals  -     Criteria for Skilled Interventions Met (PT) yes  -     Therapy Frequency (PT) 5 times/wk  -       Row Name 04/08/24 1025          Positioning and Restraints    Pre-Treatment Position in bed  -     Post Treatment Position bed  -     In Bed side lying left;call light within reach;encouraged to call for assist;exit alarm on;patient within staff view;with  family/caregiver;side rails up x3  -KH               User Key  (r) = Recorded By, (t) = Taken By, (c) = Cosigned By      Initials Name Provider Type    Ilda Wadsworth, JASMINE Physical Therapist                   Outcome Measures       Row Name 04/08/24 1318 04/08/24 0850       How much help from another person do you currently need...    Turning from your back to your side while in flat bed without using bedrails? 1  -KH 1  -MM    Moving from lying on back to sitting on the side of a flat bed without bedrails? 1  -KH 1  -MM    Moving to and from a bed to a chair (including a wheelchair)? 1  -KH 1  -MM    Standing up from a chair using your arms (e.g., wheelchair, bedside chair)? 1  -KH 1  -MM    Climbing 3-5 steps with a railing? 1  -KH 1  -MM    To walk in hospital room? 1  -KH 1  -MM    AM-PAC 6 Clicks Score (PT) 6  -KH 6  -MM    Highest Level of Mobility Goal 2 --> Bed activities/dependent transfer  -KH 2 --> Bed activities/dependent transfer  -MM      Row Name 04/08/24 1318          Functional Assessment    Outcome Measure Options AM-PAC 6 Clicks Basic Mobility (PT)  -               User Key  (r) = Recorded By, (t) = Taken By, (c) = Cosigned By      Initials Name Provider Type    Ilda Wadsworth, PT Physical Therapist    Emmanuel Villanueva RN Registered Nurse                                 Physical Therapy Education       Title: PT OT SLP Therapies (Not Started)       Topic: Physical Therapy (Not Started)       Point: Mobility training (Not Started)       Learner Progress:  Not documented in this visit.              Point: Home exercise program (Not Started)       Learner Progress:  Not documented in this visit.              Point: Body mechanics (Not Started)       Learner Progress:  Not documented in this visit.              Point: Precautions (Not Started)       Learner Progress:  Not documented in this visit.                                  PT Recommendation and Plan  Planned Therapy  Interventions (PT): bed mobility training, balance training, home exercise program, patient/family education, strengthening, transfer training  Plan of Care Reviewed With: patient, grandchild(clifford)  Outcome Evaluation: Pt was admitted from a rehab facility with HTN. Pt has recent h/o stroke with R sided weakness, expressive aphasia. Pt was in bed, granddaughter was present. Pt was alert and oriented x1 and had difficulty following commands. Pt presents with R sided weakness. Pt was dependent for all mobility. He sat EOB with mod A for balance with posterior LOB and R lean noted. Pushes to R with LUE at times. Unable to correct to neutral sitting balance with verbal or tactile cues. Pt would benefit from PT to address his impairments. Active participation was limited and pt fatigued quickly. Per family, plans to return to facility.     Time Calculation:         PT Charges       Row Name 04/08/24 1319             Time Calculation    Start Time 0904  -KH      Stop Time 0930  -KH      Time Calculation (min) 26 min  -KH      PT Received On 04/08/24  -      PT - Next Appointment 04/09/24  -      PT Goal Re-Cert Due Date 04/15/24  -         Time Calculation- PT    Total Timed Code Minutes- PT 15 minute(s)  -KH         Timed Charges    55775 - PT Therapeutic Activity Minutes 15  -KH         Untimed Charges    PT Eval/Re-eval Minutes 11  -KH         Total Minutes    Timed Charges Total Minutes 15  -KH      Untimed Charges Total Minutes 11  -KH       Total Minutes 26  -KH                User Key  (r) = Recorded By, (t) = Taken By, (c) = Cosigned By      Initials Name Provider Type    Ilda Wadsworth PT Physical Therapist                  Therapy Charges for Today       Code Description Service Date Service Provider Modifiers Qty    96407918096  PT THERAPEUTIC ACT EA 15 MIN 4/8/2024 Ilda Braun, PT GP 1    28789654834 HC PT EVAL MOD COMPLEXITY 2 4/8/2024 Ilda Braun, PT GP 1     [id number]  PT THER SUPP EA 15 MIN 4/8/2024 Ilda Braun, PT GP 1            PT G-Codes  Outcome Measure Options: AM-PAC 6 Clicks Basic Mobility (PT)  AM-PAC 6 Clicks Score (PT): 6  PT Discharge Summary  Anticipated Discharge Disposition (PT): skilled nursing facility    Ilda Braun, PT  4/8/2024

## 2024-04-08 NOTE — PROGRESS NOTES
[facility] Clinical Pharmacy Services: Medication Reconciliation     Erlin Blanco is a 78 y.o. male presenting to [facility] for Hypertension, uncontrolled [I10]  Hyperglycemia due to diabetes mellitus [E11.65]       He  has a past medical history of Abnormal ECG, Abnormal stress test, Abrasion of face (01/29/2020), Acute bronchitis, Acute hypokalemia (09/16/2020), Acute non-recurrent sinusitis (01/13/2020), Acute pyelonephritis (10/09/2020), Acute sinusitis, DORI (acute kidney injury) (04/28/2017), Allergic rhinitis, Aortic root dilation, Arthritis, Bacteremia due to Escherichia coli (08/17/2020), Balanitis (05/16/2021), Benign enlargement of prostate, C. difficile colitis (09/16/2020), Cellulitis of knee, left (05/04/2017), CHF (congestive heart failure), Chronic diastolic congestive heart failure, Constipation (11/14/2020), Contusion of face (01/29/2020), Coronary artery disease, COVID-19 virus infection, CVA (cerebral vascular accident) (2020), Diminished pulse, Discitis of lumbar region (10/09/2020), Edema, Elevated hematocrit, Elevated hemoglobin, Essential hypertension, Hearing loss, Heart valve disease, High risk medication use, History of back pain, History of dysuria, History of echocardiogram, History of intracranial hemorrhage, History of scoliosis, History of stroke, Hyperlipidemia, Hypokalemia (01/07/2021), Injury of toe, left, initial encounter, Irregular heart rate, Knee pain, left, Left bundle branch block, Leg wound, left, Leukocytosis (09/16/2020), Low back pain, Maxillary sinusitis (07/16/2021), Medicare annual wellness visit, subsequent, Minor head injury without loss of consciousness (01/29/2020), Murmur, Muscle cramps, Need for hepatitis C screening test, Paraphimosis (05/16/2021), Pneumonia of left lung due to infectious organism (09/17/2021), Polycythemia, Polycythemia vera, Pressure injury of buttock, stage 2 (09/18/2020), Prostate hyperplasia without urinary  obstruction, Prostatitis, Proteinuria, Pulmonary hypertension, Sacroiliitis (10/09/2020), Scoliosis, Sepsis due to Escherichia coli (08/17/2020), Sepsis with metabolic encephalopathy (08/17/2020), Stroke, SVT (supraventricular tachycardia), Tachycardia, Thrombocytosis, TIA (transient ischemic attack), Type 2 diabetes mellitus, Type 2 diabetes mellitus with hyperglycemia (05/16/2021), Urinary tract infection due to extended-spectrum beta lactamase (ESBL) producing Escherichia coli (08/18/2020), and Valvular heart disease.       Allergies as of 04/06/2024 - Reviewed 04/06/2024   Allergen Reaction Noted    Donepezil Hallucinations 08/27/2021    Steglatro [ertugliflozin] Other (See Comments) 05/28/2021          Medication information was obtained from: [facility]   Pharmacy and Phone Number: [phone]   [email or web address]     Prior to Admission Medications       Prescriptions Last Dose Informant Patient Reported? Taking?    acetaminophen (TYLENOL) 325 MG tablet 3/11/2024 Pharmacy No Yes    Take 2 tablets by mouth Every 4 (Four) Hours As Needed for Mild Pain.    acetaminophen (TYLENOL) 500 MG tablet  Pharmacy Yes Yes    Take 2 tablets by mouth Every 8 (Eight) Hours As Needed for Moderate Pain.    amLODIPine (NORVASC) 10 MG tablet  Pharmacy No Yes    Take 1 tablet by mouth Daily.    carvedilol (COREG) 6.25 MG tablet  Pharmacy No Yes    TAKE 1 TABLET TWICE A DAY WITH MEALS    cloNIDine (CATAPRES) 0.2 MG tablet  Pharmacy No Yes    Take 1 tablet by mouth Every 8 (Eight) Hours.    fluticasone (FLONASE) 50 MCG/ACT nasal spray  Pharmacy No Yes    1 spray into the nostril(s) as directed by provider Daily.    hydroxyurea (HYDREA) 500 MG capsule  Pharmacy Yes Yes    Take 1 capsule by mouth Daily.    insulin glargine (LANTUS, SEMGLEE) 100 UNIT/ML injection  Pharmacy No Yes    Inject 22 Units under the skin into the appropriate area as directed Every Night.    insulin regular (humuLIN R,novoLIN R) 100  UNIT/ML injection  Pharmacy No Yes    Inject 2-9 Units under the skin into the appropriate area as directed 3 (Three) Times a Day Before Meals.    Januvia 100 MG tablet  Pharmacy Yes Yes    Take 1 tablet by mouth Daily.    levETIRAcetam (KEPPRA) 500 MG tablet  Pharmacy No Yes    Take 1 tablet by mouth 2 (Two) Times a Day.    melatonin 5 MG tablet tablet  Pharmacy No Yes    Take 1 tablet by mouth Every Night.    Patient taking differently:  Take 3 mg by mouth At Night As Needed.    memantine (NAMENDA) 10 MG tablet  Pharmacy No Yes    Take 1 tablet by mouth 2 (Two) Times a Day.    metFORMIN ER (GLUCOPHAGE-XR) 500 MG 24 hr tablet  Pharmacy No Yes    Take 1 tablet by mouth Daily With Breakfast.    Multiple Vitamins-Minerals (b complex-C-E-zinc) tablet  Pharmacy Yes Yes    Take 1 tablet by mouth Daily.    OLANZapine zydis (zyPREXA) 5 MG disintegrating tablet  Pharmacy No Yes    Place 1 tablet on the tongue Every Night.    polyethylene glycol (MIRALAX) 17 g packet  Pharmacy No Yes    Take 17 g by mouth 2 (Two) Times a Day. Hold if loose stool    sennosides-docusate (PERICOLACE) 8.6-50 MG per tablet  Pharmacy No Yes    Take 2 tablets by mouth 2 (Two) Times a Day.    sertraline (ZOLOFT) 50 MG tablet  Pharmacy Yes Yes    Take 1 tablet by mouth Every Night.    spironolactone (ALDACTONE) 50 MG tablet  Pharmacy No Yes    Take 1 tablet by mouth Every Morning.    tamsulosin (FLOMAX) 0.4 MG capsule 24 hr capsule  Pharmacy No Yes    Take 1 capsule by mouth every night at bedtime.    valsartan (DIOVAN) 320 MG tablet  Pharmacy No Yes    Take 1 tablet by mouth Daily.    Vibegron (Gemtesa) 75 MG tablet  Pharmacy Yes Yes    Take 1 tablet by mouth Daily With Breakfast.           Medication notes: Removed duplicates from med rec,  several medications on our list but not on facility list including, aspirin, venelex, amaryl, glucosamide, guafenesin, hydralazine, miconazole, montelukast, MV, Kcl, probiotic, torsemide.      This medication  list is complete to the best of my knowledge as of 4/8/2024       Please call if questions.     Shirley Stone, Pharmacy Intern  Clinical Pharmacist

## 2024-04-08 NOTE — NURSING NOTE
Midline placed last night. When obtaining AM labs, blood sluggish in both lumens. This RN was able to obtain enough blood for labs, but then was notified the blood was clotted.     Spoke with Dr. Verma regarding midline unable to get blood return. Orders for cathflow in order to complete the therapeutic phlebotomy.    Spoke with IV therapy, unable to use cathflow in midline. Page placed to [facility].

## 2024-04-08 NOTE — NURSING NOTE
04/08/24 1438   Wound 04/06/24 1913 Right medial gluteal   Placement Date/Time: 04/06/24 1913   Present on Original Admission: Yes  Side: Right  Orientation: medial  Location: gluteal   Pressure Injury Stage 2   Base moist;pink;scab   Periwound intact;blanchable   Periwound Temperature warm   Drainage Amount none   Care, Wound barrier applied   Skin Interventions   Pressure Reduction Devices pressure-redistributing mattress utilized  (staff are adding a pump to mattress)   Pressure Reduction Techniques heels elevated off bed;positioned off wounds;pressure points protected     CWON note: pt seen for evaluation of sacral skin issues POA. Pt is alert with limited mobility, requires assistance of 2 people to turn. He is incontinent of bowel and bladder. Rt gluteal as stated above, barrier cream is being applied as prescribed, may be beneficial to place an ABD pad over the cream to keep it in place. Left gluteal is scuffy, but nothing open. Will alternate Calmoseptine with Magic Barrier cream, as pt has yeast dermatitis in groin and on his scrotum. Dedrick heels are negative. Wound care and prevention standing orders placed into [facility]. Will have staff add an accumax pump to pt's mattress for adequate pressure redistribution. Please re-consult for any additional needs.

## 2024-04-08 NOTE — DISCHARGE PLACEMENT REQUEST
"Felipe Dockery (78 y.o. Male)       Date of Birth   1945    Social Security Number       Address   [address] [place] [facility] [address]    Home Phone   [phone]    MRN   [id number]       Amish   None    Marital Status                               Admission Date   4/6/24    Admission Type   Emergency    Admitting Provider   Catie Alcaraz MD    Attending Provider   Kapil Eid MD    Department, Room/Bed   [address], [address]       Discharge Date       Discharge Disposition       Discharge Destination                                 Attending Provider: Kapil Eid MD    Allergies: Donepezil, Steglatro [Ertugliflozin]    Isolation: None   Infection: None   Code Status: CPR    Ht: 182.9 cm (72.01\")   Wt: 98.6 kg (217 lb 6 oz)    Admission Cmt: None   Principal Problem: Hypertension, uncontrolled [I10]                   Active Insurance as of 4/6/2024       Primary Coverage       Payor Plan Insurance Group Employer/Plan Group    HUMANA MEDICARE REPLACEMENT HU[employer] MED ADV GROUP [id number]       Payor Plan Address Payor Plan Phone Number Payor Plan Fax Number Effective Dates    [address] [address] [phone]  1/1/2022 - None Entered    [facility] [id number]         Subscriber Name Subscriber Birth Date Member ID       FELIPE DOCKERY 1945 [id number]                     Emergency Contacts        (Rel.) Home Phone Work Phone Mobile Phone    JOAN DOCKERY \"GUNNAR\" (Spouse) -- -- [phone]    JAME DOCKERY (Son) -- -- [phone]    Tiffany Dockery (Daughter) -- -- [phone]    Joan Dockery (Spouse) [phone] -- [phone]    Jame Dockery (Son) [phone] -- [phone]    TIFFANY DOCKERY (DAUG N LAW) (Relative) [phone] -- [phone]                "

## 2024-04-09 LAB
ANION GAP SERPL CALCULATED.3IONS-SCNC: 11.1 MMOL/L (ref 5–15)
BUN SERPL-MCNC: 22 MG/DL (ref 8–23)
BUN/CREAT SERPL: 23.9 (ref 7–25)
CALCIUM SPEC-SCNC: 9.1 MG/DL (ref 8.6–10.5)
CHLORIDE SERPL-SCNC: 112 MMOL/L (ref 98–107)
CO2 SERPL-SCNC: 20.9 MMOL/L (ref 22–29)
CREAT SERPL-MCNC: 0.92 MG/DL (ref 0.76–1.27)
DEPRECATED RDW RBC AUTO: 52.7 FL (ref 37–54)
EGFRCR SERPLBLD CKD-EPI 2021: 86.2 ML/MIN/1.73
ERYTHROCYTE [DISTWIDTH] IN BLOOD BY AUTOMATED COUNT: 15.8 % (ref 12.3–15.4)
GLUCOSE BLDC GLUCOMTR-MCNC: 172 MG/DL (ref 70–130)
GLUCOSE BLDC GLUCOMTR-MCNC: 216 MG/DL (ref 70–130)
GLUCOSE BLDC GLUCOMTR-MCNC: 227 MG/DL (ref 70–130)
GLUCOSE BLDC GLUCOMTR-MCNC: 242 MG/DL (ref 70–130)
GLUCOSE SERPL-MCNC: 234 MG/DL (ref 65–99)
HCT VFR BLD AUTO: 49.4 % (ref 37.5–51)
HGB BLD-MCNC: 16.4 G/DL (ref 13–17.7)
Lab: NORMAL
MCH RBC QN AUTO: 30.1 PG (ref 26.6–33)
MCHC RBC AUTO-ENTMCNC: 33.2 G/DL (ref 31.5–35.7)
MCV RBC AUTO: 90.6 FL (ref 79–97)
PLATELET # BLD AUTO: 224 10*3/MM3 (ref 140–450)
PMV BLD AUTO: 10 FL (ref 6–12)
POST-BLOOD PRESSURE: NORMAL MMHG
POTASSIUM SERPL-SCNC: 3.6 MMOL/L (ref 3.5–5.2)
PRE-BLOOD PRESSURE: NORMAL MMHG
PRE-HCT: 49.4 %
PRE-HGB: 16.4 G/DL
PRE-PULSE: 61 BPM
RBC # BLD AUTO: 5.45 10*6/MM3 (ref 4.14–5.8)
SODIUM SERPL-SCNC: 144 MMOL/L (ref 136–145)
VOLUME COLLECTED: 508 ML
WBC NRBC COR # BLD AUTO: 9.36 10*3/MM3 (ref 3.4–10.8)

## 2024-04-09 PROCEDURE — 82948 REAGENT STRIP/BLOOD GLUCOSE: CPT

## 2024-04-09 PROCEDURE — 63710000001 INSULIN LISPRO (HUMAN) PER 5 UNITS: Performed by: INTERNAL MEDICINE

## 2024-04-09 PROCEDURE — G0378 HOSPITAL OBSERVATION PER HR: HCPCS

## 2024-04-09 PROCEDURE — 85027 COMPLETE CBC AUTOMATED: CPT | Performed by: STUDENT IN AN ORGANIZED HEALTH CARE EDUCATION/TRAINING PROGRAM

## 2024-04-09 PROCEDURE — 80048 BASIC METABOLIC PNL TOTAL CA: CPT | Performed by: STUDENT IN AN ORGANIZED HEALTH CARE EDUCATION/TRAINING PROGRAM

## 2024-04-09 PROCEDURE — 63710000001 INSULIN GLARGINE PER 5 UNITS: Performed by: INTERNAL MEDICINE

## 2024-04-09 PROCEDURE — 63710000001 INSULIN LISPRO (HUMAN) PER 5 UNITS: Performed by: STUDENT IN AN ORGANIZED HEALTH CARE EDUCATION/TRAINING PROGRAM

## 2024-04-09 PROCEDURE — 97530 THERAPEUTIC ACTIVITIES: CPT

## 2024-04-09 PROCEDURE — 99195 PHLEBOTOMY: CPT | Performed by: INTERNAL MEDICINE

## 2024-04-09 PROCEDURE — 99213 OFFICE O/P EST LOW 20 MIN: CPT | Performed by: INTERNAL MEDICINE

## 2024-04-09 PROCEDURE — 99214 OFFICE O/P EST MOD 30 MIN: CPT | Performed by: NURSE PRACTITIONER

## 2024-04-09 RX ADMIN — Medication 1 CAPSULE: at 08:06

## 2024-04-09 RX ADMIN — INSULIN LISPRO 3 UNITS: 100 INJECTION, SOLUTION INTRAVENOUS; SUBCUTANEOUS at 17:05

## 2024-04-09 RX ADMIN — INSULIN LISPRO 3 UNITS: 100 INJECTION, SOLUTION INTRAVENOUS; SUBCUTANEOUS at 11:39

## 2024-04-09 RX ADMIN — SERTRALINE 50 MG: 50 TABLET, FILM COATED ORAL at 21:35

## 2024-04-09 RX ADMIN — ZINC OXIDE 1 APPLICATION: 200 OINTMENT TOPICAL at 08:08

## 2024-04-09 RX ADMIN — Medication 1 TABLET: at 08:05

## 2024-04-09 RX ADMIN — HYDROXYUREA 500 MG: 500 CAPSULE ORAL at 08:06

## 2024-04-09 RX ADMIN — Medication 3 MG: at 21:35

## 2024-04-09 RX ADMIN — Medication 10 ML: at 21:03

## 2024-04-09 RX ADMIN — DOCUSATE SODIUM 50MG AND SENNOSIDES 8.6MG 2 TABLET: 8.6; 5 TABLET, FILM COATED ORAL at 08:06

## 2024-04-09 RX ADMIN — CARVEDILOL 12.5 MG: 12.5 TABLET, FILM COATED ORAL at 08:05

## 2024-04-09 RX ADMIN — INSULIN LISPRO 2 UNITS: 100 INJECTION, SOLUTION INTRAVENOUS; SUBCUTANEOUS at 08:09

## 2024-04-09 RX ADMIN — INSULIN LISPRO 5 UNITS: 100 INJECTION, SOLUTION INTRAVENOUS; SUBCUTANEOUS at 11:39

## 2024-04-09 RX ADMIN — SPIRONOLACTONE 50 MG: 50 TABLET, FILM COATED ORAL at 08:05

## 2024-04-09 RX ADMIN — ROSUVASTATIN CALCIUM 40 MG: 40 TABLET, FILM COATED ORAL at 21:00

## 2024-04-09 RX ADMIN — VALSARTAN 320 MG: 320 TABLET, FILM COATED ORAL at 08:05

## 2024-04-09 RX ADMIN — Medication 1 APPLICATION: at 08:08

## 2024-04-09 RX ADMIN — GUAIFENESIN 1200 MG: 600 TABLET, EXTENDED RELEASE ORAL at 20:58

## 2024-04-09 RX ADMIN — LEVETIRACETAM 500 MG: 500 TABLET, FILM COATED ORAL at 08:06

## 2024-04-09 RX ADMIN — LEVETIRACETAM 500 MG: 500 TABLET, FILM COATED ORAL at 21:00

## 2024-04-09 RX ADMIN — DOCUSATE SODIUM 50MG AND SENNOSIDES 8.6MG 2 TABLET: 8.6; 5 TABLET, FILM COATED ORAL at 20:58

## 2024-04-09 RX ADMIN — Medication 10 ML: at 08:09

## 2024-04-09 RX ADMIN — Medication 1 APPLICATION: at 21:04

## 2024-04-09 RX ADMIN — INSULIN LISPRO 5 UNITS: 100 INJECTION, SOLUTION INTRAVENOUS; SUBCUTANEOUS at 08:07

## 2024-04-09 RX ADMIN — AMLODIPINE BESYLATE 10 MG: 10 TABLET ORAL at 08:06

## 2024-04-09 RX ADMIN — OLANZAPINE 5 MG: 5 TABLET, ORALLY DISINTEGRATING ORAL at 21:00

## 2024-04-09 RX ADMIN — INSULIN GLARGINE 22 UNITS: 100 INJECTION, SOLUTION SUBCUTANEOUS at 20:58

## 2024-04-09 RX ADMIN — CLONIDINE HYDROCHLORIDE 0.2 MG: 0.1 TABLET ORAL at 06:32

## 2024-04-09 RX ADMIN — INSULIN LISPRO 5 UNITS: 100 INJECTION, SOLUTION INTRAVENOUS; SUBCUTANEOUS at 17:05

## 2024-04-09 RX ADMIN — FLUTICASONE PROPIONATE 1 SPRAY: 50 SPRAY, METERED NASAL at 08:08

## 2024-04-09 RX ADMIN — ZINC OXIDE 1 APPLICATION: 200 OINTMENT TOPICAL at 21:04

## 2024-04-09 RX ADMIN — TAMSULOSIN HYDROCHLORIDE 0.4 MG: 0.4 CAPSULE ORAL at 08:06

## 2024-04-09 RX ADMIN — MEMANTINE HYDROCHLORIDE 10 MG: 10 TABLET, FILM COATED ORAL at 08:07

## 2024-04-09 RX ADMIN — INSULIN LISPRO 3 UNITS: 100 INJECTION, SOLUTION INTRAVENOUS; SUBCUTANEOUS at 20:59

## 2024-04-09 RX ADMIN — Medication 1 APPLICATION: at 15:05

## 2024-04-09 RX ADMIN — LINAGLIPTIN 5 MG: 5 TABLET, FILM COATED ORAL at 08:06

## 2024-04-09 RX ADMIN — CARVEDILOL 12.5 MG: 12.5 TABLET, FILM COATED ORAL at 17:05

## 2024-04-09 RX ADMIN — MEMANTINE HYDROCHLORIDE 10 MG: 10 TABLET, FILM COATED ORAL at 21:00

## 2024-04-09 RX ADMIN — GUAIFENESIN 1200 MG: 600 TABLET, EXTENDED RELEASE ORAL at 08:06

## 2024-04-09 RX ADMIN — ZINC OXIDE 1 APPLICATION: 200 OINTMENT TOPICAL at 15:05

## 2024-04-09 NOTE — PLAN OF CARE
Goal Outcome Evaluation:  Plan of Care Reviewed With: patient, spouse        Progress: no change  Outcome Evaluation: Pt seen for PT this PM and tolerated mobility well. Pt still requiring max-dep A x2 to transfer to EOB. Pt required range of CGA-mod A x1 for sitting balance. Continues with aphasia, answering simple questions with 1 word answers but inconsistent command following. Worked on UE leaning for midline positioning - pt able to correct pushing up with LUE but unable to maintain. Pt completed UE reaching - more difficulty reaching when sitting EOB with wife reporting significant fear of falling, able to better reach once back reclined in bed. Unsafe to attempt transfers at this time d/t poor sitting balance and difficulty command following. PT will continue to follow, anticipate DC to SNF.      Anticipated Discharge Disposition (PT): skilled nursing facility

## 2024-04-09 NOTE — CONSULTS
Patient Name: Erlin Blanco  :1945  78 y.o.    Date of Admission: 2024  Date of Consultation:  24  Encounter Provider: MUKUL Astorga  Place of Service: [facility] CARDIOLOGY  Referring Provider: Rboert Marie MD  Patient Care Team:  Senia Dorsey MD as PCP - General (Internal Medicine)  Bertin Perrin MD (Inactive) as Consulting Physician (Orthopedic Surgery)  Marika Arias MD as Consulting Physician (Cardiology)  Remy Thomas MD as Consulting Physician (Hematology and Oncology)  Sahil De La Rosa MD as Consulting Physician (Urology)  Erlin Abreu, RN as Ambulatory  (Population Health)  Zamzam John APRN (Family Medicine)      Chief complaint: hypertensive urgency, recent brain bleed, altered mental status.    History of Present Illness:  Mr. Blanco is a 78 year old gentleman followed by Dr. Arias. He has coronary artery disease, diabetes and hypertension. He has chronic diastolic heart failure and ventricular tachycardia. He has a questionable history of PAF and was previously on apixaban. He has polycythemia vera.     He was admitted in March for intraparenchymal hemorrhage. He received K centra. His BP was 200/100. It was slowly corrected. He had some bradycardia however this was asymptomatic. Eliquis was discontinued. He was discharged to rehab. BP at rehab has been reasonably controlled however on  became quite high and couldn't be controlled.     He was sent to the ER from rehab with . Medications were adjusted . BP corrected and torsemide and hydralazine have actually be discontinued. .    Echocardiogram 2022  Calculated left ventricular EF = 67.4% Estimated left ventricular EF = 67% Estimated left ventricular EF was in agreement with the calculated left ventricular EF. Left ventricular systolic function is normal. Normal left ventricular cavity size noted. Left ventricular wall thickness  is consistent with mild to moderate concentric hypertrophy. All left ventricular wall segments contract normally. Left ventricular diastolic function is consistent with (grade I) impaired relaxation.  Left atrial volume is moderately increased.  No aortic valve regurgitation or stenosis is present. The aortic valve is abnormal in structure. There is moderate calcification of the aortic valve.  Mild tricuspid valve regurgitation is present. Estimated right ventricular systolic pressure from tricuspid regurgitation is moderately elevated (45-55 mmHg). Calculated right ventricular systolic pressure from tricuspid regurgitation is 46 mmHg.      Past Medical History:   Diagnosis Date    Abnormal ECG     Abnormal stress test     Abrasion of face 01/29/2020    Acute bronchitis     Acute hypokalemia 09/16/2020    Acute non-recurrent sinusitis 01/13/2020    Acute pyelonephritis 10/09/2020    Acute sinusitis     DORI (acute kidney injury) 04/28/2017    Allergic rhinitis     Aortic root dilation     Arthritis     Bacteremia due to Escherichia coli 08/17/2020    Balanitis 05/16/2021    Benign enlargement of prostate     C. difficile colitis 09/16/2020    Cellulitis of knee, left 05/04/2017    CHF (congestive heart failure)     Chronic diastolic congestive heart failure     Constipation 11/14/2020    Contusion of face 01/29/2020    Coronary artery disease     COVID-19 virus infection     CVA (cerebral vascular accident) 2020    Diminished pulse     in lower extremities    Discitis of lumbar region 10/09/2020    Edema     Elevated hematocrit     Elevated hemoglobin     Essential hypertension     Hearing loss     mala hearing aids    Heart valve disease     High risk medication use     History of back pain     History of dysuria     History of echocardiogram     History of intracranial hemorrhage     History of scoliosis     History of stroke     Hyperlipidemia     Hypokalemia 01/07/2021    Injury of toe, left, initial encounter      Irregular heart rate     Knee pain, left     Left bundle branch block     Leg wound, left     Leukocytosis 09/16/2020    Low back pain     Maxillary sinusitis 07/16/2021    Medicare annual wellness visit, subsequent     Minor head injury without loss of consciousness 01/29/2020    Murmur     Muscle cramps     Need for hepatitis C screening test     Paraphimosis 05/16/2021    Pneumonia of left lung due to infectious organism 09/17/2021    Polycythemia     Polycythemia vera     Pressure injury of buttock, stage 2 09/18/2020    Prostate hyperplasia without urinary obstruction     Prostatitis     Proteinuria     Pulmonary hypertension     Sacroiliitis 10/09/2020    Scoliosis     Sepsis due to Escherichia coli 08/17/2020    Sepsis with metabolic encephalopathy 08/17/2020    Stroke     SVT (supraventricular tachycardia)     Tachycardia     Thrombocytosis     TIA (transient ischemic attack)     2006    Type 2 diabetes mellitus     Type 2 diabetes mellitus with hyperglycemia 05/16/2021    Urinary tract infection due to extended-spectrum beta lactamase (ESBL) producing Escherichia coli 08/18/2020    Valvular heart disease        Past Surgical History:   Procedure Laterality Date    CARDIAC CATHETERIZATION      CATARACT EXTRACTION Left 04/2021    CATARACT EXTRACTION Right 07/26/2022    COLONOSCOPY      did Cologuard approx 6/2019 and negative    HAND SURGERY      1970s    JOINT REPLACEMENT Right 2014    KS ARTHRP KNE CONDYLE&PLATU MEDIAL&LAT COMPARTMENTS Left 04/27/2017    Procedure: LT TOTAL KNEE ARTHROPLASTY;  Surgeon: Bertin Perrin MD;  Location: [facility];  Service: Orthopedics    PROSTATE SURGERY      Rezum steam treatment to shrink prostate by dr. guerrero         Prior to Admission medications    Medication Sig Start Date End Date Taking? Authorizing Provider   acetaminophen (TYLENOL) 325 MG tablet Take 2 tablets by mouth Every 4 (Four) Hours As Needed for Mild Pain. 3/11/24  Yes Cachorro Knox MD    acetaminophen (TYLENOL) 500 MG tablet Take 2 tablets by mouth Every 8 (Eight) Hours As Needed for Moderate Pain.   Yes Keira Odonnell MD   amLODIPine (NORVASC) 10 MG tablet Take 1 tablet by mouth Daily. 3/12/24  Yes Cachorro Knox MD   carvedilol (COREG) 6.25 MG tablet TAKE 1 TABLET TWICE A DAY WITH MEALS 1/16/24  Yes Marika Arias MD   cloNIDine (CATAPRES) 0.2 MG tablet Take 1 tablet by mouth Every 8 (Eight) Hours. 3/11/24  Yes Cachorro Knox MD   fluticasone (FLONASE) 50 MCG/ACT nasal spray 1 spray into the nostril(s) as directed by provider Daily. 12/21/23  Yes Zamzam John APRN   hydroxyurea (HYDREA) 500 MG capsule Take 1 capsule by mouth Daily. 1/18/24  Yes Keira Odonnell MD   insulin glargine (LANTUS, SEMGLEE) 100 UNIT/ML injection Inject 22 Units under the skin into the appropriate area as directed Every Night. 3/14/24  Yes Alfred Hill MD   insulin regular (humuLIN R,novoLIN R) 100 UNIT/ML injection Inject 2-9 Units under the skin into the appropriate area as directed 3 (Three) Times a Day Before Meals. 3/11/24  Yes Cachorro Knox MD   Januvia 100 MG tablet Take 1 tablet by mouth Daily. 2/10/24  Yes Keira Odonnell MD   levETIRAcetam (KEPPRA) 500 MG tablet Take 1 tablet by mouth 2 (Two) Times a Day. 3/11/24  Yes Cachorro Knox MD   melatonin 5 MG tablet tablet Take 1 tablet by mouth Every Night.  Patient taking differently: Take 3 mg by mouth At Night As Needed. 3/11/24  Yes Cachorro Knox MD   memantine (NAMENDA) 10 MG tablet Take 1 tablet by mouth 2 (Two) Times a Day. 1/4/24  Yes Inderjit Adams MD   metFORMIN ER (GLUCOPHAGE-XR) 500 MG 24 hr tablet Take 1 tablet by mouth Daily With Breakfast. 3/15/24  Yes Alfred Hill MD   Multiple Vitamins-Minerals (b complex-C-E-zinc) tablet Take 1 tablet by mouth Daily.   Yes Provider, MD Keira   OLANZapine zydis (zyPREXA) 5 MG disintegrating tablet Place 1 tablet on the tongue Every Night. 3/11/24  Yes  Cachorro Knox MD   polyethylene glycol (MIRALAX) 17 g packet Take 17 g by mouth 2 (Two) Times a Day. Hold if loose stool 3/14/24  Yes Alfred Hill MD   rosuvastatin (CRESTOR) 20 MG tablet Take 2 tablets by mouth Daily.   Yes ProviderKeira MD   sennosides-docusate (PERICOLACE) 8.6-50 MG per tablet Take 2 tablets by mouth 2 (Two) Times a Day. 3/11/24  Yes Cachorro Knox MD   sertraline (ZOLOFT) 50 MG tablet Take 1 tablet by mouth Every Night.   Yes ProviderKeira MD   spironolactone (ALDACTONE) 50 MG tablet Take 1 tablet by mouth Every Morning. 3/15/24  Yes Alfred Hill MD   tamsulosin (FLOMAX) 0.4 MG capsule 24 hr capsule Take 1 capsule by mouth every night at bedtime. 23  Yes Zamzam John APRN   valsartan (DIOVAN) 320 MG tablet Take 1 tablet by mouth Daily. 23  Yes Zamzam John APRN   Vibegron (Gemtesa) 75 MG tablet Take 1 tablet by mouth Daily With Breakfast.   Yes Provider, MD Keira       Allergies   Allergen Reactions    Donepezil Hallucinations    Steglatro [Ertugliflozin] Other (See Comments)     balanitis       Social History     Socioeconomic History    Marital status:    Tobacco Use    Smoking status: Former     Current packs/day: 0.00     Types: Cigarettes     Quit date:      Years since quittin.3     Passive exposure: Past    Smokeless tobacco: Never    Tobacco comments:     Caffeine daily   Vaping Use    Vaping status: Never Used    Passive vaping exposure: Yes   Substance and Sexual Activity    Alcohol use: Never    Drug use: Never    Sexual activity: Defer       Family History   Problem Relation Age of Onset    Hypertension Mother     Other Father         ruptured appendix,  when pt. was 14 years old.    Diabetes Paternal Aunt     Diabetes Paternal Uncle     No Known Problems Other     Migraines Sister     Stroke Sister     Dementia Brother        REVIEW OF SYSTEMS:   All systems reviewed.  Pertinent positives  identified in HPI.  All other systems are negative.      Objective:     Vitals:    04/08/24 2316 04/09/24 0544 04/09/24 0632 04/09/24 0855   BP: 133/69  130/75    BP Location: Right arm  Right arm    Patient Position: Lying  Lying    Pulse: 57      Resp: 18   18   Temp: 97.9 °F (36.6 °C)      TempSrc: Oral   Oral   SpO2: 98%      Weight:  95.9 kg (211 lb 6.7 oz)     Height:         Body mass index is 28.67 kg/m².    Physical Exam:  Constitutional: He is oriented to person, place, and time. He appears well-developed. He does not appear ill.   HENT:   Head: Normocephalic and atraumatic. Head is without contusion.   Right Ear: Hearing normal. No drainage.   Left Ear: Hearing normal. No drainage.   Nose: No nasal deformity. No epistaxis.   Eyes: Lids are normal. Right eye exhibits no exudate. Left eye exhibits no exudate.  Neck: No JVD present.   Cardiovascular: Normal rate, regular rhythm and normal heart sounds.    Pulses:       Posterior tibial pulses are 2+ on the right side, and 2+ on the left side.   Pulmonary/Chest: Effort normal and breath sounds normal.   Abdominal: Soft. Normal appearance and bowel sounds are normal. There is no tenderness.   Musculoskeletal: Normal range of motion.        Right shoulder: He exhibits no deformity.        Left shoulder: He exhibits no deformity.   Neurological: He is alert and oriented to person, place, and time. He has normal strength.   Skin: Skin is warm, dry and intact. No rash noted.   Psychiatric: He has a normal mood and affect. His behavior is normal. Thought content normal.   Vitals reviewed      Lab Review:     Results from last 7 days   Lab Units 04/09/24  0331 04/08/24  0429   SODIUM mmol/L 144 143   POTASSIUM mmol/L 3.6 3.4*   CHLORIDE mmol/L 112* 110*   CO2 mmol/L 20.9* 21.6*   BUN mg/dL 22 25*   CREATININE mg/dL 0.92 1.09   CALCIUM mg/dL 9.1 9.1   BILIRUBIN mg/dL  --  0.4   ALK PHOS U/L  --  88   ALT (SGPT) U/L  --  14   AST (SGOT) U/L  --  24   GLUCOSE mg/dL  234* 296*     Results from last 7 days   Lab Units 04/06/24  1925 04/06/24  1713   HSTROP T ng/L 54* 55*     Results from last 7 days   Lab Units 04/09/24  0331   WBC 10*3/mm3 9.36   HEMOGLOBIN g/dL 16.4   HEMATOCRIT % 49.4   PLATELETS 10*3/mm3 224     Results from last 7 days   Lab Units 04/06/24  1713   INR  1.40*   APTT seconds 30.5     Results from last 7 days   Lab Units 04/08/24  0806   MAGNESIUM mg/dL 2.2         Current Facility-Administered Medications:     acetaminophen (TYLENOL) tablet 650 mg, 650 mg, Oral, Q4H PRN, Catie Alcaraz MD, 650 mg at 04/08/24 1143    amLODIPine (NORVASC) tablet 10 mg, 10 mg, Oral, Q24H, Catie Alcaraz MD, 10 mg at 04/09/24 0806    sennosides-docusate (PERICOLACE) 8.6-50 MG per tablet 2 tablet, 2 tablet, Oral, BID PRN **AND** polyethylene glycol (MIRALAX) packet 17 g, 17 g, Oral, Daily PRN **AND** bisacodyl (DULCOLAX) EC tablet 5 mg, 5 mg, Oral, Daily PRN **AND** bisacodyl (DULCOLAX) suppository 10 mg, 10 mg, Rectal, Daily PRN, Catie Alcaraz MD    carvedilol (COREG) tablet 12.5 mg, 12.5 mg, Oral, BID With Meals, Catie Alcaraz MD, 12.5 mg at 04/09/24 0805    cloNIDine (CATAPRES) tablet 0.2 mg, 0.2 mg, Oral, Q8H, Catie Alcaraz MD, 0.2 mg at 04/09/24 0632    dextrose (D50W) (25 g/50 mL) IV injection 25 g, 25 g, Intravenous, Q15 Min PRN, Catie Alcaraz MD    dextrose (GLUTOSE) oral gel 15 g, 15 g, Oral, Q15 Min PRN, Catie Alcaraz MD    fluticasone (FLONASE) 50 MCG/ACT nasal spray 1 spray, 1 spray, Nasal, Daily, Catie Alcaraz MD, 1 spray at 04/09/24 0808    glucagon (GLUCAGEN) injection 1 mg, 1 mg, Intramuscular, Q15 Min PRN, Catie Alcaraz MD    guaiFENesin (MUCINEX) 12 hr tablet 1,200 mg, 1,200 mg, Oral, BID, Catie Alcaraz MD, 1,200 mg at 04/09/24 0806    hydrALAZINE (APRESOLINE) injection 10 mg, 10 mg, Intravenous, Q4H PRN, Catie Alcaraz MD    hydrocortisone-bacitracin-zinc  oxide-nystatin (MAGIC BARRIER) ointment 1 Application, 1 Application, Topical, TID, Kapil Eid MD, 1 Application at 04/09/24 0808    hydroxyurea (HYDREA) capsule 500 mg, 500 mg, Oral, Daily, Catie Alcaraz MD, 500 mg at 04/09/24 0806    insulin glargine (LANTUS, SEMGLEE) injection 22 Units, 22 Units, Subcutaneous, Nightly, Catie Alcaraz MD, 22 Units at 04/08/24 2142    insulin lispro (HUMALOG/ADMELOG) injection 2-7 Units, 2-7 Units, Subcutaneous, 4x Daily AC & at Bedtime, Catie Alcaraz MD, 3 Units at 04/09/24 1139    insulin lispro (HUMALOG/ADMELOG) injection 5 Units, 5 Units, Subcutaneous, TID With Meals, Kapil Eid MD, 5 Units at 04/09/24 1139    lactobacillus acidophilus (RISAQUAD) capsule 1 capsule, 1 capsule, Oral, Daily, Catie Alcaraz MD, 1 capsule at 04/09/24 0806    levETIRAcetam (KEPPRA) tablet 500 mg, 500 mg, Oral, BID, Catie Alcaraz MD, 500 mg at 04/09/24 0806    linagliptin (TRADJENTA) tablet 5 mg, 5 mg, Oral, Daily, Catie Alcaraz MD, 5 mg at 04/09/24 0806    melatonin tablet 3 mg, 3 mg, Oral, Nightly PRN, Catie Alcaraz MD, 3 mg at 04/06/24 2308    memantine (NAMENDA) tablet 10 mg, 10 mg, Oral, BID, Catie Alcaraz MD, 10 mg at 04/09/24 0807    Menthol-Zinc Oxide 1 Application, 1 Application, Topical, TID, Kapil Eid MD, 1 Application at 04/09/24 0808    multivitamin with minerals 1 tablet, 1 tablet, Oral, Daily, Catie Alcaraz MD, 1 tablet at 04/09/24 0805    OLANZapine zydis (zyPREXA) disintegrating tablet 5 mg, 5 mg, Oral, Nightly, Catie Alcaraz MD, 5 mg at 04/08/24 2143    ondansetron ODT (ZOFRAN-ODT) disintegrating tablet 4 mg, 4 mg, Oral, Q6H PRN **OR** ondansetron (ZOFRAN) injection 4 mg, 4 mg, Intravenous, Q6H PRN, Catie Alcaraz MD    rosuvastatin (CRESTOR) tablet 40 mg, 40 mg, Oral, Nightly, Catie Alcaraz MD, 40 mg at 04/08/24 2143    sennosides-docusate (PERICOLACE) 8.6-50 MG per  tablet 2 tablet, 2 tablet, Oral, BID, Catie Alcaraz MD, 2 tablet at 04/09/24 0806    sertraline (ZOLOFT) tablet 50 mg, 50 mg, Oral, Nightly, Catie Aclaraz MD, 50 mg at 04/08/24 2142    [COMPLETED] Insert Peripheral IV, , , Once **AND** sodium chloride 0.9 % flush 10 mL, 10 mL, Intravenous, PRN, Robert Marie MD    sodium chloride 0.9 % flush 10 mL, 10 mL, Intravenous, Q12H, Colton Fleming MD, 10 mL at 04/09/24 0809    sodium chloride 0.9 % flush 10 mL, 10 mL, Intravenous, Q12H, Colton Fleming MD, 10 mL at 04/09/24 0809    sodium chloride 0.9 % flush 10 mL, 10 mL, Intravenous, PRN, Colton Fleming MD    sodium chloride 0.9 % infusion 40 mL, 40 mL, Intravenous, PRN, Colton Fleming MD    sodium chloride 0.9 % infusion, 50 mL/hr, Intravenous, Continuous, Colton Fleming MD, Last Rate: 50 mL/hr at 04/08/24 1732, 50 mL/hr at 04/08/24 1732    spironolactone (ALDACTONE) tablet 50 mg, 50 mg, Oral, Daily, Catie Alcaraz MD, 50 mg at 04/09/24 0805    tamsulosin (FLOMAX) 24 hr capsule 0.4 mg, 0.4 mg, Oral, Daily, Catie Alcaraz MD, 0.4 mg at 04/09/24 0806    valsartan (DIOVAN) tablet 320 mg, 320 mg, Oral, Daily, Catie Alcaraz MD, 320 mg at 04/09/24 0805    Assessment and Plan:       Active Hospital Problems    Diagnosis  POA    **Hypertension, uncontrolled [I10]  Yes    Expressive aphasia [R47.01]  Yes    Paroxysmal atrial fibrillation [I48.0]  Yes    Type 2 diabetes mellitus [E11.9]  Yes    HTN (hypertension) [I10]  Yes    Paroxysmal atrial fibrillation [I48.0]  Yes    History of CVA (cerebrovascular accident) [Z86.73]  Not Applicable      Resolved Hospital Problems   No resolved problems to display.     Hypertensive urgency  Altered mental status  Recent hemorrhagic stroke   Questionable history of AF - Dr. Porras reviewed numerous EKGs and holter monitors and did not see any convincing AF. Apixaban has been stopped indefinitely without plans to restart.   Diabetes  mellitus type II  Coronary artery disease  Chronic diastolic heart failure     BP currently well controlled - managed by primary team.     No plans to restart apixaban or alternative. Discussed with family at bedside.     No objection to discharge. Will cancel upcoming appt with Monisha.     Cate Villalba, MUKUL  04/09/24  13:33 EDT

## 2024-04-09 NOTE — THERAPY TREATMENT NOTE
Patient Name: Erlin Blanco  : 1945    MRN: [id number]                              Today's Date: 2024       Admit Date: 2024    Visit Dx:     ICD-10-CM ICD-9-CM   1. Hypertension, uncontrolled  I10 401.9   2. 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 without macular edema, without long-term current use of insulin    Primary hypertension    Coronary artery disease    Supraventricular tachycardia    TIA (transient ischemic attack)    Hyperlipidemia    Benign prostatic hyperplasia without urinary obstruction    Class 2 severe obesity due to excess calories with serious comorbidity and body mass index (BMI) of 36.0 to 36.9 in adult    Polycythemia vera    Acute non-recurrent sinusitis    Pain in both knees    Allergic rhinitis    Left bundle-branch block    Fatigue    Atrial flutter    History of CVA (cerebrovascular accident)    Cervical radiculitis    Chronic diastolic congestive heart failure    CKD (chronic kidney disease) stage 2, GFR 60-89 ml/min    Glucosuria    Paroxysmal atrial fibrillation    Memory difficulties    Bradycardia    Anemia    Low back pain    Back pain    Cardiovascular stress test abnormal    Prostatitis    Localized edema    Murmur    Pulmonary hypertension    Thrombocytosis    Arthritis    Proteinuria    Fall at home    Cognitive communication deficit    Mild cognitive impairment with memory loss    Hematuria    Hypertension, uncontrolled     Past Medical History:   Diagnosis Date    Abnormal ECG     Abnormal stress test     Abrasion of face 2020    Acute bronchitis     Acute hypokalemia 2020     Acute non-recurrent sinusitis 01/13/2020    Acute pyelonephritis 10/09/2020    Acute sinusitis     DORI (acute kidney injury) 04/28/2017    Allergic rhinitis     Aortic root dilation     Arthritis     Bacteremia due to Escherichia coli 08/17/2020    Balanitis 05/16/2021    Benign enlargement of prostate     C. difficile colitis 09/16/2020    Cellulitis of knee, left 05/04/2017    CHF (congestive heart failure)     Chronic diastolic congestive heart failure     Constipation 11/14/2020    Contusion of face 01/29/2020    Coronary artery disease     COVID-19 virus infection     CVA (cerebral vascular accident) 2020    Diminished pulse     in lower extremities    Discitis of lumbar region 10/09/2020    Edema     Elevated hematocrit     Elevated hemoglobin     Essential hypertension     Hearing loss     mala hearing aids    Heart valve disease     High risk medication use     History of back pain     History of dysuria     History of echocardiogram     History of intracranial hemorrhage     History of scoliosis     History of stroke     Hyperlipidemia     Hypokalemia 01/07/2021    Injury of toe, left, initial encounter     Irregular heart rate     Knee pain, left     Left bundle branch block     Leg wound, left     Leukocytosis 09/16/2020    Low back pain     Maxillary sinusitis 07/16/2021    Medicare annual wellness visit, subsequent     Minor head injury without loss of consciousness 01/29/2020    Murmur     Muscle cramps     Need for hepatitis C screening test     Paraphimosis 05/16/2021    Pneumonia of left lung due to infectious organism 09/17/2021    Polycythemia     Polycythemia vera     Pressure injury of buttock, stage 2 09/18/2020    Prostate hyperplasia without urinary obstruction     Prostatitis     Proteinuria     Pulmonary hypertension     Sacroiliitis 10/09/2020    Scoliosis     Sepsis due to Escherichia coli 08/17/2020    Sepsis with metabolic encephalopathy 08/17/2020    Stroke     SVT (supraventricular  tachycardia)     Tachycardia     Thrombocytosis     TIA (transient ischemic attack)     2006    Type 2 diabetes mellitus     Type 2 diabetes mellitus with hyperglycemia 05/16/2021    Urinary tract infection due to extended-spectrum beta lactamase (ESBL) producing Escherichia coli 08/18/2020    Valvular heart disease      Past Surgical History:   Procedure Laterality Date    CARDIAC CATHETERIZATION      CATARACT EXTRACTION Left 04/2021    CATARACT EXTRACTION Right 07/26/2022    COLONOSCOPY      did Cologuard approx 6/2019 and negative    HAND SURGERY      1970s    JOINT REPLACEMENT Right 2014    MS ARTHRP KNE CONDYLE&PLATU MEDIAL&LAT COMPARTMENTS Left 04/27/2017    Procedure: LT TOTAL KNEE ARTHROPLASTY;  Surgeon: Bertin Perrin MD;  Location: [facility] OR;  Service: Orthopedics    PROSTATE SURGERY      Rezum steam treatment to shrink prostate by dr. guerrero      General Information       Row Name 04/09/24 1747          Physical Therapy Time and Intention    Document Type therapy note (daily note)  -     Mode of Treatment individual therapy;physical therapy  -       Row Name 04/09/24 1747          General Information    Patient Profile Reviewed yes  -     Existing Precautions/Restrictions fall  -       Row Name 04/09/24 1747          Cognition    Orientation Status (Cognition) oriented to;person  -       Row Name 04/09/24 1747          Safety Issues, Functional Mobility    Impairments Affecting Function (Mobility) endurance/activity tolerance;strength;postural/trunk control;balance  -               User Key  (r) = Recorded By, (t) = Taken By, (c) = Cosigned By      Initials Name Provider Type     Sarah Shah PT Physical Therapist                   Mobility       Row Name 04/09/24 1746          Bed Mobility    Bed Mobility supine-sit;sit-supine  -     Supine-Sit [place] (Bed Mobility) dependent (less than 25% patient effort);2 person assist;maximum assist (25% patient effort)  -      Sit-Supine [place] (Bed Mobility) dependent (less than 25% patient effort);2 person assist;maximum assist (25% patient effort)  -     Assistive Device (Bed Mobility) draw sheet  -       Row Name 04/09/24 1747          Transfers    Comment, (Transfers) Sat EOB ~10 minutes, intermittent posterior lean requiring mod A to correct, unsafe to attempt transfers at this time  -               User Key  (r) = Recorded By, (t) = Taken By, (c) = Cosigned By      Initials Name Provider Type     Sarah Shah PT Physical Therapist                   Obj/Interventions       [facility] Name 04/09/24 1748          Balance    Comment, Balance CGA-mod A range for sitting balance - increased assist with UE reaching, inconsistent command following  -               User Key  (r) = Recorded By, (t) = Taken By, (c) = Cosigned By      Initials Name Provider Type     Sarah Shah PT Physical Therapist                   Goals/Plan    No documentation.                  Clinical Impression       Row Name 04/09/24 1749          Pain    Pretreatment Pain Rating 0/10 - no pain  -     Posttreatment Pain Rating 0/10 - no pain  -[facility] Name 04/09/24 1749          Plan of Care Review    Plan of Care Reviewed With patient;spouse  -     Progress no change  -     Outcome Evaluation Pt seen for PT this PM and tolerated mobility well. Pt still requiring max-dep A x2 to transfer to EOB. Pt required range of CGA-mod A x1 for sitting balance. Continues with aphasia, answering simple questions with 1 word answers but inconsistent command following. Worked on UE leaning for midline positioning - pt able to correct pushing up with LUE but unable to maintain. Pt completed UE reaching - more difficulty reaching when sitting EOB with wife reporting significant fear of falling, able to better reach once back reclined in bed. Unsafe to attempt transfers at this time d/t poor sitting balance and difficulty command following. PT will  continue to follow, anticipate DC to SNF.  -       Row Name 04/09/24 1749          Vital Signs    O2 Delivery Pre Treatment room air  -     O2 Delivery Intra Treatment room air  -     O2 Delivery Post Treatment room air  -       Row Name 04/09/24 1749          Positioning and Restraints    Pre-Treatment Position in bed  -     Post Treatment Position bed  -     In Bed fowlers;call light within reach;encouraged to call for assist;exit alarm on;with family/caregiver  -               User Key  (r) = Recorded By, (t) = Taken By, (c) = Cosigned By      Initials Name Provider Type     Sarah Shah PT Physical Therapist                   Outcome Measures       Row Name 04/09/24 1755          How much help from another person do you currently need...    Turning from your back to your side while in flat bed without using bedrails? 1  -BH     Moving from lying on back to sitting on the side of a flat bed without bedrails? 1  -BH     Moving to and from a bed to a chair (including a wheelchair)? 1  -     Standing up from a chair using your arms (e.g., wheelchair, bedside chair)? 1  -     Climbing 3-5 steps with a railing? 1  -     To walk in hospital room? 1  -     AM-PAC 6 Clicks Score (PT) 6  -     Highest Level of Mobility Goal 2 --> Bed activities/dependent transfer  -       Row Name 04/09/24 1755          Functional Assessment    Outcome Measure Options AM-PAC 6 Clicks Basic Mobility (PT)  -               User Key  (r) = Recorded By, (t) = Taken By, (c) = Cosigned By      Initials Name Provider Type     Sarah Shah PT Physical Therapist                                 Physical Therapy Education       Title: PT OT SLP Therapies (In Progress)       Topic: Physical Therapy (In Progress)       Point: Mobility training (In Progress)       Learning Progress Summary             Patient Acceptance, E,TB,D, NR,NL by  at 4/9/2024 1755                         Point: Home exercise program  (In Progress)       Learning Progress Summary             Patient Acceptance, E,TB,D, NR,NL by  at 4/9/2024 1755                         Point: Body mechanics (In Progress)       Learning Progress Summary             Patient Acceptance, E,TB,D, NR,NL by  at 4/9/2024 1755                         Point: Precautions (In Progress)       Learning Progress Summary             Patient Acceptance, E,TB,D, NR,NL by  at 4/9/2024 1755                                         User Key       Initials Effective Dates Name Provider Type Discipline     04/08/22 -  Sarah Shah, PT Physical Therapist PT                  PT Recommendation and Plan     Plan of Care Reviewed With: patient, spouse  Progress: no change  Outcome Evaluation: Pt seen for PT this PM and tolerated mobility well. Pt still requiring max-dep A x2 to transfer to EOB. Pt required range of CGA-mod A x1 for sitting balance. Continues with aphasia, answering simple questions with 1 word answers but inconsistent command following. Worked on UE leaning for midline positioning - pt able to correct pushing up with LUE but unable to maintain. Pt completed UE reaching - more difficulty reaching when sitting EOB with wife reporting significant fear of falling, able to better reach once back reclined in bed. Unsafe to attempt transfers at this time d/t poor sitting balance and difficulty command following. PT will continue to follow, anticipate DC to SNF.     Time Calculation:         PT Charges       Row Name 04/09/24 1755             Time Calculation    Start Time 1726  -      Stop Time 1742  -      Time Calculation (min) 16 min  -      PT Received On 04/09/24  -      PT - Next Appointment 04/10/24  -         Time Calculation- PT    Total Timed Code Minutes- PT 16 minute(s)  -         Timed Charges    46250 - PT Therapeutic Exercise Minutes 6  -      85308 - PT Therapeutic Activity Minutes 10  -         Total Minutes    Timed Charges Total  Minutes 16  -BH       Total Minutes 16  -BH                User Key  (r) = Recorded By, (t) = Taken By, (c) = Cosigned By      Initials Name Provider Type     Sarah Shah, PT Physical Therapist                  Therapy Charges for Today       Code Description Service Date Service Provider Modifiers Qty    [id number]  PT THERAPEUTIC ACT EA 15 MIN 4/9/2024 Sarah Shah, PT GP 1            PT G-Codes  Outcome Measure Options: AM-PAC 6 Clicks Basic Mobility (PT)  AM-PAC 6 Clicks Score (PT): 6  PT Discharge Summary  Anticipated Discharge Disposition (PT): skilled nursing facility    Sarah Shah PT  4/9/2024

## 2024-04-09 NOTE — CASE MANAGEMENT/SOCIAL WORK
Continued Stay Note  [facility]     Patient Name: Erlin Blanco  MRN: [id number]  Today's Date: 4/9/2024    Admit Date: 4/6/2024    Plan: [facility] pending [employer] MCR precert   Discharge Plan       Row Name 04/09/24 1647       Plan    Plan [facility] pending [employer] MCR precert    Plan Comments CCP spoke with patient's spouse at bedside regarding updates on the dc plan. Spouse states prior to previous stroke, patient was independent. They would like to go back to [facility]. CCP spoke with Nena[facility]Ricki who confirms they can accept patient pending [employer] MCR precert. CCP requested [employer] M[employer] precert be initiated via [facility] post acute authorizations. ERIC CORONEL                   Discharge Codes    No documentation.                 Expected Discharge Date and Time       Expected Discharge Date Expected Discharge Time    Apr 9, 2024               ERIC Ruiz

## 2024-04-09 NOTE — SIGNIFICANT NOTE
04/09/24 1657   Post Acute Pre-Cert Documentation   Request Submitted by Facility - Type: Hospital   Post-Acute Authorization Type Submitted: SNF   Date Post Acute Pre-Cert Inititated per Facility 04/09/24   Accepting Facility [facility]   Hospital Discharge Date Requested 04/10/24   All Clinicals Submitted? Yes   Had Accepting Facility at Time of Submission Yes   Response Communicated to:    Authorization Number: [id number]   Post Acute Pre-Cert Initiated Comment ADARSH BECERRA

## 2024-04-09 NOTE — PROGRESS NOTES
"    Name: Erlin Blanco ADMIT: 2024   : 1945  PCP: Senia Dorsey MD    MRN: [id number] LOS: 0 days   AGE/SEX: 78 y.o. male  ROOM: [facility]     Subjective     This is a 78-year-old man with male \"with hypertension, coronary artery disease, chronic diastolic heart failure, type 2 diabetes, and atrial fibrillation on Eliquis who presents to the hospital with persistently elevated blood pressure.  He was recently discharged from this facility on  for a left thalamic intracranial hemorrhage with intraventricular extension.  At that time, neurosurgery has seen the patient and management was obtained with blood pressure control as well as serial CT scans.    There was also some concern about aspiration pneumonia and dysphagia.  He was placed on a modified diet after completing a course of antibiotic therapy.    His blood pressure in the outlying facility was noted to be as high as 190 systolic with his goal being less than 160.  He was discharged on quite a few blood pressure medications including amlodipine 10 mg every 24 hours, clonidine 0.2 mg every 8 hours, spironolactone 50 mg every morning, valsartan 320 mg daily.  Eliquis is obviously discontinued at that time.      Other medications, including hydralazine 100 and torsemide 20 mg were also discontinued.       : Examined at bedside with son and daughter.  Family reports that he is back to his mental baseline.  His blood pressure has also been better controlled however systolic blood pressure was as high as 174.    : Underwent therapeutic phlebotomy 500 cc of blood removed.  Blood pressures are excellent today.  No new complaints.      Objective   Objective   Vital Signs  Temp:  [97.5 °F (36.4 °C)-97.9 °F (36.6 °C)] 97.9 °F (36.6 °C)  Heart Rate:  [57-74] 57  Resp:  [18] 18  BP: (125-133)/(63-75) 130/75  SpO2:  [95 %-98 %] 98 %  on   ;   Device (Oxygen Therapy): room air  Body mass index is 28.67 kg/m².  Physical Exam  Constitutional:  "      General: He is not in acute distress.  HENT:      Head: Normocephalic and atraumatic.   Cardiovascular:      Rate and Rhythm: Normal rate and regular rhythm.   Pulmonary:      Effort: Pulmonary effort is normal. No respiratory distress.   Abdominal:      General: There is no distension.      Palpations: Abdomen is soft.      Tenderness: There is no abdominal tenderness.   Neurological:      General: No focal deficit present.      Mental Status: He is alert and oriented to person, place, and time.      Comments: Right-sided hemiparesis, facial droop  At baseline     Exam reviewed and updated on 4/9    Results Review     I reviewed the patient's new clinical results.  Results from last 7 days   Lab Units 04/09/24  0331 04/08/24 0429 04/07/24 0531 04/06/24  1713   WBC 10*3/mm3 9.36 9.51 11.58* 11.30*   HEMOGLOBIN g/dL 16.4 16.3 17.7 18.5*   PLATELETS 10*3/mm3 224 259 291 289     Results from last 7 days   Lab Units 04/09/24  0331 04/08/24 0429 04/07/24 0531 04/06/24  1713   SODIUM mmol/L 144 143 145 142   POTASSIUM mmol/L 3.6 3.4* 3.6 4.4   CHLORIDE mmol/L 112* 110* 110* 107   CO2 mmol/L 20.9* 21.6* 21.0* 19.6*   BUN mg/dL 22 25* 19 20   CREATININE mg/dL 0.92 1.09 1.03 1.04   GLUCOSE mg/dL 234* 296* 241* 268*   Estimated Creatinine Clearance: 82 mL/min (by C-G formula based on SCr of 0.92 mg/dL).  Results from last 7 days   Lab Units 04/08/24  0429 04/06/24  1713   ALBUMIN g/dL 3.3* 3.8   BILIRUBIN mg/dL 0.4 0.6   ALK PHOS U/L 88 112   AST (SGOT) U/L 24 41*   ALT (SGPT) U/L 14 18     Results from last 7 days   Lab Units 04/09/24  0331 04/08/24  0806 04/08/24 0429 04/07/24 0531 04/06/24  1713   CALCIUM mg/dL 9.1  --  9.1 9.7 9.8   ALBUMIN g/dL  --   --  3.3*  --  3.8   MAGNESIUM mg/dL  --  2.2  --   --   --        COVID19   Date Value Ref Range Status   03/30/2023 Detected (A) Not Detected - Ref. Range Final   09/17/2021 Not Detected Not Detected - Ref. Range Final     Glucose   Date/Time Value Ref Range  Status   04/09/2024 1117 216 (H) 70 - 130 mg/dL Final   04/09/2024 0635 172 (H) 70 - 130 mg/dL Final   04/08/2024 2034 308 (H) 70 - 130 mg/dL Final   04/08/2024 1626 352 (H) 70 - 130 mg/dL Final   04/08/2024 1459 298 (H) 70 - 130 mg/dL Final   04/08/2024 0612 246 (H) 70 - 130 mg/dL Final   04/07/2024 2026 378 (H) 70 - 130 mg/dL Final     Results for orders placed or performed during the hospital encounter of 09/16/21   Blood Culture - Blood, Arm, Left    Specimen: Arm, Left; Blood   Result Value Ref Range    Blood Culture No growth at 5 days          IR PICC W Imaging Guidance  Narrative: LEFT ARM PICC LINE EXCHANGE     HISTORY: No blood return from left midline, therapeutic phlebotomy  needed; I10-Essential (primary) hypertension; E11.65-Type 2 diabetes  mellitus with hyperglycemia     TECHNIQUE: Maximum sterile barrier technique was used including sterile  cap, mask, gloves, gown and large sterile sheet as well as pre-procedure  hand washing and cutaneous antisepsis with 2 % chlorhexidine. The left  arm and existing mid line were sterilely prepped and draped and  anesthetized with 1% Lidocaine solution. A guidewire was advanced  through the existing mid line and into the SVC. The line was removed.  Next, a new 5 Kiswahili 44 cm double lumen PICC line was advanced over the  guidewire with its tip positioned in the lower SVC. The catheter was  secured in place and a sterile dressing was applied. No immediate  complication.      Impression: Successful PICC line exchange as described.     Reference air kerma: 10.7 mGy     This report was finalized on 4/8/2024 1:24 PM by Dr. Ephraim De La Rosa M.D  on Workstation: [id number]       Scheduled Medications  amLODIPine, 10 mg, Oral, Q24H  carvedilol, 12.5 mg, Oral, BID With Meals  cloNIDine, 0.2 mg, Oral, Q8H  fluticasone, 1 spray, Nasal, Daily  guaiFENesin, 1,200 mg, Oral, BID  hydrocortisone-bacitracin-zinc oxide-nystatin, 1 Application, Topical, TID  hydroxyurea, 500 mg, Oral,  Daily  insulin glargine, 22 Units, Subcutaneous, Nightly  insulin lispro, 2-7 Units, Subcutaneous, 4x Daily AC & at Bedtime  insulin lispro, 5 Units, Subcutaneous, TID With Meals  lactobacillus acidophilus, 1 capsule, Oral, Daily  levETIRAcetam, 500 mg, Oral, BID  linagliptin, 5 mg, Oral, Daily  memantine, 10 mg, Oral, BID  Menthol-Zinc Oxide, 1 Application, Topical, TID  multivitamin with minerals, 1 tablet, Oral, Daily  OLANZapine zydis, 5 mg, Oral, Nightly  rosuvastatin, 40 mg, Oral, Nightly  sennosides-docusate, 2 tablet, Oral, BID  sertraline, 50 mg, Oral, Nightly  sodium chloride, 10 mL, Intravenous, Q12H  sodium chloride, 10 mL, Intravenous, Q12H  spironolactone, 50 mg, Oral, Daily  tamsulosin, 0.4 mg, Oral, Daily  valsartan, 320 mg, Oral, Daily    Infusions  sodium chloride, 50 mL/hr, Last Rate: 50 mL/hr (04/08/24 1732)    Diet  Diet: Cardiac, Diabetic; Healthy Heart (2-3 Na+); Consistent Carbohydrate; Texture: Pureed (NDD 1); Fluid Consistency: Honey Thick       Assessment/Plan     Active Hospital Problems    Diagnosis  POA    **Hypertension, uncontrolled [I10]  Yes    Expressive aphasia [R47.01]  Yes    Paroxysmal atrial fibrillation [I48.0]  Yes    Type 2 diabetes mellitus [E11.9]  Yes    HTN (hypertension) [I10]  Yes    Paroxysmal atrial fibrillation [I48.0]  Yes    History of CVA (cerebrovascular accident) [Z86.73]  Not Applicable      Resolved Hospital Problems   No resolved problems to display.       78 y.o. male admitted with Hypertension, uncontrolled.    Hypertension  Uncontrolled upon presentation initially with a pulse of blood pressure in the 190s.  Currently it is controlled on Coreg 12.5 mg twice daily, clonidine 0.2 mg every 8 hours, spironolactone 50 mg daily, losartan 220 mg daily.  Started on hydralazine and torsemide which were discontinued during the previous hospitalization.  I have discontinued these medications.  Blood pressure actually dropped a little bit too precipitously.   Discontinuing hydralazine and torsemide and monitor response.    Type 2 diabetes  Currently on glargine 20 units nightly along with sliding scale insulin.   Blood sugars are currently uncontrolled.  Will add scheduled short acting insulin    Polycythemia vera  On hydroxyurea.  This is being continued and current and on hematology has been consulted    Paroxysmal atrial fibrillation  Holding off on any antiplatelets anticoagulants.  He was supposed to see cardiology on 4/12.  Family requested that cardiology see the patient while he is still in the hospital.  Consult placed.    Left thalmic intracranial hemorrhage with intraventricular extension  Dysphagia  Start subacute rehab on February 24, 2024.    Keppra for seizure prophylaxis.  Modified diet.  CT head shows partial improvement of the partial intracranial hemorrhage      SCDs for DVT prophylaxis.  Full code.  Discussed with patient, family, and nursing staff.  Anticipate discharge to rehab, hopefully in the next day or two    Kapil Eid MD  [facility]  04/09/24  13:18 EDT

## 2024-04-10 LAB
ANION GAP SERPL CALCULATED.3IONS-SCNC: 9.8 MMOL/L (ref 5–15)
BUN SERPL-MCNC: 19 MG/DL (ref 8–23)
BUN/CREAT SERPL: 25.3 (ref 7–25)
CALCIUM SPEC-SCNC: 9 MG/DL (ref 8.6–10.5)
CHLORIDE SERPL-SCNC: 114 MMOL/L (ref 98–107)
CO2 SERPL-SCNC: 22.2 MMOL/L (ref 22–29)
CREAT SERPL-MCNC: 0.75 MG/DL (ref 0.76–1.27)
DEPRECATED RDW RBC AUTO: 53.7 FL (ref 37–54)
EGFRCR SERPLBLD CKD-EPI 2021: 93.5 ML/MIN/1.73
ERYTHROCYTE [DISTWIDTH] IN BLOOD BY AUTOMATED COUNT: 16.1 % (ref 12.3–15.4)
GLUCOSE BLDC GLUCOMTR-MCNC: 112 MG/DL (ref 70–130)
GLUCOSE BLDC GLUCOMTR-MCNC: 201 MG/DL (ref 70–130)
GLUCOSE BLDC GLUCOMTR-MCNC: 211 MG/DL (ref 70–130)
GLUCOSE BLDC GLUCOMTR-MCNC: 262 MG/DL (ref 70–130)
GLUCOSE SERPL-MCNC: 122 MG/DL (ref 65–99)
HCT VFR BLD AUTO: 47.9 % (ref 37.5–51)
HGB BLD-MCNC: 15.5 G/DL (ref 13–17.7)
MCH RBC QN AUTO: 29.2 PG (ref 26.6–33)
MCHC RBC AUTO-ENTMCNC: 32.4 G/DL (ref 31.5–35.7)
MCV RBC AUTO: 90.4 FL (ref 79–97)
PLATELET # BLD AUTO: 279 10*3/MM3 (ref 140–450)
PMV BLD AUTO: 9.8 FL (ref 6–12)
POTASSIUM SERPL-SCNC: 3.3 MMOL/L (ref 3.5–5.2)
RBC # BLD AUTO: 5.3 10*6/MM3 (ref 4.14–5.8)
SODIUM SERPL-SCNC: 146 MMOL/L (ref 136–145)
WBC NRBC COR # BLD AUTO: 8.78 10*3/MM3 (ref 3.4–10.8)

## 2024-04-10 PROCEDURE — 82948 REAGENT STRIP/BLOOD GLUCOSE: CPT

## 2024-04-10 PROCEDURE — 63710000001 INSULIN GLARGINE PER 5 UNITS: Performed by: INTERNAL MEDICINE

## 2024-04-10 PROCEDURE — 63710000001 INSULIN LISPRO (HUMAN) PER 5 UNITS: Performed by: STUDENT IN AN ORGANIZED HEALTH CARE EDUCATION/TRAINING PROGRAM

## 2024-04-10 PROCEDURE — 63710000001 INSULIN LISPRO (HUMAN) PER 5 UNITS: Performed by: INTERNAL MEDICINE

## 2024-04-10 PROCEDURE — 99232 SBSQ HOSP IP/OBS MODERATE 35: CPT | Performed by: INTERNAL MEDICINE

## 2024-04-10 PROCEDURE — 80048 BASIC METABOLIC PNL TOTAL CA: CPT | Performed by: STUDENT IN AN ORGANIZED HEALTH CARE EDUCATION/TRAINING PROGRAM

## 2024-04-10 PROCEDURE — 97530 THERAPEUTIC ACTIVITIES: CPT

## 2024-04-10 PROCEDURE — 99231 SBSQ HOSP IP/OBS SF/LOW 25: CPT | Performed by: NURSE PRACTITIONER

## 2024-04-10 PROCEDURE — 25810000003 SODIUM CHLORIDE 0.9 % SOLUTION: Performed by: INTERNAL MEDICINE

## 2024-04-10 PROCEDURE — 85027 COMPLETE CBC AUTOMATED: CPT | Performed by: STUDENT IN AN ORGANIZED HEALTH CARE EDUCATION/TRAINING PROGRAM

## 2024-04-10 RX ORDER — INSULIN LISPRO 100 [IU]/ML
6 INJECTION, SOLUTION INTRAVENOUS; SUBCUTANEOUS
Status: DISCONTINUED | OUTPATIENT
Start: 2024-04-10 | End: 2024-04-13 | Stop reason: HOSPADM

## 2024-04-10 RX ADMIN — Medication 10 ML: at 13:46

## 2024-04-10 RX ADMIN — FLUTICASONE PROPIONATE 1 SPRAY: 50 SPRAY, METERED NASAL at 09:48

## 2024-04-10 RX ADMIN — Medication 1 APPLICATION: at 20:53

## 2024-04-10 RX ADMIN — ZINC OXIDE 1 APPLICATION: 200 OINTMENT TOPICAL at 20:53

## 2024-04-10 RX ADMIN — CARVEDILOL 12.5 MG: 12.5 TABLET, FILM COATED ORAL at 09:47

## 2024-04-10 RX ADMIN — INSULIN LISPRO 5 UNITS: 100 INJECTION, SOLUTION INTRAVENOUS; SUBCUTANEOUS at 09:46

## 2024-04-10 RX ADMIN — Medication 1 CAPSULE: at 09:48

## 2024-04-10 RX ADMIN — ZINC OXIDE 1 APPLICATION: 200 OINTMENT TOPICAL at 14:46

## 2024-04-10 RX ADMIN — Medication 1 TABLET: at 09:48

## 2024-04-10 RX ADMIN — ZINC OXIDE 1 APPLICATION: 200 OINTMENT TOPICAL at 13:45

## 2024-04-10 RX ADMIN — MEMANTINE HYDROCHLORIDE 10 MG: 10 TABLET, FILM COATED ORAL at 20:52

## 2024-04-10 RX ADMIN — LEVETIRACETAM 500 MG: 500 TABLET, FILM COATED ORAL at 20:52

## 2024-04-10 RX ADMIN — OLANZAPINE 5 MG: 5 TABLET, ORALLY DISINTEGRATING ORAL at 20:52

## 2024-04-10 RX ADMIN — INSULIN LISPRO 3 UNITS: 100 INJECTION, SOLUTION INTRAVENOUS; SUBCUTANEOUS at 21:39

## 2024-04-10 RX ADMIN — GUAIFENESIN 1200 MG: 600 TABLET, EXTENDED RELEASE ORAL at 09:47

## 2024-04-10 RX ADMIN — Medication 10 ML: at 20:53

## 2024-04-10 RX ADMIN — CLONIDINE HYDROCHLORIDE 0.2 MG: 0.1 TABLET ORAL at 06:01

## 2024-04-10 RX ADMIN — LEVETIRACETAM 500 MG: 500 TABLET, FILM COATED ORAL at 09:48

## 2024-04-10 RX ADMIN — TAMSULOSIN HYDROCHLORIDE 0.4 MG: 0.4 CAPSULE ORAL at 09:48

## 2024-04-10 RX ADMIN — INSULIN LISPRO 3 UNITS: 100 INJECTION, SOLUTION INTRAVENOUS; SUBCUTANEOUS at 13:45

## 2024-04-10 RX ADMIN — INSULIN LISPRO 6 UNITS: 100 INJECTION, SOLUTION INTRAVENOUS; SUBCUTANEOUS at 17:26

## 2024-04-10 RX ADMIN — CLONIDINE HYDROCHLORIDE 0.2 MG: 0.1 TABLET ORAL at 21:01

## 2024-04-10 RX ADMIN — SPIRONOLACTONE 50 MG: 50 TABLET, FILM COATED ORAL at 09:48

## 2024-04-10 RX ADMIN — Medication 1 APPLICATION: at 09:48

## 2024-04-10 RX ADMIN — VALSARTAN 320 MG: 320 TABLET, FILM COATED ORAL at 09:48

## 2024-04-10 RX ADMIN — MEMANTINE HYDROCHLORIDE 10 MG: 10 TABLET, FILM COATED ORAL at 09:47

## 2024-04-10 RX ADMIN — CARVEDILOL 12.5 MG: 12.5 TABLET, FILM COATED ORAL at 17:26

## 2024-04-10 RX ADMIN — INSULIN GLARGINE 25 UNITS: 100 INJECTION, SOLUTION SUBCUTANEOUS at 20:54

## 2024-04-10 RX ADMIN — AMLODIPINE BESYLATE 10 MG: 10 TABLET ORAL at 09:48

## 2024-04-10 RX ADMIN — DOCUSATE SODIUM 50MG AND SENNOSIDES 8.6MG 2 TABLET: 8.6; 5 TABLET, FILM COATED ORAL at 20:53

## 2024-04-10 RX ADMIN — DOCUSATE SODIUM 50MG AND SENNOSIDES 8.6MG 2 TABLET: 8.6; 5 TABLET, FILM COATED ORAL at 09:47

## 2024-04-10 RX ADMIN — SERTRALINE 50 MG: 50 TABLET, FILM COATED ORAL at 20:52

## 2024-04-10 RX ADMIN — HYDROXYUREA 500 MG: 500 CAPSULE ORAL at 09:47

## 2024-04-10 RX ADMIN — Medication 3 MG: at 20:52

## 2024-04-10 RX ADMIN — GUAIFENESIN 1200 MG: 600 TABLET, EXTENDED RELEASE ORAL at 20:52

## 2024-04-10 RX ADMIN — Medication 1 APPLICATION: at 14:45

## 2024-04-10 RX ADMIN — SODIUM CHLORIDE 50 ML/HR: 9 INJECTION, SOLUTION INTRAVENOUS at 05:53

## 2024-04-10 RX ADMIN — ROSUVASTATIN CALCIUM 40 MG: 40 TABLET, FILM COATED ORAL at 20:53

## 2024-04-10 RX ADMIN — INSULIN LISPRO 3 UNITS: 100 INJECTION, SOLUTION INTRAVENOUS; SUBCUTANEOUS at 17:26

## 2024-04-10 RX ADMIN — CLONIDINE HYDROCHLORIDE 0.2 MG: 0.1 TABLET ORAL at 14:44

## 2024-04-10 RX ADMIN — LINAGLIPTIN 5 MG: 5 TABLET, FILM COATED ORAL at 09:47

## 2024-04-10 NOTE — PLAN OF CARE
Goal Outcome Evaluation:  Plan of Care Reviewed With: patient, son        Progress: declining  Outcome Evaluation: Pt seen for PT this AM, son present and states pt sleeps most of the day and helpful with arousing pt. Pt transferred to EOB dep A x2 and required CGA-max A range for sitting balance. Pt seemed much more confused today, though unable to engage in conversation. Pt not following commands, tolerated RUE/RLE and LLE PROM, but resistant to LUE PROM - squeezing therapist's fingers and swatting occasionally. Son present for part of session and attempting to calm pt and encourage participation/cooperation but minimal improvement with pt becoming more frustrated. Pt assisted dep A x2 back to bed and repositioned, left with needs met. Son asking about DC plans - PT with concerns about pt's ability to participate with therapy consistently to make progress, son reports minimal progress at rehab for 3 weeks prior as well as inconsistent participation before. Son reports they have a torsten lift at home and family assist to help, but would like to try rehab for a couple more weeks - updated RN and CCP following.      Anticipated Discharge Disposition (PT): skilled nursing facility

## 2024-04-10 NOTE — PROGRESS NOTES
Name: Erlin Blanco ADMIT: 2024   : 1945  PCP: Senia Dorsey MD    MRN: [id number] LOS: 0 days   AGE/SEX: 78 y.o. male  ROOM: [facility]     Subjective   Subjective   Chief Complaint   Patient presents with    Hypertension     Patient was resting . NAD.  Discussed with family at bedside.     Objective   Objective   Vital Signs  Temp:  [97.9 °F (36.6 °C)-98.4 °F (36.9 °C)] 98.4 °F (36.9 °C)  Heart Rate:  [50-68] 50  Resp:  [18] 18  BP: (140-160)/(63-83) 160/83  SpO2:  [96 %-99 %] 96 %  on   ;   Device (Oxygen Therapy): room air  Body mass index is 30.04 kg/m².    Physical Exam  Vitals and nursing note reviewed.   Constitutional:       General: He is not in acute distress.     Appearance: He is not diaphoretic.   HENT:      Head: Atraumatic.   Cardiovascular:      Rate and Rhythm: Normal rate and regular rhythm.   Pulmonary:      Effort: Pulmonary effort is normal. No respiratory distress.   Abdominal:      General: There is no distension.      Palpations: Abdomen is soft.      Tenderness: There is no abdominal tenderness. There is no guarding or rebound.   Musculoskeletal:         General: No tenderness.   Skin:     General: Skin is dry.   Neurological:      Comments: Baseline right-sided hemiparesis, facial droop       Results Review  I reviewed the patient's new clinical results.    Results from last 7 days   Lab Units 04/10/24  0516 04/09/24  0331 04/08/24  0429 04/07/24  0531   WBC 10*3/mm3 8.78 9.36 9.51 11.58*   HEMOGLOBIN g/dL 15.5 16.4 16.3 17.7   PLATELETS 10*3/mm3 279 224 259 291     Results from last 7 days   Lab Units 04/10/24  0516 04/09/24  03324  0531   SODIUM mmol/L 146* 144 143 145   POTASSIUM mmol/L 3.3* 3.6 3.4* 3.6   CHLORIDE mmol/L 114* 112* 110* 110*   CO2 mmol/L 22.2 20.9* 21.6* 21.0*   BUN mg/dL 19 22 25* 19   CREATININE mg/dL 0.75* 0.92 1.09 1.03   GLUCOSE mg/dL 122* 234* 296* 241*   EGFR mL/min/1.73 93.5 86.2 70.3 75.3     Results from last 7 days   Lab  Units 04/08/24  0429 04/06/24  1713   ALBUMIN g/dL 3.3* 3.8   BILIRUBIN mg/dL 0.4 0.6   ALK PHOS U/L 88 112   AST (SGOT) U/L 24 41*   ALT (SGPT) U/L 14 18     Results from last 7 days   Lab Units 04/10/24  0516 04/09/24  0331 04/08/24  0806 04/08/24  0429 04/07/24  0531 04/06/24  1713   CALCIUM mg/dL 9.0 9.1  --  9.1 9.7 9.8   ALBUMIN g/dL  --   --   --  3.3*  --  3.8   MAGNESIUM mg/dL  --   --  2.2  --   --   --          Glucose   Date/Time Value Ref Range Status   04/10/2024 1108 201 (H) 70 - 130 mg/dL Final   04/10/2024 0602 112 70 - 130 mg/dL Final   04/09/2024 2035 227 (H) 70 - 130 mg/dL Final   04/09/2024 1614 242 (H) 70 - 130 mg/dL Final   04/09/2024 1117 216 (H) 70 - 130 mg/dL Final   04/09/2024 0635 172 (H) 70 - 130 mg/dL Final   04/08/2024 2034 308 (H) 70 - 130 mg/dL Final       IR PICC W Imaging Guidance    Result Date: 4/8/2024  Successful PICC line exchange as described.  Reference air kerma: 10.7 mGy  This report was finalized on 4/8/2024 1:24 PM by Dr. Ephraim De La Rosa M.D on Workstation: [id number]       I have personally reviewed all medications:  Scheduled Medications  amLODIPine, 10 mg, Oral, Q24H  carvedilol, 12.5 mg, Oral, BID With Meals  cloNIDine, 0.2 mg, Oral, Q8H  fluticasone, 1 spray, Nasal, Daily  guaiFENesin, 1,200 mg, Oral, BID  hydrocortisone-bacitracin-zinc oxide-nystatin, 1 Application, Topical, TID  hydroxyurea, 500 mg, Oral, Daily  insulin glargine, 22 Units, Subcutaneous, Nightly  insulin lispro, 2-7 Units, Subcutaneous, 4x Daily AC & at Bedtime  insulin lispro, 5 Units, Subcutaneous, TID With Meals  lactobacillus acidophilus, 1 capsule, Oral, Daily  levETIRAcetam, 500 mg, Oral, BID  linagliptin, 5 mg, Oral, Daily  memantine, 10 mg, Oral, BID  Menthol-Zinc Oxide, 1 Application, Topical, TID  multivitamin with minerals, 1 tablet, Oral, Daily  OLANZapine zydis, 5 mg, Oral, Nightly  rosuvastatin, 40 mg, Oral, Nightly  sennosides-docusate, 2 tablet, Oral, BID  sertraline, 50 mg, Oral,  Nightly  sodium chloride, 10 mL, Intravenous, Q12H  sodium chloride, 10 mL, Intravenous, Q12H  spironolactone, 50 mg, Oral, Daily  tamsulosin, 0.4 mg, Oral, Daily  valsartan, 320 mg, Oral, Daily      Infusions  sodium chloride, 50 mL/hr, Last Rate: 50 mL/hr (04/10/24 0552)      Diet  Diet: Cardiac, Diabetic; Healthy Heart (2-3 Na+); Consistent Carbohydrate; Texture: Pureed (NDD 1); Fluid Consistency: Honey Thick    I have personally reviewed:  [x]  Laboratory   []  Microbiology   [x]  Radiology   [x]  EKG/Telemetry  []  Cardiology/Vascular   []  Pathology    [x]  Records       Assessment/Plan     Active Hospital Problems    Diagnosis  POA    **Hypertension, uncontrolled [I10]  Yes    Expressive aphasia [R47.01]  Yes    Paroxysmal atrial fibrillation [I48.0]  Yes    Type 2 diabetes mellitus [E11.9]  Yes    HTN (hypertension) [I10]  Yes    Paroxysmal atrial fibrillation [I48.0]  Yes    History of CVA (cerebrovascular accident) [Z86.73]  Not Applicable      Resolved Hospital Problems   No resolved problems to display.       78 y.o. male admitted with Hypertension, uncontrolled.    Hypertension: Acceptable acutely now.  Continue current regimen.  Diabetes: Check A1c.  Adjusting insulin based on requirements today with changed to Lantus 25 units nightly and lispro 6 units plus SSI Premeal.  Polycythemia vera: Status post phlebotomy.  On hydroxyurea.  PICC is going to be removed at discharge.  Not going to continue Eliquis.  Neurosurgery consulted about potential aspirin.  Oncology following.  Atrial arrhythmia: Cardiology evaluated and no convincing atrial fibrillation was noted in chart by electrophysiology.  To remain off Eliquis.  Cardiology following.  History intraparenchymal hemorrhage: Continued aphasia, hemiparesis.  Neurosurgery consult noted.  PPX: SCD  Disposition: SNF/possibly tomorrow, pre-CERT pending    Expected Discharge Date: 4/9/2024; Expected Discharge Time:      Valeriy De Jesus MD  [facility]  04/10/24  12:13 EDT    Dictated portions of note using Dragon dictation software.  Copied text in this note has been reviewed by me and remains accurate as of 04/10/24

## 2024-04-10 NOTE — CASE MANAGEMENT/SOCIAL WORK
Continued Stay Note  [facility]     Patient Name: Erlin Blanco  MRN: [id number]  Today's Date: 4/10/2024    Admit Date: 4/6/2024    Plan: Home w/ 24/7 care & HH - will need EMS   Discharge Plan       Row Name 04/10/24 1520       Plan    Plan Home w/ 24/7 care & HH - will need EMS    Patient/Family in Agreement with Plan yes    Plan Comments CCP spoke with patient's son & spouse via speaker phone. CCP let family know that the precert has been denied. Son & spouse state the new plan is to bring patient home w/ 24/7 care & HH. They have a torsten lift, hospital bed, and wheelchair. Referral sent to [facility]. [facility] updated MD on discharge plan. Patient will need EMS to get home. [facility] gave EMS disclaimer that we cannot guarantee that the EMS transport will be fully covered by insurance; family acknowledged.  In anticipation of possible discharge tomorrow, [facility] scheduled for 2pm. Patient will need to be seen by neurosurgery prior to dc. KAMRYN CORONELW                   Discharge Codes    No documentation.                 Expected Discharge Date and Time       Expected Discharge Date Expected Discharge Time    Apr 9, 2024               ERIC Ruiz

## 2024-04-10 NOTE — PROGRESS NOTES
REASON FOR FOLLOWUP/CHIEF COMPLAINT:  Polycythemia vera    HISTORY OF PRESENT ILLNESS:   No new issues overnight.  No bleeding.  Cardiology has evaluated and not planning any more Eliquis.    Past Medical History, Past Surgical History, Social History, Family History have been reviewed and are without significant changes except as mentioned.    Review of Systems   Review of Systems   Constitutional:  Negative for activity change.   HENT:  Negative for nosebleeds and trouble swallowing.    Respiratory:  Negative for shortness of breath and wheezing.    Cardiovascular:  Negative for chest pain and palpitations.   Gastrointestinal:  Negative for constipation, diarrhea and nausea.   Genitourinary:  Negative for dysuria and hematuria.   Musculoskeletal:  Negative for arthralgias and myalgias.   Neurological:  Negative for seizures and syncope.   Hematological:  Negative for adenopathy. Does not bruise/bleed easily.   Psychiatric/Behavioral:  Negative for confusion.        Medications:  The current medication list was reviewed in the EMR    ALLERGIES:    Allergies   Allergen Reactions    Donepezil Hallucinations    Steglatro [Ertugliflozin] Other (See Comments)     balanitis              Vitals:    04/09/24 2329 04/10/24 0500 04/10/24 0603 04/10/24 0750   BP: 140/63  160/83    BP Location: Right arm      Patient Position: Lying      Pulse: 50      Resp: 18   18   Temp: 98.4 °F (36.9 °C)      TempSrc: Oral   Oral   SpO2: 96%      Weight:  101 kg (221 lb 9 oz)     Height:         Physical Exam    CONSTITUTIONAL:  Vital signs reviewed.  No distress, looks comfortable.  EYES:  Conjunctivae and lids unremarkable.  PERRLA  EARS, NOSE, MOUTH, THROAT:  Ears and nose appear unremarkable.  Lips, teeth, gums appear unremarkable.  RESPIRATORY:  Normal respiratory effort.  Lungs clear to auscultation bilaterally.  CARDIOVASCULAR:  Normal S1, S2.  No murmurs, rubs or gallops.  No significant lower extremity  edema.  GASTROINTESTINAL: Abdomen appears unremarkable.  Nontender.  No hepatomegaly.  No splenomegaly.  NEURO: Cranial nerves 2-12 grossly intact.  No focal deficits.  Appears to have equal strength all 4 extremities.  MUSCULOSKELETAL:  Unremarkable digits/nails.  No cyanosis or clubbing.  SKIN:  Warm.  No rashes.  PSYCHIATRIC:  Normal judgment and insight.  Normal mood and affect.       RECENT LABS:  WBC   Date Value Ref Range Status   04/10/2024 8.78 3.40 - 10.80 10*3/mm3 Final   04/09/2024 9.36 3.40 - 10.80 10*3/mm3 Final   04/08/2024 9.51 3.40 - 10.80 10*3/mm3 Final     Hemoglobin   Date Value Ref Range Status   04/10/2024 15.5 13.0 - 17.7 g/dL Final   04/09/2024 16.4 13.0 - 17.7 g/dL Final   04/08/2024 16.3 13.0 - 17.7 g/dL Final     Platelets   Date Value Ref Range Status   04/10/2024 279 140 - 450 10*3/mm3 Final   04/09/2024 224 140 - 450 10*3/mm3 Final   04/08/2024 259 140 - 450 10*3/mm3 Final       ASSESSMENT/PLAN:  Erlin Blanco [id number]     *Polycythemia vera  Followed by Dr. Thomas, hematology oncology, for years, on Hydrea.    *Prior hemorrhagic stroke with expressive aphasia, lives in a nursing facility.    *Admitted 4/ 6/24 for uncontrolled hypertension    *Anticoagulation  Cardiology evaluated and state they think he may not have ever had A-fib and therefore they do not plan any more Eliquis regarding A-fib.  Daughter-in-law states patient was on aspirin 81 mg along with Eliquis treatment dose when he had the hemorrhagic stroke in February 2024 and has been off both of them since.  Daughter-in-law states he has had a couple of ischemic strokes.  Considering the hypercoagulability of polycythemia vera, I do recommend taking aspirin 81 mg daily if he is not going to be on Eliquis, if okay with neurosurgery.  I do see a CT from 4/7/2024 showing improvement of intracranial hemorrhage.    Plan  Consult neurosurgery to see if they are okay with him resuming aspirin 81 mg daily considering the  intracranial hemorrhage.  (Cardiology is not planning anymore Eliquis.)  Ideally, aspirin 81 mg daily is used to decrease the risk of ischemic events with polycythemia vera which could include MI, CVA, DVT, PE  Could not do a .phlebotomy through peripheral IV.  Therefore, PICC placed on 4/ 8/24 and phlebotomy occurred the morning of 4/9/2024.  Wife states he is not a difficult stick as an outpatient so I told her PICC would likely be removed upon discharge.  Wife will make sure he gets follow-up with his regular outpatient hematologist, Dr. Thomas, sometime after discharge  Continue Hydrea

## 2024-04-10 NOTE — PROGRESS NOTES
[facility] Follow Up    Chief Complaint: Follow up hypertension    Interval History: No significant complaints at this time.  Blood pressure is mildly elevated overnight and this morning.  Appears he did not receive his clonidine both in the afternoon and in the evening because of heart rates in the 40s while asleep.  His heart rates drop in the 70s to 80s while awake.    Objective:     Objective:  Temp:  [97.9 °F (36.6 °C)-98.4 °F (36.9 °C)] 98.4 °F (36.9 °C)  Heart Rate:  [50-68] 50  Resp:  [18] 18  BP: (140-160)/(63-83) 160/83     Intake/Output Summary (Last 24 hours) at 4/10/2024 0741  Last data filed at 4/10/2024 0500  Gross per 24 hour   Intake --   Output 550 ml   Net -550 ml     Body mass index is 30.04 kg/m².      04/08/24  0440 04/09/24  0544 04/10/24  0500   Weight: 98.6 kg (217 lb 6 oz) 95.9 kg (211 lb 6.7 oz) 101 kg (221 lb 9 oz)     Weight change: 4.6 kg (10 lb 2.3 oz)      Physical Exam:   General : Alert, cooperative, in no acute distress.  Neuro: Alert,cooperative and oriented.  Lungs: CTAB. Normal respiratory effort and rate.  CV: Regular rate and rhythm, normal S1 and S2, no murmurs, gallops or rubs.  ABD: Soft, nontender, nondistended. Positive bowel sounds.  Extr: No edema or cyanosis, moves all extremities.    Lab Review:   Results from last 7 days   Lab Units 04/10/24  0516 04/09/24  0331 04/08/24  0429 04/07/24  0531 04/06/24  1713   SODIUM mmol/L 146* 144 143   < > 142   POTASSIUM mmol/L 3.3* 3.6 3.4*   < > 4.4   CHLORIDE mmol/L 114* 112* 110*   < > 107   CO2 mmol/L 22.2 20.9* 21.6*   < > 19.6*   BUN mg/dL 19 22 25*   < > 20   CREATININE mg/dL 0.75* 0.92 1.09   < > 1.04   GLUCOSE mg/dL 122* 234* 296*   < > 268*   CALCIUM mg/dL 9.0 9.1 9.1   < > 9.8   AST (SGOT) U/L  --   --  24  --  41*   ALT (SGPT) U/L  --   --  14  --  18    < > = values in this interval not displayed.     Results from last 7 days   Lab Units 04/06/24  1925 04/06/24  1713   HSTROP T ng/L 54* 55*  11/21/2022  ISaqib MD, saw and evaluated the patient  I have discussed the patient with the resident/non-physician practitioner and agree with the resident's/non-physician practitioner's findings, Plan of Care, and MDM as documented in the resident's/non-physician practitioner's note, except where noted  All available labs and Radiology studies were reviewed  I was present for key portions of any procedure(s) performed by the resident/non-physician practitioner and I was immediately available to provide assistance  At this point I agree with the current assessment done in the Emergency Department  I have conducted an independent evaluation of this patient a history and physical is as follows:    ED Course     Patient is a 28-year-old female presents with generalized weakness fatigue nausea associated symptoms include palpitations  Patient states she took her blood pressure work today noted to or elevated  She admits to nausea denies any vomiting  Denies any headaches at this time  Denies any fevers chills shortness of breath or chest pain  Vital signs reviewed  Patient appears anxious elevated heart rate elevated blood pressure on arrival   Heart regular rhythm without murmurs  Lungs clear  Abdomen soft nontender nondistended normal bowel sounds  Extremities no edema  Impression:  Palpitations nausea plan check screening labs thyroid studies ECG chest x-ray pregnancy test will monitor patient trial of Ativan  Anticipate discharge if negative ED workup with outpatient PCP follow-up      Critical Care Time  Procedures "    Results from last 7 days   Lab Units 04/10/24  0516 04/09/24  0331   WBC 10*3/mm3 8.78 9.36   HEMOGLOBIN g/dL 15.5 16.4   HEMATOCRIT % 47.9 49.4   PLATELETS 10*3/mm3 279 224     Results from last 7 days   Lab Units 04/06/24  1713   INR  1.40*   APTT seconds 30.5     Results from last 7 days   Lab Units 04/08/24  0806   MAGNESIUM mg/dL 2.2           Invalid input(s): \"LDLCALC\"          I reviewed the patient's new clinical results.  I personally viewed and interpreted the patient's EKG  Current Medications:   Scheduled Meds:amLODIPine, 10 mg, Oral, Q24H  carvedilol, 12.5 mg, Oral, BID With Meals  cloNIDine, 0.2 mg, Oral, Q8H  fluticasone, 1 spray, Nasal, Daily  guaiFENesin, 1,200 mg, Oral, BID  hydrocortisone-bacitracin-zinc oxide-nystatin, 1 Application, Topical, TID  hydroxyurea, 500 mg, Oral, Daily  insulin glargine, 22 Units, Subcutaneous, Nightly  insulin lispro, 2-7 Units, Subcutaneous, 4x Daily AC & at Bedtime  insulin lispro, 5 Units, Subcutaneous, TID With Meals  lactobacillus acidophilus, 1 capsule, Oral, Daily  levETIRAcetam, 500 mg, Oral, BID  linagliptin, 5 mg, Oral, Daily  memantine, 10 mg, Oral, BID  Menthol-Zinc Oxide, 1 Application, Topical, TID  multivitamin with minerals, 1 tablet, Oral, Daily  OLANZapine zydis, 5 mg, Oral, Nightly  rosuvastatin, 40 mg, Oral, Nightly  sennosides-docusate, 2 tablet, Oral, BID  sertraline, 50 mg, Oral, Nightly  sodium chloride, 10 mL, Intravenous, Q12H  sodium chloride, 10 mL, Intravenous, Q12H  spironolactone, 50 mg, Oral, Daily  tamsulosin, 0.4 mg, Oral, Daily  valsartan, 320 mg, Oral, Daily      Continuous Infusions:sodium chloride, 50 mL/hr, Last Rate: 50 mL/hr (04/10/24 0553)        Allergies:  Allergies   Allergen Reactions    Donepezil Hallucinations    Steglatro [Ertugliflozin] Other (See Comments)     balanitis       Assessment/Plan:     Hypertension.  Overall well-controlled.  Mildly elevated this morning because his clonidine was held.  He did receive " his clonidine this morning.  Altered mental status.  Improved.  Recent hemorrhagic stroke   Nonspecific atrial arrhythmias.  Previously thought to be atrial fibrillation.  However recently Dr. Porras reviewed numerous EKGs and holter monitors and did not see any convincing AF. Apixaban has been stopped indefinitely without plans to restart.   Diabetes mellitus type II  Coronary artery disease  Chronic diastolic heart failure     -Continue current antihypertensive regimen.  Suspect his blood pressure will improve as long as he routinely receives his current medications.  -He is okay for discharge from my standpoint as long as his blood pressures improve after he receives his clonidine this morning.    Marika Arias MD  04/10/24  07:41 EDT

## 2024-04-10 NOTE — CASE MANAGEMENT/SOCIAL WORK
"Physicians Statement of Medical Necessity for  Ambulance Transportation    GENERAL INFORMATION     Name: Erlin Blanco  YOB: 1945  Medicare #: [id number]  Transport Date: 4/11/2024 (Valid for round trips this date, or for scheduled repetitive trips for 60 days from the date signed below.)  Origin: [facility]  Destination: [address] [place] [address]  Is the Patient's stay covered under Medicare Part A (PPS/DRG?)Yes  Closest appropriate facility? Yes  If this a hosp-hosp transfer? No  Is this a hospice patient? No    MEDICAL NECESSITY QUESTIONAIRE    Ambulance Transportation is medically necessary only if other means of transportation are contraindicated or would be potentially harmful to the patient.  To meet this requirement, the patient must be either \"bed confined\" or suffer from a condition such that transport by means other than an ambulance is contraindicated by the patient's condition.  The following questions must be answered by the healthcare professional signing below for this form to be valid:     1) Describe the MEDICAL CONDITION (physical and/or mental) of this patient AT THE TIME OF AMBULANCE TRANSPORT that requires the patient to be transported in an ambulance, and why transport by other means is contraindicated by the patient's condition:   Past Medical History:   Diagnosis Date    Abnormal ECG     Abnormal stress test     Abrasion of face 01/29/2020    Acute bronchitis     Acute hypokalemia 09/16/2020    Acute non-recurrent sinusitis 01/13/2020    Acute pyelonephritis 10/09/2020    Acute sinusitis     DORI (acute kidney injury) 04/28/2017    Allergic rhinitis     Aortic root dilation     Arthritis     Bacteremia due to Escherichia coli 08/17/2020    Balanitis 05/16/2021    Benign enlargement of prostate     C. difficile colitis 09/16/2020    Cellulitis of knee, left 05/04/2017    CHF (congestive heart failure)     Chronic diastolic congestive heart failure     " Constipation 11/14/2020    Contusion of face 01/29/2020    Coronary artery disease     COVID-19 virus infection     CVA (cerebral vascular accident) 2020    Diminished pulse     in lower extremities    Discitis of lumbar region 10/09/2020    Edema     Elevated hematocrit     Elevated hemoglobin     Essential hypertension     Hearing loss     mala hearing aids    Heart valve disease     High risk medication use     History of back pain     History of dysuria     History of echocardiogram     History of intracranial hemorrhage     History of scoliosis     History of stroke     Hyperlipidemia     Hypokalemia 01/07/2021    Injury of toe, left, initial encounter     Irregular heart rate     Knee pain, left     Left bundle branch block     Leg wound, left     Leukocytosis 09/16/2020    Low back pain     Maxillary sinusitis 07/16/2021    Medicare annual wellness visit, subsequent     Minor head injury without loss of consciousness 01/29/2020    Murmur     Muscle cramps     Need for hepatitis C screening test     Paraphimosis 05/16/2021    Pneumonia of left lung due to infectious organism 09/17/2021    Polycythemia     Polycythemia vera     Pressure injury of buttock, stage 2 09/18/2020    Prostate hyperplasia without urinary obstruction     Prostatitis     Proteinuria     Pulmonary hypertension     Sacroiliitis 10/09/2020    Scoliosis     Sepsis due to Escherichia coli 08/17/2020    Sepsis with metabolic encephalopathy 08/17/2020    Stroke     SVT (supraventricular tachycardia)     Tachycardia     Thrombocytosis     TIA (transient ischemic attack)     2006    Type 2 diabetes mellitus     Type 2 diabetes mellitus with hyperglycemia 05/16/2021    Urinary tract infection due to extended-spectrum beta lactamase (ESBL) producing Escherichia coli 08/18/2020    Valvular heart disease       Past Surgical History:   Procedure Laterality Date    CARDIAC CATHETERIZATION      CATARACT EXTRACTION Left 04/2021    CATARACT EXTRACTION  "Right 07/26/2022    COLONOSCOPY      did Cologuard approx 6/2019 and negative    HAND SURGERY      1970s    JOINT REPLACEMENT Right 2014    RI ARTHRP KNE CONDYLE&PLATU MEDIAL&LAT COMPARTMENTS Left 04/27/2017    Procedure: LT TOTAL KNEE ARTHROPLASTY;  Surgeon: Bertin Perrin MD;  Location: [facility];  Service: Orthopedics    PROSTATE SURGERY      Rezum steam treatment to shrink prostate by dr. guerrero      2) Is this patient \"bed confined\" as defined below?Yes   To be \"bed confined\" the patient must satisfy all three of the following criteria:  (1) unable to get up from bed without assistance; AND (2) unable to ambulate;  AND (3) unable to sit in a chair or wheelchair.  3) Can this patient safely be transported by car or wheelchair van (I.e., may safely sit during transport, without an attendant or monitoring?)No   4. In addition to completing questions 1-3 above, please check any of the following conditions that apply*:          *Note: supporting documentation for any boxes checked must be maintained in the patient's medical records Patient is confused, Moderate/severe pain on movement, Danger to self/other, Patient is combative, Medical attendant required, Unable to tolerate seated position for time needed to transport, and Other non-ambulatory, confusion, agitation      SIGNATURE OF PHYSICIAN OR OTHER AUTHORIZED HEALTHCARE PROFESSIONAL    I certify that the above information is true and correct based on my evaluation of this patient, and represent that the patient requires transport by ambulance and that other forms of transport are contraindicated.  I understand that this information will be used by the Centers for Medicare and Medicaid Services (CMS) to support the determiniation of medical necessity for ambulance services, and I represent that I have personal knowledge of the patient's condition at the time of transport.       If this box is checked, I also certify that the patient is physically or " mentally incapable of signing the ambulance service's claim form and that the institution with which I am affiliated has furnished care, services or assistance to the patient.  My signature below is made on behalf of the patient pursuant to 42 .36(b)(4). In accordance with 42 .37, the specific reason(s) that the patient is physically or mentally incapable of signing the claim for is as follows:     Signature of Physician or Healthcare Professional  Date/Time:        (For Scheduled repetitive transport, this form is not valid for transports performed more than 60 days after this date).                                                                                                                                            --------------------------------------------------------------------------------------------  Printed Name and Credentials of Physician or Authorized Healthcare Professional     *Form must be signed by patient's attending physician for scheduled, repetitive transports,.  For non-repetitive ambulance transports, if unable to obtain the signature of the attending physician, any of the following may sign (please select below):     Physician  Clinical Nurse Specialist  Registered Nurse     Physician Assistant  Discharge Planner  Licensed Practical Nurse     Nurse Practitioner

## 2024-04-10 NOTE — DISCHARGE PLACEMENT REQUEST
"Felipe Dockery (78 y.o. Male)       Date of Birth   1945    Social Security Number       Address   [address] [place] [facility] [address]    Home Phone   [phone]    MRN   [id number]       Evangel[employer]   None    Marital Status                               Admission Date   4/6/24    Admission Type   Emergency    Admitting Provider   Valeriy De Jesus MD    Attending Provider   Valeriy De Jesus MD    Department, Room/Bed   [address], [address]/1       Discharge Date       Discharge Disposition       Discharge Destination                                 Attending Provider: Valeriy De Jesus MD    Allergies: Donepezil, Steglatro [Ertugliflozin]    Isolation: None   Infection: None   Code Status: No CPR    Ht: 182.9 cm (72.01\")   Wt: 101 kg (221 lb 9 oz)    Admission Cmt: None   Principal Problem: Hypertension, uncontrolled [I10]                   Active Insurance as of 4/6/2024       Primary Coverage       Payor Plan Insurance Group Employer/Plan Group    HUMANA MEDICARE REPLACEMENT HUMANA MED ADV GROUP [id number]       Payor Plan Address Payor Plan Phone Number Payor Plan Fax Number Effective Dates    [address] [address] [phone]  1/1/2022 - None Entered    [facility] [id number]         Subscriber Name Subscriber Birth Date Member ID       FELIPE DOCKERY 1945 [id number]                     Emergency Contacts        (Rel.) Home Phone Work Phone Mobile Phone    JOAN DOCKERY \"GUNNAR\" (Spouse) -- -- [phone]    JAME DOCKERY (Son) -- -- [phone]    Tiffany Dockery (Daughter) -- -- [phone]    Joan Dockery (Spouse) [phone] -- [phone]    Jame Dockery (Son) [phone] -- [phone]    TIFFANY DOCKERY (DAUG N LAW) (Relative) [phone] -- [phone]                "

## 2024-04-10 NOTE — THERAPY TREATMENT NOTE
Patient Name: Erlin Blanco  : 1945    MRN: [id number]                              Today's Date: 4/10/2024       Admit Date: 2024    Visit Dx:     ICD-10-CM ICD-9-CM   1. Hypertension, uncontrolled  I10 401.9   2. 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 without macular edema, without long-term current use of insulin    Primary hypertension    Coronary artery disease    Supraventricular tachycardia    TIA (transient ischemic attack)    Hyperlipidemia    Benign prostatic hyperplasia without urinary obstruction    Class 2 severe obesity due to excess calories with serious comorbidity and body mass index (BMI) of 36.0 to 36.9 in adult    Polycythemia vera    Acute non-recurrent sinusitis    Pain in both knees    Allergic rhinitis    Left bundle-branch block    Fatigue    Atrial flutter    History of CVA (cerebrovascular accident)    Cervical radiculitis    Chronic diastolic congestive heart failure    CKD (chronic kidney disease) stage 2, GFR 60-89 ml/min    Glucosuria    Paroxysmal atrial fibrillation    Memory difficulties    Bradycardia    Anemia    Low back pain    Back pain    Cardiovascular stress test abnormal    Prostatitis    Localized edema    Murmur    Pulmonary hypertension    Thrombocytosis    Arthritis    Proteinuria    Fall at home    Cognitive communication deficit    Mild cognitive impairment with memory loss    Hematuria    Hypertension, uncontrolled     Past Medical History:   Diagnosis Date    Abnormal ECG     Abnormal stress test     Abrasion of face 2020    Acute bronchitis     Acute hypokalemia 2020     Acute non-recurrent sinusitis 01/13/2020    Acute pyelonephritis 10/09/2020    Acute sinusitis     DORI (acute kidney injury) 04/28/2017    Allergic rhinitis     Aortic root dilation     Arthritis     Bacteremia due to Escherichia coli 08/17/2020    Balanitis 05/16/2021    Benign enlargement of prostate     C. difficile colitis 09/16/2020    Cellulitis of knee, left 05/04/2017    CHF (congestive heart failure)     Chronic diastolic congestive heart failure     Constipation 11/14/2020    Contusion of face 01/29/2020    Coronary artery disease     COVID-19 virus infection     CVA (cerebral vascular accident) 2020    Diminished pulse     in lower extremities    Discitis of lumbar region 10/09/2020    Edema     Elevated hematocrit     Elevated hemoglobin     Essential hypertension     Hearing loss     mala hearing aids    Heart valve disease     High risk medication use     History of back pain     History of dysuria     History of echocardiogram     History of intracranial hemorrhage     History of scoliosis     History of stroke     Hyperlipidemia     Hypokalemia 01/07/2021    Injury of toe, left, initial encounter     Irregular heart rate     Knee pain, left     Left bundle branch block     Leg wound, left     Leukocytosis 09/16/2020    Low back pain     Maxillary sinusitis 07/16/2021    Medicare annual wellness visit, subsequent     Minor head injury without loss of consciousness 01/29/2020    Murmur     Muscle cramps     Need for hepatitis C screening test     Paraphimosis 05/16/2021    Pneumonia of left lung due to infectious organism 09/17/2021    Polycythemia     Polycythemia vera     Pressure injury of buttock, stage 2 09/18/2020    Prostate hyperplasia without urinary obstruction     Prostatitis     Proteinuria     Pulmonary hypertension     Sacroiliitis 10/09/2020    Scoliosis     Sepsis due to Escherichia coli 08/17/2020    Sepsis with metabolic encephalopathy 08/17/2020    Stroke     SVT (supraventricular  tachycardia)     Tachycardia     Thrombocytosis     TIA (transient ischemic attack)     2006    Type 2 diabetes mellitus     Type 2 diabetes mellitus with hyperglycemia 05/16/2021    Urinary tract infection due to extended-spectrum beta lactamase (ESBL) producing Escherichia coli 08/18/2020    Valvular heart disease      Past Surgical History:   Procedure Laterality Date    CARDIAC CATHETERIZATION      CATARACT EXTRACTION Left 04/2021    CATARACT EXTRACTION Right 07/26/2022    COLONOSCOPY      did Cologuard approx 6/2019 and negative    HAND SURGERY      1970s    JOINT REPLACEMENT Right 2014    OR ARTHRP KNE CONDYLE&PLATU MEDIAL&LAT COMPARTMENTS Left 04/27/2017    Procedure: LT TOTAL KNEE ARTHROPLASTY;  Surgeon: Bertin Perrin MD;  Location: [facility] OR;  Service: Orthopedics    PROSTATE SURGERY      Rezum steam treatment to shrink prostate by dr. guerrero      General Information       [facility] Name 04/10/24 1146          Physical Therapy Time and Intention    Document Type therapy note (daily note)  -     Mode of Treatment individual therapy;physical therapy  -       Row Name 04/10/24 1146          General Information    Patient Profile Reviewed yes  -     Existing Precautions/Restrictions fall  -       Row Name 04/10/24 1146          Cognition    Orientation Status (Cognition) oriented to;person;other (see comments)  More agitated today, uncooperative, not following commands  -       Row Name 04/10/24 1146          Safety Issues, Functional Mobility    Impairments Affecting Function (Mobility) endurance/activity tolerance;strength;postural/trunk control;balance  -               User Key  (r) = Recorded By, (t) = Taken By, (c) = Cosigned By      Initials Name Provider Type     Sarah Shah PT Physical Therapist                   Mobility       Row Name 04/10/24 1147          Bed Mobility    Bed Mobility supine-sit;sit-supine  -     Supine-Sit [place] (Bed Mobility) dependent (less than 25%  patient effort);2 person assist  -     Sit-Supine [place] (Bed Mobility) dependent (less than 25% patient effort);2 person assist  -     Assistive Device (Bed Mobility) draw sheet;head of bed elevated  -       Row Name 04/10/24 1147          Transfers    Comment, (Transfers) Sat EOB ~10 minutes, not cooperating with PROM, resistant on LUE but tolerated LLE and R sided PROM. Ranging from CGA-max A for sitting balance depending on cooperation  -               User Key  (r) = Recorded By, (t) = Taken By, (c) = Cosigned By      Initials Name Provider Type     Sarah Shah, PT Physical Therapist                   Obj/Interventions       Row Name 04/10/24 1150          Motor Skills    Therapeutic Exercise --  5-10 reps PROM LAQ/seated marches BLE, PROM RUE, resisted PROM LUE and swatting/squeezing therapist's fingers  -       Row Name 04/10/24 1150          Balance    Comment, Balance CGa-max A range for sitting balance depending on cooperation/agitation  -               User Key  (r) = Recorded By, (t) = Taken By, (c) = Cosigned By      Initials Name Provider Type     Sarah Shah, PT Physical Therapist                   Goals/Plan    No documentation.                  Clinical Impression       Row Name 04/10/24 1151          Pain    Pretreatment Pain Rating 0/10 - no pain  -     Posttreatment Pain Rating 0/10 - no pain  -[facility] Name 04/10/24 1151          Plan of Care Review    Plan of Care Reviewed With patient;son  -     Progress declining  -     Outcome Evaluation Pt seen for PT this AM, son present and states pt sleeps most of the day and helpful with arousing pt. Pt transferred to EOB dep A x2 and required CGA-max A range for sitting balance. Pt seemed much more confused today, though unable to engage in conversation. Pt not following commands, tolerated RUE/RLE and LLE PROM, but resistant to LUE PROM - squeezing therapist's fingers and swatting occasionally. Son present for  part of session and attempting to calm pt and encourage participation/cooperation but minimal improvement with pt becoming more frustrated. Pt assisted dep A x2 back to bed and repositioned, left with needs met. Son asking about DC plans - PT with concerns about pt's ability to participate with therapy consistently to make progress, son reports minimal progress at rehab for 3 weeks prior as well as inconsistent participation before. Son reports they have a torsten lift at home and family assist to help, but would like to try rehab for a couple more weeks - updated RN and CCP following.  -       Row Name 04/10/24 1151          Vital Signs    O2 Delivery Pre Treatment room air  -     O2 Delivery Intra Treatment room air  -     O2 Delivery Post Treatment room air  -       Row Name 04/10/24 1151          Positioning and Restraints    Pre-Treatment Position in bed  -     Post Treatment Position bed  -     In Bed notified nsg;supine;call light within reach;encouraged to call for assist;exit alarm on;with family/caregiver  -               User Key  (r) = Recorded By, (t) = Taken By, (c) = Cosigned By      Initials Name Provider Type     Sarah Shah, PT Physical Therapist                   Outcome Measures       Row Name 04/10/24 1156          How much help from another person do you currently need...    Turning from your back to your side while in flat bed without using bedrails? 1  -BH     Moving from lying on back to sitting on the side of a flat bed without bedrails? 1  -BH     Moving to and from a bed to a chair (including a wheelchair)? 1  -BH     Standing up from a chair using your arms (e.g., wheelchair, bedside chair)? 1  -BH     Climbing 3-5 steps with a railing? 1  -BH     To walk in hospital room? 1  -BH     AM-PAC 6 Clicks Score (PT) 6  -     Highest Level of Mobility Goal 2 --> Bed activities/dependent transfer  -       Row Name 04/10/24 1159          Functional Assessment    Outcome  Measure Options AM-PAC 6 Clicks Basic Mobility (PT)  -               User Key  (r) = Recorded By, (t) = Taken By, (c) = Cosigned By      Initials Name Provider Type     Sarah Shah PT Physical Therapist                                 Physical Therapy Education       Title: PT OT SLP Therapies (In Progress)       Topic: Physical Therapy (In Progress)       Point: Mobility training (In Progress)       Learning Progress Summary             Patient Acceptance, E,TB,D, NL,NR by  at 4/10/2024 1156    Acceptance, E,TB,D, NR,NL by  at 4/9/2024 1755                         Point: Home exercise program (In Progress)       Learning Progress Summary             Patient Acceptance, E,TB,D, NL,NR by  at 4/10/2024 1156    Acceptance, E,TB,D, NR,NL by  at 4/9/2024 1755                         Point: Body mechanics (In Progress)       Learning Progress Summary             Patient Acceptance, E,TB,D, NL,NR by  at 4/10/2024 1156    Acceptance, E,TB,D, NR,NL by  at 4/9/2024 1755                         Point: Precautions (In Progress)       Learning Progress Summary             Patient Acceptance, E,TB,D, NL,NR by  at 4/10/2024 1156    Acceptance, E,TB,D, NR,NL by  at 4/9/2024 1755                                         User Key       Initials Effective Dates Name Provider Type [facility] 04/08/22 -  Sarah Shah PT Physical Therapist PT                  PT Recommendation and Plan     Plan of Care Reviewed With: patient, son  Progress: declining  Outcome Evaluation: Pt seen for PT this AM, son present and states pt sleeps most of the day and helpful with arousing pt. Pt transferred to EOB dep A x2 and required CGA-max A range for sitting balance. Pt seemed much more confused today, though unable to engage in conversation. Pt not following commands, tolerated RUE/RLE and LLE PROM, but resistant to LUE PROM - squeezing therapist's fingers and swatting occasionally. Son present for part of session  and attempting to calm pt and encourage participation/cooperation but minimal improvement with pt becoming more frustrated. Pt assisted dep A x2 back to bed and repositioned, left with needs met. Son asking about DC plans - PT with concerns about pt's ability to participate with therapy consistently to make progress, son reports minimal progress at rehab for 3 weeks prior as well as inconsistent participation before. Son reports they have a torsten lift at home and family assist to help, but would like to try rehab for a couple more weeks - updated RN and CCP following.     Time Calculation:         PT Charges       Row Name 04/10/24 1157             Time Calculation    Start Time 1058  -      Stop Time 1122  -      Time Calculation (min) 24 min  -      PT Received On 04/10/24  -      PT - Next Appointment 04/11/24  -         Time Calculation- PT    Total Timed Code Minutes- PT 24 minute(s)  -         Timed Charges    01367 - PT Therapeutic Activity Minutes 24  -BH         Total Minutes    Timed Charges Total Minutes 24  -BH       Total Minutes 24  -BH                User Key  (r) = Recorded By, (t) = Taken By, (c) = Cosigned By      Initials Name Provider Type     Sarah Shah PT Physical Therapist                  Therapy Charges for Today       Code Description Service Date Service Provider Modifiers Qty    05897269390 HC PT THERAPEUTIC ACT EA 15 MIN 4/9/2024 Sarah Shah, PT GP 1    06387224667 HC PT THERAPEUTIC ACT EA 15 MIN 4/10/2024 Sarah Shah, PT GP 2    28915312489 HC PT THER SUPP EA 15 MIN 4/10/2024 Sarah Shah PT GP 1            PT G-Codes  Outcome Measure Options: AM-PAC 6 Clicks Basic Mobility (PT)  AM-PAC 6 Clicks Score (PT): 6  PT Discharge Summary  Anticipated Discharge Disposition (PT): skilled nursing facility    Sarah Shah PT  4/10/2024

## 2024-04-10 NOTE — NURSING NOTE
No new issues overnight. Q2 turns. Bradycardic when sleeping down to high 40s but in 70s while awake. 2 doses of clonidine held yesterday r/t HR parameters which is reflected on his 160 SBP this AM. VSS. Baseline mentation, RA.

## 2024-04-11 LAB
ALBUMIN SERPL-MCNC: 3.7 G/DL (ref 3.5–5.2)
ANION GAP SERPL CALCULATED.3IONS-SCNC: 9 MMOL/L (ref 5–15)
BUN SERPL-MCNC: 11 MG/DL (ref 8–23)
BUN/CREAT SERPL: 12.9 (ref 7–25)
CALCIUM SPEC-SCNC: 8.8 MG/DL (ref 8.6–10.5)
CHLORIDE SERPL-SCNC: 110 MMOL/L (ref 98–107)
CO2 SERPL-SCNC: 27 MMOL/L (ref 22–29)
CREAT SERPL-MCNC: 0.85 MG/DL (ref 0.76–1.27)
DEPRECATED RDW RBC AUTO: 54 FL (ref 37–54)
EGFRCR SERPLBLD CKD-EPI 2021: 90.1 ML/MIN/1.73
ERYTHROCYTE [DISTWIDTH] IN BLOOD BY AUTOMATED COUNT: 16.2 % (ref 12.3–15.4)
GLUCOSE BLDC GLUCOMTR-MCNC: 166 MG/DL (ref 70–130)
GLUCOSE BLDC GLUCOMTR-MCNC: 185 MG/DL (ref 70–130)
GLUCOSE BLDC GLUCOMTR-MCNC: 309 MG/DL (ref 70–130)
GLUCOSE BLDC GLUCOMTR-MCNC: 313 MG/DL (ref 70–130)
GLUCOSE SERPL-MCNC: 165 MG/DL (ref 65–99)
HBA1C MFR BLD: 8.4 % (ref 4.8–5.6)
HCT VFR BLD AUTO: 48.2 % (ref 37.5–51)
HGB BLD-MCNC: 15.4 G/DL (ref 13–17.7)
MAGNESIUM SERPL-MCNC: 1.6 MG/DL (ref 1.6–2.4)
MCH RBC QN AUTO: 28.9 PG (ref 26.6–33)
MCHC RBC AUTO-ENTMCNC: 32 G/DL (ref 31.5–35.7)
MCV RBC AUTO: 90.6 FL (ref 79–97)
PHOSPHATE SERPL-MCNC: 2.1 MG/DL (ref 2.5–4.5)
PHOSPHATE SERPL-MCNC: 3.1 MG/DL (ref 2.5–4.5)
PLATELET # BLD AUTO: 311 10*3/MM3 (ref 140–450)
PMV BLD AUTO: 10 FL (ref 6–12)
POTASSIUM SERPL-SCNC: 3.6 MMOL/L (ref 3.5–5.2)
POTASSIUM SERPL-SCNC: 4 MMOL/L (ref 3.5–5.2)
RBC # BLD AUTO: 5.32 10*6/MM3 (ref 4.14–5.8)
SODIUM SERPL-SCNC: 146 MMOL/L (ref 136–145)
WBC NRBC COR # BLD AUTO: 9.23 10*3/MM3 (ref 3.4–10.8)

## 2024-04-11 PROCEDURE — 99232 SBSQ HOSP IP/OBS MODERATE 35: CPT | Performed by: INTERNAL MEDICINE

## 2024-04-11 PROCEDURE — 63710000001 INSULIN LISPRO (HUMAN) PER 5 UNITS: Performed by: INTERNAL MEDICINE

## 2024-04-11 PROCEDURE — 63710000001 INSULIN GLARGINE PER 5 UNITS: Performed by: INTERNAL MEDICINE

## 2024-04-11 PROCEDURE — 83735 ASSAY OF MAGNESIUM: CPT | Performed by: INTERNAL MEDICINE

## 2024-04-11 PROCEDURE — 25010000002 POTASSIUM CHLORIDE PER 2 MEQ: Performed by: INTERNAL MEDICINE

## 2024-04-11 PROCEDURE — 84132 ASSAY OF SERUM POTASSIUM: CPT | Performed by: INTERNAL MEDICINE

## 2024-04-11 PROCEDURE — 85027 COMPLETE CBC AUTOMATED: CPT | Performed by: INTERNAL MEDICINE

## 2024-04-11 PROCEDURE — 84100 ASSAY OF PHOSPHORUS: CPT | Performed by: INTERNAL MEDICINE

## 2024-04-11 PROCEDURE — 80069 RENAL FUNCTION PANEL: CPT | Performed by: INTERNAL MEDICINE

## 2024-04-11 PROCEDURE — 82948 REAGENT STRIP/BLOOD GLUCOSE: CPT

## 2024-04-11 PROCEDURE — 83036 HEMOGLOBIN GLYCOSYLATED A1C: CPT | Performed by: INTERNAL MEDICINE

## 2024-04-11 RX ORDER — HYDRALAZINE HYDROCHLORIDE 20 MG/ML
10 INJECTION INTRAMUSCULAR; INTRAVENOUS EVERY 6 HOURS PRN
Status: DISCONTINUED | OUTPATIENT
Start: 2024-04-11 | End: 2024-04-13 | Stop reason: HOSPADM

## 2024-04-11 RX ORDER — POTASSIUM CHLORIDE 29.8 MG/ML
20 INJECTION INTRAVENOUS
Status: COMPLETED | OUTPATIENT
Start: 2024-04-11 | End: 2024-04-11

## 2024-04-11 RX ORDER — ASPIRIN 81 MG/1
81 TABLET ORAL DAILY
Status: DISCONTINUED | OUTPATIENT
Start: 2024-04-11 | End: 2024-04-13 | Stop reason: HOSPADM

## 2024-04-11 RX ORDER — FENTANYL/ROPIVACAINE/NS/PF 2-625MCG/1
15 PLASTIC BAG, INJECTION (ML) EPIDURAL ONCE
Status: COMPLETED | OUTPATIENT
Start: 2024-04-11 | End: 2024-04-11

## 2024-04-11 RX ORDER — CARVEDILOL 12.5 MG/1
12.5 TABLET ORAL 2 TIMES DAILY WITH MEALS
Qty: 60 TABLET | Refills: 0 | Status: SHIPPED | OUTPATIENT
Start: 2024-04-11

## 2024-04-11 RX ADMIN — SERTRALINE 50 MG: 50 TABLET, FILM COATED ORAL at 20:02

## 2024-04-11 RX ADMIN — Medication 1 APPLICATION: at 09:58

## 2024-04-11 RX ADMIN — Medication 1 APPLICATION: at 20:03

## 2024-04-11 RX ADMIN — INSULIN LISPRO 6 UNITS: 100 INJECTION, SOLUTION INTRAVENOUS; SUBCUTANEOUS at 13:59

## 2024-04-11 RX ADMIN — Medication 3 MG: at 20:02

## 2024-04-11 RX ADMIN — POTASSIUM CHLORIDE 20 MEQ: 400 INJECTION, SOLUTION INTRAVENOUS at 11:16

## 2024-04-11 RX ADMIN — POTASSIUM PHOSPHATE, MONOBASIC POTASSIUM PHOSPHATE, DIBASIC 15 MMOL: 224; 236 INJECTION, SOLUTION, CONCENTRATE INTRAVENOUS at 09:55

## 2024-04-11 RX ADMIN — INSULIN LISPRO 5 UNITS: 100 INJECTION, SOLUTION INTRAVENOUS; SUBCUTANEOUS at 17:40

## 2024-04-11 RX ADMIN — Medication 10 ML: at 10:00

## 2024-04-11 RX ADMIN — INSULIN LISPRO 5 UNITS: 100 INJECTION, SOLUTION INTRAVENOUS; SUBCUTANEOUS at 21:56

## 2024-04-11 RX ADMIN — VALSARTAN 320 MG: 320 TABLET, FILM COATED ORAL at 09:57

## 2024-04-11 RX ADMIN — LEVETIRACETAM 500 MG: 500 TABLET, FILM COATED ORAL at 09:57

## 2024-04-11 RX ADMIN — LINAGLIPTIN 5 MG: 5 TABLET, FILM COATED ORAL at 09:57

## 2024-04-11 RX ADMIN — AMLODIPINE BESYLATE 10 MG: 10 TABLET ORAL at 09:57

## 2024-04-11 RX ADMIN — INSULIN LISPRO 6 UNITS: 100 INJECTION, SOLUTION INTRAVENOUS; SUBCUTANEOUS at 09:57

## 2024-04-11 RX ADMIN — Medication 10 ML: at 20:03

## 2024-04-11 RX ADMIN — Medication 1 APPLICATION: at 14:00

## 2024-04-11 RX ADMIN — POTASSIUM CHLORIDE 20 MEQ: 400 INJECTION, SOLUTION INTRAVENOUS at 09:55

## 2024-04-11 RX ADMIN — ACETAMINOPHEN 325MG 650 MG: 325 TABLET ORAL at 10:10

## 2024-04-11 RX ADMIN — GUAIFENESIN 1200 MG: 600 TABLET, EXTENDED RELEASE ORAL at 09:58

## 2024-04-11 RX ADMIN — INSULIN LISPRO 6 UNITS: 100 INJECTION, SOLUTION INTRAVENOUS; SUBCUTANEOUS at 17:42

## 2024-04-11 RX ADMIN — LEVETIRACETAM 500 MG: 500 TABLET, FILM COATED ORAL at 20:02

## 2024-04-11 RX ADMIN — MEMANTINE HYDROCHLORIDE 10 MG: 10 TABLET, FILM COATED ORAL at 20:02

## 2024-04-11 RX ADMIN — ZINC OXIDE 1 APPLICATION: 200 OINTMENT TOPICAL at 09:58

## 2024-04-11 RX ADMIN — OLANZAPINE 5 MG: 5 TABLET, ORALLY DISINTEGRATING ORAL at 20:02

## 2024-04-11 RX ADMIN — Medication 1 CAPSULE: at 09:58

## 2024-04-11 RX ADMIN — ZINC OXIDE 1 APPLICATION: 200 OINTMENT TOPICAL at 14:00

## 2024-04-11 RX ADMIN — SPIRONOLACTONE 50 MG: 50 TABLET, FILM COATED ORAL at 09:58

## 2024-04-11 RX ADMIN — ASPIRIN 81 MG: 81 TABLET, COATED ORAL at 09:58

## 2024-04-11 RX ADMIN — MEMANTINE HYDROCHLORIDE 10 MG: 10 TABLET, FILM COATED ORAL at 09:57

## 2024-04-11 RX ADMIN — CARVEDILOL 12.5 MG: 12.5 TABLET, FILM COATED ORAL at 09:57

## 2024-04-11 RX ADMIN — GUAIFENESIN 1200 MG: 600 TABLET, EXTENDED RELEASE ORAL at 20:02

## 2024-04-11 RX ADMIN — Medication 1 TABLET: at 09:58

## 2024-04-11 RX ADMIN — CLONIDINE HYDROCHLORIDE 0.2 MG: 0.1 TABLET ORAL at 13:58

## 2024-04-11 RX ADMIN — ZINC OXIDE 1 APPLICATION: 200 OINTMENT TOPICAL at 20:02

## 2024-04-11 RX ADMIN — DOCUSATE SODIUM 50MG AND SENNOSIDES 8.6MG 2 TABLET: 8.6; 5 TABLET, FILM COATED ORAL at 20:02

## 2024-04-11 RX ADMIN — ROSUVASTATIN CALCIUM 40 MG: 40 TABLET, FILM COATED ORAL at 20:02

## 2024-04-11 RX ADMIN — TAMSULOSIN HYDROCHLORIDE 0.4 MG: 0.4 CAPSULE ORAL at 09:57

## 2024-04-11 RX ADMIN — CARVEDILOL 12.5 MG: 12.5 TABLET, FILM COATED ORAL at 17:40

## 2024-04-11 RX ADMIN — INSULIN LISPRO 2 UNITS: 100 INJECTION, SOLUTION INTRAVENOUS; SUBCUTANEOUS at 13:59

## 2024-04-11 RX ADMIN — ACETAMINOPHEN 325MG 650 MG: 325 TABLET ORAL at 19:52

## 2024-04-11 RX ADMIN — INSULIN LISPRO 2 UNITS: 100 INJECTION, SOLUTION INTRAVENOUS; SUBCUTANEOUS at 09:55

## 2024-04-11 RX ADMIN — INSULIN GLARGINE 25 UNITS: 100 INJECTION, SOLUTION SUBCUTANEOUS at 20:02

## 2024-04-11 RX ADMIN — CLONIDINE HYDROCHLORIDE 0.2 MG: 0.1 TABLET ORAL at 21:56

## 2024-04-11 RX ADMIN — DOCUSATE SODIUM 50MG AND SENNOSIDES 8.6MG 2 TABLET: 8.6; 5 TABLET, FILM COATED ORAL at 09:58

## 2024-04-11 RX ADMIN — FLUTICASONE PROPIONATE 1 SPRAY: 50 SPRAY, METERED NASAL at 09:58

## 2024-04-11 RX ADMIN — HYDROXYUREA 500 MG: 500 CAPSULE ORAL at 09:58

## 2024-04-11 NOTE — NURSING NOTE
Patient has been agitated for most of shift whenever interacting with staff, slapping and grabbing with his left arm. He is not sleeping well. Plan is home today with HH.  Hypertensive last night but was very agitated while checking BP. HR has been 55-80 range. MICAELA ok with restarting 81 ASA. Patient agitated this morning and refused his clonidine.

## 2024-04-11 NOTE — PROGRESS NOTES
REASON FOR FOLLOWUP/CHIEF COMPLAINT:  Polycythemia vera    HISTORY OF PRESENT ILLNESS:   No new problems overnight.  Neurosurgery evaluated and is okay with aspirin 81 mg daily.    Past Medical History, Past Surgical History, Social History, Family History have been reviewed and are without significant changes except as mentioned.    Review of Systems   Review of Systems   Constitutional:  Negative for activity change.   HENT:  Negative for nosebleeds and trouble swallowing.    Respiratory:  Negative for shortness of breath and wheezing.    Cardiovascular:  Negative for chest pain and palpitations.   Gastrointestinal:  Negative for constipation, diarrhea and nausea.   Genitourinary:  Negative for dysuria and hematuria.   Musculoskeletal:  Negative for arthralgias and myalgias.   Neurological:  Negative for seizures and syncope.   Hematological:  Negative for adenopathy. Does not bruise/bleed easily.   Psychiatric/Behavioral:  Negative for confusion.        Medications:  The current medication list was reviewed in the EMR    ALLERGIES:    Allergies   Allergen Reactions    Donepezil Hallucinations    Steglatro [Ertugliflozin] Other (See Comments)     balanitis              Vitals:    04/10/24 1959 04/10/24 2336 04/11/24 0606 04/11/24 0820   BP: (!) 185/107 154/74  160/90   BP Location: Right arm Right arm  Left arm   Patient Position: Lying Lying  Lying   Pulse: 90 58  82   Resp: 18 18  16   Temp: 98.4 °F (36.9 °C) 97.9 °F (36.6 °C)     TempSrc: Oral Oral     SpO2: 94% 94%  93%   Weight:   98.9 kg (218 lb 0.6 oz)    Height:         Physical Exam    CONSTITUTIONAL:  Vital signs reviewed.  No distress, looks comfortable.  EYES:  Conjunctivae and lids unremarkable.  PERRLA  EARS, NOSE, MOUTH, THROAT:  Ears and nose appear unremarkable.  Lips, teeth, gums appear unremarkable.  RESPIRATORY:  Normal respiratory effort.  Lungs clear to auscultation bilaterally.  CARDIOVASCULAR:  Normal S1, S2.  No murmurs, rubs or  gallops.  No significant lower extremity edema.  GASTROINTESTINAL: Abdomen appears unremarkable.  Nontender.  No hepatomegaly.  No splenomegaly.  NEURO: Cranial nerves 2-12 grossly intact.  No focal deficits.  Appears to have equal strength all 4 extremities.  MUSCULOSKELETAL:  Unremarkable digits/nails.  No cyanosis or clubbing.  SKIN:  Warm.  No rashes.  PSYCHIATRIC:  Normal judgment and insight.  Normal mood and affect.      RECENT LABS:  WBC   Date Value Ref Range Status   04/11/2024 9.23 3.40 - 10.80 10*3/mm3 Final   04/10/2024 8.78 3.40 - 10.80 10*3/mm3 Final   04/09/2024 9.36 3.40 - 10.80 10*3/mm3 Final     Hemoglobin   Date Value Ref Range Status   04/11/2024 15.4 13.0 - 17.7 g/dL Final   04/10/2024 15.5 13.0 - 17.7 g/dL Final   04/09/2024 16.4 13.0 - 17.7 g/dL Final     Platelets   Date Value Ref Range Status   04/11/2024 311 140 - 450 10*3/mm3 Final   04/10/2024 279 140 - 450 10*3/mm3 Final   04/09/2024 224 140 - 450 10*3/mm3 Final       ASSESSMENT/PLAN:  Erlin Blanco [id number]     *Polycythemia vera  Followed by Dr. Thomas, hematology oncology, for years, on Hydrea.  He will see Dr. Thomas after discharge    *Prior hemorrhagic stroke with expressive aphasia, lives in a nursing facility.    *Admitted 4/ 6/24 for uncontrolled hypertension    *Anticoagulation  Cardiology evaluated and state they think he may not have ever had A-fib and therefore they do not plan any more Eliquis regarding A-fib.  Daughter-in-law states patient was on aspirin 81 mg along with Eliquis treatment dose when he had the hemorrhagic stroke in February 2024 and has been off both of them since.  Daughter-in-law states he has had a couple of ischemic strokes.  Considering the hypercoagulability of polycythemia vera, I do recommend taking aspirin 81 mg daily if he is not going to be on Eliquis, if okay with neurosurgery.  I do see a CT from 4/7/2024 showing improvement of intracranial hemorrhage.  4/10/2024: Neurosurgery evaluated  and they are okay with aspirin 81 mg daily.    Plan  Could not do a .phlebotomy through peripheral IV.  Therefore, PICC placed on 4/ 8/24 and phlebotomy occurred the morning of 4/9/2024.  Wife states he is not a difficult stick as an outpatient so I told her PICC would likely be removed upon discharge.  Wife will make sure he gets follow-up with his regular outpatient hematologist, Dr. Thomas, sometime after discharge  Continue Hydrea    Chart reviewed and summarized neurosurgery note and hospitalist note.

## 2024-04-11 NOTE — PROGRESS NOTES
"     LOS: 0 days   Patient Care Team:  Senia Dorsey MD as PCP - General (Internal Medicine)  Bertin Perrin MD (Inactive) as Consulting Physician (Orthopedic Surgery)  Marika Arias MD as Consulting Physician (Cardiology)  Remy Thomas MD as Consulting Physician (Hematology and Oncology)  Sahil De La Rosa MD as Consulting Physician (Urology)  Erlin Abreu, RN as Ambulatory  ([facility])  Zamzam John APRN (Family Medicine)    Chief Complaint:  ICH      Interval History:     History taken from: chart Patient unable to give history due to patient confusion.    Objective      Vital Signs  Temp:  [98.2 °F (36.8 °C)-98.4 °F (36.9 °C)] 98.2 °F (36.8 °C)  Heart Rate:  [50] 50  Resp:  [18] 18  BP: (140-166)/() 166/110    Physical Exam:    79 yo male lying in bed. In no acute distress.   Continues with expressive aphasia, R facial droop and R sided weakness also present when diagnosed with left ICH back in February.   AA&O to person, date of birth only.   Responds :\"yes\" to most questions.   PERRL.   R arm and R leg weakness. Follows commands on L side.   Space boot intact to R leg.      Results Review:     I reviewed the patient's new clinical results.  I reviewed the patient's new imaging results and agree with the interpretation.    CT HEAD WO CONTRAST- 4/7/2024    COMPARISON: 3/20/2024     Left basal ganglia intraparenchymal hemorrhage shows continued interval decrease in size and density.  There is also decrease and associated mass effect and associated volume of intraventricular hemorrhage is also decreased.  No evidence of hydrocephalus.  There is no midline shift or evidence of acute infarct.    Assessment & Plan       Hypertension, uncontrolled    History of CVA (cerebrovascular accident)    Paroxysmal atrial fibrillation    Type 2 diabetes mellitus    HTN (hypertension)    Paroxysmal atrial fibrillation    Expressive aphasia    CT head dated 4/7/2024 shows " continued improvement and signs that the left basal ganglia ICH is resolving.  Mass effect decreased.  Intraventricular hemorrhage decreased.  No evidence of hydrocephalus or significant mass effect.  Patient had been on Eliquis at the time of the initial discovery of the hemorrhage and February. He was evaluated during this hospitalization by cardiology.  Dr. Arias's note from earlier today indicates that review of prior EKG and Holter monitoring by Dr. Porras revealed no convincing sign of atrial fibrillation.  Therefore Eliquis has been stopped indefinitely with no plans to restart it.     .  Due to his history of polycythemia vera, the patient has been evaluated today by hematology.  We were asked by Dr. Martins for recommendations regarding starting 81 mg daily aspirin to assist with reducing the risk of future ischemic events.    I discussed the above with Dr. Miller.  He has viewed the head CT and is agreeable to clearing the patient for 81 mg daily aspirin.    Neurosurgery will continue following the patient until resolution of the left basal ganglia ICH.  Depending on what his CT head looks like in 6 weeks, we can discuss MRI imaging of the brain from there.  Our office will contact the patient family with the CT and follow-up appointments.    Nothing further to add from this juncture.  Please call neurosurgery if there are any further questions or concerns.    Veronique Lopez, MUKUL  04/10/24  20:37 EDT

## 2024-04-11 NOTE — SIGNIFICANT NOTE
04/11/24 1052   Post Acute Pre-Cert Documentation   Request Submitted by Facility - Type: Hospital   Post-Acute Authorization Type Submitted: SNF   Date Post Acute Pre-Cert Inititated per Facility 04/09/24   Date Post Acute Pre-Cert Completed 04/10/24   Response Received from Insurance? Denial   Action Taken by Requesting Facility: P2P Not Completed   Response Communicated to:    Authorization Number: DENIED [id number]  (PER CCP PATIENT DECIDED TO DC HOME VS P2P.)   Post Acute Pre-Cert Initiated Comment ADARSH BECERRA

## 2024-04-11 NOTE — PROGRESS NOTES
House Cardiology [facility] Follow Up    Chief Complaint: Follow up hypertension    Interval History: Confused and agitated overnight.  Apparently refused his clonidine earlier this morning.  Blood pressure is elevated yesterday although improved somewhat this morning.  Unclear if this was due to agitation.    Objective:     Objective:  Temp:  [97.9 °F (36.6 °C)-98.4 °F (36.9 °C)] 97.9 °F (36.6 °C)  Heart Rate:  [58-90] 58  Resp:  [18] 18  BP: (154-185)/() 154/74     Intake/Output Summary (Last 24 hours) at 4/11/2024 0736  Last data filed at 4/11/2024 0500  Gross per 24 hour   Intake 110 ml   Output 1300 ml   Net -1190 ml     Body mass index is 29.56 kg/m².      04/09/24  0544 04/10/24  0500 04/11/24  0606   Weight: 95.9 kg (211 lb 6.7 oz) 101 kg (221 lb 9 oz) 98.9 kg (218 lb 0.6 oz)     Weight change: -1.6 kg (-3 lb 8.4 oz)      Physical Exam:   General : Alert, cooperative, in no acute distress.  Neuro: Alert,cooperative and oriented.  Lungs: CTAB. Normal respiratory effort and rate.  CV: Regular rate and rhythm, normal S1 and S2, no murmurs, gallops or rubs.  ABD: Soft, nontender, nondistended. Positive bowel sounds.  Extr: No edema or cyanosis, moves all extremities.    Lab Review:   Results from last 7 days   Lab Units 04/11/24  0515 04/10/24  0516 04/09/24  0331 04/08/24  0429 04/07/24  0531 04/06/24  1713   SODIUM mmol/L 146* 146*   < > 143   < > 142   POTASSIUM mmol/L 3.6 3.3*   < > 3.4*   < > 4.4   CHLORIDE mmol/L 110* 114*   < > 110*   < > 107   CO2 mmol/L 27.0 22.2   < > 21.6*   < > 19.6*   BUN mg/dL 11 19   < > 25*   < > 20   CREATININE mg/dL 0.85 0.75*   < > 1.09   < > 1.04   GLUCOSE mg/dL 165* 122*   < > 296*   < > 268*   CALCIUM mg/dL 8.8 9.0   < > 9.1   < > 9.8   AST (SGOT) U/L  --   --   --  24  --  41*   ALT (SGPT) U/L  --   --   --  14  --  18    < > = values in this interval not displayed.     Results from last 7 days   Lab Units 04/06/24  1925 04/06/24  1713   HSTROP T ng/L 54* 55*  "    Results from last 7 days   Lab Units 04/11/24  0515 04/10/24  0516   WBC 10*3/mm3 9.23 8.78   HEMOGLOBIN g/dL 15.4 15.5   HEMATOCRIT % 48.2 47.9   PLATELETS 10*3/mm3 311 279     Results from last 7 days   Lab Units 04/06/24  1713   INR  1.40*   APTT seconds 30.5     Results from last 7 days   Lab Units 04/11/24  0515 04/08/24  0806   MAGNESIUM mg/dL 1.6 2.2           Invalid input(s): \"LDLCALC\"          I reviewed the patient's new clinical results.  I personally viewed and interpreted the patient's EKG  Current Medications:   Scheduled Meds:amLODIPine, 10 mg, Oral, Q24H  carvedilol, 12.5 mg, Oral, BID With Meals  cloNIDine, 0.2 mg, Oral, Q8H  fluticasone, 1 spray, Nasal, Daily  guaiFENesin, 1,200 mg, Oral, BID  hydrocortisone-bacitracin-zinc oxide-nystatin, 1 Application, Topical, TID  hydroxyurea, 500 mg, Oral, Daily  insulin glargine, 25 Units, Subcutaneous, Nightly  insulin lispro, 2-7 Units, Subcutaneous, 4x Daily AC & at Bedtime  insulin lispro, 6 Units, Subcutaneous, TID With Meals  lactobacillus acidophilus, 1 capsule, Oral, Daily  levETIRAcetam, 500 mg, Oral, BID  linagliptin, 5 mg, Oral, Daily  memantine, 10 mg, Oral, BID  Menthol-Zinc Oxide, 1 Application, Topical, TID  multivitamin with minerals, 1 tablet, Oral, Daily  OLANZapine zydis, 5 mg, Oral, Nightly  potassium chloride, 20 mEq, Intravenous, Q1H  potassium phosphate, 15 mmol, Intravenous, Once  rosuvastatin, 40 mg, Oral, Nightly  sennosides-docusate, 2 tablet, Oral, BID  sertraline, 50 mg, Oral, Nightly  sodium chloride, 10 mL, Intravenous, Q12H  sodium chloride, 10 mL, Intravenous, Q12H  spironolactone, 50 mg, Oral, Daily  tamsulosin, 0.4 mg, Oral, Daily  valsartan, 320 mg, Oral, Daily      Continuous Infusions:     Allergies:  Allergies   Allergen Reactions    Donepezil Hallucinations    Steglatro [Ertugliflozin] Other (See Comments)     balanitis       Assessment/Plan:     Hypertension.  Overall well-controlled.  Elevated yesterday and still " mildly elevated this morning although he did not take his clonidine this morning.  I suspect some of the elevated pressures yesterday were due to agitation.  Altered mental status.  Improved.  Recent hemorrhagic stroke   Nonspecific atrial arrhythmias.  Previously thought to be atrial fibrillation.  However recently Dr. Porras reviewed numerous EKGs and holter monitors and did not see any convincing atrial fibrillation. Apixaban has been stopped indefinitely without plans to restart.   Diabetes mellitus type II  Coronary artery disease  Chronic diastolic heart failure     -Monitor blood pressures today.  We may need to increase the spironolactone to 100 mg if blood pressures remain elevated.    Marika Arias MD  04/11/24  07:36 EDT

## 2024-04-11 NOTE — CASE MANAGEMENT/SOCIAL WORK
Continued Stay Note  [facility]     Patient Name: Erlin Blanco  MRN: [id number]  Today's Date: 4/11/2024    Admit Date: 4/6/2024    Plan: Home w/ 24/7 care & HH - will need EMS   Discharge Plan       Row Name 04/11/24 1104       Plan    Plan Home w/ 24/7 care & HH - will need EMS    Plan Comments Per MD, they would like to hold the patient dc and keep him overnight. CCP spoke with patient's spouse at bedside who confirms plan remains home w/ [facility] & 24/7 care from family. Patient will need EMS. CCP let patient's spouse know that we cannot guarantee the EMS will be fully covered by insurance; she acknowledged. Spouse states due to the rain, patient will need to be transported through the front door which has 2 steps to enter. CCP following for medical stability to schedule EMS. KAMRYN CORONELW                   Discharge Codes    No documentation.                 Expected Discharge Date and Time       Expected Discharge Date Expected Discharge Time    Apr 11, 2024               ERIC Ruiz

## 2024-04-11 NOTE — PLAN OF CARE
Goal Outcome Evaluation:            VSS. RA. Sinus. Having about 50 % of meals. Has been taking his medicines crushed in apple sauce. Plan DC home tomorrow. Care plan ongoing.  Problem: Fall Injury Risk  Goal: Absence of Fall and Fall-Related Injury  Intervention: Identify and Manage Contributors  Recent Flowsheet Documentation  Taken 4/11/2024 1636 by Joe Valente, RN  Medication Review/Management: medications reviewed  Taken 4/11/2024 1422 by Joe Valente RN  Medication Review/Management: medications reviewed  Taken 4/11/2024 1210 by Joe Valente RN  Medication Review/Management: medications reviewed  Taken 4/11/2024 1205 by Joe Valente RN  Medication Review/Management: medications reviewed  Taken 4/11/2024 1010 by Joe Valente RN  Medication Review/Management: medications reviewed  Taken 4/11/2024 0800 by Joe Valente RN  Medication Review/Management: medications reviewed

## 2024-04-11 NOTE — PROGRESS NOTES
Name: Erlin Blanco ADMIT: 2024   : 1945  PCP: Senia Dorsey MD    MRN: [id number] LOS: 1 days   AGE/SEX: 78 y.o. male  ROOM: [facility]     Subjective   Subjective   Chief Complaint   Patient presents with    Hypertension     Awake. Poorly interactive and poor historian. Not able to relay symptoms to me.     Objective   Objective   Vital Signs  Temp:  [97.9 °F (36.6 °C)-98.4 °F (36.9 °C)] 97.9 °F (36.6 °C)  Heart Rate:  [58-90] 82  Resp:  [16-18] 16  BP: (154-185)/() 160/90  SpO2:  [93 %-94 %] 93 %  on   ;   Device (Oxygen Therapy): room air  Body mass index is 29.56 kg/m².    Physical Exam  Vitals and nursing note reviewed.   Constitutional:       General: He is not in acute distress.     Appearance: He is not diaphoretic.   HENT:      Head: Atraumatic.   Cardiovascular:      Rate and Rhythm: Normal rate and regular rhythm.   Pulmonary:      Effort: Pulmonary effort is normal. No respiratory distress.   Abdominal:      General: There is no distension.      Palpations: Abdomen is soft.      Tenderness: There is no abdominal tenderness. There is no guarding or rebound.   Musculoskeletal:         General: No tenderness.   Skin:     General: Skin is dry.   Neurological:      Mental Status: He is alert. He is disoriented.      Comments: Baseline right-sided hemiparesis, facial droop   Psychiatric:         Cognition and Memory: Cognition is impaired. Memory is impaired.       Results Review  I reviewed the patient's new clinical results.    Results from last 7 days   Lab Units 24  0515 04/10/24  0516 24  0331 24  0429   WBC 10*3/mm3 9.23 8.78 9.36 9.51   HEMOGLOBIN g/dL 15.4 15.5 16.4 16.3   PLATELETS 10*3/mm3 311 279 224 259     Results from last 7 days   Lab Units 24  0515 04/10/24  0516 24  0331 24  0429   SODIUM mmol/L 146* 146* 144 143   POTASSIUM mmol/L 3.6 3.3* 3.6 3.4*   CHLORIDE mmol/L 110* 114* 112* 110*   CO2 mmol/L 27.0 22.2 20.9* 21.6*   BUN mg/dL 11  19 22 25*   CREATININE mg/dL 0.85 0.75* 0.92 1.09   GLUCOSE mg/dL 165* 122* 234* 296*   EGFR mL/min/1.73 90.1 93.5 86.2 70.3     Results from last 7 days   Lab Units 04/11/24  0515 04/08/24  0429 04/06/24  1713   ALBUMIN g/dL 3.7 3.3* 3.8   BILIRUBIN mg/dL  --  0.4 0.6   ALK PHOS U/L  --  88 112   AST (SGOT) U/L  --  24 41*   ALT (SGPT) U/L  --  14 18     Results from last 7 days   Lab Units 04/11/24  0515 04/10/24  0516 04/09/24  0331 04/08/24  0806 04/08/24 0429 04/07/24  0531 04/06/24  1713   CALCIUM mg/dL 8.8 9.0 9.1  --  9.1   < > 9.8   ALBUMIN g/dL 3.7  --   --   --  3.3*  --  3.8   MAGNESIUM mg/dL 1.6  --   --  2.2  --   --   --    PHOSPHORUS mg/dL 2.1*  --   --   --   --   --   --     < > = values in this interval not displayed.         Hemoglobin A1C   Date/Time Value Ref Range Status   04/11/2024 0515 8.40 (H) 4.80 - 5.60 % Final     Glucose   Date/Time Value Ref Range Status   04/11/2024 0609 166 (H) 70 - 130 mg/dL Final   04/10/2024 2159 262 (H) 70 - 130 mg/dL Final   04/10/2024 1634 211 (H) 70 - 130 mg/dL Final   04/10/2024 1108 201 (H) 70 - 130 mg/dL Final   04/10/2024 0602 112 70 - 130 mg/dL Final   04/09/2024 2035 227 (H) 70 - 130 mg/dL Final   04/09/2024 1614 242 (H) 70 - 130 mg/dL Final       No radiology results for the last day    I have personally reviewed all medications:  Scheduled Medications  amLODIPine, 10 mg, Oral, Q24H  aspirin, 81 mg, Oral, Daily  carvedilol, 12.5 mg, Oral, BID With Meals  cloNIDine, 0.2 mg, Oral, Q8H  fluticasone, 1 spray, Nasal, Daily  guaiFENesin, 1,200 mg, Oral, BID  hydrocortisone-bacitracin-zinc oxide-nystatin, 1 Application, Topical, TID  hydroxyurea, 500 mg, Oral, Daily  insulin glargine, 25 Units, Subcutaneous, Nightly  insulin lispro, 2-7 Units, Subcutaneous, 4x Daily AC & at Bedtime  insulin lispro, 6 Units, Subcutaneous, TID With Meals  lactobacillus acidophilus, 1 capsule, Oral, Daily  levETIRAcetam, 500 mg, Oral, BID  linagliptin, 5 mg, Oral,  Daily  memantine, 10 mg, Oral, BID  Menthol-Zinc Oxide, 1 Application, Topical, TID  multivitamin with minerals, 1 tablet, Oral, Daily  OLANZapine zydis, 5 mg, Oral, Nightly  potassium phosphate, 15 mmol, Intravenous, Once  rosuvastatin, 40 mg, Oral, Nightly  sennosides-docusate, 2 tablet, Oral, BID  sertraline, 50 mg, Oral, Nightly  sodium chloride, 10 mL, Intravenous, Q12H  sodium chloride, 10 mL, Intravenous, Q12H  spironolactone, 50 mg, Oral, Daily  tamsulosin, 0.4 mg, Oral, Daily  valsartan, 320 mg, Oral, Daily      Infusions       Diet  Diet: Cardiac, Diabetic; Healthy Heart (2-3 Na+); Consistent Carbohydrate; Texture: Pureed (NDD 1); Fluid Consistency: Honey Thick    I have personally reviewed:  [x]  Laboratory   []  Microbiology   []  Radiology   []  EKG/Telemetry  []  Cardiology/Vascular   []  Pathology    []  Records       Assessment/Plan     Active Hospital Problems    Diagnosis  POA    **Hypertension, uncontrolled [I10]  Yes    Expressive aphasia [R47.01]  Yes    Paroxysmal atrial fibrillation [I48.0]  Yes    Type 2 diabetes mellitus [E11.9]  Yes    HTN (hypertension) [I10]  Yes    Paroxysmal atrial fibrillation [I48.0]  Yes    History of CVA (cerebrovascular accident) [Z86.73]  Not Applicable      Resolved Hospital Problems   No resolved problems to display.       78 y.o. male admitted with Hypertension, uncontrolled.    Hypertension: Elevated today. Did not receive clonidine this morning. Monitoring with medication. Potential adjustment tomorrow per cardiology.  Diabetes: A1c 8.4.  Continue insulin and monitoring requirements.  Polycythemia vera: Status post phlebotomy.  On hydroxyurea.  PICC is going to be removed at discharge.  Not going to continue Eliquis.  Neurosurgery evaluated and ok to start aspirin.  Oncology following.  Atrial arrhythmia: Cardiology evaluated and no convincing atrial fibrillation was noted in chart by electrophysiology.  To remain off Eliquis.  Cardiology following.  History  intraparenchymal hemorrhage: Continued aphasia, hemiparesis.  Neurosurgery consult noted.  PPX: SCD  Disposition:  with 24/7 care; potential discharge tomorrow    Expected Discharge Date: 4/11/2024; Expected Discharge Time:      Valeriy De Jesus MD  [facility]  04/11/24  09:57 EDT    Dictated portions of note using Dragon dictation software.  Copied text in this note has been reviewed by me and remains accurate as of 04/11/24

## 2024-04-12 ENCOUNTER — TELEPHONE (OUTPATIENT)
Dept: NEUROSURGERY | Facility: CLINIC | Age: 79
End: 2024-04-12
Payer: MEDICARE

## 2024-04-12 ENCOUNTER — DOCUMENTATION (OUTPATIENT)
Dept: HOME HEALTH SERVICES | Facility: HOME HEALTHCARE | Age: 79
End: 2024-04-12
Payer: MEDICARE

## 2024-04-12 ENCOUNTER — TRANSCRIBE ORDERS (OUTPATIENT)
Dept: HOME HEALTH SERVICES | Facility: HOME HEALTHCARE | Age: 79
End: 2024-04-12
Payer: MEDICARE

## 2024-04-12 DIAGNOSIS — E11.65 TYPE 2 DIABETES MELLITUS WITH HYPERGLYCEMIA, WITH LONG-TERM CURRENT USE OF INSULIN: ICD-10-CM

## 2024-04-12 DIAGNOSIS — I61.9 HEMORRHAGIC CEREBROVASCULAR ACCIDENT (CVA): ICD-10-CM

## 2024-04-12 DIAGNOSIS — I61.9 INTRAPARENCHYMAL HEMORRHAGE OF BRAIN: Primary | ICD-10-CM

## 2024-04-12 DIAGNOSIS — R13.10 DYSPHAGIA, UNSPECIFIED TYPE: ICD-10-CM

## 2024-04-12 DIAGNOSIS — I10 HYPERTENSION, ESSENTIAL: Primary | ICD-10-CM

## 2024-04-12 DIAGNOSIS — Z79.4 TYPE 2 DIABETES MELLITUS WITH HYPERGLYCEMIA, WITH LONG-TERM CURRENT USE OF INSULIN: ICD-10-CM

## 2024-04-12 LAB
ALBUMIN SERPL-MCNC: 3.3 G/DL (ref 3.5–5.2)
ANION GAP SERPL CALCULATED.3IONS-SCNC: 8.1 MMOL/L (ref 5–15)
BUN SERPL-MCNC: 17 MG/DL (ref 8–23)
BUN/CREAT SERPL: 17.3 (ref 7–25)
CALCIUM SPEC-SCNC: 9 MG/DL (ref 8.6–10.5)
CHLORIDE SERPL-SCNC: 110 MMOL/L (ref 98–107)
CO2 SERPL-SCNC: 25.9 MMOL/L (ref 22–29)
CREAT SERPL-MCNC: 0.98 MG/DL (ref 0.76–1.27)
DEPRECATED RDW RBC AUTO: 51.5 FL (ref 37–54)
EGFRCR SERPLBLD CKD-EPI 2021: 79.9 ML/MIN/1.73
ERYTHROCYTE [DISTWIDTH] IN BLOOD BY AUTOMATED COUNT: 15.6 % (ref 12.3–15.4)
GLUCOSE BLDC GLUCOMTR-MCNC: 171 MG/DL (ref 70–130)
GLUCOSE BLDC GLUCOMTR-MCNC: 235 MG/DL (ref 70–130)
GLUCOSE BLDC GLUCOMTR-MCNC: 304 MG/DL (ref 70–130)
GLUCOSE BLDC GLUCOMTR-MCNC: 338 MG/DL (ref 70–130)
GLUCOSE SERPL-MCNC: 200 MG/DL (ref 65–99)
HCT VFR BLD AUTO: 44.7 % (ref 37.5–51)
HGB BLD-MCNC: 14.5 G/DL (ref 13–17.7)
MAGNESIUM SERPL-MCNC: 2.2 MG/DL (ref 1.6–2.4)
MCH RBC QN AUTO: 29.3 PG (ref 26.6–33)
MCHC RBC AUTO-ENTMCNC: 32.4 G/DL (ref 31.5–35.7)
MCV RBC AUTO: 90.3 FL (ref 79–97)
PHOSPHATE SERPL-MCNC: 3.8 MG/DL (ref 2.5–4.5)
PLATELET # BLD AUTO: 335 10*3/MM3 (ref 140–450)
PMV BLD AUTO: 9.7 FL (ref 6–12)
POTASSIUM SERPL-SCNC: 3.9 MMOL/L (ref 3.5–5.2)
RBC # BLD AUTO: 4.95 10*6/MM3 (ref 4.14–5.8)
SODIUM SERPL-SCNC: 144 MMOL/L (ref 136–145)
WBC NRBC COR # BLD AUTO: 10.3 10*3/MM3 (ref 3.4–10.8)

## 2024-04-12 PROCEDURE — 63710000001 INSULIN LISPRO (HUMAN) PER 5 UNITS: Performed by: INTERNAL MEDICINE

## 2024-04-12 PROCEDURE — 83735 ASSAY OF MAGNESIUM: CPT | Performed by: INTERNAL MEDICINE

## 2024-04-12 PROCEDURE — 99232 SBSQ HOSP IP/OBS MODERATE 35: CPT | Performed by: NURSE PRACTITIONER

## 2024-04-12 PROCEDURE — 82948 REAGENT STRIP/BLOOD GLUCOSE: CPT

## 2024-04-12 PROCEDURE — 80069 RENAL FUNCTION PANEL: CPT | Performed by: INTERNAL MEDICINE

## 2024-04-12 PROCEDURE — 85027 COMPLETE CBC AUTOMATED: CPT | Performed by: INTERNAL MEDICINE

## 2024-04-12 PROCEDURE — 99232 SBSQ HOSP IP/OBS MODERATE 35: CPT | Performed by: INTERNAL MEDICINE

## 2024-04-12 PROCEDURE — 63710000001 INSULIN GLARGINE PER 5 UNITS: Performed by: INTERNAL MEDICINE

## 2024-04-12 RX ORDER — ASPIRIN 81 MG/1
81 TABLET ORAL DAILY
Qty: 30 TABLET | Refills: 0 | Status: SHIPPED | OUTPATIENT
Start: 2024-04-12

## 2024-04-12 RX ADMIN — Medication 1 CAPSULE: at 09:13

## 2024-04-12 RX ADMIN — CLONIDINE HYDROCHLORIDE 0.2 MG: 0.1 TABLET ORAL at 06:47

## 2024-04-12 RX ADMIN — Medication 10 ML: at 09:16

## 2024-04-12 RX ADMIN — INSULIN LISPRO 6 UNITS: 100 INJECTION, SOLUTION INTRAVENOUS; SUBCUTANEOUS at 17:15

## 2024-04-12 RX ADMIN — MEMANTINE HYDROCHLORIDE 10 MG: 10 TABLET, FILM COATED ORAL at 09:13

## 2024-04-12 RX ADMIN — INSULIN LISPRO 3 UNITS: 100 INJECTION, SOLUTION INTRAVENOUS; SUBCUTANEOUS at 12:56

## 2024-04-12 RX ADMIN — ROSUVASTATIN CALCIUM 40 MG: 40 TABLET, FILM COATED ORAL at 21:37

## 2024-04-12 RX ADMIN — LEVETIRACETAM 500 MG: 500 TABLET, FILM COATED ORAL at 21:37

## 2024-04-12 RX ADMIN — FLUTICASONE PROPIONATE 1 SPRAY: 50 SPRAY, METERED NASAL at 09:12

## 2024-04-12 RX ADMIN — GUAIFENESIN 1200 MG: 600 TABLET, EXTENDED RELEASE ORAL at 21:36

## 2024-04-12 RX ADMIN — INSULIN LISPRO 6 UNITS: 100 INJECTION, SOLUTION INTRAVENOUS; SUBCUTANEOUS at 21:37

## 2024-04-12 RX ADMIN — CARVEDILOL 12.5 MG: 12.5 TABLET, FILM COATED ORAL at 09:13

## 2024-04-12 RX ADMIN — HYDROXYUREA 500 MG: 500 CAPSULE ORAL at 09:13

## 2024-04-12 RX ADMIN — LINAGLIPTIN 5 MG: 5 TABLET, FILM COATED ORAL at 09:13

## 2024-04-12 RX ADMIN — Medication 10 ML: at 21:40

## 2024-04-12 RX ADMIN — Medication 1 TABLET: at 09:13

## 2024-04-12 RX ADMIN — Medication 1 APPLICATION: at 15:07

## 2024-04-12 RX ADMIN — CLONIDINE HYDROCHLORIDE 0.2 MG: 0.1 TABLET ORAL at 15:07

## 2024-04-12 RX ADMIN — INSULIN LISPRO 5 UNITS: 100 INJECTION, SOLUTION INTRAVENOUS; SUBCUTANEOUS at 17:14

## 2024-04-12 RX ADMIN — ZINC OXIDE 1 APPLICATION: 200 OINTMENT TOPICAL at 15:07

## 2024-04-12 RX ADMIN — OLANZAPINE 5 MG: 5 TABLET, ORALLY DISINTEGRATING ORAL at 21:36

## 2024-04-12 RX ADMIN — SERTRALINE 50 MG: 50 TABLET, FILM COATED ORAL at 21:37

## 2024-04-12 RX ADMIN — TAMSULOSIN HYDROCHLORIDE 0.4 MG: 0.4 CAPSULE ORAL at 09:13

## 2024-04-12 RX ADMIN — AMLODIPINE BESYLATE 10 MG: 10 TABLET ORAL at 09:12

## 2024-04-12 RX ADMIN — ASPIRIN 81 MG: 81 TABLET, COATED ORAL at 09:12

## 2024-04-12 RX ADMIN — ACETAMINOPHEN 325MG 650 MG: 325 TABLET ORAL at 18:21

## 2024-04-12 RX ADMIN — Medication 1 APPLICATION: at 09:12

## 2024-04-12 RX ADMIN — CARVEDILOL 12.5 MG: 12.5 TABLET, FILM COATED ORAL at 17:14

## 2024-04-12 RX ADMIN — VALSARTAN 320 MG: 320 TABLET, FILM COATED ORAL at 09:13

## 2024-04-12 RX ADMIN — MEMANTINE HYDROCHLORIDE 10 MG: 10 TABLET, FILM COATED ORAL at 21:37

## 2024-04-12 RX ADMIN — Medication 1 APPLICATION: at 21:41

## 2024-04-12 RX ADMIN — DOCUSATE SODIUM 50MG AND SENNOSIDES 8.6MG 2 TABLET: 8.6; 5 TABLET, FILM COATED ORAL at 21:42

## 2024-04-12 RX ADMIN — ZINC OXIDE 1 APPLICATION: 200 OINTMENT TOPICAL at 21:41

## 2024-04-12 RX ADMIN — ZINC OXIDE 1 APPLICATION: 200 OINTMENT TOPICAL at 09:12

## 2024-04-12 RX ADMIN — CLONIDINE HYDROCHLORIDE 0.2 MG: 0.1 TABLET ORAL at 21:36

## 2024-04-12 RX ADMIN — SPIRONOLACTONE 50 MG: 50 TABLET, FILM COATED ORAL at 09:13

## 2024-04-12 RX ADMIN — INSULIN GLARGINE 25 UNITS: 100 INJECTION, SOLUTION SUBCUTANEOUS at 21:37

## 2024-04-12 RX ADMIN — LEVETIRACETAM 500 MG: 500 TABLET, FILM COATED ORAL at 09:13

## 2024-04-12 RX ADMIN — INSULIN LISPRO 2 UNITS: 100 INJECTION, SOLUTION INTRAVENOUS; SUBCUTANEOUS at 09:16

## 2024-04-12 RX ADMIN — Medication 3 MG: at 21:38

## 2024-04-12 RX ADMIN — DOCUSATE SODIUM 50MG AND SENNOSIDES 8.6MG 2 TABLET: 8.6; 5 TABLET, FILM COATED ORAL at 09:13

## 2024-04-12 NOTE — PROGRESS NOTES
[facility] will follow post hospital as requested. Family agreeable to services. Contact information and PCP confirmed. Correct address is [address] [address]. Best contact for scheduling home health visits is spouse at [phone]. Can list son as a back up contact ( [phone]). Verbal order received from Anny with PCP, MUKUL Crawford, for home health care.

## 2024-04-12 NOTE — PROGRESS NOTES
REASON FOR FOLLOWUP/CHIEF COMPLAINT:  Polycythemia vera    HISTORY OF PRESENT ILLNESS:   No new issues overnight.  No bleeding issues.  Wife is helping to take him home today.    Past Medical History, Past Surgical History, Social History, Family History have been reviewed and are without significant changes except as mentioned.    Review of Systems   Review of Systems   Constitutional:  Negative for activity change.   HENT:  Negative for nosebleeds and trouble swallowing.    Respiratory:  Negative for shortness of breath and wheezing.    Cardiovascular:  Negative for chest pain and palpitations.   Gastrointestinal:  Negative for constipation, diarrhea and nausea.   Genitourinary:  Negative for dysuria and hematuria.   Musculoskeletal:  Negative for arthralgias and myalgias.   Neurological:  Negative for seizures and syncope.   Hematological:  Negative for adenopathy. Does not bruise/bleed easily.   Psychiatric/Behavioral:  Negative for confusion.        Medications:  The current medication list was reviewed in the EMR    ALLERGIES:    Allergies   Allergen Reactions    Donepezil Hallucinations    Steglatro [Ertugliflozin] Other (See Comments)     balanitis              Vitals:    04/11/24 2336 04/12/24 0750 04/12/24 0800 04/12/24 1415   BP: 132/80  134/78    BP Location: Right arm      Patient Position: Lying      Pulse: 80      Resp: 20 18  18   Temp: 98.2 °F (36.8 °C)      TempSrc: Oral Oral  Oral   SpO2: 93%      Weight:       Height:         Physical Exam    CONSTITUTIONAL:  Vital signs reviewed.  No distress, looks comfortable.  EYES:  Conjunctivae and lids unremarkable.  PERRLA  EARS, NOSE, MOUTH, THROAT:  Ears and nose appear unremarkable.  Lips, teeth, gums appear unremarkable.  RESPIRATORY:  Normal respiratory effort.  Lungs clear to auscultation bilaterally.  CARDIOVASCULAR:  Normal S1, S2.  No murmurs, rubs or gallops.  No significant lower extremity edema.  GASTROINTESTINAL: Abdomen appears  unremarkable.  Nontender.  No hepatomegaly.  No splenomegaly.  NEURO: Attempted but could not assess because he was sleeping soundly  MUSCULOSKELETAL:  Unremarkable digits/nails.  No cyanosis or clubbing.  SKIN:  Warm.  No rashes.  PSYCHIATRIC: Sleeping soundly so could not assess judgment or insight or mood or affect        RECENT LABS:  WBC   Date Value Ref Range Status   04/12/2024 10.30 3.40 - 10.80 10*3/mm3 Final   04/11/2024 9.23 3.40 - 10.80 10*3/mm3 Final   04/10/2024 8.78 3.40 - 10.80 10*3/mm3 Final     Hemoglobin   Date Value Ref Range Status   04/12/2024 14.5 13.0 - 17.7 g/dL Final   04/11/2024 15.4 13.0 - 17.7 g/dL Final   04/10/2024 15.5 13.0 - 17.7 g/dL Final     Platelets   Date Value Ref Range Status   04/12/2024 335 140 - 450 10*3/mm3 Final   04/11/2024 311 140 - 450 10*3/mm3 Final   04/10/2024 279 140 - 450 10*3/mm3 Final       ASSESSMENT/PLAN:  Erlin Floreslps S416/1     *Polycythemia vera  Followed by Dr. Thomas, hematology oncology, for years, on Hydrea.  He will see Dr. Thomas after discharge  HCT 44.7    *Prior hemorrhagic stroke with expressive aphasia, lives in a nursing facility.    *Admitted 4/ 6/24 for uncontrolled hypertension    *Anticoagulation  Cardiology evaluated and state they think he may not have ever had A-fib and therefore they do not plan any more Eliquis regarding A-fib.  Daughter-in-law states patient was on aspirin 81 mg along with Eliquis treatment dose when he had the hemorrhagic stroke in February 2024 and has been off both of them since.  Daughter-in-law states he has had a couple of ischemic strokes.  Considering the hypercoagulability of polycythemia vera, I do recommend taking aspirin 81 mg daily if he is not going to be on Eliquis, if okay with neurosurgery.  I do see a CT from 4/7/2024 showing improvement of intracranial hemorrhage.  4/10/2024: Neurosurgery evaluated and they are okay with aspirin 81 mg daily.  No signs of clotting or bleeding    Plan  Could  not do a .phlebotomy through peripheral IV.  Therefore, PICC placed on 4/ 8/24 and phlebotomy occurred the morning of 4/9/2024.  Wife states he is not a difficult stick as an outpatient so I told her PICC would likely be removed upon discharge.  Wife will make sure he gets follow-up with his regular outpatient hematologist, Dr. Thomas, sometime after discharge  Continue Hydrea    Chart reviewed and summarized including hospitalist note, continuing the plan.  Also reviewed BMP and CBC from today which were not ordered by me

## 2024-04-12 NOTE — CASE MANAGEMENT/SOCIAL WORK
"Physicians Statement of Medical Necessity for  Ambulance Transportation    GENERAL INFORMATION     Name: Erlin Blanco  YOB: 1945  Medicare #: [id number]  Transport Date: 4/13/2024 (Valid for round trips this date, or for scheduled repetitive trips for 60 days from the date signed below.)  Origin: [facility]  Destination: [address] [place] [address]  Is the Patient's stay covered under Medicare Part A (PPS/DRG?)No   Closest appropriate facility? Yes  If this a hosp-hosp transfer? No  Is this a hospice patient? No    MEDICAL NECESSITY QUESTIONAIRE    Ambulance Transportation is medically necessary only if other means of transportation are contraindicated or would be potentially harmful to the patient.  To meet this requirement, the patient must be either \"bed confined\" or suffer from a condition such that transport by means other than an ambulance is contraindicated by the patient's condition.  The following questions must be answered by the healthcare professional signing below for this form to be valid:     1) Describe the MEDICAL CONDITION (physical and/or mental) of this patient AT THE TIME OF AMBULANCE TRANSPORT that requires the patient to be transported in an ambulance, and why transport by other means is contraindicated by the patient's condition:   Past Medical History:   Diagnosis Date    Abnormal ECG     Abnormal stress test     Abrasion of face 01/29/2020    Acute bronchitis     Acute hypokalemia 09/16/2020    Acute non-recurrent sinusitis 01/13/2020    Acute pyelonephritis 10/09/2020    Acute sinusitis     DORI (acute kidney injury) 04/28/2017    Allergic rhinitis     Aortic root dilation     Arthritis     Bacteremia due to Escherichia coli 08/17/2020    Balanitis 05/16/2021    Benign enlargement of prostate     C. difficile colitis 09/16/2020    Cellulitis of knee, left 05/04/2017    CHF (congestive heart failure)     Chronic diastolic congestive heart failure     " Constipation 11/14/2020    Contusion of face 01/29/2020    Coronary artery disease     COVID-19 virus infection     CVA (cerebral vascular accident) 2020    Diminished pulse     in lower extremities    Discitis of lumbar region 10/09/2020    Edema     Elevated hematocrit     Elevated hemoglobin     Essential hypertension     Hearing loss     mala hearing aids    Heart valve disease     High risk medication use     History of back pain     History of dysuria     History of echocardiogram     History of intracranial hemorrhage     History of scoliosis     History of stroke     Hyperlipidemia     Hypokalemia 01/07/2021    Injury of toe, left, initial encounter     Irregular heart rate     Knee pain, left     Left bundle branch block     Leg wound, left     Leukocytosis 09/16/2020    Low back pain     Maxillary sinusitis 07/16/2021    Medicare annual wellness visit, subsequent     Minor head injury without loss of consciousness 01/29/2020    Murmur     Muscle cramps     Need for hepatitis C screening test     Paraphimosis 05/16/2021    Pneumonia of left lung due to infectious organism 09/17/2021    Polycythemia     Polycythemia vera     Pressure injury of buttock, stage 2 09/18/2020    Prostate hyperplasia without urinary obstruction     Prostatitis     Proteinuria     Pulmonary hypertension     Sacroiliitis 10/09/2020    Scoliosis     Sepsis due to Escherichia coli 08/17/2020    Sepsis with metabolic encephalopathy 08/17/2020    Stroke     SVT (supraventricular tachycardia)     Tachycardia     Thrombocytosis     TIA (transient ischemic attack)     2006    Type 2 diabetes mellitus     Type 2 diabetes mellitus with hyperglycemia 05/16/2021    Urinary tract infection due to extended-spectrum beta lactamase (ESBL) producing Escherichia coli 08/18/2020    Valvular heart disease       Past Surgical History:   Procedure Laterality Date    CARDIAC CATHETERIZATION      CATARACT EXTRACTION Left 04/2021    CATARACT EXTRACTION  "Right 07/26/2022    COLONOSCOPY      did Cologuard approx 6/2019 and negative    HAND SURGERY      1970s    JOINT REPLACEMENT Right 2014    TX ARTHRP KNE CONDYLE&PLATU MEDIAL&LAT COMPARTMENTS Left 04/27/2017    Procedure: LT TOTAL KNEE ARTHROPLASTY;  Surgeon: Bertin Perrin MD;  Location: [facility];  Service: Orthopedics    PROSTATE SURGERY      Rezum steam treatment to shrink prostate by dr. guerrero      2) Is this patient \"bed confined\" as defined below?Yes   To be \"bed confined\" the patient must satisfy all three of the following criteria:  (1) unable to get up from bed without assistance; AND (2) unable to ambulate;  AND (3) unable to sit in a chair or wheelchair.  3) Can this patient safely be transported by car or wheelchair van (I.e., may safely sit during transport, without an attendant or monitoring?)No   4. In addition to completing questions 1-3 above, please check any of the following conditions that apply*:          *Note: supporting documentation for any boxes checked must be maintained in the patient's medical records Patient is confused, Moderate/severe pain on movement, Medical attendant required, Unable to tolerate seated position for time needed to transport, and Other 2 steps to enter home, non-ambulatory      SIGNATURE OF PHYSICIAN OR OTHER AUTHORIZED HEALTHCARE PROFESSIONAL    I certify that the above information is true and correct based on my evaluation of this patient, and represent that the patient requires transport by ambulance and that other forms of transport are contraindicated.  I understand that this information will be used by the Centers for Medicare and Medicaid Services (CMS) to support the determiniation of medical necessity for ambulance services, and I represent that I have personal knowledge of the patient's condition at the time of transport.       If this box is checked, I also certify that the patient is physically or mentally incapable of signing the ambulance " service's claim form and that the institution with which I am affiliated has furnished care, services or assistance to the patient.  My signature below is made on behalf of the patient pursuant to 42 .36(b)(4). In accordance with 42 .37, the specific reason(s) that the patient is physically or mentally incapable of signing the claim for is as follows:     Signature of Physician or Healthcare Professional  Date/Time:        (For Scheduled repetitive transport, this form is not valid for transports performed more than 60 days after this date).                                                                                                                                            --------------------------------------------------------------------------------------------  Printed Name and Credentials of Physician or Authorized Healthcare Professional     *Form must be signed by patient's attending physician for scheduled, repetitive transports,.  For non-repetitive ambulance transports, if unable to obtain the signature of the attending physician, any of the following may sign (please select below):     Physician  Clinical Nurse Specialist  Registered Nurse     Physician Assistant  Discharge Planner  Licensed Practical Nurse     Nurse Practitioner

## 2024-04-12 NOTE — TELEPHONE ENCOUNTER
S/W PATIENT'S WIFE AND INFORMED OF CT HEAD 5/13/2024 AND F/U WITH DR. AMBROSE ON 5/21/2024----- Message from MUKUL Hawk sent at 4/10/2024  8:57 PM EDT -----  Regarding: please schedule  Please arrange a head CT and follow-up in the office 6 weeks from now with Dr. Ambrose if possible or on a day Dr. Ambrose is in the office with an APC.  Call patient wife to schedule    CT head without contrast for follow-up of ICH.   Thanks!

## 2024-04-12 NOTE — CASE MANAGEMENT/SOCIAL WORK
Continued Stay Note  [facility]     Patient Name: Erlin Blanco  MRN: [id number]  Today's Date: 4/12/2024    Admit Date: 4/6/2024    Plan: Home w/ 24/7 care & HH - [facility] today at 4pm   Discharge Plan       Row Name 04/12/24 1114       Plan    Plan Home w/ 24/7 care & HH - [facility] today at 4pm    Patient/Family in Agreement with Plan yes    Plan Comments D/c order & summary noted. [facility] scheduled  for today at 4pm to [address] EMS notified there is 2 steps to enter the home. CCP notified spouse of transport time. EMS form given to RN. MD notified of transport time. CCP requested HH order through [facility]                   Discharge Codes    No documentation.                 Expected Discharge Date and Time       Expected Discharge Date Expected Discharge Time    Apr 12, 2024               ERIC Ruiz

## 2024-04-12 NOTE — PROGRESS NOTES
Hospital Follow Up    LOS:  LOS: 2 days   Patient Name: Erlin Blanco  Age/Sex: 78 y.o. male  : 1945  MRN: [id number]    Day of Service: 24   Length of Stay: 2  Encounter Provider: MUKUL Souza  Place of Service: [facility]  Patient Care Team:  Senia Dorsey MD as PCP - General (Internal Medicine)  Bertin Perrin MD (Inactive) as Consulting Physician (Orthopedic Surgery)  Marika Arias MD as Consulting Physician (Cardiology)  Remy Thomas MD as Consulting Physician (Hematology and Oncology)  Sahil De La Rosa MD as Consulting Physician (Urology)  Erlin Abreu RN as Ambulatory  (Population Health)  Zamzam John APRN (Family Medicine)    Subjective:     Chief Complaint: follow up hypertension     Interval History: resting comfortably in bed.    Objective:     Objective:  Temp:  [98.2 °F (36.8 °C)-98.6 °F (37 °C)] 98.2 °F (36.8 °C)  Heart Rate:  [76-85] 80  Resp:  [16-20] 20  BP: (132-160)/(80-90) 132/80     Intake/Output Summary (Last 24 hours) at 2024 0817  Last data filed at 2024 2336  Gross per 24 hour   Intake --   Output 500 ml   Net -500 ml     Body mass index is 29.56 kg/m².      24  0544 04/10/24  0500 24  0606   Weight: 95.9 kg (211 lb 6.7 oz) 101 kg (221 lb 9 oz) 98.9 kg (218 lb 0.6 oz)     Weight change:     Physical Exam:   General Appearance:    Resting comfortably, In no acute distress.   Color:  Skin:  Neuro:  HEENT:    Lungs:     Pink  Warm and dry  No focal, motor or sensory deficits  Neck supple, pupils equal, round and reactive. No JVD, No Bruit  Clear to auscultation,respirations regular, even and                  unlabored    Heart:    Regular rate and rhythm, S1 and S2, no murmur, no gallop, no rub. No edema, DP/PT pulses are 2+   Chest Wall:    No abnormalities observed   Abdomen:     Normal bowel sounds, no masses, no organomegaly, soft        non-tender, non-distended,  "no guarding, no ascites noted   Extremities:   Moves all extremities well, no edema, no cyanosis, no redness       Lab Review:   Results from last 7 days   Lab Units 04/12/24  0654 04/11/24  1408 04/11/24  0515 04/09/24  0331 04/08/24  0429 04/07/24  0531 04/06/24  1713   SODIUM mmol/L 144  --  146*   < > 143   < > 142   POTASSIUM mmol/L 3.9 4.0 3.6   < > 3.4*   < > 4.4   CHLORIDE mmol/L 110*  --  110*   < > 110*   < > 107   CO2 mmol/L 25.9  --  27.0   < > 21.6*   < > 19.6*   BUN mg/dL 17  --  11   < > 25*   < > 20   CREATININE mg/dL 0.98  --  0.85   < > 1.09   < > 1.04   GLUCOSE mg/dL 200*  --  165*   < > 296*   < > 268*   CALCIUM mg/dL 9.0  --  8.8   < > 9.1   < > 9.8   AST (SGOT) U/L  --   --   --   --  24  --  41*   ALT (SGPT) U/L  --   --   --   --  14  --  18    < > = values in this interval not displayed.     Results from last 7 days   Lab Units 04/06/24  1925 04/06/24  1713   HSTROP T ng/L 54* 55*     Results from last 7 days   Lab Units 04/12/24  0654 04/11/24  0515   WBC 10*3/mm3 10.30 9.23   HEMOGLOBIN g/dL 14.5 15.4   HEMATOCRIT % 44.7 48.2   PLATELETS 10*3/mm3 335 311     Results from last 7 days   Lab Units 04/06/24  1713   INR  1.40*   APTT seconds 30.5     Results from last 7 days   Lab Units 04/12/24  0654 04/11/24  0515   MAGNESIUM mg/dL 2.2 1.6           Invalid input(s): \"LDLCALC\"          I reviewed the patient's new clinical results.  I personally viewed and interpreted the patient's EKG  Current Medications:   Scheduled Meds:amLODIPine, 10 mg, Oral, Q24H  aspirin, 81 mg, Oral, Daily  carvedilol, 12.5 mg, Oral, BID With Meals  cloNIDine, 0.2 mg, Oral, Q8H  fluticasone, 1 spray, Nasal, Daily  guaiFENesin, 1,200 mg, Oral, BID  hydrocortisone-bacitracin-zinc oxide-nystatin, 1 Application, Topical, TID  hydroxyurea, 500 mg, Oral, Daily  insulin glargine, 25 Units, Subcutaneous, Nightly  insulin lispro, 2-7 Units, Subcutaneous, 4x Daily AC & at Bedtime  insulin lispro, 6 Units, Subcutaneous, TID " With Meals  lactobacillus acidophilus, 1 capsule, Oral, Daily  levETIRAcetam, 500 mg, Oral, BID  linagliptin, 5 mg, Oral, Daily  memantine, 10 mg, Oral, BID  Menthol-Zinc Oxide, 1 Application, Topical, TID  multivitamin with minerals, 1 tablet, Oral, Daily  OLANZapine zydis, 5 mg, Oral, Nightly  rosuvastatin, 40 mg, Oral, Nightly  sennosides-docusate, 2 tablet, Oral, BID  sertraline, 50 mg, Oral, Nightly  sodium chloride, 10 mL, Intravenous, Q12H  sodium chloride, 10 mL, Intravenous, Q12H  spironolactone, 50 mg, Oral, Daily  tamsulosin, 0.4 mg, Oral, Daily  valsartan, 320 mg, Oral, Daily      Continuous Infusions:     Allergies:  Allergies   Allergen Reactions    Donepezil Hallucinations    Steglatro [Ertugliflozin] Other (See Comments)     balanitis       Assessment:       Hypertension, uncontrolled    History of CVA (cerebrovascular accident)    Paroxysmal atrial fibrillation    Type 2 diabetes mellitus    HTN (hypertension)    Paroxysmal atrial fibrillation    Expressive aphasia        Plan:   Hypertension.  BP improved yesterday evening. No BP recorded yet this morning.  Altered mental status.  Improved.  Recent hemorrhagic stroke   Nonspecific atrial arrhythmias.  Previously thought to be atrial fibrillation.  However recently Dr. Porras reviewed numerous EKGs and holter monitors and did not see any convincing atrial fibrillation. Apixaban has been stopped indefinitely without plans to restart.   Diabetes mellitus type II  Coronary artery disease  Chronic diastolic heart failure      - Will see what BP is this morning. If elevated, we can increase spironolactone to 100 mg daily  -Otherwise no changes at this time.    MUKUL Souza  04/12/24  08:17 EDT  Electronically signed by MUKUL Souza, 04/12/24, 8:17 AM EDT.

## 2024-04-12 NOTE — DISCHARGE SUMMARY
Date of Admission: 4/6/2024  Date of Discharge:  4/13/2024  Primary Care Physician: Senia Dorsey MD     Discharge Diagnosis:  Active Hospital Problems    Diagnosis  POA    **Hypertension, uncontrolled [I10]  Yes    Expressive aphasia [R47.01]  Yes    Paroxysmal atrial fibrillation [I48.0]  Yes    Type 2 diabetes mellitus [E11.9]  Yes    HTN (hypertension) [I10]  Yes    Paroxysmal atrial fibrillation [I48.0]  Yes    History of CVA (cerebrovascular accident) [Z86.73]  Not Applicable      Resolved Hospital Problems   No resolved problems to display.       Presenting Problem/History of Present Illness from H&P:  Hypertension, uncontrolled [I10]  Hyperglycemia due to diabetes mellitus [E11.65]     78 year old gentleman presented to the emergency room from a rehab facility due to uncontrolled hypertension;he was sent there after being hospitalized for a hemorrhagic stroke; he has no new neuro deficits; his wife is at the bedside; history was obtained from the ED provider and family; he has a history of expressive aphasia     Hospital Course:  The patient is a 78 y.o. male who presented with hypertension.  He had hypertension and atrial arrhythmia.  Cardiology consulted.  He has undergone some adjustment in medication and blood pressure is better controlled today.  He did not have any evidence of A-fib in the chart and electrophysiology reviewed.  He is going to stay off of Eliquis.  He has a history of intraparenchymal hemorrhage and also has polycythemia vera.  He did undergo phlebotomy here.  He is continuing hydroxyurea.  Neurosurgery evaluated and hematology evaluated during his stay.  He was cleared to start a low-dose daily aspirin by neurosurgery due to the polycythemia vera.  He has diabetes with an A1c of 8.4.  He has baseline aphasia, right hemiparesis, facial droop.  Insurance did not approve rehab so he is going to discharge home with home health and 24/7 care.    Exam Today:  Constitutional:        General: He is not in acute distress.     Appearance: He is not diaphoretic.   HENT:      Head: Atraumatic.   Cardiovascular:      Rate and Rhythm: Normal rate and regular rhythm.   Pulmonary:      Effort: Pulmonary effort is normal. No respiratory distress.   Abdominal:      General: There is no distension.      Palpations: Abdomen is soft.      Tenderness: There is no abdominal tenderness. There is no guarding or rebound.   Musculoskeletal:         General: No tenderness.   Skin:     General: Skin is dry.   Neurological:      Mental Status: He is alert. He is disoriented.      Comments: Baseline right-sided hemiparesis, facial droop   Psychiatric:         Cognition and Memory: Cognition is impaired. Memory is impaired.     Results:  CXR  There is subtle interstitial prominence involving the right  upper lobe without consolidation or of effusion. Interstitial infiltrate  including COVID-pneumonia could not be excluded. There is no evidence of  consolidation or of gross effusion. See above.    CT Head  Partial improvement of previous intracranial hemorrhage. No new areas of  hemorrhage are identified. If there is further clinical concern, MRI  could be considered for further evaluation.    Procedures Performed:         Consults:   Consults       Date and Time Order Name Status Description    4/10/2024 10:35 AM Inpatient Neurosurgery Consult Completed     4/9/2024  9:26 AM Inpatient Cardiology Consult Completed     4/6/2024 10:22 PM Inpatient Hematology & Oncology Consult Completed     3/8/2024  9:01 AM Inpatient Cardiology Consult Completed     3/4/2024 12:15 PM Inpatient Hospitalist Consult Completed     2/29/2024  3:20 PM Inpatient Neurology Consult General Completed     2/28/2024 10:57 AM Inpatient Hospitalist Consult Completed     2/25/2024 12:32 AM Inpatient Neurosurgery Consult Completed     2/24/2024 10:30 PM Neurosurgery (on-call MD unless specified) Completed     2/24/2024 10:12 PM Inpatient Neurology  Consult Stroke Completed     2/24/2024 10:12 PM Inpatient Neurology Consult Stroke Completed              Discharge Disposition:  Skilled Nursing Facility (DC - External)    Discharge Medications:     Discharge Medications        New Medications        Instructions Start Date   Aspirin Low Dose 81 MG EC tablet  Generic drug: aspirin   81 mg, Oral, Daily      BD Pen Needle Moni 2nd Gen 32G X 4 MM misc  Generic drug: Insulin Pen Needle   Use 1 each as directed once daily.      Lantus SoloStar 100 UNIT/ML injection pen  Generic drug: Insulin Glargine   25 Units, Subcutaneous, Nightly             Changes to Medications        Instructions Start Date   acetaminophen 500 MG tablet  Commonly known as: TYLENOL  What changed: Another medication with the same name was removed. Continue taking this medication, and follow the directions you see here.   1,000 mg, Oral, Every 8 Hours PRN      amLODIPine 10 MG tablet  Commonly known as: NORVASC  What changed: when to take this   10 mg, Oral, Daily      carvedilol 12.5 MG tablet  Commonly known as: COREG  What changed:   medication strength  how much to take   12.5 mg, Oral, 2 Times Daily With Meals      melatonin 5 MG tablet tablet  What changed:   how much to take  when to take this  reasons to take this   5 mg, Oral, Nightly      metFORMIN  MG 24 hr tablet  Commonly known as: GLUCOPHAGE-XR  What changed: how much to take   1,000 mg, Oral, Daily With Breakfast      sertraline 50 MG tablet  Commonly known as: ZOLOFT  What changed: when to take this   50 mg, Oral, Daily      spironolactone 50 MG tablet  Commonly known as: ALDACTONE  What changed: when to take this   50 mg, Oral, Daily             Continue These Medications        Instructions Start Date   b complex-C-E-zinc tablet   1 tablet, Oral, Daily      cloNIDine 0.2 MG tablet  Commonly known as: CATAPRES   0.2 mg, Oral, Every 8 Hours Scheduled      fluticasone 50 MCG/ACT nasal spray  Commonly known as: FLONASE   1  spray, Nasal, Daily      Gemtesa 75 MG tablet  Generic drug: Vibegron   75 mg, Oral, Daily With Breakfast      hydroxyurea 500 MG capsule  Commonly known as: HYDREA   500 mg, Oral, Daily      Januvia 100 MG tablet  Generic drug: SITagliptin   100 mg, Oral, Daily      levETIRAcetam 500 MG tablet  Commonly known as: KEPPRA   500 mg, Oral, 2 Times Daily      memantine 10 MG tablet  Commonly known as: NAMENDA   10 mg, Oral, 2 Times Daily      OLANZapine zydis 5 MG disintegrating tablet  Commonly known as: zyPREXA   5 mg, Translingual, Nightly      polyethylene glycol 17 g packet  Commonly known as: MIRALAX   17 g, Oral, 2 Times Daily, Hold if loose stool      rosuvastatin 20 MG tablet  Commonly known as: CRESTOR   40 mg, Oral, Daily      sennosides-docusate 8.6-50 MG per tablet  Commonly known as: PERICOLACE   2 tablets, Oral, 2 Times Daily      tamsulosin 0.4 MG capsule 24 hr capsule  Commonly known as: FLOMAX   0.4 mg, Oral, Every Night at Bedtime      valsartan 320 MG tablet  Commonly known as: DIOVAN   320 mg, Oral, Daily             Stop These Medications      insulin glargine 100 UNIT/ML injection  Commonly known as: LANTUS, SEMGLEE     insulin regular 100 UNIT/ML injection  Commonly known as: humuLIN R,novoLIN R            ASK your doctor about these medications        Instructions Start Date   Myrbetriq 25 MG tablet sustained-release 24 hour 24 hr tablet  Generic drug: Mirabegron ER   25 mg, Oral, Daily               Discharge Diet:   Diet Instructions       Diet: Cardiac Diets, Diabetic Diets; Healthy Heart (2-3 Na+); Pureed (NDD 1); Honey Thick; Consistent Carbohydrate      Discharge Diet:  Cardiac Diets  Diabetic Diets       Cardiac Diet: Healthy Heart (2-3 Na+)    Texture: Pureed (NDD 1)    Fluid Consistency: Honey Thick    Diabetic Diet: Consistent Carbohydrate            Activity at Discharge:   Activity Instructions       Activity as Tolerated              Follow-up Appointments:   Contact information for  follow-up providers       Senia Dorsey MD Follow up.    Specialty: Internal Medicine  Contact information:  [address]  [facility] [address]  [phone]               Josué Miller MD Follow up.    Specialty: Neurosurgery  Contact information:  [address]  [place] [address]  [phone]               Ej Martins II, MD Follow up.    Specialties: Hematology and Oncology, Oncology, Hematology  Contact information:  [address]  [place] [address]  [phone]                       Contact information for after-discharge care       Home Medical Care       [facility] .    Services: Home Health Services, Home Nursing, Home Rehabilitation  Contact information:  [address] [facility] [address]  [facility] [address] [phone]                                   Test Results Pending at Discharge:       Valeriy De Jesus MD  04/13/24  11:08 EDT    Time Spent on Discharge Activities: >30 minutes    Dictated portions using Dragon dictation software.

## 2024-04-12 NOTE — PLAN OF CARE
Goal Outcome Evaluation:  Plan of Care Reviewed With: patient, spouse        Progress: no change  Outcome Evaluation: Patient is tired today but does wake up and answer when questions when you ask. VSS on room air. Patient followed commands from RN. Patient is beuing discharged home with family and home health to care- transportation scheduled for 4pm. PICC line and IV to be removed prior to discharge. Bed alarm on, bed in lowest position, and call light within reach.

## 2024-04-12 NOTE — PROGRESS NOTES
Name: Erlin Blanco ADMIT: 2024   : 1945  PCP: Senia Dorsey MD    MRN: [id number] LOS: 2 days   AGE/SEX: 78 y.o. male  ROOM: RUST     Subjective   Subjective   Chief Complaint   Patient presents with    Hypertension     Awake. Poorly interactive and poor historian. Not able to relay symptoms to me.     Objective   Objective   Vital Signs  Temp:  [98.2 °F (36.8 °C)-98.6 °F (37 °C)] 98.2 °F (36.8 °C)  Heart Rate:  [76-80] 80  Resp:  [18-20] 18  BP: (132-158)/(78-85) 134/78  SpO2:  [93 %-100 %] 93 %  on   ;   Device (Oxygen Therapy): room air  Body mass index is 29.56 kg/m².    Physical Exam  Vitals and nursing note reviewed.   Constitutional:       General: He is not in acute distress.     Appearance: He is not diaphoretic.   HENT:      Head: Atraumatic.   Cardiovascular:      Rate and Rhythm: Normal rate and regular rhythm.   Pulmonary:      Effort: Pulmonary effort is normal. No respiratory distress.   Abdominal:      General: There is no distension.      Palpations: Abdomen is soft.      Tenderness: There is no abdominal tenderness. There is no guarding or rebound.   Musculoskeletal:         General: No tenderness.   Skin:     General: Skin is dry.   Neurological:      Mental Status: He is alert. He is disoriented.      Comments: Baseline right-sided hemiparesis, facial droop   Psychiatric:         Cognition and Memory: Cognition is impaired. Memory is impaired.       Results Review  I reviewed the patient's new clinical results.    Results from last 7 days   Lab Units 24  0654 24  0515 04/10/24  0516 24  0331   WBC 10*3/mm3 10.30 9.23 8.78 9.36   HEMOGLOBIN g/dL 14.5 15.4 15.5 16.4   PLATELETS 10*3/mm3 335 311 279 224     Results from last 7 days   Lab Units 24  0654 24  1408 24  0515 04/10/24  0516 24  0331   SODIUM mmol/L 144  --  146* 146* 144   POTASSIUM mmol/L 3.9 4.0 3.6 3.3* 3.6   CHLORIDE mmol/L 110*  --  110* 114* 112*   CO2 mmol/L 25.9  --   27.0 22.2 20.9*   BUN mg/dL 17  --  11 19 22   CREATININE mg/dL 0.98  --  0.85 0.75* 0.92   GLUCOSE mg/dL 200*  --  165* 122* 234*   EGFR mL/min/1.73 79.9  --  90.1 93.5 86.2     Results from last 7 days   Lab Units 04/12/24  0654 04/11/24  0515 04/08/24  0429 04/06/24  1713   ALBUMIN g/dL 3.3* 3.7 3.3* 3.8   BILIRUBIN mg/dL  --   --  0.4 0.6   ALK PHOS U/L  --   --  88 112   AST (SGOT) U/L  --   --  24 41*   ALT (SGPT) U/L  --   --  14 18     Results from last 7 days   Lab Units 04/12/24  0654 04/11/24  1408 04/11/24  0515 04/10/24  0516 04/09/24  0331 04/08/24  0806 04/08/24  0429 04/07/24  0531 04/06/24  1713   CALCIUM mg/dL 9.0  --  8.8 9.0 9.1  --  9.1   < > 9.8   ALBUMIN g/dL 3.3*  --  3.7  --   --   --  3.3*  --  3.8   MAGNESIUM mg/dL 2.2  --  1.6  --   --  2.2  --   --   --    PHOSPHORUS mg/dL 3.8 3.1 2.1*  --   --   --   --   --   --     < > = values in this interval not displayed.         Hemoglobin A1C   Date/Time Value Ref Range Status   04/11/2024 0515 8.40 (H) 4.80 - 5.60 % Final     Glucose   Date/Time Value Ref Range Status   04/12/2024 1113 235 (H) 70 - 130 mg/dL Final   04/12/2024 0628 171 (H) 70 - 130 mg/dL Final   04/11/2024 2053 309 (H) 70 - 130 mg/dL Final   04/11/2024 1648 313 (H) 70 - 130 mg/dL Final   04/11/2024 1140 185 (H) 70 - 130 mg/dL Final   04/11/2024 0609 166 (H) 70 - 130 mg/dL Final   04/10/2024 2159 262 (H) 70 - 130 mg/dL Final       No radiology results for the last day    I have personally reviewed all medications:  Scheduled Medications  amLODIPine, 10 mg, Oral, Q24H  aspirin, 81 mg, Oral, Daily  carvedilol, 12.5 mg, Oral, BID With Meals  cloNIDine, 0.2 mg, Oral, Q8H  fluticasone, 1 spray, Nasal, Daily  guaiFENesin, 1,200 mg, Oral, BID  hydrocortisone-bacitracin-zinc oxide-nystatin, 1 Application, Topical, TID  hydroxyurea, 500 mg, Oral, Daily  insulin glargine, 25 Units, Subcutaneous, Nightly  insulin lispro, 2-7 Units, Subcutaneous, 4x Daily AC & at Bedtime  insulin lispro, 6  Units, Subcutaneous, TID With Meals  lactobacillus acidophilus, 1 capsule, Oral, Daily  levETIRAcetam, 500 mg, Oral, BID  linagliptin, 5 mg, Oral, Daily  memantine, 10 mg, Oral, BID  Menthol-Zinc Oxide, 1 Application, Topical, TID  multivitamin with minerals, 1 tablet, Oral, Daily  OLANZapine zydis, 5 mg, Oral, Nightly  rosuvastatin, 40 mg, Oral, Nightly  sennosides-docusate, 2 tablet, Oral, BID  sertraline, 50 mg, Oral, Nightly  sodium chloride, 10 mL, Intravenous, Q12H  sodium chloride, 10 mL, Intravenous, Q12H  spironolactone, 50 mg, Oral, Daily  tamsulosin, 0.4 mg, Oral, Daily  valsartan, 320 mg, Oral, Daily      Infusions       Diet  Diet: Cardiac, Diabetic; Healthy Heart (2-3 Na+); Consistent Carbohydrate; Texture: Pureed (NDD 1); Fluid Consistency: Honey Thick    I have personally reviewed:  [x]  Laboratory   []  Microbiology   []  Radiology   []  EKG/Telemetry  []  Cardiology/Vascular   []  Pathology    []  Records       Assessment/Plan     Active Hospital Problems    Diagnosis  POA    **Hypertension, uncontrolled [I10]  Yes    Expressive aphasia [R47.01]  Yes    Paroxysmal atrial fibrillation [I48.0]  Yes    Type 2 diabetes mellitus [E11.9]  Yes    HTN (hypertension) [I10]  Yes    Paroxysmal atrial fibrillation [I48.0]  Yes    History of CVA (cerebrovascular accident) [Z86.73]  Not Applicable      Resolved Hospital Problems   No resolved problems to display.       78 y.o. male admitted with Hypertension, uncontrolled.    Hypertension: Continue medications.  Diabetes: A1c 8.4.  Continue insulin.  Polycythemia vera: Status post phlebotomy.  On hydroxyurea.  PICC is going to be removed at discharge.  Not going to continue Eliquis.  Neurosurgery evaluated and ok to start aspirin.  Oncology following.  Atrial arrhythmia: Cardiology evaluated and no convincing atrial fibrillation was noted in chart by electrophysiology.  To remain off Eliquis.  Cardiology following.  History intraparenchymal hemorrhage: Continued  aphasia, hemiparesis.  Neurosurgery consult noted.  PPX: SCD  Disposition:  with 24/7 care; waiting on transport    Expected Discharge Date: 4/12/2024; Expected Discharge Time:      Valeriy De Jesus MD  [facility]  04/12/24  14:02 EDT    Dictated portions of note using Dragon dictation software.  Copied text in this note has been reviewed by me and remains accurate as of 04/12/24

## 2024-04-12 NOTE — TELEPHONE ENCOUNTER
[facility]FORMED OF UPCOMING CT HEAD AND F/U WITH DR. AMBROSE ----- Message from MUKUL Hawk sent at 4/10/2024  8:57 PM EDT -----  Regarding: please schedule  Please arrange a head CT and follow-up in the office 6 weeks from now with Dr. Ambrose if possible or on a day Dr. Ambrose is in the office with an APC.  Call patient wife to schedule    CT head without contrast for follow-up of I[facility].   Thanks!

## 2024-04-13 VITALS
HEART RATE: 78 BPM | RESPIRATION RATE: 18 BRPM | TEMPERATURE: 97.7 F | DIASTOLIC BLOOD PRESSURE: 85 MMHG | WEIGHT: 219.6 LBS | SYSTOLIC BLOOD PRESSURE: 155 MMHG | BODY MASS INDEX: 29.74 KG/M2 | HEIGHT: 72 IN | OXYGEN SATURATION: 89 %

## 2024-04-13 LAB — GLUCOSE BLDC GLUCOMTR-MCNC: 191 MG/DL (ref 70–130)

## 2024-04-13 PROCEDURE — 63710000001 INSULIN LISPRO (HUMAN) PER 5 UNITS: Performed by: INTERNAL MEDICINE

## 2024-04-13 PROCEDURE — 82948 REAGENT STRIP/BLOOD GLUCOSE: CPT

## 2024-04-13 PROCEDURE — 99232 SBSQ HOSP IP/OBS MODERATE 35: CPT | Performed by: INTERNAL MEDICINE

## 2024-04-13 RX ORDER — AMLODIPINE BESYLATE 10 MG/1
10 TABLET ORAL DAILY
Qty: 30 TABLET | Refills: 0 | Status: SHIPPED | OUTPATIENT
Start: 2024-04-13

## 2024-04-13 RX ORDER — OLANZAPINE 5 MG/1
5 TABLET, ORALLY DISINTEGRATING ORAL NIGHTLY
Qty: 30 TABLET | Refills: 0
Start: 2024-04-13

## 2024-04-13 RX ORDER — PEN NEEDLE, DIABETIC 32GX 5/32"
1 NEEDLE, DISPOSABLE MISCELLANEOUS DAILY
Qty: 100 EACH | Refills: 0 | Status: SHIPPED | OUTPATIENT
Start: 2024-04-13

## 2024-04-13 RX ORDER — OLANZAPINE 5 MG/1
5 TABLET, ORALLY DISINTEGRATING ORAL NIGHTLY
Qty: 30 TABLET | Refills: 0 | Status: SHIPPED | OUTPATIENT
Start: 2024-04-13

## 2024-04-13 RX ORDER — LEVETIRACETAM 500 MG/1
500 TABLET ORAL 2 TIMES DAILY
Qty: 60 TABLET | Refills: 0 | Status: SHIPPED | OUTPATIENT
Start: 2024-04-13

## 2024-04-13 RX ORDER — AMLODIPINE BESYLATE 10 MG/1
10 TABLET ORAL DAILY
Qty: 30 TABLET | Refills: 0
Start: 2024-04-13

## 2024-04-13 RX ORDER — VIBEGRON 75 MG/1
75 TABLET, FILM COATED ORAL
Qty: 30 TABLET | Refills: 0 | Status: SHIPPED | OUTPATIENT
Start: 2024-04-13 | End: 2024-04-17

## 2024-04-13 RX ORDER — SPIRONOLACTONE 50 MG/1
50 TABLET, FILM COATED ORAL DAILY
Qty: 30 TABLET | Refills: 0 | Status: SHIPPED | OUTPATIENT
Start: 2024-04-13

## 2024-04-13 RX ORDER — METFORMIN HYDROCHLORIDE 500 MG/1
1000 TABLET, EXTENDED RELEASE ORAL
Qty: 60 TABLET | Refills: 0
Start: 2024-04-13 | End: 2024-04-22 | Stop reason: ALTCHOICE

## 2024-04-13 RX ORDER — CLONIDINE HYDROCHLORIDE 0.2 MG/1
0.2 TABLET ORAL EVERY 8 HOURS SCHEDULED
Qty: 90 TABLET | Refills: 0 | Status: SHIPPED | OUTPATIENT
Start: 2024-04-13

## 2024-04-13 RX ADMIN — Medication 1 APPLICATION: at 09:59

## 2024-04-13 RX ADMIN — GUAIFENESIN 1200 MG: 600 TABLET, EXTENDED RELEASE ORAL at 09:50

## 2024-04-13 RX ADMIN — VALSARTAN 320 MG: 320 TABLET, FILM COATED ORAL at 09:51

## 2024-04-13 RX ADMIN — TAMSULOSIN HYDROCHLORIDE 0.4 MG: 0.4 CAPSULE ORAL at 09:51

## 2024-04-13 RX ADMIN — INSULIN LISPRO 2 UNITS: 100 INJECTION, SOLUTION INTRAVENOUS; SUBCUTANEOUS at 09:57

## 2024-04-13 RX ADMIN — HYDROXYUREA 500 MG: 500 CAPSULE ORAL at 09:51

## 2024-04-13 RX ADMIN — MEMANTINE HYDROCHLORIDE 10 MG: 10 TABLET, FILM COATED ORAL at 09:51

## 2024-04-13 RX ADMIN — FLUTICASONE PROPIONATE 1 SPRAY: 50 SPRAY, METERED NASAL at 09:58

## 2024-04-13 RX ADMIN — AMLODIPINE BESYLATE 10 MG: 10 TABLET ORAL at 09:51

## 2024-04-13 RX ADMIN — Medication 1 TABLET: at 09:51

## 2024-04-13 RX ADMIN — CARVEDILOL 12.5 MG: 12.5 TABLET, FILM COATED ORAL at 09:51

## 2024-04-13 RX ADMIN — INSULIN LISPRO 6 UNITS: 100 INJECTION, SOLUTION INTRAVENOUS; SUBCUTANEOUS at 09:58

## 2024-04-13 RX ADMIN — CLONIDINE HYDROCHLORIDE 0.2 MG: 0.1 TABLET ORAL at 06:17

## 2024-04-13 RX ADMIN — Medication 10 ML: at 09:59

## 2024-04-13 RX ADMIN — ASPIRIN 81 MG: 81 TABLET, COATED ORAL at 09:51

## 2024-04-13 RX ADMIN — LEVETIRACETAM 500 MG: 500 TABLET, FILM COATED ORAL at 09:51

## 2024-04-13 RX ADMIN — SPIRONOLACTONE 50 MG: 50 TABLET, FILM COATED ORAL at 09:51

## 2024-04-13 RX ADMIN — DOCUSATE SODIUM 50MG AND SENNOSIDES 8.6MG 2 TABLET: 8.6; 5 TABLET, FILM COATED ORAL at 10:07

## 2024-04-13 RX ADMIN — ZINC OXIDE 1 APPLICATION: 200 OINTMENT TOPICAL at 09:59

## 2024-04-13 RX ADMIN — Medication 1 CAPSULE: at 09:51

## 2024-04-13 RX ADMIN — LINAGLIPTIN 5 MG: 5 TABLET, FILM COATED ORAL at 09:59

## 2024-04-13 NOTE — CASE MANAGEMENT/SOCIAL WORK
Case Management Discharge Note      Final Note: Home with 24/7 care and [facility]    Provided Post Acute Provider List?: N/A  Provided Post Acute Provider Quality & Resource List?: N/A    Selected Continued Care - Discharged on 4/13/2024 Admission date: 4/6/2024 - Discharge disposition: Home-Health Care Svc      Destination    No services have been selected for the patient.                Durable Medical Equipment    No services have been selected for the patient.                Dialysis/Infusion    No services have been selected for the patient.                Home Medical Care Coordination complete.      Service Provider Selected Services Address Phone Fax Patient Preferred     [facility] ,  Home Nursing ,  Home Rehabilitation [address] [address] [phone] --              Therapy    No services have been selected for the patient.                Community Resources    No services have been selected for the patient.                Community & DME    No services have been selected for the patient.                    Selected Continued Care - Episodes Includes continued care and service providers with selected services from the active episodes listed below      Chronic Care Management Episode start date: 1/12/2024 (Paused)   There are no active outsourced providers for this episode.                 Selected Continued Care - Prior Encounters Includes continued care and service providers with selected services from prior encounters from 1/7/2024 to 4/13/2024      Discharged on 3/14/2024 Admission date: 2/24/2024 - Discharge disposition: Skilled Nursing Facility (DC - External)      Destination       Service Provider Selected Services Address Phone Fax Patient Preferred    [facility] [address] [address] [phone] --                          Transportation Services  Ambulance: [facility]    Final Discharge Disposition Code: 06 - home with home health care

## 2024-04-13 NOTE — CASE MANAGEMENT/SOCIAL WORK
Continued Stay Note  [facility]     Patient Name: Erlin Blanco  MRN: [id number]  Today's Date: 4/13/2024    Admit Date: 4/6/2024    Plan: Home with 24/7 care and [facility] via EMS today at noon   Discharge Plan       Row Name 04/13/24 1114       Plan    Plan Home with 24/7 care and [facility] via EMS today at noon    Plan Comments Called EMS to confirm EMS run is today at noon and verified with Radha. LAQUITA updated. Tom SANTA                   Discharge Codes    No documentation.                 Expected Discharge Date and Time       Expected Discharge Date Expected Discharge Time    Apr 13, 2024               Tom Squires RN

## 2024-04-13 NOTE — PROGRESS NOTES
REASON FOR FOLLOWUP/CHIEF COMPLAINT:  Polycythemia vera    HISTORY OF PRESENT ILLNESS:   No family present.  No evidence of bleeding or clotting.  Noted likely discharge today    Past Medical History, Past Surgical History, Social History, Family History have been reviewed and are without significant changes except as mentioned.    Review of Systems   Review of Systems   Constitutional:  Negative for activity change.   HENT:  Negative for nosebleeds and trouble swallowing.    Respiratory:  Negative for shortness of breath and wheezing.    Cardiovascular:  Negative for chest pain and palpitations.   Gastrointestinal:  Negative for constipation, diarrhea and nausea.   Genitourinary:  Negative for dysuria and hematuria.   Musculoskeletal:  Negative for arthralgias and myalgias.   Neurological:  Negative for seizures and syncope.   Hematological:  Negative for adenopathy. Does not bruise/bleed easily.   Psychiatric/Behavioral:  Negative for confusion.        Medications:  The current medication list was reviewed in the EMR    ALLERGIES:    Allergies   Allergen Reactions    Donepezil Hallucinations    Steglatro [Ertugliflozin] Other (See Comments)     balanitis              Vitals:    04/12/24 2350 04/13/24 0500 04/13/24 0617 04/13/24 0731   BP: 103/74  (!) 136/119 155/85   BP Location: Right arm   Right arm   Patient Position: Lying   Lying   Pulse:   82 78   Resp: 18   18   Temp: 98.5 °F (36.9 °C)   97.7 °F (36.5 °C)   TempSrc: Oral   Oral   SpO2:       Weight:  99.6 kg (219 lb 9.6 oz)     Height:         Physical Exam    CONSTITUTIONAL:  Vital signs reviewed.  No distress, looks comfortable.  EYES:  Conjunctivae and lids unremarkable.  PERRLA  EARS, NOSE, MOUTH, THROAT:  Ears and nose appear unremarkable.  Lips, teeth, gums appear unremarkable.  RESPIRATORY:  Normal respiratory effort.  Lungs clear to auscultation bilaterally.  CARDIOVASCULAR:  Normal S1, S2.  No murmurs, rubs or gallops.  No significant lower  extremity edema.  GASTROINTESTINAL: Abdomen appears unremarkable.  Nontender.  No hepatomegaly.  No splenomegaly.  NEURO: Cranial nerves 2-12 grossly intact.  No focal deficits.  Appears to have equal strength all 4 extremities.  MUSCULOSKELETAL:  Unremarkable digits/nails.  No cyanosis or clubbing.  SKIN:  Warm.  No rashes.  PSYCHIATRIC:  Normal judgment and insight.  Normal mood and affect.      RECENT LABS:  WBC   Date Value Ref Range Status   04/12/2024 10.30 3.40 - 10.80 10*3/mm3 Final   04/11/2024 9.23 3.40 - 10.80 10*3/mm3 Final     Hemoglobin   Date Value Ref Range Status   04/12/2024 14.5 13.0 - 17.7 g/dL Final   04/11/2024 15.4 13.0 - 17.7 g/dL Final     Platelets   Date Value Ref Range Status   04/12/2024 335 140 - 450 10*3/mm3 Final   04/11/2024 311 140 - 450 10*3/mm3 Final       ASSESSMENT/PLAN:  Erlin Blanco [id number]     *Polycythemia vera  Followed by Dr. Thomas, hematology oncology, for years, on Hydrea.  He will see Dr. Thomas after discharge  HCT 44.7  No HCT today which is fine    *Prior hemorrhagic stroke with expressive aphasia, lives in a nursing facility.    *Admitted 4/ 6/24 for uncontrolled hypertension    *Anticoagulation  Cardiology evaluated and state they think he may not have ever had A-fib and therefore they do not plan any more Eliquis regarding A-fib.  Daughter-in-law states patient was on aspirin 81 mg along with Eliquis treatment dose when he had the hemorrhagic stroke in February 2024 and has been off both of them since.  Daughter-in-law states he has had a couple of ischemic strokes.  Considering the hypercoagulability of polycythemia vera, I do recommend taking aspirin 81 mg daily if he is not going to be on Eliquis, if okay with neurosurgery.  I do see a CT from 4/7/2024 showing improvement of intracranial hemorrhage.  4/10/2024: Neurosurgery evaluated and they are okay with aspirin 81 mg daily.  No evidence of clotting or bleeding    *Overweight.  Being overweight is a  risk factor for clotting.  Ideally, lose weight  Body mass index is 29.78 kg/m².  BMI 25 to <30 is overweight  BMI 30 to <35 is class 1 obesity  BMI 35 to <40 is class 2 obesity  BMI 40 or higher is class 3 obesity     Plan  Could not do a .phlebotomy through peripheral IV.  Therefore, PICC placed on 4/ 8/24 and phlebotomy occurred the morning of 4/9/2024.  Wife states he is not a difficult stick as an outpatient so I told her PICC would likely be removed upon discharge.  Wife will make sure he gets follow-up with his regular outpatient hematologist, Dr. Thomas, sometime after discharge  Continue Hydrea    Chart reviewed and summarized including hospitalist note.  Also reviewed the last 4 glucose results which were not ordered by me

## 2024-04-14 ENCOUNTER — HOME CARE VISIT (OUTPATIENT)
Dept: HOME HEALTH SERVICES | Facility: HOME HEALTHCARE | Age: 79
End: 2024-04-14
Payer: MEDICARE

## 2024-04-14 ENCOUNTER — READMISSION MANAGEMENT (OUTPATIENT)
Dept: CALL CENTER | Facility: HOSPITAL | Age: 79
End: 2024-04-14
Payer: MEDICARE

## 2024-04-14 VITALS
SYSTOLIC BLOOD PRESSURE: 142 MMHG | OXYGEN SATURATION: 98 % | RESPIRATION RATE: 16 BRPM | DIASTOLIC BLOOD PRESSURE: 78 MMHG | WEIGHT: 231 LBS | BODY MASS INDEX: 33.07 KG/M2 | TEMPERATURE: 97.5 F | HEART RATE: 72 BPM | HEIGHT: 70 IN

## 2024-04-14 PROCEDURE — G0299 HHS/HOSPICE OF RN EA 15 MIN: HCPCS

## 2024-04-14 NOTE — Clinical Note
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 Pt was sent to [facility] for rehab. Pt was sent back to ER on 4/6/2024 - 4/13/2024 from the facility for uncontrolled hypertension. Pt has PMH of hemorrhagic stroke with right sided deficits and aphasia, HTN, DM, polycythemia vera    Focus of Care: CV assessments and education r/t hypertension    Patient's goal(s): pt unable to determine goals     Current Functional status/mobility/DME: Pt is dependent for all ADLs. Pt is bedfast and requires torsten lift for transfers.     HB status/Living Arrangements: Pt lives at home with family as PCG.     Skin Integrity/wound status: Pt has stage 2 pressure ulcer on left and right buttocks.     Code Status: DNR    Fall Risk/Safety concerns: Pt is falls risk of 8     Educated on Emergency Plan, steps to take prior to going to the ER and when to Call [facility]     Medication issues/Concerns: Insulin Glargine (LANTUS SOLOSTAR) 100 UNIT/ML injection pen    Additional Problems/Concerns: Family was concerned with RLE for mild redness and wanted to be checked for DVT. Call made to Zamzam Muniz office to notify.     SDOH Barriers (i.e. caregiver concerns, social isolation, transportation, food insecurity, environment, income etc.)/Need for MSW: none at this time     Plan for next visit: CP and general assessment, vital signs, goal based teaching per careplan.

## 2024-04-14 NOTE — OUTREACH NOTE
Prep Survey      Flowsheet Row Responses   [facility] patient discharged from? [place]   Is LACE score < 7 ? No   Eligibility [facility]   Date of Admission 04/06/24   Date of Discharge 04/13/24   Discharge Disposition Home-Health Care Sv   Discharge diagnosis Hypertension, uncontrolled   Does the patient have one of the following disease processes/diagnoses(primary or secondary)? Other   Does the patient have Home health ordered? Yes   What is the Home health agency?  [facility]   Is there a DME ordered? No   Comments regarding appointments new PCP appt   Medication alerts for this patient see avs--many new meds, insulin   Prep survey completed? Yes            Nancy WELDON - Registered Nurse

## 2024-04-14 NOTE — HOME HEALTH
SOC Note:    Home Health ordered for: disciplines SN, PT, OT, SLP    Reason for Hosp/Primary Dx/Co-morbidities: Pt admitted to [facility] from 2/24/2024 - 3/14/2024 for hemorrhagic stroke causing right sided facial droop, right sided weakness, and aphasia. Pt was sent to [facility] for rehab. Pt was sent back to ER on 4/6/2024 - 4/13/2024 from the facility for uncontrolled hypertension. Pt has PMH of hemorrhagic stroke with right sided deficits and aphasia, HTN, DM, polycythemia vera    Focus of Care: CV assessments and education r/t hypertension    Patient's goal(s): pt unable to determine goals     Current Functional status/mobility/DME: Pt is dependent for all ADLs. Pt is bedfast and requires torsten lift for transfers.     HB status/Living Arrangements: Pt lives at home with family as PCG.     Skin Integrity/wound status: Pt has stage 2 pressure ulcer on left and right buttocks.     Code Status: DNR    Fall Risk/Safety concerns: Pt is falls risk of 8     Educated on Emergency Plan, steps to take prior to going to the ER and when to Call [facility]     Medication issues/Concerns: Insulin Glargine (LANTUS SOLOSTAR) 100 UNIT/ML injection pen    Additional Problems/Concerns: Family was concerned with RLE for mild redness and wanted to be checked for DVT. Call made to Zamzam Muniz office to notify.     SDOH Barriers (i.e. caregiver concerns, social isolation, transportation, food insecurity, environment, income etc.)/Need for MSW: none at this time     Plan for next visit: CP and general assessment, vital signs, goal based teaching per careplan.

## 2024-04-15 ENCOUNTER — TRANSITIONAL CARE MANAGEMENT TELEPHONE ENCOUNTER (OUTPATIENT)
Dept: CALL CENTER | Facility: HOSPITAL | Age: 79
End: 2024-04-15
Payer: MEDICARE

## 2024-04-15 ENCOUNTER — TELEPHONE (OUTPATIENT)
Dept: FAMILY MEDICINE CLINIC | Facility: CLINIC | Age: 79
End: 2024-04-15
Payer: MEDICARE

## 2024-04-15 NOTE — OUTREACH NOTE
Call Center TCM Note      Flowsheet Row Responses   [facility] patient discharged from? [place]   Does the patient have one of the following disease processes/diagnoses(primary or secondary)? Other   TCM attempt successful? Yes   Call start time 1133   Call end time 1201   Discharge diagnosis Hypertension, uncontrolled   Person spoke with today (if not patient) and relationship pt   Meds reviewed with patient/caregiver? Yes   Is the patient having any side effects they believe may be caused by any medication additions or changes? No   Does the patient have all medications ordered at discharge? Yes   Is the patient taking all medications as directed (includes completed medication regime)? Yes   Does the patient have an appointment with their PCP within 7-14 days of discharge? No   Nursing Interventions Routed TCM call to PCP office   What is the Home health agency?  [facility]   Has home health visited the patient within 72 hours of discharge? Call prior to 72 hours   Psychosocial issues? No   Did the patient receive a copy of their discharge instructions? Yes   Nursing interventions Reviewed instructions with patient   What is the patient's perception of their health status since discharge? Same   Is the patient/caregiver able to teach back signs and symptoms related to disease process for when to call PCP? Yes   Is the patient/caregiver able to teach back signs and symptoms related to disease process for when to call 911? Yes   Is the patient/caregiver able to teach back the hierarchy of who to call/visit for symptoms/problems? PCP, Specialist, Home health nurse, Urgent Care, ED, 911 Yes   If the patient is a current smoker, are they able to teach back resources for cessation? Not a smoker   TCM call completed? Yes   Wrap up additional comments Spouse states pt is about the same, and still weak. Reviewed AVS/meds with spouse. PT, HH, OT, ST visits. Pt has a PCP fu appt.   Call end time 1201   Would  this patient benefit from a Referral to [facility] Social Work? No   Is the patient interested in additional calls from an ambulatory ? No            Lisa Breen RN    4/15/2024, 12:02 EDT

## 2024-04-15 NOTE — OUTREACH NOTE
Call Center TCM Note      Flowsheet Row Responses   [facility] patient discharged from? [place]   Does the patient have one of the following disease processes/diagnoses(primary or secondary)? Other   TCM attempt successful? No   Unsuccessful attempts Attempt 1   Call Status Left message   Does the patient have an appointment with their PCP within 7-14 days of discharge? --  [Pt has an appt with a  PCP on 5/10/24,  PCP listed, Senia Dorsey MD is not a  PCP]            Lisa Breen RN    4/15/2024, 10:20 EDT

## 2024-04-15 NOTE — TELEPHONE ENCOUNTER
Julieth from called to ask Zamzam if patient could continue his Singulair. Patient's family would also like Zamzam to take a look at the wound image uploaded on 4/14 and tell them if patient needs to get an UltraSound. They are concerned it might be a blood clot.

## 2024-04-16 NOTE — TELEPHONE ENCOUNTER
Spoke with patient's spouse (on HIPAA) over the phone; questions answered; Montelukast was discontinued at hospitalization in March; spouse denies pt having increased allergy symptoms currently; will continue to stay off Montelukast for now; discussed photo of leg; no swelling or warmth; spouse and home health will continue to monitor; will schedule hospital follow up as telehealth visit.

## 2024-04-17 ENCOUNTER — HOME CARE VISIT (OUTPATIENT)
Dept: HOME HEALTH SERVICES | Facility: HOME HEALTHCARE | Age: 79
End: 2024-04-17
Payer: MEDICARE

## 2024-04-17 VITALS
RESPIRATION RATE: 24 BRPM | OXYGEN SATURATION: 99 % | TEMPERATURE: 96.2 F | HEART RATE: 56 BPM | SYSTOLIC BLOOD PRESSURE: 124 MMHG | DIASTOLIC BLOOD PRESSURE: 68 MMHG

## 2024-04-17 PROCEDURE — G0299 HHS/HOSPICE OF RN EA 15 MIN: HCPCS

## 2024-04-17 PROCEDURE — G0151 HHCP-SERV OF PT,EA 15 MIN: HCPCS

## 2024-04-18 ENCOUNTER — HOME CARE VISIT (OUTPATIENT)
Dept: HOME HEALTH SERVICES | Facility: HOME HEALTHCARE | Age: 79
End: 2024-04-18
Payer: MEDICARE

## 2024-04-18 ENCOUNTER — TELEPHONE (OUTPATIENT)
Dept: NEUROSURGERY | Facility: CLINIC | Age: 79
End: 2024-04-18
Payer: MEDICARE

## 2024-04-18 VITALS
RESPIRATION RATE: 18 BRPM | SYSTOLIC BLOOD PRESSURE: 138 MMHG | DIASTOLIC BLOOD PRESSURE: 64 MMHG | TEMPERATURE: 96.7 F | HEART RATE: 52 BPM | OXYGEN SATURATION: 93 %

## 2024-04-18 PROCEDURE — G0152 HHCP-SERV OF OT,EA 15 MIN: HCPCS

## 2024-04-18 NOTE — Clinical Note
OT evaluation completed, plan for 2x/week for 4 weeks to address CG Education with adl tasks, transfers, bed mobility, UE HEP, safety, splint use, DME/equipment education.

## 2024-04-18 NOTE — TELEPHONE ENCOUNTER
Patient had a CT scan while in hospital for high BP on 4/7. Patient's wife would like to know if he will need anoth ct done prior to appt with DR. Miller since they did one in hospital already. If they do not need one please call and also cancel CT appointment.

## 2024-04-18 NOTE — HOME HEALTH
Routine Visit Note:    Skill/education provided: PRN FOR  NOT VOIDING SINCE THIS AM   NOTIFIED MD  IN ANDOUT CATHED USING 14FR COUDE AND STERILE TECH ADVANCE SL RESTRICTION MOVED BACK OUT SLIGHTLY  THEN BACK IN  350CC SL BLOOD TINGED JAYCOB SL CLOUDY URINE OBTAINED  PCG TO PUSH FLUIDS.  DCD MYRBETRIQ      Patient/caregiver response:    Plan for next visit:C/P ASSESS TEACHING PER POT    Other pertinent info:

## 2024-04-18 NOTE — TELEPHONE ENCOUNTER
S/W PATIENTS WIFE AND EXPLAINED THAT IT IS NECESSARY FOR HER  TO HAVE THE CT SCAN IN MAY, THE CT SCAN WILL SHOW RATHER OR THE BRAIN BLEED HAS GOTTEN BETTER/WORSE. SHE SAID SHE WILL CALL BACK, SHE NEEDS TO THINK ABOUT IT

## 2024-04-19 ENCOUNTER — HOME CARE VISIT (OUTPATIENT)
Dept: HOME HEALTH SERVICES | Facility: HOME HEALTHCARE | Age: 79
End: 2024-04-19
Payer: MEDICARE

## 2024-04-19 ENCOUNTER — PATIENT OUTREACH (OUTPATIENT)
Dept: CASE MANAGEMENT | Facility: OTHER | Age: 79
End: 2024-04-19
Payer: MEDICARE

## 2024-04-19 VITALS
TEMPERATURE: 96.7 F | SYSTOLIC BLOOD PRESSURE: 138 MMHG | HEART RATE: 52 BPM | RESPIRATION RATE: 18 BRPM | DIASTOLIC BLOOD PRESSURE: 64 MMHG | OXYGEN SATURATION: 93 %

## 2024-04-19 DIAGNOSIS — I10 PRIMARY HYPERTENSION: ICD-10-CM

## 2024-04-19 DIAGNOSIS — E11.3293 TYPE 2 DIABETES MELLITUS WITH BOTH EYES AFFECTED BY MILD NONPROLIFERATIVE RETINOPATHY WITHOUT MACULAR EDEMA, WITHOUT LONG-TERM CURRENT USE OF INSULIN: Primary | ICD-10-CM

## 2024-04-19 PROCEDURE — G0299 HHS/HOSPICE OF RN EA 15 MIN: HCPCS

## 2024-04-19 NOTE — OUTREACH NOTE
"AMBULATORY CASE MANAGEMENT NOTE    Names and Relationships of Patient/Support Persons: Caller: JOAN DOCKERY \"GUNNAR\"; Relationship: Emergency Contact -     [facility] Interim Update    Called and spoke to patient's wife, Joan. Patient now home from rehab after recent Stroke. He is currently being seen by Home Health PT, OT SLP and nursing. Wife states that patient have all the equipments that they need. Discussed care giving and support, she states that her family and Buddhism is helping them at this time. Encouraged wife to call insurance company to see what benefit patient has that they many not be aware of such as home care services. Informed wife that we also have sen[facility] living advisors that we can connect her to to make long term care plans if she would be interested. Wife states she will call their insurance first and will reach out to me for further assistance as needed.     Education Documentation  No documentation found.        Erlin EARL  Ambulatory Case Management    4/19/2024, 10:52 EDT  "

## 2024-04-19 NOTE — HOME HEALTH
Pt is a 79 yo male who suffered his third stroke affecting his R side.  He was in rehab and now home with family.  He has hospital bed, torsten lift, wheelchair.  He was very hard to arise and awaken at eval today.  SN had just left.  PT assessed UE/LE ROM and strength, but was unable to safely complete bed mobility and transfers.  He will require OT and PT to co-treat visits.  Their goal is to safely be able to transfer him to the wheelchair to roll him outside to sit.      Plan for next visit:  HEP progression with family/ROM   rolling/pressure relief  supine to sit to edge of bed if appropriate

## 2024-04-19 NOTE — HOME HEALTH
Routine Visit Note: Patient sleeping throughout most of visit. Skin intact with skin assessment    Skill/education provided: Reviewed all meds with patient. Patient denies any issues at this time. Instructed on meds dosage, scheduling and side effects. Patient voiced back understanding.    Patient/caregiver response: Patinet and caregiver need reinforcment of instructions given    Plan for next visit: Medication education, Pain management, Cardiopulmonary assessments, Neurovascular assessments

## 2024-04-19 NOTE — CASE COMMUNICATION
SUPPLY ORDER PLACED WITH [facility] PO#[phone]  [id number] [employer]

## 2024-04-22 VITALS
DIASTOLIC BLOOD PRESSURE: 82 MMHG | HEART RATE: 54 BPM | OXYGEN SATURATION: 96 % | RESPIRATION RATE: 18 BRPM | TEMPERATURE: 97.5 F | SYSTOLIC BLOOD PRESSURE: 138 MMHG

## 2024-04-22 DIAGNOSIS — E11.3293 TYPE 2 DIABETES MELLITUS WITH BOTH EYES AFFECTED BY MILD NONPROLIFERATIVE RETINOPATHY WITHOUT MACULAR EDEMA, WITHOUT LONG-TERM CURRENT USE OF INSULIN: Primary | ICD-10-CM

## 2024-04-22 NOTE — HOME HEALTH
Routine Visit Note: Patient alert and smiling. Skin intact with skin assessment    Skill/education provided: Reviewed all meds with patient. Patient denies any issues at this time. Instructed on meds dosage, scheduling and side effects. Patient voiced back understanding.    Patient/caregiver response: Patinet and caregiver need reinforcment of instructions given    Plan for next visit: Medication education, Pain management, Cardiopulmonary assessments, Neurovascular assessments

## 2024-04-22 NOTE — HOME HEALTH
Reason for Referral: Recent hospitalization and rehab due to hemorrhagic stroke affecting right side, aphasia    Medical History: HTN, Type 2 DM, Afib, OA, CHF, Scoliosis, Hx strokes    Subjective: Patient difficult to awaken on arrival, family present    Home Environment:Patient lives with wife, has very supportive family, in hospital bed, has electric torsten lift, high back w/c    PLOF: Ambulated with cane    Medical Necessity: Patient requires skilled occupational therapy for CG education, remediation of deficits to improve safety in home and improve self care, functional transfers, UE ROM, splint use, bed mobility, DME/equipment education    Plan for Next Visit: LUE ROM, safety with adl tasks

## 2024-04-23 ENCOUNTER — HOME CARE VISIT (OUTPATIENT)
Dept: HOME HEALTH SERVICES | Facility: HOME HEALTHCARE | Age: 79
End: 2024-04-23
Payer: MEDICARE

## 2024-04-23 VITALS
TEMPERATURE: 97.4 F | HEART RATE: 67 BPM | OXYGEN SATURATION: 93 % | SYSTOLIC BLOOD PRESSURE: 159 MMHG | DIASTOLIC BLOOD PRESSURE: 80 MMHG

## 2024-04-23 VITALS
HEART RATE: 62 BPM | DIASTOLIC BLOOD PRESSURE: 66 MMHG | OXYGEN SATURATION: 96 % | SYSTOLIC BLOOD PRESSURE: 142 MMHG | TEMPERATURE: 98 F

## 2024-04-23 PROCEDURE — G0299 HHS/HOSPICE OF RN EA 15 MIN: HCPCS

## 2024-04-23 PROCEDURE — G0152 HHCP-SERV OF OT,EA 15 MIN: HCPCS

## 2024-04-23 PROCEDURE — G0153 HHCP-SVS OF S/L PATH,EA 15MN: HCPCS

## 2024-04-23 PROCEDURE — G0157 HHC PT ASSISTANT EA 15: HCPCS

## 2024-04-23 NOTE — HOME HEALTH
Subjective: Per family he sleeps alot    Falls- none  Medication Changes- none- nursing addressed upon arrival  Wound- buttocks and has catherter    Assessment: Patient laying in bed upon arrival with left leg dangling off of the bed and had to be assisted back up in place. Family education on positioning, transfers from supine<> sit and PROM to LE's. Patient and family re-education on medication regimen, hydration and proper nutrition and used teach back for carryover and accuracy. Patient will benefit with continued PT for progression and reduce risk of decline.    Plan for next visit:  CG education  Review and progress HEP  Sitting Balance/transfers  Chinyere training

## 2024-04-23 NOTE — Clinical Note
4/23/24  ST evaluation completed today. ST will follow patient 2w4 for Aphasia Therapy and Dysphagia Therapy and education.

## 2024-04-24 ENCOUNTER — DOCUMENTATION (OUTPATIENT)
Dept: HOME HEALTH SERVICES | Facility: HOME HEALTHCARE | Age: 79
End: 2024-04-24
Payer: MEDICARE

## 2024-04-24 ENCOUNTER — HOME CARE VISIT (OUTPATIENT)
Dept: HOME HEALTH SERVICES | Facility: HOME HEALTHCARE | Age: 79
End: 2024-04-24
Payer: MEDICARE

## 2024-04-24 ENCOUNTER — TELEPHONE (OUTPATIENT)
Dept: FAMILY MEDICINE CLINIC | Facility: CLINIC | Age: 79
End: 2024-04-24
Payer: MEDICARE

## 2024-04-24 PROBLEM — N39.0 ACUTE UTI: Status: ACTIVE | Noted: 2024-04-24

## 2024-04-24 PROBLEM — Z86.19 HISTORY OF ESBL E. COLI INFECTION: Chronic | Status: ACTIVE | Noted: 2024-04-24

## 2024-04-24 PROCEDURE — G0299 HHS/HOSPICE OF RN EA 15 MIN: HCPCS

## 2024-04-24 NOTE — ED PROVIDER NOTES
EMERGENCY DEPARTMENT ENCOUNTER    Room Number:  36/36  Date seen:  4/24/2024  PCP: Senia Dorsey MD  Historian: EMS      HPI:  Chief Complaint: Elevated blood sugar  A complete HPI/ROS/PMH/PSH/SH/FH are unobtainable due to:  wife  Aphasiatext: Erlin Blanco is a 78 y.o. male who presents to the ED via EMS from home for reported elevated blood sugar for the past 24 hours.  The wife states that yesterday the patient had a temperature of 100.2 and began breathing a little bit funny.  She also noticed that his blood sugars are running greater than 500.  She has given him some Tylenol and an extra metformin and called her doctor who advised them to bring him to the emergency room for further evaluation and care.      PAST MEDICAL HISTORY  Active Ambulatory Problems     Diagnosis Date Noted    Osteoarthritis, knee 04/27/2017    Type 2 diabetes mellitus with both eyes affected by mild nonproliferative retinopathy without macular edema, without long-term current use of insulin 04/28/2017    Primary hypertension 04/28/2017    Coronary artery disease 04/28/2017    Supraventricular tachycardia 11/04/2018    TIA (transient ischemic attack) 06/18/2019    Hyperlipidemia     Benign prostatic hyperplasia without urinary obstruction 06/17/2014    Class 2 severe obesity due to excess calories with serious comorbidity and body mass index (BMI) of 36.0 to 36.9 in adult 12/10/2019    Polycythemia vera     Acute non-recurrent sinusitis 01/13/2020    Pain in both knees 01/29/2020    Allergic rhinitis     Left bundle-branch block 07/17/2015    Fatigue 06/24/2020    Atrial flutter 09/16/2020    History of CVA (cerebrovascular accident) 09/28/2020    Cervical radiculitis 12/29/2020    Chronic diastolic congestive heart failure 02/03/2017    CKD (chronic kidney disease) stage 2, GFR 60-89 ml/min 05/16/2021    Glucosuria 05/16/2021    Paroxysmal atrial fibrillation 06/22/2021    Memory difficulties 07/16/2021    Bradycardia 09/17/2021     Anemia 09/18/2021    Low back pain 04/18/2019    Back pain 01/10/2022    Cardiovascular stress test abnormal 08/15/2015    Prostatitis 08/30/2018    Localized edema 06/23/2016    Murmur 06/17/2014    Pulmonary hypertension 01/17/2017    Thrombocytosis 03/19/2018    Arthritis 01/10/2022    Proteinuria 11/08/2022    Fall at home 12/17/2022    Cognitive communication deficit 08/31/2020    Mild cognitive impairment with memory loss 06/15/2023    Hematuria 02/05/2024    Intraparenchymal hemorrhage of brain 02/24/2024    Seizures 02/29/2024    Type 2 diabetes mellitus 03/04/2024    HTN (hypertension) 03/04/2024    Right hemiparesis 03/05/2024    Oropharyngeal dysphagia 03/05/2024    Paroxysmal atrial fibrillation 03/05/2024    Expressive aphasia 03/14/2024    Hypertension, uncontrolled 04/06/2024     Resolved Ambulatory Problems     Diagnosis Date Noted    DORI (acute kidney injury) 04/28/2017    Cellulitis of knee, left 05/04/2017    Aortic root dilation 11/04/2018    Acute bronchitis 01/13/2020    Abrasion of face 01/29/2020    Fall down steps 01/29/2020    Minor head injury without loss of consciousness 01/29/2020    Contusion of face 01/29/2020    Cough 04/14/2020    Fever in adult 08/16/2020    Sepsis due to Escherichia coli 08/17/2020    Bacteremia due to Escherichia coli 08/17/2020    Sepsis with metabolic encephalopathy 08/17/2020    Urinary tract infection due to extended-spectrum beta lactamase (ESBL) producing Escherichia coli 08/18/2020    Shock, septic 08/19/2020    Acute hypokalemia 09/16/2020    C. difficile colitis 09/16/2020    Leukocytosis 09/16/2020    Pressure injury of buttock, stage 2 09/18/2020    Sacroiliitis 10/09/2020    Discitis of lumbar region 10/09/2020    Acute pyelonephritis 10/09/2020    Constipation 11/14/2020    Hypokalemia 01/07/2021    Balanitis 05/16/2021    Type 2 diabetes mellitus with hyperglycemia 05/16/2021    Paraphimosis 05/16/2021    Maxillary sinusitis 07/16/2021    Pneumonia  of left lung due to infectious organism 2021    COVID-19 virus infection 2023     Past Medical History:   Diagnosis Date    Abnormal ECG     Abnormal stress test     Acute sinusitis     Benign enlargement of prostate     CHF (congestive heart failure)     CVA (cerebral vascular accident)     Diminished pulse     Edema     Elevated hematocrit     Elevated hemoglobin     Essential hypertension     Hearing loss     Heart valve disease     High risk medication use     History of back pain     History of dysuria     History of echocardiogram     History of intracranial hemorrhage     History of scoliosis     History of stroke     Injury of toe, left, initial encounter     Irregular heart rate     Knee pain, left     Left bundle branch block     Leg wound, left     Medicare annual wellness visit, subsequent     Muscle cramps     Need for hepatitis C screening test     Polycythemia     Prostate hyperplasia without urinary obstruction     Scoliosis     Stroke     SVT (supraventricular tachycardia)     Tachycardia     Valvular heart disease          REVIEW OF SYSTEMS  All systems reviewed and negative except for those discussed in HPI.       PAST SURGICAL HISTORY  Past Surgical History:   Procedure Laterality Date    CARDIAC CATHETERIZATION      CATARACT EXTRACTION Left 2021    CATARACT EXTRACTION Right 2022    COLONOSCOPY      did Cologuard approx 2019 and negative    HAND SURGERY      1970s    JOINT REPLACEMENT Right     NC ARTHRP KNE CONDYLE&PLATU MEDIAL&LAT COMPARTMENTS Left 2017    Procedure: LT TOTAL KNEE ARTHROPLASTY;  Surgeon: Bertin Perrin MD;  Location: [facility];  Service: Orthopedics    PROSTATE SURGERY      Rezum steam treatment to shrink prostate by dr. guerrero         FAMILY HISTORY  Family History   Problem Relation Age of Onset    Hypertension Mother     Other Father         ruptured appendix,  when pt. was 14 years old.    Diabetes Paternal Aunt      Diabetes Paternal Uncle     No Known Problems Other     Migraines Sister     Stroke Sister     Dementia Brother          SOCIAL HISTORY  Social History     Socioeconomic History    Marital status:    Tobacco Use    Smoking status: Former     Current packs/day: 0.00     Types: Cigarettes     Quit date:      Years since quittin.3     Passive exposure: Past    Smokeless tobacco: Never    Tobacco comments:     Caffeine daily   Vaping Use    Vaping status: Never Used    Passive vaping exposure: Yes   Substance and Sexual Activity    Alcohol use: Never    Drug use: Never    Sexual activity: Defer         ALLERGIES  Donepezil and Steglatro [ertugliflozin]      PHYSICAL EXAM  ED Triage Vitals [24 1141]   Temp Heart Rate Resp BP SpO2   97.8 °F (36.6 °C) 81 20 168/84 92 %      Temp src Heart Rate Source Patient Position BP Location FiO2 (%)   Oral Monitor -- -- --       Physical Exam      GENERAL: 78-year-old chronically ill appearing male in no acute distress  HENT: NCAT: nares patent: Neck supple: Dry mucous membranes  EYES: no scleral icterus: Pale conjunctiva  CV: regular rhythm, normal rate  RESPIRATORY: normal effort with rhonchi bilateral bases  ABDOMEN: soft, NTND: Bowel sounds diminished  MUSCULOSKELETAL: no deformity: Chronic venous changes: Right foot in boot to prevent pressure ulcer  NEURO: Awake with aphasia and right-sided weakness and at baseline per wife  PSYCH:  calm, cooperative  SKIN: warm, dry    Vital signs and nursing notes reviewed.      LAB RESULTS  Recent Results (from the past 24 hour(s))   POC Glucose Once    Collection Time: 24 11:54 AM    Specimen: Blood   Result Value Ref Range    Glucose 374 (H) 70 - 130 mg/dL   Comprehensive Metabolic Panel    Collection Time: 24 12:04 PM    Specimen: Blood   Result Value Ref Range    Glucose 405 (C) 65 - 99 mg/dL    BUN 19 8 - 23 mg/dL    Creatinine 0.81 0.76 - 1.27 mg/dL    Sodium 139 136 - 145 mmol/L    Potassium 3.8 3.5 -  5.2 mmol/L    Chloride 105 98 - 107 mmol/L    CO2 23.8 22.0 - 29.0 mmol/L    Calcium 9.7 8.6 - 10.5 mg/dL    Total Protein 6.5 6.0 - 8.5 g/dL    Albumin 3.4 (L) 3.5 - 5.2 g/dL    ALT (SGPT) 21 1 - 41 U/L    AST (SGOT) 22 1 - 40 U/L    Alkaline Phosphatase 98 39 - 117 U/L    Total Bilirubin 0.6 0.0 - 1.2 mg/dL    Globulin 3.1 gm/dL    A/G Ratio 1.1 g/dL    BUN/Creatinine Ratio 23.5 7.0 - 25.0    Anion Gap 10.2 5.0 - 15.0 mmol/L    eGFR 91.4 >60.0 mL/min/1.73   BNP    Collection Time: 04/24/24 12:04 PM    Specimen: Blood   Result Value Ref Range    proBNP 1,981.0 (H) 0.0 - 1,800.0 pg/mL   High Sensitivity Troponin T    Collection Time: 04/24/24 12:04 PM    Specimen: Blood   Result Value Ref Range    HS Troponin T 58 (C) <22 ng/L   Lactic Acid, Plasma    Collection Time: 04/24/24 12:04 PM    Specimen: Blood   Result Value Ref Range    Lactate 1.4 0.5 - 2.0 mmol/L   Procalcitonin    Collection Time: 04/24/24 12:04 PM    Specimen: Blood   Result Value Ref Range    Procalcitonin 0.23 0.00 - 0.25 ng/mL   Magnesium    Collection Time: 04/24/24 12:04 PM    Specimen: Blood   Result Value Ref Range    Magnesium 1.8 1.6 - 2.4 mg/dL   CBC Auto Differential    Collection Time: 04/24/24 12:04 PM    Specimen: Blood   Result Value Ref Range    WBC 17.22 (H) 3.40 - 10.80 10*3/mm3    RBC 4.85 4.14 - 5.80 10*6/mm3    Hemoglobin 14.7 13.0 - 17.7 g/dL    Hematocrit 46.1 37.5 - 51.0 %    MCV 95.1 79.0 - 97.0 fL    MCH 30.3 26.6 - 33.0 pg    MCHC 31.9 31.5 - 35.7 g/dL    RDW 15.9 (H) 12.3 - 15.4 %    RDW-SD 56.4 (H) 37.0 - 54.0 fl    MPV 9.7 6.0 - 12.0 fL    Platelets 394 140 - 450 10*3/mm3    Neutrophil % 86.4 (H) 42.7 - 76.0 %    Lymphocyte % 4.2 (L) 19.6 - 45.3 %    Monocyte % 8.3 5.0 - 12.0 %    Eosinophil % 0.1 (L) 0.3 - 6.2 %    Basophil % 0.4 0.0 - 1.5 %    Immature Grans % 0.6 (H) 0.0 - 0.5 %    Neutrophils, Absolute 14.88 (H) 1.70 - 7.00 10*3/mm3    Lymphocytes, Absolute 0.72 0.70 - 3.10 10*3/mm3    Monocytes, Absolute 1.43 (H)  0.10 - 0.90 10*3/mm3    Eosinophils, Absolute 0.01 0.00 - 0.40 10*3/mm3    Basophils, Absolute 0.07 0.00 - 0.20 10*3/mm3    Immature Grans, Absolute 0.11 (H) 0.00 - 0.05 10*3/mm3    nRBC 0.0 0.0 - 0.2 /100 WBC   ECG 12 Lead Dyspnea    Collection Time: 04/24/24 12:19 PM   Result Value Ref Range    QT Interval 433 ms    QTC Interval 484 ms   Urinalysis With Culture If Indicated - Urine, Catheter    Collection Time: 04/24/24 12:35 PM    Specimen: Urine, Catheter   Result Value Ref Range    Color, UA Yellow Yellow, Straw    Appearance, UA Cloudy (A) Clear    pH, UA 6.5 5.0 - 8.0    Specific Gravity, UA 1.020 1.005 - 1.030    Glucose, UA >=1000 mg/dL (3+) (A) Negative    Ketones, UA Negative Negative    Bilirubin, UA Negative Negative    Blood, UA Trace (A) Negative    Protein, UA 30 mg/dL (1+) (A) Negative    Leuk Esterase, UA Moderate (2+) (A) Negative    Nitrite, UA Positive (A) Negative    Urobilinogen, UA 1.0 E.U./dL 0.2 - 1.0 E.U./dL   Urinalysis, Microscopic Only - Urine, Catheter    Collection Time: 04/24/24 12:35 PM    Specimen: Urine, Catheter   Result Value Ref Range    RBC, UA 6-10 (A) None Seen, 0-2 /HPF    WBC, UA 21-50 (A) None Seen, 0-2 /HPF    Bacteria, UA 4+ (A) None Seen /HPF    Squamous Epithelial Cells, UA 0-2 None Seen, 0-2 /HPF    Hyaline Casts, UA 3-6 None Seen /LPF    Methodology Automated Microscopy        Ordered the above labs and reviewed the results.        RADIOLOGY  XR Chest 1 View    Result Date: 4/24/2024  ONE-VIEW PORTABLE CHEST  HISTORY: Shortness of breath.  FINDINGS: The lungs are moderately expanded and there is mild cardiomegaly and vascular congestion that is accentuated by the decreased lung expansion when compared to the study of 4/6/2024. The findings suggest borderline changes of congestive heart failure. There is some minimal atelectasis in the lower left lung.  This report was finalized on 4/24/2024 12:28 PM by Dr. Sony Rachel M.D on Workstation: [id number]       Ordered  the above noted radiological studies. Reviewed by me in PACS.            PROCEDURES  Procedures          MEDICATIONS GIVEN IN ER  Medications   ertapenem (INVanz) 1,000 mg in sodium chloride 0.9 % 100 mL MBP (has no administration in time range)   insulin regular (humuLIN R,novoLIN R) injection 10 Units (has no administration in time range)   sodium chloride 0.9 % bolus 500 mL (500 mL Intravenous New Bag 4/24/24 1236)             MEDICAL DECISION MAKING, PROGRESS, and CONSULTS    All labs have been independently reviewed by me.  All radiology studies have been reviewed by me and I have also reviewed the radiology report.   EKG's independently viewed and interpreted by me.  Discussion below represents my analysis of pertinent findings related to patient's condition, differential diagnosis, treatment plan and final disposition.      Additional sources:  - Discussed/ obtained information from independent historians: The patient's wife gives the majority the history-see HPI    - External (non-ED) record review: I reviewed the patient's admission to the hospital from April 6 to April 13-earlier this month where he was admitted for his hypertension, expressive aphasia, paroxysmal A-fib, diabetes and history of stroke.  The patient was admitted for hypertensive urgency.  His medications were adjusted and he was taken off of his blood thinner at that time.  His hemoglobin A1c was 8.4.  He states he has a history of aphasia with right hemiparesis from prior stroke.    - Chronic or social conditions impacting care: Currently patient is living at home with wife with an extended amount of help from her family and home health    - Shared decision making: Shared decision making discussion between myself, the patient, his wife and the son we agree he needs to admitted to the hospital for further evaluation and care      Orders placed during this visit:  Orders Placed This Encounter   Procedures    Respiratory Panel PCR  w/COVID-19(SARS-CoV-2) DARY/GUI/GUADALUPE/PAD/[facility] In-House, NP Swab in UTM/VTM, 2 HR TAT - Swab, Nasopharynx    Blood Culture - Blood,    Blood Culture - Blood,    Urine Culture - Urine,    XR Chest 1 View    CT Head Without Contrast    Comprehensive Metabolic Panel    Urinalysis With Culture If Indicated - Urine, Catheter    BNP    High Sensitivity Troponin T    Lactic Acid, Plasma    Procalcitonin    Magnesium    CBC Auto Differential    Urinalysis, Microscopic Only - Urine, Clean Catch    High Sensitivity Troponin T 2Hr    Monitor Blood Pressure    Continuous Pulse Oximetry    LHA (on-call MD unless specified) Details    IP Palliative Care Nurse Consult    POC Glucose Once    ECG 12 Lead Dyspnea    Initiate Observation Status    CBC & Differential         Differential diagnosis:  My differential diagnosis includes but is not limited to stroke, encephalopathy, toxic / metabolic, hypoglycemia, toxin-induced, psychogenic, medication-induced, or infectious.      Independent interpretation of labs, radiology studies, and discussions with consultants:  ED Course as of 04/24/24 1443   Wed Apr 24, 2024   1200 I will treat the patient with IV fluids with obtaining labs, urinalysis, chest x-ray and RVP for further evaluation. [GP]   1323 My independent interpretation the patient's chest x-ray is mild vascular congestion. [GP]   1323 The patient does have a white count of 17 but a normal lactic acid and procalcitonin.  He does have 21-50 WBCs and 4+ bacteria in his urine.  I will go ahead and cover him with IV antibiotics currently we are still awaiting his chemistries to result. [GP]   1324 Review of the patient's chart his last urine culture from February shows ESBL E. coli that was resistant to ceftriaxone and sensitive to ertapenem which I will order. [GP]   1330 EKG    EKG time: 1219  Rhythm/Rate: Normal sinus rhythm at 75  Left bundle branch block  No Acute Ischemia  Non-Specific ST-T changes    Unchanged compared to  prior on 4/6/2024    Interpreted Contemporaneously by me.  Independently viewed by me     [GP]   1331 Patient's glucose is 405 but he has no metabolic acidosis.  I will give him a dose of insulin. [GP]   1348 The patient's troponin is 58 which is similar it was when he was here 2 weeks ago. [GP]   1421 Repeat examination the patient's vital signs were stable.  I spoke with the wife and son.  They do confirm the patient is a DNR.  After further discussions they would like for me to consult palliative care.  They did ask that I obtain a CT of his head because the patient is supposed to have one done as an outpatient and follow-up with neurology. [GP]   1439 I discussed the case with Dr. Manrique from [facility].  She is aware of the patient's UTI, hyperglycemia and history of stroke.  We discussed the patient is a DNR and that the family is interested in hospice care.  She will admit the patient to a telemetry bed for further evaluation and care. [GP]      ED Course User Index  [GP] Aftab Mccartney MD               DIAGNOSIS  Final diagnoses:   Acute UTI-history of ESBL E. coli   Hyperglycemia   History of stroke   Aphasia   Right hemiparesis   DNR (do not resuscitate)         DISPOSITION  ADMISSION    Discussed treatment plan and reason for admission with pt/family and admitting physician.  Pt/family voiced understanding of the plan for admission for further testing/treatment as needed.            Latest Documented Vital Signs:  As of 14:43 EDT  BP- 135/92 HR- 86 Temp- 97.8 °F (36.6 °C) (Oral) O2 sat- 93%--      --------------------  Please note that portions of this were completed with a voice recognition program.       Note Disclaimer: At [facility], we believe that sharing information builds trust and better relationships. You are receiving this note because you are receiving care at [facility] or recently visited. It is possible you will see health information before a provider has talked with you about it. This  kind of information can be easy to misunderstand. To help you fully understand what it means for your health, we urge you to discuss this note with your provider.             Aftab Mccartney MD  04/24/24 0543

## 2024-04-24 NOTE — H&P
PCP: Senia Dorsey MD    Chief complaint   Chief Complaint   Patient presents with    Hyperglycemia       HPI  Patient is a 78 y.o. male presents with a history of stroke with right-sided hemiparesis and expressive aphasia secondary to bleed in February 2024, diabetes, dCHF who presents to the ER due to elevated blood sugars and fevers.  Patient had gone to subacute rehab and has been home for about 2 weeks now.  He been doing okay with his wife and children helping.  He had decreased input and they were really having to monitor how much liquid he was taking and since they have to thicken it.  Wife states they were doing 60 ounces a day.  He does not have a chronic Aguila.  There was a day a few days ago that he did not urinate all day and that is when they started pushing the fluids more.  The home health In-N-Out cath him and got 350 cc.  And then they reported that the Gemtesa was stopped since he is already incontinent.  But the Flomax capsule should not be opened or crushed in therefore they stopped that a couple days ago.  They notices temperature was 100.7 and had some elevated blood sugars yesterday up to 500.  Normally the blood sugars are 150-270    Patient is on puréed NDD 1 and honey thick liquids and had been doing okay but today in the ER he choked on some food and started having some upper respiratory noises after suctioning.  PAST MEDICAL HISTORY  Past Medical History:   Diagnosis Date    Abnormal ECG     Abnormal stress test     Abrasion of face 01/29/2020    Acute bronchitis     Acute hypokalemia 09/16/2020    Acute non-recurrent sinusitis 01/13/2020    Acute pyelonephritis 10/09/2020    Acute sinusitis     DORI (acute kidney injury) 04/28/2017    Allergic rhinitis     Aortic root dilation     Arthritis     Bacteremia due to Escherichia coli 08/17/2020    Balanitis 05/16/2021    Benign enlargement of prostate     C. difficile colitis 09/16/2020    Cellulitis of knee, left 05/04/2017    CHF  (congestive heart failure)     Chronic diastolic congestive heart failure     Constipation 11/14/2020    Contusion of face 01/29/2020    Coronary artery disease     COVID-19 virus infection     CVA (cerebral vascular accident) 2020    Diminished pulse     in lower extremities    Discitis of lumbar region 10/09/2020    Edema     Elevated hematocrit     Elevated hemoglobin     Essential hypertension     Hearing loss     mala hearing aids    Heart valve disease     High risk medication use     History of back pain     History of dysuria     History of echocardiogram     History of intracranial hemorrhage     History of scoliosis     History of stroke     Hyperlipidemia     Hypokalemia 01/07/2021    Injury of toe, left, initial encounter     Irregular heart rate     Knee pain, left     Left bundle branch block     Leg wound, left     Leukocytosis 09/16/2020    Low back pain     Maxillary sinusitis 07/16/2021    Medicare annual wellness visit, subsequent     Minor head injury without loss of consciousness 01/29/2020    Murmur     Muscle cramps     Need for hepatitis C screening test     Paraphimosis 05/16/2021    Paroxysmal atrial fibrillation 03/05/2024    Pneumonia of left lung due to infectious organism 09/17/2021    Polycythemia     Polycythemia vera     Pressure injury of buttock, stage 2 09/18/2020    Prostate hyperplasia without urinary obstruction     Prostatitis     Proteinuria     Pulmonary hypertension     Sacroiliitis 10/09/2020    Scoliosis     Sepsis due to Escherichia coli 08/17/2020    Sepsis with metabolic encephalopathy 08/17/2020    Stroke     SVT (supraventricular tachycardia)     Tachycardia     Thrombocytosis     TIA (transient ischemic attack)     2006    Type 2 diabetes mellitus     Type 2 diabetes mellitus with hyperglycemia 05/16/2021    Urinary tract infection due to extended-spectrum beta lactamase (ESBL) producing Escherichia coli 08/18/2020    Valvular heart disease        PAST SURGICAL  HISTORY  Past Surgical History:   Procedure Laterality Date    CARDIAC CATHETERIZATION      CATARACT EXTRACTION Left 2021    CATARACT EXTRACTION Right 2022    COLONOSCOPY      did Cologuard approx 2019 and negative    HAND SURGERY      1970s    JOINT REPLACEMENT Right     GA ARTHRP KNE CONDYLE&PLATU MEDIAL&LAT COMPARTMENTS Left 2017    Procedure: LT TOTAL KNEE ARTHROPLASTY;  Surgeon: Bertin Perrin MD;  Location: [facility];  Service: Orthopedics    PROSTATE SURGERY      Rezum steam treatment to shrink prostate by dr. guerrero       FAMILY HISTORY  Family History   Problem Relation Age of Onset    Hypertension Mother     Other Father         ruptured appendix,  when pt. was 14 years old.    Diabetes Paternal Aunt     Diabetes Paternal Uncle     No Known Problems Other     Migraines Sister     Stroke Sister     Dementia Brother        SOCIAL HISTORY  Social History     Tobacco Use    Smoking status: Former     Current packs/day: 0.00     Types: Cigarettes     Quit date:      Years since quittin.3     Passive exposure: Past    Smokeless tobacco: Never    Tobacco comments:     Caffeine daily   Vaping Use    Vaping status: Never Used    Passive vaping exposure: Yes   Substance Use Topics    Alcohol use: Never    Drug use: Never       MEDICATIONS:  Medications Prior to Admission   Medication Sig Dispense Refill Last Dose    acetaminophen (TYLENOL) 500 MG tablet Take 2 tablets by mouth Every 8 (Eight) Hours As Needed for Moderate Pain. Indications: Pain       amLODIPine (NORVASC) 10 MG tablet Take 1 tablet by mouth Daily. 30 tablet 0     aspirin 81 MG EC tablet Take 1 tablet by mouth Daily. 30 tablet 0     B COMPLEX-C-ZN-FOLIC ACID PO Take 1 tablet by mouth Daily.       carvedilol (COREG) 12.5 MG tablet Take 1 tablet by mouth 2 (Two) Times a Day With Meals. 60 tablet 0     cloNIDine (CATAPRES) 0.2 MG tablet Take 1 tablet by mouth Every 8 (Eight) Hours. 90 tablet 0     fluticasone  (FLONASE) 50 MCG/ACT nasal spray 1 spray into the nostril(s) as directed by provider Daily. 48 g 3     hydroxyurea (HYDREA) 500 MG capsule Take 1 capsule by mouth Daily. Indications: Polycythemia Vera       Insulin Glargine (LANTUS SOLOSTAR) 100 UNIT/ML injection pen Inject 25 Units under the skin into the appropriate area as directed Every Night. 15 mL 0     Januvia 100 MG tablet Take 1 tablet by mouth Daily. Indications: Type 2 Diabetes       levETIRAcetam (KEPPRA) 500 MG tablet Take 1 tablet by mouth 2 (Two) Times a Day. 60 tablet 0     melatonin 5 MG tablet tablet Take 1 tablet by mouth Every Night. (Patient taking differently: Take 1 tablet by mouth At Night As Needed.)       memantine (NAMENDA) 10 MG tablet Take 1 tablet by mouth 2 (Two) Times a Day. 180 tablet 3     metFORMIN (GLUCOPHAGE) 500 MG tablet Take 1 tablet by mouth 2 (Two) Times a Day With Meals. (Patient taking differently: Take 2 tablets by mouth Daily.) 180 tablet 1     OLANZapine zydis (ZyPREXA Zydis) 5 MG disintegrating tablet Place 1 tablet on the tongue Every Night. 30 tablet 0     rosuvastatin (CRESTOR) 20 MG tablet Take 2 tablets by mouth Every Night. Indications: High Amount of Fats in the Blood       sennosides-docusate (PERICOLACE) 8.6-50 MG per tablet Take 2 tablets by mouth 2 (Two) Times a Day.       sertraline (ZOLOFT) 50 MG tablet Take 1 tablet by mouth Daily. (Patient taking differently: Take 1 tablet by mouth Every Night. Indications: Major Depressive Disorder) 30 tablet 0     spironolactone (ALDACTONE) 50 MG tablet Take 1 tablet by mouth Daily. 30 tablet 0     valsartan (DIOVAN) 320 MG tablet Take 1 tablet by mouth Daily. 90 tablet 1     amLODIPine (Norvasc) 10 MG tablet Take 1 tablet by mouth Daily. 30 tablet 0     Insulin Pen Needle (BD Pen Needle Moni U/F) 32G X 4 MM misc Use 1 each as directed once daily. 100 each 0     Multiple Vitamins-Minerals (b complex-C-E-zinc) tablet Take 1 tablet by mouth Daily.       OLANZapine zydis  "(zyPREXA) 5 MG disintegrating tablet Place 1 tablet on the tongue Every Night. 30 tablet 0     polyethylene glycol (MIRALAX) 17 g packet Take 17 g by mouth 2 (Two) Times a Day. Hold if loose stool      The Gemtesa/Myrbetriq was stopped and Flomax has not been taken for 2 days    Allergies:  Donepezil and Steglatro [ertugliflozin]    Review of Systems:  Unable to obtain due to expressive aphasia and altered mental status      Vital Signs  Temp:  [97.8 °F (36.6 °C)] 97.8 °F (36.6 °C)  Heart Rate:  [75-86] 86  Resp:  [20] 20  BP: (135-168)/(76-92) 135/92  Flowsheet Rows      Flowsheet Row First Filed Value   Admission Height 182.9 cm (72\") Documented at 04/24/2024 1200   Admission Weight 105 kg (231 lb) Documented at 04/24/2024 1200           Body mass index is 31.33 kg/m².  92-97 % RA    Physical Exam:  General Appearance:     no acute distress, minimally responsive, awakens, propped up in bed, mouth open   Head:    Normocephalic, without obvious abnormality, atraumatic   Eyes:         conjunctivae and sclerae normal, no icterus, PERRLA   ENT:    Ears grossly intact, oral mucosa dry,   Neck:   No adenopathy, supple, trachea midline,        Lungs:   Decreased breath sounds in the bases with the occasional rhonchi respirations regular, even and      mildly labored    Heart:    Regular rhythm and normal rate,  no murmur, normal S1 and S2,   Abdomen:     Normal bowel sounds, no masses,  soft non-tender, non-distended,    Extremities:    no cyanosis, trace  edema,             Pulses:   Pulses palpable and equal bilaterally   Skin:   No bleeding, rash,   occasional bruising    Neurologic:    Psych:    sensation grossly intact,   Right hemiparesis with expressive aphasia    Oriented x 0,     I washed/sanitized my hands before entering the room and immediately upon leaving the room.    LABS:  Admission on 04/24/2024   Component Date Value Ref Range Status    Glucose 04/24/2024 374 (H)  70 - 130 mg/dL Final    Glucose " 04/24/2024 405 (C)  65 - 99 mg/dL Final    BUN 04/24/2024 19  8 - 23 mg/dL Final    Creatinine 04/24/2024 0.81  0.76 - 1.27 mg/dL Final    Sodium 04/24/2024 139  136 - 145 mmol/L Final    Potassium 04/24/2024 3.8  3.5 - 5.2 mmol/L Final    Chloride 04/24/2024 105  98 - 107 mmol/L Final    CO2 04/24/2024 23.8  22.0 - 29.0 mmol/L Final    Calcium 04/24/2024 9.7  8.6 - 10.5 mg/dL Final    Total Protein 04/24/2024 6.5  6.0 - 8.5 g/dL Final    Albumin 04/24/2024 3.4 (L)  3.5 - 5.2 g/dL Final    ALT (SGPT) 04/24/2024 21  1 - 41 U/L Final    AST (SGOT) 04/24/2024 22  1 - 40 U/L Final    Alkaline Phosphatase 04/24/2024 98  39 - 117 U/L Final    Total Bilirubin 04/24/2024 0.6  0.0 - 1.2 mg/dL Final    Globulin 04/24/2024 3.1  gm/dL Final    A/G Ratio 04/24/2024 1.1  g/dL Final    BUN/Creatinine Ratio 04/24/2024 23.5  7.0 - 25.0 Final    Anion Gap 04/24/2024 10.2  5.0 - 15.0 mmol/L Final    eGFR 04/24/2024 91.4  >60.0 mL/min/1.73 Final    Color, UA 04/24/2024 Yellow  Yellow, Straw Final    Appearance, UA 04/24/2024 Cloudy (A)  Clear Final    pH, UA 04/24/2024 6.5  5.0 - 8.0 Final    Specific Gravity, UA 04/24/2024 1.020  1.005 - 1.030 Final    Glucose, UA 04/24/2024 >=1000 mg/dL (3+) (A)  Negative Final    Ketones, UA 04/24/2024 Negative  Negative Final    Bilirubin, UA 04/24/2024 Negative  Negative Final    Blood, UA 04/24/2024 Trace (A)  Negative Final    Protein, UA 04/24/2024 30 mg/dL (1+) (A)  Negative Final    Leuk Esterase, UA 04/24/2024 Moderate (2+) (A)  Negative Final    Nitrite, UA 04/24/2024 Positive (A)  Negative Final    Urobilinogen, UA 04/24/2024 1.0 E.U./dL  0.2 - 1.0 E.U./dL Final    proBNP 04/24/2024 1,981.0 (H)  0.0 - 1,800.0 pg/mL Final    HS Troponin T 04/24/2024 58 (C)  <22 ng/L Final    Lactate 04/24/2024 1.4  0.5 - 2.0 mmol/L Final    Procalcitonin 04/24/2024 0.23  0.00 - 0.25 ng/mL Final    Magnesium 04/24/2024 1.8  1.6 - 2.4 mg/dL Final    QT Interval 04/24/2024 433  ms Preliminary    QTC Interval  04/24/2024 484  ms Preliminary    WBC 04/24/2024 17.22 (H)  3.40 - 10.80 10*3/mm3 Final    RBC 04/24/2024 4.85  4.14 - 5.80 10*6/mm3 Final    Hemoglobin 04/24/2024 14.7  13.0 - 17.7 g/dL Final    Hematocrit 04/24/2024 46.1  37.5 - 51.0 % Final    MCV 04/24/2024 95.1  79.0 - 97.0 fL Final    MCH 04/24/2024 30.3  26.6 - 33.0 pg Final    MCHC 04/24/2024 31.9  31.5 - 35.7 g/dL Final    RDW 04/24/2024 15.9 (H)  12.3 - 15.4 % Final    RDW-SD 04/24/2024 56.4 (H)  37.0 - 54.0 fl Final    MPV 04/24/2024 9.7  6.0 - 12.0 fL Final    Platelets 04/24/2024 394  140 - 450 10*3/mm3 Final    Neutrophil % 04/24/2024 86.4 (H)  42.7 - 76.0 % Final    Lymphocyte % 04/24/2024 4.2 (L)  19.6 - 45.3 % Final    Monocyte % 04/24/2024 8.3  5.0 - 12.0 % Final    Eosinophil % 04/24/2024 0.1 (L)  0.3 - 6.2 % Final    Basophil % 04/24/2024 0.4  0.0 - 1.5 % Final    Immature Grans % 04/24/2024 0.6 (H)  0.0 - 0.5 % Final    Neutrophils, Absolute 04/24/2024 14.88 (H)  1.70 - 7.00 10*3/mm3 Final    Lymphocytes, Absolute 04/24/2024 0.72  0.70 - 3.10 10*3/mm3 Final    Monocytes, Absolute 04/24/2024 1.43 (H)  0.10 - 0.90 10*3/mm3 Final    Eosinophils, Absolute 04/24/2024 0.01  0.00 - 0.40 10*3/mm3 Final    Basophils, Absolute 04/24/2024 0.07  0.00 - 0.20 10*3/mm3 Final    Immature Grans, Absolute 04/24/2024 0.11 (H)  0.00 - 0.05 10*3/mm3 Final    nRBC 04/24/2024 0.0  0.0 - 0.2 /100 WBC Final    RBC, UA 04/24/2024 6-10 (A)  None Seen, 0-2 /HPF Final    WBC, UA 04/24/2024 21-50 (A)  None Seen, 0-2 /HPF Final    Bacteria, UA 04/24/2024 4+ (A)  None Seen /HPF Final    Squamous Epithelial Cells, UA 04/24/2024 0-2  None Seen, 0-2 /HPF Final    Hyaline Casts, UA 04/24/2024 3-6  None Seen /LPF Final    Methodology 04/24/2024 Automated Microscopy   Final    HS Troponin T 04/24/2024 52 (H)  <22 ng/L Final    Troponin T Delta 04/24/2024 -6 (L)  >=-4 - <+4 ng/L Final    Glucose 04/24/2024 359 (H)  70 - 130 mg/dL Final         DIAGNOSTICS:  CT Head Without  Contrast    Result Date: 4/24/2024   Interval decreased size of previous left basal ganglia hematoma. No new areas of hemorrhage. No hydrocephalus. Chronic changes of the brain. If there is further clinical concern, MRI could be considered for further evaluation.  This report was finalized on 4/24/2024 3:30 PM by Dr. Akhil Nugent M.D on Workstation: [id number]            Results Review:   I reviewed the patient's new clinical results.  Discussed with ER physician  Old records reviewed / Medical Decision Making High Complexity  I personally viewed and interpreted the patient's EKG/Telemetry data- [facility]      ASSESSMENT AND PLAN    Acute UTI    History of CVA (cerebrovascular accident)    Chronic diastolic congestive heart failure    Paroxysmal atrial fibrillation    Mild cognitive impairment with memory loss    Seizures    Type 2 diabetes mellitus    HTN (hypertension)    Right hemiparesis    Oropharyngeal dysphagia    Expressive aphasia    Hypertension, uncontrolled    Metabolic encephalopathy secondary to acute UTI  -Patient has history of ESBL  -Ertapenem IV already started we will continue.  And consult ID  -Patient probably susceptible secondary to decreased fluid intake secondary to thickened liquid but that is improved and family is doing better at monitoring.  Possibly incomplete evacuation since Flomax was stopped 2 days ago.  Can look into whether there are alternatives for Flomax capsules and whether it really cannot be opened.      Oropharyngeal dysphagia with choking and cough  -Will get chest x-ray to evaluate for aspiration  -N.p.o. for now  -Was on a NDD 1 with honey thick liquids  -Oral suction as needed, aspiration precautions  -Consult speech therapy     Type 2 diabetes mellitus with hyperglycemia  -Accu-Cheks  -hypoglycemia protocol  -sliding scale insulin to cover outlier blood sugars  -Patient is on Lantus 25 units at night and metformin and Tradjenta but does not have any sliding scale insulin  at home.  Blood sugars were probably elevated secondary to infection.  They probably need a sliding scale insulin to cover some outliers      History of CVA (cerebrovascular accident), right hemiparesis, dyphagia, exp aphasia  -Patient has been home for 2 weeks and they are trying to do their best at managing all the medical ailments.  -They are having difficulty getting back to appointments.  -They would like to have palliative care to evaluate for possible hospice  -Patient is DNR  -Continue blood pressure control and statin for now      Chronic diastolic congestive heart failure  -Patient had decreased p.o. intake and will be doing some IV fluids but monitoring volume status      Paroxysmal atrial fibrillation  -There was not enough for cardiology to want to anticoagulate especially given the head bleed therefore not on anticoagulation      HOME MEDS HELD: All meds on hold for now till speech therapy evaluation  -Restart when clinically appropriate      Chronic medical conditions being monitored- stable, continue medical management  -htn, hld    +DVT proph:    scd  + CODE STATUS: dnr       I discussed the patient's findings and my recommendations with the patient and/or family.  Please reference all orders placed.    Annemarie Araujo MD  04/24/24  15:34 EDT      This document is intended for medical expert use only. Reading of this document by patients and/or patient's family without participating in medical staff guidance may result in misinterpretation and unintended morbidity. Any interpretation of such data is the responsibility of the patient and/or family member responsible for the patient in concert with their primary or specialist providers, and NOT to be left for sources of online searches such as [employer], [employer] or similar queries. Relying on these approaches to knowledge may result in misinterpretation, misguided goals of care and even death should patients or family members try recommendations outside of  the realm of professional medical care in a supervised way.    Dictated utilizing Voice dictation:  Parts of this note may be an electronic transcription/translation of spoke language to printed text using Dragon dictation system.

## 2024-04-24 NOTE — ED NOTES
Nursing report ED to floor  Erlin Blanco  78 y.o.  male    HPI :  HPI (Adult)  Stated Reason for Visit: Hyperglycemia  History Obtained From: EMS  Precipitating Event(s): none    Chief Complaint  Chief Complaint   Patient presents with    Hyperglycemia       Admitting doctor:   Annemarie Araujo MD    Admitting diagnosis:   The primary encounter diagnosis was Acute UTI-history of ESBL E. coli. Diagnoses of Hyperglycemia, History of stroke, Aphasia, Right hemiparesis, and DNR (do not resuscitate) were also pertinent to this visit.    Code status:   Current Code Status       Date Active Code Status Order ID Comments User Context       4/24/2024 1804 CPR (Attempt to Resuscitate) [id number]  Annemarie Araujo MD ED        Question Answer    Code Status (Patient has no pulse and is not breathing) CPR (Attempt to Resuscitate)    Medical Interventions (Patient has pulse or is breathing) Full                    Allergies:   Donepezil and Steglatro [ertugliflozin]    Isolation:   Enhanced Droplet/Contact     Intake and Output    Intake/Output Summary (Last 24 hours) at 4/24/2024 1811  Last data filed at 4/24/2024 1537  Gross per 24 hour   Intake 600 ml   Output --   Net 600 ml       Weight:       04/24/24  1200   Weight: 105 kg (231 lb)       Most recent vitals:   Vitals:    04/24/24 1536 04/24/24 1537 04/24/24 1654 04/24/24 1656   BP:    140/81   Pulse: 74 68  85   Resp:       Temp:       TempSrc:       SpO2: 95% 95% 94%    Weight:       Height:           Active LDAs/IV Access:   Lines, Drains & Airways       Active LDAs       Name Placement date Placement time Site Days    Peripheral IV Left Antecubital --  --  Antecubital  --                    Labs (abnormal labs have a star):   Labs Reviewed   COMPREHENSIVE METABOLIC PANEL - Abnormal; Notable for the following components:       Result Value    Glucose 405 (*)     Albumin 3.4 (*)     All other components within normal limits    Narrative:     GFR Normal  >60  Chronic Kidney Disease <60  Kidney Failure <15    The GFR formula is only valid for adults with stable renal function between ages 18 and 70.   URINALYSIS W/ CULTURE IF INDICATED - Abnormal; Notable for the following components:    Appearance, UA Cloudy (*)     Glucose, UA >=1000 mg/dL (3+) (*)     Blood, UA Trace (*)     Protein, UA 30 mg/dL (1+) (*)     Leuk Esterase, UA Moderate (2+) (*)     Nitrite, UA Positive (*)     All other components within normal limits    Narrative:     In absence of clinical symptoms, the presence of pyuria, bacteria, and/or nitrites on the urinalysis result does not correlate with infection.   BNP (IN-HOUSE) - Abnormal; Notable for the following components:    proBNP 1,981.0 (*)     All other components within normal limits    Narrative:     This assay is used as an aid in the diagnosis of individuals suspected of having heart failure. It can be used as an aid in the diagnosis of acute decompensated heart failure (ADHF) in patients presenting with signs and symptoms of ADHF to the emergency department (ED). In addition, NT-proBNP of <300 pg/mL indicates ADHF is not likely.    Age Range Result Interpretation  NT-proBNP Concentration (pg/mL:      <50             Positive            >450                   Gray                 300-450                    Negative             <300    50-75           Positive            >900                  Gray                300-900                  Negative            <300      >75             Positive            >1800                  Gray                300-1800                  Negative            <300   TROPONIN - Abnormal; Notable for the following components:    HS Troponin T 58 (*)     All other components within normal limits    Narrative:     High Sensitive Troponin T Reference Range:  <14.0 ng/L- Negative Female for AMI  <22.0 ng/L- Negative Male for AMI  >=14 - Abnormal Female indicating possible myocardial injury.  >=22 - Abnormal Male  indicating possible myocardial injury.   Clinicians would have to utilize clinical acumen, EKG, Troponin, and serial changes to determine if it is an Acute Myocardial Infarction or myocardial injury due to an underlying chronic condition.        CBC WITH AUTO DIFFERENTIAL - Abnormal; Notable for the following components:    WBC 17.22 (*)     RDW 15.9 (*)     RDW-SD 56.4 (*)     Neutrophil % 86.4 (*)     Lymphocyte % 4.2 (*)     Eosinophil % 0.1 (*)     Immature Grans % 0.6 (*)     Neutrophils, Absolute 14.88 (*)     Monocytes, Absolute 1.43 (*)     Immature Grans, Absolute 0.11 (*)     All other components within normal limits   URINALYSIS, MICROSCOPIC ONLY - Abnormal; Notable for the following components:    RBC, UA 6-10 (*)     WBC, UA 21-50 (*)     Bacteria, UA 4+ (*)     All other components within normal limits   HIGH SENSITIVITIY TROPONIN T 2HR - Abnormal; Notable for the following components:    HS Troponin T 52 (*)     Troponin T Delta -6 (*)     All other components within normal limits    Narrative:     High Sensitive Troponin T Reference Range:  <14.0 ng/L- Negative Female for AMI  <22.0 ng/L- Negative Male for AMI  >=14 - Abnormal Female indicating possible myocardial injury.  >=22 - Abnormal Male indicating possible myocardial injury.   Clinicians would have to utilize clinical acumen, EKG, Troponin, and serial changes to determine if it is an Acute Myocardial Infarction or myocardial injury due to an underlying chronic condition.        POCT GLUCOSE FINGERSTICK - Abnormal; Notable for the following components:    Glucose 374 (*)     All other components within normal limits   POCT GLUCOSE FINGERSTICK - Abnormal; Notable for the following components:    Glucose 359 (*)     All other components within normal limits   POCT GLUCOSE FINGERSTICK - Abnormal; Notable for the following components:    Glucose 303 (*)     All other components within normal limits   RESPIRATORY PANEL PCR W/ COVID-19 (SARS-COV-2),  "NP SWAB IN UTM/VTP, 2 HR TAT - Normal    Narrative:     In the setting of a positive respiratory panel with a viral infection PLUS a negative procalcitonin without other underlying concern for bacterial infection, consider observing off antibiotics or discontinuation of antibiotics and continue supportive care. If the respiratory panel is positive for atypical bacterial infection (Bordetella pertussis, Chlamydophila pneumoniae, or Mycoplasma pneumoniae), consider antibiotic de-escalation to target atypical bacterial infection.   LACTIC ACID, PLASMA - Normal   PROCALCITONIN - Normal    Narrative:     As a Marker for Sepsis (Non-Neonates):    1. <0.5 ng/mL represents a low risk of severe sepsis and/or septic shock.  2. >2 ng/mL represents a high risk of severe sepsis and/or septic shock.    As a Marker for Lower Respiratory Tract Infections that require antibiotic therapy:    PCT on Admission    Antibiotic Therapy       6-12 Hrs later    >0.5                Strongly Recommended  >0.25 - <0.5        Recommended   0.1 - 0.25          Discouraged              Remeasure/reassess PCT  <0.1                Strongly Discouraged     Remeasure/reassess PCT    As 28 day mortality risk marker: \"Change in Procalcitonin Result\" (>80% or <=80%) if Day 0 (or Day 1) and Day 4 values are available. Refer to http://www."[employer]s-pct-calculator.com    Change in PCT <=80%  A decrease of PCT levels below or equal to 80% defines a positive change in PCT test result representing a higher risk for 28-day all-cause mortality of patients diagnosed with severe sepsis for septic shock.    Change in PCT >80%  A decrease of PCT levels of more than 80% defines a negative change in PCT result representing a lower risk for 28-day all-cause mortality of patients diagnosed with severe sepsis or septic shock.      MAGNESIUM - Normal   BLOOD CULTURE   BLOOD CULTURE   URINE CULTURE   POCT GLUCOSE FINGERSTICK   POCT GLUCOSE FINGERSTICK   POCT GLUCOSE " FINGERSTICK   POCT GLUCOSE FINGERSTICK   CBC AND DIFFERENTIAL    Narrative:     The following orders were created for panel order CBC & Differential.  Procedure                               Abnormality         Status                     ---------                               -----------         ------                     CBC Auto Differential[[id number]]        Abnormal            Final result                 Please view results for these tests on the individual orders.       EKG:   ECG 12 Lead Dyspnea   Preliminary Result   HEART RATE= 75  bpm   RR Interval= 800  ms   NH Interval= 223  ms   P Horizontal Axis= 4  deg   P Front Axis= 7  deg   QRSD Interval= 172  ms   QT Interval= 433  ms   QTcB= 484  ms   QRS Axis= -24  deg   T Wave Axis= 134  deg   - ABNORMAL ECG -   Sinus rhythm   Prolonged NH interval   Left bundle branch block   Baseline wander in lead(s) V5   Electronically Signed By:    Date and Time of Study: 2024-04-24 12:19:37          Meds given in ED:   Medications   amLODIPine (NORVASC) tablet 10 mg (has no administration in time range)   aspirin EC tablet 81 mg (has no administration in time range)   cloNIDine (CATAPRES) tablet 0.2 mg (has no administration in time range)   fluticasone (FLONASE) 50 MCG/ACT nasal spray 1 spray (has no administration in time range)   hydroxyurea (HYDREA) capsule 500 mg (has no administration in time range)   insulin glargine (LANTUS, SEMGLEE) injection 25 Units (has no administration in time range)   linagliptin (TRADJENTA) tablet 5 mg (has no administration in time range)   levETIRAcetam (KEPPRA) tablet 500 mg (has no administration in time range)   melatonin tablet 5 mg (has no administration in time range)   memantine (NAMENDA) tablet 10 mg (has no administration in time range)   metFORMIN (GLUCOPHAGE) tablet 1,000 mg (has no administration in time range)   oxybutynin XL (DITROPAN-XL) 24 hr tablet 5 mg (has no administration in time range)   OLANZapine zydis (zyPREXA)  disintegrating tablet 5 mg (has no administration in time range)   polyethylene glycol (MIRALAX) packet 17 g (has no administration in time range)   rosuvastatin (CRESTOR) tablet 40 mg (has no administration in time range)   sennosides-docusate (PERICOLACE) 8.6-50 MG per tablet 2 tablet (has no administration in time range)   sertraline (ZOLOFT) tablet 50 mg (has no administration in time range)   spironolactone (ALDACTONE) tablet 50 mg (has no administration in time range)   tamsulosin (FLOMAX) 24 hr capsule 0.4 mg (has no administration in time range)   valsartan (DIOVAN) tablet 320 mg (has no administration in time range)   acetaminophen (TYLENOL) tablet 650 mg (has no administration in time range)     Or   acetaminophen (TYLENOL) 160 MG/5ML oral solution 650 mg (has no administration in time range)     Or   acetaminophen (TYLENOL) suppository 650 mg (has no administration in time range)   calcium carbonate (TUMS) chewable tablet 500 mg (200 mg elemental) (has no administration in time range)   ondansetron ODT (ZOFRAN-ODT) disintegrating tablet 4 mg (has no administration in time range)     Or   ondansetron (ZOFRAN) injection 4 mg (has no administration in time range)   dextrose (GLUTOSE) oral gel 15 g (has no administration in time range)   dextrose (D50W) (25 g/50 mL) IV injection 25 g (has no administration in time range)   glucagon (GLUCAGEN) injection 1 mg (has no administration in time range)   insulin lispro (HUMALOG/ADMELOG) injection 3-14 Units (has no administration in time range)   nitroglycerin (NITROSTAT) SL tablet 0.4 mg (has no administration in time range)   sodium chloride 0.9 % flush 10 mL (has no administration in time range)   sodium chloride 0.9 % flush 10 mL (has no administration in time range)   sodium chloride 0.9 % infusion 40 mL (has no administration in time range)   sodium chloride 0.9 % infusion (has no administration in time range)   Potassium Replacement - Follow Nurse / BPA Driven  Protocol (has no administration in time range)   Magnesium Standard Dose Replacement - Follow Nurse / BPA Driven Protocol (has no administration in time range)   Phosphorus Replacement - Follow Nurse / BPA Driven Protocol (has no administration in time range)   Calcium Replacement - Follow Nurse / BPA Driven Protocol (has no administration in time range)   sennosides-docusate (PERICOLACE) 8.6-50 MG per tablet 2 tablet (has no administration in time range)     And   polyethylene glycol (MIRALAX) packet 17 g (has no administration in time range)     And   bisacodyl (DULCOLAX) EC tablet 5 mg (has no administration in time range)     And   bisacodyl (DULCOLAX) suppository 10 mg (has no administration in time range)   sodium chloride 0.9 % bolus 500 mL (0 mL Intravenous Stopped 24 1537)   ertapenem (INVanz) 1,000 mg in sodium chloride 0.9 % 100 mL MBP (0 mg Intravenous Stopped 24 1537)   insulin regular (humuLIN R,novoLIN R) injection 10 Units (10 Units Intravenous Given 24 1459)       Imaging results:  CT Head Without Contrast    Result Date: 2024   Interval decreased size of previous left basal ganglia hematoma. No new areas of hemorrhage. No hydrocephalus. Chronic changes of the brain. If there is further clinical concern, MRI could be considered for further evaluation.  This report was finalized on 2024 3:30 PM by Dr. Akhil Nugent M.D on Workstation: [employer]       Ambulatory status:   - bed rest    Social issues:   Social History     Socioeconomic History    Marital status:    Tobacco Use    Smoking status: Former     Current packs/day: 0.00     Types: Cigarettes     Quit date:      Years since quittin.3     Passive exposure: Past    Smokeless tobacco: Never    Tobacco comments:     Caffeine daily   Vaping Use    Vaping status: Never Used    Passive vaping exposure: Yes   Substance and Sexual Activity    Alcohol use: Never    Drug use: Never    Sexual activity: Defer        Peripheral Neurovascular  Peripheral Neurovascular (Adult)  Peripheral Neurovascular WDL: WDL    Neuro Cognitive  Neuro Cognitive (Adult)  Cognitive/Neuro/Behavioral WDL: .WDL except, level of consciousness, orientation  Level of Consciousness: Responds to Voice  Orientation: disoriented to, place, time, situation  Pupils  Pupil PERRLA: yes    Learning       Respiratory  Respiratory WDL  Respiratory WDL: .WDL except, rhythm/pattern  Rhythm/Pattern, Respiratory: labored    Abdominal Pain       Pain Assessments  Pain (Adult)  (0-10) Pain Rating: Rest: 0    NIH Stroke Scale       Jia Montoya RN  04/24/24 18:11 EDT

## 2024-04-24 NOTE — PROGRESS NOTES
[facility] is current with SN,PT,OT and ST and will continue to follow for D/C needs.

## 2024-04-24 NOTE — TELEPHONE ENCOUNTER
EUFEMIA:  LAQUITA Lay with [facility] called and stated Pt. Blood sugar was spiking 400 to 500, has a fever of 100.2, heavy breathing,lungs sound clear and oxygen was 96, and pt was not moving around well. Spoke with Dr. Ross and informed him of the above and he stated the pt needs to go to the ER. I relayed the information to LAQUITA Lay [facility] and verbalized and understanding.

## 2024-04-24 NOTE — HOME HEALTH
REASON FOR REFERRAL: Speech Therapy referral post hospitalization/rehab stay for stroke[facility] causing right sided facial droop, right sided weakness, and aphasia. [place] acute for rehab.  Speech Therapy to evaluate and establish a plan of care.  PRIMARY DIAGNOSIS: Aphasia (Receptive and Expressive);   Oropharyngeal dysphagia  SECONDARY DIAGNOSIS: Intraparenchymal hemorrhage of brain  VFSS OR FEES:  3/13/24 at [facility]    PERTINENT HISTORY: Intraparenchymal hemorrhage of brain, Seizures, Type 2 diabetes mellitus, HTN,  Right hemiparesis, Oropharyngeal dysphagia,  Paroxysmal atrial fibrillation, Expressive aphasia, Osteoarthritis, knee, Fall at home, Cognitive communication deficit, COVID-19 virus infection, Mild cognitive impairment with memory loss, Hematuria, Hypertension, uncontrolled.  PRIOR LEVEL OF FUNCTION: Patient was mobile with a cane. Stroke in 2006 with left natalie and no other significant deficits. Patient was independent with daily care/driving. Mild cognitive issues prior to stroke.     FOCUS OF CARE: ST to focus on increasing receptive and expressive language skills and swallowing function for intake of least restrictive diet post stroke.     PLAN FOR NEXT VISIT   MEDICAL NECESSITY FOR ONGOING SKILLED THERAPY: Dysphagia therapy to increase swallowing function for safe intake of least restrictive diet and decrease risk of aspiration/aspiration pneumonia. Aphasia Therapy to increase expressive and receptive skills for increased function and communication. Speech Therapy interventions are necessary due to significant declines in swallowing function and expressive and receptive skills and increased dependence on caregivers.   SPECIFIC INTERVENTIONS AND GOALS TO ADDRESS ON NEXT VISIT:   Education   Expressive language tasks  Receptive language tasks  FREQUENCY AND DURATION: 1w1  2w4  ANY OTHER FOLLOW UP NEEDED: NA  REASSESSMENT DUE DATE:  5/25/24      Bilateral hearing aids  Not on  Glasses.   No respiratory issues  Intraparenchymal hemorrhage of brain, Seizures, Type 2 diabetes mellitus, HTN,  Right hemiparesis, Oropharyngeal dysphagia,  Paroxysmal atrial fibrillation, Expressive aphasia, Osteoarthritis, knee   Fall at home, Cognitive communication deficit, COVID-19 virus infection, Mild cognitive impairment with memory loss, Hematuria, Hypertension, uncontrolled.

## 2024-04-24 NOTE — ED NOTES
Pt's wife approached the desk stating the pt was vomiting what he just ate. This RN entered the room to find that the pt had vomit on this bed sheet and was still vomiting. Pt's mouth suctioned and had approximately 100 mL of emesis out. [place] notified.

## 2024-04-24 NOTE — TELEPHONE ENCOUNTER
I do not see where the patient has gone to any ED.  Anny, can you call and check on the patient?  If you want to call the RN, that's fine.  But if you do, please let us know what they say.  Then, kalyn Robles call and speak to emergency contact.  He is listed as having another PCP but has a pending appt. With [facility] in May.

## 2024-04-24 NOTE — PROGRESS NOTES
Clinical Pharmacy Services: Medication History    Erlin Blanco is a 78 y.o. male presenting to [facility] for   Chief Complaint   Patient presents with    Hyperglycemia       He  has a past medical history of Abnormal ECG, Abnormal stress test, Abrasion of face (01/29/2020), Acute bronchitis, Acute hypokalemia (09/16/2020), Acute non-recurrent sinusitis (01/13/2020), Acute pyelonephritis (10/09/2020), Acute sinusitis, DORI (acute kidney injury) (04/28/2017), Allergic rhinitis, Aortic root dilation, Arthritis, Bacteremia due to Escherichia coli (08/17/2020), Balanitis (05/16/2021), Benign enlargement of prostate, C. difficile colitis (09/16/2020), Cellulitis of knee, left (05/04/2017), CHF (congestive heart failure), Chronic diastolic congestive heart failure, Constipation (11/14/2020), Contusion of face (01/29/2020), Coronary artery disease, COVID-19 virus infection, CVA (cerebral vascular accident) (2020), Diminished pulse, Discitis of lumbar region (10/09/2020), Edema, Elevated hematocrit, Elevated hemoglobin, Essential hypertension, Hearing loss, Heart valve disease, High risk medication use, History of back pain, History of dysuria, History of echocardiogram, History of intracranial hemorrhage, History of scoliosis, History of stroke, Hyperlipidemia, Hypokalemia (01/07/2021), Injury of toe, left, initial encounter, Irregular heart rate, Knee pain, left, Left bundle branch block, Leg wound, left, Leukocytosis (09/16/2020), Low back pain, Maxillary sinusitis (07/16/2021), Medicare annual wellness visit, subsequent, Minor head injury without loss of consciousness (01/29/2020), Murmur, Muscle cramps, Need for hepatitis C screening test, Paraphimosis (05/16/2021), Pneumonia of left lung due to infectious organism (09/17/2021), Polycythemia, Polycythemia vera, Pressure injury of buttock, stage 2 (09/18/2020), Prostate hyperplasia without urinary obstruction, Prostatitis, Proteinuria, Pulmonary  hypertension, Sacroiliitis (10/09/2020), Scoliosis, Sepsis due to Escherichia coli (08/17/2020), Sepsis with metabolic encephalopathy (08/17/2020), Stroke, SVT (supraventricular tachycardia), Tachycardia, Thrombocytosis, TIA (transient ischemic attack), Type 2 diabetes mellitus, Type 2 diabetes mellitus with hyperglycemia (05/16/2021), Urinary tract infection due to extended-spectrum beta lactamase (ESBL) producing Escherichia coli (08/18/2020), and Valvular heart disease.    Allergies as of 04/24/2024 - Reviewed 04/24/2024   Allergen Reaction Noted    Donepezil Hallucinations 08/27/2021    Steglatro [ertugliflozin] Other (See Comments) 05/28/2021       Medication information was obtained from: Family Member & Med List   Pharmacy and Phone Number:     Prior to Admission Medications       Prescriptions Last Dose Informant Patient Reported? Taking?    acetaminophen (TYLENOL) 500 MG tablet  Family Member Yes Yes    Take 2 tablets by mouth Every 8 (Eight) Hours As Needed for Moderate Pain. Indications: Pain    amLODIPine (NORVASC) 10 MG tablet  Family Member No Yes    Take 1 tablet by mouth Daily.    aspirin 81 MG EC tablet  Family Member No Yes    Take 1 tablet by mouth Daily.    B COMPLEX-C-ZN-FOLIC ACID PO  Family Member Yes Yes    Take 1 tablet by mouth Daily.    carvedilol (COREG) 12.5 MG tablet  Family Member No Yes    Take 1 tablet by mouth 2 (Two) Times a Day With Meals.    cloNIDine (CATAPRES) 0.2 MG tablet  Family Member No Yes    Take 1 tablet by mouth Every 8 (Eight) Hours.    fluticasone (FLONASE) 50 MCG/ACT nasal spray  Family Member No Yes    1 spray into the nostril(s) as directed by provider Daily.    hydroxyurea (HYDREA) 500 MG capsule  Family Member Yes Yes    Take 1 capsule by mouth Daily. Indications: Polycythemia Vera    Insulin Glargine (LANTUS SOLOSTAR) 100 UNIT/ML injection pen  Family Member No Yes    Inject 25 Units under the skin into the appropriate area as directed Every Night.    Januvia  100 MG tablet  Family Member Yes Yes    Take 1 tablet by mouth Daily. Indications: Type 2 Diabetes    levETIRAcetam (KEPPRA) 500 MG tablet  Family Member No Yes    Take 1 tablet by mouth 2 (Two) Times a Day.    melatonin 5 MG tablet tablet  Family Member No Yes    Take 1 tablet by mouth Every Night.    Patient taking differently:  Take 1 tablet by mouth At Night As Needed.    memantine (NAMENDA) 10 MG tablet  Family Member No Yes    Take 1 tablet by mouth 2 (Two) Times a Day.    metFORMIN (GLUCOPHAGE) 500 MG tablet  Family Member No Yes    Take 1 tablet by mouth 2 (Two) Times a Day With Meals.    Patient taking differently:  Take 2 tablets by mouth Daily.    Mirabegron ER (Myrbetriq) 25 MG tablet sustained-release 24 hour 24 hr tablet  Family Member Yes Yes    Take 1 tablet by mouth Daily.    OLANZapine zydis (ZyPREXA Zydis) 5 MG disintegrating tablet  Family Member No Yes    Place 1 tablet on the tongue Every Night.    polyethylene glycol (MIRALAX) 17 g packet  Family Member No Yes    Take 17 g by mouth 2 (Two) Times a Day. Hold if loose stool    rosuvastatin (CRESTOR) 20 MG tablet  Family Member Yes Yes    Take 2 tablets by mouth Every Night. Indications: High Amount of Fats in the Blood    sennosides-docusate (PERICOLACE) 8.6-50 MG per tablet  Family Member No Yes    Take 2 tablets by mouth 2 (Two) Times a Day.    sertraline (ZOLOFT) 50 MG tablet  Family Member No Yes    Take 1 tablet by mouth Daily.    Patient taking differently:  Take 1 tablet by mouth Every Night. Indications: Major Depressive Disorder    spironolactone (ALDACTONE) 50 MG tablet  Family Member No Yes    Take 1 tablet by mouth Daily.    tamsulosin (FLOMAX) 0.4 MG capsule 24 hr capsule  Family Member Yes Yes    Take 1 capsule by mouth Every Night.    valsartan (DIOVAN) 320 MG tablet  Family Member No Yes    Take 1 tablet by mouth Daily.    amLODIPine (Norvasc) 10 MG tablet   No No    Take 1 tablet by mouth Daily.    Insulin Pen Needle (BD Pen  Needle Moni U/F) 32G X 4 MM misc   No No    Use 1 each as directed once daily.    Multiple Vitamins-Minerals (b complex-C-E-zinc) tablet  Pharmacy Yes No    Take 1 tablet by mouth Daily.    OLANZapine zydis (zyPREXA) 5 MG disintegrating tablet   No No    Place 1 tablet on the tongue Every Night.              Medication notes:     This medication list is complete to the best of my knowledge as of 4/24/2024    Please call if questions.    Consuelo Bell  Medication History Technician   [phone]    4/24/2024 13:52 EDT

## 2024-04-25 ENCOUNTER — HOME CARE VISIT (OUTPATIENT)
Dept: HOME HEALTH SERVICES | Facility: HOME HEALTHCARE | Age: 79
End: 2024-04-25
Payer: MEDICARE

## 2024-04-25 ENCOUNTER — READMISSION MANAGEMENT (OUTPATIENT)
Dept: CALL CENTER | Facility: HOSPITAL | Age: 79
End: 2024-04-25
Payer: MEDICARE

## 2024-04-25 VITALS
OXYGEN SATURATION: 88 % | TEMPERATURE: 97.4 F | HEART RATE: 62 BPM | SYSTOLIC BLOOD PRESSURE: 159 MMHG | DIASTOLIC BLOOD PRESSURE: 80 MMHG

## 2024-04-25 VITALS
DIASTOLIC BLOOD PRESSURE: 66 MMHG | RESPIRATION RATE: 18 BRPM | OXYGEN SATURATION: 96 % | SYSTOLIC BLOOD PRESSURE: 142 MMHG | HEART RATE: 62 BPM | TEMPERATURE: 98 F

## 2024-04-25 VITALS
OXYGEN SATURATION: 96 % | SYSTOLIC BLOOD PRESSURE: 154 MMHG | HEART RATE: 82 BPM | TEMPERATURE: 97.5 F | RESPIRATION RATE: 18 BRPM | DIASTOLIC BLOOD PRESSURE: 70 MMHG

## 2024-04-25 PROBLEM — Z79.4 TYPE 2 DIABETES MELLITUS WITH HYPERGLYCEMIA, WITH LONG-TERM CURRENT USE OF INSULIN: Status: ACTIVE | Noted: 2024-01-01

## 2024-04-25 PROBLEM — E11.65 TYPE 2 DIABETES MELLITUS WITH HYPERGLYCEMIA, WITH LONG-TERM CURRENT USE OF INSULIN: Status: ACTIVE | Noted: 2024-01-01

## 2024-04-25 PROCEDURE — G0157 HHC PT ASSISTANT EA 15: HCPCS

## 2024-04-25 NOTE — DISCHARGE PLACEMENT REQUEST
"Felipe Dockery (78 y.o. Male)       Date of Birth   1945    Social Security Number       Address   [address] [address]    Home Phone   [phone]    MRN   [id number]       Rastafari   Muslim of [employer]    Marital Status                               Admission Date   4/24/24    Admission Type   Emergency    Admitting Provider   Annemarie Araujo MD    Attending Provider   Randy Hernandez MD    Department, Room/Bed   [address], [address]       Discharge Date       Discharge Disposition       Discharge Destination                                 Attending Provider: Randy Hernandez MD    Allergies: Donepezil, Steglatro [Ertugliflozin]    Isolation: Contact   Infection: None   Code Status: No CPR    Ht: 182.9 cm (72\")   Wt: 100 kg (220 lb 7.4 oz)    Admission Cmt: None   Principal Problem: Acute UTI [N39.0]                   Active Insurance as of 4/24/2024       Primary Coverage       Payor Plan Insurance Group Employer/Plan Group    HUMANA MEDICARE REPLACEMENT HUMANA MED ADV GROUP [id number]       Payor Plan Address Payor Plan Phone Number Payor Plan Fax Number Effective Dates    [address] [address] [phone]  1/1/2022 - None Entered    [facility] [address]         Subscriber Name Subscriber Birth Date Member ID       FELIPE DOCKERY 1945 [id number]                     Emergency Contacts        (Rel.) Home Phone Work Phone Mobile Phone    Jame Dockery (Son) [phone] -- [phone]    JOAN DOCKERY \"GUNNAR\" (Spouse) -- -- [phone]    JAME DOCKERY (Son) -- -- [phone]    Tiffany Dockery (Daughter) -- -- [phone]    Joan Dockery (Spouse) [phone] -- [phone]    TIFFANY DOCKERY (DAUG N LAW) (Relative) [phone] -- [phone]                "

## 2024-04-25 NOTE — HOME HEALTH
Routine Visit Note: Patient lying in bed, alert without speach. Skin intact with skin assessment    Skill/education provided: Reviewed all meds with patient. Patient denies any issues at this time. Instructed on meds dosage, scheduling and side effects. Patient voiced back understanding.    Patient/caregiver response: Patinet and caregiver need reinforcment of instructions given    Plan for next visit: Medication education, Pain management, Cardiopulmonary assessments, Neurovascular assessments

## 2024-04-25 NOTE — HOME HEALTH
Routine Visit Note: Patient lethargic and reports that -500 with low grade temp of 100.2 last noc. Low urine output with -1300 over the last 24hrs. Called [facility] ALE[facility]ANDRA office with instructions to send patient to ED.    Skill/education provided: Reviewed all meds with patient. Patient denies any issues at this time. Instructed on meds dosage, scheduling and side effects. Patient voiced back understanding.    Patient/caregiver response: Patinet and caregiver need reinforcment of instructions given    Plan for next visit: Medication education, Pain management, Cardiopulmonary assessments, Neurovascular assessments

## 2024-04-25 NOTE — PROGRESS NOTES
Name: Erlin Blanco ADMIT: 2024   : 1945  PCP: Senia Dorsey MD    MRN: [id number] LOS: 0 days   AGE/SEX: 78 y.o. male  ROOM: [facility]     Subjective   Subjective   No acute events. Patient has no new complaints although he has some confusion and aphasia at baseline. He is close to baseline although a little more lethargic per his daughter in law at bedside.    Objective   Objective   Vital Signs  Temp:  [97.3 °F (36.3 °C)-100 °F (37.8 °C)] 97.5 °F (36.4 °C)  Heart Rate:  [61-93] 88  Resp:  [18-20] 18  BP: ()/(46-92) 115/53  SpO2:  [92 %-98 %] 92 %  on   ;   Device (Oxygen Therapy): room air  Body mass index is 29.9 kg/m².  Physical Exam  Vitals and nursing note reviewed.   Constitutional:       General: He is not in acute distress.     Appearance: He is not toxic-appearing.   HENT:      Head: Normocephalic and atraumatic.      Nose: Nose normal.      Mouth/Throat:      Mouth: Mucous membranes are dry.      Pharynx: Oropharynx is clear.   Eyes:      Conjunctiva/sclera: Conjunctivae normal.      Pupils: Pupils are equal, round, and reactive to light.   Cardiovascular:      Rate and Rhythm: Normal rate and regular rhythm.      Pulses: Normal pulses.   Pulmonary:      Effort: Pulmonary effort is normal.      Breath sounds: Normal breath sounds. No rales.   Abdominal:      General: Bowel sounds are normal.      Palpations: Abdomen is soft.      Tenderness: There is no abdominal tenderness.   Musculoskeletal:         General: No swelling or tenderness.      Cervical back: Neck supple.   Skin:     General: Skin is warm and dry.      Capillary Refill: Capillary refill takes less than 2 seconds.   Neurological:      Mental Status: He is lethargic.      Comments: Aphasia and right hemiparesis known from previous CVA   Psychiatric:         Mood and Affect: Affect is flat.         Cognition and Memory: Cognition is impaired.       Results Review     I reviewed the patient's new clinical  results.  Results from last 7 days   Lab Units 04/25/24  0316 04/24/24  1204   WBC 10*3/mm3 21.00* 17.22*   HEMOGLOBIN g/dL 13.8 14.7   PLATELETS 10*3/mm3 392 394     Results from last 7 days   Lab Units 04/25/24  0316 04/24/24  1204   SODIUM mmol/L 142 139   POTASSIUM mmol/L 4.0 3.8   CHLORIDE mmol/L 109* 105   CO2 mmol/L 23.0 23.8   BUN mg/dL 23 19   CREATININE mg/dL 1.01 0.81   GLUCOSE mg/dL 305* 405*   EGFR mL/min/1.73 77.1 91.4     Results from last 7 days   Lab Units 04/24/24  1204   ALBUMIN g/dL 3.4*   BILIRUBIN mg/dL 0.6   ALK PHOS U/L 98   AST (SGOT) U/L 22   ALT (SGPT) U/L 21     Results from last 7 days   Lab Units 04/25/24  0316 04/24/24  1204   CALCIUM mg/dL 9.2 9.7   ALBUMIN g/dL  --  3.4*   MAGNESIUM mg/dL  --  1.8     Results from last 7 days   Lab Units 04/25/24  0316 04/24/24  1204   PROCALCITONIN ng/mL 0.98* 0.23   LACTATE mmol/L  --  1.4     Glucose   Date/Time Value Ref Range Status   04/25/2024 1123 278 (H) 70 - 130 mg/dL Final   04/25/2024 0741 266 (H) 70 - 130 mg/dL Final   04/25/2024 0558 272 (H) 70 - 130 mg/dL Final   04/24/2024 2301 342 (H) 70 - 130 mg/dL Final   04/24/2024 1937 324 (H) 70 - 130 mg/dL Final   04/24/2024 1747 303 (H) 70 - 130 mg/dL Final   04/24/2024 1458 359 (H) 70 - 130 mg/dL Final       CT Head Without Contrast    Result Date: 4/24/2024   Interval decreased size of previous left basal ganglia hematoma. No new areas of hemorrhage. No hydrocephalus. Chronic changes of the brain. If there is further clinical concern, MRI could be considered for further evaluation.  This report was finalized on 4/24/2024 3:30 PM by Dr. Akhil Nugent M.D on Workstation: [id number]       I have personally reviewed all medications:  Scheduled Medications  aspirin, 81 mg, Oral, Daily  carvedilol, 12.5 mg, Oral, BID With Meals  cloNIDine, 0.2 mg, Oral, Q8H  ertapenem, 1,000 mg, Intravenous, Q24H  fluticasone, 1 spray, Nasal, Daily  hydroxyurea, 500 mg, Oral, Daily  insulin glargine, 25 Units,  Subcutaneous, Nightly  insulin lispro, 3-14 Units, Subcutaneous, 4x Daily AC & at Bedtime  levETIRAcetam, 500 mg, Oral, BID  linagliptin, 5 mg, Oral, Daily  memantine, 10 mg, Oral, BID  Menthol-Zinc Oxide, 1 Application, Topical, BID  OLANZapine zydis, 5 mg, Oral, Nightly  oxybutynin XL, 5 mg, Oral, Daily  polyethylene glycol, 17 g, Oral, BID  rosuvastatin, 40 mg, Oral, Nightly  sennosides-docusate, 2 tablet, Oral, BID  sertraline, 50 mg, Oral, Nightly  sodium chloride, 10 mL, Intravenous, Q12H  tamsulosin, 0.4 mg, Oral, Nightly    Infusions  sodium chloride, 125 mL/hr, Last Rate: 125 mL/hr (04/25/24 1200)    Diet  NPO Diet NPO Type: Strict NPO    I have personally reviewed:  [x]  Laboratory   [x]  Microbiology   [x]  Radiology   [x]  EKG/Telemetry  []  Cardiology/Vascular   []  Pathology    [x]  Records    Assessment/Plan     Active Hospital Problems    Diagnosis  POA    **Acute UTI [N39.0]  Yes    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]  Yes    Hypertension, uncontrolled [I10]  Yes    Expressive aphasia [R47.01]  Yes    Oropharyngeal dysphagia [R13.12]  Yes    Right hemiparesis [G81.91]  Yes    HTN (hypertension) [I10]  Yes    Type 2 diabetes mellitus with hyperglycemia, with long-term current use of insulin [E11.65, Z79.4]  Not Applicable    Seizures [R56.9]  Yes    Mild cognitive impairment with memory loss [G31.84]  Yes    Paroxysmal atrial fibrillation [I48.0]  Yes    History of CVA (cerebrovascular accident) [Z86.73]  Not Applicable    Chronic diastolic congestive heart failure [I50.32]  Yes      Resolved Hospital Problems   No resolved problems to display.   Acute UTI with Sepsis, present on Admission  - HR>90 and leukocytosis on admission  - likely source is UTI, has hx of ESBL  - continue ertapenem, await urine and blood cultures  - continue IV fluids  - appreciate ID recs    HTN/Chronic Diastolic CHF  - BP low normal, appears hypovolemic  - continue coreg and clonidine  - hold amlodipine,  spironolactone, and valsartan    Type 2 DM with hyperglycemia  - hyperglycemia slowly improving-will avoid more aggressive medication titrating for now as he is NPO  - continue lantus 25 units nightly  - cover with ssi  - continue linagliptin, hold metformin    PAF/Hx of CVA/Seizures  - HR acceptable, continue coreg  - not on AC due to hx of spontaneous intracranial hemorrhage  - has residual aphasia and right hemiparesis  - continue keppra for seizure prophylaxis    Oropharyngeal Dysphagia  - SLP to evaluate      SCDs for DVT prophylaxis.  DNR.  Discussed with patient, family, nursing staff, and care team on multidisciplinary rounds.  Anticipate discharge home with family timing yet to be determined.  Expected Discharge Date: 4/27/2024; Expected Discharge Time:       Randy Hernandez MD  [facility]  04/25/24  12:16 EDT

## 2024-04-25 NOTE — PLAN OF CARE
Goal Outcome Evaluation:      Vss. Meds given crushed with pudding- see order history. Pt has IVF at 125. Pt doesn't have a lot of urine output. Urine Is dark and bladder scanned multiple times with getting 0. Turned throughout the night. SCDs on. Pt has previous pressure injury that is healing. Dressing on. Wound consulted. Palliative to see. Daughter in law at the bedside all night.

## 2024-04-25 NOTE — CASE MANAGEMENT/SOCIAL WORK
Discharge Planning Assessment  [facility]     Patient Name: Erlin Blanco  MRN: [id number]  Today's Date: 4/25/2024    Admit Date: 4/24/2024    Plan: Home with [facility], family support, will need ambulance/stretcher transport   Discharge Needs Assessment       Row Name 04/25/24 1036       Living Environment    People in Home spouse    Name(s) of People in Home Yany Floreslps, [phone]    Unique Family Situation All children participate in patients care    Current Living Arrangements home    Potentially Unsafe Housing Conditions none    In the past 12 months has the electric, gas, oil, or water company threatened to shut off services in your home? No    Primary Care Provided by spouse/significant other  All children/in laws participate in care, [facility]    Provides Primary Care For no one, unable/limited ability to care for self    Family Caregiver if Needed child(clifford), adult;significant other;other relative(s)    Family Caregiver Names Ynay Blanco    Quality of Family Relationships helpful;involved;supportive    Able to Return to Prior Arrangements yes       Resource/Environmental Concerns    Resource/Environmental Concerns none    Transportation Concerns none       Transportation Needs    In the past 12 months, has lack of transportation kept you from medical appointments or from getting medications? no    In the past 12 months, has lack of transportation kept you from meetings, work, or from getting things needed for daily living? No       Food Insecurity    Within the past 12 months, you worried that your food would run out before you got the money to buy more. Never true    Within the past 12 months, the food you bought just didn't last and you didn't have money to get more. Never true       Transition Planning    Patient/Family Anticipates Transition to home;home with family;home with help/services  Current with [facility]    Patient/Family Anticipated Services at Transition none    Transportation Anticipated  "health plan transportation       Discharge Needs Assessment    Readmission Within the Last 30 Days no previous admission in last 30 days    Equipment Currently Used at Home hospital bed;lift device;ramp;slide board;wheelchair    Concerns to be Addressed discharge planning    Anticipated Changes Related to Illness none    Equipment Needed After Discharge none    Outpatient/Agency/Support Group Needs homecare agency                   Discharge Plan       Row Name 04/25/24 1040       Plan    Plan Home with [facility], family support, will need ambulance/stretcher transport    Patient/Family in Agreement with Plan yes    Provided Post Acute Provider List? N/A    Provided Post Acute Provider Quality & Resource List? N/A    Plan Comments Met with pt at bedside. Pt unable to participate in interview, CATHERINE, who is also caregiver participated in interview and answered all questions. Introduced self and explained role of . Face sheet verified, PCP is Senia Dorsey.  Family stated no difficulty paying for medications and he obtains his medications from [facility]. Pt lives at home,  with family support, is totally dependent in ADL's. Pt has at home a lift, a hospital bed and a straight back WC that he uses for mobility. Pt is current with [facility], referral has been placed in [facility]. Pt has been at [facility] in the past, however family stated that he has used \"up all his days and that is why he is home. We have worked out a schedule and we all take care of him\". Upon discharge, pt will require ambulance/strecther home due to hemiparesis. Explained that CCP would follow to assess for discharge needs.                  Continued Care and Services - Admitted Since 4/24/2024    No active coordination exists for this encounter.       Selected Continued Care - Episodes Includes continued care and service providers with selected services from the active episodes listed below      Chronic Care Management Episode start date: " 1/12/2024   There are no active outsourced providers for this episode.                 Selected Continued Care - Prior Encounters Includes continued care and service providers with selected services from prior encounters from 1/25/2024 to 4/25/2024      Discharged on 4/13/2024 Admission date: 4/6/2024 - Discharge disposition: Home-Health Care Svc      Home Medical Care       Service Provider Selected Services Address Phone Fax Patient Preferred     [facility] ,  Home Nursing ,  Home Rehabilitation [address] [facility] [address] [facility] [address] [address] [phone] --                      Discharged on 3/14/2024 Admission date: 2/24/2024 - Discharge disposition: Skilled Nursing Facility (DC - External)      Destination       Service Provider Selected Services Address Phone Fax Patient Preferred    [facility] [address] [facility] [address] [phone] --                          Expected Discharge Date and Time       Expected Discharge Date Expected Discharge Time    Apr 27, 2024            Demographic Summary    No documentation.                  Functional Status    No documentation.                  Psychosocial    No documentation.                  Abuse/Neglect    No documentation.                  Legal    No documentation.                  Substance Abuse    No documentation.                  Patient Forms    No documentation.                     Domonique Rm, RN

## 2024-04-25 NOTE — HOME HEALTH
Patient lying in bed, wife and son present.  [place] with PT for safety, assist level.  Plan to address UE ROM, CG education

## 2024-04-25 NOTE — SIGNIFICANT NOTE
04/25/24 1528   OTHER   Discipline occupational therapist   Rehab Time/Intention   Session Not Performed other (see comments)  (Per the chart review, the pt is now dependent at home with ADL's and torsten lift transfers. No acute OT needs are indicated at this time. Will s/o.)

## 2024-04-25 NOTE — PLAN OF CARE
Goal Outcome Evaluation:  Plan of Care Reviewed With: patient, family           Outcome Evaluation: Clinical swallow eval completed. Recommend NPO, meds crushed with puree. Recommend upright for meds. Frequent oral care. SLP to follow for re-eval.

## 2024-04-25 NOTE — CONSULTS
Pt in bed, alert, family at bedside, visit welcomed. Pt requested prayer. Chp prayed for comfort, rest, peace, and for family and medical staff. Pt/Family expressed appreciation.     Pastoral Care Office remains available if requested.

## 2024-04-25 NOTE — CASE COMMUNICATION
Patient missed a Physical Therapy visit from [facility] on 4-25-24    Reason: Patient currently at hospital      For your records only.   Per CMS Guidance, MD must be notified of missed/cancelled visits; therefore the prescribed frequency was not met.   Sissy Corrigan PTA [facility]

## 2024-04-25 NOTE — NURSING NOTE
Cwon consult received. Patient evaluated for sacrococcygeal breakdown that was present on admission. Wound stable and most consistent with a stage 2 pressure injury. Moisture also played a part in the evolution of this wound. He is incontinent of both urine and stool. He currently lives at home with family providing round the clock care, as patient is dependent for all ADL's. Patient with a hx of stroke, right hemiparesis, dysphagia, cognition impairment, CHF and T2D.   on recommends Calmoseptine ointment to coccyx/gluteal wounds and most importantly, providing prevention measures, as patient is high risk for impaired skin integrity.   I have placed wound care and prevention measure orders in Epic. I will also request a specialty, low air loss mattress from [facility], for appropriate pressure redistribution.   Thank you for consult and please call or re-consult if needed.

## 2024-04-25 NOTE — SIGNIFICANT NOTE
04/25/24 1509   OTHER   Discipline physical therapist   Rehab Time/Intention   Session Not Performed other (see comments)  (Per nsg, pt is bedbound at his BL. No criteria identified that requires skilled PT intervention. PT will sign off.)   Therapy Assessment/Plan (PT)   Criteria for Skilled Interventions Met (PT) no;no problems identified which require skilled intervention

## 2024-04-25 NOTE — CONSULTS
Referring Provider: Aftab Mccartney MD      Subjective   History of present illness: 78-year-old we are asked to see for possible sepsis.  Patient is known to me from an admission in 2020 where he had E. coli sepsis secondary to right pyelonephritis.  There is also concern for possible discitis at that time although no definitive evidence of that and he was discharged on ertapenem to complete 6 weeks.  He was readmitted with C. difficile and ended up completing his course with doxycycline.    Patient was admitted in February 2024 with hemorrhagic stroke and has had some dysphagia.  He has a history of A-fib and coronary artery disease but given the bleed he was monitored off anticoagulation.  He was discharged to rehab but then came home.  Per his daughter-in-law he has been dealing with issues with severe constipation and had to have an enema.  He also has had some trouble with decreased urine output over the last several days along with mild temperatures as high as 100.7.  Blood sugars have also been labile in range up to 500.  He came to the ER because of this and had an episode of nausea vomiting and had a wet cough.  Still little urine output but negative PVR      Patient denies any pain or urinary symptoms.  He is on room air      Physical Exam:   Vital Signs   Temp:  [97.3 °F (36.3 °C)-100 °F (37.8 °C)] 97.5 °F (36.4 °C)  Heart Rate:  [61-93] 88  Resp:  [18-20] 18  BP: ()/(46-92) 115/53    GENERAL: Awake and alert, chronically ill-appearing  HEENT: Oropharynx is clear. Hearing is grossly normal.   EYES: . No conjunctival injection. No lid lag.   LUNGS:normal respiratory effort.   SKIN: no cutaneous eruptions in exposed areas  PSYCHIATRIC: Drowsy, responds to simple commands    Results Review:  Creatinine 1.01 glucose 266 through 342 anion gap normal 10.  White count 21  Procalcitonin 0.23    CXR, independently interpreted: possible RML infiltrate    A/p  Sepsis  Encephalopathy  DM2 with  hyperglycemia  Hemorrhagic stroke      78-year-old with complicated past medical history of polycythemia vera, ESBL bacteremia in 2020, recent intracranial hemorrhage with dysphagia, urinary incontinence admitted with hyperglycemia, elevated white blood cell count with concerns for sepsis.  Chest x-ray with possible right lower lobe infiltrate but not terribly impressive but procalcitonin was negative.  We will repeat his procalcitonin today.  Could have UTI although not endorsing symptoms at this time (is my impression that I do not think given accurate assessment at this time however)    Noninfectious etiologies include DVT, severe constipation obstruction reaction from hyperglycemia.  No evidence of DKA.  I agree with ertapenem and follow-up blood cultures.    We can consider a CT imaging or Doppler should he not improve.         Thank you for this consult.  We will continue to follow along and tailor antibiotics as the patient's clinical course evolves.

## 2024-04-25 NOTE — THERAPY EVALUATION
Acute Care - Speech Language Pathology   Swallow Initial Evaluation [facility]     Patient Name: Erlin Blanco  : 1945  MRN: [id number]  Today's Date: 2024               Admit Date: 2024    Visit Dx:     ICD-10-CM ICD-9-CM   1. Acute UTI-history of ESBL E. coli  N39.0 599.0   2. Hyperglycemia  R73.9 790.29   3. History of stroke  Z86.73 V12.54   4. Aphasia  R47.01 784.3   5. Right hemiparesis  G81.91 342.90   6. DNR (do not resuscitate)  Z66 V49.86     Patient Active Problem List   Diagnosis    Intraparenchymal hemorrhage of brain    Seizures    Type 2 diabetes mellitus with hyperglycemia, with long-term current use of insulin    HTN (hypertension)    Right hemiparesis    Oropharyngeal dysphagia    Paroxysmal atrial fibrillation    Expressive aphasia    Osteoarthritis, knee    Type 2 diabetes mellitus with both eyes affected by mild nonproliferative retinopathy without macular edema, without long-term current use of insulin    Primary hypertension    Coronary artery disease    Supraventricular tachycardia    TIA (transient ischemic attack)    Hyperlipidemia    Benign prostatic hyperplasia without urinary obstruction    Class 2 severe obesity due to excess calories with serious comorbidity and body mass index (BMI) of 36.0 to 36.9 in adult    Polycythemia vera    Acute non-recurrent sinusitis    Pain in both knees    Allergic rhinitis    Left bundle-branch block    Fatigue    Atrial flutter    History of CVA (cerebrovascular accident)    Cervical radiculitis    Chronic diastolic congestive heart failure    CKD (chronic kidney disease) stage 2, GFR 60-89 ml/min    Glucosuria    Paroxysmal atrial fibrillation    Memory difficulties    Bradycardia    Anemia    Low back pain    Back pain    Cardiovascular stress test abnormal    Prostatitis    Localized edema    Murmur    Pulmonary hypertension    Thrombocytosis    Arthritis    Proteinuria    Fall at home    Cognitive communication deficit    Mild  cognitive impairment with memory loss    Hematuria    Hypertension, uncontrolled    Acute UTI    History of ESBL E. coli infection     Past Medical History:   Diagnosis Date    Abnormal ECG     Abnormal stress test     Abrasion of face 01/29/2020    Acute bronchitis     Acute hypokalemia 09/16/2020    Acute non-recurrent sinusitis 01/13/2020    Acute pyelonephritis 10/09/2020    Acute sinusitis     DORI (acute kidney injury) 04/28/2017    Allergic rhinitis     Aortic root dilation     Arthritis     Bacteremia due to Escherichia coli 08/17/2020    Balanitis 05/16/2021    Benign enlargement of prostate     C. difficile colitis 09/16/2020    Cellulitis of knee, left 05/04/2017    CHF (congestive heart failure)     Chronic diastolic congestive heart failure     Constipation 11/14/2020    Contusion of face 01/29/2020    Coronary artery disease     COVID-19 virus infection     CVA (cerebral vascular accident) 2020    Diminished pulse     in lower extremities    Discitis of lumbar region 10/09/2020    Edema     Elevated hematocrit     Elevated hemoglobin     Essential hypertension     Hearing loss     mala hearing aids    Heart valve disease     High risk medication use     History of back pain     History of dysuria     History of echocardiogram     History of ESBL E. coli infection 04/24/2024    History of intracranial hemorrhage     History of scoliosis     History of stroke     Hyperlipidemia     Hypokalemia 01/07/2021    Injury of toe, left, initial encounter     Irregular heart rate     Knee pain, left     Left bundle branch block     Left bundle-branch block 07/17/2015    Leg wound, left     Leukocytosis 09/16/2020    Low back pain     Maxillary sinusitis 07/16/2021    Medicare annual wellness visit, subsequent     Minor head injury without loss of consciousness 01/29/2020    Murmur     Muscle cramps     Need for hepatitis C screening test     Paraphimosis 05/16/2021    Paroxysmal atrial fibrillation 03/05/2024     Pneumonia of left lung due to infectious organism 09/17/2021    Polycythemia     Polycythemia vera     Pressure injury of buttock, stage 2 09/18/2020    Prostate hyperplasia without urinary obstruction     Prostatitis     Proteinuria     Pulmonary hypertension     Sacroiliitis 10/09/2020    Scoliosis     Sepsis due to Escherichia coli 08/17/2020    Sepsis with metabolic encephalopathy 08/17/2020    Stroke     SVT (supraventricular tachycardia)     Tachycardia     Thrombocytosis     TIA (transient ischemic attack)     2006    Type 2 diabetes mellitus     Type 2 diabetes mellitus with hyperglycemia 05/16/2021    Urinary tract infection due to extended-spectrum beta lactamase (ESBL) producing Escherichia coli 08/18/2020    Valvular heart disease      Past Surgical History:   Procedure Laterality Date    CARDIAC CATHETERIZATION      CATARACT EXTRACTION Left 04/2021    CATARACT EXTRACTION Right 07/26/2022    COLONOSCOPY      did Cologuard approx 6/2019 and negative    HAND SURGERY      1970s    JOINT REPLACEMENT Right 2014    OR ARTHRP KNE CONDYLE&PLATU MEDIAL&LAT COMPARTMENTS Left 04/27/2017    Procedure: LT TOTAL KNEE ARTHROPLASTY;  Surgeon: Bertin Perrin MD;  Location: [facility];  Service: Orthopedics    PROSTATE SURGERY      Rezum steam treatment to shrink prostate by dr. guerrero       SLP Recommendation and Plan  SLP Swallowing Diagnosis: suspected pharyngeal dysphagia (04/25/24 0900)  SLP Diet Recommendation: NPO (04/25/24 0900)     SLP Rec. for Method of Medication Administration: meds crushed, with puree (04/25/24 0900)     Monitor for Signs of Aspiration: yes, notify SLP if any concerns (04/25/24 0900)  Recommended Diagnostics: reassess via clinical swallow evaluation, reassess via VFSS (MBS) (04/25/24 0900)  Swallow Criteria for Skilled Therapeutic Interventions Met: demonstrates skilled criteria (04/25/24 0900)  Anticipated Discharge Disposition (SLP): unknown (04/25/24 0900)  Rehab  Potential/Prognosis, Swallowing: adequate, monitor progress closely (04/25/24 0900)  Therapy Frequency (Swallow): PRN (04/25/24 0900)  Predicted Duration Therapy Intervention (Days): until discharge (04/25/24 0900)  Oral Care Recommendations: Oral Care BID/PRN (04/25/24 0900)                                        Plan of Care Reviewed With: patient, family  Outcome Evaluation: Clinical swallow eval completed. Recommend NPO, meds crushed with puree. Recommend upright for meds. Frequent oral care. SLP to follow for re-eval.      SWALLOW EVALUATION (Last 72 Hours)       SLP Adult Swallow Evaluation       Row Name 04/25/24 0900                   Rehab Evaluation    Document Type evaluation  -OC        Subjective Information no complaints  -OC        Patient Observations cooperative;agree to therapy  -OC        Patient Effort good  -OC        Symptoms Noted During/After Treatment none  -OC           General Information    Patient Profile Reviewed yes  -OC        Pertinent History Of Current Problem Patient admitted with acute UTI.  Hx seizures,  HTN, R natalie, dyaphgia, expressive aphasia, hx cva.  -OC        Current Method of Nutrition NPO;other (see comments)  choking/coughing/vomiting episode with puree/honey in Ed previous date.  -OC        Precautions/Limitations, Vision difficult to assess  -OC        Precautions/Limitations, Hearing difficult to assess  -OC        Prior Level of Function-Communication unknown  -OC        Prior Level of Function-Swallowing puree;honey thick liquids;other (see comments)  honey via spoon, VFSS 03/2024  -OC        Plans/Goals Discussed with patient and family;agreed upon  -OC        Barriers to Rehab medically complex  -OC        Patient's Goals for Discharge patient did not state  -OC        Family Goals for Discharge patient able to return to PO diet  -OC           Pain    Additional Documentation --  no pain reported  -OC           Oral Motor Structure and Function    Dentition  Assessment natural, present and adequate  -OC        Secretion Management WNL/WFL  -OC        Mucosal Quality moist, healthy  -OC        Volitional Swallow unable to elicit  -OC        Volitional Cough weak  -OC           Oral Musculature and Cranial Nerve Assessment    Oral Motor General Assessment generalized oral motor weakness  -OC           Clinical Swallow Eval    Clinical Swallow Evaluation Summary Patient with poor alertness. Patient demonstrated 3-4 swallows per teaspoon bolus honey thick liquids. Decreased elevation/weakness upon palpation, ? incomplete swallow. Patient demonstrated no overt s/s aspiration with puree. Delayed throat clear X1 s/p honey thick. Laryngeal elevation appears reduced across trials. Poor alertness and endurance across trials.  -OC           SLP Evaluation Clinical Impression    SLP Swallowing Diagnosis suspected pharyngeal dysphagia  -OC        Functional Impact risk of aspiration/pneumonia  -OC        Rehab Potential/Prognosis, Swallowing adequate, monitor progress closely  -OC        Swallow Criteria for Skilled Therapeutic Interventions Met demonstrates skilled criteria  -OC           Recommendations    Therapy Frequency (Swallow) PRN  -OC        Predicted Duration Therapy Intervention (Days) until discharge  -OC        SLP Diet Recommendation NPO  -OC        Recommended Diagnostics reassess via clinical swallow evaluation;reassess via VFSS (MBS)  -OC        Oral Care Recommendations Oral Care BID/PRN  -OC        SLP Rec. for Method of Medication Administration meds crushed;with puree  -OC        Monitor for Signs of Aspiration yes;notify SLP if any concerns  -OC        Anticipated Discharge Disposition (SLP) unknown  -OC           Swallow Goals (SLP)    Swallow LTGs Swallow Long Term Goal (free text)  -OC           (LTG) Swallow    (LTG) Swallow Tolerate least restrictive diet with no overt s/s aspiration.  -OC        [place] (Swallow Long Term Goal) with 1:1 assist/  supervision  -OC        Time Frame (Swallow Long Term Goal) by discharge  -OC                  User Key  (r) = Recorded By, (t) = Taken By, (c) = Cosigned By      Initials Name Effective Dates    Kristine Coates SLP 08/28/23 -                     EDUCATION  The patient has been educated in the following areas:   Dysphagia (Swallowing Impairment).        SLP GOALS       Row Name 04/25/24 0900             (LTG) Swallow    (LTG) Swallow Tolerate least restrictive diet with no overt s/s aspiration.  -OC      [place] (Swallow Long Term Goal) with 1:1 assist/ supervision  -OC      Time Frame (Swallow Long Term Goal) by discharge  -OC                User Key  (r) = Recorded By, (t) = Taken By, (c) = Cosigned By      Initials Name Provider Type    Kristine Coates SLP Speech and Language Pathologist                       Time Calculation:    Time Calculation- SLP       Row Name 04/25/24 1354             Time Calculation- SLP    SLP Start Time 1354  -OC      SLP Received On 04/25/24  -OC         Untimed Charges    SLP Eval/Re-eval  ST Eval Oral Pharyng Swallow - 33426  -OC      [id number] Eval Oral Pharyng Swallow Minutes 75  -OC         Total Minutes    Untimed Charges Total Minutes 75  -OC       Total Minutes 75  -OC                User Key  (r) = Recorded By, (t) = Taken By, (c) = Cosigned By      Initials Name Provider Type    Kristine Coates SLP Speech and Language Pathologist                    Therapy Charges for Today       Code Description Service Date Service Provider Modifiers Qty    [id number] HC ST EVAL ORAL PHARYNG SWALLOW 5 4/25/2024 Kristine Jaimes SLP GN 1                 PATRICK Howell  4/25/2024

## 2024-04-25 NOTE — OUTREACH NOTE
Medical Week 2 Survey      Flowsheet Row Responses   [facility] patient discharged from? [place]   Does the patient have one of the following disease processes/diagnoses(primary or secondary)? Other   Week 2 attempt successful? No   Unsuccessful attempts Attempt 1   Revoke Readmitted            TRACY RAMÍREZ - Registered Nurse

## 2024-04-26 NOTE — CONSULTS
MET WITH PT'S SON AND WIFE. EXPLANATION ON [facility] AT HOME AND SCATTERED BED. FAMILY IS LEANING TOWARD PALLIATIVE CARE. THEY ASKED THAT WE CHECK BACK IN SUNDAY/MONDAY TO SEE IF PT MOVED TO [facility]. PT IS MEDICALLY ELIGIBLE FOR ROUTINE [facility] AT THIS TIME. [facility] WILL REMOTELY F/U SUNDAY.             THANK YOU  HIEN HUERTAS RN, [facility]  [phone]

## 2024-04-26 NOTE — CONSULTS
"Purpose of the visit was to evaluate for: goals of care/advanced care planning, support for patient/family, hospice referral/discussion, pain/symptom management, withdrawal of interventions, transfer to comfort care bed/unit, and comfort care. Spoke with MD and RN as well as family and discussed palliative care, goals of care, care options, resuscitation status, [facility], [facility] scattered bed status, and discharge options.      Assessment:  Patient is a 77 YO male with a history of CVA with residual deficit, PAF, seizures, DM II, HTN, Hx UTI with ESBL E Coli. He was admitted to the hospital with an acute UTI with sepsis.   Upon assessment the patient is resting in bed with eyes closed, nonverbal and minimally responsive. His skin is pallid, cheeks are les. Breathing somewhat shallow and coarse.   PPS: 20%    Recommendations/Plan: [facility] evaluation for community based services. Goal is to continue medical management and reevaluate in a few days for home with [facility] v inpatient palliative care.    Other Comments: I met with this patient's spouse Yany \"Prashanth\" and CATHERINE Allen at bedside to offer support and to discuss their goals of care for the patient. The patient was unable to participate due to AMS and aphasia, but they are both good historians and knowledgeable about his acute and chronic conditions. They both shared that he has suffered significant deficits since the CVA that occurred this past February, noting that he is now bedbound, aphasic and apparently confused with hallucinations. His spouse shared that \"he would not want to live like this\".   Their stated goal is to continue to pursue medical management of acute and chronic illness at this time. They would like to speak with [facility] for an explanation of services and reevaluate for support at home v inpatient palliative care as HSB.   I advised them of our availability and all questions answered. Palliative Care Team will follow up on Monday to " provide continued support with decision making.     Niru Huff, Palliative Care Referral RN

## 2024-04-26 NOTE — PLAN OF CARE
Goal Outcome Evaluation:              Outcome Evaluation: Re-evaluation completed. Wife and daughter-in-law at bedside. Vomiting overnight reported now with wet cough, per daughter-in-law. Question GI component. Suction canister at bedside appearing applesauce based. Lethargic requiring cues to remain awake during eval. Thorough oral care completed with suction toothbrush and oral care kit. Initially pt exhibited difficulties initiating swallow; improved as eval continued. Suspect reduced laryngeal elevation per palpation. Tolerated x3/4 ice chips with no overt s/s of aspiration. x1 delayed cough. Meds crushed in puree administered with RN assistance. Pt tolerated x2/4 trials of puree with no overt s/s of aspiration. x2 instances of wet vocal quality. Pt is not appropriate for VFSS at this time. Recommend NPO with ice chips sparingly only after oral care. Oral care should be completed 2-3 times daily with suction. Also recommending temporary alternate means of nutrition/hydration/medication versus GOC conversation.

## 2024-04-26 NOTE — PROGRESS NOTES
"Nutrition Services    Patient Name:  Erlin Blanco  YOB: 1945  MRN: [id number]  Admit Date:  4/24/2024Assessment Date:  04/26/24    Screen for MST 2/age/P.I. and currently NPO x2 days. 78 yoM admitted 4/24 with aphasia, AMS, and acute UTI. PMH: DM, CVR, stroke. Currently NPO d/t dysphagia- failed beside SLP evaluation and not appropriate for VFSS d/t mental status. Family currently deciding on goals of care- palliative vs [facility]. RD available if needed when appropriate.     NUTRITION SCREENING      Reason for Encounter MST score 2+, NPO/Clear liquid, Nonhealing wound or pressure ulcer   Diagnosis/Problem Aphasia, DM, CVR, AMS, hx stroke, acute UTI, dysphagia       PO Diet NPO Diet NPO Type: Strict NPO   Supplements    PO Intake % NPO        Medications MAR reviewed by RD   Labs  Listed below, reviewed   Physical Findings Slurred speech, aphasia, 1+ edema   GI Function Last BM 4/23   Skin Status Coccyx pressure injury        Height  Weight  BMI  Weight Trend     Height: 182.9 cm (72\")  Weight: 99.9 kg (220 lb 3.8 oz) (04/26/24 0329)  Body mass index is 29.87 kg/m².  Stable, Loss       Nutrition Problem (PES) Inadequate oral intake related to dysphagia as evidenced by NPO status.       Intervention/Plan Awaiting family decision for palliative care vs [facility]     RD to follow up per protocol.     Results from last 7 days   Lab Units 04/26/24 0258 04/25/24  0316 04/24/24  1204   SODIUM mmol/L 145 142 139   POTASSIUM mmol/L 4.4 4.0 3.8   CHLORIDE mmol/L 112* 109* 105   CO2 mmol/L 21.0* 23.0 23.8   BUN mg/dL 41* 23 19   CREATININE mg/dL 2.17* 1.01 0.81   CALCIUM mg/dL 9.1 9.2 9.7   BILIRUBIN mg/dL  --   --  0.6   ALK PHOS U/L  --   --  98   ALT (SGPT) U/L  --   --  21   AST (SGOT) U/L  --   --  22   GLUCOSE mg/dL 188* 305* 405*     Results from last 7 days   Lab Units 04/26/24 0257 04/25/24  0316 04/24/24  1204   MAGNESIUM mg/dL  --   --  1.8   HEMOGLOBIN g/dL 13.4   < > 14.7   HEMATOCRIT % 40.8   < " > 46.1    < > = values in this interval not displayed.     Lab Results   Component Value Date    HGBA1C 8.40 (H) 04/11/2024         Electronically signed by:  Maria Saenz RD  04/26/24 14:34 EDT

## 2024-04-26 NOTE — PLAN OF CARE
Goal Outcome Evaluation:              Outcome Evaluation: VSS, NPO, meds given in applesauce. Patient had an episode were he threw up in his sleep. Family at bedside and was very helpful. NS @125ml/hr infusing. Maintain safety and continue to monitor.

## 2024-04-26 NOTE — PLAN OF CARE
Goal Outcome Evaluation:  Plan of Care Reviewed With: patient, family           Outcome Evaluation: Pt seen by paliative and hospice today, family wants to see how pt does this weekend before decision is made. IVF's continue, pt is now STRICT NPO, safety maintained, will CTM cl

## 2024-04-26 NOTE — TELEPHONE ENCOUNTER
The patient's daughter called. She verified that the patient HAD NOT changed PCP. He is still [facility]'s patient. His daughter said that name may have come from the rehab facility he was in. But that it should not have been changed. [facility] has been put back as his PCP.

## 2024-04-26 NOTE — CASE MANAGEMENT/SOCIAL WORK
Continued Stay Note  [facility]     Patient Name: Erlin Blanco  MRN: [id number]  Today's Date: 4/26/2024    Admit Date: 4/24/2024    Plan: Palliative care vs home with home health   Discharge Plan       Row Name 04/26/24 1240       Plan    Plan Palliative care vs home with home health    Plan Comments Family to decide re Palliative care vs home health vs [facility]. [facility] consult called to Rad[facility] at [facility].                   Discharge Codes    No documentation.                 Expected Discharge Date and Time       Expected Discharge Date Expected Discharge Time    Apr 29, 2024               Domonique Rm RN

## 2024-04-26 NOTE — CASE MANAGEMENT/SOCIAL WORK
Case Management Readmission Assessment Note    Case Management Readmission Assessment (all recorded)       Readmission Interview       Row Name 04/26/24 1340             Readmission Indications    Is this hospitalization related to the prior hospital diagnosis? Yes      What was the reason you were admitted? Acute UTI        Row Name 04/26/24 1340             Recommendation for rehospitalization    Did you speak with your physician prior to coming to the hospital No      Who recommended you return to the hospital? Other (comment)  Brought in thru the ER      Did you seek care elsewhere prior to coming to the hospital? No        [facility] Name 04/26/24 1340             Follow up appointment    Do you have a PCP? Yes      Did you have an appointment with PCP/specialist after hospitalization within 7 days? No  Appointment 4-      Did you keep your appointment? --  Hospitalized before appointment        [facility] Name 04/26/24 1340             Medications    Did you have newly prescribed medications at discharge? No      Did you understand the reasons for your medications at discharge and how to take them? No  Family understands, pt does not      Did you understand the side effects of your medications? Yes  Family understands , pt does not      Are you taking all of you prescribed medications? Yes  Family administers all medications to pt        [facility] Name 04/26/24 1340             Discharge Instructions    Did you understand your discharge instructions? Yes      Did your family/caregiver hear your instructions? Yes      Were you told to eat a special diet? Yes  Pt is on a special diet due to stroke      Did you adhere to the diet? Yes  Family feeds pt and prepares diet      Were you given a number of someone to call if you had questions or concerns? Yes        [facility] Name 04/26/24 1340             Index discharge location/services    Where did you go upon discharge? Home with services      Do you have supportive family or  friends in the home? Yes      What services were arranged at discharge? Home Health        Row Name 04/26/24 6744             Discharge Readiness    On a scale of 1-5 (5 being well prepared), how ready were you for discharge 3      Recommendation based on interview Other (comment);Additional caregiver support  Family considering Palliative care , [facility] consults have been placed

## 2024-04-26 NOTE — THERAPY RE-EVALUATION
Acute Care - Speech Language Pathology   Swallow Re-Evaluation [facility]     Patient Name: Erlin Blanco  : 1945  MRN: [id number]  Today's Date: 2024               Admit Date: 2024    Visit Dx:     ICD-10-CM ICD-9-CM   1. Acute UTI-history of ESBL E. coli  N39.0 599.0   2. Hyperglycemia  R73.9 790.29   3. History of stroke  Z86.73 V12.54   4. Aphasia  R47.01 784.3   5. Right hemiparesis  G81.91 342.90   6. DNR (do not resuscitate)  Z66 V49.86     Patient Active Problem List   Diagnosis    Intraparenchymal hemorrhage of brain    Seizures    Type 2 diabetes mellitus with hyperglycemia, with long-term current use of insulin    HTN (hypertension)    Right hemiparesis    Oropharyngeal dysphagia    Paroxysmal atrial fibrillation    Expressive aphasia    Osteoarthritis, knee    Type 2 diabetes mellitus with both eyes affected by mild nonproliferative retinopathy without macular edema, without long-term current use of insulin    Primary hypertension    Coronary artery disease    Supraventricular tachycardia    TIA (transient ischemic attack)    Hyperlipidemia    Benign prostatic hyperplasia without urinary obstruction    Class 2 severe obesity due to excess calories with serious comorbidity and body mass index (BMI) of 36.0 to 36.9 in adult    Polycythemia vera    Acute non-recurrent sinusitis    Pain in both knees    Allergic rhinitis    Left bundle-branch block    Fatigue    Atrial flutter    History of CVA (cerebrovascular accident)    Cervical radiculitis    Chronic diastolic congestive heart failure    CKD (chronic kidney disease) stage 2, GFR 60-89 ml/min    Glucosuria    Paroxysmal atrial fibrillation    Memory difficulties    Bradycardia    Anemia    Low back pain    Back pain    Cardiovascular stress test abnormal    Prostatitis    Localized edema    Murmur    Pulmonary hypertension    Thrombocytosis    Arthritis    Proteinuria    Fall at home    Cognitive communication deficit    Mild  cognitive impairment with memory loss    Hematuria    Hypertension, uncontrolled    Acute UTI    History of ESBL E. coli infection     Past Medical History:   Diagnosis Date    Abnormal ECG     Abnormal stress test     Abrasion of face 01/29/2020    Acute bronchitis     Acute hypokalemia 09/16/2020    Acute non-recurrent sinusitis 01/13/2020    Acute pyelonephritis 10/09/2020    Acute sinusitis     DORI (acute kidney injury) 04/28/2017    Allergic rhinitis     Aortic root dilation     Arthritis     Bacteremia due to Escherichia coli 08/17/2020    Balanitis 05/16/2021    Benign enlargement of prostate     C. difficile colitis 09/16/2020    Cellulitis of knee, left 05/04/2017    CHF (congestive heart failure)     Chronic diastolic congestive heart failure     Constipation 11/14/2020    Contusion of face 01/29/2020    Coronary artery disease     COVID-19 virus infection     CVA (cerebral vascular accident) 2020    Diminished pulse     in lower extremities    Discitis of lumbar region 10/09/2020    Edema     Elevated hematocrit     Elevated hemoglobin     Essential hypertension     Hearing loss     mala hearing aids    Heart valve disease     High risk medication use     History of back pain     History of dysuria     History of echocardiogram     History of ESBL E. coli infection 04/24/2024    History of intracranial hemorrhage     History of scoliosis     History of stroke     Hyperlipidemia     Hypokalemia 01/07/2021    Injury of toe, left, initial encounter     Irregular heart rate     Knee pain, left     Left bundle branch block     Left bundle-branch block 07/17/2015    Leg wound, left     Leukocytosis 09/16/2020    Low back pain     Maxillary sinusitis 07/16/2021    Medicare annual wellness visit, subsequent     Minor head injury without loss of consciousness 01/29/2020    Murmur     Muscle cramps     Need for hepatitis C screening test     Paraphimosis 05/16/2021    Paroxysmal atrial fibrillation 03/05/2024     Pneumonia of left lung due to infectious organism 09/17/2021    Polycythemia     Polycythemia vera     Pressure injury of buttock, stage 2 09/18/2020    Prostate hyperplasia without urinary obstruction     Prostatitis     Proteinuria     Pulmonary hypertension     Sacroiliitis 10/09/2020    Scoliosis     Sepsis due to Escherichia coli 08/17/2020    Sepsis with metabolic encephalopathy 08/17/2020    Stroke     SVT (supraventricular tachycardia)     Tachycardia     Thrombocytosis     TIA (transient ischemic attack)     2006    Type 2 diabetes mellitus     Type 2 diabetes mellitus with hyperglycemia 05/16/2021    Urinary tract infection due to extended-spectrum beta lactamase (ESBL) producing Escherichia coli 08/18/2020    Valvular heart disease      Past Surgical History:   Procedure Laterality Date    CARDIAC CATHETERIZATION      CATARACT EXTRACTION Left 04/2021    CATARACT EXTRACTION Right 07/26/2022    COLONOSCOPY      did Cologuard approx 6/2019 and negative    HAND SURGERY      1970s    JOINT REPLACEMENT Right 2014    KS ARTHRP KNE CONDYLE&PLATU MEDIAL&LAT COMPARTMENTS Left 04/27/2017    Procedure: LT TOTAL KNEE ARTHROPLASTY;  Surgeon: Bertin Perrin MD;  Location: [facility];  Service: Orthopedics    PROSTATE SURGERY      Rezum steam treatment to shrink prostate by dr. guerrero       SLP Recommendation and Plan  SLP Swallowing Diagnosis: suspected pharyngeal dysphagia (04/26/24 0900)  SLP Diet Recommendation: NPO (04/26/24 0900)     SLP Rec. for Method of Medication Administration: meds via alternate route (04/26/24 0900)     Monitor for Signs of Aspiration: yes, notify SLP if any concerns (04/26/24 0900)  Recommended Diagnostics: reassess via clinical swallow evaluation (04/26/24 0900)  Swallow Criteria for Skilled Therapeutic Interventions Met: demonstrates skilled criteria (04/26/24 0900)     Rehab Potential/Prognosis, Swallowing: adequate, monitor progress closely (04/26/24 0900)  Therapy Frequency  (Swallow): PRN (04/26/24 0900)  Predicted Duration Therapy Intervention (Days): until discharge (04/26/24 0900)  Oral Care Recommendations: Oral Care BID/PRN, Suction toothbrush, Before ice/water (04/26/24 0900)                                        Outcome Evaluation: Re-evaluation completed. Wife and daughter-in-law at bedside. Vomiting overnight reported now with wet cough, per daughter-in-law. Suction canister at bedside appearing applesauce based. Lethargic requiring cues to remain awake during eval. Thorough oral care completed with suction toothbrush and oral care kit. Initially pt exhibited difficulties initiating swallow; improved as eval continued. Suspect reduced laryngeal elevation per palpation. Tolerated x3/4 ice chips with no overt s/s of aspiration. x1 delayed cough. Meds crushed in puree administered with RN assistance. Pt tolerated x2/4 trials of puree with no overt s/s of aspiration. x2 instances of wet vocal quality. Pt is not appropriate for VFSS at this time. Recommend NPO with ice chips sparingly only after oral care. Oral care should be completed 2-3 times daily with suction. Also recommending temporary alternate means of nutrition/hydration/medication.      SWALLOW EVALUATION (Last 72 Hours)       SLP Adult Swallow Evaluation       Row Name 04/26/24 0900 04/25/24 0900                Rehab Evaluation    Document Type re-evaluation  -CR evaluation  -OC       Subjective Information no complaints  -CR no complaints  -OC       Patient Observations lethargic;cooperative  -CR cooperative;agree to therapy  -OC       Patient/Family/Caregiver Comments/Observations Vomiting overnight with suction used; suction canister at bedside appears applesauce-based.  -CR --       Patient Effort adequate  -CR good  -OC       Symptoms Noted During/After Treatment none  -CR none  -OC          General Information    Patient Profile Reviewed yes  -CR yes  -OC       Pertinent History Of Current Problem -- Patient  admitted with acute UTI.  Hx seizures,  HTN, R natalie, dyaphgia, expressive aphasia, hx cva.  -OC       Current Method of Nutrition -- NPO;other (see comments)  choking/coughing/vomiting episode with puree/honey in Ed previous date.  -OC       Precautions/Limitations, Vision -- difficult to assess  -OC       Precautions/Limitations, Hearing -- difficult to assess  -OC       Prior Level of Function-Communication -- unknown  -OC       Prior Level of Function-Swallowing -- puree;honey thick liquids;other (see comments)  honey via spoon, VFSS 03/2024  -OC       Plans/Goals Discussed with -- patient and family;agreed upon  -OC       Barriers to Rehab -- medically complex  -OC       Patient's Goals for Discharge -- patient did not state  -OC       Family Goals for Discharge -- patient able to return to PO diet  -OC          Pain    Additional Documentation -- --  no pain reported  -OC          Oral Motor Structure and Function    Dentition Assessment missing teeth  -CR natural, present and adequate  -OC       Secretion Management WNL/WFL  -CR WNL/WFL  -OC       Mucosal Quality moist, healthy  -CR moist, healthy  -OC       Volitional Swallow -- unable to elicit  -OC       Volitional Cough -- weak  -OC          Oral Musculature and Cranial Nerve Assessment    Oral Motor General Assessment -- generalized oral motor weakness  -OC          Clinical Swallow Eval    Clinical Swallow Evaluation Summary -- Patient with poor alertness. Patient demonstrated 3-4 swallows per teaspoon bolus honey thick liquids. Decreased elevation/weakness upon palpation, ? incomplete swallow. Patient demonstrated no overt s/s aspiration with puree. Delayed throat clear X1 s/p honey thick. Laryngeal elevation appears reduced across trials. Poor alertness and endurance across trials.  -OC          SLP Evaluation Clinical Impression    SLP Swallowing Diagnosis suspected pharyngeal dysphagia  -CR suspected pharyngeal dysphagia  -OC       Functional Impact  risk of aspiration/pneumonia  -CR risk of aspiration/pneumonia  -OC       Rehab Potential/Prognosis, Swallowing adequate, monitor progress closely  -CR adequate, monitor progress closely  -OC       Swallow Criteria for Skilled Therapeutic Interventions Met demonstrates skilled criteria  -CR demonstrates skilled criteria  -OC          Recommendations    Therapy Frequency (Swallow) PRN  -CR PRN  -OC       Predicted Duration Therapy Intervention (Days) until discharge  -CR until discharge  -OC       SLP Diet Recommendation NPO  -CR NPO  -OC       Recommended Diagnostics reassess via clinical swallow evaluation  -CR reassess via clinical swallow evaluation;reassess via VFSS (MBS)  -OC       Oral Care Recommendations Oral Care BID/PRN;Suction toothbrush;Before ice/water  -CR Oral Care BID/PRN  -OC       SLP Rec. for Method of Medication Administration meds via alternate route  -CR meds crushed;with puree  -OC       Monitor for Signs of Aspiration yes;notify SLP if any concerns  -CR yes;notify SLP if any concerns  -OC       Anticipated Discharge Disposition (SLP) -- unknown  -OC          Swallow Goals (SLP)    Swallow LTGs -- Swallow Long Term Goal (free text)  -OC          (LTG) Swallow    (LTG) Swallow Tolerate least restrictive diet with no overt s/s aspiration.  -CR Tolerate least restrictive diet with no overt s/s aspiration.  -OC       [place] (Swallow Long Term Goal) with 1:1 assist/ supervision  -CR with 1:1 assist/ supervision  -OC       Time Frame (Swallow Long Term Goal) by discharge  -CR by discharge  -OC       Comment (Swallow Long Term Goal) Re-evaluation completed. Wife and daughter-in-law at bedside. Vomiting overnight reported now with wet cough, per daughter-in-law. Question GI component. Suction canister at bedside appearing applesauce based. Lethargic requiring cues to remain awake during eval. Thorough oral care completed with suction toothbrush and oral care kit. Initially pt exhibited  difficulties initiating swallow; improved as eval continued. Suspect reduced laryngeal elevation per palpation. Tolerated x3/4 ice chips with no overt s/s of aspiration. x1 delayed cough. Meds crushed in puree administered with RN assistance. Pt tolerated x2/4 trials of puree with no overt s/s of aspiration. x2 instances of wet vocal quality. Pt is not appropriate for VFSS at this time. Recommend NPO with ice chips sparingly only after oral care. Oral care should be completed 2-3 times daily with suction. Also recommending temporary alternate means of nutrition/hydration/medication versus GOC conversation.  -CR --                 User Key  (r) = Recorded By, (t) = Taken By, (c) = Cosigned By      Initials Name Effective Dates    OC Kristine Jaimes, PATRICK 08/28/23 -     CR Alesha Shaikh, SLP 08/28/23 -                     EDUCATION  The patient has been educated in the following areas:   Dysphagia (Swallowing Impairment).        SLP GOALS       Row Name 04/26/24 0900 04/25/24 0900          (LTG) Swallow    (LTG) Swallow Tolerate least restrictive diet with no overt s/s aspiration.  -CR Tolerate least restrictive diet with no overt s/s aspiration.  -OC     [place] (Swallow Long Term Goal) with 1:1 assist/ supervision  -CR with 1:1 assist/ supervision  -OC     Time Frame (Swallow Long Term Goal) by discharge  -CR by discharge  -OC     Comment (Swallow Long Term Goal) Re-evaluation completed. Wife and daughter-in-law at bedside. Vomiting overnight reported now with wet cough, per daughter-in-law. Question GI component. Suction canister at bedside appearing applesauce based. Lethargic requiring cues to remain awake during eval. Thorough oral care completed with suction toothbrush and oral care kit. Initially pt exhibited difficulties initiating swallow; improved as eval continued. Suspect reduced laryngeal elevation per palpation. Tolerated x3/4 ice chips with no overt s/s of aspiration. x1 delayed cough. Meds  crushed in puree administered with RN assistance. Pt tolerated x2/4 trials of puree with no overt s/s of aspiration. x2 instances of wet vocal quality. Pt is not appropriate for VFSS at this time. Recommend NPO with ice chips sparingly only after oral care. Oral care should be completed 2-3 times daily with suction. Also recommending temporary alternate means of nutrition/hydration/medication versus GOC conversation.  -CR --               User Key  (r) = Recorded By, (t) = Taken By, (c) = Cosigned By      Initials Name Provider Type    Kristine Coates SLP Speech and Language Pathologist    Alesha Hawley SLP Speech and Language Pathologist                       Time Calculation:    Time Calculation- SLP       Row Name 04/26/24 0949             Time Calculation- SLP    SLP Start Time 0750  -CR      SLP Received On 04/26/24  -CR         Untimed Charges    74360-BC Treatment Swallow Minutes 75  -CR         Total Minutes    Untimed Charges Total Minutes 75  -CR       Total Minutes 75  -CR                User Key  (r) = Recorded By, (t) = Taken By, (c) = Cosigned By      Initials Name Provider Type    Alesha Hawley SLP Speech and Language Pathologist                    Therapy Charges for Today       Code Description Service Date Service Provider Modifiers Qty    [id number] HC ST TREATMENT SWALLOW 5 4/26/2024 Alesha Shaikh SLP GN 1                 PATRICK Tomlin  4/26/2024

## 2024-04-26 NOTE — CONSULTS
ID note for sepsis  Subjective: Remains on room air.  Minimal urine output and creatinine increased to 2.2 today.  He is unable to provide any history but his daughter-in-law reports that he did have some nausea and a wet cough      Physical Exam:   Vital Signs   Temp:  [97.9 °F (36.6 °C)-100.2 °F (37.9 °C)] 98.1 °F (36.7 °C)  Heart Rate:  [64-87] 67  Resp:  [16-21] 16  BP: (116-148)/(54-96) 138/82    GENERAL: Awake and alert, chronically ill-appearing  HEENT: Oropharynx is clear. Hearing is grossly normal.   EYES: . No conjunctival injection. No lid lag.   LUNGS:normal respiratory effort.   SKIN: no cutaneous eruptions in exposed areas  PSYCHIATRIC: Drowsy, responds to simple commands    Results Review:  Creatinine 2.2, anion gap 12  Glucose 188 through 278  White count 17.1  Procalcitonin 0.98    4/24 blood cultures 2/2 no growth to date  4/24 respiratory pathogen panel negative  4/24 urine culture greater than 100,000 mixed gram-negative halina    A/p  Sepsis  Encephalopathy  DM2 with hyperglycemia  Hemorrhagic stroke  Acute kidney injury    78-year-old with complicated past medical history of polycythemia vera, ESBL bacteremia in 2020, recent intracranial hemorrhage with dysphagia, urinary incontinence admitted with hyperglycemia, elevated white blood cell count with concerns for sepsis.     White count slightly improved but creatinine worsened.  Going to check a CT chest to evaluate for aspiration pneumonia given abnormal chest x-ray on admission.  We will also go ahead and check a CT abdomen pelvis to look for any renal obstruction given his worsening renal function.    Continue ertapenem 1 g IV every 24 hours.  No renal dose adjustment needed currently.    Discussed with Dr. Hernandez regarding impression and plans    Thank you for this consult.  We will continue to follow along and tailor antibiotics as the patient's clinical course evolves.

## 2024-04-26 NOTE — PROGRESS NOTES
Name: Erlin Blanco ADMIT: 2024   : 1945  PCP: Senia Dorsey MD    MRN: [id number] LOS: 0 days   AGE/SEX: 78 y.o. male  ROOM: [facility]     Subjective   Subjective   Patient had some vomiting overnight. He remains NPO. Has confusion and aphasia at baseline.   His wife and daughter-in-law are at bedside as is Niru Huff RN.  Objective   Objective   Vital Signs  Temp:  [97.9 °F (36.6 °C)-100.2 °F (37.9 °C)] 98.1 °F (36.7 °C)  Heart Rate:  [64-87] 67  Resp:  [16-21] 16  BP: (116-148)/(54-96) 138/82  SpO2:  [95 %-97 %] 96 %  on   ;   Device (Oxygen Therapy): room air  Body mass index is 29.87 kg/m².  Physical Exam  Vitals and nursing note reviewed.   Constitutional:       General: He is not in acute distress.     Appearance: He is not toxic-appearing.   HENT:      Head: Normocephalic and atraumatic.      Nose: Nose normal.      Mouth/Throat:      Mouth: Mucous membranes are dry.      Pharynx: Oropharynx is clear.   Eyes:      Conjunctiva/sclera: Conjunctivae normal.      Pupils: Pupils are equal, round, and reactive to light.   Cardiovascular:      Rate and Rhythm: Normal rate and regular rhythm.      Pulses: Normal pulses.   Pulmonary:      Effort: Pulmonary effort is normal.      Breath sounds: Rhonchi (left upper field) present. No wheezing or rales.   Abdominal:      General: Bowel sounds are normal.      Palpations: Abdomen is soft.      Tenderness: There is no abdominal tenderness.   Musculoskeletal:         General: No swelling or tenderness.      Cervical back: Neck supple.   Skin:     General: Skin is warm and dry.      Capillary Refill: Capillary refill takes less than 2 seconds.   Neurological:      Mental Status: He is lethargic.      Comments: Aphasia and right hemiparesis known from previous CVA   Psychiatric:         Mood and Affect: Affect is flat.         Cognition and Memory: Cognition is impaired.       Results Review     I reviewed the patient's new clinical results.  Results  from last 7 days   Lab Units 04/26/24  0257 04/25/24  0316 04/24/24  1204   WBC 10*3/mm3 17.08* 21.00* 17.22*   HEMOGLOBIN g/dL 13.4 13.8 14.7   PLATELETS 10*3/mm3 370 392 394     Results from last 7 days   Lab Units 04/26/24  0258 04/25/24  0316 04/24/24  1204   SODIUM mmol/L 145 142 139   POTASSIUM mmol/L 4.4 4.0 3.8   CHLORIDE mmol/L 112* 109* 105   CO2 mmol/L 21.0* 23.0 23.8   BUN mg/dL 41* 23 19   CREATININE mg/dL 2.17* 1.01 0.81   GLUCOSE mg/dL 188* 305* 405*   EGFR mL/min/1.73 30.8* 77.1 91.4     Results from last 7 days   Lab Units 04/24/24  1204   ALBUMIN g/dL 3.4*   BILIRUBIN mg/dL 0.6   ALK PHOS U/L 98   AST (SGOT) U/L 22   ALT (SGPT) U/L 21     Results from last 7 days   Lab Units 04/26/24  0258 04/25/24  0316 04/24/24  1204   CALCIUM mg/dL 9.1 9.2 9.7   ALBUMIN g/dL  --   --  3.4*   MAGNESIUM mg/dL  --   --  1.8     Results from last 7 days   Lab Units 04/25/24  0316 04/24/24  1204   PROCALCITONIN ng/mL 0.98* 0.23   LACTATE mmol/L  --  1.4     Glucose   Date/Time Value Ref Range Status   04/26/2024 1127 144 (H) 70 - 130 mg/dL Final   04/26/2024 0745 189 (H) 70 - 130 mg/dL Final   04/25/2024 2019 209 (H) 70 - 130 mg/dL Final   04/25/2024 1616 223 (H) 70 - 130 mg/dL Final   04/25/2024 1123 278 (H) 70 - 130 mg/dL Final   04/25/2024 0741 266 (H) 70 - 130 mg/dL Final   04/25/2024 0558 272 (H) 70 - 130 mg/dL Final       CT Head Without Contrast    Result Date: 4/24/2024   Interval decreased size of previous left basal ganglia hematoma. No new areas of hemorrhage. No hydrocephalus. Chronic changes of the brain. If there is further clinical concern, MRI could be considered for further evaluation.  This report was finalized on 4/24/2024 3:30 PM by Dr. Akhil Nugent M.D on Workstation: [id number]       I have personally reviewed all medications:  Scheduled Medications  aspirin, 81 mg, Oral, Daily  carvedilol, 12.5 mg, Oral, BID With Meals  cloNIDine, 0.2 mg, Oral, Q8H  ertapenem, 1,000 mg, Intravenous,  Q24H  fluticasone, 1 spray, Nasal, Daily  hydroxyurea, 500 mg, Oral, Daily  insulin glargine, 25 Units, Subcutaneous, Nightly  insulin lispro, 3-14 Units, Subcutaneous, 4x Daily AC & at Bedtime  levETIRAcetam, 500 mg, Oral, BID  memantine, 10 mg, Oral, BID  Menthol-Zinc Oxide, 1 Application, Topical, Q12H  OLANZapine zydis, 5 mg, Oral, Nightly  oxybutynin XL, 5 mg, Oral, Daily  polyethylene glycol, 17 g, Oral, BID  rosuvastatin, 40 mg, Oral, Nightly  sennosides-docusate, 2 tablet, Oral, BID  sertraline, 50 mg, Oral, Nightly  sodium chloride, 250 mL, Intravenous, Once  sodium chloride, 10 mL, Intravenous, Q12H  tamsulosin, 0.4 mg, Oral, Nightly    Infusions  sodium chloride, 125 mL/hr, Last Rate: 125 mL/hr (04/26/24 0859)    Diet  NPO Diet NPO Type: Strict NPO    I have personally reviewed:  [x]  Laboratory   [x]  Microbiology   []  Radiology   [x]  EKG/Telemetry  []  Cardiology/Vascular   []  Pathology    []  Records    Assessment/Plan     Active Hospital Problems    Diagnosis  POA    **Acute UTI [N39.0]  Yes    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]  Yes    Hypertension, uncontrolled [I10]  Yes    Expressive aphasia [R47.01]  Yes    Oropharyngeal dysphagia [R13.12]  Yes    Right hemiparesis [G81.91]  Yes    HTN (hypertension) [I10]  Yes    Type 2 diabetes mellitus with hyperglycemia, with long-term current use of insulin [E11.65, Z79.4]  Not Applicable    Seizures [R56.9]  Yes    Mild cognitive impairment with memory loss [G31.84]  Yes    Paroxysmal atrial fibrillation [I48.0]  Yes    History of CVA (cerebrovascular accident) [Z86.73]  Not Applicable    DORI (acute kidney injury) [N17.9]  Yes    Chronic diastolic congestive heart failure [I50.32]  Yes      Resolved Hospital Problems   No resolved problems to display.   Acute UTI with Sepsis, present on Admission  - HR>90 and leukocytosis on admission  - likely source is UTI although unable to ascertain symptoms- has hx of ESBL in his urine  - continue ertapenem,  await final blood and urine cultures  - continue IV fluids  - check non-contrasted CT of the chest, abdomen, and pelvis  - appreciate ID recs, d/w Dr. Quintero    DORI  - due to sepsis  - will give a 250cc bolus and continue NS at 125cc/hr, monitor volume status given hx of CHF  - check UA with microscopy  - will see if there is any obstruction on CT scan    HTN/Chronic Diastolic CHF  - BP acceptable, he has no evidence of volume overload  - hold coreg and clonidine due to NPO, will schedule IV metoprolol with holding parameters to help with BP and avoid rebound tachycardia and will consider clonidine patch if he has rebound hypertension  - hold amlodipine, spironolactone, and valsartan    Type 2 DM with hyperglycemia  - hyperglycemia slowly improving-will avoid more aggressive medication titrating for now as he is NPO  - decrease lantus to 15 units nightly since he is going to be NPO  - cover with ssi  - hold linagliptin and metformin    PAF/Hx of CVA/Seizures  - HR acceptable, continue coreg  - 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 The patient is chronically very ill and has significant acute illnesses. I explained to the patient's wife and daughter-in-law that I think we will see which direction this is going to go in the next 24-48 hours. At that point if he has not improved we will need to decide whether to pursue artifical nutrition and continued aggressive treatment or palliative care. I also discussed with Niru Huff RN who is at bedside.    SCDs for DVT prophylaxis.  DNR.  Discussed with patient, spouse, family, nursing staff, consulting provider, and care team on multidisciplinary rounds.  Anticipate discharge home  with family timing yet to be determined.  Expected Discharge Date: 4/27/2024; Expected Discharge Time:       Randy Hernandez MD  [facility]  04/26/24  11:49 EDT

## 2024-04-27 NOTE — PROGRESS NOTES
Name: Erlin Blanco ADMIT: 2024   : 1945  PCP: Zamzam John APRN    MRN: [id number] LOS: 1 days   AGE/SEX: 78 y.o. male  ROOM: [facility]     Subjective   Subjective   No acute events. Patient has been more lethargic today. Wife and friend are at bedside.    Objective   Objective   Vital Signs  Temp:  [97.5 °F (36.4 °C)-98.6 °F (37 °C)] 97.5 °F (36.4 °C)  Heart Rate:  [74-91] 91  Resp:  [18] 18  BP: (120-137)/() 120/106  SpO2:  [91 %-98 %] 91 %  on   ;   Device (Oxygen Therapy): room air  Body mass index is 29.51 kg/m².  Physical Exam  Vitals and nursing note reviewed.   Constitutional:       General: He is not in acute distress.     Appearance: He is not toxic-appearing.   HENT:      Head: Normocephalic and atraumatic.      Nose: Nose normal.      Mouth/Throat:      Mouth: Mucous membranes are dry.      Pharynx: Oropharynx is clear.   Eyes:      Conjunctiva/sclera: Conjunctivae normal.      Pupils: Pupils are equal, round, and reactive to light.   Cardiovascular:      Rate and Rhythm: Normal rate and regular rhythm.      Pulses: Normal pulses.   Pulmonary:      Effort: Pulmonary effort is normal.      Breath sounds: No wheezing or rales.   Abdominal:      General: Bowel sounds are normal.      Palpations: Abdomen is soft.      Tenderness: There is no abdominal tenderness.   Musculoskeletal:         General: No swelling or tenderness.      Cervical back: Neck supple.   Skin:     General: Skin is warm and dry.      Capillary Refill: Capillary refill takes less than 2 seconds.   Neurological:      Mental Status: He is lethargic.      Comments: Aphasia and right hemiparesis known from previous CVA       Results Review     I reviewed the patient's new clinical results.  Results from last 7 days   Lab Units 24  0237 24  0257 24  0316 24  1204   WBC 10*3/mm3 12.33* 17.08* 21.00* 17.22*   HEMOGLOBIN g/dL 12.7* 13.4 13.8 14.7   PLATELETS 10*3/mm3 405 370 392 394     Results  from last 7 days   Lab Units 04/27/24  0237 04/26/24  0258 04/25/24  0316 04/24/24  1204   SODIUM mmol/L 149* 145 142 139   POTASSIUM mmol/L 4.3 4.4 4.0 3.8   CHLORIDE mmol/L 117* 112* 109* 105   CO2 mmol/L 18.4* 21.0* 23.0 23.8   BUN mg/dL 62* 41* 23 19   CREATININE mg/dL 2.99* 2.17* 1.01 0.81   GLUCOSE mg/dL 146* 188* 305* 405*   EGFR mL/min/1.73 21.0* 30.8* 77.1 91.4     Results from last 7 days   Lab Units 04/24/24  1204   ALBUMIN g/dL 3.4*   BILIRUBIN mg/dL 0.6   ALK PHOS U/L 98   AST (SGOT) U/L 22   ALT (SGPT) U/L 21     Results from last 7 days   Lab Units 04/27/24  0237 04/26/24  0258 04/25/24  0316 04/24/24  1204   CALCIUM mg/dL 9.0 9.1 9.2 9.7   ALBUMIN g/dL  --   --   --  3.4*   MAGNESIUM mg/dL  --   --   --  1.8     Results from last 7 days   Lab Units 04/25/24  0316 04/24/24  1204   PROCALCITONIN ng/mL 0.98* 0.23   LACTATE mmol/L  --  1.4     Glucose   Date/Time Value Ref Range Status   04/27/2024 1105 125 70 - 130 mg/dL Final   04/27/2024 0724 130 70 - 130 mg/dL Final   04/26/2024 2036 139 (H) 70 - 130 mg/dL Final   04/26/2024 1610 171 (H) 70 - 130 mg/dL Final   04/26/2024 1127 144 (H) 70 - 130 mg/dL Final   04/26/2024 0745 189 (H) 70 - 130 mg/dL Final   04/25/2024 2019 209 (H) 70 - 130 mg/dL Final       CT Chest Without Contrast Diagnostic    Result Date: 4/26/2024   1. Low lung volumes and multifocal bilateral perihilar and bibasilar subsegmental atelectasis and/or scarring with superimposed consolidation of the medial base of the right lower lobe and patchy and nodular groundglass opacities in each lower lobe that likely represent pneumonia. 2. Patchy groundglass opacities in the upper lobes and lingula could also be infectious/inflammatory. 3. Retained secretions in the right mainstem bronchus with bilateral lower lobe bronchial plugging. 4. Enlarged prostate gland with urinary bladder wall thickening that could be accentuated by underdistention but could also reflect sequela of chronic bladder  outlet obstruction and/or cystitis. 5. Cholelithiasis with distended gallbladder, gallbladder wall thickening, and mild pericholecystic fluid. Findings could reflect acute cholecystitis. Correlate with laboratory values. Could consider right upper quadrant ultrasound and/or nuclear medicine hepatobiliary scan if clinically indicated. 6. No renal or ureteral stone. No hydronephrosis or hydroureter. 7. Mild diffuse anasarca. 8. Nonspecific splenomegaly.  This report was finalized on 4/26/2024 12:15 PM by Bryce Stoll MD on Workstation: [id number]      CT Abdomen Pelvis Without Contrast    Result Date: 4/26/2024   1. Low lung volumes and multifocal bilateral perihilar and bibasilar subsegmental atelectasis and/or scarring with superimposed consolidation of the medial base of the right lower lobe and patchy and nodular groundglass opacities in each lower lobe that likely represent pneumonia. 2. Patchy groundglass opacities in the upper lobes and lingula could also be infectious/inflammatory. 3. Retained secretions in the right mainstem bronchus with bilateral lower lobe bronchial plugging. 4. Enlarged prostate gland with urinary bladder wall thickening that could be accentuated by underdistention but could also reflect sequela of chronic bladder outlet obstruction and/or cystitis. 5. Cholelithiasis with distended gallbladder, gallbladder wall thickening, and mild pericholecystic fluid. Findings could reflect acute cholecystitis. Correlate with laboratory values. Could consider right upper quadrant ultrasound and/or nuclear medicine hepatobiliary scan if clinically indicated. 6. No renal or ureteral stone. No hydronephrosis or hydroureter. 7. Mild diffuse anasarca. 8. Nonspecific splenomegaly.  This report was finalized on 4/26/2024 12:15 PM by Bryce Stoll MD on Workstation: [id number]       I have personally reviewed all medications:  Scheduled Medications  [Held by provider] aspirin, 81 mg, Oral, Daily  [Held by  provider] carvedilol, 12.5 mg, Oral, BID With Meals  [Held by provider] cloNIDine, 0.2 mg, Oral, Q8H  ertapenem, 1,000 mg, Intravenous, Q24H  fluticasone, 1 spray, Nasal, Daily  [Held by provider] hydroxyurea, 500 mg, Oral, Daily  insulin glargine, 15 Units, Subcutaneous, Nightly  insulin lispro, 3-14 Units, Subcutaneous, 4x Daily AC & at Bedtime  levETIRAcetam, 500 mg, Intravenous, Q12H  [Held by provider] memantine, 10 mg, Oral, BID  Menthol-Zinc Oxide, 1 Application, Topical, Q12H  metoprolol tartrate, 2.5 mg, Intravenous, Q6H  [Held by provider] OLANZapine zydis, 5 mg, Oral, Nightly  [Held by provider] oxybutynin XL, 5 mg, Oral, Daily  [Held by provider] polyethylene glycol, 17 g, Oral, BID  [Held by provider] rosuvastatin, 40 mg, Oral, Nightly  [Held by provider] sennosides-docusate, 2 tablet, Oral, BID  [Held by provider] sertraline, 50 mg, Oral, Nightly  sodium chloride, 10 mL, Intravenous, Q12H  [Held by provider] tamsulosin, 0.4 mg, Oral, Nightly    Infusions  sodium chloride, 125 mL/hr, Last Rate: 125 mL/hr (04/27/24 0847)    Diet  NPO Diet NPO Type: Strict NPO    I have personally reviewed:  [x]  Laboratory   [x]  Microbiology   []  Radiology   [x]  EKG/Telemetry  []  Cardiology/Vascular   []  Pathology    []  Records    Assessment/Plan     Active Hospital Problems    Diagnosis  POA    **Acute UTI [N39.0]  Yes    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]  Yes    Hypertension, uncontrolled [I10]  Yes    Expressive aphasia [R47.01]  Yes    Oropharyngeal dysphagia [R13.12]  Yes    Right hemiparesis [G81.91]  Yes    HTN (hypertension) [I10]  Yes    Type 2 diabetes mellitus with hyperglycemia, with long-term current use of insulin [E11.65, Z79.4]  Not Applicable    Seizures [R56.9]  Yes    Mild cognitive impairment with memory loss [G31.84]  Yes    Paroxysmal atrial fibrillation [I48.0]  Yes    History of CVA (cerebrovascular accident) [Z86.73]  Not Applicable    DORI (acute kidney injury) [N17.9]  Yes     Chronic diastolic congestive heart failure [I50.32]  Yes      Resolved Hospital Problems   No resolved problems to display.   Acute UTI with Sepsis, present on Admission  - HR>90 and leukocytosis on admission  - likely source is UTI although unable to ascertain symptoms- has hx of ESBL in his urine but current culture growing mixed halina  - continue ertapenem, blood cx's NGTD  - continue IV fluids  - CT of the chest, abdomen, and pelvis noted with aspiration pna and UTI  - appreciate ID recs    DORI  - due to sepsis, creatinine has not yet peaked  - CT noted, no hydronephrosis but urinary bladder is thickened and prostate is enlarged, place rinaldi  - consult nephrology    HTN/Chronic Diastolic CHF  - BP acceptable, he has no evidence of volume overload  - hold coreg and clonidine due to NPO, continue scheduled IV metoprolol with holding parameters to help with BP and avoid rebound tachycardia and will consider clonidine patch if he has rebound hypertension  - hold amlodipine, spironolactone, and valsartan    Type 2 DM with hyperglycemia  - hyperglycemia resolved, now normoglycemic  - stop lantus given NPO  - change ssi to regular insulin Q6H moderate dose  - hold linagliptin and metformin    PAF/Hx of CVA/Seizures  - HR acceptable, continue coreg  - not on AC due to hx of spontaneous intracranial hemorrhage  - has residual aphasia and right hemiparesis  - continue keppra for seizure prophylaxis    Oropharyngeal Dysphagia  - SLP recommending NPO for now  - continue to hold all oral medications-converted keppra to IV form and substitute BP meds as above      Overall guarded prognosis. The patient is chronically very ill and has significant acute illnesses. Palliative care and [facility] are following.     SCDs for DVT prophylaxis.  DNR.  Discussed with patient, spouse, family, and nursing staff.  Anticipate discharge home with family timing yet to be determined.  Expected Discharge Date: 4/29/2024; Expected Discharge  Time:       Randy Hernandez MD  [facility]  04/27/24  12:02 EDT

## 2024-04-27 NOTE — PROGRESS NOTES
ID Progress Note    CC: f/u sepsis    Subjective: No fever.  WBC trending down.  Creatinine up to 3 today.  His friend at the bedside said he is not taking anything in and that his responsiveness is less today.  He is on room air    Medications:    Current Facility-Administered Medications:     acetaminophen (TYLENOL) tablet 650 mg, 650 mg, Oral, Q4H PRN **OR** acetaminophen (TYLENOL) 160 MG/5ML oral solution 650 mg, 650 mg, Oral, Q4H PRN **OR** acetaminophen (TYLENOL) suppository 650 mg, 650 mg, Rectal, Q4H PRN, Annemarie Araujo MD, 650 mg at 04/25/24 1427    [Held by provider] aspirin EC tablet 81 mg, 81 mg, Oral, Daily, Annemarie Araujo MD, 81 mg at 04/26/24 0850    sennosides-docusate (PERICOLACE) 8.6-50 MG per tablet 2 tablet, 2 tablet, Oral, BID PRN **AND** polyethylene glycol (MIRALAX) packet 17 g, 17 g, Oral, Daily PRN **AND** bisacodyl (DULCOLAX) EC tablet 5 mg, 5 mg, Oral, Daily PRN **AND** bisacodyl (DULCOLAX) suppository 10 mg, 10 mg, Rectal, Daily PRN, Annemarie Araujo MD, 10 mg at 04/25/24 1138    calcium carbonate (TUMS) chewable tablet 500 mg (200 mg elemental), 2 tablet, Oral, BID PRN, Annemarie Araujo MD    Calcium Replacement - Follow Nurse / BPA Driven Protocol, , Does not apply, PRN, Annemarie Araujo MD    [Held by provider] carvedilol (COREG) tablet 12.5 mg, 12.5 mg, Oral, BID With Meals, Randy Hernandez MD, 12.5 mg at 04/26/24 0902    [Held by provider] cloNIDine (CATAPRES) tablet 0.2 mg, 0.2 mg, Oral, Q8H, Annemarie Araujo MD, 0.2 mg at 04/26/24 0628    dextrose (D50W) (25 g/50 mL) IV injection 25 g, 25 g, Intravenous, Q15 Min PRN, Annemarie Araujo MD    dextrose (GLUTOSE) oral gel 15 g, 15 g, Oral, Q15 Min PRN, Annemarie Araujo MD    ertapenem (INVanz) 1,000 mg in sodium chloride 0.9 % 100 mL MBP, 1,000 mg, Intravenous, Q24H, Annemarie Araujo MD, Last Rate: 200 mL/hr at 04/26/24 1623, 1,000 mg at 04/26/24 1623    fluticasone  (FLONASE) 50 MCG/ACT nasal spray 1 spray, 1 spray, Nasal, Daily, Annemarie Araujo MD, 1 spray at 04/26/24 0902    glucagon (GLUCAGEN) injection 1 mg, 1 mg, Intramuscular, Q15 Min PRN, Annemarie Araujo MD    [Held by provider] hydroxyurea (HYDREA) capsule 500 mg, 500 mg, Oral, Daily, Annemarie Araujo MD, 500 mg at 04/26/24 0904    insulin glargine (LANTUS, SEMGLEE) injection 15 Units, 15 Units, Subcutaneous, Nightly, Randy Hernandez MD    insulin lispro (HUMALOG/ADMELOG) injection 3-14 Units, 3-14 Units, Subcutaneous, 4x Daily AC & at Bedtime, Annemarie Araujo MD, 3 Units at 04/26/24 1731    levETIRAcetam (KEPPRA) injection 500 mg, 500 mg, Intravenous, Q12H, Randy Hernandez MD, 500 mg at 04/27/24 0846    Magnesium Standard Dose Replacement - Follow Nurse / BPA Driven Protocol, , Does not apply, PRN, Annemarie Araujo MD    melatonin tablet 5 mg, 5 mg, Oral, Nightly PRN, Annemarie Araujo MD    [Held by provider] memantine (NAMENDA) tablet 10 mg, 10 mg, Oral, BID, Annemarie Araujo MD, 10 mg at 04/26/24 0850    Menthol-Zinc Oxide 1 Application, 1 Application, Topical, Q12H, Randy Hernandez MD, 1 Application at 04/27/24 0847    metoprolol tartrate (LOPRESSOR) injection 2.5 mg, 2.5 mg, Intravenous, Q6H, Randy Hernandez MD, 2.5 mg at 04/27/24 0847    morphine injection 2 mg, 2 mg, Intravenous, Q3H PRN, Randy Hernandez MD, 2 mg at 04/26/24 2132    nitroglycerin (NITROSTAT) SL tablet 0.4 mg, 0.4 mg, Sublingual, Q5 Min PRN, Annemarie Araujo MD    [Held by provider] OLANZapine zydis (zyPREXA) disintegrating tablet 5 mg, 5 mg, Oral, Nightly, Annemarie Araujo MD, 5 mg at 04/25/24 2119    ondansetron ODT (ZOFRAN-ODT) disintegrating tablet 4 mg, 4 mg, Oral, Q6H PRN **OR** ondansetron (ZOFRAN) injection 4 mg, 4 mg, Intravenous, Q6H PRN, Annemarie Araujo MD, 4 mg at 04/26/24 0007    [Held by provider] oxybutynin XL (DITROPAN-XL) 24 hr tablet 5 mg, 5  mg, Oral, Daily, Annemarie Araujo MD, 5 mg at 04/26/24 0850    Phosphorus Replacement - Follow Nurse / BPA Driven Protocol, , Does not apply, Van SCHMIDT Tracy Suzzanne, MD    [Held by provider] polyethylene glycol (MIRALAX) packet 17 g, 17 g, Oral, BID, Annemarie Araujo MD, 17 g at 04/26/24 0903    Potassium Replacement - Follow Nurse / BPA Driven Protocol, , Does not apply, Van SCHMIDT Tracy Suzzanne, MD    [Held by provider] rosuvastatin (CRESTOR) tablet 40 mg, 40 mg, Oral, Nightly, Annemarie Araujo MD, 40 mg at 04/25/24 2116    [Held by provider] sennosides-docusate (PERICOLACE) 8.6-50 MG per tablet 2 tablet, 2 tablet, Oral, BID, Annemarie Araujo MD, 2 tablet at 04/26/24 0850    [Held by provider] sertraline (ZOLOFT) tablet 50 mg, 50 mg, Oral, Nightly, Annemarie Araujo MD, 50 mg at 04/25/24 2117    sodium chloride 0.9 % flush 10 mL, 10 mL, Intravenous, Q12H, Annemarie Araujo MD, 10 mL at 04/27/24 0847    sodium chloride 0.9 % flush 10 mL, 10 mL, Intravenous, PRN, Annemarie Araujo MD    sodium chloride 0.9 % infusion 40 mL, 40 mL, Intravenous, PRN, Annemarie Araujo MD    sodium chloride 0.9 % infusion, 125 mL/hr, Intravenous, Continuous, Randy Hernandez MD, Last Rate: 125 mL/hr at 04/27/24 0847, 125 mL/hr at 04/27/24 0847    [Held by provider] tamsulosin (FLOMAX) 24 hr capsule 0.4 mg, 0.4 mg, Oral, Nightly, Annemarie Araujo MD, 0.4 mg at 04/25/24 2116        Physical Exam:   Vital Signs   Temp:  [97.5 °F (36.4 °C)-98.6 °F (37 °C)] 97.5 °F (36.4 °C)  Heart Rate:  [74-91] 91  Resp:  [18] 18  BP: (120-137)/() 120/106    GENERAL: Comfortable but does not respond to physical stimuli or commands today  CV: Normal rate  LUNGS:normal respiratory effort.  On room air  GI: Soft, no rebound or guarding  : External urinary catheter in place  SKIN: no rash      Results Review:  CBC, BMP, and blood cultures and urine cultures reviewed today  Lab  Results   Component Value Date    WBC 12.33 (H) 04/27/2024    HGB 12.7 (L) 04/27/2024    HCT 39.5 04/27/2024    MCV 92.5 04/27/2024     04/27/2024     Lab Results   Component Value Date    GLUCOSE 146 (H) 04/27/2024    CALCIUM 9.0 04/27/2024     (H) 04/27/2024    K 4.3 04/27/2024    CO2 18.4 (L) 04/27/2024     (H) 04/27/2024    BUN 62 (H) 04/27/2024    CREATININE 2.99 (H) 04/27/2024    EGFRRESULT 49 (L) 02/05/2024    EGFR 21.0 (L) 04/27/2024    BCR 20.7 04/27/2024    ANIONGAP 13.6 04/27/2024     Lab Results   Component Value Date    HGBA1C 8.40 (H) 04/11/2024       Microbiology:  4/24 blood cultures 2/2 no growth to date  4/24 respiratory pathogen panel negative  4/24 urine culture greater than 100,000 mixed gram-negative halina    Radiology:  CT chest/abdomen/pelvis: Low lung volumes with multifocal bilateral atelectasis and probable superimposed consolidation likely representing pneumonia.  Retained secretions in the right mainstem bronchus and associated mucous plugging.  Prostatic megaly and bladder thickening consistent with cystitis.    Assessment and plan:  Sepsis  Bilateral pneumonia  Acute cystitis  Encephalopathy  Uncontrolled DM2 with hyperglycemia  History of hemorrhagic stroke  Acute kidney injury -worse    Per his friend and nursing staff, his mental status is worse today.  His creatinine has increased to 3 today.  I suspect this is prerenal in etiology but will defer to primary team.     WBC trending down. No fever. His blood cultures are negative to date and his urine culture shows mixed halina.  Given his history of ESBL infection in the past, will continue ertapenem 1 g IV every 24 hours.  He is not yet at the level of renal function where it needs a dose adjustment.  I will plan for a 7-day course.    That being said, palliative care has been consulted and it sounds like things may be headed more in a comfort care trajectory.    ID will follow.

## 2024-04-27 NOTE — PLAN OF CARE
Goal Outcome Evaluation:              Outcome Evaluation: Speech following. Spoke to RN who reports pt is lethargic and having difficulties following directions. Not appropriate for swallow re-evaluation this date. Speech to follow as appropriate for PO trials.

## 2024-04-27 NOTE — PLAN OF CARE
Goal Outcome Evaluation:              Outcome Evaluation: Pt drowsy, slept most of shift. Will wake to touch or loud talking. Able to slighty squeeze hand upon request. Pt appears to be resting comfortably. Aguila cath placed to BSD. Turned/repositioned Q2 hrs. Heels elevated off mattress/heel lift boot in place. SCDs/Accumax in place.  IVF continue. NPO continues. Bed alarm utilized. Safety maintained.

## 2024-04-27 NOTE — PROGRESS NOTES
RENAL/KCC:    Patient of Dr. Carreon.  Will forward consult to his service.    Francois Thomas M.D.  [facility]

## 2024-04-27 NOTE — PLAN OF CARE
Goal Outcome Evaluation:  Plan of Care Reviewed With: patient, family        Progress: no change  Outcome Evaluation: Pt remains strict NPO. Continues to only answer with 1 word answers. PRN pain medication given. Remains on IVF. Q2 turn. Bed alarm in place.

## 2024-04-28 NOTE — PROGRESS NOTES
Name: Erlin Blanco ADMIT: 2024   : 1945  PCP: Zamzam John APRN    MRN: [id number] LOS: 2 days   AGE/SEX: 78 y.o. male  ROOM: [facility]     Subjective   Subjective   No acute events. Patient has been more lethargic and having bouts of agitation and difficulty handling secretions. Wife is at bedside.    Objective   Objective   Vital Signs  Temp:  [98.4 °F (36.9 °C)-99.3 °F (37.4 °C)] 99.3 °F (37.4 °C)  Heart Rate:  [73-84] 84  Resp:  [16-24] 22  BP: (133-170)/(64-88) 170/88  SpO2:  [91 %-93 %] 93 %  on   ;   Device (Oxygen Therapy): room air  Body mass index is 30.53 kg/m².  Physical Exam  Vitals and nursing note reviewed.   Constitutional:       General: He is not in acute distress.     Appearance: He is ill-appearing.   HENT:      Head: Normocephalic and atraumatic.      Nose: Nose normal.      Mouth/Throat:      Mouth: Mucous membranes are dry.      Pharynx: Oropharynx is clear.   Eyes:      Conjunctiva/sclera: Conjunctivae normal.      Pupils: Pupils are equal, round, and reactive to light.   Cardiovascular:      Rate and Rhythm: Normal rate and regular rhythm.      Pulses: Normal pulses.   Pulmonary:      Effort: Pulmonary effort is normal.      Breath sounds: No wheezing or rales.      Comments: Coarse breath sounds throughout  Abdominal:      General: Bowel sounds are normal.      Palpations: Abdomen is soft.      Tenderness: There is no abdominal tenderness.   Genitourinary:     Comments: Aguila catheter in place  Musculoskeletal:         General: No swelling or tenderness.      Cervical back: Neck supple.   Skin:     General: Skin is warm and dry.      Capillary Refill: Capillary refill takes less than 2 seconds.   Neurological:      Mental Status: He is lethargic.      Comments: Aphasia and right hemiparesis known from previous CVA       Results Review     I reviewed the patient's new clinical results.  Results from last 7 days   Lab Units 24  0241 24  0237 24  0257  04/25/24  0316   WBC 10*3/mm3 10.07 12.33* 17.08* 21.00*   HEMOGLOBIN g/dL 13.3 12.7* 13.4 13.8   PLATELETS 10*3/mm3 440 405 370 392     Results from last 7 days   Lab Units 04/28/24 0241 04/27/24 0237 04/26/24 0258 04/25/24  0316   SODIUM mmol/L 150* 149* 145 142   POTASSIUM mmol/L 4.6 4.3 4.4 4.0   CHLORIDE mmol/L 120* 117* 112* 109*   CO2 mmol/L 16.1* 18.4* 21.0* 23.0   BUN mg/dL 71* 62* 41* 23   CREATININE mg/dL 3.51* 2.99* 2.17* 1.01   GLUCOSE mg/dL 202* 146* 188* 305*   EGFR mL/min/1.73 17.3* 21.0* 30.8* 77.1     Results from last 7 days   Lab Units 04/28/24 0241 04/24/24  1204   ALBUMIN g/dL 2.6* 3.4*   BILIRUBIN mg/dL  --  0.6   ALK PHOS U/L  --  98   AST (SGOT) U/L  --  22   ALT (SGPT) U/L  --  21     Results from last 7 days   Lab Units 04/28/24 0241 04/27/24 0237 04/26/24 0258 04/25/24 0316 04/24/24  1204   CALCIUM mg/dL 8.9 9.0 9.1 9.2 9.7   ALBUMIN g/dL 2.6*  --   --   --  3.4*   MAGNESIUM mg/dL  --   --   --   --  1.8   PHOSPHORUS mg/dL 3.6  --   --   --   --      Results from last 7 days   Lab Units 04/25/24 0316 04/24/24  1204   PROCALCITONIN ng/mL 0.98* 0.23   LACTATE mmol/L  --  1.4     Glucose   Date/Time Value Ref Range Status   04/28/2024 0757 198 (H) 70 - 130 mg/dL Final   04/28/2024 0607 229 (H) 70 - 130 mg/dL Final   04/28/2024 0001 164 (H) 70 - 130 mg/dL Final   04/27/2024 2103 158 (H) 70 - 130 mg/dL Final   04/27/2024 1609 140 (H) 70 - 130 mg/dL Final   04/27/2024 1105 125 70 - 130 mg/dL Final   04/27/2024 0724 130 70 - 130 mg/dL Final       CT Chest Without Contrast Diagnostic    Result Date: 4/26/2024   1. Low lung volumes and multifocal bilateral perihilar and bibasilar subsegmental atelectasis and/or scarring with superimposed consolidation of the medial base of the right lower lobe and patchy and nodular groundglass opacities in each lower lobe that likely represent pneumonia. 2. Patchy groundglass opacities in the upper lobes and lingula could also be infectious/inflammatory.  3. Retained secretions in the right mainstem bronchus with bilateral lower lobe bronchial plugging. 4. Enlarged prostate gland with urinary bladder wall thickening that could be accentuated by underdistention but could also reflect sequela of chronic bladder outlet obstruction and/or cystitis. 5. Cholelithiasis with distended gallbladder, gallbladder wall thickening, and mild pericholecystic fluid. Findings could reflect acute cholecystitis. Correlate with laboratory values. Could consider right upper quadrant ultrasound and/or nuclear medicine hepatobiliary scan if clinically indicated. 6. No renal or ureteral stone. No hydronephrosis or hydroureter. 7. Mild diffuse anasarca. 8. Nonspecific splenomegaly.  This report was finalized on 4/26/2024 12:15 PM by Bryce Stoll MD on Workstation: [id number]      CT Abdomen Pelvis Without Contrast    Result Date: 4/26/2024   1. Low lung volumes and multifocal bilateral perihilar and bibasilar subsegmental atelectasis and/or scarring with superimposed consolidation of the medial base of the right lower lobe and patchy and nodular groundglass opacities in each lower lobe that likely represent pneumonia. 2. Patchy groundglass opacities in the upper lobes and lingula could also be infectious/inflammatory. 3. Retained secretions in the right mainstem bronchus with bilateral lower lobe bronchial plugging. 4. Enlarged prostate gland with urinary bladder wall thickening that could be accentuated by underdistention but could also reflect sequela of chronic bladder outlet obstruction and/or cystitis. 5. Cholelithiasis with distended gallbladder, gallbladder wall thickening, and mild pericholecystic fluid. Findings could reflect acute cholecystitis. Correlate with laboratory values. Could consider right upper quadrant ultrasound and/or nuclear medicine hepatobiliary scan if clinically indicated. 6. No renal or ureteral stone. No hydronephrosis or hydroureter. 7. Mild diffuse anasarca.  8. Nonspecific splenomegaly.  This report was finalized on 4/26/2024 12:15 PM by Bryce Stoll MD on Workstation: [id number]       I have personally reviewed all medications:  Scheduled Medications  [Held by provider] aspirin, 81 mg, Oral, Daily  [Held by provider] carvedilol, 12.5 mg, Oral, BID With Meals  [Held by provider] cloNIDine, 0.2 mg, Oral, Q8H  ertapenem, 500 mg, Intravenous, Q24H  fluticasone, 1 spray, Nasal, Daily  [Held by provider] hydroxyurea, 500 mg, Oral, Daily  insulin regular, 2-9 Units, Subcutaneous, Q6H  levETIRAcetam, 500 mg, Intravenous, Q12H  [Held by provider] memantine, 10 mg, Oral, BID  Menthol-Zinc Oxide, 1 Application, Topical, Q12H  metoprolol tartrate, 2.5 mg, Intravenous, Q6H  [Held by provider] OLANZapine zydis, 5 mg, Oral, Nightly  [Held by provider] oxybutynin XL, 5 mg, Oral, Daily  [Held by provider] polyethylene glycol, 17 g, Oral, BID  [Held by provider] rosuvastatin, 40 mg, Oral, Nightly  [Held by provider] sennosides-docusate, 2 tablet, Oral, BID  [Held by provider] sertraline, 50 mg, Oral, Nightly  sodium chloride, 10 mL, Intravenous, Q12H  [Held by provider] tamsulosin, 0.4 mg, Oral, Nightly    Infusions  dextrose, 100 mL/hr, Last Rate: 100 mL/hr (04/28/24 1011)    Diet  NPO Diet NPO Type: Strict NPO    I have personally reviewed:  [x]  Laboratory   [x]  Microbiology   []  Radiology   [x]  EKG/Telemetry  []  Cardiology/Vascular   []  Pathology    []  Records    Assessment/Plan     Active Hospital Problems    Diagnosis  POA    **Acute UTI [N39.0]  Yes    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]  Yes    Hypertension, uncontrolled [I10]  Yes    Expressive aphasia [R47.01]  Yes    Oropharyngeal dysphagia [R13.12]  Yes    Right hemiparesis [G81.91]  Yes    HTN (hypertension) [I10]  Yes    Type 2 diabetes mellitus with hyperglycemia, with long-term current use of insulin [E11.65, Z79.4]  Not Applicable    Seizures [R56.9]  Yes    Mild cognitive impairment with memory loss  [G31.84]  Yes    Paroxysmal atrial fibrillation [I48.0]  Yes    History of CVA (cerebrovascular accident) [Z86.73]  Not Applicable    DORI (acute kidney injury) [N17.9]  Yes    Chronic diastolic congestive heart failure [I50.32]  Yes      Resolved Hospital Problems   No resolved problems to display.   Acute UTI with Sepsis, present on Admission  - HR>90 and leukocytosis on admission  - likely source is UTI although unable to ascertain symptoms- has hx of ESBL in his urine but current culture growing mixed halina  - continue ertapenem, blood cx's NGTD  - continue IV fluids  - CT of the chest, abdomen, and pelvis noted with aspiration pna and UTI  - appreciate ID recs    DORI  - due to sepsis, creatinine has not yet peaked  - CT noted, no hydronephrosis but urinary bladder is thickened and prostate is enlarged, place rinaldi  - consult nephrology    HTN/Chronic Diastolic CHF  - BP acceptable, he has no evidence of volume overload  - hold coreg and clonidine due to NPO, continue scheduled IV metoprolol with holding parameters to help with BP and avoid rebound tachycardia and will consider clonidine patch if he has rebound hypertension  - hold amlodipine, spironolactone, and valsartan    Type 2 DM with hyperglycemia  - hyperglycemia resolved, now normoglycemic  - stopped lantus given NPO  - covering with ssi to regular insulin Q6H moderate dose  - hold linagliptin and metformin    PAF/Hx of CVA/Seizures  - HR acceptable, continue coreg  - not on AC due to hx of spontaneous intracranial hemorrhage  - has residual aphasia and right hemiparesis  - continue keppra for seizure prophylaxis    Oropharyngeal Dysphagia  - SLP recommending NPO for now  - continue to hold all oral medications      Poor prognosis. Patient has been continuing to decline with his acute illness and in the months leading up to this he had been declining as well. I discussed with his wife at bedside his acute illness in the context of his chronic illnesses.  It is looking less likely that we are going to be able to stabilize his acute illness but even if we do his chronic issues will continue to progress and his quality of life is unlikely to improve. She has decided to pursue palliative care. I will therefore stop all testing and treatments which are not related to comfort and transfer to the palliative care floor. Will ask [facility] to evaluate for scattered bed.     SCDs for DVT prophylaxis.  DNR.  Discussed with patient, spouse, and nursing staff.  Anticipate discharge home with family timing yet to be determined.  Expected Discharge Date: 4/29/2024; Expected Discharge Time:       Randy Hernandez MD  [facility]  04/28/24  10:43 EDT

## 2024-04-28 NOTE — PLAN OF CARE
Palliative care patient pps 10%. Transferred from [facility]N this afternoon. PRN morphine 2mg x1, 0.2mg robinul x1, SL 0.5mg ativan x1 since transfer. F/c in place, extremities clammy and warm with palpable pulses, pt arousable to voice and only mumbles. Family at bedside, oriented to the unit and educated about palliative care order set. Pt's spouse agreeable to stopping all care that does not directly promote comfort. No current signs of distress.

## 2024-04-28 NOTE — CONSULTS
[facility] Consult Note      Patient Name: Erlin Blanco  : 1945  MRN: [id number]  Primary Care Physician:  Zamzam John APRN  Referring Physician: Wojciech Garcia*  Date of admission: 2024    Subjective     Reason for Consult:  DORI on CKD2    HPI:   Erlin Blanco is a 78 y.o. male with CKD 2 (baseline SCr 1.1; followed by Dr. Kevin Carreon of our group), admitted  for further evaluation of fever and hyperglycemia.  Found to have sepsis, probably on the basis of cystitis and pneumonia.  ID assisting.  Full PMH outlined below; pertinent are remote and recent strokes.  SCR 0.8 on arrival, with value 2.2 yesterday and 3.0 today.  Has developed hypernatremia as well, with level 149 today.    Review of Systems:   Unobtainable from the patient as he is obtunded    Personal History     Past Medical History:   Diagnosis Date    Abnormal ECG     Abnormal stress test     Abrasion of face 2020    Acute bronchitis     Acute hypokalemia 2020    Acute non-recurrent sinusitis 2020    Acute pyelonephritis 10/09/2020    Acute sinusitis     DORI (acute kidney injury) 2017    Allergic rhinitis     Aortic root dilation     Arthritis     Bacteremia due to Escherichia coli 2020    Balanitis 2021    Benign enlargement of prostate     C. difficile colitis 2020    Cellulitis of knee, left 2017    CHF (congestive heart failure)     Chronic diastolic congestive heart failure     Constipation 2020    Contusion of face 2020    Coronary artery disease     COVID-19 virus infection     CVA (cerebral vascular accident)     Diminished pulse     in lower extremities    Discitis of lumbar region 10/09/2020    Edema     Elevated hematocrit     Elevated hemoglobin     Essential hypertension     Hearing loss     mala hearing aids    Heart valve disease     High risk medication use     History of back pain     History of dysuria      History of echocardiogram     History of ESBL E. coli infection 04/24/2024    History of intracranial hemorrhage     History of scoliosis     History of stroke     Hyperlipidemia     Hypokalemia 01/07/2021    Injury of toe, left, initial encounter     Irregular heart rate     Knee pain, left     Left bundle branch block     Left bundle-branch block 07/17/2015    Leg wound, left     Leukocytosis 09/16/2020    Low back pain     Maxillary sinusitis 07/16/2021    Medicare annual wellness visit, subsequent     Minor head injury without loss of consciousness 01/29/2020    Murmur     Muscle cramps     Need for hepatitis C screening test     Paraphimosis 05/16/2021    Paroxysmal atrial fibrillation 03/05/2024    Pneumonia of left lung due to infectious organism 09/17/2021    Polycythemia     Polycythemia vera     Pressure injury of buttock, stage 2 09/18/2020    Prostate hyperplasia without urinary obstruction     Prostatitis     Proteinuria     Pulmonary hypertension     Sacroiliitis 10/09/2020    Scoliosis     Sepsis due to Escherichia coli 08/17/2020    Sepsis with metabolic encephalopathy 08/17/2020    Stroke     SVT (supraventricular tachycardia)     Tachycardia     Thrombocytosis     TIA (transient ischemic attack)     2006    Type 2 diabetes mellitus     Type 2 diabetes mellitus with hyperglycemia 05/16/2021    Urinary tract infection due to extended-spectrum beta lactamase (ESBL) producing Escherichia coli 08/18/2020    Valvular heart disease        Past Surgical History:   Procedure Laterality Date    CARDIAC CATHETERIZATION      CATARACT EXTRACTION Left 04/2021    CATARACT EXTRACTION Right 07/26/2022    COLONOSCOPY      did Cologuard approx 6/2019 and negative    HAND SURGERY      1970s    JOINT REPLACEMENT Right 2014    TX ARTHRP KNE CONDYLE&PLATU MEDIAL&LAT COMPARTMENTS Left 04/27/2017    Procedure: LT TOTAL KNEE ARTHROPLASTY;  Surgeon: Bertin Perrin MD;  Location: [facility];  Service: Orthopedics     PROSTATE SURGERY      Rezum steam treatment to shrink prostate by dr. guerrero       Family History: family history includes Dementia in his brother; Diabetes in his paternal aunt and paternal uncle; Hypertension in his mother; Migraines in his sister; No Known Problems in an other family member; Other in his father; Stroke in his sister.    Social History:  reports that he quit smoking about 48 years ago. His smoking use included cigarettes. He has been exposed to tobacco smoke. He has never used smokeless tobacco. He reports that he does not drink alcohol and does not use drugs.    Home Medications:  Prior to Admission medications    Medication Sig Start Date End Date Taking? Authorizing Provider   acetaminophen (TYLENOL) 500 MG tablet Take 2 tablets by mouth Every 8 (Eight) Hours As Needed for Moderate Pain. Indications: Pain   Yes ProviderKeira MD   amLODIPine (NORVASC) 10 MG tablet Take 1 tablet by mouth Daily. 4/13/24  Yes Valeriy De Jesus MD   aspirin 81 MG EC tablet Take 1 tablet by mouth Daily. 4/12/24  Yes Valeriy De Jesus MD   B COMPLEX-C-ZN-FOLIC ACID PO Take 1 tablet by mouth Daily.   Yes Zamzam John APRN   carvedilol (COREG) 12.5 MG tablet Take 1 tablet by mouth 2 (Two) Times a Day With Meals. 4/11/24  Yes Valeriy De Jesus MD   cloNIDine (CATAPRES) 0.2 MG tablet Take 1 tablet by mouth Every 8 (Eight) Hours. 4/13/24  Yes Valeriy De Jesus MD   fluticasone (FLONASE) 50 MCG/ACT nasal spray 1 spray into the nostril(s) as directed by provider Daily. 12/21/23  Yes Zamzam John APRN   hydroxyurea (HYDREA) 500 MG capsule Take 1 capsule by mouth Daily. Indications: Polycythemia Vera   Yes ProviderKeira MD   Insulin Glargine (LANTUS SOLOSTAR) 100 UNIT/ML injection pen Inject 25 Units under the skin into the appropriate area as directed Every Night. 4/13/24  Yes Valeriy De Jesus MD   Januvia 100 MG tablet Take 1 tablet by mouth Daily. Indications: Type 2 Diabetes   Yes  ProviderKeira MD   levETIRAcetam (KEPPRA) 500 MG tablet Take 1 tablet by mouth 2 (Two) Times a Day. 4/13/24  Yes Valeriy De Jesus MD   melatonin 5 MG tablet tablet Take 1 tablet by mouth Every Night.  Patient taking differently: Take 1 tablet by mouth At Night As Needed. 3/11/24  Yes Cachorro Knox MD   memantine (NAMENDA) 10 MG tablet Take 1 tablet by mouth 2 (Two) Times a Day. 1/4/24  Yes Inderjit Adams MD   metFORMIN (GLUCOPHAGE) 500 MG tablet Take 1 tablet by mouth 2 (Two) Times a Day With Meals.  Patient taking differently: Take 2 tablets by mouth Daily. 4/22/24  Yes Zamzam John APRN   OLANZapine zydis (ZyPREXA Zydis) 5 MG disintegrating tablet Place 1 tablet on the tongue Every Night. 4/13/24  Yes Valeriy De Jesus MD   rosuvastatin (CRESTOR) 20 MG tablet Take 2 tablets by mouth Every Night. Indications: High Amount of Fats in the Blood   Yes Keira Odonnell MD   sennosides-docusate (PERICOLACE) 8.6-50 MG per tablet Take 2 tablets by mouth 2 (Two) Times a Day. 3/11/24  Yes Cachorro Knox MD   sertraline (ZOLOFT) 50 MG tablet Take 1 tablet by mouth Daily.  Patient taking differently: Take 1 tablet by mouth Every Night. Indications: Major Depressive Disorder 4/13/24  Yes Valeriy De Jesus MD   spironolactone (ALDACTONE) 50 MG tablet Take 1 tablet by mouth Daily. 4/13/24  Yes Valeriy De Jesus MD   valsartan (DIOVAN) 320 MG tablet Take 1 tablet by mouth Daily. 12/21/23  Yes Zamzam John APRN   amLODIPine (Norvasc) 10 MG tablet Take 1 tablet by mouth Daily. 4/13/24   Valeriy De Jesus MD   Insulin Pen Needle (BD Pen Needle Moni U/F) 32G X 4 MM misc Use 1 each as directed once daily. 4/13/24   Valeriy De Jesus MD   Multiple Vitamins-Minerals (b complex-C-E-zinc) tablet Take 1 tablet by mouth Daily.    Keira Odonnell MD   OLANZapine zydis (zyPREXA) 5 MG disintegrating tablet Place 1 tablet on the tongue Every Night. 4/13/24   Valeriy De Jesus MD   polyethylene glycol  (MIRALAX) 17 g packet Take 17 g by mouth 2 (Two) Times a Day. Hold if loose stool 3/14/24   Alfred Hill MD       Allergies:  Allergies   Allergen Reactions    Donepezil Hallucinations    Steglatro [Ertugliflozin] Other (See Comments)     balanitis       Objective     Vitals:   Temp:  [97.5 °F (36.4 °C)-98.4 °F (36.9 °C)] 98.4 °F (36.9 °C)  Heart Rate:  [73-91] 77  Resp:  [16-18] 16  BP: (120-155)/() 155/70    Intake/Output Summary (Last 24 hours) at 4/27/2024 2114  Last data filed at 4/27/2024 1500  Gross per 24 hour   Intake --   Output 950 ml   Net -950 ml       Physical Exam:   Constitutional: Obtunded; tachypneic  HEENT: Sclera anicteric, no conjunctival injection, dry MM though mouth breathing  Neck: Supple, no carotid bruit, trachea at midline, no JVD  Respiratory: Coarse anteriorly, no wheezing, nonlabored on RA  Cardiovascular: RRR, no rub  Gastrointestinal: BS +, soft, distended, no grimacing with palpation   : No palpable bladder; Aguila catheter anchored  Musculoskeletal: Trace doughy edema, no clubbing or cyanosis  Psychiatric: Obtunded  Neurologic: Pupils are equal   Skin: Warm and dry       Scheduled Meds:     [Held by provider] aspirin, 81 mg, Oral, Daily  [Held by provider] carvedilol, 12.5 mg, Oral, BID With Meals  [Held by provider] cloNIDine, 0.2 mg, Oral, Q8H  ertapenem, 500 mg, Intravenous, Q24H  fluticasone, 1 spray, Nasal, Daily  [Held by provider] hydroxyurea, 500 mg, Oral, Daily  insulin regular, 2-9 Units, Subcutaneous, Q6H  levETIRAcetam, 500 mg, Intravenous, Q12H  [Held by provider] memantine, 10 mg, Oral, BID  Menthol-Zinc Oxide, 1 Application, Topical, Q12H  metoprolol tartrate, 2.5 mg, Intravenous, Q6H  [Held by provider] OLANZapine zydis, 5 mg, Oral, Nightly  [Held by provider] oxybutynin XL, 5 mg, Oral, Daily  [Held by provider] polyethylene glycol, 17 g, Oral, BID  [Held by provider] rosuvastatin, 40 mg, Oral, Nightly  [Held by provider] sennosides-docusate, 2  tablet, Oral, BID  [Held by provider] sertraline, 50 mg, Oral, Nightly  sodium chloride, 10 mL, Intravenous, Q12H  [Held by provider] tamsulosin, 0.4 mg, Oral, Nightly      IV Meds:   sodium chloride, 125 mL/hr, Last Rate: 125 mL/hr (04/27/24 4588)        Results Reviewed:   I have personally reviewed the results from the time of this admission to 4/27/2024 21:14 EDT     Lab Results   Component Value Date    GLUCOSE 146 (H) 04/27/2024    CALCIUM 9.0 04/27/2024     (H) 04/27/2024    K 4.3 04/27/2024    CO2 18.4 (L) 04/27/2024     (H) 04/27/2024    BUN 62 (H) 04/27/2024    CREATININE 2.99 (H) 04/27/2024    EGFRIFAFRI 66 01/10/2022    EGFRIFNONA 57 (L) 01/10/2022    BCR 20.7 04/27/2024    ANIONGAP 13.6 04/27/2024      Lab Results   Component Value Date    MG 1.8 04/24/2024    PHOS 3.8 04/12/2024    ALBUMIN 3.4 (L) 04/24/2024           Assessment / Plan       Acute UTI    DORI (acute kidney injury)    History of CVA (cerebrovascular accident)    Chronic diastolic congestive heart failure    Paroxysmal atrial fibrillation    Mild cognitive impairment with memory loss    Seizures    Type 2 diabetes mellitus with hyperglycemia, with long-term current use of insulin    HTN (hypertension)    Right hemiparesis    Oropharyngeal dysphagia    Expressive aphasia    Hypertension, uncontrolled    History of ESBL E. coli infection      ASSESSMENT:  1.  DORI, nonoliguric, prerenal due to sepsis and hypovolemia.  Significant hypernatremia on saline; water deficit as he is not eating or drinking anything.  Likely NAGMA  2.  Sepsis of unclear etiology; concern for pneumonia and possibly UTI (although urine culture growing mixed halina)  3.  History of stroke, both old and new.  Brother-in-law at bedside reports continued decline globally for the past few months  4.  Dysphagia  5.  PAF  6.  Hypertension     PLAN:  1.  Stop NS  2.  Begin D5W at 100 mL/h  3.  FENa and uric acid  4.  Family reviewing goals of care; brother-in-law  at bedside indicates that patient's wife considering transition to palliative care    Thank you for involving us in the care of Erlin Blanco.  Please feel free to call with any questions.    Valeriy Dang MD  04/27/24  21:14 EDT    [facility]  [phone]      Please note that portions of this note were completed with a voice recognition program.

## 2024-04-28 NOTE — PROGRESS NOTES
ID Progress Note    CC: f/u sepsis    Subjective: Discussed with his wife.  The family has decided to transition to comfort care later today.  Creatinine little higher.  WBC down to 9.    Medications:    Current Facility-Administered Medications:     acetaminophen (TYLENOL) tablet 650 mg, 650 mg, Oral, Q4H PRN **OR** acetaminophen (TYLENOL) 160 MG/5ML oral solution 650 mg, 650 mg, Oral, Q4H PRN **OR** acetaminophen (TYLENOL) suppository 650 mg, 650 mg, Rectal, Q4H PRN, Annemarie Araujo MD, 650 mg at 04/28/24 0810    [Held by provider] aspirin EC tablet 81 mg, 81 mg, Oral, Daily, Annemarie Araujo MD, 81 mg at 04/26/24 0850    sennosides-docusate (PERICOLACE) 8.6-50 MG per tablet 2 tablet, 2 tablet, Oral, BID PRN **AND** polyethylene glycol (MIRALAX) packet 17 g, 17 g, Oral, Daily PRN **AND** bisacodyl (DULCOLAX) EC tablet 5 mg, 5 mg, Oral, Daily PRN **AND** bisacodyl (DULCOLAX) suppository 10 mg, 10 mg, Rectal, Daily PRN, Annemarie Araujo MD, 10 mg at 04/25/24 1138    calcium carbonate (TUMS) chewable tablet 500 mg (200 mg elemental), 2 tablet, Oral, BID PRN, Annemarie Araujo MD    Calcium Replacement - Follow Nurse / BPA Driven Protocol, , Does not apply, PRN, Annemarie Araujo MD    [Held by provider] carvedilol (COREG) tablet 12.5 mg, 12.5 mg, Oral, BID With Meals, Randy Hernandez MD, 12.5 mg at 04/26/24 0902    [Held by provider] cloNIDine (CATAPRES) tablet 0.2 mg, 0.2 mg, Oral, Q8H, Annemarie Araujo MD, 0.2 mg at 04/26/24 0628    dextrose (D50W) (25 g/50 mL) IV injection 25 g, 25 g, Intravenous, Q15 Min PRN, Randy Hernandez MD    dextrose (D5W) 5 % infusion, 100 mL/hr, Intravenous, Continuous, Valeriy Dang MD, Last Rate: 100 mL/hr at 04/28/24 1011, 100 mL/hr at 04/28/24 1011    dextrose (GLUTOSE) oral gel 15 g, 15 g, Oral, Q15 Min PRN, Randy Hernandez MD    ertapenem (INVanz) 500 mg in sodium chloride 0.9 % 50 mL IVPB, 500 mg, Intravenous, Q24H,  Иван Rand MD, Last Rate: 100 mL/hr at 04/27/24 1439, 500 mg at 04/27/24 1439    fluticasone (FLONASE) 50 MCG/ACT nasal spray 1 spray, 1 spray, Nasal, Daily, Annemarie Araujo MD, 1 spray at 04/26/24 0902    glucagon (GLUCAGEN) injection 1 mg, 1 mg, Intramuscular, Q15 Min PRN, Randy Hernandez MD    [Held by provider] hydroxyurea (HYDREA) capsule 500 mg, 500 mg, Oral, Daily, Annemarie Araujo MD, 500 mg at 04/26/24 0904    insulin regular (humuLIN R,novoLIN R) injection 2-9 Units, 2-9 Units, Subcutaneous, Q6H, Randy Hernandez MD, 4 Units at 04/28/24 0612    levETIRAcetam (KEPPRA) injection 500 mg, 500 mg, Intravenous, Q12H, Randy Hernandez MD, 500 mg at 04/28/24 0809    Magnesium Standard Dose Replacement - Follow Nurse / BPA Driven Protocol, , Does not apply, PRN, Annemarie Araujo MD    melatonin tablet 5 mg, 5 mg, Oral, Nightly PRN, Annemarie Araujo MD    [Held by provider] memantine (NAMENDA) tablet 10 mg, 10 mg, Oral, BID, Annemarie Araujo MD, 10 mg at 04/26/24 0850    Menthol-Zinc Oxide 1 Application, 1 Application, Topical, Q12H, Randy Hernandez MD, 1 Application at 04/28/24 0810    metoprolol tartrate (LOPRESSOR) injection 2.5 mg, 2.5 mg, Intravenous, Q6H, Randy Hernandez MD, 2.5 mg at 04/28/24 0810    morphine injection 2 mg, 2 mg, Intravenous, Q2H PRN, Randy Hernandez MD    nitroglycerin (NITROSTAT) SL tablet 0.4 mg, 0.4 mg, Sublingual, Q5 Min PRN, Annemarie Araujo MD    [Held by provider] OLANZapine zydis (zyPREXA) disintegrating tablet 5 mg, 5 mg, Oral, Nightly, Annemarie Araujo MD, 5 mg at 04/25/24 2119    ondansetron ODT (ZOFRAN-ODT) disintegrating tablet 4 mg, 4 mg, Oral, Q6H PRN **OR** ondansetron (ZOFRAN) injection 4 mg, 4 mg, Intravenous, Q6H PRN, Annemarie Araujo MD, 4 mg at 04/26/24 0007    [Held by provider] oxybutynin XL (DITROPAN-XL) 24 hr tablet 5 mg, 5 mg, Oral, Daily, Annemarie Araujo MD, 5 mg at  04/26/24 0850    Phosphorus Replacement - Follow Nurse / BPA Driven Protocol, , Does not apply, PRN, Annemarie Araujo MD    [Held by provider] polyethylene glycol (MIRALAX) packet 17 g, 17 g, Oral, BID, Annemarie Araujo MD, 17 g at 04/26/24 0903    Potassium Replacement - Follow Nurse / BPA Driven Protocol, , Does not apply, Van SCHMIDT Tracy Suzzanne, MD    [Held by provider] rosuvastatin (CRESTOR) tablet 40 mg, 40 mg, Oral, Nightly, Annemarie Araujo MD, 40 mg at 04/25/24 2116    [Held by provider] sennosides-docusate (PERICOLACE) 8.6-50 MG per tablet 2 tablet, 2 tablet, Oral, BID, Annemarie Araujo MD, 2 tablet at 04/26/24 0850    [Held by provider] sertraline (ZOLOFT) tablet 50 mg, 50 mg, Oral, Nightly, Annemarie Araujo MD, 50 mg at 04/25/24 2117    sodium chloride 0.9 % flush 10 mL, 10 mL, Intravenous, Q12H, Annemarie Araujo MD, 10 mL at 04/28/24 0810    sodium chloride 0.9 % flush 10 mL, 10 mL, Intravenous, PRN, Annemarie Araujo MD    sodium chloride 0.9 % infusion 40 mL, 40 mL, Intravenous, PRN, Annemarie Araujo MD    [Held by provider] tamsulosin (FLOMAX) 24 hr capsule 0.4 mg, 0.4 mg, Oral, Nightly, Annemarie Araujo MD, 0.4 mg at 04/25/24 2116        Physical Exam:   Vital Signs   Temp:  [98.4 °F (36.9 °C)-99.3 °F (37.4 °C)] 99.3 °F (37.4 °C)  Heart Rate:  [73-84] 84  Resp:  [16-24] 22  BP: (133-170)/(64-88) 170/88    GENERAL: Does not respond to any stimuli  CV: NR  LUNGS:normal respiratory effort.  On RA  GI: Soft, no rebound or guarding  SKIN: no rash      Results Review:  CBC, BMP, and blood cultures and urine cultures reviewed today  Lab Results   Component Value Date    WBC 10.07 04/28/2024    HGB 13.3 04/28/2024    HCT 41.0 04/28/2024    MCV 95.8 04/28/2024     04/28/2024     Lab Results   Component Value Date    GLUCOSE 202 (H) 04/28/2024    CALCIUM 8.9 04/28/2024     (H) 04/28/2024    K 4.6 04/28/2024    CO2 16.1 (L)  04/28/2024     (H) 04/28/2024    BUN 71 (H) 04/28/2024    CREATININE 3.51 (H) 04/28/2024    EGFRRESULT 49 (L) 02/05/2024    EGFR 17.3 (L) 04/28/2024    BCR 20.2 04/28/2024    ANIONGAP 13.9 04/28/2024     Lab Results   Component Value Date    HGBA1C 8.40 (H) 04/11/2024       Microbiology:  4/24 blood cultures 2/2 no growth to date  4/24 respiratory pathogen panel negative  4/24 urine culture greater than 100,000 mixed gram-negative halina    Prior radiology:  CT chest/abdomen/pelvis: Low lung volumes with multifocal bilateral atelectasis and probable superimposed consolidation likely representing pneumonia.  Retained secretions in the right mainstem bronchus and associated mucous plugging.  Prostatic megaly and bladder thickening consistent with cystitis.    Assessment and plan:  Sepsis  Bilateral pneumonia  Acute cystitis  Encephalopathy  Uncontrolled DM2 with hyperglycemia  History of hemorrhagic stroke  Acute kidney injury -worse    No fever. WBC normal. BCx negative to date.     Noted plans to transition to palliative/comfort measures today.  I told his wife that given the information I have that I would do the same thing if my family member were in his situation.

## 2024-04-28 NOTE — PLAN OF CARE
Goal Outcome Evaluation:  Plan of Care Reviewed With: patient, family        Progress: no change  Outcome Evaluation: Pt a little restless at times. Calling out for his mother. PRN morphine given x 1 with relief. IVF changed to D5W. Aguila remains in place. Safety maintained.

## 2024-04-29 PROBLEM — Z51.5 END OF LIFE CARE: Status: ACTIVE | Noted: 2024-04-29

## 2024-04-29 NOTE — PLAN OF CARE
Goal Outcome Evaluation:      PPS 10%. Patient remained comfortable throughout the day. Pre-medicated with morphine, lorazepam and robinul prior to turns. Flipped to B this afternoon. Spouse at bedside and attentive throughout the day.

## 2024-04-29 NOTE — PLAN OF CARE
Goal Outcome Evaluation:  Plan of Care Reviewed With: patient, son        Progress: no change  Outcome Evaluation: Palliative pt, PPS 10% Patient is responsive to pain. Opens eyes to voice, garbled speech. Aguila catheter in place. Premedicated with morphine 2mg, ativan SL 0.5mg, and robinul 0.2mg prior to turns. Son at bedside. Continues to receive pepcid and seizure medications per families wishes. Room air. Continue comfort measures.

## 2024-04-29 NOTE — CONSULTS
Arrived on [facility] and verified hospice order on Mr. Blanco chart. Met with prosper Piper's primary RN for updated pt status. Reviewed all documents and faxed to R. Met with Mr. Blanco, pt's spouse and family for visit. Pt currently lying in bed and withdraws to pain. Pt is mumbling intermittently. Provided pt spouse and family with detailed explanation of hospice services with focus on general inpatient services. Had goals of care conversation with pt's spouse, who wants pt to remain in his room for comfort measures with hospice. Pt needs general inpatient admission for pain control/restlessness that can not be managed in the home setting. Reviewed general inpatient eligibility with Dr. Raquel Pagan ([facility]) who declared pt was eligible. Consents and MAP forms obtained and signed by Yanykhushbu Cevalloss (spouse) with attending as MUKUL Augustin. Dr. Lopez confirmed he would discharge pt and readmit to scattered bed for hospice general inpatient admission. Updated Veronique pt's primary RN and left benefit change form on Radha () desk. Thank you for allowing [facility] to participate in this patient and family's care.    Penny Reese  Referral and Admissions Coordinator  [facility]  [phone]

## 2024-04-29 NOTE — DISCHARGE SUMMARY
Date of Admission: 4/24/2024  Date of Discharge:  4/29/2024  Primary Care Physician: Zamzam John, APRN     Discharge Diagnosis:  Active Hospital Problems    Diagnosis  POA    **History of CVA (cerebrovascular accident) [Z86.73]  Not Applicable    Acute UTI [N39.0]  Yes    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]  Yes    Hypertension, uncontrolled [I10]  Yes    Expressive aphasia [R47.01]  Yes    Oropharyngeal dysphagia [R13.12]  Yes    Right hemiparesis [G81.91]  Yes    HTN (hypertension) [I10]  Yes    Type 2 diabetes mellitus with hyperglycemia, with long-term current use of insulin [E11.65, Z79.4]  Not Applicable    Seizures [R56.9]  Yes    Mild cognitive impairment with memory loss [G31.84]  Yes    Paroxysmal atrial fibrillation [I48.0]  Yes    DORI (acute kidney injury) [N17.9]  Yes    Chronic diastolic congestive heart failure [I50.32]  Yes      Resolved Hospital Problems   No resolved problems to display.       DETAILS OF HOSPITAL STAY     Pertinent Test Results and Procedures Performed    CT scan of the chest, abdomen, and pelvis:  1. Low lung volumes and multifocal bilateral perihilar and bibasilar   subsegmental atelectasis and/or scarring with superimposed consolidation   of the medial base of the right lower lobe and patchy and nodular   groundglass opacities in each lower lobe that likely represent   pneumonia.   2. Patchy groundglass opacities in the upper lobes and lingula could   also be infectious/inflammatory.   3. Retained secretions in the right mainstem bronchus with bilateral   lower lobe bronchial plugging.   4. Enlarged prostate gland with urinary bladder wall thickening that   could be accentuated by underdistention but could also reflect sequela   of chronic bladder outlet obstruction and/or cystitis.   5. Cholelithiasis with distended gallbladder, gallbladder wall   thickening, and mild pericholecystic fluid. Findings could reflect acute   cholecystitis. Correlate with laboratory  values. Could consider right   upper quadrant ultrasound and/or nuclear medicine hepatobiliary scan if   clinically indicated.   6. No renal or ureteral stone. No hydronephrosis or hydroureter.   7. Mild diffuse anasarca.   8. Nonspecific splenomegaly.     Hospital Course  This is a 78-year-old male with history of diabetes, atrial fibrillation, prior stroke with resulting right hemiparesis and expressive aphasia, seizure disorder, hypertension, chronic diastolic heart failure who presented to the emergency room with elevated blood sugar and fever.  Please see H&P for full details of admission.  He was found to have acute urinary tract infection with sepsis.  He was placed on ertapenem given his history of ESBL bacteremia and infectious disease was consulted.  He also developed acute kidney injury for which nephrology was consulted.  He has associated encephalopathy as well.  After several days of aggressive management of these acute issues it was felt that his prognosis was very guarded as he was significantly ill acutely on top of serious chronic illness as well.  Palliative care was consulted.  The patient was monitored another 48 hours with no significant clinical improvement.  He was showing decline despite aggressive medical treatment and ultimately his family decided to pursue comfort care.  He has been accepted to a hospice scatter bed.    Physical Exam at Discharge:  General: Terminally ill-appearing  HEENT: EOMI, PERRL  Cardiovascular: +s1 and s2, RRR  Lungs: No rhonchi or wheezing  Abdomen: soft, nontender  Neuro: Minimally responsive    Consults:   Consults       Date and Time Order Name Status Description    4/24/2024  6:51 PM Inpatient Infectious Diseases Consult Completed     4/10/2024 10:35 AM Inpatient Neurosurgery Consult Completed     4/9/2024  9:26 AM Inpatient Cardiology Consult Completed     4/6/2024 10:22 PM Inpatient Hematology & Oncology Consult Completed               Condition on  Discharge: Terminal    Discharge Disposition  Hospice/Medical Facility ([facility] [facility])    Vernon Lopez MD  04/29/24  16:52 EDT    Time: Discharge greater than 30 min

## 2024-04-29 NOTE — PROGRESS NOTES
Palliative Social Work Note    Purpose of visit: Initial visit  Assessment: Patient resting comfortably at time of visit. PPS 10%.  Support System: Family, Spouse, and Children  Psychosocial Needs Identified: None identified at this time  Plan/Other Comments:     SW met with patient's spouse at bedside this afternoon. Explained role and purpose of visit for psychosocial support. Spouse appreciative of the support and care patient has received during his time on the unit as well as family. She had spoken with hospice and had questions regarding hospice involvement in caring for patients on the unit. Provided education on HSB process and the hospice benefit. No other needs identified at this time. Family aware of SW availability should needs arise.

## 2024-04-29 NOTE — PROGRESS NOTES
" LOS: 3 days     Name: Erlin Blanco  Age: 78 y.o.  Sex: male  :  1945  MRN: [id number]         Primary Care Physician: Zamzam John APRN    Subjective   Subjective  Discussed with his son at the bedside.  Patient resting comfortably and was minimally responsive last night and this morning.    Objective   Vital Signs  Temp:  [98.4 °F (36.9 °C)-99.5 °F (37.5 °C)] 99.5 °F (37.5 °C)  Heart Rate:  [80-83] 83  Resp:  [18] 18  BP: (144-172)/(73-78) 149/73  Body mass index is 30.53 kg/m².    Objective:  General Appearance:  Comfortable and in no acute distress.    Vital signs: (most recent): Blood pressure 149/73, pulse 83, temperature 99.5 °F (37.5 °C), temperature source Axillary, resp. rate 18, height 182.9 cm (72\"), weight 102 kg (225 lb 1.4 oz), SpO2 91%.    Lungs:  Normal effort and normal respiratory rate.  He is not in respiratory distress.  There are decreased breath sounds.    Heart: Normal rate.  Regular rhythm.    Abdomen: Abdomen is soft.  Bowel sounds are normal.   There is no abdominal tenderness.     Extremities: There is no dependent edema or local swelling.    Neurological: (Sleeping comfortably.  Minimally responsive to stimuli).    Skin:  Warm and dry.                Results Review:       I reviewed the patient's new clinical results.    Results from last 7 days   Lab Units 24  02424  [id number] 24  1204   WBC 10*3/mm3 10.07 12.33* 17.08* 21.00* 17.22*   HEMOGLOBIN g/dL 13.3 12.7* 13.4 13.8 14.7   PLATELETS 10*3/mm3 440 405 370 392 394     Results from last 7 days   Lab Units 24  02424  02324  0258 246 24  1204   SODIUM mmol/L 150* 149* 145 142 139   POTASSIUM mmol/L 4.6 4.3 4.4 4.0 3.8   CHLORIDE mmol/L 120* 117* 112* 109* 105   CO2 mmol/L 16.1* 18.4* 21.0* 23.0 23.8   BUN mg/dL 71* 62* 41* 23 19   CREATININE mg/dL 3.51* 2.99* 2.17* 1.01 0.81   CALCIUM mg/dL 8.9 9.0 9.1 9.2 9.7   GLUCOSE mg/dL 202* 146* 188* " 305* 405*                 Scheduled Meds:   levETIRAcetam, 500 mg, Intravenous, Q12H      PRN Meds:     acetaminophen **OR** acetaminophen **OR** acetaminophen    atropine    diphenoxylate-atropine    First Mouthwash (Magic Mouthwash)    glycopyrrolate **OR** glycopyrrolate **OR** glycopyrrolate **OR** glycopyrrolate    Morphine **OR** morphine **OR** HYDROmorphone    Morphine **OR** morphine **OR** HYDROmorphone    Morphine **OR** morphine **OR** HYDROmorphone    LORazepam **OR** midazolam **OR** midazolam **OR** midazolam **OR** LORazepam **OR** LORazepam    LORazepam **OR** midazolam **OR** midazolam **OR** midazolam **OR** LORazepam **OR** LORazepam    LORazepam **OR** midazolam **OR** midazolam **OR** midazolam **OR** LORazepam **OR** LORazepam    miconazole    polyethylene Glycol 400    prochlorperazine **OR** prochlorperazine **OR** prochlorperazine    Scopolamine    sodium chloride  Continuous Infusions:       Assessment & Plan   Active Hospital Problems    Diagnosis  POA    **Acute UTI [N39.0]  Yes    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]  Yes    Hypertension, uncontrolled [I10]  Yes    Expressive aphasia [R47.01]  Yes    Oropharyngeal dysphagia [R13.12]  Yes    Right hemiparesis [G81.91]  Yes    HTN (hypertension) [I10]  Yes    Type 2 diabetes mellitus with hyperglycemia, with long-term current use of insulin [E11.65, Z79.4]  Not Applicable    Seizures [R56.9]  Yes    Mild cognitive impairment with memory loss [G31.84]  Yes    Paroxysmal atrial fibrillation [I48.0]  Yes    History of CVA (cerebrovascular accident) [Z86.73]  Not Applicable    DORI (acute kidney injury) [N17.9]  Yes    Chronic diastolic congestive heart failure [I50.32]  Yes      Resolved Hospital Problems   No resolved problems to display.       Assessment & Plan    -Continue comfort measures.  Requiring Ativan, morphine, and Robinul for symptom control  -Continue IV Keppra to help prevent seizure and thusly provide comfort  -Discussed  with his son that prognosis is that of hours to days          Expected Discharge Date: 4/29/2024; Expected Discharge Time:      Vernon Lopez MD  [facility]  04/29/24  08:17 EDT

## 2024-04-30 NOTE — H&P
[facility] Admission H&P    Patient Care Team:  Zamzam John APRN as PCP - General (Family Medicine)  Bertin Perrin MD (Inactive) as Consulting Physician (Orthopedic Surgery)  Marika Arias MD as Consulting Physician (Cardiology)  Remy Thomas MD as Consulting Physician (Hematology and Oncology)  Sahil De La Rosa MD as Consulting Physician (Urology)  Erlin Abreu, RN as Ambulatory  ([facility])  Zamzam John APRN as Primary Care Provider (Family Medicine)    Chief complaint: Admission to hospice scattered bed    History of Present Illness    This is a 78-year-old male with history of diabetes, atrial fibrillation, prior stroke with resulting right hemiparesis and expressive aphasia, seizure disorder, hypertension, chronic diastolic heart failure who presented to the emergency room with elevated blood sugar and fever. Please see H&P for full details of admission. He was found to have acute urinary tract infection with sepsis. He was placed on ertapenem given his history of ESBL bacteremia and infectious disease was consulted. He also developed acute kidney injury for which nephrology was consulted. He has associated encephalopathy as well. After several days of aggressive management of these acute issues it was felt that his prognosis was very guarded as he was significantly ill acutely on top of serious chronic illness as well. Palliative care was consulted. The patient was monitored another 48 hours with no significant clinical improvement. He was showing decline despite aggressive medical treatment and ultimately his family decided to pursue comfort care. He has been accepted to a hospice scatter bed.     Past Medical History:   Diagnosis Date    Abnormal ECG     Abnormal stress test     Abrasion of face 01/29/2020    Acute bronchitis     Acute hypokalemia 09/16/2020    Acute non-recurrent sinusitis 01/13/2020    Acute pyelonephritis 10/09/2020    Acute sinusitis     DORI  (acute kidney injury) 04/28/2017    Allergic rhinitis     Aortic root dilation     Arthritis     Bacteremia due to Escherichia coli 08/17/2020    Balanitis 05/16/2021    Benign enlargement of prostate     C. difficile colitis 09/16/2020    Cellulitis of knee, left 05/04/2017    CHF (congestive heart failure)     Chronic diastolic congestive heart failure     Constipation 11/14/2020    Contusion of face 01/29/2020    Coronary artery disease     COVID-19 virus infection     CVA (cerebral vascular accident) 2020    Diminished pulse     in lower extremities    Discitis of lumbar region 10/09/2020    Edema     Elevated hematocrit     Elevated hemoglobin     Essential hypertension     Hearing loss     mala hearing aids    Heart valve disease     High risk medication use     History of back pain     History of dysuria     History of echocardiogram     History of ESBL E. coli infection 04/24/2024    History of intracranial hemorrhage     History of scoliosis     History of stroke     Hyperlipidemia     Hypokalemia 01/07/2021    Injury of toe, left, initial encounter     Irregular heart rate     Knee pain, left     Left bundle branch block     Left bundle-branch block 07/17/2015    Leg wound, left     Leukocytosis 09/16/2020    Low back pain     Maxillary sinusitis 07/16/2021    Medicare annual wellness visit, subsequent     Minor head injury without loss of consciousness 01/29/2020    Murmur     Muscle cramps     Need for hepatitis C screening test     Paraphimosis 05/16/2021    Paroxysmal atrial fibrillation 03/05/2024    Pneumonia of left lung due to infectious organism 09/17/2021    Polycythemia     Polycythemia vera     Pressure injury of buttock, stage 2 09/18/2020    Prostate hyperplasia without urinary obstruction     Prostatitis     Proteinuria     Pulmonary hypertension     Sacroiliitis 10/09/2020    Scoliosis     Sepsis due to Escherichia coli 08/17/2020    Sepsis with metabolic encephalopathy 08/17/2020    Stroke      SVT (supraventricular tachycardia)     Tachycardia     Thrombocytosis     TIA (transient ischemic attack)         Type 2 diabetes mellitus     Type 2 diabetes mellitus with hyperglycemia 2021    Urinary tract infection due to extended-spectrum beta lactamase (ESBL) producing Escherichia coli 2020    Valvular heart disease      Past Surgical History:   Procedure Laterality Date    CARDIAC CATHETERIZATION      CATARACT EXTRACTION Left 2021    CATARACT EXTRACTION Right 2022    COLONOSCOPY      did Cologuard approx 2019 and negative    HAND SURGERY      1970s    JOINT REPLACEMENT Right     AL ARTHRP KNE CONDYLE&PLATU MEDIAL&LAT COMPARTMENTS Left 2017    Procedure: LT TOTAL KNEE ARTHROPLASTY;  Surgeon: Bertin Perrin MD;  Location: [facility] OR;  Service: Orthopedics    PROSTATE SURGERY      Rezum steam treatment to shrink prostate by dr. guerrero     Family History   Problem Relation Age of Onset    Hypertension Mother     Other Father         ruptured appendix,  when pt. was 14 years old.    Diabetes Paternal Aunt     Diabetes Paternal Uncle     No Known Problems Other     Migraines Sister     Stroke Sister     Dementia Brother      Social History     Tobacco Use    Smoking status: Former     Current packs/day: 0.00     Types: Cigarettes     Quit date:      Years since quittin.3     Passive exposure: Past    Smokeless tobacco: Never    Tobacco comments:     Caffeine daily   Vaping Use    Vaping status: Never Used    Passive vaping exposure: Yes   Substance Use Topics    Alcohol use: Never    Drug use: Never     Medications Prior to Admission   Medication Sig Dispense Refill Last Dose    acetaminophen (TYLENOL) 500 MG tablet Take 2 tablets by mouth Every 8 (Eight) Hours As Needed for Moderate Pain. Indications: Pain       amLODIPine (NORVASC) 10 MG tablet Take 1 tablet by mouth Daily. 30 tablet 0     amLODIPine (Norvasc) 10 MG tablet Take 1 tablet by mouth  Daily. 30 tablet 0     aspirin 81 MG EC tablet Take 1 tablet by mouth Daily. 30 tablet 0     B COMPLEX-C-ZN-FOLIC ACID PO Take 1 tablet by mouth Daily.       carvedilol (COREG) 12.5 MG tablet Take 1 tablet by mouth 2 (Two) Times a Day With Meals. 60 tablet 0     cloNIDine (CATAPRES) 0.2 MG tablet Take 1 tablet by mouth Every 8 (Eight) Hours. 90 tablet 0     fluticasone (FLONASE) 50 MCG/ACT nasal spray 1 spray into the nostril(s) as directed by provider Daily. 48 g 3     hydroxyurea (HYDREA) 500 MG capsule Take 1 capsule by mouth Daily. Indications: Polycythemia Vera       Insulin Glargine (LANTUS SOLOSTAR) 100 UNIT/ML injection pen Inject 25 Units under the skin into the appropriate area as directed Every Night. 15 mL 0     Insulin Pen Needle (BD Pen Needle Moni U/F) 32G X 4 MM misc Use 1 each as directed once daily. 100 each 0     Januvia 100 MG tablet Take 1 tablet by mouth Daily. Indications: Type 2 Diabetes       levETIRAcetam (KEPPRA) 500 MG tablet Take 1 tablet by mouth 2 (Two) Times a Day. 60 tablet 0     melatonin 5 MG tablet tablet Take 1 tablet by mouth Every Night. (Patient taking differently: Take 1 tablet by mouth At Night As Needed.)       memantine (NAMENDA) 10 MG tablet Take 1 tablet by mouth 2 (Two) Times a Day. 180 tablet 3     metFORMIN (GLUCOPHAGE) 500 MG tablet Take 1 tablet by mouth 2 (Two) Times a Day With Meals. (Patient taking differently: Take 2 tablets by mouth Daily.) 180 tablet 1     Multiple Vitamins-Minerals (b complex-C-E-zinc) tablet Take 1 tablet by mouth Daily.       OLANZapine zydis (ZyPREXA Zydis) 5 MG disintegrating tablet Place 1 tablet on the tongue Every Night. 30 tablet 0     OLANZapine zydis (zyPREXA) 5 MG disintegrating tablet Place 1 tablet on the tongue Every Night. 30 tablet 0     polyethylene glycol (MIRALAX) 17 g packet Take 17 g by mouth 2 (Two) Times a Day. Hold if loose stool       rosuvastatin (CRESTOR) 20 MG tablet Take 2 tablets by mouth Every Night.  Indications: High Amount of Fats in the Blood       sennosides-docusate (PERICOLACE) 8.6-50 MG per tablet Take 2 tablets by mouth 2 (Two) Times a Day.       sertraline (ZOLOFT) 50 MG tablet Take 1 tablet by mouth Daily. (Patient taking differently: Take 1 tablet by mouth Every Night. Indications: Major Depressive Disorder) 30 tablet 0     spironolactone (ALDACTONE) 50 MG tablet Take 1 tablet by mouth Daily. 30 tablet 0     valsartan (DIOVAN) 320 MG tablet Take 1 tablet by mouth Daily. 90 tablet 1      Allergies:  Donepezil and Steglatro [ertugliflozin]    Review of Systems   Unable to perform ROS: Patient unresponsive        PHYSICAL EXAM    Vital Signs  tMax 24 hrs:  Temp (24hrs), Av.6 °F (36.4 °C), Min:97.1 °F (36.2 °C), Max:98.1 °F (36.7 °C)    Vitals Ranges:  Temp:  [97.1 °F (36.2 °C)-98.1 °F (36.7 °C)] 98.1 °F (36.7 °C)  Heart Rate:  [87-95] 95  Resp:  [12-18] 12  BP: (138-153)/(81-87) 153/87    Physical Exam  Vitals and nursing note reviewed.   Constitutional:       General: He is not in acute distress.     Appearance: He is well-developed.      Comments: Terminally ill-appearing   HENT:      Head: Normocephalic and atraumatic.      Nose: Nose normal.      Mouth/Throat:      Mouth: Mucous membranes are not dry.   Eyes:      Conjunctiva/sclera: Conjunctivae normal.      Pupils: Pupils are equal, round, and reactive to light.   Neck:      Vascular: No JVD.   Cardiovascular:      Rate and Rhythm: Normal rate and regular rhythm.      Heart sounds: No murmur heard.     No gallop.   Pulmonary:      Effort: Pulmonary effort is normal. No accessory muscle usage or respiratory distress.      Breath sounds: No decreased breath sounds or wheezing.   Abdominal:      General: Bowel sounds are normal. There is no distension.      Palpations: Abdomen is soft.      Tenderness: There is no abdominal tenderness. There is no guarding or rebound.   Musculoskeletal:         General: No deformity. Normal range of motion.       Cervical back: Normal range of motion and neck supple.   Lymphadenopathy:      Cervical: No cervical adenopathy.   Skin:     General: Skin is warm and dry.      Findings: No erythema or rash.   Neurological:      Mental Status: He is alert.      Comments: Minimally responsive         Results Review:  Results from last 7 days   Lab Units 04/28/24  0241   WBC 10*3/mm3 10.07   HEMOGLOBIN g/dL 13.3   HEMATOCRIT % 41.0   PLATELETS 10*3/mm3 440     Results from last 7 days   Lab Units 04/28/24  0241 04/25/24  0316 04/24/24  1204   SODIUM mmol/L 150*   < > 139   POTASSIUM mmol/L 4.6   < > 3.8   CHLORIDE mmol/L 120*   < > 105   CO2 mmol/L 16.1*   < > 23.8   BUN mg/dL 71*   < > 19   CREATININE mg/dL 3.51*   < > 0.81   CALCIUM mg/dL 8.9   < > 9.7   BILIRUBIN mg/dL  --   --  0.6   ALK PHOS U/L  --   --  98   ALT (SGPT) U/L  --   --  21   AST (SGOT) U/L  --   --  22   GLUCOSE mg/dL 202*   < > 405*    < > = values in this interval not displayed.        I reviewed the patient's new clinical results.        Active Hospital Problems    Diagnosis  POA    **History of CVA (cerebrovascular accident) [Z86.73]  Not Applicable    End of life care [Z51.5]  Not Applicable    HTN (hypertension) [I10]  Yes    Paroxysmal atrial fibrillation [I48.0]  Yes    Chronic diastolic congestive heart failure [I50.32]  Yes      Resolved Hospital Problems   No resolved problems to display.       Assessment & Plan    Patient is admitted to a hospice scatter bed.  He is requiring morphine, Ativan, and Robinul for symptom management.  He appears very comfortable.  Continue palliative/comfort care measures.  Discussed with family at bedside.  Prognosis is that of hours to days.    I discussed the patients findings and my recommendations with family      Vernon Lopez MD  04/30/24  07:21 EDT

## 2024-04-30 NOTE — PLAN OF CARE
Goal Outcome Evaluation:  Plan of Care Reviewed With: patient, son, daughter        Progress: declining  Outcome Evaluation: PPS 10%. Pt is responsive to pain. Premedicating prior to turns with 2 morphine, and 0.5 SL ativan, and 0.2 robinul. Seizures meds given. FC in place. Family remains at bedside. Will cont comfort measures.

## 2024-04-30 NOTE — PROGRESS NOTES
[facility] Visit Report    Erlin MIRANDA Blanco  [id number]  4/30/2024    [facility] Dx: yes    Reason for [facility] Admission: dysphagia following cerebral infarction    Review of Visit: Patient is a 77yo male with a primary [facility] diagnosis of dysphagia following cerebral infarction. Admitted to [facility] for GIP for symptom management of pain, dyspnea, seizures, anxiety and congestion. Patient currently in contact isolation for ESBL E.coli. [facility] RN donned appropriate garb prior to entering pts room and maintained throughout the visit.     PPS:     VS: 98.1, 95, 12, 153/87, 92% on room air.     Medications in 24 hours:  -IV Robinul 0.2mg x5  -IV Morphine 2mg x5  -IV Keppra 500mg every 12 hours  -SL Ativan 0.5mg x4  -Ativan 0.5mg tablet x1    Recommendations:  Continue to monitor for signs of decline and provide comfort measures. Contact [facility] at [phone] with any questions or concerns and at TOD.     Assessment:  Patient is bed bound, oral care only, minimally responsive, PPS 10%. Respirations are even and unlabored, with open mouth breathing. Lung sounds diminished with rhonchi, on room air. Abdomen is soft with hypoactive bowel sounds, no PO intake. Extremities are warm to touch, hands and feet cool to touch with dusky nailbeds. Peripheral pulses palpable. Aguila catheter to bedside drainage with 400mL documented output in 24 hours.     Collaboration:  Spoke with pts spouse and family at the bedside with condition update and support provided. Training provided regarding assessment findings, disease progression, symptom management, Scattered Bed Team roles and expected length of stay. Family v/u of pts condition and all questions were answered. Collaborated with [facility] RNMalena and Radha SANTA about pts condition.    Disposition:  Patient meets GIP criteria d/t frequent administration and continued titration of IV medications to achieve and maintain symptom management. Patient requiring  increasing symptom management and frequent RN assessment. If patient were to stabilize he would likely return home with family and hospitals Health support. Will continue [facility] RN visits to monitor for changes, assess needs and provide support.       Sonia Mendoza RN  [facility] Nurse  Scattered Bed Team

## 2024-04-30 NOTE — PROGRESS NOTES
"Enter Query Response Below      Query Response: Bacterial pneumonia superimposed on aspiration pneumonia              If applicable, please update the problem list.     Patient: Erlin Blanco        : 1945  Account: [id number]           Admit Date: 2024        How to Respond to this query:       a. Click New Note     b. Answer query within the yellow box.                c. Update the Problem List, if applicable.      If you have any questions about this query contact me at: [phone]     Dr. Lopez:    78-year-old male with history of diabetes, prior stroke with right hemiparesis and expressive aphasia, dysphagia, presented with elevated blood sugar and fever and found to have acute urinary tract infection with sepsis.  CT chest showed \"nodular groundglass opacities in each lower lobe that likely represent pneumonia.\"  Progress note  states \"CT of the chest, abdomen, and pelvis noted with aspiration pna and UTI.\"  Infectious disease progress note includes bilateral pneumonia.  Speech therapy evaluation note states suspected pharyngeal dysphagia and recommended NPO, meds crushed with puree.  Patient was given Invanz IV -.  Pneumonia is not documented on the discharge summary.     Please clarify the following:    Bacterial pneumonia   Aspiration pneumonia  Bacterial pneumonia superimposed on aspiration pneumonia  Pneumonia ruled out   Other (specify) ________  Unable to determine       By submitting this query, we are merely seeking further clarification of documentation to accurately reflect all conditions that you are monitoring, evaluating, treating or that extend the hospitalization or utilize additional resources of care. Please utilize your independent clinical judgment when addressing the question(s) above.     This query and your response, once completed, will be entered into the legal medical record.    Sincerely,  Karon Jasso RN, MSN  Clinical Documentation " [facility]   [email or web address]

## 2024-04-30 NOTE — PLAN OF CARE
Goal Outcome Evaluation:  Plan of Care Reviewed With: family        Progress: declining  Outcome Evaluation: Palliative pt, PPS 10% Patient is responsive to pain and opens eyes spontaneously. Premedicated prior to turns with morphine 2mg, ativan 0.5mg SL, and robinul 0.2mg. Continues to receive seizure medications IV per families wishes. Patient is on room air. Family at bedside. Continue comfort measures.

## 2024-04-30 NOTE — PROGRESS NOTES
LOS: 1 day     Name: Erlin Blanco  Age: 78 y.o.  Sex: male  :  1945  MRN: [id number]         Primary Care Physician: Zamzam John APRN    Subjective   Subjective  Remains minimally responsive.  Requiring morphine, Ativan, Robinul for symptom control.    Objective   Vital Signs  Temp:  [97.1 °F (36.2 °C)-98.1 °F (36.7 °C)] 98.1 °F (36.7 °C)  Heart Rate:  [87-95] 95  Resp:  [12-18] 12  BP: (138-153)/(81-87) 153/87  There is no height or weight on file to calculate BMI.    Objective:  General Appearance:  Comfortable, in no acute distress and ill-appearing (Terminal appearing).    Vital signs: (most recent): Blood pressure 153/87, pulse 95, temperature 98.1 °F (36.7 °C), temperature source Oral, resp. rate 12, SpO2 92%.    Lungs:  Normal effort and normal respiratory rate.  He is not in respiratory distress.  There are decreased breath sounds.    Heart: Normal rate.  Regular rhythm.    Abdomen: Abdomen is soft.  Bowel sounds are normal.   There is no abdominal tenderness.     Extremities: There is no dependent edema or local swelling.    Neurological: (Minimally responsive).    Skin:  Warm and dry.                Results Review:       I reviewed the patient's new clinical results.    Results from last 7 days   Lab Units 24  02424  0237 24  0257 24  1204   WBC 10*3/mm3 10.07 12.33* 17.08* 21.00* 17.22*   HEMOGLOBIN g/dL 13.3 12.7* 13.4 13.8 14.7   PLATELETS 10*3/mm3 440 405 370 392 394     Results from last 7 days   Lab Units 24  02424  0237 24  0258 24  03124  1204   SODIUM mmol/L 150* 149* 145 142 139   POTASSIUM mmol/L 4.6 4.3 4.4 4.0 3.8   CHLORIDE mmol/L 120* 117* 112* 109* 105   CO2 mmol/L 16.1* 18.4* 21.0* 23.0 23.8   BUN mg/dL 71* 62* 41* 23 19   CREATININE mg/dL 3.51* 2.99* 2.17* 1.01 0.81   CALCIUM mg/dL 8.9 9.0 9.1 9.2 9.7   GLUCOSE mg/dL 202* 146* 188* 305* 405*                 Scheduled Meds:   levETIRAcetam, 500 mg,  Intravenous, Q12H      PRN Meds:     acetaminophen **OR** acetaminophen **OR** acetaminophen    atropine    diphenoxylate-atropine    First Mouthwash (Magic Mouthwash)    glycopyrrolate **OR** glycopyrrolate **OR** glycopyrrolate **OR** glycopyrrolate    Morphine **OR** morphine **OR** HYDROmorphone    Morphine **OR** morphine **OR** HYDROmorphone    Morphine **OR** morphine **OR** HYDROmorphone    LORazepam **OR** midazolam **OR** midazolam **OR** midazolam **OR** LORazepam **OR** LORazepam    LORazepam **OR** midazolam **OR** midazolam **OR** midazolam **OR** LORazepam **OR** LORazepam    LORazepam **OR** midazolam **OR** midazolam **OR** midazolam **OR** LORazepam **OR** LORazepam    miconazole    polyethylene Glycol 400    prochlorperazine **OR** prochlorperazine **OR** prochlorperazine    Scopolamine    sodium chloride  Continuous Infusions:       Assessment & Plan   Active Hospital Problems    Diagnosis  POA    **History of CVA (cerebrovascular accident) [Z86.73]  Not Applicable    End of life care [Z51.5]  Not Applicable    HTN (hypertension) [I10]  Yes    Paroxysmal atrial fibrillation [I48.0]  Yes    Chronic diastolic congestive heart failure [I50.32]  Yes      Resolved Hospital Problems   No resolved problems to display.       Assessment & Plan    Continue comfort measures.    Prognosis is that of hours to days.  Requiring morphine, Ativan, and Robinul for symptom control.  Hospice scatter bed.        Expected discharge date/ time has not been documented.     Vernon Lopez MD  [facility]  04/30/24  09:16 EDT

## 2024-04-30 NOTE — PROGRESS NOTES
SB team SW met with patient and family to explain role of SB team SW and assess for needs. Patient was lying in his hospital bed, unresponsive with no signs of pain or discomfort. Patient's wife, and many other family members were at the bedside. SW sat with family to provide supportive presence.   Patient is currently unresponsive, therefore SW unable to complete PHQ9. Patient has two adult children that live in the area and are both involved in patient's care. Family  would like for patient to remain in a SB for EOL care due to his  imminent prognosis. SW educated on bereavement services provided by [facility] and family voiced understanding. Discussed  planning and family has not selected a  Home at this time but are working on making plans. SW will visit on a weekly and PRN basis to offer EOL support and assist with needs.

## 2024-05-01 NOTE — PROGRESS NOTES
Name: Erlin Blanco ADMIT: 2024   : 1945  PCP: Zamzam John, MUKUL    MRN: [id number] LOS: 2 days   AGE/SEX: 79 y.o. male  ROOM: [facility]     Subjective   Subjective   No chief complaint on file.    Resting comfortably. Discussed with family at bedside.     Objective   Objective   Vital Signs  Temp:  [102 °F (38.9 °C)] 102 °F (38.9 °C)  Heart Rate:  [119] 119  Resp:  [20] 20  BP: (121)/(71) 121/71  SpO2:  [81 %] 81 %  on   ;   Device (Oxygen Therapy): room air  There is no height or weight on file to calculate BMI.    Physical Exam  Vitals and nursing note reviewed.   Constitutional:       Appearance: He is ill-appearing. He is not diaphoretic.   Cardiovascular:      Rate and Rhythm: Normal rate and regular rhythm.   Pulmonary:      Effort: Pulmonary effort is normal.      Breath sounds: Decreased breath sounds and rhonchi present. No wheezing.   Abdominal:      Palpations: Abdomen is soft.   Psychiatric:         Cognition and Memory: Cognition is impaired.       Results Review  I reviewed the patient's new clinical results.    Results from last 7 days   Lab Units 24  0241 24  0237 24  0257 24  0316   WBC 10*3/mm3 10.07 12.33* 17.08* 21.00*   HEMOGLOBIN g/dL 13.3 12.7* 13.4 13.8   PLATELETS 10*3/mm3 440 405 370 392     Results from last 7 days   Lab Units 24  0241 24  0237 24  0258 24  0316   SODIUM mmol/L 150* 149* 145 142   POTASSIUM mmol/L 4.6 4.3 4.4 4.0   CHLORIDE mmol/L 120* 117* 112* 109*   CO2 mmol/L 16.1* 18.4* 21.0* 23.0   BUN mg/dL 71* 62* 41* 23   CREATININE mg/dL 3.51* 2.99* 2.17* 1.01   GLUCOSE mg/dL 202* 146* 188* 305*   EGFR mL/min/1.73 17.3* 21.0* 30.8* 77.1     Results from last 7 days   Lab Units 24  0241   ALBUMIN g/dL 2.6*     Results from last 7 days   Lab Units 24  0241 24  0237 24  0258 24  0316   CALCIUM mg/dL 8.9 9.0 9.1 9.2   ALBUMIN g/dL 2.6*  --   --   --    PHOSPHORUS mg/dL 3.6  --   --   --   "    Results from last 7 days   Lab Units 04/25/24  0316   PROCALCITONIN ng/mL 0.98*     No results found for: \"HGBA1C\", \"POCGLU\"    No radiology results for the last day    I have personally reviewed all medications:  Scheduled Medications  levETIRAcetam, 500 mg, Intravenous, Q12H      Infusions     Diet  Diet: Regular/House; Fluid Consistency: Thin (IDDSI 0)    I have personally reviewed:  []  Laboratory   []  Microbiology   []  Radiology   []  EKG/Telemetry  []  Cardiology/Vascular   []  Pathology    []  Records       Assessment/Plan     Active Hospital Problems    Diagnosis  POA    **History of CVA (cerebrovascular accident) [Z86.73]  Not Applicable    End of life care [Z51.5]  Not Applicable    HTN (hypertension) [I10]  Yes    Paroxysmal atrial fibrillation [I48.0]  Yes    Chronic diastolic congestive heart failure [I50.32]  Yes      Resolved Hospital Problems   No resolved problems to display.       79 y.o. male admitted with History of CVA (cerebrovascular accident).    Continue palliative and comfort measures. Requiring robinul, morphine, ativan, scopolamine for symptom control. Hospice following.  Hx CVA  PAF  Chronic Diastolic Heart Failure  HTN    Expected Discharge Date: 5/5/2024; Expected Discharge Time:      Valeriy De Jesus MD  [facility]  05/01/24  15:11 EDT    Dictated portions of note using Dragon dictation software.  Copied text in this note has been reviewed by me and remains accurate as of 05/01/24  "

## 2024-05-01 NOTE — CASE MANAGEMENT/SOCIAL WORK
Case Management Discharge Note      Final Note: Home, hospice following. Orders reviewed no further discharge needs.    Provided Post Acute Provider List?: N/A  Provided Post Acute Provider Quality & Resource List?: N/A    Selected Continued Care - Discharged on 4/29/2024 Admission date: 4/24/2024 - Discharge disposition: Hospice/Medical Facility ([facility])      Destination    No services have been selected for the patient.                Durable Medical Equipment    No services have been selected for the patient.                Dialysis/Infusion    No services have been selected for the patient.                Home Medical Care    No services have been selected for the patient.                Therapy    No services have been selected for the patient.                Community Resources    No services have been selected for the patient.                Community & DME    No services have been selected for the patient.                    Selected Continued Care - Episodes Includes continued care and service providers with selected services from the active episodes listed below      Chronic Care Management Episode start date: 1/12/2024   There are no active outsourced providers for this episode.                 Selected Continued Care - Prior Encounters Includes continued care and service providers with selected services from prior encounters from 1/25/2024 to 4/29/2024      Discharged on 4/13/2024 Admission date: 4/6/2024 - Discharge disposition: Home-Health Care Svc      Home Medical Care       Service Provider Selected Services Address Phone Fax Patient Preferred    [facility] ,  Home Nursing ,  Home Rehabilitation [address] [address] [phone] --                      Discharged on 3/14/2024 Admission date: 2/24/2024 - Discharge disposition: Skilled Nursing Facility (DC - External)      Destination       Service Provider  Selected Services Address Phone Fax Patient Preferred    [facility] [address] [facility] , [facility] [address] [phone] --                               Final Discharge Disposition Code: 50 - home with hospice

## 2024-05-01 NOTE — PROGRESS NOTES
[facility] Visit Report    Erlin MIRANDA Blanco  [id number]  5/1/2024    Admission R/T [facility] Dx: yes     Reason for [facility] Admission: dysphagia following cerebral infarction     Review of Visit: Patient is a 80yo male with a primary [facility] diagnosis of dysphagia following cerebral infarction. Admitted to [facility] for GIP for symptom management of pain, dyspnea, seizures, anxiety and congestion. Patient currently in contact isolation for ESBL E.coli. [facility] RN donned appropriate garb prior to entering pts room and maintained throughout the visit.      PPS: 10%     VS: 102, 119, 20, 121/71, 81% on room air.      Medications in 24 hours:  -IV Robinul 0.2mg x3  -IV Robinul 0.4mg x3  -IV Morphine 2mg x4  -IV Morphine 4mg x2  -IV Keppra 500mg every 12 hours  -SL Ativan 0.5mg x1  -SL Ativan 1mg x5  -Scopolamine patch     Recommendations:  Continue to monitor for signs of decline and provide comfort measures. Contact [facility] at [phone] with any questions or concerns and at TOD.      Assessment:  Patient is bed bound, oral care only, unresponsive, PPS 10%. Respirations are even and slightly labored with accessory muscle use, with open mouth breathing. Lung sounds diminished with rhonchi, on room air. Abdomen is soft with hypoactive bowel sounds, no PO intake. Hands are warm to touch, feet cool to touch with dusky nailbeds. Peripheral pulses palpable. Aguila catheter to bedside drainage with 1250mL documented output in 24 hours.      Collaboration:  Spoke with pts spouse at the bedside with condition update and support provided. Training provided regarding assessment findings, disease progression, symptom management, Scattered Bed Team roles and expected length of stay. Family v/u of pts condition and all questions were answered. Collaborated with [facility] RN, Shantanu and Radha SANTA about pts condition.     Disposition:  Patient meets GIP criteria d/t frequent administration and continued titration of IV medications to  achieve and maintain symptom management. Patient requiring increasing symptom management and frequent RN assessment. If patient were to stabilize he would likely return home with family and [facility] support. Will continue [facility] RN visits to monitor for changes, assess needs and provide support.       Sonia Mendoza RN  [facility] Visit Nurse  Scattered Bed Team

## 2024-05-01 NOTE — PLAN OF CARE
Goal Outcome Evaluation:  Plan of Care Reviewed With: patient        Progress: declining  Outcome Evaluation: Palliative pt, PPS 10% Responsive to pain. Premeds increased to robinul 0.4mg morphine 4mg, and ativan SL 1mg. Scopolamine patch placed behind right ear. Room air. Aguila catheter in place. Continues to receive seizure medication per family wishes.Family at bedside. Continue comfort measures.

## 2024-05-02 NOTE — PROGRESS NOTES
Case Management Discharge Note      Final Note: The patient  on 24 @ 23:10. BMicheal Stone RN CCP.         Selected Continued Care - Discharged on 2024 Admission date: 2024 - Discharge disposition:       Destination Coordination complete.      Service Provider Selected Services Address Phone Fax Patient Preferred    [facility] [address] [facility] [address] [phone] --              Durable Medical Equipment    No services have been selected for the patient.                Dialysis/Infusion    No services have been selected for the patient.                Home Medical Care    No services have been selected for the patient.                Therapy    No services have been selected for the patient.                Community Resources    No services have been selected for the patient.                Community & DME    No services have been selected for the patient.                    Selected Continued Care - Episodes Includes continued care and service providers with selected services from the active episodes listed below      Chronic Care Management Episode start date: 2024   There are no active outsourced providers for this episode.                 Selected Continued Care - Prior Encounters Includes continued care and service providers with selected services from prior encounters from 2024 to 2024      Discharged on 2024 Admission date: 2024 - Discharge disposition: Home-Health Care Svc      Home Medical Care       Service Provider Selected Services Address Phone Fax Patient Preferred     [facility] ,  Home Nursing ,  Home Rehabilitation [address] [id number] [phone] --                      Discharged on 3/14/2024 Admission date: 2024 - Discharge disposition: Skilled Nursing Facility (DC - External)      Destination       Service  Provider Selected Services Address Phone Fax Patient Preferred    [facility] [address] [facility] , [facility] [address] [phone] --                               Final Discharge Disposition Code: 41 -  in medical facility

## 2024-05-03 NOTE — DISCHARGE SUMMARY
Discharge As      Date of Admisssion:  2024  Date of Death:  2024  Time of Death:  11:10 PM    Patient Care Team:  Zamzam John APRN as PCP - General (Family Medicine)  Bertin Perrin MD (Inactive) as Consulting Physician (Orthopedic Surgery)  Marika Arias MD as Consulting Physician (Cardiology)  Remy Thomas MD as Consulting Physician (Hematology and Oncology)  Sahil De La Rosa MD as Consulting Physician (Urology)  Erlin Abreu RN as Ambulatory  ([facility])  Zamzam John APRN as Primary Care Provider (Family Medicine)    Final Diagnosis:   History of and Sequelae of Stroke    PAF  Chronic HFpEF  HTN    Presenting Problem/History of Present Illness  End of life care [Z51.5]    This is a 78-year-old male with history of diabetes, atrial fibrillation, prior stroke with resulting right hemiparesis and expressive aphasia, seizure disorder, hypertension, chronic diastolic heart failure who presented to the emergency room with elevated blood sugar and fever. Please see H&P for full details of admission. He was found to have acute urinary tract infection with sepsis. He was placed on ertapenem given his history of ESBL bacteremia and infectious disease was consulted. He also developed acute kidney injury for which nephrology was consulted. He has associated encephalopathy as well. After several days of aggressive management of these acute issues it was felt that his prognosis was very guarded as he was significantly ill acutely on top of serious chronic illness as well. Palliative care was consulted. The patient was monitored another 48 hours with no significant clinical improvement. He was showing decline despite aggressive medical treatment and ultimately his family decided to pursue comfort care. He has been accepted to a hospice scatter bed.    Hospital Course  Patient was a 79 y.o. male presented with history of stroke, PAF, and chronic diastolic heart  failure who was admitted to hospice scatter bed for continued palliative and comfort care after acute admission to the hospital. He had progressive decline and passed on 05/01/2024.    Valeriy De Jesus MD  05/02/24  21:50 EDT    Dictated portions using Dragon dictation software.

## 2024-09-09 NOTE — TELEPHONE ENCOUNTER
Patient's daughter-in law asking would it be ok to post-pone patients upcoming CT and appt here in the office? Patient has only been In rehab for a 1 week, he is still very weak and it is hard transporting him themselves. They are in the process of getting medical transportation set up but as of now the family would be the ones' doing the transporting and it would be very difficult for them to get him here.     Tiffany is wanting to know would it be ok for him to follow up in a few more weeks?    PAST MEDICAL HISTORY:  HTN (hypertension)

## (undated) DEVICE — DUAL CUT SAGITTAL BLADE

## (undated) DEVICE — BANDAGE,GAUZE,BULKEE II,4.5"X4.1YD,STRL: Brand: MEDLINE

## (undated) DEVICE — STPLR SKIN VISISTAT WD 35CT

## (undated) DEVICE — PREMIUM WET SKIN PREP TRAY: Brand: MEDLINE INDUSTRIES, INC.

## (undated) DEVICE — APPL DURAPREP IODOPHOR APL 26ML

## (undated) DEVICE — PK KN TOTL 40

## (undated) DEVICE — SYR LUERLOK 20CC

## (undated) DEVICE — ENCORE® LATEX ORTHO SIZE 8.5, STERILE LATEX POWDER-FREE SURGICAL GLOVE: Brand: ENCORE

## (undated) DEVICE — SOL NACL 0.9PCT 1000ML

## (undated) DEVICE — PIN TROC SIGNATURE PK/2

## (undated) DEVICE — GLV SURG TRIUMPH CLASSIC PF LTX 8.5 STRL

## (undated) DEVICE — KT DRN EVAC WND PVC PCH WTROC RND 10F400

## (undated) DEVICE — ANTIBACTERIAL UNDYED BRAIDED (POLYGLACTIN 910), SYNTHETIC ABSORBABLE SUTURE: Brand: COATED VICRYL

## (undated) DEVICE — GAUZE,SPONGE,FLUFF,6"X6.75",STRL,10/TRAY: Brand: MEDLINE

## (undated) DEVICE — NDL SPINE 20G 3 1/2 YEL STRL 1P/U

## (undated) DEVICE — DRSNG WND GZ CURAD OIL EMULSION 3X8IN LF STRL 1PK